# Patient Record
Sex: FEMALE | Race: WHITE | NOT HISPANIC OR LATINO
[De-identification: names, ages, dates, MRNs, and addresses within clinical notes are randomized per-mention and may not be internally consistent; named-entity substitution may affect disease eponyms.]

---

## 2016-04-11 RX ORDER — INSULIN ASPART 100 [IU]/ML
22 INJECTION, SOLUTION SUBCUTANEOUS
Qty: 1 | Refills: 0 | COMMUNITY
Start: 2016-04-11 | End: 2016-05-11

## 2017-01-13 ENCOUNTER — APPOINTMENT (OUTPATIENT)
Dept: CARDIOLOGY | Facility: CLINIC | Age: 82
End: 2017-01-13

## 2017-04-06 ENCOUNTER — INPATIENT (INPATIENT)
Facility: HOSPITAL | Age: 82
LOS: 0 days | Discharge: ANOTHER HOSPITAL | DRG: 281 | End: 2017-04-07
Attending: HOSPITALIST | Admitting: HOSPITALIST
Payer: MEDICARE

## 2017-04-06 VITALS
DIASTOLIC BLOOD PRESSURE: 107 MMHG | HEIGHT: 60 IN | TEMPERATURE: 98 F | OXYGEN SATURATION: 98 % | HEART RATE: 115 BPM | SYSTOLIC BLOOD PRESSURE: 188 MMHG | WEIGHT: 194.01 LBS

## 2017-04-06 DIAGNOSIS — R93.1 ABNORMAL FINDINGS ON DIAGNOSTIC IMAGING OF HEART AND CORONARY CIRCULATION: Chronic | ICD-10-CM

## 2017-04-06 DIAGNOSIS — Z90.49 ACQUIRED ABSENCE OF OTHER SPECIFIED PARTS OF DIGESTIVE TRACT: Chronic | ICD-10-CM

## 2017-04-06 PROCEDURE — 93010 ELECTROCARDIOGRAM REPORT: CPT

## 2017-04-06 PROCEDURE — 71010: CPT | Mod: 26

## 2017-04-06 RX ORDER — CLOPIDOGREL BISULFATE 75 MG/1
300 TABLET, FILM COATED ORAL ONCE
Qty: 0 | Refills: 0 | Status: COMPLETED | OUTPATIENT
Start: 2017-04-06 | End: 2017-04-06

## 2017-04-06 RX ORDER — ASPIRIN/CALCIUM CARB/MAGNESIUM 324 MG
325 TABLET ORAL ONCE
Qty: 0 | Refills: 0 | Status: COMPLETED | OUTPATIENT
Start: 2017-04-06 | End: 2017-04-06

## 2017-04-06 RX ORDER — ENOXAPARIN SODIUM 100 MG/ML
90 INJECTION SUBCUTANEOUS ONCE
Qty: 0 | Refills: 0 | Status: COMPLETED | OUTPATIENT
Start: 2017-04-06 | End: 2017-04-06

## 2017-04-06 RX ORDER — NITROGLYCERIN 6.5 MG
10 CAPSULE, EXTENDED RELEASE ORAL
Qty: 50 | Refills: 0 | Status: DISCONTINUED | OUTPATIENT
Start: 2017-04-06 | End: 2017-04-07

## 2017-04-06 NOTE — ED PROVIDER NOTE - SIGNIFICANT NEGATIVE FINDINGS
no headache, no syncope , no palpitations, no n/v/d, no urinary symptoms, no bleeding. no neuro changes.

## 2017-04-06 NOTE — ED ADULT NURSE NOTE - PMH
Asthma    CAD (coronary artery disease)    Diabetes    HLD (hyperlipidemia)    HTN (hypertension)    Hypothyroid

## 2017-04-06 NOTE — ED PROVIDER NOTE - CARE PLAN
Principal Discharge DX:	Chest pain  Secondary Diagnosis:	Asthma  Secondary Diagnosis:	Abdominal pain

## 2017-04-06 NOTE — ED PROVIDER NOTE - OBJECTIVE STATEMENT
83 y/o w/f h/o Asthma, HTN c/o chest tightness, SOB and wheezing. She took one albuterol PTA. The chest  tightness has subsided, the breathing discomfort is reduced. She also has abdominal bloating and RLQ pain

## 2017-04-06 NOTE — ED ADULT NURSE NOTE - OBJECTIVE STATEMENT
pt presents to the ER with an episode of chest pain that lasted two hours across her chest with SOB   EMS started PIV and gave aspirin and nitroglycerin with relief

## 2017-04-07 ENCOUNTER — INPATIENT (INPATIENT)
Facility: HOSPITAL | Age: 82
LOS: 3 days | Discharge: HOME | DRG: 246 | End: 2017-04-11
Attending: HOSPITALIST | Admitting: INTERNAL MEDICINE
Payer: MEDICARE

## 2017-04-07 VITALS
WEIGHT: 194.01 LBS | DIASTOLIC BLOOD PRESSURE: 77 MMHG | HEIGHT: 60 IN | OXYGEN SATURATION: 97 % | SYSTOLIC BLOOD PRESSURE: 177 MMHG | TEMPERATURE: 98 F | HEART RATE: 78 BPM | RESPIRATION RATE: 20 BRPM

## 2017-04-07 VITALS
TEMPERATURE: 98 F | DIASTOLIC BLOOD PRESSURE: 78 MMHG | SYSTOLIC BLOOD PRESSURE: 144 MMHG | OXYGEN SATURATION: 96 % | RESPIRATION RATE: 17 BRPM | HEART RATE: 70 BPM

## 2017-04-07 DIAGNOSIS — K21.9 GASTRO-ESOPHAGEAL REFLUX DISEASE WITHOUT ESOPHAGITIS: ICD-10-CM

## 2017-04-07 DIAGNOSIS — E03.9 HYPOTHYROIDISM, UNSPECIFIED: ICD-10-CM

## 2017-04-07 DIAGNOSIS — Z90.49 ACQUIRED ABSENCE OF OTHER SPECIFIED PARTS OF DIGESTIVE TRACT: Chronic | ICD-10-CM

## 2017-04-07 DIAGNOSIS — I10 ESSENTIAL (PRIMARY) HYPERTENSION: ICD-10-CM

## 2017-04-07 DIAGNOSIS — Z29.9 ENCOUNTER FOR PROPHYLACTIC MEASURES, UNSPECIFIED: ICD-10-CM

## 2017-04-07 DIAGNOSIS — N17.9 ACUTE KIDNEY FAILURE, UNSPECIFIED: ICD-10-CM

## 2017-04-07 DIAGNOSIS — R07.9 CHEST PAIN, UNSPECIFIED: ICD-10-CM

## 2017-04-07 DIAGNOSIS — E78.5 HYPERLIPIDEMIA, UNSPECIFIED: ICD-10-CM

## 2017-04-07 DIAGNOSIS — I21.4 NON-ST ELEVATION (NSTEMI) MYOCARDIAL INFARCTION: ICD-10-CM

## 2017-04-07 DIAGNOSIS — E11.9 TYPE 2 DIABETES MELLITUS WITHOUT COMPLICATIONS: ICD-10-CM

## 2017-04-07 DIAGNOSIS — R07.89 OTHER CHEST PAIN: ICD-10-CM

## 2017-04-07 DIAGNOSIS — R10.9 UNSPECIFIED ABDOMINAL PAIN: ICD-10-CM

## 2017-04-07 DIAGNOSIS — J45.909 UNSPECIFIED ASTHMA, UNCOMPLICATED: ICD-10-CM

## 2017-04-07 DIAGNOSIS — R93.1 ABNORMAL FINDINGS ON DIAGNOSTIC IMAGING OF HEART AND CORONARY CIRCULATION: Chronic | ICD-10-CM

## 2017-04-07 LAB
ALBUMIN SERPL ELPH-MCNC: 3.2 G/DL — LOW (ref 3.3–5)
ALP SERPL-CCNC: 80 U/L — SIGNIFICANT CHANGE UP (ref 40–120)
ALT FLD-CCNC: 17 U/L — SIGNIFICANT CHANGE UP (ref 12–78)
ANION GAP SERPL CALC-SCNC: 12 MMOL/L — SIGNIFICANT CHANGE UP (ref 5–17)
ANION GAP SERPL CALC-SCNC: 12 MMOL/L — SIGNIFICANT CHANGE UP (ref 5–17)
AST SERPL-CCNC: 15 U/L — SIGNIFICANT CHANGE UP (ref 15–37)
BASOPHILS # BLD AUTO: 0.1 K/UL — SIGNIFICANT CHANGE UP (ref 0–0.2)
BASOPHILS NFR BLD AUTO: 0.9 % — SIGNIFICANT CHANGE UP (ref 0–2)
BILIRUB SERPL-MCNC: 0.3 MG/DL — SIGNIFICANT CHANGE UP (ref 0.2–1.2)
BUN SERPL-MCNC: 21 MG/DL — SIGNIFICANT CHANGE UP (ref 7–23)
BUN SERPL-MCNC: 24 MG/DL — HIGH (ref 7–23)
CALCIUM SERPL-MCNC: 8.3 MG/DL — LOW (ref 8.5–10.1)
CALCIUM SERPL-MCNC: 8.4 MG/DL — LOW (ref 8.5–10.1)
CHLORIDE SERPL-SCNC: 101 MMOL/L — SIGNIFICANT CHANGE UP (ref 96–108)
CHLORIDE SERPL-SCNC: 102 MMOL/L — SIGNIFICANT CHANGE UP (ref 96–108)
CHOLEST SERPL-MCNC: 167 MG/DL — SIGNIFICANT CHANGE UP (ref 10–199)
CK SERPL-CCNC: 182 U/L — SIGNIFICANT CHANGE UP (ref 26–192)
CO2 SERPL-SCNC: 25 MMOL/L — SIGNIFICANT CHANGE UP (ref 22–31)
CO2 SERPL-SCNC: 25 MMOL/L — SIGNIFICANT CHANGE UP (ref 22–31)
CREAT SERPL-MCNC: 1.1 MG/DL — SIGNIFICANT CHANGE UP (ref 0.5–1.3)
CREAT SERPL-MCNC: 1.4 MG/DL — HIGH (ref 0.5–1.3)
EOSINOPHIL # BLD AUTO: 0.1 K/UL — SIGNIFICANT CHANGE UP (ref 0–0.5)
EOSINOPHIL NFR BLD AUTO: 1.1 % — SIGNIFICANT CHANGE UP (ref 0–6)
GLUCOSE SERPL-MCNC: 306 MG/DL — HIGH (ref 70–99)
GLUCOSE SERPL-MCNC: 404 MG/DL — HIGH (ref 70–99)
HBA1C BLD-MCNC: 9.3 % — HIGH (ref 4–5.6)
HCT VFR BLD CALC: 39.3 % — SIGNIFICANT CHANGE UP (ref 34.5–45)
HCT VFR BLD CALC: 39.8 % — SIGNIFICANT CHANGE UP (ref 34.5–45)
HDLC SERPL-MCNC: 37 MG/DL — LOW (ref 40–125)
HGB BLD-MCNC: 12.9 G/DL — SIGNIFICANT CHANGE UP (ref 11.5–15.5)
HGB BLD-MCNC: 13.1 G/DL — SIGNIFICANT CHANGE UP (ref 11.5–15.5)
INR BLD: 0.98 RATIO — SIGNIFICANT CHANGE UP (ref 0.88–1.16)
LIPID PNL WITH DIRECT LDL SERPL: 89 MG/DL — SIGNIFICANT CHANGE UP
LYMPHOCYTES # BLD AUTO: 2.8 K/UL — SIGNIFICANT CHANGE UP (ref 1–3.3)
LYMPHOCYTES # BLD AUTO: 20.4 % — SIGNIFICANT CHANGE UP (ref 13–44)
MCHC RBC-ENTMCNC: 27.4 PG — SIGNIFICANT CHANGE UP (ref 27–34)
MCHC RBC-ENTMCNC: 28.3 PG — SIGNIFICANT CHANGE UP (ref 27–34)
MCHC RBC-ENTMCNC: 32.4 GM/DL — SIGNIFICANT CHANGE UP (ref 32–36)
MCHC RBC-ENTMCNC: 33.4 GM/DL — SIGNIFICANT CHANGE UP (ref 32–36)
MCV RBC AUTO: 84.4 FL — SIGNIFICANT CHANGE UP (ref 80–100)
MCV RBC AUTO: 84.6 FL — SIGNIFICANT CHANGE UP (ref 80–100)
MONOCYTES # BLD AUTO: 1 K/UL — HIGH (ref 0–0.9)
MONOCYTES NFR BLD AUTO: 7.2 % — SIGNIFICANT CHANGE UP (ref 1–9)
NEUTROPHILS # BLD AUTO: 9.5 K/UL — HIGH (ref 1.8–7.4)
NEUTROPHILS NFR BLD AUTO: 70.5 % — SIGNIFICANT CHANGE UP (ref 43–77)
PLATELET # BLD AUTO: 232 K/UL — SIGNIFICANT CHANGE UP (ref 150–400)
PLATELET # BLD AUTO: 238 K/UL — SIGNIFICANT CHANGE UP (ref 150–400)
POTASSIUM SERPL-MCNC: 3.8 MMOL/L — SIGNIFICANT CHANGE UP (ref 3.5–5.3)
POTASSIUM SERPL-MCNC: 3.8 MMOL/L — SIGNIFICANT CHANGE UP (ref 3.5–5.3)
POTASSIUM SERPL-SCNC: 3.8 MMOL/L — SIGNIFICANT CHANGE UP (ref 3.5–5.3)
POTASSIUM SERPL-SCNC: 3.8 MMOL/L — SIGNIFICANT CHANGE UP (ref 3.5–5.3)
PROT SERPL-MCNC: 7.1 G/DL — SIGNIFICANT CHANGE UP (ref 6–8.3)
PROTHROM AB SERPL-ACNC: 10.7 SEC — SIGNIFICANT CHANGE UP (ref 9.8–12.7)
RBC # BLD: 4.64 M/UL — SIGNIFICANT CHANGE UP (ref 3.8–5.2)
RBC # BLD: 4.72 M/UL — SIGNIFICANT CHANGE UP (ref 3.8–5.2)
RBC # FLD: 12.9 % — SIGNIFICANT CHANGE UP (ref 10.3–14.5)
RBC # FLD: 12.9 % — SIGNIFICANT CHANGE UP (ref 10.3–14.5)
SODIUM SERPL-SCNC: 138 MMOL/L — SIGNIFICANT CHANGE UP (ref 135–145)
SODIUM SERPL-SCNC: 139 MMOL/L — SIGNIFICANT CHANGE UP (ref 135–145)
TOTAL CHOLESTEROL/HDL RATIO MEASUREMENT: 4.5 RATIO — SIGNIFICANT CHANGE UP (ref 3.3–7.1)
TRIGL SERPL-MCNC: 204 MG/DL — HIGH (ref 10–149)
TROPONIN I SERPL-MCNC: 0.43 NG/ML — HIGH (ref 0.01–0.04)
WBC # BLD: 12.4 K/UL — HIGH (ref 3.8–10.5)
WBC # BLD: 13.5 K/UL — HIGH (ref 3.8–10.5)
WBC # FLD AUTO: 12.4 K/UL — HIGH (ref 3.8–10.5)
WBC # FLD AUTO: 13.5 K/UL — HIGH (ref 3.8–10.5)

## 2017-04-07 PROCEDURE — 96372 THER/PROPH/DIAG INJ SC/IM: CPT | Mod: 59

## 2017-04-07 PROCEDURE — 74176 CT ABD & PELVIS W/O CONTRAST: CPT

## 2017-04-07 PROCEDURE — 83036 HEMOGLOBIN GLYCOSYLATED A1C: CPT

## 2017-04-07 PROCEDURE — 85610 PROTHROMBIN TIME: CPT

## 2017-04-07 PROCEDURE — 74176 CT ABD & PELVIS W/O CONTRAST: CPT | Mod: 26

## 2017-04-07 PROCEDURE — 96374 THER/PROPH/DIAG INJ IV PUSH: CPT

## 2017-04-07 PROCEDURE — 93005 ELECTROCARDIOGRAM TRACING: CPT

## 2017-04-07 PROCEDURE — 80053 COMPREHEN METABOLIC PANEL: CPT

## 2017-04-07 PROCEDURE — 71045 X-RAY EXAM CHEST 1 VIEW: CPT

## 2017-04-07 PROCEDURE — 84484 ASSAY OF TROPONIN QUANT: CPT

## 2017-04-07 PROCEDURE — 94640 AIRWAY INHALATION TREATMENT: CPT

## 2017-04-07 PROCEDURE — 82550 ASSAY OF CK (CPK): CPT

## 2017-04-07 PROCEDURE — 93458 L HRT ARTERY/VENTRICLE ANGIO: CPT | Mod: 26,59

## 2017-04-07 PROCEDURE — 92941 PRQ TRLML REVSC TOT OCCL AMI: CPT | Mod: LD

## 2017-04-07 PROCEDURE — 76705 ECHO EXAM OF ABDOMEN: CPT

## 2017-04-07 PROCEDURE — 99239 HOSP IP/OBS DSCHRG MGMT >30: CPT

## 2017-04-07 PROCEDURE — 99223 1ST HOSP IP/OBS HIGH 75: CPT | Mod: AI,GC

## 2017-04-07 PROCEDURE — 80061 LIPID PANEL: CPT

## 2017-04-07 PROCEDURE — 76705 ECHO EXAM OF ABDOMEN: CPT | Mod: 26

## 2017-04-07 PROCEDURE — 80048 BASIC METABOLIC PNL TOTAL CA: CPT

## 2017-04-07 PROCEDURE — 99223 1ST HOSP IP/OBS HIGH 75: CPT

## 2017-04-07 PROCEDURE — 85027 COMPLETE CBC AUTOMATED: CPT

## 2017-04-07 PROCEDURE — 99285 EMERGENCY DEPT VISIT HI MDM: CPT

## 2017-04-07 PROCEDURE — 82553 CREATINE MB FRACTION: CPT

## 2017-04-07 PROCEDURE — 99285 EMERGENCY DEPT VISIT HI MDM: CPT | Mod: 25

## 2017-04-07 PROCEDURE — 93010 ELECTROCARDIOGRAM REPORT: CPT

## 2017-04-07 RX ORDER — FLUTICASONE PROPIONATE AND SALMETEROL 50; 250 UG/1; UG/1
1 POWDER ORAL; RESPIRATORY (INHALATION)
Qty: 0 | Refills: 0 | Status: DISCONTINUED | OUTPATIENT
Start: 2017-04-07 | End: 2017-04-11

## 2017-04-07 RX ORDER — TIOTROPIUM BROMIDE 18 UG/1
1 CAPSULE ORAL; RESPIRATORY (INHALATION) DAILY
Qty: 0 | Refills: 0 | Status: DISCONTINUED | OUTPATIENT
Start: 2017-04-07 | End: 2017-04-07

## 2017-04-07 RX ORDER — DEXTROSE 50 % IN WATER 50 %
50 SYRINGE (ML) INTRAVENOUS
Qty: 0 | Refills: 0 | COMMUNITY
Start: 2017-04-07

## 2017-04-07 RX ORDER — INSULIN GLARGINE 100 [IU]/ML
55 INJECTION, SOLUTION SUBCUTANEOUS AT BEDTIME
Qty: 0 | Refills: 0 | Status: DISCONTINUED | OUTPATIENT
Start: 2017-04-07 | End: 2017-04-07

## 2017-04-07 RX ORDER — INSULIN GLARGINE 100 [IU]/ML
48 INJECTION, SOLUTION SUBCUTANEOUS AT BEDTIME
Qty: 0 | Refills: 0 | Status: DISCONTINUED | OUTPATIENT
Start: 2017-04-07 | End: 2017-04-09

## 2017-04-07 RX ORDER — ASPIRIN/CALCIUM CARB/MAGNESIUM 324 MG
325 TABLET ORAL DAILY
Qty: 0 | Refills: 0 | Status: DISCONTINUED | OUTPATIENT
Start: 2017-04-07 | End: 2017-04-07

## 2017-04-07 RX ORDER — CLOPIDOGREL BISULFATE 75 MG/1
75 TABLET, FILM COATED ORAL DAILY
Qty: 0 | Refills: 0 | Status: DISCONTINUED | OUTPATIENT
Start: 2017-04-08 | End: 2017-04-11

## 2017-04-07 RX ORDER — METOPROLOL TARTRATE 50 MG
50 TABLET ORAL DAILY
Qty: 0 | Refills: 0 | Status: COMPLETED | OUTPATIENT
Start: 2017-04-07 | End: 2017-04-07

## 2017-04-07 RX ORDER — ALBUTEROL 90 UG/1
2 AEROSOL, METERED ORAL EVERY 6 HOURS
Qty: 0 | Refills: 0 | Status: DISCONTINUED | OUTPATIENT
Start: 2017-04-07 | End: 2017-04-11

## 2017-04-07 RX ORDER — ASPIRIN/CALCIUM CARB/MAGNESIUM 324 MG
1 TABLET ORAL
Qty: 0 | Refills: 0 | COMMUNITY
Start: 2017-04-07

## 2017-04-07 RX ORDER — DEXTROSE 50 % IN WATER 50 %
25 SYRINGE (ML) INTRAVENOUS ONCE
Qty: 0 | Refills: 0 | Status: DISCONTINUED | OUTPATIENT
Start: 2017-04-07 | End: 2017-04-07

## 2017-04-07 RX ORDER — INSULIN LISPRO 100/ML
VIAL (ML) SUBCUTANEOUS
Qty: 0 | Refills: 0 | Status: DISCONTINUED | OUTPATIENT
Start: 2017-04-07 | End: 2017-04-07

## 2017-04-07 RX ORDER — DEXTROSE 50 % IN WATER 50 %
1 SYRINGE (ML) INTRAVENOUS ONCE
Qty: 0 | Refills: 0 | Status: DISCONTINUED | OUTPATIENT
Start: 2017-04-07 | End: 2017-04-07

## 2017-04-07 RX ORDER — PANTOPRAZOLE SODIUM 20 MG/1
40 TABLET, DELAYED RELEASE ORAL
Qty: 0 | Refills: 0 | Status: DISCONTINUED | OUTPATIENT
Start: 2017-04-07 | End: 2017-04-11

## 2017-04-07 RX ORDER — FUROSEMIDE 40 MG
20 TABLET ORAL DAILY
Qty: 0 | Refills: 0 | Status: DISCONTINUED | OUTPATIENT
Start: 2017-04-07 | End: 2017-04-09

## 2017-04-07 RX ORDER — TIOTROPIUM BROMIDE 18 UG/1
1 CAPSULE ORAL; RESPIRATORY (INHALATION) DAILY
Qty: 0 | Refills: 0 | Status: DISCONTINUED | OUTPATIENT
Start: 2017-04-07 | End: 2017-04-11

## 2017-04-07 RX ORDER — METOPROLOL TARTRATE 50 MG
1 TABLET ORAL
Qty: 0 | Refills: 0 | COMMUNITY
Start: 2017-04-07

## 2017-04-07 RX ORDER — INSULIN LISPRO 100/ML
22 VIAL (ML) SUBCUTANEOUS
Qty: 0 | Refills: 0 | Status: DISCONTINUED | OUTPATIENT
Start: 2017-04-07 | End: 2017-04-07

## 2017-04-07 RX ORDER — LEVOTHYROXINE SODIUM 125 MCG
112 TABLET ORAL DAILY
Qty: 0 | Refills: 0 | Status: DISCONTINUED | OUTPATIENT
Start: 2017-04-07 | End: 2017-04-07

## 2017-04-07 RX ORDER — BUDESONIDE AND FORMOTEROL FUMARATE DIHYDRATE 160; 4.5 UG/1; UG/1
2 AEROSOL RESPIRATORY (INHALATION)
Qty: 0 | Refills: 0 | COMMUNITY

## 2017-04-07 RX ORDER — ALBUTEROL 90 UG/1
2.5 AEROSOL, METERED ORAL ONCE
Qty: 0 | Refills: 0 | Status: COMPLETED | OUTPATIENT
Start: 2017-04-07 | End: 2017-04-08

## 2017-04-07 RX ORDER — DEXTROSE 50 % IN WATER 50 %
12.5 SYRINGE (ML) INTRAVENOUS ONCE
Qty: 0 | Refills: 0 | Status: DISCONTINUED | OUTPATIENT
Start: 2017-04-07 | End: 2017-04-11

## 2017-04-07 RX ORDER — LABETALOL HCL 100 MG
10 TABLET ORAL ONCE
Qty: 0 | Refills: 0 | Status: COMPLETED | OUTPATIENT
Start: 2017-04-07 | End: 2017-04-07

## 2017-04-07 RX ORDER — METOPROLOL TARTRATE 50 MG
25 TABLET ORAL ONCE
Qty: 0 | Refills: 0 | Status: COMPLETED | OUTPATIENT
Start: 2017-04-07 | End: 2017-04-07

## 2017-04-07 RX ORDER — INSULIN LISPRO 100/ML
VIAL (ML) SUBCUTANEOUS
Qty: 0 | Refills: 0 | Status: DISCONTINUED | OUTPATIENT
Start: 2017-04-07 | End: 2017-04-10

## 2017-04-07 RX ORDER — INSULIN LISPRO 100/ML
0 VIAL (ML) SUBCUTANEOUS
Qty: 0 | Refills: 0 | COMMUNITY
Start: 2017-04-07

## 2017-04-07 RX ORDER — DEXTROSE 50 % IN WATER 50 %
25 SYRINGE (ML) INTRAVENOUS ONCE
Qty: 0 | Refills: 0 | Status: DISCONTINUED | OUTPATIENT
Start: 2017-04-07 | End: 2017-04-11

## 2017-04-07 RX ORDER — GLUCAGON INJECTION, SOLUTION 0.5 MG/.1ML
1 INJECTION, SOLUTION SUBCUTANEOUS ONCE
Qty: 0 | Refills: 0 | Status: DISCONTINUED | OUTPATIENT
Start: 2017-04-07 | End: 2017-04-11

## 2017-04-07 RX ORDER — LEVOTHYROXINE SODIUM 125 MCG
112 TABLET ORAL DAILY
Qty: 0 | Refills: 0 | Status: DISCONTINUED | OUTPATIENT
Start: 2017-04-07 | End: 2017-04-11

## 2017-04-07 RX ORDER — ATORVASTATIN CALCIUM 80 MG/1
40 TABLET, FILM COATED ORAL AT BEDTIME
Qty: 0 | Refills: 0 | Status: DISCONTINUED | OUTPATIENT
Start: 2017-04-07 | End: 2017-04-11

## 2017-04-07 RX ORDER — INSULIN LISPRO 100/ML
15 VIAL (ML) SUBCUTANEOUS
Qty: 0 | Refills: 0 | Status: DISCONTINUED | OUTPATIENT
Start: 2017-04-07 | End: 2017-04-09

## 2017-04-07 RX ORDER — INSULIN LISPRO 100/ML
11 VIAL (ML) SUBCUTANEOUS
Qty: 0 | Refills: 0 | Status: DISCONTINUED | OUTPATIENT
Start: 2017-04-07 | End: 2017-04-07

## 2017-04-07 RX ORDER — ASPIRIN/CALCIUM CARB/MAGNESIUM 324 MG
81 TABLET ORAL DAILY
Qty: 0 | Refills: 0 | Status: DISCONTINUED | OUTPATIENT
Start: 2017-04-08 | End: 2017-04-11

## 2017-04-07 RX ORDER — CLOPIDOGREL BISULFATE 75 MG/1
1 TABLET, FILM COATED ORAL
Qty: 0 | Refills: 0 | COMMUNITY
Start: 2017-04-07

## 2017-04-07 RX ORDER — ATORVASTATIN CALCIUM 80 MG/1
40 TABLET, FILM COATED ORAL AT BEDTIME
Qty: 0 | Refills: 0 | Status: DISCONTINUED | OUTPATIENT
Start: 2017-04-07 | End: 2017-04-07

## 2017-04-07 RX ORDER — SODIUM CHLORIDE 9 MG/ML
1000 INJECTION, SOLUTION INTRAVENOUS
Qty: 0 | Refills: 0 | COMMUNITY
Start: 2017-04-07

## 2017-04-07 RX ORDER — BUDESONIDE AND FORMOTEROL FUMARATE DIHYDRATE 160; 4.5 UG/1; UG/1
2 AEROSOL RESPIRATORY (INHALATION)
Qty: 0 | Refills: 0 | COMMUNITY
Start: 2017-04-07

## 2017-04-07 RX ORDER — SODIUM CHLORIDE 9 MG/ML
1000 INJECTION INTRAMUSCULAR; INTRAVENOUS; SUBCUTANEOUS
Qty: 0 | Refills: 0 | Status: DISCONTINUED | OUTPATIENT
Start: 2017-04-07 | End: 2017-04-07

## 2017-04-07 RX ORDER — DEXTROSE 50 % IN WATER 50 %
1 SYRINGE (ML) INTRAVENOUS ONCE
Qty: 0 | Refills: 0 | Status: DISCONTINUED | OUTPATIENT
Start: 2017-04-07 | End: 2017-04-11

## 2017-04-07 RX ORDER — ALBUTEROL 90 UG/1
2.5 AEROSOL, METERED ORAL EVERY 6 HOURS
Qty: 0 | Refills: 0 | Status: DISCONTINUED | OUTPATIENT
Start: 2017-04-07 | End: 2017-04-07

## 2017-04-07 RX ORDER — DEXTROSE 50 % IN WATER 50 %
1 SYRINGE (ML) INTRAVENOUS
Qty: 0 | Refills: 0 | COMMUNITY
Start: 2017-04-07

## 2017-04-07 RX ORDER — DEXTROSE 50 % IN WATER 50 %
12.5 SYRINGE (ML) INTRAVENOUS ONCE
Qty: 0 | Refills: 0 | Status: DISCONTINUED | OUTPATIENT
Start: 2017-04-07 | End: 2017-04-07

## 2017-04-07 RX ORDER — DEXTROSE 50 % IN WATER 50 %
25 SYRINGE (ML) INTRAVENOUS
Qty: 0 | Refills: 0 | COMMUNITY
Start: 2017-04-07

## 2017-04-07 RX ORDER — INSULIN LISPRO 100/ML
11 VIAL (ML) SUBCUTANEOUS
Qty: 0 | Refills: 0 | COMMUNITY
Start: 2017-04-07

## 2017-04-07 RX ORDER — SODIUM CHLORIDE 9 MG/ML
1000 INJECTION, SOLUTION INTRAVENOUS
Qty: 0 | Refills: 0 | Status: DISCONTINUED | OUTPATIENT
Start: 2017-04-07 | End: 2017-04-07

## 2017-04-07 RX ORDER — GLUCAGON INJECTION, SOLUTION 0.5 MG/.1ML
1 INJECTION, SOLUTION SUBCUTANEOUS ONCE
Qty: 0 | Refills: 0 | Status: DISCONTINUED | OUTPATIENT
Start: 2017-04-07 | End: 2017-04-07

## 2017-04-07 RX ORDER — BUDESONIDE AND FORMOTEROL FUMARATE DIHYDRATE 160; 4.5 UG/1; UG/1
2 AEROSOL RESPIRATORY (INHALATION)
Qty: 0 | Refills: 0 | Status: DISCONTINUED | OUTPATIENT
Start: 2017-04-07 | End: 2017-04-07

## 2017-04-07 RX ORDER — SODIUM CHLORIDE 9 MG/ML
1000 INJECTION, SOLUTION INTRAVENOUS
Qty: 0 | Refills: 0 | Status: DISCONTINUED | OUTPATIENT
Start: 2017-04-07 | End: 2017-04-11

## 2017-04-07 RX ORDER — CLOPIDOGREL BISULFATE 75 MG/1
75 TABLET, FILM COATED ORAL DAILY
Qty: 0 | Refills: 0 | Status: DISCONTINUED | OUTPATIENT
Start: 2017-04-07 | End: 2017-04-07

## 2017-04-07 RX ORDER — INSULIN LISPRO 100/ML
VIAL (ML) SUBCUTANEOUS AT BEDTIME
Qty: 0 | Refills: 0 | Status: DISCONTINUED | OUTPATIENT
Start: 2017-04-07 | End: 2017-04-11

## 2017-04-07 RX ORDER — METOPROLOL TARTRATE 50 MG
50 TABLET ORAL DAILY
Qty: 0 | Refills: 0 | Status: DISCONTINUED | OUTPATIENT
Start: 2017-04-07 | End: 2017-04-11

## 2017-04-07 RX ADMIN — Medication 112 MICROGRAM(S): at 05:43

## 2017-04-07 RX ADMIN — FLUTICASONE PROPIONATE AND SALMETEROL 1 DOSE(S): 50; 250 POWDER ORAL; RESPIRATORY (INHALATION) at 23:16

## 2017-04-07 RX ADMIN — SODIUM CHLORIDE 75 MILLILITER(S): 9 INJECTION INTRAMUSCULAR; INTRAVENOUS; SUBCUTANEOUS at 06:22

## 2017-04-07 RX ADMIN — ATORVASTATIN CALCIUM 40 MILLIGRAM(S): 80 TABLET, FILM COATED ORAL at 23:16

## 2017-04-07 RX ADMIN — Medication 2: at 23:17

## 2017-04-07 RX ADMIN — Medication 50 MILLIGRAM(S): at 10:15

## 2017-04-07 RX ADMIN — Medication 10 MILLIGRAM(S): at 19:50

## 2017-04-07 RX ADMIN — Medication 325 MILLIGRAM(S): at 12:11

## 2017-04-07 RX ADMIN — Medication 11 UNIT(S): at 08:32

## 2017-04-07 RX ADMIN — Medication 3: at 08:32

## 2017-04-07 RX ADMIN — TIOTROPIUM BROMIDE 1 CAPSULE(S): 18 CAPSULE ORAL; RESPIRATORY (INHALATION) at 08:33

## 2017-04-07 RX ADMIN — INSULIN GLARGINE 48 UNIT(S): 100 INJECTION, SOLUTION SUBCUTANEOUS at 23:16

## 2017-04-07 RX ADMIN — ENOXAPARIN SODIUM 90 MILLIGRAM(S): 100 INJECTION SUBCUTANEOUS at 00:10

## 2017-04-07 RX ADMIN — Medication 3 MICROGRAM(S)/MIN: at 00:43

## 2017-04-07 RX ADMIN — Medication 25 MILLIGRAM(S): at 02:05

## 2017-04-07 RX ADMIN — CLOPIDOGREL BISULFATE 300 MILLIGRAM(S): 75 TABLET, FILM COATED ORAL at 00:43

## 2017-04-07 RX ADMIN — BUDESONIDE AND FORMOTEROL FUMARATE DIHYDRATE 2 PUFF(S): 160; 4.5 AEROSOL RESPIRATORY (INHALATION) at 10:15

## 2017-04-07 RX ADMIN — CLOPIDOGREL BISULFATE 75 MILLIGRAM(S): 75 TABLET, FILM COATED ORAL at 12:11

## 2017-04-07 NOTE — H&P ADULT - NSHPSOCIALHISTORY_GEN_ALL_CORE
smoked cigarettes 40 years ago, smoked for 20 year, 1 pack per week   no alcohol, no drugs    , ambulates with walker    received flu vaccine this season   received pna vaccine 3 years ago

## 2017-04-07 NOTE — H&P CARDIOLOGY - PMH
DM2 (diabetes mellitus, type 2)    Essential hypertension    Gastroesophageal reflux disease without esophagitis    Hypothyroidism, unspecified type    Pure hypercholesterolemia    Uncomplicated asthma, unspecified asthma severity DM2 (diabetes mellitus, type 2)    Essential hypertension    Gastroesophageal reflux disease without esophagitis    Hypothyroidism, unspecified type    NSTEMI (non-ST elevated myocardial infarction)    Pure hypercholesterolemia    Uncomplicated asthma, unspecified asthma severity

## 2017-04-07 NOTE — H&P ADULT - FAMILY HISTORY
Father  Still living? Unknown  Family history of heart disease, Age at diagnosis: Age Unknown     Mother  Still living? Unknown  Family history of cancer in mother, Age at diagnosis: Age Unknown

## 2017-04-07 NOTE — H&P ADULT - ASSESSMENT
81 yo female with pmh of DM, Asthma, CAD, hypothyroid, Htn presented c/o chest pain admitted with chest pain r/o acs, veda, elevated glucose, and questionable abdominal pain.

## 2017-04-07 NOTE — H&P ADULT - HISTORY OF PRESENT ILLNESS
81 yo female with pmh of DM, Asthma, CAD, hypothyroid, Htn presented c/o chest pain. Pt states at around 10pm last night she began having this heaviness across her chest and then left arm pain that persisted. Also admitted to worsening sob and nausea. Denies ever having this pain before. Denies abdominal pain, v/d/c, dizziness, headache, blurry vision. Pt denies any current pain now and stated it improved after medication she received in the ER.  In the ED pt was found to have creatinine of 1.4, glucose of 404, trop 0.428. Pt received asa and plavix and full dose lovenox. Pt started on nitroglycerin drip. Pt also had a ct- abdomen (for rlq pain per ER note, which pt is denying now) showing no bowel obstruction, drainable fluid collection or intraperitoneal free air. Cholelithiasis.

## 2017-04-07 NOTE — H&P ADULT. - ASSESSMENT
81 yo female with PMH of DM2, ?CKD, severe Asthma, CAD, hypothyroid, HTN, HLD, obesity, former smoker 5 pack years presented with NSTEMI, s/p cardiac cath and stents x 3 to mid and prox LAD.

## 2017-04-07 NOTE — CONSULT NOTE ADULT - PROBLEM SELECTOR RECOMMENDATION 9
chest pain resolved at this time   recommend readministration of sublingual nitro if returns  submental oxygen

## 2017-04-07 NOTE — H&P ADULT - PROBLEM SELECTOR PLAN 1
admit to tele  follow up with Cardiology   trend troponin   ekg in AM   continue asa and lovenox full dose  pain controlled with nirtroglycerin drip in the ED admit to tele  follow up with Cardiology Dr Marinelli  trend troponin   ekg in AM   continue asa and lovenox full dose  pain controlled with nirtroglycerin drip in the ED admit to tele  follow up with Cardiology Dr Marinelli  trend troponin   ekg in AM   continue asa and plavix, s/p full dose lovenox, discuss with cardio if want to continue  nitroglycerin drip in the ED, d/c for now

## 2017-04-07 NOTE — H&P ADULT - GASTROINTESTINAL DETAILS
bowel sounds normal/no rebound tenderness/no masses palpable/no rigidity/no bruit/no distention/soft/no guarding/nontender/normal/no organomegaly

## 2017-04-07 NOTE — H&P ADULT. - LAB RESULTS AND INTERPRETATION
Labs personally reviewed and interpreted:  labs from [facility] 4/7/17  A1c 9.3, Chol 167, HDL 37, LDL 89  leukocytosis of 13.5, Hb 13.1, platelet 232, electrolytes normal BUN 21, cr. 1.1, glucose 306  calcium 8.3; , CKMB 15, troponin 0.045

## 2017-04-07 NOTE — CONSULT NOTE ADULT - PROBLEM SELECTOR RECOMMENDATION 2
discontinue Tridol ( nitro) drip   give hydralazine IV or labetalol for hypertension if returns   restart home antihypertensive meds

## 2017-04-07 NOTE — H&P ADULT - ATTENDING COMMENTS
83yo f pmh stated above comes in with Chest pain, hypertensive,  nitroglycerin was started. EKG st depression. Trops elevated.  G was contacted, recommended ICU monitoring.  ICU evaluated pt, pt's symptoms subsided, recommended telemetry floor.  Recommended also to D/c nitroglycerin and give labetolol.

## 2017-04-07 NOTE — H&P ADULT. - FAMILY HISTORY
Father  Still living? Unknown  Family history of MI (myocardial infarction), Age at diagnosis: Age Unknown     Mother  Still living? Unknown  Family history of stomach cancer, Age at diagnosis: Age Unknown

## 2017-04-07 NOTE — CONSULT NOTE ADULT - SUBJECTIVE AND OBJECTIVE BOX
REQUESTING PHYSICIAN:      CHIEF COMPLAINT: Patient is a 82y old  Female who presents with a chief complaint of chest pain (07 Apr 2017 03:27)      HPI:  83 yo female with pmh of DM, Asthma, CAD, hypothyroid, Htn presented c/o chest pain. Pt states at around 10pm last night she began having this heaviness across her chest and then left arm pain that persisted. Also admitted to worsening sob and nausea. Denies ever having this pain before. Denies abdominal pain, v/d/c, dizziness, headache, blurry vision. Pt denies any current pain now and stated it improved after medication she received in the ER.  In the ED pt was found to have creatinine of 1.4, glucose of 404, trop 0.428. Pt received asa and plavix and full dose lovenox. Pt started on nitroglycerin drip. Pt also had a ct- abdomen (for rlq pain per ER note, which pt is denying now) showing no bowel obstruction, drainable fluid collection or intraperitoneal free air. Cholelithiasis. (07 Apr 2017 03:27)      PAST MEDICAL / SURGICAL HISTORY:  PAST MEDICAL & SURGICAL HISTORY:  Hypothyroid  HLD (hyperlipidemia)  HTN (hypertension)  CAD (coronary artery disease)  Asthma  Diabetes  History of appendectomy        FAMILY HISTORY: FAMILY HISTORY:  Family history of cancer in mother (Mother)  Family history of heart disease (Father)      MEDICATIONS  (STANDING):  levothyroxine 112MICROGram(s) Oral daily  tiotropium 18 MICROgram(s) Capsule 1Capsule(s) Inhalation daily  buDESOnide 160 MICROgram(s)/formoterol 4.5 MICROgram(s) Inhaler 2Puff(s) Inhalation two times a day  atorvastatin 40milliGRAM(s) Oral at bedtime  diltiazem    Tablet 60milliGRAM(s) Oral three times a day  insulin glargine Injectable (LANTUS) 55Unit(s) SubCutaneous at bedtime  insulin lispro (HumaLOG) corrective regimen sliding scale  SubCutaneous Before meals and at bedtime  dextrose 5%. 1000milliLiter(s) IV Continuous <Continuous>  dextrose 50% Injectable 12.5Gram(s) IV Push once  dextrose 50% Injectable 25Gram(s) IV Push once  dextrose 50% Injectable 25Gram(s) IV Push once  aspirin 325milliGRAM(s) Oral daily  clopidogrel Tablet 75milliGRAM(s) Oral daily  insulin lispro Injectable (HumaLOG) 11Unit(s) SubCutaneous three times a day before meals  sodium chloride 0.9%. 1000milliLiter(s) IV Continuous <Continuous>    MEDICATIONS  (PRN):  ALBUTerol    0.083% 2.5milliGRAM(s) Nebulizer every 6 hours PRN Shortness of Breath and/or Wheezing  dextrose Gel 1Dose(s) Oral once PRN Blood Glucose LESS THAN 70 milliGRAM(s)/deciLiter  glucagon  Injectable 1milliGRAM(s) IntraMuscular once PRN Glucose <70 milliGRAM(s)/deciLiter      Allergies    iodine containing compounds (Unknown)  shellfish (Swelling; Short breath)    Intolerances        REVIEW OF SYSTEMS:    CONSTITUTIONAL: No weakness, fevers or chills  EYES/ENT: No visual changes;  No vertigo or throat pain   NECK: No pain or stiffness  RESPIRATORY: No cough, wheezing, hemoptysis; No shortness of breath  CARDIOVASCULAR: No chest pain or palpitations  GASTROINTESTINAL: No abdominal pain. No nausea, vomiting, or hematemesis; No diarrhea or constipation. No melena or hematochezia.  GENITOURINARY: No dysuria, frequency or hematuria  NEUROLOGICAL: No numbness or weakness  SKIN: No itching or rash  All other review of systems is negative unless indicated above    VITAL SIGNS:   Vital Signs Last 24 Hrs  T(C): 36.7, Max: 36.7 (04-07 @ 02:55)  T(F): 98, Max: 98 (04-07 @ 02:55)  HR: 71 (71 - 115)  BP: 143/58 (128/- - 188/107)  BP(mean): --  RR: 17 (17 - 22)  SpO2: 97% (97% - 98%)    I&O's Summary      PHYSICAL EXAM:    Constitutional: NAD, awake and alert, well-developed  Eyes:  EOMI,  Pupils round, No oral cyanosis.  Pulmonary: Non-labored, breath sounds are clear bilaterally, No wheezing, rales or rhonchi  Cardiovascular: S1 and S2, regular rate and rhythm, no Murmurs, gallops or rubs  Gastrointestinal: Bowel Sounds present, soft, nontender.   Lymph: No peripheral edema. No cervical lymphadenopathy.  Neurological: Alert, no focal deficits  Skin: No rashes.  Psych:  Mood & affect appropriate    LABS: All Labs Reviewed:                        13.1   13.5  )-----------( 232      ( 07 Apr 2017 06:15 )             39.3                         12.9   12.4  )-----------( 238      ( 07 Apr 2017 00:02 )             39.8     07 Apr 2017 06:15    139    |  102    |  21     ----------------------------<  306    3.8     |  25     |  1.10   07 Apr 2017 00:02    138    |  101    |  24     ----------------------------<  404    3.8     |  25     |  1.40     Ca    8.3        07 Apr 2017 06:15  Ca    8.4        07 Apr 2017 00:02    TPro  7.1    /  Alb  3.2    /  TBili  0.3    /  DBili  x      /  AST  15     /  ALT  17     /  AlkPhos  80     07 Apr 2017 00:02    PT/INR - ( 07 Apr 2017 00:02 )   PT: 10.7 sec;   INR: 0.98 ratio           CARDIAC MARKERS ( 07 Apr 2017 06:15 )  3.950 ng/mL / x     / 259 U/L / x     / 15.0 ng/mL  CARDIAC MARKERS ( 07 Apr 2017 00:02 )  .428 ng/mL / x     / 182 U/L / x     / x          Blood Culture:         EKG: Sinus tachycardia. ST-T abn possible ischemia    Imaging:  EXAM:  CHEST PORTABLE IMMED                            PROCEDURE DATE:  04/06/2017        INTERPRETATION:  Chest portable semierect single AP view:    Clinical history:      Chest pain    Findings:    Cardiac size appears normal. Aorta within normal limits. Lungs appear   normal. No acute pulmonary process seen. CP angles appear normal. No   pneumothorax. Bones within normal limits.    Impression:    Unremarkable study.
82y  Female  iodine containing compounds (Unknown)  shellfish (Swelling; Short breath)    PAST MEDICAL & SURGICAL HISTORY:  Hypothyroid  HLD (hyperlipidemia)  HTN (hypertension)  CAD (coronary artery disease)  Asthma  Diabetes  History of appendectomy    FAMILY HISTORY:  Family history of cancer in mother (Mother)  Family history of heart disease (Father)    Patient is a 82y old  Female who presented with a chief complaint of chest pain     HPI:  83 yo female with pmh of DM, Asthma, CAD, hypothyroid, Htn presented c/o chest pain started 10pm last night , described as heaviness across her chest and then left arm pain that persisted. Also admitted to worsening sob and some nausea but no vomiting . Denies v/d/c, dizziness, headache, blurry vision. Pt denies any current pain now which  improved with nitro drip started by ER MD.  trop 0.428.with st depression in V leads but no ST elevation  Pt received asa and Plavix and full dose Lovenox Pt started on nitroglycerin drip but chest discomfort has resolved .   There was also a  ct- abdomen  showing no bowel obstruction, drainable fluid collection or intraperitoneal free air. Cholelithiasis. This was orded by ER MD when pt complained of abdominal pain which she currently denies Presently Pt seems anxious but not in distress or pain.     Vital Signs Last 24 Hrs  T(C): 36.7, Max: 36.7 (04-07 @ 02:55)  T(F): 98, Max: 98 (04-07 @ 02:55)  HR: 71 (71 - 115)  BP: 143/58 (128/- - 188/107)  BP(mean): --  RR: 17 (17 - 22)  SpO2: 97% (97% - 98%)  I&O's Summary    Radiology    ct abdomin   No bowel obstruction, drainable fluid collection or intraperitoneal free  air. Cholelithiasis. Recommend clinical correlation      LABS                        13.1   13.5  )-----------( 232      ( 07 Apr 2017 06:15 )             39.3     PT/INR - ( 07 Apr 2017 00:02 )   PT: 10.7 sec;   INR: 0.98 ratio           04-07    139  |  102  |  21  ----------------------------<  306<H>  3.8   |  25  |  1.10    Ca    8.3<L>      07 Apr 2017 06:15    TPro  7.1  /  Alb  3.2<L>  /  TBili  0.3  /  DBili  x   /  AST  15  /  ALT  17  /  AlkPhos  80  04-07    LIVER FUNCTIONS - ( 07 Apr 2017 00:02 )  Alb: 3.2 g/dL / Pro: 7.1 g/dL / ALK PHOS: 80 U/L / ALT: 17 U/L / AST: 15 U/L / GGT: x           CAPILLARY BLOOD GLUCOSE      VENT SETTINGS     MEDICATIONS  (STANDING):  levothyroxine 112MICROGram(s) Oral daily  tiotropium 18 MICROgram(s) Capsule 1Capsule(s) Inhalation daily  buDESOnide 160 MICROgram(s)/formoterol 4.5 MICROgram(s) Inhaler 2Puff(s) Inhalation two times a day  atorvastatin 40milliGRAM(s) Oral at bedtime  diltiazem    Tablet 60milliGRAM(s) Oral three times a day  insulin glargine Injectable (LANTUS) 55Unit(s) SubCutaneous at bedtime  insulin lispro (HumaLOG) corrective regimen sliding scale  SubCutaneous Before meals and at bedtime  dextrose 5%. 1000milliLiter(s) IV Continuous <Continuous>  dextrose 50% Injectable 12.5Gram(s) IV Push once  dextrose 50% Injectable 25Gram(s) IV Push once  dextrose 50% Injectable 25Gram(s) IV Push once  aspirin 325milliGRAM(s) Oral daily  clopidogrel Tablet 75milliGRAM(s) Oral daily  insulin lispro Injectable (HumaLOG) 11Unit(s) SubCutaneous three times a day before meals  sodium chloride 0.9%. 1000milliLiter(s) IV Continuous <Continuous>      REVIEW OF SYSTEMS:    CONSTITUTIONAL: No fever, weight loss, or fatigue  EYES: No eye pain, visual disturbances, or discharge  ENMT:  No difficulty hearing, tinnitus, vertigo; No sinus or throat pain  NECK: No pain or stiffness  RESPIRATORY: No cough, wheezing, chills or hemoptysis; No shortness of breath  CARDIOVASCULAR:  chest pain resolved with nitro drip , no palpitations, no dizziness, no peripheral edema  GASTROINTESTINAL: No abdominal or epigastric pain. No nausea, vomiting, or hematemesis; No diarrhea or constipation. No melena or hematochezia.  GENITOURINARY: No dysuria, frequency, hematuria, or incontinence  NEUROLOGICAL: No headaches, memory loss, loss of strength, numbness, or tremors  SKIN: No itching, burning, rashes, or lesions   LYMPH NODES: No enlarged glands  ENDOCRINE: No heat or cold intolerance; No hair loss  MUSCULOSKELETAL: No joint pain or swelling; No muscle, back, or extremity pain  PSYCHIATRIC: No depression, anxiety, mood swings, or difficulty sleeping  HEME/LYMPH: No easy bruising, or bleeding gums  ALLERY AND IMMUNOLOGIC: No hives or eczema      Physicial Exam:     Constitutional: obese female slightly anxious   HEENT: PERRLA, EOMI, no drainage or redness  Neck: No bruits; no thyromegaly or nodules,  No JVD  Respiratory: Breath Sounds equal & clear to percussion & auscultation, no accessory muscle use to breath  Cardiovascular: Regular rate & rhythm, normal S1, S2; no murmurs, gallops or rubs; no S3, S4  Gastrointestinal: Soft, non-tender, non distended no hepatosplenomegaly, normal bowel sounds  Extremities: No peripheral edema, No cyanosis, clubbing   Vascular: Equal and normal pulses: 2+ peripheral pulses throughout  Neurological: A&O x 3; no sensory, motor  deficits, normal reflexes  Psychiatric: Normal mood, normal affect  Musculoskeletal: No joint pain, swelling or deformity; no limitation of movement  Skin: No rashes

## 2017-04-07 NOTE — DISCHARGE NOTE ADULT - PATIENT PORTAL LINK FT
“You can access the [employer] Patient Portal, offered by [facility], by registering with the following website: [email or web address]”

## 2017-04-07 NOTE — DISCHARGE NOTE ADULT - SECONDARY DIAGNOSIS.
Abdominal pain TRENTON (acute kidney injury) Diabetes Asthma Essential hypertension HLD (hyperlipidemia)

## 2017-04-07 NOTE — CONSULT NOTE ADULT - PROBLEM SELECTOR RECOMMENDATION 3
consult cardiolgy  admit to telemetry floor with cardiac monitoring   full dose Lovenox given   continue anticoagulation per cardiology  recommend ASA, plavix   considering history of asthma beta blocker with caution consider cardiology   recommendation    TTE in morning  repeat EKG

## 2017-04-07 NOTE — H&P ADULT. - HISTORY OF PRESENT ILLNESS
83 yo female with PMH of DM2, ?CKD, severe Asthma, CAD, hypothyroid, HTN, HLD, obesity, former smoker 5 pack years presented initially to [facility] with c/o severe 10/10 substernal chest pain with tingling down left arm that started at approx 10pm on 4/6/17 with associated worsening SOB, nausea.  At the time, patient was getting ready to go to bed.  She then activated her lifeline and was brought to [facility]. Pt. denied nausea, vomiting, dizziness, diaphoresis, palpitations, weight gain, peripheral edema or syncope. She never had prior episode of chest pain.  Pt. states pain was relieved in ER after receiving NTG and aspirin.  In the ED pt was found to have creatinine of 1.4, glucose of 404, trop 0.428/3.95 Pt was given Plavix 300mg/ lovenox. Pt was started on nitroglycerin drip.  Patient also felt bloated and had abdominal pain for which she had a CT abdomen, only showed cholelithiasis.  Patient was subsequently sent to [facility] for cardiac cath.  She is now s/p cardia cath, found have lesions in LAD and RCA.  She is s/p 3 stents to proximal and mid LAD.  She is now chest pain free.

## 2017-04-07 NOTE — H&P CARDIOLOGY - HISTORY OF PRESENT ILLNESS
This is an 81 yo female with PMH of DM2 managed by Dr. Craig Perlman A1C 9.3, severe Asthma, CAD, hypothyroid, HTN, HLD presented to [facility] with c/o severe 10/10 substernal chest pain with tingling down left arm that started at approx 10pm on 4/6/17 with associated worsening SOB, nausea.  Pt. denied nausea, vomiting, dizziness, diaphoresis, palpitations, weight gain, peripheral edema or syncope.     In the ED pt was found to have creatinine of 1.4, glucose of 404, trop 0.428/3.95 Pt received ASA 325mg/ Plavix 300mg/ and full dose lovenox. Pt started on nitroglycerin drip. Pt also had a ct- abdomen (for rlq pain per ER note, which pt is denying now) showing no bowel obstruction, drainable fluid collection or intraperitoneal free air. Cholelithiasis. This is an 83 yo female with PMH of DM2 managed by Dr. Craig Perlman A1C 9.3, severe Asthma, CAD, hypothyroid, HTN, HLD, obesity, former smoker 5 pack years presented to [place] with c/o severe 10/10 substernal chest pain with tingling down left arm that started at approx 10pm on 4/6/17 with associated worsening SOB, nausea.  Pt. denied nausea, vomiting, dizziness, diaphoresis, palpitations, weight gain, peripheral edema or syncope.     In the ED pt was found to have creatinine of 1.4, glucose of 404, trop 0.428/3.95 Pt received ASA 325mg/ Plavix 300mg/ lovenox 90 mg at 0010 today. Pt started on nitroglycerin drip. Pt also had a ct- abdomen (for rlq pain per ER note, which pt is denying now) showing no bowel obstruction, drainable fluid collection or intraperitoneal free air. Cholelithiasis. This is an 81 yo female with PMH of DM2 managed by Dr. Craig Perlman A1C 9.3, [place], severe Asthma, CAD, hypothyroid, HTN, HLD, obesity, former smoker 5 pack years presented to [place] with c/o severe 10/10 substernal chest pain with tingling down left arm that started at approx 10pm on 4/6/17 with associated worsening SOB, nausea.  Pt. denied nausea, vomiting, dizziness, diaphoresis, palpitations, weight gain, peripheral edema or syncope. Pt. states pain was relieved in ER.    In the ED pt was found to have creatinine of 1.4, glucose of 404, trop 0.428/3.95 Pt received ASA 325mg/ Plavix 300mg/ lovenox 90 mg at 0010 today. Pt started on nitroglycerin drip. Pt also had a CT abdomen (for rlq pain per ER note, which pt is denying now) showing no bowel obstruction, drainable fluid collection or intraperitoneal free air. Cholelithiasis.    Pt. now transferred to [facility] asymptomatic for further evaluation and cardiac cath.

## 2017-04-07 NOTE — DISCHARGE NOTE ADULT - ADDITIONAL INSTRUCTIONS
pt pmd dr andrade / transfer  plan notified / transfer [facility] for cath lab. hold  on metformin for cath .

## 2017-04-07 NOTE — H&P ADULT. - NEUROLOGICAL DETAILS
sensation intact/responds to verbal commands/cranial nerves intact/alert and oriented x 3/responds to pain

## 2017-04-07 NOTE — H&P ADULT - PROBLEM SELECTOR PLAN 6
continue diltiazem uncontrolled, ICU recommend D/C nitroglycerin drip and give IV labetolol   continue diltiazem uncontrolled, ICU recommend D/C nitroglycerin drip and give IV labetolol if BP rising again   continue diltiazem

## 2017-04-07 NOTE — H&P ADULT - PROBLEM SELECTOR PLAN 3
unknown baseline, possible secondary to acs  monitor BMP and continue IVF unknown baseline, possible secondary to acs  monitor BMP and continue IVF  hold lasix for now, restart if needed per cardio

## 2017-04-07 NOTE — H&P ADULT. - PROBLEM SELECTOR PLAN 4
A1c 9.3  sliding scale  started on 48u of lantus and novolog 15 u TIDAC  needs diabetic education as patient is not controlled  hold metformin

## 2017-04-07 NOTE — H&P ADULT. - PMH
DM2 (diabetes mellitus, type 2)    Essential hypertension    Gastroesophageal reflux disease without esophagitis    Hypothyroidism, unspecified type    NSTEMI (non-ST elevated myocardial infarction)    Pure hypercholesterolemia    Uncomplicated asthma, unspecified asthma severity

## 2017-04-07 NOTE — H&P ADULT. - RS GEN PE MLT RESP DETAILS PC
respirations non-labored/normal/clear to auscultation bilaterally/airway patent/breath sounds equal/good air movement

## 2017-04-07 NOTE — DISCHARGE NOTE ADULT - MEDICATION SUMMARY - MEDICATIONS TO STOP TAKING
I will STOP taking the medications listed below when I get home from the hospital:  None I will STOP taking the medications listed below when I get home from the hospital:    furosemide 20 mg oral tablet  -- 1 tab(s) by mouth once a day t, th, sat, sun  2 tab m, w , f    metFORMIN 500 mg oral tablet, extended release  -- 2 tab(s) by mouth 2 times a day

## 2017-04-07 NOTE — DISCHARGE NOTE ADULT - HOSPITAL COURSE
81 yo female with pmh of DM, Asthma, CAD, hypothyroid, Htn presented c/o chest pain. Pt states at around 10pm last night she began having this heaviness across her chest and then left arm pain that persisted. Also admitted to worsening sob and nausea. Denies ever having this pain before. Denies abdominal pain, v/d/c, dizziness, headache, blurry vision. Pt denies any current pain now and stated it improved after medication she received in the ER.  In the ED pt was found to have creatinine of 1.4, glucose of 404, trop 0.428. Pt received asa and plavix and full dose lovenox. Pt started on nitroglycerin drip. Pt also had a ct- abdomen (for rlq pain per ER note, which pt is denying now) showing no bowel obstruction, drainable fluid collection or intraperitoneal free air. Cholelithiasis. (07 Apr 2017 03:27). EKG in ED showed sinus tachycardia, abnormal ST-T, possible ischemia - unofficial read. Chest x ray showed no acute pulmonary process, no pneumothorax. US Gallbladder showed cholelithiasis without significant gallbladder wall thickening or   pericholecystic fluid, questionable liver cirrhosis. Initial troponin 0.428, repeat troponin 3.950.     Admitted for chest pain 2/2 to Acute coronary syndrome, patient ready for immediate transfer to [place] for urgent cardiac cath. Also admitted for elevated glucose and questionable abdominal pain. 83 yo female with pmh of DM, Asthma, CAD, hypothyroid, Htn presented c/o chest pain. Pt states at around 10pm last night she began having this heaviness across her chest and then left arm pain that persisted. Also admitted to worsening sob and nausea. Denies ever having this pain before. Denies abdominal pain, v/d/c, dizziness, headache, blurry vision. Pt denies any current pain now and stated it improved after medication she received in the ER.  In the ED pt was found to have creatinine of 1.4, glucose of 404, trop 0.428. Pt received asa and plavix and full dose lovenox. Pt started on nitroglycerin drip. Pt also had a ct- abdomen (for rlq pain per ER note, which pt is denying now) showing no bowel obstruction, drainable fluid collection or intraperitoneal free air. Cholelithiasis. (07 Apr 2017 03:27). EKG in ED showed sinus tachycardia, abnormal ST-T, possible ischemia - unofficial read. Chest x ray showed no acute pulmonary process, no pneumothorax. US Gallbladder showed cholelithiasis without significant gallbladder wall thickening or   pericholecystic fluid, questionable liver cirrhosis. Initial troponin 0.428, repeat troponin 3.950.     Admitted for chest pain 2/2 to Acute coronary syndrome / nstemi , patient ready for immediate transfer to [place] for urgent cardiac cath. Also admitted for elevated glucose and questionable abdominal pain  sec to possible acs . 81 yo female with pmh of DM, Asthma, CAD, hypothyroid, Htn presented c/o chest pain. Pt states at around 10pm last night she began having this heaviness across her chest and then left arm pain that persisted. Also admitted to worsening sob and nausea. Denies ever having this pain before. Denies abdominal pain, v/d/c, dizziness, headache, blurry vision. Pt denies any current pain now and stated it improved after medication she received in the ER.  In the ED pt was found to have creatinine of 1.4, glucose of 404, trop 0.428. Pt received asa and plavix and full dose lovenox. Pt started on nitroglycerin drip. Pt also had a ct- abdomen (for rlq pain per ER note, which pt is denying now) showing no bowel obstruction, drainable fluid collection or intraperitoneal free air. Cholelithiasis. (07 Apr 2017 03:27). EKG in ED showed sinus tachycardia, abnormal ST-T, possible ischemia - unofficial read. Chest x ray showed no acute pulmonary process, no pneumothorax. US Gallbladder showed cholelithiasis without significant gallbladder wall thickening or   pericholecystic fluid, questionable liver cirrhosis. Initial troponin 0.428, repeat troponin 3.950.     Admitted for chest pain 2/2 to Acute coronary syndrome / nstemi , patient ready for immediate transfer to [place] for urgent cardiac cath. Also admitted for elevated glucose and questionable abdominal pain  sec to possible acs .pt does have cho lithiasis but no cholecystitis / mild leucocytosis sec to stress induce , hx dm hold on metformin cont insulin, mild veda resolved cr stable . pmd dr ramirez notified dc plan .

## 2017-04-07 NOTE — CONSULT NOTE ADULT - ASSESSMENT
Problem 1:   Chest pain/ACS.  Elevated troponin. Currently pain free.  continue asa and plavix, s/p full dose lovenox, add B blocker. admit to tele  follow up with Cardiology Dr Marinelli  trend troponin   ekg in AM   continue asa and lovenox full dose  pain controlled with nirtroglycerin drip in the EDTransfer to [place] for urgent cardiac cath    Problem/Plan - 2:  ·  Problem: Abdominal pain.  Plan: pt is denying any pain and stated that she never had any pain. Ct- abd- negative for acute findings  Follow up US gallbladder.     Problem/Plan - 3:  ·  Problem: TRENTON (acute kidney injury).  Plan: unknown baseline, possible secondary to acs  monitor BMP and continue IVF  hold lasix monitor BMP and continue IVF.     Problem/Plan - 4:  ·  Problem: Diabetes.  Plan: uncontrolled continue home bedtime insulin and pre- meals  ISS  hypoglycemia protocol  follow up HBa1C.     Problem/Plan - 5:  ·  Problem: Asthma.  Plan: continue advair, spiriva, albuterol prn.     Problem/Plan - 6:  Problem: HTN (hypertension). continue diltiazemB blocker.    Problem/Plan - 7:  ·  Problem: HLD (hyperlipidemia).  Plan: continue statin.     Problem/Plan - 8:  ·  Problem: Hypothyroid.  Plan: continue levothyroxine.     Problem/Plan - 9:  ·  Problem: Prophylactic measure.  Plan: full dose lovenox, pt on full dose lovenox
83 yo female with pmh of DM, Asthma, CAD, hypothyroid, Htn presented c/o chest pain described as heaviness across her chest and then left arm pain that persisted. Also admitted to worsening sob while at home and some nausea but no vomiting . The patient is resting in ER bed anxious but states chest pain has resolved. Currently hemodynamically stable on tridol drip with blood pressure of 158/68 heart rate normal with 100 oxygen saturation.   Pt at this time does not meet the criteria for ICU admission and after disscusion with ER MD and EICU atending Dr Nagy , it was aggred for pt to be admited to [facility] on Telemetry floor and cardiolgy consult called   please re consult if pt condition worsens  40 mins of critical care time spent

## 2017-04-07 NOTE — H&P ADULT. - RADIOLOGY RESULTS AND INTERPRETATION
CT abd/pelv and US gallbladder  from [facility] reviewed:  shows cholelithiasis without gallbladder wall thickening or pericholecystic fluid; ?liver cirrhosis  CXR 4/6/17: negative

## 2017-04-07 NOTE — DISCHARGE NOTE ADULT - CARE PLAN
Principal Discharge DX:	Acute coronary syndrome  Goal:	Urgent Cardiac cath  Instructions for follow-up, activity and diet:	Elevated troponin. Currently pain free.  Continue asa and plavix, s/p full dose lovenox, add B blocker.   Transfer to [place] for urgent cardiac cath.  Continue full dose Lovenox.  Seen by cardiology in ED.  Secondary Diagnosis:	Abdominal pain  Instructions for follow-up, activity and diet:	RLQ abdominal pain initially in ED. Now resolved.  CT abdomen is negative for acute findings, + for cholelithiasis.  US gallbladder: Cholelithiasis without significant gallbladder wall thickening or   pericholecystic fluid. Unreliable sonographic Leyva sign. If there is   clinical suspicion for acute cholecystitis, a hepatobiliary scan may be   obtained for further evaluation. Question liver cirrhosis. Recommend clinical correlation and follow-up.  Secondary Diagnosis:	TRENTON (acute kidney injury)  Instructions for follow-up, activity and diet:	Unknown baseline, possible secondary to ACS  Monitor BMP and continue IVF  Hold lasix.  Secondary Diagnosis:	Diabetes  Instructions for follow-up, activity and diet:	Uncontrolled continue home bedtime insulin and pre- meals  ISS  hypoglycemia protocol  Follow up HBa1C.  Secondary Diagnosis:	Asthma  Instructions for follow-up, activity and diet:	Continue advair, spiriva, albuterol prn.  Secondary Diagnosis:	Essential hypertension  Instructions for follow-up, activity and diet:	Continue B blocker  Secondary Diagnosis:	HLD (hyperlipidemia)  Instructions for follow-up, activity and diet:	Continue Statin Principal Discharge DX:	Acute coronary syndrome  Goal:	Urgent Cardiac cath  Instructions for follow-up, activity and diet:	Elevated troponin. Currently pain free.  Continue asa and plavix, s/p full dose lovenox, continue B blocker.   Transfer to [place] for urgent cardiac cath.  Continue full dose Lovenox.  Seen by cardiology Dr. Cooper in ED.  Secondary Diagnosis:	Abdominal pain  Instructions for follow-up, activity and diet:	RLQ abdominal pain initially in ED. Now resolved.  CT abdomen is negative for acute findings, + for cholelithiasis.  US gallbladder: Cholelithiasis without significant gallbladder wall thickening or   pericholecystic fluid. Unreliable sonographic Leyva sign. If there is   clinical suspicion for acute cholecystitis, a hepatobiliary scan may be   obtained for further evaluation. Question liver cirrhosis. Recommend clinical correlation and follow-up.  Consider GI consult in [place].  Secondary Diagnosis:	TRENTON (acute kidney injury)  Instructions for follow-up, activity and diet:	Unknown baseline, possible secondary to ACS  Monitor BMP and continue IVF  Hold lasix.  Secondary Diagnosis:	Diabetes  Instructions for follow-up, activity and diet:	Uncontrolled continue home bedtime insulin and pre- meals  ISS  hypoglycemia protocol  Follow up HBa1C.  Secondary Diagnosis:	Asthma  Instructions for follow-up, activity and diet:	Continue advair, spiriva, albuterol prn.  Secondary Diagnosis:	Essential hypertension  Instructions for follow-up, activity and diet:	Continue B blocker  Secondary Diagnosis:	HLD (hyperlipidemia)  Instructions for follow-up, activity and diet:	Continue Statin Principal Discharge DX:	Acute coronary syndrome  Goal:	Urgent Cardiac cath  Instructions for follow-up, activity and diet:	Elevated troponin. Currently pain free.  Continue asa and plavix, s/p full dose lovenox, continue B blocker.   Transfer to [place] for urgent cardiac cath.  Continue full dose Lovenox.  Seen by cardiology Dr. Cooper in ED.  Secondary Diagnosis:	Abdominal pain  Instructions for follow-up, activity and diet:	possible acs RLQ abdominal pain initially in ED. Now resolved.  CT abdomen is negative for acute findings, + for cholelithiasis.  US gallbladder: Cholelithiasis without significant gallbladder wall thickening or   pericholecystic fluid. Unreliable sonographic Leyva sign. If there is   clinical suspicion for acute cholecystitis, a hepatobiliary scan may be   obtained for further evaluation. Question liver cirrhosis. Recommend clinical correlation and follow-up.  Consider GI consult in [place].  Secondary Diagnosis:	TRENTON (acute kidney injury)  Instructions for follow-up, activity and diet:	Unknown baseline, possible secondary to ACS  Monitor BMP and continue IVF  Hold lasix.  Secondary Diagnosis:	Diabetes  Instructions for follow-up, activity and diet:	Uncontrolled continue home bedtime insulin and pre- meals  ISS  hypoglycemia protocol  Follow up HBa1C.  Secondary Diagnosis:	Asthma  Instructions for follow-up, activity and diet:	Continue advair, spiriva, albuterol prn.  Secondary Diagnosis:	Essential hypertension  Instructions for follow-up, activity and diet:	Continue B blocker  Secondary Diagnosis:	HLD (hyperlipidemia)  Instructions for follow-up, activity and diet:	Continue Statin Principal Discharge DX:	Acute coronary syndrome  Goal:	Urgent Cardiac cath  Instructions for follow-up, activity and diet:	Elevated troponin. Currently pain free.  Continue asa and plavix, s/p full dose lovenox, continue B blocker.   Transfer to [place] for urgent cardiac cath.  Continue full dose Lovenox.  Seen by cardiology Dr. Cooper in ED.  Secondary Diagnosis:	Abdominal pain  Instructions for follow-up, activity and diet:	possible acs RLQ abdominal pain initially in ED. Now resolved.  CT abdomen is negative for acute findings, + for cholelithiasis.  US gallbladder: Cholelithiasis without significant gallbladder wall thickening or   pericholecystic fluid. Unreliable sonographic Leyva sign. If there is   clinical suspicion for acute cholecystitis, a hepatobiliary scan may be   obtained for further evaluation. Question liver cirrhosis. Recommend clinical correlation and follow-up.  Consider GI consult in [place].  Secondary Diagnosis:	TRENTON (acute kidney injury)  Instructions for follow-up, activity and diet:	Unknown baseline, possible secondary to ACS  Monitor BMP and continue IVF  Hold lasix.  Secondary Diagnosis:	Diabetes  Instructions for follow-up, activity and diet:	Uncontrolled continue home bedtime insulin and pre- meals  ISS  hypoglycemia protocol  Follow up HBa1C.  Secondary Diagnosis:	Asthma  Instructions for follow-up, activity and diet:	Continue advair, spiriva, albuterol prn.  Secondary Diagnosis:	Essential hypertension  Instructions for follow-up, activity and diet:	Continue B blocker  Secondary Diagnosis:	HLD (hyperlipidemia)  Instructions for follow-up, activity and diet:	Continue Statin Principal Discharge DX:	Acute coronary syndrome  Goal:	Urgent Cardiac cath  Instructions for follow-up, activity and diet:	Elevated troponin. Currently pain free.  Continue asa and plavix, s/p full dose lovenox, continue B blocker.   Transfer to [place] for urgent cardiac cath.  Continue full dose Lovenox.  Seen by cardiology Dr. Cooper in ED.  Secondary Diagnosis:	Abdominal pain  Instructions for follow-up, activity and diet:	possible acs RLQ abdominal pain initially in ED. Now resolved.  CT abdomen is negative for acute findings, + for cholelithiasis.  US gallbladder: Cholelithiasis without significant gallbladder wall thickening or   pericholecystic fluid. Unreliable sonographic Leyva sign. If there is   clinical suspicion for acute cholecystitis, a hepatobiliary scan may be   obtained for further evaluation. Question liver cirrhosis. Recommend clinical correlation and follow-up.  Consider GI consult in [place].  Secondary Diagnosis:	TRENTON (acute kidney injury)  Instructions for follow-up, activity and diet:	Unknown baseline, possible secondary to ACS  Monitor BMP and continue IVF  Hold lasix.  Secondary Diagnosis:	Diabetes  Instructions for follow-up, activity and diet:	Uncontrolled continue home bedtime insulin and pre- meals  ISS  hypoglycemia protocol  Follow up HBa1C.  Secondary Diagnosis:	Asthma  Instructions for follow-up, activity and diet:	Continue advair, spiriva, albuterol prn.  Secondary Diagnosis:	Essential hypertension  Instructions for follow-up, activity and diet:	Continue B blocker  Secondary Diagnosis:	HLD (hyperlipidemia)  Instructions for follow-up, activity and diet:	Continue Statin

## 2017-04-07 NOTE — DISCHARGE NOTE ADULT - PLAN OF CARE
Elevated troponin. Currently pain free.  Continue asa and plavix, s/p full dose lovenox, add B blocker.   Transfer to [place] for urgent cardiac cath.  Continue full dose Lovenox.  Seen by cardiology in ED. RLQ abdominal pain initially in ED. Now resolved.  CT abdomen is negative for acute findings, + for cholelithiasis.  US gallbladder: Cholelithiasis without significant gallbladder wall thickening or   pericholecystic fluid. Unreliable sonographic Leyva sign. If there is   clinical suspicion for acute cholecystitis, a hepatobiliary scan may be   obtained for further evaluation. Question liver cirrhosis. Recommend clinical correlation and follow-up. Unknown baseline, possible secondary to ACS  Monitor BMP and continue IVF  Hold lasix. Uncontrolled continue home bedtime insulin and pre- meals  ISS  hypoglycemia protocol  Follow up HBa1C. Continue advair, spiriva, albuterol prn. Continue B blocker Continue Statin Urgent Cardiac cath Elevated troponin. Currently pain free.  Continue asa and plavix, s/p full dose lovenox, continue B blocker.   Transfer to [place] for urgent cardiac cath.  Continue full dose Lovenox.  Seen by cardiology Dr. Cooper in ED. RLQ abdominal pain initially in ED. Now resolved.  CT abdomen is negative for acute findings, + for cholelithiasis.  US gallbladder: Cholelithiasis without significant gallbladder wall thickening or   pericholecystic fluid. Unreliable sonographic Leyva sign. If there is   clinical suspicion for acute cholecystitis, a hepatobiliary scan may be   obtained for further evaluation. Question liver cirrhosis. Recommend clinical correlation and follow-up.  Consider GI consult in [place]. possible acs RLQ abdominal pain initially in ED. Now resolved.  CT abdomen is negative for acute findings, + for cholelithiasis.  US gallbladder: Cholelithiasis without significant gallbladder wall thickening or   pericholecystic fluid. Unreliable sonographic Leyva sign. If there is   clinical suspicion for acute cholecystitis, a hepatobiliary scan may be   obtained for further evaluation. Question liver cirrhosis. Recommend clinical correlation and follow-up.  Consider GI consult in [place].

## 2017-04-07 NOTE — H&P ADULT - PROBLEM SELECTOR PLAN 4
uncontrolled continue aysha bedtime insulin and pre- meals  ISS  hypoglycemia protocol  follow up HBa1C uncontrolled continue home bedtime insulin and pre- meals  ISS  hypoglycemia protocol  follow up HBa1C

## 2017-04-07 NOTE — H&P ADULT - PROBLEM SELECTOR PLAN 2
pt is denying any pain and stated that she never had any pain. Ct- abd- negative for acute findings  Follow up US gallbladder

## 2017-04-07 NOTE — H&P ADULT. - PROBLEM SELECTOR PLAN 1
Patient s/p cardiac cath, stents x3 to mid and prox LAD  monitor in telemetry  groin looks OK, no hematoma  c/w aspirin and plavix; c/w metoprolol and statin  will be getting staged PCI on Monday  monitor creatinine

## 2017-04-07 NOTE — DISCHARGE NOTE ADULT - CARE PROVIDER_API CALL
Maik Coyne (DO), Family Medicine  [address]  [place] [address]  Phone: [phone]  Fax: [phone]

## 2017-04-07 NOTE — H&P ADULT. - MUSCULOSKELETAL
details… detailed exam no joint warmth/no joint erythema/no joint swelling/normal/no calf tenderness/ROM intact

## 2017-04-08 LAB
ALBUMIN SERPL ELPH-MCNC: 3.7 G/DL — SIGNIFICANT CHANGE UP (ref 3.3–5)
ALP SERPL-CCNC: 81 U/L — SIGNIFICANT CHANGE UP (ref 40–120)
ALT FLD-CCNC: 12 U/L RC — SIGNIFICANT CHANGE UP (ref 10–45)
ANION GAP SERPL CALC-SCNC: 14 MMOL/L — SIGNIFICANT CHANGE UP (ref 5–17)
AST SERPL-CCNC: 31 U/L — SIGNIFICANT CHANGE UP (ref 10–40)
BILIRUB SERPL-MCNC: 0.5 MG/DL — SIGNIFICANT CHANGE UP (ref 0.2–1.2)
BUN SERPL-MCNC: 25 MG/DL — HIGH (ref 7–23)
CALCIUM SERPL-MCNC: 9.3 MG/DL — SIGNIFICANT CHANGE UP (ref 8.4–10.5)
CHLORIDE SERPL-SCNC: 99 MMOL/L — SIGNIFICANT CHANGE UP (ref 96–108)
CHOLEST SERPL-MCNC: 202 MG/DL — HIGH (ref 10–199)
CO2 SERPL-SCNC: 24 MMOL/L — SIGNIFICANT CHANGE UP (ref 22–31)
CREAT SERPL-MCNC: 1.21 MG/DL — SIGNIFICANT CHANGE UP (ref 0.5–1.3)
GLUCOSE SERPL-MCNC: 281 MG/DL — HIGH (ref 70–99)
HBA1C BLD-MCNC: 9.4 % — HIGH (ref 4–5.6)
HCT VFR BLD CALC: 41 % — SIGNIFICANT CHANGE UP (ref 34.5–45)
HDLC SERPL-MCNC: 39 MG/DL — LOW (ref 40–125)
HGB BLD-MCNC: 14.2 G/DL — SIGNIFICANT CHANGE UP (ref 11.5–15.5)
LIPID PNL WITH DIRECT LDL SERPL: 110 MG/DL — SIGNIFICANT CHANGE UP
MCHC RBC-ENTMCNC: 29 PG — SIGNIFICANT CHANGE UP (ref 27–34)
MCHC RBC-ENTMCNC: 34.7 GM/DL — SIGNIFICANT CHANGE UP (ref 32–36)
MCV RBC AUTO: 83.4 FL — SIGNIFICANT CHANGE UP (ref 80–100)
PLATELET # BLD AUTO: 253 K/UL — SIGNIFICANT CHANGE UP (ref 150–400)
POTASSIUM SERPL-MCNC: 4 MMOL/L — SIGNIFICANT CHANGE UP (ref 3.5–5.3)
POTASSIUM SERPL-SCNC: 4 MMOL/L — SIGNIFICANT CHANGE UP (ref 3.5–5.3)
PROT SERPL-MCNC: 6.9 G/DL — SIGNIFICANT CHANGE UP (ref 6–8.3)
RBC # BLD: 4.92 M/UL — SIGNIFICANT CHANGE UP (ref 3.8–5.2)
RBC # FLD: 13 % — SIGNIFICANT CHANGE UP (ref 10.3–14.5)
SODIUM SERPL-SCNC: 137 MMOL/L — SIGNIFICANT CHANGE UP (ref 135–145)
TOTAL CHOLESTEROL/HDL RATIO MEASUREMENT: 5.2 RATIO — SIGNIFICANT CHANGE UP (ref 3.3–7.1)
TRIGL SERPL-MCNC: 266 MG/DL — HIGH (ref 10–149)
TSH SERPL-MCNC: 0.94 UIU/ML — SIGNIFICANT CHANGE UP (ref 0.27–4.2)
WBC # BLD: 17.8 K/UL — HIGH (ref 3.8–10.5)
WBC # FLD AUTO: 17.8 K/UL — HIGH (ref 3.8–10.5)

## 2017-04-08 PROCEDURE — 99232 SBSQ HOSP IP/OBS MODERATE 35: CPT

## 2017-04-08 PROCEDURE — 93010 ELECTROCARDIOGRAM REPORT: CPT

## 2017-04-08 PROCEDURE — 99232 SBSQ HOSP IP/OBS MODERATE 35: CPT | Mod: GC

## 2017-04-08 RX ORDER — SODIUM CHLORIDE 9 MG/ML
1000 INJECTION INTRAMUSCULAR; INTRAVENOUS; SUBCUTANEOUS
Qty: 0 | Refills: 0 | Status: DISCONTINUED | OUTPATIENT
Start: 2017-04-08 | End: 2017-04-11

## 2017-04-08 RX ORDER — HEPARIN SODIUM 5000 [USP'U]/ML
5000 INJECTION INTRAVENOUS; SUBCUTANEOUS EVERY 8 HOURS
Qty: 0 | Refills: 0 | Status: DISCONTINUED | OUTPATIENT
Start: 2017-04-08 | End: 2017-04-11

## 2017-04-08 RX ADMIN — INSULIN GLARGINE 48 UNIT(S): 100 INJECTION, SOLUTION SUBCUTANEOUS at 21:38

## 2017-04-08 RX ADMIN — HEPARIN SODIUM 5000 UNIT(S): 5000 INJECTION INTRAVENOUS; SUBCUTANEOUS at 21:22

## 2017-04-08 RX ADMIN — Medication 3: at 07:40

## 2017-04-08 RX ADMIN — ALBUTEROL 2.5 MILLIGRAM(S): 90 AEROSOL, METERED ORAL at 05:39

## 2017-04-08 RX ADMIN — TIOTROPIUM BROMIDE 1 CAPSULE(S): 18 CAPSULE ORAL; RESPIRATORY (INHALATION) at 13:04

## 2017-04-08 RX ADMIN — PANTOPRAZOLE SODIUM 40 MILLIGRAM(S): 20 TABLET, DELAYED RELEASE ORAL at 05:40

## 2017-04-08 RX ADMIN — Medication 50 MILLIGRAM(S): at 05:40

## 2017-04-08 RX ADMIN — ATORVASTATIN CALCIUM 40 MILLIGRAM(S): 80 TABLET, FILM COATED ORAL at 21:22

## 2017-04-08 RX ADMIN — Medication 81 MILLIGRAM(S): at 11:58

## 2017-04-08 RX ADMIN — Medication 20 MILLIGRAM(S): at 05:40

## 2017-04-08 RX ADMIN — Medication 15 UNIT(S): at 12:00

## 2017-04-08 RX ADMIN — CLOPIDOGREL BISULFATE 75 MILLIGRAM(S): 75 TABLET, FILM COATED ORAL at 11:58

## 2017-04-08 RX ADMIN — Medication 112 MICROGRAM(S): at 05:40

## 2017-04-08 RX ADMIN — SODIUM CHLORIDE 60 MILLILITER(S): 9 INJECTION INTRAMUSCULAR; INTRAVENOUS; SUBCUTANEOUS at 15:25

## 2017-04-08 RX ADMIN — Medication 15 UNIT(S): at 18:01

## 2017-04-08 RX ADMIN — FLUTICASONE PROPIONATE AND SALMETEROL 1 DOSE(S): 50; 250 POWDER ORAL; RESPIRATORY (INHALATION) at 21:22

## 2017-04-08 RX ADMIN — Medication 2: at 12:00

## 2017-04-08 RX ADMIN — Medication 1: at 18:01

## 2017-04-08 RX ADMIN — FLUTICASONE PROPIONATE AND SALMETEROL 1 DOSE(S): 50; 250 POWDER ORAL; RESPIRATORY (INHALATION) at 11:58

## 2017-04-08 RX ADMIN — Medication 15 UNIT(S): at 07:40

## 2017-04-09 LAB
ANION GAP SERPL CALC-SCNC: 15 MMOL/L — SIGNIFICANT CHANGE UP (ref 5–17)
BASOPHILS # BLD AUTO: 0.04 K/UL — SIGNIFICANT CHANGE UP (ref 0–0.2)
BASOPHILS NFR BLD AUTO: 0.3 % — SIGNIFICANT CHANGE UP (ref 0–2)
BUN SERPL-MCNC: 34 MG/DL — HIGH (ref 7–23)
CALCIUM SERPL-MCNC: 8.5 MG/DL — SIGNIFICANT CHANGE UP (ref 8.4–10.5)
CHLORIDE SERPL-SCNC: 101 MMOL/L — SIGNIFICANT CHANGE UP (ref 96–108)
CO2 SERPL-SCNC: 21 MMOL/L — LOW (ref 22–31)
CREAT SERPL-MCNC: 1.34 MG/DL — HIGH (ref 0.5–1.3)
EOSINOPHIL # BLD AUTO: 0.31 K/UL — SIGNIFICANT CHANGE UP (ref 0–0.5)
EOSINOPHIL NFR BLD AUTO: 2.6 % — SIGNIFICANT CHANGE UP (ref 0–6)
GLUCOSE SERPL-MCNC: 236 MG/DL — HIGH (ref 70–99)
HCT VFR BLD CALC: 37 % — SIGNIFICANT CHANGE UP (ref 34.5–45)
HGB BLD-MCNC: 11.9 G/DL — SIGNIFICANT CHANGE UP (ref 11.5–15.5)
IMM GRANULOCYTES NFR BLD AUTO: 0.4 % — SIGNIFICANT CHANGE UP (ref 0–1.5)
LYMPHOCYTES # BLD AUTO: 25.3 % — SIGNIFICANT CHANGE UP (ref 13–44)
LYMPHOCYTES # BLD AUTO: 3.05 K/UL — SIGNIFICANT CHANGE UP (ref 1–3.3)
MCHC RBC-ENTMCNC: 27.3 PG — SIGNIFICANT CHANGE UP (ref 27–34)
MCHC RBC-ENTMCNC: 32.2 GM/DL — SIGNIFICANT CHANGE UP (ref 32–36)
MCV RBC AUTO: 84.9 FL — SIGNIFICANT CHANGE UP (ref 80–100)
MONOCYTES # BLD AUTO: 0.92 K/UL — HIGH (ref 0–0.9)
MONOCYTES NFR BLD AUTO: 7.6 % — SIGNIFICANT CHANGE UP (ref 2–14)
NEUTROPHILS # BLD AUTO: 7.69 K/UL — HIGH (ref 1.8–7.4)
NEUTROPHILS NFR BLD AUTO: 63.8 % — SIGNIFICANT CHANGE UP (ref 43–77)
PLATELET # BLD AUTO: 204 K/UL — SIGNIFICANT CHANGE UP (ref 150–400)
POTASSIUM SERPL-MCNC: 3.6 MMOL/L — SIGNIFICANT CHANGE UP (ref 3.5–5.3)
POTASSIUM SERPL-SCNC: 3.6 MMOL/L — SIGNIFICANT CHANGE UP (ref 3.5–5.3)
RBC # BLD: 4.36 M/UL — SIGNIFICANT CHANGE UP (ref 3.8–5.2)
RBC # FLD: 14.4 % — SIGNIFICANT CHANGE UP (ref 10.3–14.5)
SODIUM SERPL-SCNC: 137 MMOL/L — SIGNIFICANT CHANGE UP (ref 135–145)
WBC # BLD: 12.06 K/UL — HIGH (ref 3.8–10.5)
WBC # FLD AUTO: 12.06 K/UL — HIGH (ref 3.8–10.5)

## 2017-04-09 PROCEDURE — 99232 SBSQ HOSP IP/OBS MODERATE 35: CPT

## 2017-04-09 RX ORDER — INSULIN LISPRO 100/ML
17 VIAL (ML) SUBCUTANEOUS
Qty: 0 | Refills: 0 | Status: DISCONTINUED | OUTPATIENT
Start: 2017-04-09 | End: 2017-04-11

## 2017-04-09 RX ORDER — INSULIN GLARGINE 100 [IU]/ML
24 INJECTION, SOLUTION SUBCUTANEOUS AT BEDTIME
Qty: 0 | Refills: 0 | Status: DISCONTINUED | OUTPATIENT
Start: 2017-04-09 | End: 2017-04-11

## 2017-04-09 RX ORDER — SODIUM CHLORIDE 9 MG/ML
1000 INJECTION, SOLUTION INTRAVENOUS
Qty: 0 | Refills: 0 | Status: DISCONTINUED | OUTPATIENT
Start: 2017-04-10 | End: 2017-04-10

## 2017-04-09 RX ADMIN — TIOTROPIUM BROMIDE 1 CAPSULE(S): 18 CAPSULE ORAL; RESPIRATORY (INHALATION) at 11:32

## 2017-04-09 RX ADMIN — HEPARIN SODIUM 5000 UNIT(S): 5000 INJECTION INTRAVENOUS; SUBCUTANEOUS at 22:17

## 2017-04-09 RX ADMIN — HEPARIN SODIUM 5000 UNIT(S): 5000 INJECTION INTRAVENOUS; SUBCUTANEOUS at 13:25

## 2017-04-09 RX ADMIN — Medication 112 MICROGRAM(S): at 05:54

## 2017-04-09 RX ADMIN — Medication 1: at 17:34

## 2017-04-09 RX ADMIN — Medication 50 MILLIGRAM(S): at 05:54

## 2017-04-09 RX ADMIN — CLOPIDOGREL BISULFATE 75 MILLIGRAM(S): 75 TABLET, FILM COATED ORAL at 11:32

## 2017-04-09 RX ADMIN — Medication 17 UNIT(S): at 11:31

## 2017-04-09 RX ADMIN — Medication 2: at 08:02

## 2017-04-09 RX ADMIN — Medication 4: at 11:32

## 2017-04-09 RX ADMIN — HEPARIN SODIUM 5000 UNIT(S): 5000 INJECTION INTRAVENOUS; SUBCUTANEOUS at 05:55

## 2017-04-09 RX ADMIN — ATORVASTATIN CALCIUM 40 MILLIGRAM(S): 80 TABLET, FILM COATED ORAL at 22:17

## 2017-04-09 RX ADMIN — FLUTICASONE PROPIONATE AND SALMETEROL 1 DOSE(S): 50; 250 POWDER ORAL; RESPIRATORY (INHALATION) at 22:16

## 2017-04-09 RX ADMIN — Medication 15 UNIT(S): at 08:02

## 2017-04-09 RX ADMIN — FLUTICASONE PROPIONATE AND SALMETEROL 1 DOSE(S): 50; 250 POWDER ORAL; RESPIRATORY (INHALATION) at 08:03

## 2017-04-09 RX ADMIN — Medication 17 UNIT(S): at 17:34

## 2017-04-09 RX ADMIN — PANTOPRAZOLE SODIUM 40 MILLIGRAM(S): 20 TABLET, DELAYED RELEASE ORAL at 05:55

## 2017-04-09 RX ADMIN — Medication 20 MILLIGRAM(S): at 05:55

## 2017-04-09 RX ADMIN — SODIUM CHLORIDE 60 MILLILITER(S): 9 INJECTION INTRAMUSCULAR; INTRAVENOUS; SUBCUTANEOUS at 05:54

## 2017-04-09 RX ADMIN — Medication 81 MILLIGRAM(S): at 11:32

## 2017-04-09 RX ADMIN — INSULIN GLARGINE 24 UNIT(S): 100 INJECTION, SOLUTION SUBCUTANEOUS at 22:16

## 2017-04-10 ENCOUNTER — TRANSCRIPTION ENCOUNTER (OUTPATIENT)
Age: 82
End: 2017-04-10

## 2017-04-10 LAB
ANION GAP SERPL CALC-SCNC: 15 MMOL/L — SIGNIFICANT CHANGE UP (ref 5–17)
BUN SERPL-MCNC: 33 MG/DL — HIGH (ref 7–23)
CALCIUM SERPL-MCNC: 8.8 MG/DL — SIGNIFICANT CHANGE UP (ref 8.4–10.5)
CHLORIDE SERPL-SCNC: 102 MMOL/L — SIGNIFICANT CHANGE UP (ref 96–108)
CO2 SERPL-SCNC: 20 MMOL/L — LOW (ref 22–31)
CREAT SERPL-MCNC: 0.67 MG/DL — SIGNIFICANT CHANGE UP (ref 0.5–1.3)
GLUCOSE SERPL-MCNC: 271 MG/DL — HIGH (ref 70–99)
HCT VFR BLD CALC: 36.1 % — SIGNIFICANT CHANGE UP (ref 34.5–45)
HGB BLD-MCNC: 11.6 G/DL — SIGNIFICANT CHANGE UP (ref 11.5–15.5)
MCHC RBC-ENTMCNC: 26.9 PG — LOW (ref 27–34)
MCHC RBC-ENTMCNC: 32.1 GM/DL — SIGNIFICANT CHANGE UP (ref 32–36)
MCV RBC AUTO: 83.8 FL — SIGNIFICANT CHANGE UP (ref 80–100)
PLATELET # BLD AUTO: 211 K/UL — SIGNIFICANT CHANGE UP (ref 150–400)
POTASSIUM SERPL-MCNC: 4 MMOL/L — SIGNIFICANT CHANGE UP (ref 3.5–5.3)
POTASSIUM SERPL-SCNC: 4 MMOL/L — SIGNIFICANT CHANGE UP (ref 3.5–5.3)
RBC # BLD: 4.31 M/UL — SIGNIFICANT CHANGE UP (ref 3.8–5.2)
RBC # FLD: 14.2 % — SIGNIFICANT CHANGE UP (ref 10.3–14.5)
SODIUM SERPL-SCNC: 137 MMOL/L — SIGNIFICANT CHANGE UP (ref 135–145)
WBC # BLD: 11.84 K/UL — HIGH (ref 3.8–10.5)
WBC # FLD AUTO: 11.84 K/UL — HIGH (ref 3.8–10.5)

## 2017-04-10 PROCEDURE — 93010 ELECTROCARDIOGRAM REPORT: CPT

## 2017-04-10 PROCEDURE — 93306 TTE W/DOPPLER COMPLETE: CPT | Mod: 26

## 2017-04-10 PROCEDURE — 99233 SBSQ HOSP IP/OBS HIGH 50: CPT

## 2017-04-10 PROCEDURE — 92928 PRQ TCAT PLMT NTRAC ST 1 LES: CPT | Mod: LC,GC

## 2017-04-10 PROCEDURE — 99232 SBSQ HOSP IP/OBS MODERATE 35: CPT | Mod: GC

## 2017-04-10 PROCEDURE — 71010: CPT | Mod: 26

## 2017-04-10 RX ORDER — DIPHENHYDRAMINE HCL 50 MG
50 CAPSULE ORAL ONCE
Qty: 0 | Refills: 0 | Status: COMPLETED | OUTPATIENT
Start: 2017-04-10 | End: 2017-04-10

## 2017-04-10 RX ORDER — INSULIN LISPRO 100/ML
VIAL (ML) SUBCUTANEOUS EVERY 6 HOURS
Qty: 0 | Refills: 0 | Status: DISCONTINUED | OUTPATIENT
Start: 2017-04-10 | End: 2017-04-10

## 2017-04-10 RX ORDER — HYDROCORTISONE 20 MG
200 TABLET ORAL ONCE
Qty: 0 | Refills: 0 | Status: COMPLETED | OUTPATIENT
Start: 2017-04-10 | End: 2017-04-10

## 2017-04-10 RX ORDER — INSULIN LISPRO 100/ML
VIAL (ML) SUBCUTANEOUS
Qty: 0 | Refills: 0 | Status: DISCONTINUED | OUTPATIENT
Start: 2017-04-10 | End: 2017-04-11

## 2017-04-10 RX ORDER — SODIUM CHLORIDE 9 MG/ML
1000 INJECTION, SOLUTION INTRAVENOUS
Qty: 0 | Refills: 0 | Status: DISCONTINUED | OUTPATIENT
Start: 2017-04-10 | End: 2017-04-11

## 2017-04-10 RX ADMIN — INSULIN GLARGINE 24 UNIT(S): 100 INJECTION, SOLUTION SUBCUTANEOUS at 21:58

## 2017-04-10 RX ADMIN — HEPARIN SODIUM 5000 UNIT(S): 5000 INJECTION INTRAVENOUS; SUBCUTANEOUS at 21:26

## 2017-04-10 RX ADMIN — Medication 200 MILLIGRAM(S): at 15:30

## 2017-04-10 RX ADMIN — CLOPIDOGREL BISULFATE 75 MILLIGRAM(S): 75 TABLET, FILM COATED ORAL at 11:42

## 2017-04-10 RX ADMIN — Medication 50 MILLIGRAM(S): at 05:31

## 2017-04-10 RX ADMIN — PANTOPRAZOLE SODIUM 40 MILLIGRAM(S): 20 TABLET, DELAYED RELEASE ORAL at 05:31

## 2017-04-10 RX ADMIN — Medication 50 MILLIGRAM(S): at 15:30

## 2017-04-10 RX ADMIN — HEPARIN SODIUM 5000 UNIT(S): 5000 INJECTION INTRAVENOUS; SUBCUTANEOUS at 05:30

## 2017-04-10 RX ADMIN — Medication 3: at 06:18

## 2017-04-10 RX ADMIN — SODIUM CHLORIDE 50 MILLILITER(S): 9 INJECTION, SOLUTION INTRAVENOUS at 00:12

## 2017-04-10 RX ADMIN — FLUTICASONE PROPIONATE AND SALMETEROL 1 DOSE(S): 50; 250 POWDER ORAL; RESPIRATORY (INHALATION) at 21:26

## 2017-04-10 RX ADMIN — Medication 3: at 11:41

## 2017-04-10 RX ADMIN — Medication 81 MILLIGRAM(S): at 11:42

## 2017-04-10 RX ADMIN — HEPARIN SODIUM 5000 UNIT(S): 5000 INJECTION INTRAVENOUS; SUBCUTANEOUS at 13:17

## 2017-04-10 RX ADMIN — Medication 112 MICROGRAM(S): at 05:30

## 2017-04-10 RX ADMIN — Medication 3: at 21:59

## 2017-04-10 RX ADMIN — FLUTICASONE PROPIONATE AND SALMETEROL 1 DOSE(S): 50; 250 POWDER ORAL; RESPIRATORY (INHALATION) at 08:07

## 2017-04-10 RX ADMIN — Medication 3: at 18:17

## 2017-04-10 RX ADMIN — TIOTROPIUM BROMIDE 1 CAPSULE(S): 18 CAPSULE ORAL; RESPIRATORY (INHALATION) at 11:42

## 2017-04-10 RX ADMIN — ATORVASTATIN CALCIUM 40 MILLIGRAM(S): 80 TABLET, FILM COATED ORAL at 21:26

## 2017-04-10 RX ADMIN — Medication 200 MILLIGRAM(S): at 21:26

## 2017-04-10 NOTE — DISCHARGE NOTE ADULT - MEDICATION SUMMARY - MEDICATIONS TO TAKE
I will START or STAY ON the medications listed below when I get home from the hospital:    aspirin 81 mg oral tablet, chewable  -- 1 tab(s) by mouth once a day  -- Indication: For CAD    diltiaZEM 60 mg oral tablet  -- 1 tab(s) by mouth 3 times a day  -- Indication: For CAD    Basaglar KwikPen  -- 55 unit(s) subcutaneous once a day (at bedtime)  home  -- Indication: For DM2 (diabetes mellitus, type 2)    NovoLOG FlexPen 100 units/mL subcutaneous solution  -- 22 unit(s) subcutaneous 3 times a day  home  -- Indication: For DM2 (diabetes mellitus, type 2)    metFORMIN 500 mg oral tablet, extended release  -- 2 tab(s) by mouth 2 times a day  home, resume 4/13/17  -- Indication: For DM2 (diabetes mellitus, type 2)    atorvastatin 40 mg oral tablet  -- 1 tab(s) by mouth once a day  -- Indication: For HLD    clopidogrel 75 mg oral tablet  -- 1 tab(s) by mouth once a day hosp  -- Indication: For CAD    metoprolol succinate 50 mg oral tablet, extended release  -- 1 tab(s) by mouth once a day  -- Indication: For HTN (hypertension)    ProAir HFA 90 mcg/inh inhalation aerosol  -- 2 puff(s) inhaled 4 times a day  home  -- Indication: For COPD (chronic obstructive pulmonary disease)    Advair Diskus 500 mcg-50 mcg inhalation powder  -- 1 puff(s) inhaled 2 times a day  home  -- Indication: For COPD (chronic obstructive pulmonary disease)    Spiriva 18 mcg inhalation capsule  -- 1 cap(s) inhaled once a day  -- Indication: For COPD (chronic obstructive pulmonary disease)    furosemide 40 mg oral tablet  -- 1 tab(s) by mouth once a day  -- Indication: For CHF    pantoprazole 40 mg oral delayed release tablet  -- 1 tab(s) by mouth once a day (before a meal)  -- Indication: For GERD    Synthroid 112 mcg (0.112 mg) oral tablet  -- 1 tab(s) by mouth once a day  home  -- Indication: For Hypothyroidism, unspecified type    Hycodan  -- 5 milliliter(s) by mouth every 6 hours, As Needed  home  -- Indication: For COugh as needed

## 2017-04-10 NOTE — DISCHARGE NOTE ADULT - HOSPITAL COURSE
to be completed by MD 81 yo female with PMH of DM2, ?CKD, severe Asthma, CAD, hypothyroid, HTN, HLD, obesity, former smoker 5 pack years presented initially to [facility] with c/o severe 10/10 substernal chest pain with tingling down left arm that started at approx 10pm on 4/6/17 with associated worsening SOB, nausea.  At the time, patient was getting ready to go to bed.  She then activated her lifeline and was brought to [facility]. Pt. denied nausea, vomiting, dizziness, diaphoresis, palpitations, weight gain, peripheral edema or syncope. She never had prior episode of chest pain.  Pt. states pain was relieved in ER after receiving NTG and aspirin.  In the ED pt was found to have creatinine of 1.4, glucose of 404, trop 0.428/3.95 Pt was given Plavix 300mg/ lovenox. Pt was started on nitroglycerin drip.  Patient also felt bloated and had abdominal pain for which she had a CT abdomen, only showed cholelithiasis.  Patient was subsequently sent to [facility] for cardiac cath.  She is now s/p cardia cath, found have lesions in LAD and RCA.  She is s/p 3 stents to proximal and mid LAD.  She is now chest pain free.     Patient then had a staged cath with ADELIA to OM, which was done in 4/10. She tolerated the procedure well. She was continued on optimal medical therapy, and lasix 40mg daily was added to her daily regimen. She remained asymptomatic and was discharged home. She will need follow up with PCP and Cardiology.

## 2017-04-10 NOTE — DISCHARGE NOTE ADULT - PLAN OF CARE
to remain without complication of cardiac catheterization and or returning chest pain Continue your medications. Do not stop Aspirin or Plavix unless instructed by your cardiologist.  No heavy lifting, strenuous activity, bending, straining or unnecessary stair climbing for 2 weeks. No sex for 1 week.  No driving for 2 days. You may shower 24 hours following procedure but avoid baths and swimming for 1 week. Check groin site for bleeding and/or swelling daily following procedure. Call your doctor/cardiologist immediately for bleeding or swelling or if you have increased/persistent pain or drainage at the site. Follow up with your cardiologist in 1- 2 weeks. You may call [facility] at [phone] or [phone] after office hours and weekends with any questions or concerns following your procedure. HgA1C this admission was 9.4  Make sure you get your HgA1c checked every three months.  If you take oral diabetes medications, check your blood glucose two times a day.  If you take insulin, check your blood glucose before meals and at bedtime.  It's important not to skip any meals.  Keep a log of your blood glucose results and always take it with you to your doctor appointments.  Keep a list of your current medications including injectables and over the counter medications and bring this medication list with you to all your doctor appointments.  If you have not seen your ophthalmologist this year call for appointment.  Check your feet daily for redness, sores, or openings. Do not self treat. If no improvement in two days call your primary care physician for an appointment.  Low blood sugar (hypoglycemia) is a blood sugar below 70mg/dl. Check your blood sugar if you feel signs/symptoms of hypoglycemia. If your blood sugar is below 70 take 15 grams of carbohydrates (ex 4 oz of apple juice, 3-4 glucose tablets, or 4-6 oz of regular soda) wait 15 minutes and repeat blood sugar to make sure it comes up above 70.  If your blood sugar is above 70 and you are due for a meal, have a meal.  If you are not due for a meal have a snack.  This snack helps keeps your blood sugar at a safe range. Follow up with your medical doctor to establish long term blood pressure treatment goals. Call your Health Care provider upon arrival home to make a follow up appointment within one week.  Take all inhalers as prescribed by your Health Care Provider.  Take steroids as prescribed by your Health Care Provider.  If your cough increases infrequency and severity and/or you have shortness of breath or increased shortness of breath call your Health Care Provider.  If you develop fever, chills, night sweats, malaise, and/or change in mental status call your Health care Provider.  Nutrition is very important.  Eat small frequent meals.  Increase your activity as tolerated.  Do not stay in bed all day Avoid taking (NSAIDs) - (ex: Ibuprofen, Advil, Celebrex, Naprosyn)  Avoid taking any nephrotoxic agents (can harm kidneys) - Intravenous contrast for diagnostic testing, combination cold medications.  Have all medications adjusted for your renal function by your Health Care Provider.  Blood pressure control is important.  Take all medication as prescribed. Avoid taking (NSAIDs) - (ex: Ibuprofen, Advil, Celebrex, Naprosyn)  Avoid taking any nephrotoxic agents (can harm kidneys) - Intravenous contrast for diagnostic testing, combination cold medications.  Have all medications adjusted for your renal function by your Health Care Provider.  Blood pressure control is important.  Take all medication as prescribed.  Please have routine kidney function tests in 1 week with Dr. Coyne Weigh yourself daily.  If you gain 3lbs in 3 days, or 5lbs in a week call your Health Care Provider.  Do not eat or drink foods containing more than 2000mg of salt (sodium) in your diet every day.  Call your Health Care Provider if you have any swelling or increased swelling in your feet, ankles, and/or stomach.  Take all of your medication as directed.  If you become dizzy call your Health Care Provider.

## 2017-04-10 NOTE — DISCHARGE NOTE ADULT - CARE PROVIDER_API CALL
Terrell Howell (DO), Cardiology; Internal Medicine; Nuclear Cardiology  [address]  [place] [address]  Phone: [phone]  Fax: [phone]    Maik Coyne (DO), Family Medicine  [address] [place] [address]  Phone: [phone]  Fax: [phone]

## 2017-04-10 NOTE — DISCHARGE NOTE ADULT - PATIENT PORTAL LINK FT
“You can access the [facility] Patient Portal, offered by [facility], by registering with the following website: [email or web address]”

## 2017-04-10 NOTE — DISCHARGE NOTE ADULT - CARE PLAN
Principal Discharge DX:	NSTEMI (non-ST elevated myocardial infarction)  Goal:	to remain without complication of cardiac catheterization and or returning chest pain  Instructions for follow-up, activity and diet:	Continue your medications. Do not stop Aspirin or Plavix unless instructed by your cardiologist.  No heavy lifting, strenuous activity, bending, straining or unnecessary stair climbing for 2 weeks. No sex for 1 week.  No driving for 2 days. You may shower 24 hours following procedure but avoid baths and swimming for 1 week. Check groin site for bleeding and/or swelling daily following procedure. Call your doctor/cardiologist immediately for bleeding or swelling or if you have increased/persistent pain or drainage at the site. Follow up with your cardiologist in 1- 2 weeks. You may call [facility] at [phone] or [phone] after office hours and weekends with any questions or concerns following your procedure.  Secondary Diagnosis:	DM2 (diabetes mellitus, type 2)  Instructions for follow-up, activity and diet:	HgA1C this admission was 9.4  Make sure you get your HgA1c checked every three months.  If you take oral diabetes medications, check your blood glucose two times a day.  If you take insulin, check your blood glucose before meals and at bedtime.  It's important not to skip any meals.  Keep a log of your blood glucose results and always take it with you to your doctor appointments.  Keep a list of your current medications including injectables and over the counter medications and bring this medication list with you to all your doctor appointments.  If you have not seen your ophthalmologist this year call for appointment.  Check your feet daily for redness, sores, or openings. Do not self treat. If no improvement in two days call your primary care physician for an appointment.  Low blood sugar (hypoglycemia) is a blood sugar below 70mg/dl. Check your blood sugar if you feel signs/symptoms of hypoglycemia. If your blood sugar is below 70 take 15 grams of carbohydrates (ex 4 oz of apple juice, 3-4 glucose tablets, or 4-6 oz of regular soda) wait 15 minutes and repeat blood sugar to make sure it comes up above 70.  If your blood sugar is above 70 and you are due for a meal, have a meal.  If you are not due for a meal have a snack.  This snack helps keeps your blood sugar at a safe range.  Secondary Diagnosis:	HTN (hypertension)  Instructions for follow-up, activity and diet:	Follow up with your medical doctor to establish long term blood pressure treatment goals.  Secondary Diagnosis:	COPD (chronic obstructive pulmonary disease)  Instructions for follow-up, activity and diet:	Call your Health Care provider upon arrival home to make a follow up appointment within one week.  Take all inhalers as prescribed by your Health Care Provider.  Take steroids as prescribed by your Health Care Provider.  If your cough increases infrequency and severity and/or you have shortness of breath or increased shortness of breath call your Health Care Provider.  If you develop fever, chills, night sweats, malaise, and/or change in mental status call your Health care Provider.  Nutrition is very important.  Eat small frequent meals.  Increase your activity as tolerated.  Do not stay in bed all day  Secondary Diagnosis:	CKD (chronic kidney disease)  Instructions for follow-up, activity and diet:	Avoid taking (NSAIDs) - (ex: Ibuprofen, Advil, Celebrex, Naprosyn)  Avoid taking any nephrotoxic agents (can harm kidneys) - Intravenous contrast for diagnostic testing, combination cold medications.  Have all medications adjusted for your renal function by your Health Care Provider.  Blood pressure control is important.  Take all medication as prescribed. Principal Discharge DX:	NSTEMI (non-ST elevated myocardial infarction)  Goal:	to remain without complication of cardiac catheterization and or returning chest pain  Instructions for follow-up, activity and diet:	Continue your medications. Do not stop Aspirin or Plavix unless instructed by your cardiologist.  No heavy lifting, strenuous activity, bending, straining or unnecessary stair climbing for 2 weeks. No sex for 1 week.  No driving for 2 days. You may shower 24 hours following procedure but avoid baths and swimming for 1 week. Check groin site for bleeding and/or swelling daily following procedure. Call your doctor/cardiologist immediately for bleeding or swelling or if you have increased/persistent pain or drainage at the site. Follow up with your cardiologist in 1- 2 weeks. You may call [facility] at [phone] or [phone] after office hours and weekends with any questions or concerns following your procedure.  Secondary Diagnosis:	DM2 (diabetes mellitus, type 2)  Instructions for follow-up, activity and diet:	HgA1C this admission was 9.4  Make sure you get your HgA1c checked every three months.  If you take oral diabetes medications, check your blood glucose two times a day.  If you take insulin, check your blood glucose before meals and at bedtime.  It's important not to skip any meals.  Keep a log of your blood glucose results and always take it with you to your doctor appointments.  Keep a list of your current medications including injectables and over the counter medications and bring this medication list with you to all your doctor appointments.  If you have not seen your ophthalmologist this year call for appointment.  Check your feet daily for redness, sores, or openings. Do not self treat. If no improvement in two days call your primary care physician for an appointment.  Low blood sugar (hypoglycemia) is a blood sugar below 70mg/dl. Check your blood sugar if you feel signs/symptoms of hypoglycemia. If your blood sugar is below 70 take 15 grams of carbohydrates (ex 4 oz of apple juice, 3-4 glucose tablets, or 4-6 oz of regular soda) wait 15 minutes and repeat blood sugar to make sure it comes up above 70.  If your blood sugar is above 70 and you are due for a meal, have a meal.  If you are not due for a meal have a snack.  This snack helps keeps your blood sugar at a safe range.  Secondary Diagnosis:	HTN (hypertension)  Instructions for follow-up, activity and diet:	Follow up with your medical doctor to establish long term blood pressure treatment goals.  Secondary Diagnosis:	COPD (chronic obstructive pulmonary disease)  Instructions for follow-up, activity and diet:	Call your Health Care provider upon arrival home to make a follow up appointment within one week.  Take all inhalers as prescribed by your Health Care Provider.  Take steroids as prescribed by your Health Care Provider.  If your cough increases infrequency and severity and/or you have shortness of breath or increased shortness of breath call your Health Care Provider.  If you develop fever, chills, night sweats, malaise, and/or change in mental status call your Health care Provider.  Nutrition is very important.  Eat small frequent meals.  Increase your activity as tolerated.  Do not stay in bed all day  Secondary Diagnosis:	CKD (chronic kidney disease)  Instructions for follow-up, activity and diet:	Avoid taking (NSAIDs) - (ex: Ibuprofen, Advil, Celebrex, Naprosyn)  Avoid taking any nephrotoxic agents (can harm kidneys) - Intravenous contrast for diagnostic testing, combination cold medications.  Have all medications adjusted for your renal function by your Health Care Provider.  Blood pressure control is important.  Take all medication as prescribed. Principal Discharge DX:	NSTEMI (non-ST elevated myocardial infarction)  Goal:	to remain without complication of cardiac catheterization and or returning chest pain  Instructions for follow-up, activity and diet:	Continue your medications. Do not stop Aspirin or Plavix unless instructed by your cardiologist.  No heavy lifting, strenuous activity, bending, straining or unnecessary stair climbing for 2 weeks. No sex for 1 week.  No driving for 2 days. You may shower 24 hours following procedure but avoid baths and swimming for 1 week. Check groin site for bleeding and/or swelling daily following procedure. Call your doctor/cardiologist immediately for bleeding or swelling or if you have increased/persistent pain or drainage at the site. Follow up with your cardiologist in 1- 2 weeks. You may call [facility] at [phone] or [phone] after office hours and weekends with any questions or concerns following your procedure.  Secondary Diagnosis:	DM2 (diabetes mellitus, type 2)  Instructions for follow-up, activity and diet:	HgA1C this admission was 9.4  Make sure you get your HgA1c checked every three months.  If you take oral diabetes medications, check your blood glucose two times a day.  If you take insulin, check your blood glucose before meals and at bedtime.  It's important not to skip any meals.  Keep a log of your blood glucose results and always take it with you to your doctor appointments.  Keep a list of your current medications including injectables and over the counter medications and bring this medication list with you to all your doctor appointments.  If you have not seen your ophthalmologist this year call for appointment.  Check your feet daily for redness, sores, or openings. Do not self treat. If no improvement in two days call your primary care physician for an appointment.  Low blood sugar (hypoglycemia) is a blood sugar below 70mg/dl. Check your blood sugar if you feel signs/symptoms of hypoglycemia. If your blood sugar is below 70 take 15 grams of carbohydrates (ex 4 oz of apple juice, 3-4 glucose tablets, or 4-6 oz of regular soda) wait 15 minutes and repeat blood sugar to make sure it comes up above 70.  If your blood sugar is above 70 and you are due for a meal, have a meal.  If you are not due for a meal have a snack.  This snack helps keeps your blood sugar at a safe range.  Secondary Diagnosis:	HTN (hypertension)  Instructions for follow-up, activity and diet:	Follow up with your medical doctor to establish long term blood pressure treatment goals.  Secondary Diagnosis:	COPD (chronic obstructive pulmonary disease)  Instructions for follow-up, activity and diet:	Call your Health Care provider upon arrival home to make a follow up appointment within one week.  Take all inhalers as prescribed by your Health Care Provider.  Take steroids as prescribed by your Health Care Provider.  If your cough increases infrequency and severity and/or you have shortness of breath or increased shortness of breath call your Health Care Provider.  If you develop fever, chills, night sweats, malaise, and/or change in mental status call your Health care Provider.  Nutrition is very important.  Eat small frequent meals.  Increase your activity as tolerated.  Do not stay in bed all day  Secondary Diagnosis:	CKD (chronic kidney disease)  Instructions for follow-up, activity and diet:	Avoid taking (NSAIDs) - (ex: Ibuprofen, Advil, Celebrex, Naprosyn)  Avoid taking any nephrotoxic agents (can harm kidneys) - Intravenous contrast for diagnostic testing, combination cold medications.  Have all medications adjusted for your renal function by your Health Care Provider.  Blood pressure control is important.  Take all medication as prescribed.  Please have routine kidney function tests in 1 week with Dr. Coyne Principal Discharge DX:	NSTEMI (non-ST elevated myocardial infarction)  Goal:	to remain without complication of cardiac catheterization and or returning chest pain  Instructions for follow-up, activity and diet:	Continue your medications. Do not stop Aspirin or Plavix unless instructed by your cardiologist.  No heavy lifting, strenuous activity, bending, straining or unnecessary stair climbing for 2 weeks. No sex for 1 week.  No driving for 2 days. You may shower 24 hours following procedure but avoid baths and swimming for 1 week. Check groin site for bleeding and/or swelling daily following procedure. Call your doctor/cardiologist immediately for bleeding or swelling or if you have increased/persistent pain or drainage at the site. Follow up with your cardiologist in 1- 2 weeks. You may call [facility] at [phone] or [phone] after office hours and weekends with any questions or concerns following your procedure.  Secondary Diagnosis:	DM2 (diabetes mellitus, type 2)  Instructions for follow-up, activity and diet:	HgA1C this admission was 9.4  Make sure you get your HgA1c checked every three months.  If you take oral diabetes medications, check your blood glucose two times a day.  If you take insulin, check your blood glucose before meals and at bedtime.  It's important not to skip any meals.  Keep a log of your blood glucose results and always take it with you to your doctor appointments.  Keep a list of your current medications including injectables and over the counter medications and bring this medication list with you to all your doctor appointments.  If you have not seen your ophthalmologist this year call for appointment.  Check your feet daily for redness, sores, or openings. Do not self treat. If no improvement in two days call your primary care physician for an appointment.  Low blood sugar (hypoglycemia) is a blood sugar below 70mg/dl. Check your blood sugar if you feel signs/symptoms of hypoglycemia. If your blood sugar is below 70 take 15 grams of carbohydrates (ex 4 oz of apple juice, 3-4 glucose tablets, or 4-6 oz of regular soda) wait 15 minutes and repeat blood sugar to make sure it comes up above 70.  If your blood sugar is above 70 and you are due for a meal, have a meal.  If you are not due for a meal have a snack.  This snack helps keeps your blood sugar at a safe range.  Secondary Diagnosis:	HTN (hypertension)  Instructions for follow-up, activity and diet:	Follow up with your medical doctor to establish long term blood pressure treatment goals.  Secondary Diagnosis:	COPD (chronic obstructive pulmonary disease)  Instructions for follow-up, activity and diet:	Call your Health Care provider upon arrival home to make a follow up appointment within one week.  Take all inhalers as prescribed by your Health Care Provider.  Take steroids as prescribed by your Health Care Provider.  If your cough increases infrequency and severity and/or you have shortness of breath or increased shortness of breath call your Health Care Provider.  If you develop fever, chills, night sweats, malaise, and/or change in mental status call your Health care Provider.  Nutrition is very important.  Eat small frequent meals.  Increase your activity as tolerated.  Do not stay in bed all day  Secondary Diagnosis:	CKD (chronic kidney disease)  Instructions for follow-up, activity and diet:	Avoid taking (NSAIDs) - (ex: Ibuprofen, Advil, Celebrex, Naprosyn)  Avoid taking any nephrotoxic agents (can harm kidneys) - Intravenous contrast for diagnostic testing, combination cold medications.  Have all medications adjusted for your renal function by your Health Care Provider.  Blood pressure control is important.  Take all medication as prescribed.  Please have routine kidney function tests in 1 week with Dr. Coyne  Secondary Diagnosis:	Chronic diastolic congestive heart failure  Instructions for follow-up, activity and diet:	Weigh yourself daily.  If you gain 3lbs in 3 days, or 5lbs in a week call your Health Care Provider.  Do not eat or drink foods containing more than 2000mg of salt (sodium) in your diet every day.  Call your Health Care Provider if you have any swelling or increased swelling in your feet, ankles, and/or stomach.  Take all of your medication as directed.  If you become dizzy call your Health Care Provider. Principal Discharge DX:	NSTEMI (non-ST elevated myocardial infarction)  Goal:	to remain without complication of cardiac catheterization and or returning chest pain  Instructions for follow-up, activity and diet:	Continue your medications. Do not stop Aspirin or Plavix unless instructed by your cardiologist.  No heavy lifting, strenuous activity, bending, straining or unnecessary stair climbing for 2 weeks. No sex for 1 week.  No driving for 2 days. You may shower 24 hours following procedure but avoid baths and swimming for 1 week. Check groin site for bleeding and/or swelling daily following procedure. Call your doctor/cardiologist immediately for bleeding or swelling or if you have increased/persistent pain or drainage at the site. Follow up with your cardiologist in 1- 2 weeks. You may call [facility] at [phone] or [phone] after office hours and weekends with any questions or concerns following your procedure.  Secondary Diagnosis:	DM2 (diabetes mellitus, type 2)  Instructions for follow-up, activity and diet:	HgA1C this admission was 9.4  Make sure you get your HgA1c checked every three months.  If you take oral diabetes medications, check your blood glucose two times a day.  If you take insulin, check your blood glucose before meals and at bedtime.  It's important not to skip any meals.  Keep a log of your blood glucose results and always take it with you to your doctor appointments.  Keep a list of your current medications including injectables and over the counter medications and bring this medication list with you to all your doctor appointments.  If you have not seen your ophthalmologist this year call for appointment.  Check your feet daily for redness, sores, or openings. Do not self treat. If no improvement in two days call your primary care physician for an appointment.  Low blood sugar (hypoglycemia) is a blood sugar below 70mg/dl. Check your blood sugar if you feel signs/symptoms of hypoglycemia. If your blood sugar is below 70 take 15 grams of carbohydrates (ex 4 oz of apple juice, 3-4 glucose tablets, or 4-6 oz of regular soda) wait 15 minutes and repeat blood sugar to make sure it comes up above 70.  If your blood sugar is above 70 and you are due for a meal, have a meal.  If you are not due for a meal have a snack.  This snack helps keeps your blood sugar at a safe range.  Secondary Diagnosis:	HTN (hypertension)  Instructions for follow-up, activity and diet:	Follow up with your medical doctor to establish long term blood pressure treatment goals.  Secondary Diagnosis:	COPD (chronic obstructive pulmonary disease)  Instructions for follow-up, activity and diet:	Call your Health Care provider upon arrival home to make a follow up appointment within one week.  Take all inhalers as prescribed by your Health Care Provider.  Take steroids as prescribed by your Health Care Provider.  If your cough increases infrequency and severity and/or you have shortness of breath or increased shortness of breath call your Health Care Provider.  If you develop fever, chills, night sweats, malaise, and/or change in mental status call your Health care Provider.  Nutrition is very important.  Eat small frequent meals.  Increase your activity as tolerated.  Do not stay in bed all day  Secondary Diagnosis:	CKD (chronic kidney disease)  Instructions for follow-up, activity and diet:	Avoid taking (NSAIDs) - (ex: Ibuprofen, Advil, Celebrex, Naprosyn)  Avoid taking any nephrotoxic agents (can harm kidneys) - Intravenous contrast for diagnostic testing, combination cold medications.  Have all medications adjusted for your renal function by your Health Care Provider.  Blood pressure control is important.  Take all medication as prescribed.  Please have routine kidney function tests in 1 week with Dr. Coyne  Secondary Diagnosis:	Chronic diastolic congestive heart failure  Instructions for follow-up, activity and diet:	Weigh yourself daily.  If you gain 3lbs in 3 days, or 5lbs in a week call your Health Care Provider.  Do not eat or drink foods containing more than 2000mg of salt (sodium) in your diet every day.  Call your Health Care Provider if you have any swelling or increased swelling in your feet, ankles, and/or stomach.  Take all of your medication as directed.  If you become dizzy call your Health Care Provider. Principal Discharge DX:	NSTEMI (non-ST elevated myocardial infarction)  Goal:	to remain without complication of cardiac catheterization and or returning chest pain  Instructions for follow-up, activity and diet:	Continue your medications. Do not stop Aspirin or Plavix unless instructed by your cardiologist.  No heavy lifting, strenuous activity, bending, straining or unnecessary stair climbing for 2 weeks. No sex for 1 week.  No driving for 2 days. You may shower 24 hours following procedure but avoid baths and swimming for 1 week. Check groin site for bleeding and/or swelling daily following procedure. Call your doctor/cardiologist immediately for bleeding or swelling or if you have increased/persistent pain or drainage at the site. Follow up with your cardiologist in 1- 2 weeks. You may call [facility] at [phone] or [phone] after office hours and weekends with any questions or concerns following your procedure.  Secondary Diagnosis:	DM2 (diabetes mellitus, type 2)  Instructions for follow-up, activity and diet:	HgA1C this admission was 9.4  Make sure you get your HgA1c checked every three months.  If you take oral diabetes medications, check your blood glucose two times a day.  If you take insulin, check your blood glucose before meals and at bedtime.  It's important not to skip any meals.  Keep a log of your blood glucose results and always take it with you to your doctor appointments.  Keep a list of your current medications including injectables and over the counter medications and bring this medication list with you to all your doctor appointments.  If you have not seen your ophthalmologist this year call for appointment.  Check your feet daily for redness, sores, or openings. Do not self treat. If no improvement in two days call your primary care physician for an appointment.  Low blood sugar (hypoglycemia) is a blood sugar below 70mg/dl. Check your blood sugar if you feel signs/symptoms of hypoglycemia. If your blood sugar is below 70 take 15 grams of carbohydrates (ex 4 oz of apple juice, 3-4 glucose tablets, or 4-6 oz of regular soda) wait 15 minutes and repeat blood sugar to make sure it comes up above 70.  If your blood sugar is above 70 and you are due for a meal, have a meal.  If you are not due for a meal have a snack.  This snack helps keeps your blood sugar at a safe range.  Secondary Diagnosis:	HTN (hypertension)  Instructions for follow-up, activity and diet:	Follow up with your medical doctor to establish long term blood pressure treatment goals.  Secondary Diagnosis:	COPD (chronic obstructive pulmonary disease)  Instructions for follow-up, activity and diet:	Call your Health Care provider upon arrival home to make a follow up appointment within one week.  Take all inhalers as prescribed by your Health Care Provider.  Take steroids as prescribed by your Health Care Provider.  If your cough increases infrequency and severity and/or you have shortness of breath or increased shortness of breath call your Health Care Provider.  If you develop fever, chills, night sweats, malaise, and/or change in mental status call your Health care Provider.  Nutrition is very important.  Eat small frequent meals.  Increase your activity as tolerated.  Do not stay in bed all day  Secondary Diagnosis:	CKD (chronic kidney disease)  Instructions for follow-up, activity and diet:	Avoid taking (NSAIDs) - (ex: Ibuprofen, Advil, Celebrex, Naprosyn)  Avoid taking any nephrotoxic agents (can harm kidneys) - Intravenous contrast for diagnostic testing, combination cold medications.  Have all medications adjusted for your renal function by your Health Care Provider.  Blood pressure control is important.  Take all medication as prescribed.  Please have routine kidney function tests in 1 week with Dr. Coyne  Secondary Diagnosis:	Chronic diastolic congestive heart failure  Instructions for follow-up, activity and diet:	Weigh yourself daily.  If you gain 3lbs in 3 days, or 5lbs in a week call your Health Care Provider.  Do not eat or drink foods containing more than 2000mg of salt (sodium) in your diet every day.  Call your Health Care Provider if you have any swelling or increased swelling in your feet, ankles, and/or stomach.  Take all of your medication as directed.  If you become dizzy call your Health Care Provider. Principal Discharge DX:	NSTEMI (non-ST elevated myocardial infarction)  Goal:	to remain without complication of cardiac catheterization and or returning chest pain  Instructions for follow-up, activity and diet:	Continue your medications. Do not stop Aspirin or Plavix unless instructed by your cardiologist.  No heavy lifting, strenuous activity, bending, straining or unnecessary stair climbing for 2 weeks. No sex for 1 week.  No driving for 2 days. You may shower 24 hours following procedure but avoid baths and swimming for 1 week. Check groin site for bleeding and/or swelling daily following procedure. Call your doctor/cardiologist immediately for bleeding or swelling or if you have increased/persistent pain or drainage at the site. Follow up with your cardiologist in 1- 2 weeks. You may call [facility] at [phone] or [phone] after office hours and weekends with any questions or concerns following your procedure.  Secondary Diagnosis:	DM2 (diabetes mellitus, type 2)  Instructions for follow-up, activity and diet:	HgA1C this admission was 9.4  Make sure you get your HgA1c checked every three months.  If you take oral diabetes medications, check your blood glucose two times a day.  If you take insulin, check your blood glucose before meals and at bedtime.  It's important not to skip any meals.  Keep a log of your blood glucose results and always take it with you to your doctor appointments.  Keep a list of your current medications including injectables and over the counter medications and bring this medication list with you to all your doctor appointments.  If you have not seen your ophthalmologist this year call for appointment.  Check your feet daily for redness, sores, or openings. Do not self treat. If no improvement in two days call your primary care physician for an appointment.  Low blood sugar (hypoglycemia) is a blood sugar below 70mg/dl. Check your blood sugar if you feel signs/symptoms of hypoglycemia. If your blood sugar is below 70 take 15 grams of carbohydrates (ex 4 oz of apple juice, 3-4 glucose tablets, or 4-6 oz of regular soda) wait 15 minutes and repeat blood sugar to make sure it comes up above 70.  If your blood sugar is above 70 and you are due for a meal, have a meal.  If you are not due for a meal have a snack.  This snack helps keeps your blood sugar at a safe range.  Secondary Diagnosis:	HTN (hypertension)  Instructions for follow-up, activity and diet:	Follow up with your medical doctor to establish long term blood pressure treatment goals.  Secondary Diagnosis:	COPD (chronic obstructive pulmonary disease)  Instructions for follow-up, activity and diet:	Call your Health Care provider upon arrival home to make a follow up appointment within one week.  Take all inhalers as prescribed by your Health Care Provider.  Take steroids as prescribed by your Health Care Provider.  If your cough increases infrequency and severity and/or you have shortness of breath or increased shortness of breath call your Health Care Provider.  If you develop fever, chills, night sweats, malaise, and/or change in mental status call your Health care Provider.  Nutrition is very important.  Eat small frequent meals.  Increase your activity as tolerated.  Do not stay in bed all day  Secondary Diagnosis:	CKD (chronic kidney disease)  Instructions for follow-up, activity and diet:	Avoid taking (NSAIDs) - (ex: Ibuprofen, Advil, Celebrex, Naprosyn)  Avoid taking any nephrotoxic agents (can harm kidneys) - Intravenous contrast for diagnostic testing, combination cold medications.  Have all medications adjusted for your renal function by your Health Care Provider.  Blood pressure control is important.  Take all medication as prescribed.  Please have routine kidney function tests in 1 week with Dr. Coyne  Secondary Diagnosis:	Chronic diastolic congestive heart failure  Instructions for follow-up, activity and diet:	Weigh yourself daily.  If you gain 3lbs in 3 days, or 5lbs in a week call your Health Care Provider.  Do not eat or drink foods containing more than 2000mg of salt (sodium) in your diet every day.  Call your Health Care Provider if you have any swelling or increased swelling in your feet, ankles, and/or stomach.  Take all of your medication as directed.  If you become dizzy call your Health Care Provider.

## 2017-04-10 NOTE — DISCHARGE NOTE ADULT - SECONDARY DIAGNOSIS.
DM2 (diabetes mellitus, type 2) HTN (hypertension) COPD (chronic obstructive pulmonary disease) CKD (chronic kidney disease) Chronic diastolic congestive heart failure

## 2017-04-10 NOTE — DISCHARGE NOTE ADULT - MEDICATION SUMMARY - MEDICATIONS TO CHANGE
I will SWITCH the dose or number of times a day I take the medications listed below when I get home from the hospital:    furosemide 20 mg oral tablet  -- 1 tab(s) by mouth once a day t/TH/sat/Sun  2 tabs M/W/F  home

## 2017-04-11 VITALS
SYSTOLIC BLOOD PRESSURE: 156 MMHG | RESPIRATION RATE: 18 BRPM | OXYGEN SATURATION: 96 % | TEMPERATURE: 98 F | HEART RATE: 71 BPM | DIASTOLIC BLOOD PRESSURE: 52 MMHG

## 2017-04-11 DIAGNOSIS — J45.909 UNSPECIFIED ASTHMA, UNCOMPLICATED: ICD-10-CM

## 2017-04-11 DIAGNOSIS — E03.9 HYPOTHYROIDISM, UNSPECIFIED: ICD-10-CM

## 2017-04-11 DIAGNOSIS — E78.5 HYPERLIPIDEMIA, UNSPECIFIED: ICD-10-CM

## 2017-04-11 DIAGNOSIS — I10 ESSENTIAL (PRIMARY) HYPERTENSION: ICD-10-CM

## 2017-04-11 DIAGNOSIS — I25.10 ATHEROSCLEROTIC HEART DISEASE OF NATIVE CORONARY ARTERY WITHOUT ANGINA PECTORIS: ICD-10-CM

## 2017-04-11 DIAGNOSIS — E11.9 TYPE 2 DIABETES MELLITUS WITHOUT COMPLICATIONS: ICD-10-CM

## 2017-04-11 DIAGNOSIS — E66.9 OBESITY, UNSPECIFIED: ICD-10-CM

## 2017-04-11 DIAGNOSIS — N17.9 ACUTE KIDNEY FAILURE, UNSPECIFIED: ICD-10-CM

## 2017-04-11 DIAGNOSIS — I21.4 NON-ST ELEVATION (NSTEMI) MYOCARDIAL INFARCTION: ICD-10-CM

## 2017-04-11 DIAGNOSIS — I24.9 ACUTE ISCHEMIC HEART DISEASE, UNSPECIFIED: ICD-10-CM

## 2017-04-11 LAB
ANION GAP SERPL CALC-SCNC: 15 MMOL/L — SIGNIFICANT CHANGE UP (ref 5–17)
BASOPHILS # BLD AUTO: 0.1 K/UL — SIGNIFICANT CHANGE UP (ref 0–0.2)
BASOPHILS NFR BLD AUTO: 0.4 % — SIGNIFICANT CHANGE UP (ref 0–2)
BUN SERPL-MCNC: 30 MG/DL — HIGH (ref 7–23)
CALCIUM SERPL-MCNC: 9 MG/DL — SIGNIFICANT CHANGE UP (ref 8.4–10.5)
CHLORIDE SERPL-SCNC: 104 MMOL/L — SIGNIFICANT CHANGE UP (ref 96–108)
CO2 SERPL-SCNC: 21 MMOL/L — LOW (ref 22–31)
CREAT SERPL-MCNC: 1.21 MG/DL — SIGNIFICANT CHANGE UP (ref 0.5–1.3)
EOSINOPHIL # BLD AUTO: 0 K/UL — SIGNIFICANT CHANGE UP (ref 0–0.5)
EOSINOPHIL NFR BLD AUTO: 0.2 % — SIGNIFICANT CHANGE UP (ref 0–6)
GLUCOSE SERPL-MCNC: 329 MG/DL — HIGH (ref 70–99)
HCT VFR BLD CALC: 35.9 % — SIGNIFICANT CHANGE UP (ref 34.5–45)
HCT VFR BLD CALC: 35.9 % — SIGNIFICANT CHANGE UP (ref 34.5–45)
HGB BLD-MCNC: 11.9 G/DL — SIGNIFICANT CHANGE UP (ref 11.5–15.5)
HGB BLD-MCNC: 12.2 G/DL — SIGNIFICANT CHANGE UP (ref 11.5–15.5)
LYMPHOCYTES # BLD AUTO: 17.7 % — SIGNIFICANT CHANGE UP (ref 13–44)
LYMPHOCYTES # BLD AUTO: 2.6 K/UL — SIGNIFICANT CHANGE UP (ref 1–3.3)
MCHC RBC-ENTMCNC: 27.5 PG — SIGNIFICANT CHANGE UP (ref 27–34)
MCHC RBC-ENTMCNC: 28.4 PG — SIGNIFICANT CHANGE UP (ref 27–34)
MCHC RBC-ENTMCNC: 33.2 GM/DL — SIGNIFICANT CHANGE UP (ref 32–36)
MCHC RBC-ENTMCNC: 34.1 GM/DL — SIGNIFICANT CHANGE UP (ref 32–36)
MCV RBC AUTO: 82.8 FL — SIGNIFICANT CHANGE UP (ref 80–100)
MCV RBC AUTO: 83.2 FL — SIGNIFICANT CHANGE UP (ref 80–100)
MONOCYTES # BLD AUTO: 0.8 K/UL — SIGNIFICANT CHANGE UP (ref 0–0.9)
MONOCYTES NFR BLD AUTO: 5.8 % — SIGNIFICANT CHANGE UP (ref 2–14)
NEUTROPHILS # BLD AUTO: 11 K/UL — HIGH (ref 1.8–7.4)
NEUTROPHILS NFR BLD AUTO: 75.9 % — SIGNIFICANT CHANGE UP (ref 43–77)
PLATELET # BLD AUTO: 199 K/UL — SIGNIFICANT CHANGE UP (ref 150–400)
PLATELET # BLD AUTO: 204 K/UL — SIGNIFICANT CHANGE UP (ref 150–400)
POTASSIUM SERPL-MCNC: 4.3 MMOL/L — SIGNIFICANT CHANGE UP (ref 3.5–5.3)
POTASSIUM SERPL-SCNC: 4.3 MMOL/L — SIGNIFICANT CHANGE UP (ref 3.5–5.3)
RBC # BLD: 4.31 M/UL — SIGNIFICANT CHANGE UP (ref 3.8–5.2)
RBC # BLD: 4.33 M/UL — SIGNIFICANT CHANGE UP (ref 3.8–5.2)
RBC # FLD: 12.6 % — SIGNIFICANT CHANGE UP (ref 10.3–14.5)
RBC # FLD: 12.8 % — SIGNIFICANT CHANGE UP (ref 10.3–14.5)
SODIUM SERPL-SCNC: 140 MMOL/L — SIGNIFICANT CHANGE UP (ref 135–145)
WBC # BLD: 14.5 K/UL — HIGH (ref 3.8–10.5)
WBC # BLD: 15.1 K/UL — HIGH (ref 3.8–10.5)
WBC # FLD AUTO: 14.5 K/UL — HIGH (ref 3.8–10.5)
WBC # FLD AUTO: 15.1 K/UL — HIGH (ref 3.8–10.5)

## 2017-04-11 PROCEDURE — 80061 LIPID PANEL: CPT

## 2017-04-11 PROCEDURE — 80048 BASIC METABOLIC PNL TOTAL CA: CPT

## 2017-04-11 PROCEDURE — 80053 COMPREHEN METABOLIC PANEL: CPT

## 2017-04-11 PROCEDURE — 93458 L HRT ARTERY/VENTRICLE ANGIO: CPT | Mod: 59

## 2017-04-11 PROCEDURE — 85027 COMPLETE CBC AUTOMATED: CPT

## 2017-04-11 PROCEDURE — 94640 AIRWAY INHALATION TREATMENT: CPT

## 2017-04-11 PROCEDURE — 71045 X-RAY EXAM CHEST 1 VIEW: CPT

## 2017-04-11 PROCEDURE — 84443 ASSAY THYROID STIM HORMONE: CPT

## 2017-04-11 PROCEDURE — C1725: CPT

## 2017-04-11 PROCEDURE — C9606: CPT | Mod: LD

## 2017-04-11 PROCEDURE — C1769: CPT

## 2017-04-11 PROCEDURE — C1760: CPT

## 2017-04-11 PROCEDURE — 99232 SBSQ HOSP IP/OBS MODERATE 35: CPT | Mod: GC

## 2017-04-11 PROCEDURE — C1887: CPT

## 2017-04-11 PROCEDURE — 83036 HEMOGLOBIN GLYCOSYLATED A1C: CPT

## 2017-04-11 PROCEDURE — C9600: CPT | Mod: LC

## 2017-04-11 PROCEDURE — 99232 SBSQ HOSP IP/OBS MODERATE 35: CPT

## 2017-04-11 PROCEDURE — 93306 TTE W/DOPPLER COMPLETE: CPT

## 2017-04-11 PROCEDURE — 93005 ELECTROCARDIOGRAM TRACING: CPT

## 2017-04-11 PROCEDURE — C1874: CPT

## 2017-04-11 PROCEDURE — C1894: CPT

## 2017-04-11 RX ORDER — CLOPIDOGREL BISULFATE 75 MG/1
1 TABLET, FILM COATED ORAL
Qty: 0 | Refills: 0 | COMMUNITY

## 2017-04-11 RX ORDER — FUROSEMIDE 40 MG
40 TABLET ORAL DAILY
Qty: 0 | Refills: 0 | Status: DISCONTINUED | OUTPATIENT
Start: 2017-04-11 | End: 2017-04-11

## 2017-04-11 RX ORDER — PANTOPRAZOLE SODIUM 20 MG/1
1 TABLET, DELAYED RELEASE ORAL
Qty: 30 | Refills: 0 | OUTPATIENT
Start: 2017-04-11 | End: 2017-05-11

## 2017-04-11 RX ORDER — CLOPIDOGREL BISULFATE 75 MG/1
1 TABLET, FILM COATED ORAL
Qty: 30 | Refills: 0 | OUTPATIENT
Start: 2017-04-11 | End: 2017-05-11

## 2017-04-11 RX ORDER — ALBUTEROL 90 UG/1
3 AEROSOL, METERED ORAL
Qty: 0 | Refills: 0 | COMMUNITY

## 2017-04-11 RX ORDER — FUROSEMIDE 40 MG
1 TABLET ORAL
Qty: 30 | Refills: 0 | OUTPATIENT
Start: 2017-04-11 | End: 2017-05-11

## 2017-04-11 RX ORDER — METOPROLOL TARTRATE 50 MG
1 TABLET ORAL
Qty: 0 | Refills: 0 | COMMUNITY

## 2017-04-11 RX ORDER — FUROSEMIDE 40 MG
1 TABLET ORAL
Qty: 0 | Refills: 0 | COMMUNITY

## 2017-04-11 RX ORDER — METOPROLOL TARTRATE 50 MG
1 TABLET ORAL
Qty: 30 | Refills: 0 | OUTPATIENT
Start: 2017-04-11 | End: 2017-05-11

## 2017-04-11 RX ORDER — METFORMIN HYDROCHLORIDE 850 MG/1
2 TABLET ORAL
Qty: 0 | Refills: 0 | COMMUNITY

## 2017-04-11 RX ADMIN — HEPARIN SODIUM 5000 UNIT(S): 5000 INJECTION INTRAVENOUS; SUBCUTANEOUS at 13:59

## 2017-04-11 RX ADMIN — Medication 17 UNIT(S): at 07:56

## 2017-04-11 RX ADMIN — FLUTICASONE PROPIONATE AND SALMETEROL 1 DOSE(S): 50; 250 POWDER ORAL; RESPIRATORY (INHALATION) at 12:06

## 2017-04-11 RX ADMIN — TIOTROPIUM BROMIDE 1 CAPSULE(S): 18 CAPSULE ORAL; RESPIRATORY (INHALATION) at 12:06

## 2017-04-11 RX ADMIN — Medication 3: at 12:06

## 2017-04-11 RX ADMIN — Medication 112 MICROGRAM(S): at 05:56

## 2017-04-11 RX ADMIN — Medication 17 UNIT(S): at 12:05

## 2017-04-11 RX ADMIN — HEPARIN SODIUM 5000 UNIT(S): 5000 INJECTION INTRAVENOUS; SUBCUTANEOUS at 05:57

## 2017-04-11 RX ADMIN — CLOPIDOGREL BISULFATE 75 MILLIGRAM(S): 75 TABLET, FILM COATED ORAL at 12:06

## 2017-04-11 RX ADMIN — Medication 50 MILLIGRAM(S): at 05:57

## 2017-04-11 RX ADMIN — PANTOPRAZOLE SODIUM 40 MILLIGRAM(S): 20 TABLET, DELAYED RELEASE ORAL at 05:56

## 2017-04-11 RX ADMIN — Medication 81 MILLIGRAM(S): at 12:06

## 2017-04-11 RX ADMIN — Medication 3: at 07:56

## 2017-04-11 RX ADMIN — Medication 40 MILLIGRAM(S): at 13:59

## 2017-09-13 ENCOUNTER — APPOINTMENT (OUTPATIENT)
Dept: PULMONOLOGY | Facility: CLINIC | Age: 82
End: 2017-09-13
Payer: MEDICARE

## 2017-09-13 VITALS
RESPIRATION RATE: 16 BRPM | WEIGHT: 214 LBS | SYSTOLIC BLOOD PRESSURE: 122 MMHG | OXYGEN SATURATION: 94 % | BODY MASS INDEX: 42.57 KG/M2 | HEIGHT: 59.5 IN | HEART RATE: 69 BPM | DIASTOLIC BLOOD PRESSURE: 74 MMHG

## 2017-09-13 PROCEDURE — 94060 EVALUATION OF WHEEZING: CPT

## 2017-09-13 PROCEDURE — 94664 DEMO&/EVAL PT USE INHALER: CPT | Mod: 59

## 2017-09-13 PROCEDURE — 99204 OFFICE O/P NEW MOD 45 MIN: CPT | Mod: 25

## 2017-09-13 PROCEDURE — 99214 OFFICE O/P EST MOD 30 MIN: CPT | Mod: 25

## 2017-09-13 RX ORDER — AMOXICILLIN 500 MG/1
500 TABLET, FILM COATED ORAL
Qty: 22 | Refills: 0 | Status: DISCONTINUED | COMMUNITY
Start: 2017-03-20 | End: 2017-09-13

## 2017-09-25 ENCOUNTER — RX RENEWAL (OUTPATIENT)
Age: 82
End: 2017-09-25

## 2017-10-02 ENCOUNTER — RX RENEWAL (OUTPATIENT)
Age: 82
End: 2017-10-02

## 2017-10-11 ENCOUNTER — APPOINTMENT (OUTPATIENT)
Dept: CARDIOLOGY | Facility: CLINIC | Age: 82
End: 2017-10-11

## 2018-02-28 ENCOUNTER — NON-APPOINTMENT (OUTPATIENT)
Age: 83
End: 2018-02-28

## 2018-02-28 ENCOUNTER — APPOINTMENT (OUTPATIENT)
Dept: CARDIOLOGY | Facility: CLINIC | Age: 83
End: 2018-02-28
Payer: MEDICARE

## 2018-02-28 DIAGNOSIS — I25.2 OLD MYOCARDIAL INFARCTION: ICD-10-CM

## 2018-02-28 PROCEDURE — 93000 ELECTROCARDIOGRAM COMPLETE: CPT

## 2018-02-28 PROCEDURE — 99214 OFFICE O/P EST MOD 30 MIN: CPT | Mod: 25

## 2018-02-28 RX ORDER — LIRAGLUTIDE 6 MG/ML
18 INJECTION SUBCUTANEOUS
Qty: 27 | Refills: 0 | Status: COMPLETED | COMMUNITY
Start: 2017-10-17

## 2018-02-28 RX ORDER — PREDNISOLONE ACETATE 10 MG/ML
1 SUSPENSION/ DROPS OPHTHALMIC
Qty: 5 | Refills: 0 | Status: COMPLETED | COMMUNITY
Start: 2018-01-26

## 2018-02-28 RX ORDER — TROPICAMIDE 10 MG/ML
1 SOLUTION/ DROPS OPHTHALMIC
Qty: 15 | Refills: 0 | Status: COMPLETED | COMMUNITY
Start: 2018-01-25

## 2018-02-28 RX ORDER — BESIFLOXACIN 6 MG/ML
0.6 SUSPENSION OPHTHALMIC
Qty: 5 | Refills: 0 | Status: COMPLETED | COMMUNITY
Start: 2018-01-25

## 2018-03-01 ENCOUNTER — FORM ENCOUNTER (OUTPATIENT)
Age: 83
End: 2018-03-01

## 2018-03-02 ENCOUNTER — APPOINTMENT (OUTPATIENT)
Dept: CARDIOLOGY | Facility: CLINIC | Age: 83
End: 2018-03-02
Payer: MEDICARE

## 2018-03-02 ENCOUNTER — APPOINTMENT (OUTPATIENT)
Dept: RADIOLOGY | Facility: CLINIC | Age: 83
End: 2018-03-02
Payer: MEDICARE

## 2018-03-02 ENCOUNTER — OUTPATIENT (OUTPATIENT)
Dept: OUTPATIENT SERVICES | Facility: HOSPITAL | Age: 83
LOS: 1 days | End: 2018-03-02
Payer: MEDICARE

## 2018-03-02 ENCOUNTER — APPOINTMENT (OUTPATIENT)
Dept: PULMONOLOGY | Facility: CLINIC | Age: 83
End: 2018-03-02
Payer: MEDICARE

## 2018-03-02 VITALS
WEIGHT: 219 LBS | BODY MASS INDEX: 44.15 KG/M2 | SYSTOLIC BLOOD PRESSURE: 186 MMHG | DIASTOLIC BLOOD PRESSURE: 70 MMHG | OXYGEN SATURATION: 94 % | HEART RATE: 76 BPM | HEIGHT: 59 IN

## 2018-03-02 VITALS — WEIGHT: 210 LBS | DIASTOLIC BLOOD PRESSURE: 72 MMHG | BODY MASS INDEX: 41.71 KG/M2 | SYSTOLIC BLOOD PRESSURE: 144 MMHG

## 2018-03-02 DIAGNOSIS — Z90.49 ACQUIRED ABSENCE OF OTHER SPECIFIED PARTS OF DIGESTIVE TRACT: Chronic | ICD-10-CM

## 2018-03-02 DIAGNOSIS — R93.1 ABNORMAL FINDINGS ON DIAGNOSTIC IMAGING OF HEART AND CORONARY CIRCULATION: Chronic | ICD-10-CM

## 2018-03-02 DIAGNOSIS — R06.02 SHORTNESS OF BREATH: ICD-10-CM

## 2018-03-02 DIAGNOSIS — J44.9 CHRONIC OBSTRUCTIVE PULMONARY DISEASE, UNSPECIFIED: ICD-10-CM

## 2018-03-02 PROCEDURE — 85018 HEMOGLOBIN: CPT | Mod: QW

## 2018-03-02 PROCEDURE — 94727 GAS DIL/WSHOT DETER LNG VOL: CPT

## 2018-03-02 PROCEDURE — 71046 X-RAY EXAM CHEST 2 VIEWS: CPT

## 2018-03-02 PROCEDURE — 99215 OFFICE O/P EST HI 40 MIN: CPT | Mod: 25

## 2018-03-02 PROCEDURE — 94010 BREATHING CAPACITY TEST: CPT

## 2018-03-02 PROCEDURE — 94729 DIFFUSING CAPACITY: CPT

## 2018-03-02 PROCEDURE — 0399T: CPT

## 2018-03-02 PROCEDURE — 71046 X-RAY EXAM CHEST 2 VIEWS: CPT | Mod: 26

## 2018-03-02 PROCEDURE — 93306 TTE W/DOPPLER COMPLETE: CPT

## 2018-03-26 ENCOUNTER — OUTPATIENT (OUTPATIENT)
Dept: OUTPATIENT SERVICES | Facility: HOSPITAL | Age: 83
LOS: 1 days | End: 2018-03-26
Payer: MEDICARE

## 2018-03-26 VITALS
OXYGEN SATURATION: 95 % | TEMPERATURE: 98 F | DIASTOLIC BLOOD PRESSURE: 70 MMHG | RESPIRATION RATE: 18 BRPM | HEART RATE: 75 BPM | SYSTOLIC BLOOD PRESSURE: 156 MMHG

## 2018-03-26 DIAGNOSIS — R93.1 ABNORMAL FINDINGS ON DIAGNOSTIC IMAGING OF HEART AND CORONARY CIRCULATION: Chronic | ICD-10-CM

## 2018-03-26 DIAGNOSIS — Z90.49 ACQUIRED ABSENCE OF OTHER SPECIFIED PARTS OF DIGESTIVE TRACT: Chronic | ICD-10-CM

## 2018-03-26 DIAGNOSIS — Z01.810 ENCOUNTER FOR PREPROCEDURAL CARDIOVASCULAR EXAMINATION: ICD-10-CM

## 2018-03-26 LAB
ANION GAP SERPL CALC-SCNC: 10 MMOL/L — SIGNIFICANT CHANGE UP (ref 5–17)
APTT BLD: 32.3 SEC — SIGNIFICANT CHANGE UP (ref 27.5–37.4)
BASOPHILS # BLD AUTO: 0 K/UL — SIGNIFICANT CHANGE UP (ref 0–0.2)
BASOPHILS NFR BLD AUTO: 0.2 % — SIGNIFICANT CHANGE UP (ref 0–2)
BUN SERPL-MCNC: 23 MG/DL — HIGH (ref 8–20)
CALCIUM SERPL-MCNC: 9.1 MG/DL — SIGNIFICANT CHANGE UP (ref 8.6–10.2)
CHLORIDE SERPL-SCNC: 101 MMOL/L — SIGNIFICANT CHANGE UP (ref 98–107)
CO2 SERPL-SCNC: 31 MMOL/L — HIGH (ref 22–29)
CREAT SERPL-MCNC: 1.09 MG/DL — SIGNIFICANT CHANGE UP (ref 0.5–1.3)
EOSINOPHIL # BLD AUTO: 0.3 K/UL — SIGNIFICANT CHANGE UP (ref 0–0.5)
EOSINOPHIL NFR BLD AUTO: 2.7 % — SIGNIFICANT CHANGE UP (ref 0–6)
GLUCOSE SERPL-MCNC: 115 MG/DL — SIGNIFICANT CHANGE UP (ref 70–115)
HCT VFR BLD CALC: 38.6 % — SIGNIFICANT CHANGE UP (ref 37–47)
HGB BLD-MCNC: 12.1 G/DL — SIGNIFICANT CHANGE UP (ref 12–16)
INR BLD: 1.01 RATIO — SIGNIFICANT CHANGE UP (ref 0.88–1.16)
LYMPHOCYTES # BLD AUTO: 2.3 K/UL — SIGNIFICANT CHANGE UP (ref 1–4.8)
LYMPHOCYTES # BLD AUTO: 20.5 % — SIGNIFICANT CHANGE UP (ref 20–55)
MCHC RBC-ENTMCNC: 27.1 PG — SIGNIFICANT CHANGE UP (ref 27–31)
MCHC RBC-ENTMCNC: 31.3 G/DL — LOW (ref 32–36)
MCV RBC AUTO: 86.5 FL — SIGNIFICANT CHANGE UP (ref 81–99)
MONOCYTES # BLD AUTO: 0.8 K/UL — SIGNIFICANT CHANGE UP (ref 0–0.8)
MONOCYTES NFR BLD AUTO: 6.8 % — SIGNIFICANT CHANGE UP (ref 3–10)
NEUTROPHILS # BLD AUTO: 7.9 K/UL — SIGNIFICANT CHANGE UP (ref 1.8–8)
NEUTROPHILS NFR BLD AUTO: 69.3 % — SIGNIFICANT CHANGE UP (ref 37–73)
PLATELET # BLD AUTO: 263 K/UL — SIGNIFICANT CHANGE UP (ref 150–400)
POTASSIUM SERPL-MCNC: 4.1 MMOL/L — SIGNIFICANT CHANGE UP (ref 3.5–5.3)
POTASSIUM SERPL-SCNC: 4.1 MMOL/L — SIGNIFICANT CHANGE UP (ref 3.5–5.3)
PROTHROM AB SERPL-ACNC: 11.1 SEC — SIGNIFICANT CHANGE UP (ref 9.8–12.7)
RBC # BLD: 4.46 M/UL — SIGNIFICANT CHANGE UP (ref 4.4–5.2)
RBC # FLD: 14.9 % — SIGNIFICANT CHANGE UP (ref 11–15.6)
SODIUM SERPL-SCNC: 142 MMOL/L — SIGNIFICANT CHANGE UP (ref 135–145)
WBC # BLD: 11.4 K/UL — HIGH (ref 4.8–10.8)
WBC # FLD AUTO: 11.4 K/UL — HIGH (ref 4.8–10.8)

## 2018-03-26 PROCEDURE — G0463: CPT

## 2018-03-26 PROCEDURE — 36415 COLL VENOUS BLD VENIPUNCTURE: CPT

## 2018-03-26 PROCEDURE — 93005 ELECTROCARDIOGRAM TRACING: CPT

## 2018-03-26 PROCEDURE — 85610 PROTHROMBIN TIME: CPT

## 2018-03-26 PROCEDURE — 85730 THROMBOPLASTIN TIME PARTIAL: CPT

## 2018-03-26 PROCEDURE — 80048 BASIC METABOLIC PNL TOTAL CA: CPT

## 2018-03-26 PROCEDURE — 85027 COMPLETE CBC AUTOMATED: CPT

## 2018-03-26 PROCEDURE — 93010 ELECTROCARDIOGRAM REPORT: CPT

## 2018-03-26 NOTE — H&P PST ADULT - ASSESSMENT
Plan: PRE-PROCEDURE ASSESSMENT      -Patient seen and examined  -Labs reviewed  -Pre-procedure teaching completed with patient   -Questions answered about patients concerns    - pt instructed to hold metformin 2 days prior to procedure and 2 days after   -instructed to NPO after midnight.   - Pt instructed to have escort to and from procedure   -pt instructed IF STENT PLACED WILL REQUIRE TO STAY OVERNIGHT FOR MONITORING AND DISCHARGED IN THE AM .   -advised to take normal dose of basal insulin due to elevated fasting sugars of 170 adverage according to pt.

## 2018-03-26 NOTE — H&P PST ADULT - HISTORY OF PRESENT ILLNESS
This is an 82yo female with past medical history of Diabetes, heart failure , HTN, HLD, MI, AI, COPD.   She presents today for PST in anticipation of having a RHC/LHC.  Most recently she was seen by cardiology.    Coronary angioplasty 4/2017 -- ADELIA ro RCA and LAD x 2

## 2018-03-29 ENCOUNTER — TRANSCRIPTION ENCOUNTER (OUTPATIENT)
Age: 83
End: 2018-03-29

## 2018-03-29 ENCOUNTER — OUTPATIENT (OUTPATIENT)
Dept: OUTPATIENT SERVICES | Facility: HOSPITAL | Age: 83
LOS: 1 days | Discharge: HOME | End: 2018-03-29
Payer: MEDICARE

## 2018-03-29 VITALS
RESPIRATION RATE: 20 BRPM | SYSTOLIC BLOOD PRESSURE: 156 MMHG | OXYGEN SATURATION: 96 % | HEART RATE: 75 BPM | DIASTOLIC BLOOD PRESSURE: 70 MMHG

## 2018-03-29 VITALS
HEART RATE: 71 BPM | SYSTOLIC BLOOD PRESSURE: 145 MMHG | TEMPERATURE: 98 F | RESPIRATION RATE: 20 BRPM | OXYGEN SATURATION: 95 % | DIASTOLIC BLOOD PRESSURE: 65 MMHG

## 2018-03-29 DIAGNOSIS — Z90.49 ACQUIRED ABSENCE OF OTHER SPECIFIED PARTS OF DIGESTIVE TRACT: Chronic | ICD-10-CM

## 2018-03-29 DIAGNOSIS — Z01.810 ENCOUNTER FOR PREPROCEDURAL CARDIOVASCULAR EXAMINATION: ICD-10-CM

## 2018-03-29 DIAGNOSIS — R06.09 OTHER FORMS OF DYSPNEA: ICD-10-CM

## 2018-03-29 DIAGNOSIS — R93.1 ABNORMAL FINDINGS ON DIAGNOSTIC IMAGING OF HEART AND CORONARY CIRCULATION: Chronic | ICD-10-CM

## 2018-03-29 LAB — GLUCOSE BLDC GLUCOMTR-MCNC: 184 MG/DL — HIGH (ref 70–99)

## 2018-03-29 PROCEDURE — C1769: CPT

## 2018-03-29 PROCEDURE — 82962 GLUCOSE BLOOD TEST: CPT

## 2018-03-29 PROCEDURE — C1760: CPT

## 2018-03-29 PROCEDURE — 99152 MOD SED SAME PHYS/QHP 5/>YRS: CPT

## 2018-03-29 PROCEDURE — C1894: CPT

## 2018-03-29 PROCEDURE — C1887: CPT

## 2018-03-29 PROCEDURE — C1889: CPT

## 2018-03-29 PROCEDURE — 93460 R&L HRT ART/VENTRICLE ANGIO: CPT

## 2018-03-29 PROCEDURE — 99153 MOD SED SAME PHYS/QHP EA: CPT

## 2018-03-29 PROCEDURE — 93571 IV DOP VEL&/PRESS C FLO 1ST: CPT | Mod: LC

## 2018-03-29 RX ORDER — HYDROCORTISONE 20 MG
100 TABLET ORAL ONCE
Qty: 0 | Refills: 0 | Status: COMPLETED | OUTPATIENT
Start: 2018-03-29 | End: 2018-03-29

## 2018-03-29 RX ORDER — METFORMIN HYDROCHLORIDE 850 MG/1
2 TABLET ORAL
Qty: 0 | Refills: 0 | COMMUNITY

## 2018-03-29 RX ORDER — DIPHENHYDRAMINE HCL 50 MG
25 CAPSULE ORAL ONCE
Qty: 0 | Refills: 0 | Status: COMPLETED | OUTPATIENT
Start: 2018-03-29 | End: 2018-03-29

## 2018-03-29 RX ORDER — FAMOTIDINE 10 MG/ML
20 INJECTION INTRAVENOUS ONCE
Qty: 0 | Refills: 0 | Status: COMPLETED | OUTPATIENT
Start: 2018-03-29 | End: 2018-03-29

## 2018-03-29 RX ADMIN — Medication 100 MILLIGRAM(S): at 08:34

## 2018-03-29 RX ADMIN — Medication 25 MILLIGRAM(S): at 08:34

## 2018-03-29 RX ADMIN — FAMOTIDINE 20 MILLIGRAM(S): 10 INJECTION INTRAVENOUS at 08:34

## 2018-03-29 NOTE — DISCHARGE NOTE ADULT - PATIENT PORTAL LINK FT
You can access the [facility] Patient Portal, offered by [facility], by registering with the following website: [email or web address]

## 2018-03-29 NOTE — DISCHARGE NOTE ADULT - PLAN OF CARE
Optimal cardiac function No heavy lifting, driving, sex, tub baths, swimming, or any activity that submerges the lower half of the body in water for 48 hours.  Limited walking and stairs for 48 hours.    Change the bandaid after 24 hours and every 24 hours after that.  Keep the puncture site dry and covered with a bandaid until a scab forms.    Observe the site frequently.  If bleeding or a large lump (the size of a golf ball or bigger) occurs lie flat, apply continuous direct pressure just above the puncture site for at least 10 minutes, and notify your physician immediately.  If the bleeding cannot be controlled, call 911 immediately for assistance.  Notify your physician of pain, swelling or any drainage.    Notify your physician immediately if coldness, numbness, discoloration or pain in your foot occurs.     Restricted use with no heavy lifting of affected arm for 48 hours.  No submerging the arm in water for 48 hours.  You may start showering today.  Call your doctor for any bleeding, swelling, loss of sensation in the hand or fingers, or fingers turning blue.  If heavy bleeding or large lumps form, hold pressure at the spot and come to the Emergency Room.

## 2018-03-29 NOTE — DISCHARGE NOTE ADULT - MEDICATION SUMMARY - MEDICATIONS TO TAKE
I will START or STAY ON the medications listed below when I get home from the hospital:    aspirin 81 mg oral tablet, chewable  -- 1 tab(s) by mouth once a day  -- Indication: For aspirin    DilTIAZem Hydrochloride  mg/24 hours oral capsule, extended release  -- 1 cap(s) by mouth once a day  -- Indication: For blood pressure    metFORMIN 500 mg oral tablet, extended release  -- 2 tab(s) by mouth 2 times a day  home, resume 4/13/17  -- Indication: For diabetes; hold for 48 hours, resume on 4/1    Basaglar KwikPen  -- 55 unit(s) subcutaneous once a day (at bedtime)  home  -- Indication: For diabetes    NovoLOG FlexPen 100 units/mL subcutaneous solution  -- 22 unit(s) subcutaneous 3 times a day  home  -- Indication: For diabetes    atorvastatin 40 mg oral tablet  -- 1 tab(s) by mouth once a day  -- Indication: For cholesterol    clopidogrel 75 mg oral tablet  -- 1 tab(s) by mouth once a day hosp  -- Indication: For anti platelte    metoprolol succinate 50 mg oral tablet, extended release  -- 1 tab(s) by mouth once a day  -- Indication: For blood pressure    ProAir HFA 90 mcg/inh inhalation aerosol  -- 2 puff(s) inhaled 4 times a day  home  -- Indication: For lungs    Advair Diskus 500 mcg-50 mcg inhalation powder  -- 1 puff(s) inhaled 2 times a day  home  -- Indication: For lungs    Spiriva 18 mcg inhalation capsule  -- 1 cap(s) inhaled once a day  -- Indication: For lungs    furosemide 40 mg oral tablet  -- 1 tab(s) by mouth once a day  -- Indication: For water pill    pantoprazole 40 mg oral delayed release tablet  -- 1 tab(s) by mouth once a day (before a meal)  -- Indication: For stomach    Synthroid 112 mcg (0.112 mg) oral tablet  -- 1 tab(s) by mouth once a day  home  -- Indication: For thyroid

## 2018-03-29 NOTE — DISCHARGE NOTE ADULT - NS AS ACTIVITY OBS
Showering allowed/Walking-Indoors allowed/No Heavy lifting/straining/Walking-Outdoors allowed/Do not drive or operate machinery

## 2018-03-29 NOTE — DISCHARGE NOTE ADULT - CARE PLAN
Principal Discharge DX:	DM2 (diabetes mellitus, type 2)  Goal:	Optimal cardiac function  Assessment and plan of treatment:	No heavy lifting, driving, sex, tub baths, swimming, or any activity that submerges the lower half of the body in water for 48 hours.  Limited walking and stairs for 48 hours.    Change the bandaid after 24 hours and every 24 hours after that.  Keep the puncture site dry and covered with a bandaid until a scab forms.    Observe the site frequently.  If bleeding or a large lump (the size of a golf ball or bigger) occurs lie flat, apply continuous direct pressure just above the puncture site for at least 10 minutes, and notify your physician immediately.  If the bleeding cannot be controlled, call 911 immediately for assistance.  Notify your physician of pain, swelling or any drainage.    Notify your physician immediately if coldness, numbness, discoloration or pain in your foot occurs.     Restricted use with no heavy lifting of affected arm for 48 hours.  No submerging the arm in water for 48 hours.  You may start showering today.  Call your doctor for any bleeding, swelling, loss of sensation in the hand or fingers, or fingers turning blue.  If heavy bleeding or large lumps form, hold pressure at the spot and come to the Emergency Room.

## 2018-03-29 NOTE — DISCHARGE NOTE ADULT - HOSPITAL COURSE
84yo female with past medical history of Diabetes, heart failure , HTN, HLD, MI, AI, COPD now s/p RHC/LHC no intervention see full report. RFA with angioseal closure device, RRA band in place,. Right brachial venous sheath in place. Patient awake and alert without complaints. Denies cehst pain, sob, palps. No complication during hospital course.    Neuro: A&OX3  Lungs: CTA B/L  CV: S1, S2, no murmur, RRR  Abd: Soft  Right Groin: Soft, no bleeding, no hematoma  Right Wrist no bleeding, no hematoma, no ecchymosis, band in place, Right brachial sheath in place no hematoma, no bleeding  Extremity: + distal pulses, cap refill <3 sec      A/P: 83y Female s/p /[facility] no intervention  1. Groin/wrist management discussed with patient  2. Continue current meds  3. Follow up as an outpatient with cardiologist  4. Bedrest x 2 hours  5. Remove radial and brachial sheaths in 1 hour  6. Discharge at 1500 if groin, wrist and brachial site benign.

## 2018-03-29 NOTE — DISCHARGE NOTE ADULT - CARE PROVIDER_API CALL
Waylon Benavides), Cardiovascular Disease; Internal Medicine; Interventional Cardiology  Phone: [phone]  Fax: [phone]

## 2018-03-30 PROCEDURE — 93460 R&L HRT ART/VENTRICLE ANGIO: CPT | Mod: 26

## 2018-03-30 PROCEDURE — 99152 MOD SED SAME PHYS/QHP 5/>YRS: CPT

## 2018-03-30 PROCEDURE — 93571 IV DOP VEL&/PRESS C FLO 1ST: CPT | Mod: 26,RC

## 2018-04-01 RX ORDER — METFORMIN HYDROCHLORIDE 850 MG/1
1 TABLET ORAL
Qty: 0 | Refills: 0 | COMMUNITY
Start: 2018-04-01

## 2018-04-01 RX ORDER — METFORMIN HYDROCHLORIDE 850 MG/1
2 TABLET ORAL
Qty: 0 | Refills: 0 | COMMUNITY
Start: 2018-04-01

## 2018-04-04 DIAGNOSIS — R06.02 SHORTNESS OF BREATH: ICD-10-CM

## 2018-04-17 ENCOUNTER — OTHER (OUTPATIENT)
Age: 83
End: 2018-04-17

## 2018-04-17 ENCOUNTER — RX RENEWAL (OUTPATIENT)
Age: 83
End: 2018-04-17

## 2018-04-18 ENCOUNTER — APPOINTMENT (OUTPATIENT)
Dept: CARDIOLOGY | Facility: CLINIC | Age: 83
End: 2018-04-18
Payer: MEDICARE

## 2018-04-18 ENCOUNTER — FORM ENCOUNTER (OUTPATIENT)
Age: 83
End: 2018-04-18

## 2018-04-18 PROCEDURE — 99215 OFFICE O/P EST HI 40 MIN: CPT | Mod: 25

## 2018-04-18 NOTE — ED PROVIDER NOTE - RELIEVING FACTORS
Diet controlled, non compliant with Metformin, started her on insulin Lispro on a sliding scale before meals & at bedtime, may add glargine based on insuline requirement, will check glycohemoglobin level in am. Also will hold Amlodipine & start her instead on Lisinopril 10 mg PO daily for both BP management & renal protection. n/a - Stopped taking Metformin because her sugars improved with diet control  - HgbA1c pending  - continue SSI for now  - current hyperglycemia likely due to steroids

## 2018-04-19 ENCOUNTER — APPOINTMENT (OUTPATIENT)
Dept: PULMONOLOGY | Facility: CLINIC | Age: 83
End: 2018-04-19
Payer: MEDICARE

## 2018-04-19 ENCOUNTER — OUTPATIENT (OUTPATIENT)
Dept: OUTPATIENT SERVICES | Facility: HOSPITAL | Age: 83
LOS: 1 days | End: 2018-04-19
Payer: MEDICARE

## 2018-04-19 VITALS
HEART RATE: 73 BPM | OXYGEN SATURATION: 95 % | DIASTOLIC BLOOD PRESSURE: 66 MMHG | WEIGHT: 214 LBS | HEIGHT: 60 IN | BODY MASS INDEX: 42.01 KG/M2 | SYSTOLIC BLOOD PRESSURE: 130 MMHG

## 2018-04-19 DIAGNOSIS — Z90.49 ACQUIRED ABSENCE OF OTHER SPECIFIED PARTS OF DIGESTIVE TRACT: Chronic | ICD-10-CM

## 2018-04-19 DIAGNOSIS — I50.30 UNSPECIFIED DIASTOLIC (CONGESTIVE) HEART FAILURE: ICD-10-CM

## 2018-04-19 DIAGNOSIS — R93.1 ABNORMAL FINDINGS ON DIAGNOSTIC IMAGING OF HEART AND CORONARY CIRCULATION: Chronic | ICD-10-CM

## 2018-04-19 PROCEDURE — 99205 OFFICE O/P NEW HI 60 MIN: CPT | Mod: 25

## 2018-04-19 PROCEDURE — 94060 EVALUATION OF WHEEZING: CPT

## 2018-04-19 PROCEDURE — 94664 DEMO&/EVAL PT USE INHALER: CPT | Mod: 59

## 2018-04-19 PROCEDURE — 99215 OFFICE O/P EST HI 40 MIN: CPT | Mod: 25

## 2018-04-19 PROCEDURE — 71046 X-RAY EXAM CHEST 2 VIEWS: CPT | Mod: 26

## 2018-04-19 RX ORDER — PREDNISONE 10 MG/1
10 TABLET ORAL
Qty: 60 | Refills: 0 | Status: DISCONTINUED | COMMUNITY
Start: 2018-03-02 | End: 2018-04-19

## 2018-04-23 ENCOUNTER — OUTPATIENT (OUTPATIENT)
Dept: OUTPATIENT SERVICES | Facility: HOSPITAL | Age: 83
LOS: 1 days | End: 2018-04-23
Payer: MEDICARE

## 2018-04-23 DIAGNOSIS — Z90.49 ACQUIRED ABSENCE OF OTHER SPECIFIED PARTS OF DIGESTIVE TRACT: Chronic | ICD-10-CM

## 2018-04-23 DIAGNOSIS — G47.33 OBSTRUCTIVE SLEEP APNEA (ADULT) (PEDIATRIC): ICD-10-CM

## 2018-04-23 DIAGNOSIS — R93.1 ABNORMAL FINDINGS ON DIAGNOSTIC IMAGING OF HEART AND CORONARY CIRCULATION: Chronic | ICD-10-CM

## 2018-04-23 PROCEDURE — 95810 POLYSOM 6/> YRS 4/> PARAM: CPT

## 2018-04-23 PROCEDURE — 95810 POLYSOM 6/> YRS 4/> PARAM: CPT | Mod: 26

## 2018-05-04 ENCOUNTER — OUTPATIENT (OUTPATIENT)
Dept: OUTPATIENT SERVICES | Facility: HOSPITAL | Age: 83
LOS: 1 days | End: 2018-05-04
Payer: MEDICARE

## 2018-05-04 DIAGNOSIS — Z90.49 ACQUIRED ABSENCE OF OTHER SPECIFIED PARTS OF DIGESTIVE TRACT: Chronic | ICD-10-CM

## 2018-05-04 DIAGNOSIS — I50.30 UNSPECIFIED DIASTOLIC (CONGESTIVE) HEART FAILURE: ICD-10-CM

## 2018-05-04 DIAGNOSIS — R93.1 ABNORMAL FINDINGS ON DIAGNOSTIC IMAGING OF HEART AND CORONARY CIRCULATION: Chronic | ICD-10-CM

## 2018-05-04 PROCEDURE — 93351 STRESS TTE COMPLETE: CPT

## 2018-05-04 PROCEDURE — 93351 STRESS TTE COMPLETE: CPT | Mod: 26

## 2018-05-04 RX ORDER — DOBUTAMINE HCL 250MG/20ML
5 VIAL (ML) INTRAVENOUS
Qty: 500 | Refills: 0 | Status: DISCONTINUED | OUTPATIENT
Start: 2018-05-04 | End: 2018-05-19

## 2018-05-09 ENCOUNTER — APPOINTMENT (OUTPATIENT)
Dept: CARDIOLOGY | Facility: CLINIC | Age: 83
End: 2018-05-09
Payer: MEDICARE

## 2018-05-09 VITALS
WEIGHT: 221 LBS | DIASTOLIC BLOOD PRESSURE: 75 MMHG | BODY MASS INDEX: 43.16 KG/M2 | OXYGEN SATURATION: 94 % | SYSTOLIC BLOOD PRESSURE: 153 MMHG | HEART RATE: 83 BPM

## 2018-05-09 PROCEDURE — 99215 OFFICE O/P EST HI 40 MIN: CPT

## 2018-05-25 ENCOUNTER — APPOINTMENT (OUTPATIENT)
Dept: CARDIOTHORACIC SURGERY | Facility: CLINIC | Age: 83
End: 2018-05-25
Payer: MEDICARE

## 2018-05-25 VITALS
HEIGHT: 60 IN | DIASTOLIC BLOOD PRESSURE: 84 MMHG | SYSTOLIC BLOOD PRESSURE: 186 MMHG | WEIGHT: 220 LBS | RESPIRATION RATE: 16 BRPM | BODY MASS INDEX: 43.19 KG/M2 | OXYGEN SATURATION: 95 % | HEART RATE: 79 BPM

## 2018-05-25 PROCEDURE — 99204 OFFICE O/P NEW MOD 45 MIN: CPT

## 2018-05-30 ENCOUNTER — INPATIENT (INPATIENT)
Facility: HOSPITAL | Age: 83
LOS: 6 days | Discharge: HOME | DRG: 291 | End: 2018-06-06
Attending: HOSPITALIST | Admitting: HOSPITALIST
Payer: MEDICARE

## 2018-05-30 VITALS
DIASTOLIC BLOOD PRESSURE: 66 MMHG | WEIGHT: 195.11 LBS | SYSTOLIC BLOOD PRESSURE: 123 MMHG | HEIGHT: 66 IN | TEMPERATURE: 98 F | OXYGEN SATURATION: 95 % | RESPIRATION RATE: 20 BRPM | HEART RATE: 65 BPM

## 2018-05-30 DIAGNOSIS — Z90.49 ACQUIRED ABSENCE OF OTHER SPECIFIED PARTS OF DIGESTIVE TRACT: Chronic | ICD-10-CM

## 2018-05-30 DIAGNOSIS — R06.00 DYSPNEA, UNSPECIFIED: ICD-10-CM

## 2018-05-30 DIAGNOSIS — R93.1 ABNORMAL FINDINGS ON DIAGNOSTIC IMAGING OF HEART AND CORONARY CIRCULATION: Chronic | ICD-10-CM

## 2018-05-30 DIAGNOSIS — I35.0 NONRHEUMATIC AORTIC (VALVE) STENOSIS: ICD-10-CM

## 2018-05-30 LAB
ANION GAP SERPL CALC-SCNC: 15 MMOL/L — SIGNIFICANT CHANGE UP (ref 5–17)
APPEARANCE UR: CLEAR — SIGNIFICANT CHANGE UP
APTT BLD: 29.2 SEC — SIGNIFICANT CHANGE UP (ref 27.5–37.4)
BACTERIA # UR AUTO: ABNORMAL
BILIRUB UR-MCNC: NEGATIVE — SIGNIFICANT CHANGE UP
BUN SERPL-MCNC: 22 MG/DL — HIGH (ref 8–20)
CALCIUM SERPL-MCNC: 9 MG/DL — SIGNIFICANT CHANGE UP (ref 8.6–10.2)
CHLORIDE SERPL-SCNC: 98 MMOL/L — SIGNIFICANT CHANGE UP (ref 98–107)
CK SERPL-CCNC: 103 U/L — SIGNIFICANT CHANGE UP (ref 25–170)
CO2 SERPL-SCNC: 28 MMOL/L — SIGNIFICANT CHANGE UP (ref 22–29)
COLOR SPEC: YELLOW — SIGNIFICANT CHANGE UP
CREAT SERPL-MCNC: 1.36 MG/DL — HIGH (ref 0.5–1.3)
D DIMER BLD IA.RAPID-MCNC: 228 NG/ML DDU — SIGNIFICANT CHANGE UP
DIFF PNL FLD: ABNORMAL
EPI CELLS # UR: SIGNIFICANT CHANGE UP
GLUCOSE SERPL-MCNC: 72 MG/DL — SIGNIFICANT CHANGE UP (ref 70–115)
GLUCOSE UR QL: NEGATIVE MG/DL — SIGNIFICANT CHANGE UP
HCT VFR BLD CALC: 40.8 % — SIGNIFICANT CHANGE UP (ref 37–47)
HGB BLD-MCNC: 12.5 G/DL — SIGNIFICANT CHANGE UP (ref 12–16)
INR BLD: 1.04 RATIO — SIGNIFICANT CHANGE UP (ref 0.88–1.16)
KETONES UR-MCNC: NEGATIVE — SIGNIFICANT CHANGE UP
LEUKOCYTE ESTERASE UR-ACNC: ABNORMAL
MCHC RBC-ENTMCNC: 26.6 PG — LOW (ref 27–31)
MCHC RBC-ENTMCNC: 30.6 G/DL — LOW (ref 32–36)
MCV RBC AUTO: 86.8 FL — SIGNIFICANT CHANGE UP (ref 81–99)
NITRITE UR-MCNC: NEGATIVE — SIGNIFICANT CHANGE UP
NT-PROBNP SERPL-SCNC: 354 PG/ML — HIGH (ref 0–300)
PH UR: 5 — SIGNIFICANT CHANGE UP (ref 5–8)
PLATELET # BLD AUTO: 269 K/UL — SIGNIFICANT CHANGE UP (ref 150–400)
POTASSIUM SERPL-MCNC: 4.6 MMOL/L — SIGNIFICANT CHANGE UP (ref 3.5–5.3)
POTASSIUM SERPL-SCNC: 4.6 MMOL/L — SIGNIFICANT CHANGE UP (ref 3.5–5.3)
PROT UR-MCNC: NEGATIVE MG/DL — SIGNIFICANT CHANGE UP
PROTHROM AB SERPL-ACNC: 11.4 SEC — SIGNIFICANT CHANGE UP (ref 9.8–12.7)
RBC # BLD: 4.7 M/UL — SIGNIFICANT CHANGE UP (ref 4.4–5.2)
RBC # FLD: 14.9 % — SIGNIFICANT CHANGE UP (ref 11–15.6)
RBC CASTS # UR COMP ASSIST: SIGNIFICANT CHANGE UP /HPF (ref 0–4)
SODIUM SERPL-SCNC: 141 MMOL/L — SIGNIFICANT CHANGE UP (ref 135–145)
SP GR SPEC: 1.01 — SIGNIFICANT CHANGE UP (ref 1.01–1.02)
TROPONIN T SERPL-MCNC: <0.01 NG/ML — SIGNIFICANT CHANGE UP (ref 0–0.06)
UROBILINOGEN FLD QL: NEGATIVE MG/DL — SIGNIFICANT CHANGE UP
WBC # BLD: 15.8 K/UL — HIGH (ref 4.8–10.8)
WBC # FLD AUTO: 15.8 K/UL — HIGH (ref 4.8–10.8)
WBC UR QL: SIGNIFICANT CHANGE UP

## 2018-05-30 PROCEDURE — 99221 1ST HOSP IP/OBS SF/LOW 40: CPT

## 2018-05-30 PROCEDURE — 93970 EXTREMITY STUDY: CPT | Mod: 26

## 2018-05-30 PROCEDURE — 71045 X-RAY EXAM CHEST 1 VIEW: CPT | Mod: 26

## 2018-05-30 PROCEDURE — 99291 CRITICAL CARE FIRST HOUR: CPT

## 2018-05-30 PROCEDURE — 93306 TTE W/DOPPLER COMPLETE: CPT | Mod: 26

## 2018-05-30 PROCEDURE — 93010 ELECTROCARDIOGRAM REPORT: CPT

## 2018-05-30 RX ORDER — ASPIRIN/CALCIUM CARB/MAGNESIUM 324 MG
81 TABLET ORAL ONCE
Qty: 0 | Refills: 0 | Status: COMPLETED | OUTPATIENT
Start: 2018-05-30 | End: 2018-05-30

## 2018-05-30 RX ORDER — SODIUM CHLORIDE 9 MG/ML
3 INJECTION INTRAMUSCULAR; INTRAVENOUS; SUBCUTANEOUS ONCE
Qty: 0 | Refills: 0 | Status: COMPLETED | OUTPATIENT
Start: 2018-05-30 | End: 2018-05-30

## 2018-05-30 RX ORDER — FUROSEMIDE 40 MG
40 TABLET ORAL ONCE
Qty: 0 | Refills: 0 | Status: COMPLETED | OUTPATIENT
Start: 2018-05-30 | End: 2018-05-30

## 2018-05-30 RX ADMIN — SODIUM CHLORIDE 3 MILLILITER(S): 9 INJECTION INTRAMUSCULAR; INTRAVENOUS; SUBCUTANEOUS at 20:38

## 2018-05-30 RX ADMIN — Medication 40 MILLIGRAM(S): at 23:28

## 2018-05-30 RX ADMIN — Medication 81 MILLIGRAM(S): at 23:28

## 2018-05-30 NOTE — ED ADULT NURSE NOTE - CHIEF COMPLAINT QUOTE
pt BIBA c/o SOB, exacerbation of asthma, denies chest pain, has hx of stents, 1 combi treatment administered pta by ems

## 2018-05-30 NOTE — PHYSICAL EXAM
[General Appearance - Alert] : alert [General Appearance - Well Nourished] : well nourished [General Appearance - Well-Appearing] : healthy appearing [Hearing Threshold Finger Rub Not Clay] : hearing was normal [Neck Appearance] : the appearance of the neck was normal [Apical Impulse] : the apical impulse was normal [Heart Sounds] : normal S1 and S2 [FreeTextEntry1] : 3/6 RJ [Examination Of The Chest] : the chest was normal in appearance [Breast Appearance] : normal in appearance [Abdomen Tenderness] : non-tender [No CVA Tenderness] : no ~M costovertebral angle tenderness [Abnormal Walk] : normal gait [Skin Color & Pigmentation] : normal skin color and pigmentation [No Focal Deficits] : no focal deficits [Oriented To Time, Place, And Person] : oriented to person, place, and time

## 2018-05-30 NOTE — ED ADULT NURSE NOTE - PMH
DM2 (diabetes mellitus, type 2)    Essential hypertension    Gastroesophageal reflux disease without esophagitis    Hypothyroidism, unspecified type    Pure hypercholesterolemia    Uncomplicated asthma, unspecified asthma severity

## 2018-05-30 NOTE — ED PROVIDER NOTE - MEDICAL DECISION MAKING DETAILS
PT WITH MODERATE AS, MI HISTORY, HYPERTENSION,, HYPERLIPIDEMIA, DIABETES, COROANARY ARTERY DISEASE, MILD PULMONARY HTN, PRESENTS WITH INCREASING EDEMA BLE AND INCREASING SOB. BEING EVALUATED BY DR KO FOR POSSIBLE TAVR. FOLLOWED BY [place] [facility] FOR ABOVE. DAVIDSON [place] SIG CHF ON CXR, GIVEN 40 MG LASIX. BNP ONLY 300S. D DI ROXANN BELOW THRESHHOLD. AWAITING DOPPLER RESULTS, SPOKE WITH DR PATTEN CARDIO REC IV LASIX X 1 DOSE 40 MG AND SERIAL CARDIAC ENZYMES. SPOKE WITH HIWOT CT ICU PA WHO WILL SEE PT. PT NEEDS ADMISSION AND FURTHER EVALUATION. DR BARON TO COMPLETE ADMISSION PROCESS

## 2018-05-30 NOTE — CONSULT NOTE ADULT - ASSESSMENT
83 year old Female with extensive past medical history including NSTEMI s/p PCI to LAD and LCx in 4/2017 on Plavix, HTN, HLD, COPD related to extensive smoking history, DM on metformin, and known aortic stenosis followed as outpatient by Dr. Benavides, was recently seen in CTS office for consultation for TAVR with Dr. Gerardo. Today, patient presents to ER BIBA from assisted living facility with complaints of acute exacerbation of SOB at rest and peripheral edema that is worse than it has been despite taking her daily Lasix.     Upon thorough evaluation of EMR including cath, stress echo, tte, and discussion with Dr. Gerardo, at this time we recommend admission to medicine service with close cardiology follow up and medical optimization of heart failure. Further evaluation for TAVR includes SHANNON to better evaluate severity of aortic stenosis given that preliminary testing shows moderate AS with BILL 1.21. Shortness of breath may also be related to COPD and diminished lung function CT surgery will follow closely while patient is admitted. Please continue with heart failure treatment including diuresis and HR control.

## 2018-05-30 NOTE — ED PROVIDER NOTE - CRITICAL CARE PROVIDED
interpretation of diagnostic studies/direct patient care (not related to procedure)/consultation with other physicians/additional history taking/documentation/consult w/ pt's family directly relating to pts condition/50 MINUTES

## 2018-05-30 NOTE — ED PROVIDER NOTE - OBJECTIVE STATEMENT
THIS IS AN 82 Y/O F PT WITH A PMHX OF NSTEMI, SM, HTN, GERD, HYPOTHYROIDISM, HYPERCHOLESTEROLEMIA, CORONARY STENTS X3, ASTHMA AND APPENDECTOMY PRESENTS TO THE ED C/O ACUTE ON CHRONIC SOB AND DIFFICULTY BREATHING. EXPLAINS THAT HER SYMPTOMS HAD EXACERBATING OVER THE PAST 6 DAYS. ADDITIONALLY NOTES LE EDEMA, DIFFICULTY WALKING AND DECREASE IN APPETITE. SHE ORDERED FOOD TODAY AND WHEN SHE STARTED TO EAR SHE FELT THAT "I DID NOT WANT TO EAT AND WAS NOT HUNGRY." COMPLIANT WITH ALL MEDICATIONS AT THIS TIME. HAS BEEN TO HER CARDIOLOGIST/PULMONOLOGIST/SURGEON OVER THE PAST FEW MONTH I  ORDER TO FIGURE OUT HER BREATHING COMPLICATIONS. THEY HAVE OBTAIN ECHOS, CATHETERIZATIONS, STRESS TESTS THAT ARE ALL NORMAL WITH MINIMAL ABNORMALITIES. ALLERGIC TO SHELLFISH AND IODINE.  CARDIOLOGIST: DR. MULLINS.  PULMONOLOGIST: DR. CHAWLA.  CARDIOTHOROACIC SURGEON: MAIKEL

## 2018-05-30 NOTE — ED ADULT NURSE NOTE - OBJECTIVE STATEMENT
pt care assumed at 2040, no apparent distress noted at this time. pt received Alert and Oriented to person, place, situation and time sitting in bed with son, daughter and family at bedside. pt c.o shortness of breath, b/l lower extremity edema, and b/l ankle pain. as per daughter "the texture is off." b/l lower extremities have +2 pitting edema and shiny texture. HR is Normal Sinus Rhythm on cardiac monitor, lung sounds are diminished b/l, abd is soft and nontender with positive bowel sounds in all four quadrants, skin is warm, dry and appropriate for age and race. pt educated on plan of care, plan of care taught back to RN. proficiency determined from successful pt teach back. will continue to educate pt throughout ED stay.

## 2018-05-30 NOTE — CONSULT LETTER
[DrLex  ___] : Dr. MARTINEZ [Dear  ___] : Dear  [unfilled], [Courtesy Letter:] : I had the pleasure of seeing your patient, [unfilled], in my office today. [Please see my note below.] : Please see my note below. [Consult Closing:] : Thank you very much for allowing me to participate in the care of this patient.  If you have any questions, please do not hesitate to contact me. [Sincerely,] : Sincerely, [FreeTextEntry2] : Dr.Luis Benavides\par [address] [place] [address]\par [place] [id number] [Rachid Gerardo MD] : Rachid Gerardo MD [Chief] : Chief [[facility]] : [facility]

## 2018-05-30 NOTE — ASSESSMENT
[FreeTextEntry1] : Very pleasant 83-year-old lady with aortic stenosis, and shortness of breath. The patient has had a chronic history of dyspnea, and more recently she was found to have a murmur. When an echocardiogram it was noticed that she had what appears to be moderate aortic stenosis. The severity of the aortic stenosis appears to be only moderate, with a low gradient, and with moderate velocities across the left ventricular out flow tract. It is not clear that her symptoms are related to the severity of the aortic stenosis. Nonetheless she does have a murmur. As a result, if the patient continues to be symptomatic then a SHANNON may be indicated to obtain a better assessment of the degree of the aortic stenosis. I have discussed this with Dr. Galindo Barnett, and he is in agreement. We will continue to follow the aortic stenosis closely. If this worsens, she may be a candidate for a transcatheter aortic valve replacement. Thank you for the opportunity to participate in the care of your patient.

## 2018-05-30 NOTE — ED PROVIDER NOTE - SHIFT CHANGE DETAILS
PT WITH EXTENSIVE MEDICAL HX PRESENTS WITH INCREASING SOB. READ MDM. NEEDS ADMISSION. CARDIO TO SEE IN AM. CT PA TO SEE TONIGHT. PULM IS DR CHAWLA - NOT CALLED.  FU CT ICU CONSULT AND ADMIT ON POX MON , DISPO

## 2018-05-30 NOTE — ED ADULT TRIAGE NOTE - CHIEF COMPLAINT QUOTE
pt BIBA c/o SOB, exacerbation of asthma, denies chest pain, has hx of stents. pt BIBA c/o SOB, exacerbation of asthma, denies chest pain, has hx of stents, 1 combi treatment administered pta by ems

## 2018-05-30 NOTE — ED PROVIDER NOTE - CRITICAL CARE INDICATION, MLM
patient was critically ill... Patient was critically ill with a high probability of imminent or life threatening deterioration. STAT EVAL , LABS, IV, XRAY, DOPPLERS, EKG, IV MEDS, UA CARDIO AND CT CONSULTS ADMIT

## 2018-05-31 DIAGNOSIS — E66.9 OBESITY, UNSPECIFIED: ICD-10-CM

## 2018-05-31 DIAGNOSIS — I50.9 HEART FAILURE, UNSPECIFIED: ICD-10-CM

## 2018-05-31 DIAGNOSIS — E03.9 HYPOTHYROIDISM, UNSPECIFIED: ICD-10-CM

## 2018-05-31 DIAGNOSIS — I25.10 ATHEROSCLEROTIC HEART DISEASE OF NATIVE CORONARY ARTERY WITHOUT ANGINA PECTORIS: ICD-10-CM

## 2018-05-31 DIAGNOSIS — J18.9 PNEUMONIA, UNSPECIFIED ORGANISM: ICD-10-CM

## 2018-05-31 DIAGNOSIS — E11.8 TYPE 2 DIABETES MELLITUS WITH UNSPECIFIED COMPLICATIONS: ICD-10-CM

## 2018-05-31 DIAGNOSIS — B37.0 CANDIDAL STOMATITIS: ICD-10-CM

## 2018-05-31 DIAGNOSIS — J44.9 CHRONIC OBSTRUCTIVE PULMONARY DISEASE, UNSPECIFIED: ICD-10-CM

## 2018-05-31 DIAGNOSIS — I50.33 ACUTE ON CHRONIC DIASTOLIC (CONGESTIVE) HEART FAILURE: ICD-10-CM

## 2018-05-31 LAB
ANION GAP SERPL CALC-SCNC: 14 MMOL/L — SIGNIFICANT CHANGE UP (ref 5–17)
BUN SERPL-MCNC: 24 MG/DL — HIGH (ref 8–20)
CALCIUM SERPL-MCNC: 8.9 MG/DL — SIGNIFICANT CHANGE UP (ref 8.6–10.2)
CHLORIDE SERPL-SCNC: 94 MMOL/L — LOW (ref 98–107)
CO2 SERPL-SCNC: 31 MMOL/L — HIGH (ref 22–29)
CREAT SERPL-MCNC: 1.35 MG/DL — HIGH (ref 0.5–1.3)
GLUCOSE BLDC GLUCOMTR-MCNC: 199 MG/DL — HIGH (ref 70–99)
GLUCOSE BLDC GLUCOMTR-MCNC: 212 MG/DL — HIGH (ref 70–99)
GLUCOSE BLDC GLUCOMTR-MCNC: 392 MG/DL — HIGH (ref 70–99)
GLUCOSE BLDC GLUCOMTR-MCNC: 421 MG/DL — HIGH (ref 70–99)
GLUCOSE SERPL-MCNC: 230 MG/DL — HIGH (ref 70–115)
HCT VFR BLD CALC: 40.8 % — SIGNIFICANT CHANGE UP (ref 37–47)
HGB BLD-MCNC: 12.5 G/DL — SIGNIFICANT CHANGE UP (ref 12–16)
MCHC RBC-ENTMCNC: 26.6 PG — LOW (ref 27–31)
MCHC RBC-ENTMCNC: 30.6 G/DL — LOW (ref 32–36)
MCV RBC AUTO: 86.8 FL — SIGNIFICANT CHANGE UP (ref 81–99)
PLATELET # BLD AUTO: 237 K/UL — SIGNIFICANT CHANGE UP (ref 150–400)
POTASSIUM SERPL-MCNC: 4.9 MMOL/L — SIGNIFICANT CHANGE UP (ref 3.5–5.3)
POTASSIUM SERPL-SCNC: 4.9 MMOL/L — SIGNIFICANT CHANGE UP (ref 3.5–5.3)
PROCALCITONIN SERPL-MCNC: <0.05 NG/ML — SIGNIFICANT CHANGE UP (ref 0–0.04)
RBC # BLD: 4.7 M/UL — SIGNIFICANT CHANGE UP (ref 4.4–5.2)
RBC # FLD: 15 % — SIGNIFICANT CHANGE UP (ref 11–15.6)
SODIUM SERPL-SCNC: 139 MMOL/L — SIGNIFICANT CHANGE UP (ref 135–145)
WBC # BLD: 12.6 K/UL — HIGH (ref 4.8–10.8)
WBC # FLD AUTO: 12.6 K/UL — HIGH (ref 4.8–10.8)

## 2018-05-31 PROCEDURE — 99223 1ST HOSP IP/OBS HIGH 75: CPT

## 2018-05-31 PROCEDURE — 71250 CT THORAX DX C-: CPT | Mod: 26

## 2018-05-31 PROCEDURE — 93010 ELECTROCARDIOGRAM REPORT: CPT

## 2018-05-31 PROCEDURE — 12345: CPT | Mod: NC

## 2018-05-31 PROCEDURE — 99223 1ST HOSP IP/OBS HIGH 75: CPT | Mod: 25

## 2018-05-31 RX ORDER — ACETAMINOPHEN 500 MG
650 TABLET ORAL EVERY 6 HOURS
Qty: 0 | Refills: 0 | Status: DISCONTINUED | OUTPATIENT
Start: 2018-05-31 | End: 2018-06-06

## 2018-05-31 RX ORDER — SACCHAROMYCES BOULARDII 250 MG
250 POWDER IN PACKET (EA) ORAL
Qty: 0 | Refills: 0 | Status: DISCONTINUED | OUTPATIENT
Start: 2018-05-31 | End: 2018-06-06

## 2018-05-31 RX ORDER — CLOTRIMAZOLE 10 MG
1 TROCHE MUCOUS MEMBRANE
Qty: 0 | Refills: 0 | Status: DISCONTINUED | OUTPATIENT
Start: 2018-05-31 | End: 2018-06-05

## 2018-05-31 RX ORDER — ENOXAPARIN SODIUM 100 MG/ML
40 INJECTION SUBCUTANEOUS DAILY
Qty: 0 | Refills: 0 | Status: DISCONTINUED | OUTPATIENT
Start: 2018-05-31 | End: 2018-06-06

## 2018-05-31 RX ORDER — ASPIRIN/CALCIUM CARB/MAGNESIUM 324 MG
81 TABLET ORAL DAILY
Qty: 0 | Refills: 0 | Status: DISCONTINUED | OUTPATIENT
Start: 2018-05-31 | End: 2018-06-06

## 2018-05-31 RX ORDER — INSULIN LISPRO 100/ML
VIAL (ML) SUBCUTANEOUS AT BEDTIME
Qty: 0 | Refills: 0 | Status: DISCONTINUED | OUTPATIENT
Start: 2018-05-31 | End: 2018-06-02

## 2018-05-31 RX ORDER — CEFTRIAXONE 500 MG/1
1000 INJECTION, POWDER, FOR SOLUTION INTRAMUSCULAR; INTRAVENOUS ONCE
Qty: 0 | Refills: 0 | Status: COMPLETED | OUTPATIENT
Start: 2018-05-31 | End: 2018-05-31

## 2018-05-31 RX ORDER — DEXTROSE 50 % IN WATER 50 %
12.5 SYRINGE (ML) INTRAVENOUS ONCE
Qty: 0 | Refills: 0 | Status: DISCONTINUED | OUTPATIENT
Start: 2018-05-31 | End: 2018-06-01

## 2018-05-31 RX ORDER — ATORVASTATIN CALCIUM 80 MG/1
1 TABLET, FILM COATED ORAL
Qty: 0 | Refills: 0 | COMMUNITY

## 2018-05-31 RX ORDER — DILTIAZEM HCL 120 MG
240 CAPSULE, EXT RELEASE 24 HR ORAL DAILY
Qty: 0 | Refills: 0 | Status: DISCONTINUED | OUTPATIENT
Start: 2018-05-31 | End: 2018-06-06

## 2018-05-31 RX ORDER — DEXTROSE 50 % IN WATER 50 %
25 SYRINGE (ML) INTRAVENOUS ONCE
Qty: 0 | Refills: 0 | Status: DISCONTINUED | OUTPATIENT
Start: 2018-05-31 | End: 2018-06-01

## 2018-05-31 RX ORDER — TIOTROPIUM BROMIDE 18 UG/1
1 CAPSULE ORAL; RESPIRATORY (INHALATION) DAILY
Qty: 0 | Refills: 0 | Status: DISCONTINUED | OUTPATIENT
Start: 2018-05-31 | End: 2018-06-06

## 2018-05-31 RX ORDER — CEFTRIAXONE 500 MG/1
1000 INJECTION, POWDER, FOR SOLUTION INTRAMUSCULAR; INTRAVENOUS EVERY 24 HOURS
Qty: 0 | Refills: 0 | Status: DISCONTINUED | OUTPATIENT
Start: 2018-06-01 | End: 2018-06-01

## 2018-05-31 RX ORDER — PANTOPRAZOLE SODIUM 20 MG/1
40 TABLET, DELAYED RELEASE ORAL
Qty: 0 | Refills: 0 | Status: DISCONTINUED | OUTPATIENT
Start: 2018-05-31 | End: 2018-06-06

## 2018-05-31 RX ORDER — ALBUTEROL 90 UG/1
2 AEROSOL, METERED ORAL EVERY 6 HOURS
Qty: 0 | Refills: 0 | Status: DISCONTINUED | OUTPATIENT
Start: 2018-05-31 | End: 2018-06-06

## 2018-05-31 RX ORDER — IPRATROPIUM/ALBUTEROL SULFATE 18-103MCG
3 AEROSOL WITH ADAPTER (GRAM) INHALATION ONCE
Qty: 0 | Refills: 0 | Status: COMPLETED | OUTPATIENT
Start: 2018-05-31 | End: 2018-05-31

## 2018-05-31 RX ORDER — AZITHROMYCIN 500 MG/1
500 TABLET, FILM COATED ORAL EVERY 24 HOURS
Qty: 0 | Refills: 0 | Status: DISCONTINUED | OUTPATIENT
Start: 2018-06-01 | End: 2018-06-02

## 2018-05-31 RX ORDER — CLOPIDOGREL BISULFATE 75 MG/1
75 TABLET, FILM COATED ORAL DAILY
Qty: 0 | Refills: 0 | Status: DISCONTINUED | OUTPATIENT
Start: 2018-05-31 | End: 2018-06-06

## 2018-05-31 RX ORDER — AZITHROMYCIN 500 MG/1
TABLET, FILM COATED ORAL
Qty: 0 | Refills: 0 | Status: DISCONTINUED | OUTPATIENT
Start: 2018-05-31 | End: 2018-06-02

## 2018-05-31 RX ORDER — LEVOTHYROXINE SODIUM 125 MCG
112 TABLET ORAL DAILY
Qty: 0 | Refills: 0 | Status: DISCONTINUED | OUTPATIENT
Start: 2018-05-31 | End: 2018-06-06

## 2018-05-31 RX ORDER — CEFTRIAXONE 500 MG/1
INJECTION, POWDER, FOR SOLUTION INTRAMUSCULAR; INTRAVENOUS
Qty: 0 | Refills: 0 | Status: DISCONTINUED | OUTPATIENT
Start: 2018-05-31 | End: 2018-06-01

## 2018-05-31 RX ORDER — CEFTRIAXONE 500 MG/1
INJECTION, POWDER, FOR SOLUTION INTRAMUSCULAR; INTRAVENOUS
Qty: 0 | Refills: 0 | Status: DISCONTINUED | OUTPATIENT
Start: 2018-05-31 | End: 2018-05-31

## 2018-05-31 RX ORDER — ONDANSETRON 8 MG/1
4 TABLET, FILM COATED ORAL EVERY 6 HOURS
Qty: 0 | Refills: 0 | Status: DISCONTINUED | OUTPATIENT
Start: 2018-05-31 | End: 2018-06-06

## 2018-05-31 RX ORDER — DEXTROSE 50 % IN WATER 50 %
15 SYRINGE (ML) INTRAVENOUS ONCE
Qty: 0 | Refills: 0 | Status: DISCONTINUED | OUTPATIENT
Start: 2018-05-31 | End: 2018-06-01

## 2018-05-31 RX ORDER — BUDESONIDE AND FORMOTEROL FUMARATE DIHYDRATE 160; 4.5 UG/1; UG/1
2 AEROSOL RESPIRATORY (INHALATION)
Qty: 0 | Refills: 0 | Status: DISCONTINUED | OUTPATIENT
Start: 2018-05-31 | End: 2018-06-06

## 2018-05-31 RX ORDER — ATORVASTATIN CALCIUM 80 MG/1
20 TABLET, FILM COATED ORAL AT BEDTIME
Qty: 0 | Refills: 0 | Status: DISCONTINUED | OUTPATIENT
Start: 2018-05-31 | End: 2018-06-06

## 2018-05-31 RX ORDER — AZITHROMYCIN 500 MG/1
500 TABLET, FILM COATED ORAL ONCE
Qty: 0 | Refills: 0 | Status: COMPLETED | OUTPATIENT
Start: 2018-05-31 | End: 2018-05-31

## 2018-05-31 RX ORDER — SODIUM CHLORIDE 9 MG/ML
1000 INJECTION, SOLUTION INTRAVENOUS
Qty: 0 | Refills: 0 | Status: DISCONTINUED | OUTPATIENT
Start: 2018-05-31 | End: 2018-06-01

## 2018-05-31 RX ORDER — ALBUTEROL 90 UG/1
1 AEROSOL, METERED ORAL EVERY 4 HOURS
Qty: 0 | Refills: 0 | Status: DISCONTINUED | OUTPATIENT
Start: 2018-05-31 | End: 2018-06-06

## 2018-05-31 RX ORDER — INSULIN LISPRO 100/ML
VIAL (ML) SUBCUTANEOUS
Qty: 0 | Refills: 0 | Status: DISCONTINUED | OUTPATIENT
Start: 2018-05-31 | End: 2018-06-01

## 2018-05-31 RX ORDER — IPRATROPIUM/ALBUTEROL SULFATE 18-103MCG
3 AEROSOL WITH ADAPTER (GRAM) INHALATION EVERY 6 HOURS
Qty: 0 | Refills: 0 | Status: DISCONTINUED | OUTPATIENT
Start: 2018-05-31 | End: 2018-06-06

## 2018-05-31 RX ORDER — INSULIN GLARGINE 100 [IU]/ML
30 INJECTION, SOLUTION SUBCUTANEOUS EVERY MORNING
Qty: 0 | Refills: 0 | Status: DISCONTINUED | OUTPATIENT
Start: 2018-05-31 | End: 2018-06-01

## 2018-05-31 RX ORDER — FUROSEMIDE 40 MG
40 TABLET ORAL DAILY
Qty: 0 | Refills: 0 | Status: DISCONTINUED | OUTPATIENT
Start: 2018-05-31 | End: 2018-06-01

## 2018-05-31 RX ORDER — GLUCAGON INJECTION, SOLUTION 0.5 MG/.1ML
1 INJECTION, SOLUTION SUBCUTANEOUS ONCE
Qty: 0 | Refills: 0 | Status: DISCONTINUED | OUTPATIENT
Start: 2018-05-31 | End: 2018-06-01

## 2018-05-31 RX ADMIN — Medication 250 MILLIGRAM(S): at 18:07

## 2018-05-31 RX ADMIN — ATORVASTATIN CALCIUM 20 MILLIGRAM(S): 80 TABLET, FILM COATED ORAL at 21:44

## 2018-05-31 RX ADMIN — ENOXAPARIN SODIUM 40 MILLIGRAM(S): 100 INJECTION SUBCUTANEOUS at 12:35

## 2018-05-31 RX ADMIN — Medication 1 LOZENGE: at 12:36

## 2018-05-31 RX ADMIN — Medication 10: at 18:08

## 2018-05-31 RX ADMIN — Medication 3 MILLILITER(S): at 20:59

## 2018-05-31 RX ADMIN — Medication 1 LOZENGE: at 21:44

## 2018-05-31 RX ADMIN — Medication 40 MILLIGRAM(S): at 21:44

## 2018-05-31 RX ADMIN — Medication 40 MILLIGRAM(S): at 12:35

## 2018-05-31 RX ADMIN — Medication 1 LOZENGE: at 09:31

## 2018-05-31 RX ADMIN — BUDESONIDE AND FORMOTEROL FUMARATE DIHYDRATE 2 PUFF(S): 160; 4.5 AEROSOL RESPIRATORY (INHALATION) at 21:00

## 2018-05-31 RX ADMIN — Medication 4: at 12:35

## 2018-05-31 RX ADMIN — PANTOPRAZOLE SODIUM 40 MILLIGRAM(S): 20 TABLET, DELAYED RELEASE ORAL at 05:47

## 2018-05-31 RX ADMIN — Medication 1 LOZENGE: at 15:52

## 2018-05-31 RX ADMIN — Medication 4: at 21:49

## 2018-05-31 RX ADMIN — Medication 40 MILLIGRAM(S): at 05:47

## 2018-05-31 RX ADMIN — Medication 112 MICROGRAM(S): at 05:47

## 2018-05-31 RX ADMIN — ALBUTEROL 2 PUFF(S): 90 AEROSOL, METERED ORAL at 06:08

## 2018-05-31 RX ADMIN — INSULIN GLARGINE 30 UNIT(S): 100 INJECTION, SOLUTION SUBCUTANEOUS at 09:32

## 2018-05-31 RX ADMIN — Medication 240 MILLIGRAM(S): at 05:47

## 2018-05-31 RX ADMIN — AZITHROMYCIN 255 MILLIGRAM(S): 500 TABLET, FILM COATED ORAL at 15:50

## 2018-05-31 RX ADMIN — CLOPIDOGREL BISULFATE 75 MILLIGRAM(S): 75 TABLET, FILM COATED ORAL at 12:36

## 2018-05-31 RX ADMIN — TIOTROPIUM BROMIDE 1 CAPSULE(S): 18 CAPSULE ORAL; RESPIRATORY (INHALATION) at 10:09

## 2018-05-31 RX ADMIN — BUDESONIDE AND FORMOTEROL FUMARATE DIHYDRATE 2 PUFF(S): 160; 4.5 AEROSOL RESPIRATORY (INHALATION) at 10:09

## 2018-05-31 RX ADMIN — Medication 81 MILLIGRAM(S): at 12:36

## 2018-05-31 RX ADMIN — CEFTRIAXONE 1000 MILLIGRAM(S): 500 INJECTION, POWDER, FOR SOLUTION INTRAMUSCULAR; INTRAVENOUS at 12:37

## 2018-05-31 RX ADMIN — Medication 3 MILLILITER(S): at 10:09

## 2018-05-31 RX ADMIN — Medication 2: at 09:30

## 2018-05-31 RX ADMIN — Medication 3 MILLILITER(S): at 14:53

## 2018-05-31 NOTE — CONSULT NOTE ADULT - ASSESSMENT
83F hx HTN, DM, Asthma, known CAD since 1990, HFpEF, Cor Pulmonale,  COPD, Chronic Bl LE Edema,  s/p  PCI with staged procedure to OM1 and ? at [facility],   who has baseline RABAGO which is  increasing, unable to walk without getting SOB, NYHA 3/4, no CP, but has orthopnea and PND. However she has long standing asthma.   On 4/19/2018 she underwent cardiac cath that revealed patent stent in the LAD, 50% stenosis in the LCx (iFR normal)   and 100% occlusion of the Mid RCA.  LV function was normal and there was moderate AS with an BILL=1.21 cm2 and mild Pul HTN and normal CO/CI.   On 5/4/2018 she had a dobutamine stress test that revealed a completely normal LV function with a peak gradient of 34 mmHg.  Pt was brought in to the ER because of progressive SOB, orthopnea, dyspnea on walking few steps with poor response to albuterol nebulizer treatment.  She recently reduced  the dose of Lasix from 40 mg/day to 20 mg/day because patient is complaining of frequent urination.   Admitted 3 days of dry cough. Denied dizziness, CP, palpitation, nausea, vomiting hematuria, melena, syncope, near syncope.       A/  Acute on Chronic Diastolic Heart Failure: Increased baseline RABAGO, Chronic LE edema, ProBNP 354  COPD/Asthma exacerbation Wheezing. CXR done:  Suspect right perihilar right paracardiac middle lobe opacities . follow-up recommended..        CAD  Moderate AS    P/  Echo done: EF > 75%. RV systolic function is normal. Grade I DD. Moderate AS.  Pulmonary evaluations is recommended  Cont Cardio Meds  Cont Diuretics  Monitor SpO2, Supplemental O2, breathing tratment/nebs  Dr. Gerardo already evaluated for Aortic Stenosis  Dr. Benavides is [facility] Primary Cardiologist to Follow tomorrow      Cardio Meds:  aspirin enteric coated 81 milliGRAM(s) Oral daily  atorvastatin 20 milliGRAM(s) Oral at bedtime  clopidogrel Tablet 75 milliGRAM(s) Oral daily  diltiazem    milliGRAM(s) Oral daily  enoxaparin Injectable 40 milliGRAM(s) SubCutaneous daily  furosemide   Injectable 40 milliGRAM(s) IV Push daily 83F hx HTN, DM, Asthma, known CAD since 1990, HFpEF, Cor Pulmonale,  COPD, Chronic Bl LE Edema,  s/p  PCI with staged procedure to OM1 and ? at [facility],   who has baseline RABAGO which is  increasing, unable to walk without getting SOB, NYHA 3/4, no CP, but has orthopnea and PND. However she has long standing asthma.   On 4/19/2018 she underwent cardiac cath that revealed patent stent in the LAD, 50% stenosis in the LCx (iFR normal)   and 100% occlusion of the Mid RCA.  LV function was normal and there was moderate AS with an BILL=1.21 cm2 and mild Pul HTN and normal CO/CI.   On 5/4/2018 she had a dobutamine stress test that revealed a completely normal LV function with a peak gradient of 34 mmHg.  Pt was brought in to the ER because of progressive SOB, orthopnea, dyspnea on walking few steps with poor response to albuterol nebulizer treatment.  She recently reduced  the dose of Lasix from 40 mg/day to 20 mg/day because patient is complaining of frequent urination.   Admitted 3 days of dry cough. Denied dizziness, CP, palpitation, nausea, vomiting hematuria, melena, syncope, near syncope.       A/  Acute on Chronic Diastolic Heart Failure: Increased baseline RABAGO, Chronic LE edema, ProBNP 354  COPD/Asthma exacerbation Wheezing. CXR done:  Suspect right perihilar right paracardiac middle lobe opacities . follow-up recommended..        CAD: trop neg. Serial CE, EKG  Moderate AS    P/  Echo done: EF > 75%. RV systolic function is normal. Grade I DD. Moderate AS.  Pulmonary evaluations is recommended  Cont Cardio Meds  Cont Diuretics  Monitor SpO2, Supplemental O2, breathing tratment/nebs  Dr. Gerardo already evaluated for Aortic Stenosis  Dr. Benavides is [facility] Primary Cardiologist to Follow tomorrow      Cardio Meds:  aspirin enteric coated 81 milliGRAM(s) Oral daily  atorvastatin 20 milliGRAM(s) Oral at bedtime  clopidogrel Tablet 75 milliGRAM(s) Oral daily  diltiazem    milliGRAM(s) Oral daily  enoxaparin Injectable 40 milliGRAM(s) SubCutaneous daily  furosemide   Injectable 40 milliGRAM(s) IV Push daily

## 2018-05-31 NOTE — PROGRESS NOTE ADULT - PROBLEM SELECTOR PLAN 1
evaulated last week by Dr. Gerardo  concerned that AS is not patients sole issue causing SOB evaluated last week by Dr. Gerardo  concerned that AS is not patients sole issue causing SOB and may have pulmonary factor contributing   SHANNON early next week per cardiology when pt more clinically stable and optimized    primary management per medicine/cardiology  no indication for TAVR this admission  pt needs subsequent work up as her prior echos have only demonstrated low velocity and mean gradients consistent with only moderate AS, TAVR not indicated for moderate AS    will follow as needed  d/w Dr. Gerardo

## 2018-05-31 NOTE — CHART NOTE - NSCHARTNOTEFT_GEN_A_CORE
patient being seen for acute on chronic diastolic hf, pneumonia, COPD exacerbation and med management. Patient seen at bedside with daughter and is minimally sob    vitals reviewed    PE  GEN: slightly anxious, speaking in full sentences  HEENT: PERRL,   CVS: s1s2+  LUNGS: exp wheezing  ABD: soft , obese  EXT: trace edema, chronic skin changes  NEURO: aaox3     labs reviewed    ct chest no contrast ordered    a/p  84 y/o female, obese with acute on top of chronic CHF, thrush, CAD, COPD, DM II, hypothyroidism presents with acute on chronic diastolic hf     Problem/Plan - 1:  ·  Problem: Acute on chronic congestive heart failure, unspecified heart failure type.  Plan: IV lasix. Echo cardiogram. performed  --> cardio consult appreciated     Problem/Plan - 2:  ·  Problem: Coronary artery disease involving native coronary artery of native heart without angina pectoris.  Plan: Aspirin, plavix and atorvastatin.      Problem/Plan - 3:  ·  Problem: Chronic obstructive pulmonary disease, unspecified COPD type.  Plan: Symbicort and spiriva.   --> start duonebs and iv steroids     Problem/Plan - 4:  ·  Problem: Hypothyroidism, unspecified type.  Plan: Levothyroxine.      Problem/Plan - 5:  ·  Problem: Type 2 diabetes mellitus with complication, with long-term current use of insulin.  Plan: Insulin therapy protocol.    Problem 6- CAP likely gram positive  --> start iv ceft and azithro  --> florastor

## 2018-05-31 NOTE — PROGRESS NOTE ADULT - SUBJECTIVE AND OBJECTIVE BOX
Called to evaluate for wheezing, patient states, "I feel sob of today".  Patient visible air hungry, skin warm and dry, rr 26, pulse 100 percent on 2 lnc, bilateral wheezing to bases posterior and anterior, reviewed x ray and informed MD butt.    Plan  MD butt aware,  sat duo neb and changed to q6  ct pulse and plan as per MD butt.

## 2018-05-31 NOTE — CONSULT NOTE ADULT - ASSESSMENT
84 yo woman with long standing effort-induced SOB and heart failure (HFpEF) under evaluation for aortic stenosis.  Admitted with acute decompensated diastolic heart failure, possible related to lung infectious (?) process or change in diuretic dose  Will recommend to continue medical management with diuretics and antibiotics  Consider Pul consult and Chest CT to assess lung findings in the CXR

## 2018-05-31 NOTE — H&P ADULT - HISTORY OF PRESENT ILLNESS
82 y/o obese female with h/o CHF, CAD, COPD, DM II, was brought in to the ER because of progressive SOB. patient uses 3 pillows to sleep. dyspnea on walking few steps with poor response to albuterol nebulizer treatment. recently the dose of Lasix was cut down from 40 mg/day to 20 mg/day because patient is complaining of frequent urination. no chest pain or palpitations. no cough.

## 2018-05-31 NOTE — PROGRESS NOTE ADULT - SUBJECTIVE AND OBJECTIVE BOX
Subjective: reports improved breathing, denies CP, palpitations, N/V, fever chills, numbness, tingling, abd pain, HA, dizziness  improved cough, non productive    T(C): 37.2 (05-31-18 @ 10:10), Max: 37.2 (05-31-18 @ 10:10)  HR: 72 (05-31-18 @ 14:56) (65 - 91)  BP: 134/64 (05-31-18 @ 10:10) (123/66 - 148/60  RR: 20 (05-31-18 @ 10:10) (20 - 21)  SpO2: 94% (05-31-18 @ 10:22) (94% - 100%)    05-31    139  |  94<L>  |  24.0<H>  ----------------------------<  230<H>  4.9   |  31.0<H>  |  1.35<H>    Ca    8.9      31 May 2018 11:48                       12.5   12.6  )-----------( 237      ( 31 May 2018 11:48 )             40.8    PT/INR - ( 30 May 2018 20:17 )   PT: 11.4 sec;   INR: 1.04 ratio    PTT - ( 30 May 2018 20:17 )  PTT:29.2 sec              CAPILLARY BLOOD GLUCOSE  POCT Blood Glucose.: 212 mg/dL (31 May 2018 12:15)  POCT Blood Glucose.: 199 mg/dL (31 May 2018 08:18)        CXR:  Suspect right perihilar right paracardiac middle lobe   opacities . follow-up recommended..          I&O's Detail    30 May 2018 07:01  -  31 May 2018 07:00  --------------------------------------------------------  IN:  Total IN: 0 mL    OUT:    Voided: 400 mL  Total OUT: 400 mL    Total NET: -400 mL    31 May 2018 07:01  -  31 May 2018 15:35  --------------------------------------------------------  IN:    Oral Fluid: 480 mL  Total IN: 480 mL    OUT:    Voided: 1200 mL  Total OUT: 1200 mL    Total NET: -720 mL    Drug Dosing Weight  Height (cm): 167.64 (30 May 2018 19:13)  Weight (kg): 88.5 (30 May 2018 19:13)  BMI (kg/m2): 31.5 (30 May 2018 19:13)  BSA (m2): 1.98 (30 May 2018 19:13)    MEDICATIONS  (STANDING):  ALBUTerol    90 MICROgram(s) HFA Inhaler 1 Puff(s) Inhalation every 4 hours  ALBUTerol/ipratropium for Nebulization 3 milliLiter(s) Nebulizer every 6 hours  aspirin enteric coated 81 milliGRAM(s) Oral daily  atorvastatin 20 milliGRAM(s) Oral at bedtime  azithromycin  IVPB 500 milliGRAM(s) IV Intermittent once  azithromycin  IVPB      buDESOnide 160 MICROgram(s)/formoterol 4.5 MICROgram(s) Inhaler 2 Puff(s) Inhalation two times a day  cefTRIAXone Injectable.      clopidogrel Tablet 75 milliGRAM(s) Oral daily  clotrimazole Lozenge 1 Lozenge Oral five times a day  dextrose 5%. 1000 milliLiter(s) (50 mL/Hr) IV Continuous <Continuous>  dextrose 50% Injectable 12.5 Gram(s) IV Push once  dextrose 50% Injectable 25 Gram(s) IV Push once  dextrose 50% Injectable 25 Gram(s) IV Push once  diltiazem    milliGRAM(s) Oral daily  enoxaparin Injectable 40 milliGRAM(s) SubCutaneous daily  furosemide   Injectable 40 milliGRAM(s) IV Push daily  insulin glargine Injectable (LANTUS) 30 Unit(s) SubCutaneous every morning  insulin lispro (HumaLOG) corrective regimen sliding scale   SubCutaneous three times a day before meals  levothyroxine 112 MICROGram(s) Oral daily  methylPREDNISolone sodium succinate Injectable 40 milliGRAM(s) IV Push every 8 hours  pantoprazole    Tablet 40 milliGRAM(s) Oral before breakfast  saccharomyces boulardii 250 milliGRAM(s) Oral two times a day  tiotropium 18 MICROgram(s) Capsule 1 Capsule(s) Inhalation daily  tiotropium 18 MICROgram(s) Capsule 1 Capsule(s) Inhalation daily    MEDICATIONS  (PRN):  acetaminophen   Tablet 650 milliGRAM(s) Oral every 6 hours PRN For Temp greater than 38 C (100.4 F)  ALBUTerol    90 MICROgram(s) HFA Inhaler 2 Puff(s) Inhalation every 6 hours PRN Shortness of Breath and/or Wheezing  dextrose 40% Gel 15 Gram(s) Oral once PRN Blood Glucose LESS THAN 70 milliGRAM(s)/deciliter  glucagon  Injectable 1 milliGRAM(s) IntraMuscular once PRN Glucose LESS THAN 70 milligrams/deciliter  ondansetron Injectable 4 milliGRAM(s) IV Push every 6 hours PRN Nausea and/or Vomiting    Physical Exam  Neuro: A&Ox3  Pulm: decreased b/l bases, mild expiratory wheeze  CV: S1S2 RRR, + RJ  Abd: + BS soft NT ND  Extrem/MS: 1-2+ b/l LE edema

## 2018-06-01 LAB
ANION GAP SERPL CALC-SCNC: 18 MMOL/L — HIGH (ref 5–17)
ANION GAP SERPL CALC-SCNC: 18 MMOL/L — HIGH (ref 5–17)
BUN SERPL-MCNC: 39 MG/DL — HIGH (ref 8–20)
BUN SERPL-MCNC: 52 MG/DL — HIGH (ref 8–20)
CALCIUM SERPL-MCNC: 8.6 MG/DL — SIGNIFICANT CHANGE UP (ref 8.6–10.2)
CALCIUM SERPL-MCNC: 8.7 MG/DL — SIGNIFICANT CHANGE UP (ref 8.6–10.2)
CHLORIDE SERPL-SCNC: 88 MMOL/L — LOW (ref 98–107)
CHLORIDE SERPL-SCNC: 91 MMOL/L — LOW (ref 98–107)
CO2 SERPL-SCNC: 24 MMOL/L — SIGNIFICANT CHANGE UP (ref 22–29)
CO2 SERPL-SCNC: 24 MMOL/L — SIGNIFICANT CHANGE UP (ref 22–29)
CREAT SERPL-MCNC: 1.42 MG/DL — HIGH (ref 0.5–1.3)
CREAT SERPL-MCNC: 1.97 MG/DL — HIGH (ref 0.5–1.3)
GLUCOSE BLDC GLUCOMTR-MCNC: 444 MG/DL — HIGH (ref 70–99)
GLUCOSE BLDC GLUCOMTR-MCNC: 477 MG/DL — CRITICAL HIGH (ref 70–99)
GLUCOSE BLDC GLUCOMTR-MCNC: 496 MG/DL — CRITICAL HIGH (ref 70–99)
GLUCOSE BLDC GLUCOMTR-MCNC: >530 MG/DL — CRITICAL HIGH (ref 70–99)
GLUCOSE SERPL-MCNC: 479 MG/DL — HIGH (ref 70–115)
GLUCOSE SERPL-MCNC: 580 MG/DL — CRITICAL HIGH (ref 70–115)
GLUCOSE SERPL-MCNC: 619 MG/DL — CRITICAL HIGH (ref 70–115)
HBA1C BLD-MCNC: 8.5 % — HIGH (ref 4–5.6)
HCT VFR BLD CALC: 41.6 % — SIGNIFICANT CHANGE UP (ref 37–47)
HGB BLD-MCNC: 12.9 G/DL — SIGNIFICANT CHANGE UP (ref 12–16)
MCHC RBC-ENTMCNC: 26.3 PG — LOW (ref 27–31)
MCHC RBC-ENTMCNC: 31 G/DL — LOW (ref 32–36)
MCV RBC AUTO: 84.7 FL — SIGNIFICANT CHANGE UP (ref 81–99)
PLATELET # BLD AUTO: 235 K/UL — SIGNIFICANT CHANGE UP (ref 150–400)
POTASSIUM SERPL-MCNC: 4.5 MMOL/L — SIGNIFICANT CHANGE UP (ref 3.5–5.3)
POTASSIUM SERPL-MCNC: 4.5 MMOL/L — SIGNIFICANT CHANGE UP (ref 3.5–5.3)
POTASSIUM SERPL-SCNC: 4.5 MMOL/L — SIGNIFICANT CHANGE UP (ref 3.5–5.3)
POTASSIUM SERPL-SCNC: 4.5 MMOL/L — SIGNIFICANT CHANGE UP (ref 3.5–5.3)
RBC # BLD: 4.91 M/UL — SIGNIFICANT CHANGE UP (ref 4.4–5.2)
RBC # FLD: 14.6 % — SIGNIFICANT CHANGE UP (ref 11–15.6)
SODIUM SERPL-SCNC: 130 MMOL/L — LOW (ref 135–145)
SODIUM SERPL-SCNC: 133 MMOL/L — LOW (ref 135–145)
TROPONIN T SERPL-MCNC: <0.01 NG/ML — SIGNIFICANT CHANGE UP (ref 0–0.06)
WBC # BLD: 7.2 K/UL — SIGNIFICANT CHANGE UP (ref 4.8–10.8)
WBC # FLD AUTO: 7.2 K/UL — SIGNIFICANT CHANGE UP (ref 4.8–10.8)

## 2018-06-01 PROCEDURE — 99233 SBSQ HOSP IP/OBS HIGH 50: CPT

## 2018-06-01 RX ORDER — INSULIN GLARGINE 100 [IU]/ML
50 INJECTION, SOLUTION SUBCUTANEOUS
Qty: 0 | Refills: 0 | Status: DISCONTINUED | OUTPATIENT
Start: 2018-06-01 | End: 2018-06-02

## 2018-06-01 RX ORDER — INSULIN LISPRO 100/ML
22 VIAL (ML) SUBCUTANEOUS
Qty: 0 | Refills: 0 | Status: DISCONTINUED | OUTPATIENT
Start: 2018-06-01 | End: 2018-06-02

## 2018-06-01 RX ORDER — DEXTROSE 50 % IN WATER 50 %
25 SYRINGE (ML) INTRAVENOUS ONCE
Qty: 0 | Refills: 0 | Status: DISCONTINUED | OUTPATIENT
Start: 2018-06-01 | End: 2018-06-06

## 2018-06-01 RX ORDER — INSULIN GLARGINE 100 [IU]/ML
40 INJECTION, SOLUTION SUBCUTANEOUS AT BEDTIME
Qty: 0 | Refills: 0 | Status: DISCONTINUED | OUTPATIENT
Start: 2018-06-01 | End: 2018-06-01

## 2018-06-01 RX ORDER — GLUCAGON INJECTION, SOLUTION 0.5 MG/.1ML
1 INJECTION, SOLUTION SUBCUTANEOUS ONCE
Qty: 0 | Refills: 0 | Status: DISCONTINUED | OUTPATIENT
Start: 2018-06-01 | End: 2018-06-06

## 2018-06-01 RX ORDER — SODIUM CHLORIDE 9 MG/ML
1000 INJECTION, SOLUTION INTRAVENOUS
Qty: 0 | Refills: 0 | Status: DISCONTINUED | OUTPATIENT
Start: 2018-06-01 | End: 2018-06-06

## 2018-06-01 RX ORDER — INSULIN GLARGINE 100 [IU]/ML
35 INJECTION, SOLUTION SUBCUTANEOUS AT BEDTIME
Qty: 0 | Refills: 0 | Status: DISCONTINUED | OUTPATIENT
Start: 2018-06-01 | End: 2018-06-01

## 2018-06-01 RX ORDER — INSULIN LISPRO 100/ML
14 VIAL (ML) SUBCUTANEOUS ONCE
Qty: 0 | Refills: 0 | Status: COMPLETED | OUTPATIENT
Start: 2018-06-01 | End: 2018-06-01

## 2018-06-01 RX ORDER — INSULIN GLARGINE 100 [IU]/ML
60 INJECTION, SOLUTION SUBCUTANEOUS AT BEDTIME
Qty: 0 | Refills: 0 | Status: DISCONTINUED | OUTPATIENT
Start: 2018-06-01 | End: 2018-06-01

## 2018-06-01 RX ORDER — DEXTROSE 50 % IN WATER 50 %
12.5 SYRINGE (ML) INTRAVENOUS ONCE
Qty: 0 | Refills: 0 | Status: DISCONTINUED | OUTPATIENT
Start: 2018-06-01 | End: 2018-06-06

## 2018-06-01 RX ORDER — FUROSEMIDE 40 MG
40 TABLET ORAL DAILY
Qty: 0 | Refills: 0 | Status: DISCONTINUED | OUTPATIENT
Start: 2018-06-01 | End: 2018-06-06

## 2018-06-01 RX ORDER — INSULIN LISPRO 100/ML
VIAL (ML) SUBCUTANEOUS
Qty: 0 | Refills: 0 | Status: DISCONTINUED | OUTPATIENT
Start: 2018-06-01 | End: 2018-06-02

## 2018-06-01 RX ORDER — INSULIN LISPRO 100/ML
10 VIAL (ML) SUBCUTANEOUS
Qty: 0 | Refills: 0 | Status: DISCONTINUED | OUTPATIENT
Start: 2018-06-01 | End: 2018-06-01

## 2018-06-01 RX ORDER — INSULIN LISPRO 100/ML
7 VIAL (ML) SUBCUTANEOUS
Qty: 0 | Refills: 0 | Status: DISCONTINUED | OUTPATIENT
Start: 2018-06-01 | End: 2018-06-01

## 2018-06-01 RX ORDER — SODIUM CHLORIDE 9 MG/ML
1000 INJECTION INTRAMUSCULAR; INTRAVENOUS; SUBCUTANEOUS
Qty: 0 | Refills: 0 | Status: COMPLETED | OUTPATIENT
Start: 2018-06-01 | End: 2018-06-01

## 2018-06-01 RX ORDER — DEXTROSE 50 % IN WATER 50 %
15 SYRINGE (ML) INTRAVENOUS ONCE
Qty: 0 | Refills: 0 | Status: DISCONTINUED | OUTPATIENT
Start: 2018-06-01 | End: 2018-06-06

## 2018-06-01 RX ORDER — INSULIN LISPRO 100/ML
15 VIAL (ML) SUBCUTANEOUS
Qty: 0 | Refills: 0 | Status: DISCONTINUED | OUTPATIENT
Start: 2018-06-01 | End: 2018-06-01

## 2018-06-01 RX ADMIN — Medication 4: at 21:33

## 2018-06-01 RX ADMIN — Medication 3 MILLILITER(S): at 20:57

## 2018-06-01 RX ADMIN — INSULIN GLARGINE 50 UNIT(S): 100 INJECTION, SOLUTION SUBCUTANEOUS at 14:05

## 2018-06-01 RX ADMIN — Medication 240 MILLIGRAM(S): at 05:56

## 2018-06-01 RX ADMIN — ATORVASTATIN CALCIUM 20 MILLIGRAM(S): 80 TABLET, FILM COATED ORAL at 21:33

## 2018-06-01 RX ADMIN — Medication 81 MILLIGRAM(S): at 12:46

## 2018-06-01 RX ADMIN — Medication 22 UNIT(S): at 18:15

## 2018-06-01 RX ADMIN — Medication 3 MILLILITER(S): at 03:01

## 2018-06-01 RX ADMIN — Medication 40 MILLIGRAM(S): at 05:56

## 2018-06-01 RX ADMIN — Medication 3 MILLILITER(S): at 08:47

## 2018-06-01 RX ADMIN — BUDESONIDE AND FORMOTEROL FUMARATE DIHYDRATE 2 PUFF(S): 160; 4.5 AEROSOL RESPIRATORY (INHALATION) at 20:58

## 2018-06-01 RX ADMIN — Medication 3 MILLILITER(S): at 15:36

## 2018-06-01 RX ADMIN — CLOPIDOGREL BISULFATE 75 MILLIGRAM(S): 75 TABLET, FILM COATED ORAL at 12:46

## 2018-06-01 RX ADMIN — Medication 7 UNIT(S): at 09:13

## 2018-06-01 RX ADMIN — SODIUM CHLORIDE 100 MILLILITER(S): 9 INJECTION INTRAMUSCULAR; INTRAVENOUS; SUBCUTANEOUS at 18:19

## 2018-06-01 RX ADMIN — Medication 14 UNIT(S): at 13:32

## 2018-06-01 RX ADMIN — AZITHROMYCIN 255 MILLIGRAM(S): 500 TABLET, FILM COATED ORAL at 15:50

## 2018-06-01 RX ADMIN — BUDESONIDE AND FORMOTEROL FUMARATE DIHYDRATE 2 PUFF(S): 160; 4.5 AEROSOL RESPIRATORY (INHALATION) at 08:48

## 2018-06-01 RX ADMIN — Medication 250 MILLIGRAM(S): at 05:56

## 2018-06-01 RX ADMIN — Medication 1 LOZENGE: at 00:34

## 2018-06-01 RX ADMIN — Medication 12: at 13:18

## 2018-06-01 RX ADMIN — ENOXAPARIN SODIUM 40 MILLIGRAM(S): 100 INJECTION SUBCUTANEOUS at 21:33

## 2018-06-01 RX ADMIN — INSULIN GLARGINE 50 UNIT(S): 100 INJECTION, SOLUTION SUBCUTANEOUS at 21:32

## 2018-06-01 RX ADMIN — Medication 1 LOZENGE: at 21:32

## 2018-06-01 RX ADMIN — Medication 40 MILLIGRAM(S): at 18:23

## 2018-06-01 RX ADMIN — Medication 12: at 19:00

## 2018-06-01 RX ADMIN — Medication 10 UNIT(S): at 13:18

## 2018-06-01 RX ADMIN — PANTOPRAZOLE SODIUM 40 MILLIGRAM(S): 20 TABLET, DELAYED RELEASE ORAL at 05:56

## 2018-06-01 RX ADMIN — Medication 12: at 09:13

## 2018-06-01 RX ADMIN — Medication 112 MICROGRAM(S): at 05:56

## 2018-06-01 RX ADMIN — Medication 250 MILLIGRAM(S): at 21:33

## 2018-06-01 NOTE — PHYSICAL THERAPY INITIAL EVALUATION ADULT - PERTINENT HX OF CURRENT PROBLEM, REHAB EVAL
83F, pmhx NSTEMI s/p PCI to LAD, LCx 4/2017, HTN, HLD, COPD, former extensive smoking history, DM, known AS, recently seen by Dr. Gerardo for TAVR consultation, now admitted for acute on chronic diastolic HF exacerbation, COPD exacerbation and possible CAP

## 2018-06-01 NOTE — PHYSICAL THERAPY INITIAL EVALUATION ADULT - ASSISTIVE DEVICE FOR TRANSFER: STAND/SIT, REHAB EVAL
Benefits, risks, and possible complications of procedure explained to patient/caregiver who verbalized understanding and gave verbal consent. rolling walker

## 2018-06-01 NOTE — CONSULT NOTE ADULT - ASSESSMENT
82 yo woman with known moderate aortic stenosis, HFpEF and COPD. Admitted with worsening SOB that could be attributed to a mix of CHF (she reduced the dose of furosemide on her own) and COPD exacerbation.  All tests for an infectious process have been negative.  Has responded well to diuresis and inhalers, although there has been an increase in her SCr.  Would recommend to continue same management of the COPD  Would recommend to switch back to oral furosemide 40 mg/day  Continue the PT

## 2018-06-01 NOTE — PHYSICAL THERAPY INITIAL EVALUATION ADULT - ADDITIONAL COMMENTS
Pt lives in a senior living apartment. No steps to navigate. Pt was independent PTA with intermittent use of RW. Pt owns RW, shower chair and has grab bars in her bathroom.

## 2018-06-01 NOTE — PROGRESS NOTE ADULT - SUBJECTIVE AND OBJECTIVE BOX
FLOYD PAEZ    [id number]    83y      Female    INTERVAL HPI/OVERNIGHT EVENTS:    patient being seen for COPD exacerbation, chf and veda. Patient seen at bedside and states breathing is better.       REVIEW OF SYSTEMS:    CONSTITUTIONAL: No fever, weight loss, or fatigue  RESPIRATORY: No cough, wheezing, hemoptysis; No shortness of breath  CARDIOVASCULAR: No chest pain, palpitations  GASTROINTESTINAL: No abdominal or epigastric pain. No nausea, vomiting  NEUROLOGICAL: No headaches, memory loss, loss of strength.  MISCELLANEOUS:      Vital Signs Last 24 Hrs  T(C): 36.6 (2018 10:32), Max: 37.1 (31 May 2018 21:47)  T(F): 97.9 (2018 10:32), Max: 98.8 (31 May 2018 21:47)  HR: 80 (2018 10:32) (72 - 88)  BP: 138/68 (2018 10:32) (138/68 - 148/75)  BP(mean): --  RR: 19 (2018 10:32) (19 - 20)  SpO2: 97% (2018 10:32) (96% - 98%)    PHYSICAL EXAM:    GENERAL: NAD, obese  HEENT: PERRL, +EOMI  NECK: soft, Supple, No JVD,   CHEST/LUNG: mild exp wheeze, improved from yesterday   HEART: S1S2+,   ABDOMEN: Soft, Nontender,  EXTREMITIES:  no edema   SKIN: No rashes or lesions  NEURO: AAOX3, no focal deficits,   PSYCH: normal mood      LABS:                        12.9   7.2   )-----------( 235      ( 2018 05:46 )             41.6     06-01    133<L>  |  91<L>  |  39.0<H>  ----------------------------<  479<H>  4.5   |  24.0  |  1.42<H>    Ca    8.6      2018 05:46      PT/INR - ( 30 May 2018 20:17 )   PT: 11.4 sec;   INR: 1.04 ratio         PTT - ( 30 May 2018 20:17 )  PTT:29.2 sec  Urinalysis Basic - ( 30 May 2018 23:37 )    Color: Yellow / Appearance: Clear / S.015 / pH: x  Gluc: x / Ketone: Negative  / Bili: Negative / Urobili: Negative mg/dL   Blood: x / Protein: Negative mg/dL / Nitrite: Negative   Leuk Esterase: Small / RBC: 0-2 /HPF / WBC 3-5   Sq Epi: x / Non Sq Epi: Few / Bacteria: Occasional          MEDICATIONS  (STANDING):  ALBUTerol    90 MICROgram(s) HFA Inhaler 1 Puff(s) Inhalation every 4 hours  ALBUTerol/ipratropium for Nebulization 3 milliLiter(s) Nebulizer every 6 hours  aspirin enteric coated 81 milliGRAM(s) Oral daily  atorvastatin 20 milliGRAM(s) Oral at bedtime  azithromycin  IVPB 500 milliGRAM(s) IV Intermittent every 24 hours  azithromycin  IVPB      buDESOnide 160 MICROgram(s)/formoterol 4.5 MICROgram(s) Inhaler 2 Puff(s) Inhalation two times a day  clopidogrel Tablet 75 milliGRAM(s) Oral daily  clotrimazole Lozenge 1 Lozenge Oral five times a day  dextrose 5%. 1000 milliLiter(s) (50 mL/Hr) IV Continuous <Continuous>  dextrose 50% Injectable 12.5 Gram(s) IV Push once  dextrose 50% Injectable 25 Gram(s) IV Push once  dextrose 50% Injectable 25 Gram(s) IV Push once  diltiazem    milliGRAM(s) Oral daily  enoxaparin Injectable 40 milliGRAM(s) SubCutaneous daily  furosemide    Tablet 40 milliGRAM(s) Oral daily  furosemide   Injectable 40 milliGRAM(s) IV Push daily  insulin glargine Injectable (LANTUS) 60 Unit(s) SubCutaneous at bedtime  insulin lispro (HumaLOG) corrective regimen sliding scale   SubCutaneous at bedtime  insulin lispro (HumaLOG) corrective regimen sliding scale   SubCutaneous three times a day before meals  insulin lispro Injectable (HumaLOG) 10 Unit(s) SubCutaneous three times a day before meals  levothyroxine 112 MICROGram(s) Oral daily  methylPREDNISolone sodium succinate Injectable 40 milliGRAM(s) IV Push every 8 hours  pantoprazole    Tablet 40 milliGRAM(s) Oral before breakfast  saccharomyces boulardii 250 milliGRAM(s) Oral two times a day  tiotropium 18 MICROgram(s) Capsule 1 Capsule(s) Inhalation daily  tiotropium 18 MICROgram(s) Capsule 1 Capsule(s) Inhalation daily    MEDICATIONS  (PRN):  acetaminophen   Tablet 650 milliGRAM(s) Oral every 6 hours PRN For Temp greater than 38 C (100.4 F)  ALBUTerol    90 MICROgram(s) HFA Inhaler 2 Puff(s) Inhalation every 6 hours PRN Shortness of Breath and/or Wheezing  dextrose 40% Gel 15 Gram(s) Oral once PRN Blood Glucose LESS THAN 70 milliGRAM(s)/deciliter  glucagon  Injectable 1 milliGRAM(s) IntraMuscular once PRN Glucose LESS THAN 70 milligrams/deciliter  ondansetron Injectable 4 milliGRAM(s) IV Push every 6 hours PRN Nausea and/or Vomiting      RADIOLOGY & ADDITIONAL TESTS:    ct chest - no pna

## 2018-06-02 LAB
ANION GAP SERPL CALC-SCNC: 18 MMOL/L — HIGH (ref 5–17)
ANION GAP SERPL CALC-SCNC: 18 MMOL/L — HIGH (ref 5–17)
ANION GAP SERPL CALC-SCNC: 19 MMOL/L — HIGH (ref 5–17)
BUN SERPL-MCNC: 53 MG/DL — HIGH (ref 8–20)
BUN SERPL-MCNC: 56 MG/DL — HIGH (ref 8–20)
BUN SERPL-MCNC: 57 MG/DL — HIGH (ref 8–20)
CALCIUM SERPL-MCNC: 8.5 MG/DL — LOW (ref 8.6–10.2)
CALCIUM SERPL-MCNC: 8.6 MG/DL — SIGNIFICANT CHANGE UP (ref 8.6–10.2)
CALCIUM SERPL-MCNC: 8.9 MG/DL — SIGNIFICANT CHANGE UP (ref 8.6–10.2)
CHLORIDE SERPL-SCNC: 88 MMOL/L — LOW (ref 98–107)
CHLORIDE SERPL-SCNC: 91 MMOL/L — LOW (ref 98–107)
CHLORIDE SERPL-SCNC: 94 MMOL/L — LOW (ref 98–107)
CO2 SERPL-SCNC: 24 MMOL/L — SIGNIFICANT CHANGE UP (ref 22–29)
CO2 SERPL-SCNC: 25 MMOL/L — SIGNIFICANT CHANGE UP (ref 22–29)
CO2 SERPL-SCNC: 25 MMOL/L — SIGNIFICANT CHANGE UP (ref 22–29)
CREAT SERPL-MCNC: 1.54 MG/DL — HIGH (ref 0.5–1.3)
CREAT SERPL-MCNC: 1.55 MG/DL — HIGH (ref 0.5–1.3)
CREAT SERPL-MCNC: 1.68 MG/DL — HIGH (ref 0.5–1.3)
GLUCOSE BLDC GLUCOMTR-MCNC: 328 MG/DL — HIGH (ref 70–99)
GLUCOSE BLDC GLUCOMTR-MCNC: 381 MG/DL — HIGH (ref 70–99)
GLUCOSE BLDC GLUCOMTR-MCNC: 393 MG/DL — HIGH (ref 70–99)
GLUCOSE BLDC GLUCOMTR-MCNC: 435 MG/DL — HIGH (ref 70–99)
GLUCOSE BLDC GLUCOMTR-MCNC: 457 MG/DL — CRITICAL HIGH (ref 70–99)
GLUCOSE BLDC GLUCOMTR-MCNC: 468 MG/DL — CRITICAL HIGH (ref 70–99)
GLUCOSE BLDC GLUCOMTR-MCNC: >530 MG/DL — CRITICAL HIGH (ref 70–99)
GLUCOSE BLDC GLUCOMTR-MCNC: >530 MG/DL — CRITICAL HIGH (ref 70–99)
GLUCOSE SERPL-MCNC: 432 MG/DL — HIGH (ref 70–115)
GLUCOSE SERPL-MCNC: 454 MG/DL — HIGH (ref 70–115)
GLUCOSE SERPL-MCNC: 489 MG/DL — HIGH (ref 70–115)
HCT VFR BLD CALC: 39.5 % — SIGNIFICANT CHANGE UP (ref 37–47)
HGB BLD-MCNC: 12.3 G/DL — SIGNIFICANT CHANGE UP (ref 12–16)
MAGNESIUM SERPL-MCNC: 2.3 MG/DL — SIGNIFICANT CHANGE UP (ref 1.6–2.6)
MCHC RBC-ENTMCNC: 26.3 PG — LOW (ref 27–31)
MCHC RBC-ENTMCNC: 31.1 G/DL — LOW (ref 32–36)
MCV RBC AUTO: 84.4 FL — SIGNIFICANT CHANGE UP (ref 81–99)
PLATELET # BLD AUTO: 242 K/UL — SIGNIFICANT CHANGE UP (ref 150–400)
POTASSIUM SERPL-MCNC: 4 MMOL/L — SIGNIFICANT CHANGE UP (ref 3.5–5.3)
POTASSIUM SERPL-MCNC: 4.3 MMOL/L — SIGNIFICANT CHANGE UP (ref 3.5–5.3)
POTASSIUM SERPL-MCNC: 4.5 MMOL/L — SIGNIFICANT CHANGE UP (ref 3.5–5.3)
POTASSIUM SERPL-SCNC: 4 MMOL/L — SIGNIFICANT CHANGE UP (ref 3.5–5.3)
POTASSIUM SERPL-SCNC: 4.3 MMOL/L — SIGNIFICANT CHANGE UP (ref 3.5–5.3)
POTASSIUM SERPL-SCNC: 4.5 MMOL/L — SIGNIFICANT CHANGE UP (ref 3.5–5.3)
RBC # BLD: 4.68 M/UL — SIGNIFICANT CHANGE UP (ref 4.4–5.2)
RBC # FLD: 14.6 % — SIGNIFICANT CHANGE UP (ref 11–15.6)
SODIUM SERPL-SCNC: 132 MMOL/L — LOW (ref 135–145)
SODIUM SERPL-SCNC: 134 MMOL/L — LOW (ref 135–145)
SODIUM SERPL-SCNC: 136 MMOL/L — SIGNIFICANT CHANGE UP (ref 135–145)
WBC # BLD: 18.4 K/UL — HIGH (ref 4.8–10.8)
WBC # FLD AUTO: 18.4 K/UL — HIGH (ref 4.8–10.8)

## 2018-06-02 PROCEDURE — 99232 SBSQ HOSP IP/OBS MODERATE 35: CPT

## 2018-06-02 PROCEDURE — 99233 SBSQ HOSP IP/OBS HIGH 50: CPT

## 2018-06-02 RX ORDER — INSULIN LISPRO 100/ML
25 VIAL (ML) SUBCUTANEOUS
Qty: 0 | Refills: 0 | Status: DISCONTINUED | OUTPATIENT
Start: 2018-06-02 | End: 2018-06-05

## 2018-06-02 RX ORDER — POLYETHYLENE GLYCOL 3350 17 G/17G
17 POWDER, FOR SOLUTION ORAL DAILY
Qty: 0 | Refills: 0 | Status: DISCONTINUED | OUTPATIENT
Start: 2018-06-02 | End: 2018-06-06

## 2018-06-02 RX ORDER — AZITHROMYCIN 500 MG/1
500 TABLET, FILM COATED ORAL EVERY 24 HOURS
Qty: 0 | Refills: 0 | Status: DISCONTINUED | OUTPATIENT
Start: 2018-06-02 | End: 2018-06-04

## 2018-06-02 RX ORDER — INSULIN HUMAN 100 [IU]/ML
10 INJECTION, SOLUTION SUBCUTANEOUS ONCE
Qty: 0 | Refills: 0 | Status: COMPLETED | OUTPATIENT
Start: 2018-06-02 | End: 2018-06-02

## 2018-06-02 RX ORDER — FLUTICASONE PROPIONATE 50 MCG
1 SPRAY, SUSPENSION NASAL
Qty: 0 | Refills: 0 | Status: DISCONTINUED | OUTPATIENT
Start: 2018-06-02 | End: 2018-06-06

## 2018-06-02 RX ORDER — INSULIN LISPRO 100/ML
VIAL (ML) SUBCUTANEOUS
Qty: 0 | Refills: 0 | Status: DISCONTINUED | OUTPATIENT
Start: 2018-06-02 | End: 2018-06-06

## 2018-06-02 RX ORDER — SODIUM CHLORIDE 9 MG/ML
1000 INJECTION INTRAMUSCULAR; INTRAVENOUS; SUBCUTANEOUS ONCE
Qty: 0 | Refills: 0 | Status: COMPLETED | OUTPATIENT
Start: 2018-06-02 | End: 2018-06-02

## 2018-06-02 RX ORDER — FUROSEMIDE 40 MG
20 TABLET ORAL ONCE
Qty: 0 | Refills: 0 | Status: COMPLETED | OUTPATIENT
Start: 2018-06-02 | End: 2018-06-02

## 2018-06-02 RX ORDER — INSULIN GLARGINE 100 [IU]/ML
60 INJECTION, SOLUTION SUBCUTANEOUS
Qty: 0 | Refills: 0 | Status: DISCONTINUED | OUTPATIENT
Start: 2018-06-02 | End: 2018-06-02

## 2018-06-02 RX ORDER — INSULIN GLARGINE 100 [IU]/ML
65 INJECTION, SOLUTION SUBCUTANEOUS
Qty: 0 | Refills: 0 | Status: DISCONTINUED | OUTPATIENT
Start: 2018-06-02 | End: 2018-06-04

## 2018-06-02 RX ORDER — INSULIN LISPRO 100/ML
VIAL (ML) SUBCUTANEOUS AT BEDTIME
Qty: 0 | Refills: 0 | Status: DISCONTINUED | OUTPATIENT
Start: 2018-06-02 | End: 2018-06-06

## 2018-06-02 RX ADMIN — Medication 3 MILLILITER(S): at 09:44

## 2018-06-02 RX ADMIN — Medication 1 LOZENGE: at 23:33

## 2018-06-02 RX ADMIN — Medication 40 MILLIGRAM(S): at 05:35

## 2018-06-02 RX ADMIN — Medication 22 UNIT(S): at 12:34

## 2018-06-02 RX ADMIN — Medication 3 MILLILITER(S): at 15:52

## 2018-06-02 RX ADMIN — Medication 3 MILLILITER(S): at 03:48

## 2018-06-02 RX ADMIN — Medication 112 MICROGRAM(S): at 05:35

## 2018-06-02 RX ADMIN — CLOPIDOGREL BISULFATE 75 MILLIGRAM(S): 75 TABLET, FILM COATED ORAL at 11:43

## 2018-06-02 RX ADMIN — Medication 240 MILLIGRAM(S): at 05:35

## 2018-06-02 RX ADMIN — Medication 1 LOZENGE: at 11:42

## 2018-06-02 RX ADMIN — Medication 14: at 17:38

## 2018-06-02 RX ADMIN — INSULIN GLARGINE 65 UNIT(S): 100 INJECTION, SOLUTION SUBCUTANEOUS at 21:42

## 2018-06-02 RX ADMIN — Medication 22 UNIT(S): at 08:44

## 2018-06-02 RX ADMIN — ENOXAPARIN SODIUM 40 MILLIGRAM(S): 100 INJECTION SUBCUTANEOUS at 21:42

## 2018-06-02 RX ADMIN — Medication 25 UNIT(S): at 17:38

## 2018-06-02 RX ADMIN — Medication 250 MILLIGRAM(S): at 05:35

## 2018-06-02 RX ADMIN — AZITHROMYCIN 255 MILLIGRAM(S): 500 TABLET, FILM COATED ORAL at 06:53

## 2018-06-02 RX ADMIN — Medication 81 MILLIGRAM(S): at 11:43

## 2018-06-02 RX ADMIN — INSULIN HUMAN 10 UNIT(S): 100 INJECTION, SOLUTION SUBCUTANEOUS at 02:31

## 2018-06-02 RX ADMIN — PANTOPRAZOLE SODIUM 40 MILLIGRAM(S): 20 TABLET, DELAYED RELEASE ORAL at 05:35

## 2018-06-02 RX ADMIN — Medication 8: at 21:42

## 2018-06-02 RX ADMIN — Medication 1 SPRAY(S): at 17:39

## 2018-06-02 RX ADMIN — INSULIN HUMAN 10 UNIT(S): 100 INJECTION, SOLUTION SUBCUTANEOUS at 06:32

## 2018-06-02 RX ADMIN — Medication 250 MILLIGRAM(S): at 17:39

## 2018-06-02 RX ADMIN — INSULIN GLARGINE 50 UNIT(S): 100 INJECTION, SOLUTION SUBCUTANEOUS at 08:43

## 2018-06-02 RX ADMIN — BUDESONIDE AND FORMOTEROL FUMARATE DIHYDRATE 2 PUFF(S): 160; 4.5 AEROSOL RESPIRATORY (INHALATION) at 09:44

## 2018-06-02 RX ADMIN — ATORVASTATIN CALCIUM 20 MILLIGRAM(S): 80 TABLET, FILM COATED ORAL at 21:42

## 2018-06-02 RX ADMIN — Medication 1 SPRAY(S): at 12:34

## 2018-06-02 RX ADMIN — Medication 40 MILLIGRAM(S): at 05:36

## 2018-06-02 RX ADMIN — Medication 3 MILLILITER(S): at 20:43

## 2018-06-02 RX ADMIN — INSULIN HUMAN 10 UNIT(S): 100 INJECTION, SOLUTION SUBCUTANEOUS at 01:24

## 2018-06-02 RX ADMIN — Medication 1 LOZENGE: at 01:05

## 2018-06-02 RX ADMIN — Medication 1 LOZENGE: at 20:15

## 2018-06-02 RX ADMIN — Medication 1 LOZENGE: at 08:44

## 2018-06-02 RX ADMIN — Medication 16: at 13:20

## 2018-06-02 RX ADMIN — Medication 20 MILLIGRAM(S): at 06:32

## 2018-06-02 RX ADMIN — BUDESONIDE AND FORMOTEROL FUMARATE DIHYDRATE 2 PUFF(S): 160; 4.5 AEROSOL RESPIRATORY (INHALATION) at 20:44

## 2018-06-02 RX ADMIN — Medication 1 LOZENGE: at 17:39

## 2018-06-02 NOTE — PROGRESS NOTE ADULT - SUBJECTIVE AND OBJECTIVE BOX
FLOYD PAEZ    [id number]    83y      Female    INTERVAL HPI/OVERNIGHT EVENTS:    patient being seen for COPD exacerbation, chf and veda. Patient seen at bedside and states feeling better and has less sob.       REVIEW OF SYSTEMS:    CONSTITUTIONAL: No fever, weight loss, or fatigue  RESPIRATORY: No cough, wheezing, hemoptysis; No shortness of breath  CARDIOVASCULAR: No chest pain, palpitations  GASTROINTESTINAL: No abdominal or epigastric pain. No nausea, vomiting  NEUROLOGICAL: No headaches, memory loss, loss of strength.  MISCELLANEOUS:      Vital Signs Last 24 Hrs  T(C): 36.5 (02 Jun 2018 05:34), Max: 37.1 (01 Jun 2018 21:31)  T(F): 97.7 (02 Jun 2018 05:34), Max: 98.7 (01 Jun 2018 21:31)  HR: 77 (02 Jun 2018 09:48) (73 - 82)  BP: 138/60 (02 Jun 2018 05:34) (138/60 - 142/79)  BP(mean): --  RR: 20 (02 Jun 2018 05:34) (20 - 20)  SpO2: 97% (02 Jun 2018 09:48) (96% - 98%)    PHYSICAL EXAM:    GENERAL: NAD, obese  HEENT: PERRL, +EOMI  NECK: soft, Supple, No JVD,   CHEST/LUNG: slightly diminished, no wheezing   HEART: S1S2+,   ABDOMEN: Soft, Nontender,  EXTREMITIES:  edema   SKIN: No rashes or lesions  NEURO: AAOX3, no focal deficits,   PSYCH: normal mood        LABS:                        12.3   18.4  )-----------( 242      ( 02 Jun 2018 06:13 )             39.5     06-02    136  |  94<L>  |  56.0<H>  ----------------------------<  432<H>  4.5   |  24.0  |  1.54<H>    Ca    8.6      02 Jun 2018 06:13  Mg     2.3     06-02              MEDICATIONS  (STANDING):  ALBUTerol    90 MICROgram(s) HFA Inhaler 1 Puff(s) Inhalation every 4 hours  ALBUTerol/ipratropium for Nebulization 3 milliLiter(s) Nebulizer every 6 hours  aspirin enteric coated 81 milliGRAM(s) Oral daily  atorvastatin 20 milliGRAM(s) Oral at bedtime  azithromycin  IVPB 500 milliGRAM(s) IV Intermittent every 24 hours  buDESOnide 160 MICROgram(s)/formoterol 4.5 MICROgram(s) Inhaler 2 Puff(s) Inhalation two times a day  clopidogrel Tablet 75 milliGRAM(s) Oral daily  clotrimazole Lozenge 1 Lozenge Oral five times a day  dextrose 5%. 1000 milliLiter(s) (50 mL/Hr) IV Continuous <Continuous>  dextrose 50% Injectable 12.5 Gram(s) IV Push once  dextrose 50% Injectable 25 Gram(s) IV Push once  dextrose 50% Injectable 25 Gram(s) IV Push once  diltiazem    milliGRAM(s) Oral daily  enoxaparin Injectable 40 milliGRAM(s) SubCutaneous daily  fluticasone propionate 50 MICROgram(s)/spray Nasal Spray 1 Spray(s) Both Nostrils two times a day  furosemide    Tablet 40 milliGRAM(s) Oral daily  insulin glargine Injectable (LANTUS) 60 Unit(s) SubCutaneous two times a day  insulin lispro (HumaLOG) corrective regimen sliding scale   SubCutaneous at bedtime  insulin lispro (HumaLOG) corrective regimen sliding scale   SubCutaneous three times a day before meals  insulin lispro Injectable (HumaLOG) 22 Unit(s) SubCutaneous three times a day before meals  levothyroxine 112 MICROGram(s) Oral daily  methylPREDNISolone sodium succinate Injectable 40 milliGRAM(s) IV Push two times a day  pantoprazole    Tablet 40 milliGRAM(s) Oral before breakfast  saccharomyces boulardii 250 milliGRAM(s) Oral two times a day  tiotropium 18 MICROgram(s) Capsule 1 Capsule(s) Inhalation daily  tiotropium 18 MICROgram(s) Capsule 1 Capsule(s) Inhalation daily    MEDICATIONS  (PRN):  acetaminophen   Tablet 650 milliGRAM(s) Oral every 6 hours PRN For Temp greater than 38 C (100.4 F)  ALBUTerol    90 MICROgram(s) HFA Inhaler 2 Puff(s) Inhalation every 6 hours PRN Shortness of Breath and/or Wheezing  dextrose 40% Gel 15 Gram(s) Oral once PRN Blood Glucose LESS THAN 70 milliGRAM(s)/deciliter  glucagon  Injectable 1 milliGRAM(s) IntraMuscular once PRN Glucose LESS THAN 70 milligrams/deciliter  ondansetron Injectable 4 milliGRAM(s) IV Push every 6 hours PRN Nausea and/or Vomiting  polyethylene glycol 3350 17 Gram(s) Oral daily PRN Constipation      RADIOLOGY & ADDITIONAL TESTS:

## 2018-06-02 NOTE — DIETITIAN INITIAL EVALUATION ADULT. - ADHERENCE
Pt lives at an assisted living where 2 meals/day are provided. She often has a PBJ sandwhich with her morning pills, then has one egg and potato pancake with 4oz of juice for breakfast. Lunch is cottage cheese and fruit or tuna salad. Dinner is provided- pt chooses a protein/starch/veggie. Pt weighs herself every morning and SMBG 3x daily before meals, reports BG is usually ~120./fair

## 2018-06-02 NOTE — PROGRESS NOTE ADULT - ATTENDING COMMENTS
Patient seen and examined by me.  Patient is doing well and stable from a cardiovascular standpoint.    I will sign off.

## 2018-06-02 NOTE — PROGRESS NOTE ADULT - SUBJECTIVE AND OBJECTIVE BOX
[place] [facility]                                                        Office: [address] [address]                                                       Telephone: [phone]. Fax:[phone]                                                                             PROGRESS NOTE    Reason for follow up: Follow up on congestive heart failure      Review of symptoms:   Cardiac:  No chest pain. No dyspnea. No palpitations.  Respiratory: no cough. No dyspnea  Gastrointestinal: No diarrhea. No abdominal pain. No bleeding.   N: 600 mL / OUT: 1800 mL /  	  Vitals:  T(C): 36.5 (06-02-18 @ 05:34), Max: 37.1 (06-01-18 @ 21:31)  HR: 77 (06-02-18 @ 09:48) (73 - 82)  BP: 138/60 (06-02-18 @ 05:34) (138/60 - 142/79)  RR: 20 (06-02-18 @ 05:34) (20 - 20)  SpO2: 97% (06-02-18 @ 09:48) (96% - 98%)  Wt(kg): --  I&O's Summary    01 Jun 2018 07:01  -  02 Jun 2018 07:00  --------------------------------------------------------  I NET: -1200 mL      Weight (kg): 88.5 (05-30 @ 19:13)    PHYSICAL EXAM:  Appearance: Comfortable. No acute distress  HEENT:  Head and neck: Atraumatic. Normocephalic  Lymphatic: No cervical lymphadenopathy  Cardiovascular: Normal S1 S2,   Respiratory: Lungs clear to auscultation  Gastrointestinal:  Soft, Non-tender, + BS  Lower Extremities: trace pedal edema  Skin: No rashes/ ecchymoses/cyanosis/ulcers visualized on the face, hands or feet.  Neurologic: A & O x 3, no focal deficits. EOMI , Cranial nerves are intact.    CURRENT MEDICATIONS:  Dilltiazem  milliGRAM(s) Oral daily  furosemide Tablet 40 milliGRAM(s) Oral daily  atorvastatin  aspirin enteric coated 81mg  clopidogrel Tablet 75mg   ALBUTerol    90 MICROgram(s) HFA Inhaler  ALBUTerol/ipratropium for Nebulization  buDESOnide 160 MICROgram(s)/formoterol 4.5 MICROgram(s) Inhaler  tiotropium 18 MICROgram(s) Capsule  tiotropium 18 MICROgram(s) Capsule  azithromycin  IVPB  clotrimazole Lozenge  pantoprazole 20  Tablet  insulin glargine Injectable (LANTUS)  insulin lispro Injectable (HumaLOG)  levothyroxine  predniSONE   Tablet  enoxaparin Injectable  fluticasone propionate 50 MICROgram(s)/spray Nasal Spray      LABS:	 	  CARDIAC MARKERS ( 01 Jun 2018 05:46 )  x     / <0.01 ng/mL / x     / x     / x      p-BNP 01 Jun 2018 05:46: x    , CARDIAC MARKERS ( 30 May 2018 20:17 )  x     / <0.01 ng/mL / 103 U/L / x     / x      p-BNP 30 May 2018 20:17: 354 pg/mL                         12.3   18.4  )-----------( 242      ( 02 Jun 2018 06:13 )             39.5     06-02    136  |  94<L>  |  56.0<H>  ----------------------------<  432<H>  4.5   |  24.0  |  1.54<H>    Ca    8.6      02 Jun 2018 06:13  Mg     2.3     06-02      proBNP: Serum Pro-Brain Natriuretic Peptide: 354 pg/mL (05-30 @ 20:17)      TELEMETRY: Nsr no arrhythmias    DIAGNOSTIC TESTING:  [ ] Echocardiogram: 1. Hyperdynamic global left ventricular systolic function. Left   ventricular ejection fraction, by visual estimation, is >75%. RV systolic   function is normal.   2. Spectral Doppler shows impaired relaxation pattern of left   ventricular myocardial filling (Grade I diastolic dysfunction).   3. Moderate degenerative aortic stenosis.   4. No pericardial effusion    [ ]  Catheterization:  [ ] Stress Test:    OTHER: [place] [facility]                                                        Office: [address] [address]                                                       Telephone: [phone]. Fax:[phone]                                                                             PROGRESS NOTE    Reason for follow up: Follow up on congestive heart failure      Review of symptoms:   Cardiac:  No chest pain. No dyspnea. No palpitations.  Respiratory: no cough. No dyspnea  Gastrointestinal: No diarrhea. No abdominal pain. No bleeding.   N: 600 mL / OUT: 1800 mL /  	  Vitals:  T(C): 36.5 (06-02-18 @ 05:34), Max: 37.1 (06-01-18 @ 21:31)  HR: 77 (06-02-18 @ 09:48) (73 - 82)  BP: 138/60 (06-02-18 @ 05:34) (138/60 - 142/79)  RR: 20 (06-02-18 @ 05:34) (20 - 20)  SpO2: 97% (06-02-18 @ 09:48) (96% - 98%)  Wt(kg): --  I&O's Summary    01 Jun 2018 07:01  -  02 Jun 2018 07:00  --------------------------------------------------------  I NET: -1200 mL      Weight (kg): 88.5 (05-30 @ 19:13)    PHYSICAL EXAM:  Appearance: Comfortable. No acute distress  HEENT:  Head and neck: Atraumatic. Normocephalic  Lymphatic: No cervical lymphadenopathy  Cardiovascular: Normal S1 S2,   Respiratory: Lungs clear to auscultation  Gastrointestinal:  Soft, Non-tender, + BS  Lower Extremities: trace pedal edema  Skin: No rashes/ ecchymoses/cyanosis/ulcers visualized on the face, hands or feet.  Neurologic: A & O x 3, no focal deficits. EOMI , Cranial nerves are intact.    CURRENT MEDICATIONS:  Dilltiazem  milliGRAM(s) Oral daily  furosemide Tablet 40 milliGRAM(s) Oral daily  atorvastatin 20mg  aspirin enteric coated 81mg  clopidogrel Tablet 75mg       ALBUTerol    90 MICROgram(s) HFA Inhaler  ALBUTerol/ipratropium for Nebulization  buDESOnide 160 MICROgram(s)/formoterol 4.5 MICROgram(s) Inhaler  tiotropium 18 MICROgram(s) Capsule  tiotropium 18 MICROgram(s) Capsule  azithromycin  IVPB  clotrimazole Lozenge  pantoprazole 20  Tablet  insulin glargine Injectable (LANTUS)  insulin lispro Injectable (HumaLOG)  levothyroxine  predniSONE 50mg   Tablet  enoxaparin Injectable  fluticasone propionate 50 MICROgram(s)/spray Nasal Spray      LABS:	 	  CARDIAC MARKERS ( 01 Jun 2018 05:46 )  x     / <0.01 ng/mL / x     / x     / x      p-BNP 01 Jun 2018 05:46: x    , CARDIAC MARKERS ( 30 May 2018 20:17 )  x     / <0.01 ng/mL / 103 U/L / x     / x      p-BNP 30 May 2018 20:17: 354 pg/mL                         12.3   18.4  )-----------( 242      ( 02 Jun 2018 06:13 )             39.5     06-02    136  |  94<L>  |  56.0<H>  ----------------------------<  432<H>  4.5   |  24.0  |  1.54<H>    Ca    8.6      02 Jun 2018 06:13  Mg     2.3     06-02      proBNP: Serum Pro-Brain Natriuretic Peptide: 354 pg/mL (05-30 @ 20:17)      TELEMETRY: Nsr no arrhythmias    DIAGNOSTIC TESTING:  [ ] Echocardiogram: 1. Hyperdynamic global left ventricular systolic function. Left   ventricular ejection fraction, by visual estimation, is >75%. RV systolic   function is normal.   2. Spectral Doppler shows impaired relaxation pattern of left   ventricular myocardial filling (Grade I diastolic dysfunction).   3. Moderate degenerative aortic stenosis.   4. No pericardial effusion  On 4/19/2018 she underwent cardiac cath that revealed patent stent in the LAD, 50% stenosis in the LCx (iFR normal) and 100% occlusion of the Mid RCA (fills via good collaterals).  LV function was normal and there was moderate AS with an BILL=1.21 cm2 and mild Pul HTN and normal CO/CI.   On 5/4/2018 she had a dobutamine stress test that revealed a completely normal LV function with a peak gradient of 34 mmHg and was discussed to perform a SHANNON to assess the opening of the aortic valve.    OTHER:

## 2018-06-02 NOTE — DIETITIAN INITIAL EVALUATION ADULT. - OTHER INFO
Pt admitted for acute of chronic CHF exacerbation. Pt reports appetite is fair-good. Yesterday she ate poorly due to BG being in the 600s and feeling very weak. Pt fills out menu, declined updating food preferences. Diet education with literature provided. Noted current weight of 213#- likely fluid shifts as pt reports weight has been stable for years at 186-190#.

## 2018-06-03 LAB
ANION GAP SERPL CALC-SCNC: 16 MMOL/L — SIGNIFICANT CHANGE UP (ref 5–17)
BUN SERPL-MCNC: 67 MG/DL — HIGH (ref 8–20)
CALCIUM SERPL-MCNC: 9.1 MG/DL — SIGNIFICANT CHANGE UP (ref 8.6–10.2)
CHLORIDE SERPL-SCNC: 93 MMOL/L — LOW (ref 98–107)
CO2 SERPL-SCNC: 28 MMOL/L — SIGNIFICANT CHANGE UP (ref 22–29)
CREAT SERPL-MCNC: 1.49 MG/DL — HIGH (ref 0.5–1.3)
GLUCOSE BLDC GLUCOMTR-MCNC: 230 MG/DL — HIGH (ref 70–99)
GLUCOSE BLDC GLUCOMTR-MCNC: 298 MG/DL — HIGH (ref 70–99)
GLUCOSE BLDC GLUCOMTR-MCNC: 466 MG/DL — CRITICAL HIGH (ref 70–99)
GLUCOSE BLDC GLUCOMTR-MCNC: 78 MG/DL — SIGNIFICANT CHANGE UP (ref 70–99)
GLUCOSE SERPL-MCNC: 253 MG/DL — HIGH (ref 70–115)
MAGNESIUM SERPL-MCNC: 2.5 MG/DL — SIGNIFICANT CHANGE UP (ref 1.6–2.6)
POTASSIUM SERPL-MCNC: 4.6 MMOL/L — SIGNIFICANT CHANGE UP (ref 3.5–5.3)
POTASSIUM SERPL-SCNC: 4.6 MMOL/L — SIGNIFICANT CHANGE UP (ref 3.5–5.3)
SODIUM SERPL-SCNC: 137 MMOL/L — SIGNIFICANT CHANGE UP (ref 135–145)

## 2018-06-03 PROCEDURE — 71045 X-RAY EXAM CHEST 1 VIEW: CPT | Mod: 26

## 2018-06-03 PROCEDURE — 99233 SBSQ HOSP IP/OBS HIGH 50: CPT

## 2018-06-03 RX ORDER — FUROSEMIDE 40 MG
20 TABLET ORAL ONCE
Qty: 0 | Refills: 0 | Status: COMPLETED | OUTPATIENT
Start: 2018-06-03 | End: 2018-06-03

## 2018-06-03 RX ORDER — ACETYLCYSTEINE 200 MG/ML
600 VIAL (ML) MISCELLANEOUS EVERY 12 HOURS
Qty: 0 | Refills: 0 | Status: COMPLETED | OUTPATIENT
Start: 2018-06-03 | End: 2018-06-05

## 2018-06-03 RX ORDER — LISINOPRIL 2.5 MG/1
5 TABLET ORAL DAILY
Qty: 0 | Refills: 0 | Status: DISCONTINUED | OUTPATIENT
Start: 2018-06-03 | End: 2018-06-06

## 2018-06-03 RX ADMIN — Medication 1 SPRAY(S): at 17:36

## 2018-06-03 RX ADMIN — Medication 1 LOZENGE: at 11:21

## 2018-06-03 RX ADMIN — Medication 3 MILLILITER(S): at 03:50

## 2018-06-03 RX ADMIN — Medication 8: at 17:36

## 2018-06-03 RX ADMIN — Medication 25 UNIT(S): at 09:32

## 2018-06-03 RX ADMIN — ATORVASTATIN CALCIUM 20 MILLIGRAM(S): 80 TABLET, FILM COATED ORAL at 22:18

## 2018-06-03 RX ADMIN — Medication 1 LOZENGE: at 09:34

## 2018-06-03 RX ADMIN — Medication 1 SPRAY(S): at 05:33

## 2018-06-03 RX ADMIN — Medication 3 MILLILITER(S): at 15:06

## 2018-06-03 RX ADMIN — ENOXAPARIN SODIUM 40 MILLIGRAM(S): 100 INJECTION SUBCUTANEOUS at 22:18

## 2018-06-03 RX ADMIN — INSULIN GLARGINE 65 UNIT(S): 100 INJECTION, SOLUTION SUBCUTANEOUS at 22:19

## 2018-06-03 RX ADMIN — Medication 20 MILLIGRAM(S): at 12:59

## 2018-06-03 RX ADMIN — Medication 10: at 09:33

## 2018-06-03 RX ADMIN — PANTOPRAZOLE SODIUM 40 MILLIGRAM(S): 20 TABLET, DELAYED RELEASE ORAL at 05:31

## 2018-06-03 RX ADMIN — BUDESONIDE AND FORMOTEROL FUMARATE DIHYDRATE 2 PUFF(S): 160; 4.5 AEROSOL RESPIRATORY (INHALATION) at 09:06

## 2018-06-03 RX ADMIN — Medication 81 MILLIGRAM(S): at 11:22

## 2018-06-03 RX ADMIN — BUDESONIDE AND FORMOTEROL FUMARATE DIHYDRATE 2 PUFF(S): 160; 4.5 AEROSOL RESPIRATORY (INHALATION) at 20:14

## 2018-06-03 RX ADMIN — Medication 250 MILLIGRAM(S): at 05:31

## 2018-06-03 RX ADMIN — Medication 600 MILLIGRAM(S): at 22:18

## 2018-06-03 RX ADMIN — Medication 25 UNIT(S): at 12:54

## 2018-06-03 RX ADMIN — Medication 3 MILLILITER(S): at 09:03

## 2018-06-03 RX ADMIN — Medication 1 LOZENGE: at 17:35

## 2018-06-03 RX ADMIN — INSULIN GLARGINE 65 UNIT(S): 100 INJECTION, SOLUTION SUBCUTANEOUS at 09:35

## 2018-06-03 RX ADMIN — Medication 40 MILLIGRAM(S): at 05:31

## 2018-06-03 RX ADMIN — Medication 1 LOZENGE: at 19:43

## 2018-06-03 RX ADMIN — Medication 3 MILLILITER(S): at 20:14

## 2018-06-03 RX ADMIN — LISINOPRIL 5 MILLIGRAM(S): 2.5 TABLET ORAL at 10:55

## 2018-06-03 RX ADMIN — Medication 112 MICROGRAM(S): at 05:31

## 2018-06-03 RX ADMIN — Medication 25 UNIT(S): at 17:36

## 2018-06-03 RX ADMIN — Medication 600 MILLIGRAM(S): at 14:11

## 2018-06-03 RX ADMIN — Medication 50 MILLIGRAM(S): at 05:32

## 2018-06-03 RX ADMIN — AZITHROMYCIN 255 MILLIGRAM(S): 500 TABLET, FILM COATED ORAL at 05:32

## 2018-06-03 RX ADMIN — Medication 16: at 12:53

## 2018-06-03 RX ADMIN — Medication 250 MILLIGRAM(S): at 17:36

## 2018-06-03 RX ADMIN — Medication 240 MILLIGRAM(S): at 05:31

## 2018-06-03 RX ADMIN — Medication 1 LOZENGE: at 23:26

## 2018-06-03 RX ADMIN — CLOPIDOGREL BISULFATE 75 MILLIGRAM(S): 75 TABLET, FILM COATED ORAL at 11:21

## 2018-06-03 NOTE — PROGRESS NOTE ADULT - SUBJECTIVE AND OBJECTIVE BOX
FLOYD PAEZ    [id number]    83y      Female    INTERVAL HPI/OVERNIGHT EVENTS:    patient being seen for COPD exacerbation, chf and veda. Patient seen at bedside with granddaughter. Patient complains of cough and mild sob.     REVIEW OF SYSTEMS:    CONSTITUTIONAL: No fever, weight loss, or fatigue  RESPIRATORY: cough, sob   CARDIOVASCULAR: No chest pain, palpitations  GASTROINTESTINAL: No abdominal or epigastric pain. No nausea, vomiting  NEUROLOGICAL: No headaches, memory loss, loss of strength.  MISCELLANEOUS:      Vital Signs Last 24 Hrs  T(C): 36.5 (03 Jun 2018 10:48), Max: 37.1 (02 Jun 2018 15:49)  T(F): 97.7 (03 Jun 2018 10:48), Max: 98.7 (02 Jun 2018 15:49)  HR: 84 (03 Jun 2018 10:48) (73 - 90)  BP: 118/60 (03 Jun 2018 10:48) (118/60 - 165/64)  BP(mean): --  RR: 20 (03 Jun 2018 10:48) (18 - 22)  SpO2: 100% (03 Jun 2018 10:48) (95% - 100%)    PHYSICAL EXAM:    GENERAL: NAD, obese  HEENT: PERRL, +EOMI  NECK: soft, Supple, No JVD,   CHEST/LUNG: slightly diminished, no wheezing   HEART: S1S2+,   ABDOMEN: Soft, Nontender,  EXTREMITIES:  edema   NEURO: AAOX3, no focal deficits,   PSYCH: normal mood      LABS:                        12.3   18.4  )-----------( 242      ( 02 Jun 2018 06:13 )             39.5     06-03    137  |  93<L>  |  67.0<H>  ----------------------------<  253<H>  4.6   |  28.0  |  1.49<H>    Ca    9.1      03 Jun 2018 06:03  Mg     2.5     06-03              MEDICATIONS  (STANDING):  acetylcysteine  Oral Solution 600 milliGRAM(s) Oral every 12 hours  ALBUTerol    90 MICROgram(s) HFA Inhaler 1 Puff(s) Inhalation every 4 hours  ALBUTerol/ipratropium for Nebulization 3 milliLiter(s) Nebulizer every 6 hours  aspirin enteric coated 81 milliGRAM(s) Oral daily  atorvastatin 20 milliGRAM(s) Oral at bedtime  azithromycin  IVPB 500 milliGRAM(s) IV Intermittent every 24 hours  buDESOnide 160 MICROgram(s)/formoterol 4.5 MICROgram(s) Inhaler 2 Puff(s) Inhalation two times a day  clopidogrel Tablet 75 milliGRAM(s) Oral daily  clotrimazole Lozenge 1 Lozenge Oral five times a day  dextrose 5%. 1000 milliLiter(s) (50 mL/Hr) IV Continuous <Continuous>  dextrose 50% Injectable 12.5 Gram(s) IV Push once  dextrose 50% Injectable 25 Gram(s) IV Push once  dextrose 50% Injectable 25 Gram(s) IV Push once  diltiazem    milliGRAM(s) Oral daily  enoxaparin Injectable 40 milliGRAM(s) SubCutaneous daily  fluticasone propionate 50 MICROgram(s)/spray Nasal Spray 1 Spray(s) Both Nostrils two times a day  furosemide    Tablet 40 milliGRAM(s) Oral daily  insulin glargine Injectable (LANTUS) 65 Unit(s) SubCutaneous two times a day  insulin lispro (HumaLOG) corrective regimen sliding scale   SubCutaneous three times a day before meals  insulin lispro (HumaLOG) corrective regimen sliding scale   SubCutaneous at bedtime  insulin lispro Injectable (HumaLOG) 25 Unit(s) SubCutaneous three times a day before meals  levothyroxine 112 MICROGram(s) Oral daily  lisinopril 5 milliGRAM(s) Oral daily  pantoprazole    Tablet 40 milliGRAM(s) Oral before breakfast  predniSONE   Tablet 50 milliGRAM(s) Oral daily  saccharomyces boulardii 250 milliGRAM(s) Oral two times a day  tiotropium 18 MICROgram(s) Capsule 1 Capsule(s) Inhalation daily  tiotropium 18 MICROgram(s) Capsule 1 Capsule(s) Inhalation daily    MEDICATIONS  (PRN):  acetaminophen   Tablet 650 milliGRAM(s) Oral every 6 hours PRN For Temp greater than 38 C (100.4 F)  ALBUTerol    90 MICROgram(s) HFA Inhaler 2 Puff(s) Inhalation every 6 hours PRN Shortness of Breath and/or Wheezing  dextrose 40% Gel 15 Gram(s) Oral once PRN Blood Glucose LESS THAN 70 milliGRAM(s)/deciliter  glucagon  Injectable 1 milliGRAM(s) IntraMuscular once PRN Glucose LESS THAN 70 milligrams/deciliter  ondansetron Injectable 4 milliGRAM(s) IV Push every 6 hours PRN Nausea and/or Vomiting  polyethylene glycol 3350 17 Gram(s) Oral daily PRN Constipation      RADIOLOGY & ADDITIONAL TESTS:    chest xray - ordered

## 2018-06-04 DIAGNOSIS — N17.9 ACUTE KIDNEY FAILURE, UNSPECIFIED: ICD-10-CM

## 2018-06-04 LAB
ANION GAP SERPL CALC-SCNC: 15 MMOL/L — SIGNIFICANT CHANGE UP (ref 5–17)
BUN SERPL-MCNC: 64 MG/DL — HIGH (ref 8–20)
CALCIUM SERPL-MCNC: 9.4 MG/DL — SIGNIFICANT CHANGE UP (ref 8.6–10.2)
CHLORIDE SERPL-SCNC: 97 MMOL/L — LOW (ref 98–107)
CO2 SERPL-SCNC: 30 MMOL/L — HIGH (ref 22–29)
CREAT SERPL-MCNC: 1.51 MG/DL — HIGH (ref 0.5–1.3)
GLUCOSE BLDC GLUCOMTR-MCNC: 157 MG/DL — HIGH (ref 70–99)
GLUCOSE BLDC GLUCOMTR-MCNC: 195 MG/DL — HIGH (ref 70–99)
GLUCOSE BLDC GLUCOMTR-MCNC: 69 MG/DL — LOW (ref 70–99)
GLUCOSE BLDC GLUCOMTR-MCNC: 75 MG/DL — SIGNIFICANT CHANGE UP (ref 70–99)
GLUCOSE BLDC GLUCOMTR-MCNC: 76 MG/DL — SIGNIFICANT CHANGE UP (ref 70–99)
GLUCOSE SERPL-MCNC: 67 MG/DL — LOW (ref 70–115)
MAGNESIUM SERPL-MCNC: 2.5 MG/DL — SIGNIFICANT CHANGE UP (ref 1.6–2.6)
POTASSIUM SERPL-MCNC: 4.2 MMOL/L — SIGNIFICANT CHANGE UP (ref 3.5–5.3)
POTASSIUM SERPL-SCNC: 4.2 MMOL/L — SIGNIFICANT CHANGE UP (ref 3.5–5.3)
SODIUM SERPL-SCNC: 142 MMOL/L — SIGNIFICANT CHANGE UP (ref 135–145)

## 2018-06-04 PROCEDURE — 99233 SBSQ HOSP IP/OBS HIGH 50: CPT

## 2018-06-04 RX ORDER — INSULIN GLARGINE 100 [IU]/ML
60 INJECTION, SOLUTION SUBCUTANEOUS AT BEDTIME
Qty: 0 | Refills: 0 | Status: DISCONTINUED | OUTPATIENT
Start: 2018-06-04 | End: 2018-06-05

## 2018-06-04 RX ADMIN — Medication 4: at 21:55

## 2018-06-04 RX ADMIN — Medication 1 SPRAY(S): at 05:36

## 2018-06-04 RX ADMIN — Medication 6: at 17:43

## 2018-06-04 RX ADMIN — Medication 1 SPRAY(S): at 17:09

## 2018-06-04 RX ADMIN — Medication 250 MILLIGRAM(S): at 17:10

## 2018-06-04 RX ADMIN — Medication 3 MILLILITER(S): at 20:11

## 2018-06-04 RX ADMIN — PANTOPRAZOLE SODIUM 40 MILLIGRAM(S): 20 TABLET, DELAYED RELEASE ORAL at 05:36

## 2018-06-04 RX ADMIN — BUDESONIDE AND FORMOTEROL FUMARATE DIHYDRATE 2 PUFF(S): 160; 4.5 AEROSOL RESPIRATORY (INHALATION) at 08:24

## 2018-06-04 RX ADMIN — Medication 600 MILLIGRAM(S): at 17:10

## 2018-06-04 RX ADMIN — Medication 25 UNIT(S): at 12:33

## 2018-06-04 RX ADMIN — Medication 3 MILLILITER(S): at 15:50

## 2018-06-04 RX ADMIN — BUDESONIDE AND FORMOTEROL FUMARATE DIHYDRATE 2 PUFF(S): 160; 4.5 AEROSOL RESPIRATORY (INHALATION) at 20:11

## 2018-06-04 RX ADMIN — Medication 6: at 12:32

## 2018-06-04 RX ADMIN — INSULIN GLARGINE 60 UNIT(S): 100 INJECTION, SOLUTION SUBCUTANEOUS at 21:55

## 2018-06-04 RX ADMIN — Medication 240 MILLIGRAM(S): at 05:36

## 2018-06-04 RX ADMIN — Medication 25 UNIT(S): at 17:43

## 2018-06-04 RX ADMIN — Medication 50 MILLIGRAM(S): at 05:36

## 2018-06-04 RX ADMIN — ENOXAPARIN SODIUM 40 MILLIGRAM(S): 100 INJECTION SUBCUTANEOUS at 21:54

## 2018-06-04 RX ADMIN — Medication 1 LOZENGE: at 23:45

## 2018-06-04 RX ADMIN — Medication 40 MILLIGRAM(S): at 05:36

## 2018-06-04 RX ADMIN — Medication 1 LOZENGE: at 11:38

## 2018-06-04 RX ADMIN — Medication 1 LOZENGE: at 08:44

## 2018-06-04 RX ADMIN — Medication 1 LOZENGE: at 15:56

## 2018-06-04 RX ADMIN — Medication 112 MICROGRAM(S): at 05:36

## 2018-06-04 RX ADMIN — ATORVASTATIN CALCIUM 20 MILLIGRAM(S): 80 TABLET, FILM COATED ORAL at 21:55

## 2018-06-04 RX ADMIN — AZITHROMYCIN 255 MILLIGRAM(S): 500 TABLET, FILM COATED ORAL at 05:37

## 2018-06-04 RX ADMIN — LISINOPRIL 5 MILLIGRAM(S): 2.5 TABLET ORAL at 05:36

## 2018-06-04 RX ADMIN — Medication 1 LOZENGE: at 21:29

## 2018-06-04 RX ADMIN — Medication 3 MILLILITER(S): at 08:24

## 2018-06-04 RX ADMIN — CLOPIDOGREL BISULFATE 75 MILLIGRAM(S): 75 TABLET, FILM COATED ORAL at 11:38

## 2018-06-04 RX ADMIN — Medication 250 MILLIGRAM(S): at 05:36

## 2018-06-04 RX ADMIN — Medication 81 MILLIGRAM(S): at 11:38

## 2018-06-04 RX ADMIN — Medication 600 MILLIGRAM(S): at 05:37

## 2018-06-04 RX ADMIN — Medication 3 MILLILITER(S): at 03:39

## 2018-06-04 NOTE — CONSULT NOTE ADULT - PROBLEM SELECTOR RECOMMENDATION 9
Will continue same meds  Would continue PO Lasix 40 mg/day with careful follow up of kidney function  Consider preload and afterload management with nitrates and hydralazine if BP permits
as above  SHANNON required for better evaluation of aortic stenosis as part of TAVR work up

## 2018-06-04 NOTE — SWALLOW BEDSIDE ASSESSMENT ADULT - ASR SWALLOW ASPIRATION MONITOR
pneumonia/position upright (90Y)/fever/throat clearing/change of breathing pattern/cough/gurgly voice/upper respiratory infection/oral hygiene

## 2018-06-04 NOTE — CONSULT NOTE ADULT - SUBJECTIVE AND OBJECTIVE BOX
[facility]                                                       [facility]                                                        Office: [address] [address]                                                       Telephone: [phone]. Fax:[phone]      CC: SOB    HPI: Patient is a  83y Female with a PMH of HTN, DM, COPD, known CAD since 1990, HFpEF, Chronic Bl LE Edema, s/p  PCI with staged procedure to OM1 and LAD.  C/O RABAGO which is  increasing, unable to walk without getting SOB, NYHA 3/4, no CP, but has orthopnea and PND. However she has long standing COPD.   On 4/19/2018 she underwent cardiac cath that revealed patent stent in the LAD, 50% stenosis in the LCx (iFR normal) and 100% occlusion of the Mid RCA (fills via good collaterals).  LV function was normal and there was moderate AS with an BILL=1.21 cm2 and mild Pul HTN and normal CO/CI.   On 5/4/2018 she had a dobutamine stress test that revealed a completely normal LV function with a peak gradient of 34 mmHg and was discussed to perform a SHANNON to assess the opening of the aortic valve.  Pt was brought in to the ER because of progressive SOB, orthopnea, dyspnea and dry cough on walking few steps with poor response to albuterol nebulizer treatment. No fever, no change in her diet.  She recently reduced  the dose of Lasix from 40 mg/day to 20 mg/day because patient is complaining of frequent urination.   Cardiac troponins were normal. Pro BNP was mildly elevated (354 mcg/dL). Procalcitonin was negative and chest CT done on 5/31/2018 did not reveal a pneumonia or pleural effusion. US DPlx LE was negative also.  This morning, still C/O effort-induced SOB, mild orthopnea but some improvement since yesterday. Has been walking. Denies CP, palpitation, nausea, vomiting hematuria, melena, syncope, near syncope.     PAST MEDICAL & SURGICAL HISTORY:  NSTEMI (non-ST elevated myocardial infarction)  Gastroesophageal reflux disease without esophagitis  Pure hypercholesterolemia  Hypothyroidism, unspecified type  Essential hypertension  DM2 (diabetes mellitus, type 2)  Uncomplicated asthma, unspecified asthma severity  Abnormal findings on cardiac catheterization: Cardiac Cath  History of appendectomy    FAMILY HISTORY:  Family history of stomach cancer  Family history of MI (myocardial infarction) (Father)    SOCIAL HISTORY: no EtOH, drugs or tobacco    MEDICATIONS  (STANDING):  ALBUTerol    90 MICROgram(s) HFA Inhaler 1 Puff(s) Inhalation every 4 hours  ALBUTerol/ipratropium for Nebulization 3 milliLiter(s) Nebulizer every 6 hours  aspirin enteric coated 81 milliGRAM(s) Oral daily  atorvastatin 20 milliGRAM(s) Oral at bedtime  azithromycin  IVPB 500 milliGRAM(s) IV Intermittent every 24 hours  azithromycin  IVPB      buDESOnide 160 MICROgram(s)/formoterol 4.5 MICROgram(s) Inhaler 2 Puff(s) Inhalation two times a day  clopidogrel Tablet 75 milliGRAM(s) Oral daily  clotrimazole Lozenge 1 Lozenge Oral five times a day  dextrose 5%. 1000 milliLiter(s) (50 mL/Hr) IV Continuous <Continuous>  dextrose 50% Injectable 12.5 Gram(s) IV Push once  dextrose 50% Injectable 25 Gram(s) IV Push once  dextrose 50% Injectable 25 Gram(s) IV Push once  diltiazem    milliGRAM(s) Oral daily  enoxaparin Injectable 40 milliGRAM(s) SubCutaneous daily  furosemide   Injectable 40 milliGRAM(s) IV Push daily  insulin glargine Injectable (LANTUS) 35 Unit(s) SubCutaneous at bedtime  insulin glargine Injectable (LANTUS) 40 Unit(s) SubCutaneous at bedtime  insulin lispro (HumaLOG) corrective regimen sliding scale   SubCutaneous at bedtime  insulin lispro (HumaLOG) corrective regimen sliding scale   SubCutaneous three times a day before meals  insulin lispro Injectable (HumaLOG) 7 Unit(s) SubCutaneous three times a day before meals  levothyroxine 112 MICROGram(s) Oral daily  methylPREDNISolone sodium succinate Injectable 40 milliGRAM(s) IV Push every 8 hours  pantoprazole    Tablet 40 milliGRAM(s) Oral before breakfast  saccharomyces boulardii 250 milliGRAM(s) Oral two times a day  tiotropium 18 MICROgram(s) Capsule 1 Capsule(s) Inhalation daily  tiotropium 18 MICROgram(s) Capsule 1 Capsule(s) Inhalation daily    ROS: All others negative    PHYSICAL EXAM:  Vital Signs Last 24 Hrs  T(C): 36.5 (01 Jun 2018 05:54), Max: 37.2 (31 May 2018 10:10)  T(F): 97.7 (01 Jun 2018 05:54), Max: 99 (31 May 2018 10:10)  HR: 85 (01 Jun 2018 05:54) (72 - 91)  BP: 140/70 (01 Jun 2018 05:54) (134/64 - 148/75)  BP(mean): --  RR: 20 (01 Jun 2018 05:54) (20 - 20)  SpO2: 97% (01 Jun 2018 05:54) (94% - 100%)  I&O's Summary    31 May 2018 07:01  -  01 Jun 2018 07:00  --------------------------------------------------------  IN: 850 mL / OUT: 2050 mL / NET: -1200 mL    01 Jun 2018 07:01  -  01 Jun 2018 09:58  --------------------------------------------------------  IN: 0 mL / OUT: 400 mL / NET: -400 mL      Appearance: Normal	  HEENT:   Normal oral mucosa, PERRL, EOMI	  Lymphatic: No lymphadenopathy  Cardiovascular: Normal S1 S2, No JVD, RJ 4/6 at the LSB with reduced S2. Edema +2  Respiratory: Lungs prolonged expirium with mild wheezing and diminished breath sounds in the Lt side  Psychiatry: A & O x 3, Mood & affect appropriate  Gastrointestinal:  Soft, Non-tender, + BS	  Skin: No rashes, No ecchymoses, No cyanosis  Neurologic: Non-focal  Extremities: Normal range of motion, No clubbing, cyanosis or edema  Vascular: Peripheral pulses palpable 2+ bilaterally    ECG:  LABS:                        12.9   7.2   )-----------( 235      ( 01 Jun 2018 05:46 )             41.6     06-01    133<L>  |  91<L>  |  39.0<H>  ----------------------------<  479<H>  4.5   |  24.0  |  1.42<H>    Ca    8.6      01 Jun 2018 05:46      PT/INR - ( 30 May 2018 20:17 )   PT: 11.4 sec;   INR: 1.04 ratio         PTT - ( 30 May 2018 20:17 )  PTT:29.2 sec  CARDIAC MARKERS ( 01 Jun 2018 05:46 )  x     / <0.01 ng/mL / x     / x     / x      CARDIAC MARKERS ( 30 May 2018 20:17 )  x     / <0.01 ng/mL / 103 U/L / x     / x          RADIOLOGY & ADDITIONAL STUDIES:
CARDIOLOGY CONSULTATION NOTE   Consult requested by:      Reason for Consultation:     History obtained by: Patient, family and medical record     obtained: No    Chief complaint:    Patient is a 83y old  Female who presents with a chief complaint of SOB (31 May 2018 01:02)      HPI:    83F hx HTN, DM, Asthma, known CAD since , HFpEF, Cor Pulmonale,  COPD, Chronic Bl LE Edema,  s/p  PCI with staged procedure to OM1 and ? at [facility],   who has balsine RABAGO which is  increasing, unable to walk without getting SOB, NYHA 3/4, no CP, but has orthopnea and PND. However she has long standing asthma.   On 2018 she underwent cardiac cath that revealed patent stent in the LAD, 50% stenosis in the LCx (iFR normal)   and 100% occlusion of the Mid RCA.  LV function was normal and there was moderate AS with an BILL=1.21 cm2 and mild Pul HTN and normal CO/CI.   On 2018 she had a dobutamine stress test that revealed a completely normal LV function with a peak gradient of 34 mmHg.  Pt was brought in to the ER because of progressive SOB, orthopnea, dyspnea on walking few steps with poor response to albuterol nebulizer treatment.  She recentlyr reduced  the dose of Lasix from 40 mg/day to 20 mg/day because patient is complaining of frequent urination.   Admitted 3 days of dry cough. Denied dizziness, CP, palpitation, nausea, vomiting hematuria, melena, syncope, near syncope.         REVIEW OF SYMPTOMS:   Constitutional: Denied: fever, chills, weight loss or gain  Eyes: Denied: reddened ayes, eye discharge, eye pain  ENMT: Denied: ear pain, nasal discharge, mouth pain, throat pain or swelling  Cardiovascular: Denied: chest pain,  Chest pressure, palpitation, arrhythmia, irregular or fast heart beats,  Respiratory: Denied:  phlegm production  GI: Denied: Abdominal pain, nausea, vomiting, diarrhea, constipation, melena, rectal bleed  : Denied: hematuria, frequency  Musculoskeletal: Denied: Muscle aches, weakness, pain  Hematology/lymp: Denied: Bleeding disorder, anemia, blood clotting  Neuro: Denied: Headache, light headedness, dizziness, numbness, aphasia, dysarthria, seizure,  syncope, near syncope  Psych: Denied: depression, anxiety  Integumentary/skin: Denied: rash, bruises, ecchymosis, itching  Allergy/Immunology: denied environmental or drug allergies    ALL OTHER REVIEW OF SYSTEMS ARE NEGATIVE.    MEDICATIONS  (STANDING):  ALBUTerol    90 MICROgram(s) HFA Inhaler 1 Puff(s) Inhalation every 4 hours  ALBUTerol/ipratropium for Nebulization 3 milliLiter(s) Nebulizer every 6 hours  ALBUTerol/ipratropium for Nebulization. 3 milliLiter(s) Nebulizer once  aspirin enteric coated 81 milliGRAM(s) Oral daily  atorvastatin 20 milliGRAM(s) Oral at bedtime  buDESOnide 160 MICROgram(s)/formoterol 4.5 MICROgram(s) Inhaler 2 Puff(s) Inhalation two times a day  clopidogrel Tablet 75 milliGRAM(s) Oral daily  clotrimazole Lozenge 1 Lozenge Oral five times a day  dextrose 5%. 1000 milliLiter(s) (50 mL/Hr) IV Continuous <Continuous>  dextrose 50% Injectable 12.5 Gram(s) IV Push once  dextrose 50% Injectable 25 Gram(s) IV Push once  dextrose 50% Injectable 25 Gram(s) IV Push once  diltiazem    milliGRAM(s) Oral daily  enoxaparin Injectable 40 milliGRAM(s) SubCutaneous daily  furosemide   Injectable 40 milliGRAM(s) IV Push daily  insulin glargine Injectable (LANTUS) 30 Unit(s) SubCutaneous every morning  insulin lispro (HumaLOG) corrective regimen sliding scale   SubCutaneous three times a day before meals  levothyroxine 112 MICROGram(s) Oral daily  pantoprazole    Tablet 40 milliGRAM(s) Oral before breakfast  tiotropium 18 MICROgram(s) Capsule 1 Capsule(s) Inhalation daily  tiotropium 18 MICROgram(s) Capsule 1 Capsule(s) Inhalation daily    MEDICATIONS  (PRN):  ALBUTerol    90 MICROgram(s) HFA Inhaler 2 Puff(s) Inhalation every 6 hours PRN Shortness of Breath and/or Wheezing  dextrose 40% Gel 15 Gram(s) Oral once PRN Blood Glucose LESS THAN 70 milliGRAM(s)/deciliter  glucagon  Injectable 1 milliGRAM(s) IntraMuscular once PRN Glucose LESS THAN 70 milligrams/deciliter        PAST MEDICAL & SURGICAL HISTORY:  NSTEMI (non-ST elevated myocardial infarction)  Gastroesophageal reflux disease without esophagitis  Pure hypercholesterolemia  Hypothyroidism, unspecified type  Essential hypertension  DM2 (diabetes mellitus, type 2)  Uncomplicated asthma, unspecified asthma severity  Abnormal findings on cardiac catheterization: Cardiac Cath  History of appendectomy      FAMILY HISTORY:  Family history of stomach cancer  Family history of MI (myocardial infarction) (Father)      SOCIAL HISTORY:    CIGARETTES:  No    ALCOHOL: No    DRUGS: No    Vital Signs Last 24 Hrs  T(C): 37.2 (31 May 2018 10:10), Max: 37.2 (31 May 2018 10:10)  T(F): 99 (31 May 2018 10:10), Max: 99 (31 May 2018 10:10)  HR: 88 (31 May 2018 10:22) (65 - 91)  BP: 134/64 (31 May 2018 10:10) (123/66 - 148/60)  BP(mean): --  RR: 20 (31 May 2018 10:10) (20 - 21)  SpO2: 94% (31 May 2018 10:22) (94% - 100%)    PHYSICAL EXAM:      Constitutional: No fever, chills, NAD, Comfortable    Eyes: Not reddened, no discharge    ENMT: No discharge, No pain    Neck: + JVD, No Bruit    Back: No CVA tenderness    Respiratory: CModerate air entry, BL Diffuse expiratory wheezing    Cardiovascular: RRR, Normal S1-2, No S3-, No friction rub 3/6 RJ at RUSB    Gastrointestinal: Soft, NT/ND. BS+, No organomegaly    Extremities: 2+ bl ankle  edema    Vascular: Distal pulses intact    Neurological: Alert, awake, oriented, able to move extremities, speach is clear    Skin: No ecchymosis, no rash    Musculoskeletal: Non tender, no weaknss    Psychiatric: Aproppriate mood, no anxiety        INTERPRETATION OF TELEMETRY: SR    ECG: P    ECG    Ventricular Rate 73 BPM    Atrial Rate 73 BPM    P-R Interval 238 ms    QRS Duration 88 ms    Q-T Interval 422 ms    QTC Calculation(Bezet) 464 ms    P Axis 74 degrees    R Axis 22 degrees    T Axis 98 degrees    Diagnosis Line Sinus rhythm with 1st degree A-V block  Abnormal QRS-T angle, consider primary T wave abnormality  Abnormal ECG    Confirmed by ALLY BENITES ([id number]) on 3/26/2018 10:03:49 PM      I&O's Detail    30 May 2018 07:01  -  31 May 2018 07:00  --------------------------------------------------------  IN:  Total IN: 0 mL    OUT:    Voided: 400 mL  Total OUT: 400 mL    Total NET: -400 mL      31 May 2018 07:01  -  31 May 2018 10:24  --------------------------------------------------------  IN:  Total IN: 0 mL    OUT:    Voided: 700 mL  Total OUT: 700 mL    Total NET: -700 mL          LABS:                        12.5   15.8  )-----------( 269      ( 30 May 2018 20:17 )             40.8     05-30    141  |  98  |  22.0<H>  ----------------------------<  72  4.6   |  28.0  |  1.36<H>    Ca    9.0      30 May 2018 20:17      CARDIAC MARKERS ( 30 May 2018 20:17 )  x     / <0.01 ng/mL / 103 U/L / x     / x          PT/INR - ( 30 May 2018 20:17 )   PT: 11.4 sec;   INR: 1.04 ratio         PTT - ( 30 May 2018 20:17 )  PTT:29.2 sec  Urinalysis Basic - ( 30 May 2018 23:37 )    Color: Yellow / Appearance: Clear / S.015 / pH: x  Gluc: x / Ketone: Negative  / Bili: Negative / Urobili: Negative mg/dL   Blood: x / Protein: Negative mg/dL / Nitrite: Negative   Leuk Esterase: Small / RBC: 0-2 /HPF / WBC 3-5   Sq Epi: x / Non Sq Epi: Few / Bacteria: Occasional      I&O's Summary    30 May 2018 07:01  -  31 May 2018 07:00  --------------------------------------------------------  IN: 0 mL / OUT: 400 mL / NET: -400 mL    31 May 2018 07:  -  31 May 2018 10:24  --------------------------------------------------------  IN: 0 mL / OUT: 700 mL / NET: -700 mL        EXAM:  ECHO TRANSTHORACIC COMP W DOPP      PROCEDURE DATE:  May 30 2018   .      INTERPRETATION:  REPORT:    TRANSTHORACIC ECHOCARDIOGRAM REPORT         Patient Name:   FLOYD PAEZ Patient Location: [facility]  Medical Rec #:  [id number]         Accession #:      [id number]  Account #:                       Height:           65.7 in 167.0 cm  YOB: 1934        Weight:           194.0 lb 88.00 kg  Patient Age:    83 years         BSA:              1.97 m²  Patient Gender: F                BP:               137/86 mmHg       Date of Exam: 2018 9:57:16 PM  Sonographer:  Ludivina Cruz    Procedure:     2D Echo/Doppler/Color Doppler Complete.  Indications:   Unspecified systolic (congestive) heart failure - I50.20  Diagnosis:     Nonrheumatic aortic (valve) stenosis - I35.0  Study Details: Technically fair study.         2D AND M-MODE MEASUREMENTS (normal ranges within parentheses):  Left                Normal    Aorta/Left           Normal  Ventricle:                    Atrium:  IVSd (2D):    1.24  (0.7-1.1) Aortic Root  2.20 cm (2.4-3.7)                cm              (2D):  LVPWd (2D):   1.07  (0.7-1.1) Left Atrium  3.97 cm (1.9-4.0)                cm              (2D):  LVIDd (2D):   4.24  (3.4-5.7) LA Volume    29.8                cm              Index        ml/m²  LVIDs (2D):   2.77                cm  LV FS (2D):   34.7  (>25%)                %  Relative Wall 0.50  (<0.42)  Thickness    LV DIASTOLIC FUNCTION:  MV Peak E: 1.00 m/s e', MV Katerin: 0.07 m/s  MV Peak A: 1.34 m/s E/e' Ratio: 14.21  E/A Ratio: 0.74    SPECTRAL DOPPLER ANALYSIS (where applicable):  Aortic Valve: AoV Max Eliud: 2.31 m/s AoV Peak P.3 mmHg AoV Mean P.0 mmHg    LVOT Vmax:  LVOT VTI: 0.272 m LVOT Diameter: 1.90 cm    AoV Area, Vmax:  AoV Area, VTI: 1.38 cm² AoV Area, Vmn: 1.41 cm²  Ao VTI: 0.557  Tricuspid Valve and PA/RV Systolic Pressure: TR Max Velocity: 2.65 m/s RA   Pressure:  RVSP/PASP:       PHYSICIAN INTERPRETATION:  Left Ventricle: Low parasternal window precludes accurate linear   measurements. Global LV systolic function was hyperdynamic. Left   ventricular ejection fraction, by visual estimation, is >75%. Spectral   Doppler shows impaired relaxation pattern of left ventricular myocardial   filling (Grade I diastolic dysfunction).  Right Ventricle: The right ventricular size is normal. RV systolic   function is normal.  Left Atrium: There is left atrial enlargement.  Right Atrium: The right atrium is normal in size.  Pericardium: There is no evidence of pericardial effusion. Prominent fat   pad.  Mitral Valve: Chordal TRUDY is present. There is mild mitral annular   calcification. Trace mitral valve regurgitation is seen. Mean transmitral   gradient of 3 mmHg (HR 62 bpm).  Tricuspid Valve: Structurally normal tricuspid valve. Trivial tricuspid   regurgitation is visualized. Adequate TR velocity was not obtained to   accurately assess RVSP.  Aortic Valve: The aortic valve is not well visualized on short axis.   Leaflets are calcific and severely restricted. Peak Av velocity is 2.31   m/s mean transaortic gradient equals 14.0 mmHg, the calculated aortic   valve area equals 1.38 cm² by the continuity equation consistent with   moderate aortic stenosis. No evidence of aortic valve regurgitation is   seen.  Pulmonic Valve: The pulmonic valve is normal. No indication of   significant pulmonic valve regurgitation.  Aorta: The aortic root is normal in size and structure.  Pulmonary Artery: The main pulmonary artery is normal in size.  Venous: The inferior vena cava is not well visualized.  In comparison to the previous echocardiogram(s): Prior examinations are   available and were reviewed for comparison purposes.       Summary:   1. Hyperdynamic global left ventricular systolic function. Left   ventricular ejection fraction, by visual estimation, is >75%. RV systolic   function is normal.   2. Spectral Doppler shows impaired relaxation pattern of left   ventricular myocardial filling (Grade I diastolic dysfunction).   3. Moderate degenerative aortic stenosis.   4. No pericardial effusion
History of Present Illness:  83 year old Female with extensive past medical history including NSTEMI s/p PCI to LAD and LCx in 4/2017 on Plavix, HTN, HLD, COPD related to extensive smoking history, DM on metformin, and known aortic stenosis followed as outpatient by Dr. Benavides, was recently seen in CTS office for consultation for TAVR with Dr. Gerardo. Today, patient presents to ER BIBA from assisted living facility with complaints of acute exacerbation of SOB at rest and peripheral edema that is worse than it has been despite taking her daily Lasix. Patient denies syncope, chest pain, headache, dizziness. She does admit to lower extremity tenderness. On arrival, patient is SOB while lying on stretcher requiring frequent breaks when taking her history requiring supplemental O2.     Past Medical History  NSTEMI (non-ST elevated myocardial infarction)  Gastroesophageal reflux disease without esophagitis  Pure hypercholesterolemia  Hypothyroidism, unspecified type  Essential hypertension  DM2 (diabetes mellitus, type 2)  Uncomplicated asthma, unspecified asthma severity    Past Surgical History  Abnormal findings on cardiac catheterization: Cardiac Cath  History of appendectomy  No significant past surgical history    MEDICATIONS  (STANDING):  furosemide   Injectable 40 milliGRAM(s) IV Push Once    MEDICATIONS  (PRN):    Antiplatelet therapy: Plavix                          Last dose/amt: 75mg once daily    Allergies: iodine (Hives)  shellfish (Anaphylaxis)      SOCIAL HISTORY:  Smoker: [X] Yes  [ ] No        PACK YEARS:  1 PPY                     WHEN QUIT? 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  Live with: self  Assist device use: walker    Relevant Family History  FAMILY HISTORY:  Family history of stomach cancer (Mother)  Family history of MI (myocardial infarction) (Father)      Review of Systems  GENERAL:  Fevers[] chills[] sweats[] fatigue[] weight loss[] weight gain []                                        NEURO:  parathesias[] seizures []  syncope []  confusion []                                                                                                                                                                                        CV:  chest pain[] palpitations[] RABAGO [X] diaphoresis [] edema[]                                                                                             RESPIRATORY:  wheezing[] SOB[X] cough [] sputum[] hemoptysis[]                                                                    GI:  nausea[]  vomiting []  diarrhea[] constipation [] melena []                                                                        : hematuria[ ]  dysuria[ ] urgency[] incontinence[]                                                                                              MUSKULOSKELETAL:  arthritis[ ]  joint swelling [ ] muscle weakness [ ]                                                                  SKIN/BREAST:  rash[ ] itching [ ]  hair loss[ ] masses[ ]                                                                                                PSYCH:  dementia [ ] depresion [ ] anxiety[ ]                                                                                                                  HEME/LYMPH:  bruises easily[ ] enlarged lymph nodes[ ] tender lymph nodes[ ]                                                 ENDOCRINE:  cold intolerance[ ] heat intolerance[ ] polydipsia[ ]                                                                              PHYSICAL EXAM  Vital Signs Last 24 Hrs  T(C): 36.9 (30 May 2018 19:13), Max: 36.9 (30 May 2018 19:13)  T(F): 98.4 (30 May 2018 19:13), Max: 98.4 (30 May 2018 19:13)  HR: 65 (30 May 2018 19:13) (65 - 65)  BP: 123/66 (30 May 2018 19:13) (123/66 - 123/66)  BP(mean): --  RR: 20 (30 May 2018 19:13) (20 - 20)  SpO2: 95% (30 May 2018 19:13) (95% - 95%)    General: Well nourished, well developed, no acute distress.                                                         Neuro: Normal exam oriented to person/place & time with no focal motor or sensory  deficits.                    Neck: Normal exam of jugular veins, trachea & thyroid.   Chest: lung exam with good air movement and diminished breath sounds bilateral lower bases                                                                     CV:  Auscultation: normal [ ] S3[ ] S4[ ] Irregular [ ] Rub[ ] Clicks[ ]  Murmurs none:[ ]systolic [X]  diastolic [ ] holosystolic [ ]  Carotids: No Bruits[X] Other____________ Abdominal Aorta: normal [ ] nonpalpable[ ]                                                                         GI: Normal exam of abdomen, liver & spleen with no noted masses or tenderness.                                                                                              Lower Extremity Pulses: Right[X] Left[X]Varicosities[ ]  SKIN : Normal exam to inspection & palpation of upper extremities, lower extremities with bilateral 2+ edema                                                          LABS:                        12.5   15.8  )-----------( 269      ( 30 May 2018 20:17 )             40.8     05-30    141  |  98  |  22.0<H>  ----------------------------<  72  4.6   |  28.0  |  1.36<H>    Ca    9.0      30 May 2018 20:17      PT/INR - ( 30 May 2018 20:17 )   PT: 11.4 sec;   INR: 1.04 ratio         PTT - ( 30 May 2018 20:17 )  PTT:29.2 sec    CARDIAC MARKERS ( 30 May 2018 20:17 )  x     / <0.01 ng/mL / 103 U/L / x     / x          Cardiac Cath:  < from: Cardiac Cath Lab - Adult (03.29.18 @ 10:50) >  SUMMARY:  HEMODYNAMICS: Hemodynamic assessment demonstrates normal cardiac output,  normal pulmonary capillary wedge pressure, and moderately elevated  pulmonary vascular resistance.  DIAGNOSTIC IMPRESSIONS: There is significant single vessel coronary artery  disease.  RCA Mid = 100%  LCx Prox = 50%  Patent Stents in LAD and LCx  iFR of Prox LCx was WNL (0.99) Moderate Aortic Stenosis (AVG=16 mmHg) and  BILL=1.21 cm2  Mild Pulmonary HTN ( 42/19 - 31 mmHg)  Mild Elevation of Right Heart Pressures:  RA=14 mmHg, RV=48/16 mmHg, PCWP=19 mmHg  Normal CO (4.82 L/min) and CI (2.63L/min/m2) Left ventricular function is  normal (LVEF=60%)  Normal Ascending Aorta  Succesful Deployment of AngioSeal 6F in the RFA  DIAGNOSTIC RECOMMENDATIONS: Patient management should include aggressive  medical therapy, close monitoring of BUN and creatinine, resumption of all  previous activities in 2 days, an exercise program, and weight reduction.  The patient should follow a low sodium, low fat, and low calorie diet.  Medical management is recommended.  Consider nuclear stress test for possible inferior wall ischemia, in which  case PCI of the  could be attempted  Consider dobutamine echo for Low Gradient/Normal CO Moderate AS  Prepared and signed by  Waylon Benavides MD    < end of copied text >    TTE / SHANNON:  < from: TTE Echo Complete w/Doppler (03.01.16 @ 12:36) >  IMPRESSION:  Summary:   1. Left ventricular ejection fraction, by visual estimation, is >75%.   2. Hyperdynamic global left ventricular systolic function.   3. Spectral Doppler shows impaired relaxation pattern of left   ventricular myocardial filling (Grade I diastolic dysfunction).   4. Normal left ventricular internal cavity size.   5. There is mild concentric left ventricular hypertrophy.   6. Normal right ventricular size and function.   7. Moderately enlarged left atrium.   8. Moderately dilated right atrium.   9. Mild to moderate mitral annular calcification.  10. Thickening of the anterior and posterior mitral valve leaflets.  11. Mild mitral valve regurgitation.  12. Aortic valve is calcified with reduced opening. Accurate velocities   could not be obtained but there appears to be at least mild aortic   stenosis.  13. Moderate aortic regurgitation.  14. Estimated pulmonary artery systolic pressure is 42.9 mmHg assuming a   right atrial pressure of 10 mmHg, which is consistent with mild pulmonary   hypertension.  15. There is no evidence of pericardial effusion.     MD Daija Electronically signed on 3/1/2016 at 5:47:15 PM      < end of copied text >
[facility]                                                       [facility]                                                        Office: [address] [address]                                                       Telephone: [phone]. Fax:[phone]      CC: Consult for CHF exacerbation     HPI: Patient is a  83y Female with a PMH of HTN, DM, COPD, known CAD since 1990, s/p  PCI with staged procedure to OM1 and LAD and known  of RCA, HFpEF, Chronic Bl LE Edema.  Admitted with increasing RABAGO, unable to walk without getting SOB, NYHA 3/4, no CP, but has orthopnea and PND. However she has long standing COPD.   On 4/19/2018 she underwent cardiac cath that revealed patent stent in the LAD, 50% stenosis in the LCx (iFR normal) and 100% occlusion of the Mid RCA (fills via good collaterals).  LV function was normal and there was moderate AS with an BILL=1.21 cm2, mild Pul HTN and normal CO/CI.   On 5/4/2018 she had a dobutamine stress test that revealed a completely normal LV function with a peak gradient of 34 mmHg and was discussed to perform a SHANNON to assess the opening of the aortic valve.  Pt was brought in to the ER because of progressive SOB, orthopnea, dyspnea and dry cough on walking few steps with poor response to albuterol nebulizer treatment. No fever, no change in her diet.  She recently reduced  the dose of Lasix from 40 mg/day to 20 mg/day because patient is complaining of frequent urination.   Cardiac troponins were normal. Pro BNP was mildly elevated (354 mcg/dL). Procalcitonin was negative and chest CT done on 5/31/2018 did not reveal a pneumonia or pleural effusion. US DPlx LE was negative also.  Glucose was uncontrolled over the weekend  (489 mg.dl).  This morning, still C/O effort-induced SOB, productive cough, no orthopnea, improvement since yesterday.   Has been walking with O2 supplementation. Denies CP, palpitation, nausea, vomiting hematuria, melena, syncope, near syncope.   PAST MEDICAL & SURGICAL HISTORY:  NSTEMI (non-ST elevated myocardial infarction)  Gastroesophageal reflux disease without esophagitis  Pure hypercholesterolemia  Hypothyroidism, unspecified type  Essential hypertension  DM2 (diabetes mellitus, type 2)  Uncomplicated asthma, unspecified asthma severity  Abnormal findings on cardiac catheterization: Cardiac Cath  History of appendectomy    FAMILY HISTORY:  Family history of stomach cancer  Family history of MI (myocardial infarction) (Father)    SOCIAL HISTORY: no EtOH, drugs or tobacco    MEDICATIONS  (STANDING):  acetylcysteine  Oral Solution 600 milliGRAM(s) Oral every 12 hours  ALBUTerol    90 MICROgram(s) HFA Inhaler 1 Puff(s) Inhalation every 4 hours  ALBUTerol/ipratropium for Nebulization 3 milliLiter(s) Nebulizer every 6 hours  aspirin enteric coated 81 milliGRAM(s) Oral daily  atorvastatin 20 milliGRAM(s) Oral at bedtime  azithromycin  IVPB 500 milliGRAM(s) IV Intermittent every 24 hours  buDESOnide 160 MICROgram(s)/formoterol 4.5 MICROgram(s) Inhaler 2 Puff(s) Inhalation two times a day  clopidogrel Tablet 75 milliGRAM(s) Oral daily  clotrimazole Lozenge 1 Lozenge Oral five times a day  dextrose 5%. 1000 milliLiter(s) (50 mL/Hr) IV Continuous <Continuous>  dextrose 50% Injectable 12.5 Gram(s) IV Push once  dextrose 50% Injectable 25 Gram(s) IV Push once  dextrose 50% Injectable 25 Gram(s) IV Push once  diltiazem    milliGRAM(s) Oral daily  enoxaparin Injectable 40 milliGRAM(s) SubCutaneous daily  fluticasone propionate 50 MICROgram(s)/spray Nasal Spray 1 Spray(s) Both Nostrils two times a day  furosemide    Tablet 40 milliGRAM(s) Oral daily  insulin glargine Injectable (LANTUS) 60 Unit(s) SubCutaneous at bedtime  insulin lispro (HumaLOG) corrective regimen sliding scale   SubCutaneous three times a day before meals  insulin lispro (HumaLOG) corrective regimen sliding scale   SubCutaneous at bedtime  insulin lispro Injectable (HumaLOG) 25 Unit(s) SubCutaneous three times a day before meals  levothyroxine 112 MICROGram(s) Oral daily  lisinopril 5 milliGRAM(s) Oral daily  pantoprazole    Tablet 40 milliGRAM(s) Oral before breakfast  predniSONE   Tablet 40 milliGRAM(s) Oral daily  saccharomyces boulardii 250 milliGRAM(s) Oral two times a day  tiotropium 18 MICROgram(s) Capsule 1 Capsule(s) Inhalation daily  tiotropium 18 MICROgram(s) Capsule 1 Capsule(s) Inhalation daily    ROS: All others negative    PHYSICAL EXAM:  Vital Signs Last 24 Hrs  T(C): 36.7 (04 Jun 2018 10:00), Max: 36.7 (04 Jun 2018 10:00)  T(F): 98.1 (04 Jun 2018 10:00), Max: 98.1 (04 Jun 2018 10:00)  HR: 92 (04 Jun 2018 10:00) (71 - 92)  BP: 141/65 (04 Jun 2018 10:00) (128/62 - 160/69)  BP(mean): --  RR: 18 (04 Jun 2018 10:00) (18 - 20)  SpO2: 98% (04 Jun 2018 10:00) (97% - 99%)  I&O's Summary    03 Jun 2018 07:01  -  04 Jun 2018 07:00  --------------------------------------------------------  IN: 0 mL / OUT: 1700 mL / NET: -1700 mL      Appearance: Normal	  HEENT:   Normal oral mucosa, PERRL, EOMI	  Lymphatic: No lymphadenopathy  Cardiovascular: RJ 4/6 at LSB with diminshed S2. No JVD, Edema +2 BE  Respiratory: Lungs clear to auscultation, mild sofx expiratory wheezing, very mild.	  Psychiatry: A & O x 3, Mood & affect appropriate  Gastrointestinal:  Protuberant. Soft, Non-tender, + BS	  Skin: No rashes, No ecchymoses, No cyanosis  Neurologic: Non-focal  Extremities: Normal range of motion, No clubbing, cyanosis, edema +2 BE  Vascular: Peripheral pulses palpable 2+ bilaterally    ECG:  LABS:    06-04    142  |  97<L>  |  64.0<H>  ----------------------------<  67<L>  4.2   |  30.0<H>  |  1.51<H>    Ca    9.4      04 Jun 2018 02:21  Mg     2.5     06-04            RADIOLOGY & ADDITIONAL STUDIES:
[facility]                                                        Office: [address] [address]                                                       Telephone: [phone]. Fax:[phone]      CC: VANESSA    HPI: Patient is a  83y Female with a PMH of HTN, DM, Asthma, known CAD since 1990, HFpEF, Cor Pulmonale,  COPD, Chronic Bl LE Edema,  s/p  PCI with staged procedure to OM1 and LAD.  She had been C/O RABAGO which is  increasing, unable to walk without getting SOB, NYHA 3/4, no CP, but has orthopnea and PND. However she has long standing asthma.   On 4/19/2018 she underwent cardiac cath that revealed patent stent in the LAD, 50% stenosis in the LCx (iFR normal) and 100% occlusion of the Mid RCA (fills via good collaterals).  LV function was normal and there was moderate AS with an BILL=1.21 cm2 and mild Pul HTN and normal CO/CI.   On 5/4/2018 she had a dobutamine stress test that revealed a completely normal LV function with a peak gradient of 34 mmHg and was discussed to perform a SHANNON to assess the opening of the aortic valve.  Pt was brought in to the ER because of progressive SOB, orthopnea, dyspnea and dry cough on walking few steps with poor response to albuterol nebulizer treatment. No fever, no change in her diet.  She recently reduced  the dose of Lasix from 40 mg/day to 20 mg/day because patient is complaining of frequent urination.   Denies CP, palpitation, nausea, vomiting hematuria, melena, syncope, near syncope. Cardiac troponins were normal. Pro BNP was mildly elevated (354 mcg/dL)    PAST MEDICAL & SURGICAL HISTORY:  NSTEMI (non-ST elevated myocardial infarction)  Gastroesophageal reflux disease without esophagitis  Pure hypercholesterolemia  Hypothyroidism, unspecified type  Essential hypertension  DM2 (diabetes mellitus, type 2)  Uncomplicated asthma, unspecified asthma severity  Abnormal findings on cardiac catheterization: Cardiac Cath  History of appendectomy    FAMILY HISTORY:  Family history of stomach cancer  Family history of MI (myocardial infarction) (Father)    SOCIAL HISTORY: no EtOH, drugs or tobacco    MEDICATIONS  (STANDING):  ALBUTerol    90 MICROgram(s) HFA Inhaler 1 Puff(s) Inhalation every 4 hours  ALBUTerol/ipratropium for Nebulization 3 milliLiter(s) Nebulizer every 6 hours  aspirin enteric coated 81 milliGRAM(s) Oral daily  atorvastatin 20 milliGRAM(s) Oral at bedtime  azithromycin  IVPB 500 milliGRAM(s) IV Intermittent once  azithromycin  IVPB      buDESOnide 160 MICROgram(s)/formoterol 4.5 MICROgram(s) Inhaler 2 Puff(s) Inhalation two times a day  cefTRIAXone Injectable.      cefTRIAXone Injectable. 1000 milliGRAM(s) IV Push once  clopidogrel Tablet 75 milliGRAM(s) Oral daily  clotrimazole Lozenge 1 Lozenge Oral five times a day  dextrose 5%. 1000 milliLiter(s) (50 mL/Hr) IV Continuous <Continuous>  dextrose 50% Injectable 12.5 Gram(s) IV Push once  dextrose 50% Injectable 25 Gram(s) IV Push once  dextrose 50% Injectable 25 Gram(s) IV Push once  diltiazem    milliGRAM(s) Oral daily  enoxaparin Injectable 40 milliGRAM(s) SubCutaneous daily  furosemide   Injectable 40 milliGRAM(s) IV Push daily  insulin glargine Injectable (LANTUS) 30 Unit(s) SubCutaneous every morning  insulin lispro (HumaLOG) corrective regimen sliding scale   SubCutaneous three times a day before meals  levothyroxine 112 MICROGram(s) Oral daily  methylPREDNISolone sodium succinate Injectable 40 milliGRAM(s) IV Push every 8 hours  pantoprazole    Tablet 40 milliGRAM(s) Oral before breakfast  saccharomyces boulardii 250 milliGRAM(s) Oral two times a day  tiotropium 18 MICROgram(s) Capsule 1 Capsule(s) Inhalation daily  tiotropium 18 MICROgram(s) Capsule 1 Capsule(s) Inhalation daily    ROS: All others negative    PHYSICAL EXAM:  Vital Signs Last 24 Hrs  T(C): 37.2 (31 May 2018 10:10), Max: 37.2 (31 May 2018 10:10)  T(F): 99 (31 May 2018 10:10), Max: 99 (31 May 2018 10:10)  HR: 88 (31 May 2018 10:22) (65 - 91)  BP: 134/64 (31 May 2018 10:10) (123/66 - 148/60)  BP(mean): --  RR: 20 (31 May 2018 10:10) (20 - 21)  SpO2: 94% (31 May 2018 10:22) (94% - 100%)  I&O's Summary    30 May 2018 07:01  -  31 May 2018 07:00  --------------------------------------------------------  IN: 0 mL / OUT: 400 mL / NET: -400 mL    31 May 2018 07:01  -  31 May 2018 11:57  --------------------------------------------------------  IN: 0 mL / OUT: 700 mL / NET: -700 mL      Appearance: Normal	  HEENT:   Normal oral mucosa, PERRL, EOMI	  Lymphatic: No lymphadenopathy  Cardiovascular: Normal S1 S2, No JVD, RJ 4/6 at the LSB with absent S2  Respiratory: Lungs crepitation on the left lower base  Psychiatry: A & O x 3, Mood & affect appropriate  Gastrointestinal:  Soft, Non-tender, + BS	  Skin: No rashes, No ecchymoses, No cyanosis  Neurologic: Non-focal  Extremities: Normal range of motion, No clubbing, edema +1  Vascular: Peripheral pulses palpable 2+ bilaterally    ECG: NSR 64 BPM with 1st degree A-V block (278 ms), normal AQRS, QRS and QTc with poor R progression V1-4  LABS:                        12.5   12.6  )-----------( 237      ( 31 May 2018 11:48 )             40.8     05-30    141  |  98  |  22.0<H>  ----------------------------<  72  4.6   |  28.0  |  1.36<H>    Ca    9.0      30 May 2018 20:17      PT/INR - ( 30 May 2018 20:17 )   PT: 11.4 sec;   INR: 1.04 ratio         PTT - ( 30 May 2018 20:17 )  PTT:29.2 sec  CARDIAC MARKERS ( 30 May 2018 20:17 )  x     / <0.01 ng/mL / 103 U/L / x     / x          RADIOLOGY & ADDITIONAL STUDIES:

## 2018-06-04 NOTE — SWALLOW BEDSIDE ASSESSMENT ADULT - SLP PERTINENT HISTORY OF CURRENT PROBLEM
pt denies history of dysphagia upon ?. Pt reports expectoration of large amount of phlegm on 6/3/18, which was "stuck in throat"

## 2018-06-04 NOTE — SWALLOW BEDSIDE ASSESSMENT ADULT - COMMENTS
84 y/o female, obese with acute on top of chronic CHF, thrush, CAD, COPD, DM II, hypothyroidism presents with acute on chronic diastolic hf

## 2018-06-04 NOTE — PROGRESS NOTE ADULT - SUBJECTIVE AND OBJECTIVE BOX
FLOYD PAEZ    [id number]    83y      Female    INTERVAL HPI/OVERNIGHT EVENTS:    patient being seen for COPD exacerbation, chf and veda. Patient seen at bedside and seen at bedside with daughter. Patients breathing is better      REVIEW OF SYSTEMS:    CONSTITUTIONAL: No fever, weight loss, or fatigue  RESPIRATORY: No cough, wheezing, hemoptysis; No shortness of breath  CARDIOVASCULAR: No chest pain, palpitations  GASTROINTESTINAL: No abdominal or epigastric pain. No nausea, vomiting  NEUROLOGICAL: No headaches, memory loss, loss of strength.  MISCELLANEOUS:      Vital Signs Last 24 Hrs  T(C): 36.7 (04 Jun 2018 10:00), Max: 36.7 (04 Jun 2018 10:00)  T(F): 98.1 (04 Jun 2018 10:00), Max: 98.1 (04 Jun 2018 10:00)  HR: 92 (04 Jun 2018 10:00) (71 - 92)  BP: 141/65 (04 Jun 2018 10:00) (128/62 - 160/69)  BP(mean): --  RR: 18 (04 Jun 2018 10:00) (18 - 20)  SpO2: 98% (04 Jun 2018 10:00) (97% - 99%)    PHYSICAL EXAM:    GENERAL: NAD, obese  HEENT: PERRL, +EOMI  NECK: soft, Supple, No JVD,   CHEST/LUNG: slightly diminished, no wheezing   HEART: S1S2+,   ABDOMEN: Soft, Nontender,  EXTREMITIES:  edema   NEURO: AAOX3, no focal deficits,   PSYCH: normal mood      LABS:    06-04    142  |  97<L>  |  64.0<H>  ----------------------------<  67<L>  4.2   |  30.0<H>  |  1.51<H>    Ca    9.4      04 Jun 2018 02:21  Mg     2.5     06-04              MEDICATIONS  (STANDING):  acetylcysteine  Oral Solution 600 milliGRAM(s) Oral every 12 hours  ALBUTerol    90 MICROgram(s) HFA Inhaler 1 Puff(s) Inhalation every 4 hours  ALBUTerol/ipratropium for Nebulization 3 milliLiter(s) Nebulizer every 6 hours  aspirin enteric coated 81 milliGRAM(s) Oral daily  atorvastatin 20 milliGRAM(s) Oral at bedtime  azithromycin  IVPB 500 milliGRAM(s) IV Intermittent every 24 hours  buDESOnide 160 MICROgram(s)/formoterol 4.5 MICROgram(s) Inhaler 2 Puff(s) Inhalation two times a day  clopidogrel Tablet 75 milliGRAM(s) Oral daily  clotrimazole Lozenge 1 Lozenge Oral five times a day  dextrose 5%. 1000 milliLiter(s) (50 mL/Hr) IV Continuous <Continuous>  dextrose 50% Injectable 12.5 Gram(s) IV Push once  dextrose 50% Injectable 25 Gram(s) IV Push once  dextrose 50% Injectable 25 Gram(s) IV Push once  diltiazem    milliGRAM(s) Oral daily  enoxaparin Injectable 40 milliGRAM(s) SubCutaneous daily  fluticasone propionate 50 MICROgram(s)/spray Nasal Spray 1 Spray(s) Both Nostrils two times a day  furosemide    Tablet 40 milliGRAM(s) Oral daily  insulin glargine Injectable (LANTUS) 60 Unit(s) SubCutaneous at bedtime  insulin lispro (HumaLOG) corrective regimen sliding scale   SubCutaneous three times a day before meals  insulin lispro (HumaLOG) corrective regimen sliding scale   SubCutaneous at bedtime  insulin lispro Injectable (HumaLOG) 25 Unit(s) SubCutaneous three times a day before meals  levothyroxine 112 MICROGram(s) Oral daily  lisinopril 5 milliGRAM(s) Oral daily  pantoprazole    Tablet 40 milliGRAM(s) Oral before breakfast  predniSONE   Tablet 40 milliGRAM(s) Oral daily  saccharomyces boulardii 250 milliGRAM(s) Oral two times a day  tiotropium 18 MICROgram(s) Capsule 1 Capsule(s) Inhalation daily  tiotropium 18 MICROgram(s) Capsule 1 Capsule(s) Inhalation daily    MEDICATIONS  (PRN):  acetaminophen   Tablet 650 milliGRAM(s) Oral every 6 hours PRN For Temp greater than 38 C (100.4 F)  ALBUTerol    90 MICROgram(s) HFA Inhaler 2 Puff(s) Inhalation every 6 hours PRN Shortness of Breath and/or Wheezing  dextrose 40% Gel 15 Gram(s) Oral once PRN Blood Glucose LESS THAN 70 milliGRAM(s)/deciliter  glucagon  Injectable 1 milliGRAM(s) IntraMuscular once PRN Glucose LESS THAN 70 milligrams/deciliter  ondansetron Injectable 4 milliGRAM(s) IV Push every 6 hours PRN Nausea and/or Vomiting  polyethylene glycol 3350 17 Gram(s) Oral daily PRN Constipation      RADIOLOGY & ADDITIONAL TESTS:

## 2018-06-04 NOTE — CONSULT NOTE ADULT - PROBLEM SELECTOR PROBLEM 1
Acute on chronic diastolic (congestive) heart failure
Moderate aortic stenosis by prior echocardiogram

## 2018-06-05 ENCOUNTER — TRANSCRIPTION ENCOUNTER (OUTPATIENT)
Age: 83
End: 2018-06-05

## 2018-06-05 LAB
GLUCOSE BLDC GLUCOMTR-MCNC: 106 MG/DL — HIGH (ref 70–99)
GLUCOSE BLDC GLUCOMTR-MCNC: 193 MG/DL — HIGH (ref 70–99)
GLUCOSE BLDC GLUCOMTR-MCNC: 195 MG/DL — HIGH (ref 70–99)
GLUCOSE BLDC GLUCOMTR-MCNC: 54 MG/DL — LOW (ref 70–99)
GLUCOSE BLDC GLUCOMTR-MCNC: 66 MG/DL — LOW (ref 70–99)
GLUCOSE BLDC GLUCOMTR-MCNC: 92 MG/DL — SIGNIFICANT CHANGE UP (ref 70–99)

## 2018-06-05 PROCEDURE — 99233 SBSQ HOSP IP/OBS HIGH 50: CPT

## 2018-06-05 RX ORDER — FUROSEMIDE 40 MG
1 TABLET ORAL
Qty: 0 | Refills: 0 | COMMUNITY

## 2018-06-05 RX ORDER — PANTOPRAZOLE SODIUM 20 MG/1
1 TABLET, DELAYED RELEASE ORAL
Qty: 0 | Refills: 0 | COMMUNITY
Start: 2018-06-05

## 2018-06-05 RX ORDER — ATORVASTATIN CALCIUM 80 MG/1
1 TABLET, FILM COATED ORAL
Qty: 0 | Refills: 0 | COMMUNITY
Start: 2018-06-05

## 2018-06-05 RX ORDER — LEVOTHYROXINE SODIUM 125 MCG
1 TABLET ORAL
Qty: 0 | Refills: 0 | COMMUNITY
Start: 2018-06-05

## 2018-06-05 RX ORDER — DEXTROSE 50 % IN WATER 50 %
15 SYRINGE (ML) INTRAVENOUS ONCE
Qty: 0 | Refills: 0 | Status: COMPLETED | OUTPATIENT
Start: 2018-06-05 | End: 2018-06-05

## 2018-06-05 RX ORDER — DILTIAZEM HCL 120 MG
1 CAPSULE, EXT RELEASE 24 HR ORAL
Qty: 0 | Refills: 0 | COMMUNITY
Start: 2018-06-05

## 2018-06-05 RX ORDER — ASPIRIN/CALCIUM CARB/MAGNESIUM 324 MG
1 TABLET ORAL
Qty: 0 | Refills: 0 | DISCHARGE
Start: 2018-06-05

## 2018-06-05 RX ORDER — DILTIAZEM HCL 120 MG
1 CAPSULE, EXT RELEASE 24 HR ORAL
Qty: 0 | Refills: 0 | COMMUNITY

## 2018-06-05 RX ORDER — INSULIN GLARGINE 100 [IU]/ML
50 INJECTION, SOLUTION SUBCUTANEOUS AT BEDTIME
Qty: 0 | Refills: 0 | Status: DISCONTINUED | OUTPATIENT
Start: 2018-06-05 | End: 2018-06-06

## 2018-06-05 RX ORDER — LISINOPRIL 2.5 MG/1
1 TABLET ORAL
Qty: 0 | Refills: 0 | COMMUNITY
Start: 2018-06-05

## 2018-06-05 RX ORDER — CIPROFLOXACIN LACTATE 400MG/40ML
1 VIAL (ML) INTRAVENOUS
Qty: 1 | Refills: 0 | OUTPATIENT
Start: 2018-06-05 | End: 2018-06-05

## 2018-06-05 RX ORDER — LISINOPRIL 2.5 MG/1
1 TABLET ORAL
Qty: 30 | Refills: 0 | OUTPATIENT
Start: 2018-06-05 | End: 2018-07-04

## 2018-06-05 RX ORDER — CLOPIDOGREL BISULFATE 75 MG/1
1 TABLET, FILM COATED ORAL
Qty: 0 | Refills: 0 | COMMUNITY
Start: 2018-06-05

## 2018-06-05 RX ORDER — FUROSEMIDE 40 MG
1 TABLET ORAL
Qty: 0 | Refills: 0 | DISCHARGE
Start: 2018-06-05

## 2018-06-05 RX ORDER — LEVOTHYROXINE SODIUM 125 MCG
1 TABLET ORAL
Qty: 0 | Refills: 0 | DISCHARGE
Start: 2018-06-05

## 2018-06-05 RX ORDER — BENZOCAINE AND MENTHOL 5; 1 G/100ML; G/100ML
1 LIQUID ORAL THREE TIMES A DAY
Qty: 0 | Refills: 0 | Status: DISCONTINUED | OUTPATIENT
Start: 2018-06-05 | End: 2018-06-06

## 2018-06-05 RX ORDER — INSULIN LISPRO 100/ML
15 VIAL (ML) SUBCUTANEOUS
Qty: 0 | Refills: 0 | Status: DISCONTINUED | OUTPATIENT
Start: 2018-06-05 | End: 2018-06-06

## 2018-06-05 RX ORDER — LEVOTHYROXINE SODIUM 125 MCG
1 TABLET ORAL
Qty: 0 | Refills: 0 | COMMUNITY

## 2018-06-05 RX ORDER — ATORVASTATIN CALCIUM 80 MG/1
1 TABLET, FILM COATED ORAL
Qty: 0 | Refills: 0 | COMMUNITY

## 2018-06-05 RX ADMIN — INSULIN GLARGINE 50 UNIT(S): 100 INJECTION, SOLUTION SUBCUTANEOUS at 23:06

## 2018-06-05 RX ADMIN — Medication 1 SPRAY(S): at 17:23

## 2018-06-05 RX ADMIN — Medication 3 MILLILITER(S): at 15:28

## 2018-06-05 RX ADMIN — Medication 250 MILLIGRAM(S): at 17:23

## 2018-06-05 RX ADMIN — Medication 3 MILLILITER(S): at 09:03

## 2018-06-05 RX ADMIN — Medication 4: at 21:23

## 2018-06-05 RX ADMIN — Medication 81 MILLIGRAM(S): at 12:33

## 2018-06-05 RX ADMIN — BUDESONIDE AND FORMOTEROL FUMARATE DIHYDRATE 2 PUFF(S): 160; 4.5 AEROSOL RESPIRATORY (INHALATION) at 09:04

## 2018-06-05 RX ADMIN — ATORVASTATIN CALCIUM 20 MILLIGRAM(S): 80 TABLET, FILM COATED ORAL at 21:23

## 2018-06-05 RX ADMIN — Medication 600 MILLIGRAM(S): at 06:25

## 2018-06-05 RX ADMIN — Medication 3 MILLILITER(S): at 20:15

## 2018-06-05 RX ADMIN — Medication 112 MICROGRAM(S): at 06:23

## 2018-06-05 RX ADMIN — Medication 6: at 13:13

## 2018-06-05 RX ADMIN — Medication 1 LOZENGE: at 10:22

## 2018-06-05 RX ADMIN — BUDESONIDE AND FORMOTEROL FUMARATE DIHYDRATE 2 PUFF(S): 160; 4.5 AEROSOL RESPIRATORY (INHALATION) at 20:15

## 2018-06-05 RX ADMIN — Medication 3 MILLILITER(S): at 02:18

## 2018-06-05 RX ADMIN — Medication 40 MILLIGRAM(S): at 06:23

## 2018-06-05 RX ADMIN — Medication 40 MILLIGRAM(S): at 06:48

## 2018-06-05 RX ADMIN — Medication 15 GRAM(S): at 06:55

## 2018-06-05 RX ADMIN — Medication 1 SPRAY(S): at 06:24

## 2018-06-05 RX ADMIN — LISINOPRIL 5 MILLIGRAM(S): 2.5 TABLET ORAL at 06:24

## 2018-06-05 RX ADMIN — Medication 240 MILLIGRAM(S): at 06:24

## 2018-06-05 RX ADMIN — ENOXAPARIN SODIUM 40 MILLIGRAM(S): 100 INJECTION SUBCUTANEOUS at 21:23

## 2018-06-05 RX ADMIN — TIOTROPIUM BROMIDE 1 CAPSULE(S): 18 CAPSULE ORAL; RESPIRATORY (INHALATION) at 09:03

## 2018-06-05 RX ADMIN — CLOPIDOGREL BISULFATE 75 MILLIGRAM(S): 75 TABLET, FILM COATED ORAL at 12:33

## 2018-06-05 RX ADMIN — Medication 250 MILLIGRAM(S): at 06:24

## 2018-06-05 RX ADMIN — Medication 15 GRAM(S): at 06:43

## 2018-06-05 RX ADMIN — Medication 15 UNIT(S): at 16:37

## 2018-06-05 RX ADMIN — Medication 6: at 16:37

## 2018-06-05 RX ADMIN — Medication 15 UNIT(S): at 13:07

## 2018-06-05 RX ADMIN — PANTOPRAZOLE SODIUM 40 MILLIGRAM(S): 20 TABLET, DELAYED RELEASE ORAL at 06:23

## 2018-06-05 NOTE — DISCHARGE NOTE ADULT - PATIENT PORTAL LINK FT
You can access the [facility], offered by [facility], by registering with the following website: [email or web address]

## 2018-06-05 NOTE — DISCHARGE NOTE ADULT - PLAN OF CARE
home insulin STOP METFORMIN secondary to CKD home meds creatinine baseline 1.4-1.5 follow up with dr kathleen outpatient TAVR eval resolved, low salt diet secondary to chf and COPD lasix low salt diet  follow up with cardiac

## 2018-06-05 NOTE — PROVIDER CONTACT NOTE (OTHER) - REASON
Pt was Independent upon Eval. Will hold PT new order for PT Consult pending clarification of new PT Order.

## 2018-06-05 NOTE — DISCHARGE NOTE ADULT - SECONDARY DIAGNOSIS.
Type 2 diabetes mellitus with complication, with long-term current use of insulin Hypothyroidism, unspecified type Essential hypertension CKD (chronic kidney disease) Aortic stenosis Acute on chronic congestive heart failure, unspecified heart failure type

## 2018-06-05 NOTE — DISCHARGE NOTE ADULT - HOSPITAL COURSE
84 y/o obese female with h/o CHF, CAD, COPD, DM II, was brought in to the ER because of progressive SOB. patient uses 3 pillows to sleep. dyspnea on walking few steps with poor response to albuterol nebulizer treatment. recently the dose of Lasix was cut down from 40 mg/day to 20 mg/day because patient is complaining of frequent urination.    Patient admitted to medicine as an acute hypoxic resp failure secondary to acute on chronic diastolic hf and COPD exacerbation. Patient started on lasix, iv steroids abx and had a tte:    Summary:   1. Hyperdynamic global left ventricular systolic function. Left   ventricular ejection fraction, by visual estimation, is >75%. RV systolic   function is normal.   2. Spectral Doppler shows impaired relaxation pattern of left   ventricular myocardial filling (Grade I diastolic dysfunction).   3. Moderate degenerative aortic stenosis.    Patient seen by ct surgery and cardio. Patient improved clinicaly and is now stable for dc home. Patient stressed importance of diet low in salt and carbs. Patient advised to take all meds as prescribed. Patient is now ready to be discharged back to her original living siutation with abx and steoird taper.     time spent on dc 32 minutes 82 y/o obese female with h/o CHF, CAD, COPD, DM II, was brought in to the ER because of progressive SOB. patient uses 3 pillows to sleep. dyspnea on walking few steps with poor response to albuterol nebulizer treatment. recently the dose of Lasix was cut down from 40 mg/day to 20 mg/day because patient is complaining of frequent urination.    Patient admitted to medicine as an acute hypoxic resp failure secondary to acute on chronic diastolic hf and COPD exacerbation. Patient started on lasix, iv steroids abx and had a tte:    Summary:   1. Hyperdynamic global left ventricular systolic function. Left   ventricular ejection fraction, by visual estimation, is >75%. RV systolic   function is normal.   2. Spectral Doppler shows impaired relaxation pattern of left   ventricular myocardial filling (Grade I diastolic dysfunction).   3. Moderate degenerative aortic stenosis.    Patient seen by ct surgery and cardio. Patient improved clinicaly and is now stable for dc home. Patient stressed importance of diet low in salt and carbs. Patient advised to take all meds as prescribed. Patient is now ready to be discharged back to her original living siutation with a steoird taper.     time spent on dc 32 minutes

## 2018-06-05 NOTE — DISCHARGE NOTE ADULT - CARE PLAN
Principal Discharge DX:	Acute respiratory failure with hypoxia  Goal:	resolved, low salt diet  Assessment and plan of treatment:	secondary to chf and COPD  Secondary Diagnosis:	Acute on chronic congestive heart failure, unspecified heart failure type  Goal:	lasix  Assessment and plan of treatment:	low salt diet  follow up with cardiac  Secondary Diagnosis:	Type 2 diabetes mellitus with complication, with long-term current use of insulin  Goal:	home insulin  Assessment and plan of treatment:	STOP METFORMIN secondary to CKD  Secondary Diagnosis:	Hypothyroidism, unspecified type  Goal:	home meds  Secondary Diagnosis:	Essential hypertension  Goal:	home meds  Secondary Diagnosis:	CKD (chronic kidney disease)  Goal:	creatinine baseline 1.4-1.5  Secondary Diagnosis:	Aortic stenosis  Goal:	follow up with dr kathleen  Assessment and plan of treatment:	outpatient TAVR eval

## 2018-06-05 NOTE — PROGRESS NOTE ADULT - SUBJECTIVE AND OBJECTIVE BOX
FLOYD PAEZ    [id number]    83y      Female    INTERVAL HPI/OVERNIGHT EVENTS:    patient being seen for COPD exacerbation, chf and ckd. Patient seen at bedside and still complains of sob and dry cough.     REVIEW OF SYSTEMS:    CONSTITUTIONAL: No fever, weight loss, or fatigue  RESPIRATORY: sob and cough   CARDIOVASCULAR: No chest pain, palpitations  GASTROINTESTINAL: No abdominal or epigastric pain. No nausea, vomiting  NEUROLOGICAL: No headaches, memory loss, loss of strength.  MISCELLANEOUS:      Vital Signs Last 24 Hrs  T(C): 36.5 (05 Jun 2018 09:16), Max: 36.8 (04 Jun 2018 21:51)  T(F): 97.7 (05 Jun 2018 09:16), Max: 98.2 (04 Jun 2018 21:51)  HR: 81 (05 Jun 2018 09:16) (77 - 98)  BP: 144/60 (05 Jun 2018 09:16) (118/72 - 149/76)  BP(mean): --  RR: 20 (05 Jun 2018 09:16) (16 - 20)  SpO2: 100% (05 Jun 2018 09:07) (93% - 100%)    PHYSICAL EXAM:    GENERAL: NAD, obese  HEENT: PERRL, +EOMI  NECK: soft, Supple, No JVD,   CHEST/LUNG: slightly diminished, no wheezing   HEART: S1S2+,   ABDOMEN: Soft, Nontender,  EXTREMITIES:  edema   NEURO: AAOX3, no focal deficits,   PSYCH: normal mood      LABS:    06-04    142  |  97<L>  |  64.0<H>  ----------------------------<  67<L>  4.2   |  30.0<H>  |  1.51<H>    Ca    9.4      04 Jun 2018 02:21  Mg     2.5     06-04              MEDICATIONS  (STANDING):  ALBUTerol    90 MICROgram(s) HFA Inhaler 1 Puff(s) Inhalation every 4 hours  ALBUTerol/ipratropium for Nebulization 3 milliLiter(s) Nebulizer every 6 hours  aspirin enteric coated 81 milliGRAM(s) Oral daily  atorvastatin 20 milliGRAM(s) Oral at bedtime  buDESOnide 160 MICROgram(s)/formoterol 4.5 MICROgram(s) Inhaler 2 Puff(s) Inhalation two times a day  clopidogrel Tablet 75 milliGRAM(s) Oral daily  dextrose 5%. 1000 milliLiter(s) (50 mL/Hr) IV Continuous <Continuous>  dextrose 50% Injectable 12.5 Gram(s) IV Push once  dextrose 50% Injectable 25 Gram(s) IV Push once  dextrose 50% Injectable 25 Gram(s) IV Push once  diltiazem    milliGRAM(s) Oral daily  enoxaparin Injectable 40 milliGRAM(s) SubCutaneous daily  fluticasone propionate 50 MICROgram(s)/spray Nasal Spray 1 Spray(s) Both Nostrils two times a day  furosemide    Tablet 40 milliGRAM(s) Oral daily  insulin glargine Injectable (LANTUS) 50 Unit(s) SubCutaneous at bedtime  insulin lispro (HumaLOG) corrective regimen sliding scale   SubCutaneous three times a day before meals  insulin lispro (HumaLOG) corrective regimen sliding scale   SubCutaneous at bedtime  insulin lispro Injectable (HumaLOG) 15 Unit(s) SubCutaneous three times a day before meals  levoFLOXacin  Tablet 750 milliGRAM(s) Oral every 48 hours  levothyroxine 112 MICROGram(s) Oral daily  lisinopril 5 milliGRAM(s) Oral daily  pantoprazole    Tablet 40 milliGRAM(s) Oral before breakfast  predniSONE   Tablet 40 milliGRAM(s) Oral daily  saccharomyces boulardii 250 milliGRAM(s) Oral two times a day  tiotropium 18 MICROgram(s) Capsule 1 Capsule(s) Inhalation daily  tiotropium 18 MICROgram(s) Capsule 1 Capsule(s) Inhalation daily    MEDICATIONS  (PRN):  acetaminophen   Tablet 650 milliGRAM(s) Oral every 6 hours PRN For Temp greater than 38 C (100.4 F)  ALBUTerol    90 MICROgram(s) HFA Inhaler 2 Puff(s) Inhalation every 6 hours PRN Shortness of Breath and/or Wheezing  benzocaine 15 mG/menthol 3.6 mG Lozenge 1 Lozenge Oral three times a day PRN Sore Throat  dextrose 40% Gel 15 Gram(s) Oral once PRN Blood Glucose LESS THAN 70 milliGRAM(s)/deciliter  glucagon  Injectable 1 milliGRAM(s) IntraMuscular once PRN Glucose LESS THAN 70 milligrams/deciliter  HYDROcodone/homatropine Syrup 5 milliLiter(s) Oral every 6 hours PRN Cough  ondansetron Injectable 4 milliGRAM(s) IV Push every 6 hours PRN Nausea and/or Vomiting  polyethylene glycol 3350 17 Gram(s) Oral daily PRN Constipation      RADIOLOGY & ADDITIONAL TESTS:

## 2018-06-05 NOTE — PROGRESS NOTE ADULT - ASSESSMENT
82 y/o female, obese with acute on top of chronic CHF, thrush, CAD, COPD, DM II, hypothyroidism presents with acute on chronic diastolic hf     Problem/Plan - 1:  ·  Problem: Acute on chronic congestive heart failure, unspecified heart failure type.  .   --> cardio following  --> will start po lasix      Problem/Plan - 2:  ·  Problem: Coronary artery disease involving native coronary artery of native heart without angina pectoris.  Plan: Aspirin, plavix and atorvastatin.      Problem/Plan - 3:  ·  Problem: Chronic obstructive pulmonary disease, unspecified COPD type.  improved  --> taper iv steorids  --> c.w zpack     Problem/Plan - 4:  ·  Problem: Hypothyroidism, unspecified type.  Plan: Levothyroxine.      Problem/Plan - 5:  ·  Problem: Type 2 diabetes mellitus with complication, with long-term current use of insulin.  Plan: Insulin therapy protocol.  --> inc lantus and premeal     Problem 6- bronchitis  --> continue zpack     Problem 7 - acute on chronic renal failure  --> hold ace-I  --> bmp daily  --> could be secondary to long standing dm2    Problem 8 - debility   --> pt consult
82 y/o female, obese with acute on top of chronic CHF, thrush, CAD, COPD, DM II, hypothyroidism presents with acute on chronic diastolic hf     Problem/Plan - 1:  ·  Problem: Acute on chronic congestive heart failure, unspecified heart failure type.  .   --> cardio following  --> po lasix     Problem/Plan - 2:  ·  Problem: Coronary artery disease involving native coronary artery of native heart without angina pectoris.  Plan: Aspirin, plavix and atorvastatin.      Problem/Plan - 3:  ·  Problem: Chronic obstructive pulmonary disease, unspecified COPD type.  improved  --> change iv steroids to po prednisone   --> c.w zpack for 5 days      Problem/Plan - 4:  ·  Problem: Hypothyroidism, unspecified type.  Plan: Levothyroxine.      Problem/Plan - 5:  ·  Problem: Type 2 diabetes mellitus with complication, with long-term current use of insulin.  Plan: Insulin therapy protocol.  --> inc lantus  --> improved    Problem 6- bronchitis  --> continue zpack     Problem 7 - acute on chronic renal failure --> improving  --> if bmp improves tomorrow ; will add ace-I     Problem 8 - debility   --> pt consult appreciated, home with home pt
83F, pmhx NSTEMI s/p PCI to LAD, LCx 4/2017, HTN, HLD, COPD, former extensive smoking history, DM, known AS, recently seen by Dr. Gerardo for TAVR consultation, now admitted for acute on chronic diastolic HF exacerbation, COPD exacerbation and possible CAP, CT surgery asked to follow for future TAVR;
84 y/o female, obese with acute on top of chronic CHF, thrush, CAD, COPD, DM II, hypothyroidism presents with acute on chronic diastolic hf     Problem/Plan - 1:  ·  Problem: Acute on chronic congestive heart failure, unspecified heart failure type.  .   --> cardio following  --> po lasix     Problem/Plan - 2:  ·  Problem: Coronary artery disease involving native coronary artery of native heart without angina pectoris.  Plan: Aspirin, plavix and atorvastatin.      Problem/Plan - 3:  ·  Problem: Chronic obstructive pulmonary disease, unspecified COPD type.  improved  --> taper steroids  --> levaquin      Problem/Plan - 4:  ·  Problem: Hypothyroidism, unspecified type.  Plan: Levothyroxine.      Problem/Plan - 5:  ·  Problem: Type 2 diabetes mellitus with complication, with long-term current use of insulin.  Plan: Insulin therapy protocol.  --> imrproved  --> dec lantus     Problem 6- bronchitis  --> change to levaquin    Problem 7 - acute on chronic renal failure --> stable    dispo --> dc tomorrow back to original living situation
84 y/o female, obese with acute on top of chronic CHF, thrush, CAD, COPD, DM II, hypothyroidism presents with acute on chronic diastolic hf     Problem/Plan - 1:  ·  Problem: Acute on chronic congestive heart failure, unspecified heart failure type.  .   --> cardio following  --> po lasix     Problem/Plan - 2:  ·  Problem: Coronary artery disease involving native coronary artery of native heart without angina pectoris.  Plan: Aspirin, plavix and atorvastatin.      Problem/Plan - 3:  ·  Problem: Chronic obstructive pulmonary disease, unspecified COPD type.  improved  --> taper steroids  --> levaquin      Problem/Plan - 4:  ·  Problem: Hypothyroidism, unspecified type.  Plan: Levothyroxine.      Problem/Plan - 5:  ·  Problem: Type 2 diabetes mellitus with complication, with long-term current use of insulin.  Plan: Insulin therapy protocol.  --> now with hypoglycemia  --> dec lantus and premeal lispro    Problem 6- bronchitis  --> levaquin, nebs and cepacol and hycodan     Problem 7 - acute on chronic renal failure --> stable    dispo --> transfer from [facility] tomorrow
84 y/o female, obese with acute on top of chronic CHF, thrush, CAD, COPD, DM II, hypothyroidism presents with acute on chronic diastolic hf     Problem/Plan - 1:  ·  Problem: Acute on chronic congestive heart failure, unspecified heart failure type.  .   --> cardio following  --> po lasix  --> one time order of lasix 20mg ordered iv      Problem/Plan - 2:  ·  Problem: Coronary artery disease involving native coronary artery of native heart without angina pectoris.  Plan: Aspirin, plavix and atorvastatin.      Problem/Plan - 3:  ·  Problem: Chronic obstructive pulmonary disease, unspecified COPD type.  improved  --> po prednisone; taper accordingly   --> will switch to levaquin      Problem/Plan - 4:  ·  Problem: Hypothyroidism, unspecified type.  Plan: Levothyroxine.      Problem/Plan - 5:  ·  Problem: Type 2 diabetes mellitus with complication, with long-term current use of insulin.  Plan: Insulin therapy protocol.  --> imrproved    Problem 6- bronchitis  --> change to levaquin    Problem 7 - acute on chronic renal failure --> improving  -->add low dose ace-I
This is a 82y/o female with PMH of hypothyroidism, Dm, COPD,  ASHD  (Lad stent 4/18)  & Chf  Ef 75% moderate AS diastolic dysfunction who presents with dyspnea and diuresed and now with worsening renal function    CHF D dysfunction  Low na diet  Lasix 40mg qd    ASHD  asa, plavix No beta blocker  No chest pain     Copd excab continue nebulizer, taper prednisone    ARF bmp in am

## 2018-06-05 NOTE — DISCHARGE NOTE ADULT - CARE PROVIDER_API CALL
Maik Coyne (DO), Family Medicine  [address]  [address]  [place] [address]  Phone: [phone]  Fax: [phone]    Mamadou Mcginnis), Critical Care Medicine; Pulmonary Disease; Sleep Medicine  [address]  [place], [place] [address]  Phone: [phone]  Fax: [phone]    Waylon Benavides), Cardiovascular Disease; Internal Medicine; Interventional Cardiology  Phone: [phone]  Fax: [phone]    Rachid Gerardo (MD), Surgery; Thoracic and Cardiac Surgery  [address] [place] [address]  Phone: [phone]  Fax: [phone]

## 2018-06-05 NOTE — DISCHARGE NOTE ADULT - CARE PROVIDERS DIRECT ADDRESSES
,DirectAddress_Unknown,[email or web address],DirectAddress_Unknown,[email or web address]

## 2018-06-05 NOTE — DISCHARGE NOTE ADULT - MEDICATION SUMMARY - MEDICATIONS TO TAKE
I will START or STAY ON the medications listed below when I get home from the hospital:    predniSONE 10 mg oral tablet  -- 4 tabs po daily x 2 days then 3tabs po daily x 2 days then 2 tabs po daily x 2 days then 1 tab po daily x 2 days then stop   -- It is very important that you take or use this exactly as directed.  Do not skip doses or discontinue unless directed by your doctor.  Obtain medical advice before taking any non-prescription drugs as some may affect the action of this medication.  Take with food or milk.    -- Indication: For Copd    aspirin 81 mg oral delayed release tablet  -- 1 tab(s) by mouth once a day  -- Indication: For Cad    lisinopril 5 mg oral tablet  -- 1 tab(s) by mouth once a day  -- Indication: For Htn    dilTIAZem 240 mg/24 hours oral capsule, extended release  -- 1 cap(s) by mouth once a day  -- Indication: For Afib    Basaglar KwikPen  -- 55 unit(s) subcutaneous once a day (at bedtime)  home  -- Indication: For DM2 (diabetes mellitus, type 2)    NovoLOG FlexPen 100 units/mL subcutaneous solution  -- 22 unit(s) subcutaneous 3 times a day  home  -- Indication: For DM2 (diabetes mellitus, type 2)    atorvastatin 20 mg oral tablet  -- 1 tab(s) by mouth once a day (at bedtime)  -- Indication: For Hyperlipidmiea    clopidogrel 75 mg oral tablet  -- 1 tab(s) by mouth once a day  -- Indication: For Cad    Advair Diskus 500 mcg-50 mcg inhalation powder  -- 1 puff(s) inhaled 2 times a day  home  -- Indication: For Copd    Spiriva 18 mcg inhalation capsule  -- 1 cap(s) inhaled once a day  -- Indication: For Copd    ProAir HFA 90 mcg/inh inhalation aerosol  -- 2 puff(s) inhaled 4 times a day  home  -- Indication: For Copd    furosemide 40 mg oral tablet  -- 1 tab(s) by mouth once a day  -- Indication: For Chf    pantoprazole 40 mg oral delayed release tablet  -- 1 tab(s) by mouth once a day (before a meal)  -- Indication: For gerd    omeprazole 40 mg oral delayed release capsule  -- 1 cap(s) by mouth once a day  -- Indication: For gerd    levoFLOXacin 750 mg oral tablet  -- 1 tab(s) by mouth every 48 hours  -- Indication: For bronchitis     levothyroxine 112 mcg (0.112 mg) oral tablet  -- 1 tab(s) by mouth once a day  -- Indication: For Hypothyroidism, unspecified type I will START or STAY ON the medications listed below when I get home from the hospital:    predniSONE 10 mg oral tablet  -- 4 tabs po daily x 2 days then 3tabs po daily x 2 days then 2 tabs po daily x 2 days then 1 tab po daily x 2 days then stop   -- It is very important that you take or use this exactly as directed.  Do not skip doses or discontinue unless directed by your doctor.  Obtain medical advice before taking any non-prescription drugs as some may affect the action of this medication.  Take with food or milk.    -- Indication: For Copd    aspirin 81 mg oral delayed release tablet  -- 1 tab(s) by mouth once a day  -- Indication: For Cad    lisinopril 5 mg oral tablet  -- 1 tab(s) by mouth once a day  -- Indication: For Htn    dilTIAZem 240 mg/24 hours oral capsule, extended release  -- 1 cap(s) by mouth once a day  -- Indication: For Afib    Basaglar KwikPen  -- 45 unit(s) subcutaneous once a day (at bedtime)  -- Indication: For Dm    NovoLOG FlexPen 100 units/mL subcutaneous solution  -- 12 unit(s) subcutaneous 3 times a day  -- Indication: For Dm    atorvastatin 20 mg oral tablet  -- 1 tab(s) by mouth once a day (at bedtime)  -- Indication: For Hyperlipidmiea    clopidogrel 75 mg oral tablet  -- 1 tab(s) by mouth once a day  -- Indication: For Cad    ProAir HFA 90 mcg/inh inhalation aerosol  -- 2 puff(s) inhaled 4 times a day  home  -- Indication: For Copd    Advair Diskus 500 mcg-50 mcg inhalation powder  -- 1 puff(s) inhaled 2 times a day  home  -- Indication: For Copd    Spiriva 18 mcg inhalation capsule  -- 1 cap(s) inhaled once a day  -- Indication: For Copd    furosemide 40 mg oral tablet  -- 1 tab(s) by mouth once a day  -- Indication: For Chf    pantoprazole 40 mg oral delayed release tablet  -- 1 tab(s) by mouth once a day (before a meal)  -- Indication: For gerd    omeprazole 40 mg oral delayed release capsule  -- 1 cap(s) by mouth once a day  -- Indication: For gerd    levothyroxine 112 mcg (0.112 mg) oral tablet  -- 1 tab(s) by mouth once a day  -- Indication: For Hypothyroidism, unspecified type

## 2018-06-05 NOTE — DISCHARGE NOTE ADULT - MEDICATION SUMMARY - MEDICATIONS TO CHANGE
I will SWITCH the dose or number of times a day I take the medications listed below when I get home from the hospital:    Lasix 20 mg oral tablet  -- 1 tab(s) by mouth once a day I will SWITCH the dose or number of times a day I take the medications listed below when I get home from the hospital:    Basaglar KwikPen  -- 55 unit(s) subcutaneous once a day (at bedtime)  home    NovoLOG FlexPen 100 units/mL subcutaneous solution  -- 22 unit(s) subcutaneous 3 times a day  home    Lasix 20 mg oral tablet  -- 1 tab(s) by mouth once a day

## 2018-06-06 ENCOUNTER — APPOINTMENT (OUTPATIENT)
Dept: PULMONOLOGY | Facility: CLINIC | Age: 83
End: 2018-06-06

## 2018-06-06 VITALS
TEMPERATURE: 98 F | DIASTOLIC BLOOD PRESSURE: 72 MMHG | SYSTOLIC BLOOD PRESSURE: 132 MMHG | OXYGEN SATURATION: 98 % | RESPIRATION RATE: 18 BRPM | HEART RATE: 90 BPM

## 2018-06-06 LAB
GLUCOSE BLDC GLUCOMTR-MCNC: 267 MG/DL — HIGH (ref 70–99)
GLUCOSE BLDC GLUCOMTR-MCNC: 82 MG/DL — SIGNIFICANT CHANGE UP (ref 70–99)

## 2018-06-06 PROCEDURE — 94640 AIRWAY INHALATION TREATMENT: CPT

## 2018-06-06 PROCEDURE — 85027 COMPLETE CBC AUTOMATED: CPT

## 2018-06-06 PROCEDURE — 92610 EVALUATE SWALLOWING FUNCTION: CPT

## 2018-06-06 PROCEDURE — 93306 TTE W/DOPPLER COMPLETE: CPT

## 2018-06-06 PROCEDURE — 85610 PROTHROMBIN TIME: CPT

## 2018-06-06 PROCEDURE — 83036 HEMOGLOBIN GLYCOSYLATED A1C: CPT

## 2018-06-06 PROCEDURE — 71045 X-RAY EXAM CHEST 1 VIEW: CPT

## 2018-06-06 PROCEDURE — 71250 CT THORAX DX C-: CPT

## 2018-06-06 PROCEDURE — 83880 ASSAY OF NATRIURETIC PEPTIDE: CPT

## 2018-06-06 PROCEDURE — 97163 PT EVAL HIGH COMPLEX 45 MIN: CPT

## 2018-06-06 PROCEDURE — 99239 HOSP IP/OBS DSCHRG MGMT >30: CPT

## 2018-06-06 PROCEDURE — 85379 FIBRIN DEGRADATION QUANT: CPT

## 2018-06-06 PROCEDURE — 84145 PROCALCITONIN (PCT): CPT

## 2018-06-06 PROCEDURE — 81001 URINALYSIS AUTO W/SCOPE: CPT

## 2018-06-06 PROCEDURE — 36415 COLL VENOUS BLD VENIPUNCTURE: CPT

## 2018-06-06 PROCEDURE — 85730 THROMBOPLASTIN TIME PARTIAL: CPT

## 2018-06-06 PROCEDURE — 82962 GLUCOSE BLOOD TEST: CPT

## 2018-06-06 PROCEDURE — 96374 THER/PROPH/DIAG INJ IV PUSH: CPT

## 2018-06-06 PROCEDURE — 83735 ASSAY OF MAGNESIUM: CPT

## 2018-06-06 PROCEDURE — 93970 EXTREMITY STUDY: CPT

## 2018-06-06 PROCEDURE — 84484 ASSAY OF TROPONIN QUANT: CPT

## 2018-06-06 PROCEDURE — 93005 ELECTROCARDIOGRAM TRACING: CPT

## 2018-06-06 PROCEDURE — 80048 BASIC METABOLIC PNL TOTAL CA: CPT

## 2018-06-06 PROCEDURE — 82550 ASSAY OF CK (CPK): CPT

## 2018-06-06 PROCEDURE — 82947 ASSAY GLUCOSE BLOOD QUANT: CPT

## 2018-06-06 PROCEDURE — 99285 EMERGENCY DEPT VISIT HI MDM: CPT | Mod: 25

## 2018-06-06 RX ORDER — FLUTICASONE PROPIONATE AND SALMETEROL 50; 250 UG/1; UG/1
1 POWDER ORAL; RESPIRATORY (INHALATION)
Qty: 10 | Refills: 0 | OUTPATIENT
Start: 2018-06-06 | End: 2018-07-05

## 2018-06-06 RX ORDER — TIOTROPIUM BROMIDE 18 UG/1
1 CAPSULE ORAL; RESPIRATORY (INHALATION)
Qty: 30 | Refills: 0 | OUTPATIENT
Start: 2018-06-06 | End: 2018-07-05

## 2018-06-06 RX ORDER — INSULIN LISPRO 100/ML
12 VIAL (ML) SUBCUTANEOUS
Qty: 0 | Refills: 0 | Status: DISCONTINUED | OUTPATIENT
Start: 2018-06-06 | End: 2018-06-06

## 2018-06-06 RX ORDER — INSULIN ASPART 100 [IU]/ML
22 INJECTION, SOLUTION SUBCUTANEOUS
Qty: 0 | Refills: 0 | COMMUNITY

## 2018-06-06 RX ORDER — INSULIN GLARGINE 100 [IU]/ML
45 INJECTION, SOLUTION SUBCUTANEOUS AT BEDTIME
Qty: 0 | Refills: 0 | Status: DISCONTINUED | OUTPATIENT
Start: 2018-06-06 | End: 2018-06-06

## 2018-06-06 RX ORDER — INSULIN GLARGINE 100 [IU]/ML
55 INJECTION, SOLUTION SUBCUTANEOUS
Qty: 0 | Refills: 0 | COMMUNITY

## 2018-06-06 RX ADMIN — Medication 81 MILLIGRAM(S): at 11:24

## 2018-06-06 RX ADMIN — CLOPIDOGREL BISULFATE 75 MILLIGRAM(S): 75 TABLET, FILM COATED ORAL at 11:24

## 2018-06-06 RX ADMIN — Medication 240 MILLIGRAM(S): at 05:39

## 2018-06-06 RX ADMIN — BUDESONIDE AND FORMOTEROL FUMARATE DIHYDRATE 2 PUFF(S): 160; 4.5 AEROSOL RESPIRATORY (INHALATION) at 08:57

## 2018-06-06 RX ADMIN — Medication 40 MILLIGRAM(S): at 05:39

## 2018-06-06 RX ADMIN — Medication 3 MILLILITER(S): at 14:50

## 2018-06-06 RX ADMIN — Medication 112 MICROGRAM(S): at 05:39

## 2018-06-06 RX ADMIN — Medication 1 SPRAY(S): at 05:39

## 2018-06-06 RX ADMIN — Medication 3 MILLILITER(S): at 02:26

## 2018-06-06 RX ADMIN — Medication 10: at 11:24

## 2018-06-06 RX ADMIN — Medication 12 UNIT(S): at 11:24

## 2018-06-06 RX ADMIN — PANTOPRAZOLE SODIUM 40 MILLIGRAM(S): 20 TABLET, DELAYED RELEASE ORAL at 05:39

## 2018-06-06 RX ADMIN — LISINOPRIL 5 MILLIGRAM(S): 2.5 TABLET ORAL at 05:39

## 2018-06-06 RX ADMIN — Medication 3 MILLILITER(S): at 08:56

## 2018-06-06 RX ADMIN — TIOTROPIUM BROMIDE 1 CAPSULE(S): 18 CAPSULE ORAL; RESPIRATORY (INHALATION) at 08:56

## 2018-06-06 RX ADMIN — Medication 250 MILLIGRAM(S): at 05:39

## 2018-06-06 NOTE — CHART NOTE - NSCHARTNOTEFT_GEN_A_CORE
home o2 evaluation       Patient evaluated for home o2. Patient desaturated to 86% on ambulation on room air. Patient is 96% on 2 liters/min      DX: COPD and diastolic heart failure.   Nebs, steroids, lasix and abx attempted without improvement.   Patient will require oxygen on discharge. home o2 evaluation       Patient evaluated for home o2. Patient desaturated to 86% on ambulation on room air. Patient is 96% on 2 liters/min  Spo2 92% on room air at rest  Spo2 ambulating on 2 liters 95%      DX: COPD and diastolic heart failure.   Nebs, steroids, lasix and abx attempted without improvement.   Patient will require oxygen on discharge.    CHF exacerbation has resolved and patient is at baseline with chf and COPD

## 2018-06-07 ENCOUNTER — INPATIENT (INPATIENT)
Facility: HOSPITAL | Age: 83
LOS: 7 days | Discharge: NURSING, REHAB OR LONG-TERM CARE | DRG: 191 | End: 2018-06-15
Attending: INTERNAL MEDICINE | Admitting: INTERNAL MEDICINE
Payer: MEDICARE

## 2018-06-07 VITALS
RESPIRATION RATE: 25 BRPM | HEIGHT: 60 IN | DIASTOLIC BLOOD PRESSURE: 78 MMHG | OXYGEN SATURATION: 94 % | WEIGHT: 199.96 LBS | HEART RATE: 107 BPM | TEMPERATURE: 98 F | SYSTOLIC BLOOD PRESSURE: 139 MMHG

## 2018-06-07 DIAGNOSIS — R93.1 ABNORMAL FINDINGS ON DIAGNOSTIC IMAGING OF HEART AND CORONARY CIRCULATION: Chronic | ICD-10-CM

## 2018-06-07 DIAGNOSIS — Z90.49 ACQUIRED ABSENCE OF OTHER SPECIFIED PARTS OF DIGESTIVE TRACT: Chronic | ICD-10-CM

## 2018-06-07 DIAGNOSIS — J44.1 CHRONIC OBSTRUCTIVE PULMONARY DISEASE WITH (ACUTE) EXACERBATION: ICD-10-CM

## 2018-06-07 LAB
ALBUMIN SERPL ELPH-MCNC: 3.4 G/DL — SIGNIFICANT CHANGE UP (ref 3.3–5.2)
ALP SERPL-CCNC: 59 U/L — SIGNIFICANT CHANGE UP (ref 40–120)
ALT FLD-CCNC: 17 U/L — SIGNIFICANT CHANGE UP
ANION GAP SERPL CALC-SCNC: 14 MMOL/L — SIGNIFICANT CHANGE UP (ref 5–17)
APPEARANCE UR: CLEAR — SIGNIFICANT CHANGE UP
APTT BLD: 26.7 SEC — LOW (ref 27.5–37.4)
AST SERPL-CCNC: 15 U/L — SIGNIFICANT CHANGE UP
BASOPHILS # BLD AUTO: 0 K/UL — SIGNIFICANT CHANGE UP (ref 0–0.2)
BASOPHILS NFR BLD AUTO: 0.1 % — SIGNIFICANT CHANGE UP (ref 0–2)
BILIRUB SERPL-MCNC: 0.3 MG/DL — LOW (ref 0.4–2)
BILIRUB UR-MCNC: NEGATIVE — SIGNIFICANT CHANGE UP
BUN SERPL-MCNC: 68 MG/DL — HIGH (ref 8–20)
CALCIUM SERPL-MCNC: 9.4 MG/DL — SIGNIFICANT CHANGE UP (ref 8.6–10.2)
CHLORIDE SERPL-SCNC: 95 MMOL/L — LOW (ref 98–107)
CO2 SERPL-SCNC: 28 MMOL/L — SIGNIFICANT CHANGE UP (ref 22–29)
COLOR SPEC: YELLOW — SIGNIFICANT CHANGE UP
CREAT SERPL-MCNC: 1.51 MG/DL — HIGH (ref 0.5–1.3)
DIFF PNL FLD: NEGATIVE — SIGNIFICANT CHANGE UP
EOSINOPHIL # BLD AUTO: 0 K/UL — SIGNIFICANT CHANGE UP (ref 0–0.5)
EOSINOPHIL NFR BLD AUTO: 0.1 % — SIGNIFICANT CHANGE UP (ref 0–6)
EPI CELLS # UR: SIGNIFICANT CHANGE UP
GLUCOSE SERPL-MCNC: 91 MG/DL — SIGNIFICANT CHANGE UP (ref 70–115)
GLUCOSE UR QL: NEGATIVE MG/DL — SIGNIFICANT CHANGE UP
HCT VFR BLD CALC: 41.2 % — SIGNIFICANT CHANGE UP (ref 37–47)
HGB BLD-MCNC: 12.9 G/DL — SIGNIFICANT CHANGE UP (ref 12–16)
INR BLD: 0.99 RATIO — SIGNIFICANT CHANGE UP (ref 0.88–1.16)
KETONES UR-MCNC: NEGATIVE — SIGNIFICANT CHANGE UP
LEUKOCYTE ESTERASE UR-ACNC: ABNORMAL
LYMPHOCYTES # BLD AUTO: 0.8 K/UL — LOW (ref 1–4.8)
LYMPHOCYTES # BLD AUTO: 4.6 % — LOW (ref 20–55)
MCHC RBC-ENTMCNC: 26.8 PG — LOW (ref 27–31)
MCHC RBC-ENTMCNC: 31.3 G/DL — LOW (ref 32–36)
MCV RBC AUTO: 85.5 FL — SIGNIFICANT CHANGE UP (ref 81–99)
MONOCYTES # BLD AUTO: 0.3 K/UL — SIGNIFICANT CHANGE UP (ref 0–0.8)
MONOCYTES NFR BLD AUTO: 1.8 % — LOW (ref 3–10)
NEUTROPHILS # BLD AUTO: 16.2 K/UL — HIGH (ref 1.8–8)
NEUTROPHILS NFR BLD AUTO: 92.6 % — HIGH (ref 37–73)
NITRITE UR-MCNC: NEGATIVE — SIGNIFICANT CHANGE UP
NT-PROBNP SERPL-SCNC: 156 PG/ML — SIGNIFICANT CHANGE UP (ref 0–300)
PH UR: 6.5 — SIGNIFICANT CHANGE UP (ref 5–8)
PLATELET # BLD AUTO: 249 K/UL — SIGNIFICANT CHANGE UP (ref 150–400)
POTASSIUM SERPL-MCNC: 4.5 MMOL/L — SIGNIFICANT CHANGE UP (ref 3.5–5.3)
POTASSIUM SERPL-SCNC: 4.5 MMOL/L — SIGNIFICANT CHANGE UP (ref 3.5–5.3)
PROT SERPL-MCNC: 6.7 G/DL — SIGNIFICANT CHANGE UP (ref 6.6–8.7)
PROT UR-MCNC: NEGATIVE MG/DL — SIGNIFICANT CHANGE UP
PROTHROM AB SERPL-ACNC: 10.9 SEC — SIGNIFICANT CHANGE UP (ref 9.8–12.7)
RBC # BLD: 4.82 M/UL — SIGNIFICANT CHANGE UP (ref 4.4–5.2)
RBC # FLD: 14.8 % — SIGNIFICANT CHANGE UP (ref 11–15.6)
SODIUM SERPL-SCNC: 137 MMOL/L — SIGNIFICANT CHANGE UP (ref 135–145)
SP GR SPEC: 1 — LOW (ref 1.01–1.02)
UROBILINOGEN FLD QL: NEGATIVE MG/DL — SIGNIFICANT CHANGE UP
WBC # BLD: 17.5 K/UL — HIGH (ref 4.8–10.8)
WBC # FLD AUTO: 17.5 K/UL — HIGH (ref 4.8–10.8)
WBC UR QL: SIGNIFICANT CHANGE UP

## 2018-06-07 PROCEDURE — 71045 X-RAY EXAM CHEST 1 VIEW: CPT | Mod: 26

## 2018-06-07 PROCEDURE — 99285 EMERGENCY DEPT VISIT HI MDM: CPT

## 2018-06-07 PROCEDURE — 93010 ELECTROCARDIOGRAM REPORT: CPT

## 2018-06-07 RX ORDER — DEXTROSE 50 % IN WATER 50 %
12.5 SYRINGE (ML) INTRAVENOUS ONCE
Qty: 0 | Refills: 0 | Status: DISCONTINUED | OUTPATIENT
Start: 2018-06-07 | End: 2018-06-15

## 2018-06-07 RX ORDER — IPRATROPIUM/ALBUTEROL SULFATE 18-103MCG
3 AEROSOL WITH ADAPTER (GRAM) INHALATION EVERY 6 HOURS
Qty: 0 | Refills: 0 | Status: DISCONTINUED | OUTPATIENT
Start: 2018-06-07 | End: 2018-06-15

## 2018-06-07 RX ORDER — TIOTROPIUM BROMIDE 18 UG/1
1 CAPSULE ORAL; RESPIRATORY (INHALATION) DAILY
Qty: 0 | Refills: 0 | Status: DISCONTINUED | OUTPATIENT
Start: 2018-06-07 | End: 2018-06-15

## 2018-06-07 RX ORDER — GLUCAGON INJECTION, SOLUTION 0.5 MG/.1ML
1 INJECTION, SOLUTION SUBCUTANEOUS ONCE
Qty: 0 | Refills: 0 | Status: DISCONTINUED | OUTPATIENT
Start: 2018-06-07 | End: 2018-06-15

## 2018-06-07 RX ORDER — FUROSEMIDE 40 MG
40 TABLET ORAL DAILY
Qty: 0 | Refills: 0 | Status: DISCONTINUED | OUTPATIENT
Start: 2018-06-07 | End: 2018-06-10

## 2018-06-07 RX ORDER — SODIUM CHLORIDE 9 MG/ML
1000 INJECTION, SOLUTION INTRAVENOUS
Qty: 0 | Refills: 0 | Status: DISCONTINUED | OUTPATIENT
Start: 2018-06-07 | End: 2018-06-15

## 2018-06-07 RX ORDER — ATORVASTATIN CALCIUM 80 MG/1
20 TABLET, FILM COATED ORAL AT BEDTIME
Qty: 0 | Refills: 0 | Status: DISCONTINUED | OUTPATIENT
Start: 2018-06-07 | End: 2018-06-15

## 2018-06-07 RX ORDER — ENOXAPARIN SODIUM 100 MG/ML
40 INJECTION SUBCUTANEOUS DAILY
Qty: 0 | Refills: 0 | Status: DISCONTINUED | OUTPATIENT
Start: 2018-06-07 | End: 2018-06-15

## 2018-06-07 RX ORDER — IPRATROPIUM/ALBUTEROL SULFATE 18-103MCG
3 AEROSOL WITH ADAPTER (GRAM) INHALATION ONCE
Qty: 0 | Refills: 0 | Status: COMPLETED | OUTPATIENT
Start: 2018-06-07 | End: 2018-06-07

## 2018-06-07 RX ORDER — DEXTROSE 50 % IN WATER 50 %
25 SYRINGE (ML) INTRAVENOUS ONCE
Qty: 0 | Refills: 0 | Status: DISCONTINUED | OUTPATIENT
Start: 2018-06-07 | End: 2018-06-15

## 2018-06-07 RX ORDER — LEVOTHYROXINE SODIUM 125 MCG
112 TABLET ORAL DAILY
Qty: 0 | Refills: 0 | Status: DISCONTINUED | OUTPATIENT
Start: 2018-06-07 | End: 2018-06-15

## 2018-06-07 RX ORDER — INSULIN LISPRO 100/ML
VIAL (ML) SUBCUTANEOUS AT BEDTIME
Qty: 0 | Refills: 0 | Status: DISCONTINUED | OUTPATIENT
Start: 2018-06-07 | End: 2018-06-15

## 2018-06-07 RX ORDER — PANTOPRAZOLE SODIUM 20 MG/1
40 TABLET, DELAYED RELEASE ORAL
Qty: 0 | Refills: 0 | Status: DISCONTINUED | OUTPATIENT
Start: 2018-06-07 | End: 2018-06-15

## 2018-06-07 RX ORDER — DILTIAZEM HCL 120 MG
240 CAPSULE, EXT RELEASE 24 HR ORAL DAILY
Qty: 0 | Refills: 0 | Status: DISCONTINUED | OUTPATIENT
Start: 2018-06-07 | End: 2018-06-15

## 2018-06-07 RX ORDER — INSULIN LISPRO 100/ML
VIAL (ML) SUBCUTANEOUS
Qty: 0 | Refills: 0 | Status: DISCONTINUED | OUTPATIENT
Start: 2018-06-07 | End: 2018-06-15

## 2018-06-07 RX ORDER — DEXTROSE 50 % IN WATER 50 %
15 SYRINGE (ML) INTRAVENOUS ONCE
Qty: 0 | Refills: 0 | Status: DISCONTINUED | OUTPATIENT
Start: 2018-06-07 | End: 2018-06-15

## 2018-06-07 RX ORDER — ALBUTEROL 90 UG/1
2.5 AEROSOL, METERED ORAL
Qty: 0 | Refills: 0 | Status: DISCONTINUED | OUTPATIENT
Start: 2018-06-07 | End: 2018-06-15

## 2018-06-07 RX ORDER — FUROSEMIDE 40 MG
40 TABLET ORAL ONCE
Qty: 0 | Refills: 0 | Status: COMPLETED | OUTPATIENT
Start: 2018-06-07 | End: 2018-06-07

## 2018-06-07 RX ORDER — CLOPIDOGREL BISULFATE 75 MG/1
75 TABLET, FILM COATED ORAL DAILY
Qty: 0 | Refills: 0 | Status: DISCONTINUED | OUTPATIENT
Start: 2018-06-07 | End: 2018-06-15

## 2018-06-07 RX ORDER — LISINOPRIL 2.5 MG/1
5 TABLET ORAL DAILY
Qty: 0 | Refills: 0 | Status: DISCONTINUED | OUTPATIENT
Start: 2018-06-07 | End: 2018-06-08

## 2018-06-07 RX ORDER — ASPIRIN/CALCIUM CARB/MAGNESIUM 324 MG
81 TABLET ORAL DAILY
Qty: 0 | Refills: 0 | Status: DISCONTINUED | OUTPATIENT
Start: 2018-06-07 | End: 2018-06-15

## 2018-06-07 RX ORDER — ALBUTEROL 90 UG/1
1 AEROSOL, METERED ORAL EVERY 4 HOURS
Qty: 0 | Refills: 0 | Status: DISCONTINUED | OUTPATIENT
Start: 2018-06-07 | End: 2018-06-15

## 2018-06-07 RX ADMIN — Medication 3 MILLILITER(S): at 18:51

## 2018-06-07 RX ADMIN — Medication 3 MILLILITER(S): at 16:05

## 2018-06-07 RX ADMIN — Medication 40 MILLIGRAM(S): at 16:05

## 2018-06-07 NOTE — H&P ADULT - FAMILY HISTORY
Family history of stomach cancer     Father  Still living? Unknown  Family history of MI (myocardial infarction), Age at diagnosis: Age Unknown

## 2018-06-07 NOTE — ED ADULT NURSE NOTE - CHIEF COMPLAINT QUOTE
Pt axox3 c/o SOB and wheezing starting this AM. Pt dc'd from [facility] 6/6 for copd and CHF exacerbation. Pt Room air 94%, Pt received solumedrol 125mgIV and x 2 albuterol treatment from EMS. Pt denies chest pain, On arrival for EMS spo2 on room air was 94%.

## 2018-06-07 NOTE — H&P ADULT - HISTORY OF PRESENT ILLNESS
84 y/o female who was discharged from [facility] yesterday after being admitted for copd exacerbation and acute on chronic diastolic chf, pt. was sent home ( lives in assisted living ) on oxygen. As per pt. she got o2 tank but it was not function properly and she was not getting enough oxygen and felt sob, could not catch her breath and came to hospital via ambulance. no cp. no fever , no abd. pain  or n/v reported. pt. feels better with oxygen in the ER. 84 y/o female who was discharged from [facility] yesterday after being admitted for copd exacerbation and acute on chronic diastolic chf, pt. was sent home ( lives in assisted living ) on oxygen. As per pt. she got o2 tank but it was not function properly and she was not getting enough oxygen and felt sob, wheeze and could not catch her breath and came to hospital via ambulance. no cp. no fever , no abd. pain  or n/v reported. pt. feels better with oxygen in the ER. Pt. also reported that her blood sugar was 60 today when she checked at home. 84 y/o female who was discharged from [facility] yesterday after being admitted for copd exacerbation and acute on chronic diastolic chf, pt. was sent home ( lives in assisted living ) on oxygen. As per pt. she got o2 tank but it was not function properly and she was not getting enough oxygen and felt sob, wheeze and could not catch her breath and came to hospital via ambulance. no cp. no fever , no abd. pain  or n/v reported. pt. feels better with oxygen in the ER. Pt. also reported that her blood sugar was 60 today when she checked at home. pt. was discharged home on po steroids which she used today in the morning.

## 2018-06-07 NOTE — ED ADULT NURSE REASSESSMENT NOTE - NS ED NURSE REASSESS COMMENT FT1
oob to bathroom ( stretcher moved across from bathroom) + sob after and episode of coughing and strenuously blowing her nose. returned to bed and placed back on oxygen, pt reported felt better. Dr. Scruggs at bedside talia pt.
1750 pt yumiko diabetic, low na tray without incident. famiily at bedside updated on plan of care, questions answered. awaiting dispo.
apt oob to bedside commode, r/t lasix, yumiko it much better than bedpan. richard care proved and pt returned to stretcher.
pt continues to c/o pain to her l thumb, ct angio negative. pt yumiko lunch box well. awaiting re eval. by md.
pt sitting in chair pt states she is more comfortable in chair, family at bedside, resp even and slightly labored pt c/o some sob but has improved since being in ED, l/s upper left expiatory wheezing, pt denies any pain, cardiac monitor in place, will continue to monitor

## 2018-06-07 NOTE — ED PROVIDER NOTE - OBJECTIVE STATEMENT
84 y/o F pt with hx of DM presents to ED c/o SOB and productive cough today. Pt was in hospital x 1 week and was sent home yesterday. This morning, pt's BS was noted to be 59. Pt had 3 nebulizer treatment today. Pt takes lasix, and took dose today. 84 y/o F pt with hx of DM presents to ED c/o SOB and productive cough today. Pt was in hospital x 1 week and was sent home yesterday. This morning, pt's BS was noted to be 59. Pt had 3 nebulizer treatment today. Pt takes lasix, and took dose today.  Cardio: Dr. Conroy  PMD: Dr. Coyne

## 2018-06-07 NOTE — ED ADULT TRIAGE NOTE - CHIEF COMPLAINT QUOTE
Pt axox3 c/o SOB and wheezing starting this AM. Pt dc'd from [facility] 6/6 for copd and CHF exacerbation. Pt Room air 94%, Pt received solumedrol 125mgIV and x 2 albuterol treatment from EMS. Pt denies chest pain, On arrival for EMS spo2 on room air was 94%.

## 2018-06-07 NOTE — ED PROVIDER NOTE - MUSCULOSKELETAL, MLM
Spine appears normal, range of motion is not limited, no muscle or joint tenderness Spine appears normal, range of motion is not limited, no muscle or joint tenderness. 2+ pedal edema

## 2018-06-07 NOTE — ED ADULT NURSE NOTE - OBJECTIVE STATEMENT
pt biba from an independent living facility with c/o sob, pt was discharged from [facility] yesterday after a week for chf / copd exacerbation. denies chest pain fever or chills. skin warm and dry. pt biba from an independent living facility with c/o sob, pt was discharged from [facility] yesterday after a week for chf / copd exacerbation. denies chest pain fever or chills. skin warm and dry. + scattered wheezes in all fields, + 2+ pitting edema,

## 2018-06-07 NOTE — H&P ADULT - NSHPPHYSICALEXAM_GEN_ALL_CORE
General: An elderly  female sitting up in chair, with o2 via NC on.   HEENT: AT, NC. PERRL. intact EOM. no throat erythema or exudate.   Neck: supple. no JVD.   Chest: mild wheeze b/L. no rales.  Heart: normal S1,S2. RRR. 3 /6 RJ left sternal border.   Abdomen: soft. non-tender. obese+ BS.  Ext: 1 + edema of b/L LE. moves all ext. well. neuro vascular intact.   Neuro: AAO x3. no focal weakness. no speech disorder.   Skin: no rash. no warmth. no cyanosis. General: An elderly  female sitting up in chair, with o2 via NC on.   HEENT: AT, NC. PERRL. intact EOM. no throat erythema or exudate.   Neck: supple. no JVD.   Chest: mild wheeze b/L. no rales.  Heart: normal S1,S2. RRR. 3 /6 RJ left sternal border.   Abdomen: soft. non-tender. obese+ BS.  Ext: 1 + edema of b/L LE. moves all ext. well.   Vascular : neuro vascular intact.   Neuro: AAO x3. no focal weakness. no speech disorder.   Skin: no rash. no warmth. no cyanosis.  Psychiatric : normal.

## 2018-06-07 NOTE — H&P ADULT - NSHPLABSRESULTS_GEN_ALL_CORE
cxr reviewed by me, heart appears enlarged, no infiltrate or pl. effusion noted. no sig. change from last cxr on 6/3/18. radiologist reading pending.

## 2018-06-07 NOTE — H&P ADULT - ASSESSMENT
pt. is admitted for     Acute respiratory distress, very  likely related to o2 malfunction, feels better with o2 in ER. will request for SW consult so o2 delivery and proper functioning instrument is arranged before discharge.    COPD unspecified type,  liat be mild exacerbation, got iv solumedrol in ER, will give couple more doses of solumedrol and then day team can change to po steroids.    TRENTON,  BUN/ Cr was 23/1.09 in March , 2018 and today  BUN/ Cr is 68/ 1.51. pt. is on lasix that might have contributed to TRENTON, pt. has h/o DM and htn as well that might be contributing as well. will request nephrology Dr. Leach consult for TRENTON as she needs close f/u on fluid balance and monitoring of kidney function.     Essential htn, continue lisinopril and diltiazem.    Dm , type 2 will keep on humalog scale and close f/u of accu checks. pt. is admitted for     Acute respiratory distress, very  likely related to o2 malfunction, feels better with o2 in ER. will request for SW consult so o2 delivery and proper functioning instrument is arranged before discharge.    COPD unspecified type,  may be mild exacerbation, got iv solumedrol in ER, will give couple more doses of solumedrol and then day team can change to po steroids.    TRENTON,  BUN/ Cr was 23/1.09 in March , 2018 and today  BUN/ Cr is 68/ 1.51. pt. is on lasix that might have contributed to TRENTON, pt. has h/o DM and htn as well that might be contributing as well. will request nephrology Dr. Leach consult for TRENTON as she needs close f/u on fluid balance and monitoring of kidney function.     Essential htn, continue lisinopril and diltiazem.    Chronic diastolic chf, continue po lasix. last echo report  from May 30th reviewed.     Leukocytosis, unspecified. likely from steroid use, no fever, non toxic looking. will follow with repeat cbc.     Dm , type 2 will keep on humalog scale and close f/u of accu checks.

## 2018-06-08 LAB
ANION GAP SERPL CALC-SCNC: 16 MMOL/L — SIGNIFICANT CHANGE UP (ref 5–17)
BUN SERPL-MCNC: 69 MG/DL — HIGH (ref 8–20)
CALCIUM SERPL-MCNC: 9.1 MG/DL — SIGNIFICANT CHANGE UP (ref 8.6–10.2)
CHLORIDE SERPL-SCNC: 91 MMOL/L — LOW (ref 98–107)
CO2 SERPL-SCNC: 27 MMOL/L — SIGNIFICANT CHANGE UP (ref 22–29)
CREAT SERPL-MCNC: 1.62 MG/DL — HIGH (ref 0.5–1.3)
EOSINOPHIL # BLD AUTO: 0 K/UL — SIGNIFICANT CHANGE UP (ref 0–0.5)
EOSINOPHIL NFR BLD AUTO: 0 % — SIGNIFICANT CHANGE UP (ref 0–6)
GLUCOSE BLDC GLUCOMTR-MCNC: 323 MG/DL — HIGH (ref 70–99)
GLUCOSE BLDC GLUCOMTR-MCNC: 358 MG/DL — HIGH (ref 70–99)
GLUCOSE BLDC GLUCOMTR-MCNC: 390 MG/DL — HIGH (ref 70–99)
GLUCOSE BLDC GLUCOMTR-MCNC: 397 MG/DL — HIGH (ref 70–99)
GLUCOSE BLDC GLUCOMTR-MCNC: 406 MG/DL — HIGH (ref 70–99)
GLUCOSE SERPL-MCNC: 398 MG/DL — HIGH (ref 70–115)
HCT VFR BLD CALC: 42.8 % — SIGNIFICANT CHANGE UP (ref 37–47)
HGB BLD-MCNC: 13.4 G/DL — SIGNIFICANT CHANGE UP (ref 12–16)
LYMPHOCYTES # BLD AUTO: 1 K/UL — SIGNIFICANT CHANGE UP (ref 1–4.8)
LYMPHOCYTES # BLD AUTO: 8.3 % — LOW (ref 20–55)
MCHC RBC-ENTMCNC: 26.5 PG — LOW (ref 27–31)
MCHC RBC-ENTMCNC: 31.3 G/DL — LOW (ref 32–36)
MCV RBC AUTO: 84.6 FL — SIGNIFICANT CHANGE UP (ref 81–99)
MONOCYTES # BLD AUTO: 0.5 K/UL — SIGNIFICANT CHANGE UP (ref 0–0.8)
MONOCYTES NFR BLD AUTO: 3.9 % — SIGNIFICANT CHANGE UP (ref 3–10)
NEUTROPHILS # BLD AUTO: 10.7 K/UL — HIGH (ref 1.8–8)
NEUTROPHILS NFR BLD AUTO: 87.2 % — HIGH (ref 37–73)
PLATELET # BLD AUTO: 235 K/UL — SIGNIFICANT CHANGE UP (ref 150–400)
POTASSIUM SERPL-MCNC: 4.8 MMOL/L — SIGNIFICANT CHANGE UP (ref 3.5–5.3)
POTASSIUM SERPL-SCNC: 4.8 MMOL/L — SIGNIFICANT CHANGE UP (ref 3.5–5.3)
RBC # BLD: 5.06 M/UL — SIGNIFICANT CHANGE UP (ref 4.4–5.2)
RBC # FLD: 14.7 % — SIGNIFICANT CHANGE UP (ref 11–15.6)
SODIUM SERPL-SCNC: 134 MMOL/L — LOW (ref 135–145)
WBC # BLD: 12.3 K/UL — HIGH (ref 4.8–10.8)
WBC # FLD AUTO: 12.3 K/UL — HIGH (ref 4.8–10.8)

## 2018-06-08 PROCEDURE — 99223 1ST HOSP IP/OBS HIGH 75: CPT

## 2018-06-08 PROCEDURE — 99233 SBSQ HOSP IP/OBS HIGH 50: CPT

## 2018-06-08 RX ORDER — INSULIN LISPRO 100/ML
8 VIAL (ML) SUBCUTANEOUS ONCE
Qty: 0 | Refills: 0 | Status: COMPLETED | OUTPATIENT
Start: 2018-06-08 | End: 2018-06-08

## 2018-06-08 RX ORDER — INSULIN GLARGINE 100 [IU]/ML
20 INJECTION, SOLUTION SUBCUTANEOUS AT BEDTIME
Qty: 0 | Refills: 0 | Status: DISCONTINUED | OUTPATIENT
Start: 2018-06-08 | End: 2018-06-11

## 2018-06-08 RX ADMIN — CLOPIDOGREL BISULFATE 75 MILLIGRAM(S): 75 TABLET, FILM COATED ORAL at 12:28

## 2018-06-08 RX ADMIN — ATORVASTATIN CALCIUM 20 MILLIGRAM(S): 80 TABLET, FILM COATED ORAL at 21:32

## 2018-06-08 RX ADMIN — LISINOPRIL 5 MILLIGRAM(S): 2.5 TABLET ORAL at 06:18

## 2018-06-08 RX ADMIN — Medication 10: at 08:32

## 2018-06-08 RX ADMIN — Medication 12: at 12:27

## 2018-06-08 RX ADMIN — Medication 3 MILLILITER(S): at 20:08

## 2018-06-08 RX ADMIN — Medication 240 MILLIGRAM(S): at 06:18

## 2018-06-08 RX ADMIN — Medication 3 MILLILITER(S): at 03:20

## 2018-06-08 RX ADMIN — Medication 40 MILLIGRAM(S): at 06:19

## 2018-06-08 RX ADMIN — ENOXAPARIN SODIUM 40 MILLIGRAM(S): 100 INJECTION SUBCUTANEOUS at 12:28

## 2018-06-08 RX ADMIN — Medication 81 MILLIGRAM(S): at 12:28

## 2018-06-08 RX ADMIN — Medication 8 UNIT(S): at 07:35

## 2018-06-08 RX ADMIN — INSULIN GLARGINE 20 UNIT(S): 100 INJECTION, SOLUTION SUBCUTANEOUS at 21:33

## 2018-06-08 RX ADMIN — Medication 40 MILLIGRAM(S): at 17:09

## 2018-06-08 RX ADMIN — Medication 112 MICROGRAM(S): at 06:18

## 2018-06-08 RX ADMIN — PANTOPRAZOLE SODIUM 40 MILLIGRAM(S): 20 TABLET, DELAYED RELEASE ORAL at 06:19

## 2018-06-08 RX ADMIN — Medication 10: at 17:10

## 2018-06-08 RX ADMIN — Medication 2: at 21:33

## 2018-06-08 RX ADMIN — Medication 3 MILLILITER(S): at 08:35

## 2018-06-08 NOTE — PROGRESS NOTE ADULT - ASSESSMENT
83 yr female admitted for Acute respiratory distress, very  likely related to o2 malfunction, feels better with o2 in ER. will request for SW consult so o2 delivery and proper functioning instrument is arranged before discharge.    COPD unspecified type,-     may be mild exacerbation, Received  iv solumedrol in ER,  will  transition to tapering dose  po steroids.    TRENTON,  BUN/ Cr was 23/1.09 in March , 2018 and today  BUN/ Cr is 68/ 1.51. pt. is on lasix that might have contributed to TRENTON, pt. has h/o DM and htn as well that might be contributing as well. will request nephrology Dr. Leach consult for TRENTON as she needs close f/u on fluid balance and monitoring of kidney function.     Essential htn,-  continue diltiazem CD 240mg po daily     Chronic diastolic-  chf, continue po lasix 40mg po daily .     Leukocytosis,-  unspecified. likely from steroid use, no fever, non toxic looking. will follow with repeat cbc.     Dm , type 2- Insulin sliding scale . Insulin lantus      Disposition- PT, supportive care.

## 2018-06-08 NOTE — PROGRESS NOTE ADULT - SUBJECTIVE AND OBJECTIVE BOX
CC: COPD returned to the hospital for increasing shortness of breath.  Blood sugar uncontrolled.    HPI:  84 y/o female who was discharged from [facility] yesterday after being admitted for copd exacerbation and acute on chronic diastolic chf, pt. was sent home ( lives in assisted living ) on oxygen. As per pt. she got o2 tank but it was not function properly and she was not getting enough oxygen and felt sob, wheeze and could not catch her breath and came to hospital via ambulance. no cp. no fever , no abd. pain  or n/v reported. pt. feels better with oxygen in the ER. Pt. also reported that her blood sugar was 60 today when she checked at home. pt. was discharged home on po steroids which she used today in the morning. (2018 22:23)    REVIEW OF SYSTEMS:    Patient denied fever, chills, abdominal pain, nausea, vomiting, cough, shortness of breath, chest pain or palpitations    Vital Signs Last 24 Hrs  T(C): 36.4 (2018 10:53), Max: 36.8 (2018 14:22)  T(F): 97.6 (2018 10:53), Max: 98.2 (2018 14:22)  HR: 92 (2018 10:53) (92 - 116)  BP: 144/65 (2018 10:53) (126/78 - 145/84)  BP(mean): --  RR: 26 (2018 10:53) (24 - 27)  SpO2: 95% (2018 10:53) (94% - 99%)I&O's Summary    2018 07:01  -  2018 07:00  --------------------------------------------------------  IN: 0 mL / OUT: 850 mL / NET: -850 mL      PHYSICAL EXAM:  GENERAL: Obese   HEENT: PERRL, +EOMI, anicteric, no [place]  NECK: Supple, No JVD   CHEST/LUNG: bilateral rales rhonchi  HEART: S1S2 Normal intensity, no murmurs, g  ABDOMEN: Soft, BS Normoactive, NT, ND, no HSM noted  EXTREMITIES:  2+ radial and DP pulses noted, No cyanosis, Paul pedal  edema noted, Limited mobility   SKIN: No rashes or lesions noted  NEURO: A&Ox3, no focal deficits noted, CN II-XII intact  PSYCH: Anxious mood and affect; insight/judgement appropriate  LABS:                        13.4   12.3  )-----------( 235      ( 2018 07:59 )             42.8     06-08    134<L>  |  91<L>  |  69.0<H>  ----------------------------<  398<H>  4.8   |  27.0  |  1.62<H>    Ca    9.1      2018 07:59    TPro  6.7  /  Alb  3.4  /  TBili  0.3<L>  /  DBili  x   /  AST  15  /  ALT  17  /  AlkPhos  59  06-07    PT/INR - ( 2018 16:32 )   PT: 10.9 sec;   INR: 0.99 ratio         PTT - ( 2018 16:32 )  PTT:26.7 sec  Urinalysis Basic - ( 2018 18:06 )    Color: Yellow / Appearance: Clear / S.005 / pH: x  Gluc: x / Ketone: Negative  / Bili: Negative / Urobili: Negative mg/dL   Blood: x / Protein: Negative mg/dL / Nitrite: Negative   Leuk Esterase: Trace / RBC: x / WBC 3-5   Sq Epi: x / Non Sq Epi: Occasional / Bacteria: x      RADIOLOGY & ADDITIONAL TESTS:    MEDICATIONS:  MEDICATIONS  (STANDING):  ALBUTerol    90 MICROgram(s) HFA Inhaler 1 Puff(s) Inhalation every 4 hours  ALBUTerol/ipratropium for Nebulization 3 milliLiter(s) Nebulizer every 6 hours  aspirin enteric coated 81 milliGRAM(s) Oral daily  atorvastatin 20 milliGRAM(s) Oral at bedtime  clopidogrel Tablet 75 milliGRAM(s) Oral daily  dextrose 5%. 1000 milliLiter(s) (50 mL/Hr) IV Continuous <Continuous>  dextrose 50% Injectable 12.5 Gram(s) IV Push once  dextrose 50% Injectable 25 Gram(s) IV Push once  dextrose 50% Injectable 25 Gram(s) IV Push once  diltiazem    milliGRAM(s) Oral daily  enoxaparin Injectable 40 milliGRAM(s) SubCutaneous daily  furosemide    Tablet 40 milliGRAM(s) Oral daily  insulin glargine Injectable (LANTUS) 20 Unit(s) SubCutaneous at bedtime  insulin lispro (HumaLOG) corrective regimen sliding scale   SubCutaneous three times a day before meals  insulin lispro (HumaLOG) corrective regimen sliding scale   SubCutaneous at bedtime  levothyroxine 112 MICROGram(s) Oral daily  methylPREDNISolone sodium succinate Injectable 40 milliGRAM(s) IV Push every 12 hours  pantoprazole    Tablet 40 milliGRAM(s) Oral before breakfast  tiotropium 18 MICROgram(s) Capsule 1 Capsule(s) Inhalation daily    MEDICATIONS  (PRN):  ALBUTerol    0.083% 2.5 milliGRAM(s) Nebulizer every 2 hours PRN Shortness of Breath and/or Wheezing  dextrose 40% Gel 15 Gram(s) Oral once PRN Blood Glucose LESS THAN 70 milliGRAM(s)/deciliter  glucagon  Injectable 1 milliGRAM(s) IntraMuscular once PRN Glucose LESS THAN 70 milligrams/deciliter

## 2018-06-08 NOTE — PROGRESS NOTE ADULT - SUBJECTIVE AND OBJECTIVE BOX
137    |  95<L>  |  68.0<H>  ----------------------------<  91     Ca:9.4   (2018 16:32)  4.5     |  28.0   |  1.51<H>      eGFR if Non : 32 <L>  eGFR if : 37 <L>    TPro  6.7    /  Alb  3.4    /  TBili  0.3<L>  /  DBili  x      /  AST  15     /  ALT  17     /  AlkPhos  59     2018 16:32                               12.9   17.5<H> )-----------( 249      ( 2018 16:32 )                    41.2     Phos:-- M.5 mg/dL PTH:-- Uric acid:-- Serum Osm:--  Ferritin:-- Iron:-- TIBC:-- Tsat:--  B12:-- TSH:-- ( @ 02:21)    Urinalysis Basic - ( 2018 18:06 )  Color: Yellow / Appearance: Clear / S.005<L> / pH: x  Gluc: x / Ketone: Negative  / Bili: Negative / Urobili: Negative mg/dL   Blood: x / Protein: Negative mg/dL / Nitrite: Negative   Leuk Esterase: Trace<!> / RBC: x / WBC 3-5   Sq Epi: x / Non Sq Epi: Occasional / Bacteria: x                      84 y/o female, obese with acute on chronic CHF, CAD, COPD, DM II &  hypothyroidism presents with acutely Decompensated HF,    Full consult to Follow,

## 2018-06-08 NOTE — CONSULT NOTE ADULT - SUBJECTIVE AND OBJECTIVE BOX
[facility] DIVISION OF KIDNEY DISEASES AND HYPERTENSION -- INITIAL CONSULT NOTE  --------------------------------------------------------------------------------  HPI:  83 year old female discharged from [facility] recently after being admitted for COPD exacerbation with acute on chronic CHF. Pt found to be short of breath, wheezing, brought in by ambulance to [facility]. Pt seen and examined; in no distress; sitting up in chair in NAD. Has history of DM2, HTN, GERD, hypothyroidism.     PAST HISTORY  --------------------------------------------------------------------------------  PAST MEDICAL & SURGICAL HISTORY:  NSTEMI (non-ST elevated myocardial infarction)  Gastroesophageal reflux disease without esophagitis  Pure hypercholesterolemia  Hypothyroidism, unspecified type  Essential hypertension  DM2 (diabetes mellitus, type 2)  Uncomplicated asthma, unspecified asthma severity  Abnormal findings on cardiac catheterization: Cardiac Cath  History of appendectomy    FAMILY HISTORY:  Family history of stomach cancer  Family history of MI (myocardial infarction) (Father)    PAST SOCIAL HISTORY:    ALLERGIES & MEDICATIONS  --------------------------------------------------------------------------------  Allergies    iodine (Hives)  shellfish (Anaphylaxis)    Intolerances      Standing Inpatient Medications  ALBUTerol    90 MICROgram(s) HFA Inhaler 1 Puff(s) Inhalation every 4 hours  ALBUTerol/ipratropium for Nebulization 3 milliLiter(s) Nebulizer every 6 hours  aspirin enteric coated 81 milliGRAM(s) Oral daily  atorvastatin 20 milliGRAM(s) Oral at bedtime  clopidogrel Tablet 75 milliGRAM(s) Oral daily  dextrose 5%. 1000 milliLiter(s) IV Continuous <Continuous>  dextrose 50% Injectable 12.5 Gram(s) IV Push once  dextrose 50% Injectable 25 Gram(s) IV Push once  dextrose 50% Injectable 25 Gram(s) IV Push once  diltiazem    milliGRAM(s) Oral daily  enoxaparin Injectable 40 milliGRAM(s) SubCutaneous daily  furosemide    Tablet 40 milliGRAM(s) Oral daily  insulin lispro (HumaLOG) corrective regimen sliding scale   SubCutaneous three times a day before meals  insulin lispro (HumaLOG) corrective regimen sliding scale   SubCutaneous at bedtime  levothyroxine 112 MICROGram(s) Oral daily  methylPREDNISolone sodium succinate Injectable 40 milliGRAM(s) IV Push every 12 hours  pantoprazole    Tablet 40 milliGRAM(s) Oral before breakfast  tiotropium 18 MICROgram(s) Capsule 1 Capsule(s) Inhalation daily    PRN Inpatient Medications  ALBUTerol    0.083% 2.5 milliGRAM(s) Nebulizer every 2 hours PRN  dextrose 40% Gel 15 Gram(s) Oral once PRN  glucagon  Injectable 1 milliGRAM(s) IntraMuscular once PRN      REVIEW OF SYSTEMS  --------------------------------------------------------------------------------  Gen: No weight changes, fatigue, fevers/chills, weakness  Skin: No rashes  Head/Eyes/Ears/Mouth: No headache; Normal hearing; Normal vision w/o blurriness; No sinus pain/discomfort, sore throat  Respiratory: No dyspnea, cough, wheezing, hemoptysis  CV: No chest pain, PND, orthopnea  GI: No abdominal pain, diarrhea, constipation, nausea, vomiting, melena, hematochezia  : No increased frequency, dysuria, hematuria, nocturia  MSK: No joint pain/swelling; no back pain; no edema  Neuro: No dizziness/lightheadedness, weakness, seizures, numbness, tingling  Heme: No easy bruising or bleeding  Endo: No heat/cold intolerance  Psych: No significant nervousness, anxiety, stress, depression    All other systems were reviewed and are negative, except as noted.    VITALS/PHYSICAL EXAM  --------------------------------------------------------------------------------  T(C): 36.4 (06-08-18 @ 10:53), Max: 36.8 (06-07-18 @ 14:22)  HR: 92 (06-08-18 @ 10:53) (92 - 116)  BP: 144/65 (06-08-18 @ 10:53) (126/78 - 145/84)  RR: 26 (06-08-18 @ 10:53) (24 - 27)  SpO2: 95% (06-08-18 @ 10:53) (94% - 99%)  Wt(kg): --  Height (cm): 152.4 (06-07-18 @ 14:22)  Weight (kg): 90.7 (06-07-18 @ 14:22)  BMI (kg/m2): 39.1 (06-07-18 @ 14:22)  BSA (m2): 1.87 (06-07-18 @ 14:22)      06-07-18 @ 07:01  -  06-08-18 @ 07:00  --------------------------------------------------------  IN: 0 mL / OUT: 850 mL / NET: -850 mL      Physical Exam:    General: An elderly  female sitting up in chair, with o2 via NC on.   	HEENT: AT, NC. PERRL. intact EOM. no throat erythema or exudate.   	Neck: supple. no JVD.   	Chest: mild wheeze b/L. no rales.  	Heart: normal S1,S2. RRR. 3 /6 RJ left sternal border.   	Abdomen: soft. non-tender. obese+ BS.  	Ext: 1 + edema of b/L LE. moves all ext. well.   	Vascular : neuro vascular intact.   	Neuro: AAO x3. no focal weakness. no speech disorder.   	Skin: no rash. no warmth. no cyanosis.    Psychiatric : normal.    LABS/STUDIES  --------------------------------------------------------------------------------              13.4   12.3  >-----------<  235      [06-08-18 @ 07:59]              42.8     134  |  91  |  69.0  ----------------------------<  398      [06-08-18 @ 07:59]  4.8   |  27.0  |  1.62        Ca     9.1     [06-08-18 @ 07:59]    TPro  6.7  /  Alb  3.4  /  TBili  0.3  /  DBili  x   /  AST  15  /  ALT  17  /  AlkPhos  59  [06-07-18 @ 16:32]    PT/INR: PT 10.9 , INR 0.99       [06-07-18 @ 16:32]  PTT: 26.7       [06-07-18 @ 16:32]      Creatinine Trend:  SCr 1.62 [06-08 @ 07:59]  SCr 1.51 [06-07 @ 16:32]  SCr 1.51 [06-04 @ 02:21]  SCr 1.49 [06-03 @ 06:03]  SCr 1.55 [06-02 @ 12:55]    Urinalysis - [06-07-18 @ 18:06]      Color Yellow / Appearance Clear / SG 1.005 / pH 6.5      Gluc Negative / Ketone Negative  / Bili Negative / Urobili Negative       Blood Negative / Protein Negative / Leuk Est Trace / Nitrite Negative      RBC  / WBC 3-5 / Hyaline  / Gran  / Sq Epi  / Non Sq Epi Occasional / Bacteria       HbA1c 8.5      [06-01-18 @ 05:46]

## 2018-06-08 NOTE — CONSULT NOTE ADULT - ASSESSMENT
1) TRENTON on CKD III  2) CHF  3) HTN  4) HTN    Pt has TRENTON on CKD III in the setting of diuresis and ACEI use  Baseline SCr 1.09 however has been in this range   Check UA, Shira, UOsm  Renal/bladder sonogram  Holding lisinopril  Can continue po lasix  Will follow

## 2018-06-09 LAB
ANION GAP SERPL CALC-SCNC: 14 MMOL/L — SIGNIFICANT CHANGE UP (ref 5–17)
BUN SERPL-MCNC: 76 MG/DL — HIGH (ref 8–20)
CALCIUM SERPL-MCNC: 9 MG/DL — SIGNIFICANT CHANGE UP (ref 8.6–10.2)
CHLORIDE SERPL-SCNC: 94 MMOL/L — LOW (ref 98–107)
CO2 SERPL-SCNC: 29 MMOL/L — SIGNIFICANT CHANGE UP (ref 22–29)
CREAT SERPL-MCNC: 1.5 MG/DL — HIGH (ref 0.5–1.3)
GLUCOSE BLDC GLUCOMTR-MCNC: 228 MG/DL — HIGH (ref 70–99)
GLUCOSE BLDC GLUCOMTR-MCNC: 344 MG/DL — HIGH (ref 70–99)
GLUCOSE BLDC GLUCOMTR-MCNC: 350 MG/DL — HIGH (ref 70–99)
GLUCOSE BLDC GLUCOMTR-MCNC: 399 MG/DL — HIGH (ref 70–99)
GLUCOSE SERPL-MCNC: 376 MG/DL — HIGH (ref 70–115)
HCT VFR BLD CALC: 39.2 % — SIGNIFICANT CHANGE UP (ref 37–47)
HGB BLD-MCNC: 12.2 G/DL — SIGNIFICANT CHANGE UP (ref 12–16)
MCHC RBC-ENTMCNC: 26.5 PG — LOW (ref 27–31)
MCHC RBC-ENTMCNC: 31.1 G/DL — LOW (ref 32–36)
MCV RBC AUTO: 85 FL — SIGNIFICANT CHANGE UP (ref 81–99)
PLATELET # BLD AUTO: 252 K/UL — SIGNIFICANT CHANGE UP (ref 150–400)
POTASSIUM SERPL-MCNC: 5.2 MMOL/L — SIGNIFICANT CHANGE UP (ref 3.5–5.3)
POTASSIUM SERPL-SCNC: 5.2 MMOL/L — SIGNIFICANT CHANGE UP (ref 3.5–5.3)
RBC # BLD: 4.61 M/UL — SIGNIFICANT CHANGE UP (ref 4.4–5.2)
RBC # FLD: 14.8 % — SIGNIFICANT CHANGE UP (ref 11–15.6)
SODIUM SERPL-SCNC: 137 MMOL/L — SIGNIFICANT CHANGE UP (ref 135–145)
WBC # BLD: 19.7 K/UL — HIGH (ref 4.8–10.8)
WBC # FLD AUTO: 19.7 K/UL — HIGH (ref 4.8–10.8)

## 2018-06-09 PROCEDURE — 99233 SBSQ HOSP IP/OBS HIGH 50: CPT

## 2018-06-09 RX ORDER — INSULIN LISPRO 100/ML
4 VIAL (ML) SUBCUTANEOUS
Qty: 0 | Refills: 0 | Status: DISCONTINUED | OUTPATIENT
Start: 2018-06-09 | End: 2018-06-11

## 2018-06-09 RX ORDER — AZITHROMYCIN 500 MG/1
500 TABLET, FILM COATED ORAL DAILY
Qty: 0 | Refills: 0 | Status: COMPLETED | OUTPATIENT
Start: 2018-06-09 | End: 2018-06-13

## 2018-06-09 RX ORDER — CARVEDILOL PHOSPHATE 80 MG/1
6.25 CAPSULE, EXTENDED RELEASE ORAL EVERY 12 HOURS
Qty: 0 | Refills: 0 | Status: DISCONTINUED | OUTPATIENT
Start: 2018-06-09 | End: 2018-06-09

## 2018-06-09 RX ADMIN — Medication 112 MICROGRAM(S): at 05:55

## 2018-06-09 RX ADMIN — Medication 3 MILLILITER(S): at 15:11

## 2018-06-09 RX ADMIN — AZITHROMYCIN 500 MILLIGRAM(S): 500 TABLET, FILM COATED ORAL at 14:30

## 2018-06-09 RX ADMIN — Medication 8: at 08:10

## 2018-06-09 RX ADMIN — Medication 3 MILLILITER(S): at 08:58

## 2018-06-09 RX ADMIN — Medication 81 MILLIGRAM(S): at 12:18

## 2018-06-09 RX ADMIN — INSULIN GLARGINE 20 UNIT(S): 100 INJECTION, SOLUTION SUBCUTANEOUS at 21:53

## 2018-06-09 RX ADMIN — Medication 4 UNIT(S): at 12:18

## 2018-06-09 RX ADMIN — Medication 240 MILLIGRAM(S): at 05:55

## 2018-06-09 RX ADMIN — Medication 3: at 21:50

## 2018-06-09 RX ADMIN — Medication 4 UNIT(S): at 16:41

## 2018-06-09 RX ADMIN — Medication 40 MILLIGRAM(S): at 05:55

## 2018-06-09 RX ADMIN — CLOPIDOGREL BISULFATE 75 MILLIGRAM(S): 75 TABLET, FILM COATED ORAL at 12:18

## 2018-06-09 RX ADMIN — Medication 4: at 16:42

## 2018-06-09 RX ADMIN — PANTOPRAZOLE SODIUM 40 MILLIGRAM(S): 20 TABLET, DELAYED RELEASE ORAL at 06:00

## 2018-06-09 RX ADMIN — Medication 3 MILLILITER(S): at 20:10

## 2018-06-09 RX ADMIN — ENOXAPARIN SODIUM 40 MILLIGRAM(S): 100 INJECTION SUBCUTANEOUS at 12:18

## 2018-06-09 RX ADMIN — ATORVASTATIN CALCIUM 20 MILLIGRAM(S): 80 TABLET, FILM COATED ORAL at 21:51

## 2018-06-09 RX ADMIN — Medication 8: at 12:19

## 2018-06-09 NOTE — PROGRESS NOTE ADULT - SUBJECTIVE AND OBJECTIVE BOX
ICU Vital Signs Last 24 Hrs  T(C): 36.3 (2018 13:14), Max: 37.1 (2018 06:37)  T(F): 97.4 (2018 13:14), Max: 98.8 (2018 06:37)  HR: 68 (2018 13:14) (68 - 98)  BP: 132/72 (2018 13:14) (132/72 - 165/70)  BP(mean): --  ABP: --  ABP(mean): --  RR: 18 (2018 13:14) (18 - 20)  SpO2: 96% (2018 13:14) (96% - 97%)    137    |  94<L>  |  76.0<H>  ----------------------------<  376<H>  Ca:9.0   (2018 07:51)  5.2     |  29.0   |  1.50<H>      eGFR if Non : 32 <L>  eGFR if : 37 <L>    TPro  6.7    /  Alb  3.4    /  TBili  0.3<L>  /  DBili  x      /  AST  15     /  ALT  17     /  AlkPhos  59     2018 16:32                             12.2   19.7<H> )-----------( 252      ( 2018 07:51 )                  39.2     Phos:-- M.5 mg/dL PTH:-- Uric acid:-- Serum Osm:--  Ferritin:-- Iron:-- TIBC:-- Tsat:--  B12:-- TSH:-- ( @ 02:21)    Urinalysis Basic - ( 2018 18:06 )  Color: Yellow / Appearance: Clear / S.005<L> / pH: x  Gluc: x / Ketone: Negative  / Bili: Negative / Urobili: Negative mg/dL   Blood: x / Protein: Negative mg/dL / Nitrite: Negative   Leuk Esterase: Trace<!> / RBC: x / WBC 3-5   Sq Epi: x / Non Sq Epi: Occasional / Bacteria: x    Consult Note:   · Provider Specialty	Nephrology	    Referral/Consultation:   Initial Consult:  · Requested by Name:	Dr. Tomlin	  · Date/Time:	2018 13:00	  · Reason for Referral/Consultation:	TRENTON on CKD III	      · Subjective and Objective: 	  [facility] DIVISION OF KIDNEY DISEASES AND HYPERTENSION -- INITIAL CONSULT NOTE  --------------------------------------------------------------------------------  HPI:  83 year old female discharged from [facility] recently after being admitted for COPD exacerbation with acute on chronic CHF. Pt found to be short of breath, wheezing, brought in by ambulance to [facility]. Pt seen and examined; in no distress; sitting up in chair in NAD. Has history of DM2, HTN, GERD, hypothyroidism.     PAST HISTORY  --------------------------------------------------------------------------------  PAST MEDICAL & SURGICAL HISTORY:  NSTEMI (non-ST elevated myocardial infarction)  Gastroesophageal reflux disease without esophagitis  Pure hypercholesterolemia  Hypothyroidism, unspecified type  Essential hypertension  DM2 (diabetes mellitus, type 2)  Uncomplicated asthma, unspecified asthma severity  Abnormal findings on cardiac catheterization: Cardiac Cath  History of appendectomy    FAMILY HISTORY:  Family history of stomach cancer  Family history of MI (myocardial infarction) (Father)    PAST SOCIAL HISTORY:    ALLERGIES & MEDICATIONS  --------------------------------------------------------------------------------  Allergies    iodine (Hives)  shellfish (Anaphylaxis)    Intolerances      Standing Inpatient Medications  ALBUTerol    90 MICROgram(s) HFA Inhaler 1 Puff(s) Inhalation every 4 hours  ALBUTerol/ipratropium for Nebulization 3 milliLiter(s) Nebulizer every 6 hours  aspirin enteric coated 81 milliGRAM(s) Oral daily  atorvastatin 20 milliGRAM(s) Oral at bedtime  clopidogrel Tablet 75 milliGRAM(s) Oral daily  dextrose 5%. 1000 milliLiter(s) IV Continuous <Continuous>  dextrose 50% Injectable 12.5 Gram(s) IV Push once  dextrose 50% Injectable 25 Gram(s) IV Push once  dextrose 50% Injectable 25 Gram(s) IV Push once  diltiazem    milliGRAM(s) Oral daily  enoxaparin Injectable 40 milliGRAM(s) SubCutaneous daily  furosemide    Tablet 40 milliGRAM(s) Oral daily  insulin lispro (HumaLOG) corrective regimen sliding scale   SubCutaneous three times a day before meals  insulin lispro (HumaLOG) corrective regimen sliding scale   SubCutaneous at bedtime  levothyroxine 112 MICROGram(s) Oral daily  methylPREDNISolone sodium succinate Injectable 40 milliGRAM(s) IV Push every 12 hours  pantoprazole    Tablet 40 milliGRAM(s) Oral before breakfast  tiotropium 18 MICROgram(s) Capsule 1 Capsule(s) Inhalation daily    PRN Inpatient Medications  ALBUTerol    0.083% 2.5 milliGRAM(s) Nebulizer every 2 hours PRN  dextrose 40% Gel 15 Gram(s) Oral once PRN  glucagon  Injectable 1 milliGRAM(s) IntraMuscular once PRN      REVIEW OF SYSTEMS  --------------------------------------------------------------------------------  Gen: No weight changes, fatigue, fevers/chills, weakness  Skin: No rashes  Head/Eyes/Ears/Mouth: No headache; Normal hearing; Normal vision w/o blurriness; No sinus pain/discomfort, sore throat  Respiratory: No dyspnea, cough, wheezing, hemoptysis  CV: No chest pain, PND, orthopnea  GI: No abdominal pain, diarrhea, constipation, nausea, vomiting, melena, hematochezia  : No increased frequency, dysuria, hematuria, nocturia  MSK: No joint pain/swelling; no back pain; no edema  Neuro: No dizziness/lightheadedness, weakness, seizures, numbness, tingling  Heme: No easy bruising or bleeding  Endo: No heat/cold intolerance  Psych: No significant nervousness, anxiety, stress, depression    All other systems were reviewed and are negative, except as noted.    VITALS/PHYSICAL EXAM  --------------------------------------------------------------------------------  T(C): 36.4 (18 @ 10:53), Max: 36.8 (18 @ 14:22)  HR: 92 (18 @ 10:53) (92 - 116)  BP: 144/65 (18 @ 10:53) (126/78 - 145/84)  RR: 26 (18 @ 10:53) (24 - 27)  SpO2: 95% (18 @ 10:53) (94% - 99%)  Wt(kg): --  Height (cm): 152.4 (18 @ 14:22)  Weight (kg): 90.7 (18 14:22)  BMI (kg/m2): 39.1 (18 @ 14:22)  BSA (m2): 1.87 (18 @ 14:22)      18 @ 07:01  -  18 @ 07:00  --------------------------------------------------------  IN: 0 mL / OUT: 850 mL / NET: -850 mL      Physical Exam:    General: An elderly  female sitting up in chair, with o2 via NC on.   	HEENT: AT, NC. PERRL. intact EOM. no throat erythema or exudate.   	Neck: supple. no JVD.   	Chest: mild wheeze b/L. no rales.  	Heart: normal S1,S2. RRR. 3 /6 RJ left sternal border.   	Abdomen: soft. non-tender. obese+ BS.  	Ext: 1 + edema of b/L LE. moves all ext. well.   	Vascular : neuro vascular intact.   	Neuro: AAO x3. no focal weakness. no speech disorder.   	Skin: no rash. no warmth. no cyanosis.    Psychiatric : normal.    LABS/STUDIES  --------------------------------------------------------------------------------              13.4   12.3  >-----------<  235      [18 @ 07:59]              42.8     134  |  91  |  69.0  ----------------------------<  398      [18 07:59]  4.8   |  27.0  |  1.62        Ca     9.1     [18 07:59]    TPro  6.7  /  Alb  3.4  /  TBili  0.3  /  DBili  x   /  AST  15  /  ALT  17  /  AlkPhos  59  [18 @ 16:32]    PT/INR: PT 10.9 , INR 0.99       [18 @ 16:32]  PTT: 26.7       [18 16:32]      Creatinine Trend:  SCr 1.62 [ 07:59]  SCr 1.51 [ 16:32]  SCr 1.51 [ 02:21]  SCr 1.49 [ 06:03]  SCr 1.55 [ @ 12:55]    Urinalysis - [18 @ 18:06]      Color Yellow / Appearance Clear / SG 1.005 / pH 6.5      Gluc Negative / Ketone Negative  / Bili Negative / Urobili Negative       Blood Negative / Protein Negative / Leuk Est Trace / Nitrite Negative      RBC  / WBC 3-5 / Hyaline  / Gran  / Sq Epi  / Non Sq Epi Occasional / Bacteria     HbA1c 8.5      [06-01-18 @ 05:46]    Assessment and Recommendation:     1) TRENTON on CKD - 4,  2) CHF  3) HTN  4) HTN    Pt has TRENTON on CKD III in the setting of diuresis and ACEI use

## 2018-06-09 NOTE — PROGRESS NOTE ADULT - ASSESSMENT
83 yr female admitted for Acute respiratory distress, very  likely related to o2 malfunction, feels better with o2 in ER. will request for SW consult so o2 delivery and proper functioning instrument is arranged before discharge.    COPD exacerbation, Received  iv solumedrol in ER. Steroid discontinued for leukocytosis. Now On  Zithromax .     TRENTON,  BUN/ Cr was 23/1.09 mg.,  in March , 2018     Essential HTN - On  diltiazem CD 240mg , coreg 6.25mg po bid for greater BP control.     Chronic diastolic CHF , On po Lasix 40mg daily .     Dm , type 2- Insulin sliding scale  & Lantus  .

## 2018-06-09 NOTE — PROGRESS NOTE ADULT - ASSESSMENT
83 yr female admitted for Acute respiratory distress, very  likely related to o2 malfunction, feels better with o2 in ER. will request for SW consult so o2 delivery and proper functioning instrument is arranged before discharge.    COPD exac,-     may be mild exacerbation, Received  iv solumedrol in ER,  will  ttapering dose  po steroids.    TRENTON,  BUN/ Cr was 23/1.09 in March , 2018 and today  BUN/ Cr is 68/ 1.51. pt. is on lasix that might have contributed to TRENTON, pt. has h/o DM and htn as well that might be contributing as well. will request nephrology Dr. Leach consult for TRENTON as she needs close f/u on fluid balance and monitoring of kidney function.     Essential htn,-  continue diltiazem CD 240mg po daily . Start coreg 6.25mg po bid for greater BP control.     Chronic diastolic-  chf, continue po lasix 40mg po daily .     Leukocytosis,-  unspecified. likely from steroid use, no fever, non toxic looking. will follow with repeat cbc.     Dm , type 2- Insulin sliding scale . Insulin lantus  . Start meal time insulin for greater blood sugar control     Disposition- PT, supportive care. 83 yr female admitted for Acute respiratory distress, very  likely related to o2 malfunction, feels better with o2 in ER. will request for SW consult so o2 delivery and proper functioning instrument is arranged before discharge.    COPD exac,-     may be mild exacerbation, Received  iv solumedrol in ER. Steroid discontinued for leukocytosis. Will give zithromax .     TRENTON,  BUN/ Cr was 23/1.09 in March , 2018 and today  BUN/ Cr is 68/ 1.51. pt. is on lasix that might have contributed to TRENTON, pt. has h/o DM and htn as well that might be contributing as well. will request nephrology Dr. Leach consult for TRENTON as she needs close f/u on fluid balance and monitoring of kidney function.     Essential htn,-  continue diltiazem CD 240mg po daily . Start coreg 6.25mg po bid for greater BP control.     Chronic diastolic-  chf, continue po lasix 40mg po daily .     Leukocytosis,-  unspecified. likely from steroid use, no fever, non toxic looking. Will d/c steroid      Dm , type 2- Insulin sliding scale . Insulin lantus  . Start meal time insulin for greater blood sugar control     Disposition- PT, supportive care.

## 2018-06-09 NOTE — PROGRESS NOTE ADULT - SUBJECTIVE AND OBJECTIVE BOX
CC: COPD.  Breathing better. No cough. BP and blood sugar is uncontrolled.   HPI:  82 y/o female who was discharged from [facility] yesterday after being admitted for copd exacerbation and acute on chronic diastolic chf, pt. was sent home ( lives in assisted living ) on oxygen. As per pt. she got o2 tank but it was not function properly and she was not getting enough oxygen and felt sob, wheeze and could not catch her breath and came to hospital via ambulance. no cp. no fever , no abd. pain  or n/v reported. pt. feels better with oxygen in the ER. Pt. also reported that her blood sugar was 60 today when she checked at home. pt. was discharged home on po steroids which she used today in the morning. (2018 22:23)    REVIEW OF SYSTEMS:    Patient denied fever, chills, abdominal pain, nausea, vomiting, cough, shortness of breath, chest pain or palpitations    Vital Signs Last 24 Hrs  T(C): 36.9 (2018 11:11), Max: 37.1 (2018 06:37)  T(F): 98.4 (2018 11:11), Max: 98.8 (2018 06:37)  HR: 72 (2018 11:11) (71 - 98)  BP: 165/70 (2018 11:11) (134/63 - 165/70)  BP(mean): --  RR: 18 (2018 06:37) (18 - 20)  SpO2: 97% (2018 06:37) (97% - 97%)I&O's Summary    2018 07:01  -  2018 07:00  --------------------------------------------------------  IN: 610 mL / OUT: 340 mL / NET: 270 mL      PHYSICAL EXAM:  GENERAL: Extreme obese  HEENT: PERRL, +EOMI, anicteric, no [place]  NECK: Supple, No JVD   CHEST/LUNG:  bilateral crackles   HEART: S1S2 Normal intensity, no murmurs, gallops or rubs noted  ABDOMEN: Soft, BS Normoactive, NT, ND, no HSM noted  EXTREMITIES:  2+ radial and DP pulses noted, no clubbing, cyanosis, or edema noted, Limited mobility   SKIN: No rashes or lesions noted  NEURO: A&Ox3, no focal deficits noted, CN II-XII intact  PSYCH: Depressed mood and affect; insight/judgement appropriate  LABS:                        12.2   19.7  )-----------( 252      ( 2018 07:51 )             39.2     06-    137  |  94<L>  |  76.0<H>  ----------------------------<  376<H>  5.2   |  29.0  |  1.50<H>    Ca    9.0      2018 07:51    TPro  6.7  /  Alb  3.4  /  TBili  0.3<L>  /  DBili  x   /  AST  15  /  ALT  17  /  AlkPhos  59  06-07    PT/INR - ( 2018 16:32 )   PT: 10.9 sec;   INR: 0.99 ratio         PTT - ( 2018 16:32 )  PTT:26.7 sec  Urinalysis Basic - ( 2018 18:06 )    Color: Yellow / Appearance: Clear / S.005 / pH: x  Gluc: x / Ketone: Negative  / Bili: Negative / Urobili: Negative mg/dL   Blood: x / Protein: Negative mg/dL / Nitrite: Negative   Leuk Esterase: Trace / RBC: x / WBC 3-5   Sq Epi: x / Non Sq Epi: Occasional / Bacteria: x      RADIOLOGY & ADDITIONAL TESTS:    MEDICATIONS:  MEDICATIONS  (STANDING):  ALBUTerol    90 MICROgram(s) HFA Inhaler 1 Puff(s) Inhalation every 4 hours  ALBUTerol/ipratropium for Nebulization 3 milliLiter(s) Nebulizer every 6 hours  aspirin enteric coated 81 milliGRAM(s) Oral daily  atorvastatin 20 milliGRAM(s) Oral at bedtime  carvedilol 6.25 milliGRAM(s) Oral every 12 hours  clopidogrel Tablet 75 milliGRAM(s) Oral daily  dextrose 5%. 1000 milliLiter(s) (50 mL/Hr) IV Continuous <Continuous>  dextrose 50% Injectable 12.5 Gram(s) IV Push once  dextrose 50% Injectable 25 Gram(s) IV Push once  dextrose 50% Injectable 25 Gram(s) IV Push once  diltiazem    milliGRAM(s) Oral daily  enoxaparin Injectable 40 milliGRAM(s) SubCutaneous daily  furosemide    Tablet 40 milliGRAM(s) Oral daily  insulin glargine Injectable (LANTUS) 20 Unit(s) SubCutaneous at bedtime  insulin lispro (HumaLOG) corrective regimen sliding scale   SubCutaneous three times a day before meals  insulin lispro (HumaLOG) corrective regimen sliding scale   SubCutaneous at bedtime  insulin lispro Injectable (HumaLOG) 4 Unit(s) SubCutaneous three times a day before meals  levothyroxine 112 MICROGram(s) Oral daily  pantoprazole    Tablet 40 milliGRAM(s) Oral before breakfast  tiotropium 18 MICROgram(s) Capsule 1 Capsule(s) Inhalation daily    MEDICATIONS  (PRN):  ALBUTerol    0.083% 2.5 milliGRAM(s) Nebulizer every 2 hours PRN Shortness of Breath and/or Wheezing  dextrose 40% Gel 15 Gram(s) Oral once PRN Blood Glucose LESS THAN 70 milliGRAM(s)/deciliter  glucagon  Injectable 1 milliGRAM(s) IntraMuscular once PRN Glucose LESS THAN 70 milligrams/deciliter

## 2018-06-10 LAB
-  AMIKACIN: SIGNIFICANT CHANGE UP
-  AMPICILLIN/SULBACTAM: SIGNIFICANT CHANGE UP
-  AMPICILLIN: SIGNIFICANT CHANGE UP
-  AZTREONAM: SIGNIFICANT CHANGE UP
-  CEFAZOLIN: SIGNIFICANT CHANGE UP
-  CEFEPIME: SIGNIFICANT CHANGE UP
-  CEFOXITIN: SIGNIFICANT CHANGE UP
-  CEFTRIAXONE: SIGNIFICANT CHANGE UP
-  CIPROFLOXACIN: SIGNIFICANT CHANGE UP
-  ERTAPENEM: SIGNIFICANT CHANGE UP
-  GENTAMICIN: SIGNIFICANT CHANGE UP
-  IMIPENEM: SIGNIFICANT CHANGE UP
-  LEVOFLOXACIN: SIGNIFICANT CHANGE UP
-  MEROPENEM: SIGNIFICANT CHANGE UP
-  NITROFURANTOIN: SIGNIFICANT CHANGE UP
-  PIPERACILLIN/TAZOBACTAM: SIGNIFICANT CHANGE UP
-  TIGECYCLINE: SIGNIFICANT CHANGE UP
-  TOBRAMYCIN: SIGNIFICANT CHANGE UP
-  TRIMETHOPRIM/SULFAMETHOXAZOLE: SIGNIFICANT CHANGE UP
ANION GAP SERPL CALC-SCNC: 14 MMOL/L — SIGNIFICANT CHANGE UP (ref 5–17)
BUN SERPL-MCNC: 80 MG/DL — HIGH (ref 8–20)
CALCIUM SERPL-MCNC: 9.3 MG/DL — SIGNIFICANT CHANGE UP (ref 8.6–10.2)
CHLORIDE SERPL-SCNC: 95 MMOL/L — LOW (ref 98–107)
CO2 SERPL-SCNC: 27 MMOL/L — SIGNIFICANT CHANGE UP (ref 22–29)
CREAT SERPL-MCNC: 1.57 MG/DL — HIGH (ref 0.5–1.3)
CULTURE RESULTS: SIGNIFICANT CHANGE UP
GLUCOSE BLDC GLUCOMTR-MCNC: 240 MG/DL — HIGH (ref 70–99)
GLUCOSE BLDC GLUCOMTR-MCNC: 241 MG/DL — HIGH (ref 70–99)
GLUCOSE BLDC GLUCOMTR-MCNC: 264 MG/DL — HIGH (ref 70–99)
GLUCOSE BLDC GLUCOMTR-MCNC: 289 MG/DL — HIGH (ref 70–99)
GLUCOSE SERPL-MCNC: 280 MG/DL — HIGH (ref 70–115)
HCT VFR BLD CALC: 39.7 % — SIGNIFICANT CHANGE UP (ref 37–47)
HGB BLD-MCNC: 12.2 G/DL — SIGNIFICANT CHANGE UP (ref 12–16)
MCHC RBC-ENTMCNC: 26.3 PG — LOW (ref 27–31)
MCHC RBC-ENTMCNC: 30.7 G/DL — LOW (ref 32–36)
MCV RBC AUTO: 85.6 FL — SIGNIFICANT CHANGE UP (ref 81–99)
METHOD TYPE: SIGNIFICANT CHANGE UP
NT-PROBNP SERPL-SCNC: 157 PG/ML — SIGNIFICANT CHANGE UP (ref 0–300)
ORGANISM # SPEC MICROSCOPIC CNT: SIGNIFICANT CHANGE UP
ORGANISM # SPEC MICROSCOPIC CNT: SIGNIFICANT CHANGE UP
PLATELET # BLD AUTO: 245 K/UL — SIGNIFICANT CHANGE UP (ref 150–400)
POTASSIUM SERPL-MCNC: 4.3 MMOL/L — SIGNIFICANT CHANGE UP (ref 3.5–5.3)
POTASSIUM SERPL-SCNC: 4.3 MMOL/L — SIGNIFICANT CHANGE UP (ref 3.5–5.3)
RBC # BLD: 4.64 M/UL — SIGNIFICANT CHANGE UP (ref 4.4–5.2)
RBC # FLD: 14.9 % — SIGNIFICANT CHANGE UP (ref 11–15.6)
SODIUM SERPL-SCNC: 136 MMOL/L — SIGNIFICANT CHANGE UP (ref 135–145)
SPECIMEN SOURCE: SIGNIFICANT CHANGE UP
WBC # BLD: 16 K/UL — HIGH (ref 4.8–10.8)
WBC # FLD AUTO: 16 K/UL — HIGH (ref 4.8–10.8)

## 2018-06-10 PROCEDURE — 99233 SBSQ HOSP IP/OBS HIGH 50: CPT

## 2018-06-10 PROCEDURE — 99223 1ST HOSP IP/OBS HIGH 75: CPT

## 2018-06-10 RX ORDER — ERTAPENEM SODIUM 1 G/1
500 INJECTION, POWDER, LYOPHILIZED, FOR SOLUTION INTRAMUSCULAR; INTRAVENOUS EVERY 24 HOURS
Qty: 0 | Refills: 0 | Status: DISCONTINUED | OUTPATIENT
Start: 2018-06-10 | End: 2018-06-10

## 2018-06-10 RX ORDER — ERTAPENEM SODIUM 1 G/1
INJECTION, POWDER, LYOPHILIZED, FOR SOLUTION INTRAMUSCULAR; INTRAVENOUS
Qty: 0 | Refills: 0 | Status: DISCONTINUED | OUTPATIENT
Start: 2018-06-10 | End: 2018-06-10

## 2018-06-10 RX ADMIN — Medication 3 MILLILITER(S): at 14:23

## 2018-06-10 RX ADMIN — Medication 40 MILLIGRAM(S): at 05:28

## 2018-06-10 RX ADMIN — Medication 3 MILLILITER(S): at 08:09

## 2018-06-10 RX ADMIN — Medication 4 UNIT(S): at 17:30

## 2018-06-10 RX ADMIN — ATORVASTATIN CALCIUM 20 MILLIGRAM(S): 80 TABLET, FILM COATED ORAL at 21:13

## 2018-06-10 RX ADMIN — Medication 4: at 17:31

## 2018-06-10 RX ADMIN — Medication 81 MILLIGRAM(S): at 12:18

## 2018-06-10 RX ADMIN — ENOXAPARIN SODIUM 40 MILLIGRAM(S): 100 INJECTION SUBCUTANEOUS at 12:18

## 2018-06-10 RX ADMIN — CLOPIDOGREL BISULFATE 75 MILLIGRAM(S): 75 TABLET, FILM COATED ORAL at 12:17

## 2018-06-10 RX ADMIN — Medication 240 MILLIGRAM(S): at 05:30

## 2018-06-10 RX ADMIN — Medication 4 UNIT(S): at 12:18

## 2018-06-10 RX ADMIN — Medication 112 MICROGRAM(S): at 05:27

## 2018-06-10 RX ADMIN — Medication 1: at 21:12

## 2018-06-10 RX ADMIN — PANTOPRAZOLE SODIUM 40 MILLIGRAM(S): 20 TABLET, DELAYED RELEASE ORAL at 05:29

## 2018-06-10 RX ADMIN — Medication 4: at 12:18

## 2018-06-10 RX ADMIN — Medication 6: at 08:31

## 2018-06-10 RX ADMIN — AZITHROMYCIN 500 MILLIGRAM(S): 500 TABLET, FILM COATED ORAL at 13:17

## 2018-06-10 RX ADMIN — ERTAPENEM SODIUM 100 MILLIGRAM(S): 1 INJECTION, POWDER, LYOPHILIZED, FOR SOLUTION INTRAMUSCULAR; INTRAVENOUS at 15:44

## 2018-06-10 RX ADMIN — Medication 4 UNIT(S): at 08:30

## 2018-06-10 RX ADMIN — INSULIN GLARGINE 20 UNIT(S): 100 INJECTION, SOLUTION SUBCUTANEOUS at 21:12

## 2018-06-10 RX ADMIN — Medication 3 MILLILITER(S): at 19:46

## 2018-06-10 NOTE — PROGRESS NOTE ADULT - SUBJECTIVE AND OBJECTIVE BOX
Vital Signs Last  48 Hrs  T(C): 36.8 (2018 06:34), Max: 36.9 (10 Charbel 2018 11:23)  T(F): 98.2 (2018 06:34), Max: 98.4 (10 Charbel 2018 11:23)  HR: 66 (2018 06:34) (66 - 88)  BP: 139/57 (2018 06:34) (110/62 - 142/62)  BP(mean): --  RR: 20 (2018 06:34) (18 - 20)  SpO2: 98% (2018 06:34) (96% - 100%)    T(C): 36.3 (2018 13:14), Max: 37.1 (2018 06:37)  T(F): 97.4 (2018 13:14), Max: 98.8 (2018 06:37)  HR: 68 (2018 13:14) (68 - 98)  BP: 132/72 (2018 13:14) (132/72 - 165/70)  BP(mean): --  ABP: --  ABP(mean): --  RR: 18 (2018 13:14) (18 - 20)  SpO2: 96% (2018 13:14) (96% - 97%)      142    |  100    |  75.0<H>  ----------------------------<  208<H>  Ca:9.0   (2018 06:01)  4.6     |  32.0<H>  |  1.41<H>      eGFR if Non : 34 <L>  eGFR if : 40 <L>                             12.1   13.8<H> )-----------( 226      ( 2018 06:01 )                 40.0     Phos:-- M.5 mg/dL PTH:-- Uric acid:-- Serum Osm:--  Ferritin:-- Iron:-- TIBC:-- Tsat:--  B12:-- TSH:-- ( @ 02:21)    Urinalysis Basic - ( 2018 18:06 )  Color: Yellow / Appearance: Clear / S.005<L> / pH: x  Gluc: x / Ketone: Negative  / Bili: Negative / Urobili: Negative mg/dL   Blood: x / Protein: Negative mg/dL / Nitrite: Negative   Leuk Esterase: Trace<!> / RBC: x / WBC 3-5   Sq Epi: x / Non Sq Epi: Occasional / Bacteria: x          137    |  94<L>  |  76.0<H>  ----------------------------<  376<H>  Ca:9.0   (2018 07:51)  5.2     |  29.0   |  1.50<H>      eGFR if Non : 32 <L>  eGFR if : 37 <L>    TPro  6.7    /  Alb  3.4    /  TBili  0.3<L>  /  DBili  x      /  AST  15     /  ALT  17     /  AlkPhos  59     2018 16:32                             12.2   19.7<H> )-----------( 252      ( 2018 07:51 )                  39.2     Phos:-- M.5 mg/dL PTH:-- Uric acid:-- Serum Osm:--  Ferritin:-- Iron:-- TIBC:-- Tsat:--  B12:-- TSH:-- ( @ 02:21)    Urinalysis Basic - ( 2018 18:06 )  Color: Yellow / Appearance: Clear / S.005<L> / pH: x  Gluc: x / Ketone: Negative  / Bili: Negative / Urobili: Negative mg/dL   Blood: x / Protein: Negative mg/dL / Nitrite: Negative   Leuk Esterase: Trace<!> / RBC: x / WBC 3-5   Sq Epi: x / Non Sq Epi: Occasional / Bacteria: x    Consult Note:   · Provider Specialty	Nephrology	    Referral/Consultation:   Initial Consult:  · Requested by Name:	Dr. Tomlin	  · Date/Time:	2018 13:00	  · Reason for Referral/Consultation:	TRENTON on CKD III	      · Subjective and Objective: 	  [facility] DIVISION OF KIDNEY DISEASES AND HYPERTENSION -- INITIAL CONSULT NOTE  --------------------------------------------------------------------------------  HPI:  83 year old female discharged from [facility] recently after being admitted for COPD exacerbation with acute on chronic CHF. Pt found to be short of breath, wheezing, brought in by ambulance to [facility]. Pt seen and examined; in no distress; sitting up in chair in NAD. Has history of DM2, HTN, GERD, hypothyroidism.     PAST HISTORY  --------------------------------------------------------------------------------  PAST MEDICAL & SURGICAL HISTORY:  NSTEMI (non-ST elevated myocardial infarction)  Gastroesophageal reflux disease without esophagitis  Pure hypercholesterolemia  Hypothyroidism, unspecified type  Essential hypertension  DM2 (diabetes mellitus, type 2)  Uncomplicated asthma, unspecified asthma severity  Abnormal findings on cardiac catheterization: Cardiac Cath  History of appendectomy    FAMILY HISTORY:  Family history of stomach cancer  Family history of MI (myocardial infarction) (Father)    PAST SOCIAL HISTORY:    ALLERGIES & MEDICATIONS  --------------------------------------------------------------------------------  Allergies    iodine (Hives)  shellfish (Anaphylaxis)    Intolerances      Standing Inpatient Medications  ALBUTerol    90 MICROgram(s) HFA Inhaler 1 Puff(s) Inhalation every 4 hours  ALBUTerol/ipratropium for Nebulization 3 milliLiter(s) Nebulizer every 6 hours  aspirin enteric coated 81 milliGRAM(s) Oral daily  atorvastatin 20 milliGRAM(s) Oral at bedtime  clopidogrel Tablet 75 milliGRAM(s) Oral daily  dextrose 5%. 1000 milliLiter(s) IV Continuous <Continuous>  dextrose 50% Injectable 12.5 Gram(s) IV Push once  dextrose 50% Injectable 25 Gram(s) IV Push once  dextrose 50% Injectable 25 Gram(s) IV Push once  diltiazem    milliGRAM(s) Oral daily  enoxaparin Injectable 40 milliGRAM(s) SubCutaneous daily  furosemide    Tablet 40 milliGRAM(s) Oral daily  insulin lispro (HumaLOG) corrective regimen sliding scale   SubCutaneous three times a day before meals  insulin lispro (HumaLOG) corrective regimen sliding scale   SubCutaneous at bedtime  levothyroxine 112 MICROGram(s) Oral daily  methylPREDNISolone sodium succinate Injectable 40 milliGRAM(s) IV Push every 12 hours  pantoprazole    Tablet 40 milliGRAM(s) Oral before breakfast  tiotropium 18 MICROgram(s) Capsule 1 Capsule(s) Inhalation daily    PRN Inpatient Medications  ALBUTerol    0.083% 2.5 milliGRAM(s) Nebulizer every 2 hours PRN  dextrose 40% Gel 15 Gram(s) Oral once PRN  glucagon  Injectable 1 milliGRAM(s) IntraMuscular once PRN      REVIEW OF SYSTEMS  --------------------------------------------------------------------------------  Gen: No weight changes, fatigue, fevers/chills, weakness  Skin: No rashes  Head/Eyes/Ears/Mouth: No headache; Normal hearing; Normal vision w/o blurriness; No sinus pain/discomfort, sore throat  Respiratory: No dyspnea, cough, wheezing, hemoptysis  CV: No chest pain, PND, orthopnea  GI: No abdominal pain, diarrhea, constipation, nausea, vomiting, melena, hematochezia  : No increased frequency, dysuria, hematuria, nocturia  MSK: No joint pain/swelling; no back pain; no edema  Neuro: No dizziness/lightheadedness, weakness, seizures, numbness, tingling  Heme: No easy bruising or bleeding  Endo: No heat/cold intolerance  Psych: No significant nervousness, anxiety, stress, depression    All other systems were reviewed and are negative, except as noted.    VITALS/PHYSICAL EXAM  --------------------------------------------------------------------------------  T(C): 36.4 (18 @ 10:53), Max: 36.8 (18 @ 14:22)  HR: 92 (18 @ 10:53) (92 - 116)  BP: 144/65 (18 @ 10:53) (126/78 - 145/84)  RR: 26 (18 @ 10:53) (24 - 27)  SpO2: 95% (18 @ 10:53) (94% - 99%)  Wt(kg): --  Height (cm): 152.4 (18 @ 14:22)  Weight (kg): 90.7 (18 14:22)  BMI (kg/m2): 39.1 (18 14:22)  BSA (m2): 1.87 (18 @ 14:22)      18 @ 07:01  -  18 @ 07:00  --------------------------------------------------------  IN: 0 mL / OUT: 850 mL / NET: -850 mL      Physical Exam:    General: An elderly  female sitting up in chair, with o2 via NC on.   	HEENT: AT, NC. PERRL. intact EOM. no throat erythema or exudate.   	Neck: supple. no JVD.   	Chest: mild wheeze b/L. no rales.  	Heart: normal S1,S2. RRR. 3 /6 RJ left sternal border.   	Abdomen: soft. non-tender. obese+ BS.  	Ext: 1 + edema of b/L LE. moves all ext. well.   	Vascular : neuro vascular intact.   	Neuro: AAO x3. no focal weakness. no speech disorder.   	Skin: no rash. no warmth. no cyanosis.    Psychiatric : normal.    LABS/STUDIES  --------------------------------------------------------------------------------              13.4   12.3  >-----------<  235      [18 @ 07:59]              42.8     134  |  91  |  69.0  ----------------------------<  398      [18 07:59]  4.8   |  27.0  |  1.62        Ca     9.1     [18 07:59]    TPro  6.7  /  Alb  3.4  /  TBili  0.3  /  DBili  x   /  AST  15  /  ALT  17  /  AlkPhos  59  [18 @ 16:32]    PT/INR: PT 10.9 , INR 0.99       [18 @ 16:32]  PTT: 26.7       [18 @ 16:32]      Creatinine Trend:  SCr 1.62 [ 07:59]  SCr 1.51 [ 16:32]  SCr 1.51 [ @ 02:21]  SCr 1.49 [ @ 06:03]  SCr 1.55 [ @ 12:55]    Urinalysis - [18 @ 18:06]      Color Yellow / Appearance Clear / SG 1.005 / pH 6.5      Gluc Negative / Ketone Negative  / Bili Negative / Urobili Negative       Blood Negative / Protein Negative / Leuk Est Trace / Nitrite Negative      RBC  / WBC 3-5 / Hyaline  / Gran  / Sq Epi  / Non Sq Epi Occasional / Bacteria     HbA1c 8.5      [18 @ 05:46]    Assessment and Recommendation:     1) TRENTON on CKD - 4,  2) CHF  3) HTN  4) HTN    Pt has TRENTON on CKD III in the setting of diuresis and ACEI use    Assessment and Plan:     83 yr female admitted for Acute respiratory distress, very  likely related to o2 malfunction, feels better with o2 in ER. will request for SW consult so o2 delivery and proper functioning instrument is arranged before discharge.    COPD exacerbation, Received  iv solumedrol in ER. Steroid discontinued for leukocytosis. Now On  Zithromax .     TRENTON,  BUN/ Cr was 23/1.09 mg.,  in      Essential HTN - On  diltiazem CD 240mg , coreg 6.25mg po bid for greater BP control.     Chronic diastolic CHF , On po Lasix 40mg daily . Will ABRAN ADAN Cardiology, BUN Disproportionately elevated related to steroidsABRAN RN,    Dm , type 2- Insulin sliding scale  & Lantus  .     Note :  Serum Pro-Brain Natriuretic Peptide (06.10.18 @ 18:32)    Serum Pro-Brain Natriuretic Peptide: 157 pg/mL

## 2018-06-10 NOTE — PROGRESS NOTE ADULT - ASSESSMENT
83 yr female admitted for Acute respiratory distress, very  likely related to o2 malfunction, feels better with o2 in ER. will request for SW consult so o2 delivery and proper functioning instrument is arranged before discharge.    COPD exac,-     may be mild exacerbation, Received  iv solumedrol in ER. Steroid discontinued for leukocytosis.  zithromax 500mg po daily .     TRENTON,  - Slow worsening of renal function on diuretics.  Nephrology Illamathi holding lasix for now.  Pat. has h/o DM and htn as well that might be contributing to renal insufficiency . Close f/u on fluid and electrolyte  balance and monitoring of kidney function.     Essential htn,-  continue diltiazem CD 240mg po daily . Start coreg 6.25mg po bid for greater BP control.     Chronic diastolic-  chf, continue po lasix 40mg po daily. Nephrology recommend to hold lasix for worsening renal function.  Cardiology dr De León bnp [phone] not elevated. SOB likely mostly pulmonary related COPD.     Leukocytosis,-  Urine culture growing ESBL E.Coli.  Consulted ID dr Luu.  Will start on invanz pending ID review.  Leukocytosis may be related to steroid . Discontinued steroid      Dm , type 2- Insulin sliding scale . Insulin lantus  . Start meal time insulin for greater blood sugar control     Disposition- PT, supportive care.

## 2018-06-10 NOTE — CONSULT NOTE ADULT - SUBJECTIVE AND OBJECTIVE BOX
[facility] [place] KANDY CURRAN MD FACP   SERGIO CORNEJO MD  Diplomates American Board of Internal Medicine and Infecctious Diseases  [phone]  [phone] JOSELINE [id number]      HPI:  84 y/o female who was discharged from [facility]  after being admitted for copd exacerbation and acute on chronic diastolic chf, pt. was sent home ( lives in assisted living ) on oxygen. As per pt. she got o2 tank but it was not function properly and she was not getting enough oxygen and felt sob, wheeze and could not catch her breath and came to hospital via ambulance. no cp. no fever , no abd. pain  or n/v reported. pt. feels better with oxygen in the ER. Pt. also reported that her blood sugar was 60 today when she checked at home. pt. was discharged home on po steroids which she used today in the morning.   PT HAD URINE CX SENT AND RESULTS WERE 10-49K ESBL  UNCLEAR HOW IT WAS COLLECTED PT DENIES  SX  ASKED TO EVALUATE FROM ID STANDPOINT       PAST MEDICAL & SURGICAL HISTORY:  NSTEMI (non-ST elevated myocardial infarction)  Gastroesophageal reflux disease without esophagitis  Pure hypercholesterolemia  Hypothyroidism, unspecified type  Essential hypertension  DM2 (diabetes mellitus, type 2)  Uncomplicated asthma, unspecified asthma severity  Abnormal findings on cardiac catheterization: Cardiac Cath  History of appendectomy      ANTIBIOTICS  azithromycin   Tablet 500 milliGRAM(s) Oral daily      Allergies    iodine (Hives)  shellfish (Anaphylaxis)    Intolerances        SOCIAL HISTORY:       FAMILY HX   FAMILY HISTORY:  Family history of stomach cancer  Family history of MI (myocardial infarction) (Father)      Vital Signs Last 24 Hrs  T(C): 36.9 (10 Charbel 2018 11:23), Max: 36.9 (10 Charbel 2018 11:23)  T(F): 98.4 (10 Charbel 2018 11:23), Max: 98.4 (10 Charbel 2018 11:23)  HR: 76 (10 Charbel 2018 14:38) (66 - 98)  BP: 110/62 (10 Charbel 2018 11:23) (110/62 - 129/63)  BP(mean): --  RR: 20 (10 Charbel 2018 11:23) (18 - 20)  SpO2: 100% (10 Charbel 2018 11:23) (96% - 100%)  Drug Dosing Weight  Height (cm): 152.4 (07 Jun 2018 14:22)  Weight (kg): 90.7 (07 Jun 2018 14:22)  BMI (kg/m2): 39.1 (07 Jun 2018 14:22)  BSA (m2): 1.87 (07 Jun 2018 14:22)      REVIEW OF SYSTEMS:    CONSTITUTIONAL:  As per HPI.    HEENT:  Eyes:  No diplopia or blurred vision. ENT:  No earache, sore throat or runny nose.    CARDIOVASCULAR:  No pressure, squeezing, strangling, tightness, heaviness or aching about the chest, neck, axilla or epigastrium.    RESPIRATORY:  No cough, shortness of breath, PND or orthopnea.    GASTROINTESTINAL:  No nausea, vomiting or diarrhea.    GENITOURINARY:  No dysuria, frequency or urgency.    MUSCULOSKELETAL:  As per HPI.    SKIN:  No change in skin, hair or nails.    NEUROLOGIC:  No paresthesias, fasciculations, seizures or weakness.                  PHYSICAL EXAMINATION:    GENERAL: The patient is a well-developed, well-nourished ___IN NAD OOB TO CHAIR    VITAL SIGNS: T(C): 36.9 (06-10-18 @ 11:23), Max: 36.9 (06-10-18 @ 11:23)  HR: 76 (06-10-18 @ 14:38) (66 - 98)  BP: 110/62 (06-10-18 @ 11:23) (110/62 - 129/63)  RR: 20 (06-10-18 @ 11:23) (18 - 20)  SpO2: 100% (06-10-18 @ 11:23) (96% - 100%)  Wt(kg): --    HEENT: Head is normocephalic and atraumatic.  ANICTERIC  NECK: Supple. No carotid bruits.  No lymphadenopathy or thyromegaly.    LUNGS:COARSE BREATH SOUNDS    HEART: Regular rate and rhythm without murmur.    ABDOMEN: Soft, nontender, and nondistended.  Positive bowel sounds.  No hepatosplenomegaly was noted. NO REBOUND NO GUARDING    EXTREMITIES: NO EDEMA NO ERYTHEMA    NEUROLOGIC: NON FOCAL      SKIN: No ulceration or induration present. NO RASH        BLOOD CULTURES  Culture Results:   10,000 - 49,000 CFU/mL Escherichia coli ESBL  .  TYPE: (C=Critical, N=Notification, A=Abnormal) C  TESTS:  _ ESBL  DATE/TIME CALLED: _ 06/10/2018 11:10:20  CALLED TO: Pankaj REYNA RN  READ BACK (2 Patient Identifiers)(Y/N): _ Y  READ BACK VALUES(Y/N): _ Y  CALLED BY: Pankaj BONNER  .  FAX TO [facility] (06-07 @ 22:53)       URINE CX          LABS:                        12.2   16.0  )-----------( 245      ( 10 Charbel 2018 07:39 )             39.7     06-10    136  |  95<L>  |  80.0<H>  ----------------------------<  280<H>  4.3   |  27.0  |  1.57<H>    Ca    9.3      10 Charbel 2018 07:39            RADIOLOGY & ADDITIONAL STUDIES:      ASSESSMENT/PLAN  84 y/o female who was discharged from [facility]  after being admitted for copd exacerbation and acute on chronic diastolic chf, pt. was sent home ( lives in assisted living ) on oxygen. As per pt. she got o2 tank but it was not function properly and she was not getting enough oxygen and felt sob, wheeze and could not catch her breath and came to hospital via ambulance. no cp. no fever , no abd. pain  or n/v reported. pt. feels better with oxygen in the ER. Pt. also reported that her blood sugar was 60 today when she checked at home. pt. was discharged home on po steroids which she used today in the morning.   PT HAD URINE CX SENT AND RESULTS WERE 10-49K ESBL ECOLI  UNCLEAR HOWKAYA NUÑEZ SPECIMEN  WAS COLLECTED AND   PT DENIES ANY SIGNIFICANT  SX   WOULD DEFER TREATMENT AS LOW COLONY COUNT AND NO SX AND MAY BE COLONIZATION   I WOULD SUGGEST REPEAT URINE VIA STRAIGHT CATH IF THIS IS POSITIVE THEN TREAT  IN TERMS OF COPD CAN  CONTINUE ZITHROMAX                  SERGIO CA MD

## 2018-06-10 NOTE — PROGRESS NOTE ADULT - SUBJECTIVE AND OBJECTIVE BOX
CC: Cough, Relentless. Not able to lie flat in bed to sleep at night. Sit up in chair all night . COPD , CHF . ESBL E Coli.   HPI:  84 y/o female who was discharged from [facility] yesterday after being admitted for copd exacerbation and acute on chronic diastolic chf, pt. was sent home ( lives in assisted living ) on oxygen. As per pt. she got o2 tank but it was not function properly and she was not getting enough oxygen and felt sob, wheeze and could not catch her breath and came to hospital via ambulance. no cp. no fever , no abd. pain  or n/v reported. pt. feels better with oxygen in the ER. Pt. also reported that her blood sugar was 60 today when she checked at home. pt. was discharged home on po steroids which she used today in the morning. (07 Jun 2018 22:23)    REVIEW OF SYSTEMS:    Patient denied fever, chills, abdominal pain, nausea, vomiting, cough, shortness of breath, chest pain or palpitations    Vital Signs Last 24 Hrs  T(C): 36.9 (10 Charbel 2018 11:23), Max: 36.9 (10 Charbel 2018 11:23)  T(F): 98.4 (10 Charbel 2018 11:23), Max: 98.4 (10 Charbel 2018 11:23)  HR: 66 (10 Charbel 2018 11:23) (66 - 98)  BP: 110/62 (10 Charbel 2018 11:23) (110/62 - 129/63)  BP(mean): --  RR: 20 (10 Charbel 2018 11:23) (18 - 20)  SpO2: 100% (10 Charbel 2018 11:23) (96% - 100%)I&O's Summary    09 Jun 2018 07:01  -  10 Charbel 2018 07:00  --------------------------------------------------------  IN: 200 mL / OUT: 0 mL / NET: 200 mL      PHYSICAL EXAM:  GENERAL: Obese  HEENT: PERRL, +EOMI, anicteric, no [place]  NECK: Supple, No JVD   CHEST/LUNG: bilateral rales   HEART: S1S2 Normal intensity, no murmurs, gallops or rubs noted  ABDOMEN: Soft, BS Normoactive, NT, ND, no HSM noted  EXTREMITIES:  2+ radial and DP pulses noted, no clubbing, cyanosis, or edema noted, Limited mobility   SKIN: No rashes or lesions noted  NEURO: A&Ox3, no focal deficits noted, CN II-XII intact  PSYCH: Depresse mood and affect; insight/judgement appropriate  LABS:                        12.2   16.0  )-----------( 245      ( 10 Charbel 2018 07:39 )             39.7     06-10    136  |  95<L>  |  80.0<H>  ----------------------------<  280<H>  4.3   |  27.0  |  1.57<H>    Ca    9.3      10 Charbel 2018 07:39          RADIOLOGY & ADDITIONAL TESTS:    MEDICATIONS:  MEDICATIONS  (STANDING):  ALBUTerol    90 MICROgram(s) HFA Inhaler 1 Puff(s) Inhalation every 4 hours  ALBUTerol/ipratropium for Nebulization 3 milliLiter(s) Nebulizer every 6 hours  aspirin enteric coated 81 milliGRAM(s) Oral daily  atorvastatin 20 milliGRAM(s) Oral at bedtime  azithromycin   Tablet 500 milliGRAM(s) Oral daily  clopidogrel Tablet 75 milliGRAM(s) Oral daily  dextrose 5%. 1000 milliLiter(s) (50 mL/Hr) IV Continuous <Continuous>  dextrose 50% Injectable 12.5 Gram(s) IV Push once  dextrose 50% Injectable 25 Gram(s) IV Push once  dextrose 50% Injectable 25 Gram(s) IV Push once  diltiazem    milliGRAM(s) Oral daily  enoxaparin Injectable 40 milliGRAM(s) SubCutaneous daily  insulin glargine Injectable (LANTUS) 20 Unit(s) SubCutaneous at bedtime  insulin lispro (HumaLOG) corrective regimen sliding scale   SubCutaneous three times a day before meals  insulin lispro (HumaLOG) corrective regimen sliding scale   SubCutaneous at bedtime  insulin lispro Injectable (HumaLOG) 4 Unit(s) SubCutaneous three times a day before meals  levothyroxine 112 MICROGram(s) Oral daily  pantoprazole    Tablet 40 milliGRAM(s) Oral before breakfast  tiotropium 18 MICROgram(s) Capsule 1 Capsule(s) Inhalation daily    MEDICATIONS  (PRN):  ALBUTerol    0.083% 2.5 milliGRAM(s) Nebulizer every 2 hours PRN Shortness of Breath and/or Wheezing  dextrose 40% Gel 15 Gram(s) Oral once PRN Blood Glucose LESS THAN 70 milliGRAM(s)/deciliter  glucagon  Injectable 1 milliGRAM(s) IntraMuscular once PRN Glucose LESS THAN 70 milligrams/deciliter

## 2018-06-11 DIAGNOSIS — R82.71 BACTERIURIA: ICD-10-CM

## 2018-06-11 DIAGNOSIS — D72.823 LEUKEMOID REACTION: ICD-10-CM

## 2018-06-11 DIAGNOSIS — J44.1 CHRONIC OBSTRUCTIVE PULMONARY DISEASE WITH (ACUTE) EXACERBATION: ICD-10-CM

## 2018-06-11 LAB
ANION GAP SERPL CALC-SCNC: 10 MMOL/L — SIGNIFICANT CHANGE UP (ref 5–17)
BUN SERPL-MCNC: 75 MG/DL — HIGH (ref 8–20)
CALCIUM SERPL-MCNC: 9 MG/DL — SIGNIFICANT CHANGE UP (ref 8.6–10.2)
CHLORIDE SERPL-SCNC: 100 MMOL/L — SIGNIFICANT CHANGE UP (ref 98–107)
CO2 SERPL-SCNC: 32 MMOL/L — HIGH (ref 22–29)
CREAT SERPL-MCNC: 1.41 MG/DL — HIGH (ref 0.5–1.3)
CULTURE RESULTS: NO GROWTH — SIGNIFICANT CHANGE UP
GLUCOSE BLDC GLUCOMTR-MCNC: 180 MG/DL — HIGH (ref 70–99)
GLUCOSE BLDC GLUCOMTR-MCNC: 205 MG/DL — HIGH (ref 70–99)
GLUCOSE BLDC GLUCOMTR-MCNC: 236 MG/DL — HIGH (ref 70–99)
GLUCOSE BLDC GLUCOMTR-MCNC: 297 MG/DL — HIGH (ref 70–99)
GLUCOSE SERPL-MCNC: 208 MG/DL — HIGH (ref 70–115)
HCT VFR BLD CALC: 40 % — SIGNIFICANT CHANGE UP (ref 37–47)
HGB BLD-MCNC: 12.1 G/DL — SIGNIFICANT CHANGE UP (ref 12–16)
MCHC RBC-ENTMCNC: 26.1 PG — LOW (ref 27–31)
MCHC RBC-ENTMCNC: 30.3 G/DL — LOW (ref 32–36)
MCV RBC AUTO: 86.2 FL — SIGNIFICANT CHANGE UP (ref 81–99)
PLATELET # BLD AUTO: 226 K/UL — SIGNIFICANT CHANGE UP (ref 150–400)
POTASSIUM SERPL-MCNC: 4.6 MMOL/L — SIGNIFICANT CHANGE UP (ref 3.5–5.3)
POTASSIUM SERPL-SCNC: 4.6 MMOL/L — SIGNIFICANT CHANGE UP (ref 3.5–5.3)
RBC # BLD: 4.64 M/UL — SIGNIFICANT CHANGE UP (ref 4.4–5.2)
RBC # FLD: 14.6 % — SIGNIFICANT CHANGE UP (ref 11–15.6)
SODIUM SERPL-SCNC: 142 MMOL/L — SIGNIFICANT CHANGE UP (ref 135–145)
SPECIMEN SOURCE: SIGNIFICANT CHANGE UP
WBC # BLD: 13.8 K/UL — HIGH (ref 4.8–10.8)
WBC # FLD AUTO: 13.8 K/UL — HIGH (ref 4.8–10.8)

## 2018-06-11 PROCEDURE — 99233 SBSQ HOSP IP/OBS HIGH 50: CPT

## 2018-06-11 PROCEDURE — 99232 SBSQ HOSP IP/OBS MODERATE 35: CPT

## 2018-06-11 RX ORDER — SACCHAROMYCES BOULARDII 250 MG
250 POWDER IN PACKET (EA) ORAL
Qty: 0 | Refills: 0 | Status: DISCONTINUED | OUTPATIENT
Start: 2018-06-11 | End: 2018-06-15

## 2018-06-11 RX ORDER — INSULIN LISPRO 100/ML
6 VIAL (ML) SUBCUTANEOUS
Qty: 0 | Refills: 0 | Status: DISCONTINUED | OUTPATIENT
Start: 2018-06-11 | End: 2018-06-15

## 2018-06-11 RX ORDER — INSULIN GLARGINE 100 [IU]/ML
25 INJECTION, SOLUTION SUBCUTANEOUS AT BEDTIME
Qty: 0 | Refills: 0 | Status: DISCONTINUED | OUTPATIENT
Start: 2018-06-11 | End: 2018-06-15

## 2018-06-11 RX ADMIN — Medication 4 UNIT(S): at 12:16

## 2018-06-11 RX ADMIN — Medication 3 MILLILITER(S): at 15:13

## 2018-06-11 RX ADMIN — Medication 3 MILLILITER(S): at 19:57

## 2018-06-11 RX ADMIN — INSULIN GLARGINE 25 UNIT(S): 100 INJECTION, SOLUTION SUBCUTANEOUS at 22:08

## 2018-06-11 RX ADMIN — AZITHROMYCIN 500 MILLIGRAM(S): 500 TABLET, FILM COATED ORAL at 17:44

## 2018-06-11 RX ADMIN — Medication 1: at 22:09

## 2018-06-11 RX ADMIN — Medication 250 MILLIGRAM(S): at 17:44

## 2018-06-11 RX ADMIN — Medication 240 MILLIGRAM(S): at 05:29

## 2018-06-11 RX ADMIN — Medication 4: at 08:13

## 2018-06-11 RX ADMIN — Medication 112 MICROGRAM(S): at 05:29

## 2018-06-11 RX ADMIN — Medication 81 MILLIGRAM(S): at 11:49

## 2018-06-11 RX ADMIN — ENOXAPARIN SODIUM 40 MILLIGRAM(S): 100 INJECTION SUBCUTANEOUS at 11:49

## 2018-06-11 RX ADMIN — Medication 6 UNIT(S): at 17:14

## 2018-06-11 RX ADMIN — Medication 3 MILLILITER(S): at 08:56

## 2018-06-11 RX ADMIN — Medication 2: at 12:16

## 2018-06-11 RX ADMIN — PANTOPRAZOLE SODIUM 40 MILLIGRAM(S): 20 TABLET, DELAYED RELEASE ORAL at 05:29

## 2018-06-11 RX ADMIN — Medication 4 UNIT(S): at 08:13

## 2018-06-11 RX ADMIN — CLOPIDOGREL BISULFATE 75 MILLIGRAM(S): 75 TABLET, FILM COATED ORAL at 11:49

## 2018-06-11 RX ADMIN — Medication 4: at 17:14

## 2018-06-11 RX ADMIN — Medication 3 MILLILITER(S): at 03:23

## 2018-06-11 RX ADMIN — ATORVASTATIN CALCIUM 20 MILLIGRAM(S): 80 TABLET, FILM COATED ORAL at 22:09

## 2018-06-11 NOTE — PROGRESS NOTE ADULT - SUBJECTIVE AND OBJECTIVE BOX
Patient is a 83y old  Female who presents with a chief complaint of difficulty breathing. (07 Jun 2018 22:23)      HPI:  82 y/o female who was discharged from [facility] yesterday after being admitted for copd exacerbation and acute on chronic diastolic chf, pt. was sent home ( lives in assisted living ) on oxygen. As per pt. she got o2 tank but it was not function properly and she was not getting enough oxygen and felt sob, wheeze and could not catch her breath and came to hospital via ambulance. no cp. no fever , no abd. pain  or n/v reported. pt. feels better with oxygen in the ER. Pt. also reported that her blood sugar was 60 today when she checked at home. pt. was discharged home on po steroids which she used today in the morning. (07 Jun 2018 22:23)    FAMILY HISTORY:  Family history of stomach cancer  Family history of MI (myocardial infarction) (Father)      INTERVAL HPI/OVERNIGHT EVENTS:  Pt. is seen and examined. Case d/w attending.  Pt. states she feels tired and wants to rest.  Denies SOB, CP, abd.pain, N/V/D/C, dysuria, chills/fever      PAST MEDICAL & SURGICAL HISTORY:  NSTEMI (non-ST elevated myocardial infarction)  Gastroesophageal reflux disease without esophagitis  Pure hypercholesterolemia  Hypothyroidism, unspecified type  Essential hypertension  DM2 (diabetes mellitus, type 2)  Uncomplicated asthma, unspecified asthma severity  Abnormal findings on cardiac catheterization: Cardiac Cath  History of appendectomy      Allergies    iodine (Hives)  shellfish (Anaphylaxis)    Intolerances        Vital Signs Last 24 Hrs  T(C): 36.7 (11 Jun 2018 11:53), Max: 36.8 (11 Jun 2018 06:34)  T(F): 98 (11 Jun 2018 11:53), Max: 98.2 (11 Jun 2018 06:34)  HR: 70 (11 Jun 2018 11:53) (66 - 89)  BP: 125/60 (11 Jun 2018 11:53) (125/60 - 142/62)  BP(mean): --  RR: 20 (11 Jun 2018 11:53) (18 - 20)  SpO2: 97% (11 Jun 2018 11:53) (96% - 99%)                                12.1   13.8  )-----------( 226      ( 11 Jun 2018 06:01 )             40.0         RADIOLOGY & ADDITIONAL STUDIES:    MEDICATIONS:  ALBUTerol    0.083% 2.5 milliGRAM(s) Nebulizer every 2 hours PRN  ALBUTerol    90 MICROgram(s) HFA Inhaler 1 Puff(s) Inhalation every 4 hours  ALBUTerol/ipratropium for Nebulization 3 milliLiter(s) Nebulizer every 6 hours  aspirin enteric coated 81 milliGRAM(s) Oral daily  atorvastatin 20 milliGRAM(s) Oral at bedtime  azithromycin   Tablet 500 milliGRAM(s) Oral daily  clopidogrel Tablet 75 milliGRAM(s) Oral daily  dextrose 40% Gel 15 Gram(s) Oral once PRN  dextrose 5%. 1000 milliLiter(s) IV Continuous <Continuous>  dextrose 50% Injectable 12.5 Gram(s) IV Push once  dextrose 50% Injectable 25 Gram(s) IV Push once  dextrose 50% Injectable 25 Gram(s) IV Push once  diltiazem    milliGRAM(s) Oral daily  enoxaparin Injectable 40 milliGRAM(s) SubCutaneous daily  glucagon  Injectable 1 milliGRAM(s) IntraMuscular once PRN  insulin glargine Injectable (LANTUS) 20 Unit(s) SubCutaneous at bedtime  insulin lispro (HumaLOG) corrective regimen sliding scale   SubCutaneous three times a day before meals  insulin lispro (HumaLOG) corrective regimen sliding scale   SubCutaneous at bedtime  insulin lispro Injectable (HumaLOG) 4 Unit(s) SubCutaneous three times a day before meals  levothyroxine 112 MICROGram(s) Oral daily  pantoprazole    Tablet 40 milliGRAM(s) Oral before breakfast  tiotropium 18 MICROgram(s) Capsule 1 Capsule(s) Inhalation daily

## 2018-06-11 NOTE — PHYSICAL THERAPY INITIAL EVALUATION ADULT - ADDITIONAL COMMENTS
per patient she is in an assisted living, however independent. She uses a RW when she leaves her living quarters, but does not use it around the home. She is requesting PT services when she returns home.

## 2018-06-11 NOTE — PROGRESS NOTE ADULT - ASSESSMENT
This 83 y.o. F with COPD exacerbation; found with positive urine culture, asymptomatic.   repeat urine cx sent on 6/10/18

## 2018-06-11 NOTE — PROGRESS NOTE ADULT - ASSESSMENT
Pt has TRENTON on CKD III in the setting of diuresis and ACEI use  1) TRENTON   2) CHF  3) HTN  4) DM    TRENTON,  BUN/ Cr was 23/1.09 mg.,  in March , 2018     Essential HTN - On  diltiazem CD 240mg , coreg 6.25mg po bid for greater BP control.     Chronic diastolic CHF , On po Lasix 40mg daily . Will [facility] Cardiology, BUN Disproportionately elevated related to steroids, ABRAN RN,    Dm , type 2- Insulin sliding scale  & Lantus  .     SCr improving; holding ACEI for now ;     Signing off; please recall if needed

## 2018-06-11 NOTE — PROGRESS NOTE ADULT - SUBJECTIVE AND OBJECTIVE BOX
[facility] DIVISION OF KIDNEY DISEASES AND HYPERTENSION -- FOLLOW UP NOTE  --------------------------------------------------------------------------------  Chief Complaint: TRENTON    24 hour events/subjective:  Pt seen and examined  SCr improving      PAST HISTORY  --------------------------------------------------------------------------------  No significant changes to PMH, PSH, FHx, SHx, unless otherwise noted    ALLERGIES & MEDICATIONS  --------------------------------------------------------------------------------  Allergies    iodine (Hives)  shellfish (Anaphylaxis)    Intolerances      Standing Inpatient Medications  ALBUTerol    90 MICROgram(s) HFA Inhaler 1 Puff(s) Inhalation every 4 hours  ALBUTerol/ipratropium for Nebulization 3 milliLiter(s) Nebulizer every 6 hours  aspirin enteric coated 81 milliGRAM(s) Oral daily  atorvastatin 20 milliGRAM(s) Oral at bedtime  azithromycin   Tablet 500 milliGRAM(s) Oral daily  clopidogrel Tablet 75 milliGRAM(s) Oral daily  dextrose 5%. 1000 milliLiter(s) IV Continuous <Continuous>  dextrose 50% Injectable 12.5 Gram(s) IV Push once  dextrose 50% Injectable 25 Gram(s) IV Push once  dextrose 50% Injectable 25 Gram(s) IV Push once  diltiazem    milliGRAM(s) Oral daily  enoxaparin Injectable 40 milliGRAM(s) SubCutaneous daily  insulin glargine Injectable (LANTUS) 20 Unit(s) SubCutaneous at bedtime  insulin lispro (HumaLOG) corrective regimen sliding scale   SubCutaneous three times a day before meals  insulin lispro (HumaLOG) corrective regimen sliding scale   SubCutaneous at bedtime  insulin lispro Injectable (HumaLOG) 4 Unit(s) SubCutaneous three times a day before meals  levothyroxine 112 MICROGram(s) Oral daily  pantoprazole    Tablet 40 milliGRAM(s) Oral before breakfast  saccharomyces boulardii 250 milliGRAM(s) Oral two times a day  tiotropium 18 MICROgram(s) Capsule 1 Capsule(s) Inhalation daily    PRN Inpatient Medications  ALBUTerol    0.083% 2.5 milliGRAM(s) Nebulizer every 2 hours PRN  dextrose 40% Gel 15 Gram(s) Oral once PRN  glucagon  Injectable 1 milliGRAM(s) IntraMuscular once PRN      REVIEW OF SYSTEMS  --------------------------------------------------------------------------------  Gen: No weight changes, fatigue, fevers/chills, weakness  Skin: No rashes  Head/Eyes/Ears/Mouth: No headache; Normal hearing; Normal vision w/o blurriness; No sinus pain/discomfort, sore throat  Respiratory: No dyspnea, cough, wheezing, hemoptysis  CV: No chest pain, PND, orthopnea  GI: No abdominal pain, diarrhea, constipation, nausea, vomiting, melena, hematochezia  : No increased frequency, dysuria, hematuria, nocturia  MSK: No joint pain/swelling; no back pain; no edema  Neuro: No dizziness/lightheadedness, weakness, seizures, numbness, tingling  Heme: No easy bruising or bleeding  Endo: No heat/cold intolerance  Psych: No significant nervousness, anxiety, stress, depression    All other systems were reviewed and are negative, except as noted.    VITALS/PHYSICAL EXAM  --------------------------------------------------------------------------------  T(C): 36.7 (06-11-18 @ 11:53), Max: 36.8 (06-11-18 @ 06:34)  HR: 70 (06-11-18 @ 11:53) (66 - 89)  BP: 125/60 (06-11-18 @ 11:53) (125/60 - 142/62)  RR: 20 (06-11-18 @ 11:53) (18 - 20)  SpO2: 97% (06-11-18 @ 11:53) (96% - 99%)  Wt(kg): --        06-10-18 @ 07:01  -  06-11-18 @ 07:00  --------------------------------------------------------  IN: 300 mL / OUT: 600 mL / NET: -300 mL      Physical Exam:    General: An elderly  female sitting up in chair, with o2 via NC on.   	HEENT: AT, NC. PERRL. intact EOM. no throat erythema or exudate.   	Neck: supple. no JVD.   	Chest: mild wheeze b/L. no rales.  	Heart: normal S1,S2. RRR. 3 /6 RJ left sternal border.   	Abdomen: soft. non-tender. obese+ BS.  	Ext: 1 + edema of b/L LE. moves all ext. well.   	Vascular : neuro vascular intact.   	Neuro: AAO x3. no focal weakness. no speech disorder.   	Skin: no rash. no warmth. no cyanosis.    Psychiatric : normal.    LABS/STUDIES  --------------------------------------------------------------------------------              12.1   13.8  >-----------<  226      [06-11-18 @ 06:01]              40.0     142  |  100  |  75.0  ----------------------------<  208      [06-11-18 @ 06:01]  4.6   |  32.0  |  1.41        Ca     9.0     [06-11-18 @ 06:01]            Creatinine Trend:  SCr 1.41 [06-11 @ 06:01]  SCr 1.57 [06-10 @ 07:39]  SCr 1.50 [06-09 @ 07:51]  SCr 1.62 [06-08 @ 07:59]  SCr 1.51 [06-07 @ 16:32]    Urinalysis - [06-07-18 @ 18:06]      Color Yellow / Appearance Clear / SG 1.005 / pH 6.5      Gluc Negative / Ketone Negative  / Bili Negative / Urobili Negative       Blood Negative / Protein Negative / Leuk Est Trace / Nitrite Negative      RBC  / WBC 3-5 / Hyaline  / Gran  / Sq Epi  / Non Sq Epi Occasional / Bacteria       HbA1c 8.5      [06-01-18 @ 05:46]

## 2018-06-11 NOTE — PROGRESS NOTE ADULT - ASSESSMENT
83 yr female admitted for Acute respiratory distress, very  likely related to o2 malfunction, feels better with o2 in ER. will request for SW consult so o2 delivery and proper functioning instrument is arranged before discharge.    1. COPD exac,-     may be mild exacerbation,   Received  iv solumedrol in ER.   Steroid discontinued for leukocytosis.    zithromax 500mg po daily .     2. TRENTON,  - Slow worsening of renal function on diuretics.    Nephrology Dr. Marx holding lasix for now.    h/o DM and htn as well that might be contributing to renal insufficiency .   Close f/u on fluid and electrolyte  balance and monitoring of kidney function.     06-11    142  |  100  |  75.0<H>  ----------------------------<  208<H>  4.6   |  32.0<H>  |  1.41<H>    Ca    9.0      11 Jun 2018 06:01      3. Essential htn,-    continue diltiazem CD 240mg po daily .   Start coreg 6.25mg po bid for greater BP control. ICU Vital Signs Last 24 Hrs  HR: 70 (11 Jun 2018 11:53) (66 - 89)  BP: 125/60 (11 Jun 2018 11:53) (125/60 - 142/62)  SpO2: 97% (11 Jun 2018 11:53) (96% - 99%)    4. Chronic diastolic-  chf,   continue po lasix 40mg po daily.   Nephrology recommend to hold lasix for worsening renal function.    Cardiology dr De León bnp 156 not elevated. SOB likely mostly pulmonary related COPD.     5. Leukocytosis,-  afebrile  Urine culture growing ESBL E.Coli.    ID Consult appreciated  Invanz dced   Leukocytosis may be related to steroid . Discontinued steroid      6. Dm   type 2- Insulin sliding scale .   Insulin lantus    Start meal time insulin for greater blood sugar control     Disposition- PT, supportive care.    PT evaluation appreciated - Home w/home PT recommended 83 yr female admitted for Acute respiratory distress, very  likely related to o2 malfunction, feels better with o2 in ER. will request for SW consult so o2 delivery and proper functioning instrument is arranged before discharge.    1. COPD exac,-     may be mild exacerbation,   Received  iv solumedrol in ER.   Steroid discontinued  zithromax 500mg po daily  Nebs    2. TRENTON,  - Slow worsening of renal function on diuretics.    Nephrology Dr. Marx holding lasix for now.    h/o DM and htn as well that might be contributing to renal insufficiency .   Close f/u on fluid and electrolyte  balance and monitoring of kidney function.       06-11    142  |  100  |  75.0<H>  ----------------------------<  208<H>  4.6   |  32.0<H>  |  1.41<H>    Ca    9.0      11 Jun 2018 06:01      3. Essential htn,-    continue diltiazem CD 240mg po daily .   monitor BP    4. Chronic diastolic-  chf,   Nephrology recommend to hold lasix for worsening renal function.    Cardiology dr De León bnp 156 not elevated. SOB likely mostly pulmonary related COPD.     5. Leukocytosis,-  afebrile   ID Consult appreciated  Leukocytosis may be related to steroid . Discontinued steroid      6. DM type 2   Insulin sliding scale, fingerstick monitoring  Increase Lantus to 25 units QHS and increase insulin lispro to 6 units 3x per day with meals for better sugar controll    Disposition- PT, supportive care.    PT evaluation appreciated - Home w/home PT recommended

## 2018-06-11 NOTE — PROGRESS NOTE ADULT - SUBJECTIVE AND OBJECTIVE BOX
[facility]  INFECTIOUS DISEASES AND INTERNAL MEDICINE at [place]  =======================================================  Lázaro Bob MD  Diplomates American Board of Internal Medicine and Infectious Diseases  =======================================================    FLOYD PAEZ [id number]  chief complaint: follow up on positive urine culture  patient seen and examined in follow up.  Chart and labs reviewed.     reports breathing better today.   Denies urinary complaints.  Specifically, no burning, no abd pain, no foul smelling urine.   slight cough.   =======================================================  Allergies:  iodine (Hives)  shellfish (Anaphylaxis)    =======================================================  Medications:  ALBUTerol    0.083% 2.5 milliGRAM(s) Nebulizer every 2 hours PRN  ALBUTerol    90 MICROgram(s) HFA Inhaler 1 Puff(s) Inhalation every 4 hours  ALBUTerol/ipratropium for Nebulization 3 milliLiter(s) Nebulizer every 6 hours  aspirin enteric coated 81 milliGRAM(s) Oral daily  atorvastatin 20 milliGRAM(s) Oral at bedtime  azithromycin   Tablet 500 milliGRAM(s) Oral daily  clopidogrel Tablet 75 milliGRAM(s) Oral daily  dextrose 40% Gel 15 Gram(s) Oral once PRN  dextrose 5%. 1000 milliLiter(s) IV Continuous <Continuous>  dextrose 50% Injectable 12.5 Gram(s) IV Push once  dextrose 50% Injectable 25 Gram(s) IV Push once  dextrose 50% Injectable 25 Gram(s) IV Push once  diltiazem    milliGRAM(s) Oral daily  enoxaparin Injectable 40 milliGRAM(s) SubCutaneous daily  glucagon  Injectable 1 milliGRAM(s) IntraMuscular once PRN  insulin glargine Injectable (LANTUS) 20 Unit(s) SubCutaneous at bedtime  insulin lispro (HumaLOG) corrective regimen sliding scale   SubCutaneous three times a day before meals  insulin lispro (HumaLOG) corrective regimen sliding scale   SubCutaneous at bedtime  insulin lispro Injectable (HumaLOG) 4 Unit(s) SubCutaneous three times a day before meals  levothyroxine 112 MICROGram(s) Oral daily  pantoprazole    Tablet 40 milliGRAM(s) Oral before breakfast  tiotropium 18 MICROgram(s) Capsule 1 Capsule(s) Inhalation daily      =======================================================  REVIEW OF SYSTEMS:  as above  all other ROS negative  =======================================================    Physical Exam:  Vital Signs Last 24 Hrs  T(C): 36.7 (11 Jun 2018 11:53), Max: 36.8 (11 Jun 2018 06:34)  T(F): 98 (11 Jun 2018 11:53), Max: 98.2 (11 Jun 2018 06:34)  HR: 70 (11 Jun 2018 11:53) (66 - 89)  BP: 125/60 (11 Jun 2018 11:53) (125/60 - 142/62)  RR: 20 (11 Jun 2018 11:53) (18 - 20)  SpO2: 97% (11 Jun 2018 11:53) (96% - 99%)    GEN: NAD, pleasant  HEENT: normocephalic and atraumatic. EOMI. GAMA.    NECK: Supple. No carotid bruits.  No lymphadenopathy or thyromegaly.  LUNGS: Clear to auscultation.; good air entry, prolong resp phase  HEART: Regular rate and rhythm without murmur.  ABDOMEN: Soft, nontender, and nondistended.  Positive bowel sounds.    : No tenderness to palpation lower abd/ bladder  EXTREMITIES: Without any cyanosis, clubbing, rash, lesions or edema.  MSK: no joint swelling  NEUROLOGIC: Cranial nerves II through XII are grossly intact.  PSYCHIATRIC: Appropriate affect .  SKIN: No ulceration or induration present.    =======================================================    Labs:  06-11    142  |  100  |  75.0<H>  ----------------------------<  208<H>  4.6   |  32.0<H>  |  1.41<H>    Ca    9.0      11 Jun 2018 06:01                      12.1   13.8  )-----------( 226      ( 11 Jun 2018 06:01 )             40.0     POCT Blood Glucose.: 205 mg/dL (11 Jun 2018 08:10)  POCT Blood Glucose.: 289 mg/dL (10 Charbel 2018 21:11)  POCT Blood Glucose.: 241 mg/dL (10 Charbel 2018 17:13)    RECENT CULTURES:  06-07 @ 22:53 .Urine Clean Catch (Midstream) Escherichia coli ESBL    10,000 - 49,000 CFU/mL Escherichia coli ESBL  .  TYPE: (C=Critical, N=Notification, A=Abnormal) C  TESTS:  _ ESBL  DATE/TIME CALLED: _ 06/10/2018 11:10:20  CALLED TO: Pankaj REYNA RN  READ BACK (2 Patient Identifiers)(Y/N): _ Y  READ BACK VALUES(Y/N): _ Y  CALLED BY: Pankaj BONNER  .  FAX TO [facility]

## 2018-06-12 LAB
ANION GAP SERPL CALC-SCNC: 11 MMOL/L — SIGNIFICANT CHANGE UP (ref 5–17)
BUN SERPL-MCNC: 58 MG/DL — HIGH (ref 8–20)
CALCIUM SERPL-MCNC: 9 MG/DL — SIGNIFICANT CHANGE UP (ref 8.6–10.2)
CHLORIDE SERPL-SCNC: 100 MMOL/L — SIGNIFICANT CHANGE UP (ref 98–107)
CO2 SERPL-SCNC: 29 MMOL/L — SIGNIFICANT CHANGE UP (ref 22–29)
CREAT SERPL-MCNC: 1.28 MG/DL — SIGNIFICANT CHANGE UP (ref 0.5–1.3)
GLUCOSE BLDC GLUCOMTR-MCNC: 169 MG/DL — HIGH (ref 70–99)
GLUCOSE BLDC GLUCOMTR-MCNC: 198 MG/DL — HIGH (ref 70–99)
GLUCOSE BLDC GLUCOMTR-MCNC: 252 MG/DL — HIGH (ref 70–99)
GLUCOSE BLDC GLUCOMTR-MCNC: 320 MG/DL — HIGH (ref 70–99)
GLUCOSE SERPL-MCNC: 263 MG/DL — HIGH (ref 70–115)
HCT VFR BLD CALC: 40.7 % — SIGNIFICANT CHANGE UP (ref 37–47)
HGB BLD-MCNC: 12.5 G/DL — SIGNIFICANT CHANGE UP (ref 12–16)
MAGNESIUM SERPL-MCNC: 2.3 MG/DL — SIGNIFICANT CHANGE UP (ref 1.6–2.6)
MCHC RBC-ENTMCNC: 26.3 PG — LOW (ref 27–31)
MCHC RBC-ENTMCNC: 30.7 G/DL — LOW (ref 32–36)
MCV RBC AUTO: 85.7 FL — SIGNIFICANT CHANGE UP (ref 81–99)
PLATELET # BLD AUTO: 226 K/UL — SIGNIFICANT CHANGE UP (ref 150–400)
POTASSIUM SERPL-MCNC: 4.8 MMOL/L — SIGNIFICANT CHANGE UP (ref 3.5–5.3)
POTASSIUM SERPL-SCNC: 4.8 MMOL/L — SIGNIFICANT CHANGE UP (ref 3.5–5.3)
RBC # BLD: 4.75 M/UL — SIGNIFICANT CHANGE UP (ref 4.4–5.2)
RBC # FLD: 14.7 % — SIGNIFICANT CHANGE UP (ref 11–15.6)
SODIUM SERPL-SCNC: 140 MMOL/L — SIGNIFICANT CHANGE UP (ref 135–145)
WBC # BLD: 18.6 K/UL — HIGH (ref 4.8–10.8)
WBC # FLD AUTO: 18.6 K/UL — HIGH (ref 4.8–10.8)

## 2018-06-12 PROCEDURE — 99233 SBSQ HOSP IP/OBS HIGH 50: CPT

## 2018-06-12 RX ADMIN — Medication 6: at 08:17

## 2018-06-12 RX ADMIN — ENOXAPARIN SODIUM 40 MILLIGRAM(S): 100 INJECTION SUBCUTANEOUS at 12:24

## 2018-06-12 RX ADMIN — CLOPIDOGREL BISULFATE 75 MILLIGRAM(S): 75 TABLET, FILM COATED ORAL at 12:24

## 2018-06-12 RX ADMIN — Medication 112 MICROGRAM(S): at 05:56

## 2018-06-12 RX ADMIN — Medication 240 MILLIGRAM(S): at 05:56

## 2018-06-12 RX ADMIN — Medication 250 MILLIGRAM(S): at 05:56

## 2018-06-12 RX ADMIN — ATORVASTATIN CALCIUM 20 MILLIGRAM(S): 80 TABLET, FILM COATED ORAL at 21:55

## 2018-06-12 RX ADMIN — Medication 3 MILLILITER(S): at 03:42

## 2018-06-12 RX ADMIN — AZITHROMYCIN 500 MILLIGRAM(S): 500 TABLET, FILM COATED ORAL at 14:04

## 2018-06-12 RX ADMIN — Medication 3 MILLILITER(S): at 09:19

## 2018-06-12 RX ADMIN — Medication 250 MILLIGRAM(S): at 17:34

## 2018-06-12 RX ADMIN — Medication 2: at 12:25

## 2018-06-12 RX ADMIN — Medication 6 UNIT(S): at 12:25

## 2018-06-12 RX ADMIN — PANTOPRAZOLE SODIUM 40 MILLIGRAM(S): 20 TABLET, DELAYED RELEASE ORAL at 05:56

## 2018-06-12 RX ADMIN — Medication 81 MILLIGRAM(S): at 12:25

## 2018-06-12 RX ADMIN — Medication 3 MILLILITER(S): at 15:31

## 2018-06-12 RX ADMIN — Medication 6 UNIT(S): at 17:33

## 2018-06-12 RX ADMIN — Medication 3 MILLILITER(S): at 20:30

## 2018-06-12 RX ADMIN — INSULIN GLARGINE 25 UNIT(S): 100 INJECTION, SOLUTION SUBCUTANEOUS at 21:54

## 2018-06-12 RX ADMIN — Medication 2: at 17:33

## 2018-06-12 RX ADMIN — Medication 2: at 21:54

## 2018-06-12 RX ADMIN — Medication 6 UNIT(S): at 08:17

## 2018-06-12 NOTE — PROGRESS NOTE ADULT - ASSESSMENT
83 yr female admitted for Acute respiratory distress, very  likely related to o2 malfunction, feels better with o2 in ER. will request for SW consult so o2 delivery and proper functioning instrument is arranged before discharge.    1. COPD exac,-     may be mild exacerbation,   Received  iv solumedrol in ER.   Steroid discontinued few days back  WBC still up   CXR  blood c&s added  zithromax 500mg po daily tommorrow last day  Nebs    2. TRENTON,  - Slow worsening of renal function on diuretics.    Nephrology Dr. Marx holding lasix for now.    SCr improving  ACE on hold  h/o DM and htn as well that might be contributing to renal insufficiency .   Close f/u on fluid and electrolyte  balance and monitoring of kidney function.         3. Essential htn,-    continue diltiazem CD 240mg po daily & coreg 6.25mg po bid for greater BP control.   monitor BP    4. Chronic diastolic-  chf,   Nephrology recommend to hold lasix for worsening renal function.    Cardiology dr De León bnp 156 not elevated. SOB likely mostly pulmonary related COPD.     5. Leukocytosis,-  afebrile   ID Consult appreciated  Leukocytosis may be related to steroid . Discontinued steroid      6. DM type 2   Insulin sliding scale, fingerstick monitoring  Increase Lantus to 25 units QHS and increase insulin lispro to 6 units 3x per day with meals for better sugar controll    Disposition- PT, supportive care.    PT evaluation appreciated - Home w/home PT recommended but pt prefers [facility]  SW met with family (pt. gave full authorization to talk to daughter in law r.e plan of care) 83 yr female admitted for Acute respiratory distress, very  likely related to o2 malfunction, feels better with o2 in ER. will request for SW consult so o2 delivery and proper functioning instrument is arranged before discharge.    1. COPD exac,-     may be mild exacerbation,   Received  iv solumedrol in ER.   Steroid discontinued few days back  WBC still up   CXR  blood c&s added  zithromax 500mg po daily tommorrow last day  Nebs    2. TRENTON,  - Slow worsening of renal function on diuretics.    Nephrology Dr. Marx holding lasix for now, can resume lasix post discharge per renal  SCr improving  ACE on hold  h/o DM and htn as well that might be contributing to renal insufficiency .   Close f/u on fluid and electrolyte  balance and monitoring of kidney function.         3. Essential htn,-    continue diltiazem CD 240mg po daily & coreg 6.25mg po bid for greater BP control.   monitor BP    4. Chronic diastolic-  chf,   Nephrology recommend to hold lasix for worsening renal function.    Cardiology dr De León bnp 156 not elevated. SOB likely mostly pulmonary related COPD.     5. Leukocytosis,-  afebrile   ID Consult appreciated  Leukocytosis may be related to steroid . Discontinued steroid    However since wbc is 18 today will check CXR and blood cx, urine cx from 6/10 was negative    6. DM type 2   Insulin sliding scale, fingerstick monitoring  Increase Lantus to 25 units QHS and increase insulin lispro to 6 units 3x per day with meals for better sugar control    Disposition- PT, supportive care.    PT evaluation appreciated - Home w/home PT recommended but pt prefers [facility]  SW met with family (pt. gave full authorization to talk to daughter in law r.e plan of care)

## 2018-06-12 NOTE — PROGRESS NOTE ADULT - NEGATIVE GASTROINTESTINAL SYMPTOMS
no nausea/no vomiting/no abdominal pain
no constipation/no vomiting/no diarrhea/no nausea/no abdominal pain

## 2018-06-12 NOTE — PROGRESS NOTE ADULT - SUBJECTIVE AND OBJECTIVE BOX
Patient is a 83y old  Female who presents with a chief complaint of difficulty breathing. (07 Jun 2018 22:23)      HPI:  82 y/o female who was discharged from [facility] yesterday after being admitted for copd exacerbation and acute on chronic diastolic chf, pt. was sent home ( lives in assisted living ) on oxygen. As per pt. she got o2 tank but it was not function properly and she was not getting enough oxygen and felt sob, wheeze and could not catch her breath and came to hospital via ambulance. no cp. no fever , no abd. pain  or n/v reported. pt. feels better with oxygen in the ER. Pt. also reported that her blood sugar was 60 today when she checked at home. pt. was discharged home on po steroids which she used today in the morning. (07 Jun 2018 22:23)    FAMILY HISTORY:  Family history of stomach cancer  Family history of MI (myocardial infarction) (Father)      INTERVAL HPI/OVERNIGHT EVENTS:  Pt. is seen and examined at the bedside. Case d/w attending.  Pt. states she still feels tired.  Denies SOB, CP, abd.pain, N/V/D/C, dysuria, chills/fever    PAST MEDICAL & SURGICAL HISTORY:  NSTEMI (non-ST elevated myocardial infarction)  Gastroesophageal reflux disease without esophagitis  Pure hypercholesterolemia  Hypothyroidism, unspecified type  Essential hypertension  DM2 (diabetes mellitus, type 2)  Uncomplicated asthma, unspecified asthma severity  Abnormal findings on cardiac catheterization: Cardiac Cath  History of appendectomy      Allergies    iodine (Hives)  shellfish (Anaphylaxis)    Intolerances    Vital Signs Last 24 Hrs  T(C): 36.6 (12 Jun 2018 11:04), Max: 36.9 (11 Jun 2018 23:00)  T(F): 97.8 (12 Jun 2018 11:04), Max: 98.5 (11 Jun 2018 23:00)  HR: 76 (12 Jun 2018 15:32) (76 - 91)  BP: 112/53 (12 Jun 2018 11:04) (112/53 - 131/67)  BP(mean): --  RR: 18 (12 Jun 2018 11:04) (18 - 20)  SpO2: 97% (12 Jun 2018 15:32) (95% - 98%)                          12.5   18.6  )-----------( 226      ( 12 Jun 2018 05:59 )             40.7     RADIOLOGY & ADDITIONAL STUDIES:    MEDICATIONS:  ALBUTerol    0.083% 2.5 milliGRAM(s) Nebulizer every 2 hours PRN  ALBUTerol    90 MICROgram(s) HFA Inhaler 1 Puff(s) Inhalation every 4 hours  ALBUTerol/ipratropium for Nebulization 3 milliLiter(s) Nebulizer every 6 hours  aspirin enteric coated 81 milliGRAM(s) Oral daily  atorvastatin 20 milliGRAM(s) Oral at bedtime  azithromycin   Tablet 500 milliGRAM(s) Oral daily  clopidogrel Tablet 75 milliGRAM(s) Oral daily  dextrose 40% Gel 15 Gram(s) Oral once PRN  dextrose 5%. 1000 milliLiter(s) IV Continuous <Continuous>  dextrose 50% Injectable 12.5 Gram(s) IV Push once  dextrose 50% Injectable 25 Gram(s) IV Push once  dextrose 50% Injectable 25 Gram(s) IV Push once  diltiazem    milliGRAM(s) Oral daily  enoxaparin Injectable 40 milliGRAM(s) SubCutaneous daily  glucagon  Injectable 1 milliGRAM(s) IntraMuscular once PRN  insulin glargine Injectable (LANTUS) 25 Unit(s) SubCutaneous at bedtime  insulin lispro (HumaLOG) corrective regimen sliding scale   SubCutaneous three times a day before meals  insulin lispro (HumaLOG) corrective regimen sliding scale   SubCutaneous at bedtime  insulin lispro Injectable (HumaLOG) 6 Unit(s) SubCutaneous three times a day before meals  levothyroxine 112 MICROGram(s) Oral daily  pantoprazole    Tablet 40 milliGRAM(s) Oral before breakfast  saccharomyces boulardii 250 milliGRAM(s) Oral two times a day  tiotropium 18 MICROgram(s) Capsule 1 Capsule(s) Inhalation daily

## 2018-06-13 LAB
ANION GAP SERPL CALC-SCNC: 13 MMOL/L — SIGNIFICANT CHANGE UP (ref 5–17)
BUN SERPL-MCNC: 52 MG/DL — HIGH (ref 8–20)
CALCIUM SERPL-MCNC: 8.5 MG/DL — LOW (ref 8.6–10.2)
CHLORIDE SERPL-SCNC: 99 MMOL/L — SIGNIFICANT CHANGE UP (ref 98–107)
CO2 SERPL-SCNC: 27 MMOL/L — SIGNIFICANT CHANGE UP (ref 22–29)
CREAT SERPL-MCNC: 1.14 MG/DL — SIGNIFICANT CHANGE UP (ref 0.5–1.3)
GLUCOSE BLDC GLUCOMTR-MCNC: 153 MG/DL — HIGH (ref 70–99)
GLUCOSE BLDC GLUCOMTR-MCNC: 198 MG/DL — HIGH (ref 70–99)
GLUCOSE BLDC GLUCOMTR-MCNC: 198 MG/DL — HIGH (ref 70–99)
GLUCOSE BLDC GLUCOMTR-MCNC: 316 MG/DL — HIGH (ref 70–99)
GLUCOSE SERPL-MCNC: 203 MG/DL — HIGH (ref 70–115)
HCT VFR BLD CALC: 38.5 % — SIGNIFICANT CHANGE UP (ref 37–47)
HGB BLD-MCNC: 11.9 G/DL — LOW (ref 12–16)
MAGNESIUM SERPL-MCNC: 2.2 MG/DL — SIGNIFICANT CHANGE UP (ref 1.6–2.6)
MCHC RBC-ENTMCNC: 26.6 PG — LOW (ref 27–31)
MCHC RBC-ENTMCNC: 30.9 G/DL — LOW (ref 32–36)
MCV RBC AUTO: 85.9 FL — SIGNIFICANT CHANGE UP (ref 81–99)
PLATELET # BLD AUTO: 211 K/UL — SIGNIFICANT CHANGE UP (ref 150–400)
POTASSIUM SERPL-MCNC: 4.2 MMOL/L — SIGNIFICANT CHANGE UP (ref 3.5–5.3)
POTASSIUM SERPL-SCNC: 4.2 MMOL/L — SIGNIFICANT CHANGE UP (ref 3.5–5.3)
RBC # BLD: 4.48 M/UL — SIGNIFICANT CHANGE UP (ref 4.4–5.2)
RBC # FLD: 14.7 % — SIGNIFICANT CHANGE UP (ref 11–15.6)
SODIUM SERPL-SCNC: 139 MMOL/L — SIGNIFICANT CHANGE UP (ref 135–145)
WBC # BLD: 18.6 K/UL — HIGH (ref 4.8–10.8)
WBC # FLD AUTO: 18.6 K/UL — HIGH (ref 4.8–10.8)

## 2018-06-13 PROCEDURE — 99233 SBSQ HOSP IP/OBS HIGH 50: CPT

## 2018-06-13 PROCEDURE — 71046 X-RAY EXAM CHEST 2 VIEWS: CPT | Mod: 26

## 2018-06-13 RX ORDER — SENNA PLUS 8.6 MG/1
2 TABLET ORAL AT BEDTIME
Qty: 0 | Refills: 0 | Status: DISCONTINUED | OUTPATIENT
Start: 2018-06-13 | End: 2018-06-15

## 2018-06-13 RX ORDER — DOCUSATE SODIUM 100 MG
100 CAPSULE ORAL DAILY
Qty: 0 | Refills: 0 | Status: DISCONTINUED | OUTPATIENT
Start: 2018-06-13 | End: 2018-06-15

## 2018-06-13 RX ADMIN — Medication 6 UNIT(S): at 12:06

## 2018-06-13 RX ADMIN — ENOXAPARIN SODIUM 40 MILLIGRAM(S): 100 INJECTION SUBCUTANEOUS at 12:08

## 2018-06-13 RX ADMIN — Medication 250 MILLIGRAM(S): at 17:15

## 2018-06-13 RX ADMIN — PANTOPRAZOLE SODIUM 40 MILLIGRAM(S): 20 TABLET, DELAYED RELEASE ORAL at 06:00

## 2018-06-13 RX ADMIN — Medication 3 MILLILITER(S): at 03:39

## 2018-06-13 RX ADMIN — ATORVASTATIN CALCIUM 20 MILLIGRAM(S): 80 TABLET, FILM COATED ORAL at 21:53

## 2018-06-13 RX ADMIN — INSULIN GLARGINE 25 UNIT(S): 100 INJECTION, SOLUTION SUBCUTANEOUS at 21:52

## 2018-06-13 RX ADMIN — Medication 2: at 12:06

## 2018-06-13 RX ADMIN — CLOPIDOGREL BISULFATE 75 MILLIGRAM(S): 75 TABLET, FILM COATED ORAL at 12:06

## 2018-06-13 RX ADMIN — Medication 112 MICROGRAM(S): at 05:59

## 2018-06-13 RX ADMIN — SENNA PLUS 2 TABLET(S): 8.6 TABLET ORAL at 21:53

## 2018-06-13 RX ADMIN — Medication 6 UNIT(S): at 17:15

## 2018-06-13 RX ADMIN — Medication 2: at 21:52

## 2018-06-13 RX ADMIN — AZITHROMYCIN 500 MILLIGRAM(S): 500 TABLET, FILM COATED ORAL at 12:07

## 2018-06-13 RX ADMIN — Medication 240 MILLIGRAM(S): at 05:59

## 2018-06-13 RX ADMIN — Medication 100 MILLIGRAM(S): at 17:15

## 2018-06-13 RX ADMIN — Medication 3 MILLILITER(S): at 08:38

## 2018-06-13 RX ADMIN — Medication 2: at 08:01

## 2018-06-13 RX ADMIN — Medication 250 MILLIGRAM(S): at 05:59

## 2018-06-13 RX ADMIN — Medication 2: at 17:15

## 2018-06-13 RX ADMIN — Medication 6 UNIT(S): at 08:01

## 2018-06-13 RX ADMIN — Medication 3 MILLILITER(S): at 20:40

## 2018-06-13 RX ADMIN — Medication 81 MILLIGRAM(S): at 12:07

## 2018-06-13 NOTE — PROGRESS NOTE ADULT - SUBJECTIVE AND OBJECTIVE BOX
Patient is a 83y old Female who presents with a chief complaint of difficulty breathing.     HPI:  82 y/o female who was discharged from [facility] yesterday after being admitted for copd exacerbation and acute on chronic diastolic chf, pt. was sent home ( lives in assisted living ) on oxygen. As per pt. she got o2 tank but it was not function properly and she was not getting enough oxygen and felt sob, wheeze and could not catch her breath and came to hospital via ambulance. no cp. no fever , no abd. pain  or n/v reported. pt. feels better with oxygen in the ER. Pt. also reported that her blood sugar was 60 today when she checked at home. pt. was discharged home on po steroids which she used today in the morning.     INTERVAL HPI/OVERNIGHT EVENTS:  Pt. is seen and examined at the bedside. Reports improvement in SOB.   Denies SOB, CP, abd.pain, N/V/D/C, dysuria, chills/fever    MEDICATIONS:  ALBUTerol    0.083% 2.5 milliGRAM(s) Nebulizer every 2 hours PRN  ALBUTerol    90 MICROgram(s) HFA Inhaler 1 Puff(s) Inhalation every 4 hours  ALBUTerol/ipratropium for Nebulization 3 milliLiter(s) Nebulizer every 6 hours  aspirin enteric coated 81 milliGRAM(s) Oral daily  atorvastatin 20 milliGRAM(s) Oral at bedtime  azithromycin   Tablet 500 milliGRAM(s) Oral daily  clopidogrel Tablet 75 milliGRAM(s) Oral daily  dextrose 40% Gel 15 Gram(s) Oral once PRN  dextrose 5%. 1000 milliLiter(s) IV Continuous <Continuous>  dextrose 50% Injectable 12.5 Gram(s) IV Push once  dextrose 50% Injectable 25 Gram(s) IV Push once  dextrose 50% Injectable 25 Gram(s) IV Push once  diltiazem    milliGRAM(s) Oral daily  enoxaparin Injectable 40 milliGRAM(s) SubCutaneous daily  glucagon  Injectable 1 milliGRAM(s) IntraMuscular once PRN  insulin glargine Injectable (LANTUS) 25 Unit(s) SubCutaneous at bedtime  insulin lispro (HumaLOG) corrective regimen sliding scale   SubCutaneous three times a day before meals  insulin lispro (HumaLOG) corrective regimen sliding scale   SubCutaneous at bedtime  insulin lispro Injectable (HumaLOG) 6 Unit(s) SubCutaneous three times a day before meals  levothyroxine 112 MICROGram(s) Oral daily  pantoprazole    Tablet 40 milliGRAM(s) Oral before breakfast  saccharomyces boulardii 250 milliGRAM(s) Oral two times a day  tiotropium 18 MICROgram(s) Capsule 1 Capsule(s) Inhalation daily    FAMILY HISTORY:  Family history of stomach cancer  Family history of MI (myocardial infarction) (Father)    PAST MEDICAL & SURGICAL HISTORY:  NSTEMI (non-ST elevated myocardial infarction)  Gastroesophageal reflux disease without esophagitis  Pure hypercholesterolemia  Hypothyroidism, unspecified type  Essential hypertension  DM2 (diabetes mellitus, type 2)  Uncomplicated asthma, unspecified asthma severity  Abnormal findings on cardiac catheterization: Cardiac Cath  History of appendectomy    Allergies  iodine (Hives)  shellfish (Anaphylaxis)    Vital Signs Last 24 Hrs  T(C): 36.4 (13 Jun 2018 07:58), Max: 36.8 (12 Jun 2018 21:44)  T(F): 97.6 (13 Jun 2018 07:58), Max: 98.2 (12 Jun 2018 21:44)  HR: 78 (13 Jun 2018 08:38) (76 - 86)  BP: 134/58 (13 Jun 2018 07:58) (116/58 - 137/66)  BP(mean): --  RR: 20 (13 Jun 2018 07:58) (18 - 20)  SpO2: 95% (13 Jun 2018 08:38) (94% - 98%)    PHYSICAL EXAM:  GENERAL: NAD, well-groomed, well-developed  HEAD:  Atraumatic, Normocephalic  EYES: EOMI, PERRLA, conjunctiva and sclera clear  NERVOUS SYSTEM:  Alert & Oriented X3, Good concentration; Motor Strength 5/5 B/L upper and lower extremities  CHEST/LUNG: good air entry b/l, scattered expiratory wheeze   HEART: Regular rate and rhythm; No murmurs, rubs, or gallops  ABDOMEN: Soft, Nontender, Nondistended; Bowel sounds present  EXTREMITIES:  2+ b/l lower extremity edema Patient is a 83y old Female who presents with a chief complaint of difficulty breathing.     INTERVAL HPI/OVERNIGHT EVENTS:  Pt. is seen and examined at the bedside. Reports improvement in SOB.   Denies SOB, CP, abd.pain, N/V/D/C, dysuria, chills/fever    MEDICATIONS:  ALBUTerol    0.083% 2.5 milliGRAM(s) Nebulizer every 2 hours PRN  ALBUTerol    90 MICROgram(s) HFA Inhaler 1 Puff(s) Inhalation every 4 hours  ALBUTerol/ipratropium for Nebulization 3 milliLiter(s) Nebulizer every 6 hours  aspirin enteric coated 81 milliGRAM(s) Oral daily  atorvastatin 20 milliGRAM(s) Oral at bedtime  azithromycin   Tablet 500 milliGRAM(s) Oral daily  clopidogrel Tablet 75 milliGRAM(s) Oral daily  dextrose 40% Gel 15 Gram(s) Oral once PRN  dextrose 5%. 1000 milliLiter(s) IV Continuous <Continuous>  dextrose 50% Injectable 12.5 Gram(s) IV Push once  dextrose 50% Injectable 25 Gram(s) IV Push once  dextrose 50% Injectable 25 Gram(s) IV Push once  diltiazem    milliGRAM(s) Oral daily  enoxaparin Injectable 40 milliGRAM(s) SubCutaneous daily  glucagon  Injectable 1 milliGRAM(s) IntraMuscular once PRN  insulin glargine Injectable (LANTUS) 25 Unit(s) SubCutaneous at bedtime  insulin lispro (HumaLOG) corrective regimen sliding scale   SubCutaneous three times a day before meals  insulin lispro (HumaLOG) corrective regimen sliding scale   SubCutaneous at bedtime  insulin lispro Injectable (HumaLOG) 6 Unit(s) SubCutaneous three times a day before meals  levothyroxine 112 MICROGram(s) Oral daily  pantoprazole    Tablet 40 milliGRAM(s) Oral before breakfast  saccharomyces boulardii 250 milliGRAM(s) Oral two times a day  tiotropium 18 MICROgram(s) Capsule 1 Capsule(s) Inhalation daily    FAMILY HISTORY:  Family history of stomach cancer  Family history of MI (myocardial infarction) (Father)    PAST MEDICAL & SURGICAL HISTORY:  NSTEMI (non-ST elevated myocardial infarction)  Gastroesophageal reflux disease without esophagitis  Pure hypercholesterolemia  Hypothyroidism, unspecified type  Essential hypertension  DM2 (diabetes mellitus, type 2)  Uncomplicated asthma, unspecified asthma severity  Abnormal findings on cardiac catheterization: Cardiac Cath  History of appendectomy    Allergies  iodine (Hives)  shellfish (Anaphylaxis)    Vital Signs Last 24 Hrs  T(C): 36.4 (13 Jun 2018 07:58), Max: 36.8 (12 Jun 2018 21:44)  T(F): 97.6 (13 Jun 2018 07:58), Max: 98.2 (12 Jun 2018 21:44)  HR: 78 (13 Jun 2018 08:38) (76 - 86)  BP: 134/58 (13 Jun 2018 07:58) (116/58 - 137/66)  BP(mean): --  RR: 20 (13 Jun 2018 07:58) (18 - 20)  SpO2: 95% (13 Jun 2018 08:38) (94% - 98%)    PHYSICAL EXAM:  GENERAL: NAD, well-groomed, well-developed  HEAD:  Atraumatic, Normocephalic  EYES: EOMI, PERRLA, conjunctiva and sclera clear  NERVOUS SYSTEM:  Alert & Oriented X3, Good concentration; Motor Strength 5/5 B/L upper and lower extremities  CHEST/LUNG: good air entry b/l, scattered expiratory wheeze   HEART: Regular rate and rhythm; No murmurs, rubs, or gallops  ABDOMEN: Soft, Nontender, Nondistended; Bowel sounds present  EXTREMITIES:  2+ b/l lower extremity edema

## 2018-06-13 NOTE — DIETITIAN INITIAL EVALUATION ADULT. - SIGNS/SYMPTOMS
as evidenced by uncontrolled DM, -320, Glu 203, and HgA1c 8.5 as evidenced by -320, Glu 203, and HgA1c 8.5

## 2018-06-13 NOTE — DIETITIAN INITIAL EVALUATION ADULT. - FACTORS AFF FOOD INTAKE
other (specify)/Hard to eat d/t difficulty breathing other (specify)/Hard to eat due to difficulty breathing

## 2018-06-13 NOTE — DIETITIAN INITIAL EVALUATION ADULT. - DIET TYPE
DASH/TLC (sodium and cholesterol restricted diet) consistent carbohydrate (evening snack)/DASH/TLC (sodium and cholesterol restricted diet)

## 2018-06-13 NOTE — DIETITIAN INITIAL EVALUATION ADULT. - OTHER INFO
Pt admitted for COPD. Difficulty breathing has effected pt appetite. Pt states has had DM2 for 20+ years and denied education for uncontrolled DM

## 2018-06-13 NOTE — DIETITIAN INITIAL EVALUATION ADULT. - NUTRITIONGOAL OUTCOME1
Pt will consume Ensure supplement at breakfast Pt will consume Glucerna supplement at breakfast Declined Diabetic education

## 2018-06-13 NOTE — DIETITIAN INITIAL EVALUATION ADULT. - ORAL INTAKE PTA
fair/Decreased appetite d/t difficulty breathing from COPD Decreased appetite due to difficulty breathing secondary to COPD/fair

## 2018-06-13 NOTE — PROGRESS NOTE ADULT - ASSESSMENT
83 yr female admitted for Acute respiratory distress, very likely related to o2 malfunction, feels better with o2 in ER. will request for SW consult so o2 delivery and proper functioning instrument is arranged before discharge.    1. COPD exac,-     may be mild exacerbation,   Received  iv solumedrol in ER.   Steroid discontinued few days back  WBC still up, 18.6 today  Repeat cx ordered yesterday but not drawn. Repeat bcx to be drawn today   zithromax 500mg po daily, last dose today   Nebs  02 to be set up for discharge by SW     2. [place],  - Slow worsening of renal function on diuretics.    Nephrology Dr. Marx holding lasix for now, can resume lasix post discharge per renal  SCr improving today 1.14  ACE on hold  h/o DM and htn as well that might be contributing to renal insufficiency .   Close f/u on fluid and electrolyte  balance and monitoring of kidney function.       3. Essential htn,-    continue diltiazem CD 240mg po daily & coreg 6.25mg po bid for greater BP control.   monitor BP    4. Chronic diastolic-  chf,   Nephrology recommend to hold lasix for worsening renal function.    Cardiology dr De León bnp 156 not elevated. SOB likely mostly pulmonary related COPD.     5. Leukocytosis,-  afebrile   ID Consult appreciated  Leukocytosis may be related to steroid . Discontinued steroid    However since wbc was 18 yesterday, CXR done and blood cultures redrawn.  urine cx from 6/10 was negative    6. DM type 2   Insulin sliding scale, fingerstick monitoring  Increase Lantus to 25 units QHS and increase insulin lispro to 6 units 3x per day with meals for better sugar control    Disposition- PT, supportive care.    PT evaluation appreciated - Home w/home PT recommended but pt prefers [facility]  SW met with family (pt. gave full authorization to talk to daughter in law r.e plan of care) 83 yr female admitted for Acute respiratory distress, very likely related to o2 malfunction, feels better with o2 in ER. will request for SW consult so o2 delivery and proper functioning instrument is arranged before discharge.    1. COPD exac,-     may be mild exacerbation,   Received  iv solumedrol in ER.   Steroid discontinued few days back  WBC still up, 18.6 today  Repeat cx ordered yesterday but not drawn. Repeat bcx to be drawn today   zithromax 500mg po daily, last dose today   Nebs  02 to be set up for discharge by SW     2. [place],  - Slow worsening of renal function on diuretics.    Nephrology Dr. Marx holding lasix for now, can resume lasix post discharge per renal  SCr improving today 1.14  ACE on hold  h/o DM and htn as well that might be contributing to renal insufficiency .   Close f/u on fluid and electrolyte  balance and monitoring of kidney function.       3. Essential htn,-    continue diltiazem CD 240mg po daily & coreg 6.25mg po bid for greater BP control.   monitor BP    4. Chronic diastolic-  chf,   Nephrology recommend to hold lasix for worsening renal function.    Cardiology dr De León bnp 156 not elevated. SOB likely mostly pulmonary related COPD.     5. Leukocytosis,-  afebrile   When pt initially presented to ED on June 7, Urine cultures with ESBL though UA without UTI. Repeat cultures from juen 10, negative. Blood cultures Kyung 10 negative.  ID Consult appreciated, will reconsult given WBC continues to trend up.   Leukocytosis may be related to steroid . Discontinued steroid      6. DM type 2   Insulin sliding scale, fingerstick monitoring  Increase Lantus to 25 units QHS and increase insulin lispro to 6 units 3x per day with meals for better sugar control    Disposition- PT, supportive care.    PT evaluation appreciated - Home w/home PT recommended but pt prefers [facility]  SW met with family (pt. gave full authorization to talk to daughter in law r.e plan of care)

## 2018-06-14 LAB
ALBUMIN SERPL ELPH-MCNC: 3 G/DL — LOW (ref 3.3–5.2)
ALP SERPL-CCNC: 58 U/L — SIGNIFICANT CHANGE UP (ref 40–120)
ALT FLD-CCNC: 12 U/L — SIGNIFICANT CHANGE UP
ANION GAP SERPL CALC-SCNC: 12 MMOL/L — SIGNIFICANT CHANGE UP (ref 5–17)
AST SERPL-CCNC: 9 U/L — SIGNIFICANT CHANGE UP
BASOPHILS # BLD AUTO: 0 K/UL — SIGNIFICANT CHANGE UP (ref 0–0.2)
BASOPHILS NFR BLD AUTO: 0.1 % — SIGNIFICANT CHANGE UP (ref 0–2)
BILIRUB SERPL-MCNC: 0.4 MG/DL — SIGNIFICANT CHANGE UP (ref 0.4–2)
BUN SERPL-MCNC: 43 MG/DL — HIGH (ref 8–20)
CALCIUM SERPL-MCNC: 8.7 MG/DL — SIGNIFICANT CHANGE UP (ref 8.6–10.2)
CHLORIDE SERPL-SCNC: 98 MMOL/L — SIGNIFICANT CHANGE UP (ref 98–107)
CO2 SERPL-SCNC: 24 MMOL/L — SIGNIFICANT CHANGE UP (ref 22–29)
CREAT SERPL-MCNC: 1.07 MG/DL — SIGNIFICANT CHANGE UP (ref 0.5–1.3)
EOSINOPHIL # BLD AUTO: 0.5 K/UL — SIGNIFICANT CHANGE UP (ref 0–0.5)
EOSINOPHIL NFR BLD AUTO: 2.4 % — SIGNIFICANT CHANGE UP (ref 0–6)
GLUCOSE BLDC GLUCOMTR-MCNC: 150 MG/DL — HIGH (ref 70–99)
GLUCOSE BLDC GLUCOMTR-MCNC: 209 MG/DL — HIGH (ref 70–99)
GLUCOSE BLDC GLUCOMTR-MCNC: 238 MG/DL — HIGH (ref 70–99)
GLUCOSE BLDC GLUCOMTR-MCNC: 252 MG/DL — HIGH (ref 70–99)
GLUCOSE SERPL-MCNC: 191 MG/DL — HIGH (ref 70–115)
HCT VFR BLD CALC: 35.9 % — LOW (ref 37–47)
HGB BLD-MCNC: 11.3 G/DL — LOW (ref 12–16)
LYMPHOCYTES # BLD AUTO: 17.4 % — LOW (ref 20–55)
LYMPHOCYTES # BLD AUTO: 3.3 K/UL — SIGNIFICANT CHANGE UP (ref 1–4.8)
MCHC RBC-ENTMCNC: 26.5 PG — LOW (ref 27–31)
MCHC RBC-ENTMCNC: 31.5 G/DL — LOW (ref 32–36)
MCV RBC AUTO: 84.3 FL — SIGNIFICANT CHANGE UP (ref 81–99)
MONOCYTES # BLD AUTO: 1.1 K/UL — HIGH (ref 0–0.8)
MONOCYTES NFR BLD AUTO: 5.7 % — SIGNIFICANT CHANGE UP (ref 3–10)
NEUTROPHILS # BLD AUTO: 14.1 K/UL — HIGH (ref 1.8–8)
NEUTROPHILS NFR BLD AUTO: 73.3 % — HIGH (ref 37–73)
PLATELET # BLD AUTO: 195 K/UL — SIGNIFICANT CHANGE UP (ref 150–400)
POTASSIUM SERPL-MCNC: 4.1 MMOL/L — SIGNIFICANT CHANGE UP (ref 3.5–5.3)
POTASSIUM SERPL-SCNC: 4.1 MMOL/L — SIGNIFICANT CHANGE UP (ref 3.5–5.3)
PROCALCITONIN SERPL-MCNC: <0.05 NG/ML — SIGNIFICANT CHANGE UP (ref 0–0.04)
PROT SERPL-MCNC: 6.1 G/DL — LOW (ref 6.6–8.7)
RBC # BLD: 4.26 M/UL — LOW (ref 4.4–5.2)
RBC # FLD: 14.6 % — SIGNIFICANT CHANGE UP (ref 11–15.6)
SODIUM SERPL-SCNC: 134 MMOL/L — LOW (ref 135–145)
WBC # BLD: 19.2 K/UL — HIGH (ref 4.8–10.8)
WBC # FLD AUTO: 19.2 K/UL — HIGH (ref 4.8–10.8)

## 2018-06-14 PROCEDURE — 99232 SBSQ HOSP IP/OBS MODERATE 35: CPT

## 2018-06-14 RX ORDER — ACETYLCYSTEINE 200 MG/ML
3 VIAL (ML) MISCELLANEOUS
Qty: 0 | Refills: 0 | Status: DISCONTINUED | OUTPATIENT
Start: 2018-06-14 | End: 2018-06-15

## 2018-06-14 RX ORDER — SODIUM CHLORIDE 9 MG/ML
1000 INJECTION INTRAMUSCULAR; INTRAVENOUS; SUBCUTANEOUS
Qty: 0 | Refills: 0 | Status: DISCONTINUED | OUTPATIENT
Start: 2018-06-14 | End: 2018-06-14

## 2018-06-14 RX ORDER — ACETYLCYSTEINE 200 MG/ML
6 VIAL (ML) MISCELLANEOUS
Qty: 0 | Refills: 0 | Status: DISCONTINUED | OUTPATIENT
Start: 2018-06-14 | End: 2018-06-14

## 2018-06-14 RX ADMIN — Medication 3 MILLILITER(S): at 20:02

## 2018-06-14 RX ADMIN — Medication 1: at 22:59

## 2018-06-14 RX ADMIN — ENOXAPARIN SODIUM 40 MILLIGRAM(S): 100 INJECTION SUBCUTANEOUS at 12:17

## 2018-06-14 RX ADMIN — Medication 4: at 08:16

## 2018-06-14 RX ADMIN — Medication 240 MILLIGRAM(S): at 05:53

## 2018-06-14 RX ADMIN — Medication 100 MILLIGRAM(S): at 12:20

## 2018-06-14 RX ADMIN — ATORVASTATIN CALCIUM 20 MILLIGRAM(S): 80 TABLET, FILM COATED ORAL at 22:59

## 2018-06-14 RX ADMIN — Medication 6 UNIT(S): at 17:19

## 2018-06-14 RX ADMIN — Medication 112 MICROGRAM(S): at 05:53

## 2018-06-14 RX ADMIN — Medication 3 MILLILITER(S): at 16:17

## 2018-06-14 RX ADMIN — Medication 6 UNIT(S): at 08:16

## 2018-06-14 RX ADMIN — Medication 3 MILLILITER(S): at 09:25

## 2018-06-14 RX ADMIN — Medication 6 UNIT(S): at 12:16

## 2018-06-14 RX ADMIN — Medication 81 MILLIGRAM(S): at 12:17

## 2018-06-14 RX ADMIN — Medication 4: at 12:16

## 2018-06-14 RX ADMIN — CLOPIDOGREL BISULFATE 75 MILLIGRAM(S): 75 TABLET, FILM COATED ORAL at 12:17

## 2018-06-14 RX ADMIN — INSULIN GLARGINE 25 UNIT(S): 100 INJECTION, SOLUTION SUBCUTANEOUS at 22:59

## 2018-06-14 RX ADMIN — Medication 250 MILLIGRAM(S): at 17:20

## 2018-06-14 RX ADMIN — SENNA PLUS 2 TABLET(S): 8.6 TABLET ORAL at 22:59

## 2018-06-14 RX ADMIN — Medication 3 MILLILITER(S): at 03:37

## 2018-06-14 RX ADMIN — Medication 250 MILLIGRAM(S): at 05:53

## 2018-06-14 RX ADMIN — PANTOPRAZOLE SODIUM 40 MILLIGRAM(S): 20 TABLET, DELAYED RELEASE ORAL at 05:53

## 2018-06-14 NOTE — PROGRESS NOTE ADULT - SUBJECTIVE AND OBJECTIVE BOX
[facility]  INFECTIOUS DISEASES AND INTERNAL MEDICINE at [place]  =======================================================  Lázaro Navas MD [facility]   Shen Bob MD  Diplomates American Board of Internal Medicine and Infectious Diseases  =======================================================    FLOYD PAEZ [id number]  chief complaint: WBC elevation.   patient seen and examined in follow up.  Chart and labs reviewed.     asked to come back by medical team for WBC elevation.   no fevers., no chills  still with some cough. slightly productive  lung xray on 6/13/18 evening with clear lungs and cardiomegaly.   =======================================================  Allergies:  iodine (Hives)  shellfish (Anaphylaxis)    =======================================================  MEDICATIONS  (STANDING):  acetylcysteine 20% Inhalation 3 milliLiter(s) Inhalation two times a day  ALBUTerol    90 MICROgram(s) HFA Inhaler 1 Puff(s) Inhalation every 4 hours  ALBUTerol/ipratropium for Nebulization 3 milliLiter(s) Nebulizer every 6 hours  aspirin enteric coated 81 milliGRAM(s) Oral daily  atorvastatin 20 milliGRAM(s) Oral at bedtime  clopidogrel Tablet 75 milliGRAM(s) Oral daily  dextrose 5%. 1000 milliLiter(s) (50 mL/Hr) IV Continuous <Continuous>  dextrose 50% Injectable 12.5 Gram(s) IV Push once  dextrose 50% Injectable 25 Gram(s) IV Push once  dextrose 50% Injectable 25 Gram(s) IV Push once  diltiazem    milliGRAM(s) Oral daily  docusate sodium 100 milliGRAM(s) Oral daily  enoxaparin Injectable 40 milliGRAM(s) SubCutaneous daily  insulin glargine Injectable (LANTUS) 25 Unit(s) SubCutaneous at bedtime  insulin lispro (HumaLOG) corrective regimen sliding scale   SubCutaneous three times a day before meals  insulin lispro (HumaLOG) corrective regimen sliding scale   SubCutaneous at bedtime  insulin lispro Injectable (HumaLOG) 6 Unit(s) SubCutaneous three times a day before meals  levothyroxine 112 MICROGram(s) Oral daily  pantoprazole    Tablet 40 milliGRAM(s) Oral before breakfast  saccharomyces boulardii 250 milliGRAM(s) Oral two times a day  senna 2 Tablet(s) Oral at bedtime  tiotropium 18 MICROgram(s) Capsule 1 Capsule(s) Inhalation daily    =======================================================  REVIEW OF SYSTEMS:  as above  all other ROS negative  =======================================================    Physical Exam:  Vital Signs Last 24 Hrs  T(C): 36.4 (14 Jun 2018 14:31), Max: 36.9 (13 Jun 2018 15:48)  T(F): 97.6 (14 Jun 2018 14:31), Max: 98.5 (13 Jun 2018 15:48)  HR: 76 (14 Jun 2018 14:31) (74 - 79)  BP: 130/55 (14 Jun 2018 14:31) (118/58 - 160/71)  RR: 18 (14 Jun 2018 14:31) (18 - 20)  SpO2: 96% (14 Jun 2018 14:31) (95% - 97%)    GEN: NAD, pleasant  HEENT: normocephalic and atraumatic. EOMI. GAMA.    NECK: Supple. No carotid bruits.  No lymphadenopathy or thyromegaly.  LUNGS: Clear to auscultation.; good air entry, prolong resp phase  HEART: Regular rate and rhythm without murmur.  ABDOMEN: Soft, nontender, and nondistended.  Positive bowel sounds.    : No tenderness to palpation lower abd/ bladder  EXTREMITIES: Without any cyanosis, clubbing, rash, lesions or edema.  MSK: no joint swelling  NEUROLOGIC: Cranial nerves II through XII are grossly intact.  PSYCHIATRIC: Appropriate affect .  SKIN: No ulceration or induration present.    =======================================================       Labs:  06-14    134<L>  |  98  |  43.0<H>  ----------------------------<  191<H>  4.1   |  24.0  |  1.07    Ca    8.7      14 Jun 2018 07:23  Mg     2.2     06-13    TPro  6.1<L>  /  Alb  3.0<L>  /  TBili  0.4  /  DBili  x   /  AST  9   /  ALT  12  /  AlkPhos  58  06-14                          11.3   19.2  )-----------( 195      ( 14 Jun 2018 07:23 )             35.9           LIVER FUNCTIONS - ( 14 Jun 2018 07:23 )  Alb: 3.0 g/dL / Pro: 6.1 g/dL / ALK PHOS: 58 U/L / ALT: 12 U/L / AST: 9 U/L / GGT: x               CAPILLARY BLOOD GLUCOSE      POCT Blood Glucose.: 238 mg/dL (14 Jun 2018 12:08)  POCT Blood Glucose.: 209 mg/dL (14 Jun 2018 07:50)  POCT Blood Glucose.: 316 mg/dL (13 Jun 2018 21:51)  POCT Blood Glucose.: 153 mg/dL (13 Jun 2018 16:40)        RECENT CULTURES:  06-10 @ 18:32 .Blood Blood     No growth at 48 hours        06-10 @ 18:29 .Urine Catheterized     No growth        06-07 @ 22:53 .Urine Clean Catch (Midstream) Escherichia coli ESBL    10,000 - 49,000 CFU/mL Escherichia coli ESBL  .  TYPE: (C=Critical, N=Notification, A=Abnormal) C  TESTS:  _ ESBL  DATE/TIME CALLED: _ 06/10/2018 11:10:20  CALLED TO: aPnkaj REYNA RN  READ BACK (2 Patient Identifiers)(Y/N): _ Y  READ BACK VALUES(Y/N): _ Y  CALLED BY: Pankaj BONNER  .  FAX TO [facility]

## 2018-06-14 NOTE — PROGRESS NOTE ADULT - ASSESSMENT
83 yr female admitted for Acute respiratory distress, COPD exacerbation.  Pt with improvement of symptoms though now with worsening leukocytosis, trending up. Will follow up repeat bcx drawn 6/13 and reconsult ID.     1. COPD exac,-     may be mild exacerbation,   Received iv solumedrol in ER.   Steroid discontinued few days back  WBC still trending up, 19.2 today  Repeat blood cultures drawn yesterday, pending.   Zithromax 500mg po daily, last dose yesterday 6/13  Continue with nebs and NC o2 PRN and mucomyst   Need O2 sat on RA at rest and during ambulation, RN aware    2. TRENTON,    Slow worsening of renal function on diuretics.    Nephrology Dr. Marx holding lasix for now, can resume lasix post discharge per renal  SCr improving today 1.07  ACE on hold  h/o DM and htn as well that might be contributing to renal insufficiency .   Close f/u on fluid and electrolyte  balance and monitoring of kidney function.       3. Essential htn   continue diltiazem CD 240mg po daily & coreg 6.25mg po bid for greater BP control.   monitor BP    4. Chronic diastolic-  chf,   Nephrology recommend to hold lasix for worsening renal function. Cr stable, will resume on discharge   Cardiology dr De León bnp 156 not elevated. SOB likely mostly pulmonary related COPD.     5. Leukocytosis,-  afebrile   ID Consult appreciated, will reconsult due to continue rise/up trend of WBC   Leukocytosis may be related to steroid though steroid were discontinued days back    However since wbc was 18 yesterday, 19 today and trending up, will reconsult ID.   Am CBC    6. DM type 2   Insulin sliding scale, fingerstick monitoring  Increase Lantus to 25 units QHS and increase insulin lispro to 6 units 3x per day with meals for better sugar control    Disposition- PT, supportive care.    PT evaluation appreciated - Home w/home PT recommended but pt prefers [facility]  SW met with family (pt. gave full authorization to talk to daughter in law r.e plan of care) 83 yr female admitted for Acute respiratory distress, COPD exacerbation.  Pt with improvement of symptoms though now with worsening leukocytosis, trending up. Will follow up repeat bcx drawn 6/13 and reconsult ID.     1. COPD exac,-     may be mild exacerbation,   Received iv solumedrol in ER.   Steroid discontinued few days back  WBC still trending up, 19.2 today  Repeat blood cultures drawn yesterday, pending.   Zithromax 500mg po daily, last dose yesterday 6/13  Continue with nebs and NC o2 PRN and mucomyst   Need O2 sat on RA at rest and during ambulation, RN aware    2. TRENTON,    Slow worsening of renal function on diuretics.    Nephrology Dr. Marx holding lasix for now, can resume lasix post discharge per renal  SCr improving today 1.07  ACE on hold  h/o DM and htn as well that might be contributing to renal insufficiency .   Close f/u on fluid and electrolyte  balance and monitoring of kidney function.       3. Essential htn   continue diltiazem CD 240mg po daily & coreg 6.25mg po bid for greater BP control.   monitor BP    4. Chronic diastolic-  chf,   Nephrology recommend to hold lasix for worsening renal function. Cr stable, will resume on discharge   Cardiology dr De León bnp 156 not elevated. SOB likely mostly pulmonary related COPD.     5. Leukocytosis,-  afebrile   ID Consult appreciated, will reconsult due to continue rise/up trend of WBC   Leukocytosis may be related to steroid though steroid were discontinued days back    However since wbc was 18 yesterday, 19 today and trending up, will reconsult ID.   Am CBC    6. DM type 2   Insulin sliding scale, fingerstick monitoring  Increase Lantus to 25 units QHS and increase insulin lispro to 6 units 3x per day with meals for better sugar control    7. Hyponatremia  pseudohyponatremia due to hyperglycemia?  will give IV NS x 1 dose   follow up am lab     Disposition- PT, supportive care.    PT evaluation appreciated - Home w/home PT recommended but pt prefers [facility]  SW met with family (pt. gave full authorization to talk to daughter in law r.e plan of care) 83 yr female admitted for Acute respiratory distress, COPD exacerbation.  Pt with improvement of symptoms though now with worsening leukocytosis, trending up. Will follow up repeat bcx drawn 6/13 and reconsult ID.     1. COPD exac,-     may be mild exacerbation,   Received iv solumedrol in ER.   Steroid discontinued few days back  WBC still trending up, 19.2 today  Repeat blood cultures drawn yesterday, pending.   Zithromax 500mg po daily, last dose yesterday 6/13  Continue with nebs and NC o2 PRN and mucomyst   Need O2 sat on RA at rest and during ambulation, RN aware    2. TRENTON,    Slow worsening of renal function on diuretics.    Nephrology Dr. Marx holding lasix for now, can resume lasix post discharge per renal  SCr improving today 1.07  ACE on hold  h/o DM and htn as well that might be contributing to renal insufficiency .   Close f/u on fluid and electrolyte  balance and monitoring of kidney function.       3. Essential htn   continue diltiazem CD 240mg po daily & coreg 6.25mg po bid for greater BP control.   monitor BP    4. Chronic diastolic-  chf,   Nephrology recommend to hold lasix for worsening renal function. Cr stable, will resume on discharge   Cardiology dr De León bnp 156 not elevated. SOB likely mostly pulmonary related COPD.     5. Leukocytosis,-  afebrile possibly reactive  ID Consult appreciated, will reconsult due to continue rise/up trend of WBC   Leukocytosis may be related to steroid though steroid were discontinued days back    However since wbc was 18 yesterday, 19 today and trending up, will reconsult ID.   Am CBC    6. DM type 2   Insulin sliding scale, fingerstick monitoring  Increase Lantus to 25 units QHS and increase insulin lispro to 6 units 3x per day with meals for better sugar control     Disposition- PT, supportive care.    PT evaluation appreciated - Home w/home PT recommended but pt prefers [facility]  SW met with family (pt. gave full authorization to talk to daughter in law r.e plan of care)

## 2018-06-14 NOTE — PROGRESS NOTE ADULT - SUBJECTIVE AND OBJECTIVE BOX
Patient is a 83y old Female who presents with a chief complaint of difficulty breathing. Admitted with respiratory distress, COPD exacerbation.     INTERVAL HPI/OVERNIGHT EVENTS: No events reported by nursing staff.    Pt. is seen and examined at the bedside. Reports improvement in SOB, no medical complaints.  Denies SOB, CP, abd.pain, N/V/D/C, dysuria, chills/fever    MEDICATIONS:  ALBUTerol    0.083% 2.5 milliGRAM(s) Nebulizer every 2 hours PRN  ALBUTerol    90 MICROgram(s) HFA Inhaler 1 Puff(s) Inhalation every 4 hours  ALBUTerol/ipratropium for Nebulization 3 milliLiter(s) Nebulizer every 6 hours  aspirin enteric coated 81 milliGRAM(s) Oral daily  atorvastatin 20 milliGRAM(s) Oral at bedtime  azithromycin   Tablet 500 milliGRAM(s) Oral daily  clopidogrel Tablet 75 milliGRAM(s) Oral daily  dextrose 40% Gel 15 Gram(s) Oral once PRN  dextrose 5%. 1000 milliLiter(s) IV Continuous <Continuous>  dextrose 50% Injectable 12.5 Gram(s) IV Push once  dextrose 50% Injectable 25 Gram(s) IV Push once  dextrose 50% Injectable 25 Gram(s) IV Push once  diltiazem    milliGRAM(s) Oral daily  enoxaparin Injectable 40 milliGRAM(s) SubCutaneous daily  glucagon  Injectable 1 milliGRAM(s) IntraMuscular once PRN  insulin glargine Injectable (LANTUS) 25 Unit(s) SubCutaneous at bedtime  insulin lispro (HumaLOG) corrective regimen sliding scale   SubCutaneous three times a day before meals  insulin lispro (HumaLOG) corrective regimen sliding scale   SubCutaneous at bedtime  insulin lispro Injectable (HumaLOG) 6 Unit(s) SubCutaneous three times a day before meals  levothyroxine 112 MICROGram(s) Oral daily  pantoprazole    Tablet 40 milliGRAM(s) Oral before breakfast  saccharomyces boulardii 250 milliGRAM(s) Oral two times a day  tiotropium 18 MICROgram(s) Capsule 1 Capsule(s) Inhalation daily    FAMILY HISTORY:  Family history of stomach cancer  Family history of MI (myocardial infarction) (Father)    PAST MEDICAL & SURGICAL HISTORY:  NSTEMI (non-ST elevated myocardial infarction)  Gastroesophageal reflux disease without esophagitis  Pure hypercholesterolemia  Hypothyroidism, unspecified type  Essential hypertension  DM2 (diabetes mellitus, type 2)  Uncomplicated asthma, unspecified asthma severity  Abnormal findings on cardiac catheterization: Cardiac Cath  History of appendectomy    Allergies  iodine (Hives)  shellfish (Anaphylaxis)    Vital Signs Last 24 Hrs  T(C): 36.8 (14 Jun 2018 11:25), Max: 36.9 (13 Jun 2018 15:48)  T(F): 98.2 (14 Jun 2018 11:25), Max: 98.5 (13 Jun 2018 15:48)  HR: 74 (14 Jun 2018 11:25) (74 - 79)  BP: 160/71 (14 Jun 2018 11:25) (118/58 - 160/71)  BP(mean): --  RR: 18 (14 Jun 2018 11:25) (18 - 20)  SpO2: 97% (14 Jun 2018 03:39) (95% - 97%)    PHYSICAL EXAM:  GENERAL: NAD, well-groomed, well-developed  HEAD:  Atraumatic, Normocephalic  EYES: EOMI, PERRLA, conjunctiva and sclera clear  NERVOUS SYSTEM:  Alert & Oriented X3, Good concentration; Motor Strength 5/5 B/L upper and lower extremities  CHEST/LUNG: good air entry b/l, scattered expiratory wheeze   HEART: Regular rate and rhythm; No murmurs, rubs, or gallops  ABDOMEN: Soft, Nontender, Nondistended; Bowel sounds present  EXTREMITIES:  2+ b/l lower extremity edema       Imaging/Labs:  reviewed

## 2018-06-14 NOTE — PROGRESS NOTE ADULT - ASSESSMENT
This 83 y.o. F with COPD exacerbation; found with positive urine culture, asymptomatic.   still with cough and WBC elevation.   no evidence of PNA on clinical or radiological exam.

## 2018-06-15 ENCOUNTER — TRANSCRIPTION ENCOUNTER (OUTPATIENT)
Age: 83
End: 2018-06-15

## 2018-06-15 VITALS
TEMPERATURE: 98 F | HEART RATE: 79 BPM | RESPIRATION RATE: 18 BRPM | SYSTOLIC BLOOD PRESSURE: 117 MMHG | DIASTOLIC BLOOD PRESSURE: 60 MMHG

## 2018-06-15 LAB
ALBUMIN SERPL ELPH-MCNC: 2.9 G/DL — LOW (ref 3.3–5.2)
ALP SERPL-CCNC: 61 U/L — SIGNIFICANT CHANGE UP (ref 40–120)
ALT FLD-CCNC: 12 U/L — SIGNIFICANT CHANGE UP
ANION GAP SERPL CALC-SCNC: 11 MMOL/L — SIGNIFICANT CHANGE UP (ref 5–17)
AST SERPL-CCNC: 10 U/L — SIGNIFICANT CHANGE UP
BASOPHILS # BLD AUTO: 0 K/UL — SIGNIFICANT CHANGE UP (ref 0–0.2)
BASOPHILS NFR BLD AUTO: 0.2 % — SIGNIFICANT CHANGE UP (ref 0–2)
BILIRUB SERPL-MCNC: 0.4 MG/DL — SIGNIFICANT CHANGE UP (ref 0.4–2)
BUN SERPL-MCNC: 37 MG/DL — HIGH (ref 8–20)
CALCIUM SERPL-MCNC: 8.9 MG/DL — SIGNIFICANT CHANGE UP (ref 8.6–10.2)
CHLORIDE SERPL-SCNC: 100 MMOL/L — SIGNIFICANT CHANGE UP (ref 98–107)
CO2 SERPL-SCNC: 24 MMOL/L — SIGNIFICANT CHANGE UP (ref 22–29)
CREAT SERPL-MCNC: 0.98 MG/DL — SIGNIFICANT CHANGE UP (ref 0.5–1.3)
CULTURE RESULTS: SIGNIFICANT CHANGE UP
CULTURE RESULTS: SIGNIFICANT CHANGE UP
EOSINOPHIL # BLD AUTO: 0.5 K/UL — SIGNIFICANT CHANGE UP (ref 0–0.5)
EOSINOPHIL NFR BLD AUTO: 2.8 % — SIGNIFICANT CHANGE UP (ref 0–6)
GLUCOSE BLDC GLUCOMTR-MCNC: 181 MG/DL — HIGH (ref 70–99)
GLUCOSE BLDC GLUCOMTR-MCNC: 205 MG/DL — HIGH (ref 70–99)
GLUCOSE SERPL-MCNC: 199 MG/DL — HIGH (ref 70–115)
HCT VFR BLD CALC: 38.2 % — SIGNIFICANT CHANGE UP (ref 37–47)
HGB BLD-MCNC: 11.8 G/DL — LOW (ref 12–16)
LYMPHOCYTES # BLD AUTO: 15.9 % — LOW (ref 20–55)
LYMPHOCYTES # BLD AUTO: 2.6 K/UL — SIGNIFICANT CHANGE UP (ref 1–4.8)
MCHC RBC-ENTMCNC: 26.3 PG — LOW (ref 27–31)
MCHC RBC-ENTMCNC: 30.9 G/DL — LOW (ref 32–36)
MCV RBC AUTO: 85.3 FL — SIGNIFICANT CHANGE UP (ref 81–99)
MONOCYTES # BLD AUTO: 1.1 K/UL — HIGH (ref 0–0.8)
MONOCYTES NFR BLD AUTO: 6.7 % — SIGNIFICANT CHANGE UP (ref 3–10)
NEUTROPHILS # BLD AUTO: 12 K/UL — HIGH (ref 1.8–8)
NEUTROPHILS NFR BLD AUTO: 73.4 % — HIGH (ref 37–73)
PLATELET # BLD AUTO: 182 K/UL — SIGNIFICANT CHANGE UP (ref 150–400)
POTASSIUM SERPL-MCNC: 4.5 MMOL/L — SIGNIFICANT CHANGE UP (ref 3.5–5.3)
POTASSIUM SERPL-SCNC: 4.5 MMOL/L — SIGNIFICANT CHANGE UP (ref 3.5–5.3)
PROT SERPL-MCNC: 6.3 G/DL — LOW (ref 6.6–8.7)
RBC # BLD: 4.48 M/UL — SIGNIFICANT CHANGE UP (ref 4.4–5.2)
RBC # FLD: 14.6 % — SIGNIFICANT CHANGE UP (ref 11–15.6)
SODIUM SERPL-SCNC: 135 MMOL/L — SIGNIFICANT CHANGE UP (ref 135–145)
SPECIMEN SOURCE: SIGNIFICANT CHANGE UP
SPECIMEN SOURCE: SIGNIFICANT CHANGE UP
WBC # BLD: 16.3 K/UL — HIGH (ref 4.8–10.8)
WBC # FLD AUTO: 16.3 K/UL — HIGH (ref 4.8–10.8)

## 2018-06-15 PROCEDURE — 83880 ASSAY OF NATRIURETIC PEPTIDE: CPT

## 2018-06-15 PROCEDURE — 80053 COMPREHEN METABOLIC PANEL: CPT

## 2018-06-15 PROCEDURE — 87040 BLOOD CULTURE FOR BACTERIA: CPT

## 2018-06-15 PROCEDURE — 97163 PT EVAL HIGH COMPLEX 45 MIN: CPT

## 2018-06-15 PROCEDURE — 87186 SC STD MICRODIL/AGAR DIL: CPT

## 2018-06-15 PROCEDURE — 85730 THROMBOPLASTIN TIME PARTIAL: CPT

## 2018-06-15 PROCEDURE — 71045 X-RAY EXAM CHEST 1 VIEW: CPT

## 2018-06-15 PROCEDURE — 99285 EMERGENCY DEPT VISIT HI MDM: CPT | Mod: 25

## 2018-06-15 PROCEDURE — 84145 PROCALCITONIN (PCT): CPT

## 2018-06-15 PROCEDURE — 80048 BASIC METABOLIC PNL TOTAL CA: CPT

## 2018-06-15 PROCEDURE — 93005 ELECTROCARDIOGRAM TRACING: CPT

## 2018-06-15 PROCEDURE — 99232 SBSQ HOSP IP/OBS MODERATE 35: CPT

## 2018-06-15 PROCEDURE — 71046 X-RAY EXAM CHEST 2 VIEWS: CPT

## 2018-06-15 PROCEDURE — 99239 HOSP IP/OBS DSCHRG MGMT >30: CPT

## 2018-06-15 PROCEDURE — 82962 GLUCOSE BLOOD TEST: CPT

## 2018-06-15 PROCEDURE — 94640 AIRWAY INHALATION TREATMENT: CPT

## 2018-06-15 PROCEDURE — 87086 URINE CULTURE/COLONY COUNT: CPT

## 2018-06-15 PROCEDURE — 85610 PROTHROMBIN TIME: CPT

## 2018-06-15 PROCEDURE — 83735 ASSAY OF MAGNESIUM: CPT

## 2018-06-15 PROCEDURE — 97110 THERAPEUTIC EXERCISES: CPT

## 2018-06-15 PROCEDURE — 85027 COMPLETE CBC AUTOMATED: CPT

## 2018-06-15 PROCEDURE — 97116 GAIT TRAINING THERAPY: CPT

## 2018-06-15 PROCEDURE — 96374 THER/PROPH/DIAG INJ IV PUSH: CPT

## 2018-06-15 PROCEDURE — 36415 COLL VENOUS BLD VENIPUNCTURE: CPT

## 2018-06-15 PROCEDURE — 81001 URINALYSIS AUTO W/SCOPE: CPT

## 2018-06-15 RX ADMIN — Medication 240 MILLIGRAM(S): at 06:01

## 2018-06-15 RX ADMIN — Medication 81 MILLIGRAM(S): at 12:48

## 2018-06-15 RX ADMIN — PANTOPRAZOLE SODIUM 40 MILLIGRAM(S): 20 TABLET, DELAYED RELEASE ORAL at 06:01

## 2018-06-15 RX ADMIN — Medication 3 MILLILITER(S): at 09:42

## 2018-06-15 RX ADMIN — Medication 250 MILLIGRAM(S): at 06:01

## 2018-06-15 RX ADMIN — Medication 3 MILLILITER(S): at 03:42

## 2018-06-15 RX ADMIN — Medication 6 UNIT(S): at 08:29

## 2018-06-15 RX ADMIN — Medication 112 MICROGRAM(S): at 06:01

## 2018-06-15 RX ADMIN — CLOPIDOGREL BISULFATE 75 MILLIGRAM(S): 75 TABLET, FILM COATED ORAL at 12:48

## 2018-06-15 RX ADMIN — Medication 100 MILLIGRAM(S): at 12:48

## 2018-06-15 RX ADMIN — Medication 2: at 12:48

## 2018-06-15 RX ADMIN — Medication 4: at 08:29

## 2018-06-15 RX ADMIN — Medication 6 UNIT(S): at 12:48

## 2018-06-15 RX ADMIN — ENOXAPARIN SODIUM 40 MILLIGRAM(S): 100 INJECTION SUBCUTANEOUS at 12:48

## 2018-06-15 NOTE — DISCHARGE NOTE ADULT - MEDICATION SUMMARY - MEDICATIONS TO TAKE
I will START or STAY ON the medications listed below when I get home from the hospital:    aspirin 81 mg oral delayed release tablet  -- 1 tab(s) by mouth once a day  -- Indication: For Cardio protective     lisinopril 5 mg oral tablet  -- 1 tab(s) by mouth once a day  -- Indication: For HTN    dilTIAZem 240 mg/24 hours oral capsule, extended release  -- 1 cap(s) by mouth once a day  -- Indication: For HTN    Basaglar KwikPen  -- 45 unit(s) subcutaneous once a day (at bedtime)  -- Indication: For DM2 (diabetes mellitus, type 2)    NovoLOG FlexPen 100 units/mL subcutaneous solution  -- 12 unit(s) subcutaneous 3 times a day  -- Indication: For DM2 (diabetes mellitus, type 2)    atorvastatin 20 mg oral tablet  -- 1 tab(s) by mouth once a day (at bedtime)  -- Indication: For HLD    clopidogrel 75 mg oral tablet  -- 1 tab(s) by mouth once a day  -- Indication: For Antiplatelet     ProAir HFA 90 mcg/inh inhalation aerosol  -- 2 puff(s) inhaled 4 times a day  home  -- Indication: For COPD    Advair Diskus 500 mcg-50 mcg inhalation powder  -- 1 puff(s) inhaled 2 times a day  home  -- Indication: For COPD    Spiriva 18 mcg inhalation capsule  -- 1 cap(s) inhaled once a day  -- Indication: For COPD    Spiriva 18 mcg inhalation capsule  -- 1 cap(s) inhaled once a day   -- Check with your doctor before becoming pregnant.  For inhalation only.  It is very important that you take or use this exactly as directed.  Do not skip doses or discontinue unless directed by your doctor.  Obtain medical advice before taking any non-prescription drugs as some may affect the action of this medication.    -- Indication: For COPD    Advair Diskus 100 mcg-50 mcg inhalation powder  -- 1 puff(s) inhaled 2 times a day   -- Check with your doctor before becoming pregnant.  For inhalation only.  Obtain medical advice before taking any non-prescription drugs as some may affect the action of this medication.  Rinse mouth thoroughly after use.    -- Indication: For COPD    furosemide 40 mg oral tablet  -- 1 tab(s) by mouth once a day  -- Indication: For CHF (congestive heart failure)    omeprazole 40 mg oral delayed release capsule  -- 1 cap(s) by mouth once a day  -- Indication: For GERD    levothyroxine 112 mcg (0.112 mg) oral tablet  -- 1 tab(s) by mouth once a day  -- Indication: For hypothyroid    HYDROcodone-homatropine 5 mg-1.5 mg/5 mL oral syrup  -- 5 milliliter(s) by mouth every 6 hours, As Needed MDD:4  -- Caution federal law prohibits the transfer of this drug to any person other  than the person for whom it was prescribed.  May cause drowsiness.  Alcohol may intensify this effect.  Use care when operating dangerous machinery.  Obtain medical advice before taking any non-prescription drugs as some may affect the action of this medication.    -- Indication: For COPD I will START or STAY ON the medications listed below when I get home from the hospital:    aspirin 81 mg oral delayed release tablet  -- 1 tab(s) by mouth once a day  -- Indication: For Cardio protective     lisinopril 5 mg oral tablet  -- 1 tab(s) by mouth once a day  -- Indication: For HTN    dilTIAZem 240 mg/24 hours oral capsule, extended release  -- 1 cap(s) by mouth once a day  -- Indication: For HTN    Basaglar KwikPen  -- 45 unit(s) subcutaneous once a day (at bedtime)  -- Indication: For DM2 (diabetes mellitus, type 2)    NovoLOG FlexPen 100 units/mL subcutaneous solution  -- 12 unit(s) subcutaneous 3 times a day  -- Indication: For DM2 (diabetes mellitus, type 2)    atorvastatin 20 mg oral tablet  -- 1 tab(s) by mouth once a day (at bedtime)  -- Indication: For HLD    clopidogrel 75 mg oral tablet  -- 1 tab(s) by mouth once a day  -- Indication: For Antiplatelet     ProAir HFA 90 mcg/inh inhalation aerosol  -- 2 puff(s) inhaled 4 times a day  home  -- Indication: For COPD    Advair Diskus 500 mcg-50 mcg inhalation powder  -- 1 puff(s) inhaled 2 times a day  home  -- Indication: For COPD    Spiriva 18 mcg inhalation capsule  -- 1 cap(s) inhaled once a day  -- Indication: For COPD    furosemide 40 mg oral tablet  -- 1 tab(s) by mouth once a day  -- Indication: For CHF (congestive heart failure)    omeprazole 40 mg oral delayed release capsule  -- 1 cap(s) by mouth once a day  -- Indication: For GERD    levothyroxine 112 mcg (0.112 mg) oral tablet  -- 1 tab(s) by mouth once a day  -- Indication: For hypothyroid    HYDROcodone-homatropine 5 mg-1.5 mg/5 mL oral syrup  -- 5 milliliter(s) by mouth every 6 hours, As Needed MDD:4  -- Caution federal law prohibits the transfer of this drug to any person other  than the person for whom it was prescribed.  May cause drowsiness.  Alcohol may intensify this effect.  Use care when operating dangerous machinery.  Obtain medical advice before taking any non-prescription drugs as some may affect the action of this medication.    -- Indication: For COPD

## 2018-06-15 NOTE — DISCHARGE NOTE ADULT - PLAN OF CARE
resolved continue all home meds  completed 5 day course abx  follow up with pulmonologist outpatient  02 sat at rest without oxygen 92%. O2 sat on ambulation without oxygen 97% - no home oxygen requirement. resume home meds chornic chronic resume home meds  follow up with PMD/nephrologist as outpatient within 1 week.

## 2018-06-15 NOTE — PROGRESS NOTE ADULT - ATTENDING COMMENTS
Reviewed above. Examined pt at bedside. She offers no complaints. Awaiting blood cultures.
I discussed pt care with her son Calvin Chávez who is  of workforce safety for [employer] at [phone] and answered all his questions and he agrees with above plan.
no contraindications to discharge to community.   will sign off.
will watch with you.
will sign off.
I discussed pt care with her son Calvin Chávez who is  of workforce safety for [employer] at [phone] and answered all his questions. I assured him that we will be in touch on a daily basis regarding his mothers care

## 2018-06-15 NOTE — DISCHARGE NOTE ADULT - CARE PLAN
Principal Discharge DX:	COPD exacerbation  Goal:	resolved  Assessment and plan of treatment:	continue all home meds  completed 5 day course abx  follow up with pulmonologist outpatient  02 sat at rest without oxygen 92%. O2 sat on ambulation without oxygen 97% - no home oxygen requirement.  Secondary Diagnosis:	Essential hypertension  Assessment and plan of treatment:	resume home meds  Secondary Diagnosis:	Gastroesophageal reflux disease without esophagitis  Goal:	chornic  Assessment and plan of treatment:	resume home meds  Secondary Diagnosis:	DM2 (diabetes mellitus, type 2)  Goal:	chronic  Assessment and plan of treatment:	resolved  Secondary Diagnosis:	Pure hypercholesterolemia  Goal:	chronic  Assessment and plan of treatment:	resume home meds  Secondary Diagnosis:	CHF (congestive heart failure)  Goal:	chronic  Assessment and plan of treatment:	resume home meds  Secondary Diagnosis:	TRENTON (acute kidney injury)  Goal:	resolved  Assessment and plan of treatment:	resume home meds  follow up with PMD/nephrologist as outpatient within 1 week. Principal Discharge DX:	COPD exacerbation  Goal:	resolved  Assessment and plan of treatment:	continue all home meds  completed 5 day course abx  follow up with pulmonologist outpatient  02 sat at rest without oxygen 92%. O2 sat on ambulation without oxygen 97% - no home oxygen requirement.  Secondary Diagnosis:	Essential hypertension  Assessment and plan of treatment:	resume home meds  Secondary Diagnosis:	Gastroesophageal reflux disease without esophagitis  Goal:	chronic  Assessment and plan of treatment:	resume home meds  Secondary Diagnosis:	DM2 (diabetes mellitus, type 2)  Goal:	chronic  Assessment and plan of treatment:	resolved  Secondary Diagnosis:	Pure hypercholesterolemia  Goal:	chronic  Assessment and plan of treatment:	resume home meds  Secondary Diagnosis:	CHF (congestive heart failure)  Goal:	chronic  Assessment and plan of treatment:	resume home meds  Secondary Diagnosis:	TRENTON (acute kidney injury)  Goal:	resolved  Assessment and plan of treatment:	resume home meds  follow up with PMD/nephrologist as outpatient within 1 week.

## 2018-06-15 NOTE — PROGRESS NOTE ADULT - PROBLEM SELECTOR PLAN 3
- would not treat for this specifically, even in ESBL E coli.
- would not treat for this specifically, even in ESBL E coli.
downtrending,   likely related to recent steroid use

## 2018-06-15 NOTE — DISCHARGE NOTE ADULT - MEDICATION SUMMARY - MEDICATIONS TO STOP TAKING
I will STOP taking the medications listed below when I get home from the hospital:    predniSONE 10 mg oral tablet  -- 4 tabs po daily x 2 days then 3tabs po daily x 2 days then 2 tabs po daily x 2 days then 1 tab po daily x 2 days then stop   -- It is very important that you take or use this exactly as directed.  Do not skip doses or discontinue unless directed by your doctor.  Obtain medical advice before taking any non-prescription drugs as some may affect the action of this medication.  Take with food or milk.    pantoprazole 40 mg oral delayed release tablet  -- 1 tab(s) by mouth once a day (before a meal)

## 2018-06-15 NOTE — DISCHARGE NOTE ADULT - HOSPITAL COURSE
84 y/o female who was discharged from [facility] yesterday after being admitted for copd exacerbation and acute on chronic diastolic chf, pt. was sent home ( lives in assisted living ) on oxygen. As per pt. she got o2 tank for home but it was not function properly and she was not getting enough oxygen and felt sob, wheeze and could not catch her breath and came to hospital via ambulance. no cp. no fever , no abd. pain  or n/v reported. Pt was admitted with actue respiratory failure secondary to COPD exacerbation. Imaging negative for PNA. Treated with nebs/nc o2/steroids/5 day course zithromax. Pt noted with improvement. O2 sats recorded at rest and ambulation without oxygen. At rest 92% and on ambulation 97%. Pt therefore does not meet requirements for home o2. Over hospital course, Pt found with asymptomatic UTI with cx growing ESBL. Repeat cx negative. Evaluated by ID, no recommendation for abx. Leukocytosis down trending, BCX and Ucx from [facility] negative. Pt noticed with TRENTON on admission, ACE/diuretics were held, consulted by nephrology.  TRENTON with improvemnet, OK To resume home meds with PMD/nephrology outpatient follow up. Pt medically stable for discharge to [facility]. Pt to follow up with PMD/pulmonologist and nephrologist as an outpatient.     Vital Signs Last 24 Hrs  T(C): 36.7 (15 Charbel 2018 05:36), Max: 37 (14 Jun 2018 22:54)  T(F): 98.1 (15 Charbel 2018 05:36), Max: 98.6 (14 Jun 2018 22:54)  HR: 80 (15 Charbel 2018 05:36) (64 - 80)  BP: 139/66 (15 Charbel 2018 05:36) (116/61 - 160/71)  BP(mean): --  RR: 18 (14 Jun 2018 22:54) (18 - 18)  SpO2: 97% (15 Charbel 2018 03:42) (96% - 99%)    PHYSICAL EXAM:  GENERAL: NAD, well-groomed, well-developed  HEAD:  Atraumatic, Normocephalic  EYES: EOMI, PERRLA, conjunctiva and sclera clear  NERVOUS SYSTEM:  Alert & Oriented X3, Good concentration;   CHEST/LUNG: CTA b/l   HEART: Regular rate and rhythm; No murmurs, rubs, or gallops  ABDOMEN: Soft, Nontender, Nondistended; Bowel sounds present  EXTREMITIES:  2+ b/l lower extremity edema \    A/P: DC to [facility]. follow up wiht PMD/nephrologist/pulmonologist as outpatient 84 y/o female who was discharged from [facility] yesterday after being admitted for copd exacerbation and acute on chronic diastolic chf, pt. was sent home ( lives in assisted living ) on oxygen. As per pt. she got o2 tank for home but it was not function properly and she was not getting enough oxygen and felt sob, wheeze and could not catch her breath and came to hospital via ambulance. no cp. no fever , no abd. pain  or n/v reported. Pt was admitted with actue respiratory failure secondary to COPD exacerbation. Imaging negative for PNA. Treated with nebs/nc o2/steroids/5 day course zithromax. Pt noted with improvement. O2 sats recorded at rest and ambulation without oxygen. At rest 92% and on ambulation 97%. Pt therefore does not meet requirements for home o2. Over hospital course, Pt found with asymptomatic UTI with cx growing ESBL. Repeat cx negative. Evaluated by ID, no recommendation for abx. Leukocytosis down trending, BCX and Ucx from [facility] negative. Pt noticed with TRENTON on admission, ACE/diuretics were held, consulted by nephrology.  TRENTON with improvement OK To resume home meds with PMD/nephrology outpatient follow up. Pt medically stable for discharge to [facility] Pt to follow up with PMD/pulmonologist and nephrologist as an outpatient.     Vital Signs Last 24 Hrs  T(C): 36.7 (15 Charbel 2018 05:36), Max: 37 (14 Jun 2018 22:54)  T(F): 98.1 (15 Charbel 2018 05:36), Max: 98.6 (14 Jun 2018 22:54)  HR: 80 (15 Charbel 2018 05:36) (64 - 80)  BP: 139/66 (15 Charbel 2018 05:36) (116/61 - 160/71)  BP(mean): --  RR: 18 (14 Jun 2018 22:54) (18 - 18)  SpO2: 97% (15 Charbel 2018 03:42) (96% - 99%)    PHYSICAL EXAM:  GENERAL: NAD, well-groomed, well-developed  HEAD:  Atraumatic, Normocephalic  EYES: EOMI, PERRLA, conjunctiva and sclera clear  NERVOUS SYSTEM:  Alert & Oriented X3, Good concentration;   CHEST/LUNG: CTA b/l no wheeze no rhonchi  HEART: Regular rate and rhythm; No murmurs, rubs, or gallops  ABDOMEN: Soft, Nontender, Nondistended; Bowel sounds present  EXTREMITIES:  2+ b/l lower extremity edema, non tender    DC to [facility]. follow up with PMD/nephrologist/pulmonologist as outpatient     total time spent for discharge 36 minutes

## 2018-06-15 NOTE — DISCHARGE NOTE ADULT - PATIENT PORTAL LINK FT
You can access the [facility] Patient Portal, offered by [facility], by registering with the following website: [email or web address]

## 2018-06-15 NOTE — PROGRESS NOTE ADULT - PROVIDER SPECIALTY LIST ADULT
Hospitalist
Infectious Disease
Internal Medicine
Internal Medicine
Nephrology
Hospitalist

## 2018-06-15 NOTE — PROGRESS NOTE ADULT - PROBLEM SELECTOR PLAN 2
completed a course of Azithromycin x 5days  - completed steroid course
completed a course of Azithromycin x 5days  - completed steroid course
- would not treat for this specifically, even in ESBL E coli.

## 2018-06-15 NOTE — DISCHARGE NOTE ADULT - OTHER SIGNIFICANT FINDINGS
CXR:  FINDINGS:    Single frontal view of the chest demonstrates the lungs to be clear. The   cardiomediastinal silhouette is enlarged. No acute osseous abnormalities.   Overlying EKG leads and wires are noted.    IMPRESSION: No acute cardiopulmonary disease process.    YODIT SUBRAMANIAN M.D., ATTENDING RADIOLOGIST  This document has been electronically signed. Jun 8 2018  7:58AM

## 2018-06-15 NOTE — PROGRESS NOTE ADULT - SUBJECTIVE AND OBJECTIVE BOX
[facility]  INFECTIOUS DISEASES AND INTERNAL MEDICINE at [place]  =======================================================  Lázaro Navas MD [facility]   Shen Bob MD  Diplomates American Board of Internal Medicine and Infectious Diseases  =======================================================    FLOYD PAEZ [id number]  chief complaint: follow up WBC elevation.   patient seen and examined in follow up.  Chart and labs reviewed.     no new complaints  no fevers., no chills  still with some cough  no other events noted    =======================================================  Allergies:  iodine (Hives)  shellfish (Anaphylaxis)    =======================================================  MEDICATIONS  (STANDING):  acetylcysteine 20% Inhalation 3 milliLiter(s) Inhalation two times a day  ALBUTerol    90 MICROgram(s) HFA Inhaler 1 Puff(s) Inhalation every 4 hours  ALBUTerol/ipratropium for Nebulization 3 milliLiter(s) Nebulizer every 6 hours  aspirin enteric coated 81 milliGRAM(s) Oral daily  atorvastatin 20 milliGRAM(s) Oral at bedtime  clopidogrel Tablet 75 milliGRAM(s) Oral daily  dextrose 5%. 1000 milliLiter(s) (50 mL/Hr) IV Continuous <Continuous>  dextrose 50% Injectable 12.5 Gram(s) IV Push once  dextrose 50% Injectable 25 Gram(s) IV Push once  dextrose 50% Injectable 25 Gram(s) IV Push once  diltiazem    milliGRAM(s) Oral daily  docusate sodium 100 milliGRAM(s) Oral daily  enoxaparin Injectable 40 milliGRAM(s) SubCutaneous daily  insulin glargine Injectable (LANTUS) 25 Unit(s) SubCutaneous at bedtime  insulin lispro (HumaLOG) corrective regimen sliding scale   SubCutaneous three times a day before meals  insulin lispro (HumaLOG) corrective regimen sliding scale   SubCutaneous at bedtime  insulin lispro Injectable (HumaLOG) 6 Unit(s) SubCutaneous three times a day before meals  levothyroxine 112 MICROGram(s) Oral daily  pantoprazole    Tablet 40 milliGRAM(s) Oral before breakfast  saccharomyces boulardii 250 milliGRAM(s) Oral two times a day  senna 2 Tablet(s) Oral at bedtime  tiotropium 18 MICROgram(s) Capsule 1 Capsule(s) Inhalation daily    =======================================================  REVIEW OF SYSTEMS:  as above  all other ROS negative  =======================================================    Physical Exam:  Vital Signs Last 24 Hrs  T(C): 36.7 (15 Charbel 2018 05:36), Max: 37 (14 Jun 2018 22:54)  T(F): 98.1 (15 Charbel 2018 05:36), Max: 98.6 (14 Jun 2018 22:54)  HR: 80 (15 Charbel 2018 05:36) (64 - 80)  BP: 139/66 (15 Charbel 2018 05:36) (116/61 - 139/66)  RR: 18 (14 Jun 2018 22:54) (18 - 18)  SpO2: 97% (15 Charbel 2018 03:42) (96% - 99%)    GEN: NAD, pleasant  HEENT: normocephalic and atraumatic. EOMI. GAMA.    NECK: Supple. No carotid bruits.  No lymphadenopathy or thyromegaly.  LUNGS: Clear to auscultation.; good air entry, prolong resp phase  HEART: Regular rate and rhythm without murmur.  ABDOMEN: Soft, nontender, and nondistended.  Positive bowel sounds.    : No tenderness to palpation lower abd/ bladder  EXTREMITIES: Without any cyanosis, clubbing, rash, lesions or edema.  MSK: no joint swelling  NEUROLOGIC: Cranial nerves II through XII are grossly intact.  PSYCHIATRIC: Appropriate affect .  SKIN: No ulceration or induration present.    =======================================================       Labs:        Labs:  06-15    135  |  100  |  37.0<H>  ----------------------------<  199<H>  4.5   |  24.0  |  0.98    Ca    8.9      15 Charbel 2018 06:36    TPro  6.3<L>  /  Alb  2.9<L>  /  TBili  0.4  /  DBili  x   /  AST  10  /  ALT  12  /  AlkPhos  61  06-15                          11.8   16.3  )-----------( 182      ( 15 Charbel 2018 06:36 )             38.2           LIVER FUNCTIONS - ( 15 Charbel 2018 06:36 )  Alb: 2.9 g/dL / Pro: 6.3 g/dL / ALK PHOS: 61 U/L / ALT: 12 U/L / AST: 10 U/L / GGT: x               CAPILLARY BLOOD GLUCOSE      POCT Blood Glucose.: 205 mg/dL (15 Charbel 2018 07:55)  POCT Blood Glucose.: 252 mg/dL (14 Jun 2018 22:52)  POCT Blood Glucose.: 150 mg/dL (14 Jun 2018 17:13)  POCT Blood Glucose.: 238 mg/dL (14 Jun 2018 12:08)        RECENT CULTURES:  06-10 @ 18:32 .Blood Blood     No growth at 48 hours        06-10 @ 18:29 .Urine Catheterized     No growth        06-07 @ 22:53 .Urine Clean Catch (Midstream) Escherichia coli ESBL    10,000 - 49,000 CFU/mL Escherichia coli ESBL  .  TYPE: (C=Critical, N=Notification, A=Abnormal) C  TESTS:  _ ESBL  DATE/TIME CALLED: _ 06/10/2018 11:10:20  CALLED TO: Pankaj REYNA RN  READ BACK (2 Patient Identifiers)(Y/N): _ Y  READ BACK VALUES(Y/N): _ Y  CALLED BY: _ HA  .  FAX TO [facility]

## 2018-06-15 NOTE — DISCHARGE NOTE ADULT - SECONDARY DIAGNOSIS.
Essential hypertension Gastroesophageal reflux disease without esophagitis DM2 (diabetes mellitus, type 2) Pure hypercholesterolemia CHF (congestive heart failure) TRENTON (acute kidney injury)

## 2018-06-15 NOTE — PROGRESS NOTE ADULT - PROBLEM SELECTOR PLAN 1
- WBC downtrending  procalcitonin level was < 0.05  - no clinical evidence of infection, defer antibiotics
- no clear infectious source  - defer antibiotics   - check procalcitonin elvel today
- clinically improving.   - on azithromycin, will limit to 5 day course. ends on 6/13/18  - continue medical care.

## 2018-06-18 LAB
CULTURE RESULTS: SIGNIFICANT CHANGE UP
CULTURE RESULTS: SIGNIFICANT CHANGE UP
SPECIMEN SOURCE: SIGNIFICANT CHANGE UP
SPECIMEN SOURCE: SIGNIFICANT CHANGE UP

## 2018-07-09 ENCOUNTER — APPOINTMENT (OUTPATIENT)
Dept: PULMONOLOGY | Facility: CLINIC | Age: 83
End: 2018-07-09
Payer: MEDICARE

## 2018-07-09 VITALS
DIASTOLIC BLOOD PRESSURE: 60 MMHG | HEART RATE: 76 BPM | SYSTOLIC BLOOD PRESSURE: 130 MMHG | OXYGEN SATURATION: 94 % | HEIGHT: 60 IN | BODY MASS INDEX: 38.87 KG/M2 | WEIGHT: 198 LBS

## 2018-07-09 PROCEDURE — 94010 BREATHING CAPACITY TEST: CPT

## 2018-07-09 PROCEDURE — 99215 OFFICE O/P EST HI 40 MIN: CPT | Mod: 25

## 2018-07-09 RX ORDER — PREDNISONE 10 MG/1
10 TABLET ORAL
Qty: 21 | Refills: 0 | Status: DISCONTINUED | COMMUNITY
Start: 2018-04-19 | End: 2018-07-09

## 2018-07-09 RX ORDER — TIOTROPIUM BROMIDE 18 UG/1
18 CAPSULE ORAL; RESPIRATORY (INHALATION) DAILY
Qty: 1 | Refills: 5 | Status: DISCONTINUED | COMMUNITY
Start: 2018-03-02 | End: 2018-07-09

## 2018-07-16 PROBLEM — I21.4 NON-ST ELEVATION (NSTEMI) MYOCARDIAL INFARCTION: Chronic | Status: ACTIVE | Noted: 2017-04-07

## 2018-07-17 ENCOUNTER — OUTPATIENT (OUTPATIENT)
Dept: OUTPATIENT SERVICES | Facility: HOSPITAL | Age: 83
LOS: 1 days | End: 2018-07-17
Payer: MEDICARE

## 2018-07-17 DIAGNOSIS — Z90.49 ACQUIRED ABSENCE OF OTHER SPECIFIED PARTS OF DIGESTIVE TRACT: Chronic | ICD-10-CM

## 2018-07-17 DIAGNOSIS — G47.33 OBSTRUCTIVE SLEEP APNEA (ADULT) (PEDIATRIC): ICD-10-CM

## 2018-07-17 DIAGNOSIS — R93.1 ABNORMAL FINDINGS ON DIAGNOSTIC IMAGING OF HEART AND CORONARY CIRCULATION: Chronic | ICD-10-CM

## 2018-07-17 PROCEDURE — 95811 POLYSOM 6/>YRS CPAP 4/> PARM: CPT

## 2018-07-17 PROCEDURE — 95811 POLYSOM 6/>YRS CPAP 4/> PARM: CPT | Mod: 26

## 2018-08-16 ENCOUNTER — APPOINTMENT (OUTPATIENT)
Dept: CARDIOLOGY | Facility: CLINIC | Age: 83
End: 2018-08-16

## 2018-08-22 ENCOUNTER — APPOINTMENT (OUTPATIENT)
Dept: CARDIOLOGY | Facility: CLINIC | Age: 83
End: 2018-08-22
Payer: MEDICARE

## 2018-08-22 ENCOUNTER — NON-APPOINTMENT (OUTPATIENT)
Age: 83
End: 2018-08-22

## 2018-08-22 VITALS
BODY MASS INDEX: 40.62 KG/M2 | WEIGHT: 208 LBS | HEART RATE: 110 BPM | OXYGEN SATURATION: 91 % | DIASTOLIC BLOOD PRESSURE: 80 MMHG | SYSTOLIC BLOOD PRESSURE: 136 MMHG

## 2018-08-22 PROCEDURE — 93000 ELECTROCARDIOGRAM COMPLETE: CPT

## 2018-08-22 PROCEDURE — 99215 OFFICE O/P EST HI 40 MIN: CPT | Mod: 25

## 2018-08-24 ENCOUNTER — APPOINTMENT (OUTPATIENT)
Dept: ENDOCRINOLOGY | Facility: CLINIC | Age: 83
End: 2018-08-24
Payer: MEDICARE

## 2018-08-24 LAB
GLUCOSE BLDC GLUCOMTR-MCNC: 341
HBA1C MFR BLD HPLC: 10.8

## 2018-08-24 PROCEDURE — 82962 GLUCOSE BLOOD TEST: CPT

## 2018-08-24 PROCEDURE — 83036 HEMOGLOBIN GLYCOSYLATED A1C: CPT | Mod: QW

## 2018-08-24 PROCEDURE — G0108 DIAB MANAGE TRN  PER INDIV: CPT

## 2018-08-27 ENCOUNTER — TRANSCRIPTION ENCOUNTER (OUTPATIENT)
Age: 83
End: 2018-08-27

## 2018-08-30 ENCOUNTER — EMERGENCY (EMERGENCY)
Facility: HOSPITAL | Age: 83
LOS: 1 days | Discharge: HOME | End: 2018-08-30
Attending: EMERGENCY MEDICINE
Payer: MEDICARE

## 2018-08-30 VITALS
HEART RATE: 105 BPM | TEMPERATURE: 98 F | HEIGHT: 60 IN | OXYGEN SATURATION: 94 % | DIASTOLIC BLOOD PRESSURE: 96 MMHG | SYSTOLIC BLOOD PRESSURE: 167 MMHG | WEIGHT: 203.93 LBS | RESPIRATION RATE: 22 BRPM

## 2018-08-30 VITALS
OXYGEN SATURATION: 95 % | SYSTOLIC BLOOD PRESSURE: 148 MMHG | RESPIRATION RATE: 18 BRPM | HEART RATE: 94 BPM | DIASTOLIC BLOOD PRESSURE: 72 MMHG

## 2018-08-30 DIAGNOSIS — Z90.49 ACQUIRED ABSENCE OF OTHER SPECIFIED PARTS OF DIGESTIVE TRACT: Chronic | ICD-10-CM

## 2018-08-30 DIAGNOSIS — R93.1 ABNORMAL FINDINGS ON DIAGNOSTIC IMAGING OF HEART AND CORONARY CIRCULATION: Chronic | ICD-10-CM

## 2018-08-30 LAB
ACETONE SERPL-MCNC: ABNORMAL
ALBUMIN SERPL ELPH-MCNC: 4 G/DL — SIGNIFICANT CHANGE UP (ref 3.3–5.2)
ALP SERPL-CCNC: 80 U/L — SIGNIFICANT CHANGE UP (ref 40–120)
ALT FLD-CCNC: 12 U/L — SIGNIFICANT CHANGE UP
ANION GAP SERPL CALC-SCNC: 15 MMOL/L — SIGNIFICANT CHANGE UP (ref 5–17)
APPEARANCE UR: CLEAR — SIGNIFICANT CHANGE UP
APTT BLD: 30 SEC — SIGNIFICANT CHANGE UP (ref 27.5–37.4)
AST SERPL-CCNC: 16 U/L — SIGNIFICANT CHANGE UP
BACTERIA # UR AUTO: ABNORMAL
BILIRUB SERPL-MCNC: 0.3 MG/DL — LOW (ref 0.4–2)
BILIRUB UR-MCNC: NEGATIVE — SIGNIFICANT CHANGE UP
BUN SERPL-MCNC: 25 MG/DL — HIGH (ref 8–20)
CALCIUM SERPL-MCNC: 9.7 MG/DL — SIGNIFICANT CHANGE UP (ref 8.6–10.2)
CHLORIDE SERPL-SCNC: 92 MMOL/L — LOW (ref 98–107)
CK SERPL-CCNC: 79 U/L — SIGNIFICANT CHANGE UP (ref 25–170)
CO2 SERPL-SCNC: 26 MMOL/L — SIGNIFICANT CHANGE UP (ref 22–29)
COLOR SPEC: YELLOW — SIGNIFICANT CHANGE UP
CREAT SERPL-MCNC: 1.08 MG/DL — SIGNIFICANT CHANGE UP (ref 0.5–1.3)
D DIMER BLD IA.RAPID-MCNC: 180 NG/ML DDU — SIGNIFICANT CHANGE UP
DIFF PNL FLD: NEGATIVE — SIGNIFICANT CHANGE UP
EPI CELLS # UR: SIGNIFICANT CHANGE UP
GLUCOSE SERPL-MCNC: 314 MG/DL — HIGH (ref 70–115)
GLUCOSE UR QL: 1000 MG/DL
HCT VFR BLD CALC: 42.2 % — SIGNIFICANT CHANGE UP (ref 37–47)
HGB BLD-MCNC: 13.4 G/DL — SIGNIFICANT CHANGE UP (ref 12–16)
INR BLD: 0.96 RATIO — SIGNIFICANT CHANGE UP (ref 0.88–1.16)
KETONES UR-MCNC: ABNORMAL
LEUKOCYTE ESTERASE UR-ACNC: NEGATIVE — SIGNIFICANT CHANGE UP
MCHC RBC-ENTMCNC: 26.7 PG — LOW (ref 27–31)
MCHC RBC-ENTMCNC: 31.8 G/DL — LOW (ref 32–36)
MCV RBC AUTO: 84.2 FL — SIGNIFICANT CHANGE UP (ref 81–99)
NITRITE UR-MCNC: NEGATIVE — SIGNIFICANT CHANGE UP
NT-PROBNP SERPL-SCNC: 433 PG/ML — HIGH (ref 0–300)
PH UR: 7 — SIGNIFICANT CHANGE UP (ref 5–8)
PLATELET # BLD AUTO: 252 K/UL — SIGNIFICANT CHANGE UP (ref 150–400)
POTASSIUM SERPL-MCNC: 4.6 MMOL/L — SIGNIFICANT CHANGE UP (ref 3.5–5.3)
POTASSIUM SERPL-SCNC: 4.6 MMOL/L — SIGNIFICANT CHANGE UP (ref 3.5–5.3)
PROT SERPL-MCNC: 7.4 G/DL — SIGNIFICANT CHANGE UP (ref 6.6–8.7)
PROT UR-MCNC: 15 MG/DL
PROTHROM AB SERPL-ACNC: 10.6 SEC — SIGNIFICANT CHANGE UP (ref 9.8–12.7)
RBC # BLD: 5.01 M/UL — SIGNIFICANT CHANGE UP (ref 4.4–5.2)
RBC # FLD: 14.6 % — SIGNIFICANT CHANGE UP (ref 11–15.6)
RBC CASTS # UR COMP ASSIST: SIGNIFICANT CHANGE UP /HPF (ref 0–4)
SODIUM SERPL-SCNC: 133 MMOL/L — LOW (ref 135–145)
SP GR SPEC: 1 — LOW (ref 1.01–1.02)
TROPONIN T SERPL-MCNC: <0.01 NG/ML — SIGNIFICANT CHANGE UP (ref 0–0.06)
TROPONIN T SERPL-MCNC: <0.01 NG/ML — SIGNIFICANT CHANGE UP (ref 0–0.06)
UROBILINOGEN FLD QL: NEGATIVE MG/DL — SIGNIFICANT CHANGE UP
WBC # BLD: 9.9 K/UL — SIGNIFICANT CHANGE UP (ref 4.8–10.8)
WBC # FLD AUTO: 9.9 K/UL — SIGNIFICANT CHANGE UP (ref 4.8–10.8)
WBC UR QL: SIGNIFICANT CHANGE UP

## 2018-08-30 PROCEDURE — 36415 COLL VENOUS BLD VENIPUNCTURE: CPT

## 2018-08-30 PROCEDURE — 85730 THROMBOPLASTIN TIME PARTIAL: CPT

## 2018-08-30 PROCEDURE — 80053 COMPREHEN METABOLIC PANEL: CPT

## 2018-08-30 PROCEDURE — 99284 EMERGENCY DEPT VISIT MOD MDM: CPT

## 2018-08-30 PROCEDURE — 82550 ASSAY OF CK (CPK): CPT

## 2018-08-30 PROCEDURE — 84484 ASSAY OF TROPONIN QUANT: CPT

## 2018-08-30 PROCEDURE — 99285 EMERGENCY DEPT VISIT HI MDM: CPT

## 2018-08-30 PROCEDURE — 83880 ASSAY OF NATRIURETIC PEPTIDE: CPT

## 2018-08-30 PROCEDURE — 93971 EXTREMITY STUDY: CPT

## 2018-08-30 PROCEDURE — 71046 X-RAY EXAM CHEST 2 VIEWS: CPT | Mod: 26

## 2018-08-30 PROCEDURE — 94640 AIRWAY INHALATION TREATMENT: CPT

## 2018-08-30 PROCEDURE — 96372 THER/PROPH/DIAG INJ SC/IM: CPT | Mod: XU

## 2018-08-30 PROCEDURE — 96374 THER/PROPH/DIAG INJ IV PUSH: CPT

## 2018-08-30 PROCEDURE — 81001 URINALYSIS AUTO W/SCOPE: CPT

## 2018-08-30 PROCEDURE — 85379 FIBRIN DEGRADATION QUANT: CPT

## 2018-08-30 PROCEDURE — 93005 ELECTROCARDIOGRAM TRACING: CPT

## 2018-08-30 PROCEDURE — 96375 TX/PRO/DX INJ NEW DRUG ADDON: CPT

## 2018-08-30 PROCEDURE — 99284 EMERGENCY DEPT VISIT MOD MDM: CPT | Mod: 25

## 2018-08-30 PROCEDURE — 85610 PROTHROMBIN TIME: CPT

## 2018-08-30 PROCEDURE — 93971 EXTREMITY STUDY: CPT | Mod: 26,LT

## 2018-08-30 PROCEDURE — 82009 KETONE BODYS QUAL: CPT

## 2018-08-30 PROCEDURE — 96376 TX/PRO/DX INJ SAME DRUG ADON: CPT

## 2018-08-30 PROCEDURE — 82962 GLUCOSE BLOOD TEST: CPT

## 2018-08-30 PROCEDURE — 93010 ELECTROCARDIOGRAM REPORT: CPT

## 2018-08-30 PROCEDURE — 85027 COMPLETE CBC AUTOMATED: CPT

## 2018-08-30 RX ORDER — ONDANSETRON 8 MG/1
4 TABLET, FILM COATED ORAL ONCE
Qty: 0 | Refills: 0 | Status: COMPLETED | OUTPATIENT
Start: 2018-08-30 | End: 2018-08-30

## 2018-08-30 RX ORDER — IPRATROPIUM/ALBUTEROL SULFATE 18-103MCG
3 AEROSOL WITH ADAPTER (GRAM) INHALATION EVERY 6 HOURS
Qty: 0 | Refills: 0 | Status: DISCONTINUED | OUTPATIENT
Start: 2018-08-30 | End: 2018-09-04

## 2018-08-30 RX ORDER — INSULIN HUMAN 100 [IU]/ML
4 INJECTION, SOLUTION SUBCUTANEOUS ONCE
Qty: 0 | Refills: 0 | Status: COMPLETED | OUTPATIENT
Start: 2018-08-30 | End: 2018-08-30

## 2018-08-30 RX ORDER — OXYCODONE AND ACETAMINOPHEN 5; 325 MG/1; MG/1
1 TABLET ORAL ONCE
Qty: 0 | Refills: 0 | Status: DISCONTINUED | OUTPATIENT
Start: 2018-08-30 | End: 2018-08-30

## 2018-08-30 RX ORDER — MORPHINE SULFATE 50 MG/1
4 CAPSULE, EXTENDED RELEASE ORAL ONCE
Qty: 0 | Refills: 0 | Status: DISCONTINUED | OUTPATIENT
Start: 2018-08-30 | End: 2018-08-30

## 2018-08-30 RX ORDER — LABETALOL HCL 100 MG
10 TABLET ORAL ONCE
Qty: 0 | Refills: 0 | Status: COMPLETED | OUTPATIENT
Start: 2018-08-30 | End: 2018-08-30

## 2018-08-30 RX ORDER — TIOTROPIUM BROMIDE 18 UG/1
1 CAPSULE ORAL; RESPIRATORY (INHALATION) DAILY
Qty: 0 | Refills: 0 | Status: DISCONTINUED | OUTPATIENT
Start: 2018-08-30 | End: 2018-09-04

## 2018-08-30 RX ORDER — ALBUTEROL 90 UG/1
1 AEROSOL, METERED ORAL EVERY 4 HOURS
Qty: 0 | Refills: 0 | Status: DISCONTINUED | OUTPATIENT
Start: 2018-08-30 | End: 2018-09-04

## 2018-08-30 RX ORDER — IPRATROPIUM/ALBUTEROL SULFATE 18-103MCG
3 AEROSOL WITH ADAPTER (GRAM) INHALATION ONCE
Qty: 0 | Refills: 0 | Status: DISCONTINUED | OUTPATIENT
Start: 2018-08-30 | End: 2018-09-04

## 2018-08-30 RX ORDER — INSULIN HUMAN 100 [IU]/ML
5 INJECTION, SOLUTION SUBCUTANEOUS ONCE
Qty: 0 | Refills: 0 | Status: COMPLETED | OUTPATIENT
Start: 2018-08-30 | End: 2018-08-30

## 2018-08-30 RX ORDER — SODIUM CHLORIDE 9 MG/ML
3 INJECTION INTRAMUSCULAR; INTRAVENOUS; SUBCUTANEOUS ONCE
Qty: 0 | Refills: 0 | Status: COMPLETED | OUTPATIENT
Start: 2018-08-30 | End: 2018-08-30

## 2018-08-30 RX ADMIN — MORPHINE SULFATE 4 MILLIGRAM(S): 50 CAPSULE, EXTENDED RELEASE ORAL at 10:59

## 2018-08-30 RX ADMIN — SODIUM CHLORIDE 3 MILLILITER(S): 9 INJECTION INTRAMUSCULAR; INTRAVENOUS; SUBCUTANEOUS at 10:56

## 2018-08-30 RX ADMIN — OXYCODONE AND ACETAMINOPHEN 1 TABLET(S): 5; 325 TABLET ORAL at 08:23

## 2018-08-30 RX ADMIN — MORPHINE SULFATE 4 MILLIGRAM(S): 50 CAPSULE, EXTENDED RELEASE ORAL at 15:52

## 2018-08-30 RX ADMIN — Medication 3 MILLILITER(S): at 16:13

## 2018-08-30 RX ADMIN — Medication 10 MILLIGRAM(S): at 13:07

## 2018-08-30 RX ADMIN — Medication 3 MILLILITER(S): at 10:04

## 2018-08-30 RX ADMIN — INSULIN HUMAN 5 UNIT(S): 100 INJECTION, SOLUTION SUBCUTANEOUS at 17:53

## 2018-08-30 RX ADMIN — MORPHINE SULFATE 4 MILLIGRAM(S): 50 CAPSULE, EXTENDED RELEASE ORAL at 10:50

## 2018-08-30 RX ADMIN — INSULIN HUMAN 4 UNIT(S): 100 INJECTION, SOLUTION SUBCUTANEOUS at 10:49

## 2018-08-30 RX ADMIN — OXYCODONE AND ACETAMINOPHEN 1 TABLET(S): 5; 325 TABLET ORAL at 10:03

## 2018-08-30 NOTE — ED STATDOCS - NS_ ATTENDINGSCRIBEDETAILS _ED_A_ED_FT
I, Anna Pedersen, performed the initial face to face bedside interview with this patient regarding history of   The history, relevant review of systems, past medical and surgical history, medical decision making, and physical examination was documented by the scribe in my presence and I attest to the accuracy of the documentation.

## 2018-08-30 NOTE — ED ADULT NURSE NOTE - NSIMPLEMENTINTERV_GEN_ALL_ED
Implemented All Universal Safety Interventions:  [place] to call system. Call bell, personal items and telephone within reach. Instruct patient to call for assistance. Room bathroom lighting operational. Non-slip footwear when patient is off stretcher. Physically safe environment: no spills, clutter or unnecessary equipment. Stretcher in lowest position, wheels locked, appropriate side rails in place. Specific interventions were implemented:

## 2018-08-30 NOTE — ED STATDOCS - PROGRESS NOTE DETAILS
83 y/o F pt with medical hx of COPD, DM2, HTN, hypothyroidism, and NSTEMI presents to ED c/o primarily leg pain for 1 week, nausea and SOB today. Pt reports that she called her son c/o SOB and LE pain today, and was brought into ED. She believes her nausea is being caused by taking tramadol with Tylenol. Pt states that there was no trauma to her LE. Pt had x-rays done of her hip, but had no findings, along with a doppler of her legs and gude that came out negative. She was evaluated by her PMD, but again had no findings. Pt will need cardio workup along with doppler of LE to r/o DVT, as well as treatment for her COPD, as well as pain relief and anti nausea medication. Denies chest pain and fever. No further complaints at this time. 83 y/o F pt with medical hx of COPD, DM2, HTN, hypothyroidism, and NSTEMI presents to ED c/o primarily leg pain for 1 week, nausea and SOB today. Pt reports that she called her son c/o SOB and LE pain today, and was brought into ED. She believes her nausea is being caused by taking tramadol with Tylenol. Pt states that there was no trauma to her LE. Pt had x-rays done of her hip, but had no findings, along with a doppler of her legs and  that came out negative. She was evaluated by her PMD, but again had no findings. Pt will need cardio workup along with doppler of LE to r/o DVT, as well as treatment for her COPD, as well as pain relief and anti nausea medication. Denies chest pain and fever. No further complaints at this time.

## 2018-08-30 NOTE — ED ADULT NURSE REASSESSMENT NOTE - NURSING MUSC EXTREMITY NORMAL ROM
left upper extremity/right upper extremity/right lower extremity/pain in LUE & LLE/left lower extremity

## 2018-08-30 NOTE — ED ADULT NURSE NOTE - OBJECTIVE STATEMENT
patient complaining of short of breath with nausea and weakness, also pain and swelling  in her leg for 2 weeks. denies chest pain

## 2018-08-30 NOTE — ED ADULT NURSE REASSESSMENT NOTE - INTEGUMENTARY WDL
Color consistent with ethnicity/race, warm, dry intact, resilient. Both feet cool to touch with pitting edema, pedal pulses palpable 1+

## 2018-08-30 NOTE — CONSULT NOTE ADULT - SUBJECTIVE AND OBJECTIVE BOX
CARDIOLOGY CONSULTATION NOTE   Consult requested by:  Dr. savage    Reason for Consultation:  SOB    History obtained by: Patient, family and medical record     obtained: No    Chief complaint:    Patient is a 84y old  Female who presents with a chief complaint of Leg Pain    HPI:    Primary Cardiologist:      84F hx DM, HTN, HLD, COPD, STEVENSON, Asthma, Restrictive lung disease, Subclavian artery stenosis, Occlusion and stenosis of right carotid artery, AS, HFpEF, MI,  known CAD since , last cath On 2018  that revealed patent stent in the LAD, 50% stenosis in the LCx (iFR normal) and 100% occlusion of the Mid RCA.  LV function was normal and there was moderate AS with an BILL=1.21 cm2 and mild Pul HTN and normal CO/CI. On 2018 she had a dobutamine stress test that revealed a completely normal LV function with a peak gradient of 34 mmHg. She had recent FU as oupt () for increasing SOB, orthopnea, PND and leg edema presented to ED with several days h/o L leg and back pain , Left hip pain which became "too much" and brought in to the ER. She admitted SOB this morning stating she get SOB most of the time which is part of her COPD yet today's one was "a little bit more". Denied fever, chills, cough, phlegm production, edema, CP palpitation, nausea, vomiting hematuria melena, syncope, near syncope. She admitted L shoulder pain. [place] admitted dry cough at baseline chronic and unchanged She admitted nausea which she thinks itis being caused by taking tramadol with Tylenol.  Denied fever, chills, CP, palpitation, vomiting, hematuria, melena syncope, near syncope, trauma fall.       REVIEW OF SYMPTOMS:   Constitutional: Denied: fever, chills, weight loss or gain  Eyes: Denied: reddened ayes, eye discharge, eye pain  ENMT: Denied: ear pain, nasal discharge, mouth pain, throat pain or swelling  Cardiovascular: Denied: chest pain,  Chest pressure, palpitation, arrhythmia, irregular or fast heart beats,   Respiratory: Denied:  phlegm production, wheezes,   GI: Denied: Abdominal pain, vomiting, diarrhea, constipation, melena, rectal bleed  : Denied: hematuria, frequency  Musculoskeletal: Denied: muscle weakness,  Hematology/lymp: Denied: Bleeding disorder, anemia, blood clotting  Neuro: Denied: Headache, light headedness, dizziness, numbness, aphasia, dysarthria, seizure,  syncope, near syncope  Psych: Denied: depression, anxiety  Integumentary/skin: Denied: rash, bruises, ecchymosis, itching  Allergy/Immunology: denied environmental or drug allergies    ALL OTHER REVIEW OF SYSTEMS ARE NEGATIVE.    MEDICATIONS  (STANDING):  ALBUTerol    90 MICROgram(s) HFA Inhaler 1 Puff(s) Inhalation every 4 hours  ALBUTerol/ipratropium for Nebulization 3 milliLiter(s) Nebulizer every 6 hours  labetalol Injectable 10 milliGRAM(s) IV Push once  tiotropium 18 MICROgram(s) Capsule 1 Capsule(s) Inhalation daily    MEDICATIONS  (PRN):    Home Meds  Advair Diskus 500-50 MCG/DOSE Inhalation Aerosol Powder Breath Activated; INHALE 1  PUFF TWICE DAILY  Albuterol Sulfate (2.5 MG/3ML) 0.083% Inhalation Nebulization Solution; USE 1 VIAL IN  NEBULIZER EVERY 4 TO 6 HOURS AS NEEDED FOR WHEEZING  Aspirin 81 MG Oral Tablet Chewable; CHEW AND SWALLOW 1 TABLET DAILY  Atorvastatin Calcium 40 MG Oral Tablet; TAKE 1 TABLET AT BEDTIME  Basaglar KwikPen 100 UNIT/ML Subcutaneous Solution Pen-injector  BD Pen Needle Lou U/F 32G X 4 MM  BD Pen Needle Ultrafine 29G X 12.7MM  Clopidogrel Bisulfate 75 MG Oral Tablet; TAKE 1 TAB(S) ORALLY ONCE A DAY  Diabetes Support THERAPY PACK Oral  DilTIAZem HCl ER Coated Beads 240 MG Oral Capsule Extended Release 24 Hour;  TAKE 1 CAPSULE DAILY  E-Z Spacer FILIPE; To use with handheld inhaler  Furosemide 40 MG Oral Tablet; TAKE 1 TAB(S) ORALLY ONCE A DAY  Gabapentin 100 MG Oral Capsule  Levothyroxine Sodium 112 MCG Oral Tablet; TAKE 1 TABLET BY MOUTH ONCE DAILY  MetFORMIN HCl  MG Oral Tablet Extended Release 24 Hour; TAKE 2 TABLETS  TWICE DAILY  Metoprolol Succinate ER 50 MG Oral Tablet Extended Release 24 Hour; 1 TAB(S)  ORALLY ONCE A DAY  NovoLOG FlexPen 100 UNIT/ML Subcutaneous Solution Pen-injector; INJECT  SUBCUTANEOUSLY AS DIRECTED  OneTouch Delica Lancets 33G  OneTouch Ultra Blue In Vitro Strip  Pantoprazole Sodium 40 MG Oral Tablet Delayed Release; 1 TAB(S) ORALLY ONCE A  DAY (BEFORE A MEAL)  ProAir  (90 Base) MCG/ACT Inhalation Aerosol Solution; INHALE 1 TO 2 PUFFS  EVERY 4 TO 6 HOURS AS NEEDED  Spiriva HandiHaler 18 MCG Inhalation Capsule; INHALE CONTENTS OF 1 CAPSULE  ONCE DAILY    Allergies  Iodine SOLN  Shellfish-derived Products  Shellfish    PAST MEDICAL & SURGICAL HISTORY:  NSTEMI (non-ST elevated myocardial infarction)  Gastroesophageal reflux disease without esophagitis  Pure hypercholesterolemia  Hypothyroidism, unspecified type  Essential hypertension  DM2 (diabetes mellitus, type 2)  Uncomplicated asthma, unspecified asthma severity  Abnormal findings on cardiac catheterization: Cardiac Cath  History of appendectomy      FAMILY HISTORY:  Family history of stomach cancer  Family history of MI (myocardial infarction) (Father)      SOCIAL HISTORY:    CIGARETTES:  No    ALCOHOL: No    DRUGS: No    Vital Signs Last 24 Hrs  T(C): 36.6 (30 Aug 2018 10:04), Max: 36.6 (30 Aug 2018 07:17)  T(F): 97.8 (30 Aug 2018 10:04), Max: 97.9 (30 Aug 2018 07:17)  HR: 92 (30 Aug 2018 10:58) (88 - 105)  BP: 173/96 (30 Aug 2018 10:58) (167/96 - 190/91)  BP(mean): --  RR: 12 (30 Aug 2018 10:58) (12 - 22)  SpO2: 95% (30 Aug 2018 10:58) (94% - 95%)    PHYSICAL EXAM:      Constitutional: No fever, chills, NAD, Comfortable    Eyes: Not reddened, no discharge    ENMT: No discharge, No pain    Neck: No JVD, No Bruit    Back: No CVA tenderness    Respiratory: Clear to auscultation bilaterally, prolonged expiratory phase    Cardiovascular: RRR, Normal S1-2, No S3-, No friction rub 3/6 RJ at RUSB    Gastrointestinal: Soft, NT/ND. BS+, No organomegaly    Extremities: + bl ankle edema    Vascular: Distal pulses intact    Neurological: Alert, awake, oriented, able to move extremities, speech is clear    Skin: No ecchymosis, no rash    Musculoskeletal: Non tender, no weakness    Psychiatric: Appropriate mood, no anxiety        INTERPRETATION OF TELEMETRY:    ECG:  ST at 103 bpm, 1st degree AVB, LVH with rep abnl, Inferior infarct, Anterior Infarct  (Unchanged)      I&O's Detail      LABS:                        13.4   9.9   )-----------( 252      ( 30 Aug 2018 10:57 )             42.2     08    133<L>  |  92<L>  |  25.0<H>  ----------------------------<  314<H>  4.6   |  26.0  |  1.08    Ca    9.7      30 Aug 2018 10:57    TPro  7.4  /  Alb  4.0  /  TBili  0.3<L>  /  DBili  x   /  AST  16  /  ALT  12  /  AlkPhos  80  08-30    CARDIAC MARKERS ( 30 Aug 2018 10:57 )  x     / <0.01 ng/mL / 79 U/L / x     / x          PT/INR - ( 30 Aug 2018 10:57 )   PT: 10.6 sec;   INR: 0.96 ratio         PTT - ( 30 Aug 2018 10:57 )  PTT:30.0 sec  Urinalysis Basic - ( 30 Aug 2018 11:01 )    Color: Yellow / Appearance: Clear / S.005 / pH: x  Gluc: x / Ketone: Trace  / Bili: Negative / Urobili: Negative mg/dL   Blood: x / Protein: 15 mg/dL / Nitrite: Negative   Leuk Esterase: Negative / RBC: 0-2 /HPF / WBC 0-2   Sq Epi: x / Non Sq Epi: Few / Bacteria: Occasional      I&O's Summary      Echo 2018  EF >756. Grade 1 DD. Moderate AS.

## 2018-08-30 NOTE — ED PROVIDER NOTE - PROGRESS NOTE DETAILS
hussein: pt is feeling better; cleared by cardiology to go home; most likely msk pain notes that has trmadol at home for pain able to ambulate in the bathroom - usually uses a walker at home would like to go home --will dc

## 2018-08-30 NOTE — ED PROVIDER NOTE - OBJECTIVE STATEMENT
85yo F hx of copd not on home o2 sat usually low 90s as per pt, chf on lasix. 3 stents on plavix, htn dm hld, hypothyroid p/w left leg/calf pain x 2 weeks, notes similar pain x months from back down to leg had DIEGO jacinto in june that was neg. today woke up with more pain to the leg . + weakness over last few days, sob this morning a little worse then baseline notes that was anxious bc of leg pain now feels ok no sob sp neb. +nausea. no vomiting.  no redness. Denies f/c/v/cp/palpitations/cough/ rash/headache/dizziness/abd.pain/d/c/dysuria/hematuria. no sick contacts no recent travel. no hx of dvt/pe

## 2018-08-30 NOTE — ED ADULT NURSE NOTE - INTERVENTIONS DEFINITIONS
Stretcher in lowest position, wheels locked, appropriate side rails in place/Call bell, personal items and telephone within reach/Instruct patient to call for assistance/[place] to call system/Non-slip footwear when patient is off stretcher

## 2018-08-30 NOTE — CONSULT NOTE ADULT - ASSESSMENT
84F hx DM, HTN, HLD, COPD, STEVENSON, Asthma, Restrictive lung disease, Subclavian artery stenosis, Occlusion and stenosis of right carotid artery, AS, HFpEF, MI,  known CAD since 1990, last cath On 4/19/2018  that revealed patent stent in the LAD, 50% stenosis in the LCx (iFR normal) and 100% occlusion of the Mid RCA.  LV function was normal and there was moderate AS with an BILL=1.21 cm2 and mild Pul HTN and normal CO/CI. On 5/4/2018 she had a dobutamine stress test that revealed a completely normal LV function with a peak gradient of 34 mmHg. She had recent FU as oupt (8/22) for increasing SOB, orthopnea, PND and leg edema presented to ED with several days h/o L leg and back pain , Left hip pain which became "too much" and brought in to the ER. She admitted SOB this morning stating she get SOB most of the time which is part of her COPD yet today's one was "a little bit more". Denied fever, chills, cough, phlegm production, edema, CP palpitation, nausea, vomiting hematuria melena, syncope, near syncope. She admitted L shoulder pain. She admitted dry cough at baseline chronic and unchanged She admitted nausea which she thinks itis being caused by taking tramadol with Tylenol.  Denied fever, chills, CP, palpitation, vomiting, hematuria, melena syncope, near syncope, trauma fall.     A/P  1) Leg Pain/ Hip Pain: Leg doppler: No DVT. Consider musculoskeletal etiology   2) SOB:  Multifactorial However, COPD component is more prominent, Consider Pulmonary evaluation   3) HFpEF: PBNP 433, trop #1 neg. Cont diuretics. Tren trop.  4) AS: Echo 5/2018: EF >756. Grade 1 DD. Moderate AS.   5) COPD: Monitor SpO2. Inhalers. Supplemental )2.  6) Asthma: No wheezes  7) Restrictive lung disease: Consider Pulmonary evaluation      Cont Cardio Meds:  Aspirin 81mg daily   Atorvastatin 40mg daily  Clopidogrel 75mg daily  DilTIAZem HCl ER Coated Beads 240 MG Oral Capsule Extended Release 24 Hour;  Furosemide 40 mg daily  Metoprolol Succinate ER 50 MG Oral Tablet Extended Release 24 Hour; 1 TAB(S)      if trop neg may follow as outpt from cardiac standpoint.  d/w Dr. savage

## 2018-08-30 NOTE — ED PROVIDER NOTE - MEDICAL DECISION MAKING DETAILS
leg pain - chronic msk vs dvt will fu sono; worsening sob but now improved sp neb; at baseline sat anxiety vs copd vs PE? in a setting of leg pain although hx of same with neg dvt study in the past vs acs/chf --will fu sono; cxr; labs; neb, d-dimer trop ekg --reasses

## 2018-08-31 ENCOUNTER — EMERGENCY (EMERGENCY)
Facility: HOSPITAL | Age: 83
LOS: 1 days | Discharge: HOME | End: 2018-08-31
Attending: STUDENT IN AN ORGANIZED HEALTH CARE EDUCATION/TRAINING PROGRAM
Payer: MEDICARE

## 2018-08-31 VITALS — HEIGHT: 60 IN | WEIGHT: 203.93 LBS

## 2018-08-31 VITALS
TEMPERATURE: 98 F | OXYGEN SATURATION: 94 % | HEART RATE: 104 BPM | SYSTOLIC BLOOD PRESSURE: 125 MMHG | RESPIRATION RATE: 21 BRPM | DIASTOLIC BLOOD PRESSURE: 80 MMHG

## 2018-08-31 DIAGNOSIS — R93.1 ABNORMAL FINDINGS ON DIAGNOSTIC IMAGING OF HEART AND CORONARY CIRCULATION: Chronic | ICD-10-CM

## 2018-08-31 DIAGNOSIS — Z90.49 ACQUIRED ABSENCE OF OTHER SPECIFIED PARTS OF DIGESTIVE TRACT: Chronic | ICD-10-CM

## 2018-08-31 PROCEDURE — 99283 EMERGENCY DEPT VISIT LOW MDM: CPT

## 2018-08-31 RX ORDER — OXYCODONE AND ACETAMINOPHEN 5; 325 MG/1; MG/1
2 TABLET ORAL ONCE
Qty: 0 | Refills: 0 | Status: DISCONTINUED | OUTPATIENT
Start: 2018-08-31 | End: 2018-08-31

## 2018-08-31 RX ORDER — ONDANSETRON 8 MG/1
4 TABLET, FILM COATED ORAL ONCE
Qty: 0 | Refills: 0 | Status: COMPLETED | OUTPATIENT
Start: 2018-08-31 | End: 2018-08-31

## 2018-08-31 RX ADMIN — OXYCODONE AND ACETAMINOPHEN 2 TABLET(S): 5; 325 TABLET ORAL at 04:51

## 2018-08-31 RX ADMIN — ONDANSETRON 4 MILLIGRAM(S): 8 TABLET, FILM COATED ORAL at 06:39

## 2018-08-31 RX ADMIN — OXYCODONE AND ACETAMINOPHEN 2 TABLET(S): 5; 325 TABLET ORAL at 05:30

## 2018-08-31 NOTE — ED ADULT TRIAGE NOTE - CHIEF COMPLAINT QUOTE
"My leg is sore very badly." Patient A&Ox4 stated pain has been ongoing x1.5 weeks. Patient was seen in ED earlier in the evening for same pain to leg. Denies any recent trauma. Stated is unable to ambulate.

## 2018-09-01 NOTE — DISCHARGE NOTE ADULT - MEDICATION SUMMARY - MEDICATIONS TO CHANGE
Home I will SWITCH the dose or number of times a day I take the medications listed below when I get home from the hospital:  None I will SWITCH the dose or number of times a day I take the medications listed below when I get home from the hospital:    Basaglar KwikPen  --   55 units qhs

## 2018-09-07 NOTE — ED PROVIDER NOTE - ATTENDING CONTRIBUTION TO CARE
yes
85yo female with pmh of copd not on home o2 sat usually low 90s, chf, CAD s/p stents on plavix, htn dm hld, hypothyroid p/w left leg/calf pain x 2 weeks, notes similar pain x months from back down to leg had LE sono in june that was neg. Pt also had negative sono and work up here yesterday for leg pain. Pt is able to ambulate but states she has pain, pt was told not to take her tramadol therefore did not take it at home. Pt denies fevers/chills, ha, loc, focal neuro deficits, cp/sob/palp, cough, abd pain/n/v/d, urinary symptoms.   Gen: Alert, NAD  Head: NC, AT, PERRL, EOMI, normal lids/conjunctiva  ENT: normal hearing, patent oropharynx without erythema/exudate, uvula midline  Neck: +supple, no tenderness/meningismus/JVD, +Trachea midline  Pulm: Bilateral BS, normal resp effort, no wheeze/stridor/retractions  CV: RRR, S1S2, +dist pulses  Abd: soft, NT/ND, +BS, no hepatosplenomegaly  Mskel: no edema/erythema/cyanosis, able to ambulate at baseline  Skin: no rash  Neuro: AAOx3, no sensory/motor deficits, CN 2-12 intact  plan - pain control, education, pmd follow up

## 2018-09-07 NOTE — ED PROVIDER NOTE - OBJECTIVE STATEMENT
85 y/o F c/o left leg pain x 1 week.  Patient was seen yesterday for the same complaint - had negative workup and was discharged.  She states that pain was exacerbated when she was walking to the bathroom.  Patient has bottles of tramadol and gabapentin.  She states that she hasn't taken any home medications for pain since yesterday.  Denies any other complaints. 85 y/o F c/o left leg pain x 2 week.  Patient was seen yesterday for the same complaint - had negative workup and was discharged.  She states that pain was exacerbated when she was walking to the bathroom.  Patient has bottles of tramadol and gabapentin.  She states that she hasn't taken any home medications for pain since yesterday.  Denies any other complaints.

## 2018-09-16 ENCOUNTER — INPATIENT (INPATIENT)
Facility: HOSPITAL | Age: 83
LOS: 3 days | Discharge: HOME | DRG: 74 | End: 2018-09-20
Attending: FAMILY MEDICINE | Admitting: HOSPITALIST
Payer: MEDICARE

## 2018-09-16 VITALS
SYSTOLIC BLOOD PRESSURE: 175 MMHG | OXYGEN SATURATION: 96 % | TEMPERATURE: 99 F | WEIGHT: 205.91 LBS | DIASTOLIC BLOOD PRESSURE: 65 MMHG | RESPIRATION RATE: 24 BRPM | HEART RATE: 108 BPM

## 2018-09-16 DIAGNOSIS — R93.1 ABNORMAL FINDINGS ON DIAGNOSTIC IMAGING OF HEART AND CORONARY CIRCULATION: Chronic | ICD-10-CM

## 2018-09-16 DIAGNOSIS — R06.00 DYSPNEA, UNSPECIFIED: ICD-10-CM

## 2018-09-16 DIAGNOSIS — G47.33 OBSTRUCTIVE SLEEP APNEA (ADULT) (PEDIATRIC): ICD-10-CM

## 2018-09-16 DIAGNOSIS — I51.9 HEART DISEASE, UNSPECIFIED: ICD-10-CM

## 2018-09-16 DIAGNOSIS — E11.9 TYPE 2 DIABETES MELLITUS WITHOUT COMPLICATIONS: ICD-10-CM

## 2018-09-16 DIAGNOSIS — J44.9 CHRONIC OBSTRUCTIVE PULMONARY DISEASE, UNSPECIFIED: ICD-10-CM

## 2018-09-16 DIAGNOSIS — Z90.49 ACQUIRED ABSENCE OF OTHER SPECIFIED PARTS OF DIGESTIVE TRACT: Chronic | ICD-10-CM

## 2018-09-16 DIAGNOSIS — G62.9 POLYNEUROPATHY, UNSPECIFIED: ICD-10-CM

## 2018-09-16 LAB
APPEARANCE UR: CLEAR — SIGNIFICANT CHANGE UP
BASOPHILS # BLD AUTO: 0.04 K/UL — SIGNIFICANT CHANGE UP (ref 0–0.2)
BASOPHILS NFR BLD AUTO: 0.3 % — SIGNIFICANT CHANGE UP (ref 0–2)
BILIRUB UR-MCNC: NEGATIVE — SIGNIFICANT CHANGE UP
COLOR SPEC: SIGNIFICANT CHANGE UP
D DIMER BLD IA.RAPID-MCNC: <150 NG/ML DDU — SIGNIFICANT CHANGE UP
DIFF PNL FLD: NEGATIVE — SIGNIFICANT CHANGE UP
EOSINOPHIL # BLD AUTO: 0.37 K/UL — SIGNIFICANT CHANGE UP (ref 0–0.5)
EOSINOPHIL NFR BLD AUTO: 3 % — SIGNIFICANT CHANGE UP (ref 0–6)
GLUCOSE UR QL: 50 MG/DL
HCT VFR BLD CALC: 38.2 % — SIGNIFICANT CHANGE UP (ref 34.5–45)
HGB BLD-MCNC: 12.7 G/DL — SIGNIFICANT CHANGE UP (ref 11.5–15.5)
IMM GRANULOCYTES NFR BLD AUTO: 0.5 % — SIGNIFICANT CHANGE UP (ref 0–1.5)
KETONES UR-MCNC: NEGATIVE — SIGNIFICANT CHANGE UP
LACTATE SERPL-SCNC: 1.9 MMOL/L — SIGNIFICANT CHANGE UP (ref 0.7–2)
LEUKOCYTE ESTERASE UR-ACNC: ABNORMAL
LYMPHOCYTES # BLD AUTO: 19 % — SIGNIFICANT CHANGE UP (ref 13–44)
LYMPHOCYTES # BLD AUTO: 2.33 K/UL — SIGNIFICANT CHANGE UP (ref 1–3.3)
MCHC RBC-ENTMCNC: 28 PG — SIGNIFICANT CHANGE UP (ref 27–34)
MCHC RBC-ENTMCNC: 33.2 GM/DL — SIGNIFICANT CHANGE UP (ref 32–36)
MCV RBC AUTO: 84.1 FL — SIGNIFICANT CHANGE UP (ref 80–100)
MONOCYTES # BLD AUTO: 0.92 K/UL — HIGH (ref 0–0.9)
MONOCYTES NFR BLD AUTO: 7.5 % — SIGNIFICANT CHANGE UP (ref 2–14)
NEUTROPHILS # BLD AUTO: 8.52 K/UL — HIGH (ref 1.8–7.4)
NEUTROPHILS NFR BLD AUTO: 69.7 % — SIGNIFICANT CHANGE UP (ref 43–77)
NITRITE UR-MCNC: NEGATIVE — SIGNIFICANT CHANGE UP
NT-PROBNP SERPL-SCNC: 343 PG/ML — SIGNIFICANT CHANGE UP (ref 0–450)
PH UR: 7 — SIGNIFICANT CHANGE UP (ref 5–8)
PLATELET # BLD AUTO: 234 K/UL — SIGNIFICANT CHANGE UP (ref 150–400)
PROT UR-MCNC: NEGATIVE — SIGNIFICANT CHANGE UP
RAPID RVP RESULT: SIGNIFICANT CHANGE UP
RBC # BLD: 4.54 M/UL — SIGNIFICANT CHANGE UP (ref 3.8–5.2)
RBC # FLD: 14.4 % — SIGNIFICANT CHANGE UP (ref 10.3–14.5)
SP GR SPEC: 1 — LOW (ref 1.01–1.02)
TSH SERPL-MCNC: 4.72 UIU/ML — HIGH (ref 0.36–3.74)
UROBILINOGEN FLD QL: NEGATIVE — SIGNIFICANT CHANGE UP
WBC # BLD: 12.24 K/UL — HIGH (ref 3.8–10.5)
WBC # FLD AUTO: 12.24 K/UL — HIGH (ref 3.8–10.5)

## 2018-09-16 PROCEDURE — 71045 X-RAY EXAM CHEST 1 VIEW: CPT | Mod: 26

## 2018-09-16 PROCEDURE — 93010 ELECTROCARDIOGRAM REPORT: CPT

## 2018-09-16 PROCEDURE — 99285 EMERGENCY DEPT VISIT HI MDM: CPT

## 2018-09-16 PROCEDURE — 93970 EXTREMITY STUDY: CPT | Mod: 26

## 2018-09-16 PROCEDURE — 71250 CT THORAX DX C-: CPT | Mod: 26

## 2018-09-16 RX ORDER — TRAMADOL HYDROCHLORIDE 50 MG/1
25 TABLET ORAL
Qty: 0 | Refills: 0 | Status: DISCONTINUED | OUTPATIENT
Start: 2018-09-16 | End: 2018-09-20

## 2018-09-16 RX ORDER — ACETAMINOPHEN 500 MG
650 TABLET ORAL EVERY 6 HOURS
Qty: 0 | Refills: 0 | Status: DISCONTINUED | OUTPATIENT
Start: 2018-09-16 | End: 2018-09-20

## 2018-09-16 RX ORDER — MORPHINE SULFATE 50 MG/1
4 CAPSULE, EXTENDED RELEASE ORAL ONCE
Qty: 0 | Refills: 0 | Status: DISCONTINUED | OUTPATIENT
Start: 2018-09-16 | End: 2018-09-16

## 2018-09-16 RX ORDER — TIOTROPIUM BROMIDE 18 UG/1
1 CAPSULE ORAL; RESPIRATORY (INHALATION) DAILY
Qty: 0 | Refills: 0 | Status: DISCONTINUED | OUTPATIENT
Start: 2018-09-16 | End: 2018-09-20

## 2018-09-16 RX ORDER — BUDESONIDE AND FORMOTEROL FUMARATE DIHYDRATE 160; 4.5 UG/1; UG/1
2 AEROSOL RESPIRATORY (INHALATION)
Qty: 0 | Refills: 0 | Status: DISCONTINUED | OUTPATIENT
Start: 2018-09-16 | End: 2018-09-20

## 2018-09-16 RX ORDER — ONDANSETRON 8 MG/1
4 TABLET, FILM COATED ORAL ONCE
Qty: 0 | Refills: 0 | Status: COMPLETED | OUTPATIENT
Start: 2018-09-16 | End: 2018-09-16

## 2018-09-16 RX ORDER — ALBUTEROL 90 UG/1
2.5 AEROSOL, METERED ORAL EVERY 6 HOURS
Qty: 0 | Refills: 0 | Status: DISCONTINUED | OUTPATIENT
Start: 2018-09-16 | End: 2018-09-20

## 2018-09-16 RX ADMIN — MORPHINE SULFATE 4 MILLIGRAM(S): 50 CAPSULE, EXTENDED RELEASE ORAL at 23:39

## 2018-09-16 RX ADMIN — MORPHINE SULFATE 4 MILLIGRAM(S): 50 CAPSULE, EXTENDED RELEASE ORAL at 23:52

## 2018-09-16 RX ADMIN — ONDANSETRON 4 MILLIGRAM(S): 8 TABLET, FILM COATED ORAL at 20:53

## 2018-09-16 RX ADMIN — Medication 40 MILLIGRAM(S): at 21:01

## 2018-09-16 RX ADMIN — MORPHINE SULFATE 4 MILLIGRAM(S): 50 CAPSULE, EXTENDED RELEASE ORAL at 20:53

## 2018-09-16 NOTE — CONSULT NOTE ADULT - PROBLEM SELECTOR RECOMMENDATION 2
heart disease, ashd, cad, Pulm HTN and HFpEF stage I, moderate to severe AS  old Cath and TTE reports in the EMR from [place] hospital stay dates  will likely need diuresis and cvs regimen and BP control  dietary control and DAPT as per cardio  I and O  weight daily  admit to tele floor

## 2018-09-16 NOTE — CONSULT NOTE ADULT - PROBLEM SELECTOR RECOMMENDATION 3
STEVENSON  Obesity  sleep hygiene discussed  weight loss recs  CPAP night time, discussed with pt  keep sat > 88 pct

## 2018-09-16 NOTE — ED PROVIDER NOTE - MEDICAL DECISION MAKING DETAILS
right leg pain for past week. on and off left leg pain for months. shortness of breath started this evening. will order venous dopplers of lower extremities. will order CBC, CMP, cardiac enzymes, EKG, CXR, procalcitonin, BNP, blood cultures, lactate, PT/INR. will give duoneb

## 2018-09-16 NOTE — ED ADULT NURSE NOTE - OBJECTIVE STATEMENT
Brought in by son. Present to ER with c/o of shortness of breath, fever and left leg pain. As per son, leg pain might be associated with diabetic neuropathy. Also, pt Brought in by son. Present to ER with c/o of shortness of breath, fever and left leg pain. As per son, Pt has shortness of breath and worst when lying flat. Also, leg pain might be associated with diabetic neuropathy. Pt was prescribed tramadol for pain and unable to get other prescription due to insurance issue.

## 2018-09-16 NOTE — ED PROVIDER NOTE - RESPIRATORY, MLM
coarse lung sounds bilaterally with crackles and wheezes throughout both lung fields, worse left lung base.

## 2018-09-16 NOTE — ED PROVIDER NOTE - OBJECTIVE STATEMENT
presents with shortness of breath and subjective fever that started this evening around 1800. no chest pain. shortness of breath worse when lying flat and with exertion. no abdominal pain. +nausea on and off today. no headache or neck pain. also reports right leg pain for the past 1-2 weeks. has had chronic on and off bilateral leg pain for several months. suspect pain due to diabetic neuropathy. has been prescribed tramadol in the past which helps. has not been taking it recently due to insurance issues not covering it. presents with shortness of breath and subjective fever that started this evening around 1800. no chest pain. shortness of breath worse when lying flat and with exertion. no abdominal pain. +nausea on and off today. no headache or neck pain. also reports right leg pain for the past 1-2 weeks. has had chronic on and off bilateral leg pain for several months. suspect pain due to diabetic neuropathy. has been prescribed tramadol in the past which helps. has not been taking it recently due to insurance issues not covering it.    history of HTN, hyperlipidemia, CAD with 3 stents, Asthma, CHF, and COPD    patient was seen at [place] in May for uncontrolled sugar and shortness of breath. Was then sent to [facility] for 3 weeks for rehab. then had an aide for a month. currently resides in [facility]. Was seen at [place] ED Thursday before labor day for leg pain and RAKEL. had dopplers, CT, and blood work which were negative per son. pain was improved with morphine. went back next day for continued leg pain and was given additional morphine. leg pain has continued but shortness of breath seemed to improve until tonight. has not taking gabapentin for pain, but ran out of tramadol due to insurance issues.     PCP Dr. Stanford Benavides

## 2018-09-16 NOTE — ED PROVIDER NOTE - NEUROLOGICAL, MLM
Alert and oriented, no focal deficits, no motor or sensory deficits. strong distal pulses bilaterally

## 2018-09-16 NOTE — CONSULT NOTE ADULT - PROBLEM SELECTOR RECOMMENDATION 6
Dyspnea likely multifactorial, extensive hx of heart and lung disease  will check D Dimer and LE dopplers  CXR pending  labs pending  cvs regimen  pulm regimen  overall prognosis guarded  discussed with pt and son  tele monitoring and admission  pall care eval for GOC discussion and MOLST

## 2018-09-16 NOTE — ED ADULT NURSE REASSESSMENT NOTE - NSIMPLEMENTINTERV_GEN_ALL_ED
Implemented All Fall Risk Interventions:  [place] to call system. Call bell, personal items and telephone within reach. Instruct patient to call for assistance. Room bathroom lighting operational. Non-slip footwear when patient is off stretcher. Physically safe environment: no spills, clutter or unnecessary equipment. Stretcher in lowest position, wheels locked, appropriate side rails in place. Provide visual cue, wrist band, yellow gown, etc. Monitor gait and stability. Monitor for mental status changes and reorient to person, place, and time. Review medications for side effects contributing to fall risk. Reinforce activity limits and safety measures with patient and family.

## 2018-09-16 NOTE — ED PROVIDER NOTE - PROGRESS NOTE DETAILS
spoke with Dr. Copeland (pulmonology) and has seen patient in ED. will give oral prednisone, 40 mg. will consult. will admit patient to medicine when work up complete patient feels overall much improved after duoneb and oral prednisone. no current shortness of breath. leg pain continues, but slightly improved after morphine. declines additional pain meds at this time. waiting for rest of lab results and doppler. Chest CT ordered due to abnormal chest x-ray results. d-dimer negative spoke with Dr. Reyes who kindly accepts patient for admission. Dr Gibson will follow up with radiologist interpretation of CT results. no obvious pneumonia on ED provider's initial interpretation. neg US and neg d-dimer, low suspicion for PE.

## 2018-09-16 NOTE — ED PROVIDER NOTE - ATTENDING CONTRIBUTION TO CARE
Pt is a 83 yo female who presents to the ED with a cc of increasing weakness.  PMHx of asthma, CAD, DM, HLD,  HTN, hypothyroidism.    Pt has had a long protracted course since May.  At that time she was admitted to [facility] with uncontrolled blood sugars.  She was also noted to be hypertensive at that time.  She was admitted to several weeks and was then discharged to rehab.  Pt was at [facility] for several weeks and was finally sent home on July 9th.  She had a follow up with her PMD at the beginning of August and was dx with sleep apnea around that time.  Per reports exam was WNL at that time.  Pt and family goes on to state that over the last several weeks pt has begun to c/o left lower ext pain.  Pt reports that the pain is most severe below the knee.  She reports that initially she was following with her PMD regarding the pain but the Thursday prior to Labor Day the pain became so severe she again presented to an OSH.  She was treated with Morphine and reports that she had an US and x-rays which was WNL.  Pain was controlled and pt was sent home.  That same day she returned back to the hospital around 1:30 am when the pain was again severe and she was again treated with Morphine and discharged home.   Pt has continued to reports intermittent LLE pain.  Over the last several weeks pt reports that she has begun to note pain to her RLE.  This evening pt reports that the pain in her right leg became severe.  It appears to be worse in her upper thigh.  She then reports that she became SOB with associated nausea.  She had no relief with her home neb and so she came to the ED for further work up.  At baseline pt is not on home oxygen.  Further reports subjective fevers, chills.  Denies V/D/C, CP, abd pain.  On exam pt lying in bed on nasal canal oxygen,  NCAT, PERRL, EOMI, heart RRR with systolic murmur noted, lungs diminished at the bases with coarse BS noted to right upper lobe, abd soft NT/ND.  Sensation intact to bilateral LE with 4/5 weakness.  Peripheral edema noted right greater then left with intact pedal pulses.  Will check screening septic work up, BNP, procalcitonin, INR, d dimer, cardiac enzymes, EKG, TSH, x-ray chest, bilateral lower ext duplex, CT chest.  Agree with above plan of care

## 2018-09-16 NOTE — ED PROVIDER NOTE - CARE PLAN
Principal Discharge DX:	Shortness of breath  Secondary Diagnosis:	Right leg pain  Secondary Diagnosis:	Acute congestive heart failure, unspecified heart failure type

## 2018-09-16 NOTE — ED PROVIDER NOTE - MUSCULOSKELETAL NEGATIVE STATEMENT, MLM
right leg pain for 1 week (on and off bilateral leg pain for months) right leg pain for 1 week (on and off left leg pain for months)

## 2018-09-16 NOTE — CONSULT NOTE ADULT - PROBLEM SELECTOR RECOMMENDATION 4
LE neuropathy  pt was prescribed Neurontin and tramadol for pain relief  DM control and serial FS and optimization

## 2018-09-16 NOTE — CONSULT NOTE ADULT - SUBJECTIVE AND OBJECTIVE BOX
Date/Time Patient Seen:  		  Referring MD:   Data Reviewed	       Patient is a 84y old  Female who presents with a chief complaint of     Subjective/HPI  in bed  seen and examined  vs and meds reviewed  sob and ortiz   pt has hx of asthma, copd, STEVENSON, CAD, HFpEF and Pulm HTN, preserved LV, subclavian artery stenosis, obesity, DM and HTN     lives in indep living facility    recent multiple visits to [facility]    pt's PMD pulm - Dr. Narinder Mcginnis in [place]    er provider note reviewed  labs and imaging pending  old records from [facility] reviewed    alert  oriented  multiple chronic medical issues    ED ADULT Nurse Note [Charted Location: [facility] ED] [Authored: 16-Sep-2018 19:31]- for Visit: [id number], Incomplete, Revised, Signed in Full, General    INITIAL TRIAGE / INTAKE ASSESSMENT:    Chief Complaint:  · Chief Complaint Quote	c/o fever and shortness of breath today , and chronic right leg pain  · Chief Complaint	fever, shortness of breath     Preferred Language to Address Healthcare:  · Preferred Language to Address Healthcare	English     Vital Signs Triage Note to FS:   ,,ED ADULT Flow Sheet:    16-Sep-2018 19:28  · Temp (F)	99  · Temp (C) Temp (C)	37.2  · Temp site Temp Site	oral; oral  · Heart Rate Heart Rate (beats/min)	108  · BP Systolic Systolic	175  · BP Diastolic Diastolic (mm Hg)	65  · Respiration Rate (breaths/min) Respiration Rate (breaths/min)	24      ED Provider Note [Charted Location: [facility] ED] [Authored: 16-Sep-2018 20:37]- for Visit: [id number], Incomplete, Entered, Signed w/additional Signatures Pending, General    HISTORY OF PRESENT ILLNESS:    High Risk Travel:  International Travel? No(1)    · Chief Complaint: The patient is a 84y Female complaining of fever.    PAST MEDICAL/SURGICAL/FAMILY/SOCIAL HISTORY:    Past Medical History:  Asthma    CAD (coronary artery disease)    Diabetes    HLD (hyperlipidemia)    HTN (hypertension)    Hypothyroid.    ALLERGIES AND HOME MEDICATIONS:   Allergies:        Allergies:  	shellfish: Food, Swelling, Short breath  	iodine containing compounds: Drug Category, Unknown     PAST MEDICAL & SURGICAL HISTORY:  Hypothyroid  HLD (hyperlipidemia)  HTN (hypertension)  CAD (coronary artery disease)  Asthma  Diabetes  History of appendectomy        Medication list         MEDICATIONS  (STANDING):  ALBUTerol    0.083% 2.5 milliGRAM(s) Nebulizer every 6 hours  buDESOnide 160 MICROgram(s)/formoterol 4.5 MICROgram(s) Inhaler 2 Puff(s) Inhalation two times a day  predniSONE   Tablet 40 milliGRAM(s) Oral Once  tiotropium 18 MICROgram(s) Capsule 1 Capsule(s) Inhalation daily    MEDICATIONS  (PRN):  acetaminophen   Tablet .. 650 milliGRAM(s) Oral every 6 hours PRN Temp greater or equal to 38C (100.4F), Mild Pain (1 - 3)  benzonatate 100 milliGRAM(s) Oral three times a day PRN Cough  traMADol 25 milliGRAM(s) Oral four times a day PRN moderate to severe pain         Vitals log        ICU Vital Signs Last 24 Hrs  T(C): 37.2 (16 Sep 2018 19:28), Max: 37.2 (16 Sep 2018 19:28)  T(F): 99 (16 Sep 2018 19:28), Max: 99 (16 Sep 2018 19:28)  HR: 108 (16 Sep 2018 19:28) (108 - 108)  BP: 175/65 (16 Sep 2018 19:28) (175/65 - 175/65)  BP(mean): --  ABP: --  ABP(mean): --  RR: 24 (16 Sep 2018 19:28) (24 - 24)  SpO2: 96% (16 Sep 2018 19:28) (96% - 96%)           Input and Output:  I&O's Detail      Lab Data        non smoker  non drinker  has children  son works for [employer]  lives in [facility]  walks with assist            Review of Systems	  sob  ortiz      Objective     Physical Examination    heart s1s2  lung dec BS  abd soft  heart murmur  alert  oriented  cn grossly int  obese  abd protuberant  extr edema noted bilateral and symmetric      Pertinent Lab findings & Imaging      Schwarz:  NO   Adequate UO     I&O's Detail           Discussed with:     Cultures:	        Radiology

## 2018-09-16 NOTE — ED PROVIDER NOTE - FAMILY HISTORY
Family history of cancer in mother     Father  Still living? Unknown  Family history of heart disease, Age at diagnosis: Age Unknown

## 2018-09-16 NOTE — CONSULT NOTE ADULT - PROBLEM SELECTOR RECOMMENDATION 9
asthma and copd overlap  o2 support  albuterol nebs atc  prednisone low dose, monitor FS, hx of DM  spiriva and symbicort  pt follows with Dr. Mcginnis in [place] for Pulm Medicine  will check CRP, RVP, IgE and peripheral eos count, will check CXR  may benefit from getting old records from Dr. Mcginnis's office

## 2018-09-16 NOTE — ED PROVIDER NOTE - MUSCULOSKELETAL, MLM
bilateral lower extremity tenderness to palpation, right worse than left. right leg appears slightly more swollen than left

## 2018-09-17 ENCOUNTER — RESULT REVIEW (OUTPATIENT)
Age: 83
End: 2018-09-17

## 2018-09-17 DIAGNOSIS — E03.9 HYPOTHYROIDISM, UNSPECIFIED: ICD-10-CM

## 2018-09-17 DIAGNOSIS — I25.10 ATHEROSCLEROTIC HEART DISEASE OF NATIVE CORONARY ARTERY WITHOUT ANGINA PECTORIS: ICD-10-CM

## 2018-09-17 DIAGNOSIS — D72.829 ELEVATED WHITE BLOOD CELL COUNT, UNSPECIFIED: ICD-10-CM

## 2018-09-17 DIAGNOSIS — I50.9 HEART FAILURE, UNSPECIFIED: ICD-10-CM

## 2018-09-17 DIAGNOSIS — R06.02 SHORTNESS OF BREATH: ICD-10-CM

## 2018-09-17 DIAGNOSIS — M79.604 PAIN IN RIGHT LEG: ICD-10-CM

## 2018-09-17 DIAGNOSIS — J44.1 CHRONIC OBSTRUCTIVE PULMONARY DISEASE WITH (ACUTE) EXACERBATION: ICD-10-CM

## 2018-09-17 DIAGNOSIS — Z29.9 ENCOUNTER FOR PROPHYLACTIC MEASURES, UNSPECIFIED: ICD-10-CM

## 2018-09-17 DIAGNOSIS — E11.8 TYPE 2 DIABETES MELLITUS WITH UNSPECIFIED COMPLICATIONS: ICD-10-CM

## 2018-09-17 DIAGNOSIS — I10 ESSENTIAL (PRIMARY) HYPERTENSION: ICD-10-CM

## 2018-09-17 LAB
ANION GAP SERPL CALC-SCNC: 7 MMOL/L — SIGNIFICANT CHANGE UP (ref 5–17)
BASOPHILS # BLD AUTO: 0.02 K/UL — SIGNIFICANT CHANGE UP (ref 0–0.2)
BASOPHILS NFR BLD AUTO: 0.3 % — SIGNIFICANT CHANGE UP (ref 0–2)
BUN SERPL-MCNC: 32 MG/DL — HIGH (ref 7–23)
CALCIUM SERPL-MCNC: 9.3 MG/DL — SIGNIFICANT CHANGE UP (ref 8.5–10.1)
CHLORIDE SERPL-SCNC: 99 MMOL/L — SIGNIFICANT CHANGE UP (ref 96–108)
CO2 SERPL-SCNC: 30 MMOL/L — SIGNIFICANT CHANGE UP (ref 22–31)
CREAT SERPL-MCNC: 1.4 MG/DL — HIGH (ref 0.5–1.3)
EOSINOPHIL # BLD AUTO: 0 K/UL — SIGNIFICANT CHANGE UP (ref 0–0.5)
EOSINOPHIL # BLD: 420 /UL — HIGH (ref 50–350)
EOSINOPHIL NFR BLD AUTO: 0 % — SIGNIFICANT CHANGE UP (ref 0–6)
GLUCOSE SERPL-MCNC: 375 MG/DL — HIGH (ref 70–99)
HCT VFR BLD CALC: 40.3 % — SIGNIFICANT CHANGE UP (ref 34.5–45)
HGB BLD-MCNC: 12.6 G/DL — SIGNIFICANT CHANGE UP (ref 11.5–15.5)
IGE SERPL-ACNC: 86 IU/ML — SIGNIFICANT CHANGE UP (ref 0–100)
IMM GRANULOCYTES NFR BLD AUTO: 0.4 % — SIGNIFICANT CHANGE UP (ref 0–1.5)
LYMPHOCYTES # BLD AUTO: 0.95 K/UL — LOW (ref 1–3.3)
LYMPHOCYTES # BLD AUTO: 12.6 % — LOW (ref 13–44)
MCHC RBC-ENTMCNC: 26.3 PG — LOW (ref 27–34)
MCHC RBC-ENTMCNC: 31.3 GM/DL — LOW (ref 32–36)
MCV RBC AUTO: 84.1 FL — SIGNIFICANT CHANGE UP (ref 80–100)
MONOCYTES # BLD AUTO: 0.05 K/UL — SIGNIFICANT CHANGE UP (ref 0–0.9)
MONOCYTES NFR BLD AUTO: 0.7 % — LOW (ref 2–14)
NEUTROPHILS # BLD AUTO: 6.46 K/UL — SIGNIFICANT CHANGE UP (ref 1.8–7.4)
NEUTROPHILS NFR BLD AUTO: 86 % — HIGH (ref 43–77)
PLATELET # BLD AUTO: 225 K/UL — SIGNIFICANT CHANGE UP (ref 150–400)
POTASSIUM SERPL-MCNC: 5.2 MMOL/L — SIGNIFICANT CHANGE UP (ref 3.5–5.3)
POTASSIUM SERPL-SCNC: 5.2 MMOL/L — SIGNIFICANT CHANGE UP (ref 3.5–5.3)
RBC # BLD: 4.79 M/UL — SIGNIFICANT CHANGE UP (ref 3.8–5.2)
RBC # FLD: 14 % — SIGNIFICANT CHANGE UP (ref 10.3–14.5)
SODIUM SERPL-SCNC: 136 MMOL/L — SIGNIFICANT CHANGE UP (ref 135–145)
T3 SERPL-MCNC: 72 NG/DL — LOW (ref 80–200)
T4 AB SER-ACNC: 5.4 UG/DL — SIGNIFICANT CHANGE UP (ref 4.6–12)
WBC # BLD: 7.51 K/UL — SIGNIFICANT CHANGE UP (ref 3.8–10.5)
WBC # FLD AUTO: 7.51 K/UL — SIGNIFICANT CHANGE UP (ref 3.8–10.5)

## 2018-09-17 PROCEDURE — 99223 1ST HOSP IP/OBS HIGH 75: CPT | Mod: AI,GC

## 2018-09-17 PROCEDURE — 99233 SBSQ HOSP IP/OBS HIGH 50: CPT

## 2018-09-17 PROCEDURE — 99221 1ST HOSP IP/OBS SF/LOW 40: CPT

## 2018-09-17 RX ORDER — DEXTROSE 50 % IN WATER 50 %
25 SYRINGE (ML) INTRAVENOUS ONCE
Qty: 0 | Refills: 0 | Status: DISCONTINUED | OUTPATIENT
Start: 2018-09-17 | End: 2018-09-20

## 2018-09-17 RX ORDER — INSULIN LISPRO 100/ML
VIAL (ML) SUBCUTANEOUS
Qty: 0 | Refills: 0 | Status: DISCONTINUED | OUTPATIENT
Start: 2018-09-17 | End: 2018-09-20

## 2018-09-17 RX ORDER — LISINOPRIL 2.5 MG/1
5 TABLET ORAL DAILY
Qty: 0 | Refills: 0 | Status: DISCONTINUED | OUTPATIENT
Start: 2018-09-17 | End: 2018-09-20

## 2018-09-17 RX ORDER — DEXTROSE 50 % IN WATER 50 %
12.5 SYRINGE (ML) INTRAVENOUS ONCE
Qty: 0 | Refills: 0 | Status: DISCONTINUED | OUTPATIENT
Start: 2018-09-17 | End: 2018-09-20

## 2018-09-17 RX ORDER — INSULIN GLARGINE 100 [IU]/ML
30 INJECTION, SOLUTION SUBCUTANEOUS AT BEDTIME
Qty: 0 | Refills: 0 | Status: DISCONTINUED | OUTPATIENT
Start: 2018-09-17 | End: 2018-09-17

## 2018-09-17 RX ORDER — DILTIAZEM HCL 120 MG
240 CAPSULE, EXT RELEASE 24 HR ORAL DAILY
Qty: 0 | Refills: 0 | Status: DISCONTINUED | OUTPATIENT
Start: 2018-09-17 | End: 2018-09-20

## 2018-09-17 RX ORDER — ASPIRIN/CALCIUM CARB/MAGNESIUM 324 MG
81 TABLET ORAL DAILY
Qty: 0 | Refills: 0 | Status: DISCONTINUED | OUTPATIENT
Start: 2018-09-17 | End: 2018-09-20

## 2018-09-17 RX ORDER — INSULIN GLARGINE 100 [IU]/ML
45 INJECTION, SOLUTION SUBCUTANEOUS AT BEDTIME
Qty: 0 | Refills: 0 | Status: DISCONTINUED | OUTPATIENT
Start: 2018-09-17 | End: 2018-09-18

## 2018-09-17 RX ORDER — ATORVASTATIN CALCIUM 80 MG/1
40 TABLET, FILM COATED ORAL AT BEDTIME
Qty: 0 | Refills: 0 | Status: DISCONTINUED | OUTPATIENT
Start: 2018-09-17 | End: 2018-09-20

## 2018-09-17 RX ORDER — FUROSEMIDE 40 MG
40 TABLET ORAL DAILY
Qty: 0 | Refills: 0 | Status: DISCONTINUED | OUTPATIENT
Start: 2018-09-17 | End: 2018-09-20

## 2018-09-17 RX ORDER — GLUCAGON INJECTION, SOLUTION 0.5 MG/.1ML
1 INJECTION, SOLUTION SUBCUTANEOUS ONCE
Qty: 0 | Refills: 0 | Status: DISCONTINUED | OUTPATIENT
Start: 2018-09-17 | End: 2018-09-20

## 2018-09-17 RX ORDER — CLOPIDOGREL BISULFATE 75 MG/1
75 TABLET, FILM COATED ORAL DAILY
Qty: 0 | Refills: 0 | Status: DISCONTINUED | OUTPATIENT
Start: 2018-09-17 | End: 2018-09-20

## 2018-09-17 RX ORDER — INSULIN LISPRO 100/ML
VIAL (ML) SUBCUTANEOUS
Qty: 0 | Refills: 0 | Status: DISCONTINUED | OUTPATIENT
Start: 2018-09-17 | End: 2018-09-17

## 2018-09-17 RX ORDER — GABAPENTIN 400 MG/1
400 CAPSULE ORAL ONCE
Qty: 0 | Refills: 0 | Status: COMPLETED | OUTPATIENT
Start: 2018-09-17 | End: 2018-09-17

## 2018-09-17 RX ORDER — ATORVASTATIN CALCIUM 80 MG/1
20 TABLET, FILM COATED ORAL AT BEDTIME
Qty: 0 | Refills: 0 | Status: DISCONTINUED | OUTPATIENT
Start: 2018-09-17 | End: 2018-09-17

## 2018-09-17 RX ORDER — LEVOTHYROXINE SODIUM 125 MCG
112 TABLET ORAL DAILY
Qty: 0 | Refills: 0 | Status: DISCONTINUED | OUTPATIENT
Start: 2018-09-17 | End: 2018-09-20

## 2018-09-17 RX ORDER — ENOXAPARIN SODIUM 100 MG/ML
40 INJECTION SUBCUTANEOUS EVERY 24 HOURS
Qty: 0 | Refills: 0 | Status: DISCONTINUED | OUTPATIENT
Start: 2018-09-17 | End: 2018-09-20

## 2018-09-17 RX ORDER — INSULIN LISPRO 100/ML
10 VIAL (ML) SUBCUTANEOUS ONCE
Qty: 0 | Refills: 0 | Status: COMPLETED | OUTPATIENT
Start: 2018-09-17 | End: 2018-09-17

## 2018-09-17 RX ORDER — INSULIN GLARGINE 100 [IU]/ML
45 INJECTION, SOLUTION SUBCUTANEOUS ONCE
Qty: 0 | Refills: 0 | Status: COMPLETED | OUTPATIENT
Start: 2018-09-17 | End: 2018-09-17

## 2018-09-17 RX ORDER — SODIUM CHLORIDE 9 MG/ML
1000 INJECTION, SOLUTION INTRAVENOUS
Qty: 0 | Refills: 0 | Status: DISCONTINUED | OUTPATIENT
Start: 2018-09-17 | End: 2018-09-20

## 2018-09-17 RX ORDER — GABAPENTIN 400 MG/1
400 CAPSULE ORAL
Qty: 0 | Refills: 0 | Status: DISCONTINUED | OUTPATIENT
Start: 2018-09-17 | End: 2018-09-19

## 2018-09-17 RX ORDER — DEXTROSE 50 % IN WATER 50 %
15 SYRINGE (ML) INTRAVENOUS ONCE
Qty: 0 | Refills: 0 | Status: DISCONTINUED | OUTPATIENT
Start: 2018-09-17 | End: 2018-09-20

## 2018-09-17 RX ADMIN — CLOPIDOGREL BISULFATE 75 MILLIGRAM(S): 75 TABLET, FILM COATED ORAL at 12:14

## 2018-09-17 RX ADMIN — GABAPENTIN 400 MILLIGRAM(S): 400 CAPSULE ORAL at 12:14

## 2018-09-17 RX ADMIN — ALBUTEROL 2.5 MILLIGRAM(S): 90 AEROSOL, METERED ORAL at 01:33

## 2018-09-17 RX ADMIN — Medication 112 MICROGRAM(S): at 06:05

## 2018-09-17 RX ADMIN — TRAMADOL HYDROCHLORIDE 25 MILLIGRAM(S): 50 TABLET ORAL at 08:52

## 2018-09-17 RX ADMIN — Medication 81 MILLIGRAM(S): at 12:14

## 2018-09-17 RX ADMIN — Medication 240 MILLIGRAM(S): at 06:05

## 2018-09-17 RX ADMIN — Medication 20 MILLIGRAM(S): at 06:05

## 2018-09-17 RX ADMIN — Medication 10: at 17:16

## 2018-09-17 RX ADMIN — ENOXAPARIN SODIUM 40 MILLIGRAM(S): 100 INJECTION SUBCUTANEOUS at 12:15

## 2018-09-17 RX ADMIN — ALBUTEROL 2.5 MILLIGRAM(S): 90 AEROSOL, METERED ORAL at 13:40

## 2018-09-17 RX ADMIN — Medication 5: at 08:39

## 2018-09-17 RX ADMIN — MORPHINE SULFATE 4 MILLIGRAM(S): 50 CAPSULE, EXTENDED RELEASE ORAL at 00:15

## 2018-09-17 RX ADMIN — ALBUTEROL 2.5 MILLIGRAM(S): 90 AEROSOL, METERED ORAL at 07:47

## 2018-09-17 RX ADMIN — ATORVASTATIN CALCIUM 40 MILLIGRAM(S): 80 TABLET, FILM COATED ORAL at 22:17

## 2018-09-17 RX ADMIN — ALBUTEROL 2.5 MILLIGRAM(S): 90 AEROSOL, METERED ORAL at 19:19

## 2018-09-17 RX ADMIN — Medication 40 MILLIGRAM(S): at 06:05

## 2018-09-17 RX ADMIN — Medication 10 UNIT(S): at 12:15

## 2018-09-17 RX ADMIN — Medication 12: at 22:17

## 2018-09-17 RX ADMIN — LISINOPRIL 5 MILLIGRAM(S): 2.5 TABLET ORAL at 06:05

## 2018-09-17 RX ADMIN — BUDESONIDE AND FORMOTEROL FUMARATE DIHYDRATE 2 PUFF(S): 160; 4.5 AEROSOL RESPIRATORY (INHALATION) at 20:12

## 2018-09-17 RX ADMIN — GABAPENTIN 400 MILLIGRAM(S): 400 CAPSULE ORAL at 17:16

## 2018-09-17 RX ADMIN — TRAMADOL HYDROCHLORIDE 25 MILLIGRAM(S): 50 TABLET ORAL at 09:15

## 2018-09-17 NOTE — H&P ADULT - PROBLEM SELECTOR PLAN 5
continue synthroid HFpEF   unlikely cause of sob at this time, no significant fluid overload on exam   ProBNP negative   continue Lasix 40mg daily uncontrolled   continue insulin corrective scale, hypoglycemia protocols  pt on Novolog 15unit TID before meals. Lantis Lantus 60units at bedtime. Will continue Lantus 30unit at bedtime for now ( reduced for inpatient purposes)   F/u Endo Dr. Perlman for further recommendation

## 2018-09-17 NOTE — ED ADULT NURSE REASSESSMENT NOTE - NS ED NURSE REASSESS COMMENT FT1
Admitting resident bedside for admission.
Respiratory bedside for sleep apnea CPAP set up. Patient states she does not want to use CPAP tonight or while in the hospital as she has not been using it recently. Patient on 2L nasal canula at this time, resting comfortably without distress. Safety maintained.
Received patient from Faina MONROY. Patient alert and oriented. Vital signs as documented. Continued on bedside cardiac monitor. Patient complains of lower extremity pain, right worse than left at this time. Plan to medicate as per ED PA order. Plan for tele admission. Patient made aware. Awaiting admission assessment and orders. Awaiting bed assignment. Safety maintained. Call bell in reach.

## 2018-09-17 NOTE — CONSULT NOTE ADULT - SUBJECTIVE AND OBJECTIVE BOX
REQUESTING PHYSICIAN: Dr. Plasencia    REASON FOR CONSULT: Patient is a 84y old  Female who presents with a chief complaint of sob (17 Sep 2018 12:27)      CHIEF COMPLAINT: Patient is a 84y old  Female who presents with a chief complaint of sob (17 Sep 2018 12:27)      HPI:  83yo F with PMHx of COPD(not on home o2 sat usually low 90s as per pt), HFpEF on Lasix, CAD s/p 4 stents PCI on Plavix, 2018 she underwent cardiac cath that revealed patent stent in the LAD, 50% stenosis in the LCx (iFR normal) and 100% occlusion of the Mid RCA. LV function was normal and there was moderate AS, mild Pul, HTN, DM type 2, hypothyroid p/w difficulty breathing and left leg pain, 10/10 x 1month, had multiple ER visit at [facility] for similar pain for which all workup was negative. Today woke up with more leg pain which has been chronic for past few month talking tramadol with some relieve but states Morphine helps with the pain. Pt also endorses to difficulty breathing this morning worse then her baseline with her oxygen level under 80s then called her son who told her to go to the ED. Denies fever, chills, cp, palpitations, cough, abdominal pain, dysuria, no sick contacts, no recent travel, difficulty walking. Patient was seen at [place] in May for uncontrolled DM and shortness of breath. Was then sent to [facility] for 3 weeks for rehab, currently  resides in [facility]. States she she has visiting nurse that comes one a week and due to pain she is unable to do her ADL. Patient's cardiologist is Dr. Waylon Benavides.    In the ED vitals stable, labs pertinent for glucose 292, wbc 12.24, Cr/BUN 1.6/37, procalcitonin .08, CT chest w/o cont negative for pneumonia, Doppler LE b/l Negative for DVT, proBNP 343. Pt received 40mg prednisone x 1, Morphine x 2 for pain. EKG showed sinus tachy 107, non specific T wave changes. (17 Sep 2018 01:23)    PAST MEDICAL & SURGICAL HISTORY:  Hypothyroid  HLD (hyperlipidemia)  HTN (hypertension)  CAD (coronary artery disease)  Asthma  Diabetes  History of appendectomy    SOCIAL HISTORY:  former smoker (Quit at age 40, however was exposed to second hand smoke from ), no EtOH, lives in [facility].    FAMILY HISTORY:   Family history of cancer in mother  Family history of heart disease (Father)      MEDICATIONS  (STANDING):  ALBUTerol    0.083% 2.5 milliGRAM(s) Nebulizer every 6 hours  aspirin enteric coated 81 milliGRAM(s) Oral daily  atorvastatin 20 milliGRAM(s) Oral at bedtime  buDESOnide 160 MICROgram(s)/formoterol 4.5 MICROgram(s) Inhaler 2 Puff(s) Inhalation two times a day  clopidogrel Tablet 75 milliGRAM(s) Oral daily  dextrose 5%. 1000 milliLiter(s) (50 mL/Hr) IV Continuous <Continuous>  dextrose 50% Injectable 12.5 Gram(s) IV Push once  dextrose 50% Injectable 25 Gram(s) IV Push once  dextrose 50% Injectable 25 Gram(s) IV Push once  diltiazem    milliGRAM(s) Oral daily  enoxaparin Injectable 40 milliGRAM(s) SubCutaneous every 24 hours  furosemide    Tablet 40 milliGRAM(s) Oral daily  gabapentin 400 milliGRAM(s) Oral two times a day  insulin glargine Injectable (LANTUS) 30 Unit(s) SubCutaneous at bedtime  insulin lispro (HumaLOG) corrective regimen sliding scale   SubCutaneous three times a day before meals  levothyroxine 112 MICROGram(s) Oral daily  lisinopril 5 milliGRAM(s) Oral daily  predniSONE   Tablet 20 milliGRAM(s) Oral daily  tiotropium 18 MICROgram(s) Capsule 1 Capsule(s) Inhalation daily    MEDICATIONS  (PRN):  acetaminophen   Tablet .. 650 milliGRAM(s) Oral every 6 hours PRN Temp greater or equal to 38C (100.4F), Mild Pain (1 - 3)  benzonatate 100 milliGRAM(s) Oral three times a day PRN Cough  dextrose 40% Gel 15 Gram(s) Oral once PRN Blood Glucose LESS THAN 70 milliGRAM(s)/deciliter  glucagon  Injectable 1 milliGRAM(s) IntraMuscular once PRN Glucose LESS THAN 70 milligrams/deciliter  traMADol 25 milliGRAM(s) Oral four times a day PRN Severe Pain (7 - 10)      Allergies    iodine containing compounds (Unknown)  shellfish (Swelling; Short breath)    Intolerances        REVIEW OF SYSTEMS:    CONSTITUTIONAL: No weakness, fevers or chills  EYES/ENT: No visual changes;  No vertigo or throat pain   NECK: No pain or stiffness  RESPIRATORY: No cough, wheezing, hemoptysis; (+) shortness of breath  CARDIOVASCULAR: No chest pain or palpitations  GASTROINTESTINAL: No abdominal pain. No nausea, vomiting, or hematemesis; No diarrhea or constipation. No melena or hematochezia.  GENITOURINARY: No dysuria, frequency or hematuria  NEUROLOGICAL: No numbness or weakness  SKIN: No itching or rash  All other review of systems is negative unless indicated above    VITAL SIGNS:   Vital Signs Last 24 Hrs  T(C): 36.4 (17 Sep 2018 05:30), Max: 37.2 (16 Sep 2018 19:28)  T(F): 97.6 (17 Sep 2018 05:30), Max: 99 (16 Sep 2018 19:28)  HR: 82 (17 Sep 2018 08:02) (67 - 108)  BP: 159/87 (17 Sep 2018 05:30) (145/65 - 175/65)  BP(mean): --  RR: 17 (17 Sep 2018 05:30) (15 - 24)  SpO2: 96% (17 Sep 2018 08:02) (93% - 98%)    I&O's Summary      PHYSICAL EXAM:    Constitutional: NAD, awake and alert, well-developed  Eyes:  EOMI,  Pupils round, No oral cyanosis.  Pulmonary: Non-labored, breath sounds are clear bilaterally, No wheezing, rales or rhonchi  Cardiovascular: S1 and S2, regular rate and rhythm, no Murmurs, gallops or rubs  Gastrointestinal: Bowel Sounds present, soft, nontender.   Lymph: No peripheral edema. No cervical lymphadenopathy.  Neurological: Alert, no focal deficits  Extremities: no lower extremity edema bilaterally. intact distal pedal pulses bilaterally.  Skin: No rashes.  Psych:  Mood & affect appropriate    LABS: All Labs Reviewed:                        12.6   7.51  )-----------( 225      ( 17 Sep 2018 07:04 )             40.3                         12.7   12.24 )-----------( 234      ( 16 Sep 2018 21:39 )             38.2     17 Sep 2018 07:04    136    |  99     |  32     ----------------------------<  375    5.2     |  30     |  1.40   16 Sep 2018 20:46    138    |  102    |  37     ----------------------------<  258    4.1     |  27     |  1.60     Ca    9.3        17 Sep 2018 07:04  Ca    8.9        16 Sep 2018 20:46    TPro  7.4    /  Alb  3.2    /  TBili  0.3    /  DBili  x      /  AST  23     /  ALT  20     /  AlkPhos  76     16 Sep 2018 20:46    PT/INR - ( 16 Sep 2018 20:59 )   PT: 10.9 sec;   INR: 1.00 ratio         PTT - ( 16 Sep 2018 20:59 )  PTT:29.2 sec  CARDIAC MARKERS ( 16 Sep 2018 20:46 )  <.015 ng/mL / x     / 163 U/L / x     / 2.4 ng/mL      Blood Culture:    @ 23:08  Pro Bnp 343     @ 20:46  TSH: 4.72      EK2018: ST with first degree AV Block, mild ST depressions inferior lateral leads.    Imaging:  < from: CT Chest No Cont (18 @ 23:05) >    EXAM:  CT CHEST                            PROCEDURE DATE:  2018          INTERPRETATION:  HISTORY: Fever and shortness of breath. Evaluate for   pneumonia.    TECHNIQUE: A non-contrast CT scan of the chest was performed from the   thoracic inlet to the adrenal glands.      COMPARISON: No similar prior study is available for comparison. CT of the   abdomen and pelvis from 2017 is reviewed for comparison of the lung   bases and upper abdomen    FINDINGS:   Evaluation of the lung parenchyma demonstrates no suspicious pulmonary   nodule or mass. A 3 mm triangular-shaped nodule adjacent to the right   minor fissure on series 2 image 22 likely represents an intrafissural   lymph node. There is no evidence pneumonia. There is linear atelectasis   in both lung bases. There is mild paraseptal emphysema. There is no   pleural effusion or pneumothorax. The trachea and main bronchi are clear.    The heart size is normal.  There is no pericardial effusion. There are   severe coronary artery calcifications.    The thoracic aorta and main pulmonary arteries are normal in caliber.     The thyroid gland is unremarkable.    There is no significant axillary, mediastinal, or hilar lymphadenopathy.    Images of the upper abdomen demonstrate apartially visualized gallstones   within the gallbladder without secondary signs of acute cholecystitis. No   acute findings seen in the upper abdomen.    There are no acute osseous abnormalities. Slightly increased nodular left   breast tissue is suggested as compared to the prior CT. This could be   artifactually due to different positioning.    IMPRESSION:   No evidence of pneumonia.    Questionable increased nodular left breast tissue. Correlation with   mammography is recommended.      MEÑO BRANHAM M.D., RADIOLOGIST  This document has been electronically signed. Sep 16 2018 11:52PM    < end of copied text >      Echocardiogram 2018, LV hyperdynamic with EF of >75%, mild MAC with trace MR, Moderate AS,

## 2018-09-17 NOTE — H&P ADULT - PROBLEM SELECTOR PLAN 3
uncontrolled   continue insulin sliding scale, hypoglycemia protocols  continue Novolog 12unit TID before meals. Lantis 45units at bedtime. likely reactive in the setting of COPD exacerbation   no suspected infection at this time, pt afebrile  trend wbc, likely to remain elevated due to prednisone use.

## 2018-09-17 NOTE — H&P ADULT - PROBLEM SELECTOR PROBLEM 5
Hypothyroidism, unspecified type Congestive heart failure, unspecified HF chronicity, unspecified heart failure type Type 2 diabetes mellitus with complication, unspecified whether long term insulin use

## 2018-09-17 NOTE — H&P ADULT - NSHPSOCIALHISTORY_GEN_ALL_CORE
Lives alone at [facility]   uses walker   nonsmoker   Denies EtOH or drugs use   uptodate with vaccines

## 2018-09-17 NOTE — H&P ADULT - PROBLEM SELECTOR PROBLEM 3
Type 2 diabetes mellitus with complication, unspecified whether long term insulin use Leukocytosis, unspecified type

## 2018-09-17 NOTE — H&P ADULT - NSHPREVIEWOFSYSTEMS_GEN_ALL_CORE
REVIEW OF SYSTEMS:  CONSTITUTIONAL: fever, chills, fatigue, myalgia, Body ache  EYES: No eye pain, visual disturbances, or discharge  ENMT:  No vertigo, sinus or throat pain  NECK: No pain, stiffness  RESPIRATORY: difficulty breathing, No cough, wheezing, hemoptysis  CARDIOVASCULAR: No chest pain, palpitations, leg swelling, dizziness or lightheadedness   GASTROINTESTINAL: No abdominal or epigastric pain, nausea, vomiting, diarrhea or constipation  GENITOURINARY: No dysuria, frequency, urgency, hematuria, or incontinence  NEUROLOGICAL:  No headaches, No dizziness, No numbness, weakness   SKIN:   No itching, burning, rashes, or lesions   MUSCULOSKELETAL: right let pain, no weakness, numbness, or tinglings

## 2018-09-17 NOTE — PROGRESS NOTE ADULT - SUBJECTIVE AND OBJECTIVE BOX
Patient is a 84y old  Female who presents with a chief complaint of sob (17 Sep 2018 13:11)      INTERVAL HPI: Pt seen and examined. States she is feeling better, her breathing is improved. She still has RLE pain, states its been going on for about 1 month and started on the LLE. Son at bedside.     OVERNIGHT EVENTS: none noted  T(F): 98.5 (18 @ 13:37), Max: 99 (18 @ 19:28)  HR: 81 (18 @ 13:42) (67 - 108)  BP: 95/61 (18 @ 13:37) (95/61 - 175/65)  RR: 17 (18 @ 13:37) (15 - 24)  SpO2: 95% (18 @ 13:42) (93% - 98%)  I&O's Summary    17 Sep 2018 07:01  -  17 Sep 2018 17:13  --------------------------------------------------------  IN: 480 mL / OUT: 1 mL / NET: 479 mL        REVIEW OF SYSTEMS:  CONSTITUTIONAL: No fever, weight loss, or fatigue  RESPIRATORY: No cough, wheezing, chills or hemoptysis; No shortness of breath  CARDIOVASCULAR: No chest pain, palpitations, dizziness, or leg swelling  GASTROINTESTINAL: No abdominal or epigastric pain. No nausea, vomiting, or hematemesis; No diarrhea or constipation. No melena or hematochezia.  GENITOURINARY: No dysuria, frequency, hematuria, or incontinence  NEUROLOGICAL: No headaches, memory loss, loss of strength, numbness, or tremors  SKIN: No itching, burning, rashes, or lesions   ENDOCRINE: No heat or cold intolerance; No hair loss  MUSCULOSKELETAL: + RLE pain numbness and occasion burning sensation  PSYCHIATRIC: No depression, anxiety, mood swings, or difficulty sleeping      PHYSICAL EXAM:  GENERAL: NAD, well-groomed, well-developed  HEAD:  Atraumatic, Normocephalic  EYES: EOMI,  conjunctiva and sclera clear  ENMT: No tonsillar erythema, exudates, or enlargement; Moist mucous membranes, Good dentition, No lesions  NECK: Supple, No JVD,   NERVOUS SYSTEM:  Alert & Oriented X3, Good concentration; Motor Strength 5/5 B/L upper and lower extremities except RLE due to pain  CHEST/LUNG: Clear to percussion bilaterally; No rales, rhonchi, wheezing, or rubs  HEART: Regular rate and rhythm; No murmurs, rubs, or gallops  ABDOMEN: Soft, Nontender, Nondistended; Bowel sounds present  EXTREMITIES:  2+ Peripheral Pulses, No clubbing, cyanosis, + nonpitting edema    LABS:                        12.6   7.51  )-----------( 225      ( 17 Sep 2018 07:04 )             40.3         136  |  99  |  32<H>  ----------------------------<  375<H>  5.2   |  30  |  1.40<H>    Ca    9.3      17 Sep 2018 07:04    TPro  7.4  /  Alb  3.2<L>  /  TBili  0.3  /  DBili  x   /  AST  23  /  ALT  20  /  AlkPhos  76  09-16    PT/INR - ( 16 Sep 2018 20:59 )   PT: 10.9 sec;   INR: 1.00 ratio         PTT - ( 16 Sep 2018 20:59 )  PTT:29.2 sec  Urinalysis Basic - ( 16 Sep 2018 23:10 )    Color: Pale Yellow / Appearance: Clear / S.005 / pH: x  Gluc: x / Ketone: Negative  / Bili: Negative / Urobili: Negative   Blood: x / Protein: Negative / Nitrite: Negative   Leuk Esterase: Trace / RBC: 3-5 /HPF / WBC 3-5   Sq Epi: x / Non Sq Epi: Few / Bacteria: Few      CAPILLARY BLOOD GLUCOSE      POCT Blood Glucose.: 399 mg/dL (17 Sep 2018 16:39)  POCT Blood Glucose.: 404 mg/dL (17 Sep 2018 11:38)  POCT Blood Glucose.: 398 mg/dL (17 Sep 2018 08:15)  POCT Blood Glucose.: 292 mg/dL (16 Sep 2018 23:44)  POCT Blood Glucose.: 235 mg/dL (16 Sep 2018 21:13)              MEDICATIONS  (STANDING):  ALBUTerol    0.083% 2.5 milliGRAM(s) Nebulizer every 6 hours  aspirin enteric coated 81 milliGRAM(s) Oral daily  atorvastatin 40 milliGRAM(s) Oral at bedtime  buDESOnide 160 MICROgram(s)/formoterol 4.5 MICROgram(s) Inhaler 2 Puff(s) Inhalation two times a day  clopidogrel Tablet 75 milliGRAM(s) Oral daily  dextrose 5%. 1000 milliLiter(s) (50 mL/Hr) IV Continuous <Continuous>  dextrose 50% Injectable 12.5 Gram(s) IV Push once  dextrose 50% Injectable 25 Gram(s) IV Push once  dextrose 50% Injectable 25 Gram(s) IV Push once  diltiazem    milliGRAM(s) Oral daily  enoxaparin Injectable 40 milliGRAM(s) SubCutaneous every 24 hours  furosemide    Tablet 40 milliGRAM(s) Oral daily  gabapentin 400 milliGRAM(s) Oral two times a day  insulin glargine Injectable (LANTUS) 30 Unit(s) SubCutaneous at bedtime  insulin lispro (HumaLOG) corrective regimen sliding scale   SubCutaneous three times a day before meals  levothyroxine 112 MICROGram(s) Oral daily  lisinopril 5 milliGRAM(s) Oral daily  predniSONE   Tablet 20 milliGRAM(s) Oral daily  tiotropium 18 MICROgram(s) Capsule 1 Capsule(s) Inhalation daily    MEDICATIONS  (PRN):  acetaminophen   Tablet .. 650 milliGRAM(s) Oral every 6 hours PRN Temp greater or equal to 38C (100.4F), Mild Pain (1 - 3)  benzonatate 100 milliGRAM(s) Oral three times a day PRN Cough  dextrose 40% Gel 15 Gram(s) Oral once PRN Blood Glucose LESS THAN 70 milliGRAM(s)/deciliter  glucagon  Injectable 1 milliGRAM(s) IntraMuscular once PRN Glucose LESS THAN 70 milligrams/deciliter  traMADol 25 milliGRAM(s) Oral four times a day PRN Severe Pain (7 - 10)

## 2018-09-17 NOTE — H&P ADULT - PROBLEM SELECTOR PLAN 4
unlikely cause of sob at this time, no significant fluid overload on exam   ProBNP negative   No echo on record  continue Lasix 40mg daily uncontrolled   continue insulin sliding scale, hypoglycemia protocols  continue Novolog 12unit TID before meals. Lantis 45units at bedtime. uncontrolled   continue insulin sliding scale, hypoglycemia protocols  pt on Novolog 15unit TID before meals. Lantis Lantus 60units at bedtime. Will continue Lantus 30unit at bedtime for now  F/u Endo Dr. Perlman for further recommendation HFpEF   she has lower ext edema, at baseline per patient. labs, CXR and exam not indicative of fluid overload   continue Lasix 40mg daily

## 2018-09-17 NOTE — H&P ADULT - PROBLEM SELECTOR PLAN 1
acute on chronic COPD exacerbation  CT chest w/o cont showed no pneumonia, RVP neg    continue prednisone 20mg daily, albuterol neb, Symbicort, Spiriva inhaler   continue O2 supplement  Pulm Dr. Copeland following

## 2018-09-17 NOTE — PROVIDER CONTACT NOTE (OTHER) - ACTION/TREATMENT ORDERED:
12 units humalog ordered per sliding scale  Per MD OK to give 45 units Lantus with 12 units humalog 12 units humalog ordered per sliding scale  Per MD hold Alivia

## 2018-09-17 NOTE — CONSULT NOTE ADULT - ASSESSMENT
83yo F with PMHx of COPD(not on home o2 sat usually low 90s as per pt), HFpEF on Lasix, CAD s/p 4 stents PCI on Plavix, 4/19/2018 she underwent cardiac cath that revealed patent stent in the LAD, 50% stenosis in the LCx (iFR normal) and 100% occlusion of the Mid RCA. LV function was normal and there was moderate AS, mild Pul, HTN, DM type 2, hypothyroid p/w difficulty breathing and left leg pain, 10/10 x 1month, had multiple ER visit at [facility] for similar pain for which all workup was negative. No evidence of PAD. Symptoms concerning for neuropathic pain secondary to lumbar spinal stenosis or herniation.      Rec  Evaluation by spine surgery  Continue physical therapy  Cont tramadol and gabapentin daily  No need for further vascular studies or interventions  D/W Dr. Plasencia

## 2018-09-17 NOTE — PROGRESS NOTE ADULT - SUBJECTIVE AND OBJECTIVE BOX
Date/Time Patient Seen:  		  Referring MD:   Data Reviewed	       Patient is a 84y old  Female who presents with a chief complaint of shortness of breath (17 Sep 2018 05:00)    in bed  seen and examined  vs and meds reviewed  on o2 support    Subjective/HPI     PAST MEDICAL & SURGICAL HISTORY:  Hypothyroid  HLD (hyperlipidemia)  HTN (hypertension)  CAD (coronary artery disease)  Asthma  Diabetes  History of appendectomy        Medication list         MEDICATIONS  (STANDING):  ALBUTerol    0.083% 2.5 milliGRAM(s) Nebulizer every 6 hours  aspirin enteric coated 81 milliGRAM(s) Oral daily  atorvastatin 20 milliGRAM(s) Oral at bedtime  buDESOnide 160 MICROgram(s)/formoterol 4.5 MICROgram(s) Inhaler 2 Puff(s) Inhalation two times a day  clopidogrel Tablet 75 milliGRAM(s) Oral daily  dextrose 5%. 1000 milliLiter(s) (50 mL/Hr) IV Continuous <Continuous>  dextrose 50% Injectable 12.5 Gram(s) IV Push once  dextrose 50% Injectable 25 Gram(s) IV Push once  dextrose 50% Injectable 25 Gram(s) IV Push once  diltiazem    milliGRAM(s) Oral daily  enoxaparin Injectable 40 milliGRAM(s) SubCutaneous every 24 hours  furosemide    Tablet 40 milliGRAM(s) Oral daily  gabapentin 400 milliGRAM(s) Oral two times a day  insulin glargine Injectable (LANTUS) 30 Unit(s) SubCutaneous at bedtime  insulin lispro (HumaLOG) corrective regimen sliding scale   SubCutaneous three times a day before meals  levothyroxine 112 MICROGram(s) Oral daily  lisinopril 5 milliGRAM(s) Oral daily  predniSONE   Tablet 20 milliGRAM(s) Oral daily  tiotropium 18 MICROgram(s) Capsule 1 Capsule(s) Inhalation daily    MEDICATIONS  (PRN):  acetaminophen   Tablet .. 650 milliGRAM(s) Oral every 6 hours PRN Temp greater or equal to 38C (100.4F), Mild Pain (1 - 3)  benzonatate 100 milliGRAM(s) Oral three times a day PRN Cough  dextrose 40% Gel 15 Gram(s) Oral once PRN Blood Glucose LESS THAN 70 milliGRAM(s)/deciliter  glucagon  Injectable 1 milliGRAM(s) IntraMuscular once PRN Glucose LESS THAN 70 milligrams/deciliter  traMADol 25 milliGRAM(s) Oral four times a day PRN Severe Pain (7 - 10)         Vitals log        ICU Vital Signs Last 24 Hrs  T(C): 36.4 (17 Sep 2018 05:30), Max: 37.2 (16 Sep 2018 19:28)  T(F): 97.6 (17 Sep 2018 05:30), Max: 99 (16 Sep 2018 19:28)  HR: 82 (17 Sep 2018 08:02) (67 - 108)  BP: 159/87 (17 Sep 2018 05:30) (145/65 - 175/65)  BP(mean): --  ABP: --  ABP(mean): --  RR: 17 (17 Sep 2018 05:30) (15 - 24)  SpO2: 96% (17 Sep 2018 08:02) (93% - 98%)           Input and Output:  I&O's Detail      Lab Data                        12.6   7.51  )-----------( 225      ( 17 Sep 2018 07:04 )             40.3     09-17    136  |  99  |  32<H>  ----------------------------<  375<H>  5.2   |  30  |  1.40<H>    Ca    9.3      17 Sep 2018 07:04    TPro  7.4  /  Alb  3.2<L>  /  TBili  0.3  /  DBili  x   /  AST  23  /  ALT  20  /  AlkPhos  76  09-16      CARDIAC MARKERS ( 16 Sep 2018 20:46 )  <.015 ng/mL / x     / 163 U/L / x     / 2.4 ng/mL        Review of Systems	      Objective     Physical Examination    heart s1s2  lung dec BS  abd soft      Pertinent Lab findings & Imaging      Schwarz:  NO   Adequate UO     I&O's Detail           Discussed with:     Cultures:	        Radiology Chronic back pain greater than 3 months duration HTN (hypertension)

## 2018-09-17 NOTE — H&P ADULT - ASSESSMENT
83yo F with PMHx of COPD(not on home o2 sat usually low 90s as per pt), CHF on Lasix, CAD s/p 4 stents on Plavix, HTN, DM type 2, hypothyroid p/w difficulty breathing and left leg pain, 10/10 x 1month, had multiple ER visit at [facility] for similar pain for which all workup was negative admitted for COPD exacerbation. 85yo F with PMHx of COPD(not on home o2 sat usually low 90s as per pt), HFpEF on Lasix, CAD s/p 4 stents PCI on Plavix, 4/19/2018 she underwent cardiac cath that revealed patent stent in the LAD, 50% stenosis in the LCx (iFR normal) and 100% occlusion of the Mid RCA. LV function was normal and there was moderate AS, mild Pul, HTN, DM type 2, hypothyroid p/w difficulty breathing and left leg pain, 10/10 x 1month, had multiple ER visit at [facility] for similar pain for which all workup was negative admitted for COPD exacerbation.

## 2018-09-17 NOTE — CONSULT NOTE ADULT - SUBJECTIVE AND OBJECTIVE BOX
Vascular Attending:  Dr. Ohara      HPI:  85yo F with PMHx of COPD(not on home o2 sat usually low 90s as per pt), HFpEF on Lasix, CAD s/p 4 stents PCI on Plavix, 2018 she underwent cardiac cath that revealed patent stent in the LAD, 50% stenosis in the LCx (iFR normal) and 100% occlusion of the Mid RCA. LV function was normal and there was moderate AS, mild Pul, HTN, DM type 2, hypothyroid p/w difficulty breathing and left leg pain, 10/10 x 1month, had multiple ER visit at [facility] for similar pain for which all workup was negative. Today woke up with more leg pain which has been chronic for past few month talking tramadol with some relieve but states Morphine helps with the pain. Pt also endorses to difficulty breathing this morning worse then her baseline with her oxygen level under 80s then called her son who told her to go to the ED. Denies fever, chills, cp, palpitations, cough, abdominal pain, dysuria, no sick contacts, no recent travel, difficulty walking. Patient was seen at [place] in May for uncontrolled DM and shortness of breath. Was then sent to [facility] for 3 weeks for rehab, currently  resides in [facility]. States she she has visiting nurse that comes one a week and due to pain she is unable to do her ADL.     In the ED vitals stable, labs pertinent for glucose 292, wbc 12.24, Cr/BUN 1.6/37, procalcitonin .08, CT chest w/o cont negative for pneumonia, Doppler LE b/l Negative for DVT, proBNP 343. Pt received 40mg prednisone x 1, Morphine x 2 for pain. EKG showed sinus tachy 107, non specific T wave changes. (17 Sep 2018 01:23)      PAST MEDICAL & SURGICAL HISTORY:  Hypothyroid  HLD (hyperlipidemia)  HTN (hypertension)  CAD (coronary artery disease)  Asthma  Diabetes  History of appendectomy      MEDICATIONS  (STANDING):  ALBUTerol    0.083% 2.5 milliGRAM(s) Nebulizer every 6 hours  aspirin enteric coated 81 milliGRAM(s) Oral daily  atorvastatin 40 milliGRAM(s) Oral at bedtime  buDESOnide 160 MICROgram(s)/formoterol 4.5 MICROgram(s) Inhaler 2 Puff(s) Inhalation two times a day  clopidogrel Tablet 75 milliGRAM(s) Oral daily  dextrose 5%. 1000 milliLiter(s) (50 mL/Hr) IV Continuous <Continuous>  dextrose 50% Injectable 12.5 Gram(s) IV Push once  dextrose 50% Injectable 25 Gram(s) IV Push once  dextrose 50% Injectable 25 Gram(s) IV Push once  diltiazem    milliGRAM(s) Oral daily  enoxaparin Injectable 40 milliGRAM(s) SubCutaneous every 24 hours  furosemide    Tablet 40 milliGRAM(s) Oral daily  gabapentin 400 milliGRAM(s) Oral two times a day  insulin glargine Injectable (LANTUS) 45 Unit(s) SubCutaneous at bedtime  insulin lispro (HumaLOG) corrective regimen sliding scale   SubCutaneous three times a day before meals  levothyroxine 112 MICROGram(s) Oral daily  lisinopril 5 milliGRAM(s) Oral daily  predniSONE   Tablet 20 milliGRAM(s) Oral daily  tiotropium 18 MICROgram(s) Capsule 1 Capsule(s) Inhalation daily    MEDICATIONS  (PRN):  acetaminophen   Tablet .. 650 milliGRAM(s) Oral every 6 hours PRN Temp greater or equal to 38C (100.4F), Mild Pain (1 - 3)  benzonatate 100 milliGRAM(s) Oral three times a day PRN Cough  dextrose 40% Gel 15 Gram(s) Oral once PRN Blood Glucose LESS THAN 70 milliGRAM(s)/deciliter  glucagon  Injectable 1 milliGRAM(s) IntraMuscular once PRN Glucose LESS THAN 70 milligrams/deciliter  traMADol 25 milliGRAM(s) Oral four times a day PRN Severe Pain (7 - 10)      Allergies    iodine containing compounds (Unknown)  shellfish (Swelling; Short breath)    Intolerances        FAMILY HISTORY:  Family history of cancer in mother  Family history of heart disease (Father)      SOCIAL HISTORY: No history of smoking, alcohol or illicit drug use    ROS: 10-system review is otherwise negative except HPI above.      Vital Signs Last 24 Hrs  T(C): 36.4 (17 Sep 2018 21:14), Max: 36.9 (17 Sep 2018 13:37)  T(F): 97.6 (17 Sep 2018 21:14), Max: 98.5 (17 Sep 2018 13:37)  HR: 76 (17 Sep 2018 21:14) (67 - 96)  BP: 108/65 (17 Sep 2018 21:14) (95/61 - 159/87)  BP(mean): --  RR: 16 (17 Sep 2018 21:14) (15 - 18)  SpO2: 94% (17 Sep 2018 21:14) (93% - 98%)    General:  NAD  Neuro: 5/5 motor function in all 4 extremities, sensation intact; AAOX3  HEENT:  no facial droop; no ptosis or facial edema  Neck:  Supple, no carotid bruits  Respiratory: CTA B/L  CV: RRR, S1S2, no murmur  Abdominal: Obese, Soft, NT, ND no palpable mass, no pulsatile mass, no bruits  Ext: No edema, pink, well-perfused; capillary refill <2sec bilaterally  Musc: FROM all 4 extremity  Vasc:   Right    Axillary  2+ [x ]  1+ [ ] doppler [ ]                  Left   Axillary  2+ [ x]  1+ [ ] doppler [ ]               Brachial  2+ [x ]  1+ [ ] doppler [ ]                           Brachial  2+ [x ]  1+ [ ] doppler [ ]               Radial  2+ [x ]  1+ [ ] doppler [ ]                              Radial 2+ [x ]  1+ [ ] doppler [ ]               Ulnar  2+ [x ]  1+ [ ] doppler [ ]                               Ulnar  2+ [x ]  1+ [ ] doppler [ ]               Femoral  2+ [x ]  1+ [ ] doppler [ ]                          Femoral  2+ [x ]  1+ [ ] doppler [ ]               Popliteal  2+ [x ]  1+ [ ] doppler [ ]                         Popliteal  2+ [x ]  1+ [ ] doppler [ ]               Dorsalis Pedis  2+ [x ]  1+ [ ] doppler [ ]                Dorsalis Pedis  2+ [x ]  1+ [ ] doppler [ ]               Posterior Tibial  2+ [x ]  1+ [ ] doppler [ ]              Posterior Tibial  2+ [x ]  1+ [ ] doppler [ ]      LABS:                        12.6   7.51  )-----------( 225      ( 17 Sep 2018 07:04 )             40.3     -    136  |  99  |  32<H>  ----------------------------<  375<H>  5.2   |  30  |  1.40<H>    Ca    9.3      17 Sep 2018 07:04    TPro  7.4  /  Alb  3.2<L>  /  TBili  0.3  /  DBili  x   /  AST  23  /  ALT  20  /  AlkPhos  76  09-16    PT/INR - ( 16 Sep 2018 20:59 )   PT: 10.9 sec;   INR: 1.00 ratio         PTT - ( 16 Sep 2018 20:59 )  PTT:29.2 sec  Urinalysis Basic - ( 16 Sep 2018 23:10 )    Color: Pale Yellow / Appearance: Clear / S.005 / pH: x  Gluc: x / Ketone: Negative  / Bili: Negative / Urobili: Negative   Blood: x / Protein: Negative / Nitrite: Negative   Leuk Esterase: Trace / RBC: 3-5 /HPF / WBC 3-5   Sq Epi: x / Non Sq Epi: Few / Bacteria: Few        RADIOLOGY & ADDITIONAL STUDIES    < from: US Duplex Venous Lower Ext Complete, Bilateral (18 @ 22:14) >  EXAM:  US DPLX LWR EXT VEINS COMPL BI                            PROCEDURE DATE:  2018          INTERPRETATION:  CLINICAL INFORMATION: Lower extremity edema. Evaluate   for DVT.    COMPARISON: None available.    TECHNIQUE: Duplex sonography of the BILATERAL LOWER extremities with   color and spectral Doppler, with and without compression.      FINDINGS:    There is normal compressibility of the bilateral common femoral, femoral   and popliteal veins. No calf vein thrombosis is detected.    Doppler examination shows normal spontaneous and phasic flow.    IMPRESSION:     No evidence of bilateral lower extremity deep venous thrombosis.          < end of copied text > Vascular Attending:  Dr. Ohara      HPI:  85yo F with PMHx of COPD(not on home o2 sat usually low 90s as per pt), HFpEF on Lasix, CAD s/p 4 stents PCI on Plavix, 2018 she underwent cardiac cath that revealed patent stent in the LAD, 50% stenosis in the LCx (iFR normal) and 100% occlusion of the Mid RCA. LV function was normal and there was moderate AS, mild Pul, HTN, DM type 2, hypothyroid p/w difficulty breathing and left leg pain, 10/10 x 1month, had multiple ER visit at [facility] for similar pain for which all workup was negative. Today woke up with more leg pain which has been chronic for past few month talking tramadol with some relieve but states Morphine helps with the pain. Pt also endorses to difficulty breathing this morning worse then her baseline with her oxygen level under 80s then called her son who told her to go to the ED. Denies fever, chills, cp, palpitations, cough, abdominal pain, dysuria, no sick contacts, no recent travel, difficulty walking. Patient was seen at [place] in May for uncontrolled DM and shortness of breath. Was then sent to [facility] for 3 weeks for rehab, currently  resides in [facility]. States she she has visiting nurse that comes one a week and due to pain she is unable to do her ADL.     Pt states pain in both legs is worse when her legs are positioned above heart level when she is seated. Discomfort is improved when she stands and walks around. She also states she has pain in the legs when a sheet or blanket touch the skin or someone squeezes her leg. She has never had her back evaluated for spinal stenosis.    In the ED vitals stable, labs pertinent for glucose 292, wbc 12.24, Cr/BUN 1.6/37, procalcitonin .08, CT chest w/o cont negative for pneumonia, Doppler LE b/l Negative for DVT, proBNP 343. Pt received 40mg prednisone x 1, Morphine x 2 for pain. EKG showed sinus tachy 107, non specific T wave changes. (17 Sep 2018 01:23)      PAST MEDICAL & SURGICAL HISTORY:  Hypothyroid  HLD (hyperlipidemia)  HTN (hypertension)  CAD (coronary artery disease)  Asthma  Diabetes  History of appendectomy      MEDICATIONS  (STANDING):  ALBUTerol    0.083% 2.5 milliGRAM(s) Nebulizer every 6 hours  aspirin enteric coated 81 milliGRAM(s) Oral daily  atorvastatin 40 milliGRAM(s) Oral at bedtime  buDESOnide 160 MICROgram(s)/formoterol 4.5 MICROgram(s) Inhaler 2 Puff(s) Inhalation two times a day  clopidogrel Tablet 75 milliGRAM(s) Oral daily  dextrose 5%. 1000 milliLiter(s) (50 mL/Hr) IV Continuous <Continuous>  dextrose 50% Injectable 12.5 Gram(s) IV Push once  dextrose 50% Injectable 25 Gram(s) IV Push once  dextrose 50% Injectable 25 Gram(s) IV Push once  diltiazem    milliGRAM(s) Oral daily  enoxaparin Injectable 40 milliGRAM(s) SubCutaneous every 24 hours  furosemide    Tablet 40 milliGRAM(s) Oral daily  gabapentin 400 milliGRAM(s) Oral two times a day  insulin glargine Injectable (LANTUS) 45 Unit(s) SubCutaneous at bedtime  insulin lispro (HumaLOG) corrective regimen sliding scale   SubCutaneous three times a day before meals  levothyroxine 112 MICROGram(s) Oral daily  lisinopril 5 milliGRAM(s) Oral daily  predniSONE   Tablet 20 milliGRAM(s) Oral daily  tiotropium 18 MICROgram(s) Capsule 1 Capsule(s) Inhalation daily    MEDICATIONS  (PRN):  acetaminophen   Tablet .. 650 milliGRAM(s) Oral every 6 hours PRN Temp greater or equal to 38C (100.4F), Mild Pain (1 - 3)  benzonatate 100 milliGRAM(s) Oral three times a day PRN Cough  dextrose 40% Gel 15 Gram(s) Oral once PRN Blood Glucose LESS THAN 70 milliGRAM(s)/deciliter  glucagon  Injectable 1 milliGRAM(s) IntraMuscular once PRN Glucose LESS THAN 70 milligrams/deciliter  traMADol 25 milliGRAM(s) Oral four times a day PRN Severe Pain (7 - 10)      Allergies    iodine containing compounds (Unknown)  shellfish (Swelling; Short breath)    Intolerances        FAMILY HISTORY:  Family history of cancer in mother  Family history of heart disease (Father)      SOCIAL HISTORY: No history of smoking, alcohol or illicit drug use    ROS: 10-system review is otherwise negative except HPI above.      Vital Signs Last 24 Hrs  T(C): 36.4 (17 Sep 2018 21:14), Max: 36.9 (17 Sep 2018 13:37)  T(F): 97.6 (17 Sep 2018 21:14), Max: 98.5 (17 Sep 2018 13:37)  HR: 76 (17 Sep 2018 21:14) (67 - 96)  BP: 108/65 (17 Sep 2018 21:14) (95/61 - 159/87)  BP(mean): --  RR: 16 (17 Sep 2018 21:14) (15 - 18)  SpO2: 94% (17 Sep 2018 21:14) (93% - 98%)    General:  NAD  Neuro: 5/5 motor function in all 4 extremities, sensation intact; AAOX3  HEENT:  no facial droop; no ptosis or facial edema  Neck:  Supple, no carotid bruits  Respiratory: CTA B/L  CV: RRR, S1S2, no murmur  Abdominal: Obese, Soft, NT, ND no palpable mass, no pulsatile mass, no bruits  Ext: No edema, pink, well-perfused; capillary refill <2sec bilaterally  Musc: FROM all 4 extremity  Vasc:   Right    Axillary  2+ [x ]  1+ [ ] doppler [ ]                  Left   Axillary  2+ [ x]  1+ [ ] doppler [ ]               Brachial  2+ [x ]  1+ [ ] doppler [ ]                           Brachial  2+ [x ]  1+ [ ] doppler [ ]               Radial  2+ [x ]  1+ [ ] doppler [ ]                              Radial 2+ [x ]  1+ [ ] doppler [ ]               Ulnar  2+ [x ]  1+ [ ] doppler [ ]                               Ulnar  2+ [x ]  1+ [ ] doppler [ ]               Femoral  2+ [x ]  1+ [ ] doppler [ ]                          Femoral  2+ [x ]  1+ [ ] doppler [ ]               Popliteal  2+ [x ]  1+ [ ] doppler [ ]                         Popliteal  2+ [x ]  1+ [ ] doppler [ ]               Dorsalis Pedis  2+ [x ]  1+ [ ] doppler [ ]                Dorsalis Pedis  2+ [x ]  1+ [ ] doppler [ ]               Posterior Tibial  2+ [x ]  1+ [ ] doppler [ ]              Posterior Tibial  2+ [x ]  1+ [ ] doppler [ ]      LABS:                        12.6   7.51  )-----------( 225      ( 17 Sep 2018 07:04 )             40.3     09-    136  |  99  |  32<H>  ----------------------------<  375<H>  5.2   |  30  |  1.40<H>    Ca    9.3      17 Sep 2018 07:04    TPro  7.4  /  Alb  3.2<L>  /  TBili  0.3  /  DBili  x   /  AST  23  /  ALT  20  /  AlkPhos  76  09-16    PT/INR - ( 16 Sep 2018 20:59 )   PT: 10.9 sec;   INR: 1.00 ratio         PTT - ( 16 Sep 2018 20:59 )  PTT:29.2 sec  Urinalysis Basic - ( 16 Sep 2018 23:10 )    Color: Pale Yellow / Appearance: Clear / S.005 / pH: x  Gluc: x / Ketone: Negative  / Bili: Negative / Urobili: Negative   Blood: x / Protein: Negative / Nitrite: Negative   Leuk Esterase: Trace / RBC: 3-5 /HPF / WBC 3-5   Sq Epi: x / Non Sq Epi: Few / Bacteria: Few        RADIOLOGY & ADDITIONAL STUDIES    < from: US Duplex Venous Lower Ext Complete, Bilateral (18 @ 22:14) >  EXAM:  US DPLX LWR EXT VEINS COMPL BI                            PROCEDURE DATE:  2018          INTERPRETATION:  CLINICAL INFORMATION: Lower extremity edema. Evaluate   for DVT.    COMPARISON: None available.    TECHNIQUE: Duplex sonography of the BILATERAL LOWER extremities with   color and spectral Doppler, with and without compression.      FINDINGS:    There is normal compressibility of the bilateral common femoral, femoral   and popliteal veins. No calf vein thrombosis is detected.    Doppler examination shows normal spontaneous and phasic flow.    IMPRESSION:     No evidence of bilateral lower extremity deep venous thrombosis.          < end of copied text >

## 2018-09-17 NOTE — GOALS OF CARE CONVERSATION - PERSONAL ADVANCE DIRECTIVE - NS PRO AD PATIENT TYPE ON CHART
molst completed 9/17/18/Medical Orders for Life-Sustaining Treatment (MOLST)/Do Not Resuscitate (DNR)

## 2018-09-17 NOTE — CONSULT NOTE ADULT - ASSESSMENT
Problem/Plan - 1:  ·  Problem: COPD exacerbation.  Plan: acute on chronic COPD exacerbation  CT chest w/o cont showed no pneumonia, RVP neg    continue prednisone 20mg daily, albuterol neb, Symbicort, Spiriva inhaler   continue O2 supplement  Pulm Dr. Copeland following.     Problem/Plan - 2:  ·  Problem: Right leg pain.  Plan: Chronic with multiple ED visit with negative workup, likely 2/2 to neuropathy   Doppler LE b/l Negative for DVT  Continue Tramadol 25mg PRN for pain   F/u PT for possible rehab placement.     Problem/Plan - 3:  ·  Problem: Heart failure with preserved EF, chronic. Plan: HFpEF   she has lower ext edema, at baseline per patient. labs, CXR and exam not indicative of fluid overload   continue Lasix 40mg daily.     Problem/Plan - 4:  ·  Problem: Type 2 diabetes mellitus with complication, unspecified whether long term insulin use.  Plan: uncontrolled   insulin as per medicine team.    Problem/Plan - 5:  Problem: CAD (coronary artery disease). Plan: continue ASA, Plavix. Will increase atorvastatin to 40mg daily. 4/19/2018 she underwent cardiac cath that revealed patent stent in the LAD, 50% stenosis in the LCx (iFR normal) and 100% occlusion of the Mid RCA.    Problem/Plan - 6:  ·  Problem: Essential hypertension.  Plan: continue lisinopril.     Problem/Plan - 7:  ·  Problem: Hypothyroidism, unspecified type.  Plan: continue synthroid.     Problem/Plan - 8:  - Problem: Moderate calcific aortic valve stenosis.  Plan: continue to follow with outpatient echocardiograms on a yearly basis or sooner if symptoms develop or change.

## 2018-09-17 NOTE — H&P ADULT - NSHPPHYSICALEXAM_GEN_ALL_CORE
Weight (kg): 93.4 (09-16 @ 19:28)  Vital Signs Last 24 Hrs  T(C): 36.7 (16 Sep 2018 23:39), Max: 37.2 (16 Sep 2018 19:28)  T(F): 98 (16 Sep 2018 23:39), Max: 99 (16 Sep 2018 19:28)  HR: 88 (16 Sep 2018 23:39) (88 - 108)  BP: 145/65 (16 Sep 2018 23:39) (145/65 - 175/65)  BP(mean): --  RR: 18 (16 Sep 2018 23:39) (18 - 24)  SpO2: 98% (16 Sep 2018 23:39) (96% - 98%)  [ ] room air   [ X] 02    PHYSICAL EXAM:  GENERAL:  No acute distress, well appearing, resting comfortably in bed  HEAD:  atraumatic, normocephalic   ENMT: PERRLA, EOMI, moist , no erythema, or exudate   NECK:  Suppled, No JVD   NERVOUS SYSTEM:  Alert & Oriented X3, no focal deficits, strength 5/5, sensation intact, CN intact   CHEST/LUNG: Clear to auscultation bilaterally, no wheezing,  rhonchi, rales or crackles  HEART:  Regular rate and rhythm, No murmurs, rubs, or gallops  ABDOMEN:  soft, nontender, nondistended, positive bowel soundsx4, no rebound tenderness or guarding    EXTREMITIES: +1 pitting edema b/l, no clubbing, cyanosis   SKIN: no venous stasis skin changes

## 2018-09-17 NOTE — H&P ADULT - PROBLEM SELECTOR PLAN 2
Chronic with multiple ED visit with negative workup, likely 2/2 to neuropathy   Doppler LE b/l Negative for DVT  Continue Tramadol 25mg PRN for pain   F/u PT for possible rehab placement

## 2018-09-17 NOTE — GOALS OF CARE CONVERSATION - PERSONAL ADVANCE DIRECTIVE - CONVERSATION DETAILS
met pt & daughter, Trinity, pt has hcp, pt sonCalvin is hcp, requested to provide copy of hcp. vero discussion of pt directives: pt spoke of dnr dni, malathi reviewed: pt agrees to dnr dni no TF, wants treatment: IV flds, antibiotics, return to hospital. daughter supports pt decisions. Dr Plasencia to complete molst form, order received.  contact # given.

## 2018-09-18 LAB
ANION GAP SERPL CALC-SCNC: 8 MMOL/L — SIGNIFICANT CHANGE UP (ref 5–17)
BUN SERPL-MCNC: 58 MG/DL — HIGH (ref 7–23)
CALCIUM SERPL-MCNC: 9.2 MG/DL — SIGNIFICANT CHANGE UP (ref 8.5–10.1)
CHLORIDE SERPL-SCNC: 97 MMOL/L — SIGNIFICANT CHANGE UP (ref 96–108)
CO2 SERPL-SCNC: 29 MMOL/L — SIGNIFICANT CHANGE UP (ref 22–31)
CREAT SERPL-MCNC: 1.9 MG/DL — HIGH (ref 0.5–1.3)
CRP SERPL-MCNC: 1.28 MG/DL — HIGH (ref 0–0.4)
GLUCOSE SERPL-MCNC: 437 MG/DL — HIGH (ref 70–99)
HBA1C BLD-MCNC: 10.7 % — HIGH (ref 4–5.6)
HCT VFR BLD CALC: 37.1 % — SIGNIFICANT CHANGE UP (ref 34.5–45)
HGB BLD-MCNC: 11.6 G/DL — SIGNIFICANT CHANGE UP (ref 11.5–15.5)
MCHC RBC-ENTMCNC: 26.7 PG — LOW (ref 27–34)
MCHC RBC-ENTMCNC: 31.3 GM/DL — LOW (ref 32–36)
MCV RBC AUTO: 85.3 FL — SIGNIFICANT CHANGE UP (ref 80–100)
NRBC # BLD: 0 /100 WBCS — SIGNIFICANT CHANGE UP (ref 0–0)
PLATELET # BLD AUTO: 249 K/UL — SIGNIFICANT CHANGE UP (ref 150–400)
POTASSIUM SERPL-MCNC: 4.9 MMOL/L — SIGNIFICANT CHANGE UP (ref 3.5–5.3)
POTASSIUM SERPL-SCNC: 4.9 MMOL/L — SIGNIFICANT CHANGE UP (ref 3.5–5.3)
RBC # BLD: 4.35 M/UL — SIGNIFICANT CHANGE UP (ref 3.8–5.2)
RBC # FLD: 14.5 % — SIGNIFICANT CHANGE UP (ref 10.3–14.5)
SODIUM SERPL-SCNC: 134 MMOL/L — LOW (ref 135–145)
WBC # BLD: 13.08 K/UL — HIGH (ref 3.8–10.5)
WBC # FLD AUTO: 13.08 K/UL — HIGH (ref 3.8–10.5)

## 2018-09-18 PROCEDURE — 72148 MRI LUMBAR SPINE W/O DYE: CPT | Mod: 26

## 2018-09-18 PROCEDURE — 99233 SBSQ HOSP IP/OBS HIGH 50: CPT

## 2018-09-18 PROCEDURE — 72100 X-RAY EXAM L-S SPINE 2/3 VWS: CPT | Mod: 26

## 2018-09-18 RX ORDER — INSULIN GLARGINE 100 [IU]/ML
60 INJECTION, SOLUTION SUBCUTANEOUS AT BEDTIME
Qty: 0 | Refills: 0 | Status: DISCONTINUED | OUTPATIENT
Start: 2018-09-18 | End: 2018-09-20

## 2018-09-18 RX ORDER — INSULIN LISPRO 100/ML
10 VIAL (ML) SUBCUTANEOUS
Qty: 0 | Refills: 0 | Status: DISCONTINUED | OUTPATIENT
Start: 2018-09-18 | End: 2018-09-19

## 2018-09-18 RX ORDER — INSULIN LISPRO 100/ML
6 VIAL (ML) SUBCUTANEOUS
Qty: 0 | Refills: 0 | Status: DISCONTINUED | OUTPATIENT
Start: 2018-09-18 | End: 2018-09-18

## 2018-09-18 RX ADMIN — CLOPIDOGREL BISULFATE 75 MILLIGRAM(S): 75 TABLET, FILM COATED ORAL at 12:33

## 2018-09-18 RX ADMIN — INSULIN GLARGINE 45 UNIT(S): 100 INJECTION, SOLUTION SUBCUTANEOUS at 00:00

## 2018-09-18 RX ADMIN — Medication 20 MILLIGRAM(S): at 05:30

## 2018-09-18 RX ADMIN — ENOXAPARIN SODIUM 40 MILLIGRAM(S): 100 INJECTION SUBCUTANEOUS at 12:37

## 2018-09-18 RX ADMIN — ALBUTEROL 2.5 MILLIGRAM(S): 90 AEROSOL, METERED ORAL at 02:02

## 2018-09-18 RX ADMIN — ALBUTEROL 2.5 MILLIGRAM(S): 90 AEROSOL, METERED ORAL at 07:24

## 2018-09-18 RX ADMIN — INSULIN GLARGINE 60 UNIT(S): 100 INJECTION, SOLUTION SUBCUTANEOUS at 21:58

## 2018-09-18 RX ADMIN — TIOTROPIUM BROMIDE 1 CAPSULE(S): 18 CAPSULE ORAL; RESPIRATORY (INHALATION) at 08:06

## 2018-09-18 RX ADMIN — Medication 240 MILLIGRAM(S): at 05:29

## 2018-09-18 RX ADMIN — GABAPENTIN 400 MILLIGRAM(S): 400 CAPSULE ORAL at 17:40

## 2018-09-18 RX ADMIN — Medication 12: at 12:32

## 2018-09-18 RX ADMIN — Medication 10: at 17:39

## 2018-09-18 RX ADMIN — Medication 8: at 08:03

## 2018-09-18 RX ADMIN — BUDESONIDE AND FORMOTEROL FUMARATE DIHYDRATE 2 PUFF(S): 160; 4.5 AEROSOL RESPIRATORY (INHALATION) at 20:59

## 2018-09-18 RX ADMIN — GABAPENTIN 400 MILLIGRAM(S): 400 CAPSULE ORAL at 05:30

## 2018-09-18 RX ADMIN — ALBUTEROL 2.5 MILLIGRAM(S): 90 AEROSOL, METERED ORAL at 13:40

## 2018-09-18 RX ADMIN — Medication 81 MILLIGRAM(S): at 12:33

## 2018-09-18 RX ADMIN — TRAMADOL HYDROCHLORIDE 25 MILLIGRAM(S): 50 TABLET ORAL at 22:24

## 2018-09-18 RX ADMIN — Medication 40 MILLIGRAM(S): at 05:29

## 2018-09-18 RX ADMIN — Medication 6 UNIT(S): at 12:32

## 2018-09-18 RX ADMIN — Medication 10 UNIT(S): at 17:39

## 2018-09-18 RX ADMIN — LISINOPRIL 5 MILLIGRAM(S): 2.5 TABLET ORAL at 05:30

## 2018-09-18 RX ADMIN — ALBUTEROL 2.5 MILLIGRAM(S): 90 AEROSOL, METERED ORAL at 20:09

## 2018-09-18 RX ADMIN — Medication 6 UNIT(S): at 08:08

## 2018-09-18 RX ADMIN — BUDESONIDE AND FORMOTEROL FUMARATE DIHYDRATE 2 PUFF(S): 160; 4.5 AEROSOL RESPIRATORY (INHALATION) at 08:05

## 2018-09-18 RX ADMIN — Medication 10: at 21:58

## 2018-09-18 RX ADMIN — ATORVASTATIN CALCIUM 40 MILLIGRAM(S): 80 TABLET, FILM COATED ORAL at 21:10

## 2018-09-18 RX ADMIN — Medication 112 MICROGRAM(S): at 05:30

## 2018-09-18 RX ADMIN — TRAMADOL HYDROCHLORIDE 25 MILLIGRAM(S): 50 TABLET ORAL at 21:24

## 2018-09-18 NOTE — PROGRESS NOTE ADULT - SUBJECTIVE AND OBJECTIVE BOX
CHIEF COMPLAINT: Patient is a 84y old  Female who presents with a chief complaint of sob (18 Sep 2018 07:25)      HPI:  83yo F with PMHx of COPD(not on home o2 sat usually low 90s as per pt), HFpEF on Lasix, CAD s/p 4 stents PCI on Plavix, 2018 she underwent cardiac cath that revealed patent stent in the LAD, 50% stenosis in the LCx (iFR normal) and 100% occlusion of the Mid RCA. LV function was normal and there was moderate AS, mild Pul, HTN, DM type 2, hypothyroid p/w difficulty breathing and left leg pain, 10/10 x 1month, had multiple ER visit at [facility] for similar pain for which all workup was negative. Today woke up with more leg pain which has been chronic for past few month talking tramadol with some relieve but states Morphine helps with the pain. Pt also endorses to difficulty breathing this morning worse then her baseline with her oxygen level under 80s then called her son who told her to go to the ED. Denies fever, chills, cp, palpitations, cough, abdominal pain, dysuria, no sick contacts, no recent travel, difficulty walking. Patient was seen at [facility] in May for uncontrolled DM and shortness of breath. Was then sent to [facility] for 3 weeks for rehab, currently  resides in [facility]. States she she has visiting nurse that comes one a week and due to pain she is unable to do her ADL.     In the ED vitals stable, labs pertinent for glucose 292, wbc 12.24, Cr/BUN 1.6/37, procalcitonin .08, CT chest w/o cont negative for pneumonia, Doppler LE b/l Negative for DVT, proBNP 343. Pt received 40mg prednisone x 1, Morphine x 2 for pain. EKG showed sinus tachy 107, non specific T wave changes. (17 Sep 2018 01:23)      Subjective: Denies chest pain, SOB, or palpitations. Still with right lower extremity pain.        MEDICATIONS  (STANDING):  ALBUTerol    0.083% 2.5 milliGRAM(s) Nebulizer every 6 hours  aspirin enteric coated 81 milliGRAM(s) Oral daily  atorvastatin 40 milliGRAM(s) Oral at bedtime  buDESOnide 160 MICROgram(s)/formoterol 4.5 MICROgram(s) Inhaler 2 Puff(s) Inhalation two times a day  clopidogrel Tablet 75 milliGRAM(s) Oral daily  dextrose 5%. 1000 milliLiter(s) (50 mL/Hr) IV Continuous <Continuous>  dextrose 50% Injectable 12.5 Gram(s) IV Push once  dextrose 50% Injectable 25 Gram(s) IV Push once  dextrose 50% Injectable 25 Gram(s) IV Push once  diltiazem    milliGRAM(s) Oral daily  enoxaparin Injectable 40 milliGRAM(s) SubCutaneous every 24 hours  furosemide    Tablet 40 milliGRAM(s) Oral daily  gabapentin 400 milliGRAM(s) Oral two times a day  insulin glargine Injectable (LANTUS) 45 Unit(s) SubCutaneous at bedtime  insulin lispro (HumaLOG) corrective regimen sliding scale   SubCutaneous Before meals and at bedtime  insulin lispro Injectable (HumaLOG) 6 Unit(s) SubCutaneous three times a day before meals  levothyroxine 112 MICROGram(s) Oral daily  lisinopril 5 milliGRAM(s) Oral daily  predniSONE   Tablet 20 milliGRAM(s) Oral daily  tiotropium 18 MICROgram(s) Capsule 1 Capsule(s) Inhalation daily    MEDICATIONS  (PRN):  acetaminophen   Tablet .. 650 milliGRAM(s) Oral every 6 hours PRN Temp greater or equal to 38C (100.4F), Mild Pain (1 - 3)  benzonatate 100 milliGRAM(s) Oral three times a day PRN Cough  dextrose 40% Gel 15 Gram(s) Oral once PRN Blood Glucose LESS THAN 70 milliGRAM(s)/deciliter  glucagon  Injectable 1 milliGRAM(s) IntraMuscular once PRN Glucose LESS THAN 70 milligrams/deciliter  traMADol 25 milliGRAM(s) Oral four times a day PRN Severe Pain (7 - 10)      Allergies    iodine containing compounds (Unknown)  shellfish (Swelling; Short breath)    Intolerances        REVIEW OF SYSTEMS:  CONSTITUTIONAL: No weakness, fevers or chills  EYES/ENT: No visual changes;  No vertigo or throat pain   NECK: No pain or stiffness  RESPIRATORY: No cough, wheezing, hemoptysis; (+) shortness of breath  CARDIOVASCULAR: No chest pain or palpitations  GASTROINTESTINAL: No abdominal pain. No nausea, vomiting, or hematemesis; No diarrhea or constipation. No melena or hematochezia.  GENITOURINARY: No dysuria, frequency or hematuria  NEUROLOGICAL: No numbness or weakness  SKIN: No itching or rash  All other review of systems is negative unless indicated above    VITAL SIGNS:   Vital Signs Last 24 Hrs  T(C): 36.3 (18 Sep 2018 05:13), Max: 36.9 (17 Sep 2018 13:37)  T(F): 97.3 (18 Sep 2018 05:13), Max: 98.5 (17 Sep 2018 13:37)  HR: 59 (18 Sep 2018 07:24) (59 - 82)  BP: 124/73 (18 Sep 2018 05:13) (95/61 - 124/73)  BP(mean): --  RR: 16 (18 Sep 2018 05:13) (16 - 17)  SpO2: 98% (18 Sep 2018 07:24) (93% - 98%)    I&O's Summary    17 Sep 2018 07:01  -  18 Sep 2018 07:00  --------------------------------------------------------  IN: 480 mL / OUT: 1 mL / NET: 479 mL        PHYSICAL EXAM:    Constitutional: NAD, awake and alert, well-developed  Eyes:  EOMI,  Pupils round, No oral cyanosis.  Pulmonary: Non-labored, breath sounds are clear bilaterally, No wheezing, rales or rhonchi  Cardiovascular: S1 and S2, regular rate and rhythm, no Murmurs, gallops or rubs  Gastrointestinal: Bowel Sounds present, soft, nontender.   Lymph: No peripheral edema. No cervical lymphadenopathy.  Neurological: Alert, no focal deficits  Extremities: no lower extremity edema bilaterally, intact distal pedal pulses bilaterally  Skin: No rashes.  Psych:  Mood & affect appropriate    LABS: All Labs Reviewed:                        12.6   7.51  )-----------( 225      ( 17 Sep 2018 07:04 )             40.3                         12.7   12.24 )-----------( 234      ( 16 Sep 2018 21:39 )             38.2     17 Sep 2018 07:04    136    |  99     |  32     ----------------------------<  375    5.2     |  30     |  1.40   16 Sep 2018 20:46    138    |  102    |  37     ----------------------------<  258    4.1     |  27     |  1.60     Ca    9.3        17 Sep 2018 07:04  Ca    8.9        16 Sep 2018 20:46    TPro  7.4    /  Alb  3.2    /  TBili  0.3    /  DBili  x      /  AST  23     /  ALT  20     /  AlkPhos  76     16 Sep 2018 20:46    PT/INR - ( 16 Sep 2018 20:59 )   PT: 10.9 sec;   INR: 1.00 ratio         PTT - ( 16 Sep 2018 20:59 )  PTT:29.2 sec  CARDIAC MARKERS ( 16 Sep 2018 20:46 )  <.015 ng/mL / x     / 163 U/L / x     / 2.4 ng/mL       @ 23:08  Pro Bnp 343     @ 20:46  TSH: 4.72    EK2018: ST with first degree AV Block, mild ST depressions inferior lateral leads.    Imaging:  < from: CT Chest No Cont (18 @ 23:05) >    EXAM:  CT CHEST                            PROCEDURE DATE:  2018          INTERPRETATION:  HISTORY: Fever and shortness of breath. Evaluate for   pneumonia.    TECHNIQUE: A non-contrast CT scan of the chest was performed from the   thoracic inlet to the adrenal glands.      COMPARISON: No similar prior study is available for comparison. CT of the   abdomen and pelvis from 2017 is reviewed for comparison of the lung   bases and upper abdomen    FINDINGS:   Evaluation of the lung parenchyma demonstrates no suspicious pulmonary   nodule or mass. A 3 mm triangular-shaped nodule adjacent to the right   minor fissure on series 2 image 22 likely represents an intrafissural   lymph node. There is no evidence pneumonia. There is linear atelectasis   in both lung bases. There is mild paraseptal emphysema. There is no   pleural effusion or pneumothorax. The trachea and main bronchi are clear.    The heart size is normal.  There is no pericardial effusion. There are   severe coronary artery calcifications.    The thoracic aorta and main pulmonary arteries are normal in caliber.     The thyroid gland is unremarkable.    There is no significant axillary, mediastinal, or hilar lymphadenopathy.    Images of the upper abdomen demonstrate apartially visualized gallstones   within the gallbladder without secondary signs of acute cholecystitis. No   acute findings seen in the upper abdomen.    There are no acute osseous abnormalities. Slightly increased nodular left   breast tissue is suggested as compared to the prior CT. This could be   artifactually due to different positioning.    IMPRESSION:   No evidence of pneumonia.    Questionable increased nodular left breast tissue. Correlation with   mammography is recommended.      MEÑO BRANHAM M.D., RADIOLOGIST  This document has been electronically signed. Sep 16 2018 11:52PM    < end of copied text >      Echocardiogram 2018, LV hyperdynamic with EF of >75%, mild MAC with trace MR, Moderate AS,

## 2018-09-18 NOTE — PROGRESS NOTE ADULT - SUBJECTIVE AND OBJECTIVE BOX
Date/Time Patient Seen:  		  Referring MD:   Data Reviewed	       Patient is a 84y old  Female who presents with a chief complaint of sob (17 Sep 2018 21:16)  in bed  seen and examined  vs and meds reviewed      Subjective/HPI     PAST MEDICAL & SURGICAL HISTORY:  Hypothyroid  HLD (hyperlipidemia)  HTN (hypertension)  CAD (coronary artery disease)  Asthma  Diabetes  History of appendectomy        Medication list         MEDICATIONS  (STANDING):  ALBUTerol    0.083% 2.5 milliGRAM(s) Nebulizer every 6 hours  aspirin enteric coated 81 milliGRAM(s) Oral daily  atorvastatin 40 milliGRAM(s) Oral at bedtime  buDESOnide 160 MICROgram(s)/formoterol 4.5 MICROgram(s) Inhaler 2 Puff(s) Inhalation two times a day  clopidogrel Tablet 75 milliGRAM(s) Oral daily  dextrose 5%. 1000 milliLiter(s) (50 mL/Hr) IV Continuous <Continuous>  dextrose 50% Injectable 12.5 Gram(s) IV Push once  dextrose 50% Injectable 25 Gram(s) IV Push once  dextrose 50% Injectable 25 Gram(s) IV Push once  diltiazem    milliGRAM(s) Oral daily  enoxaparin Injectable 40 milliGRAM(s) SubCutaneous every 24 hours  furosemide    Tablet 40 milliGRAM(s) Oral daily  gabapentin 400 milliGRAM(s) Oral two times a day  insulin glargine Injectable (LANTUS) 45 Unit(s) SubCutaneous at bedtime  insulin lispro (HumaLOG) corrective regimen sliding scale   SubCutaneous Before meals and at bedtime  levothyroxine 112 MICROGram(s) Oral daily  lisinopril 5 milliGRAM(s) Oral daily  predniSONE   Tablet 20 milliGRAM(s) Oral daily  tiotropium 18 MICROgram(s) Capsule 1 Capsule(s) Inhalation daily    MEDICATIONS  (PRN):  acetaminophen   Tablet .. 650 milliGRAM(s) Oral every 6 hours PRN Temp greater or equal to 38C (100.4F), Mild Pain (1 - 3)  benzonatate 100 milliGRAM(s) Oral three times a day PRN Cough  dextrose 40% Gel 15 Gram(s) Oral once PRN Blood Glucose LESS THAN 70 milliGRAM(s)/deciliter  glucagon  Injectable 1 milliGRAM(s) IntraMuscular once PRN Glucose LESS THAN 70 milligrams/deciliter  traMADol 25 milliGRAM(s) Oral four times a day PRN Severe Pain (7 - 10)         Vitals log        ICU Vital Signs Last 24 Hrs  T(C): 36.3 (18 Sep 2018 05:13), Max: 36.9 (17 Sep 2018 13:37)  T(F): 97.3 (18 Sep 2018 05:13), Max: 98.5 (17 Sep 2018 13:37)  HR: 67 (18 Sep 2018 05:13) (67 - 82)  BP: 124/73 (18 Sep 2018 05:13) (95/61 - 124/73)  BP(mean): --  ABP: --  ABP(mean): --  RR: 16 (18 Sep 2018 05:13) (16 - 17)  SpO2: 93% (18 Sep 2018 05:13) (93% - 97%)           Input and Output:  I&O's Detail    17 Sep 2018 07:01  -  18 Sep 2018 06:28  --------------------------------------------------------  IN:    Oral Fluid: 480 mL  Total IN: 480 mL    OUT:    Stool: 1 mL  Total OUT: 1 mL    Total NET: 479 mL          Lab Data                        12.6   7.51  )-----------( 225      ( 17 Sep 2018 07:04 )             40.3     09-17    136  |  99  |  32<H>  ----------------------------<  375<H>  5.2   |  30  |  1.40<H>    Ca    9.3      17 Sep 2018 07:04    TPro  7.4  /  Alb  3.2<L>  /  TBili  0.3  /  DBili  x   /  AST  23  /  ALT  20  /  AlkPhos  76  09-16      CARDIAC MARKERS ( 16 Sep 2018 20:46 )  <.015 ng/mL / x     / 163 U/L / x     / 2.4 ng/mL        Review of Systems	      Objective     Physical Examination    heart s1s2  lung dec BS  on o2 support      Pertinent Lab findings & Imaging      Chong:  NO   Adequate UO     I&O's Detail    17 Sep 2018 07:01  -  18 Sep 2018 06:28  --------------------------------------------------------  IN:    Oral Fluid: 480 mL  Total IN: 480 mL    OUT:    Stool: 1 mL  Total OUT: 1 mL    Total NET: 479 mL               Discussed with:     Cultures:	        Radiology

## 2018-09-18 NOTE — CONSULT NOTE ADULT - PROBLEM SELECTOR RECOMMENDATION 9
add humalog 7 units tid before meals  cont mod dose humalog scale coverage  cont lantus 45 units daily  cont cons cho diet  goal bg 100-180 in hosp setting

## 2018-09-18 NOTE — PROGRESS NOTE ADULT - SUBJECTIVE AND OBJECTIVE BOX
Patient is a 84y old  Female who presents with a chief complaint of sob (18 Sep 2018 10:08)      INTERVAL HPI: Pt seen and examined.  Pt states she is still having RLE pain that radiates from her low back to back of knee.     OVERNIGHT EVENTS: none noted  T(F): 97.3 (18 @ 05:13), Max: 98.5 (18 @ 13:37)  HR: 59 (18 @ 07:24) (59 - 82)  BP: 124/73 (18 @ 05:13) (95/61 - 124/73)  RR: 16 (18 @ 05:13) (16 - 17)  SpO2: 98% (18 @ 07:24) (93% - 98%)  I&O's Summary    17 Sep 2018 07:01  -  18 Sep 2018 07:00  --------------------------------------------------------  IN: 480 mL / OUT: 1 mL / NET: 479 mL        REVIEW OF SYSTEMS:  CONSTITUTIONAL: No fever, weight loss, or fatigue  RESPIRATORY: No cough, wheezing, chills or hemoptysis; +SOB  CARDIOVASCULAR: No chest pain, palpitations, dizziness, or leg swelling  GASTROINTESTINAL: No abdominal or epigastric pain. No nausea, vomiting, or hematemesis; No diarrhea or constipation. No melena or hematochezia.  GENITOURINARY: No dysuria, frequency, hematuria, or incontinence  NEUROLOGICAL: No headaches, memory loss, loss of strength, numbness, or tremors  SKIN: No itching, burning, rashes, or lesions   ENDOCRINE: No heat or cold intolerance; No hair loss  MUSCULOSKELETAL: +low back pain  PSYCHIATRIC: No depression, anxiety, mood swings, or difficulty sleeping      PHYSICAL EXAM:  GENERAL: NAD, well-groomed, well-developed, elder, obese  HEAD:  Atraumatic, Normocephalic  EYES: EOMI, PERRLA, conjunctiva and sclera clear  ENMT: No tonsillar erythema, exudates, or enlargement; Moist mucous membranes, Good dentition, No lesions  NECK: Supple, No JVD, Normal thyroid  NERVOUS SYSTEM:  Alert & Oriented X3, Good concentration; Motor Strength 5/5 B/L upper and lower extremities  CHEST/LUNG: diminshed bs b/l   HEART: Regular rate and rhythm; No murmurs, rubs, or gallops  ABDOMEN: Soft, Nontender, Nondistended; Bowel sounds present  EXTREMITIES:  2+ Peripheral Pulses, No clubbing, cyanosis, b/l LE nonpitting edema    LABS:                        12.6   7.51  )-----------( 225      ( 17 Sep 2018 07:04 )             40.3         136  |  99  |  32<H>  ----------------------------<  375<H>  5.2   |  30  |  1.40<H>    Ca    9.3      17 Sep 2018 07:04    TPro  7.4  /  Alb  3.2<L>  /  TBili  0.3  /  DBili  x   /  AST  23  /  ALT  20  /  AlkPhos  76      PT/INR - ( 16 Sep 2018 20:59 )   PT: 10.9 sec;   INR: 1.00 ratio         PTT - ( 16 Sep 2018 20:59 )  PTT:29.2 sec  Urinalysis Basic - ( 16 Sep 2018 23:10 )    Color: Pale Yellow / Appearance: Clear / S.005 / pH: x  Gluc: x / Ketone: Negative  / Bili: Negative / Urobili: Negative   Blood: x / Protein: Negative / Nitrite: Negative   Leuk Esterase: Trace / RBC: 3-5 /HPF / WBC 3-5   Sq Epi: x / Non Sq Epi: Few / Bacteria: Few      CAPILLARY BLOOD GLUCOSE      POCT Blood Glucose.: 312 mg/dL (18 Sep 2018 07:42)  POCT Blood Glucose.: 365 mg/dL (17 Sep 2018 23:47)  POCT Blood Glucose.: 427 mg/dL (17 Sep 2018 21:55)  POCT Blood Glucose.: 411 mg/dL (17 Sep 2018 21:52)  POCT Blood Glucose.: 399 mg/dL (17 Sep 2018 16:39)  POCT Blood Glucose.: 404 mg/dL (17 Sep 2018 11:38)       @ 08:40   No growth to date.  --  --   @ 02:04   No growth to date.  --  --          MEDICATIONS  (STANDING):  ALBUTerol    0.083% 2.5 milliGRAM(s) Nebulizer every 6 hours  aspirin enteric coated 81 milliGRAM(s) Oral daily  atorvastatin 40 milliGRAM(s) Oral at bedtime  buDESOnide 160 MICROgram(s)/formoterol 4.5 MICROgram(s) Inhaler 2 Puff(s) Inhalation two times a day  clopidogrel Tablet 75 milliGRAM(s) Oral daily  dextrose 5%. 1000 milliLiter(s) (50 mL/Hr) IV Continuous <Continuous>  dextrose 50% Injectable 12.5 Gram(s) IV Push once  dextrose 50% Injectable 25 Gram(s) IV Push once  dextrose 50% Injectable 25 Gram(s) IV Push once  diltiazem    milliGRAM(s) Oral daily  enoxaparin Injectable 40 milliGRAM(s) SubCutaneous every 24 hours  furosemide    Tablet 40 milliGRAM(s) Oral daily  gabapentin 400 milliGRAM(s) Oral two times a day  insulin glargine Injectable (LANTUS) 45 Unit(s) SubCutaneous at bedtime  insulin lispro (HumaLOG) corrective regimen sliding scale   SubCutaneous Before meals and at bedtime  insulin lispro Injectable (HumaLOG) 6 Unit(s) SubCutaneous three times a day before meals  levothyroxine 112 MICROGram(s) Oral daily  lisinopril 5 milliGRAM(s) Oral daily  predniSONE   Tablet 20 milliGRAM(s) Oral daily  tiotropium 18 MICROgram(s) Capsule 1 Capsule(s) Inhalation daily    MEDICATIONS  (PRN):  acetaminophen   Tablet .. 650 milliGRAM(s) Oral every 6 hours PRN Temp greater or equal to 38C (100.4F), Mild Pain (1 - 3)  benzonatate 100 milliGRAM(s) Oral three times a day PRN Cough  dextrose 40% Gel 15 Gram(s) Oral once PRN Blood Glucose LESS THAN 70 milliGRAM(s)/deciliter  glucagon  Injectable 1 milliGRAM(s) IntraMuscular once PRN Glucose LESS THAN 70 milligrams/deciliter  traMADol 25 milliGRAM(s) Oral four times a day PRN Severe Pain (7 - 10)

## 2018-09-18 NOTE — CONSULT NOTE ADULT - SUBJECTIVE AND OBJECTIVE BOX
Patient is a 84y old  Female who presents with a chief complaint of sob (18 Sep 2018 06:27)      Reason For Consult: dm2 uncontrolled    HPI:  83yo F with PMHx of COPD(not on home o2 sat usually low 90s as per pt), HFpEF on Lasix, CAD s/p 4 stents PCI on Plavix, 4/19/2018 she underwent cardiac cath that revealed patent stent in the LAD, 50% stenosis in the LCx (iFR normal) and 100% occlusion of the Mid RCA. LV function was normal and there was moderate AS, mild Pul, HTN, DM type 2, hypothyroid p/w difficulty breathing and left leg pain, 10/10 x 1month, had multiple ER visit at [facility] for similar pain for which all workup was negative. Today woke up with more leg pain which has been chronic for past few month talking tramadol with some relieve but states Morphine helps with the pain. Pt also endorses to difficulty breathing this morning worse then her baseline with her oxygen level under 80s then called her son who told her to go to the ED. Denies fever, chills, cp, palpitations, cough, abdominal pain, dysuria, no sick contacts, no recent travel, difficulty walking. Patient was seen at [facility] in May for uncontrolled DM and shortness of breath. Was then sent to [facility] for 3 weeks for rehab, currently  resides in [facility]. States she she has visiting nurse that comes one a week and due to pain she is unable to do her ADL.     In the ED vitals stable, labs pertinent for glucose 292, wbc 12.24, Cr/BUN 1.6/37, procalcitonin .08, CT chest w/o cont negative for pneumonia, Doppler LE b/l Negative for DVT, proBNP 343. Pt received 40mg prednisone x 1, Morphine x 2 for pain. EKG showed sinus tachy 107, non specific T wave changes. (17 Sep 2018 01:23)      PAST MEDICAL & SURGICAL HISTORY:  Hypothyroid  HLD (hyperlipidemia)  HTN (hypertension)  CAD (coronary artery disease)  Asthma  Diabetes  History of appendectomy      FAMILY HISTORY:  Family history of cancer in mother  Family history of heart disease (Father)        Social History:    MEDICATIONS  (STANDING):  ALBUTerol    0.083% 2.5 milliGRAM(s) Nebulizer every 6 hours  aspirin enteric coated 81 milliGRAM(s) Oral daily  atorvastatin 40 milliGRAM(s) Oral at bedtime  buDESOnide 160 MICROgram(s)/formoterol 4.5 MICROgram(s) Inhaler 2 Puff(s) Inhalation two times a day  clopidogrel Tablet 75 milliGRAM(s) Oral daily  dextrose 5%. 1000 milliLiter(s) (50 mL/Hr) IV Continuous <Continuous>  dextrose 50% Injectable 12.5 Gram(s) IV Push once  dextrose 50% Injectable 25 Gram(s) IV Push once  dextrose 50% Injectable 25 Gram(s) IV Push once  diltiazem    milliGRAM(s) Oral daily  enoxaparin Injectable 40 milliGRAM(s) SubCutaneous every 24 hours  furosemide    Tablet 40 milliGRAM(s) Oral daily  gabapentin 400 milliGRAM(s) Oral two times a day  insulin glargine Injectable (LANTUS) 45 Unit(s) SubCutaneous at bedtime  insulin lispro (HumaLOG) corrective regimen sliding scale   SubCutaneous Before meals and at bedtime  levothyroxine 112 MICROGram(s) Oral daily  lisinopril 5 milliGRAM(s) Oral daily  predniSONE   Tablet 20 milliGRAM(s) Oral daily  tiotropium 18 MICROgram(s) Capsule 1 Capsule(s) Inhalation daily    MEDICATIONS  (PRN):  acetaminophen   Tablet .. 650 milliGRAM(s) Oral every 6 hours PRN Temp greater or equal to 38C (100.4F), Mild Pain (1 - 3)  benzonatate 100 milliGRAM(s) Oral three times a day PRN Cough  dextrose 40% Gel 15 Gram(s) Oral once PRN Blood Glucose LESS THAN 70 milliGRAM(s)/deciliter  glucagon  Injectable 1 milliGRAM(s) IntraMuscular once PRN Glucose LESS THAN 70 milligrams/deciliter  traMADol 25 milliGRAM(s) Oral four times a day PRN Severe Pain (7 - 10)        T(C): 36.3 (09-18-18 @ 05:13), Max: 36.9 (09-17-18 @ 13:37)  HR: 67 (09-18-18 @ 05:13) (67 - 82)  BP: 124/73 (09-18-18 @ 05:13) (95/61 - 124/73)  RR: 16 (09-18-18 @ 05:13) (16 - 17)  SpO2: 93% (09-18-18 @ 05:13) (93% - 97%)  Wt(kg): --    PHYSICAL EXAM:  GENERAL: NAD, well-groomed, well-developed  HEAD:  Atraumatic, Normocephalic  NECK: Supple, No JVD, Normal thyroid  CHEST/LUNG: diminished breath sounds  HEART: Regular rate and rhythm; No murmurs, rubs, or gallops  ABDOMEN: Soft, Nontender, Nondistended; Bowel sounds present  EXTREMITIES:  2+ Peripheral Pulses, No clubbing, cyanosis, or edema  SKIN: No rashes or lesions    CAPILLARY BLOOD GLUCOSE      POCT Blood Glucose.: 365 mg/dL (17 Sep 2018 23:47)  POCT Blood Glucose.: 427 mg/dL (17 Sep 2018 21:55)  POCT Blood Glucose.: 411 mg/dL (17 Sep 2018 21:52)  POCT Blood Glucose.: 399 mg/dL (17 Sep 2018 16:39)  POCT Blood Glucose.: 404 mg/dL (17 Sep 2018 11:38)  POCT Blood Glucose.: 398 mg/dL (17 Sep 2018 08:15)                            12.6   7.51  )-----------( 225      ( 17 Sep 2018 07:04 )             40.3       CMP:  09-17 @ 07:04  SGPT --  Albumin --   Alk Phos --   Anion Gap 7   SGOT --   Total Bili --   BUN 32   Calcium Total 9.3   CO2 30   Chloride 99   Creatinine 1.40   eGFR if AA 40   eGFR if non AA 34   Glucose 375   Potassium 5.2   Protein --   Sodium 136      Thyroid Function Tests:  09-17 @ 00:49 TSH -- FreeT4 -- T3 72 Anti TPO -- Anti Thyroglobulin Ab -- TSI --  09-16 @ 20:46 TSH 4.72 FreeT4 -- T3 -- Anti TPO -- Anti Thyroglobulin Ab -- TSI --      Diabetes Tests:       Radiology:

## 2018-09-19 ENCOUNTER — TRANSCRIPTION ENCOUNTER (OUTPATIENT)
Age: 83
End: 2018-09-19

## 2018-09-19 DIAGNOSIS — I35.0 NONRHEUMATIC AORTIC (VALVE) STENOSIS: ICD-10-CM

## 2018-09-19 DIAGNOSIS — I50.30 UNSPECIFIED DIASTOLIC (CONGESTIVE) HEART FAILURE: ICD-10-CM

## 2018-09-19 DIAGNOSIS — I25.119 ATHEROSCLEROTIC HEART DISEASE OF NATIVE CORONARY ARTERY WITH UNSPECIFIED ANGINA PECTORIS: ICD-10-CM

## 2018-09-19 LAB
ANION GAP SERPL CALC-SCNC: 9 MMOL/L — SIGNIFICANT CHANGE UP (ref 5–17)
BUN SERPL-MCNC: 53 MG/DL — HIGH (ref 7–23)
CALCIUM SERPL-MCNC: 9.2 MG/DL — SIGNIFICANT CHANGE UP (ref 8.5–10.1)
CHLORIDE SERPL-SCNC: 99 MMOL/L — SIGNIFICANT CHANGE UP (ref 96–108)
CO2 SERPL-SCNC: 28 MMOL/L — SIGNIFICANT CHANGE UP (ref 22–31)
CREAT SERPL-MCNC: 1.6 MG/DL — HIGH (ref 0.5–1.3)
GLUCOSE SERPL-MCNC: 254 MG/DL — HIGH (ref 70–99)
HCT VFR BLD CALC: 35.5 % — SIGNIFICANT CHANGE UP (ref 34.5–45)
HGB BLD-MCNC: 11.5 G/DL — SIGNIFICANT CHANGE UP (ref 11.5–15.5)
MCHC RBC-ENTMCNC: 27.1 PG — SIGNIFICANT CHANGE UP (ref 27–34)
MCHC RBC-ENTMCNC: 32.4 GM/DL — SIGNIFICANT CHANGE UP (ref 32–36)
MCV RBC AUTO: 83.5 FL — SIGNIFICANT CHANGE UP (ref 80–100)
NRBC # BLD: 0 /100 WBCS — SIGNIFICANT CHANGE UP (ref 0–0)
PLATELET # BLD AUTO: 238 K/UL — SIGNIFICANT CHANGE UP (ref 150–400)
POTASSIUM SERPL-MCNC: 4.2 MMOL/L — SIGNIFICANT CHANGE UP (ref 3.5–5.3)
POTASSIUM SERPL-SCNC: 4.2 MMOL/L — SIGNIFICANT CHANGE UP (ref 3.5–5.3)
RBC # BLD: 4.25 M/UL — SIGNIFICANT CHANGE UP (ref 3.8–5.2)
RBC # FLD: 14.6 % — HIGH (ref 10.3–14.5)
SODIUM SERPL-SCNC: 136 MMOL/L — SIGNIFICANT CHANGE UP (ref 135–145)
WBC # BLD: 12.3 K/UL — HIGH (ref 3.8–10.5)
WBC # FLD AUTO: 12.3 K/UL — HIGH (ref 3.8–10.5)

## 2018-09-19 PROCEDURE — 99233 SBSQ HOSP IP/OBS HIGH 50: CPT

## 2018-09-19 PROCEDURE — 73502 X-RAY EXAM HIP UNI 2-3 VIEWS: CPT | Mod: 26,RT

## 2018-09-19 RX ORDER — INSULIN LISPRO 100/ML
15 VIAL (ML) SUBCUTANEOUS
Qty: 0 | Refills: 0 | Status: DISCONTINUED | OUTPATIENT
Start: 2018-09-19 | End: 2018-09-20

## 2018-09-19 RX ORDER — GABAPENTIN 400 MG/1
600 CAPSULE ORAL
Qty: 0 | Refills: 0 | Status: DISCONTINUED | OUTPATIENT
Start: 2018-09-19 | End: 2018-09-19

## 2018-09-19 RX ADMIN — Medication 8: at 21:30

## 2018-09-19 RX ADMIN — BUDESONIDE AND FORMOTEROL FUMARATE DIHYDRATE 2 PUFF(S): 160; 4.5 AEROSOL RESPIRATORY (INHALATION) at 07:54

## 2018-09-19 RX ADMIN — TRAMADOL HYDROCHLORIDE 25 MILLIGRAM(S): 50 TABLET ORAL at 05:19

## 2018-09-19 RX ADMIN — Medication 20 MILLIGRAM(S): at 05:21

## 2018-09-19 RX ADMIN — Medication 81 MILLIGRAM(S): at 11:41

## 2018-09-19 RX ADMIN — ENOXAPARIN SODIUM 40 MILLIGRAM(S): 100 INJECTION SUBCUTANEOUS at 11:42

## 2018-09-19 RX ADMIN — Medication 10: at 11:42

## 2018-09-19 RX ADMIN — Medication 240 MILLIGRAM(S): at 05:20

## 2018-09-19 RX ADMIN — ALBUTEROL 2.5 MILLIGRAM(S): 90 AEROSOL, METERED ORAL at 07:39

## 2018-09-19 RX ADMIN — Medication 10 UNIT(S): at 07:54

## 2018-09-19 RX ADMIN — GABAPENTIN 400 MILLIGRAM(S): 400 CAPSULE ORAL at 05:21

## 2018-09-19 RX ADMIN — TIOTROPIUM BROMIDE 1 CAPSULE(S): 18 CAPSULE ORAL; RESPIRATORY (INHALATION) at 05:21

## 2018-09-19 RX ADMIN — Medication 15 UNIT(S): at 17:02

## 2018-09-19 RX ADMIN — Medication 15 UNIT(S): at 11:42

## 2018-09-19 RX ADMIN — Medication 40 MILLIGRAM(S): at 05:21

## 2018-09-19 RX ADMIN — LISINOPRIL 5 MILLIGRAM(S): 2.5 TABLET ORAL at 05:21

## 2018-09-19 RX ADMIN — Medication 12: at 17:01

## 2018-09-19 RX ADMIN — Medication 112 MICROGRAM(S): at 05:21

## 2018-09-19 RX ADMIN — Medication 6: at 07:53

## 2018-09-19 RX ADMIN — INSULIN GLARGINE 60 UNIT(S): 100 INJECTION, SOLUTION SUBCUTANEOUS at 21:30

## 2018-09-19 RX ADMIN — TRAMADOL HYDROCHLORIDE 25 MILLIGRAM(S): 50 TABLET ORAL at 16:56

## 2018-09-19 RX ADMIN — TRAMADOL HYDROCHLORIDE 25 MILLIGRAM(S): 50 TABLET ORAL at 16:24

## 2018-09-19 RX ADMIN — Medication 25 MILLIGRAM(S): at 16:24

## 2018-09-19 RX ADMIN — BUDESONIDE AND FORMOTEROL FUMARATE DIHYDRATE 2 PUFF(S): 160; 4.5 AEROSOL RESPIRATORY (INHALATION) at 21:10

## 2018-09-19 RX ADMIN — ALBUTEROL 2.5 MILLIGRAM(S): 90 AEROSOL, METERED ORAL at 20:00

## 2018-09-19 RX ADMIN — CLOPIDOGREL BISULFATE 75 MILLIGRAM(S): 75 TABLET, FILM COATED ORAL at 11:41

## 2018-09-19 RX ADMIN — ATORVASTATIN CALCIUM 40 MILLIGRAM(S): 80 TABLET, FILM COATED ORAL at 21:10

## 2018-09-19 RX ADMIN — ALBUTEROL 2.5 MILLIGRAM(S): 90 AEROSOL, METERED ORAL at 14:23

## 2018-09-19 NOTE — DISCHARGE NOTE ADULT - PATIENT PORTAL LINK FT
You can access the [facility], offered by [facility], by registering with the following website: [email or web address]

## 2018-09-19 NOTE — PROGRESS NOTE ADULT - PROBLEM SELECTOR PLAN 3
Heart failure with preserved EF, chronic. Plan: HFpEF   she has lower ext edema, at baseline per patient. labs, CXR and exam not indicative of fluid overload   continue Lasix 40mg daily.
likely reactive in the setting of COPD exacerbation   no suspected infection at this time, pt afebrile  trend wbc, likely to remain elevated due to prednisone use.

## 2018-09-19 NOTE — PROGRESS NOTE ADULT - SUBJECTIVE AND OBJECTIVE BOX
CHIEF COMPLAINT: Patient is a 84y old  Female who presents with a chief complaint of sob (19 Sep 2018 05:56)      HPI:  83yo F with PMHx of COPD(not on home o2 sat usually low 90s as per pt), HFpEF on Lasix, CAD s/p 4 stents PCI on Plavix, 2018 she underwent cardiac cath that revealed patent stent in the LAD, 50% stenosis in the LCx (iFR normal) and 100% occlusion of the Mid RCA. LV function was normal and there was moderate AS, mild Pul, HTN, DM type 2, hypothyroid p/w difficulty breathing and left leg pain, 10/10 x 1month, had multiple ER visit at [facility] for similar pain for which all workup was negative. Today woke up with more leg pain which has been chronic for past few month talking tramadol with some relieve but states Morphine helps with the pain. Pt also endorses to difficulty breathing this morning worse then her baseline with her oxygen level under 80s then called her son who told her to go to the ED. Denies fever, chills, cp, palpitations, cough, abdominal pain, dysuria, no sick contacts, no recent travel, difficulty walking. Patient was seen at [place] in May for uncontrolled DM and shortness of breath. Was then sent to [facility] for 3 weeks for rehab, currently  resides in [facility]. States she she has visiting nurse that comes one a week and due to pain she is unable to do her ADL.     In the ED vitals stable, labs pertinent for glucose 292, wbc 12.24, Cr/BUN 1.6/37, procalcitonin .08, CT chest w/o cont negative for pneumonia, Doppler LE b/l Negative for DVT, proBNP 343. Pt received 40mg prednisone x 1, Morphine x 2 for pain. EKG showed sinus tachy 107, non specific T wave changes. (17 Sep 2018 01:23)      Subjective: Pt was seen and examined at the bedside         MEDICATIONS  (STANDING):  ALBUTerol    0.083% 2.5 milliGRAM(s) Nebulizer every 6 hours  aspirin enteric coated 81 milliGRAM(s) Oral daily  atorvastatin 40 milliGRAM(s) Oral at bedtime  buDESOnide 160 MICROgram(s)/formoterol 4.5 MICROgram(s) Inhaler 2 Puff(s) Inhalation two times a day  clopidogrel Tablet 75 milliGRAM(s) Oral daily  dextrose 5%. 1000 milliLiter(s) (50 mL/Hr) IV Continuous <Continuous>  dextrose 50% Injectable 12.5 Gram(s) IV Push once  dextrose 50% Injectable 25 Gram(s) IV Push once  dextrose 50% Injectable 25 Gram(s) IV Push once  diltiazem    milliGRAM(s) Oral daily  enoxaparin Injectable 40 milliGRAM(s) SubCutaneous every 24 hours  furosemide    Tablet 40 milliGRAM(s) Oral daily  gabapentin 400 milliGRAM(s) Oral two times a day  insulin glargine Injectable (LANTUS) 60 Unit(s) SubCutaneous at bedtime  insulin lispro (HumaLOG) corrective regimen sliding scale   SubCutaneous Before meals and at bedtime  insulin lispro Injectable (HumaLOG) 10 Unit(s) SubCutaneous three times a day before meals  levothyroxine 112 MICROGram(s) Oral daily  lisinopril 5 milliGRAM(s) Oral daily  predniSONE   Tablet 20 milliGRAM(s) Oral daily  tiotropium 18 MICROgram(s) Capsule 1 Capsule(s) Inhalation daily    MEDICATIONS  (PRN):  acetaminophen   Tablet .. 650 milliGRAM(s) Oral every 6 hours PRN Temp greater or equal to 38C (100.4F), Mild Pain (1 - 3)  benzonatate 100 milliGRAM(s) Oral three times a day PRN Cough  dextrose 40% Gel 15 Gram(s) Oral once PRN Blood Glucose LESS THAN 70 milliGRAM(s)/deciliter  glucagon  Injectable 1 milliGRAM(s) IntraMuscular once PRN Glucose LESS THAN 70 milligrams/deciliter  traMADol 25 milliGRAM(s) Oral four times a day PRN Severe Pain (7 - 10)      Allergies    iodine containing compounds (Unknown)  shellfish (Swelling; Short breath)    Intolerances        REVIEW OF SYSTEMS:    CONSTITUTIONAL: No weakness, fevers or chills  EYES/ENT: No visual changes;  No vertigo or throat pain   NECK: No pain or stiffness  RESPIRATORY: No cough, wheezing, hemoptysis; No shortness of breath  CARDIOVASCULAR: No chest pain or palpitations  GASTROINTESTINAL: No abdominal pain. No nausea, vomiting, or hematemesis; No diarrhea or constipation. No melena or hematochezia.  GENITOURINARY: No dysuria, frequency or hematuria  NEUROLOGICAL: No numbness or weakness  SKIN: No itching or rash  All other review of systems is negative unless indicated above    VITAL SIGNS:   Vital Signs Last 24 Hrs  T(C): 36.8 (19 Sep 2018 05:06), Max: 36.8 (19 Sep 2018 05:06)  T(F): 98.2 (19 Sep 2018 05:06), Max: 98.2 (19 Sep 2018 05:06)  HR: 70 (19 Sep 2018 05:06) (64 - 91)  BP: 127/62 (19 Sep 2018 05:06) (117/67 - 127/62)  BP(mean): --  RR: 16 (19 Sep 2018 05:06) (16 - 16)  SpO2: 93% (19 Sep 2018 05:06) (92% - 97%)    I&O's Summary    18 Sep 2018 07:01  -  19 Sep 2018 07:00  --------------------------------------------------------  IN: 480 mL / OUT: 4 mL / NET: 476 mL        PHYSICAL EXAM:    Constitutional: NAD, awake and alert, well-developed  Eyes:  EOMI,  Pupils round, No oral cyanosis.  Pulmonary: Non-labored, breath sounds are clear bilaterally, No wheezing, rales or rhonchi  Cardiovascular: S1 and S2, regular rate and rhythm, no Murmurs, gallops or rubs  Gastrointestinal: Bowel Sounds present, soft, nontender.   Lymph: No peripheral edema. No cervical lymphadenopathy.  Neurological: Alert, no focal deficits  Skin: No rashes.  Psych:  Mood & affect appropriate    LABS: All Labs Reviewed:                           12.30 )-----------( 238      ( 19 Sep 2018 08:31 )             35.5                         11.6   13.08 )-----------( 249      ( 18 Sep 2018 11:31 )             37.1                         12.6   7.51  )-----------( 225      ( 17 Sep 2018 07:04 )             40.3     19 Sep 2018 08:31    136    |  99     |  53     ----------------------------<  254    4.2     |  28     |  1.60   18 Sep 2018 11:31    134    |  97     |  58     ----------------------------<  437    4.9     |  29     |  1.90   17 Sep 2018 07:04    136    |  99     |  32     ----------------------------<  375    5.2     |  30     |  1.40     Ca    9.2        19 Sep 2018 08:31  Ca    9.2        18 Sep 2018 11:31  Ca    9.3        17 Sep 2018 07:04    TPro  7.4    /  Alb  3.2    /  TBili  0.3    /  DBili  x      /  AST  23     /  ALT  20     /  AlkPhos  76     16 Sep 2018 20:46          09- @ 23:08  Pro Bnp 343     @ 20:46  TSH: 4.72          EKG: EK2018: ST with first degree AV Block, mild ST depressions inferior lateral leads.    Imaging:     < from: CT Chest No Cont (18 @ 23:05) >  XAM:  CT CHEST                            PROCEDURE DATE:  2018          INTERPRETATION:  HISTORY: Fever and shortness of breath. Evaluate for   pneumonia.    TECHNIQUE: A non-contrast CT scan of the chest was performed from the   thoracic inlet to the adrenal glands.      COMPARISON: No similar prior study is available for comparison. CT of the   abdomen and pelvis from 2017 is reviewed for comparison of the lung   bases and upper abdomen    FINDINGS:   Evaluation of the lung parenchyma demonstrates no suspicious pulmonary   nodule or mass. A 3 mm triangular-shaped nodule adjacent to the right   minor fissure on series 2 image 22 likely represents an intrafissural   lymph node. There is no evidence pneumonia. There is linear atelectasis   in both lung bases. There is mild paraseptal emphysema. There is no   pleural effusion or pneumothorax. The trachea and main bronchi are clear.    The heart size is normal.  There is no pericardial effusion. There are   severe coronary artery calcifications.    The thoracic aorta and main pulmonary arteries are normal in caliber.     The thyroid gland is unremarkable.    There is no significant axillary, mediastinal, or hilar lymphadenopathy.    Images of the upper abdomen demonstrate apartially visualized gallstones   within the gallbladder without secondary signs of acute cholecystitis. No   acute findings seen in the upper abdomen.    There are no acute osseous abnormalities. Slightly increased nodular left   breast tissue is suggested as compared to the prior CT. This could be   artifactually due to different positioning.    IMPRESSION:   No evidence of pneumonia.    Questionable increased nodular left breast tissue. Correlation with   mammography is recommended.        < from: MR Lumbar Spine No Cont (18 @ 12:02) >  EXAM:  MR SPINE LUMBAR                            PROCEDURE DATE:  2018          INTERPRETATION:  CLINICAL STATEMENT: Neck pain.    TECHNIQUE: MRI of the lumbar spine was performed without gadolinium.    COMPARISON: None.    FINDINGS:  The vertebral body heights are within normal limits. Endplate signal   changes noted at L4-5 and L5-S1 which Schmorl's nodes.    Grade 1 anterolisthesis of L4 on L5, L5 on S1.    The conus terminates at T12-L1    Multilevel degenerative disc disease noted with loss of signal. Mild disc   space narrowing L3-4. Moderate disc space narrowing L4-5 and L5-S1.    L2-3: Disc bulge and facet hypertrophy noted resulting in effacement of   ventral thecal sac. No significant neural foraminal narrowing.    L3-4: Disc bulge and facet hypertrophy/ligamentum flavum enfolding noted   resulting in mild spinal canal stenosis. Mild left neural foraminal   narrowing.    L4-5: Disc bulge and facet hypertrophy/ligamentum flavum enfolding noted   resulting in effacement of ventral thecal sac. Mild right neural   foraminal narrowing.    L5-S1: Disc bulge and facet hypertrophy noted resulting in effacement of   ventral thecal sac. Moderate bilateral neural foraminal narrowing    IMPRESSION:  No acute compression fracture or cord compression.    Grade 1 anterolisthesis of L4 on L5, L5-S1. CHIEF COMPLAINT: Patient is a 84y old  Female who presents with a chief complaint of sob (19 Sep 2018 05:56)      HPI:  85yo F with PMHx of COPD(not on home o2 sat usually low 90s as per pt), HFpEF on Lasix, CAD s/p 4 stents PCI on Plavix, 2018 she underwent cardiac cath that revealed patent stent in the LAD, 50% stenosis in the LCx (iFR normal) and 100% occlusion of the Mid RCA. LV function was normal and there was moderate AS, mild Pul, HTN, DM type 2, hypothyroid p/w difficulty breathing and left leg pain, 10/10 x 1month, had multiple ER visit at [facility] for similar pain for which all workup was negative. Today woke up with more leg pain which has been chronic for past few month talking tramadol with some relieve but states Morphine helps with the pain. Pt also endorses to difficulty breathing this morning worse then her baseline with her oxygen level under 80s then called her son who told her to go to the ED. Denies fever, chills, cp, palpitations, cough, abdominal pain, dysuria, no sick contacts, no recent travel, difficulty walking. Patient was seen at [place] in May for uncontrolled DM and shortness of breath. Was then sent to [facility] for 3 weeks for rehab, currently  resides in [facility]. States she she has visiting nurse that comes one a week and due to pain she is unable to do her ADL.     In the ED vitals stable, labs pertinent for glucose 292, wbc 12.24, Cr/BUN 1.6/37, procalcitonin .08, CT chest w/o cont negative for pneumonia, Doppler LE b/l Negative for DVT, proBNP 343. Pt received 40mg prednisone x 1, Morphine x 2 for pain. EKG showed sinus tachy 107, non specific T wave changes. (17 Sep 2018 01:23)      Subjective: Pt was seen and examined at the bedside . C/O Rt LE pain.        MEDICATIONS  (STANDING):  ALBUTerol    0.083% 2.5 milliGRAM(s) Nebulizer every 6 hours  aspirin enteric coated 81 milliGRAM(s) Oral daily  atorvastatin 40 milliGRAM(s) Oral at bedtime  buDESOnide 160 MICROgram(s)/formoterol 4.5 MICROgram(s) Inhaler 2 Puff(s) Inhalation two times a day  clopidogrel Tablet 75 milliGRAM(s) Oral daily  dextrose 5%. 1000 milliLiter(s) (50 mL/Hr) IV Continuous <Continuous>  dextrose 50% Injectable 12.5 Gram(s) IV Push once  dextrose 50% Injectable 25 Gram(s) IV Push once  dextrose 50% Injectable 25 Gram(s) IV Push once  diltiazem    milliGRAM(s) Oral daily  enoxaparin Injectable 40 milliGRAM(s) SubCutaneous every 24 hours  furosemide    Tablet 40 milliGRAM(s) Oral daily  gabapentin 400 milliGRAM(s) Oral two times a day  insulin glargine Injectable (LANTUS) 60 Unit(s) SubCutaneous at bedtime  insulin lispro (HumaLOG) corrective regimen sliding scale   SubCutaneous Before meals and at bedtime  insulin lispro Injectable (HumaLOG) 10 Unit(s) SubCutaneous three times a day before meals  levothyroxine 112 MICROGram(s) Oral daily  lisinopril 5 milliGRAM(s) Oral daily  predniSONE   Tablet 20 milliGRAM(s) Oral daily  tiotropium 18 MICROgram(s) Capsule 1 Capsule(s) Inhalation daily    MEDICATIONS  (PRN):  acetaminophen   Tablet .. 650 milliGRAM(s) Oral every 6 hours PRN Temp greater or equal to 38C (100.4F), Mild Pain (1 - 3)  benzonatate 100 milliGRAM(s) Oral three times a day PRN Cough  dextrose 40% Gel 15 Gram(s) Oral once PRN Blood Glucose LESS THAN 70 milliGRAM(s)/deciliter  glucagon  Injectable 1 milliGRAM(s) IntraMuscular once PRN Glucose LESS THAN 70 milligrams/deciliter  traMADol 25 milliGRAM(s) Oral four times a day PRN Severe Pain (7 - 10)      Allergies    iodine containing compounds (Unknown)  shellfish (Swelling; Short breath)    Intolerances        REVIEW OF SYSTEMS:    CONSTITUTIONAL: No weakness, fevers or chills  EYES/ENT: No visual changes;  No vertigo or throat pain   NECK: No pain or stiffness  RESPIRATORY: No cough, wheezing, hemoptysis; shortness of breath improving   CARDIOVASCULAR: No chest pain or palpitations  GASTROINTESTINAL: No abdominal pain. No nausea, vomiting, or hematemesis; No diarrhea or constipation. No melena or hematochezia.  GENITOURINARY: No dysuria, frequency or hematuria  NEUROLOGICAL: No focal weakness  SKIN: No itching or rash  All other review of systems is negative unless indicated above    VITAL SIGNS:   Vital Signs Last 24 Hrs  T(C): 36.8 (19 Sep 2018 05:06), Max: 36.8 (19 Sep 2018 05:06)  T(F): 98.2 (19 Sep 2018 05:06), Max: 98.2 (19 Sep 2018 05:06)  HR: 70 (19 Sep 2018 05:06) (64 - 91)  BP: 127/62 (19 Sep 2018 05:06) (117/67 - 127/62)  BP(mean): --  RR: 16 (19 Sep 2018 05:06) (16 - 16)  SpO2: 93% (19 Sep 2018 05:06) (92% - 97%)    I&O's Summary    18 Sep 2018 07:01  -  19 Sep 2018 07:00  --------------------------------------------------------  IN: 480 mL / OUT: 4 mL / NET: 476 mL        PHYSICAL EXAM:    Constitutional: awake and alert, well-developed  Eyes:  EOMI,  Pupils round, No oral cyanosis.  Pulmonary: Non-labored, breath sounds are clear bilaterally, No wheezing, rales or rhonchi  Cardiovascular: S1 and S2, regular rate and rhythm, ESM at aortic area , No gallops or rubs  Gastrointestinal: Bowel Sounds present, soft, nontender.   Lymph: No peripheral edema. No cervical lymphadenopathy.  Neurological: Alert, no focal deficits  Skin: No rashes.  Psych:  Mood & affect appropriate    LABS: All Labs Reviewed:                         )-----------( 238      ( 19 Sep 2018 08:31 )             35.5                         11.6   13.08 )-----------( 249      ( 18 Sep 2018 11:31 )             37.1                         12.6   7.51  )-----------( 225      ( 17 Sep 2018 07:04 )             40.3     19 Sep 2018 08:31    136    |  99     |  53     ----------------------------<  254    4.2     |  28     |  1.60   18 Sep 2018 11:31    134    |  97     |  58     ----------------------------<  437    4.9     |  29     |  1.90   17 Sep 2018 07:04    136    |  99     |  32     ----------------------------<  375    5.2     |  30     |  1.40     Ca    9.2        19 Sep 2018 08:31  Ca    9.2        18 Sep 2018 11:31  Ca    9.3        17 Sep 2018 07:04    TPro  7.4    /  Alb  3.2    /  TBili  0.3    /  DBili  x      /  AST  23     /  ALT  20     /  AlkPhos  76     16 Sep 2018 20:46          09- @ 23:08  Pro Bnp 343     @ 20:46  TSH: 4.72          EKG: EK2018: ST with first degree AV Block, mild ST depressions inferior lateral leads.    Imaging:     < from: CT Chest No Cont (18 @ 23:05) >  XAM:  CT CHEST                            PROCEDURE DATE:  2018          INTERPRETATION:  HISTORY: Fever and shortness of breath. Evaluate for   pneumonia.    TECHNIQUE: A non-contrast CT scan of the chest was performed from the   thoracic inlet to the adrenal glands.      COMPARISON: No similar prior study is available for comparison. CT of the   abdomen and pelvis from 2017 is reviewed for comparison of the lung   bases and upper abdomen    FINDINGS:   Evaluation of the lung parenchyma demonstrates no suspicious pulmonary   nodule or mass. A 3 mm triangular-shaped nodule adjacent to the right   minor fissure on series 2 image 22 likely represents an intrafissural   lymph node. There is no evidence pneumonia. There is linear atelectasis   in both lung bases. There is mild paraseptal emphysema. There is no   pleural effusion or pneumothorax. The trachea and main bronchi are clear.    The heart size is normal.  There is no pericardial effusion. There are   severe coronary artery calcifications.    The thoracic aorta and main pulmonary arteries are normal in caliber.     The thyroid gland is unremarkable.    There is no significant axillary, mediastinal, or hilar lymphadenopathy.    Images of the upper abdomen demonstrate apartially visualized gallstones   within the gallbladder without secondary signs of acute cholecystitis. No   acute findings seen in the upper abdomen.    There are no acute osseous abnormalities. Slightly increased nodular left   breast tissue is suggested as compared to the prior CT. This could be   artifactually due to different positioning.    IMPRESSION:   No evidence of pneumonia.    Questionable increased nodular left breast tissue. Correlation with   mammography is recommended.        < from: MR Lumbar Spine No Cont (18 @ 12:02) >  EXAM:  MR SPINE LUMBAR                            PROCEDURE DATE:  2018          INTERPRETATION:  CLINICAL STATEMENT: Neck pain.    TECHNIQUE: MRI of the lumbar spine was performed without gadolinium.    COMPARISON: None.    FINDINGS:  The vertebral body heights are within normal limits. Endplate signal   changes noted at L4-5 and L5-S1 which Schmorl's nodes.    Grade 1 anterolisthesis of L4 on L5, L5 on S1.    The conus terminates at T12-L1    Multilevel degenerative disc disease noted with loss of signal. Mild disc   space narrowing L3-4. Moderate disc space narrowing L4-5 and L5-S1.    L2-3: Disc bulge and facet hypertrophy noted resulting in effacement of   ventral thecal sac. No significant neural foraminal narrowing.    L3-4: Disc bulge and facet hypertrophy/ligamentum flavum enfolding noted   resulting in mild spinal canal stenosis. Mild left neural foraminal   narrowing.    L4-5: Disc bulge and facet hypertrophy/ligamentum flavum enfolding noted   resulting in effacement of ventral thecal sac. Mild right neural   foraminal narrowing.    L5-S1: Disc bulge and facet hypertrophy noted resulting in effacement of   ventral thecal sac. Moderate bilateral neural foraminal narrowing    IMPRESSION:  No acute compression fracture or cord compression.    Grade 1 anterolisthesis of L4 on L5, L5-S1.

## 2018-09-19 NOTE — DISCHARGE NOTE ADULT - CARE PLAN
Principal Discharge DX:	Right leg pain  Goal:	resolution  Assessment and plan of treatment:	-your R leg pain was evaluated and likely is due to neuropathy, you receved tramadol and lyrica for your pain which improved and you were able to ambulate close to your normal level, a PT script will be provided for ongoing PT, please follow up with your primary medical provider within 1 week of discharge for further evaluation and management, you are also going to given the name of an orthopaedist to follow up with, it is recommended that you have your kidney function tested while on lyrica routinely  Secondary Diagnosis:	COPD exacerbation  Goal:	stable  Assessment and plan of treatment:	-you were seen by our pulmonologist and give nebulization treatments and steroids which allowed you to improve with your respiratory symptoms, you were also given put on cpap and were instructed to follow up with your primary medical provider and outpatient pulmonologist within 1 week of discharge for further evaluation and management

## 2018-09-19 NOTE — PROGRESS NOTE ADULT - PROBLEM SELECTOR PLAN 9
Padua VTE score of 3  continue Lovenox subq daily

## 2018-09-19 NOTE — DISCHARGE NOTE ADULT - CARE PROVIDER_API CALL
Maik Coyne (DO), Family Medicine  [address]  [address]  [place] [address]  Phone: [phone]  Fax: [phone]    Mamadou Mcginnis), Critical Care Medicine; HospicePalliative Medicine; Pulmonary Disease; Sleep Medicine  [address] [id number]  Phone: [phone]  Fax: [phone]    Loyda Boland (), Orthopaedic Surgery Orthopaedics Surgery  [address] [facility]  [address] [place] [address]  Phone: [phone]  Fax: [phone]

## 2018-09-19 NOTE — PROGRESS NOTE ADULT - PROBLEM SELECTOR PLAN 5
Continue current meds
uncontrolled   continue insulin corrective scale, hypoglycemia protocols  humalog 15unit TID before meals.  Lantus 60units at bedtime  apprec dr perlman endocrine recs, will try to wean off prednisone
uncontrolled   continue insulin corrective scale, hypoglycemia protocols  pt on Novolog 15unit TID before meals. Lantis Lantus 60units at bedtime. Will continue Lantus 30unit at bedtime for now ( reduced for inpatient purposes)   apprec dr perlman endocrine recs
uncontrolled   continue insulin corrective scale, hypoglycemia protocols  pt on Novolog 15unit TID before meals. Lantis Lantus 60units at bedtime. Will continue Lantus 30unit at bedtime for now ( reduced for inpatient purposes)   apprec dr perlman endocrine recs

## 2018-09-19 NOTE — DIETITIAN INITIAL EVALUATION ADULT. - NS AS NUTRI INTERV ED CONTENT
Priority modifications/Nutrition relationship to health/disease/Pt provided with nutrition education on Consistent CHO diet. Reviewed : 1) consuming consistent amount of CHO throughout the day, 2) consuming CHO with protein, 3) complex vs simple CHO, 4) portion sizes, consuming foods in moderation. Pt made aware of RD to remain available./Purpose of the nutrition education/Recommended modifications

## 2018-09-19 NOTE — PROGRESS NOTE ADULT - PROBLEM SELECTOR PROBLEM 4
Congestive heart failure, unspecified HF chronicity, unspecified heart failure type
Coronary artery disease with angina pectoris, unspecified vessel or lesion type, unspecified whether native or transplanted heart
Congestive heart failure, unspecified HF chronicity, unspecified heart failure type
Congestive heart failure, unspecified HF chronicity, unspecified heart failure type

## 2018-09-19 NOTE — CONSULT NOTE ADULT - SUBJECTIVE AND OBJECTIVE BOX
Patient is a 84F community ambulator with a walker who presents today for a c/o of R leg pain. Patient states she has had a history of L leg pain that was relieved by two sessions of PT and Tramadol and most recently experienced R leg pain starting two days ago. She states the pain is localized to the lateral aspect of her upper leg (trochanteric region) and does not radiate. Denies any recent traumas or injuries. Denies any numbness or tingling. Denies having any other pain elsewhere. Denies having any previous orthopedic history. No other orthopedic concerns at this time.    ROS: All other systems reviewed and negative except as noted in HPI    PAST MEDICAL & SURGICAL HISTORY:  Hypothyroid  HLD (hyperlipidemia)  HTN (hypertension)  CAD (coronary artery disease)  Asthma  Diabetes  History of appendectomy    Home Medications:  albuterol 0.63 mg/3 mL (0.021%) inhalation solution: 3 milliliter(s) inhaled every 6 hours, As Needed (17 Sep 2018 02:54)  Aspirin Low Dose 81 mg oral delayed release tablet: 1 tab(s) orally once a day (17 Sep 2018 02:54)  atorvastatin 20 mg oral tablet: 1 tab(s) orally once a day (at bedtime) (17 Sep 2018 02:54)  Basaglar KwikPen 100 units/mL subcutaneous solution: 60 unit(s) subcutaneous once (at bedtime) (17 Sep 2018 02:54)  DilTIAZem Hydrochloride  mg/24 hours oral capsule, extended release: 1 cap(s) orally once a day (17 Sep 2018 02:54)  furosemide 40 mg oral tablet: 1 tab(s) orally once a day (17 Sep 2018 02:54)  levothyroxine 112 mcg (0.112 mg) oral tablet: 1 tab(s) orally once a day (17 Sep 2018 02:54)  lisinopril 5 mg oral tablet: 1 tab(s) orally once a day (17 Sep 2018 02:54)  NovoLOG FlexPen 100 units/mL injectable solution: 12 unit(s) subcutaneous 3 times a day (17 Sep 2018 02:54)  Plavix 75 mg oral tablet: 1 tab(s) orally once a day (17 Sep 2018 02:54)    Allergies  iodine containing compounds (Unknown)  shellfish (Swelling; Short breath)    PHYSICAL EXAM:  Vital Signs Last 24 Hrs  T(C): 36.8 (19 Sep 2018 13:11), Max: 36.8 (19 Sep 2018 05:06)  T(F): 98.2 (19 Sep 2018 13:11), Max: 98.2 (19 Sep 2018 05:06)  HR: 75 (19 Sep 2018 13:11) (64 - 91)  BP: 111/65 (19 Sep 2018 13:11) (111/65 - 127/62)  RR: 16 (19 Sep 2018 13:11) (16 - 16)  SpO2: 90% (19 Sep 2018 13:11) (90% - 97%)    Gen: NAD; Resting comfortably  RLE: No gross deformities noted  Skin intact; No erythema or ecchymosis  +ttp over lateral trochanteric region  +DP  Compartments soft and compressible    Spine Exam:  No tenderness to palpation along spine  No palpable step offs  Extremities:              Sensation:  intact to light touch           Motor exam:          Bilateral Upper extremities    Delt      Bicep      Tri      Wrist ext  Wrst Flex       Digit Ext Digit Flex                                                   R         5/5        5/5        5/5       5/5            5/5            5/5       5/5          5/5                                                   L          5/5        5/5        5/5       5/5            5/5            5/5       5/5          5/5         Bilateral Lower ext.     Hip Flx  Hip Ext    Quad   Hamstrg   TA       EHL      GS                                          R        5/5        5/5        5/5        5/5          5/5     5/5      5/5                                          L         5/5        5/5        5/5        5/5          5/5     5/5      5/5    Babinski: Neg  Ankle Clonus: Neg  Hoffmans: Neg    Secondary Survey:   RUE, LUE, LLE: No TTP over bony prominences, SILT, palpable pulses, full/painless range of motion, compartments soft     XR Hip/Pelvis:  EXAM:  XR HIP WITH PELV 2-3V RT                            PROCEDURE DATE:  09/19/2018          INTERPRETATION:  Clinical information: Right lower extremity pain.    Technique: AP view of the pelvis. AP and frog-leg lateral views of the   right hip.    Comparison: None available.    Findings: No acute fractures or dislocations are noted. The right femoral   neck appears intact. The bilateral joint spaces are preserved.   Degenerative changes are notable within the pubic symphysis. Soft tissues   are within normal limits.    IMPRESSION: No acute findings.    EXAM:  LUMBAR SPINE AP & LAT                            PROCEDURE DATE:  09/18/2018          INTERPRETATION:  CLINICAL STATEMENT: Pain.    TECHNIQUE: AP and lateral views of lumbar spine.    COMPARISON: None.    FINDINGS:  The lumbar vertebral bodyheights are within normal limits. Grade 1   anterolisthesis of L4 on L5. Evaluation of lower thoracic spine limited.    Multilevel osteophytes. Posterior facet adenopathy. Mild disc space   narrowing L3-4. Moderate disc space narrowing L4-5 and L5-S1.    Small vascular calcification overlies right pelvis    IMPRESSION:  Findings as described above. If pain persists, MRI exam recommended.    EXAM:  MR SPINE LUMBAR                            PROCEDURE DATE:  09/18/2018          INTERPRETATION:  CLINICAL STATEMENT: Neck pain.    TECHNIQUE: MRI of the lumbar spine was performed without gadolinium.    COMPARISON: None.    FINDINGS:  The vertebral body heights are within normal limits. Endplate signal   changes noted at L4-5 and L5-S1 which Schmorl's nodes.    Grade 1 anterolisthesis of L4 on L5, L5 on S1.    The conus terminates at T12-L1    Multilevel degenerative disc disease noted with loss of signal. Mild disc   space narrowing L3-4. Moderate disc space narrowing L4-5 and L5-S1.    L2-3: Disc bulge and facet hypertrophy noted resulting in effacement of   ventral thecal sac. No significant neural foraminal narrowing.    L3-4: Disc bulge and facet hypertrophy/ligamentum flavum enfolding noted   resulting in mild spinal canal stenosis. Mild left neural foraminal   narrowing.    L4-5: Disc bulge and facet hypertrophy/ligamentum flavum enfolding noted   resulting in effacement of ventral thecal sac. Mild right neural   foraminal narrowing.    L5-S1: Disc bulge and facet hypertrophy noted resulting in effacement of   ventral thecal sac. Moderate bilateral neural foraminal narrowing    IMPRESSION:  No acute compression fracture or cord compression.    Grade 1 anterolisthesis of L4 on L5, L5-S1.    Multilevel degenerative changes as described above Patient is a 84F community ambulator with a walker who presents today for a c/o of R leg pain. Patient states she has had a history of L leg pain that was relieved by two sessions of PT and Tramadol and most recently experienced R leg pain starting two days ago. She states the pain is localized to the lateral aspect of her upper leg (trochanteric region) and does not radiate. Denies any recent traumas or injuries. Denies any numbness or tingling. Denies any bowel or bladder incontinence or saddle anesthesia. Denies having any other pain elsewhere. Denies having any previous orthopedic history. No other orthopedic concerns at this time.    ROS: All other systems reviewed and negative except as noted in HPI    PAST MEDICAL & SURGICAL HISTORY:  Hypothyroid  HLD (hyperlipidemia)  HTN (hypertension)  CAD (coronary artery disease)  Asthma  Diabetes  History of appendectomy    Home Medications:  albuterol 0.63 mg/3 mL (0.021%) inhalation solution: 3 milliliter(s) inhaled every 6 hours, As Needed (17 Sep 2018 02:54)  Aspirin Low Dose 81 mg oral delayed release tablet: 1 tab(s) orally once a day (17 Sep 2018 02:54)  atorvastatin 20 mg oral tablet: 1 tab(s) orally once a day (at bedtime) (17 Sep 2018 02:54)  Basaglar KwikPen 100 units/mL subcutaneous solution: 60 unit(s) subcutaneous once (at bedtime) (17 Sep 2018 02:54)  DilTIAZem Hydrochloride  mg/24 hours oral capsule, extended release: 1 cap(s) orally once a day (17 Sep 2018 02:54)  furosemide 40 mg oral tablet: 1 tab(s) orally once a day (17 Sep 2018 02:54)  levothyroxine 112 mcg (0.112 mg) oral tablet: 1 tab(s) orally once a day (17 Sep 2018 02:54)  lisinopril 5 mg oral tablet: 1 tab(s) orally once a day (17 Sep 2018 02:54)  NovoLOG FlexPen 100 units/mL injectable solution: 12 unit(s) subcutaneous 3 times a day (17 Sep 2018 02:54)  Plavix 75 mg oral tablet: 1 tab(s) orally once a day (17 Sep 2018 02:54)    Allergies  iodine containing compounds (Unknown)  shellfish (Swelling; Short breath)    PHYSICAL EXAM:  Vital Signs Last 24 Hrs  T(C): 36.8 (19 Sep 2018 13:11), Max: 36.8 (19 Sep 2018 05:06)  T(F): 98.2 (19 Sep 2018 13:11), Max: 98.2 (19 Sep 2018 05:06)  HR: 75 (19 Sep 2018 13:11) (64 - 91)  BP: 111/65 (19 Sep 2018 13:11) (111/65 - 127/62)  RR: 16 (19 Sep 2018 13:11) (16 - 16)  SpO2: 90% (19 Sep 2018 13:11) (90% - 97%)    Gen: NAD; Resting comfortably  RLE: No gross deformities noted  Skin intact; No erythema or ecchymosis  +ttp over lateral trochanteric region  +DP  Compartments soft and compressible    Spine Exam:  No tenderness to palpation along spine  No palpable step offs  Extremities:              Sensation:  intact to light touch           Motor exam:          Bilateral Upper extremities    Delt      Bicep      Tri      Wrist ext  Wrst Flex       Digit Ext Digit Flex                                                   R         5/5        5/5        5/5       5/5            5/5            5/5       5/5          5/5                                                   L          5/5        5/5        5/5       5/5            5/5            5/5       5/5          5/5         Bilateral Lower ext.     Hip Flx  Hip Ext    Quad   Hamstrg   TA       EHL      GS                                          R        5/5        5/5        5/5        5/5          5/5     5/5      5/5                                          L         5/5        5/5        5/5        5/5          5/5     5/5      5/5    Babinski: Neg  Ankle Clonus: Neg  Hoffmans: Neg    Secondary Survey:   RUE, LUE, LLE: No TTP over bony prominences, SILT, palpable pulses, full/painless range of motion, compartments soft     XR Hip/Pelvis:  EXAM:  XR HIP WITH PELV 2-3V RT                            PROCEDURE DATE:  09/19/2018          INTERPRETATION:  Clinical information: Right lower extremity pain.    Technique: AP view of the pelvis. AP and frog-leg lateral views of the   right hip.    Comparison: None available.    Findings: No acute fractures or dislocations are noted. The right femoral   neck appears intact. The bilateral joint spaces are preserved.   Degenerative changes are notable within the pubic symphysis. Soft tissues   are within normal limits.    IMPRESSION: No acute findings.    EXAM:  LUMBAR SPINE AP & LAT                            PROCEDURE DATE:  09/18/2018          INTERPRETATION:  CLINICAL STATEMENT: Pain.    TECHNIQUE: AP and lateral views of lumbar spine.    COMPARISON: None.    FINDINGS:  The lumbar vertebral bodyheights are within normal limits. Grade 1   anterolisthesis of L4 on L5. Evaluation of lower thoracic spine limited.    Multilevel osteophytes. Posterior facet adenopathy. Mild disc space   narrowing L3-4. Moderate disc space narrowing L4-5 and L5-S1.    Small vascular calcification overlies right pelvis    IMPRESSION:  Findings as described above. If pain persists, MRI exam recommended.    EXAM:  MR SPINE LUMBAR                            PROCEDURE DATE:  09/18/2018          INTERPRETATION:  CLINICAL STATEMENT: Neck pain.    TECHNIQUE: MRI of the lumbar spine was performed without gadolinium.    COMPARISON: None.    FINDINGS:  The vertebral body heights are within normal limits. Endplate signal   changes noted at L4-5 and L5-S1 which Schmorl's nodes.    Grade 1 anterolisthesis of L4 on L5, L5 on S1.    The conus terminates at T12-L1    Multilevel degenerative disc disease noted with loss of signal. Mild disc   space narrowing L3-4. Moderate disc space narrowing L4-5 and L5-S1.    L2-3: Disc bulge and facet hypertrophy noted resulting in effacement of   ventral thecal sac. No significant neural foraminal narrowing.    L3-4: Disc bulge and facet hypertrophy/ligamentum flavum enfolding noted   resulting in mild spinal canal stenosis. Mild left neural foraminal   narrowing.    L4-5: Disc bulge and facet hypertrophy/ligamentum flavum enfolding noted   resulting in effacement of ventral thecal sac. Mild right neural   foraminal narrowing.    L5-S1: Disc bulge and facet hypertrophy noted resulting in effacement of   ventral thecal sac. Moderate bilateral neural foraminal narrowing    IMPRESSION:  No acute compression fracture or cord compression.    Grade 1 anterolisthesis of L4 on L5, L5-S1.    Multilevel degenerative changes as described above

## 2018-09-19 NOTE — DIETITIAN INITIAL EVALUATION ADULT. - ENERGY NEEDS
Wt: 205.15lbs (admission), Ht: 60in (pt's stated height, height not in chart), BMI: 40.1, IBW: 100, +/-10%IBW: 90-110lbs, %IBW: 205

## 2018-09-19 NOTE — DISCHARGE NOTE ADULT - ADDITIONAL INSTRUCTIONS
a1c 10.7% a1c 10.7% Follow up appt Dr. Coyne Friday Sept 21st 3:00pm a1c 10.7% Follow up appt Dr. Coyne primary medical provider Friday Sept 21st 3:00pm  please follow up with your primary medical provider, pulmonology and orthopaedics within 1 week of discharge  pt to setup all follow up appts

## 2018-09-19 NOTE — DISCHARGE NOTE ADULT - CARE PROVIDERS DIRECT ADDRESSES
,DirectAddress_Unknown,[email or web address],DirectAddress_Unknown

## 2018-09-19 NOTE — PROGRESS NOTE ADULT - PROBLEM SELECTOR PROBLEM 6
CAD (coronary artery disease)
Type 2 diabetes mellitus with complication, unspecified whether long term insulin use
CAD (coronary artery disease)
CAD (coronary artery disease)

## 2018-09-19 NOTE — PROGRESS NOTE ADULT - PROBLEM SELECTOR PLAN 4
HFpEF   she has lower ext edema, at baseline per patient. labs, CXR and exam not indicative of fluid overload   continue Lasix 40mg daily  apprec cardio recs and repeat echo, may 2018 hfpef ef 75%, will annote any change given dyspnea
continue ASA, Plavix. Increased atorvastatin to 40mg daily.   Cardiac cath in 4/2018 revealed patent stent in the LAD, 50% stenosis in the LCx (iFR normal) and 100% occlusion of the Mid RCA.
HFpEF   she has lower ext edema, at baseline per patient. labs, CXR and exam not indicative of fluid overload   continue Lasix 40mg daily  apprec cardio recs and repeat echo, may 2018 hfpef ef 75%, will annote any change given dyspnea
HFpEF   she has lower ext edema, at baseline per patient. labs, CXR and exam not indicative of fluid overload   continue Lasix 40mg daily  apprec cardio recs and repeat echo, may 2018 hfpef ef 75%, will annote any change given dyspnea

## 2018-09-19 NOTE — PROGRESS NOTE ADULT - PROBLEM SELECTOR PLAN 2
Chronic with multiple ED visit with negative workup, likely 2/2 to neuropathy   Doppler LE b/l Negative for DVT  Continue Tramadol 25mg PRN for pain   apprec neuro recs started lyrica, monitor for clinical imrpovement  will start higher dose gabapentin and see clinical response as likely neuropathic in nature  F/u PT for possible rehab placement  apprec vascular recs Dr. Griffin serrano poss spinal stenosis, consult neuro Dr. Gresham apprec recs, ordered MRI L/s for ongoing evaluation and care as well as xr l/s ap and lat
Chronic with multiple ED visit with negative workup, likely 2/2 to neuropathy   Doppler LE b/l Negative for DVT  MRI of Lumbar spine : No acute compression fracture or cord compression., Grade 1 anterolisthesis of L4 on L5, L5-S1.  Continue Tramadol 25mg PRN for pain   F/u PT for possible rehab placement.
Chronic with multiple ED visit with negative workup, likely 2/2 to neuropathy   Doppler LE b/l Negative for DVT  Continue Tramadol 25mg PRN for pain   will start higher dose gabapentin and see clinical response as likely neuropathic in nature  F/u PT for possible rehab placement  apprec vascular recs Dr. Griffin serrano
Chronic with multiple ED visit with negative workup, likely 2/2 to neuropathy   Doppler LE b/l Negative for DVT  Continue Tramadol 25mg PRN for pain   will start higher dose gabapentin and see clinical response as likely neuropathic in nature  F/u PT for possible rehab placement  apprec vascular recs Dr. Griffin serrano poss spinal stenosis, consult neuro Dr. Gresham apprec recs, ordered MRI L/s for ongoing evaluation and care as well as xr l/s ap and lat

## 2018-09-19 NOTE — PROGRESS NOTE ADULT - PROBLEM SELECTOR PLAN 7
chronic, stable  continue lisinopril
chronic, stable  continue lisinopril
continue to follow with outpatient echocardiograms on a yearly basis or sooner if symptoms develop or change.
chronic, stable  continue lisinopril

## 2018-09-19 NOTE — PROGRESS NOTE ADULT - SUBJECTIVE AND OBJECTIVE BOX
Patient is a 84y old  Female who presents with a chief complaint of sob (19 Sep 2018 13:56)      INTERVAL HPI: Pt seen and examined. Pt states she still has RLE pain, states it feels like numbness and pins/needles in upper and lower leg. denies any other acute complaints, states she is breathing much better    OVERNIGHT EVENTS: none noted  T(F): 98.2 (09-19-18 @ 13:11), Max: 98.2 (09-19-18 @ 05:06)  HR: 74 (09-19-18 @ 14:23) (70 - 91)  BP: 111/65 (09-19-18 @ 13:11) (111/65 - 127/62)  RR: 16 (09-19-18 @ 13:11) (16 - 16)  SpO2: 96% (09-19-18 @ 14:23) (90% - 97%)  I&O's Summary    18 Sep 2018 07:01  -  19 Sep 2018 07:00  --------------------------------------------------------  IN: 480 mL / OUT: 4 mL / NET: 476 mL        REVIEW OF SYSTEMS:  CONSTITUTIONAL: No fever, weight loss, or fatigue  RESPIRATORY: No cough, wheezing, chills or hemoptysis; No shortness of breath  CARDIOVASCULAR: No chest pain, palpitations, dizziness, or leg swelling  GASTROINTESTINAL: No abdominal or epigastric pain. No nausea, vomiting, or hematemesis; No diarrhea or constipation. No melena or hematochezia.  NEUROLOGICAL: +RLE numbness tingling and numbness  SKIN: No itching, burning, rashes, or lesions   ENDOCRINE: No heat or cold intolerance; No hair loss  MUSCULOSKELETAL: RLE pain  PSYCHIATRIC: No depression, anxiety, mood swings, or difficulty sleeping    PHYSICAL EXAM:  GENERAL: NAD, obese, elder  HEAD:  Atraumatic, Normocephalic  EYES: EOMI, PERRLA, conjunctiva and sclera clear  NECK: Supple, No JVD  NERVOUS SYSTEM:  Alert & Oriented X3, RLE tenderness on palpation  CHEST/LUNG: Clear to percussion bilaterally; No rales, rhonchi, wheezing, or rubs  HEART: Regular rate and rhythm; No murmurs, rubs, or gallops  ABDOMEN: Soft, Nontender, Nondistended; Bowel sounds present  EXTREMITIES:  RLE pain on movement and touch  SKIN: hyperalgesia of RLE    LABS:                        11.5   12.30 )-----------( 238      ( 19 Sep 2018 08:31 )             35.5     09-19    136  |  99  |  53<H>  ----------------------------<  254<H>  4.2   |  28  |  1.60<H>    Ca    9.2      19 Sep 2018 08:31          CAPILLARY BLOOD GLUCOSE      POCT Blood Glucose.: 447 mg/dL (19 Sep 2018 16:52)  POCT Blood Glucose.: 436 mg/dL (19 Sep 2018 16:34)  POCT Blood Glucose.: 384 mg/dL (19 Sep 2018 11:31)  POCT Blood Glucose.: 266 mg/dL (19 Sep 2018 07:28)  POCT Blood Glucose.: 372 mg/dL (18 Sep 2018 21:46)      09-17 @ 08:40   No growth to date.  --  --  09-17 @ 02:04   No growth to date.  --  --          MEDICATIONS  (STANDING):  ALBUTerol    0.083% 2.5 milliGRAM(s) Nebulizer every 6 hours  aspirin enteric coated 81 milliGRAM(s) Oral daily  atorvastatin 40 milliGRAM(s) Oral at bedtime  buDESOnide 160 MICROgram(s)/formoterol 4.5 MICROgram(s) Inhaler 2 Puff(s) Inhalation two times a day  clopidogrel Tablet 75 milliGRAM(s) Oral daily  dextrose 5%. 1000 milliLiter(s) (50 mL/Hr) IV Continuous <Continuous>  dextrose 50% Injectable 12.5 Gram(s) IV Push once  dextrose 50% Injectable 25 Gram(s) IV Push once  dextrose 50% Injectable 25 Gram(s) IV Push once  diltiazem    milliGRAM(s) Oral daily  enoxaparin Injectable 40 milliGRAM(s) SubCutaneous every 24 hours  furosemide    Tablet 40 milliGRAM(s) Oral daily  insulin glargine Injectable (LANTUS) 60 Unit(s) SubCutaneous at bedtime  insulin lispro (HumaLOG) corrective regimen sliding scale   SubCutaneous Before meals and at bedtime  insulin lispro Injectable (HumaLOG) 15 Unit(s) SubCutaneous three times a day before meals  levothyroxine 112 MICROGram(s) Oral daily  lisinopril 5 milliGRAM(s) Oral daily  predniSONE   Tablet 20 milliGRAM(s) Oral daily  pregabalin 25 milliGRAM(s) Oral two times a day  tiotropium 18 MICROgram(s) Capsule 1 Capsule(s) Inhalation daily    MEDICATIONS  (PRN):  acetaminophen   Tablet .. 650 milliGRAM(s) Oral every 6 hours PRN Temp greater or equal to 38C (100.4F), Mild Pain (1 - 3)  benzonatate 100 milliGRAM(s) Oral three times a day PRN Cough  dextrose 40% Gel 15 Gram(s) Oral once PRN Blood Glucose LESS THAN 70 milliGRAM(s)/deciliter  glucagon  Injectable 1 milliGRAM(s) IntraMuscular once PRN Glucose LESS THAN 70 milligrams/deciliter  traMADol 25 milliGRAM(s) Oral four times a day PRN Severe Pain (7 - 10)

## 2018-09-19 NOTE — PROGRESS NOTE ADULT - PROBLEM SELECTOR PROBLEM 5
Essential hypertension
Type 2 diabetes mellitus with complication, unspecified whether long term insulin use

## 2018-09-19 NOTE — DISCHARGE NOTE ADULT - PLAN OF CARE
resolution -your R leg pain was evaluated and likely is due to neuropathy, you receved tramadol and lyrica for your pain which improved and you were able to ambulate close to your normal level, a PT script will be provided for ongoing PT, please follow up with your primary medical provider within 1 week of discharge for further evaluation and management, you are also going to given the name of an orthopaedist to follow up with, it is recommended that you have your kidney function tested while on lyrica routinely stable -you were seen by our pulmonologist and give nebulization treatments and steroids which allowed you to improve with your respiratory symptoms, you were also given put on cpap and were instructed to follow up with your primary medical provider and outpatient pulmonologist within 1 week of discharge for further evaluation and management

## 2018-09-19 NOTE — DISCHARGE NOTE ADULT - HOSPITAL COURSE
83yo F with PMHx of COPD(not on home o2 sat usually low 90s as per pt), HFpEF on Lasix, CAD s/p 4 stents PCI on Plavix, 4/19/2018 she underwent cardiac cath that revealed patent stent in the LAD, 50% stenosis in the LCx (iFR normal) and 100% occlusion of the Mid RCA. LV function was normal and there was moderate AS, mild Pul, HTN, DM type 2, hypothyroid p/w difficulty breathing and left leg pain, 10/10 x 1month, had multiple ER visit at [facility] for similar pain for which all workup was negative admitted for acute COPD exacerbation and RLE pain likely neuropathic pain as spinal stenosis ruled out,  with inadequate pain control and  unable to ambulate. Pt was seen by pulmonology, orthopaedics, neurology and vascular surgery. Pt showed improvement with her respiratory symptoms and pain. She likely had a copd exacerbation and neuropathic leg pain and also some tronchanter bursitis. Pt was evaluated by PT and recommneded home with outpatient PT. Pt is now imprved and stable for discharge with close follow up with outpatietn primary providers.       Time spent: 65 minutes

## 2018-09-19 NOTE — DIETITIAN INITIAL EVALUATION ADULT. - OTHER INFO
As per chart is a 84 year old male with a PMH of asthma, CAD, diabetes, HLD, HTN, hypothyroid, CHF. Pt admitted with COPD exacerbation. Pt reports currently good appetite and PO intake, consuming about % of meals in house. Pt's current weight per chart (9/19) 207.4lbs. Pt reports UBW of 204-206lbs, states weight has been stable. Of note pt is currently on Lasix. Pt denies any chewing/swallowing difficulties, denies any GI distress, +BM today. Pt with +2 b/l leg edema, no pressure injuries noted at this time.

## 2018-09-19 NOTE — PROGRESS NOTE ADULT - SUBJECTIVE AND OBJECTIVE BOX
CAPILLARY BLOOD GLUCOSE      POCT Blood Glucose.: 266 mg/dL (19 Sep 2018 07:28)  POCT Blood Glucose.: 372 mg/dL (18 Sep 2018 21:46)  POCT Blood Glucose.: 391 mg/dL (18 Sep 2018 16:44)  POCT Blood Glucose.: 459 mg/dL (18 Sep 2018 12:14)  POCT Blood Glucose.: 468 mg/dL (18 Sep 2018 12:09)      Vital Signs Last 24 Hrs  T(C): 36.8 (19 Sep 2018 05:06), Max: 36.8 (19 Sep 2018 05:06)  T(F): 98.2 (19 Sep 2018 05:06), Max: 98.2 (19 Sep 2018 05:06)  HR: 70 (19 Sep 2018 05:06) (64 - 91)  BP: 127/62 (19 Sep 2018 05:06) (117/67 - 127/62)  BP(mean): --  RR: 16 (19 Sep 2018 05:06) (16 - 16)  SpO2: 93% (19 Sep 2018 05:06) (92% - 97%)    General: WN/WD NAD  Respiratory: diminished breath sounds  CV: RRR, S1S2, no murmurs, rubs or gallops  Abdominal: Soft, NT, ND +BS, Last BM  Extremities: No edema, + peripheral pulses    Hemoglobin A1C, Whole Blood: 10.7 % (09-18 @ 10:49)   09-19    136  |  99  |  53<H>  ----------------------------<  254<H>  4.2   |  28  |  1.60<H>    Ca    9.2      19 Sep 2018 08:31        atorvastatin 40 milliGRAM(s) Oral at bedtime  dextrose 40% Gel 15 Gram(s) Oral once PRN  dextrose 50% Injectable 12.5 Gram(s) IV Push once  dextrose 50% Injectable 25 Gram(s) IV Push once  dextrose 50% Injectable 25 Gram(s) IV Push once  glucagon  Injectable 1 milliGRAM(s) IntraMuscular once PRN  insulin glargine Injectable (LANTUS) 60 Unit(s) SubCutaneous at bedtime  insulin lispro (HumaLOG) corrective regimen sliding scale   SubCutaneous Before meals and at bedtime  insulin lispro Injectable (HumaLOG) 10 Unit(s) SubCutaneous three times a day before meals  levothyroxine 112 MICROGram(s) Oral daily  predniSONE   Tablet 20 milliGRAM(s) Oral daily

## 2018-09-19 NOTE — PROGRESS NOTE ADULT - PROBLEM SELECTOR PROBLEM 3
Heart failure with preserved ejection fraction
Leukocytosis, unspecified type

## 2018-09-19 NOTE — DIETITIAN INITIAL EVALUATION ADULT. - PROBLEM SELECTOR PLAN 5
uncontrolled   continue insulin corrective scale, hypoglycemia protocols  pt on Novolog 15unit TID before meals. Lantis Lantus 60units at bedtime. Will continue Lantus 30unit at bedtime for now ( reduced for inpatient purposes)   F/u Endo Dr. Perlman for further recommendation

## 2018-09-19 NOTE — PROGRESS NOTE ADULT - PROBLEM SELECTOR PLAN 6
Monitor BS/ISS  Per primary team and endocrine Monitor BS/ISS  Per primary team   Endo follow up noted

## 2018-09-19 NOTE — DIETITIAN INITIAL EVALUATION ADULT. - ADHERENCE
fair/Pt reports that at the Assisted living facility the pt is on a "Heart healthy diet", pt reports that she is able to choose the foods for her meal based on the selective menu and tries to choose foods that align with a Consistent CHO diet. Per chart pt is on insulin PTA. Pt reports food allergy to shellfish and iodine.

## 2018-09-19 NOTE — PROGRESS NOTE ADULT - SUBJECTIVE AND OBJECTIVE BOX
Date/Time Patient Seen:  		  Referring MD:   Data Reviewed	       Patient is a 84y old  Female who presents with a chief complaint of sob (18 Sep 2018 10:08)  in bed  seen and examined  vs and meds reviewed      Subjective/HPI     PAST MEDICAL & SURGICAL HISTORY:  Hypothyroid  HLD (hyperlipidemia)  HTN (hypertension)  CAD (coronary artery disease)  Asthma  Diabetes  History of appendectomy        Medication list         MEDICATIONS  (STANDING):  ALBUTerol    0.083% 2.5 milliGRAM(s) Nebulizer every 6 hours  aspirin enteric coated 81 milliGRAM(s) Oral daily  atorvastatin 40 milliGRAM(s) Oral at bedtime  buDESOnide 160 MICROgram(s)/formoterol 4.5 MICROgram(s) Inhaler 2 Puff(s) Inhalation two times a day  clopidogrel Tablet 75 milliGRAM(s) Oral daily  dextrose 5%. 1000 milliLiter(s) (50 mL/Hr) IV Continuous <Continuous>  dextrose 50% Injectable 12.5 Gram(s) IV Push once  dextrose 50% Injectable 25 Gram(s) IV Push once  dextrose 50% Injectable 25 Gram(s) IV Push once  diltiazem    milliGRAM(s) Oral daily  enoxaparin Injectable 40 milliGRAM(s) SubCutaneous every 24 hours  furosemide    Tablet 40 milliGRAM(s) Oral daily  gabapentin 400 milliGRAM(s) Oral two times a day  insulin glargine Injectable (LANTUS) 60 Unit(s) SubCutaneous at bedtime  insulin lispro (HumaLOG) corrective regimen sliding scale   SubCutaneous Before meals and at bedtime  insulin lispro Injectable (HumaLOG) 10 Unit(s) SubCutaneous three times a day before meals  levothyroxine 112 MICROGram(s) Oral daily  lisinopril 5 milliGRAM(s) Oral daily  predniSONE   Tablet 20 milliGRAM(s) Oral daily  tiotropium 18 MICROgram(s) Capsule 1 Capsule(s) Inhalation daily    MEDICATIONS  (PRN):  acetaminophen   Tablet .. 650 milliGRAM(s) Oral every 6 hours PRN Temp greater or equal to 38C (100.4F), Mild Pain (1 - 3)  benzonatate 100 milliGRAM(s) Oral three times a day PRN Cough  dextrose 40% Gel 15 Gram(s) Oral once PRN Blood Glucose LESS THAN 70 milliGRAM(s)/deciliter  glucagon  Injectable 1 milliGRAM(s) IntraMuscular once PRN Glucose LESS THAN 70 milligrams/deciliter  traMADol 25 milliGRAM(s) Oral four times a day PRN Severe Pain (7 - 10)         Vitals log        ICU Vital Signs Last 24 Hrs  T(C): 36.8 (19 Sep 2018 05:06), Max: 36.8 (19 Sep 2018 05:06)  T(F): 98.2 (19 Sep 2018 05:06), Max: 98.2 (19 Sep 2018 05:06)  HR: 70 (19 Sep 2018 05:06) (59 - 91)  BP: 127/62 (19 Sep 2018 05:06) (117/67 - 127/62)  BP(mean): --  ABP: --  ABP(mean): --  RR: 16 (19 Sep 2018 05:06) (16 - 16)  SpO2: 93% (19 Sep 2018 05:06) (92% - 98%)           Input and Output:  I&O's Detail    17 Sep 2018 07:01  -  18 Sep 2018 07:00  --------------------------------------------------------  IN:    Oral Fluid: 480 mL  Total IN: 480 mL    OUT:    Stool: 1 mL  Total OUT: 1 mL    Total NET: 479 mL      18 Sep 2018 07:01  -  19 Sep 2018 05:56  --------------------------------------------------------  IN:    Oral Fluid: 480 mL  Total IN: 480 mL    OUT:    Stool: 4 mL  Total OUT: 4 mL    Total NET: 476 mL          Lab Data                        11.6   13.08 )-----------( 249      ( 18 Sep 2018 11:31 )             37.1     09-18    134<L>  |  97  |  58<H>  ----------------------------<  437<H>  4.9   |  29  |  1.90<H>    Ca    9.2      18 Sep 2018 11:31              Review of Systems	      Objective     Physical Examination    heart s1s2  lung dec BS  abd soft    Pertinent Lab findings & Imaging      Chong:  NO   Adequate UO     I&O's Detail    17 Sep 2018 07:01  -  18 Sep 2018 07:00  --------------------------------------------------------  IN:    Oral Fluid: 480 mL  Total IN: 480 mL    OUT:    Stool: 1 mL  Total OUT: 1 mL    Total NET: 479 mL      18 Sep 2018 07:01  -  19 Sep 2018 05:56  --------------------------------------------------------  IN:    Oral Fluid: 480 mL  Total IN: 480 mL    OUT:    Stool: 4 mL  Total OUT: 4 mL    Total NET: 476 mL               Discussed with:     Cultures:	        Radiology

## 2018-09-19 NOTE — CONSULT NOTE ADULT - ASSESSMENT
A/P: 84F a/w COPD exacerbation who presents with R Lower Extremity Pain  Pain with palpation over R trochanter c/w trochanteric bursitis; Recommend NSAID therapy  Recommend continuing gabapentin and tramadol for pain control  No acute orthopedic surgical interventions indicated at this time; Ortho stable  DVT ppx  WBAT RLE  PT/OT  FU outpatient within 1 week of discharge  Will discuss with attending and advise if plan changes

## 2018-09-19 NOTE — DISCHARGE NOTE ADULT - MEDICATION SUMMARY - MEDICATIONS TO TAKE
I will START or STAY ON the medications listed below when I get home from the hospital:    Aspirin Low Dose 81 mg oral delayed release tablet  -- 1 tab(s) by mouth once a day  -- Indication: For CAD (coronary artery disease)    acetaminophen 325 mg oral tablet  -- 2 tab(s) by mouth every 6 hours, As needed, Temp greater or equal to 38C (100.4F), Mild Pain (1 - 3)  -- Indication: For Neuropathy    traMADol 50 mg oral tablet  -- 0.5 tab(s) by mouth 4 times a day, As needed, Severe Pain (7 - 10) MDD:2 tabs  -- Indication: For Neuropathy    lisinopril 5 mg oral tablet  -- 1 tab(s) by mouth once a day  -- Indication: For Essential hypertension    DilTIAZem Hydrochloride  mg/24 hours oral capsule, extended release  -- 1 cap(s) by mouth once a day  -- Indication: For Heart failure with preserved ejection fraction    pregabalin 25 mg oral capsule  -- 1 cap(s) by mouth 2 times a day MDD:2 tabs  -- Indication: For Neuropathy    NovoLOG FlexPen 100 units/mL injectable solution  -- 12 unit(s) subcutaneous 3 times a day  -- Indication: For Diabetes mellitus type 2, uncontrolled    Basaglar KwikPen 100 units/mL subcutaneous solution  -- 60 unit(s) subcutaneous once (at bedtime)  -- Indication: For Diabetes mellitus type 2, uncontrolled    atorvastatin 20 mg oral tablet  -- 1 tab(s) by mouth once a day (at bedtime)  -- Indication: For HLD    Plavix 75 mg oral tablet  -- 1 tab(s) by mouth once a day  -- Indication: For CAD (coronary artery disease)    albuterol 0.63 mg/3 mL (0.021%) inhalation solution  -- 3 milliliter(s) inhaled every 6 hours, As Needed  -- Indication: For COPD exacerbation    furosemide 40 mg oral tablet  -- 1 tab(s) by mouth once a day  -- Indication: For Heart failure with preserved ejection fraction    levothyroxine 112 mcg (0.112 mg) oral tablet  -- 1 tab(s) by mouth once a day  -- Indication: For Hypothyroidism, unspecified type

## 2018-09-19 NOTE — DIETITIAN INITIAL EVALUATION ADULT. - PROBLEM SELECTOR PLAN 4
HFpEF   she has lower ext edema, at baseline per patient. labs, CXR and exam not indicative of fluid overload   continue Lasix 40mg daily

## 2018-09-19 NOTE — DIETITIAN INITIAL EVALUATION ADULT. - PROBLEM SELECTOR PLAN 3
likely reactive in the setting of COPD exacerbation   no suspected infection at this time, pt afebrile  trend wbc, likely to remain elevated due to prednisone use.

## 2018-09-20 VITALS
SYSTOLIC BLOOD PRESSURE: 110 MMHG | RESPIRATION RATE: 16 BRPM | OXYGEN SATURATION: 94 % | DIASTOLIC BLOOD PRESSURE: 70 MMHG | HEART RATE: 79 BPM | TEMPERATURE: 98 F

## 2018-09-20 DIAGNOSIS — E11.65 TYPE 2 DIABETES MELLITUS WITH HYPERGLYCEMIA: ICD-10-CM

## 2018-09-20 LAB
ANION GAP SERPL CALC-SCNC: 10 MMOL/L — SIGNIFICANT CHANGE UP (ref 5–17)
BUN SERPL-MCNC: 56 MG/DL — HIGH (ref 7–23)
CALCIUM SERPL-MCNC: 8.8 MG/DL — SIGNIFICANT CHANGE UP (ref 8.5–10.1)
CHLORIDE SERPL-SCNC: 97 MMOL/L — SIGNIFICANT CHANGE UP (ref 96–108)
CO2 SERPL-SCNC: 31 MMOL/L — SIGNIFICANT CHANGE UP (ref 22–31)
CREAT SERPL-MCNC: 1.4 MG/DL — HIGH (ref 0.5–1.3)
GLUCOSE SERPL-MCNC: 229 MG/DL — HIGH (ref 70–99)
HCT VFR BLD CALC: 35.8 % — SIGNIFICANT CHANGE UP (ref 34.5–45)
HGB BLD-MCNC: 11.4 G/DL — LOW (ref 11.5–15.5)
MCHC RBC-ENTMCNC: 26.8 PG — LOW (ref 27–34)
MCHC RBC-ENTMCNC: 31.8 GM/DL — LOW (ref 32–36)
MCV RBC AUTO: 84.2 FL — SIGNIFICANT CHANGE UP (ref 80–100)
NRBC # BLD: 0 /100 WBCS — SIGNIFICANT CHANGE UP (ref 0–0)
PLATELET # BLD AUTO: 237 K/UL — SIGNIFICANT CHANGE UP (ref 150–400)
POTASSIUM SERPL-MCNC: 4.1 MMOL/L — SIGNIFICANT CHANGE UP (ref 3.5–5.3)
POTASSIUM SERPL-SCNC: 4.1 MMOL/L — SIGNIFICANT CHANGE UP (ref 3.5–5.3)
RBC # BLD: 4.25 M/UL — SIGNIFICANT CHANGE UP (ref 3.8–5.2)
RBC # FLD: 14.5 % — SIGNIFICANT CHANGE UP (ref 10.3–14.5)
SODIUM SERPL-SCNC: 138 MMOL/L — SIGNIFICANT CHANGE UP (ref 135–145)
WBC # BLD: 13.32 K/UL — HIGH (ref 3.8–10.5)
WBC # FLD AUTO: 13.32 K/UL — HIGH (ref 3.8–10.5)

## 2018-09-20 PROCEDURE — 99239 HOSP IP/OBS DSCHRG MGMT >30: CPT

## 2018-09-20 RX ORDER — INSULIN LISPRO 100/ML
20 VIAL (ML) SUBCUTANEOUS
Qty: 0 | Refills: 0 | Status: DISCONTINUED | OUTPATIENT
Start: 2018-09-20 | End: 2018-09-20

## 2018-09-20 RX ORDER — TRAMADOL HYDROCHLORIDE 50 MG/1
0.5 TABLET ORAL
Qty: 6 | Refills: 0
Start: 2018-09-20 | End: 2018-09-22

## 2018-09-20 RX ORDER — TRAMADOL HYDROCHLORIDE 50 MG/1
0.5 TABLET ORAL
Qty: 6 | Refills: 0 | OUTPATIENT
Start: 2018-09-20 | End: 2018-09-22

## 2018-09-20 RX ORDER — ACETAMINOPHEN 500 MG
2 TABLET ORAL
Qty: 0 | Refills: 0 | COMMUNITY
Start: 2018-09-20

## 2018-09-20 RX ADMIN — Medication 81 MILLIGRAM(S): at 11:50

## 2018-09-20 RX ADMIN — BUDESONIDE AND FORMOTEROL FUMARATE DIHYDRATE 2 PUFF(S): 160; 4.5 AEROSOL RESPIRATORY (INHALATION) at 08:09

## 2018-09-20 RX ADMIN — Medication 25 MILLIGRAM(S): at 05:29

## 2018-09-20 RX ADMIN — TIOTROPIUM BROMIDE 1 CAPSULE(S): 18 CAPSULE ORAL; RESPIRATORY (INHALATION) at 06:23

## 2018-09-20 RX ADMIN — ALBUTEROL 2.5 MILLIGRAM(S): 90 AEROSOL, METERED ORAL at 07:33

## 2018-09-20 RX ADMIN — LISINOPRIL 5 MILLIGRAM(S): 2.5 TABLET ORAL at 05:27

## 2018-09-20 RX ADMIN — Medication 20 MILLIGRAM(S): at 05:27

## 2018-09-20 RX ADMIN — Medication 112 MICROGRAM(S): at 05:27

## 2018-09-20 RX ADMIN — Medication 6: at 08:07

## 2018-09-20 RX ADMIN — Medication 40 MILLIGRAM(S): at 05:27

## 2018-09-20 RX ADMIN — Medication 240 MILLIGRAM(S): at 05:27

## 2018-09-20 RX ADMIN — Medication 20 UNIT(S): at 11:50

## 2018-09-20 RX ADMIN — CLOPIDOGREL BISULFATE 75 MILLIGRAM(S): 75 TABLET, FILM COATED ORAL at 11:50

## 2018-09-20 RX ADMIN — Medication 6: at 11:50

## 2018-09-20 RX ADMIN — Medication 15 UNIT(S): at 08:07

## 2018-09-20 NOTE — PROGRESS NOTE ADULT - SUBJECTIVE AND OBJECTIVE BOX
Date/Time Patient Seen:  		  Referring MD:   Data Reviewed	       Patient is a 84y old  Female who presents with a chief complaint of sob (19 Sep 2018 13:56)  in bed  seen and examined  vs and meds reviewed      Subjective/HPI     PAST MEDICAL & SURGICAL HISTORY:  Hypothyroid  HLD (hyperlipidemia)  HTN (hypertension)  CAD (coronary artery disease)  Asthma  Diabetes  History of appendectomy        Medication list         MEDICATIONS  (STANDING):  ALBUTerol    0.083% 2.5 milliGRAM(s) Nebulizer every 6 hours  aspirin enteric coated 81 milliGRAM(s) Oral daily  atorvastatin 40 milliGRAM(s) Oral at bedtime  buDESOnide 160 MICROgram(s)/formoterol 4.5 MICROgram(s) Inhaler 2 Puff(s) Inhalation two times a day  clopidogrel Tablet 75 milliGRAM(s) Oral daily  dextrose 5%. 1000 milliLiter(s) (50 mL/Hr) IV Continuous <Continuous>  dextrose 50% Injectable 12.5 Gram(s) IV Push once  dextrose 50% Injectable 25 Gram(s) IV Push once  dextrose 50% Injectable 25 Gram(s) IV Push once  diltiazem    milliGRAM(s) Oral daily  enoxaparin Injectable 40 milliGRAM(s) SubCutaneous every 24 hours  furosemide    Tablet 40 milliGRAM(s) Oral daily  insulin glargine Injectable (LANTUS) 60 Unit(s) SubCutaneous at bedtime  insulin lispro (HumaLOG) corrective regimen sliding scale   SubCutaneous Before meals and at bedtime  insulin lispro Injectable (HumaLOG) 15 Unit(s) SubCutaneous three times a day before meals  levothyroxine 112 MICROGram(s) Oral daily  lisinopril 5 milliGRAM(s) Oral daily  pregabalin 25 milliGRAM(s) Oral two times a day  tiotropium 18 MICROgram(s) Capsule 1 Capsule(s) Inhalation daily    MEDICATIONS  (PRN):  acetaminophen   Tablet .. 650 milliGRAM(s) Oral every 6 hours PRN Temp greater or equal to 38C (100.4F), Mild Pain (1 - 3)  benzonatate 100 milliGRAM(s) Oral three times a day PRN Cough  dextrose 40% Gel 15 Gram(s) Oral once PRN Blood Glucose LESS THAN 70 milliGRAM(s)/deciliter  glucagon  Injectable 1 milliGRAM(s) IntraMuscular once PRN Glucose LESS THAN 70 milligrams/deciliter  traMADol 25 milliGRAM(s) Oral four times a day PRN Severe Pain (7 - 10)         Vitals log        ICU Vital Signs Last 24 Hrs  T(C): 36.5 (20 Sep 2018 04:00), Max: 36.8 (19 Sep 2018 13:11)  T(F): 97.7 (20 Sep 2018 04:00), Max: 98.2 (19 Sep 2018 13:11)  HR: 66 (20 Sep 2018 04:00) (66 - 80)  BP: 126/68 (20 Sep 2018 04:00) (111/65 - 130/69)  BP(mean): --  ABP: --  ABP(mean): --  RR: 18 (20 Sep 2018 04:00) (16 - 18)  SpO2: 93% (20 Sep 2018 04:00) (90% - 97%)           Input and Output:  I&O's Detail    18 Sep 2018 07:01  -  19 Sep 2018 07:00  --------------------------------------------------------  IN:    Oral Fluid: 480 mL  Total IN: 480 mL    OUT:    Stool: 4 mL  Total OUT: 4 mL    Total NET: 476 mL          Lab Data                        11.5   12.30 )-----------( 238      ( 19 Sep 2018 08:31 )             35.5     09-19    136  |  99  |  53<H>  ----------------------------<  254<H>  4.2   |  28  |  1.60<H>    Ca    9.2      19 Sep 2018 08:31              Review of Systems	      Objective     Physical Examination    heart s1s2  lung dec BS  abd soft      Pertinent Lab findings & Imaging      Chong:  NO   Adequate UO     I&O's Detail    18 Sep 2018 07:01  -  19 Sep 2018 07:00  --------------------------------------------------------  IN:    Oral Fluid: 480 mL  Total IN: 480 mL    OUT:    Stool: 4 mL  Total OUT: 4 mL    Total NET: 476 mL               Discussed with:     Cultures:	        Radiology

## 2018-09-20 NOTE — PROGRESS NOTE ADULT - SUBJECTIVE AND OBJECTIVE BOX
CHIEF COMPLAINT: Patient is a 84y old  Female who presents with a chief complaint of sob (20 Sep 2018 10:30)      HPI:  85yo F with PMHx of COPD(not on home o2 sat usually low 90s as per pt), HFpEF on Lasix, CAD s/p 4 stents PCI on Plavix, 4/19/2018 she underwent cardiac cath that revealed patent stent in the LAD, 50% stenosis in the LCx (iFR normal) and 100% occlusion of the Mid RCA. LV function was normal and there was moderate AS, mild Pul, HTN, DM type 2, hypothyroid p/w difficulty breathing and left leg pain, 10/10 x 1month, had multiple ER visit at [facility] for similar pain for which all workup was negative. Today woke up with more leg pain which has been chronic for past few month talking tramadol with some relieve but states Morphine helps with the pain. Pt also endorses to difficulty breathing this morning worse then her baseline with her oxygen level under 80s then called her son who told her to go to the ED. Denies fever, chills, cp, palpitations, cough, abdominal pain, dysuria, no sick contacts, no recent travel, difficulty walking. Patient was seen at [place] in May for uncontrolled DM and shortness of breath. Was then sent to [facility] for 3 weeks for rehab, currently  resides in [facility]. States she she has visiting nurse that comes one a week and due to pain she is unable to do her ADL.     In the ED vitals stable, labs pertinent for glucose 292, wbc 12.24, Cr/BUN 1.6/37, procalcitonin .08, CT chest w/o cont negative for pneumonia, Doppler LE b/l Negative for DVT, proBNP 343. Pt received 40mg prednisone x 1, Morphine x 2 for pain. EKG showed sinus tachy 107, non specific T wave changes. (17 Sep 2018 01:23)      Subjective: Patient seen and examined. Still with right leg pain. No SOB, chest pain, palpitations.       MEDICATIONS  (STANDING):  ALBUTerol    0.083% 2.5 milliGRAM(s) Nebulizer every 6 hours  aspirin enteric coated 81 milliGRAM(s) Oral daily  atorvastatin 40 milliGRAM(s) Oral at bedtime  buDESOnide 160 MICROgram(s)/formoterol 4.5 MICROgram(s) Inhaler 2 Puff(s) Inhalation two times a day  clopidogrel Tablet 75 milliGRAM(s) Oral daily  dextrose 5%. 1000 milliLiter(s) (50 mL/Hr) IV Continuous <Continuous>  dextrose 50% Injectable 12.5 Gram(s) IV Push once  dextrose 50% Injectable 25 Gram(s) IV Push once  dextrose 50% Injectable 25 Gram(s) IV Push once  diltiazem    milliGRAM(s) Oral daily  enoxaparin Injectable 40 milliGRAM(s) SubCutaneous every 24 hours  furosemide    Tablet 40 milliGRAM(s) Oral daily  insulin glargine Injectable (LANTUS) 60 Unit(s) SubCutaneous at bedtime  insulin lispro (HumaLOG) corrective regimen sliding scale   SubCutaneous Before meals and at bedtime  insulin lispro Injectable (HumaLOG) 20 Unit(s) SubCutaneous three times a day before meals  levothyroxine 112 MICROGram(s) Oral daily  lisinopril 5 milliGRAM(s) Oral daily  pregabalin 25 milliGRAM(s) Oral two times a day  tiotropium 18 MICROgram(s) Capsule 1 Capsule(s) Inhalation daily    MEDICATIONS  (PRN):  acetaminophen   Tablet .. 650 milliGRAM(s) Oral every 6 hours PRN Temp greater or equal to 38C (100.4F), Mild Pain (1 - 3)  benzonatate 100 milliGRAM(s) Oral three times a day PRN Cough  dextrose 40% Gel 15 Gram(s) Oral once PRN Blood Glucose LESS THAN 70 milliGRAM(s)/deciliter  glucagon  Injectable 1 milliGRAM(s) IntraMuscular once PRN Glucose LESS THAN 70 milligrams/deciliter  traMADol 25 milliGRAM(s) Oral four times a day PRN Severe Pain (7 - 10)      Allergies    iodine containing compounds (Unknown)  shellfish (Swelling; Short breath)    Intolerances        REVIEW OF SYSTEMS:    CONSTITUTIONAL: No weakness, fevers or chills  EYES/ENT: No visual changes;  No vertigo or throat pain   NECK: No pain or stiffness  RESPIRATORY: No cough, wheezing, hemoptysis; No shortness of breath  CARDIOVASCULAR: No chest pain or palpitations  GASTROINTESTINAL: No abdominal pain. No nausea, vomiting, or hematemesis; No diarrhea or constipation. No melena or hematochezia.  GENITOURINARY: No dysuria, frequency or hematuria  NEUROLOGICAL: No numbness or weakness  SKIN: No itching or rash  All other review of systems is negative unless indicated above    VITAL SIGNS:   Vital Signs Last 24 Hrs  T(C): 36.5 (20 Sep 2018 04:00), Max: 36.8 (19 Sep 2018 13:11)  T(F): 97.7 (20 Sep 2018 04:00), Max: 98.2 (19 Sep 2018 13:11)  HR: 66 (20 Sep 2018 04:00) (66 - 80)  BP: 126/68 (20 Sep 2018 04:00) (111/65 - 130/69)  BP(mean): --  RR: 18 (20 Sep 2018 04:00) (16 - 18)  SpO2: 93% (20 Sep 2018 04:00) (90% - 97%)    I&O's Summary      PHYSICAL EXAM:    Constitutional: NAD, awake and alert, well-developed  Eyes:  EOMI,  Pupils round, No oral cyanosis.  Pulmonary: Non-labored, breath sounds are clear bilaterally, No wheezing, rales or rhonchi  Cardiovascular: S1 and S2, regular rate and rhythm, no Murmurs, gallops or rubs  Gastrointestinal: Bowel Sounds present, soft, nontender.   Lymph: No peripheral edema. No cervical lymphadenopathy.  Neurological: Alert, no focal deficits  Extremities: no lower extremity edema bilaterally, intact distal pedal pulses bilaterally  Skin: No rashes.  Psych:  Mood & affect appropriate    LABS: All Labs Reviewed:                        11.4   13.32 )-----------( 237      ( 20 Sep 2018 06:58 )             35.8                         11.5   12.30 )-----------( 238      ( 19 Sep 2018 08:31 )             35.5                         11.6   13.08 )-----------( 249      ( 18 Sep 2018 11:31 )             37.1     20 Sep 2018 06:58    138    |  97     |  56     ----------------------------<  229    4.1     |  31     |  1.40   19 Sep 2018 08:31    136    |  99     |  53     ----------------------------<  254    4.2     |  28     |  1.60   18 Sep 2018 11:31    134    |  97     |  58     ----------------------------<  437    4.9     |  29     |  1.90     Ca    8.8        20 Sep 2018 06:58  Ca    9.2        19 Sep 2018 08:31  Ca    9.2        18 Sep 2018 11:31      Telemetry: off monitor    Imaging:  < from: MR Lumbar Spine No Cont (09.18.18 @ 12:02) >  EXAM:  MR SPINE LUMBAR                            PROCEDURE DATE:  09/18/2018          INTERPRETATION:  CLINICAL STATEMENT: Neck pain.    TECHNIQUE: MRI of the lumbar spine was performed without gadolinium.    COMPARISON: None.    FINDINGS:  The vertebral body heights are within normal limits. Endplate signal   changes noted at L4-5 and L5-S1 which Schmorl's nodes.    Grade 1 anterolisthesis of L4 on L5, L5 on S1.    The conus terminates at T12-L1    Multilevel degenerative disc disease noted with loss of signal. Mild disc   space narrowing L3-4. Moderate disc space narrowing L4-5 and L5-S1.    L2-3: Disc bulge and facet hypertrophy noted resulting in effacement of   ventral thecal sac. No significant neural foraminal narrowing.    L3-4: Disc bulge and facet hypertrophy/ligamentum flavum enfolding noted   resulting in mild spinal canal stenosis. Mild left neural foraminal   narrowing.    L4-5: Disc bulge and facet hypertrophy/ligamentum flavum enfolding noted   resulting in effacement of ventral thecal sac. Mild right neural   foraminal narrowing.    L5-S1: Disc bulge and facet hypertrophy noted resulting in effacement of   ventral thecal sac. Moderate bilateral neural foraminal narrowing    IMPRESSION:  No acute compression fracture or cord compression.    Grade 1 anterolisthesis of L4 on L5, L5-S1.    Multilevel degenerative changes as described above                LAVON MALDONADO M.D., ATTENDING RADIOLOGIST  This document has been electronically signed. Sep 18 2018 12:47PM        < end of copied text >

## 2018-09-20 NOTE — PROGRESS NOTE ADULT - SUBJECTIVE AND OBJECTIVE BOX
Neurology follow up note    FLOYD [id number]      Interval History:    Patient feels better less pain seen with daughter     MEDICATIONS    acetaminophen   Tablet .. 650 milliGRAM(s) Oral every 6 hours PRN  ALBUTerol    0.083% 2.5 milliGRAM(s) Nebulizer every 6 hours  aspirin enteric coated 81 milliGRAM(s) Oral daily  atorvastatin 40 milliGRAM(s) Oral at bedtime  benzonatate 100 milliGRAM(s) Oral three times a day PRN  buDESOnide 160 MICROgram(s)/formoterol 4.5 MICROgram(s) Inhaler 2 Puff(s) Inhalation two times a day  clopidogrel Tablet 75 milliGRAM(s) Oral daily  dextrose 40% Gel 15 Gram(s) Oral once PRN  dextrose 5%. 1000 milliLiter(s) IV Continuous <Continuous>  dextrose 50% Injectable 12.5 Gram(s) IV Push once  dextrose 50% Injectable 25 Gram(s) IV Push once  dextrose 50% Injectable 25 Gram(s) IV Push once  diltiazem    milliGRAM(s) Oral daily  enoxaparin Injectable 40 milliGRAM(s) SubCutaneous every 24 hours  furosemide    Tablet 40 milliGRAM(s) Oral daily  glucagon  Injectable 1 milliGRAM(s) IntraMuscular once PRN  insulin glargine Injectable (LANTUS) 60 Unit(s) SubCutaneous at bedtime  insulin lispro (HumaLOG) corrective regimen sliding scale   SubCutaneous Before meals and at bedtime  insulin lispro Injectable (HumaLOG) 20 Unit(s) SubCutaneous three times a day before meals  levothyroxine 112 MICROGram(s) Oral daily  lisinopril 5 milliGRAM(s) Oral daily  pregabalin 25 milliGRAM(s) Oral two times a day  tiotropium 18 MICROgram(s) Capsule 1 Capsule(s) Inhalation daily  traMADol 25 milliGRAM(s) Oral four times a day PRN      Allergies    iodine containing compounds (Unknown)  shellfish (Swelling; Short breath)    Intolerances            Vital Signs Last 24 Hrs  T(C): 36.5 (20 Sep 2018 04:00), Max: 36.8 (19 Sep 2018 13:11)  T(F): 97.7 (20 Sep 2018 04:00), Max: 98.2 (19 Sep 2018 13:11)  HR: 66 (20 Sep 2018 04:00) (66 - 80)  BP: 126/68 (20 Sep 2018 04:00) (111/65 - 130/69)  BP(mean): --  RR: 18 (20 Sep 2018 04:00) (16 - 18)  SpO2: 93% (20 Sep 2018 04:00) (90% - 97%)        REVIEW OF SYSTEMS:    Constitutional:  No fever, chills, or night sweats.    Head:  No headaches.    Eyes:  No double vision or blurry vision.    Ears:  No ringing in the ears.    Neck:  No neck pain.    Respiratory:  Positive history of shortness of breath, which brought her to the emergency room.    Cardiovascular:  No chest pain.    Extremities/Neurological:  Positive numbness, tingling, and burning sensation in her thighs, was in her left thigh, now appears to be more prominent in her right thigh better today    Musculoskeletal  Occasional history of joint pain.    PHYSICAL EXAMINATION:     HEENT:  Head:  Normocephalic, atraumatic.  Eyes:  No scleral icterus.  Ears:  Hearing bilaterally intact.    NECK:  Supple.  RESPIRATORY:  Good air entry bilaterally.    CARDIOVASCULAR:  S1 and S2 heard.    ABDOMEN:  Soft and nontender.    EXTREMITIES:  No clubbing or cyanosis were noted.      NEUROLOGIC:  The patient is awake, alert, and oriented x3.    Extraocular movements were intact.    Pupils were equal, round, and reactive bilaterally 3 mm to 2 mm.    Speech was fluent.    Smile was symmetric.    Motor:  Bilateral upper and lower were 4/5 would say age-appropriate.    Sensory:  Bilateral upper and lower intact to light touch.  No drift.            LABS:  CBC Full  -  ( 20 Sep 2018 06:58 )  WBC Count : 13.32 K/uL  Hemoglobin : 11.4 g/dL  Hematocrit : 35.8 %  Platelet Count - Automated : 237 K/uL  Mean Cell Volume : 84.2 fl  Mean Cell Hemoglobin : 26.8 pg  Mean Cell Hemoglobin Concentration : 31.8 gm/dL  Auto Neutrophil # : x  Auto Lymphocyte # : x  Auto Monocyte # : x  Auto Eosinophil # : x  Auto Basophil # : x  Auto Neutrophil % : x  Auto Lymphocyte % : x  Auto Monocyte % : x  Auto Eosinophil % : x  Auto Basophil % : x      09-20    138  |  97  |  56<H>  ----------------------------<  229<H>  4.1   |  31  |  1.40<H>    Ca    8.8      20 Sep 2018 06:58      Hemoglobin A1C:       Vitamin B12         RADIOLOGY    ANALYSIS AND PLAN:  This is an 84-year-old with bilateral leg mostly in the right burning, numbness, and tingling.  1.	Clinical impression most likely neuropathy most likely from a history of diabetes, questionable any type of spinal disc disease, even though MRI imaging of the spine did not show any conclusive evidence of any compression.  2.	I will recommend to discontinue the patient's gabapentin.  She does not appear to be having decrease in pain.  We will start the patient on Lyrica 25 mg two times a day and adjust accordingly doing better today   3.	For history of diabetes, monitor the patient's blood sugars, strict control.  4.	I would recommend Physical Therapy evaluation.  5.	I would recommend fall precaution.  6.	spoke to daughter at bedside   7.	Greater than 45 minutes of time was spent with the patient, plan of care, reviewing data, speaking to the patient and multidisciplinary healthcare team.      Thank you for the courtesy of this consultation.

## 2018-09-20 NOTE — PROGRESS NOTE ADULT - PROVIDER SPECIALTY LIST ADULT
Cardiology
Endocrinology
Endocrinology
Neurology
Pulmonology
Hospitalist

## 2018-09-20 NOTE — PROGRESS NOTE ADULT - SUBJECTIVE AND OBJECTIVE BOX
CAPILLARY BLOOD GLUCOSE      POCT Blood Glucose.: 267 mg/dL (20 Sep 2018 07:40)  POCT Blood Glucose.: 324 mg/dL (19 Sep 2018 21:14)  POCT Blood Glucose.: 447 mg/dL (19 Sep 2018 16:52)  POCT Blood Glucose.: 436 mg/dL (19 Sep 2018 16:34)  POCT Blood Glucose.: 384 mg/dL (19 Sep 2018 11:31)      Vital Signs Last 24 Hrs  T(C): 36.5 (20 Sep 2018 04:00), Max: 36.8 (19 Sep 2018 13:11)  T(F): 97.7 (20 Sep 2018 04:00), Max: 98.2 (19 Sep 2018 13:11)  HR: 66 (20 Sep 2018 04:00) (66 - 80)  BP: 126/68 (20 Sep 2018 04:00) (111/65 - 130/69)  BP(mean): --  RR: 18 (20 Sep 2018 04:00) (16 - 18)  SpO2: 93% (20 Sep 2018 04:00) (90% - 97%)    General: WN/WD NAD  Respiratory: diminished breath sounds  CV: RRR, S1S2, no murmurs, rubs or gallops  Abdominal: Soft, NT, ND +BS, Last BM  Extremities: No edema, + peripheral pulses    Hemoglobin A1C, Whole Blood: 10.7 % (09-18 @ 10:49)   09-20    138  |  97  |  56<H>  ----------------------------<  229<H>  4.1   |  31  |  1.40<H>    Ca    8.8      20 Sep 2018 06:58        atorvastatin 40 milliGRAM(s) Oral at bedtime  dextrose 40% Gel 15 Gram(s) Oral once PRN  dextrose 50% Injectable 12.5 Gram(s) IV Push once  dextrose 50% Injectable 25 Gram(s) IV Push once  dextrose 50% Injectable 25 Gram(s) IV Push once  glucagon  Injectable 1 milliGRAM(s) IntraMuscular once PRN  insulin glargine Injectable (LANTUS) 60 Unit(s) SubCutaneous at bedtime  insulin lispro (HumaLOG) corrective regimen sliding scale   SubCutaneous Before meals and at bedtime  insulin lispro Injectable (HumaLOG) 15 Unit(s) SubCutaneous three times a day before meals  levothyroxine 112 MICROGram(s) Oral daily

## 2018-09-20 NOTE — PROGRESS NOTE ADULT - PROBLEM SELECTOR PROBLEM 1
COPD exacerbation
Diabetes mellitus type 2, uncontrolled
Type 2 diabetes mellitus with complication, unspecified whether long term insulin use
COPD exacerbation

## 2018-09-20 NOTE — PROGRESS NOTE ADULT - ASSESSMENT
83yo F with PMHx of COPD(not on home o2 sat usually low 90s as per pt), HFpEF on Lasix, CAD s/p 4 stents PCI on Plavix, 4/19/2018 she underwent cardiac cath that revealed patent stent in the LAD, 50% stenosis in the LCx (iFR normal) and 100% occlusion of the Mid RCA. LV function was normal and there was moderate AS, mild Pul, HTN, DM type 2, hypothyroid p/w difficulty breathing and left leg pain, 10/10 x 1month, had multiple ER visit at [facility] for similar pain for which all workup was negative admitted for acute COPD exacerbation and RLE pain likely neuropathic with inadequate pain unable to ambulate
85yo F with PMHx of COPD(not on home o2 sat usually low 90s as per pt), HFpEF on Lasix, CAD s/p 4 stents PCI on Plavix, 4/19/2018 she underwent cardiac cath that revealed patent stent in the LAD, 50% stenosis in the LCx (iFR normal) and 100% occlusion of the Mid RCA. LV function was normal and there was moderate AS, mild Pul, HTN, DM type 2, hypothyroid p/w difficulty breathing and left leg pain, 10/10 x 1month, had multiple ER visit at [facility] for similar pain for which all workup was negative admitted for acute COPD exacerbation and RLE pain likely neuropathic pain as psinal stenosis ruled out,  with inadequate pain control and  unable to ambulate
Problem/Plan - 1:  ·  Problem: COPD exacerbation.  Plan: acute on chronic COPD exacerbation  CT chest w/o cont showed no pneumonia, RVP neg    continue prednisone 20mg daily, albuterol neb, Symbicort, Spiriva inhaler   continue O2 supplement  Pulm Dr. Copeland following.     Problem/Plan - 2:  ·  Problem: Right leg pain.  Plan: Chronic with multiple ED visit with negative workup, likely 2/2 to neuropathy   Doppler LE b/l Negative for DVT  Continue Tramadol 25mg PRN for pain   F/u PT for possible rehab placement.     Problem/Plan - 3:  ·  Problem: Heart failure with preserved EF, chronic. Plan: HFpEF   she has lower ext edema, at baseline per patient. labs, CXR and exam not indicative of fluid overload   continue Lasix 40mg daily.     Problem/Plan - 4:  ·  Problem: Type 2 diabetes mellitus with complication, unspecified whether long term insulin use.  Plan: uncontrolled   insulin as per medicine team.    Problem/Plan - 5:  Problem: CAD (coronary artery disease). Plan: continue ASA, Plavix. Increased atorvastatin to 40mg daily. 4/19/2018 she underwent cardiac cath that revealed patent stent in the LAD, 50% stenosis in the LCx (iFR normal) and 100% occlusion of the Mid RCA.    Problem/Plan - 6:  ·  Problem: Essential hypertension.  Plan: continue lisinopril and cardizem (high risk medication with no signs of toxicity)    Problem/Plan - 7:  ·  Problem: Hypothyroidism, unspecified type.  Plan: continue synthroid.     Problem/Plan - 8:  - Problem: Moderate calcific aortic valve stenosis.  Plan: continue to follow with outpatient echocardiograms on a yearly basis or sooner if symptoms develop or change.
Problem/Plan - 1:  ·  Problem: COPD exacerbation.  Plan: acute on chronic COPD exacerbation  CT chest w/o cont showed no pneumonia, RVP neg    continue prednisone 20mg daily, albuterol neb, Symbicort, Spiriva inhaler   continue O2 supplement  Pulm Dr. Copeland following.     Problem/Plan - 2:  ·  Problem: Right leg pain.  Plan: Chronic with multiple ED visit with negative workup, likely 2/2 to neuropathy   Doppler LE b/l Negative for DVT  MRI of Lumbar spine : No acute compression fracture or cord compression., Grade 1 anterolisthesis of L4 on L5, L5-S1.  Continue Tramadol 25mg PRN for pain   F/u PT for possible rehab placement.     Problem/Plan - 3:  ·  Problem: Heart failure with preserved ejection fraction.  Plan: Heart failure with preserved EF, chronic. Plan: HFpEF   she has lower ext edema, at baseline per patient. labs, CXR and exam not indicative of fluid overload   continue Lasix 40mg daily.     Problem/Plan - 4:  ·  Problem: Coronary artery disease with angina pectoris, unspecified vessel or lesion type, unspecified whether native or transplanted heart.  Plan: continue ASA, Plavix. Increased atorvastatin to 40mg daily.   Cardiac cath in 4/2018 revealed patent stent in the LAD, 50% stenosis in the LCx (iFR normal) and 100% occlusion of the Mid RCA.     Problem/Plan - 5:  ·  Problem: Essential hypertension.  Plan: Continue Cardizem (high risk medication with no signs of toxicity) and lisinopril    Problem/Plan - 6:  Problem: Type 2 diabetes mellitus with complication, unspecified whether long term insulin use. Plan: Monitor BS/ISS  Per primary team   Endo follow up noted.  Problem/Plan - 7:  ·  Problem: Aortic stenosis, moderate.  Plan: continue to follow with outpatient echocardiograms on a yearly basis or sooner if symptoms develop or change.
85yo F with PMHx of COPD(not on home o2 sat usually low 90s as per pt), HFpEF on Lasix, CAD s/p 4 stents PCI on Plavix, 4/19/2018 she underwent cardiac cath that revealed patent stent in the LAD, 50% stenosis in the LCx (iFR normal) and 100% occlusion of the Mid RCA. LV function was normal and there was moderate AS, mild Pul, HTN, DM type 2, hypothyroid p/w difficulty breathing and left leg pain, 10/10 x 1month, had multiple ER visit at [facility] for similar pain for which all workup was negative admitted for acute COPD exacerbation and RLE pain likely neuropathic vs spinal stenosis with inadequate pain unable to ambulate

## 2018-09-20 NOTE — PHYSICAL THERAPY INITIAL EVALUATION ADULT - ADDITIONAL COMMENTS
Pt lives in apartment, 0 ZENA, was independent in all ADLs and ambulation with rollator.  Pt has a stall shower with seat.

## 2018-09-20 NOTE — PHYSICAL THERAPY INITIAL EVALUATION ADULT - PERTINENT HX OF CURRENT PROBLEM, REHAB EVAL
83 y/o woke up with more leg pain which has been chronic for past few month talking tramadol with some relieve but states Morphine helps with the pain. Pt also endorses to difficulty breathing this morning worse then her baseline with her oxygen level under 80s then called her son who told her to go to the ED.

## 2018-09-20 NOTE — PROGRESS NOTE ADULT - PROBLEM SELECTOR PLAN 1
copd and copd ex  chf and AS and Pulm HTN and HFpEF  on lasix, cvs regimen and BP control  DM care and optimization  COPD ex - will dc Steroids this am, tolerating room air, overall better, cont NEBS and Inhalers,   CPAP use for night time for STEVENSON  discussed medical issues  dc planning  SHERIN vs indep living
acute on chronic COPD exacerbation  CT chest w/o cont showed no pneumonia, RVP neg    continue prednisone 20mg daily, albuterol neb, Symbicort, Spiriva inhaler   continue O2 supplement  Pulm Dr. Copeland following.
acute on chronic COPD exacerbation  CT chest w/o cont showed no pneumonia, RVP neg    continue prednisone 20mg daily, albuterol neb, Symbicort, Spiriva inhaler   continue O2 supplement, trend off tomorrow  apprec pulm recfredy boggs
acute on chronic COPD exacerbation, improved breathing at baseline  CT chest w/o cont showed no pneumonia, RVP neg    continue prednisone 20mg daily, albuterol neb, Symbicort, Spiriva inhaler   continue O2 supplement, trend off tomorrow  will order incentive spirometer  apprec pulm recfredy boggs
cont lantus 60 units qhs  increase humalog 15 units tid before meals  cont mod dose humalog scale coverage  cont cons cho diet  goal bg 100-180 in hosp setting
copd  copd ex  CHF  CAD  AS  DM  cvs regimen and diuresis  nebs and inhaler and systemic steroids for copd ex, will dc steroids after 4- 5 days of therapy  STEVENSON - does not want to use CPAP  sleep hygiene discussed  weight loss discussed  pt has Kyphosis - restr lung disease and COPD  pain regimen neurontin and tramadol for pain, neuropathy  serial labs  serial PE  consider SHERIN dc plan  assist with ADL  prognosis guarded  pt is DNR  cardio follow up
copd  copd ex  STEVENSON - not compliant with CPAP  on o2 support  cad and AS and HFpEF  DM   DM diet, serial FS  cont symbicort and spiriva, albuterol, will complete 4 - 5 day course of Prednisone  cont diuresis  cvs regimen   management of comorbidities  seasonal vaccination  mobilize  assess for SHERIN  pt is DNR
copd  copd ex  kyphosis and restr lung disease  cad  ashd and HFpEF and Pulm HTN  cvs regimen  diuresis  I and O  serial PE and serial LABS, replete lytes  monitor vs and HD and Sat  DM management, FS high partially due to steroids  cont NEBS and Inhaler regimen and low dose systemic steroids  ct chest reviewed  US dopplers neg  Neuropathy tx - neurontin and tramadol  enc use of CPAP night time, nasal pillows   overall prognosis guarded  assess for SHERIN transfer and dc planning  discussed with patient  will follow
increase humalog 20 units tid before meals  cont lantus 60 units/day  cont humalog mod dose scale coverage  cont cons cho diet  goal bg 100-180 in hosp setting
acute on chronic COPD exacerbation  CT chest w/o cont showed no pneumonia, RVP neg    continue prednisone 20mg daily, albuterol neb, Symbicort, Spiriva inhaler   continue O2 supplement, trend off tomorrow  will order incentive spirometer  apprec pulm recs dr boggs

## 2018-10-03 RX ORDER — INSULIN ASPART 100 [IU]/ML
0 INJECTION, SOLUTION SUBCUTANEOUS
Qty: 0 | Refills: 0 | COMMUNITY

## 2018-10-03 RX ORDER — INSULIN GLARGINE 100 [IU]/ML
45 INJECTION, SOLUTION SUBCUTANEOUS
Qty: 0 | Refills: 0 | COMMUNITY

## 2018-10-03 RX ORDER — FUROSEMIDE 40 MG
1 TABLET ORAL
Qty: 0 | Refills: 0 | COMMUNITY

## 2018-10-03 RX ORDER — INSULIN GLARGINE 100 [IU]/ML
60 INJECTION, SOLUTION SUBCUTANEOUS
Qty: 0 | Refills: 0 | COMMUNITY

## 2018-10-03 RX ORDER — TIOTROPIUM BROMIDE 18 UG/1
1 CAPSULE ORAL; RESPIRATORY (INHALATION)
Qty: 0 | Refills: 0 | COMMUNITY

## 2018-10-03 RX ORDER — METFORMIN HYDROCHLORIDE 850 MG/1
2 TABLET ORAL
Qty: 0 | Refills: 0 | COMMUNITY

## 2018-10-03 RX ORDER — FLUTICASONE PROPIONATE AND SALMETEROL 50; 250 UG/1; UG/1
1 POWDER ORAL; RESPIRATORY (INHALATION)
Qty: 0 | Refills: 0 | COMMUNITY

## 2018-10-03 RX ORDER — ATORVASTATIN CALCIUM 80 MG/1
1 TABLET, FILM COATED ORAL
Qty: 0 | Refills: 0 | COMMUNITY

## 2018-10-03 RX ORDER — LEVOTHYROXINE SODIUM 125 MCG
1 TABLET ORAL
Qty: 0 | Refills: 0 | COMMUNITY

## 2018-10-03 RX ORDER — ALBUTEROL 90 UG/1
2 AEROSOL, METERED ORAL
Qty: 0 | Refills: 0 | COMMUNITY

## 2018-10-03 RX ORDER — INSULIN GLARGINE 100 [IU]/ML
0 INJECTION, SOLUTION SUBCUTANEOUS
Qty: 0 | Refills: 0 | COMMUNITY

## 2018-10-03 RX ORDER — ALBUTEROL 90 UG/1
3 AEROSOL, METERED ORAL
Qty: 0 | Refills: 0 | COMMUNITY

## 2018-10-19 ENCOUNTER — APPOINTMENT (OUTPATIENT)
Dept: PULMONOLOGY | Facility: CLINIC | Age: 83
End: 2018-10-19
Payer: MEDICARE

## 2018-10-19 ENCOUNTER — MED ADMIN CHARGE (OUTPATIENT)
Age: 83
End: 2018-10-19

## 2018-10-19 VITALS — HEART RATE: 67 BPM | DIASTOLIC BLOOD PRESSURE: 60 MMHG | OXYGEN SATURATION: 95 % | SYSTOLIC BLOOD PRESSURE: 134 MMHG

## 2018-10-19 PROBLEM — E78.5 HYPERLIPIDEMIA, UNSPECIFIED: Chronic | Status: ACTIVE | Noted: 2017-04-07

## 2018-10-19 PROBLEM — J45.909 UNSPECIFIED ASTHMA, UNCOMPLICATED: Chronic | Status: ACTIVE | Noted: 2017-04-07

## 2018-10-19 PROBLEM — E03.9 HYPOTHYROIDISM, UNSPECIFIED: Chronic | Status: ACTIVE | Noted: 2017-04-07

## 2018-10-19 PROBLEM — E11.9 TYPE 2 DIABETES MELLITUS WITHOUT COMPLICATIONS: Chronic | Status: ACTIVE | Noted: 2017-04-07

## 2018-10-19 PROBLEM — I10 ESSENTIAL (PRIMARY) HYPERTENSION: Chronic | Status: ACTIVE | Noted: 2017-04-07

## 2018-10-19 PROBLEM — I25.10 ATHEROSCLEROTIC HEART DISEASE OF NATIVE CORONARY ARTERY WITHOUT ANGINA PECTORIS: Chronic | Status: ACTIVE | Noted: 2017-04-07

## 2018-10-19 PROCEDURE — 90686 IIV4 VACC NO PRSV 0.5 ML IM: CPT

## 2018-10-19 PROCEDURE — G0008: CPT

## 2018-10-19 PROCEDURE — 99214 OFFICE O/P EST MOD 30 MIN: CPT | Mod: 25

## 2018-10-24 PROCEDURE — 96374 THER/PROPH/DIAG INJ IV PUSH: CPT

## 2018-10-24 PROCEDURE — 83880 ASSAY OF NATRIURETIC PEPTIDE: CPT

## 2018-10-24 PROCEDURE — 71045 X-RAY EXAM CHEST 1 VIEW: CPT

## 2018-10-24 PROCEDURE — 99285 EMERGENCY DEPT VISIT HI MDM: CPT | Mod: 25

## 2018-10-24 PROCEDURE — 96372 THER/PROPH/DIAG INJ SC/IM: CPT | Mod: XU

## 2018-10-24 PROCEDURE — 85379 FIBRIN DEGRADATION QUANT: CPT

## 2018-10-24 PROCEDURE — 84443 ASSAY THYROID STIM HORMONE: CPT

## 2018-10-24 PROCEDURE — 87040 BLOOD CULTURE FOR BACTERIA: CPT

## 2018-10-24 PROCEDURE — 82553 CREATINE MB FRACTION: CPT

## 2018-10-24 PROCEDURE — 82550 ASSAY OF CK (CPK): CPT

## 2018-10-24 PROCEDURE — 85048 AUTOMATED LEUKOCYTE COUNT: CPT

## 2018-10-24 PROCEDURE — 94660 CPAP INITIATION&MGMT: CPT

## 2018-10-24 PROCEDURE — 80053 COMPREHEN METABOLIC PANEL: CPT

## 2018-10-24 PROCEDURE — 87633 RESP VIRUS 12-25 TARGETS: CPT

## 2018-10-24 PROCEDURE — 96375 TX/PRO/DX INJ NEW DRUG ADDON: CPT

## 2018-10-24 PROCEDURE — 72148 MRI LUMBAR SPINE W/O DYE: CPT

## 2018-10-24 PROCEDURE — 80048 BASIC METABOLIC PNL TOTAL CA: CPT

## 2018-10-24 PROCEDURE — 84480 ASSAY TRIIODOTHYRONINE (T3): CPT

## 2018-10-24 PROCEDURE — 85027 COMPLETE CBC AUTOMATED: CPT

## 2018-10-24 PROCEDURE — 83036 HEMOGLOBIN GLYCOSYLATED A1C: CPT

## 2018-10-24 PROCEDURE — 84484 ASSAY OF TROPONIN QUANT: CPT

## 2018-10-24 PROCEDURE — 96376 TX/PRO/DX INJ SAME DRUG ADON: CPT

## 2018-10-24 PROCEDURE — 97161 PT EVAL LOW COMPLEX 20 MIN: CPT

## 2018-10-24 PROCEDURE — 87486 CHLMYD PNEUM DNA AMP PROBE: CPT

## 2018-10-24 PROCEDURE — 85730 THROMBOPLASTIN TIME PARTIAL: CPT

## 2018-10-24 PROCEDURE — 86140 C-REACTIVE PROTEIN: CPT

## 2018-10-24 PROCEDURE — 82785 ASSAY OF IGE: CPT

## 2018-10-24 PROCEDURE — 71250 CT THORAX DX C-: CPT

## 2018-10-24 PROCEDURE — 87798 DETECT AGENT NOS DNA AMP: CPT

## 2018-10-24 PROCEDURE — 72100 X-RAY EXAM L-S SPINE 2/3 VWS: CPT

## 2018-10-24 PROCEDURE — 73502 X-RAY EXAM HIP UNI 2-3 VIEWS: CPT

## 2018-10-24 PROCEDURE — 93970 EXTREMITY STUDY: CPT

## 2018-10-24 PROCEDURE — 85610 PROTHROMBIN TIME: CPT

## 2018-10-24 PROCEDURE — 82962 GLUCOSE BLOOD TEST: CPT

## 2018-10-24 PROCEDURE — 93005 ELECTROCARDIOGRAM TRACING: CPT

## 2018-10-24 PROCEDURE — 84145 PROCALCITONIN (PCT): CPT

## 2018-10-24 PROCEDURE — 83605 ASSAY OF LACTIC ACID: CPT

## 2018-10-24 PROCEDURE — 94760 N-INVAS EAR/PLS OXIMETRY 1: CPT

## 2018-10-24 PROCEDURE — 84436 ASSAY OF TOTAL THYROXINE: CPT

## 2018-10-24 PROCEDURE — 94640 AIRWAY INHALATION TREATMENT: CPT

## 2018-10-24 PROCEDURE — 81001 URINALYSIS AUTO W/SCOPE: CPT

## 2018-10-24 PROCEDURE — 87581 M.PNEUMON DNA AMP PROBE: CPT

## 2018-10-28 ENCOUNTER — EMERGENCY (EMERGENCY)
Facility: HOSPITAL | Age: 83
LOS: 1 days | Discharge: HOME | End: 2018-10-28
Attending: EMERGENCY MEDICINE
Payer: MEDICARE

## 2018-10-28 VITALS
DIASTOLIC BLOOD PRESSURE: 59 MMHG | HEART RATE: 97 BPM | RESPIRATION RATE: 16 BRPM | TEMPERATURE: 99 F | WEIGHT: 199.96 LBS | OXYGEN SATURATION: 95 % | SYSTOLIC BLOOD PRESSURE: 131 MMHG

## 2018-10-28 DIAGNOSIS — Z90.49 ACQUIRED ABSENCE OF OTHER SPECIFIED PARTS OF DIGESTIVE TRACT: Chronic | ICD-10-CM

## 2018-10-28 DIAGNOSIS — R93.1 ABNORMAL FINDINGS ON DIAGNOSTIC IMAGING OF HEART AND CORONARY CIRCULATION: Chronic | ICD-10-CM

## 2018-10-28 LAB
ALBUMIN SERPL ELPH-MCNC: 3.1 G/DL — LOW (ref 3.3–5)
ALP SERPL-CCNC: 92 U/L — SIGNIFICANT CHANGE UP (ref 40–120)
ALT FLD-CCNC: 20 U/L — SIGNIFICANT CHANGE UP (ref 12–78)
ANION GAP SERPL CALC-SCNC: 9 MMOL/L — SIGNIFICANT CHANGE UP (ref 5–17)
AST SERPL-CCNC: 19 U/L — SIGNIFICANT CHANGE UP (ref 15–37)
BASOPHILS # BLD AUTO: 0.08 K/UL — SIGNIFICANT CHANGE UP (ref 0–0.2)
BASOPHILS NFR BLD AUTO: 0.6 % — SIGNIFICANT CHANGE UP (ref 0–2)
BILIRUB SERPL-MCNC: 0.4 MG/DL — SIGNIFICANT CHANGE UP (ref 0.2–1.2)
BUN SERPL-MCNC: 41 MG/DL — HIGH (ref 7–23)
CALCIUM SERPL-MCNC: 8.5 MG/DL — SIGNIFICANT CHANGE UP (ref 8.5–10.1)
CHLORIDE SERPL-SCNC: 103 MMOL/L — SIGNIFICANT CHANGE UP (ref 96–108)
CO2 SERPL-SCNC: 27 MMOL/L — SIGNIFICANT CHANGE UP (ref 22–31)
CREAT SERPL-MCNC: 1.9 MG/DL — HIGH (ref 0.5–1.3)
EOSINOPHIL # BLD AUTO: 0.22 K/UL — SIGNIFICANT CHANGE UP (ref 0–0.5)
EOSINOPHIL NFR BLD AUTO: 1.6 % — SIGNIFICANT CHANGE UP (ref 0–6)
GLUCOSE SERPL-MCNC: 263 MG/DL — HIGH (ref 70–99)
HCT VFR BLD CALC: 40.2 % — SIGNIFICANT CHANGE UP (ref 34.5–45)
HGB BLD-MCNC: 13.2 G/DL — SIGNIFICANT CHANGE UP (ref 11.5–15.5)
IMM GRANULOCYTES NFR BLD AUTO: 0.4 % — SIGNIFICANT CHANGE UP (ref 0–1.5)
LIDOCAIN IGE QN: 143 U/L — SIGNIFICANT CHANGE UP (ref 73–393)
LYMPHOCYTES # BLD AUTO: 24.8 % — SIGNIFICANT CHANGE UP (ref 13–44)
LYMPHOCYTES # BLD AUTO: 3.44 K/UL — HIGH (ref 1–3.3)
MCHC RBC-ENTMCNC: 27.3 PG — SIGNIFICANT CHANGE UP (ref 27–34)
MCHC RBC-ENTMCNC: 32.8 GM/DL — SIGNIFICANT CHANGE UP (ref 32–36)
MCV RBC AUTO: 83.1 FL — SIGNIFICANT CHANGE UP (ref 80–100)
MONOCYTES # BLD AUTO: 1 K/UL — HIGH (ref 0–0.9)
MONOCYTES NFR BLD AUTO: 7.2 % — SIGNIFICANT CHANGE UP (ref 2–14)
NEUTROPHILS # BLD AUTO: 9.06 K/UL — HIGH (ref 1.8–7.4)
NEUTROPHILS NFR BLD AUTO: 65.4 % — SIGNIFICANT CHANGE UP (ref 43–77)
NT-PROBNP SERPL-SCNC: 390 PG/ML — SIGNIFICANT CHANGE UP (ref 0–450)
PLATELET # BLD AUTO: 237 K/UL — SIGNIFICANT CHANGE UP (ref 150–400)
POTASSIUM SERPL-MCNC: 4 MMOL/L — SIGNIFICANT CHANGE UP (ref 3.5–5.3)
POTASSIUM SERPL-SCNC: 4 MMOL/L — SIGNIFICANT CHANGE UP (ref 3.5–5.3)
PROCALCITONIN SERPL-MCNC: <0.05 — SIGNIFICANT CHANGE UP (ref 0–0.04)
PROT SERPL-MCNC: 7.7 G/DL — SIGNIFICANT CHANGE UP (ref 6–8.3)
RAPID RVP RESULT: SIGNIFICANT CHANGE UP
RBC # BLD: 4.84 M/UL — SIGNIFICANT CHANGE UP (ref 3.8–5.2)
RBC # FLD: 13.7 % — SIGNIFICANT CHANGE UP (ref 10.3–14.5)
SODIUM SERPL-SCNC: 139 MMOL/L — SIGNIFICANT CHANGE UP (ref 135–145)
TROPONIN I SERPL-MCNC: <.015 NG/ML — SIGNIFICANT CHANGE UP (ref 0.01–0.04)
WBC # BLD: 13.85 K/UL — HIGH (ref 3.8–10.5)
WBC # FLD AUTO: 13.85 K/UL — HIGH (ref 3.8–10.5)

## 2018-10-28 PROCEDURE — 71045 X-RAY EXAM CHEST 1 VIEW: CPT | Mod: 26

## 2018-10-28 PROCEDURE — 99284 EMERGENCY DEPT VISIT MOD MDM: CPT

## 2018-10-28 RX ORDER — ONDANSETRON 8 MG/1
4 TABLET, FILM COATED ORAL ONCE
Qty: 0 | Refills: 0 | Status: DISCONTINUED | OUTPATIENT
Start: 2018-10-28 | End: 2018-10-28

## 2018-10-28 RX ORDER — TRAMADOL HYDROCHLORIDE 50 MG/1
50 TABLET ORAL ONCE
Qty: 0 | Refills: 0 | Status: DISCONTINUED | OUTPATIENT
Start: 2018-10-28 | End: 2018-10-28

## 2018-10-28 RX ORDER — SODIUM CHLORIDE 9 MG/ML
3 INJECTION INTRAMUSCULAR; INTRAVENOUS; SUBCUTANEOUS ONCE
Qty: 0 | Refills: 0 | Status: COMPLETED | OUTPATIENT
Start: 2018-10-28 | End: 2018-10-28

## 2018-10-28 RX ORDER — SODIUM CHLORIDE 9 MG/ML
500 INJECTION INTRAMUSCULAR; INTRAVENOUS; SUBCUTANEOUS ONCE
Qty: 0 | Refills: 0 | Status: COMPLETED | OUTPATIENT
Start: 2018-10-28 | End: 2018-10-28

## 2018-10-28 RX ORDER — PANTOPRAZOLE SODIUM 20 MG/1
40 TABLET, DELAYED RELEASE ORAL ONCE
Qty: 0 | Refills: 0 | Status: COMPLETED | OUTPATIENT
Start: 2018-10-28 | End: 2018-10-28

## 2018-10-28 RX ORDER — ONDANSETRON 8 MG/1
4 TABLET, FILM COATED ORAL ONCE
Qty: 0 | Refills: 0 | Status: COMPLETED | OUTPATIENT
Start: 2018-10-28 | End: 2018-10-28

## 2018-10-28 RX ADMIN — SODIUM CHLORIDE 3 MILLILITER(S): 9 INJECTION INTRAMUSCULAR; INTRAVENOUS; SUBCUTANEOUS at 20:45

## 2018-10-28 RX ADMIN — TRAMADOL HYDROCHLORIDE 50 MILLIGRAM(S): 50 TABLET ORAL at 22:24

## 2018-10-28 RX ADMIN — SODIUM CHLORIDE 500 MILLILITER(S): 9 INJECTION INTRAMUSCULAR; INTRAVENOUS; SUBCUTANEOUS at 22:44

## 2018-10-28 RX ADMIN — ONDANSETRON 4 MILLIGRAM(S): 8 TABLET, FILM COATED ORAL at 21:10

## 2018-10-28 RX ADMIN — PANTOPRAZOLE SODIUM 40 MILLIGRAM(S): 20 TABLET, DELAYED RELEASE ORAL at 22:24

## 2018-10-28 NOTE — ED ADULT NURSE NOTE - NSIMPLEMENTINTERV_GEN_ALL_ED
Implemented All Universal Safety Interventions:  [place] to call system. Call bell, personal items and telephone within reach. Instruct patient to call for assistance. Room bathroom lighting operational. Non-slip footwear when patient is off stretcher. Physically safe environment: no spills, clutter or unnecessary equipment. Stretcher in lowest position, wheels locked, appropriate side rails in place.

## 2018-10-28 NOTE — ED PROVIDER NOTE - PROGRESS NOTE DETAILS
patient seen and evaluated by cardiology Dr. Olivares, noted elevated cardiac enzymes, may dc home with imdur, has outpatient f/u with cardioligist

## 2018-10-28 NOTE — ED PROVIDER NOTE - MEDICAL DECISION MAKING DETAILS
Pt is a 85 yo female who presents to the ED with a cc of chest discomfort and SOB.  Symptoms have been ongoing for several hours prior to arrival.  Pt with similar complaints on prior admission.  Has h/o CAD and COPD.   Will obtain cardiac labs, BNP, procalcitonin, EKG, and imaging.  If elevated troponin is noted pt will require cardiac consult

## 2018-10-28 NOTE — ED PROVIDER NOTE - PSH
Abnormal findings on cardiac catheterization  Cardiac Cath  History of appendectomy    History of appendectomy

## 2018-10-28 NOTE — ED PROVIDER NOTE - OBJECTIVE STATEMENT
Pt is a 85 yo female who presents to the ED with a cc of chest discomfort.  PMHx of asthma, CAD, DM, HLD,  HTN, hypothyroidism.    Pt has had a long protracted course since May.  At that time she was admitted to [facility] with uncontrolled blood sugars.  She was also noted to be hypertensive at that time.  She was admitted for several weeks and was then discharged to rehab.  Pt was at [facility] for several weeks and was finally sent home on July 9th.  She had a follow up with her PMD at the beginning of August and was dx with sleep apnea around that time.  Per reports exam was WNL at that time. She then began to develop left leg pain and shortly after that developed right leg pain.  She was seen several times at [facility] for these complaints and was started on pain medication with no improvement.  Pt reports that she has continued to follow up and they are now considering possible epidural injections for the pain.   Pt was admitted to [facility] at the end of September with increased weakness and diagnosed COPD exacerbation.  She was treated and discharged home.  Pt reports that over the last week she has been experiencing a non-productive cough.  Yesterday she noted some mild SOB which has progressively worsened.  This morning pt reports that she awoke with midsternal chest pain which has remained constant throughout the day.  Reports associated chills.  Denies fever, N/V/D/C, abd pain.  Pt is not on home oxygen

## 2018-10-28 NOTE — ED ADULT NURSE NOTE - OBJECTIVE STATEMENT
Pt presents to the Ed via ambulance s/p chest pain and cough. EKG done, pt placed on cardiac monitor, 02 in place via n/c @ 2 l

## 2018-10-28 NOTE — ED PROVIDER NOTE - FAMILY HISTORY
Family history of cancer in mother     Family history of stomach cancer     Father  Still living? Unknown  Family history of heart disease, Age at diagnosis: Age Unknown  Family history of MI (myocardial infarction), Age at diagnosis: Age Unknown

## 2018-10-28 NOTE — ED ADULT NURSE NOTE - PMH
Asthma    CAD (coronary artery disease)    Diabetes    DM2 (diabetes mellitus, type 2)    Essential hypertension    Gastroesophageal reflux disease without esophagitis    HLD (hyperlipidemia)    HTN (hypertension)    Hypothyroid    Hypothyroidism, unspecified type    NSTEMI (non-ST elevated myocardial infarction)    Pure hypercholesterolemia    Uncomplicated asthma, unspecified asthma severity

## 2018-10-29 VITALS
DIASTOLIC BLOOD PRESSURE: 67 MMHG | OXYGEN SATURATION: 95 % | HEART RATE: 62 BPM | SYSTOLIC BLOOD PRESSURE: 152 MMHG | TEMPERATURE: 98 F | RESPIRATION RATE: 15 BRPM

## 2018-10-29 LAB
CK MB BLD-MCNC: 2.9 % — SIGNIFICANT CHANGE UP (ref 0–3.5)
CK MB CFR SERPL CALC: 1.9 NG/ML — SIGNIFICANT CHANGE UP (ref 0–3.6)
CK SERPL-CCNC: 66 U/L — SIGNIFICANT CHANGE UP (ref 26–192)
TROPONIN I SERPL-MCNC: 0.05 NG/ML — HIGH (ref 0.01–0.04)
TROPONIN I SERPL-MCNC: 0.08 NG/ML — HIGH (ref 0.01–0.04)

## 2018-10-29 PROCEDURE — 83690 ASSAY OF LIPASE: CPT

## 2018-10-29 PROCEDURE — 87486 CHLMYD PNEUM DNA AMP PROBE: CPT

## 2018-10-29 PROCEDURE — 96361 HYDRATE IV INFUSION ADD-ON: CPT

## 2018-10-29 PROCEDURE — 36415 COLL VENOUS BLD VENIPUNCTURE: CPT

## 2018-10-29 PROCEDURE — 82962 GLUCOSE BLOOD TEST: CPT

## 2018-10-29 PROCEDURE — 85027 COMPLETE CBC AUTOMATED: CPT

## 2018-10-29 PROCEDURE — 96374 THER/PROPH/DIAG INJ IV PUSH: CPT

## 2018-10-29 PROCEDURE — 82553 CREATINE MB FRACTION: CPT

## 2018-10-29 PROCEDURE — 83880 ASSAY OF NATRIURETIC PEPTIDE: CPT

## 2018-10-29 PROCEDURE — 96375 TX/PRO/DX INJ NEW DRUG ADDON: CPT

## 2018-10-29 PROCEDURE — 80053 COMPREHEN METABOLIC PANEL: CPT

## 2018-10-29 PROCEDURE — 84484 ASSAY OF TROPONIN QUANT: CPT

## 2018-10-29 PROCEDURE — 93005 ELECTROCARDIOGRAM TRACING: CPT

## 2018-10-29 PROCEDURE — 99284 EMERGENCY DEPT VISIT MOD MDM: CPT | Mod: 25

## 2018-10-29 PROCEDURE — 71250 CT THORAX DX C-: CPT

## 2018-10-29 PROCEDURE — 87798 DETECT AGENT NOS DNA AMP: CPT

## 2018-10-29 PROCEDURE — 71250 CT THORAX DX C-: CPT | Mod: 26

## 2018-10-29 PROCEDURE — 74176 CT ABD & PELVIS W/O CONTRAST: CPT | Mod: 26

## 2018-10-29 PROCEDURE — 74176 CT ABD & PELVIS W/O CONTRAST: CPT

## 2018-10-29 PROCEDURE — 82550 ASSAY OF CK (CPK): CPT

## 2018-10-29 PROCEDURE — 84145 PROCALCITONIN (PCT): CPT

## 2018-10-29 PROCEDURE — 71045 X-RAY EXAM CHEST 1 VIEW: CPT

## 2018-10-29 PROCEDURE — 87581 M.PNEUMON DNA AMP PROBE: CPT

## 2018-10-29 PROCEDURE — 87633 RESP VIRUS 12-25 TARGETS: CPT

## 2018-10-29 RX ORDER — ISOSORBIDE MONONITRATE 60 MG/1
1 TABLET, EXTENDED RELEASE ORAL
Qty: 30 | Refills: 0
Start: 2018-10-29 | End: 2018-11-27

## 2018-10-29 RX ORDER — TRAMADOL HYDROCHLORIDE 50 MG/1
50 TABLET ORAL ONCE
Qty: 0 | Refills: 0 | Status: DISCONTINUED | OUTPATIENT
Start: 2018-10-29 | End: 2018-10-29

## 2018-10-29 RX ADMIN — TRAMADOL HYDROCHLORIDE 50 MILLIGRAM(S): 50 TABLET ORAL at 04:57

## 2018-10-29 RX ADMIN — TRAMADOL HYDROCHLORIDE 50 MILLIGRAM(S): 50 TABLET ORAL at 04:46

## 2018-10-29 RX ADMIN — SODIUM CHLORIDE 500 MILLILITER(S): 9 INJECTION INTRAMUSCULAR; INTRAVENOUS; SUBCUTANEOUS at 04:47

## 2018-10-29 RX ADMIN — TRAMADOL HYDROCHLORIDE 50 MILLIGRAM(S): 50 TABLET ORAL at 04:47

## 2018-10-29 NOTE — CONSULT NOTE ADULT - SUBJECTIVE AND OBJECTIVE BOX
REQUESTING PHYSICIAN: Dr. Ivan    REASON FOR CONSULT: Patient is a 84y old  Female who presents with a chief complaint of Chest pain    CHIEF COMPLAINT: Patient is a 84y old  Female who presents with a chief complaint of chest pain    HPI:83yo F with PMHx of COPD(not on home o2 sat usually low 90s as per pt), HFpEF on Lasix, CAD s/p 4 stents PCI on Plavix, 4/19/2018 she underwent cardiac cath that revealed patent stent in the LAD, 50% stenosis in the LCx (iFR normal) and 100% occlusion of the Mid RCA. LV function was normal and there was moderate AS, mild Pul, HTN, DM type 2, hypothyroid p/w chest pain. Patient states the pain was mild-moderate in nature located from her neck all the way down to her abdomen. The pain didn't radiate to arms or back. Patient admits to mild associated SOB and nausea. Patient states nothing made the pain worse or better. It lasted all day and it went away on its own by the time she came to the ED. Patient could not describe the quality of the pain. Her cardiologist is Dr. Waylon Benavides and she states she has a follow up appointment with him this Wednesday.       PAST MEDICAL & SURGICAL HISTORY:  NSTEMI (non-ST elevated myocardial infarction)  Hypothyroid  HLD (hyperlipidemia)  HTN (hypertension)  CAD (coronary artery disease)  Asthma  Diabetes  Gastroesophageal reflux disease without esophagitis  Pure hypercholesterolemia  Hypothyroidism, unspecified type  Essential hypertension  DM2 (diabetes mellitus, type 2)  Uncomplicated asthma, unspecified asthma severity  Abnormal findings on cardiac catheterization: Cardiac Cath  History of appendectomy  History of appendectomy    SOCIAL HISTORY:  no smoking, no EtOH, lives at [facility]        FAMILY HISTORY:  Family history of stomach cancer  Family history of MI (myocardial infarction) (Father)  Family history of cancer in mother  Family history of heart disease (Father)    Allergies    iodine (Hives)  iodine containing compounds (Unknown)  shellfish (Anaphylaxis)  shellfish (Swelling; Short breath)    Intolerances        REVIEW OF SYSTEMS:    CONSTITUTIONAL: (+) weakness, No fevers or chills  EYES/ENT: No visual changes;  No vertigo or throat pain   NECK: No pain or stiffness  RESPIRATORY: No cough, wheezing, hemoptysis; (+) shortness of breath  CARDIOVASCULAR: (+) chest pain, No palpitations  GASTROINTESTINAL: No abdominal pain. No nausea, vomiting, or hematemesis; No diarrhea or constipation. No melena or hematochezia.  GENITOURINARY: No dysuria, frequency or hematuria  NEUROLOGICAL: No numbness, (+) generalized weakness  SKIN: No itching or rash  All other review of systems is negative unless indicated above    VITAL SIGNS:   Vital Signs Last 24 Hrs  T(C): 36.4 (29 Oct 2018 07:40), Max: 37 (28 Oct 2018 19:54)  T(F): 97.6 (29 Oct 2018 07:40), Max: 98.6 (28 Oct 2018 19:54)  HR: 71 (29 Oct 2018 07:40) (64 - 97)  BP: 160/71 (29 Oct 2018 07:40) (131/59 - 187/87)  BP(mean): --  RR: 16 (29 Oct 2018 07:40) (16 - 16)  SpO2: 97% (29 Oct 2018 07:40) (95% - 97%)    I&O's Summary      PHYSICAL EXAM:    Constitutional: NAD, awake and alert, well-developed  Eyes:  EOMI,  Pupils round, No oral cyanosis.  Pulmonary: Non-labored, breath sounds are clear bilaterally, No wheezing, rales or rhonchi  Cardiovascular: S1 and S2, regular rate and rhythm, 2/6 holosystolic Murmur RUSB No gallops or rubs  Gastrointestinal: Bowel Sounds present, soft, nontender.   Lymph: No peripheral edema. No cervical lymphadenopathy.  Neurological: Alert, no focal deficits  Extremities: no lower extremity edema bilaterally. intact distal pedal pulses bilaterally.  Skin: No rashes.  Psych:  Mood & affect appropriate    LABS: All Labs Reviewed:                        13.2   13.85 )-----------( 237      ( 28 Oct 2018 20:49 )             40.2     28 Oct 2018 20:49    139    |  103    |  41     ----------------------------<  263    4.0     |  27     |  1.90     Ca    8.5        28 Oct 2018 20:49    TPro  7.7    /  Alb  3.1    /  TBili  0.4    /  DBili  x      /  AST  19     /  ALT  20     /  AlkPhos  92     28 Oct 2018 20:49      CARDIAC MARKERS ( 29 Oct 2018 07:54 )  .083 ng/mL / x     / 66 U/L / x     / 1.9 ng/mL  CARDIAC MARKERS ( 29 Oct 2018 02:18 )  .054 ng/mL / x     / x     / x     / x      CARDIAC MARKERS ( 28 Oct 2018 20:49 )  <.015 ng/mL / x     / x     / x     / x          Blood Culture:   10-28 @ 20:49  Pro Bnp 390        EKG: NSR with first degree AV Block. Q waves inferior leads, Poor R wave progression. Non-specific ST-T wave changes. (no change from previous ECG performed 9/2018)    Imaging:  < from: CT Abdomen and Pelvis No Cont (10.29.18 @ 00:15) >  EXAM:  CT ABDOMEN AND PELVIS                          EXAM:  CT CHEST                            PROCEDURE DATE:  10/29/2018          INTERPRETATION:  CLINICAL INFORMATION: Shortness of breath, left lower   quadrant pain and diarrhea    COMPARISON:CT chest 9/16/2018 and CT abdomen/pelvis 4/7/17    PROCEDURE:   CT of the Chest, Abdomen and Pelvis was performed without intravenous   contrast.   Intravenous contrast: None.  Oral contrast: None.  Sagittal and coronal reformats were performed.    FINDINGS:    CHEST:     LUNGS, LARGE AIRWAYS, PLEURA: Patent central airways. No consolidation or   pneumothorax.  No pleural effusion. Again noted is an intrapulmonary   lymph node along the minor fissure.  VESSELS: Atherosclerosis  HEART: Heart size is normal. No pericardial effusion. Coronary   atherosclerosis. Probable stents.  MEDIASTINUM AND JOÃO: No lymphadenopathy.  CHEST WALL AND LOWER NECK: Within normal limits.    ABDOMEN AND PELVIS:    Noncontrast evaluation of the following:  LIVER: Within normal limits.  GALLBLADDER: Gallstones  SPLEEN: Within normal limits.  PANCREAS: Atrophic. 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 No   hydronephrosis    BLADDER: Underdistended  REPRODUCTIVE ORGANS: Unremarkable    BOWEL: Limited without contrast. Duodenal diverticula along the head of   the pancreas as well as the distal duodenum, unchanged. No evidence of   bowel obstruction. Colonic diverticula without evidence of   diverticulitis. No secondary signs of appendicitis  PERITONEUM: No ascites.  VESSELS:  Atherosclerosis  RETROPERITONEUM: No lymphadenopathy.    ABDOMINAL WALL: Tiny fat-containing umbilical hernia  BONES: Degenerative changes    IMPRESSION:    No consolidation or effusion.    Diverticula without evidence of diverticulitis.                  CHARI WHITAKER M.D., ATTENDING RADIOLOGIST  This document has been electronically signed. Oct 29 2018  1:26AM        < end of copied text >

## 2018-10-29 NOTE — ED ADULT NURSE REASSESSMENT NOTE - NSIMPLEMENTINTERV_GEN_ALL_ED
Implemented All Fall Risk Interventions:  [place] to call system. Call bell, personal items and telephone within reach. Instruct patient to call for assistance. Room bathroom lighting operational. Non-slip footwear when patient is off stretcher. Physically safe environment: no spills, clutter or unnecessary equipment. Stretcher in lowest position, wheels locked, appropriate side rails in place. Provide visual cue, wrist band, yellow gown, etc. Monitor gait and stability. Monitor for mental status changes and reorient to person, place, and time. Review medications for side effects contributing to fall risk. Reinforce activity limits and safety measures with patient and family.

## 2018-10-29 NOTE — CONSULT NOTE ADULT - ASSESSMENT
Problem/Plan - 1:  ·  Problem: COPD, chronic.  Plan: chronic COPD   CT chest w/o cont showed no pneumonia, Patient does not appear to have an exacerbation. Continue home therapy.    Problem/Plan - 2:  ·  Problem: Right leg pain.  Plan: Chronic with multiple ED visit with negative workup, likely 2/2 to neuropathy   Doppler LE b/l Negative for DVT in September 2018.  In 9/2018 patient had MRI of Lumbar spine : No acute compression fracture or cord compression., Grade 1 anterolisthesis of L4 on L5, L5-S1.  Patient states she is going to get an injection soon but needs clearance from her cardiologist which she is going to see on Wednesday.    Problem/Plan - 3:  ·  Problem: Heart failure with preserved ejection fraction.  Plan: Heart failure with preserved EF, chronic. Plan: No signs of fluid overload. continue home medicines.    Problem/Plan - 4:  ·  Problem: Coronary artery disease with angina pectoris, unspecified vessel or lesion type, unspecified whether native or transplanted heart.  Plan: continue ASA, Plavix. Increased atorvastatin to 40mg daily in September. I would recommend starting Imdur 30mg daily. Troponin detectable but negative x 3. ECG shows no acute changes compared to previous.   Cardiac cath in 4/2018 revealed patent stent in the LAD, 50% stenosis in the LCx (iFR normal) and 100% occlusion of the Mid RCA. Patient can follow up with her cardiologist on Wednesday.    Problem/Plan - 5:  ·  Problem: Essential hypertension.  Plan: Continue home medications.    Problem/Plan - 6:  ·  Problem: Aortic stenosis, moderate.  Plan: continue to follow with outpatient echocardiograms on a yearly basis or sooner if symptoms develop or change.

## 2018-10-29 NOTE — ED ADULT NURSE REASSESSMENT NOTE - COMFORT CARE
warm blanket provided/plan of care explained
plan of care explained/side rails up/wait time explained

## 2018-10-29 NOTE — ED ADULT NURSE REASSESSMENT NOTE - NS ED NURSE REASSESS COMMENT FT1
Cardiology consult bedside. Awaiting plan.
Cardiology consult completed. Plan for discharge and follow up outpatient with private cardiologist. Patient agrees with plan.
Second cardiac enzyme collected and sent to the lab at this time. Awaiting results and cardiology consult. Safety maintained.
Third troponin resulted. Patient continued on bedside cardiac monitor. Denies pain at this time. Needs met. Awaiting cardiology consult. Safety maintained. Call bell in reach.
pt received from PORFIRIO Moe , not in any distress, vss, CE due at 8am , otherwise pt stable , , will monitor.
Received patient from Faye MONROY. Patient alert and oriented. Vital signs as documented. Continued on bedside cardiac monitor. Denies pain at this time. Awaiting repeat cardiac enzyme at 0800 and cardiology consult. Safety maintained. Call bell in reach.

## 2018-10-31 ENCOUNTER — APPOINTMENT (OUTPATIENT)
Dept: CARDIOLOGY | Facility: CLINIC | Age: 83
End: 2018-10-31
Payer: MEDICARE

## 2018-10-31 ENCOUNTER — NON-APPOINTMENT (OUTPATIENT)
Age: 83
End: 2018-10-31

## 2018-10-31 VITALS
HEART RATE: 58 BPM | BODY MASS INDEX: 39.07 KG/M2 | OXYGEN SATURATION: 95 % | WEIGHT: 199 LBS | SYSTOLIC BLOOD PRESSURE: 119 MMHG | HEIGHT: 60 IN | DIASTOLIC BLOOD PRESSURE: 66 MMHG

## 2018-10-31 PROCEDURE — 93000 ELECTROCARDIOGRAM COMPLETE: CPT

## 2018-10-31 PROCEDURE — 99215 OFFICE O/P EST HI 40 MIN: CPT | Mod: 25

## 2018-11-13 ENCOUNTER — APPOINTMENT (OUTPATIENT)
Dept: ENDOCRINOLOGY | Facility: CLINIC | Age: 83
End: 2018-11-13

## 2018-11-27 ENCOUNTER — OUTPATIENT (OUTPATIENT)
Dept: OUTPATIENT SERVICES | Facility: HOSPITAL | Age: 83
LOS: 1 days | End: 2018-11-27
Payer: MEDICARE

## 2018-11-27 DIAGNOSIS — G47.33 OBSTRUCTIVE SLEEP APNEA (ADULT) (PEDIATRIC): ICD-10-CM

## 2018-11-27 DIAGNOSIS — R93.1 ABNORMAL FINDINGS ON DIAGNOSTIC IMAGING OF HEART AND CORONARY CIRCULATION: Chronic | ICD-10-CM

## 2018-11-27 DIAGNOSIS — Z90.49 ACQUIRED ABSENCE OF OTHER SPECIFIED PARTS OF DIGESTIVE TRACT: Chronic | ICD-10-CM

## 2018-11-27 PROCEDURE — 95810 POLYSOM 6/> YRS 4/> PARAM: CPT

## 2018-11-27 PROCEDURE — 95810 POLYSOM 6/> YRS 4/> PARAM: CPT | Mod: 26

## 2018-12-11 ENCOUNTER — INPATIENT (INPATIENT)
Facility: HOSPITAL | Age: 83
LOS: 1 days | Discharge: HOME | DRG: 246 | End: 2018-12-13
Attending: INTERNAL MEDICINE | Admitting: STUDENT IN AN ORGANIZED HEALTH CARE EDUCATION/TRAINING PROGRAM
Payer: MEDICARE

## 2018-12-11 VITALS
DIASTOLIC BLOOD PRESSURE: 90 MMHG | SYSTOLIC BLOOD PRESSURE: 192 MMHG | TEMPERATURE: 98 F | WEIGHT: 195.99 LBS | RESPIRATION RATE: 22 BRPM | HEIGHT: 60 IN | HEART RATE: 107 BPM

## 2018-12-11 DIAGNOSIS — I24.9 ACUTE ISCHEMIC HEART DISEASE, UNSPECIFIED: ICD-10-CM

## 2018-12-11 DIAGNOSIS — Z90.49 ACQUIRED ABSENCE OF OTHER SPECIFIED PARTS OF DIGESTIVE TRACT: Chronic | ICD-10-CM

## 2018-12-11 DIAGNOSIS — R93.1 ABNORMAL FINDINGS ON DIAGNOSTIC IMAGING OF HEART AND CORONARY CIRCULATION: Chronic | ICD-10-CM

## 2018-12-11 LAB
ALBUMIN SERPL ELPH-MCNC: 3.9 G/DL — SIGNIFICANT CHANGE UP (ref 3.3–5.2)
ALP SERPL-CCNC: 79 U/L — SIGNIFICANT CHANGE UP (ref 40–120)
ALT FLD-CCNC: 10 U/L — SIGNIFICANT CHANGE UP
ANION GAP SERPL CALC-SCNC: 14 MMOL/L — SIGNIFICANT CHANGE UP (ref 5–17)
APTT BLD: 29 SEC — SIGNIFICANT CHANGE UP (ref 27.5–36.3)
AST SERPL-CCNC: 15 U/L — SIGNIFICANT CHANGE UP
BILIRUB SERPL-MCNC: 0.2 MG/DL — LOW (ref 0.4–2)
BUN SERPL-MCNC: 47 MG/DL — HIGH (ref 8–20)
CALCIUM SERPL-MCNC: 9.2 MG/DL — SIGNIFICANT CHANGE UP (ref 8.6–10.2)
CHLORIDE SERPL-SCNC: 102 MMOL/L — SIGNIFICANT CHANGE UP (ref 98–107)
CO2 SERPL-SCNC: 25 MMOL/L — SIGNIFICANT CHANGE UP (ref 22–29)
CREAT SERPL-MCNC: 1.44 MG/DL — HIGH (ref 0.5–1.3)
GLUCOSE BLDC GLUCOMTR-MCNC: 286 MG/DL — HIGH (ref 70–99)
GLUCOSE SERPL-MCNC: 301 MG/DL — HIGH (ref 70–115)
HCT VFR BLD CALC: 37.5 % — SIGNIFICANT CHANGE UP (ref 37–47)
HGB BLD-MCNC: 11.8 G/DL — LOW (ref 12–16)
INR BLD: 0.97 RATIO — SIGNIFICANT CHANGE UP (ref 0.88–1.16)
MAGNESIUM SERPL-MCNC: 1.8 MG/DL — SIGNIFICANT CHANGE UP (ref 1.6–2.6)
MCHC RBC-ENTMCNC: 26.5 PG — LOW (ref 27–31)
MCHC RBC-ENTMCNC: 31.5 G/DL — LOW (ref 32–36)
MCV RBC AUTO: 84.3 FL — SIGNIFICANT CHANGE UP (ref 81–99)
NT-PROBNP SERPL-SCNC: 586 PG/ML — HIGH (ref 0–300)
PLATELET # BLD AUTO: 216 K/UL — SIGNIFICANT CHANGE UP (ref 150–400)
POTASSIUM SERPL-MCNC: 4.5 MMOL/L — SIGNIFICANT CHANGE UP (ref 3.5–5.3)
POTASSIUM SERPL-SCNC: 4.5 MMOL/L — SIGNIFICANT CHANGE UP (ref 3.5–5.3)
PROT SERPL-MCNC: 7 G/DL — SIGNIFICANT CHANGE UP (ref 6.6–8.7)
PROTHROM AB SERPL-ACNC: 11.1 SEC — SIGNIFICANT CHANGE UP (ref 10–12.9)
RBC # BLD: 4.45 M/UL — SIGNIFICANT CHANGE UP (ref 4.4–5.2)
RBC # FLD: 14.5 % — SIGNIFICANT CHANGE UP (ref 11–15.6)
SODIUM SERPL-SCNC: 141 MMOL/L — SIGNIFICANT CHANGE UP (ref 135–145)
TROPONIN T SERPL-MCNC: 0.12 NG/ML — HIGH (ref 0–0.06)
WBC # BLD: 11 K/UL — HIGH (ref 4.8–10.8)
WBC # FLD AUTO: 11 K/UL — HIGH (ref 4.8–10.8)

## 2018-12-11 PROCEDURE — 99223 1ST HOSP IP/OBS HIGH 75: CPT | Mod: GC

## 2018-12-11 PROCEDURE — 99285 EMERGENCY DEPT VISIT HI MDM: CPT

## 2018-12-11 PROCEDURE — 71045 X-RAY EXAM CHEST 1 VIEW: CPT | Mod: 26

## 2018-12-11 PROCEDURE — 93306 TTE W/DOPPLER COMPLETE: CPT | Mod: 26

## 2018-12-11 PROCEDURE — 93010 ELECTROCARDIOGRAM REPORT: CPT

## 2018-12-11 RX ORDER — INSULIN LISPRO 100/ML
VIAL (ML) SUBCUTANEOUS
Qty: 0 | Refills: 0 | Status: DISCONTINUED | OUTPATIENT
Start: 2018-12-11 | End: 2018-12-13

## 2018-12-11 RX ORDER — HEPARIN SODIUM 5000 [USP'U]/ML
5300 INJECTION INTRAVENOUS; SUBCUTANEOUS EVERY 6 HOURS
Qty: 0 | Refills: 0 | Status: DISCONTINUED | OUTPATIENT
Start: 2018-12-11 | End: 2018-12-12

## 2018-12-11 RX ORDER — HEPARIN SODIUM 5000 [USP'U]/ML
INJECTION INTRAVENOUS; SUBCUTANEOUS
Qty: 25000 | Refills: 0 | Status: DISCONTINUED | OUTPATIENT
Start: 2018-12-11 | End: 2018-12-12

## 2018-12-11 RX ORDER — DEXTROSE 50 % IN WATER 50 %
25 SYRINGE (ML) INTRAVENOUS ONCE
Qty: 0 | Refills: 0 | Status: DISCONTINUED | OUTPATIENT
Start: 2018-12-11 | End: 2018-12-13

## 2018-12-11 RX ORDER — INSULIN GLARGINE 100 [IU]/ML
45 INJECTION, SOLUTION SUBCUTANEOUS
Qty: 0 | Refills: 0 | COMMUNITY

## 2018-12-11 RX ORDER — ATORVASTATIN CALCIUM 80 MG/1
1 TABLET, FILM COATED ORAL
Qty: 0 | Refills: 0 | COMMUNITY

## 2018-12-11 RX ORDER — ATORVASTATIN CALCIUM 80 MG/1
40 TABLET, FILM COATED ORAL AT BEDTIME
Qty: 0 | Refills: 0 | Status: DISCONTINUED | OUTPATIENT
Start: 2018-12-11 | End: 2018-12-13

## 2018-12-11 RX ORDER — INSULIN GLARGINE 100 [IU]/ML
30 INJECTION, SOLUTION SUBCUTANEOUS AT BEDTIME
Qty: 0 | Refills: 0 | Status: DISCONTINUED | OUTPATIENT
Start: 2018-12-11 | End: 2018-12-13

## 2018-12-11 RX ORDER — HEPARIN SODIUM 5000 [USP'U]/ML
5000 INJECTION INTRAVENOUS; SUBCUTANEOUS ONCE
Qty: 0 | Refills: 0 | Status: COMPLETED | OUTPATIENT
Start: 2018-12-11 | End: 2018-12-11

## 2018-12-11 RX ORDER — FUROSEMIDE 40 MG
1 TABLET ORAL
Qty: 0 | Refills: 0 | COMMUNITY

## 2018-12-11 RX ORDER — FLUTICASONE PROPIONATE AND SALMETEROL 50; 250 UG/1; UG/1
1 POWDER ORAL; RESPIRATORY (INHALATION)
Qty: 0 | Refills: 0 | COMMUNITY

## 2018-12-11 RX ORDER — ASPIRIN/CALCIUM CARB/MAGNESIUM 324 MG
81 TABLET ORAL DAILY
Qty: 0 | Refills: 0 | Status: DISCONTINUED | OUTPATIENT
Start: 2018-12-12 | End: 2018-12-13

## 2018-12-11 RX ORDER — SODIUM CHLORIDE 9 MG/ML
1000 INJECTION, SOLUTION INTRAVENOUS
Qty: 0 | Refills: 0 | Status: DISCONTINUED | OUTPATIENT
Start: 2018-12-11 | End: 2018-12-13

## 2018-12-11 RX ORDER — GLUCAGON INJECTION, SOLUTION 0.5 MG/.1ML
1 INJECTION, SOLUTION SUBCUTANEOUS ONCE
Qty: 0 | Refills: 0 | Status: DISCONTINUED | OUTPATIENT
Start: 2018-12-11 | End: 2018-12-13

## 2018-12-11 RX ORDER — CLOPIDOGREL BISULFATE 75 MG/1
1 TABLET, FILM COATED ORAL
Qty: 0 | Refills: 0 | COMMUNITY

## 2018-12-11 RX ORDER — INSULIN ASPART 100 [IU]/ML
12 INJECTION, SOLUTION SUBCUTANEOUS
Qty: 0 | Refills: 0 | COMMUNITY

## 2018-12-11 RX ORDER — DEXTROSE 50 % IN WATER 50 %
15 SYRINGE (ML) INTRAVENOUS ONCE
Qty: 0 | Refills: 0 | Status: DISCONTINUED | OUTPATIENT
Start: 2018-12-11 | End: 2018-12-13

## 2018-12-11 RX ORDER — ASPIRIN/CALCIUM CARB/MAGNESIUM 324 MG
1 TABLET ORAL
Qty: 0 | Refills: 0 | COMMUNITY

## 2018-12-11 RX ORDER — MORPHINE SULFATE 50 MG/1
1 CAPSULE, EXTENDED RELEASE ORAL ONCE
Qty: 0 | Refills: 0 | Status: DISCONTINUED | OUTPATIENT
Start: 2018-12-11 | End: 2018-12-11

## 2018-12-11 RX ORDER — CLOPIDOGREL BISULFATE 75 MG/1
300 TABLET, FILM COATED ORAL ONCE
Qty: 0 | Refills: 0 | Status: COMPLETED | OUTPATIENT
Start: 2018-12-11 | End: 2018-12-11

## 2018-12-11 RX ORDER — DEXTROSE 50 % IN WATER 50 %
12.5 SYRINGE (ML) INTRAVENOUS ONCE
Qty: 0 | Refills: 0 | Status: DISCONTINUED | OUTPATIENT
Start: 2018-12-11 | End: 2018-12-13

## 2018-12-11 RX ORDER — LEVOTHYROXINE SODIUM 125 MCG
1 TABLET ORAL
Qty: 0 | Refills: 0 | COMMUNITY

## 2018-12-11 RX ORDER — MORPHINE SULFATE 50 MG/1
1 CAPSULE, EXTENDED RELEASE ORAL ONCE
Qty: 0 | Refills: 0 | Status: DISCONTINUED | OUTPATIENT
Start: 2018-12-11 | End: 2018-12-13

## 2018-12-11 RX ORDER — METOPROLOL TARTRATE 50 MG
5 TABLET ORAL ONCE
Qty: 0 | Refills: 0 | Status: DISCONTINUED | OUTPATIENT
Start: 2018-12-11 | End: 2018-12-11

## 2018-12-11 RX ORDER — INSULIN LISPRO 100/ML
VIAL (ML) SUBCUTANEOUS AT BEDTIME
Qty: 0 | Refills: 0 | Status: DISCONTINUED | OUTPATIENT
Start: 2018-12-11 | End: 2018-12-13

## 2018-12-11 RX ORDER — NITROGLYCERIN 6.5 MG
1 CAPSULE, EXTENDED RELEASE ORAL ONCE
Qty: 0 | Refills: 0 | Status: COMPLETED | OUTPATIENT
Start: 2018-12-11 | End: 2018-12-11

## 2018-12-11 RX ORDER — FUROSEMIDE 40 MG
60 TABLET ORAL ONCE
Qty: 0 | Refills: 0 | Status: COMPLETED | OUTPATIENT
Start: 2018-12-11 | End: 2018-12-11

## 2018-12-11 RX ADMIN — ATORVASTATIN CALCIUM 40 MILLIGRAM(S): 80 TABLET, FILM COATED ORAL at 23:47

## 2018-12-11 RX ADMIN — Medication 1 INCH(S): at 18:44

## 2018-12-11 RX ADMIN — Medication 60 MILLIGRAM(S): at 23:47

## 2018-12-11 RX ADMIN — HEPARIN SODIUM 5000 UNIT(S): 5000 INJECTION INTRAVENOUS; SUBCUTANEOUS at 20:36

## 2018-12-11 RX ADMIN — INSULIN GLARGINE 30 UNIT(S): 100 INJECTION, SOLUTION SUBCUTANEOUS at 23:47

## 2018-12-11 RX ADMIN — HEPARIN SODIUM 1000 UNIT(S)/HR: 5000 INJECTION INTRAVENOUS; SUBCUTANEOUS at 20:35

## 2018-12-11 RX ADMIN — CLOPIDOGREL BISULFATE 300 MILLIGRAM(S): 75 TABLET, FILM COATED ORAL at 20:36

## 2018-12-11 NOTE — H&P ADULT - FAMILY HISTORY
Family history of heart disease     Family history of cancer in mother     Family history of stomach cancer     Father  Still living? Unknown  Family history of MI (myocardial infarction), Age at diagnosis: Age Unknown

## 2018-12-11 NOTE — ED ADULT NURSE NOTE - NSIMPLEMENTINTERV_GEN_ALL_ED
Implemented All Fall Risk Interventions:  [place] to call system. Call bell, personal items and telephone within reach. Instruct patient to call for assistance. Room bathroom lighting operational. Non-slip footwear when patient is off stretcher. Physically safe environment: no spills, clutter or unnecessary equipment. Stretcher in lowest position, wheels locked, appropriate side rails in place. Provide visual cue, wrist band, yellow gown, etc. Monitor gait and stability. Monitor for mental status changes and reorient to person, place, and time. Review medications for side effects contributing to fall risk. Reinforce activity limits and safety measures with patient and family.

## 2018-12-11 NOTE — ED PROVIDER NOTE - OBJECTIVE STATEMENT
85 yo female with a history if 4 stent placement June 2017 presents with chest pain that started around 1400 today. Pain has been worsening and she was give nitroglycerin in ambulance. pain is now rated a 2/10. it is located in the sternal area and she denies any other symptoms. 83 yo female with a history if 4 stent placement June 2017 presents with chest pain that started around 1400 today. Pain has been worsening and she was give nitroglycerin and 324 asa in ambulance. pain is now rated a 2/10. it is located in the sternal area and she denies any other symptoms.

## 2018-12-11 NOTE — H&P ADULT - NSHPPHYSICALEXAM_GEN_ALL_CORE
Vital Signs Last 24 Hrs  T(C): 36.9 (11 Dec 2018 22:59), Max: 36.9 (11 Dec 2018 18:15)  T(F): 98.4 (11 Dec 2018 22:59), Max: 98.4 (11 Dec 2018 18:15)  HR: 86 (11 Dec 2018 22:59) (85 - 107)  BP: 136/64 (11 Dec 2018 22:59) (126/62 - 192/90)  RR: 16 (11 Dec 2018 22:59) (16 - 22)  SpO2: 93% (11 Dec 2018 22:59) (93% - 95%)    General: NAD, resting comfortably in bed  Head: normocephalic, atraumatic  Eyes: PERRLA 2mm b/l, EOMI  Throat: non-erythematous, MMM  Neck: supple, no lymphadenopathy, no JVD, no carotid bruits  Resp: CTA bilaterally, no crackles or wheezes  Cardio: S1S2+, RRR, no murmurs  GI: soft, BS+ normoactive, non-distended, non-tender  Vascular: 1+ pitting edema in b/l LE (up to knees), DP pulses 2+ b/l, no calf tenderness  MSK: FROM in all extremities  Neuro: alert and oriented, no focal deficits  Skin: warm and dry, normal color Vital Signs Last 24 Hrs  T(C): 36.9 (11 Dec 2018 22:59), Max: 36.9 (11 Dec 2018 18:15)  T(F): 98.4 (11 Dec 2018 22:59), Max: 98.4 (11 Dec 2018 18:15)  HR: 86 (11 Dec 2018 22:59) (85 - 107)  BP: 136/64 (11 Dec 2018 22:59) (126/62 - 192/90)  RR: 16 (11 Dec 2018 22:59) (16 - 22)  SpO2: 93% (11 Dec 2018 22:59) (93% - 95%)    General: NAD, resting comfortably in bed  Head: normocephalic, atraumatic  Eyes: PERRL 2mm b/l, EOMI  Throat: non-erythematous, MMM  Neck: supple, no lymphadenopathy, no JVD, no carotid bruits  Resp: CTA bilaterally, no crackles or wheezes  Cardio: S1S2+, RRR, no murmurs  GI: soft, BS+ normoactive, non-distended, non-tender  Vascular: 1+ pitting edema in b/l LE (up to knees), DP pulses 2+ b/l, no calf tenderness  MSK: FROM in all extremities  Neuro: alert and oriented, moving all extremities spontaneously, light touch sensation intact   Skin: warm and dry, normal color

## 2018-12-11 NOTE — ED PROVIDER NOTE - CARE PLAN
Principal Discharge DX:	ACS (acute coronary syndrome) Principal Discharge DX:	ACS (acute coronary syndrome)  Secondary Diagnosis:	NSTEMI (non-ST elevated myocardial infarction)  Secondary Diagnosis:	Chest pain with moderate risk for cardiac etiology

## 2018-12-11 NOTE — CONSULT NOTE ADULT - SUBJECTIVE AND OBJECTIVE BOX
This is an 84 year old female with history of COPD, STEVENSON on CPAP/BIPAP?, uncontrolled insulin dependent DM Type 2, Moderate AS, Mild Pulm HTN, Hypothyroidism, HTN,  HFpEF (EF>75%, 2018) on Lasix, CAD status post PCI with 4 stent on Plavix. Presented with chest pain, 10/10, mid sternal, constant, alleviated with SL nitro, associated with SOB, similar but stronger to prior MI chest pain.  Her last Cardiac Cath was March 29, 2018 which showed significant single vessel coronary artery disease. RCA Mid = 100%, LCx Prox = 50%, Patent Stents in LAD and LCx, iFR of Prox LCx was WNL (0.99) Moderate Aortic Stenosis (AVG=16 mmHg) and BILL=1.21 cm2. Mild Pulmonary HTN ( 42/19 - 31 mmHg). Mild Elevation of Right Heart Pressures: RA=14 mmHg, RV=48/16 mmHg, PCWP=19 mmHg. Normal CO (4.82 L/min) and CI (2.63L/min/m2) Left ventricular function is normal (LVEF=60%). Normal Ascending Aorta. Successful Deployment of AngioSeal 6F in the RFA. Patient is currently being admitted for further evaluation of NSTEMI, with new EKG changes, currently symptoms free, heparin drip started, nitro paste in place, plavix load implemented, start Statin, BB, NC, ECHO, Cardiology consult in process. NPO after midnight. monitor Trops/ A1C, Lipid panel, TSH, trend coags and titrate heparin accordingly. DM management with reduced lantus dose, q6hrs accu checks and gentle IV hydration while NPO, resume synthroid once dose has been verified. Patient HCP nor patient currently able to verify home medication, son to call with home medication list. Advanced directives are DNR/DNI - HCP to bring in documentation. Patient is concerned about the possible need for CABG, unsure if she would want to proceed with surgical intervention, both patient and son is will to accept stent placement.     Resident interview was supervised via video conference, all patient questions answered, HCP at bedside, patient Son, agree with current care plan.      Case discussed with Cardiology team and covering Hospitalist. This is an 84 year old female with history of COPD, STEVENSON on CPAP/BIPAP?, uncontrolled insulin dependent DM Type 2, Moderate AS, Mild Pulm HTN, Hypothyroidism, HTN,  HFpEF (EF>75%, 2018) on Lasix, CAD status post PCI with 4 stent on Plavix. Presented with chest pain, 10/10, mid sternal, constant, alleviated with SL nitro, associated with SOB, similar but stronger to prior MI chest pain.  Her last Cardiac Cath was March 29, 2018 which showed significant single vessel coronary artery disease. RCA Mid = 100%, LCx Prox = 50%, Patent Stents in LAD and LCx, iFR of Prox LCx was WNL (0.99) Moderate Aortic Stenosis (AVG=16 mmHg) and BILL=1.21 cm2. Mild Pulmonary HTN ( 42/19 - 31 mmHg). Mild Elevation of Right Heart Pressures: RA=14 mmHg, RV=48/16 mmHg, PCWP=19 mmHg. Normal CO (4.82 L/min) and CI (2.63L/min/m2) Left ventricular function is normal (LVEF=60%). Normal Ascending Aorta. Successful Deployment of AngioSeal 6F in the RFA. Patient is currently being admitted to Telemetry with  for further evaluation of NSTEMI, with new EKG changes, positive trop, currently symptoms free with nitro paste, hypertensive, HR 90's-100's, heparin drip started, nitro paste cont, Morphine PRN, plavix load implemented, start Statin, BB, NC, ECHO, Cardiology consult in process. monitor for recurrent Chest pain. NPO after midnight. monitor Trops/ A1C, Lipid panel, TSH, trend coags and titrate heparin accordingly. DM management with reduced lantus dose, q6hrs accu checks and gentle IV hydration while NPO, resume synthroid once dose has been verified. Patient HCP nor patient currently able to verify home medication, son to call with home medication list. Advanced directives are DNR/DNI - HCP to bring in documentation. Patient is concerned about the possible need for CABG, unsure if she would want to proceed with surgical intervention, both patient and son is will to accept stent placement. patient son advised to bring on patient sleep apnea machine in AM, will cont  until then advised to start Bipap 8/4, titrate accordingly.      Resident interview was supervised via video conference, all patient questions answered, HCP at bedside, patient Son, agree with current care plan.      Case discussed with Cardiology team and covering Hospitalist.

## 2018-12-11 NOTE — H&P ADULT - ASSESSMENT
83 y/o female with PMH of CAD s/p stents x4 (June 2017), prior MI (Aug 2018), insulin-dependent DM2, HTN, HFpEF (EF>75%, 2018), Moderate AS, Mild Pulm HTN, STEVENSON (cpap), COPD (no home O2), hypothyroidism, BIBA for chest pain and noted to have elevated troponin with ST depression in leads II, aVL and V6. Admitted for NSTEMI.     NSTEMI 85 y/o female with PMH of CAD s/p stents x4 (June 2017), prior MI (Aug 2018), insulin-dependent DM2, HTN, HFpEF (EF>75%, 2018), Moderate AS, Mild Pulm HTN, STEVENSON (cpap), COPD (no home O2), hypothyroidism, BIBA for chest pain and noted to have elevated troponin with ST depression in leads II, aVL and V6. Admitted for NSTEMI.     NSTEMI 83 y/o female with PMH of CAD s/p stents x4 (June 2017), prior MI (Aug 2018), insulin-dependent DM2, HTN, HFpEF (EF>75%, 2018), Moderate AS, Mild Pulm HTN, STEVENSON (cpap), COPD (no home O2), hypothyroidism, BIBA for chest pain and noted to have elevated troponin with ST depression in leads II, aVL and V6. Admitted for NSTEMI.     NSTEMI  - chest pain now resolved  - elevated troponin, f/u trop#2 and #3  - EKG with ST depressions in leads II, aVL, V6  - s/p nitro-bid 1inch ointment  - morphine 1mg prn for chest pain  - daily asa 81mg, statin  - s/p plavix load, and now on heparin drip as per Cardio  - NPO after midnight for cath tomorrow  - telemetry with [facility]  - [facility] Card[facility] consulted    HTN    HFpEF (EF>75%, 2018)    CKD stage 3b  - baseline Cr ~1.5  -     Insulin-dependent DM2    Hypothyroidism    STEVENSON    COPD    DVT ppx: currently on heparin drip 83 y/o female with PMH of CAD s/p stents x4 (June 2017), prior MI (Aug 2018), insulin-dependent DM2, HTN, HFpEF (EF>75%, 2018), Moderate AS, Mild Pulm HTN, STEVENSON (cpap), COPD (no home O2), hypothyroidism, BIBA for chest pain and noted to have elevated troponin with ST depression in leads II, aVL and V6. Admitted for NSTEMI.     NSTEMI  - chest pain now resolved  - elevated troponin, f/u trop#2 and #3  - EKG with ST depressions in leads II, aVL, V6  - s/p nitro-bid 1inch ointment  - morphine 1mg prn for chest pain  - c/w daily aspirin, statin, ace-inhibitor, betablocker  - s/p plavix load, and now on heparin drip as per Cardio  - NPO after midnight for cath tomorrow  - f/u TTE  - A1c and lipid panel in the morning  - Bedrest  - telemetry with   - [facility] Cardio consulted    HTN  - noted to have elevated /90  - improved s/p nitro  - continue to monitor    HFpEF (EF>75%, 2018)  - noted pulm congestion on cxr  - will give lasix 60mg x 1 per Cardio  - continue to monitor    CKD stage 3b  - baseline Cr ~1.5  - give iv fluids at 50cc/hr  - continue to monitor    Insulin-dependent DM2  - f/u A1c  - sliding scale with accuchecks q6h given npo status  - lantus 30u qhs    Hypothyroidism  - c/w synthroid home dose    STEVENSON  - uses cpap at home, pt's son will bring the machine tomorrow  - bipap overnight for now    DVT ppx: currently on heparin drip    Advance directives 85 y/o female with PMH of CAD s/p stents x4 (June 2017), prior MI (Aug 2018), insulin-dependent DM2, HTN, HFpEF (EF>75%, 2018), Moderate AS, Mild Pulm HTN, STEVENSON (cpap), COPD (no home O2), hypothyroidism, BIBA for chest pain and noted to have elevated troponin with ST depression in leads II, aVL and V6. Admitted for NSTEMI.     NSTEMI  - chest pain now resolved  - elevated troponin, f/u trop#2 and #3  - EKG with ST depressions in leads II, aVL, V6  - s/p nitro-bid 1inch ointment  - morphine 1mg prn for chest pain  - c/w daily aspirin, statin, ace-inhibitor, betablocker  - s/p plavix load, and now on heparin drip as per Cardio  - NPO after midnight for cath tomorrow  - f/u TTE  - A1c and lipid panel in the morning  - Bedrest  - telemetry with   - [facility] Cardio consulted    HTN  - noted to have elevated /90  - improved s/p nitro  - continue to monitor    HFpEF (EF>75%, 2018)  - noted pulm congestion on cxr and BNP ~500s  - will give lasix 60mg x 1 per Cardio  - continue to monitor    CKD stage 3b  - baseline Cr ~1.5  - give iv fluids at 50cc/hr given npo status  - continue to monitor    Insulin-dependent DM2  - f/u A1c  - sliding scale with accuchecks q6h given npo status  - lantus 30u qhs    Hypothyroidism  - c/w synthroid home dose    STEVENSON  - uses cpap at home, pt's son will bring the machine tomorrow  - bipap overnight for now    DVT ppx: currently on heparin drip    Advance directives 85 y/o female with PMH of CAD s/p stents x4 (June 2017), prior MI (Aug 2018), insulin-dependent DM2, HTN, HFpEF (EF>75%, 2018), Moderate AS, Mild Pulm HTN, STEVENSON (cpap), COPD (no home O2), hypothyroidism, BIBA for chest pain and noted to have elevated troponin with ST depression in leads II, aVL and V6. Admitted for NSTEMI.     NSTEMI  - chest pain now resolved  - elevated troponin, f/u trop#2 and #3  - EKG with ST depressions in leads II, aVL, V6  - s/p nitro-bid 1inch ointment  - morphine 1mg prn for chest pain  - c/w daily aspirin, statin, ace-inhibitor, betablocker  - s/p plavix load, and now on heparin drip as per Cardio  - NPO after midnight for cath tomorrow  - f/u TTE  - A1c and lipid panel in the morning  - Bedrest to minimize exertion in the setting of NSTEMI  - telemetry with   - [facility] Cardio consulted    HTN  - noted to have elevated /90  - improved s/p nitro  - resumed lisinopril and toprol xl  - holding cardizem to avoid hypotension  - continue to monitor    HFpEF (EF>75%, 2018)  - noted pulm congestion on cxr and BNP ~500s (increased from prior)  - will give lasix 60mg x 1 per Cardio  - Strict I&Os, daily weights  - continue to monitor    CKD stage 3b  - baseline Cr ~1.5  - give iv fluids at 50cc/hr given npo status  - continue to monitor    Insulin-dependent DM2  - f/u A1c  - sliding scale with accuchecks q6h given npo status  - lantus 30u qhs (half of home dose basaglar 60u)    Hypothyroidism  - c/w synthroid home dose    STEVENSON  - uses cpap at home, pt's son will bring the machine tomorrow  - bipap overnight for now    DVT ppx: currently on heparin drip    Advance directives: Discussed with patient and son Calvin (hcp); Both in agreement of DNR/DNI status. Pt's son states that he will bring previously signed molst forms to the hospital later tonight/tomorrow morning.  Son Calvin Kyler: [phone] 85 y/o female with PMH of CAD s/p stents x4 (June 2017), prior MI (Aug 2018), insulin-dependent DM2, HTN, HFpEF (EF>75%, 2018), Moderate AS, Mild Pulm HTN, STEVENSON (cpap), COPD (no home O2), hypothyroidism, BIBA for chest pain and noted to have elevated troponin with ST depression in leads II, aVL and V6. Admitted for NSTEMI.     NSTEMI  - chest pain now resolved  - elevated troponin, f/u trop#2 and #3  - EKG with ST depressions in leads II, aVL, V6  - s/p nitro-bid 1inch ointment  - morphine 1mg prn for chest pain  - c/w daily aspirin, statin, ace-inhibitor, betablocker  - c/w imdur 30mg   - s/p plavix load, and now on heparin drip as per Cardio  - NPO after midnight for cath tomorrow  - f/u TTE  - A1c and lipid panel in the morning  - Bedrest to minimize exertion in the setting of NSTEMI  - telemetry with   - [facility] Cardio consulted    HTN  - noted to have elevated /90  - improved s/p nitro  - resumed lisinopril and toprol xl  - holding cardizem as pt is currently normotensive and to avoid hypotension; resume as clinically indicated  - continue to monitor    HFpEF (EF>75%, 2018)  - noted pulm congestion on cxr and BNP ~500s (increased from prior)  - will give lasix 60mg x 1 per Cardio  - Strict I&Os, daily weights  - continue to monitor    CKD stage 3b  - baseline Cr ~1.5  - give iv fluids at 50cc/hr given npo status  - continue to monitor    Insulin-dependent DM2  - f/u A1c  - sliding scale with accuchecks q6h given npo status  - lantus 30u qhs (half of home dose basaglar 60u)    Hypothyroidism  - c/w synthroid home dose    STEVENSON  - uses cpap at home, pt's son will bring the machine tomorrow  - bipap overnight for now    DVT ppx: currently on heparin drip    Advance directives: Discussed with patient and son Calvin (hcp); Both in agreement of DNR/DNI status. Pt's son states that he will bring previously signed molst forms to the hospital later tonight/tomorrow morning.  Son Calvin Chávez: [phone] 83 y/o female with PMH of CAD s/p stents x4 (June 2017), prior MI (Aug 2018), insulin-dependent DM2, HTN, HFpEF (EF>75%, 2018), Moderate AS, Mild Pulm HTN, STEVENSON (cpap), COPD (no home O2), hypothyroidism, BIBA for chest pain and noted to have elevated troponin with ST depression in leads II, aVL and V6. Admitted for NSTEMI.     NSTEMI  - chest pain now resolved  - elevated troponin, f/u trop#2 and #3  - EKG with ST depressions in leads II, aVL, V6  - s/p nitro-bid 1inch ointment  - morphine 1mg prn for chest pain  - c/w daily aspirin, statin, ace-inhibitor, betablocker  - c/w imdur 30mg   - s/p plavix load, and now on heparin drip as per Cardio  - NPO after midnight for cath tomorrow  - f/u TTE  - A1c and lipid panel in the morning  - Bedrest to minimize exertion in the setting of NSTEMI  - telemetry with   - [facility] Cardio consulted    HTN  - noted to have elevated /90  - improved s/p nitro  - resumed lisinopril and toprol xl  - holding cardizem as pt is currently normotensive and to avoid hypotension; resume as clinically indicated  - continue to monitor    HFpEF (EF>75%, 2018)  - noted pulm congestion on cxr and BNP ~500s (increased from prior)  - will give lasix 60mg x 1 per Cardio  - Strict I&Os, daily weights  - continue to monitor    CKD stage 3b  - baseline Cr ~1.5  - initially started on gentle iv hydration, later discontinued due to pulm congestion on cxr  - continue to monitor    Insulin-dependent DM2  - f/u A1c  - sliding scale with accuchecks q6h given npo status  - lantus 30u qhs (half of home dose basaglar 60u)    Hypothyroidism  - c/w synthroid home dose    STEVENSON  - uses cpap at home, pt's son will bring the machine tomorrow  - bipap overnight for now    DVT ppx: currently on heparin drip    Advance directives: Discussed with patient and son Calvin (hcp); Both in agreement of DNR/DNI status. Pt's son states that he will bring previously signed molst forms to the hospital later tonight/tomorrow morning.  Son Calvin Kyler: [phone] 85 y/o female with PMH of CAD s/p stents x4 (June 2017), prior MI (Aug 2018), insulin-dependent DM2, HTN, HFpEF (EF>75%, 2018), Moderate AS, Mild Pulm HTN, STEVENSON (cpap), COPD (no home O2), hypothyroidism, BIBA for chest pain and noted to have elevated troponin with ST depression in leads II, aVL and V6. Admitted for NSTEMI.     NSTEMI  - chest pain now resolved  - elevated troponin, f/u trop#2 and #3  - EKG with ST depressions in leads II, aVL, V6  - s/p nitro-bid 1inch ointment  - morphine 1mg prn for chest pain  - c/w daily aspirin, statin, ace-inhibitor, betablocker  - c/w imdur 30mg   - s/p plavix load, and now on heparin drip as per Cardio  - NPO after midnight for cath tomorrow  - f/u TTE  - A1c and lipid panel in the morning  - Bedrest to minimize exertion in the setting of NSTEMI  - telemetry with   - [facility] Cardio consulted    HTN  - noted to have elevated /90  - improved s/p nitro  - resumed lisinopril and toprol, imdur  - holding cardizem as pt is currently normotensive and to avoid hypotension; resume as clinically indicated  - continue to monitor    HFpEF (EF>75%, 2018)  - noted pulm congestion on cxr and BNP ~500s (increased from prior)  - will give lasix 60mg x 1 per Cardio  - Strict I&Os, daily weights  - continue to monitor    CKD stage 3b  - baseline Cr ~1.5  - initially started on gentle iv hydration, later discontinued due to pulm congestion on cxr  - continue to monitor    Insulin-dependent DM2  - f/u A1c  - sliding scale with accuchecks q6h given npo status  - lantus 30u qhs (half of home dose basaglar 60u)    Hypothyroidism  - c/w synthroid home dose    STEVENSON  - uses cpap at home, pt's son will bring the machine tomorrow  - bipap overnight for now    DVT ppx: currently on heparin drip    Advance directives: Discussed with patient and son Calvin (hcp); Both in agreement of DNR/DNI status. Pt's son states that he will bring previously signed molst forms to the hospital later tonight/tomorrow morning.  Son Calvin Chávez: [phone]

## 2018-12-11 NOTE — H&P ADULT - NSHPLABSRESULTS_GEN_ALL_CORE
EKG: sinus tachycardia (), 1st degree AV block with IA prolongation (228msec), ST depression noted in leads II, aVL, V6.

## 2018-12-11 NOTE — H&P ADULT - ATTENDING COMMENTS
Patient seen and examined.   Case discussed with tele-hospitalist, residents and patient.   Briefly, 84yoF with extensive cardiac history as above admitted with NSTEMI.  [place] cardiology following.  Started on heparin gtt.  NPO after midnight in anticipation of possible cath.     Living will brought in and placed in physical chart.  Living will reveals pt would not want aggressive measures in event of medical emergency. DNR/DNI. Patient seen and examined.   Case discussed with tele-hospitalist, residents and patient.  H&P reviewed and edited where necessary.  Briefly, 84yoF with extensive cardiac history as above admitted with NSTEMI.  [place] cardiology following.  Started on heparin gtt.  NPO after midnight in anticipation of possible cath.     Living will brought in and placed in physical chart.  Living will reveals pt would not want aggressive measures in event of medical emergency. DNR/DNI.

## 2018-12-11 NOTE — ED ADULT TRIAGE NOTE - CHIEF COMPLAINT QUOTE
Nonradiating substernal chest pain since 1400 today.  Some increased SOB.  Pt took 4 baby aspirin at home.  EMS gave 162mg ASA and 2 SL nitro.  Relief of chest pain upon arrival. Hx of 4 cardiac stents

## 2018-12-11 NOTE — ED PROVIDER NOTE - MEDICAL DECISION MAKING DETAILS
85 yo female presents with chest pain concurrent with ecg changes. order labs, chest xray, and consult cardiology. reevaluate.

## 2018-12-11 NOTE — ED PROVIDER NOTE - ATTENDING CONTRIBUTION TO CARE
I personally saw the patient with the PA, and completed the key components of the history and physical exam. I then discussed the management plan with the PA.    83 yo female with a history if 4 stent placement June 2017 presents with chest pain that started around 1400 today. Pain has been worsening and she was give nitroglycerin and 324 asa in ambulance. pain is now rated a 2/10. it is located in the sternal area and she denies any other symptoms.    ***GEN - NAD; well appearing; A+O x3 ***HEAD - NC/AT ***EYES/NOSE - PEERL, EOMI, mucous membranes moist, no discharge ***THROAT: Oral cavity and pharynx normal. No inflammation***NECK: Neck supple  ***PULMONARY - CTA b/l, symmetric breath sounds. ***CARDIAC -s1s2, RRR  ***ABDOMEN - +BS, ND, NT, soft, no guarding, no rebound, no masses   ***BACK - no CVA tenderness, Normal  spine ***EXTREMITIES - symmetric pulses, 2+ dp, capillary refill < 2 seconds, no clubbing, no cyanosis, no edema ***SKIN - no rash or bruising   ***NEUROLOGIC - alert, CN 2-12 intact, reflexes nl, sensation nl, coordination negative, motor 5/5 RUE/LUE/RLE/LLE gait nl, ***PSYCH - insight and judgment nl, memory nl, affect nl, thought nl     concern for ACS/nstemi cards consult agree with heparin and admit

## 2018-12-11 NOTE — H&P ADULT - NSHPSOCIALHISTORY_GEN_ALL_CORE
Former smoker. Quit in the early 1970s.   Denies alcohol or illicit drug use. Former smoker (1 pack per week x ~20yrs). Quit in the early 1970s.   Denies alcohol or illicit drug use.

## 2018-12-11 NOTE — ED ADULT NURSE REASSESSMENT NOTE - NS ED NURSE REASSESS COMMENT FT1
Assumed pt care at this time. pt a&ox3, son at bedside, denies any further pain/discomfort. pt has nitro patch to right upper chest wall. pt educated on nitro gtt and aware gtt will be started. resident team at bedside for admission, cardio consulted. pt and son updated on plan of care, verbalize understanding. call bell in reach

## 2018-12-11 NOTE — ED ADULT NURSE NOTE - OBJECTIVE STATEMENT
83 y/o female with sanaz of DM, 4 cardiac stents BIBA from home c/o midsternal CP x 4 hours. Pt. stated she was sitting at dinner and had CP radiating to right arm and jaw. Pt. denied any SOB, HA, N/V, diaphoresis. Pt. was given nitro SL in ambulance and denies any CP or SOB at this time.

## 2018-12-12 LAB
ANION GAP SERPL CALC-SCNC: 16 MMOL/L — SIGNIFICANT CHANGE UP (ref 5–17)
APPEARANCE UR: CLEAR — SIGNIFICANT CHANGE UP
APTT BLD: 55.7 SEC — HIGH (ref 27.5–36.3)
APTT BLD: 57.4 SEC — HIGH (ref 27.5–36.3)
BASOPHILS # BLD AUTO: 0 K/UL — SIGNIFICANT CHANGE UP (ref 0–0.2)
BASOPHILS NFR BLD AUTO: 0.4 % — SIGNIFICANT CHANGE UP (ref 0–2)
BILIRUB UR-MCNC: NEGATIVE — SIGNIFICANT CHANGE UP
BUN SERPL-MCNC: 46 MG/DL — HIGH (ref 8–20)
CALCIUM SERPL-MCNC: 9.2 MG/DL — SIGNIFICANT CHANGE UP (ref 8.6–10.2)
CHLORIDE SERPL-SCNC: 100 MMOL/L — SIGNIFICANT CHANGE UP (ref 98–107)
CHOLEST SERPL-MCNC: 180 MG/DL — SIGNIFICANT CHANGE UP (ref 110–199)
CK MB CFR SERPL CALC: 10.6 NG/ML — HIGH (ref 0–6.7)
CK MB CFR SERPL CALC: 11.9 NG/ML — HIGH (ref 0–6.7)
CK SERPL-CCNC: 164 U/L — SIGNIFICANT CHANGE UP (ref 25–170)
CK SERPL-CCNC: 173 U/L — HIGH (ref 25–170)
CO2 SERPL-SCNC: 23 MMOL/L — SIGNIFICANT CHANGE UP (ref 22–29)
COLOR SPEC: YELLOW — SIGNIFICANT CHANGE UP
CREAT SERPL-MCNC: 1.45 MG/DL — HIGH (ref 0.5–1.3)
DIFF PNL FLD: NEGATIVE — SIGNIFICANT CHANGE UP
EOSINOPHIL # BLD AUTO: 0.2 K/UL — SIGNIFICANT CHANGE UP (ref 0–0.5)
EOSINOPHIL NFR BLD AUTO: 2.3 % — SIGNIFICANT CHANGE UP (ref 0–6)
GLUCOSE BLDC GLUCOMTR-MCNC: 197 MG/DL — HIGH (ref 70–99)
GLUCOSE BLDC GLUCOMTR-MCNC: 278 MG/DL — HIGH (ref 70–99)
GLUCOSE BLDC GLUCOMTR-MCNC: 306 MG/DL — HIGH (ref 70–99)
GLUCOSE BLDC GLUCOMTR-MCNC: 411 MG/DL — HIGH (ref 70–99)
GLUCOSE BLDC GLUCOMTR-MCNC: 435 MG/DL — HIGH (ref 70–99)
GLUCOSE SERPL-MCNC: 302 MG/DL — HIGH (ref 70–115)
GLUCOSE UR QL: 50 MG/DL
HBA1C BLD-MCNC: 9.6 % — HIGH (ref 4–5.6)
HCT VFR BLD CALC: 34.9 % — LOW (ref 37–47)
HCT VFR BLD CALC: 35.3 % — LOW (ref 37–47)
HDLC SERPL-MCNC: 36 MG/DL — LOW
HGB BLD-MCNC: 11 G/DL — LOW (ref 12–16)
HGB BLD-MCNC: 11.1 G/DL — LOW (ref 12–16)
KETONES UR-MCNC: NEGATIVE — SIGNIFICANT CHANGE UP
LEUKOCYTE ESTERASE UR-ACNC: NEGATIVE — SIGNIFICANT CHANGE UP
LIPID PNL WITH DIRECT LDL SERPL: 98 MG/DL — SIGNIFICANT CHANGE UP
LYMPHOCYTES # BLD AUTO: 27.9 % — SIGNIFICANT CHANGE UP (ref 20–55)
LYMPHOCYTES # BLD AUTO: 3 K/UL — SIGNIFICANT CHANGE UP (ref 1–4.8)
MAGNESIUM SERPL-MCNC: 1.8 MG/DL — SIGNIFICANT CHANGE UP (ref 1.6–2.6)
MCHC RBC-ENTMCNC: 26.6 PG — LOW (ref 27–31)
MCHC RBC-ENTMCNC: 26.6 PG — LOW (ref 27–31)
MCHC RBC-ENTMCNC: 31.4 G/DL — LOW (ref 32–36)
MCHC RBC-ENTMCNC: 31.5 G/DL — LOW (ref 32–36)
MCV RBC AUTO: 84.4 FL — SIGNIFICANT CHANGE UP (ref 81–99)
MCV RBC AUTO: 84.5 FL — SIGNIFICANT CHANGE UP (ref 81–99)
MONOCYTES # BLD AUTO: 0.9 K/UL — HIGH (ref 0–0.8)
MONOCYTES NFR BLD AUTO: 8.1 % — SIGNIFICANT CHANGE UP (ref 3–10)
NEUTROPHILS # BLD AUTO: 6.5 K/UL — SIGNIFICANT CHANGE UP (ref 1.8–8)
NEUTROPHILS NFR BLD AUTO: 60.9 % — SIGNIFICANT CHANGE UP (ref 37–73)
NITRITE UR-MCNC: NEGATIVE — SIGNIFICANT CHANGE UP
PH UR: 6 — SIGNIFICANT CHANGE UP (ref 5–8)
PHOSPHATE SERPL-MCNC: 4.7 MG/DL — SIGNIFICANT CHANGE UP (ref 2.4–4.7)
PLATELET # BLD AUTO: 199 K/UL — SIGNIFICANT CHANGE UP (ref 150–400)
PLATELET # BLD AUTO: 202 K/UL — SIGNIFICANT CHANGE UP (ref 150–400)
POTASSIUM SERPL-MCNC: 4.4 MMOL/L — SIGNIFICANT CHANGE UP (ref 3.5–5.3)
POTASSIUM SERPL-SCNC: 4.4 MMOL/L — SIGNIFICANT CHANGE UP (ref 3.5–5.3)
PROT UR-MCNC: NEGATIVE MG/DL — SIGNIFICANT CHANGE UP
RBC # BLD: 4.13 M/UL — LOW (ref 4.4–5.2)
RBC # BLD: 4.18 M/UL — LOW (ref 4.4–5.2)
RBC # FLD: 14.5 % — SIGNIFICANT CHANGE UP (ref 11–15.6)
RBC # FLD: 14.7 % — SIGNIFICANT CHANGE UP (ref 11–15.6)
SODIUM SERPL-SCNC: 139 MMOL/L — SIGNIFICANT CHANGE UP (ref 135–145)
SP GR SPEC: 1.01 — SIGNIFICANT CHANGE UP (ref 1.01–1.02)
TOTAL CHOLESTEROL/HDL RATIO MEASUREMENT: 5 RATIO — SIGNIFICANT CHANGE UP (ref 3.3–7.1)
TRIGL SERPL-MCNC: 229 MG/DL — HIGH (ref 10–200)
TROPONIN T SERPL-MCNC: 0.48 NG/ML — HIGH (ref 0–0.06)
TROPONIN T SERPL-MCNC: 0.57 NG/ML — HIGH (ref 0–0.06)
TROPONIN T SERPL-MCNC: 0.57 NG/ML — HIGH (ref 0–0.06)
UROBILINOGEN FLD QL: NEGATIVE MG/DL — SIGNIFICANT CHANGE UP
WBC # BLD: 10.7 K/UL — SIGNIFICANT CHANGE UP (ref 4.8–10.8)
WBC # BLD: 11 K/UL — HIGH (ref 4.8–10.8)
WBC # FLD AUTO: 10.7 K/UL — SIGNIFICANT CHANGE UP (ref 4.8–10.8)
WBC # FLD AUTO: 11 K/UL — HIGH (ref 4.8–10.8)

## 2018-12-12 PROCEDURE — 99233 SBSQ HOSP IP/OBS HIGH 50: CPT | Mod: GC

## 2018-12-12 PROCEDURE — 92941 PRQ TRLML REVSC TOT OCCL AMI: CPT | Mod: LC

## 2018-12-12 PROCEDURE — 93460 R&L HRT ART/VENTRICLE ANGIO: CPT | Mod: 26,XU

## 2018-12-12 PROCEDURE — 93010 ELECTROCARDIOGRAM REPORT: CPT | Mod: 76

## 2018-12-12 PROCEDURE — 99223 1ST HOSP IP/OBS HIGH 75: CPT

## 2018-12-12 RX ORDER — LISINOPRIL 2.5 MG/1
5 TABLET ORAL DAILY
Qty: 0 | Refills: 0 | Status: DISCONTINUED | OUTPATIENT
Start: 2018-12-12 | End: 2018-12-13

## 2018-12-12 RX ORDER — MAGNESIUM SULFATE 500 MG/ML
1 VIAL (ML) INJECTION ONCE
Qty: 0 | Refills: 0 | Status: DISCONTINUED | OUTPATIENT
Start: 2018-12-12 | End: 2018-12-12

## 2018-12-12 RX ORDER — CLOPIDOGREL BISULFATE 75 MG/1
75 TABLET, FILM COATED ORAL ONCE
Qty: 0 | Refills: 0 | Status: COMPLETED | OUTPATIENT
Start: 2018-12-12 | End: 2018-12-12

## 2018-12-12 RX ORDER — GABAPENTIN 400 MG/1
100 CAPSULE ORAL THREE TIMES A DAY
Qty: 0 | Refills: 0 | Status: DISCONTINUED | OUTPATIENT
Start: 2018-12-12 | End: 2018-12-13

## 2018-12-12 RX ORDER — HYDROCORTISONE 20 MG
100 TABLET ORAL ONCE
Qty: 0 | Refills: 0 | Status: COMPLETED | OUTPATIENT
Start: 2018-12-12 | End: 2018-12-12

## 2018-12-12 RX ORDER — DILTIAZEM HCL 120 MG
240 CAPSULE, EXT RELEASE 24 HR ORAL DAILY
Qty: 0 | Refills: 0 | Status: DISCONTINUED | OUTPATIENT
Start: 2018-12-12 | End: 2018-12-13

## 2018-12-12 RX ORDER — DIPHENHYDRAMINE HCL 50 MG
50 CAPSULE ORAL ONCE
Qty: 0 | Refills: 0 | Status: DISCONTINUED | OUTPATIENT
Start: 2018-12-12 | End: 2018-12-12

## 2018-12-12 RX ORDER — ISOSORBIDE MONONITRATE 60 MG/1
30 TABLET, EXTENDED RELEASE ORAL DAILY
Qty: 0 | Refills: 0 | Status: DISCONTINUED | OUTPATIENT
Start: 2018-12-12 | End: 2018-12-13

## 2018-12-12 RX ORDER — LEVOTHYROXINE SODIUM 125 MCG
125 TABLET ORAL DAILY
Qty: 0 | Refills: 0 | Status: DISCONTINUED | OUTPATIENT
Start: 2018-12-12 | End: 2018-12-13

## 2018-12-12 RX ORDER — MAGNESIUM SULFATE 500 MG/ML
1 VIAL (ML) INJECTION ONCE
Qty: 0 | Refills: 0 | Status: COMPLETED | OUTPATIENT
Start: 2018-12-12 | End: 2018-12-12

## 2018-12-12 RX ORDER — INSULIN LISPRO 100/ML
8 VIAL (ML) SUBCUTANEOUS ONCE
Qty: 0 | Refills: 0 | Status: COMPLETED | OUTPATIENT
Start: 2018-12-12 | End: 2018-12-12

## 2018-12-12 RX ORDER — PANTOPRAZOLE SODIUM 20 MG/1
40 TABLET, DELAYED RELEASE ORAL
Qty: 0 | Refills: 0 | Status: DISCONTINUED | OUTPATIENT
Start: 2018-12-12 | End: 2018-12-13

## 2018-12-12 RX ORDER — SODIUM CHLORIDE 9 MG/ML
1000 INJECTION INTRAMUSCULAR; INTRAVENOUS; SUBCUTANEOUS
Qty: 0 | Refills: 0 | Status: DISCONTINUED | OUTPATIENT
Start: 2018-12-12 | End: 2018-12-12

## 2018-12-12 RX ORDER — DIPHENHYDRAMINE HCL 50 MG
25 CAPSULE ORAL ONCE
Qty: 0 | Refills: 0 | Status: COMPLETED | OUTPATIENT
Start: 2018-12-12 | End: 2018-12-12

## 2018-12-12 RX ORDER — CLOPIDOGREL BISULFATE 75 MG/1
75 TABLET, FILM COATED ORAL DAILY
Qty: 0 | Refills: 0 | Status: DISCONTINUED | OUTPATIENT
Start: 2018-12-13 | End: 2018-12-13

## 2018-12-12 RX ORDER — FAMOTIDINE 10 MG/ML
20 INJECTION INTRAVENOUS ONCE
Qty: 0 | Refills: 0 | Status: COMPLETED | OUTPATIENT
Start: 2018-12-12 | End: 2018-12-12

## 2018-12-12 RX ORDER — METOPROLOL TARTRATE 50 MG
50 TABLET ORAL DAILY
Qty: 0 | Refills: 0 | Status: DISCONTINUED | OUTPATIENT
Start: 2018-12-12 | End: 2018-12-13

## 2018-12-12 RX ADMIN — HEPARIN SODIUM 1000 UNIT(S)/HR: 5000 INJECTION INTRAVENOUS; SUBCUTANEOUS at 03:05

## 2018-12-12 RX ADMIN — LISINOPRIL 5 MILLIGRAM(S): 2.5 TABLET ORAL at 05:38

## 2018-12-12 RX ADMIN — Medication 1 INCH(S): at 05:42

## 2018-12-12 RX ADMIN — GABAPENTIN 100 MILLIGRAM(S): 400 CAPSULE ORAL at 05:39

## 2018-12-12 RX ADMIN — Medication 2: at 16:26

## 2018-12-12 RX ADMIN — Medication 81 MILLIGRAM(S): at 12:08

## 2018-12-12 RX ADMIN — ATORVASTATIN CALCIUM 40 MILLIGRAM(S): 80 TABLET, FILM COATED ORAL at 21:21

## 2018-12-12 RX ADMIN — Medication 125 MICROGRAM(S): at 05:39

## 2018-12-12 RX ADMIN — SODIUM CHLORIDE 50 MILLILITER(S): 9 INJECTION INTRAMUSCULAR; INTRAVENOUS; SUBCUTANEOUS at 01:11

## 2018-12-12 RX ADMIN — Medication 8: at 08:31

## 2018-12-12 RX ADMIN — PANTOPRAZOLE SODIUM 40 MILLIGRAM(S): 20 TABLET, DELAYED RELEASE ORAL at 05:38

## 2018-12-12 RX ADMIN — INSULIN GLARGINE 30 UNIT(S): 100 INJECTION, SOLUTION SUBCUTANEOUS at 21:21

## 2018-12-12 RX ADMIN — FAMOTIDINE 20 MILLIGRAM(S): 10 INJECTION INTRAVENOUS at 14:30

## 2018-12-12 RX ADMIN — CLOPIDOGREL BISULFATE 75 MILLIGRAM(S): 75 TABLET, FILM COATED ORAL at 14:30

## 2018-12-12 RX ADMIN — ISOSORBIDE MONONITRATE 30 MILLIGRAM(S): 60 TABLET, EXTENDED RELEASE ORAL at 12:08

## 2018-12-12 RX ADMIN — Medication 50 MILLIGRAM(S): at 05:38

## 2018-12-12 RX ADMIN — Medication 8: at 21:21

## 2018-12-12 RX ADMIN — HEPARIN SODIUM 1000 UNIT(S)/HR: 5000 INJECTION INTRAVENOUS; SUBCUTANEOUS at 10:00

## 2018-12-12 RX ADMIN — Medication 100 GRAM(S): at 10:41

## 2018-12-12 RX ADMIN — GABAPENTIN 100 MILLIGRAM(S): 400 CAPSULE ORAL at 21:21

## 2018-12-12 RX ADMIN — Medication 100 MILLIGRAM(S): at 14:30

## 2018-12-12 RX ADMIN — Medication 25 MILLIGRAM(S): at 14:30

## 2018-12-12 RX ADMIN — Medication 25 MILLIGRAM(S): at 05:38

## 2018-12-12 RX ADMIN — Medication 240 MILLIGRAM(S): at 10:41

## 2018-12-12 RX ADMIN — Medication 25 MILLIGRAM(S): at 21:29

## 2018-12-12 RX ADMIN — Medication 6: at 12:44

## 2018-12-12 RX ADMIN — Medication 2: at 00:18

## 2018-12-12 NOTE — CONSULT NOTE ADULT - ATTENDING COMMENTS
Mrs. Chávez was seen and examined. Her EKGs were reviewed and d/w PA and Dr. Lo.   She has NSTEMI, currently CP free. She is on heparin drip and will undergo cardiac catheterization this afternoon. I reviewed the above, labs and imaging studies and agree with a/p.

## 2018-12-12 NOTE — CONSULT NOTE ADULT - ASSESSMENT
Assessment / Plan: 83 y/o female with PMH of CAD S/P 3 cardiac cath's x 3 with PCI to PLAD 90% ADELIA, MLAD PCI to 99% ADELIA, OM1 PCI to 90% ADELIA,. LCX PCI to 80% ADELIA and  to % no intervention to date. Patient was at home at rest and developed 10/10 chest pain mid sternal with SOB prompting her to call EMS. She has a History of insulin-dependent DM2, HTN, HFpEF (EF>75%, 2018), Moderate AS, Mild Pulm HTN, STEVENSON (cpap), COPD (no home O2), hypothyroidism, Today's chest pain. Responded well to SL nitro in the ambulance,  ASA 325mg en route to the hospital, She admitted to SOB in the ED the CXR was reviewed and 60mg Lasix was given IVP with good effect SOB is now resolved. The patient admits to increasing SOB over the past week but felt it was her Asthma.        1) CAD, NSTEMI: cardiac cath in the AM, ACS heparin BB, ASA, statin, serial CE and EKG, NPO, Nitro  2) HTN: currently well controlled c/w current Rx  3) CHF: Diaeretic f/u TTE report  4) CKD: per PCT  5) Dm: Per PCT  6) STEVENSON BIPAP QHS  7) Hypothyroid: check TSH

## 2018-12-12 NOTE — CONSULT NOTE ADULT - SUBJECTIVE AND OBJECTIVE BOX
Consult requested by:  Dr Palacios    Reason for Consultation: Chest pain    History obtained by: Patient and medical record     obtained: No    Chief complaint:  Chest Pain and SOB    HPI:  83 y/o female with PMH of CAD S/P 3 cardiac cath's x 3 with PCI to PLAD 90% ADELIA, MLAD PCI to 99% ADELIA, OM1 PCI to 90% ADELIA,. LCX PCI to 80% ADELIA and  to % no intervention to date. Patient was at home at rest and developed 10/10 chest pain mid sternal with SOB prompting her to call EMS. She has a History of insulin-dependent DM2, HTN, HFpEF (EF>75%, 2018), Moderate AS, Mild Pulm HTN, STEVENSON (cpap), COPD (no home O2), hypothyroidism, Today's chest pain. Responded well to SL nitro in the ambulance,  ASA 325mg en route to the hospital, She admitted to SOB in the ED the CXR was reviewed and 60mg Lasix was given IVP with good effect SOB is now resolved. The patient admits to increasing SOB over the past week but felt it was her Asthma.  Spoke to Dr Benton who reviewed today stat TTE. Will plan likely cardiac cat in the AM     REVIEW OF SYSTEMS:  CONSTITUTIONAL: No fever, + weight gain, no fatigue  EYES: No eye pain, visual disturbances, or discharge  ENMT:  No difficulty hearing, tinnitus, vertigo; No sinus or throat pain  NECK: No pain or stiffness  RESPIRATORY: No cough, wheezing, chills or hemoptysis; + shortness of breath  CARDIOVASCULAR: + chest pain, no palpitations, dizziness, or leg swelling  GASTROINTESTINAL: No abdominal or epigastric pain. No nausea, vomiting, or hematemesis; No diarrhea or constipation. No melena or hematochezia.  GENITOURINARY: No dysuria, frequency, hematuria, or incontinence  NEUROLOGICAL: No headaches, memory loss, loss of strength, numbness, or tremors  SKIN: No itching, burning, rashes, or lesions   LYMPH NODES: No enlarged glands  ENDOCRINE: No heat or cold intolerance; No hair loss  MUSCULOSKELETAL: No joint pain or swelling; No muscle, back, or extremity pain  PSYCHIATRIC: No depression, anxiety, mood swings, or difficulty sleeping  HEME/LYMPH: No easy bruising, or bleeding gums  ALLERY AND IMMUNOLOGIC: No hives or eczema    MEDICATIONS  (STANDING):  aspirin enteric coated 81 milliGRAM(s) Oral daily  atorvastatin 40 milliGRAM(s) Oral at bedtime  dextrose 5%. 1000 milliLiter(s) (50 mL/Hr) IV Continuous <Continuous>  dextrose 50% Injectable 12.5 Gram(s) IV Push once  dextrose 50% Injectable 25 Gram(s) IV Push once  dextrose 50% Injectable 25 Gram(s) IV Push once  gabapentin 100 milliGRAM(s) Oral three times a day  heparin  Infusion.  Unit(s)/Hr (10 mL/Hr) IV Continuous <Continuous>  insulin glargine Injectable (LANTUS) 30 Unit(s) SubCutaneous at bedtime  insulin lispro (HumaLOG) corrective regimen sliding scale   SubCutaneous three times a day before meals  insulin lispro (HumaLOG) corrective regimen sliding scale   SubCutaneous at bedtime  levothyroxine 125 MICROGram(s) Oral daily  lisinopril 5 milliGRAM(s) Oral daily  metoprolol succinate ER 50 milliGRAM(s) Oral daily  pantoprazole    Tablet 40 milliGRAM(s) Oral before breakfast  pregabalin 25 milliGRAM(s) Oral three times a day  sodium chloride 0.9%. 1000 milliLiter(s) (50 mL/Hr) IV Continuous <Continuous>    MEDICATIONS  (PRN):  dextrose 40% Gel 15 Gram(s) Oral once PRN Blood Glucose LESS THAN 70 milliGRAM(s)/deciliter  glucagon  Injectable 1 milliGRAM(s) IntraMuscular once PRN Glucose LESS THAN 70 milligrams/deciliter  heparin  Injectable 5300 Unit(s) IV Push every 6 hours PRN For aPTT less than 40  morphine  - Injectable 1 milliGRAM(s) IV Push once PRN chest pain        PAST MEDICAL & SURGICAL HISTORY:  NSTEMI (non-ST elevated myocardial infarction)  Hypothyroid  HLD (hyperlipidemia)  HTN (hypertension)  CAD (coronary artery disease)  Asthma  Diabetes  Gastroesophageal reflux disease without esophagitis  Pure hypercholesterolemia  Hypothyroidism, unspecified type  Essential hypertension  DM2 (diabetes mellitus, type 2)  Uncomplicated asthma, unspecified asthma severity  Abnormal findings on cardiac catheterization: Cardiac Cath  History of appendectomy  History of appendectomy      FAMILY HISTORY:  Family history of stomach cancer  Family history of MI (myocardial infarction) (Father)  Family history of cancer in mother  Family history of heart disease      SOCIAL HISTORY:  CIGARETTES:     ALCOHOL:  DRUGS:    Vital Signs Last 24 Hrs  T(C): 36.9 (11 Dec 2018 22:59), Max: 36.9 (11 Dec 2018 18:15)  T(F): 98.4 (11 Dec 2018 22:59), Max: 98.4 (11 Dec 2018 18:15)  HR: 86 (11 Dec 2018 22:59) (85 - 107)  BP: 136/64 (11 Dec 2018 22:59) (126/62 - 192/90)  BP(mean): --  RR: 16 (11 Dec 2018 22:59) (16 - 22)  SpO2: 93% (11 Dec 2018 22:59) (93% - 95%)    PHYSICAL EXAM:  GENERAL: NAD, well-groomed, well-developed  HEAD:  Atraumatic, Normocephalic  EYES: EOMI, PERRLA, conjunctiva and sclera clear  ENMT: No tonsillar erythema, exudates, or enlargement; Moist mucous membranes, Good dentition, No lesions  NECK: Supple, No JVD, Normal thyroid  NERVOUS SYSTEM:  Alert & Oriented X3, Good concentration; Motor Strength 5/5 B/L upper and lower extremities;  CHEST/LUNG:  + rales bilaterally; No rhonchi, wheezing, or rubs  HEART: Regular rate and rhythm; No murmurs, rubs, or gallops  ABDOMEN: Soft, Nontender, Nondistended; Bowel sounds present  EXTREMITIES:  2+ Peripheral Pulses, No clubbing, cyanosis, or edema  LYMPH: No lymphadenopathy noted  SKIN: No rashes or lesions        INTERPRETATION OF TELEMETRY: NSR 70's no arrhthymias     ECG: Sinus rate 106 multiple T wave inversions abnormal no clear ST elevations    I&O's Detail      LABS:                        11.8   11.0  )-----------( 216      ( 11 Dec 2018 19:00 )             37.5     12-11    141  |  102  |  47.0<H>  ----------------------------<  301<H>  4.5   |  25.0  |  1.44<H>    Ca    9.2      11 Dec 2018 19:00  Mg     1.8     12-11    TPro  7.0  /  Alb  3.9  /  TBili  0.2<L>  /  DBili  x   /  AST  15  /  ALT  10  /  AlkPhos  79  12-11    CARDIAC MARKERS ( 11 Dec 2018 19:00 )  x     / 0.12 ng/mL / x     / x     / x          PT/INR - ( 11 Dec 2018 19:00 )   PT: 11.1 sec;   INR: 0.97 ratio         PTT - ( 11 Dec 2018 19:00 )  PTT:29.0 sec    I&O's Summary      RADIOLOGY & ADDITIONAL STUDIES:  X-ray:  bilateral PVC and cardiomegaly     PREVIOUS DIAGNOSTIC TESTING:      ECHO: TTE tonight results pending      STRESS:    IMPRESSIONS:Normal Study  Inability to exercise due to unsteady gait.  No Symptom. No diagnostic ECG changes.  The left ventricle was normal LV size.  No significant ischemia. Cannot rule out subtle ischemia  in inferior wall.  Gated wall motion shows hyperdynamic wall motion with LVEF  of 70%.    Confirmed on  4/11/2016 - 14:27:13 by Milind Arshad MD    Cardiology Fellow: AIME Beverly        CATHETERIZATION4/07/17, 4/10/17/ 3/29/18DEs x 4 with 100% RCA  without intervention to date . Consult requested by:  Dr Palacios    Reason for Consultation: Chest pain    History obtained by: Patient and medical record     obtained: No    Chief complaint:  Chest Pain and SOB    HPI:  83 y/o female with PMH of CAD S/P 3 cardiac cath's x 3 with PCI to PLAD 90% ADELIA, MLAD PCI to 99% ADELIA, OM1 PCI to 90% ADELIA,. LCX PCI to 80% ADELIA and  to % no intervention to date. Patient was at home at rest and developed 10/10 chest pain mid sternal with SOB prompting her to call EMS. She has a History of insulin-dependent DM2, HTN, HFpEF (EF>75%, 2018), Moderate AS, Mild Pulm HTN, STEVENSON (cpap), COPD (no home O2), hypothyroidism, Today's chest pain. Responded well to SL nitro in the ambulance,  ASA 325mg en route to the hospital, She admitted to SOB in the ED the CXR was reviewed and 60mg Lasix was given IVP with good effect SOB is now resolved. The patient admits to increasing SOB over the past week but felt it was her Asthma.  Spoke to Dr Benton who reviewed today stat TTE. Will plan likely cardiac cat in the AM     REVIEW OF SYSTEMS:  CONSTITUTIONAL: No fever, + weight gain, no fatigue  EYES: No eye pain, visual disturbances, or discharge  ENMT:  No difficulty hearing, tinnitus, vertigo; No sinus or throat pain  NECK: No pain or stiffness  RESPIRATORY: No cough, wheezing, chills or hemoptysis; + shortness of breath  CARDIOVASCULAR: + chest pain, no palpitations, dizziness, or leg swelling  GASTROINTESTINAL: No abdominal or epigastric pain. No nausea, vomiting, or hematemesis; No diarrhea or constipation. No melena or hematochezia.  GENITOURINARY: No dysuria, frequency, hematuria, or incontinence  NEUROLOGICAL: No headaches, memory loss, loss of strength, numbness, or tremors  SKIN: No itching, burning, rashes, or lesions   LYMPH NODES: No enlarged glands  ENDOCRINE: No heat or cold intolerance; No hair loss  MUSCULOSKELETAL: No joint pain or swelling; No muscle, back, or extremity pain  PSYCHIATRIC: No depression, anxiety, mood swings, or difficulty sleeping  HEME/LYMPH: No easy bruising, or bleeding gums  ALLERY AND IMMUNOLOGIC: No hives or eczema    MEDICATIONS  (STANDING):  aspirin enteric coated 81 milliGRAM(s) Oral daily  atorvastatin 40 milliGRAM(s) Oral at bedtime  dextrose 5%. 1000 milliLiter(s) (50 mL/Hr) IV Continuous <Continuous>  dextrose 50% Injectable 12.5 Gram(s) IV Push once  dextrose 50% Injectable 25 Gram(s) IV Push once  dextrose 50% Injectable 25 Gram(s) IV Push once  gabapentin 100 milliGRAM(s) Oral three times a day  heparin  Infusion.  Unit(s)/Hr (10 mL/Hr) IV Continuous <Continuous>  insulin glargine Injectable (LANTUS) 30 Unit(s) SubCutaneous at bedtime  insulin lispro (HumaLOG) corrective regimen sliding scale   SubCutaneous three times a day before meals  insulin lispro (HumaLOG) corrective regimen sliding scale   SubCutaneous at bedtime  levothyroxine 125 MICROGram(s) Oral daily  lisinopril 5 milliGRAM(s) Oral daily  metoprolol succinate ER 50 milliGRAM(s) Oral daily  pantoprazole    Tablet 40 milliGRAM(s) Oral before breakfast  pregabalin 25 milliGRAM(s) Oral three times a day  sodium chloride 0.9%. 1000 milliLiter(s) (50 mL/Hr) IV Continuous <Continuous>    MEDICATIONS  (PRN):  dextrose 40% Gel 15 Gram(s) Oral once PRN Blood Glucose LESS THAN 70 milliGRAM(s)/deciliter  glucagon  Injectable 1 milliGRAM(s) IntraMuscular once PRN Glucose LESS THAN 70 milligrams/deciliter  heparin  Injectable 5300 Unit(s) IV Push every 6 hours PRN For aPTT less than 40  morphine  - Injectable 1 milliGRAM(s) IV Push once PRN chest pain        PAST MEDICAL & SURGICAL HISTORY:  NSTEMI (non-ST elevated myocardial infarction)  Hypothyroid  HLD (hyperlipidemia)  HTN (hypertension)  CAD (coronary artery disease)  Asthma  Diabetes  Gastroesophageal reflux disease without esophagitis  Pure hypercholesterolemia  Hypothyroidism, unspecified type  Essential hypertension  DM2 (diabetes mellitus, type 2)  Uncomplicated asthma, unspecified asthma severity  Abnormal findings on cardiac catheterization: Cardiac Cath  History of appendectomy  History of appendectomy      FAMILY HISTORY:  Family history of stomach cancer  Family history of MI (myocardial infarction) (Father)  Family history of cancer in mother  Family history of heart disease      SOCIAL HISTORY:  CIGARETTES:  ex-smoker  ALCOHOL: denies  DRUGS: Denies     Vital Signs Last 24 Hrs  T(C): 36.9 (11 Dec 2018 22:59), Max: 36.9 (11 Dec 2018 18:15)  T(F): 98.4 (11 Dec 2018 22:59), Max: 98.4 (11 Dec 2018 18:15)  HR: 86 (11 Dec 2018 22:59) (85 - 107)  BP: 136/64 (11 Dec 2018 22:59) (126/62 - 192/90)  BP(mean): --  RR: 16 (11 Dec 2018 22:59) (16 - 22)  SpO2: 93% (11 Dec 2018 22:59) (93% - 95%)    PHYSICAL EXAM:  GENERAL: NAD, well-groomed, well-developed  HEAD:  Atraumatic, Normocephalic  EYES: EOMI, PERRLA, conjunctiva and sclera clear  ENMT: No tonsillar erythema, exudates, or enlargement; Moist mucous membranes, Good dentition, No lesions  NECK: Supple, No JVD, Normal thyroid  NERVOUS SYSTEM:  Alert & Oriented X3, Good concentration; Motor Strength 5/5 B/L upper and lower extremities;  CHEST/LUNG:  + rales bilaterally; No rhonchi, wheezing, or rubs  HEART: Regular rate and rhythm; No murmurs, rubs, or gallops  ABDOMEN: Soft, Nontender, Nondistended; Bowel sounds present  EXTREMITIES:  2+ Peripheral Pulses, No clubbing, cyanosis, or edema  LYMPH: No lymphadenopathy noted  SKIN: No rashes or lesions        INTERPRETATION OF TELEMETRY: NSR 70's no arrhthymias     ECG: Sinus rate 106 multiple T wave inversions abnormal no clear ST elevations    I&O's Detail      LABS:                        11.8   11.0  )-----------( 216      ( 11 Dec 2018 19:00 )             37.5     12-11    141  |  102  |  47.0<H>  ----------------------------<  301<H>  4.5   |  25.0  |  1.44<H>    Ca    9.2      11 Dec 2018 19:00  Mg     1.8     12-11    TPro  7.0  /  Alb  3.9  /  TBili  0.2<L>  /  DBili  x   /  AST  15  /  ALT  10  /  AlkPhos  79  12-11    CARDIAC MARKERS ( 11 Dec 2018 19:00 )  x     / 0.12 ng/mL / x     / x     / x          PT/INR - ( 11 Dec 2018 19:00 )   PT: 11.1 sec;   INR: 0.97 ratio         PTT - ( 11 Dec 2018 19:00 )  PTT:29.0 sec    I&O's Summary      RADIOLOGY & ADDITIONAL STUDIES:  X-ray:  bilateral PVC and cardiomegaly     PREVIOUS DIAGNOSTIC TESTING:      ECHO: TTE tonight results pending      STRESS:    IMPRESSIONS:Normal Study  Inability to exercise due to unsteady gait.  No Symptom. No diagnostic ECG changes.  The left ventricle was normal LV size.  No significant ischemia. Cannot rule out subtle ischemia  in inferior wall.  Gated wall motion shows hyperdynamic wall motion with LVEF  of 70%.    Confirmed on  4/11/2016 - 14:27:13 by Milind Arshad MD    Cardiology Fellow: AIME Beverly        CATHETERIZATION4/07/17, 4/10/17/ 3/29/18DEs x 4 with 100% RCA  without intervention to date .

## 2018-12-12 NOTE — PROGRESS NOTE ADULT - SUBJECTIVE AND OBJECTIVE BOX
83 y/o female with PMH of CAD s/p stents x4 (June 2017), prior MI (Aug 2018), insulin-dependent DM2, HTN, HFpEF (EF>75%, 2018), Moderate AS, Mild Pulm HTN, STEVENSON (cpap), COPD (no home O2), hypothyroidism, BIBA for chest pain. Patient was at home sitting on her couch when she had sudden onset of mid-sternal chest pain, with radiation to the right arm and right neck. Pain was 10/10, pressure-like, and similar but stronger than the chest pain from her previous MI. Assoc with dyspnea during episode of chest pain. Patient subsequently called the ambulance and received Nitro SL x 2 and ASA 325mg en route to the hospital, which improved her symptoms, and currently chest pain is now resolved.     In the ED, patient was noted to have elevated troponin with ST depression noted in leads II, aVL, V6. She was given nitro-bid ointment 1inch and put on heparin drip. [facility] consult placed.   Here for cath  Pre-op steroid and benadryl for allergy to iodine, pepcid and shellfish (anaphylaxis)

## 2018-12-12 NOTE — PROGRESS NOTE ADULT - ASSESSMENT
83 y/o female with PMH of CAD s/p stents x4 (June 2017), prior MI (Aug 2018), insulin-dependent DM2, HTN, HFpEF (EF>75%, 2018), Moderate AS, Mild Pulm HTN, STEVENSON (cpap), COPD (no home O2), hypothyroidism, BIBA for chest pain and noted to have elevated troponin with ST depression in leads II, aVL and V6. Admitted for NSTEMI.     NSTEMI  - Pending cath.  - chest pain now resolved  - elevated troponin X3, 3rd troponin downtrending  - EKG with ST depressions in leads II, aVL, V6  - s/p nitro-bid 1inch ointment  - morphine 1mg prn for chest pain  - c/w daily aspirin, statin, ace-inhibitor, betablocker  - c/w imdur 30mg   - s/p plavix load, and now on heparin drip as per Cardio  - NPO after midnight for cath today  - f/u TTE  - Lipid panel showing elevated triglycerides and low HDL  - Bedrest to minimize exertion in the setting of NSTEMI  - telemetry with   - Cardio consult appreciated    HTN  - stable  - noted to have elevated /90  - improved s/p nitro  - resumed lisinopril and toprol, imdur  - holding cardizem as pt is currently normotensive and to avoid hypotension; resume as clinically indicated  - continue to monitor    HFpEF (EF>75%, 2018)  - stable  - noted pulm congestion on cxr and BNP ~500s (increased from prior)  - will give lasix 60mg x 1 per Cardio  - Strict I&Os, daily weights  - continue to monitor    CKD stage 3b  - stable  - baseline Cr ~1.45  - initially started on gentle iv hydration, later discontinued due to pulm congestion on cxr  - continue to monitor    Insulin-dependent DM2  - stable  - f/u A1c  - sliding scale with accuchecks q6h given npo status  - lantus 30u qhs (half of home dose basaglar 60u)    Hypothyroidism  - c/w synthroid home dose    STEVENSON  - uses cpap at home, pt's son will bring the machine tomorrow  - bipap overnight for now 83 y/o female with PMH of CAD s/p stents x4 (June 2017), prior MI (Aug 2018), insulin-dependent DM2, HTN, HFpEF (EF>75%, 2018), Moderate AS, Mild Pulm HTN, STEVENSON (cpap), COPD (no home O2), hypothyroidism, BIBA for chest pain and noted to have elevated troponin with ST depression in leads II, aVL and V6. Admitted for NSTEMI.     NSTEMI  - asymptomatic, cp resolved  - Pending cath 12/12  - elevated troponin X3, peak trop 0.57, 3rd troponin downtrending  - EKG with ST depressions in leads II, aVL, V6  - s/p nitro-bid with resolution of symptoms earlier in admission  - nitro + morphine 1mg prn for chest pain  - c/w daily aspirin, statin, ace-inhibitor, betablocker  - c/w imdur 30mg   - s/p plavix load, and now on heparin drip as per Cardio  - f/u TTE  - Lipid panel showing elevated triglycerides and low HDL, total chol controlled, will consider starting ezetimibe prior to dc  - Bedrest to minimize exertion in the setting of NSTEMI  - telemetry with   - [facility] consult appreciated, will fu post cath    acute on chronic HFpEF (EF>75%, 2018)  - stable  - noted pulm congestion on cxr and BNP ~500s (increased from prior)  - sp lasix 60mg x 1 per Cardio, will not cont standing lasix for now  - Strict I&Os, daily weights  - continue to monitor    HTN Emergency  - resolved  - noted earlier in admission to have elevated /90 in setting of nstemi  - bp meds as below  - titrate to goal    HTN  - stable  - goal < 150/90  - improved s/p nitro  - resumed lisinopril and toprol, imdur, cardizem  - continue to monitor    CKD stage 3b  - stable  - baseline Cr ~1.45  - initially started on gentle iv hydration, later discontinued due to pulm congestion on cxr  - continue to monitor serial bmp, diane post contrast for cath    Dm2 on long term insulin therapy complicated by hyperglycemia, asymptomatic  - stable  - A1c 9.6, above goal of < 8  - sliding scale with accuchecks q6h given npo status, change to achs when no longer npo  - lantus 30u qhs (half of home dose basaglar 60u), titrate back to goal when no longer npo  - will need close outpt fu to help reach goal, diabetic education while inpatient + nutrition cs    Hypothyroidism  -chronic stable uncomplicated  - c/w synthroid home dose    STEVENSON  - chronic, stable, unocmplicated  - uses cpap at home, pt's son will bring the machine  - bipap overnight for now 8/4    Obesity  - bmi 38.3  - likely sec to excess alvin intake  - wt loss advised, diane in light of comorbid conditions  - lifestyle modifications  - nutrition eval    dvt ppx. initially on hep drip, stopped per cardio, will inquire as to if it will be resumed, if not will start lovenox

## 2018-12-12 NOTE — PROGRESS NOTE ADULT - SUBJECTIVE AND OBJECTIVE BOX
83 y/o female with PMH of CAD s/p stents x4 (June 2017), prior MI (Aug 2018), insulin-dependent DM2, HTN, HFpEF (EF>75%, 2018), Moderate AS, Mild Pulm HTN, STEVENSON (cpap), COPD (no home O2), hypothyroidism, BIBA for chest pain. Patient was at home sitting on her couch when she had sudden onset of mid-sternal chest pain, with radiation to the right arm and right neck. Pain was 10/10, pressure-like, and similar but stronger than the chest pain from her previous MI. Assoc with dyspnea during episode of chest pain. Patient subsequently called the ambulance and received Nitro SL x 2 and ASA 325mg en route to the hospital, which improved her symptoms, and currently chest pain is now resolved.     In the ED, patient was noted to have elevated troponin with ST depression noted in leads II, aVL, V6. She was given nitro-bid ointment 1inch and put on heparin drip. H Cardio consult placed.   < from: Cardiac Cath Lab - Adult (12.12.18 @ 14:19) >  VENTRICLES: There were no left ventricular global or regional wall motion  abnormalities. Global left ventricular function was hypercontractile. EF  calculated by contrast ventriculography was 80 % and EF estimated was80  %.  VALVES: AORTIC VALVE: There was moderate aortic stenosis.  CORONARY VESSELS: The coronary circulation is right dominant.  LM:   --  LM: Normal. The vessel was normal sized, not calcified, and not  tortuous. Angiography showed no evidence of disease.  LAD:   --  LAD: Normal. The vessel was normal sized, not calcified, and not  tortuous. Angiography showed minor luminal irregularities with no flow  limiting lesions. Patent stents in the mid portion of the LAD.  CX:   --  Circumflex: Normal.The vessel was normal sized, not calcified,  and not tortuous. Angiography showed a single discrete lesion. There was a  tubular 95 % stenosis in the proximal third of the vessel segment. The  lesion was without evidence of thrombus. There was RUBIN grade 3 flow  through the vessel (brisk flow). This lesion is a likely culprit for the  patient's recent myocardial infarction. It appears amenable to  percutaneous intervention.  --  Proximal circumflex:  RCA:   --  RCA: Normal. The vessel was normal sized, not calcified, and not  tortuous. Angiography showed a single discrete lesion. There was a diffuse  100 % stenosis in the middle third of the vessel segment. The lesion was  without evidence of thrombus. There was RUBIN grade 0 flow through the  vessel (no flow). This lesion is a chronic total occlusion. Fills via  collaterals from the RCA (faintly) and from the left system (good  collaterals).  COMPLICATIONS: There were no complications. No complications occurred  during the cath lab visit.No complications occurred during the cath lab  visit.  DIAGNOSTIC IMPRESSIONS: There is significant double vessel coronary artery  disease.  Prox LCx=95%  Mid ETX=653%  Successful PCI of Prox LCx with ADELIA x 1 ([place] 3.0x18mm) Left ventricular  function is normal.  LVEF=80%  Mild Elevation of Rt Hear Pressures:  RA=10 mmHg, RV=20/14 mmHg, PCWP=19 mmHg  Mild Pulmonary HTN (38/20 - 27 mmHg)  Normal CO (5.79 L/min) and CI (2.81 L/min/m2)  Moderate Aortic Stenosis (AVG=24 mmHg and BILL=1.3 cm2)  Successful Deployment of Closure Device (Angioseal)  DIAGNOSTIC RECOMMENDATIONS: The patient should continue with the present  medications. Patient management should include aggressive medical therapy,  close monitoring of BUN and creatinine, resumption of all previous  activities in 5 days, a cardiac rehabilitation program, and weight  reduction. The patient should follow a low fat and low calorie diet.  Medical management is recommended.  Prepared and signed by  Waylon Benavides MD    Angioseal site benign  Bedrest for 3 hours  Continue aspirin and plavix protocol  Patient in [place] ONE Clear study  Plavix and aspirin protocol as ordered.  AM labs to be drawn at 1000 am including  CK and EKG.   Post  procedure EKG  flipped T's I and Avl  Denies chest pain  Will monitor. 83 y/o female with PMH of CAD s/p stents x4 (June 2017), prior MI (Aug 2018), insulin-dependent DM2, HTN, HFpEF (EF>75%, 2018), Moderate AS, Mild Pulm HTN, STEVENSON (cpap), COPD (no home O2), hypothyroidism, BIBA for chest pain. Patient was at home sitting on her couch when she had sudden onset of mid-sternal chest pain, with radiation to the right arm and right neck. Pain was 10/10, pressure-like, and similar but stronger than the chest pain from her previous MI. Assoc with dyspnea during episode of chest pain. Patient subsequently called the ambulance and received Nitro SL x 2 and ASA 325mg en route to the hospital, which improved her symptoms, and currently chest pain is now resolved.     In the ED, patient was noted to have elevated troponin with ST depression noted in leads II, aVL, V6. She was given nitro-bid ointment 1inch and put on heparin drip. H Cardio consult placed.   < from: Cardiac Cath Lab - Adult (12.12.18 @ 14:19) >  VENTRICLES: There were no left ventricular global or regional wall motion  abnormalities. Global left ventricular function was hypercontractile. EF  calculated by contrast ventriculography was 80 % and EF estimated was80  %.  VALVES: AORTIC VALVE: There was moderate aortic stenosis.  CORONARY VESSELS: The coronary circulation is right dominant.  LM:   --  LM: Normal. The vessel was normal sized, not calcified, and not  tortuous. Angiography showed no evidence of disease.  LAD:   --  LAD: Normal. The vessel was normal sized, not calcified, and not  tortuous. Angiography showed minor luminal irregularities with no flow  limiting lesions. Patent stents in the mid portion of the LAD.  CX:   --  Circumflex: Normal.The vessel was normal sized, not calcified,  and not tortuous. Angiography showed a single discrete lesion. There was a  tubular 95 % stenosis in the proximal third of the vessel segment. The  lesion was without evidence of thrombus. There was RUBIN grade 3 flow  through the vessel (brisk flow). This lesion is a likely culprit for the  patient's recent myocardial infarction. It appears amenable to  percutaneous intervention.  --  Proximal circumflex:  RCA:   --  RCA: Normal. The vessel was normal sized, not calcified, and not  tortuous. Angiography showed a single discrete lesion. There was a diffuse  100 % stenosis in the middle third of the vessel segment. The lesion was  without evidence of thrombus. There was RUBIN grade 0 flow through the  vessel (no flow). This lesion is a chronic total occlusion. Fills via  collaterals from the RCA (faintly) and from the left system (good  collaterals).  COMPLICATIONS: There were no complications. No complications occurred  during the cath lab visit.No complications occurred during the cath lab  visit.  DIAGNOSTIC IMPRESSIONS: There is significant double vessel coronary artery  disease.  Prox LCx=95%  Mid OOK=498%  Successful PCI of Prox LCx with ADELIA x 1 ([place] 3.0x18mm) Left ventricular  function is normal.  LVEF=80%  Mild Elevation of Rt Hear Pressures:  RA=10 mmHg, RV=20/14 mmHg, PCWP=19 mmHg  Mild Pulmonary HTN (38/20 - 27 mmHg)  Normal CO (5.79 L/min) and CI (2.81 L/min/m2)  Moderate Aortic Stenosis (AVG=24 mmHg and BILL=1.3 cm2)  Successful Deployment of Closure Device (Angioseal)  DIAGNOSTIC RECOMMENDATIONS: The patient should continue with the present  medications. Patient management should include aggressive medical therapy,  close monitoring of BUN and creatinine, resumption of all previous  activities in 5 days, a cardiac rehabilitation program, and weight  reduction. The patient should follow a low fat and low calorie diet.  Medical management is recommended.  Prepared and signed by  Waylon Benavides MD    Angioseal site benign  Bedrest for 3 hours  Continue aspirin and plavix protocol  Patient in [place] ONE Clear study  Plavix and aspirin protocol as ordered.  AM labs to be drawn at 1000 am including  CK and EKG.   Post  procedure EKG  flipped T's I and Avl  Dr Fowler aware.  Denies chest pain  Will monitor.

## 2018-12-12 NOTE — PROGRESS NOTE ADULT - SUBJECTIVE AND OBJECTIVE BOX
Patient is a 84y old  Female who presents with a chief complaint of chest pain (12 Dec 2018 00:49)    INTERVAL HPI/OVERNIGHT EVENTS:  83yo Female seen at bedside and chart reviewed. Pt has no complaints.    Pt denies SOB, cough, CP, palpitations, Headache, lightheadedness, dizziness, abdominal pain, nausea, vomiting, diarrhea, constipation, dysuria, lower extremity edema    Allergies    iodine (Hives)  iodine containing compounds (Unknown)  shellfish (Anaphylaxis)  shellfish (Swelling; Short breath)    Vital Signs Last 24 Hrs  T(C): 36.4 (12 Dec 2018 10:42), Max: 37 (12 Dec 2018 00:45)  T(F): 97.6 (12 Dec 2018 10:42), Max: 98.6 (12 Dec 2018 00:45)  HR: 63 (12 Dec 2018 10:42) (63 - 107)  BP: 130/74 (12 Dec 2018 10:42) (120/62 - 192/90)  BP(mean): --  RR: 16 (12 Dec 2018 05:33) (16 - 22)  SpO2: 98% (12 Dec 2018 05:33) (93% - 98%)    Daily Height in cm: 152.4 (11 Dec 2018 18:15)    Daily Weight in k.4 (12 Dec 2018 00:45)  I&O's Detail    Last Menstrual Period      PHYSICAL EXAM:  GENERAL: NAD, well-groomed, well-developed  HEAD:  Atraumatic, Normocephalic  EYES: EOMI, PERRLA, conjunctiva and sclera clear  ENMT: Moist mucous membranes, Good dentition  NECK: Supple, No JVD, Normal thyroid  NERVOUS SYSTEM:  Alert & Oriented X3, Good concentration;   CHEST/LUNG: Clear to auscultation bilaterally; No rales, rhonchi, wheezing, or rubs  HEART: Regular rate; No murmurs, rubs, or gallops  ABDOMEN: Soft, Nontender, distended likely due to body habitus; Bowel sounds present  EXTREMITIES:  2+ Peripheral Pulses, No clubbing, cyanosis, or edema    LABS:                        11.1   11.0  )-----------( 202      ( 12 Dec 2018 06:44 )             35.3     CBC Full  -  ( 12 Dec 2018 06:44 )  WBC Count : 11.0 K/uL  Hemoglobin : 11.1 g/dL  Hematocrit : 35.3 %  Platelet Count - Automated : 202 K/uL  Mean Cell Volume : 84.4 fl  Mean Cell Hemoglobin : 26.6 pg  Mean Cell Hemoglobin Concentration : 31.4 g/dL  Auto Neutrophil # : x  Auto Lymphocyte # : x  Auto Monocyte # : x  Auto Eosinophil # : x  Auto Basophil # : x  Auto Neutrophil % : x  Auto Lymphocyte % : x  Auto Monocyte % : x  Auto Eosinophil % : x  Auto Basophil % : x        139  |  100  |  46.0<H>  ----------------------------<  302<H>  4.4   |  23.0  |  1.45<H>    Ca    9.2      12 Dec 2018 04:17  Phos  4.7       Mg     1.8         TPro  7.0  /  Alb  3.9  /  TBili  0.2<L>  /  DBili  x   /  AST  15  /  ALT  10  /  AlkPhos  79      PT/INR - ( 11 Dec 2018 19:00 )   PT: 11.1 sec;   INR: 0.97 ratio         PTT - ( 12 Dec 2018 09:29 )  PTT:57.4 sec  Urinalysis Basic - ( 12 Dec 2018 02:22 )    Color: Yellow / Appearance: Clear / S.010 / pH: x  Gluc: x / Ketone: Negative  / Bili: Negative / Urobili: Negative mg/dL   Blood: x / Protein: Negative mg/dL / Nitrite: Negative   Leuk Esterase: Negative / RBC: x / WBC x   Sq Epi: x / Non Sq Epi: x / Bacteria: x      Hemoglobin A1C, Whole Blood: 9.6 % ( @ 04:17)  Hemoglobin A1C, Whole Blood: 10.7 % ( @ 10:49)  Hemoglobin A1C, Whole Blood: 8.5 % ( @ 05:46)      Serum Pro-Brain Natriuretic Peptide: 586 pg/mL ( @ 19:00)      Albumin, Serum: 3.9 g/dL ( @ 19:00)      MEDICATIONS  (STANDING):  aspirin enteric coated 81 milliGRAM(s) Oral daily  atorvastatin 40 milliGRAM(s) Oral at bedtime  dextrose 5%. 1000 milliLiter(s) (50 mL/Hr) IV Continuous <Continuous>  dextrose 50% Injectable 12.5 Gram(s) IV Push once  dextrose 50% Injectable 25 Gram(s) IV Push once  dextrose 50% Injectable 25 Gram(s) IV Push once  diltiazem    milliGRAM(s) Oral daily  gabapentin 100 milliGRAM(s) Oral three times a day  heparin  Infusion.  Unit(s)/Hr (10 mL/Hr) IV Continuous <Continuous>  insulin glargine Injectable (LANTUS) 30 Unit(s) SubCutaneous at bedtime  insulin lispro (HumaLOG) corrective regimen sliding scale   SubCutaneous three times a day before meals  insulin lispro (HumaLOG) corrective regimen sliding scale   SubCutaneous at bedtime  isosorbide   mononitrate ER Tablet (IMDUR) 30 milliGRAM(s) Oral daily  levothyroxine 125 MICROGram(s) Oral daily  lisinopril 5 milliGRAM(s) Oral daily  metoprolol succinate ER 50 milliGRAM(s) Oral daily  pantoprazole    Tablet 40 milliGRAM(s) Oral before breakfast  pregabalin 25 milliGRAM(s) Oral three times a day    MEDICATIONS  (PRN):  dextrose 40% Gel 15 Gram(s) Oral once PRN Blood Glucose LESS THAN 70 milliGRAM(s)/deciliter  glucagon  Injectable 1 milliGRAM(s) IntraMuscular once PRN Glucose LESS THAN 70 milligrams/deciliter  heparin  Injectable 5300 Unit(s) IV Push every 6 hours PRN For aPTT less than 40  morphine  - Injectable 1 milliGRAM(s) IV Push once PRN chest pain Patient is a 84y old  Female who presents with a chief complaint of chest pain (12 Dec 2018 00:49)    INTERVAL HPI/OVERNIGHT EVENTS:    CC: CP    85yo Female seen at bedside and chart reviewed. Pt has no complaints.    Pt denies SOB, cough, CP, palpitations, Headache, lightheadedness, dizziness, abdominal pain, nausea, vomiting, diarrhea, constipation, dysuria, lower extremity edema    Allergies    iodine (Hives)  iodine containing compounds (Unknown)  shellfish (Anaphylaxis)  shellfish (Swelling; Short breath)    Vital Signs Last 24 Hrs  T(C): 36.4 (12 Dec 2018 10:42), Max: 37 (12 Dec 2018 00:45)  T(F): 97.6 (12 Dec 2018 10:42), Max: 98.6 (12 Dec 2018 00:45)  HR: 63 (12 Dec 2018 10:42) (63 - 107)  BP: 130/74 (12 Dec 2018 10:42) (120/62 - 192/90)  BP(mean): --  RR: 16 (12 Dec 2018 05:33) (16 - 22)  SpO2: 98% (12 Dec 2018 05:33) (93% - 98%)    Daily Height in cm: 152.4 (11 Dec 2018 18:15)    Daily Weight in k.4 (12 Dec 2018 00:45)  I&O's Detail    Last Menstrual Period      PHYSICAL EXAM:  GENERAL: NAD, well-groomed, obese  HEAD:  Atraumatic, Normocephalic  EYES: EOMI, PERRLA, conjunctiva and sclera clear  ENMT: Moist mucous membranes, Good dentition  NECK: Supple, No JVD  NERVOUS SYSTEM:  Alert & Oriented X3, Good concentration;   CHEST/LUNG: Clear to auscultation bilaterally; No rales, rhonchi, wheezing, or rubs  HEART: Regular rate; No murmurs, rubs, or gallops  ABDOMEN: Soft, Nontender, distended likely due to body habitus; Bowel sounds present  EXTREMITIES:  2+ Peripheral Pulses, No clubbing, cyanosis, or edema    LABS:                        11.1   11.0  )-----------( 202      ( 12 Dec 2018 06:44 )             35.3     CBC Full  -  ( 12 Dec 2018 06:44 )  WBC Count : 11.0 K/uL  Hemoglobin : 11.1 g/dL  Hematocrit : 35.3 %  Platelet Count - Automated : 202 K/uL  Mean Cell Volume : 84.4 fl  Mean Cell Hemoglobin : 26.6 pg  Mean Cell Hemoglobin Concentration : 31.4 g/dL  Auto Neutrophil # : x  Auto Lymphocyte # : x  Auto Monocyte # : x  Auto Eosinophil # : x  Auto Basophil # : x  Auto Neutrophil % : x  Auto Lymphocyte % : x  Auto Monocyte % : x  Auto Eosinophil % : x  Auto Basophil % : x        139  |  100  |  46.0<H>  ----------------------------<  302<H>  4.4   |  23.0  |  1.45<H>    Ca    9.2      12 Dec 2018 04:17  Phos  4.7       Mg     1.8         TPro  7.0  /  Alb  3.9  /  TBili  0.2<L>  /  DBili  x   /  AST  15  /  ALT  10  /  AlkPhos  79      PT/INR - ( 11 Dec 2018 19:00 )   PT: 11.1 sec;   INR: 0.97 ratio         PTT - ( 12 Dec 2018 09:29 )  PTT:57.4 sec  Urinalysis Basic - ( 12 Dec 2018 02:22 )    Color: Yellow / Appearance: Clear / S.010 / pH: x  Gluc: x / Ketone: Negative  / Bili: Negative / Urobili: Negative mg/dL   Blood: x / Protein: Negative mg/dL / Nitrite: Negative   Leuk Esterase: Negative / RBC: x / WBC x   Sq Epi: x / Non Sq Epi: x / Bacteria: x      Hemoglobin A1C, Whole Blood: 9.6 % ( @ 04:17)  Hemoglobin A1C, Whole Blood: 10.7 % ( @ 10:49)  Hemoglobin A1C, Whole Blood: 8.5 % ( @ 05:46)      Serum Pro-Brain Natriuretic Peptide: 586 pg/mL ( @ 19:00)      Albumin, Serum: 3.9 g/dL ( @ 19:00)      MEDICATIONS  (STANDING):  aspirin enteric coated 81 milliGRAM(s) Oral daily  atorvastatin 40 milliGRAM(s) Oral at bedtime  dextrose 5%. 1000 milliLiter(s) (50 mL/Hr) IV Continuous <Continuous>  dextrose 50% Injectable 12.5 Gram(s) IV Push once  dextrose 50% Injectable 25 Gram(s) IV Push once  dextrose 50% Injectable 25 Gram(s) IV Push once  diltiazem    milliGRAM(s) Oral daily  gabapentin 100 milliGRAM(s) Oral three times a day  heparin  Infusion.  Unit(s)/Hr (10 mL/Hr) IV Continuous <Continuous>  insulin glargine Injectable (LANTUS) 30 Unit(s) SubCutaneous at bedtime  insulin lispro (HumaLOG) corrective regimen sliding scale   SubCutaneous three times a day before meals  insulin lispro (HumaLOG) corrective regimen sliding scale   SubCutaneous at bedtime  isosorbide   mononitrate ER Tablet (IMDUR) 30 milliGRAM(s) Oral daily  levothyroxine 125 MICROGram(s) Oral daily  lisinopril 5 milliGRAM(s) Oral daily  metoprolol succinate ER 50 milliGRAM(s) Oral daily  pantoprazole    Tablet 40 milliGRAM(s) Oral before breakfast  pregabalin 25 milliGRAM(s) Oral three times a day    MEDICATIONS  (PRN):  dextrose 40% Gel 15 Gram(s) Oral once PRN Blood Glucose LESS THAN 70 milliGRAM(s)/deciliter  glucagon  Injectable 1 milliGRAM(s) IntraMuscular once PRN Glucose LESS THAN 70 milligrams/deciliter  heparin  Injectable 5300 Unit(s) IV Push every 6 hours PRN For aPTT less than 40  morphine  - Injectable 1 milliGRAM(s) IV Push once PRN chest pain

## 2018-12-13 ENCOUNTER — TRANSCRIPTION ENCOUNTER (OUTPATIENT)
Age: 83
End: 2018-12-13

## 2018-12-13 VITALS
DIASTOLIC BLOOD PRESSURE: 64 MMHG | TEMPERATURE: 98 F | HEART RATE: 64 BPM | SYSTOLIC BLOOD PRESSURE: 132 MMHG | OXYGEN SATURATION: 96 % | RESPIRATION RATE: 18 BRPM

## 2018-12-13 LAB
ANION GAP SERPL CALC-SCNC: 12 MMOL/L — SIGNIFICANT CHANGE UP (ref 5–17)
ANION GAP SERPL CALC-SCNC: 12 MMOL/L — SIGNIFICANT CHANGE UP (ref 5–17)
APTT BLD: 27 SEC — LOW (ref 27.5–36.3)
BASOPHILS # BLD AUTO: 0 K/UL — SIGNIFICANT CHANGE UP (ref 0–0.2)
BASOPHILS NFR BLD AUTO: 0.3 % — SIGNIFICANT CHANGE UP (ref 0–2)
BUN SERPL-MCNC: 45 MG/DL — HIGH (ref 8–20)
BUN SERPL-MCNC: 49 MG/DL — HIGH (ref 8–20)
CALCIUM SERPL-MCNC: 9.1 MG/DL — SIGNIFICANT CHANGE UP (ref 8.6–10.2)
CALCIUM SERPL-MCNC: 9.3 MG/DL — SIGNIFICANT CHANGE UP (ref 8.6–10.2)
CHLORIDE SERPL-SCNC: 100 MMOL/L — SIGNIFICANT CHANGE UP (ref 98–107)
CHLORIDE SERPL-SCNC: 103 MMOL/L — SIGNIFICANT CHANGE UP (ref 98–107)
CK SERPL-CCNC: 102 U/L — SIGNIFICANT CHANGE UP (ref 25–170)
CK SERPL-CCNC: 103 U/L — SIGNIFICANT CHANGE UP (ref 25–170)
CO2 SERPL-SCNC: 26 MMOL/L — SIGNIFICANT CHANGE UP (ref 22–29)
CO2 SERPL-SCNC: 28 MMOL/L — SIGNIFICANT CHANGE UP (ref 22–29)
CREAT SERPL-MCNC: 1.47 MG/DL — HIGH (ref 0.5–1.3)
CREAT SERPL-MCNC: 1.48 MG/DL — HIGH (ref 0.5–1.3)
CREAT SERPL-MCNC: 1.73 MG/DL — HIGH (ref 0.5–1.3)
EOSINOPHIL # BLD AUTO: 0.3 K/UL — SIGNIFICANT CHANGE UP (ref 0–0.5)
EOSINOPHIL NFR BLD AUTO: 2.3 % — SIGNIFICANT CHANGE UP (ref 0–6)
GLUCOSE BLDC GLUCOMTR-MCNC: 241 MG/DL — HIGH (ref 70–99)
GLUCOSE BLDC GLUCOMTR-MCNC: 286 MG/DL — HIGH (ref 70–99)
GLUCOSE BLDC GLUCOMTR-MCNC: 294 MG/DL — HIGH (ref 70–99)
GLUCOSE BLDC GLUCOMTR-MCNC: 329 MG/DL — HIGH (ref 70–99)
GLUCOSE BLDC GLUCOMTR-MCNC: 370 MG/DL — HIGH (ref 70–99)
GLUCOSE BLDC GLUCOMTR-MCNC: 474 MG/DL — CRITICAL HIGH (ref 70–99)
GLUCOSE SERPL-MCNC: 215 MG/DL — HIGH (ref 70–115)
GLUCOSE SERPL-MCNC: 346 MG/DL — HIGH (ref 70–115)
HCT VFR BLD CALC: 36.8 % — LOW (ref 37–47)
HGB BLD-MCNC: 11.4 G/DL — LOW (ref 12–16)
LYMPHOCYTES # BLD AUTO: 2.8 K/UL — SIGNIFICANT CHANGE UP (ref 1–4.8)
LYMPHOCYTES # BLD AUTO: 21.3 % — SIGNIFICANT CHANGE UP (ref 20–55)
MAGNESIUM SERPL-MCNC: 2.2 MG/DL — SIGNIFICANT CHANGE UP (ref 1.6–2.6)
MCHC RBC-ENTMCNC: 26.6 PG — LOW (ref 27–31)
MCHC RBC-ENTMCNC: 31 G/DL — LOW (ref 32–36)
MCV RBC AUTO: 85.8 FL — SIGNIFICANT CHANGE UP (ref 81–99)
MONOCYTES # BLD AUTO: 1.3 K/UL — HIGH (ref 0–0.8)
MONOCYTES NFR BLD AUTO: 10.1 % — HIGH (ref 3–10)
NEUTROPHILS # BLD AUTO: 8.7 K/UL — HIGH (ref 1.8–8)
NEUTROPHILS NFR BLD AUTO: 65.7 % — SIGNIFICANT CHANGE UP (ref 37–73)
PHOSPHATE SERPL-MCNC: 3.8 MG/DL — SIGNIFICANT CHANGE UP (ref 2.4–4.7)
PLATELET # BLD AUTO: 228 K/UL — SIGNIFICANT CHANGE UP (ref 150–400)
POTASSIUM SERPL-MCNC: 3.9 MMOL/L — SIGNIFICANT CHANGE UP (ref 3.5–5.3)
POTASSIUM SERPL-MCNC: 4.5 MMOL/L — SIGNIFICANT CHANGE UP (ref 3.5–5.3)
POTASSIUM SERPL-SCNC: 3.9 MMOL/L — SIGNIFICANT CHANGE UP (ref 3.5–5.3)
POTASSIUM SERPL-SCNC: 4.5 MMOL/L — SIGNIFICANT CHANGE UP (ref 3.5–5.3)
RAPID RVP RESULT: SIGNIFICANT CHANGE UP
RBC # BLD: 4.29 M/UL — LOW (ref 4.4–5.2)
RBC # FLD: 14.7 % — SIGNIFICANT CHANGE UP (ref 11–15.6)
SODIUM SERPL-SCNC: 140 MMOL/L — SIGNIFICANT CHANGE UP (ref 135–145)
SODIUM SERPL-SCNC: 141 MMOL/L — SIGNIFICANT CHANGE UP (ref 135–145)
TROPONIN T SERPL-MCNC: 0.35 NG/ML — HIGH (ref 0–0.06)
TROPONIN T SERPL-MCNC: 0.39 NG/ML — HIGH (ref 0–0.06)
WBC # BLD: 13.2 K/UL — HIGH (ref 4.8–10.8)
WBC # FLD AUTO: 13.2 K/UL — HIGH (ref 4.8–10.8)

## 2018-12-13 PROCEDURE — C1769: CPT

## 2018-12-13 PROCEDURE — 83735 ASSAY OF MAGNESIUM: CPT

## 2018-12-13 PROCEDURE — 81003 URINALYSIS AUTO W/O SCOPE: CPT

## 2018-12-13 PROCEDURE — 84484 ASSAY OF TROPONIN QUANT: CPT

## 2018-12-13 PROCEDURE — 99232 SBSQ HOSP IP/OBS MODERATE 35: CPT

## 2018-12-13 PROCEDURE — 93306 TTE W/DOPPLER COMPLETE: CPT

## 2018-12-13 PROCEDURE — 80061 LIPID PANEL: CPT

## 2018-12-13 PROCEDURE — C1887: CPT

## 2018-12-13 PROCEDURE — C1889: CPT

## 2018-12-13 PROCEDURE — 36415 COLL VENOUS BLD VENIPUNCTURE: CPT

## 2018-12-13 PROCEDURE — 93460 R&L HRT ART/VENTRICLE ANGIO: CPT | Mod: XU

## 2018-12-13 PROCEDURE — 80053 COMPREHEN METABOLIC PANEL: CPT

## 2018-12-13 PROCEDURE — 86900 BLOOD TYPING SEROLOGIC ABO: CPT

## 2018-12-13 PROCEDURE — 82962 GLUCOSE BLOOD TEST: CPT

## 2018-12-13 PROCEDURE — 96374 THER/PROPH/DIAG INJ IV PUSH: CPT

## 2018-12-13 PROCEDURE — 93010 ELECTROCARDIOGRAM REPORT: CPT

## 2018-12-13 PROCEDURE — C9606: CPT | Mod: LC

## 2018-12-13 PROCEDURE — 93005 ELECTROCARDIOGRAM TRACING: CPT

## 2018-12-13 PROCEDURE — 85730 THROMBOPLASTIN TIME PARTIAL: CPT

## 2018-12-13 PROCEDURE — 86850 RBC ANTIBODY SCREEN: CPT

## 2018-12-13 PROCEDURE — 83036 HEMOGLOBIN GLYCOSYLATED A1C: CPT

## 2018-12-13 PROCEDURE — 82550 ASSAY OF CK (CPK): CPT

## 2018-12-13 PROCEDURE — 80048 BASIC METABOLIC PNL TOTAL CA: CPT

## 2018-12-13 PROCEDURE — 71045 X-RAY EXAM CHEST 1 VIEW: CPT

## 2018-12-13 PROCEDURE — C1874: CPT

## 2018-12-13 PROCEDURE — 83880 ASSAY OF NATRIURETIC PEPTIDE: CPT

## 2018-12-13 PROCEDURE — 84100 ASSAY OF PHOSPHORUS: CPT

## 2018-12-13 PROCEDURE — 87798 DETECT AGENT NOS DNA AMP: CPT

## 2018-12-13 PROCEDURE — 94760 N-INVAS EAR/PLS OXIMETRY 1: CPT

## 2018-12-13 PROCEDURE — 99239 HOSP IP/OBS DSCHRG MGMT >30: CPT

## 2018-12-13 PROCEDURE — 82553 CREATINE MB FRACTION: CPT

## 2018-12-13 PROCEDURE — 85027 COMPLETE CBC AUTOMATED: CPT

## 2018-12-13 PROCEDURE — C1760: CPT

## 2018-12-13 PROCEDURE — 87633 RESP VIRUS 12-25 TARGETS: CPT

## 2018-12-13 PROCEDURE — 94660 CPAP INITIATION&MGMT: CPT

## 2018-12-13 PROCEDURE — 87486 CHLMYD PNEUM DNA AMP PROBE: CPT

## 2018-12-13 PROCEDURE — C1725: CPT

## 2018-12-13 PROCEDURE — 82565 ASSAY OF CREATININE: CPT

## 2018-12-13 PROCEDURE — 85610 PROTHROMBIN TIME: CPT

## 2018-12-13 PROCEDURE — 99285 EMERGENCY DEPT VISIT HI MDM: CPT | Mod: 25

## 2018-12-13 PROCEDURE — 87581 M.PNEUMON DNA AMP PROBE: CPT

## 2018-12-13 PROCEDURE — C1894: CPT

## 2018-12-13 RX ORDER — BENZOCAINE AND MENTHOL 5; 1 G/100ML; G/100ML
1 LIQUID ORAL DAILY
Qty: 0 | Refills: 0 | Status: DISCONTINUED | OUTPATIENT
Start: 2018-12-13 | End: 2018-12-13

## 2018-12-13 RX ORDER — BENZOCAINE AND MENTHOL 5; 1 G/100ML; G/100ML
1 LIQUID ORAL
Qty: 0 | Refills: 0 | Status: DISCONTINUED | OUTPATIENT
Start: 2018-12-13 | End: 2018-12-13

## 2018-12-13 RX ORDER — ATORVASTATIN CALCIUM 80 MG/1
1 TABLET, FILM COATED ORAL
Qty: 30 | Refills: 0
Start: 2018-12-13 | End: 2019-01-11

## 2018-12-13 RX ORDER — INSULIN LISPRO 100/ML
4 VIAL (ML) SUBCUTANEOUS ONCE
Qty: 0 | Refills: 0 | Status: COMPLETED | OUTPATIENT
Start: 2018-12-13 | End: 2018-12-13

## 2018-12-13 RX ADMIN — Medication 240 MILLIGRAM(S): at 06:01

## 2018-12-13 RX ADMIN — Medication 4: at 09:03

## 2018-12-13 RX ADMIN — Medication 125 MICROGRAM(S): at 06:01

## 2018-12-13 RX ADMIN — Medication 50 MILLIGRAM(S): at 06:01

## 2018-12-13 RX ADMIN — Medication 25 MILLIGRAM(S): at 13:38

## 2018-12-13 RX ADMIN — Medication 8 UNIT(S): at 00:41

## 2018-12-13 RX ADMIN — LISINOPRIL 5 MILLIGRAM(S): 2.5 TABLET ORAL at 06:01

## 2018-12-13 RX ADMIN — CLOPIDOGREL BISULFATE 75 MILLIGRAM(S): 75 TABLET, FILM COATED ORAL at 12:22

## 2018-12-13 RX ADMIN — GABAPENTIN 100 MILLIGRAM(S): 400 CAPSULE ORAL at 06:01

## 2018-12-13 RX ADMIN — GABAPENTIN 100 MILLIGRAM(S): 400 CAPSULE ORAL at 13:38

## 2018-12-13 RX ADMIN — Medication 25 MILLIGRAM(S): at 06:00

## 2018-12-13 RX ADMIN — ISOSORBIDE MONONITRATE 30 MILLIGRAM(S): 60 TABLET, EXTENDED RELEASE ORAL at 12:22

## 2018-12-13 RX ADMIN — Medication 8: at 12:23

## 2018-12-13 RX ADMIN — PANTOPRAZOLE SODIUM 40 MILLIGRAM(S): 20 TABLET, DELAYED RELEASE ORAL at 06:00

## 2018-12-13 RX ADMIN — Medication 81 MILLIGRAM(S): at 12:22

## 2018-12-13 NOTE — DISCHARGE NOTE ADULT - MEDICATION SUMMARY - MEDICATIONS TO TAKE
I will START or STAY ON the medications listed below when I get home from the hospital:    aspirin 81 mg oral delayed release tablet  -- 1 tab(s) by mouth once a day  -- Indication: For Heart disease    lisinopril 5 mg oral tablet  -- 1 tab(s) by mouth once a day  -- Indication: For blood pressure    isosorbide mononitrate 30 mg oral tablet, extended release  -- 1 tab(s) by mouth once a day (in the morning)   -- Do not drink alcoholic beverages when taking this medication.  It is very important that you take or use this exactly as directed.  Do not skip doses or discontinue unless directed by your doctor.  Swallow whole.  Do not crush.    -- Indication: For Heart disease    DilTIAZem Hydrochloride  mg/24 hours oral capsule, extended release  -- 1 cap(s) by mouth once a day  -- Indication: For Heart disease    pregabalin 25 mg oral capsule  -- 1 cap(s) by mouth 3 times a day  -- Indication: For pain    gabapentin 100 mg oral capsule  -- 1 cap(s) by mouth 3 times a day  -- Indication: For pain    NovoLOG FlexPen 100 units/mL injectable solution  -- unit(s) subcutaneous 3 times a day: as per sliding scale  -- Indication: For Diabetes    Basaglar KwikPen 100 units/mL subcutaneous solution  -- 60 unit(s) subcutaneous once (at bedtime)  -- Indication: For Diabetes    Lipitor 80 mg oral tablet  -- 1 tab(s) by mouth once a day (at bedtime)   -- Avoid grapefruit and grapefruit juice while taking this medication.  Do not take this drug if you are pregnant.  It is very important that you take or use this exactly as directed.  Do not skip doses or discontinue unless directed by your doctor.  Obtain medical advice before taking any non-prescription drugs as some may affect the action of this medication.  Take with food or milk.    -- Indication: For Cholesterol    clopidogrel 75 mg oral tablet  -- 1 tab(s) by mouth once a day  -- Indication: For Heart disease    metoprolol succinate 50 mg oral tablet, extended release  -- 1 tab(s) by mouth once a day  -- Indication: For Heart disease    ProAir HFA 90 mcg/inh inhalation aerosol  -- 2 puff(s) inhaled 4 times a day, As Needed - for shortness of breath and/or wheezing  -- Indication: For shortness of breath    Spiriva 18 mcg inhalation capsule  -- 1 cap(s) inhaled once a day  -- Indication: For shortness of breath    albuterol 0.63 mg/3 mL (0.021%) inhalation solution  -- 3 milliliter(s) inhaled every 6 hours, As Needed  -- Indication: For shortness of breath    furosemide 40 mg oral tablet  -- 1 tab(s) by mouth once a day  -- Indication: For Heart disease    omeprazole 40 mg oral delayed release capsule  -- 1 cap(s) by mouth once a day  -- Indication: For reflux    levothyroxine 125 mcg (0.125 mg) oral tablet  -- 1 tab(s) by mouth once a day  -- Indication: For Hypothyroidism, unspecified type

## 2018-12-13 NOTE — DISCHARGE NOTE ADULT - OTHER SIGNIFICANT FINDINGS
< from: TTE Echo Complete w/Doppler (12.11.18 @ 22:13) >    Summary:   1. Technically difficult study.   2. Hyperdynamic global left ventricular systolic function.   3. Left ventricular ejection fraction, by visual estimation, is >75%.   4. Spectral Doppler shows impaired relaxation pattern of left   ventricular myocardial filling (Grade I diastolic dysfunction).   5. There is mild concentric left ventricular hypertrophy.   6. Thickening and calcification of the anterior and posterior mitral   valve leaflets.   7. Mildly enlarged left atrium.   8. Trace tricuspid regurgitation.   9. Sclerotic aortic valve with decreased opening.  10. Trace pulmonic valve regurgitation.  11. Normal right ventricular size and function.  12. Pericardium and IVC was not well visualized.    < end of copied text >    12-13    141  |  103  |  45.0<H>  ----------------------------<  215<H>  3.9   |  26.0  |  1.48<H>    Ca    9.1      13 Dec 2018 07:38  Phos  3.8     12-13  Mg     2.2     12-13    TPro  7.0  /  Alb  3.9  /  TBili  0.2<L>  /  DBili  x   /  AST  15  /  ALT  10  /  AlkPhos  79  12-11                          11.4   13.2  )-----------( 228      ( 13 Dec 2018 11:42 )             36.8     LIVER FUNCTIONS - ( 11 Dec 2018 19:00 )  Alb: 3.9 g/dL / Pro: 7.0 g/dL / ALK PHOS: 79 U/L / ALT: 10 U/L / AST: 15 U/L / GGT: x           PT/INR - ( 11 Dec 2018 19:00 )   PT: 11.1 sec;   INR: 0.97 ratio         PTT - ( 13 Dec 2018 07:38 )  PTT:27.0 sec    Hemoglobin A1C, Whole Blood in AM (12.12.18 @ 04:17)    Hemoglobin A1C, Whole Blood: 9.6 %    Lipid Profile in AM (12.12.18 @ 04:17)    Total Cholesterol/HDL Ratio Measurement: 5.0 Ratio    Cholesterol, Serum: 180 mg/dL    Triglycerides, Serum: 229 mg/dL    HDL Cholesterol, Serum: 36: HDL Levels >/= 60 mg/dL are considered beneficial and a "negative" risk  factor.  Effective 08/15/2018: New reference range and interpretive comment. mg/dL    Direct LDL: 98:     < from: Cardiac Cath Lab - Adult (12.12.18 @ 14:19) >  DIAGNOSTIC IMPRESSIONS: There is significant double vessel coronary artery  disease.  Prox LCx=95%  Mid HEK=679%  Successful PCI of Prox LCx with ADELIA x 1 ([place] 3.0x18mm) Left ventricular  function is normal.  LVEF=80%    < end of copied text >

## 2018-12-13 NOTE — DISCHARGE NOTE ADULT - CARE PROVIDER_API CALL
Maik Coyne (), Family Medicine  [address] [facility]  [address]  [place] [address]  Phone: [phone]  Fax: [phone]    Waylon Benavides), Cardiovascular Disease; Internal Medicine; Interventional Cardiology  Phone: [phone]  Fax: [phone]

## 2018-12-13 NOTE — DISCHARGE NOTE ADULT - PATIENT PORTAL LINK FT
You can access the [facility] Patient Portal, offered by [facility], by registering with the following website: [email or web address]

## 2018-12-13 NOTE — PROGRESS NOTE ADULT - SUBJECTIVE AND OBJECTIVE BOX
SUBJECTIVE:  Cardiology NP F/U note:  SP: [facility] which revealed: ADELIA prox LCX   denies complaints of chest pain/sob/dizziness/palps overnight   yumiko diet OOB   slight cough overnight  NAD       MEDICATIONS:  diltiazem    milliGRAM(s) Oral daily  isosorbide   mononitrate ER Tablet (IMDUR) 30 milliGRAM(s) Oral daily  lisinopril 5 milliGRAM(s) Oral daily  metoprolol succinate ER 50 milliGRAM(s) Oral daily  gabapentin 100 milliGRAM(s) Oral three times a day  morphine  - Injectable 1 milliGRAM(s) IV Push once PRN  pregabalin 25 milliGRAM(s) Oral three times a day  pantoprazole    Tablet 40 milliGRAM(s) Oral before breakfast  atorvastatin 40 milliGRAM(s) Oral at bedtime  dextrose 40% Gel 15 Gram(s) Oral once PRN  dextrose 50% Injectable 12.5 Gram(s) IV Push once  dextrose 50% Injectable 25 Gram(s) IV Push once  dextrose 50% Injectable 25 Gram(s) IV Push once  glucagon  Injectable 1 milliGRAM(s) IntraMuscular once PRN  insulin glargine Injectable (LANTUS) 30 Unit(s) SubCutaneous at bedtime  insulin lispro (HumaLOG) corrective regimen sliding scale   SubCutaneous three times a day before meals  insulin lispro (HumaLOG) corrective regimen sliding scale   SubCutaneous at bedtime  levothyroxine 125 MICROGram(s) Oral daily  aspirin enteric coated 81 milliGRAM(s) Oral daily  clopidogrel Tablet 75 milliGRAM(s) Oral daily  dextrose 5%. 1000 milliLiter(s) IV Continuous <Continuous>        PHYSICAL EXAM:    T(C): 36.6 (18 @ 10:13), Max: 36.7 (18 @ 21:18)  HR: 62 (18 @ 10:13) (59 - 83)  BP: 118/56 (18 @ 10:13) (118/56 - 184/76)  RR: 18 (18 @ 10:13) (15 - 20)  SpO2: 97% (18 @ 10:13) (94% - 97%)  Wt(kg): --    I&O's Summary    12 Dec 2018 07:01  -  13 Dec 2018 07:00  --------------------------------------------------------  IN: 120 mL / OUT: 700 mL / NET: -580 mL        Daily     Daily Weight in k (13 Dec 2018 10:44)    Appearance: Normal	  HEENT:   Normal oral mucosa,   Lymphatic: No lymphadenopathy  Cardiovascular: Normal S1 S2, RRR 50's No JVD, No murmurs, No edema  Respiratory: Lungs clear to auscultation	  Psychiatry: A & O x 3, Mood & affect appropriate  Gastrointestinal:  Soft, Non-tender, + BS	  Skin: warm and dry  Neurologic: Non-focal  Extremities: Normal range of motion,:  Right Groin soft /no hematoma  dressing removed  Vascular: Peripheral pulses palpable 2+ bilaterally    TELEMETRY: 	 RSR B  56  no event s  on tele     ECG:  	  RADIOLOGY:   DIAGNOSTIC TESTING:  [X ] Echocardiogram:  < from: TTE Echo Complete w/Doppler (18 @ 22:13) >  Technically difficult study.   2. Hyperdynamic global left ventricular systolic function.   3. Left ventricular ejection fraction, by visual estimation, is >75%.   4. Spectral Doppler shows impaired relaxation pattern of left   ventricular myocardial filling (Grade I diastolic dysfunction).   5. There is mild concentric left ventricular hypertrophy.   6. Thickening and calcification of the anterior and posterior mitral   valve leaflets.   7. Mildly enlarged left atrium.   8. Trace tricuspid regurgitation.   9. Sclerotic aortic valve with decreased opening.  10. Trace pulmonic valve regurgitation.  11. Normal right ventricular size and function.  12. Pericardium and IVC was not well visualized.    [X ]  Catheterization:  < from: Cardiac Cath Lab - Adult (18 @ 14:19) >  DIAGNOSTIC IMPRESSIONS: There is significant double vessel coronary artery  disease.  Prox LCx=95%  Mid OVQ=331%  Successful PCI of Prox LCx with ADELIA x 1 (Adela 3.0x18mm) Left ventricular  function is normal.  LVEF=80%  Mild Elevation of Rt Hear Pressures:  RA=10 mmHg, RV=20/14 mmHg, PCWP=19 mmHg  Mild Pulmonary HTN (38/20 - 27 mmHg)  Normal CO (5.79 L/min) and CI (2.81 L/min/m2)  Moderate Aortic Stenosis (AVG=24 mmHg and BILL=1.3 cm2)  Successful Deployment of Closure Device (Angioseal)    [ ] Stress Test:    OTHER: 	    LABS:	 	    CARDIAC MARKERS: Positive                                   11.1   11.0  )-----------( 202      ( 12 Dec 2018 06:44 )             35.3     12    141  |  103  |  45.0<H>  ----------------------------<  215<H>  3.9   |  26.0  |  1.48<H>    Ca    9.1      13 Dec 2018 07:38  Phos  3.8     12  Mg     2.2         TPro  7.0  /  Alb  3.9  /  TBili  0.2<L>  /  DBili  x   /  AST  15  /  ALT  10  /  AlkPhos  79        ASSESSMENT:  84 F  presents with Chest pain radiating to right arm/ +SOB  HX CAD& stents mod AS/ DM2/ Pulm HTN/ STEVENSON bipap/ HTN/HLD  + NSTEMI w/ pEF / enrolled into the ADELA study  [facility] 18 = ADELIA PCI prox lCX  Hemodynamically stable / labs and EKG reviewed VSS  Plan:  Continue current meds asa / Plavix/ BB/ Statin/ ACE  med compliance/ groin care and out pt follow up discussed with Dr. Marshall.   stable for d/c home SUBJECTIVE:  Cardiology NP F/U note:  SP: [facility] which revealed: ADELIA prox LCX   denies complaints of chest pain/sob/dizziness/palps overnight   yumiko diet OOB   slight cough overnight  NAD       MEDICATIONS:  diltiazem    milliGRAM(s) Oral daily  isosorbide   mononitrate ER Tablet (IMDUR) 30 milliGRAM(s) Oral daily  lisinopril 5 milliGRAM(s) Oral daily  metoprolol succinate ER 50 milliGRAM(s) Oral daily  gabapentin 100 milliGRAM(s) Oral three times a day  morphine  - Injectable 1 milliGRAM(s) IV Push once PRN  pregabalin 25 milliGRAM(s) Oral three times a day  pantoprazole    Tablet 40 milliGRAM(s) Oral before breakfast  atorvastatin 40 milliGRAM(s) Oral at bedtime  dextrose 40% Gel 15 Gram(s) Oral once PRN  dextrose 50% Injectable 12.5 Gram(s) IV Push once  dextrose 50% Injectable 25 Gram(s) IV Push once  dextrose 50% Injectable 25 Gram(s) IV Push once  glucagon  Injectable 1 milliGRAM(s) IntraMuscular once PRN  insulin glargine Injectable (LANTUS) 30 Unit(s) SubCutaneous at bedtime  insulin lispro (HumaLOG) corrective regimen sliding scale   SubCutaneous three times a day before meals  insulin lispro (HumaLOG) corrective regimen sliding scale   SubCutaneous at bedtime  levothyroxine 125 MICROGram(s) Oral daily  aspirin enteric coated 81 milliGRAM(s) Oral daily  clopidogrel Tablet 75 milliGRAM(s) Oral daily  dextrose 5%. 1000 milliLiter(s) IV Continuous <Continuous>        PHYSICAL EXAM:    T(C): 36.6 (18 @ 10:13), Max: 36.7 (18 @ 21:18)  HR: 62 (18 @ 10:13) (59 - 83)  BP: 118/56 (18 @ 10:13) (118/56 - 184/76)  RR: 18 (18 @ 10:13) (15 - 20)  SpO2: 97% (18 @ 10:13) (94% - 97%)  Wt(kg): --    I&O's Summary    12 Dec 2018 07:01  -  13 Dec 2018 07:00  --------------------------------------------------------  IN: 120 mL / OUT: 700 mL / NET: -580 mL        Daily     Daily Weight in k (13 Dec 2018 10:44)    Appearance: Normal	  HEENT:   Normal oral mucosa,   Lymphatic: No lymphadenopathy  Cardiovascular: Normal S1 S2, RRR 50's No JVD, No murmurs, No edema  Respiratory: Lungs clear to auscultation	  Psychiatry: A & O x 3, Mood & affect appropriate  Gastrointestinal:  Soft, Non-tender, + BS	  Skin: warm and dry  Neurologic: Non-focal  Extremities: Normal range of motion,:  Right Groin soft /no hematoma  dressing removed  Vascular: Peripheral pulses palpable 2+ bilaterally    TELEMETRY: 	 RSR B  56  no event s  on tele     ECG:  	  RADIOLOGY:   DIAGNOSTIC TESTING:  [X ] Echocardiogram:  < from: TTE Echo Complete w/Doppler (18 @ 22:13) >  Technically difficult study.   2. Hyperdynamic global left ventricular systolic function.   3. Left ventricular ejection fraction, by visual estimation, is >75%.   4. Spectral Doppler shows impaired relaxation pattern of left   ventricular myocardial filling (Grade I diastolic dysfunction).   5. There is mild concentric left ventricular hypertrophy.   6. Thickening and calcification of the anterior and posterior mitral   valve leaflets.   7. Mildly enlarged left atrium.   8. Trace tricuspid regurgitation.   9. Sclerotic aortic valve with decreased opening.  10. Trace pulmonic valve regurgitation.  11. Normal right ventricular size and function.  12. Pericardium and IVC was not well visualized.    [X ]  Catheterization:  < from: Cardiac Cath Lab - Adult (18 @ 14:19) >  DIAGNOSTIC IMPRESSIONS: There is significant double vessel coronary artery  disease.  Prox LCx=95%  Mid CLA=583%  Successful PCI of Prox LCx with ADELIA x 1 (Adela 3.0x18mm) Left ventricular  function is normal.  LVEF=80%  Mild Elevation of Rt Hear Pressures:  RA=10 mmHg, RV=20/14 mmHg, PCWP=19 mmHg  Mild Pulmonary HTN (38/20 - 27 mmHg)  Normal CO (5.79 L/min) and CI (2.81 L/min/m2)  Moderate Aortic Stenosis (AVG=24 mmHg and BILL=1.3 cm2)  Successful Deployment of Closure Device (Angioseal)    [ ] Stress Test:    OTHER: 	    LABS:	 	    CARDIAC MARKERS: Positive                                   11.1   11.0  )-----------( 202      ( 12 Dec 2018 06:44 )             35.3     12-    141  |  103  |  45.0<H>  ----------------------------<  215<H>  3.9   |  26.0  |  1.48<H>    Ca    9.1      13 Dec 2018 07:38  Phos  3.8     12  Mg     2.2         TPro  7.0  /  Alb  3.9  /  TBili  0.2<L>  /  DBili  x   /  AST  15  /  ALT  10  /  AlkPhos  79        ASSESSMENT:  84 F  presents with Chest pain radiating to right arm/ +SOB  HX CAD& stents mod AS/ DM2/ Pulm HTN/ STEVENSON bipap/ HTN/HLD/ CKD  + NSTEMI w/ pEF / enrolled into the ADELA study  [facility] 18 = ADELIA PCI prox lCX  Hemodynamically stable / labs and EKG reviewed VSS  CKD stable   Plan:  Continue current meds asa / Plavix/ BB/ Statin/ ACE  med compliance/ groin care and out pt follow up discussed with Dr. Marshall.   stable for d/c home SUBJECTIVE:  Cardiology NP F/U note:  SP: [facility] which revealed: ADELIA prox LCX   denies complaints of chest pain/sob/dizziness/palps overnight   yumiko diet OOB   slight cough overnight  NAD       MEDICATIONS:  diltiazem    milliGRAM(s) Oral daily  isosorbide   mononitrate ER Tablet (IMDUR) 30 milliGRAM(s) Oral daily  lisinopril 5 milliGRAM(s) Oral daily  metoprolol succinate ER 50 milliGRAM(s) Oral daily  gabapentin 100 milliGRAM(s) Oral three times a day  morphine  - Injectable 1 milliGRAM(s) IV Push once PRN  pregabalin 25 milliGRAM(s) Oral three times a day  pantoprazole    Tablet 40 milliGRAM(s) Oral before breakfast  atorvastatin 40 milliGRAM(s) Oral at bedtime  dextrose 40% Gel 15 Gram(s) Oral once PRN  dextrose 50% Injectable 12.5 Gram(s) IV Push once  dextrose 50% Injectable 25 Gram(s) IV Push once  dextrose 50% Injectable 25 Gram(s) IV Push once  glucagon  Injectable 1 milliGRAM(s) IntraMuscular once PRN  insulin glargine Injectable (LANTUS) 30 Unit(s) SubCutaneous at bedtime  insulin lispro (HumaLOG) corrective regimen sliding scale   SubCutaneous three times a day before meals  insulin lispro (HumaLOG) corrective regimen sliding scale   SubCutaneous at bedtime  levothyroxine 125 MICROGram(s) Oral daily  aspirin enteric coated 81 milliGRAM(s) Oral daily  clopidogrel Tablet 75 milliGRAM(s) Oral daily  dextrose 5%. 1000 milliLiter(s) IV Continuous <Continuous>        PHYSICAL EXAM:    T(C): 36.6 (18 @ 10:13), Max: 36.7 (18 @ 21:18)  HR: 62 (18 @ 10:13) (59 - 83)  BP: 118/56 (18 @ 10:13) (118/56 - 184/76)  RR: 18 (18 @ 10:13) (15 - 20)  SpO2: 97% (18 @ 10:13) (94% - 97%)  Wt(kg): --    I&O's Summary    12 Dec 2018 07:01  -  13 Dec 2018 07:00  --------------------------------------------------------  IN: 120 mL / OUT: 700 mL / NET: -580 mL        Daily     Daily Weight in k (13 Dec 2018 10:44)    Appearance: Normal	  HEENT:   Normal oral mucosa,   Lymphatic: No lymphadenopathy  Cardiovascular: Normal S1 S2, RRR 50's No JVD, No murmurs, No edema  Respiratory: Lungs clear to auscultation	  Psychiatry: A & O x 3, Mood & affect appropriate  Gastrointestinal:  Soft, Non-tender, + BS	  Skin: warm and dry  Neurologic: Non-focal  Extremities: Normal range of motion,:  Right Groin soft /no hematoma  dressing removed  Vascular: Peripheral pulses palpable 2+ bilaterally    TELEMETRY: 	 RSR B  56  no event s  on tele     ECG:  	RSR B 56/ Latera;l STTW abm/ no acute STTW changes   RADIOLOGY:   DIAGNOSTIC TESTING:  [X ] Echocardiogram:  < from: TTE Echo Complete w/Doppler (18 @ 22:13) >  Technically difficult study.   2. Hyperdynamic global left ventricular systolic function.   3. Left ventricular ejection fraction, by visual estimation, is >75%.   4. Spectral Doppler shows impaired relaxation pattern of left   ventricular myocardial filling (Grade I diastolic dysfunction).   5. There is mild concentric left ventricular hypertrophy.   6. Thickening and calcification of the anterior and posterior mitral   valve leaflets.   7. Mildly enlarged left atrium.   8. Trace tricuspid regurgitation.   9. Sclerotic aortic valve with decreased opening.  10. Trace pulmonic valve regurgitation.  11. Normal right ventricular size and function.  12. Pericardium and IVC was not well visualized.    [X ]  Catheterization:  < from: Cardiac Cath Lab - Adult (18 @ 14:19) >  DIAGNOSTIC IMPRESSIONS: There is significant double vessel coronary artery  disease.  Prox LCx=95%  Mid EOG=265%  Successful PCI of Prox LCx with ADELIA x 1 (Adela 3.0x18mm) Left ventricular  function is normal.  LVEF=80%  Mild Elevation of Rt Hear Pressures:  RA=10 mmHg, RV=20/14 mmHg, PCWP=19 mmHg  Mild Pulmonary HTN (38/20 - 27 mmHg)  Normal CO (5.79 L/min) and CI (2.81 L/min/m2)  Moderate Aortic Stenosis (AVG=24 mmHg and BILL=1.3 cm2)  Successful Deployment of Closure Device (Angioseal)    [ ] Stress Test:    OTHER: 	    LABS:	 	    CARDIAC MARKERS: Positive                                   11.1   11.0  )-----------( 202      ( 12 Dec 2018 06:44 )             35.3     12-13    141  |  103  |  45.0<H>  ----------------------------<  215<H>  3.9   |  26.0  |  1.48<H>    Ca    9.1      13 Dec 2018 07:38  Phos  3.8     12-13  Mg     2.2     12-13    TPro  7.0  /  Alb  3.9  /  TBili  0.2<L>  /  DBili  x   /  AST  15  /  ALT  10  /  AlkPhos  79  12-11      ASSESSMENT:  84 F  presents with Chest pain radiating to right arm/ +SOB  HX CAD& stents mod AS/ DM2/ Pulm HTN/ STEVENSON bipap/ HTN/HLD/ CKD  + NSTEMI w/ pEF / enrolled into the ADELA study  Sp [facility] 18 = ADELIA PCI prox lCX  Hemodynamically stable / labs and EKG reviewed VSS  CKD stable   Plan:  Continue current meds asa / Plavix/ BB/ Statin/ ACE  med compliance/ groin care and out pt follow up discussed with Dr. Marshall.   stable for d/c home

## 2018-12-13 NOTE — DISCHARGE NOTE ADULT - ADDITIONAL INSTRUCTIONS
-Follow up with Primary Care Doctor within 1 week  -Follow up with Cardiology (heart doctor) in 1-2 weeks

## 2018-12-13 NOTE — DISCHARGE NOTE ADULT - HOSPITAL COURSE
83 y/o female with PMH of CAD s/p stents x4 (June 2017), prior MI (Aug 2018), insulin-dependent DM2, HTN, HFpEF (EF>75%, 2018), Moderate AS, Mild Pulm HTN, STEVENSON (cpap), COPD (no home O2), hypothyroidism, BIBA for chest pain and noted to have elevated troponin with ST depression in leads II, aVL and V6. Admitted for NSTEMI.     NSTEMI  - asymptomatic, cp resolved  - sp cath 12/12, well tolerated, uncomplicated, sp octavia x1 to prox Lt circumflex  - EKG with ST depressions in leads II, aVL, V6  - c/w daily aspirin, statin, ace-inhibitor, betablocker  - c/w imdur 30mg   - cont plavix  - TTE w/ normal EF, grade 1 diastolic dysfunction  - Lipid panel showing elevated triglycerides and low HDL, total chol controlled, increased statin to 80mg, advised repeat lipid panel w/ poss addition of ezetimibe as outpt if remaining uncontrolled  - [place] [facility] consult appreciated    acute on chronic HFpEF (EF>75%, 2018)  - resolved by the time of dc  - noted pulm congestion on cxr and BNP ~500s (increased from prior)  - sp lasix 60mg x 1 per Cardio  - echo as above    HTN Emergency  - resolved  - noted earlier in admission to have elevated /90 in setting of nstemi  - bp meds as below    HTN  - stable  - goal < 150/90  - improved s/p nitro  - resumed lisinopril and toprol, imdur, cardizem    CKD stage 3b  - stable  - baseline Cr ~1.45  - initially started on gentle iv hydration, later discontinued due to pulm congestion on cxr  - outpt pmd fu    Dm2 on long term insulin therapy complicated by hyperglycemia, asymptomatic  - stable  - A1c 9.6, above goal of < 8  - sliding scale with accuchecks while npo, resume home dosing insulin on dc  - will need close outpt fu to help reach goal, recommend continued diabetic education as outpt with pmd for better control, may need continued titration of meds    Hypothyroidism  -chronic stable uncomplicated  - c/w synthroid home dose    STEVENSON  - chronic, stable, uncomplicated  - uses cpap at home    Obesity  - bmi 38.3  - likely sec to excess alvin intake  - wt loss advised, diane in light of comorbid conditions  - lifestyle modifications    Vital Signs Last 24 Hrs  T(C): 36.6 (13 Dec 2018 10:13), Max: 36.7 (12 Dec 2018 21:18)  T(F): 97.8 (13 Dec 2018 10:13), Max: 98 (12 Dec 2018 21:18)  HR: 62 (13 Dec 2018 10:13) (59 - 83)  BP: 118/56 (13 Dec 2018 10:13) (118/56 - 184/76)  BP(mean): --  RR: 18 (13 Dec 2018 10:13) (15 - 20)  SpO2: 97% (13 Dec 2018 10:13) (94% - 97%)    PHYSICAL EXAM:  GENERAL: NAD, well-groomed, obese  HEAD:  Atraumatic, Normocephalic  EYES: EOMI, PERRLA, conjunctiva and sclera clear  ENMT: Moist mucous membranes, Good dentition  NECK: Supple, No JVD  NERVOUS SYSTEM:  Alert & Oriented X3, Good concentration;   CHEST/LUNG: Clear to auscultation bilaterally; No rales, rhonchi, wheezing, or rubs  HEART: Regular rate; No murmurs, rubs, or gallops  ABDOMEN: Soft, Nontender, distended likely due to body habitus; Bowel sounds present  EXTREMITIES:  2+ Peripheral Pulses, No clubbing, cyanosis, or edema    All electrolyte abnormalities were monitored carefully and repleted as necessary during this hospitalization. At the time of discharge patient was hemodynamically stable and amenable to all terms of discharge. The patient has received verbal instructions from myself regarding discharge plans.     Length of Discharge: 45MIN

## 2018-12-13 NOTE — DISCHARGE NOTE ADULT - CARE PLAN
Principal Discharge DX:	ACS (acute coronary syndrome)  Goal:	s/p Stent  Assessment and plan of treatment:	You were noted to have chest pain either on arrival or during your hospitalization. Tests to evaluate your heart including blood tests called troponins and EKGs were performed and you had signs of heart attack based on these studies. You had 1 stent placed in your heart to keep your arteries open. You have been instructed to follow up with a Cardiologist, it is extremely important that you do so. If you have been prescribed medications for heart health, it is very important that you ALWAYS take them and do not stop doing so unless instructed by a healthcare provider.  Secondary Diagnosis:	CAD (coronary artery disease)  Goal:	Follow up  Assessment and plan of treatment:	Continue following a healthy lifestyle, this includes exercising 150minutes a week or as much as tolerated, and eating a healthy balanced diet consisting of fresh fruits and veggies. Be sure to take all medications as prescribed, do not miss them! Follow up with your primary care doctor and/or Cardiologist. If you have chest pain, be sure to come to the ER immediately for an evaluation.  Secondary Diagnosis:	DM2 (diabetes mellitus, type 2)  Goal:	A1c < 8  Assessment and plan of treatment:	You have diabetes. Please continue to take your medications, maintain a consistent carbohydrate diet, and follow up with your primary care doctor who will oversee your blood sugar levels and adjust your treatment as necessary.  Secondary Diagnosis:	HLD (hyperlipidemia)  Goal:	LDL < 70  Assessment and plan of treatment:	Follow a low fat diet. Continue taking your cholesterol medications as prescribed. Follow up with your Primary Care Doctor to continue having your cholesterol levels checked on a continual basis.  Secondary Diagnosis:	HTN (hypertension)  Goal:	SBP < 140  Assessment and plan of treatment:	Follow a low sodium diet. Continue taking your blood pressure medications as prescribed. Follow up with your Primary Care Doctor to continue having your blood pressure checked on a continual basis.  Secondary Diagnosis:	Hypothyroidism, unspecified type  Goal:	Stable  Assessment and plan of treatment:	You have hypothyroidism. For this condition, it is important to continue taking medication as prescribed. If your dose of thyroid replacement medication has been changed, be sure to have your Primary Care Doctor or Endocronologist repeat blood work to check your Thyroid function in 6 weeks to make sure that your current dose does not need to be changed again to reach the best possible level for you.  Secondary Diagnosis:	CKD (chronic kidney disease)  Goal:	follow up  Assessment and plan of treatment:	Your kidney function is not optimal. This can be due to a variety of reasons. If you were not aware of this, you should discuss with your Primary Care Doctor about seeing a Nephrologist. If you already have a nephrologist, be sure to continue following up as scheduled. Principal Discharge DX:	ACS (acute coronary syndrome)  Goal:	s/p Stent  Assessment and plan of treatment:	You were noted to have chest pain either on arrival or during your hospitalization. Tests to evaluate your heart including blood tests called troponins and EKGs were performed and you had signs of heart attack based on these studies. You had 1 stent placed in your heart to keep your arteries open. You have been instructed to follow up with a Cardiologist, it is extremely important that you do so. If you have been prescribed medications for heart health, it is very important that you ALWAYS take them and do not stop doing so unless instructed by a healthcare provider.  Secondary Diagnosis:	CAD (coronary artery disease)  Goal:	Follow up  Assessment and plan of treatment:	Continue following a healthy lifestyle, this includes exercising 150minutes a week or as much as tolerated, and eating a healthy balanced diet consisting of fresh fruits and veggies. Be sure to take all medications as prescribed, do not miss them! Follow up with your primary care doctor and/or Cardiologist. If you have chest pain, be sure to come to the ER immediately for an evaluation.  Secondary Diagnosis:	DM2 (diabetes mellitus, type 2)  Goal:	A1c < 8, yours is 9.6  Assessment and plan of treatment:	You have diabetes. Please continue to take your medications, maintain a consistent carbohydrate diet, and follow up with your primary care doctor who will oversee your blood sugar levels and adjust your treatment as necessary.  Secondary Diagnosis:	HLD (hyperlipidemia)  Goal:	LDL < 70  Assessment and plan of treatment:	Follow a low fat diet. Continue taking your cholesterol medications as prescribed, your dose has been increased for better control. You should repeat a cholesterol panel in 6 weeks, if your LDL is not at goal and/or if your triglycerides remain elevated you should talk to your doctor about adding additional medication such as ezetimibe. Follow up with your Primary Care Doctor to continue having your cholesterol levels checked on a continual basis.  Secondary Diagnosis:	HTN (hypertension)  Goal:	SBP < 140  Assessment and plan of treatment:	Follow a low sodium diet. Continue taking your blood pressure medications as prescribed. Follow up with your Primary Care Doctor to continue having your blood pressure checked on a continual basis.  Secondary Diagnosis:	Hypothyroidism, unspecified type  Goal:	Stable  Assessment and plan of treatment:	You have hypothyroidism. For this condition, it is important to continue taking medication as prescribed. If your dose of thyroid replacement medication has been changed, be sure to have your Primary Care Doctor or Endocronologist repeat blood work to check your Thyroid function in 6 weeks to make sure that your current dose does not need to be changed again to reach the best possible level for you.  Secondary Diagnosis:	CKD (chronic kidney disease)  Goal:	follow up  Assessment and plan of treatment:	Your kidney function is not optimal. This can be due to a variety of reasons. If you were not aware of this, you should discuss with your Primary Care Doctor about seeing a Nephrologist. If you already have a nephrologist, be sure to continue following up as scheduled.

## 2018-12-13 NOTE — DISCHARGE NOTE ADULT - PLAN OF CARE
s/p Stent You were noted to have chest pain either on arrival or during your hospitalization. Tests to evaluate your heart including blood tests called troponins and EKGs were performed and you had signs of heart attack based on these studies. You had 1 stent placed in your heart to keep your arteries open. You have been instructed to follow up with a Cardiologist, it is extremely important that you do so. If you have been prescribed medications for heart health, it is very important that you ALWAYS take them and do not stop doing so unless instructed by a healthcare provider. Follow up Continue following a healthy lifestyle, this includes exercising 150minutes a week or as much as tolerated, and eating a healthy balanced diet consisting of fresh fruits and veggies. Be sure to take all medications as prescribed, do not miss them! Follow up with your primary care doctor and/or Cardiologist. If you have chest pain, be sure to come to the ER immediately for an evaluation. A1c < 8 You have diabetes. Please continue to take your medications, maintain a consistent carbohydrate diet, and follow up with your primary care doctor who will oversee your blood sugar levels and adjust your treatment as necessary. LDL < 70 Follow a low fat diet. Continue taking your cholesterol medications as prescribed. Follow up with your Primary Care Doctor to continue having your cholesterol levels checked on a continual basis. SBP < 140 Follow a low sodium diet. Continue taking your blood pressure medications as prescribed. Follow up with your Primary Care Doctor to continue having your blood pressure checked on a continual basis. Stable You have hypothyroidism. For this condition, it is important to continue taking medication as prescribed. If your dose of thyroid replacement medication has been changed, be sure to have your Primary Care Doctor or Endocronologist repeat blood work to check your Thyroid function in 6 weeks to make sure that your current dose does not need to be changed again to reach the best possible level for you. follow up Your kidney function is not optimal. This can be due to a variety of reasons. If you were not aware of this, you should discuss with your Primary Care Doctor about seeing a Nephrologist. If you already have a nephrologist, be sure to continue following up as scheduled. A1c < 8, yours is 9.6 Follow a low fat diet. Continue taking your cholesterol medications as prescribed, your dose has been increased for better control. You should repeat a cholesterol panel in 6 weeks, if your LDL is not at goal and/or if your triglycerides remain elevated you should talk to your doctor about adding additional medication such as ezetimibe. Follow up with your Primary Care Doctor to continue having your cholesterol levels checked on a continual basis.

## 2018-12-13 NOTE — DISCHARGE NOTE ADULT - SECONDARY DIAGNOSIS.
CAD (coronary artery disease) DM2 (diabetes mellitus, type 2) HLD (hyperlipidemia) HTN (hypertension) Hypothyroidism, unspecified type CKD (chronic kidney disease)

## 2018-12-13 NOTE — PROGRESS NOTE ADULT - ATTENDING COMMENTS
s/p pci to lcx.  Images reviewed with pt.   Stable for dc.  pt seen and examined. D/W NP.  I agree with a/p.   FU with Dr Benavides 1-2 weeks
Patient seen and examined at the bedside. I was physically present for key portions of the evaluation and management services provided. Agree with the above history, physical, assessment, and plan with the necessary amendments/elaborations already made above.

## 2018-12-14 ENCOUNTER — APPOINTMENT (OUTPATIENT)
Dept: PULMONOLOGY | Facility: CLINIC | Age: 83
End: 2018-12-14

## 2019-01-09 ENCOUNTER — APPOINTMENT (OUTPATIENT)
Dept: CARDIOLOGY | Facility: CLINIC | Age: 84
End: 2019-01-09
Payer: MEDICARE

## 2019-01-09 ENCOUNTER — NON-APPOINTMENT (OUTPATIENT)
Age: 84
End: 2019-01-09

## 2019-01-09 ENCOUNTER — APPOINTMENT (OUTPATIENT)
Dept: CARDIOLOGY | Facility: CLINIC | Age: 84
End: 2019-01-09

## 2019-01-09 VITALS
BODY MASS INDEX: 40.25 KG/M2 | HEART RATE: 70 BPM | OXYGEN SATURATION: 96 % | DIASTOLIC BLOOD PRESSURE: 64 MMHG | WEIGHT: 205 LBS | SYSTOLIC BLOOD PRESSURE: 138 MMHG | HEIGHT: 60 IN

## 2019-01-09 PROCEDURE — 93000 ELECTROCARDIOGRAM COMPLETE: CPT

## 2019-01-09 PROCEDURE — 99215 OFFICE O/P EST HI 40 MIN: CPT | Mod: 25

## 2019-01-09 RX ORDER — CLOPIDOGREL BISULFATE 75 MG/1
75 TABLET, FILM COATED ORAL
Qty: 30 | Refills: 0 | Status: DISCONTINUED | COMMUNITY
Start: 2017-04-11 | End: 2019-01-09

## 2019-01-09 NOTE — ASSESSMENT
[FreeTextEntry1] : ECG performed today at the office revealed NSR 71 BPM, prolonged CT (254 ms) with Q in the inferior leads, normal AQRS, QRS and QTc. Poor R prog precordial leads

## 2019-01-09 NOTE — REASON FOR VISIT
[Follow-Up - Clinic] : a clinic follow-up of [Family Member] : family member [FreeTextEntry1] : Follow up for SOB and CAD

## 2019-01-09 NOTE — PHYSICAL EXAM
[General Appearance - Well Developed] : well developed [Normal Appearance] : normal appearance [Well Groomed] : well groomed [General Appearance - Well Nourished] : well nourished [No Deformities] : no deformities [General Appearance - In No Acute Distress] : no acute distress [Normal Conjunctiva] : the conjunctiva exhibited no abnormalities [Normal Oral Mucosa] : normal oral mucosa [Normal Oropharynx] : normal oropharynx [Normal Jugular Venous V Waves Present] : normal jugular venous V waves present [Respiration, Rhythm And Depth] : normal respiratory rhythm and effort [Exaggerated Use Of Accessory Muscles For Inspiration] : no accessory muscle use [Auscultation Breath Sounds / Voice Sounds] : lungs were clear to auscultation bilaterally [Chest Palpation] : palpation of the chest revealed no abnormalities [Lungs Percussion] : the lungs were normal to percussion [Heart Rate And Rhythm] : heart rate and rhythm were normal [Heart Sounds] : normal S1 and S2 [Arterial Pulses Normal] : the arterial pulses were normal [Bowel Sounds] : normal bowel sounds [Abdomen Soft] : soft [Abdomen Tenderness] : non-tender [Abdomen Mass (___ Cm)] : no abdominal mass palpated [Abdomen Hernia] : no hernia was discovered [Abnormal Walk] : normal gait [Nail Clubbing] : no clubbing of the fingernails [Cyanosis, Localized] : no localized cyanosis [Skin Color & Pigmentation] : normal skin color and pigmentation [Skin Turgor] : normal skin turgor [] : no rash [No Venous Stasis] : no venous stasis [Skin Lesions] : no skin lesions [No Skin Ulcers] : no skin ulcer [Oriented To Time, Place, And Person] : oriented to person, place, and time [Impaired Insight] : insight and judgment were intact [No Anxiety] : not feeling anxious [FreeTextEntry1] : Edema +1 BE

## 2019-01-09 NOTE — HISTORY OF PRESENT ILLNESS
[FreeTextEntry1] : 83 yo woman, , mother of 4 with known CAD since 1990, seems that at that time she had a procedure at [facility]. In April 2017 she was admitted to [facility] with chest pain and then transferred to [facility] where she underwent PCI with staged procedure to OM1 and ?  \par Under follow up for increasing SOB, unable to walk without getting SOB, NYHA 3/4, no CP, but has orthopnea and PND. However she has long standing asthma. She also has noticed BE edema. Has not been compliant with the furosemide because of incontinence and the need to urinate.\par Seems that the last echo was done in 2016?\par On 4/19/2018 she underwent cardiac cath that revealed patent stent in the LAD, 50% stenosis in the LCx (iFR normal) and 100% occlusion of the Mid RCA. LV function was normal and there was moderate AS with an BILL=1.21 cm2 and mild Pul HTN and normal CO/CI. \par On 5/4/2018 she had a dobutamine stress test that revealed a completely normal LV function with a peak gradient of 34 mmHg.\par On 10/28/2018 she was again in [facility] for SOB. Underwent tests and was discharged the next day. \par On 12/11/2018 she was admitted to [facility] with ACS/NSTEMI. Cardiac cath revealed a  of the RCA and a new 95% stenosis in the prox LCX that was treated with ADELIA x 1. She was enrolled in the CLEAR [place] study (1 month DAPT). \par C/O occasional SOB, has returned to her facility and is able to walk with a walker. Independent. Still C/O orthopnea, STEVENSON and COPD. Denies CP, palpitations or dizziness. Still occasional BE ankle swelling. Has also Rt leg pain and she will undergo a procedure. \par Underwent cataract of the left eye in January 2018\par Diabetes since 2000 on insulin\par HTN treated with meds\par HLD\par Smoked until age 40\par Denies alcohol drinking

## 2019-01-09 NOTE — DISCUSSION/SUMMARY
[FreeTextEntry1] : 85 yo woman with known moderate AS and HFrEF. Post recent PCI to the LCx and is asymptomatic, except for the effort-related SOB (NYHA 2/4)\par Will continue same meds.\par Will stop clopidogrel as part of the CLEAR [place] study\par Discussed exercise and diet and patient is able PT and occupational therapy\par Routine Follow up in 3 months

## 2019-01-23 ENCOUNTER — OTHER (OUTPATIENT)
Age: 84
End: 2019-01-23

## 2019-01-23 ENCOUNTER — MESSAGE (OUTPATIENT)
Age: 84
End: 2019-01-23

## 2019-02-28 ENCOUNTER — MEDICATION RENEWAL (OUTPATIENT)
Age: 84
End: 2019-02-28

## 2019-02-28 ENCOUNTER — APPOINTMENT (OUTPATIENT)
Dept: ENDOCRINOLOGY | Facility: CLINIC | Age: 84
End: 2019-02-28
Payer: MEDICARE

## 2019-02-28 ENCOUNTER — RX RENEWAL (OUTPATIENT)
Age: 84
End: 2019-02-28

## 2019-02-28 VITALS
WEIGHT: 208 LBS | HEIGHT: 60 IN | SYSTOLIC BLOOD PRESSURE: 118 MMHG | BODY MASS INDEX: 40.84 KG/M2 | DIASTOLIC BLOOD PRESSURE: 70 MMHG | HEART RATE: 71 BPM

## 2019-02-28 PROCEDURE — 99215 OFFICE O/P EST HI 40 MIN: CPT | Mod: 25

## 2019-02-28 PROCEDURE — 83036 HEMOGLOBIN GLYCOSYLATED A1C: CPT | Mod: QW

## 2019-02-28 RX ORDER — PREGABALIN 25 MG/1
25 CAPSULE ORAL
Qty: 60 | Refills: 0 | Status: DISCONTINUED | COMMUNITY
Start: 2018-09-21 | End: 2019-02-28

## 2019-02-28 RX ORDER — ASPIRIN ENTERIC COATED TABLETS 81 MG 81 MG/1
81 TABLET, DELAYED RELEASE ORAL
Qty: 30 | Refills: 0 | Status: DISCONTINUED | COMMUNITY
Start: 2018-07-09 | End: 2019-02-28

## 2019-02-28 NOTE — PHYSICAL EXAM
[Alert] : alert [No Acute Distress] : no acute distress [Well Nourished] : well nourished [Well Developed] : well developed [Normal Sclera/Conjunctiva] : normal sclera/conjunctiva [EOMI] : extra ocular movement intact [No Proptosis] : no proptosis [Normal Oropharynx] : the oropharynx was normal [Thyroid Not Enlarged] : the thyroid was not enlarged [No Thyroid Nodules] : there were no palpable thyroid nodules [No Respiratory Distress] : no respiratory distress [No Accessory Muscle Use] : no accessory muscle use [Clear to Auscultation] : lungs were clear to auscultation bilaterally [Normal Rate] : heart rate was normal  [Normal S1, S2] : normal S1 and S2 [Regular Rhythm] : with a regular rhythm [Pedal Pulses Normal] : the pedal pulses are present [No Edema] : there was no peripheral edema [Normal Bowel Sounds] : normal bowel sounds [Not Tender] : non-tender [Soft] : abdomen soft [Not Distended] : not distended [Post Cervical Nodes] : posterior cervical nodes [Anterior Cervical Nodes] : anterior cervical nodes [Normal] : normal and non tender [Spine Straight] : spine straight [No Stigmata of Cushings Syndrome] : no stigmata of cushings syndrome [Normal Gait] : normal gait [Normal Strength/Tone] : muscle strength and tone were normal [No Rash] : no rash [Normal Reflexes] : deep tendon reflexes were 2+ and symmetric [No Tremors] : no tremors [Oriented x3] : oriented to person, place, and time [Acanthosis Nigricans] : no acanthosis nigricans [de-identified] : obese

## 2019-02-28 NOTE — HISTORY OF PRESENT ILLNESS
[FreeTextEntry1] : Dm2 for 20 yr and HTN, HLD and obesity. CAd s/p Mi and PCI with stents x4. Has nephropathy with GFr 35. \par she takes basal -bolus insulin \par Bgs 4x day . She's been sick with respiratory infection and Bgs are a little higher than usual .

## 2019-02-28 NOTE — ASSESSMENT
[Carbohydrate Consistent Diet] : carbohydrate consistent diet [Long Term Vascular Complications] : long term vascular complications of diabetes [Importance of Diet and Exercise] : importance of diet and exercise to improve glycemic control, achieve weight loss and improve cardiovascular health [Self Monitoring of Blood Glucose] : self monitoring of blood glucose [Insulin Self-Administration] : insulin self-administration [Levothyroxine] : The patient was instructed to take Levothyroxine on an empty stomach, separate from vitamins, and wait at least 30 minutes before eating [FreeTextEntry1] : DM2 moderately uncontrolled  A1c 9 \par increase basaglar 64 u HS \par start taking novolog 17 u TID for meals. No SSI for now.\par she will retrun with logbook with CDE and will adjust doses of insulins \par Bgs now 180-300 \par she uses a SSI (actually 2 SSI given by 2 physicians) and she varies doses. \par she enjoys cake and sweets at time and she has Bgs 500 ocasionally\par discussed diet and exercise\par encouraged more exercise walking 30 min 3 x week\par nephropathy with GFR 35. Monitor and continue ACEI \par has diabetic neuropathy: controlled symptoms with gabapentin \par flushot done\par monitor GFR \par eye exam referral \par \par HTN :  bp at target on meds. Continue current management.\par \par HLD: check lipids. \par continue atorvastatin 80 mg qd . \par History of CAD s/p MI and stents \par \par Obesity: discussed diet and exercise\par encouraged more exercise walking 30 min 3 x week\par \par hypoT : check TFts today \par continue lt4 125 mcg qd \par takes LT4 appropriately in am\par \par vitamin d deficiency: she does not take any vitamins \par check 25OHD level today \par \par age osteoporosis: check DXA scan \par she had an ankle fracture many years ago. no steroids. \par height 60 in. no alcohol. no RA no family h/o fractures or hip fractures \par

## 2019-03-01 LAB — HBA1C MFR BLD HPLC: 9

## 2019-03-14 ENCOUNTER — APPOINTMENT (OUTPATIENT)
Dept: ENDOCRINOLOGY | Facility: CLINIC | Age: 84
End: 2019-03-14

## 2019-03-27 ENCOUNTER — APPOINTMENT (OUTPATIENT)
Dept: ENDOCRINOLOGY | Facility: CLINIC | Age: 84
End: 2019-03-27
Payer: MEDICARE

## 2019-03-27 PROCEDURE — 97802 MEDICAL NUTRITION INDIV IN: CPT

## 2019-03-29 ENCOUNTER — INPATIENT (INPATIENT)
Facility: HOSPITAL | Age: 84
LOS: 5 days | Discharge: HOME | DRG: 191 | End: 2019-04-04
Attending: INTERNAL MEDICINE | Admitting: HOSPITALIST
Payer: MEDICARE

## 2019-03-29 VITALS
OXYGEN SATURATION: 91 % | TEMPERATURE: 98 F | DIASTOLIC BLOOD PRESSURE: 92 MMHG | RESPIRATION RATE: 20 BRPM | SYSTOLIC BLOOD PRESSURE: 147 MMHG | HEIGHT: 62 IN | WEIGHT: 207.01 LBS | HEART RATE: 103 BPM

## 2019-03-29 DIAGNOSIS — E03.9 HYPOTHYROIDISM, UNSPECIFIED: ICD-10-CM

## 2019-03-29 DIAGNOSIS — I50.32 CHRONIC DIASTOLIC (CONGESTIVE) HEART FAILURE: ICD-10-CM

## 2019-03-29 DIAGNOSIS — Z90.49 ACQUIRED ABSENCE OF OTHER SPECIFIED PARTS OF DIGESTIVE TRACT: Chronic | ICD-10-CM

## 2019-03-29 DIAGNOSIS — I25.10 ATHEROSCLEROTIC HEART DISEASE OF NATIVE CORONARY ARTERY WITHOUT ANGINA PECTORIS: ICD-10-CM

## 2019-03-29 DIAGNOSIS — J44.1 CHRONIC OBSTRUCTIVE PULMONARY DISEASE WITH (ACUTE) EXACERBATION: ICD-10-CM

## 2019-03-29 DIAGNOSIS — I10 ESSENTIAL (PRIMARY) HYPERTENSION: ICD-10-CM

## 2019-03-29 DIAGNOSIS — R93.1 ABNORMAL FINDINGS ON DIAGNOSTIC IMAGING OF HEART AND CORONARY CIRCULATION: Chronic | ICD-10-CM

## 2019-03-29 DIAGNOSIS — E11.8 TYPE 2 DIABETES MELLITUS WITH UNSPECIFIED COMPLICATIONS: ICD-10-CM

## 2019-03-29 LAB
ALBUMIN SERPL ELPH-MCNC: 3.8 G/DL — SIGNIFICANT CHANGE UP (ref 3.3–5.2)
ALP SERPL-CCNC: 93 U/L — SIGNIFICANT CHANGE UP (ref 40–120)
ALT FLD-CCNC: 12 U/L — SIGNIFICANT CHANGE UP
ANION GAP SERPL CALC-SCNC: 12 MMOL/L — SIGNIFICANT CHANGE UP (ref 5–17)
APPEARANCE UR: CLEAR — SIGNIFICANT CHANGE UP
APTT BLD: 30 SEC — SIGNIFICANT CHANGE UP (ref 27.5–36.3)
AST SERPL-CCNC: 12 U/L — SIGNIFICANT CHANGE UP
BASOPHILS # BLD AUTO: 0 K/UL — SIGNIFICANT CHANGE UP (ref 0–0.2)
BASOPHILS NFR BLD AUTO: 0.3 % — SIGNIFICANT CHANGE UP (ref 0–2)
BILIRUB SERPL-MCNC: 0.4 MG/DL — SIGNIFICANT CHANGE UP (ref 0.4–2)
BILIRUB UR-MCNC: NEGATIVE — SIGNIFICANT CHANGE UP
BUN SERPL-MCNC: 25 MG/DL — HIGH (ref 8–20)
CALCIUM SERPL-MCNC: 9.7 MG/DL — SIGNIFICANT CHANGE UP (ref 8.6–10.2)
CHLORIDE SERPL-SCNC: 101 MMOL/L — SIGNIFICANT CHANGE UP (ref 98–107)
CO2 SERPL-SCNC: 26 MMOL/L — SIGNIFICANT CHANGE UP (ref 22–29)
COLOR SPEC: YELLOW — SIGNIFICANT CHANGE UP
CREAT SERPL-MCNC: 1.07 MG/DL — SIGNIFICANT CHANGE UP (ref 0.5–1.3)
DIFF PNL FLD: ABNORMAL
EOSINOPHIL # BLD AUTO: 0.2 K/UL — SIGNIFICANT CHANGE UP (ref 0–0.5)
EOSINOPHIL NFR BLD AUTO: 1.2 % — SIGNIFICANT CHANGE UP (ref 0–6)
EPI CELLS # UR: SIGNIFICANT CHANGE UP
GLUCOSE BLDC GLUCOMTR-MCNC: 337 MG/DL — HIGH (ref 70–99)
GLUCOSE BLDC GLUCOMTR-MCNC: 354 MG/DL — HIGH (ref 70–99)
GLUCOSE SERPL-MCNC: 259 MG/DL — HIGH (ref 70–115)
GLUCOSE UR QL: 250 MG/DL
HCT VFR BLD CALC: 38.5 % — SIGNIFICANT CHANGE UP (ref 37–47)
HGB BLD-MCNC: 12 G/DL — SIGNIFICANT CHANGE UP (ref 12–16)
INR BLD: 1.04 RATIO — SIGNIFICANT CHANGE UP (ref 0.88–1.16)
KETONES UR-MCNC: NEGATIVE — SIGNIFICANT CHANGE UP
LACTATE BLDV-MCNC: 2.1 MMOL/L — HIGH (ref 0.5–2)
LEUKOCYTE ESTERASE UR-ACNC: NEGATIVE — SIGNIFICANT CHANGE UP
LYMPHOCYTES # BLD AUTO: 22.9 % — SIGNIFICANT CHANGE UP (ref 20–55)
LYMPHOCYTES # BLD AUTO: 3.5 K/UL — SIGNIFICANT CHANGE UP (ref 1–4.8)
MAGNESIUM SERPL-MCNC: 1.9 MG/DL — SIGNIFICANT CHANGE UP (ref 1.6–2.6)
MCHC RBC-ENTMCNC: 26.8 PG — LOW (ref 27–31)
MCHC RBC-ENTMCNC: 31.2 G/DL — LOW (ref 32–36)
MCV RBC AUTO: 86.1 FL — SIGNIFICANT CHANGE UP (ref 81–99)
MONOCYTES # BLD AUTO: 1 K/UL — HIGH (ref 0–0.8)
MONOCYTES NFR BLD AUTO: 6.3 % — SIGNIFICANT CHANGE UP (ref 3–10)
NEUTROPHILS # BLD AUTO: 10.6 K/UL — HIGH (ref 1.8–8)
NEUTROPHILS NFR BLD AUTO: 68.9 % — SIGNIFICANT CHANGE UP (ref 37–73)
NITRITE UR-MCNC: NEGATIVE — SIGNIFICANT CHANGE UP
NT-PROBNP SERPL-SCNC: 945 PG/ML — HIGH (ref 0–300)
PH UR: 6 — SIGNIFICANT CHANGE UP (ref 5–8)
PLATELET # BLD AUTO: 261 K/UL — SIGNIFICANT CHANGE UP (ref 150–400)
POTASSIUM SERPL-MCNC: 5.2 MMOL/L — SIGNIFICANT CHANGE UP (ref 3.5–5.3)
POTASSIUM SERPL-SCNC: 5.2 MMOL/L — SIGNIFICANT CHANGE UP (ref 3.5–5.3)
PROT SERPL-MCNC: 7.6 G/DL — SIGNIFICANT CHANGE UP (ref 6.6–8.7)
PROT UR-MCNC: 30 MG/DL
PROTHROM AB SERPL-ACNC: 12 SEC — SIGNIFICANT CHANGE UP (ref 10–12.9)
RAPID RVP RESULT: SIGNIFICANT CHANGE UP
RBC # BLD: 4.47 M/UL — SIGNIFICANT CHANGE UP (ref 4.4–5.2)
RBC # FLD: 14.1 % — SIGNIFICANT CHANGE UP (ref 11–15.6)
RBC CASTS # UR COMP ASSIST: ABNORMAL /HPF (ref 0–4)
SODIUM SERPL-SCNC: 139 MMOL/L — SIGNIFICANT CHANGE UP (ref 135–145)
SP GR SPEC: 1.01 — SIGNIFICANT CHANGE UP (ref 1.01–1.02)
TROPONIN T SERPL-MCNC: <0.01 NG/ML — SIGNIFICANT CHANGE UP (ref 0–0.06)
UROBILINOGEN FLD QL: NEGATIVE MG/DL — SIGNIFICANT CHANGE UP
WBC # BLD: 15.4 K/UL — HIGH (ref 4.8–10.8)
WBC # FLD AUTO: 15.4 K/UL — HIGH (ref 4.8–10.8)
WBC UR QL: SIGNIFICANT CHANGE UP

## 2019-03-29 PROCEDURE — 99233 SBSQ HOSP IP/OBS HIGH 50: CPT

## 2019-03-29 PROCEDURE — 99285 EMERGENCY DEPT VISIT HI MDM: CPT

## 2019-03-29 PROCEDURE — 99223 1ST HOSP IP/OBS HIGH 75: CPT

## 2019-03-29 PROCEDURE — 71250 CT THORAX DX C-: CPT | Mod: 26

## 2019-03-29 PROCEDURE — 71045 X-RAY EXAM CHEST 1 VIEW: CPT | Mod: 26

## 2019-03-29 RX ORDER — PANTOPRAZOLE SODIUM 20 MG/1
40 TABLET, DELAYED RELEASE ORAL
Qty: 0 | Refills: 0 | Status: DISCONTINUED | OUTPATIENT
Start: 2019-03-29 | End: 2019-04-04

## 2019-03-29 RX ORDER — ASPIRIN/CALCIUM CARB/MAGNESIUM 324 MG
81 TABLET ORAL DAILY
Qty: 0 | Refills: 0 | Status: DISCONTINUED | OUTPATIENT
Start: 2019-03-29 | End: 2019-04-04

## 2019-03-29 RX ORDER — SODIUM CHLORIDE 9 MG/ML
1000 INJECTION, SOLUTION INTRAVENOUS
Qty: 0 | Refills: 0 | Status: DISCONTINUED | OUTPATIENT
Start: 2019-03-29 | End: 2019-04-04

## 2019-03-29 RX ORDER — METOPROLOL TARTRATE 50 MG
50 TABLET ORAL DAILY
Qty: 0 | Refills: 0 | Status: DISCONTINUED | OUTPATIENT
Start: 2019-03-29 | End: 2019-04-04

## 2019-03-29 RX ORDER — ATORVASTATIN CALCIUM 80 MG/1
80 TABLET, FILM COATED ORAL AT BEDTIME
Qty: 0 | Refills: 0 | Status: DISCONTINUED | OUTPATIENT
Start: 2019-03-29 | End: 2019-04-04

## 2019-03-29 RX ORDER — GLUCAGON INJECTION, SOLUTION 0.5 MG/.1ML
1 INJECTION, SOLUTION SUBCUTANEOUS ONCE
Qty: 0 | Refills: 0 | Status: DISCONTINUED | OUTPATIENT
Start: 2019-03-29 | End: 2019-04-04

## 2019-03-29 RX ORDER — ONDANSETRON 8 MG/1
4 TABLET, FILM COATED ORAL ONCE
Qty: 0 | Refills: 0 | Status: COMPLETED | OUTPATIENT
Start: 2019-03-29 | End: 2019-03-29

## 2019-03-29 RX ORDER — FUROSEMIDE 40 MG
40 TABLET ORAL DAILY
Qty: 0 | Refills: 0 | Status: DISCONTINUED | OUTPATIENT
Start: 2019-03-29 | End: 2019-04-01

## 2019-03-29 RX ORDER — ENOXAPARIN SODIUM 100 MG/ML
40 INJECTION SUBCUTANEOUS DAILY
Qty: 0 | Refills: 0 | Status: DISCONTINUED | OUTPATIENT
Start: 2019-03-29 | End: 2019-04-01

## 2019-03-29 RX ORDER — GABAPENTIN 400 MG/1
100 CAPSULE ORAL THREE TIMES A DAY
Qty: 0 | Refills: 0 | Status: DISCONTINUED | OUTPATIENT
Start: 2019-03-29 | End: 2019-04-04

## 2019-03-29 RX ORDER — INSULIN GLARGINE 100 [IU]/ML
60 INJECTION, SOLUTION SUBCUTANEOUS
Qty: 0 | Refills: 0 | COMMUNITY

## 2019-03-29 RX ORDER — TIOTROPIUM BROMIDE 18 UG/1
1 CAPSULE ORAL; RESPIRATORY (INHALATION) DAILY
Qty: 0 | Refills: 0 | Status: DISCONTINUED | OUTPATIENT
Start: 2019-03-29 | End: 2019-03-31

## 2019-03-29 RX ORDER — DEXTROSE 50 % IN WATER 50 %
25 SYRINGE (ML) INTRAVENOUS ONCE
Qty: 0 | Refills: 0 | Status: DISCONTINUED | OUTPATIENT
Start: 2019-03-29 | End: 2019-04-04

## 2019-03-29 RX ORDER — INSULIN LISPRO 100/ML
VIAL (ML) SUBCUTANEOUS
Qty: 0 | Refills: 0 | Status: DISCONTINUED | OUTPATIENT
Start: 2019-03-29 | End: 2019-04-04

## 2019-03-29 RX ORDER — IPRATROPIUM/ALBUTEROL SULFATE 18-103MCG
3 AEROSOL WITH ADAPTER (GRAM) INHALATION ONCE
Qty: 0 | Refills: 0 | Status: COMPLETED | OUTPATIENT
Start: 2019-03-29 | End: 2019-03-29

## 2019-03-29 RX ORDER — LEVOTHYROXINE SODIUM 125 MCG
125 TABLET ORAL DAILY
Qty: 0 | Refills: 0 | Status: DISCONTINUED | OUTPATIENT
Start: 2019-03-29 | End: 2019-04-04

## 2019-03-29 RX ORDER — DEXTROSE 50 % IN WATER 50 %
12.5 SYRINGE (ML) INTRAVENOUS ONCE
Qty: 0 | Refills: 0 | Status: DISCONTINUED | OUTPATIENT
Start: 2019-03-29 | End: 2019-04-04

## 2019-03-29 RX ORDER — DEXTROSE 50 % IN WATER 50 %
15 SYRINGE (ML) INTRAVENOUS ONCE
Qty: 0 | Refills: 0 | Status: DISCONTINUED | OUTPATIENT
Start: 2019-03-29 | End: 2019-04-04

## 2019-03-29 RX ORDER — LISINOPRIL 2.5 MG/1
5 TABLET ORAL DAILY
Qty: 0 | Refills: 0 | Status: DISCONTINUED | OUTPATIENT
Start: 2019-03-29 | End: 2019-04-01

## 2019-03-29 RX ORDER — BUDESONIDE, MICRONIZED 100 %
0.5 POWDER (GRAM) MISCELLANEOUS EVERY 12 HOURS
Qty: 0 | Refills: 0 | Status: DISCONTINUED | OUTPATIENT
Start: 2019-03-29 | End: 2019-04-04

## 2019-03-29 RX ORDER — INSULIN GLARGINE 100 [IU]/ML
40 INJECTION, SOLUTION SUBCUTANEOUS AT BEDTIME
Qty: 0 | Refills: 0 | Status: DISCONTINUED | OUTPATIENT
Start: 2019-03-29 | End: 2019-04-03

## 2019-03-29 RX ORDER — DILTIAZEM HCL 120 MG
240 CAPSULE, EXT RELEASE 24 HR ORAL DAILY
Qty: 0 | Refills: 0 | Status: DISCONTINUED | OUTPATIENT
Start: 2019-03-29 | End: 2019-04-04

## 2019-03-29 RX ORDER — IPRATROPIUM/ALBUTEROL SULFATE 18-103MCG
3 AEROSOL WITH ADAPTER (GRAM) INHALATION EVERY 6 HOURS
Qty: 0 | Refills: 0 | Status: DISCONTINUED | OUTPATIENT
Start: 2019-03-29 | End: 2019-04-04

## 2019-03-29 RX ORDER — ISOSORBIDE MONONITRATE 60 MG/1
30 TABLET, EXTENDED RELEASE ORAL DAILY
Qty: 0 | Refills: 0 | Status: DISCONTINUED | OUTPATIENT
Start: 2019-03-29 | End: 2019-04-04

## 2019-03-29 RX ORDER — SODIUM CHLORIDE 9 MG/ML
500 INJECTION INTRAMUSCULAR; INTRAVENOUS; SUBCUTANEOUS ONCE
Qty: 0 | Refills: 0 | Status: COMPLETED | OUTPATIENT
Start: 2019-03-29 | End: 2019-03-29

## 2019-03-29 RX ADMIN — GABAPENTIN 100 MILLIGRAM(S): 400 CAPSULE ORAL at 21:35

## 2019-03-29 RX ADMIN — SODIUM CHLORIDE 500 MILLILITER(S): 9 INJECTION INTRAMUSCULAR; INTRAVENOUS; SUBCUTANEOUS at 08:51

## 2019-03-29 RX ADMIN — Medication 10: at 17:53

## 2019-03-29 RX ADMIN — Medication 40 MILLIGRAM(S): at 23:08

## 2019-03-29 RX ADMIN — INSULIN GLARGINE 40 UNIT(S): 100 INJECTION, SOLUTION SUBCUTANEOUS at 21:36

## 2019-03-29 RX ADMIN — Medication 40 MILLIGRAM(S): at 17:56

## 2019-03-29 RX ADMIN — GABAPENTIN 100 MILLIGRAM(S): 400 CAPSULE ORAL at 13:57

## 2019-03-29 RX ADMIN — SODIUM CHLORIDE 500 MILLILITER(S): 9 INJECTION INTRAMUSCULAR; INTRAVENOUS; SUBCUTANEOUS at 10:47

## 2019-03-29 RX ADMIN — Medication 3 MILLILITER(S): at 07:52

## 2019-03-29 RX ADMIN — ONDANSETRON 4 MILLIGRAM(S): 8 TABLET, FILM COATED ORAL at 10:46

## 2019-03-29 RX ADMIN — Medication 81 MILLIGRAM(S): at 13:42

## 2019-03-29 RX ADMIN — Medication 50 MILLIGRAM(S): at 13:42

## 2019-03-29 RX ADMIN — Medication 100 MILLIGRAM(S): at 12:21

## 2019-03-29 RX ADMIN — Medication 40 MILLIGRAM(S): at 13:57

## 2019-03-29 RX ADMIN — ATORVASTATIN CALCIUM 80 MILLIGRAM(S): 80 TABLET, FILM COATED ORAL at 21:35

## 2019-03-29 NOTE — ED PROVIDER NOTE - PHYSICAL EXAMINATION
VITAL SIGNS: I have reviewed nursing notes and confirm.  CONSTITUTIONAL: Well-developed; well-nourished; (+) speak in short sentenses   SKIN: Skin exam is warm and dry, no acute rash.  HEAD: Normocephalic; atraumatic.  EYES: PERRL, EOM intact; conjunctiva and sclera clear.  ENT: No nasal discharge; airway clear. Throat clear.  NECK: Supple; non tender.    CARD: S1, S2 normal; no murmurs, gallops, or rubs. Regular rate and rhythm.  RESP: (+) wheezing b/l  (+) crackleat the b/l base   ABD:  soft; non-distended; non-tender;   EXT: Normal ROM. No clubbing, cyanosis. non-pitting edema.  NEURO: Alert, oriented. Grossly unremarkable. No focal deficits. no facial droop, moves all extremities,    PSYCH: Cooperative, appropriate.

## 2019-03-29 NOTE — ED PROVIDER NOTE - OBJECTIVE STATEMENT
83 yo F hx of COPD, CHF, HTN, CAD s/p stent x 5, multiple hospitalization recently p/w SOB with excessary muscle use. patient had 1 nebulized treatment at home. EMS found her in respiraotry distress, 91% on RA. gave her another duoneb x 1 and solumdral 125 mg IV with significant improvement.     cards:

## 2019-03-29 NOTE — ED ADULT NURSE NOTE - NSIMPLEMENTINTERV_GEN_ALL_ED
Implemented All Universal Safety Interventions:  [place] to call system. Call bell, personal items and telephone within reach. Instruct patient to call for assistance. Room bathroom lighting operational. Non-slip footwear when patient is off stretcher. Physically safe environment: no spills, clutter or unnecessary equipment. Stretcher in lowest position, wheels locked, appropriate side rails in place.

## 2019-03-29 NOTE — CONSULT NOTE ADULT - SUBJECTIVE AND OBJECTIVE BOX
PULMONARY CONSULT NOTE      FLOYD PAEZ  MRN-[id number]    Patient is a 84y old  Female who presents with a chief complaint of Difficulty to breath (29 Mar 2019 15:20)      HISTORY OF PRESENT ILLNESS:    83 y/o F PMHx of CAD s/p cardiac cath x 4 (most recent NSTEMI 12/2018 ADELIA to pLCX, previous ADELIA pLAD, ADELIA x mLAD, ADELIA OM1, ADELIA to LCx, and  to % with no intervention to date), HFpEF (EF>75%), COPD, asthma, moderate AS, mild pulmonary HTN, STEVENSON (CPAP of 7 cm of water), insulin dependent DMII, HTN, HLD, and hypothyroidism who presents to the ED with worsening cough and shortness of breath. Patient states that a month ago she began experiencing a dry cough that is very persistent and worsening. Patient states that she had been having progressive RABAGO over the month, but became acutely worse 3 days ago. Patient states minimal movement results in SOB. Patient states the SOB resolves at rest, but the cough is constant. Patient has chronic orthopnea, doesn't note and worsening, and doesn't note worsening leg swelling from her baseline. Patient denies fevers, chills, CP, PND, palpitations, syncope, near syncope, abdominal pain, N/v/D, headache, or dizziness. She is an ex-smoker of 1ppw x 10 years but quit 40 years ago.      MEDICATIONS  (STANDING):  ALBUTerol/ipratropium for Nebulization 3 milliLiter(s) Nebulizer every 6 hours  aspirin enteric coated 81 milliGRAM(s) Oral daily  atorvastatin 80 milliGRAM(s) Oral at bedtime  buDESOnide   0.5 milliGRAM(s) Respule 0.5 milliGRAM(s) Inhalation every 12 hours  dextrose 5%. 1000 milliLiter(s) (50 mL/Hr) IV Continuous <Continuous>  dextrose 50% Injectable 12.5 Gram(s) IV Push once  dextrose 50% Injectable 25 Gram(s) IV Push once  dextrose 50% Injectable 25 Gram(s) IV Push once  diltiazem    milliGRAM(s) Oral daily  enoxaparin Injectable 40 milliGRAM(s) SubCutaneous daily  furosemide    Tablet 40 milliGRAM(s) Oral daily  gabapentin 100 milliGRAM(s) Oral three times a day  insulin glargine Injectable (LANTUS) 40 Unit(s) SubCutaneous at bedtime  insulin lispro (HumaLOG) corrective regimen sliding scale   SubCutaneous three times a day before meals  isosorbide   mononitrate ER Tablet (IMDUR) 30 milliGRAM(s) Oral daily  levoFLOXacin IVPB      levothyroxine 125 MICROGram(s) Oral daily  lisinopril 5 milliGRAM(s) Oral daily  methylPREDNISolone sodium succinate Injectable 40 milliGRAM(s) IV Push every 6 hours  metoprolol succinate ER 50 milliGRAM(s) Oral daily  pantoprazole    Tablet 40 milliGRAM(s) Oral before breakfast  tiotropium 18 MICROgram(s) Capsule 1 Capsule(s) Inhalation daily      MEDICATIONS  (PRN):  benzonatate 100 milliGRAM(s) Oral three times a day PRN Cough  dextrose 40% Gel 15 Gram(s) Oral once PRN Blood Glucose LESS THAN 70 milliGRAM(s)/deciliter  glucagon  Injectable 1 milliGRAM(s) IntraMuscular once PRN Glucose LESS THAN 70 milligrams/deciliter  HYDROcodone/homatropine Syrup 5 milliLiter(s) Oral two times a day PRN Cough      Allergies    iodine (Hives)  iodine containing compounds (Unknown)  shellfish (Anaphylaxis)  shellfish (Swelling; Short breath)    Intolerances        PAST MEDICAL & SURGICAL HISTORY:  NSTEMI (non-ST elevated myocardial infarction)  Hypothyroid  HLD (hyperlipidemia)  HTN (hypertension)  CAD (coronary artery disease)  Asthma  Diabetes  Gastroesophageal reflux disease without esophagitis  Pure hypercholesterolemia  Hypothyroidism, unspecified type  Essential hypertension  DM2 (diabetes mellitus, type 2)  Uncomplicated asthma, unspecified asthma severity  Abnormal findings on cardiac catheterization: Cardiac Cath  History of appendectomy  History of appendectomy      FAMILY HISTORY:  Family history of stomach cancer  Family history of MI (myocardial infarction)  Family history of cancer in mother  Family history of heart disease      SOCIAL HISTORY  Smoking History: as above    REVIEW OF SYSTEMS:    CONSTITUTIONAL:  No fevers, chills, sweats    HEENT:  Eyes:  No diplopia or blurred vision. ENT:  No earache, sore throat or runny nose.    CARDIOVASCULAR:  No pressure, squeezing, tightness, or heaviness about the chest; no palpitations.    RESPIRATORY:  Per HPI    GASTROINTESTINAL:  No abdominal pain, nausea, vomiting or diarrhea.    GENITOURINARY:  No dysuria, frequency or urgency.    NEUROLOGIC:  No paresthesias, fasciculations, seizures or weakness.    PSYCHIATRIC:  No disorder of thought or mood.    Vital Signs Last 24 Hrs  T(C): 36.7 (29 Mar 2019 14:01), Max: 37 (29 Mar 2019 11:28)  T(F): 98 (29 Mar 2019 14:01), Max: 98.6 (29 Mar 2019 11:28)  HR: 102 (29 Mar 2019 14:01) (102 - 114)  BP: 180/105 (29 Mar 2019 14:01) (147/92 - 187/85)  BP(mean): --  RR: 20 (29 Mar 2019 14:01) (20 - 22)  SpO2: 98% (29 Mar 2019 14:01) (91% - 98%)    PHYSICAL EXAMINATION:    GENERAL: The patient is a well-developed, well-nourished obese female in no apparent distress.     HEENT: Head is normocephalic and atraumatic. Extraocular muscles are intact. Mucous membranes are moist.     NECK: Supple.     LUNGS: Clear to auscultation without wheezing, rales, or rhonchi. Respirations unlabored    HEART: Regular rate and rhythm without murmur.    ABDOMEN: Soft, nontender, and nondistended.  No hepatosplenomegaly is noted.    EXTREMITIES: Without any cyanosis, clubbing, with b/l edema.    NEUROLOGIC: Grossly intact.      LABS:                        12.0   15.4  )-----------( 261      ( 29 Mar 2019 07:56 )             38.5     03-29    139  |  101  |  25.0<H>  ----------------------------<  259<H>  5.2   |  26.0  |  1.07    Ca    9.7      29 Mar 2019 07:56  Mg     1.9     03-29    TPro  7.6  /  Alb  3.8  /  TBili  0.4  /  DBili  x   /  AST  12  /  ALT  12  /  AlkPhos  93  03-29    PT/INR - ( 29 Mar 2019 07:56 )   PT: 12.0 sec;   INR: 1.04 ratio         PTT - ( 29 Mar 2019 07:56 )  PTT:30.0 sec      CARDIAC MARKERS ( 29 Mar 2019 07:56 )  x     / <0.01 ng/mL / x     / x     / x            Serum Pro-Brain Natriuretic Peptide: 945 pg/mL (03-29-19 @ 07:56)          MICROBIOLOGY:    Rapid Respiratory Viral Panel (03.29.19 @ 13:47)    Rapid RVP Result: NotDete: This Respiratory Panel uses polymerase chain reaction (PCR) to detect for  adenovirus; coronavirus (HKU1, NL63, 229E, OC43); human metapneumovirus  (hMPV); human enterovirus/rhinovirus (Entero/RV); influenza A; influenza  A/H1; influenza A/H3; influenza A/H1-2009; influenza B; parainfluenza  viruses 1, 2, 3, 4; respiratory syncytial virus; Mycoplasma pneumoniae;  and Chlamydophila pneumoniae.        RADIOLOGY & ADDITIONAL STUDIES:       EXAM:  CT CHEST                          PROCEDURE DATE:  03/29/2019          INTERPRETATION:  HISTORY: Shortness of breath. History of COPD and   congestive heart failure..    Date and Time of Exam: 3/29/2019 9:23 AM    TECHNIQUE:  Sections were obtained from the apices to the diaphragm   without intravenous contrast.    COMPARISON EXAMINATION:   10/28/2018.    FINDINGS: No evidence of mediastinal or hilar lymphadenopathy. No   evidence of axillary adenopathy. No evidence of pleural or pericardial   effusion.    No significant abnormality in the visualized upper abdomen.    The left lung is clear. There is mild subpleural interstitial prominence   at the right lung base with linear atelectasis or fibrosis in the right   lower lobe unchanged since 10/28/2018. The right lung is otherwise clear.    No significant osseous abnormality.      IMPRESSION:     Mild subpleural interstitial prominence at the right lung base as well as   linear strands of atelectasis or fibrosis in the right lower lobe.   Otherwise unremarkable study. Stable exam since 10/28/2018..                 PRABHA MUIR M.D., ATTENDING RADIOLOGIST  This document has been electronically signed. Mar 29 2019 10:15AM          ECHO 12/12/18:      Summary:   1. Technically difficult study.   2. Hyperdynamic global left ventricular systolic function.   3. Left ventricular ejection fraction, by visual estimation, is >75%.   4. Spectral Doppler shows impaired relaxation pattern of left   ventricular myocardial filling (Grade I diastolic dysfunction).   5. There is mild concentric left ventricular hypertrophy.   6. Thickening and calcification of the anterior and posterior mitral   valve leaflets.   7. Mildly enlarged left atrium.   8. Trace tricuspid regurgitation.   9. Sclerotic aortic valve with decreased opening.  10. Trace pulmonic valve regurgitation.  11. Normal right ventricular size and function.  12. Pericardium and IVC was not well visualized.    [id number] Milton Benton MD, Electronically signed on 12/12/2018 at 9:05:12   AM

## 2019-03-29 NOTE — CONSULT NOTE ADULT - ATTENDING COMMENTS
pt see kin examined. Imaging studies were reviewed.   Case d/w PA.   History of multiple stents. She presents with cough. She has chronic leg edema.   She is treated with abx   I do not think her symptoms are related to HF. We will give her a dose of lasix and re-evaluate her tomorrow.  I agree with a/p.

## 2019-03-29 NOTE — H&P ADULT - HISTORY OF PRESENT ILLNESS
85 y/o female with h/o COPD, CHF, CAD, HTN, DM II 85 y/o female with h/o COPD, CHF, CAD, HTN, DM II, hypothyroidism, was brought in to the ER because of difficulty to breath and Hypoxemia. difficulty to breath started 2 days ado and was associated with runny nose and congestion. no fever. dry irritative cough. orthopnea ankle edema B/L. no chest pain or palpitations. . no sick contacts. WBC: 15 400.

## 2019-03-29 NOTE — H&P ADULT - NSICDXPASTMEDICALHX_GEN_ALL_CORE_FT
PAST MEDICAL HISTORY:  Asthma     CAD (coronary artery disease)     Diabetes     DM2 (diabetes mellitus, type 2)     Essential hypertension     Gastroesophageal reflux disease without esophagitis     HLD (hyperlipidemia)     HTN (hypertension)     Hypothyroid     Hypothyroidism, unspecified type     NSTEMI (non-ST elevated myocardial infarction)     Pure hypercholesterolemia     Uncomplicated asthma, unspecified asthma severity

## 2019-03-29 NOTE — ED PROVIDER NOTE - CLINICAL SUMMARY MEDICAL DECISION MAKING FREE TEXT BOX
83 yo F hx of asthma, copd, and cad p/w sob, improved after duonexb. CXR and CT showed no fluid, no signs of pneumonia. SOB likely from COPD exacerbtion, will need admission for further management.

## 2019-03-29 NOTE — ED ADULT NURSE REASSESSMENT NOTE - NS ED NURSE REASSESS COMMENT FT1
Pt's son is Calvin Chávez, per pt can discuss her medical progress with son and he can be reached at [phone]
Pt remains A & Ox4, states she still feels SOB however pt is 94-95% on room air and 98% on 2 liters O2 via NC. PT offers no other complaints waiting on CT of chest,  pt and family aware of plan of care.

## 2019-03-29 NOTE — CONSULT NOTE ADULT - ASSESSMENT
A/P:  85 y/o F PMHx of CAD s/p cardiac cath x 4 (most recent NSTEMI 12/2018 ADELIA to pLCX, previous ADELIA pLAD, ADELIA x mLAD, ADELIA OM1, ADELIA to LCx, and  to % with no intervention to date), HFpEF (EF>75%), COPD, asthma, moderate AS, mild pulmonary HTN, STEVENSON (CPAP), insulin dependent DMII, HTN, HLD, and hypothyroidism who presents to the ED with worsening cough and shortness of breath. Patient states that a month ago she began experiencing a dry cough that is very persistent and worsening. Patient states that she had been having progressive RABAGO over the month, but became acutely worse 3 days ago. Patient states minimal movement results in SOB. Patient states the SOB resolves at rest, but the cough is constant. Patient has chronic orthopnea, doesn't note and worsening, and doesn't note worsening leg swelling from her baseline. Patient denies fevers, chills, CP, PND, palpitations, syncope, near syncope, abdominal pain, N/v/D, headache, or dizziness.   Troponin neg x 1.     CT chest with ?pneumonia RLL with WBC 15.4  EKG with no acute changes.     1. HFpEF  - EF 12/18 >75%  - Dyspnea multifactorial HF vs. COPD exacerbation vs. infectious process  - Lasix 40mg IV x 1  - Then continue Lasix 40mg PO daily   - Continue current cardiac meds    2. CAD  - Recent ADELIA to A/P:  85 y/o F PMHx of CAD s/p cardiac cath x 4 (most recent NSTEMI 12/2018 ADELIA to pLCX, previous ADELIA pLAD, ADELIA x mLAD, ADELIA OM1, ADELIA to LCx, and  to % with no intervention to date), HFpEF (EF>75%), COPD, asthma, moderate AS, mild pulmonary HTN, STEVENSON (CPAP), insulin dependent DMII, HTN, HLD, and hypothyroidism who presents to the ED with worsening cough and shortness of breath. Patient states that a month ago she began experiencing a dry cough that is very persistent and worsening. Patient states that she had been having progressive RABAGO over the month, but became acutely worse 3 days ago. Patient states minimal movement results in SOB. Patient states the SOB resolves at rest, but the cough is constant. Patient has chronic orthopnea, doesn't note and worsening, and doesn't note worsening leg swelling from her baseline. Patient denies fevers, chills, CP, PND, palpitations, syncope, near syncope, abdominal pain, N/v/D, headache, or dizziness.   Troponin neg x 1.     CT chest with ?pneumonia RLL with WBC 15.4  EKG with no acute changes.     1. HFpEF  - EF 12/18 >75%  - Dyspnea multifactorial HF vs. COPD exacerbation vs. infectious process  - Lasix 40mg IV at this time  - Continue current cardiac meds    2. CAD  - Recent ADELIA to LCx, off Plavix per Dr. Benavides and [facility] [place] trial  - Continue ASA, Imdur, lipitor, Toprol XL, and Lisinopril  - Troponin neg x 1  - No symptoms of ACS    3. DVT PPx  - Cont lovenox for DVT ppx

## 2019-03-29 NOTE — H&P ADULT - NSICDXFAMILYHX_GEN_ALL_CORE_FT
FAMILY HISTORY:  Family history of cancer in mother  Family history of heart disease  Family history of MI (myocardial infarction)  Family history of stomach cancer

## 2019-03-29 NOTE — ED PROVIDER NOTE - NS ED ROS FT
Review of Systems  •	CONSTITUTIONAL - no  fever, no diaphoresis, no weight change  •	SKIN - no rash  •	HEMATOLOGIC - no bleeding, no bruising  •	EYES - no eye pain, no blurred vision  •	ENT - no change in hearing, no pain  •	RESPIRATORY - (+) shortness of breath,  (+) cough  •	CARDIAC - no chest pain, no palpitations  •	GI - no abd pain, no nausea, no vomiting, no diarrhea, no constipation, no bleeding  •	GENITO-URINARY - no discharge, no dysuria; no hematuria,   •	ENDO - no polydypsia, no polyurea, no heat/no cold intolerance  •	MUSCULOSKELETAL - no joint pain, no swelling, no redness  •	NEUROLOGIC - (+)  weakness, no headache, no anesthesia, no paresthesias  •	PSYCH - no anxiety, non suicidal, non homicidal, no hallucination, no depression

## 2019-03-29 NOTE — ED PROVIDER NOTE - PROGRESS NOTE DETAILS
Bedside ultrasound done, no significant B line or pleural effusion Patient re-assess, still felt SOB, drops to 89% off O2. exersional dyspnea after walking to the bathroom. I spoke to hospitalist Dr. Kidd, will admit the patient.

## 2019-03-29 NOTE — CONSULT NOTE ADULT - SUBJECTIVE AND OBJECTIVE BOX
REASON FOR Tele-evaluation    SUBJECTIVE: SOB and cough for almost a week     HPI: 83 yr old female with known history of DM, Htn, COPD, CAD , CHF with PEF, chronic bilateral LLE, moderate AS and Pul htn, presents with few days of worsening  cough and SOB , denied any fever, chills , sick contact , denied any dysuria , denied rash or diarrhea , complains of a relentless cough that wont go away and states that in terma of not being able to lie down flat she hasn't been able to lie down flat for a long time, but doesn't report any worsening of chronic leg edema or orthopnea. reports some relief in ED with tessalon pearls          ROS: negative as per HPI      T(C): 37 (03-29-19 @ 11:28), Max: 37 (03-29-19 @ 11:28)  HR: 114 (03-29-19 @ 11:28) (102 - 114)  BP: 163/87 (03-29-19 @ 11:28) (147/92 - 187/85)  RR: 22 (03-29-19 @ 11:28) (20 - 22)  SpO2: 98% (03-29-19 @ 11:28) (91% - 98%)    PHYSICAL EXAM: evaluation precludes physical exam. Pertinent physical exam findings as per video conference with  nurse at bedside is as follow: AAO times three, sitting with grandson t bedside , NAD but coughing, answers appropriately and knows all her medications and doctors                           12.0   15.4  )-----------( 261      ( 29 Mar 2019 07:56 )             38.5   03-29    139  |  101  |  25.0<H>  ----------------------------<  259<H>  5.2   |  26.0  |  1.07    Ca    9.7      29 Mar 2019 07:56  Mg     1.9     03-29    TPro  7.6  /  Alb  3.8  /  TBili  0.4  /  DBili  x   /  AST  12  /  ALT  12  /  AlkPhos  93  03-29          Radiology findings         Medication reconcillitation done     ASSESSMENT AND PLAN: (***)    Care plan discussed with (***)        Survey offered to patient

## 2019-03-29 NOTE — ED ADULT TRIAGE NOTE - CHIEF COMPLAINT QUOTE
Patient brought in by ambulance A/Ox3, sent from [facility], c/o SOB x 2 days.  Patient sat 90% RA, labored breathing, tachypneic.

## 2019-03-29 NOTE — CONSULT NOTE ADULT - SUBJECTIVE AND OBJECTIVE BOX
Patient is a 84y old  Female who presents with a chief complaint of Difficulty to breath (29 Mar 2019 13:10)      HPI: 85 y/o F PMHx of CAD s/p cardiac cath x 4 (most recent NSTEMI 12/2018 ADELIA to pLCX, previous ADELIA pLAD, ADELIA x mLAD, ADELIA OM1, ADELIA to LCx, and  to % with no intervention to date), HFpEF (EF>75%), COPD, asthma, moderate AS, mild pulmonary HTN, STEVENSON (CPAP), insulin dependent DMII, HTN, HLD, and hypothyroidism who presents to the ED with worsening cough and shortness of breath. Patient states that a month ago she began experiencing a dry cough that is very persistent and worsening. Patient states that she had been having progressive RABAGO over the month, but became acutely worse 3 days ago. Patient states minimal movement results in SOB. Patient states the SOB resolves at rest, but the cough is constant. Patient has chronic orthopnea, doesn't note and worsening, and doesn't note worsening leg swelling from her baseline. Patient denies fevers, chills, CP, PND, palpitations, syncope, near syncope, abdominal pain, N/v/D, headache, or dizziness.     PAST MEDICAL & SURGICAL HISTORY:  NSTEMI (non-ST elevated myocardial infarction)  Hypothyroid  HLD (hyperlipidemia)  HTN (hypertension)  CAD (coronary artery disease)  Asthma  Diabetes  Gastroesophageal reflux disease without esophagitis  Pure hypercholesterolemia  Hypothyroidism, unspecified type  Essential hypertension  DM2 (diabetes mellitus, type 2)  Uncomplicated asthma, unspecified asthma severity  Abnormal findings on cardiac catheterization: Cardiac Cath  History of appendectomy  History of appendectomy      PREVIOUS DIAGNOSTIC TESTING:      ECHO  FINDINGS:  < from: TTE Echo Complete w/Doppler (12.11.18 @ 22:13) >  PHYSICIAN INTERPRETATION:  Left Ventricle: The left ventricular internal cavity size is normal.  Global LV systolic function was hyperdynamic. Left ventricularejection   fraction, by visual estimation, is >75%. Spectral Doppler shows impaired   relaxation pattern of left ventricular myocardial filling (Grade I   diastolic dysfunction).  Right Ventricle: Normal right ventricular size and function. TV S' 0.1  m/s.  Left Atrium: Mildly enlarged left atrium.  Right Atrium: The right atrium is normal in size.  Pericardium: The pericardium was not well visualized.  Mitral Valve: Thickening and calcification of the anterior and posterior   mitral valve leaflets. There is moderate mitral annular calcification.   Peak transmitral mean gradient equals 3.4 mmHg, calculated mitral valve   area by pressure half time equals 2.72 cm² consistent with No evidence of   mitral stenosis. Trace mitral valve regurgitation is seen.  Tricuspid Valve: Trivial tricuspid regurgitation is visualized.  Aortic Valve: Sclerotic aortic valve with decreased opening. Peak Av   velocity is 2.85 m/s, mean transaortic gradient equals 19.2 mmHg, the   calculated aortic valve area equals 1.98 cm² by the continuity equation   consistent with mild aortic stenosis. Trivial aortic valve regurgitation   is seen.  Pulmonic Valve: The pulmonic valve was not well visualized. Trace   pulmonic valve regurgitation.  Aorta: The aortic rootis normal in size and structure.  Pulmonary Artery: The pulmonary artery is not well seen.  Venous: The inferior vena cava is not well visualized.       Summary:   1. Technically difficult study.   2. Hyperdynamic global left ventricular systolic function.   3. Left ventricular ejection fraction, by visual estimation, is >75%.   4. Spectral Doppler shows impaired relaxation pattern of left   ventricular myocardial filling (Grade I diastolic dysfunction).   5. There is mild concentric left ventricular hypertrophy.   6. Thickening and calcification of the anterior and posterior mitral   valve leaflets.   7. Mildly enlarged left atrium.   8. Trace tricuspid regurgitation.   9. Sclerotic aortic valve with decreased opening.  10. Trace pulmonic valve regurgitation.  11. Normal right ventricular size and function.  12. Pericardium and IVC was not well visualized.    [id number] Milton Benton MD, Electronically signed on 12/12/2018 at 9:05:12   AM    < end of copied text >      CATHETERIZATION  FINDINGS:  < from: Cardiac Cath Lab - Adult (12.12.18 @ 14:19) >  VENTRICLES: There were no left ventricular global or regional wall motion  abnormalities. Global left ventricular function was hypercontractile. EF  calculated by contrast ventriculography was 80 % and EF estimated was 80  %.  VALVES: AORTIC VALVE: There was moderate aortic stenosis.  CORONARY VESSELS: The coronary circulation is right dominant.  LM:   --  LM: Normal. The vessel was normal sized, not calcified, and not  tortuous. Angiography showed no evidence of disease.  LAD:   --  LAD: Normal. The vessel was normal sized, not calcified, and not  tortuous. Angiography showed minor luminal irregularities with no flow  limiting lesions. Patent stents in the mid portion of the LAD.  CX:   --  Circumflex: Normal. The vessel was normal sized, not calcified,  and not tortuous. Angiography showed a single discrete lesion. There was a  tubular 95 % stenosis in the proximal third of the vessel segment. The  lesion was without evidence of thrombus. There was RUBIN grade 3 flow  through the vessel (brisk flow). This lesion is a likely culprit forthe  patient's recent myocardial infarction. It appears amenable to  percutaneous intervention.  --  Proximal circumflex:  RCA:   --  RCA: Normal. The vessel was normal sized, not calcified, and not  tortuous. Angiography showed a single discrete lesion. There was a diffuse  100 % stenosis in the middle third of the vessel segment. The lesion was  without evidence of thrombus. There was RUBIN grade 0 flow through the  vessel (no flow). This lesion is a chronic total occlusion. Fills via  collaterals from the RCA (faintly) and from the left system (good  collaterals).  COMPLICATIONS: There were no complications. No complications occurred  during the cath lab visit. No complications occurred during the cath lab  visit.  SUMMARY:  Summary: Addendum 12/13/2018: case reconfirmed to add Stent Procedure to  [facility] Interventional report  DIAGNOSTIC IMPRESSIONS: There is significant double vessel coronary artery  disease.  Prox LCx=95%  Mid LRI=199%  Successful PCI of Prox LCx with ADELIA x 1 ([place] 3.0x18mm) Left ventricular  function is normal.  LVEF=80%  Mild Elevation of Rt Hear Pressures:  RA=10 mmHg, RV=20/14 mmHg, PCWP=19 mmHg  Mild Pulmonary HTN (38/20 - 27 mmHg)  Normal CO (5.79 L/min) and CI (2.81 L/min/m2)  Moderate Aortic Stenosis (AVG=24 mmHg and BILL=1.3 cm2)  Successful Deployment of Closure Device (Angioseal)  DIAGNOSTIC RECOMMENDATIONS: The patient should continue with the present  medications. Patient management should include aggressive medical therapy,  close monitoring of BUN and creatinine, resumption of all previous  activities in 5 days, a cardiac rehabilitation program, and weight  reduction. The patient should follow a low fat and low calorie diet.  Medical management is recommended.  Prepared and signed by  Waylon Benavides MD  Signed 12/13/2018 15:40:30    < end of copied text >    Allergies    iodine (Hives)  iodine containing compounds (Unknown)  shellfish (Anaphylaxis)  shellfish (Swelling; Short breath)    Intolerances    MEDICATIONS  (STANDING):  ALBUTerol/ipratropium for Nebulization 3 milliLiter(s) Nebulizer every 6 hours  aspirin enteric coated 81 milliGRAM(s) Oral daily  atorvastatin 80 milliGRAM(s) Oral at bedtime  buDESOnide   0.5 milliGRAM(s) Respule 0.5 milliGRAM(s) Inhalation every 12 hours  dextrose 5%. 1000 milliLiter(s) (50 mL/Hr) IV Continuous <Continuous>  dextrose 50% Injectable 12.5 Gram(s) IV Push once  dextrose 50% Injectable 25 Gram(s) IV Push once  dextrose 50% Injectable 25 Gram(s) IV Push once  diltiazem    milliGRAM(s) Oral daily  enoxaparin Injectable 40 milliGRAM(s) SubCutaneous daily  furosemide    Tablet 40 milliGRAM(s) Oral daily  gabapentin 100 milliGRAM(s) Oral three times a day  insulin glargine Injectable (LANTUS) 40 Unit(s) SubCutaneous at bedtime  insulin lispro (HumaLOG) corrective regimen sliding scale   SubCutaneous three times a day before meals  isosorbide   mononitrate ER Tablet (IMDUR) 30 milliGRAM(s) Oral daily  levoFLOXacin IVPB      levothyroxine 125 MICROGram(s) Oral daily  lisinopril 5 milliGRAM(s) Oral daily  methylPREDNISolone sodium succinate Injectable 40 milliGRAM(s) IV Push every 6 hours  metoprolol succinate ER 50 milliGRAM(s) Oral daily  pantoprazole    Tablet 40 milliGRAM(s) Oral before breakfast  tiotropium 18 MICROgram(s) Capsule 1 Capsule(s) Inhalation daily    MEDICATIONS  (PRN):  benzonatate 100 milliGRAM(s) Oral three times a day PRN Cough  dextrose 40% Gel 15 Gram(s) Oral once PRN Blood Glucose LESS THAN 70 milliGRAM(s)/deciliter  glucagon  Injectable 1 milliGRAM(s) IntraMuscular once PRN Glucose LESS THAN 70 milligrams/deciliter  HYDROcodone/homatropine Syrup 5 milliLiter(s) Oral two times a day PRN Cough    Home Medications:  albuterol 0.63 mg/3 mL (0.021%) inhalation solution: 3 milliliter(s) inhaled every 6 hours, As Needed (29 Mar 2019 13:27)  aspirin 81 mg oral delayed release tablet: 1 tab(s) orally once a day (29 Mar 2019 13:27)  Basaglar KwikPen 100 units/mL subcutaneous solution: 70 unit(s) subcutaneous once (at bedtime) (29 Mar 2019 13:27)  DilTIAZem Hydrochloride  mg/24 hours oral capsule, extended release: 1 cap(s) orally once a day (29 Mar 2019 13:27)  furosemide 40 mg oral tablet: 1 tab(s) orally once a day (29 Mar 2019 13:27)  gabapentin 100 mg oral capsule: 1 cap(s) orally 3 times a day (29 Mar 2019 13:27)  levothyroxine 125 mcg (0.125 mg) oral tablet: 1 tab(s) orally once a day (29 Mar 2019 13:27)  lisinopril 5 mg oral tablet: 1 tab(s) orally once a day (29 Mar 2019 13:27)  metoprolol succinate 50 mg oral tablet, extended release: 1 tab(s) orally once a day (29 Mar 2019 13:27)  NovoLOG FlexPen 100 units/mL injectable solution: unit(s) subcutaneous 3 times a day: as per sliding scale (29 Mar 2019 13:27)  omeprazole 40 mg oral delayed release capsule: 1 cap(s) orally once a day (29 Mar 2019 13:27)  Spiriva 18 mcg inhalation capsule: 1 cap(s) inhaled once a day (29 Mar 2019 13:27)    FAMILY HISTORY:  Family history of stomach cancer  Family history of MI (myocardial infarction)  Family history of cancer in mother  Family history of heart disease      SOCIAL HISTORY:    CIGARETTES: Former smoker    ALCOHOL: Denies    REVIEW OF SYSTEMS:  CONSTITUTIONAL: No fever, weight loss, or fatigue  Cardiovascular:  AS PER HPI  Respiratory: AS PER HPI  Genitourinary:  No dysuria, no hematuria;   Gastrointestinal:   No dark color stool, no melena, no diarrhea, no constipation, no abdominal pain;   Neurological: No headache, no dizziness, no slurred speech;    Psychiatric: No agitation, no anxiety.    ALL OTHER REVIEW OF SYSTEMS ARE NEGATIVE.    Vital Signs Last 24 Hrs  T(C): 36.7 (29 Mar 2019 14:01), Max: 37 (29 Mar 2019 11:28)  T(F): 98 (29 Mar 2019 14:01), Max: 98.6 (29 Mar 2019 11:28)  HR: 102 (29 Mar 2019 14:01) (102 - 114)  BP: 180/105 (29 Mar 2019 14:01) (147/92 - 187/85)  RR: 20 (29 Mar 2019 14:01) (20 - 22)  SpO2: 98% (29 Mar 2019 14:01) (91% - 98%)    Daily Height in cm: 157.48 (29 Mar 2019 07:36)      PHYSICAL EXAM:  Appearance: Normal, well nourished	  HEENT:   Normal oral mucosa, PERRL, EOMI, sclera non-icteric	  Lymphatic: No cervical lymphadenopathy  Cardiovascular: Normal S1 S2, No JVD, III/VI systolic murmur rsb, No carotid bruits, 2+ LE edema  Respiratory: Decreased BS at right base  Psychiatry: A & O x 3, Mood & affect appropriate  Gastrointestinal:  Soft, Non-tender, + BS, no bruits	  Skin: No rashes, No ecchymoses, No cyanosis  Neurologic: Grossly non-focal with full strength in all four extremities  Extremities: Normal range of motion, No clubbing, cyanosis, 2+ LE edema   Vascular: Peripheral pulses palpable 2+ bilaterally      INTERPRETATION OF TELEMETRY:    ECG: SR 1st degree AV block, old inferior infarct, old anteroseptal infarct, NSST/T wave abnormalities     LABS:                        12.0   15.4  )-----------( 261      ( 29 Mar 2019 07:56 )             38.5     03-29    139  |  101  |  25.0<H>  ----------------------------<  259<H>  5.2   |  26.0  |  1.07    Ca    9.7      29 Mar 2019 07:56  Mg     1.9     03-29    TPro  7.6  /  Alb  3.8  /  TBili  0.4  /  DBili  x   /  AST  12  /  ALT  12  /  AlkPhos  93  03-29    CARDIAC MARKERS ( 29 Mar 2019 07:56 )  x     / <0.01 ng/mL / x     / x     / x          PT/INR - ( 29 Mar 2019 07:56 )   PT: 12.0 sec;   INR: 1.04 ratio         PTT - ( 29 Mar 2019 07:56 )  PTT:30.0 sec    I&O's Summary    BNPSerum Pro-Brain Natriuretic Peptide: 945 pg/mL (03-29 @ 07:56)    Serum Pro-Brain Natriuretic Peptide: 945 pg/mL (03-29-19 @ 07:56)    RADIOLOGY & ADDITIONAL STUDIES:  < from: CT Chest No Cont (03.29.19 @ 10:01) >     EXAM:  CT CHEST                          PROCEDURE DATE:  03/29/2019          INTERPRETATION:  HISTORY: Shortness of breath. History of COPD and   congestive heart failure..    Date and Time of Exam: 3/29/2019 9:23 AM    TECHNIQUE:  Sections were obtained from the apices to the diaphragm   without intravenous contrast.    COMPARISON EXAMINATION:   10/28/2018.    FINDINGS: No evidence of mediastinal or hilar lymphadenopathy. No   evidence of axillary adenopathy. No evidence of pleural or pericardial   effusion.    No significant abnormality in the visualized upper abdomen.    The left lung is clear. There is mild subpleural interstitial prominence   at the right lung base with linear atelectasis or fibrosis in the right   lower lobe unchanged since 10/28/2018. The right lung is otherwise clear.    No significant osseous abnormality.      IMPRESSION:     Mild subpleural interstitial prominence at the right lung base as well as   linear strands of atelectasis or fibrosis in the right lower lobe.   Otherwise unremarkable study. Stable exam since 10/28/2018..       < end of copied text >

## 2019-03-29 NOTE — H&P ADULT - NSICDXPASTSURGICALHX_GEN_ALL_CORE_FT
PAST SURGICAL HISTORY:  Abnormal findings on cardiac catheterization Cardiac Cath    History of appendectomy     History of appendectomy

## 2019-03-29 NOTE — CONSULT NOTE ADULT - ASSESSMENT
Moderate COPD  STEVENSON on CPAP at 7 cm of water but not compliant at home  CAD s/p multiple stents  Obesity  Bronchitis    Rec:    Drug nebs  O2  IV steroids  Empiric abx  Diurese as needed  Does not want CPAP for O2 - not compliant at home  Optimize cardiac function  On Lovenox for DVT prophylaxis  On PPI for GI prophylaxis  Pulm f/u after d/c

## 2019-03-29 NOTE — CONSULT NOTE ADULT - ASSESSMENT
83 yr old female with known history of DM, Htn, COPD, CAD , CHF with PEF, chronic bilateral LLE, moderate AS and Pul htn, presents with few days of worsening  cough and SOB- possible COPD Exacerbation - start solumedrol, Duoneb and Pulmicort, Levaquin, LI lung called   Leukocytosis- check urine and blood cultures , urinalaysis  HTN/CAD_ cont Imdur , BB, statin, recently taken off the Plavix [facility] cardio called  Chronic CHF with PEF- cont lisinopril , hold lasix for a day with high lactate  Hypothyroidism- Synthroid   DM- FS with HISS   DVT PPX- lovenox

## 2019-03-30 LAB
ALBUMIN SERPL ELPH-MCNC: 3.5 G/DL — SIGNIFICANT CHANGE UP (ref 3.3–5.2)
ALP SERPL-CCNC: 87 U/L — SIGNIFICANT CHANGE UP (ref 40–120)
ALT FLD-CCNC: 13 U/L — SIGNIFICANT CHANGE UP
ANION GAP SERPL CALC-SCNC: 17 MMOL/L — SIGNIFICANT CHANGE UP (ref 5–17)
AST SERPL-CCNC: 11 U/L — SIGNIFICANT CHANGE UP
BASOPHILS # BLD AUTO: 0 K/UL — SIGNIFICANT CHANGE UP (ref 0–0.2)
BASOPHILS NFR BLD AUTO: 0.1 % — SIGNIFICANT CHANGE UP (ref 0–2)
BILIRUB SERPL-MCNC: 0.3 MG/DL — LOW (ref 0.4–2)
BUN SERPL-MCNC: 47 MG/DL — HIGH (ref 8–20)
CALCIUM SERPL-MCNC: 9.3 MG/DL — SIGNIFICANT CHANGE UP (ref 8.6–10.2)
CHLORIDE SERPL-SCNC: 95 MMOL/L — LOW (ref 98–107)
CO2 SERPL-SCNC: 22 MMOL/L — SIGNIFICANT CHANGE UP (ref 22–29)
CREAT SERPL-MCNC: 1.42 MG/DL — HIGH (ref 0.5–1.3)
CULTURE RESULTS: NO GROWTH — SIGNIFICANT CHANGE UP
CULTURE RESULTS: SIGNIFICANT CHANGE UP
EOSINOPHIL # BLD AUTO: 0 K/UL — SIGNIFICANT CHANGE UP (ref 0–0.5)
EOSINOPHIL NFR BLD AUTO: 0 % — SIGNIFICANT CHANGE UP (ref 0–6)
GLUCOSE BLDC GLUCOMTR-MCNC: 391 MG/DL — HIGH (ref 70–99)
GLUCOSE BLDC GLUCOMTR-MCNC: 455 MG/DL — CRITICAL HIGH (ref 70–99)
GLUCOSE BLDC GLUCOMTR-MCNC: 457 MG/DL — CRITICAL HIGH (ref 70–99)
GLUCOSE BLDC GLUCOMTR-MCNC: 462 MG/DL — CRITICAL HIGH (ref 70–99)
GLUCOSE BLDC GLUCOMTR-MCNC: 465 MG/DL — CRITICAL HIGH (ref 70–99)
GLUCOSE BLDC GLUCOMTR-MCNC: 489 MG/DL — CRITICAL HIGH (ref 70–99)
GLUCOSE SERPL-MCNC: 385 MG/DL — HIGH (ref 70–115)
HBA1C BLD-MCNC: 8.9 % — HIGH (ref 4–5.6)
HCT VFR BLD CALC: 37 % — SIGNIFICANT CHANGE UP (ref 37–47)
HGB BLD-MCNC: 11.7 G/DL — LOW (ref 12–16)
LYMPHOCYTES # BLD AUTO: 1.2 K/UL — SIGNIFICANT CHANGE UP (ref 1–4.8)
LYMPHOCYTES # BLD AUTO: 7.5 % — LOW (ref 20–55)
MCHC RBC-ENTMCNC: 26.8 PG — LOW (ref 27–31)
MCHC RBC-ENTMCNC: 31.6 G/DL — LOW (ref 32–36)
MCV RBC AUTO: 84.7 FL — SIGNIFICANT CHANGE UP (ref 81–99)
MONOCYTES # BLD AUTO: 0.3 K/UL — SIGNIFICANT CHANGE UP (ref 0–0.8)
MONOCYTES NFR BLD AUTO: 2.2 % — LOW (ref 3–10)
NEUTROPHILS # BLD AUTO: 13.7 K/UL — HIGH (ref 1.8–8)
NEUTROPHILS NFR BLD AUTO: 89.7 % — HIGH (ref 37–73)
PLATELET # BLD AUTO: 272 K/UL — SIGNIFICANT CHANGE UP (ref 150–400)
POTASSIUM SERPL-MCNC: 5 MMOL/L — SIGNIFICANT CHANGE UP (ref 3.5–5.3)
POTASSIUM SERPL-SCNC: 5 MMOL/L — SIGNIFICANT CHANGE UP (ref 3.5–5.3)
PROT SERPL-MCNC: 7 G/DL — SIGNIFICANT CHANGE UP (ref 6.6–8.7)
RBC # BLD: 4.37 M/UL — LOW (ref 4.4–5.2)
RBC # FLD: 13.7 % — SIGNIFICANT CHANGE UP (ref 11–15.6)
SODIUM SERPL-SCNC: 134 MMOL/L — LOW (ref 135–145)
SPECIMEN SOURCE: SIGNIFICANT CHANGE UP
WBC # BLD: 15.3 K/UL — HIGH (ref 4.8–10.8)
WBC # FLD AUTO: 15.3 K/UL — HIGH (ref 4.8–10.8)

## 2019-03-30 PROCEDURE — 99233 SBSQ HOSP IP/OBS HIGH 50: CPT

## 2019-03-30 PROCEDURE — 99232 SBSQ HOSP IP/OBS MODERATE 35: CPT

## 2019-03-30 RX ORDER — IPRATROPIUM/ALBUTEROL SULFATE 18-103MCG
3 AEROSOL WITH ADAPTER (GRAM) INHALATION ONCE
Qty: 0 | Refills: 0 | Status: DISCONTINUED | OUTPATIENT
Start: 2019-03-30 | End: 2019-04-04

## 2019-03-30 RX ORDER — INSULIN LISPRO 100/ML
6 VIAL (ML) SUBCUTANEOUS ONCE
Qty: 0 | Refills: 0 | Status: COMPLETED | OUTPATIENT
Start: 2019-03-30 | End: 2019-03-30

## 2019-03-30 RX ADMIN — ATORVASTATIN CALCIUM 80 MILLIGRAM(S): 80 TABLET, FILM COATED ORAL at 22:42

## 2019-03-30 RX ADMIN — LISINOPRIL 5 MILLIGRAM(S): 2.5 TABLET ORAL at 05:33

## 2019-03-30 RX ADMIN — ENOXAPARIN SODIUM 40 MILLIGRAM(S): 100 INJECTION SUBCUTANEOUS at 13:22

## 2019-03-30 RX ADMIN — ISOSORBIDE MONONITRATE 30 MILLIGRAM(S): 60 TABLET, EXTENDED RELEASE ORAL at 16:02

## 2019-03-30 RX ADMIN — PANTOPRAZOLE SODIUM 40 MILLIGRAM(S): 20 TABLET, DELAYED RELEASE ORAL at 05:46

## 2019-03-30 RX ADMIN — Medication 40 MILLIGRAM(S): at 05:46

## 2019-03-30 RX ADMIN — Medication 3 MILLILITER(S): at 03:49

## 2019-03-30 RX ADMIN — Medication 3 MILLILITER(S): at 14:20

## 2019-03-30 RX ADMIN — Medication 0.5 MILLIGRAM(S): at 08:29

## 2019-03-30 RX ADMIN — Medication 0.5 MILLIGRAM(S): at 20:11

## 2019-03-30 RX ADMIN — Medication 3 MILLILITER(S): at 08:28

## 2019-03-30 RX ADMIN — Medication 40 MILLIGRAM(S): at 13:22

## 2019-03-30 RX ADMIN — GABAPENTIN 100 MILLIGRAM(S): 400 CAPSULE ORAL at 13:22

## 2019-03-30 RX ADMIN — Medication 12: at 12:30

## 2019-03-30 RX ADMIN — Medication 125 MICROGRAM(S): at 05:33

## 2019-03-30 RX ADMIN — Medication 3 MILLILITER(S): at 20:07

## 2019-03-30 RX ADMIN — Medication 50 MILLIGRAM(S): at 05:33

## 2019-03-30 RX ADMIN — INSULIN GLARGINE 40 UNIT(S): 100 INJECTION, SOLUTION SUBCUTANEOUS at 23:13

## 2019-03-30 RX ADMIN — Medication 10: at 08:25

## 2019-03-30 RX ADMIN — Medication 12: at 17:18

## 2019-03-30 RX ADMIN — Medication 81 MILLIGRAM(S): at 13:22

## 2019-03-30 RX ADMIN — TIOTROPIUM BROMIDE 1 CAPSULE(S): 18 CAPSULE ORAL; RESPIRATORY (INHALATION) at 08:29

## 2019-03-30 RX ADMIN — Medication 6 UNIT(S): at 22:56

## 2019-03-30 RX ADMIN — GABAPENTIN 100 MILLIGRAM(S): 400 CAPSULE ORAL at 05:46

## 2019-03-30 RX ADMIN — Medication 40 MILLIGRAM(S): at 22:42

## 2019-03-30 RX ADMIN — Medication 240 MILLIGRAM(S): at 05:33

## 2019-03-30 RX ADMIN — GABAPENTIN 100 MILLIGRAM(S): 400 CAPSULE ORAL at 22:42

## 2019-03-30 NOTE — PROGRESS NOTE ADULT - PROBLEM SELECTOR PLAN 1
Duoneb . Solu medrol, being tapered, supplemental oxygen, Pulm consult appreciated, patient follow with  Dr Mcginnis, will discuss with Pulmonary for long term steriods as this is her 9th episode of exacerbation, will monitor closely and will do home O2 eval as well.

## 2019-03-30 NOTE — PROGRESS NOTE ADULT - ASSESSMENT
Moderate COPD  STEVENSON on CPAP at 7 cm of water but not compliant at home  CAD s/p multiple stents  Obesity  Bronchitis  Mild leukocytosis    Rec:    Drug nebs  Will need nebulizer at home - does not have per pt  O2 - evaluate for home O2  IV steroids - decrease as tolerates  Empiric abx  Diurese as needed  Does not want CPAP for O2 - not compliant at home  Optimize cardiac function  On Lovenox for DVT prophylaxis  On PPI for GI prophylaxis  Pulm f/u after d/c    D/w medicine

## 2019-03-30 NOTE — PROGRESS NOTE ADULT - ASSESSMENT
A/P:  83 y/o F PMHx of CAD s/p cardiac cath x 4 (most recent NSTEMI 12/2018 ADELIA to pLCX, previous ADELIA pLAD, ADELIA x mLAD, ADELIA OM1, ADELIA to LCx, and  to % with no intervention to date), HFpEF (EF>75%), COPD, asthma, moderate AS, mild pulmonary HTN, STEVENSON (CPAP), insulin dependent DMII, HTN, HLD, and hypothyroidism who presents to the ED with worsening cough and shortness of breath. Patient states that a month ago she began experiencing a dry cough that is very persistent and worsening. Patient states that she had been having progressive RABAGO over the month, but became acutely worse 3 days ago. Patient states minimal movement results in SOB. Patient states the SOB resolves at rest, but the cough is constant. Patient has chronic orthopnea, doesn't note and worsening, and doesn't note worsening leg swelling from her baseline. Patient denies fevers, chills, CP, PND, palpitations, syncope, near syncope, abdominal pain, N/v/D, headache, or dizziness.       HFpEF  Dyspnea multifactorial HF vs. COPD exacerbation vs. infectious process  Ct lasix, cardiac meds    CT chest with ?pneumonia RLL with WBC 15.4    Moderate COPD  STEVENSON on CPAP,  Non-compliant at home  Bronchitis  Mild leukocytosis    CAD  Recent ADELIA to LCx, off Plavix per Dr. Benavides and CLEAR Tabiona trial  Cont meds

## 2019-03-31 LAB
ACETONE SERPL-MCNC: NEGATIVE — SIGNIFICANT CHANGE UP
ANION GAP SERPL CALC-SCNC: 16 MMOL/L — SIGNIFICANT CHANGE UP (ref 5–17)
APPEARANCE UR: CLEAR — SIGNIFICANT CHANGE UP
B-OH-BUTYR SERPL-SCNC: 0.1 MMOL/L — SIGNIFICANT CHANGE UP
BACTERIA # UR AUTO: NEGATIVE — SIGNIFICANT CHANGE UP
BILIRUB UR-MCNC: NEGATIVE — SIGNIFICANT CHANGE UP
BUN SERPL-MCNC: 74 MG/DL — HIGH (ref 8–20)
CALCIUM SERPL-MCNC: 8.4 MG/DL — LOW (ref 8.6–10.2)
CHLORIDE SERPL-SCNC: 95 MMOL/L — LOW (ref 98–107)
CO2 SERPL-SCNC: 20 MMOL/L — LOW (ref 22–29)
COLOR SPEC: YELLOW — SIGNIFICANT CHANGE UP
COMMENT - URINE: SIGNIFICANT CHANGE UP
CREAT SERPL-MCNC: 1.88 MG/DL — HIGH (ref 0.5–1.3)
DIFF PNL FLD: NEGATIVE — SIGNIFICANT CHANGE UP
EPI CELLS # UR: SIGNIFICANT CHANGE UP
GLUCOSE BLDC GLUCOMTR-MCNC: 405 MG/DL — HIGH (ref 70–99)
GLUCOSE BLDC GLUCOMTR-MCNC: 445 MG/DL — HIGH (ref 70–99)
GLUCOSE BLDC GLUCOMTR-MCNC: 453 MG/DL — CRITICAL HIGH (ref 70–99)
GLUCOSE BLDC GLUCOMTR-MCNC: 455 MG/DL — CRITICAL HIGH (ref 70–99)
GLUCOSE BLDC GLUCOMTR-MCNC: 457 MG/DL — CRITICAL HIGH (ref 70–99)
GLUCOSE BLDC GLUCOMTR-MCNC: 460 MG/DL — CRITICAL HIGH (ref 70–99)
GLUCOSE BLDC GLUCOMTR-MCNC: 461 MG/DL — CRITICAL HIGH (ref 70–99)
GLUCOSE BLDC GLUCOMTR-MCNC: 469 MG/DL — CRITICAL HIGH (ref 70–99)
GLUCOSE BLDC GLUCOMTR-MCNC: 484 MG/DL — CRITICAL HIGH (ref 70–99)
GLUCOSE BLDC GLUCOMTR-MCNC: 501 MG/DL — CRITICAL HIGH (ref 70–99)
GLUCOSE BLDC GLUCOMTR-MCNC: 501 MG/DL — CRITICAL HIGH (ref 70–99)
GLUCOSE BLDC GLUCOMTR-MCNC: 502 MG/DL — CRITICAL HIGH (ref 70–99)
GLUCOSE SERPL-MCNC: 469 MG/DL — HIGH (ref 70–115)
GLUCOSE SERPL-MCNC: 487 MG/DL — HIGH (ref 70–115)
GLUCOSE UR QL: 1000 MG/DL
HCT VFR BLD CALC: 34.1 % — LOW (ref 37–47)
HGB BLD-MCNC: 10.7 G/DL — LOW (ref 12–16)
KETONES UR-MCNC: NEGATIVE — SIGNIFICANT CHANGE UP
LDH SERPL L TO P-CCNC: 219 U/L — HIGH (ref 98–192)
LEUKOCYTE ESTERASE UR-ACNC: NEGATIVE — SIGNIFICANT CHANGE UP
MCHC RBC-ENTMCNC: 26.6 PG — LOW (ref 27–31)
MCHC RBC-ENTMCNC: 31.4 G/DL — LOW (ref 32–36)
MCV RBC AUTO: 84.8 FL — SIGNIFICANT CHANGE UP (ref 81–99)
NITRITE UR-MCNC: NEGATIVE — SIGNIFICANT CHANGE UP
PH UR: 5 — SIGNIFICANT CHANGE UP (ref 5–8)
PLATELET # BLD AUTO: 265 K/UL — SIGNIFICANT CHANGE UP (ref 150–400)
POTASSIUM SERPL-MCNC: 4.8 MMOL/L — SIGNIFICANT CHANGE UP (ref 3.5–5.3)
POTASSIUM SERPL-SCNC: 4.8 MMOL/L — SIGNIFICANT CHANGE UP (ref 3.5–5.3)
PROT UR-MCNC: 15 MG/DL
RBC # BLD: 4.02 M/UL — LOW (ref 4.4–5.2)
RBC # FLD: 14 % — SIGNIFICANT CHANGE UP (ref 11–15.6)
RBC CASTS # UR COMP ASSIST: SIGNIFICANT CHANGE UP /HPF (ref 0–4)
SODIUM SERPL-SCNC: 131 MMOL/L — LOW (ref 135–145)
SP GR SPEC: 1.02 — SIGNIFICANT CHANGE UP (ref 1.01–1.02)
UROBILINOGEN FLD QL: NEGATIVE MG/DL — SIGNIFICANT CHANGE UP
WBC # BLD: 17.8 K/UL — HIGH (ref 4.8–10.8)
WBC # FLD AUTO: 17.8 K/UL — HIGH (ref 4.8–10.8)
WBC UR QL: SIGNIFICANT CHANGE UP

## 2019-03-31 PROCEDURE — 99232 SBSQ HOSP IP/OBS MODERATE 35: CPT

## 2019-03-31 PROCEDURE — 99233 SBSQ HOSP IP/OBS HIGH 50: CPT

## 2019-03-31 PROCEDURE — 12345: CPT | Mod: NC

## 2019-03-31 PROCEDURE — 99223 1ST HOSP IP/OBS HIGH 75: CPT

## 2019-03-31 RX ORDER — INSULIN LISPRO 100/ML
10 VIAL (ML) SUBCUTANEOUS
Qty: 0 | Refills: 0 | Status: DISCONTINUED | OUTPATIENT
Start: 2019-03-31 | End: 2019-04-01

## 2019-03-31 RX ORDER — INSULIN GLARGINE 100 [IU]/ML
30 INJECTION, SOLUTION SUBCUTANEOUS EVERY MORNING
Qty: 0 | Refills: 0 | Status: DISCONTINUED | OUTPATIENT
Start: 2019-04-01 | End: 2019-04-04

## 2019-03-31 RX ORDER — INSULIN LISPRO 100/ML
12 VIAL (ML) SUBCUTANEOUS ONCE
Qty: 0 | Refills: 0 | Status: COMPLETED | OUTPATIENT
Start: 2019-03-31 | End: 2019-03-31

## 2019-03-31 RX ORDER — INSULIN LISPRO 100/ML
8 VIAL (ML) SUBCUTANEOUS ONCE
Qty: 0 | Refills: 0 | Status: COMPLETED | OUTPATIENT
Start: 2019-03-31 | End: 2019-03-31

## 2019-03-31 RX ORDER — SACCHAROMYCES BOULARDII 250 MG
250 POWDER IN PACKET (EA) ORAL
Qty: 0 | Refills: 0 | Status: DISCONTINUED | OUTPATIENT
Start: 2019-03-31 | End: 2019-04-04

## 2019-03-31 RX ORDER — INSULIN GLARGINE 100 [IU]/ML
30 INJECTION, SOLUTION SUBCUTANEOUS ONCE
Qty: 0 | Refills: 0 | Status: COMPLETED | OUTPATIENT
Start: 2019-03-31 | End: 2019-03-31

## 2019-03-31 RX ORDER — INSULIN LISPRO 100/ML
6 VIAL (ML) SUBCUTANEOUS ONCE
Qty: 0 | Refills: 0 | Status: COMPLETED | OUTPATIENT
Start: 2019-03-31 | End: 2019-03-31

## 2019-03-31 RX ORDER — INSULIN LISPRO 100/ML
5 VIAL (ML) SUBCUTANEOUS
Qty: 0 | Refills: 0 | Status: DISCONTINUED | OUTPATIENT
Start: 2019-03-31 | End: 2019-03-31

## 2019-03-31 RX ADMIN — ENOXAPARIN SODIUM 40 MILLIGRAM(S): 100 INJECTION SUBCUTANEOUS at 11:30

## 2019-03-31 RX ADMIN — INSULIN GLARGINE 30 UNIT(S): 100 INJECTION, SOLUTION SUBCUTANEOUS at 02:32

## 2019-03-31 RX ADMIN — Medication 10 UNIT(S): at 17:02

## 2019-03-31 RX ADMIN — PANTOPRAZOLE SODIUM 40 MILLIGRAM(S): 20 TABLET, DELAYED RELEASE ORAL at 05:45

## 2019-03-31 RX ADMIN — Medication 12: at 12:23

## 2019-03-31 RX ADMIN — Medication 3 MILLILITER(S): at 14:22

## 2019-03-31 RX ADMIN — Medication 8 UNIT(S): at 02:30

## 2019-03-31 RX ADMIN — Medication 6 UNIT(S): at 00:30

## 2019-03-31 RX ADMIN — Medication 40 MILLIGRAM(S): at 05:44

## 2019-03-31 RX ADMIN — TIOTROPIUM BROMIDE 1 CAPSULE(S): 18 CAPSULE ORAL; RESPIRATORY (INHALATION) at 09:07

## 2019-03-31 RX ADMIN — Medication 0.5 MILLIGRAM(S): at 20:04

## 2019-03-31 RX ADMIN — Medication 12: at 17:03

## 2019-03-31 RX ADMIN — Medication 240 MILLIGRAM(S): at 05:44

## 2019-03-31 RX ADMIN — GABAPENTIN 100 MILLIGRAM(S): 400 CAPSULE ORAL at 05:44

## 2019-03-31 RX ADMIN — ATORVASTATIN CALCIUM 80 MILLIGRAM(S): 80 TABLET, FILM COATED ORAL at 21:31

## 2019-03-31 RX ADMIN — Medication 50 MILLIGRAM(S): at 05:44

## 2019-03-31 RX ADMIN — Medication 3 MILLILITER(S): at 09:06

## 2019-03-31 RX ADMIN — INSULIN GLARGINE 40 UNIT(S): 100 INJECTION, SOLUTION SUBCUTANEOUS at 21:32

## 2019-03-31 RX ADMIN — Medication 125 MICROGRAM(S): at 05:44

## 2019-03-31 RX ADMIN — Medication 100 MILLIGRAM(S): at 21:32

## 2019-03-31 RX ADMIN — GABAPENTIN 100 MILLIGRAM(S): 400 CAPSULE ORAL at 13:20

## 2019-03-31 RX ADMIN — Medication 81 MILLIGRAM(S): at 11:34

## 2019-03-31 RX ADMIN — ISOSORBIDE MONONITRATE 30 MILLIGRAM(S): 60 TABLET, EXTENDED RELEASE ORAL at 11:30

## 2019-03-31 RX ADMIN — LISINOPRIL 5 MILLIGRAM(S): 2.5 TABLET ORAL at 05:44

## 2019-03-31 RX ADMIN — Medication 3 MILLILITER(S): at 20:04

## 2019-03-31 RX ADMIN — Medication 12 UNIT(S): at 21:32

## 2019-03-31 RX ADMIN — Medication 5 UNIT(S): at 12:24

## 2019-03-31 RX ADMIN — Medication 40 MILLIGRAM(S): at 17:03

## 2019-03-31 RX ADMIN — Medication 12: at 08:05

## 2019-03-31 RX ADMIN — GABAPENTIN 100 MILLIGRAM(S): 400 CAPSULE ORAL at 21:31

## 2019-03-31 RX ADMIN — Medication 0.5 MILLIGRAM(S): at 09:06

## 2019-03-31 RX ADMIN — Medication 250 MILLIGRAM(S): at 17:07

## 2019-03-31 RX ADMIN — Medication 3 MILLILITER(S): at 02:47

## 2019-03-31 NOTE — CHART NOTE - NSCHARTNOTEFT_GEN_A_CORE
I was called on several occasions by RN FERNANDO due to patients blood glucose repeatedly being elevated above 500mg/dl. Patients blood sugar had also been repeatedly elevated throughout the day prior. ^ units of Humalog was given initially, then another 6 units of Humalog was given which did not decrease blood glucose. I spoke with the hospitalist who recommended BMP, UA, ketone level, beta hydroxybutyrate, and decrease steroids. give another dose of Lantus 30 units, another 8 units of Humalog. Case presented to daytime PA's to followup patient today.

## 2019-03-31 NOTE — PROGRESS NOTE ADULT - ASSESSMENT
Moderate COPD  STEVENSON on CPAP at 7 cm of water but not compliant at home  CAD s/p multiple stents  Obesity  Bronchitis  Mild leukocytosis    Rec:    Drug nebs  Will need nebulizer at home - does not have per pt  O2 - evaluate for home O2 prior to d/c  IV steroids - decrease as tolerates  Prednisone taper on d/c - possibly in AM and may benefit from a low dose of prednisone long term for cough if blood sugars allow  Control blood sugar  Consider endocrine evaluation  Empiric abx  Diurese as needed  Does not want CPAP for O2 - not compliant at home  Optimize cardiac function  On Lovenox for DVT prophylaxis  On PPI for GI prophylaxis  Pulm f/u after d/c    Had long d/w son regarding patient's condition Moderate COPD  STEVENSON on CPAP at 7 cm of water but not compliant at home  CAD s/p multiple stents  Obesity  Bronchitis  Mild leukocytosis - slightly worse and may be related to steroids    Rec:    Drug nebs  Will need nebulizer at home - does not have per pt  O2 - evaluate for home O2 prior to d/c  IV steroids - decrease as tolerates  Prednisone taper on d/c - possibly in AM and may benefit from a low dose of prednisone long term for cough if blood sugars allow  Control blood sugar  Consider endocrine evaluation  Empiric abx  Diurese as needed  Does not want CPAP for O2 - not compliant at home  Optimize cardiac function  On Lovenox for DVT prophylaxis  On PPI for GI prophylaxis  Pulm f/u after d/c    Had long d/w son regarding patient's condition

## 2019-03-31 NOTE — CHART NOTE - NSCHARTNOTEFT_GEN_A_CORE
Delayed entry note: called by PA for persistent hyperglycemia despite humalog 6U x 2, advised to reduce steroids from q8hr to q12hr, check BMP, acetone, give additional 8U humalog (reduced dose ot avoid stacking and hypoglycemia) and  give lantus 30U x1 then increase back to home regimen of lantus 70U per med rec and dose was reduced to lantus 40U on admission. Delayed entry note: called by medicine PA for persistent hyperglycemia despite humalog 6U x 2, advised to reduce steroids from q8hr to q12hr, check BMP, acetone, give additional 8U humalog (reduced dosing to avoid insulin stacking and hypoglycemia) and give lantus 30U x1 then increase back to home regimen of lantus 70U per med rec and dose was reduced to lantus 40U on admission.  Consider endo eval.

## 2019-03-31 NOTE — CONSULT NOTE ADULT - SUBJECTIVE AND OBJECTIVE BOX
HPI:  85 y/o female with h/o COPD, CHF, CAD, HTN, DM II, hypothyroidism, was brought in to the ER because of difficulty to breath and Hypoxemia. difficulty to breath started 2 days ado and was associated with runny nose and congestion. no fever. dry irritative cough. orthopnea ankle edema B/L. no chest pain or palpitations. . no sick contacts. WBC: 15 400.       PAST MEDICAL & SURGICAL HISTORY:  NSTEMI (non-ST elevated myocardial infarction)  Hypothyroid  HLD (hyperlipidemia)  HTN (hypertension)  CAD (coronary artery disease)  Asthma  Diabetes  Gastroesophageal reflux disease without esophagitis  Pure hypercholesterolemia  Hypothyroidism, unspecified type  Essential hypertension  DM2 (diabetes mellitus, type 2)  Uncomplicated asthma, unspecified asthma severity  Abnormal findings on cardiac catheterization: Cardiac Cath  History of appendectomy  History of appendectomy      FAMILY HISTORY:  Family history of stomach cancer  Family history of MI (myocardial infarction)  Family history of cancer in mother  Family history of heart disease      SOCIAL HISTORY: misha chaya, no tobacco, no drugs     REVIEW OF SYSTEMS:  Constitutional: No fever, no chills, no change in weight.  Eyes: No eye swelling,no  blurry vision, no redness, no loss of vision.  Neck: No neck pain, no change in voice.  Lungs: HAd SOB, no wheezing, no cough  CV: No chest pain, no palpitations, no pain with walking.  GI: No nausea, no vomiting, no constipation, no diarrhea, no abdominal pain  : No urinary frequency, no blood in urine  Musculoskeletal: No muscle pain, no joint pain, no swelling.  Skin: No rash  Neurologic: No headaches, no weakness  Endocrine: No heat intolerance, no cold intolerance  Psych: No depression, no anxiety    MEDICATIONS  (STANDING):  ALBUTerol/ipratropium for Nebulization 3 milliLiter(s) Nebulizer every 6 hours  ALBUTerol/ipratropium for Nebulization 3 milliLiter(s) Nebulizer once  aspirin enteric coated 81 milliGRAM(s) Oral daily  atorvastatin 80 milliGRAM(s) Oral at bedtime  buDESOnide   0.5 milliGRAM(s) Respule 0.5 milliGRAM(s) Inhalation every 12 hours  dextrose 5%. 1000 milliLiter(s) (50 mL/Hr) IV Continuous <Continuous>  dextrose 50% Injectable 12.5 Gram(s) IV Push once  dextrose 50% Injectable 25 Gram(s) IV Push once  dextrose 50% Injectable 25 Gram(s) IV Push once  diltiazem    milliGRAM(s) Oral daily  enoxaparin Injectable 40 milliGRAM(s) SubCutaneous daily  furosemide    Tablet 40 milliGRAM(s) Oral daily  gabapentin 100 milliGRAM(s) Oral three times a day  insulin glargine Injectable (LANTUS) 40 Unit(s) SubCutaneous at bedtime  insulin lispro (HumaLOG) corrective regimen sliding scale   SubCutaneous three times a day before meals  insulin lispro Injectable (HumaLOG) 5 Unit(s) SubCutaneous three times a day before meals  isosorbide   mononitrate ER Tablet (IMDUR) 30 milliGRAM(s) Oral daily  levoFLOXacin IVPB 500 milliGRAM(s) IV Intermittent every 24 hours  levoFLOXacin IVPB      levothyroxine 125 MICROGram(s) Oral daily  lisinopril 5 milliGRAM(s) Oral daily  methylPREDNISolone sodium succinate Injectable 40 milliGRAM(s) IV Push every 12 hours  metoprolol succinate ER 50 milliGRAM(s) Oral daily  pantoprazole    Tablet 40 milliGRAM(s) Oral before breakfast  tiotropium 18 MICROgram(s) Capsule 1 Capsule(s) Inhalation daily    MEDICATIONS  (PRN):  benzonatate 100 milliGRAM(s) Oral three times a day PRN Cough  dextrose 40% Gel 15 Gram(s) Oral once PRN Blood Glucose LESS THAN 70 milliGRAM(s)/deciliter  glucagon  Injectable 1 milliGRAM(s) IntraMuscular once PRN Glucose LESS THAN 70 milligrams/deciliter  HYDROcodone/homatropine Syrup 5 milliLiter(s) Oral two times a day PRN Cough      Allergies  iodine (Hives)  iodine containing compounds (Unknown)  shellfish (Anaphylaxis)  shellfish (Swelling; Short breath)    CAPILLARY BLOOD GLUCOSE  POCT Blood Glucose.: 460 mg/dL (31 Mar 2019 12:14)      PHYSICAL EXAM:    Vital Signs Last 24 Hrs  T(C): 36.1 (31 Mar 2019 11:08), Max: 36.5 (30 Mar 2019 21:46)  T(F): 96.9 (31 Mar 2019 11:08), Max: 97.7 (30 Mar 2019 21:46)  HR: 70 (31 Mar 2019 11:08) (67 - 81)  BP: 105/65 (31 Mar 2019 11:08) (105/65 - 130/66)  BP(mean): --  RR: 16 (31 Mar 2019 11:08) (16 - 20)  SpO2: 93% (31 Mar 2019 11:08) (93% - 99%)  General appearance: Well developed, well nourished.  Eyes: Pupils equal and reactive to light. EOM full. No exophthalmos.  Neck: Trachea midline. No thyroid enlargement.  Lungs: Normal respiratory excursion. Lungs clear.  CV: Regular cardiac rhythm. No murmur. Carotid and pedal pulses intact.  Abdomen: Soft, non tender, no organomegaly or mass.  Musculoskeletal: No cyanosis, clubbing, or edema Pitting edema 1+ B/L   Skin: Warm and moist. No rash.   Neuro: Cranial nerves intact. Normal motor and sensory function.  Psych: Normal affect, good judgement.      LABS:                        10.7   17.8  )-----------( 265      ( 31 Mar 2019 05:39 )             34.1     -    131<L>  |  95<L>  |  74.0<H>  ----------------------------<  469<H>  4.8   |  20.0<L>  |  1.88<H>    Ca    8.4<L>      31 Mar 2019 05:39    TPro  7.0  /  Alb  3.5  /  TBili  0.3<L>  /  DBili  x   /  AST  11  /  ALT  13  /  AlkPhos  87      Urinalysis Basic - ( 31 Mar 2019 04:28 )    Color: Yellow / Appearance: Clear / S.020 / pH: x  Gluc: x / Ketone: Negative  / Bili: Negative / Urobili: Negative mg/dL   Blood: x / Protein: 15 mg/dL / Nitrite: Negative   Leuk Esterase: Negative / RBC: 0-2 /HPF / WBC 0-2   Sq Epi: x / Non Sq Epi: Few / Bacteria: Negative      LIVER FUNCTIONS - ( 30 Mar 2019 05:53 )  Alb: 3.5 g/dL / Pro: 7.0 g/dL / ALK PHOS: 87 U/L / ALT: 13 U/L / AST: 11 U/L / GGT: x           Hemoglobin A1C, Whole Blood: 8.9 % ( @ 05:53)

## 2019-03-31 NOTE — PROGRESS NOTE ADULT - ASSESSMENT
85 y/o F PMHx of CAD s/p cardiac cath x 4 (most recent NSTEMI 12/2018 ADELIA to pLCX, previous ADELIA pLAD, ADELIA x mLAD, ADELIA OM1, ADELIA to LCx, and  to % with no intervention to date), HFpEF (EF>75%), COPD, asthma, moderate AS, mild pulmonary HTN, STEVENSON (CPAP), insulin dependent DMII, HTN, HLD, and hypothyroidism who presents to the ED with worsening cough and shortness of breath. Patient states that a month ago she began experiencing a dry cough that is very persistent and worsening.       HFpEF  Dyspnea multifactorial HF vs. COPD exacerbation vs. infectious process  Improved SOB  CT chest with ?pneumonia RLL with WBC 15.4  Cont meds    Moderate COPD  STEVENSON on CPAP,  Non-compliant at home  Bronchitis    CAD  Recent ADELIA to LCx, off Plavix per Dr. Benavides and CLEAR Maulik trial  Cont meds

## 2019-03-31 NOTE — CONSULT NOTE ADULT - ASSESSMENT
85 y/o female with COPD exacerbation, CHF, CAD, HTN, DM II, hypothyroidism      Problem: Type 2 diabetes mellitus with complication, with long-term current use of insulin.    insulin therapy protocol.   lantus just increased to 30 u am   continue lantus 40 u HS   increase lispro to 10 u TID w meals.     COPD exacerbation.  Plan: Duoneb . Solu medrol, being tapered, supplemental oxygen,  long term steriods as this is her 9th episode of exacerbation?  monitor closely and will do home O2 eval as well.       Chronic diastolic congestive heart failure.  Plan: Lasix and lisinopril. Imdur.     Coronary artery disease involving native coronary artery of native heart without angina pectoris.  Plan: Aspirin, metoprolol. atorvastatin.       Essential hypertension.  Plan: Cardizem. metoprolol and lisinopril.     Hypothyroidism, unspecified type. Plan: Levothyroxine.

## 2019-03-31 NOTE — PROGRESS NOTE ADULT - ASSESSMENT
83 y/o female with COPD exacerbation, CHF, CAD, HTN, DM II, hypothyroidism    1) COPD exacerbation  - Continue Solumedrol 40 mg q 12 hours  - Nebs and supplemental oxygen  - Levaquin 500 mg  - Pulmonary recommendations appreciated  2) Chronic diastolic congestive heart failure  - Continue Lasix, Lisinopril and Metoprolol   3) CAD  - Continue Aspirin, Imdur, Atorvastatin and Metoprolol   4) Type 2 diabetes mellitus - Uncontrolled  - Accu checks and ISS  - Added Lantus 30 units in am along with 40 units at night.  - Added Humalog 5 units Premeals this am and then increased to 10 units as per Endo recommendations   - Endo Consult appreciated  5) Essential hypertension  - Continue Lisinopril, Cardizem and Metoprolol   6) Hypothyroidism  - Continue Levothyroxine  DVT Prophylaxis -- Lovenox 40 mg

## 2019-04-01 LAB
ANION GAP SERPL CALC-SCNC: 15 MMOL/L — SIGNIFICANT CHANGE UP (ref 5–17)
BUN SERPL-MCNC: 86 MG/DL — HIGH (ref 8–20)
CALCIUM SERPL-MCNC: 8.3 MG/DL — LOW (ref 8.6–10.2)
CHLORIDE SERPL-SCNC: 97 MMOL/L — LOW (ref 98–107)
CO2 SERPL-SCNC: 23 MMOL/L — SIGNIFICANT CHANGE UP (ref 22–29)
CREAT SERPL-MCNC: 1.88 MG/DL — HIGH (ref 0.5–1.3)
GLUCOSE BLDC GLUCOMTR-MCNC: 271 MG/DL — HIGH (ref 70–99)
GLUCOSE BLDC GLUCOMTR-MCNC: 344 MG/DL — HIGH (ref 70–99)
GLUCOSE BLDC GLUCOMTR-MCNC: 368 MG/DL — HIGH (ref 70–99)
GLUCOSE BLDC GLUCOMTR-MCNC: 435 MG/DL — HIGH (ref 70–99)
GLUCOSE SERPL-MCNC: 384 MG/DL — HIGH (ref 70–115)
HCT VFR BLD CALC: 33.5 % — LOW (ref 37–47)
HGB BLD-MCNC: 10.5 G/DL — LOW (ref 12–16)
LYMPHOCYTES # BLD AUTO: 0.9 K/UL — LOW (ref 1–4.8)
LYMPHOCYTES # BLD AUTO: 6 % — LOW (ref 20–55)
MAGNESIUM SERPL-MCNC: 2.2 MG/DL — SIGNIFICANT CHANGE UP (ref 1.6–2.6)
MCHC RBC-ENTMCNC: 26.7 PG — LOW (ref 27–31)
MCHC RBC-ENTMCNC: 31.3 G/DL — LOW (ref 32–36)
MCV RBC AUTO: 85.2 FL — SIGNIFICANT CHANGE UP (ref 81–99)
MONOCYTES # BLD AUTO: 0.4 K/UL — SIGNIFICANT CHANGE UP (ref 0–0.8)
MONOCYTES NFR BLD AUTO: 3 % — SIGNIFICANT CHANGE UP (ref 3–10)
NEUTROPHILS # BLD AUTO: 14 K/UL — HIGH (ref 1.8–8)
NEUTROPHILS NFR BLD AUTO: 91 % — HIGH (ref 37–73)
PLAT MORPH BLD: NORMAL — SIGNIFICANT CHANGE UP
PLATELET # BLD AUTO: 264 K/UL — SIGNIFICANT CHANGE UP (ref 150–400)
POTASSIUM SERPL-MCNC: 4.5 MMOL/L — SIGNIFICANT CHANGE UP (ref 3.5–5.3)
POTASSIUM SERPL-SCNC: 4.5 MMOL/L — SIGNIFICANT CHANGE UP (ref 3.5–5.3)
RBC # BLD: 3.93 M/UL — LOW (ref 4.4–5.2)
RBC # FLD: 14 % — SIGNIFICANT CHANGE UP (ref 11–15.6)
RBC BLD AUTO: SIGNIFICANT CHANGE UP
SODIUM SERPL-SCNC: 135 MMOL/L — SIGNIFICANT CHANGE UP (ref 135–145)
WBC # BLD: 15.4 K/UL — HIGH (ref 4.8–10.8)
WBC # FLD AUTO: 15.4 K/UL — HIGH (ref 4.8–10.8)

## 2019-04-01 PROCEDURE — 99232 SBSQ HOSP IP/OBS MODERATE 35: CPT

## 2019-04-01 PROCEDURE — 99233 SBSQ HOSP IP/OBS HIGH 50: CPT

## 2019-04-01 RX ORDER — INSULIN LISPRO 100/ML
13 VIAL (ML) SUBCUTANEOUS
Qty: 0 | Refills: 0 | Status: DISCONTINUED | OUTPATIENT
Start: 2019-04-01 | End: 2019-04-02

## 2019-04-01 RX ORDER — HEPARIN SODIUM 5000 [USP'U]/ML
5000 INJECTION INTRAVENOUS; SUBCUTANEOUS EVERY 8 HOURS
Qty: 0 | Refills: 0 | Status: DISCONTINUED | OUTPATIENT
Start: 2019-04-01 | End: 2019-04-04

## 2019-04-01 RX ORDER — INSULIN LISPRO 100/ML
3 VIAL (ML) SUBCUTANEOUS ONCE
Qty: 0 | Refills: 0 | Status: COMPLETED | OUTPATIENT
Start: 2019-04-01 | End: 2019-04-01

## 2019-04-01 RX ADMIN — Medication 13 UNIT(S): at 17:01

## 2019-04-01 RX ADMIN — LISINOPRIL 5 MILLIGRAM(S): 2.5 TABLET ORAL at 05:09

## 2019-04-01 RX ADMIN — INSULIN GLARGINE 30 UNIT(S): 100 INJECTION, SOLUTION SUBCUTANEOUS at 08:23

## 2019-04-01 RX ADMIN — Medication 81 MILLIGRAM(S): at 12:22

## 2019-04-01 RX ADMIN — Medication 8: at 17:01

## 2019-04-01 RX ADMIN — Medication 40 MILLIGRAM(S): at 05:09

## 2019-04-01 RX ADMIN — GABAPENTIN 100 MILLIGRAM(S): 400 CAPSULE ORAL at 05:09

## 2019-04-01 RX ADMIN — ENOXAPARIN SODIUM 40 MILLIGRAM(S): 100 INJECTION SUBCUTANEOUS at 12:23

## 2019-04-01 RX ADMIN — Medication 100 MILLIGRAM(S): at 05:08

## 2019-04-01 RX ADMIN — INSULIN GLARGINE 40 UNIT(S): 100 INJECTION, SOLUTION SUBCUTANEOUS at 21:34

## 2019-04-01 RX ADMIN — PANTOPRAZOLE SODIUM 40 MILLIGRAM(S): 20 TABLET, DELAYED RELEASE ORAL at 05:09

## 2019-04-01 RX ADMIN — Medication 3 MILLILITER(S): at 20:03

## 2019-04-01 RX ADMIN — Medication 3 MILLILITER(S): at 09:37

## 2019-04-01 RX ADMIN — GABAPENTIN 100 MILLIGRAM(S): 400 CAPSULE ORAL at 13:00

## 2019-04-01 RX ADMIN — Medication 10 UNIT(S): at 08:24

## 2019-04-01 RX ADMIN — Medication 3 UNIT(S): at 22:16

## 2019-04-01 RX ADMIN — Medication 3 MILLILITER(S): at 14:56

## 2019-04-01 RX ADMIN — Medication 12: at 12:23

## 2019-04-01 RX ADMIN — HEPARIN SODIUM 5000 UNIT(S): 5000 INJECTION INTRAVENOUS; SUBCUTANEOUS at 21:34

## 2019-04-01 RX ADMIN — GABAPENTIN 100 MILLIGRAM(S): 400 CAPSULE ORAL at 21:34

## 2019-04-01 RX ADMIN — Medication 0.5 MILLIGRAM(S): at 20:02

## 2019-04-01 RX ADMIN — ISOSORBIDE MONONITRATE 30 MILLIGRAM(S): 60 TABLET, EXTENDED RELEASE ORAL at 12:22

## 2019-04-01 RX ADMIN — ATORVASTATIN CALCIUM 80 MILLIGRAM(S): 80 TABLET, FILM COATED ORAL at 21:34

## 2019-04-01 RX ADMIN — Medication 250 MILLIGRAM(S): at 05:09

## 2019-04-01 RX ADMIN — Medication 125 MICROGRAM(S): at 05:08

## 2019-04-01 RX ADMIN — Medication 0.5 MILLIGRAM(S): at 09:37

## 2019-04-01 RX ADMIN — Medication 50 MILLIGRAM(S): at 05:09

## 2019-04-01 RX ADMIN — Medication 40 MILLIGRAM(S): at 12:23

## 2019-04-01 RX ADMIN — Medication 240 MILLIGRAM(S): at 05:08

## 2019-04-01 RX ADMIN — Medication 10 UNIT(S): at 12:23

## 2019-04-01 RX ADMIN — Medication 250 MILLIGRAM(S): at 17:01

## 2019-04-01 RX ADMIN — Medication 10: at 08:24

## 2019-04-01 RX ADMIN — Medication 3 MILLILITER(S): at 03:12

## 2019-04-01 NOTE — PROGRESS NOTE ADULT - ASSESSMENT
85 y/o female with COPD exacerbation, CHF, CAD, HTN, DM II, hypothyroidism    1) COPD exacerbation  - Solumedrol switched to Prednisone this am  - Nebs and supplemental oxygen  - Levaquin 250 mg  - Pulmonary recommendations appreciated  2) Chronic diastolic congestive heart failure  - Continue Lasix, Lisinopril and Metoprolol   - Cardiology input appreciated  3) CAD  - Continue Aspirin, Imdur, Atorvastatin and Metoprolol   4) Type 2 diabetes mellitus - Uncontrolled  - Accu checks and ISS  - Continue Lantus 30 units in am and 40 units at night.  - Increase Premeal Humalog to 13 units   - Endo Consult appreciated  5) Essential hypertension  - Continue Lisinopril, Cardizem and Metoprolol   6) Hypothyroidism  - Continue Levothyroxine  DVT Prophylaxis -- Lovenox 40 mg 85 y/o female with COPD exacerbation, CHF, CAD, HTN, DM II, hypothyroidism    1) COPD exacerbation  - Solumedrol switched to Prednisone this am  - Nebs and supplemental oxygen  - Levaquin 250 mg  - Pulmonary recommendations appreciated  2) Chronic diastolic congestive heart failure  - Continue Metoprolol   - Cardiology input appreciated  - Hold Lasix and Lisinopril due to acute on chronic kidney injury  3) CAD  - Continue Aspirin, Imdur, Atorvastatin and Metoprolol   4) Type 2 diabetes mellitus - Uncontrolled  - Accu checks and ISS  - Continue Lantus 30 units in am and 40 units at night.  - Increase Premeal Humalog to 13 units   - Endo Consult appreciated  5) Essential hypertension  - Continue Lisinopril, Cardizem and Metoprolol   6) Hypothyroidism  - Continue Levothyroxine  DVT Prophylaxis -- Lovenox 40 mg

## 2019-04-01 NOTE — PROGRESS NOTE ADULT - ASSESSMENT
T2DM uncontrolled _ pt just received 30 unit dose of AM lantus today - willsee how she does T2DM uncontrolled _ pt just received 30 unit dose of AM lantus today - willsee how she does tomorrow and adjust insulin as needed   contineu supportive care

## 2019-04-01 NOTE — PROGRESS NOTE ADULT - ASSESSMENT
Still bronchospastic on PO steroids  Tolerating CPAP    Plan:  hold dc for 24 hrs  needs home nebulizer  prn O2

## 2019-04-02 ENCOUNTER — TRANSCRIPTION ENCOUNTER (OUTPATIENT)
Age: 84
End: 2019-04-02

## 2019-04-02 LAB
ANION GAP SERPL CALC-SCNC: 15 MMOL/L — SIGNIFICANT CHANGE UP (ref 5–17)
BASOPHILS # BLD AUTO: 0 K/UL — SIGNIFICANT CHANGE UP (ref 0–0.2)
BASOPHILS NFR BLD AUTO: 0.1 % — SIGNIFICANT CHANGE UP (ref 0–2)
BUN SERPL-MCNC: 91 MG/DL — HIGH (ref 8–20)
CALCIUM SERPL-MCNC: 8.2 MG/DL — LOW (ref 8.6–10.2)
CHLORIDE SERPL-SCNC: 98 MMOL/L — SIGNIFICANT CHANGE UP (ref 98–107)
CO2 SERPL-SCNC: 24 MMOL/L — SIGNIFICANT CHANGE UP (ref 22–29)
CREAT SERPL-MCNC: 1.84 MG/DL — HIGH (ref 0.5–1.3)
EOSINOPHIL # BLD AUTO: 0 K/UL — SIGNIFICANT CHANGE UP (ref 0–0.5)
EOSINOPHIL NFR BLD AUTO: 0 % — SIGNIFICANT CHANGE UP (ref 0–6)
GLUCOSE BLDC GLUCOMTR-MCNC: 219 MG/DL — HIGH (ref 70–99)
GLUCOSE BLDC GLUCOMTR-MCNC: 277 MG/DL — HIGH (ref 70–99)
GLUCOSE BLDC GLUCOMTR-MCNC: 346 MG/DL — HIGH (ref 70–99)
GLUCOSE BLDC GLUCOMTR-MCNC: 422 MG/DL — HIGH (ref 70–99)
GLUCOSE SERPL-MCNC: 349 MG/DL — HIGH (ref 70–115)
HCT VFR BLD CALC: 33.5 % — LOW (ref 37–47)
HGB BLD-MCNC: 10.7 G/DL — LOW (ref 12–16)
LYMPHOCYTES # BLD AUTO: 0.9 K/UL — LOW (ref 1–4.8)
LYMPHOCYTES # BLD AUTO: 6 % — LOW (ref 20–55)
MAGNESIUM SERPL-MCNC: 2.4 MG/DL — SIGNIFICANT CHANGE UP (ref 1.6–2.6)
MCHC RBC-ENTMCNC: 27 PG — SIGNIFICANT CHANGE UP (ref 27–31)
MCHC RBC-ENTMCNC: 31.9 G/DL — LOW (ref 32–36)
MCV RBC AUTO: 84.4 FL — SIGNIFICANT CHANGE UP (ref 81–99)
MONOCYTES # BLD AUTO: 1 K/UL — HIGH (ref 0–0.8)
MONOCYTES NFR BLD AUTO: 6.7 % — SIGNIFICANT CHANGE UP (ref 3–10)
NEUTROPHILS # BLD AUTO: 12.5 K/UL — HIGH (ref 1.8–8)
NEUTROPHILS NFR BLD AUTO: 86.6 % — HIGH (ref 37–73)
PHOSPHATE SERPL-MCNC: 4.1 MG/DL — SIGNIFICANT CHANGE UP (ref 2.4–4.7)
PLATELET # BLD AUTO: 254 K/UL — SIGNIFICANT CHANGE UP (ref 150–400)
POTASSIUM SERPL-MCNC: 4.4 MMOL/L — SIGNIFICANT CHANGE UP (ref 3.5–5.3)
POTASSIUM SERPL-SCNC: 4.4 MMOL/L — SIGNIFICANT CHANGE UP (ref 3.5–5.3)
RBC # BLD: 3.97 M/UL — LOW (ref 4.4–5.2)
RBC # FLD: 13.9 % — SIGNIFICANT CHANGE UP (ref 11–15.6)
SODIUM SERPL-SCNC: 137 MMOL/L — SIGNIFICANT CHANGE UP (ref 135–145)
WBC # BLD: 14.4 K/UL — HIGH (ref 4.8–10.8)
WBC # FLD AUTO: 14.4 K/UL — HIGH (ref 4.8–10.8)

## 2019-04-02 PROCEDURE — 99233 SBSQ HOSP IP/OBS HIGH 50: CPT

## 2019-04-02 PROCEDURE — 99232 SBSQ HOSP IP/OBS MODERATE 35: CPT

## 2019-04-02 RX ORDER — INSULIN LISPRO 100/ML
15 VIAL (ML) SUBCUTANEOUS
Qty: 0 | Refills: 0 | Status: DISCONTINUED | OUTPATIENT
Start: 2019-04-02 | End: 2019-04-02

## 2019-04-02 RX ORDER — INSULIN LISPRO 100/ML
18 VIAL (ML) SUBCUTANEOUS
Qty: 0 | Refills: 0 | Status: DISCONTINUED | OUTPATIENT
Start: 2019-04-02 | End: 2019-04-04

## 2019-04-02 RX ORDER — INSULIN GLARGINE 100 [IU]/ML
10 INJECTION, SOLUTION SUBCUTANEOUS ONCE
Qty: 0 | Refills: 0 | Status: COMPLETED | OUTPATIENT
Start: 2019-04-02 | End: 2019-04-02

## 2019-04-02 RX ADMIN — Medication 240 MILLIGRAM(S): at 05:21

## 2019-04-02 RX ADMIN — Medication 250 MILLIGRAM(S): at 05:23

## 2019-04-02 RX ADMIN — Medication 0.5 MILLIGRAM(S): at 20:19

## 2019-04-02 RX ADMIN — Medication 40 MILLIGRAM(S): at 05:20

## 2019-04-02 RX ADMIN — Medication 250 MILLIGRAM(S): at 16:46

## 2019-04-02 RX ADMIN — Medication 12: at 12:49

## 2019-04-02 RX ADMIN — GABAPENTIN 100 MILLIGRAM(S): 400 CAPSULE ORAL at 05:21

## 2019-04-02 RX ADMIN — PANTOPRAZOLE SODIUM 40 MILLIGRAM(S): 20 TABLET, DELAYED RELEASE ORAL at 05:21

## 2019-04-02 RX ADMIN — Medication 6: at 16:42

## 2019-04-02 RX ADMIN — Medication 125 MICROGRAM(S): at 05:21

## 2019-04-02 RX ADMIN — INSULIN GLARGINE 40 UNIT(S): 100 INJECTION, SOLUTION SUBCUTANEOUS at 21:41

## 2019-04-02 RX ADMIN — Medication 13 UNIT(S): at 12:49

## 2019-04-02 RX ADMIN — Medication 50 MILLIGRAM(S): at 05:23

## 2019-04-02 RX ADMIN — HEPARIN SODIUM 5000 UNIT(S): 5000 INJECTION INTRAVENOUS; SUBCUTANEOUS at 21:38

## 2019-04-02 RX ADMIN — HEPARIN SODIUM 5000 UNIT(S): 5000 INJECTION INTRAVENOUS; SUBCUTANEOUS at 12:51

## 2019-04-02 RX ADMIN — Medication 81 MILLIGRAM(S): at 12:50

## 2019-04-02 RX ADMIN — HEPARIN SODIUM 5000 UNIT(S): 5000 INJECTION INTRAVENOUS; SUBCUTANEOUS at 05:23

## 2019-04-02 RX ADMIN — INSULIN GLARGINE 30 UNIT(S): 100 INJECTION, SOLUTION SUBCUTANEOUS at 08:38

## 2019-04-02 RX ADMIN — GABAPENTIN 100 MILLIGRAM(S): 400 CAPSULE ORAL at 21:38

## 2019-04-02 RX ADMIN — Medication 3 MILLILITER(S): at 09:03

## 2019-04-02 RX ADMIN — Medication 3 MILLILITER(S): at 03:37

## 2019-04-02 RX ADMIN — Medication 18 UNIT(S): at 16:42

## 2019-04-02 RX ADMIN — Medication 8: at 08:37

## 2019-04-02 RX ADMIN — GABAPENTIN 100 MILLIGRAM(S): 400 CAPSULE ORAL at 12:51

## 2019-04-02 RX ADMIN — Medication 0.5 MILLIGRAM(S): at 09:03

## 2019-04-02 RX ADMIN — Medication 13 UNIT(S): at 08:37

## 2019-04-02 RX ADMIN — Medication 3 MILLILITER(S): at 20:17

## 2019-04-02 RX ADMIN — ATORVASTATIN CALCIUM 80 MILLIGRAM(S): 80 TABLET, FILM COATED ORAL at 21:38

## 2019-04-02 RX ADMIN — Medication 3 MILLILITER(S): at 14:59

## 2019-04-02 RX ADMIN — INSULIN GLARGINE 10 UNIT(S): 100 INJECTION, SOLUTION SUBCUTANEOUS at 16:43

## 2019-04-02 NOTE — PHYSICAL THERAPY INITIAL EVALUATION ADULT - ADDITIONAL COMMENTS
pt states she lives in [facility] assisted living in her own apartment with no steps. has RW and rollator at home. has shower chair. has a lady who comes a few days a week to help and someone to clean once a week.

## 2019-04-02 NOTE — DISCHARGE NOTE PROVIDER - INSTRUCTIONS
consistent carbohydrate diet  low sodium (2gm sodium) diet consistent carbohydrate diet  - make sure to eat carbohydrates with meals when using your premeal insulin  low sodium (2gm sodium) diet

## 2019-04-02 NOTE — DISCHARGE NOTE PROVIDER - PROVIDER TOKENS
PROVIDER:[TOKEN:[[id number]],FOLLOWUP:[2 weeks]],PROVIDER:[TOKEN:[[id number]],FOLLOWUP:[1 month]] PROVIDER:[TOKEN:[[id number]],FOLLOWUP:[2 weeks]],PROVIDER:[TOKEN:[[id number]],FOLLOWUP:[1 week]]

## 2019-04-02 NOTE — DISCHARGE NOTE PROVIDER - HOSPITAL COURSE
HPI:    85 y/o female with h/o COPD, CHF, CAD, HTN, DM II, hypothyroidism, was brought in to the ER because of difficulty to breath and Hypoxemia. Difficulty to breath started 2 days ado and was associated with runny nose and congestion. No fever. Dry irritative cough. Orthopnea ankle edema B/L. No chest pain or palpitations. No sick contacts.        Patient admitted for COPD exacerbation, treated with IV steroids and breathing treatments. She was seen by pulmonolongy and cardiology. Pulmonology considering long term steroids to control COPD given patient has had 9 admissions in the last 1 year. Patient was seen by endocrinology for diabetes and steroid induced hyperglycemia. She is on BID lantus. She will follow up with pulmonology and endocrinology on discharge.        ICU Vital Signs Last 24 Hrs    T(C): 36.6 (03 Apr 2019 04:32), Max: 36.6 (02 Apr 2019 21:33)    T(F): 97.8 (03 Apr 2019 04:32), Max: 97.8 (02 Apr 2019 21:33)    HR: 63 (03 Apr 2019 04:32) (62 - 80)    BP: 118/71 (03 Apr 2019 04:32) (94/48 - 135/75)    BP(mean): --    ABP: --    ABP(mean): --    RR: 18 (02 Apr 2019 21:33) (18 - 18)    SpO2: 93% (03 Apr 2019 04:32) (92% - 96%)        PHYSICAL EXAM:        General: Well developed; well nourished; in no acute distress    Eyes: PERRLA, EOMI; conjunctiva and sclera clear    Head: Normocephalic; atraumatic    ENMT: No nasal discharge; airway clear    Neck: Supple; non tender; no masses    Respiratory: No wheezes, rales or rhonchi    Cardiovascular: Regular rate and rhythm. S1 and S2 Normal; No murmurs, gallops or rubs    Gastrointestinal: Soft non-tender non-distended; Normal bowel sounds    Genitourinary: No costovertebral angle tenderness    Extremities: Normal range of motion, No clubbing, cyanosis or edema    Vascular: Peripheral pulses palpable 2+ bilaterally    Neurological: Alert and oriented x4    Skin: Warm and dry. No acute rash    Lymph Nodes: No acute cervical adenopathy    Musculoskeletal: Normal gait, tone, without deformities    Psychiatric: Cooperative and appropriate        < from: CT Chest No Cont (03.29.19 @ 10:01) >        IMPRESSION:       Mild subpleural interstitial prominence at the right lung base as well as     linear strands of atelectasis or fibrosis in the right lower lobe.     Otherwise unremarkable study. Stable exam since 10/28/2018..         < end of copied text >                                10.7     14.4  )-----------( 254      ( 02 Apr 2019 07:05 )               33.5     04-02        137  |  98  |  91.0<H>    ----------------------------<  349<H>    4.4   |  24.0  |  1.84<H>        Ca    8.2<L>      02 Apr 2019 07:05    Phos  4.1     04-02    Mg     2.4     04-02

## 2019-04-02 NOTE — PHYSICAL THERAPY INITIAL EVALUATION ADULT - PERTINENT HX OF CURRENT PROBLEM, REHAB EVAL
83 y/o female with h/o COPD, CHF, CAD, HTN, DM II, hypothyroidism, was brought in to the ER because of difficulty to breath and Hypoxemia. difficulty to breath started 2 days ado and was associated with runny nose and congestion.

## 2019-04-02 NOTE — DISCHARGE NOTE NURSING/CASE MANAGEMENT/SOCIAL WORK - NSDCFUADDAPPT_GEN_ALL_CORE_FT
A Discharge follow up appointment has been made for you with/  Pulmonologist-Dr. Lutz   Address: [address] [place] [address]  Phone Number: [phone]    Date and Time of appointment: Tuesday 4/16/19 at 3:15 A Discharge follow up appointment has been made for you with/  Pulmonologist-Dr. Mcginnis  Address: [address] [place] [address]  Phone Number: [phone]    Date and Time of appointment: Tuesday 4/10/19 at 11:30

## 2019-04-02 NOTE — DISCHARGE NOTE PROVIDER - CARE PROVIDER_API CALL
Shen Kerns)  Critical Care Medicine; Internal Medicine; Pulmonary Disease  [address] [facility], [facility] [address]  [place] [address]  Phone: [phone]  Fax: [phone]  Follow Up Time: 2 weeks    Mesha Barfield)  EndocrinologyMetabDiabetes  [address] [place], [place] [id number]  Phone: [phone]  Fax: [phone]  Follow Up Time: 1 month Shen Kerns)  Critical Care Medicine; Internal Medicine; Pulmonary Disease  [address] [facility], [address]  [place] [address]  Phone: [phone]  Fax: [phone]  Follow Up Time: 2 weeks    Mesha Barfield)  EndocrinologyMetabDiabetes  [address] [place], [place] [id number]  Phone: [phone]  Fax: [phone]  Follow Up Time: 1 week

## 2019-04-02 NOTE — PHYSICAL THERAPY INITIAL EVALUATION ADULT - PREDICTED DURATION OF THERAPY (DAYS/WKS), PT EVAL
pt independent with RW for all mobility on level surfaces. no skilled needs at this time. will not follow.

## 2019-04-02 NOTE — PROGRESS NOTE ADULT - ASSESSMENT
85 y/o female with COPD exacerbation, CHF, CAD, HTN, DM II, hypothyroidism    1) COPD exacerbation  - Solumedrol switched to Prednisone yesterday  - Nebs and supplemental oxygen  - Levaquin 250 mg  - Pulmonary recommendations appreciated  2) Chronic diastolic congestive heart failure  - Continue Metoprolol   - Cardiology input appreciated  - Hold Lasix and Lisinopril due to acute on chronic kidney injury  3) CAD  - Continue Aspirin, Imdur, Atorvastatin and Metoprolol   4) Type 2 diabetes mellitus - Uncontrolled  - Accu checks and ISS  - Continue Lantus 30 units in am and 40 units at night. Gave 10 units extra in the afternoon.  - Increased Premeal Humalog to 18 units as per Endo  - Endo input appreciated  5) Essential hypertension  - Continue Lisinopril, Cardizem and Metoprolol   6) Hypothyroidism  - Continue Levothyroxine  DVT Prophylaxis -- Lovenox 40 mg    Dispo: Likely discharge tomorrow

## 2019-04-02 NOTE — DISCHARGE NOTE PROVIDER - CARE PROVIDERS DIRECT ADDRESSES
[email or web address]

## 2019-04-02 NOTE — PROGRESS NOTE ADULT - ASSESSMENT
T2Dm- uncontrolled - Will Increase remeal Huamlog to 18 unit tid ac   Continue coverage scale    Continue Lantus 30 unit Maxx and 40 unit in PM   pt received additional 10 unit Lantus this Am T2Dm- uncontrolled - Will Increase premeal Humalog to 18 unit tid ac   Continue coverage scale    Continue Lantus 30 unit Maxx and 40 unit in PM   pt to receive additional LAntus now   Pt received Prednisone 40 mg yesterday and today then is stopped  will reduce Humalog  tomorrow

## 2019-04-02 NOTE — DISCHARGE NOTE NURSING/CASE MANAGEMENT/SOCIAL WORK - NSDCDPATPORTLINK_GEN_ALL_CORE
You can access the [facility], offered by [facility], by registering with the following website: [email or web address]

## 2019-04-02 NOTE — DISCHARGE NOTE PROVIDER - NSDCCPCAREPLAN_GEN_ALL_CORE_FT
PRINCIPAL DISCHARGE DIAGNOSIS  Diagnosis: COPD exacerbation  Assessment and Plan of Treatment:       SECONDARY DISCHARGE DIAGNOSES  Diagnosis: Type 2 diabetes mellitus with complication, with long-term current use of insulin  Assessment and Plan of Treatment: Type 2 diabetes mellitus with complication, with long-term current use of insulin    Diagnosis: Hypothyroidism, unspecified type  Assessment and Plan of Treatment: Hypothyroidism, unspecified type    Diagnosis: Essential hypertension  Assessment and Plan of Treatment: Essential hypertension    Diagnosis: Chronic diastolic congestive heart failure  Assessment and Plan of Treatment: Chronic diastolic congestive heart failure PRINCIPAL DISCHARGE DIAGNOSIS  Diagnosis: COPD exacerbation  Assessment and Plan of Treatment: taper steroids to 10mg a day. Follow up with your pulmonologist to see if should continue in 2 weeks. continue taking your home inhalers as prescribed.      SECONDARY DISCHARGE DIAGNOSES  Diagnosis: Type 2 diabetes mellitus with complication, with long-term current use of insulin  Assessment and Plan of Treatment: continue for now on glargine in the morning and at night 30 units. take 18 U premeal of novolog. Record your blood sugar that you measure before meals and at night and take to your endocrinologist next week so that insulin can be tapered down. Take carbohydrates with each of your meals. Do not use sliding scale insulin at home.    Diagnosis: Hypothyroidism, unspecified type  Assessment and Plan of Treatment: continue levothyroxine    Diagnosis: Essential hypertension  Assessment and Plan of Treatment: continue prescribed medications    Diagnosis: Chronic diastolic congestive heart failure  Assessment and Plan of Treatment: continue home medciations PRINCIPAL DISCHARGE DIAGNOSIS  Diagnosis: COPD exacerbation  Assessment and Plan of Treatment: taper steroids to 10mg a day. Follow up with your pulmonologist to see if should continue in 2 weeks. continue taking your home inhalers as prescribed.      SECONDARY DISCHARGE DIAGNOSES  Diagnosis: Type 2 diabetes mellitus with complication, with long-term current use of insulin  Assessment and Plan of Treatment: continue for now on glargine 30 units in the morning and at night take 10 units. Record your blood sugar before meals and at night. If your morning blood sugar is less than 100 for 2 days in a row stop taking the 10 units at night and only take the 30 units in the morning. With meals take 18 U premeal of novolog and be sure to have carbohydrates in each meal. Do not use sliding scale insulin at home. Take your measurements to your endocrinologist next week. Be wary of low blood glucose and supplement with hard candy if having symptoms    Diagnosis: Hypothyroidism, unspecified type  Assessment and Plan of Treatment: continue levothyroxine    Diagnosis: Essential hypertension  Assessment and Plan of Treatment: continue prescribed medications    Diagnosis: Chronic diastolic congestive heart failure  Assessment and Plan of Treatment: continue home medciations

## 2019-04-03 LAB
ANION GAP SERPL CALC-SCNC: 12 MMOL/L — SIGNIFICANT CHANGE UP (ref 5–17)
BUN SERPL-MCNC: 85 MG/DL — HIGH (ref 8–20)
CALCIUM SERPL-MCNC: 8.9 MG/DL — SIGNIFICANT CHANGE UP (ref 8.6–10.2)
CHLORIDE SERPL-SCNC: 104 MMOL/L — SIGNIFICANT CHANGE UP (ref 98–107)
CO2 SERPL-SCNC: 27 MMOL/L — SIGNIFICANT CHANGE UP (ref 22–29)
CREAT SERPL-MCNC: 1.7 MG/DL — HIGH (ref 0.5–1.3)
CULTURE RESULTS: SIGNIFICANT CHANGE UP
GLUCOSE BLDC GLUCOMTR-MCNC: 161 MG/DL — HIGH (ref 70–99)
GLUCOSE BLDC GLUCOMTR-MCNC: 197 MG/DL — HIGH (ref 70–99)
GLUCOSE BLDC GLUCOMTR-MCNC: 235 MG/DL — HIGH (ref 70–99)
GLUCOSE BLDC GLUCOMTR-MCNC: 95 MG/DL — SIGNIFICANT CHANGE UP (ref 70–99)
GLUCOSE SERPL-MCNC: 104 MG/DL — SIGNIFICANT CHANGE UP (ref 70–115)
MAGNESIUM SERPL-MCNC: 2.6 MG/DL — SIGNIFICANT CHANGE UP (ref 1.6–2.6)
POTASSIUM SERPL-MCNC: 4.4 MMOL/L — SIGNIFICANT CHANGE UP (ref 3.5–5.3)
POTASSIUM SERPL-SCNC: 4.4 MMOL/L — SIGNIFICANT CHANGE UP (ref 3.5–5.3)
SODIUM SERPL-SCNC: 143 MMOL/L — SIGNIFICANT CHANGE UP (ref 135–145)
SPECIMEN SOURCE: SIGNIFICANT CHANGE UP

## 2019-04-03 PROCEDURE — 99232 SBSQ HOSP IP/OBS MODERATE 35: CPT

## 2019-04-03 RX ORDER — GABAPENTIN 400 MG/1
1 CAPSULE ORAL
Qty: 0 | Refills: 0 | COMMUNITY

## 2019-04-03 RX ORDER — INSULIN GLARGINE 100 [IU]/ML
30 INJECTION, SOLUTION SUBCUTANEOUS
Qty: 1 | Refills: 0 | OUTPATIENT
Start: 2019-04-03 | End: 2019-05-02

## 2019-04-03 RX ORDER — INSULIN ASPART 100 [IU]/ML
0 INJECTION, SOLUTION SUBCUTANEOUS
Qty: 0 | Refills: 0 | COMMUNITY

## 2019-04-03 RX ORDER — INSULIN ASPART 100 [IU]/ML
18 INJECTION, SOLUTION SUBCUTANEOUS
Qty: 1 | Refills: 0
Start: 2019-04-03

## 2019-04-03 RX ORDER — INSULIN GLARGINE 100 [IU]/ML
70 INJECTION, SOLUTION SUBCUTANEOUS
Qty: 0 | Refills: 0 | COMMUNITY

## 2019-04-03 RX ORDER — GABAPENTIN 400 MG/1
1 CAPSULE ORAL
Qty: 0 | Refills: 0 | DISCHARGE
Start: 2019-04-03

## 2019-04-03 RX ORDER — INSULIN GLARGINE 100 [IU]/ML
30 INJECTION, SOLUTION SUBCUTANEOUS AT BEDTIME
Qty: 0 | Refills: 0 | Status: DISCONTINUED | OUTPATIENT
Start: 2019-04-03 | End: 2019-04-04

## 2019-04-03 RX ADMIN — Medication 18 UNIT(S): at 12:26

## 2019-04-03 RX ADMIN — HEPARIN SODIUM 5000 UNIT(S): 5000 INJECTION INTRAVENOUS; SUBCUTANEOUS at 12:27

## 2019-04-03 RX ADMIN — ISOSORBIDE MONONITRATE 30 MILLIGRAM(S): 60 TABLET, EXTENDED RELEASE ORAL at 12:26

## 2019-04-03 RX ADMIN — Medication 0.5 MILLIGRAM(S): at 20:58

## 2019-04-03 RX ADMIN — INSULIN GLARGINE 30 UNIT(S): 100 INJECTION, SOLUTION SUBCUTANEOUS at 21:33

## 2019-04-03 RX ADMIN — Medication 100 MILLIGRAM(S): at 21:32

## 2019-04-03 RX ADMIN — ATORVASTATIN CALCIUM 80 MILLIGRAM(S): 80 TABLET, FILM COATED ORAL at 21:30

## 2019-04-03 RX ADMIN — GABAPENTIN 100 MILLIGRAM(S): 400 CAPSULE ORAL at 12:27

## 2019-04-03 RX ADMIN — Medication 2: at 17:17

## 2019-04-03 RX ADMIN — Medication 100 MILLIGRAM(S): at 12:26

## 2019-04-03 RX ADMIN — HEPARIN SODIUM 5000 UNIT(S): 5000 INJECTION INTRAVENOUS; SUBCUTANEOUS at 05:31

## 2019-04-03 RX ADMIN — PANTOPRAZOLE SODIUM 40 MILLIGRAM(S): 20 TABLET, DELAYED RELEASE ORAL at 05:33

## 2019-04-03 RX ADMIN — Medication 18 UNIT(S): at 17:16

## 2019-04-03 RX ADMIN — Medication 240 MILLIGRAM(S): at 05:33

## 2019-04-03 RX ADMIN — Medication 3 MILLILITER(S): at 20:58

## 2019-04-03 RX ADMIN — Medication 3 MILLILITER(S): at 03:32

## 2019-04-03 RX ADMIN — Medication 30 MILLIGRAM(S): at 05:32

## 2019-04-03 RX ADMIN — Medication 250 MILLIGRAM(S): at 17:17

## 2019-04-03 RX ADMIN — GABAPENTIN 100 MILLIGRAM(S): 400 CAPSULE ORAL at 05:32

## 2019-04-03 RX ADMIN — Medication 4: at 12:26

## 2019-04-03 RX ADMIN — Medication 50 MILLIGRAM(S): at 05:33

## 2019-04-03 RX ADMIN — Medication 250 MILLIGRAM(S): at 05:32

## 2019-04-03 RX ADMIN — Medication 81 MILLIGRAM(S): at 12:26

## 2019-04-03 RX ADMIN — INSULIN GLARGINE 30 UNIT(S): 100 INJECTION, SOLUTION SUBCUTANEOUS at 08:47

## 2019-04-03 RX ADMIN — HEPARIN SODIUM 5000 UNIT(S): 5000 INJECTION INTRAVENOUS; SUBCUTANEOUS at 21:31

## 2019-04-03 RX ADMIN — Medication 0.5 MILLIGRAM(S): at 08:54

## 2019-04-03 RX ADMIN — GABAPENTIN 100 MILLIGRAM(S): 400 CAPSULE ORAL at 21:31

## 2019-04-03 RX ADMIN — Medication 125 MICROGRAM(S): at 05:31

## 2019-04-03 RX ADMIN — Medication 3 MILLILITER(S): at 08:54

## 2019-04-03 NOTE — DIETITIAN INITIAL EVALUATION ADULT. - PERTINENT LABORATORY DATA
04-03 Na143 mmol/L Glu 104 mg/dL K+ 4.4 mmol/L Cr  1.70 mg/dL<H> BUN 85.0 mg/dL<H> Phos n/a   Alb n/a   PAB n/a

## 2019-04-03 NOTE — PROGRESS NOTE ADULT - REASON FOR ADMISSION
Difficulty to breath

## 2019-04-03 NOTE — PROGRESS NOTE ADULT - SUBJECTIVE AND OBJECTIVE BOX
CARDIOLOGY PROGRESS NOTE   ([facility])                                                                                                        Subjective: feels better, denied  CP, alert, awake    Vitals:  T(C): 36.4 (03-30-19 @ 16:19), Max: 36.9 (03-29-19 @ 23:30)  HR: 67 (03-30-19 @ 16:19) (67 - 94)  BP: 107/62 (03-30-19 @ 16:19) (107/62 - 162/85)  RR: 18 (03-30-19 @ 16:19) (18 - 18)  SpO2: 94% (03-30-19 @ 16:19) (93% - 97%)  Wt(kg): --  I&O's Summary        PHYSICAL EXAM:  Appearance: Normal	  HEENT:   Atraumatic  Cardiovascular: Normal S1 S2, No JVD, No murmurs, No edema  Respiratory: Lungs clear to auscultation	  Gastrointestinal:  Soft, Non-tender, + BS	  Skin: No rashes, No ecchymoses, No cyanosis  Neurologic: Alert and awake, able to move extremities  Extremities: + bl ankle  edema    TELEMETRY: 	    SR      CURRENT MEDICATIONS:  diltiazem    milliGRAM(s) Oral daily  furosemide    Tablet 40 milliGRAM(s) Oral daily  isosorbide   mononitrate ER Tablet (IMDUR) 30 milliGRAM(s) Oral daily  lisinopril 5 milliGRAM(s) Oral daily  metoprolol succinate ER 50 milliGRAM(s) Oral daily    levoFLOXacin IVPB 500 milliGRAM(s) IV Intermittent every 24 hours  levoFLOXacin IVPB        ALBUTerol/ipratropium for Nebulization 3 milliLiter(s) Nebulizer every 6 hours  ALBUTerol/ipratropium for Nebulization 3 milliLiter(s) Nebulizer once  benzonatate 100 milliGRAM(s) Oral three times a day PRN  buDESOnide   0.5 milliGRAM(s) Respule 0.5 milliGRAM(s) Inhalation every 12 hours  HYDROcodone/homatropine Syrup 5 milliLiter(s) Oral two times a day PRN  tiotropium 18 MICROgram(s) Capsule 1 Capsule(s) Inhalation daily    gabapentin 100 milliGRAM(s) Oral three times a day    pantoprazole    Tablet 40 milliGRAM(s) Oral before breakfast    atorvastatin 80 milliGRAM(s) Oral at bedtime  dextrose 40% Gel 15 Gram(s) Oral once PRN  dextrose 50% Injectable 12.5 Gram(s) IV Push once  dextrose 50% Injectable 25 Gram(s) IV Push once  dextrose 50% Injectable 25 Gram(s) IV Push once  glucagon  Injectable 1 milliGRAM(s) IntraMuscular once PRN  insulin glargine Injectable (LANTUS) 40 Unit(s) SubCutaneous at bedtime  insulin lispro (HumaLOG) corrective regimen sliding scale   SubCutaneous three times a day before meals  levothyroxine 125 MICROGram(s) Oral daily  methylPREDNISolone sodium succinate Injectable 40 milliGRAM(s) IV Push every 8 hours    aspirin enteric coated 81 milliGRAM(s) Oral daily  dextrose 5%. 1000 milliLiter(s) IV Continuous <Continuous>  enoxaparin Injectable 40 milliGRAM(s) SubCutaneous daily      LABS:	 	                              11.7   15.3  )-----------( 272      ( 30 Mar 2019 05:53 )             37.0     03-30    134<L>  |  95<L>  |  47.0<H>  ----------------------------<  385<H>  5.0   |  22.0  |  1.42<H>    Ca    9.3      30 Mar 2019 05:53  Mg     1.9     03-29    TPro  7.0  /  Alb  3.5  /  TBili  0.3<L>  /  DBili  x   /  AST  11  /  ALT  13  /  AlkPhos  87  03-30    proBNP: Serum Pro-Brain Natriuretic Peptide: 945 pg/mL (03-29 @ 07:56)    Lipid Profile:   HgA1c: Hemoglobin A1C, Whole Blood: 8.9 % (03-30 @ 05:53)    TSH:
CARDIOLOGY PROGRESS NOTE   ([facility])                                                                                                        Subjective: laying in bed flat, nad, denied CP    Vitals:  T(C): 36.1 (03-31-19 @ 11:08), Max: 36.5 (03-30-19 @ 21:46)  HR: 71 (03-31-19 @ 14:23) (67 - 77)  BP: 105/65 (03-31-19 @ 11:08) (105/65 - 130/66)  RR: 16 (03-31-19 @ 11:08) (16 - 20)  SpO2: 99% (03-31-19 @ 14:23) (93% - 99%)  Wt(kg): --  I&O's Summary    30 Mar 2019 07:01  -  31 Mar 2019 07:00  --------------------------------------------------------  IN: 240 mL / OUT: 200 mL / NET: 40 mL    31 Mar 2019 07:01  -  31 Mar 2019 15:16  --------------------------------------------------------  IN: 220 mL / OUT: 0 mL / NET: 220 mL          PHYSICAL EXAM:  Appearance: Normal	  HEENT:   Atraumatic  Cardiovascular: Normal S1 S2, No JVD, No murmurs, No edema  Respiratory: Lungs clear to auscultation	  Gastrointestinal:  Soft, Non-tender, + BS	  Skin: No rashes, No ecchymoses, No cyanosis  Neurologic: Alert and awake, able to move extremities  Extremities: + bl ankle  edema  	      CURRENT MEDICATIONS:  diltiazem    milliGRAM(s) Oral daily  furosemide    Tablet 40 milliGRAM(s) Oral daily  isosorbide   mononitrate ER Tablet (IMDUR) 30 milliGRAM(s) Oral daily  lisinopril 5 milliGRAM(s) Oral daily  metoprolol succinate ER 50 milliGRAM(s) Oral daily      ALBUTerol/ipratropium for Nebulization 3 milliLiter(s) Nebulizer every 6 hours  ALBUTerol/ipratropium for Nebulization 3 milliLiter(s) Nebulizer once  benzonatate 100 milliGRAM(s) Oral three times a day PRN  buDESOnide   0.5 milliGRAM(s) Respule 0.5 milliGRAM(s) Inhalation every 12 hours  HYDROcodone/homatropine Syrup 5 milliLiter(s) Oral two times a day PRN    gabapentin 100 milliGRAM(s) Oral three times a day    pantoprazole    Tablet 40 milliGRAM(s) Oral before breakfast    atorvastatin 80 milliGRAM(s) Oral at bedtime  dextrose 40% Gel 15 Gram(s) Oral once PRN  dextrose 50% Injectable 12.5 Gram(s) IV Push once  dextrose 50% Injectable 25 Gram(s) IV Push once  dextrose 50% Injectable 25 Gram(s) IV Push once  glucagon  Injectable 1 milliGRAM(s) IntraMuscular once PRN  insulin glargine Injectable (LANTUS) 40 Unit(s) SubCutaneous at bedtime  insulin lispro (HumaLOG) corrective regimen sliding scale   SubCutaneous three times a day before meals  insulin lispro Injectable (HumaLOG) 10 Unit(s) SubCutaneous three times a day before meals  levothyroxine 125 MICROGram(s) Oral daily  methylPREDNISolone sodium succinate Injectable 40 milliGRAM(s) IV Push every 12 hours    aspirin enteric coated 81 milliGRAM(s) Oral daily  dextrose 5%. 1000 milliLiter(s) IV Continuous <Continuous>  enoxaparin Injectable 40 milliGRAM(s) SubCutaneous daily      LABS:	 	                              10.7   17.8  )-----------( 265      ( 31 Mar 2019 05:39 )             34.1     03-31    x   |  x   |  x   ----------------------------<  487<H>  x    |  x   |  x     Ca    8.4<L>      31 Mar 2019 05:39    TPro  7.0  /  Alb  3.5  /  TBili  0.3<L>  /  DBili  x   /  AST  11  /  ALT  13  /  AlkPhos  87  03-30    proBNP: Serum Pro-Brain Natriuretic Peptide: 945 pg/mL (03-29 @ 07:56)
CHIEF COMPLAINT:Patient is a 84y old  Female who presents with a chief complaint of Difficulty to breath (01 Apr 2019 14:37)    INTERVAL HISTORY:  pt see nand examined with copd exacerbation. no cp. cough is imrpoved  worsening renal function,   diuretics  on hold     Allergies  iodine (Hives)  iodine containing compounds (Unknown)  shellfish (Anaphylaxis)  shellfish (Swelling; Short breath)    MEDICATIONS:  diltiazem    milliGRAM(s) Oral daily  isosorbide   mononitrate ER Tablet (IMDUR) 30 milliGRAM(s) Oral daily  metoprolol succinate ER 50 milliGRAM(s) Oral daily  levoFLOXacin  Tablet 250 milliGRAM(s) Oral every 24 hours  ALBUTerol/ipratropium for Nebulization 3 milliLiter(s) Nebulizer every 6 hours  ALBUTerol/ipratropium for Nebulization 3 milliLiter(s) Nebulizer once  benzonatate 100 milliGRAM(s) Oral three times a day PRN  buDESOnide   0.5 milliGRAM(s) Respule 0.5 milliGRAM(s) Inhalation every 12 hours  HYDROcodone/homatropine Syrup 5 milliLiter(s) Oral two times a day PRN  gabapentin 100 milliGRAM(s) Oral three times a day  pantoprazole    Tablet 40 milliGRAM(s) Oral before breakfast  atorvastatin 80 milliGRAM(s) Oral at bedtime  dextrose 40% Gel 15 Gram(s) Oral once PRN  dextrose 50% Injectable 12.5 Gram(s) IV Push once  dextrose 50% Injectable 25 Gram(s) IV Push once  dextrose 50% Injectable 25 Gram(s) IV Push once  glucagon  Injectable 1 milliGRAM(s) IntraMuscular once PRN  insulin glargine Injectable (LANTUS) 40 Unit(s) SubCutaneous at bedtime  insulin glargine Injectable (LANTUS) 30 Unit(s) SubCutaneous every morning  insulin lispro (HumaLOG) corrective regimen sliding scale   SubCutaneous three times a day before meals  insulin lispro Injectable (HumaLOG) 13 Unit(s) SubCutaneous three times a day before meals  levothyroxine 125 MICROGram(s) Oral daily  predniSONE   Tablet 40 milliGRAM(s) Oral daily  aspirin enteric coated 81 milliGRAM(s) Oral daily  dextrose 5%. 1000 milliLiter(s) IV Continuous <Continuous>  heparin  Injectable 5000 Unit(s) SubCutaneous every 8 hours    PHYSICAL EXAM:  T(C): 36.7 (04-01-19 @ 17:11), Max: 36.7 (04-01-19 @ 17:11)  HR: 67 (04-01-19 @ 20:04) (61 - 84)  BP: 132/68 (04-01-19 @ 17:11) (96/56 - 132/68)  RR: 18 (04-01-19 @ 17:17) (18 - 20)  SpO2: 97% (04-01-19 @ 20:04) (91% - 99%)  31 Mar 2019 07:01  -  01 Apr 2019 07:00  --------------------------------------------------------  IN: 340 mL / OUT: 0 mL / NET: 340 mL    01 Apr 2019 07:01  -  01 Apr 2019 21:42  --------------------------------------------------------  IN: 480 mL / OUT: 0 mL / NET: 480 mL    Appearance: Normal	  HEENT:   NC/AT  Eye: Pink Conjunctiva  Lungs: ronchi at bases   CVS: RRR, Normal S1 and S2, No Edema  Pulses: Normal distal pulses.  Neuro: A&O x3    LABS:	 	                       10.5   15.4  )-----------( 264      ( 01 Apr 2019 06:59 )             33.5     04-01    135  |  97<L>  |  86.0<H>  ----------------------------<  384<H>  4.5   |  23.0  |  1.88<H>    Ca    8.3<L>      01 Apr 2019 06:59  Mg     2.2     04-01    ASSESSMENT/PLAN: 	    1. COPD exacerbation on medical therapy doing better  2. Agree with holding diuretics until cr stabilizes  3. cont with home meds otherwise.   There is no active cardiac issues otherwise  please call if needed.
Chronic obstructive pulmonary disease with acute exacerbation    HPI:  83 y/o female with h/o COPD, CHF, CAD, HTN, DM II, hypothyroidism, was brought in to the ER because of difficulty to breath and Hypoxemia. difficulty to breath started 2 days ado and was associated with runny nose and congestion. no fever. dry irritative cough. orthopnea ankle edema B/L. no chest pain or palpitations. . no sick contacts. WBC: 15 400. (29 Mar 2019 13:10)    Interval History:  Patient was seen and examined at bedside around 8 am. Feeling very weak and tired. SOB is slightly better. Complaining of musculoskeletal chest pain secondary to coughing.   Denies palpitations, headache, dizziness, visual symptoms, nausea, vomiting or abdominal pain.  Blood glucose has been high - was given extra dose of Humalog and Lantus overnight and solumedrol was decreased to 40 mg q 12 hours.     ROS:  As per interval history otherwise unremarkable.    PHYSICAL EXAM:  Vital Signs   T(C): 36.1 (31 Mar 2019 11:08), Max: 36.5 (30 Mar 2019 21:46)  T(F): 96.9 (31 Mar 2019 11:08), Max: 97.7 (30 Mar 2019 21:46)  HR: 71 (31 Mar 2019 14:23) (67 - 77)  BP: 105/65 (31 Mar 2019 11:08) (105/65 - 130/66)  RR: 16 (31 Mar 2019 11:08) (16 - 20)  SpO2: 99% (31 Mar 2019 14:23) (93% - 99%)  General: Well developed. Well nourished. Mild respiratory distress while talking.   HEENT: PERRLA. EOMI. Clear conjunctivae. Moist mucus membrane  Neck: Supple.    Chest: Decreased air entry at bases. No wheezing, rales or rhonchi. No chest wall tenderness.  Heart: Normal S1 & S2. RRR.   Abdomen: Soft. Non-tender. Non-distended. + BS  Ext: 1+ pedal edema. No calf tenderness   Neuro: AAO x 3. No focal deficit. No speech disorder  Skin: Warm and Dry  Psychiatry: Normal mood and affect    I&O's Summary    30 Mar 2019 07:01  -  31 Mar 2019 07:00  --------------------------------------------------------  IN: 240 mL / OUT: 200 mL / NET: 40 mL    31 Mar 2019 07:  -  31 Mar 2019 14:25  --------------------------------------------------------  IN: 220 mL / OUT: 0 mL / NET: 220 mL    MEDICATIONS  (STANDING):  ALBUTerol/ipratropium for Nebulization 3 milliLiter(s) Nebulizer every 6 hours  ALBUTerol/ipratropium for Nebulization 3 milliLiter(s) Nebulizer once  aspirin enteric coated 81 milliGRAM(s) Oral daily  atorvastatin 80 milliGRAM(s) Oral at bedtime  buDESOnide   0.5 milliGRAM(s) Respule 0.5 milliGRAM(s) Inhalation every 12 hours  dextrose 5%. 1000 milliLiter(s) (50 mL/Hr) IV Continuous <Continuous>  dextrose 50% Injectable 12.5 Gram(s) IV Push once  dextrose 50% Injectable 25 Gram(s) IV Push once  dextrose 50% Injectable 25 Gram(s) IV Push once  diltiazem    milliGRAM(s) Oral daily  enoxaparin Injectable 40 milliGRAM(s) SubCutaneous daily  furosemide    Tablet 40 milliGRAM(s) Oral daily  gabapentin 100 milliGRAM(s) Oral three times a day  insulin glargine Injectable (LANTUS) 40 Unit(s) SubCutaneous at bedtime  insulin lispro (HumaLOG) corrective regimen sliding scale   SubCutaneous three times a day before meals  insulin lispro Injectable (HumaLOG) 10 Unit(s) SubCutaneous three times a day before meals  isosorbide   mononitrate ER Tablet (IMDUR) 30 milliGRAM(s) Oral daily  levoFLOXacin IVPB 500 milliGRAM(s) IV Intermittent every 24 hours  levoFLOXacin IVPB      levothyroxine 125 MICROGram(s) Oral daily  lisinopril 5 milliGRAM(s) Oral daily  methylPREDNISolone sodium succinate Injectable 40 milliGRAM(s) IV Push every 12 hours  metoprolol succinate ER 50 milliGRAM(s) Oral daily  pantoprazole    Tablet 40 milliGRAM(s) Oral before breakfast  saccharomyces boulardii 250 milliGRAM(s) Oral two times a day  tiotropium 18 MICROgram(s) Capsule 1 Capsule(s) Inhalation daily    MEDICATIONS  (PRN):  benzonatate 100 milliGRAM(s) Oral three times a day PRN Cough  dextrose 40% Gel 15 Gram(s) Oral once PRN Blood Glucose LESS THAN 70 milliGRAM(s)/deciliter  glucagon  Injectable 1 milliGRAM(s) IntraMuscular once PRN Glucose LESS THAN 70 milligrams/deciliter  HYDROcodone/homatropine Syrup 5 milliLiter(s) Oral two times a day PRN Cough    LABS:  CAPILLARY BLOOD GLUCOSE  POCT Blood Glucose.: 460 mg/dL (31 Mar 2019 12:14)  POCT Blood Glucose.: 469 mg/dL (31 Mar 2019 12:12)  POCT Blood Glucose.: 445 mg/dL (31 Mar 2019 08:01)  POCT Blood Glucose.: 455 mg/dL (31 Mar 2019 08:00)  POCT Blood Glucose.: 461 mg/dL (31 Mar 2019 03:48)  POCT Blood Glucose.: 457 mg/dL (31 Mar 2019 03:47)  POCT Blood Glucose.: 501 mg/dL (31 Mar 2019 01:46)  POCT Blood Glucose.: 502 mg/dL (31 Mar 2019 00:08)  POCT Blood Glucose.: 501 mg/dL (31 Mar 2019 00:05)  POCT Blood Glucose.: 465 mg/dL (30 Mar 2019 22:00)  POCT Blood Glucose.: 489 mg/dL (30 Mar 2019 21:58)  POCT Blood Glucose.: 455 mg/dL (30 Mar 2019 16:51)                          10.7   17.8  )-----------( 265      ( 31 Mar 2019 05:39 )             34.1         x   |  x   |  x   ----------------------------<  487<H>  x    |  x   |  x     Ca    8.4<L>      31 Mar 2019 05:39    TPro  7.0  /  Alb  3.5  /  TBili  0.3<L>  /  DBili  x   /  AST  11  /  ALT  13  /  AlkPhos  87        Urinalysis Basic - ( 31 Mar 2019 04:28 )    Color: Yellow / Appearance: Clear / S.020 / pH: x  Gluc: x / Ketone: Negative  / Bili: Negative / Urobili: Negative mg/dL   Blood: x / Protein: 15 mg/dL / Nitrite: Negative   Leuk Esterase: Negative / RBC: 0-2 /HPF / WBC 0-2   Sq Epi: x / Non Sq Epi: Few / Bacteria: Negative    RADIOLOGY & ADDITIONAL STUDIES:  Reviewed
Chronic obstructive pulmonary disease with acute exacerbation    HPI:  83 y/o female with h/o COPD, CHF, CAD, HTN, DM II, hypothyroidism, was brought in to the ER because of difficulty to breath and Hypoxemia. difficulty to breath started 2 days ado and was associated with runny nose and congestion. no fever. dry irritative cough. orthopnea ankle edema B/L. no chest pain or palpitations. . no sick contacts. WBC: 15 400. (29 Mar 2019 13:10)    Interval History:  Patient was seen and examined at bedside around 8:30 am. SOB is slowing improving. Continues to complain about musculoskeletal chest pain secondary to coughing.   Denies palpitations, headache, dizziness, visual symptoms, nausea, vomiting or abdominal pain.    ROS:  As per interval history otherwise unremarkable.    PHYSICAL EXAM:  Vital Signs   T(C): 36.3 (01 Apr 2019 11:53), Max: 36.7 (31 Mar 2019 16:40)  T(F): 97.3 (01 Apr 2019 11:53), Max: 98 (31 Mar 2019 16:40)  HR: 69 (01 Apr 2019 11:53) (61 - 88)  BP: 96/56 (01 Apr 2019 11:53) (96/56 - 124/65)  RR: 19 (01 Apr 2019 11:53) (19 - 20)  SpO2: 96% (01 Apr 2019 14:06) (92% - 99%)  General: Well developed. Well nourished. Mild respiratory distress while talking.   HEENT: PERRLA. EOMI. Clear conjunctivae. Moist mucus membrane  Neck: Supple.    Chest: Decreased air entry at bases. No wheezing, rales or rhonchi. No chest wall tenderness.  Heart: Normal S1 & S2. RRR.   Abdomen: Soft. Non-tender. Non-distended. + BS  Ext: 1+ pedal edema. No calf tenderness   Neuro: AAO x 3. No focal deficit. No speech disorder  Skin: Warm and Dry  Psychiatry: Normal mood and affect    I&O's Summary    30 Mar 2019 07:01  -  31 Mar 2019 07:00  --------------------------------------------------------  IN: 240 mL / OUT: 200 mL / NET: 40 mL    31 Mar 2019 07:01  -  31 Mar 2019 14:25  --------------------------------------------------------  IN: 220 mL / OUT: 0 mL / NET: 220 mL    MEDICATIONS  (STANDING):  ALBUTerol/ipratropium for Nebulization 3 milliLiter(s) Nebulizer every 6 hours  ALBUTerol/ipratropium for Nebulization 3 milliLiter(s) Nebulizer once  aspirin enteric coated 81 milliGRAM(s) Oral daily  atorvastatin 80 milliGRAM(s) Oral at bedtime  buDESOnide   0.5 milliGRAM(s) Respule 0.5 milliGRAM(s) Inhalation every 12 hours  dextrose 5%. 1000 milliLiter(s) (50 mL/Hr) IV Continuous <Continuous>  dextrose 50% Injectable 12.5 Gram(s) IV Push once  dextrose 50% Injectable 25 Gram(s) IV Push once  dextrose 50% Injectable 25 Gram(s) IV Push once  diltiazem    milliGRAM(s) Oral daily  enoxaparin Injectable 40 milliGRAM(s) SubCutaneous daily  furosemide    Tablet 40 milliGRAM(s) Oral daily  gabapentin 100 milliGRAM(s) Oral three times a day  insulin glargine Injectable (LANTUS) 40 Unit(s) SubCutaneous at bedtime  insulin glargine Injectable (LANTUS) 30 Unit(s) SubCutaneous every morning  insulin lispro (HumaLOG) corrective regimen sliding scale   SubCutaneous three times a day before meals  insulin lispro Injectable (HumaLOG) 13 Unit(s) SubCutaneous three times a day before meals  isosorbide   mononitrate ER Tablet (IMDUR) 30 milliGRAM(s) Oral daily  levoFLOXacin  Tablet 250 milliGRAM(s) Oral every 24 hours  levothyroxine 125 MICROGram(s) Oral daily  lisinopril 5 milliGRAM(s) Oral daily  metoprolol succinate ER 50 milliGRAM(s) Oral daily  pantoprazole    Tablet 40 milliGRAM(s) Oral before breakfast  predniSONE   Tablet 40 milliGRAM(s) Oral daily  saccharomyces boulardii 250 milliGRAM(s) Oral two times a day    MEDICATIONS  (PRN):  benzonatate 100 milliGRAM(s) Oral three times a day PRN Cough  dextrose 40% Gel 15 Gram(s) Oral once PRN Blood Glucose LESS THAN 70 milliGRAM(s)/deciliter  glucagon  Injectable 1 milliGRAM(s) IntraMuscular once PRN Glucose LESS THAN 70 milligrams/deciliter  HYDROcodone/homatropine Syrup 5 milliLiter(s) Oral two times a day PRN Cough    LABS:  CAPILLARY BLOOD GLUCOSE  POCT Blood Glucose.: 435 mg/dL (01 Apr 2019 11:52)  POCT Blood Glucose.: 368 mg/dL (01 Apr 2019 07:59)  POCT Blood Glucose.: 405 mg/dL (31 Mar 2019 21:14)  POCT Blood Glucose.: 484 mg/dL (31 Mar 2019 17:00)  POCT Blood Glucose.: 453 mg/dL (31 Mar 2019 16:58)                        10.5   15.4  )-----------( 264      ( 01 Apr 2019 06:59 )             33.5     04-01    135  |  97<L>  |  86.0<H>  ----------------------------<  384<H>  4.5   |  23.0  |  1.88<H>    Ca    8.3<L>      01 Apr 2019 06:59  Mg     2.2     04-01    Blood Culture: 03-29 @ 20:41  Organism --  Gram Stain Blood -- Gram Stain --  Specimen Source .Urine  Culture-Blood --    03-29 @ 14:28  Organism --  Gram Stain Blood -- Gram Stain --  Specimen Source .Blood  Culture-Blood --  RADIOLOGY & ADDITIONAL STUDIES:  Reviewed
Chronic obstructive pulmonary disease with acute exacerbation    HPI:  85 y/o female with h/o COPD, CHF, CAD, HTN, DM II, hypothyroidism, was brought in to the ER because of difficulty to breath and Hypoxemia. difficulty to breath started 2 days ado and was associated with runny nose and congestion. no fever. dry irritative cough. orthopnea ankle edema B/L. no chest pain or palpitations. . no sick contacts. WBC: 15 400. (29 Mar 2019 13:10)    Interval History:  Patient was seen and examined at bedside around 8:15 am. Slowly improving. SOB and cough are better today.     Denies palpitations, headache, dizziness, visual symptoms, nausea, vomiting or abdominal pain.    PHYSICAL EXAM:  Vital Signs   T(C): 36.4 (02 Apr 2019 17:10), Max: 36.7 (02 Apr 2019 04:23)  T(F): 97.6 (02 Apr 2019 17:10), Max: 98 (02 Apr 2019 04:23)  HR: 69 (02 Apr 2019 17:10) (62 - 81)  BP: 107/54 (02 Apr 2019 17:10) (94/48 - 154/71)  RR: 18 (02 Apr 2019 17:10) (18 - 18)  SpO2: 96% (02 Apr 2019 17:10) (93% - 97%)    General: Well developed. Well nourished. Mild respiratory distress while talking.   HEENT: PERRLA. EOMI. Clear conjunctivae. Moist mucus membrane  Neck: Supple.    Chest: Good air entry bilaterally. No wheezing, rales or rhonchi. No chest wall tenderness.  Heart: Normal S1 & S2. RRR.   Abdomen: Soft. Non-tender. Non-distended. + BS  Ext: 1+ pedal edema. No calf tenderness   Neuro: AAO x 3. No focal deficit. No speech disorder  Skin: Warm and Dry  Psychiatry: Normal mood and affect    MEDICATIONS  (STANDING):  ALBUTerol/ipratropium for Nebulization 3 milliLiter(s) Nebulizer every 6 hours  ALBUTerol/ipratropium for Nebulization 3 milliLiter(s) Nebulizer once  aspirin enteric coated 81 milliGRAM(s) Oral daily  atorvastatin 80 milliGRAM(s) Oral at bedtime  buDESOnide   0.5 milliGRAM(s) Respule 0.5 milliGRAM(s) Inhalation every 12 hours  dextrose 5%. 1000 milliLiter(s) (50 mL/Hr) IV Continuous <Continuous>  dextrose 50% Injectable 12.5 Gram(s) IV Push once  dextrose 50% Injectable 25 Gram(s) IV Push once  dextrose 50% Injectable 25 Gram(s) IV Push once  diltiazem    milliGRAM(s) Oral daily  gabapentin 100 milliGRAM(s) Oral three times a day  heparin  Injectable 5000 Unit(s) SubCutaneous every 8 hours  insulin glargine Injectable (LANTUS) 40 Unit(s) SubCutaneous at bedtime  insulin glargine Injectable (LANTUS) 30 Unit(s) SubCutaneous every morning  insulin lispro (HumaLOG) corrective regimen sliding scale   SubCutaneous three times a day before meals  insulin lispro Injectable (HumaLOG) 18 Unit(s) SubCutaneous three times a day before meals  isosorbide   mononitrate ER Tablet (IMDUR) 30 milliGRAM(s) Oral daily  levothyroxine 125 MICROGram(s) Oral daily  metoprolol succinate ER 50 milliGRAM(s) Oral daily  pantoprazole    Tablet 40 milliGRAM(s) Oral before breakfast  saccharomyces boulardii 250 milliGRAM(s) Oral two times a day    MEDICATIONS  (PRN):  benzonatate 100 milliGRAM(s) Oral three times a day PRN Cough  dextrose 40% Gel 15 Gram(s) Oral once PRN Blood Glucose LESS THAN 70 milliGRAM(s)/deciliter  glucagon  Injectable 1 milliGRAM(s) IntraMuscular once PRN Glucose LESS THAN 70 milligrams/deciliter  HYDROcodone/homatropine Syrup 5 milliLiter(s) Oral two times a day PRN Cough    LABS:    Blood Culture: 03-29 @ 20:41  Organism --  Gram Stain Blood -- Gram Stain --  Specimen Source .Urine  Culture-Blood --    03-29 @ 14:28  Organism --  Gram Stain Blood -- Gram Stain --  Specimen Source .Blood  Culture-Blood --
FLOYD PAEZ    [id number]    84y      Female    Patient is a 84y old  Female who presents with a chief complaint of Difficulty to breath (30 Mar 2019 16:47)      INTERVAL HPI/OVERNIGHT EVENTS:    Patient is feeling improvement of SOB, denies fever, chills, chest pain, nausea, vomiting, dizziness    REVIEW OF SYSTEMS:    CONSTITUTIONAL: No fever, some fatigue  RESPIRATORY: No cough, improving shortness of breath  CARDIOVASCULAR: No chest pain, palpitations  GASTROINTESTINAL: No abdominal, No nausea, vomiting  NEUROLOGICAL: No headaches,  loss of strength.  MISCELLANEOUS: No joint swelling or pain       Vital Signs Last 24 Hrs  T(C): 36.4 (30 Mar 2019 16:19), Max: 36.9 (29 Mar 2019 23:30)  T(F): 97.5 (30 Mar 2019 16:19), Max: 98.5 (29 Mar 2019 23:30)  HR: 67 (30 Mar 2019 16:19) (67 - 94)  BP: 107/62 (30 Mar 2019 16:19) (107/62 - 162/85)  RR: 18 (30 Mar 2019 16:19) (18 - 18)  SpO2: 94% (30 Mar 2019 16:19) (93% - 97%)    PHYSICAL EXAM:    GENERAL: Elderly female looking comfortable  HEENT: PERRL, +EOMI  NECK: soft, Supple, No JVD,   CHEST/LUNG: Decrease air entry bilaterally; No wheezing  HEART: S1S2+, Regular rate and rhythm; No murmurs  ABDOMEN: Soft, Nontender, Nondistended; Bowel sounds present  EXTREMITIES:  1+ Peripheral Pulses, No edema  SKIN: No rashes or lesions  NEURO: AAOX3, no focal deficits, no motor r sensory loss  PSYCH: normal mood      LABS:                        11.7   15.3  )-----------( 272      ( 30 Mar 2019 05:53 )             37.0     03-30    134<L>  |  95<L>  |  47.0<H>  ----------------------------<  385<H>  5.0   |  22.0  |  1.42<H>    Ca    9.3      30 Mar 2019 05:53  Mg     1.9     03-29    TPro  7.0  /  Alb  3.5  /  TBili  0.3<L>  /  DBili  x   /  AST  11  /  ALT  13  /  AlkPhos  87  03-30    PT/INR - ( 29 Mar 2019 07:56 )   PT: 12.0 sec;   INR: 1.04 ratio         PTT - ( 29 Mar 2019 07:56 )  PTT:30.0 sec  Urinalysis Basic - ( 29 Mar 2019 20:41 )    Color: Yellow / Appearance: Clear / S.010 / pH: x  Gluc: x / Ketone: Negative  / Bili: Negative / Urobili: Negative mg/dL   Blood: x / Protein: 30 mg/dL / Nitrite: Negative   Leuk Esterase: Negative / RBC: 3-5 /HPF / WBC 0-2   Sq Epi: x / Non Sq Epi: Few / Bacteria: x          I&O's Summary      MEDICATIONS  (STANDING):  ALBUTerol/ipratropium for Nebulization 3 milliLiter(s) Nebulizer every 6 hours  ALBUTerol/ipratropium for Nebulization 3 milliLiter(s) Nebulizer once  aspirin enteric coated 81 milliGRAM(s) Oral daily  atorvastatin 80 milliGRAM(s) Oral at bedtime  buDESOnide   0.5 milliGRAM(s) Respule 0.5 milliGRAM(s) Inhalation every 12 hours  dextrose 5%. 1000 milliLiter(s) (50 mL/Hr) IV Continuous <Continuous>  dextrose 50% Injectable 12.5 Gram(s) IV Push once  dextrose 50% Injectable 25 Gram(s) IV Push once  dextrose 50% Injectable 25 Gram(s) IV Push once  diltiazem    milliGRAM(s) Oral daily  enoxaparin Injectable 40 milliGRAM(s) SubCutaneous daily  furosemide    Tablet 40 milliGRAM(s) Oral daily  gabapentin 100 milliGRAM(s) Oral three times a day  insulin glargine Injectable (LANTUS) 40 Unit(s) SubCutaneous at bedtime  insulin lispro (HumaLOG) corrective regimen sliding scale   SubCutaneous three times a day before meals  isosorbide   mononitrate ER Tablet (IMDUR) 30 milliGRAM(s) Oral daily  levoFLOXacin IVPB 500 milliGRAM(s) IV Intermittent every 24 hours  levoFLOXacin IVPB      levothyroxine 125 MICROGram(s) Oral daily  lisinopril 5 milliGRAM(s) Oral daily  methylPREDNISolone sodium succinate Injectable 40 milliGRAM(s) IV Push every 8 hours  metoprolol succinate ER 50 milliGRAM(s) Oral daily  pantoprazole    Tablet 40 milliGRAM(s) Oral before breakfast  tiotropium 18 MICROgram(s) Capsule 1 Capsule(s) Inhalation daily    MEDICATIONS  (PRN):  benzonatate 100 milliGRAM(s) Oral three times a day PRN Cough  dextrose 40% Gel 15 Gram(s) Oral once PRN Blood Glucose LESS THAN 70 milliGRAM(s)/deciliter  glucagon  Injectable 1 milliGRAM(s) IntraMuscular once PRN Glucose LESS THAN 70 milligrams/deciliter  HYDROcodone/homatropine Syrup 5 milliLiter(s) Oral two times a day PRN Cough
INTERVAL HPI/OVERNIGHT EVENTS:  follow up T2Dm   BS remain elevated   MEDICATIONS  (STANDING):  ALBUTerol/ipratropium for Nebulization 3 milliLiter(s) Nebulizer every 6 hours  ALBUTerol/ipratropium for Nebulization 3 milliLiter(s) Nebulizer once  aspirin enteric coated 81 milliGRAM(s) Oral daily  atorvastatin 80 milliGRAM(s) Oral at bedtime  buDESOnide   0.5 milliGRAM(s) Respule 0.5 milliGRAM(s) Inhalation every 12 hours  dextrose 5%. 1000 milliLiter(s) (50 mL/Hr) IV Continuous <Continuous>  dextrose 50% Injectable 12.5 Gram(s) IV Push once  dextrose 50% Injectable 25 Gram(s) IV Push once  dextrose 50% Injectable 25 Gram(s) IV Push once  diltiazem    milliGRAM(s) Oral daily  gabapentin 100 milliGRAM(s) Oral three times a day  heparin  Injectable 5000 Unit(s) SubCutaneous every 8 hours  insulin glargine Injectable (LANTUS) 40 Unit(s) SubCutaneous at bedtime  insulin glargine Injectable (LANTUS) 10 Unit(s) SubCutaneous once  insulin glargine Injectable (LANTUS) 30 Unit(s) SubCutaneous every morning  insulin lispro (HumaLOG) corrective regimen sliding scale   SubCutaneous three times a day before meals  insulin lispro Injectable (HumaLOG) 18 Unit(s) SubCutaneous three times a day before meals  isosorbide   mononitrate ER Tablet (IMDUR) 30 milliGRAM(s) Oral daily  levothyroxine 125 MICROGram(s) Oral daily  metoprolol succinate ER 50 milliGRAM(s) Oral daily  pantoprazole    Tablet 40 milliGRAM(s) Oral before breakfast  saccharomyces boulardii 250 milliGRAM(s) Oral two times a day    MEDICATIONS  (PRN):  benzonatate 100 milliGRAM(s) Oral three times a day PRN Cough  dextrose 40% Gel 15 Gram(s) Oral once PRN Blood Glucose LESS THAN 70 milliGRAM(s)/deciliter  glucagon  Injectable 1 milliGRAM(s) IntraMuscular once PRN Glucose LESS THAN 70 milligrams/deciliter  HYDROcodone/homatropine Syrup 5 milliLiter(s) Oral two times a day PRN Cough      Allergies    iodine (Hives)  iodine containing compounds (Unknown)  shellfish (Anaphylaxis)  shellfish (Swelling; Short breath)    Intolerances        Review of systems:    Vital Signs Last 24 Hrs  T(C): 36.4 (02 Apr 2019 11:03), Max: 36.7 (01 Apr 2019 17:11)  T(F): 97.5 (02 Apr 2019 11:03), Max: 98.1 (01 Apr 2019 17:11)  HR: 79 (02 Apr 2019 14:56) (62 - 81)  BP: 102/84 (02 Apr 2019 12:48) (94/48 - 154/71)  BP(mean): --  RR: 18 (02 Apr 2019 11:03) (18 - 18)  SpO2: 93% (02 Apr 2019 14:56) (91% - 97%)    PHYSICAL EXAM:      Constitutional: NAD, well-groomed, well-developed  HEENT: PERRLA, EOMI, no exophalmos  Neck: No LAD, No JVD, trachea midline, no thyroid enlargement  Back: Normal spine flexure, No CVA tenderness  Respiratory: CTAB  Cardiovascular: S1 and S2, RRR, no M/G/R  Gastrointestinal: BS+, soft, no organomeglag or mass  Extremities: No peripheral edema, no pedal lesions  Vascular: 2+ peripheral pulses  Neurological: A/O x 3, no focal deficits  Psychiatric: Normal mood, normal affect  Musculoskeletal: 5/5 strength b/l upper and lower extremities  Skin: No rashes, no acanthosis        LABS:                        10.7   14.4  )-----------( 254      ( 02 Apr 2019 07:05 )             33.5     04-02    137  |  98  |  91.0<H>  ----------------------------<  349<H>  4.4   |  24.0  |  1.84<H>    Ca    8.2<L>      02 Apr 2019 07:05  Phos  4.1     04-02  Mg     2.4     04-02            CAPILLARY BLOOD GLUCOSE  CAPILLARY BLOOD GLUCOSE      POCT Blood Glucose.: 422 mg/dL (02 Apr 2019 12:00)  POCT Blood Glucose.: 346 mg/dL (02 Apr 2019 08:16)  POCT Blood Glucose.: 271 mg/dL (01 Apr 2019 21:26)  POCT Blood Glucose.: 344 mg/dL (01 Apr 2019 16:40)      RADIOLOGY & ADDITIONAL TESTS:
INTERVAL HPI/OVERNIGHT EVENTS:  follow up t2DM     BS been runnign high  but pt received LAntus 30 units this Am for first time   MEDICATIONS  (STANDING):  ALBUTerol/ipratropium for Nebulization 3 milliLiter(s) Nebulizer every 6 hours  ALBUTerol/ipratropium for Nebulization 3 milliLiter(s) Nebulizer once  aspirin enteric coated 81 milliGRAM(s) Oral daily  atorvastatin 80 milliGRAM(s) Oral at bedtime  buDESOnide   0.5 milliGRAM(s) Respule 0.5 milliGRAM(s) Inhalation every 12 hours  dextrose 5%. 1000 milliLiter(s) (50 mL/Hr) IV Continuous <Continuous>  dextrose 50% Injectable 12.5 Gram(s) IV Push once  dextrose 50% Injectable 25 Gram(s) IV Push once  dextrose 50% Injectable 25 Gram(s) IV Push once  diltiazem    milliGRAM(s) Oral daily  gabapentin 100 milliGRAM(s) Oral three times a day  heparin  Injectable 5000 Unit(s) SubCutaneous every 8 hours  insulin glargine Injectable (LANTUS) 40 Unit(s) SubCutaneous at bedtime  insulin glargine Injectable (LANTUS) 30 Unit(s) SubCutaneous every morning  insulin lispro (HumaLOG) corrective regimen sliding scale   SubCutaneous three times a day before meals  insulin lispro Injectable (HumaLOG) 13 Unit(s) SubCutaneous three times a day before meals  isosorbide   mononitrate ER Tablet (IMDUR) 30 milliGRAM(s) Oral daily  levoFLOXacin  Tablet 250 milliGRAM(s) Oral every 24 hours  levothyroxine 125 MICROGram(s) Oral daily  metoprolol succinate ER 50 milliGRAM(s) Oral daily  pantoprazole    Tablet 40 milliGRAM(s) Oral before breakfast  predniSONE   Tablet 40 milliGRAM(s) Oral daily  saccharomyces boulardii 250 milliGRAM(s) Oral two times a day    MEDICATIONS  (PRN):  benzonatate 100 milliGRAM(s) Oral three times a day PRN Cough  dextrose 40% Gel 15 Gram(s) Oral once PRN Blood Glucose LESS THAN 70 milliGRAM(s)/deciliter  glucagon  Injectable 1 milliGRAM(s) IntraMuscular once PRN Glucose LESS THAN 70 milligrams/deciliter  HYDROcodone/homatropine Syrup 5 milliLiter(s) Oral two times a day PRN Cough      Allergies    iodine (Hives)  iodine containing compounds (Unknown)  shellfish (Anaphylaxis)  shellfish (Swelling; Short breath)    Intolerances        Review of systems:    Vital Signs Last 24 Hrs  T(C): 36.7 (2019 17:11), Max: 36.7 (2019 17:11)  T(F): 98.1 (2019 17:11), Max: 98.1 (2019 17:11)  HR: 67 (2019 20:04) (61 - 84)  BP: 132/68 (2019 17:11) (96/56 - 132/68)  BP(mean): --  RR: 18 (2019 17:17) (18 - 20)  SpO2: 97% (2019 20:04) (91% - 99%)    PHYSICAL EXAM:      Constitutional: NAD, well-groomed, well-developed  HEENT: PERRLA, EOMI, no exophalmos  Respiratory: CTAB  Cardiovascular: S1 and S2, RRR, no M/G/R  Gastrointestinal: BS+, soft, no organomeglag or mass  Extremities: No peripheral edema, no pedal lesions  Neurological: A/O x 3, no focal deficits  Psychiatric: Normal mood, normal affect  Musculoskeletal: 5/5 strength b/l upper and lower extremities  Skin: No rashes, no acanthosis        LABS:                        10.5   15.4  )-----------( 264      ( 2019 06:59 )             33.5     04-01    135  |  97<L>  |  86.0<H>  ----------------------------<  384<H>  4.5   |  23.0  |  1.88<H>    Ca    8.3<L>      2019 06:59  Mg     2.2     04-01      Urinalysis Basic - ( 31 Mar 2019 04:28 )    Color: Yellow / Appearance: Clear / S.020 / pH: x  Gluc: x / Ketone: Negative  / Bili: Negative / Urobili: Negative mg/dL   Blood: x / Protein: 15 mg/dL / Nitrite: Negative   Leuk Esterase: Negative / RBC: 0-2 /HPF / WBC 0-2   Sq Epi: x / Non Sq Epi: Few / Bacteria: Negative          CAPILLARY BLOOD GLUCOSE  CAPILLARY BLOOD GLUCOSE      POCT Blood Glucose.: 271 mg/dL (2019 21:26)  POCT Blood Glucose.: 344 mg/dL (2019 16:40)  POCT Blood Glucose.: 435 mg/dL (2019 11:52)  POCT Blood Glucose.: 368 mg/dL (2019 07:59)      RADIOLOGY & ADDITIONAL TESTS:
INTERVAL HPI/OVERNIGHT EVENTS:  pt doing better    BS lower this AM   pt with good appetitie    MEDICATIONS  (STANDING):  ALBUTerol/ipratropium for Nebulization 3 milliLiter(s) Nebulizer every 6 hours  ALBUTerol/ipratropium for Nebulization 3 milliLiter(s) Nebulizer once  aspirin enteric coated 81 milliGRAM(s) Oral daily  atorvastatin 80 milliGRAM(s) Oral at bedtime  buDESOnide   0.5 milliGRAM(s) Respule 0.5 milliGRAM(s) Inhalation every 12 hours  dextrose 5%. 1000 milliLiter(s) (50 mL/Hr) IV Continuous <Continuous>  dextrose 50% Injectable 12.5 Gram(s) IV Push once  dextrose 50% Injectable 25 Gram(s) IV Push once  dextrose 50% Injectable 25 Gram(s) IV Push once  diltiazem    milliGRAM(s) Oral daily  gabapentin 100 milliGRAM(s) Oral three times a day  heparin  Injectable 5000 Unit(s) SubCutaneous every 8 hours  insulin glargine Injectable (LANTUS) 30 Unit(s) SubCutaneous at bedtime  insulin lispro (HumaLOG) corrective regimen sliding scale   SubCutaneous three times a day before meals  insulin lispro Injectable (HumaLOG) 18 Unit(s) SubCutaneous three times a day before meals  isosorbide   mononitrate ER Tablet (IMDUR) 30 milliGRAM(s) Oral daily  levothyroxine 125 MICROGram(s) Oral daily  metoprolol succinate ER 50 milliGRAM(s) Oral daily  pantoprazole    Tablet 40 milliGRAM(s) Oral before breakfast  saccharomyces boulardii 250 milliGRAM(s) Oral two times a day    MEDICATIONS  (PRN):  benzonatate 100 milliGRAM(s) Oral three times a day PRN Cough  dextrose 40% Gel 15 Gram(s) Oral once PRN Blood Glucose LESS THAN 70 milliGRAM(s)/deciliter  glucagon  Injectable 1 milliGRAM(s) IntraMuscular once PRN Glucose LESS THAN 70 milligrams/deciliter  HYDROcodone/homatropine Syrup 5 milliLiter(s) Oral two times a day PRN Cough      Allergies    iodine (Hives)  iodine containing compounds (Unknown)  shellfish (Anaphylaxis)  shellfish (Swelling; Short breath)    Intolerances        Review of systems:    Vital Signs Last 24 Hrs  T(C): 36.1 (04 Apr 2019 10:48), Max: 36.8 (03 Apr 2019 21:07)  T(F): 97 (04 Apr 2019 10:48), Max: 98.3 (03 Apr 2019 21:07)  HR: 56 (04 Apr 2019 10:56) (56 - 75)  BP: 122/54 (04 Apr 2019 10:48) (122/54 - 145/76)  BP(mean): --  RR: 19 (04 Apr 2019 10:48) (18 - 19)  SpO2: 99% (04 Apr 2019 10:56) (93% - 99%)    PHYSICAL EXAM:      Constitutional: NAD, well-groomed, well-developed  HEENT: PERRLA, EOMI, no exophalmos  Neck: No LAD, No JVD, trachea midline, no thyroid enlargement  Back: Normal spine flexure, No CVA tenderness  Respiratory: CTAB  Cardiovascular: S1 and S2, RRR, no M/G/R        LABS:    04-03    143  |  104  |  85.0<H>  ----------------------------<  104  4.4   |  27.0  |  1.70<H>    Ca    8.9      03 Apr 2019 07:39  Mg     2.6     04-03            CAPILLARY BLOOD GLUCOSE  CAPILLARY BLOOD GLUCOSE        POCT Blood Glucose.: 161 mg/dL (03 Apr 2019 21:29)  POCT Blood Glucose.: 197 mg/dL (03 Apr 2019 17:07)        RADIOLOGY & ADDITIONAL TESTS:
PULMONARY PROGRESS NOTE      FLOYD PAEZ  MRN-[id number]    Patient is a 84y old  Female who presents with a chief complaint of Difficulty to breath (30 Mar 2019 17:54)      INTERVAL HPI/OVERNIGHT EVENTS:    Patient is awake and alert  Less cough and SOB but not back to baseline    MEDICATIONS  (STANDING):  ALBUTerol/ipratropium for Nebulization 3 milliLiter(s) Nebulizer every 6 hours  ALBUTerol/ipratropium for Nebulization 3 milliLiter(s) Nebulizer once  aspirin enteric coated 81 milliGRAM(s) Oral daily  atorvastatin 80 milliGRAM(s) Oral at bedtime  buDESOnide   0.5 milliGRAM(s) Respule 0.5 milliGRAM(s) Inhalation every 12 hours  dextrose 5%. 1000 milliLiter(s) (50 mL/Hr) IV Continuous <Continuous>  dextrose 50% Injectable 12.5 Gram(s) IV Push once  dextrose 50% Injectable 25 Gram(s) IV Push once  dextrose 50% Injectable 25 Gram(s) IV Push once  diltiazem    milliGRAM(s) Oral daily  enoxaparin Injectable 40 milliGRAM(s) SubCutaneous daily  furosemide    Tablet 40 milliGRAM(s) Oral daily  gabapentin 100 milliGRAM(s) Oral three times a day  insulin glargine Injectable (LANTUS) 40 Unit(s) SubCutaneous at bedtime  insulin lispro (HumaLOG) corrective regimen sliding scale   SubCutaneous three times a day before meals  insulin lispro Injectable (HumaLOG) 5 Unit(s) SubCutaneous three times a day before meals  isosorbide   mononitrate ER Tablet (IMDUR) 30 milliGRAM(s) Oral daily  levoFLOXacin IVPB 500 milliGRAM(s) IV Intermittent every 24 hours  levoFLOXacin IVPB      levothyroxine 125 MICROGram(s) Oral daily  lisinopril 5 milliGRAM(s) Oral daily  methylPREDNISolone sodium succinate Injectable 40 milliGRAM(s) IV Push every 12 hours  metoprolol succinate ER 50 milliGRAM(s) Oral daily  pantoprazole    Tablet 40 milliGRAM(s) Oral before breakfast  tiotropium 18 MICROgram(s) Capsule 1 Capsule(s) Inhalation daily      MEDICATIONS  (PRN):  benzonatate 100 milliGRAM(s) Oral three times a day PRN Cough  dextrose 40% Gel 15 Gram(s) Oral once PRN Blood Glucose LESS THAN 70 milliGRAM(s)/deciliter  glucagon  Injectable 1 milliGRAM(s) IntraMuscular once PRN Glucose LESS THAN 70 milligrams/deciliter  HYDROcodone/homatropine Syrup 5 milliLiter(s) Oral two times a day PRN Cough      Allergies    iodine (Hives)  iodine containing compounds (Unknown)  shellfish (Anaphylaxis)  shellfish (Swelling; Short breath)    Intolerances        PAST MEDICAL & SURGICAL HISTORY:  NSTEMI (non-ST elevated myocardial infarction)  Hypothyroid  HLD (hyperlipidemia)  HTN (hypertension)  CAD (coronary artery disease)  Asthma  Diabetes  Gastroesophageal reflux disease without esophagitis  Pure hypercholesterolemia  Hypothyroidism, unspecified type  Essential hypertension  DM2 (diabetes mellitus, type 2)  Uncomplicated asthma, unspecified asthma severity  Abnormal findings on cardiac catheterization: Cardiac Cath  History of appendectomy  History of appendectomy        REVIEW OF SYSTEMS:    CONSTITUTIONAL:  No distress    HEENT:  Eyes:  No diplopia or blurred vision. ENT:  No earache, sore throat or runny nose.    CARDIOVASCULAR:  No pressure, squeezing, tightness, heaviness or aching about the chest; no palpitations.    RESPIRATORY:  Improved cough, shortness of breath, no PND or orthopnea. + SOBOE    GASTROINTESTINAL:  No nausea, vomiting or diarrhea.    GENITOURINARY:  No dysuria, frequency or urgency.    NEUROLOGIC:  No paresthesias, fasciculations, seizures or weakness.    PSYCHIATRIC:  No disorder of thought or mood.    Vital Signs Last 24 Hrs  T(C): 36.1 (31 Mar 2019 11:08), Max: 36.5 (30 Mar 2019 21:46)  T(F): 96.9 (31 Mar 2019 11:08), Max: 97.7 (30 Mar 2019 21:46)  HR: 70 (31 Mar 2019 11:08) (67 - 81)  BP: 105/65 (31 Mar 2019 11:08) (105/65 - 130/66)  BP(mean): --  RR: 16 (31 Mar 2019 11:08) (16 - 20)  SpO2: 93% (31 Mar 2019 11:08) (93% - 99%)    PHYSICAL EXAMINATION:    GENERAL: The patient is awake and alert in no apparent distress.     HEENT: Head is normocephalic and atraumatic. Extraocular muscles are intact. Mucous membranes are moist.    NECK: Supple.    LUNGS: Clear to auscultation without wheezing, rales or rhonchi; respirations unlabored    HEART: Regular rate and rhythm without murmur.    ABDOMEN: Soft, nontender, and nondistended.      EXTREMITIES: Without any cyanosis, clubbing, rash, lesions or edema.    NEUROLOGIC: Grossly intact.    LABS:                        10.7   17.8  )-----------( 265      ( 31 Mar 2019 05:39 )             34.1     03-    131<L>  |  95<L>  |  74.0<H>  ----------------------------<  469<H>  4.8   |  20.0<L>  |  1.88<H>    Ca    8.4<L>      31 Mar 2019 05:39    TPro  7.0  /  Alb  3.5  /  TBili  0.3<L>  /  DBili  x   /  AST  11  /  ALT  13  /  AlkPhos  87  -      Urinalysis Basic - ( 31 Mar 2019 04:28 )    Color: Yellow / Appearance: Clear / S.020 / pH: x  Gluc: x / Ketone: Negative  / Bili: Negative / Urobili: Negative mg/dL   Blood: x / Protein: 15 mg/dL / Nitrite: Negative   Leuk Esterase: Negative / RBC: 0-2 /HPF / WBC 0-2   Sq Epi: x / Non Sq Epi: Few / Bacteria: Negative      Serum Pro-Brain Natriuretic Peptide: 945 pg/mL (19 @ 07:56)    MICROBIOLOGY:    Rapid Respiratory Viral Panel (19 @ 13:47)    Rapid RVP Result: NotDetec: This Respiratory Panel uses polymerase chain reaction (PCR) to detect for  adenovirus; coronavirus (HKU1, NL63, 229E, OC43); human metapneumovirus  (hMPV); human enterovirus/rhinovirus (Entero/RV); influenza A; influenza  A/H1; influenza A/H3; influenza A/H1-2009; influenza B; parainfluenza  viruses 1, 2, 3, 4; respiratory syncytial virus; Mycoplasma pneumoniae;  and Chlamydophila pneumoniae.        RADIOLOGY & ADDITIONAL STUDIES:       EXAM:  XR CHEST PORTABLE IMMED 1V                          PROCEDURE DATE:  2019          INTERPRETATION:  CHEST AP PORTABLE:    History: Chest Pain.     Date and time of exam: 3/29/2019 7:48 AM.    Technique: A single AP view of the chest was obtained.    Comparison exam: 2018.    Findings:  The lungs are clear. The heart is enlarged. No hilar or mediastinal   abnormality. No evidence of a pleural effusion. There are degenerative   changes in the spine..    Impression:  Cardiomegaly. Clear lungs. No evidence of acute cardiopulmonary disease..        PRABHA MUIR M.D., ATTENDING RADIOLOGIST  This document has been electronically signed. Mar 29 2019  8:41AM      ECHO 18:      Summary:   1. Technically difficult study.   2. Hyperdynamic global left ventricular systolic function.   3. Left ventricular ejection fraction, by visual estimation, is >75%.   4. Spectral Doppler shows impaired relaxation pattern of left   ventricular myocardial filling (Grade I diastolic dysfunction).   5. There is mild concentric left ventricular hypertrophy.   6. Thickening and calcification of the anterior and posterior mitral   valve leaflets.   7. Mildly enlarged left atrium.   8. Trace tricuspid regurgitation.   9. Sclerotic aortic valve with decreased opening.  10. Trace pulmonic valve regurgitation.  11. Normal right ventricular size and function.  12. Pericardium and IVC was not well visualized.    [id number] Milton Benton MD, Electronically signed on 2018 at 9:05:12   AM
PULMONARY PROGRESS NOTE      FLOYD PAEZ  MRN-[id number]    Patient is a 84y old  Female who presents with a chief complaint of Difficulty to breath (29 Mar 2019 16:08)      INTERVAL HPI/OVERNIGHT EVENTS:    Pt is awake and alert  Persistent cough  Improved SOB    MEDICATIONS  (STANDING):  ALBUTerol/ipratropium for Nebulization 3 milliLiter(s) Nebulizer every 6 hours  ALBUTerol/ipratropium for Nebulization 3 milliLiter(s) Nebulizer once  aspirin enteric coated 81 milliGRAM(s) Oral daily  atorvastatin 80 milliGRAM(s) Oral at bedtime  buDESOnide   0.5 milliGRAM(s) Respule 0.5 milliGRAM(s) Inhalation every 12 hours  dextrose 5%. 1000 milliLiter(s) (50 mL/Hr) IV Continuous <Continuous>  dextrose 50% Injectable 12.5 Gram(s) IV Push once  dextrose 50% Injectable 25 Gram(s) IV Push once  dextrose 50% Injectable 25 Gram(s) IV Push once  diltiazem    milliGRAM(s) Oral daily  enoxaparin Injectable 40 milliGRAM(s) SubCutaneous daily  furosemide    Tablet 40 milliGRAM(s) Oral daily  gabapentin 100 milliGRAM(s) Oral three times a day  insulin glargine Injectable (LANTUS) 40 Unit(s) SubCutaneous at bedtime  insulin lispro (HumaLOG) corrective regimen sliding scale   SubCutaneous three times a day before meals  isosorbide   mononitrate ER Tablet (IMDUR) 30 milliGRAM(s) Oral daily  levoFLOXacin IVPB 500 milliGRAM(s) IV Intermittent every 24 hours  levoFLOXacin IVPB      levothyroxine 125 MICROGram(s) Oral daily  lisinopril 5 milliGRAM(s) Oral daily  methylPREDNISolone sodium succinate Injectable 40 milliGRAM(s) IV Push every 6 hours  metoprolol succinate ER 50 milliGRAM(s) Oral daily  pantoprazole    Tablet 40 milliGRAM(s) Oral before breakfast  tiotropium 18 MICROgram(s) Capsule 1 Capsule(s) Inhalation daily      MEDICATIONS  (PRN):  benzonatate 100 milliGRAM(s) Oral three times a day PRN Cough  dextrose 40% Gel 15 Gram(s) Oral once PRN Blood Glucose LESS THAN 70 milliGRAM(s)/deciliter  glucagon  Injectable 1 milliGRAM(s) IntraMuscular once PRN Glucose LESS THAN 70 milligrams/deciliter  HYDROcodone/homatropine Syrup 5 milliLiter(s) Oral two times a day PRN Cough      Allergies    iodine (Hives)  iodine containing compounds (Unknown)  shellfish (Anaphylaxis)  shellfish (Swelling; Short breath)    Intolerances        PAST MEDICAL & SURGICAL HISTORY:  NSTEMI (non-ST elevated myocardial infarction)  Hypothyroid  HLD (hyperlipidemia)  HTN (hypertension)  CAD (coronary artery disease)  Asthma  Diabetes  Gastroesophageal reflux disease without esophagitis  Pure hypercholesterolemia  Hypothyroidism, unspecified type  Essential hypertension  DM2 (diabetes mellitus, type 2)  Uncomplicated asthma, unspecified asthma severity  Abnormal findings on cardiac catheterization: Cardiac Cath  History of appendectomy  History of appendectomy        REVIEW OF SYSTEMS:    CONSTITUTIONAL:  No distress    HEENT:  Eyes:  No diplopia or blurred vision. ENT:  No earache, sore throat or runny nose.    CARDIOVASCULAR:  No pressure, squeezing, tightness, heaviness or aching about the chest; no palpitations.    RESPIRATORY:  + cough, improved shortness of breath, no PND or orthopnea. Mild SOBOE    GASTROINTESTINAL:  No nausea, vomiting or diarrhea.    GENITOURINARY:  No dysuria, frequency or urgency.    NEUROLOGIC:  No paresthesias, fasciculations, seizures or weakness.    PSYCHIATRIC:  No disorder of thought or mood.    Vital Signs Last 24 Hrs  T(C): 36.6 (30 Mar 2019 11:03), Max: 36.9 (29 Mar 2019 23:30)  T(F): 97.8 (30 Mar 2019 11:03), Max: 98.5 (29 Mar 2019 23:30)  HR: 67 (30 Mar 2019 11:03) (67 - 102)  BP: 110/62 (30 Mar 2019 11:03) (110/62 - 180/105)  BP(mean): --  RR: 18 (30 Mar 2019 11:03) (18 - 20)  SpO2: 96% (30 Mar 2019 08:36) (93% - 98%)    PHYSICAL EXAMINATION:    GENERAL: The patient is awake and alert in no apparent distress.     HEENT: Head is normocephalic and atraumatic. Extraocular muscles are intact. Mucous membranes are moist.    NECK: Supple.    LUNGS: Decreased BS otherwise clear to auscultation without wheezing, rales or rhonchi; respirations unlabored    HEART: Regular rate and rhythm without murmur.    ABDOMEN: Soft, nontender, and nondistended.      EXTREMITIES: Without any cyanosis, clubbing, rash, lesions or edema.    NEUROLOGIC: Grossly intact.    LABS:                        11.7   15.3  )-----------( 272      ( 30 Mar 2019 05:53 )             37.0         134<L>  |  95<L>  |  47.0<H>  ----------------------------<  385<H>  5.0   |  22.0  |  1.42<H>    Ca    9.3      30 Mar 2019 05:53  Mg     1.9         TPro  7.0  /  Alb  3.5  /  TBili  0.3<L>  /  DBili  x   /  AST  11  /  ALT  13  /  AlkPhos  87      PT/INR - ( 29 Mar 2019 07:56 )   PT: 12.0 sec;   INR: 1.04 ratio         PTT - ( 29 Mar 2019 07:56 )  PTT:30.0 sec  Urinalysis Basic - ( 29 Mar 2019 20:41 )    Color: Yellow / Appearance: Clear / S.010 / pH: x  Gluc: x / Ketone: Negative  / Bili: Negative / Urobili: Negative mg/dL   Blood: x / Protein: 30 mg/dL / Nitrite: Negative   Leuk Esterase: Negative / RBC: 3-5 /HPF / WBC 0-2   Sq Epi: x / Non Sq Epi: Few / Bacteria: x        CARDIAC MARKERS ( 29 Mar 2019 07:56 )  x     / <0.01 ng/mL / x     / x     / x            Serum Pro-Brain Natriuretic Peptide: 945 pg/mL (19 @ 07:56)          MICROBIOLOGY:    Rapid Respiratory Viral Panel (19 @ 13:47)    Rapid RVP Result: NotDetec: This Respiratory Panel uses polymerase chain reaction (PCR) to detect for  adenovirus; coronavirus (HKU1, NL63, 229E, OC43); human metapneumovirus  (hMPV); human enterovirus/rhinovirus (Entero/RV); influenza A; influenza  A/H1; influenza A/H3; influenza A/H1-2009; influenza B; parainfluenza  viruses 1, 2, 3, 4; respiratory syncytial virus; Mycoplasma pneumoniae;  and Chlamydophila pneumoniae.        RADIOLOGY & ADDITIONAL STUDIES:       EXAM:  CT CHEST                          PROCEDURE DATE:  2019          INTERPRETATION:  HISTORY: Shortness of breath. History of COPD and   congestive heart failure..    Date and Time of Exam: 3/29/2019 9:23 AM    TECHNIQUE:  Sections were obtained from the apices to the diaphragm   without intravenous contrast.    COMPARISON EXAMINATION:   10/28/2018.    FINDINGS: No evidence of mediastinal or hilar lymphadenopathy. No   evidence of axillary adenopathy. No evidence of pleural or pericardial   effusion.    No significant abnormality in the visualized upper abdomen.    The left lung is clear. There is mild subpleural interstitial prominence   at the right lung base with linear atelectasis or fibrosis in the right   lower lobe unchanged since 10/28/2018. The right lung is otherwise clear.    No significant osseous abnormality.      IMPRESSION:     Mild subpleural interstitial prominence at the right lung base as well as   linear strands of atelectasis or fibrosis in the right lower lobe.   Otherwise unremarkable study. Stable exam since 10/28/2018..           PRABHA MUIR M.D., ATTENDING RADIOLOGIST  This document has been electronically signed. Mar 29 2019 10:15AM        ECHO:    Summary:   1. Technically difficult study.   2. Hyperdynamic global left ventricular systolic function.   3. Left ventricular ejection fraction, by visual estimation, is >75%.   4. Spectral Doppler shows impaired relaxation pattern of left   ventricular myocardial filling (Grade I diastolic dysfunction).   5. There is mild concentric left ventricular hypertrophy.   6. Thickening and calcification of the anterior and posterior mitral   valve leaflets.   7. Mildly enlarged left atrium.   8. Trace tricuspid regurgitation.   9. Sclerotic aortic valve with decreased opening.  10. Trace pulmonic valve regurgitation.  11. Normal right ventricular size and function.  12. Pericardium and IVC was not well visualized.    [id number] Milton Benton MD, Electronically signed on 2018 at 9:05:12   AM
PULMONARY PROGRESS NOTE      [facility]ZTen[facility] Louis[facility]-[id number]    Patient is a 84y old  Female who presents with a chief complaint of Difficulty to breath (31 Mar 2019 15:16)      INTERVAL HPI/OVERNIGHT EVENTS:  continues to cough and wheeze  no chest pain  minimal sputum    MEDICATIONS  (STANDING):  ALBUTerol/ipratropium for Nebulization 3 milliLiter(s) Nebulizer every 6 hours  ALBUTerol/ipratropium for Nebulization 3 milliLiter(s) Nebulizer once  aspirin enteric coated 81 milliGRAM(s) Oral daily  atorvastatin 80 milliGRAM(s) Oral at bedtime  buDESOnide   0.5 milliGRAM(s) Respule 0.5 milliGRAM(s) Inhalation every 12 hours  dextrose 5%. 1000 milliLiter(s) (50 mL/Hr) IV Continuous <Continuous>  dextrose 50% Injectable 12.5 Gram(s) IV Push once  dextrose 50% Injectable 25 Gram(s) IV Push once  dextrose 50% Injectable 25 Gram(s) IV Push once  diltiazem    milliGRAM(s) Oral daily  enoxaparin Injectable 40 milliGRAM(s) SubCutaneous daily  furosemide    Tablet 40 milliGRAM(s) Oral daily  gabapentin 100 milliGRAM(s) Oral three times a day  insulin glargine Injectable (LANTUS) 40 Unit(s) SubCutaneous at bedtime  insulin glargine Injectable (LANTUS) 30 Unit(s) SubCutaneous every morning  insulin lispro (HumaLOG) corrective regimen sliding scale   SubCutaneous three times a day before meals  insulin lispro Injectable (HumaLOG) 10 Unit(s) SubCutaneous three times a day before meals  isosorbide   mononitrate ER Tablet (IMDUR) 30 milliGRAM(s) Oral daily  levoFLOXacin  Tablet 250 milliGRAM(s) Oral every 24 hours  levothyroxine 125 MICROGram(s) Oral daily  lisinopril 5 milliGRAM(s) Oral daily  metoprolol succinate ER 50 milliGRAM(s) Oral daily  pantoprazole    Tablet 40 milliGRAM(s) Oral before breakfast  predniSONE   Tablet 40 milliGRAM(s) Oral daily  saccharomyces boulardii 250 milliGRAM(s) Oral two times a day      MEDICATIONS  (PRN):  benzonatate 100 milliGRAM(s) Oral three times a day PRN Cough  dextrose 40% Gel 15 Gram(s) Oral once PRN Blood Glucose LESS THAN 70 milliGRAM(s)/deciliter  glucagon  Injectable 1 milliGRAM(s) IntraMuscular once PRN Glucose LESS THAN 70 milligrams/deciliter  HYDROcodone/homatropine Syrup 5 milliLiter(s) Oral two times a day PRN Cough      Allergies    iodine (Hives)  iodine containing compounds (Unknown)  shellfish (Anaphylaxis)  shellfish (Swelling; Short breath)    Intolerances        PAST MEDICAL & SURGICAL HISTORY:  NSTEMI (non-ST elevated myocardial infarction)  Hypothyroid  HLD (hyperlipidemia)  HTN (hypertension)  CAD (coronary artery disease)  Asthma  Diabetes  Gastroesophageal reflux disease without esophagitis  Pure hypercholesterolemia  Hypothyroidism, unspecified type  Essential hypertension  DM2 (diabetes mellitus, type 2)  Uncomplicated asthma, unspecified asthma severity  Abnormal findings on cardiac catheterization: Cardiac Cath  History of appendectomy  History of appendectomy      SOCIAL HISTORY  Smoking History:       REVIEW OF SYSTEMS:    CONSTITUTIONAL:  No distress    HEENT:  Eyes:  No diplopia or blurred vision. ENT:  No earache, sore throat or runny nose.    CARDIOVASCULAR:  No pressure, squeezing, tightness, heaviness or aching about the chest; no palpitations.    RESPIRATORY: above    GASTROINTESTINAL:  No nausea, vomiting or diarrhea.    GENITOURINARY:  No dysuria, frequency or urgency.    NEUROLOGIC:  No paresthesias, fasciculations, seizures or weakness.    Extremities: No cyanosis, clubbing or edema    PSYCHIATRIC:  No disorder of thought or mood.    Vital Signs Last 24 Hrs  T(C): 36.3 (2019 04:48), Max: 36.7 (31 Mar 2019 16:40)  T(F): 97.3 (2019 04:48), Max: 98 (31 Mar 2019 16:40)  HR: 84 (2019 04:48) (70 - 88)  BP: 111/62 (2019 04:48) (111/62 - 124/65)  BP(mean): --  RR: 20 (2019 04:48) (20 - 20)  SpO2: 96% (2019 04:48) (92% - 99%)    PHYSICAL EXAMINATION:    GENERAL: The patient is awake and alert in no apparent distress.     HEENT: Head is normocephalic and atraumatic. Extraocular muscles are intact. Mucous membranes are moist.    NECK: Supple.    LUNGS: diminished bs dustin with end exp wheezes    HEART: Regular rate and rhythm without murmur.    ABDOMEN: Soft, nontender, and nondistended. Obese     EXTREMITIES: Without any cyanosis, clubbing, rash, lesions or edema.    NEUROLOGIC: Grossly intact.    LABS:                        10.5   15.4  )-----------( 264      ( 2019 06:59 )             33.5     04-    135  |  97<L>  |  86.0<H>  ----------------------------<  384<H>  4.5   |  23.0  |  1.88<H>    Ca    8.3<L>      2019 06:59  Mg     2.2     -        Urinalysis Basic - ( 31 Mar 2019 04:28 )    Color: Yellow / Appearance: Clear / S.020 / pH: x  Gluc: x / Ketone: Negative  / Bili: Negative / Urobili: Negative mg/dL   Blood: x / Protein: 15 mg/dL / Nitrite: Negative   Leuk Esterase: Negative / RBC: 0-2 /HPF / WBC 0-2   Sq Epi: x / Non Sq Epi: Few / Bacteria: Negative                      MICROBIOLOGY:    RADIOLOGY & ADDITIONAL STUDIES:   EXAM:  XR CHEST PORTABLE IMMED 1V                          PROCEDURE DATE:  2019          INTERPRETATION:  CHEST AP PORTABLE:    History: Chest Pain.     Date and time of exam: 3/29/2019 7:48 AM.    Technique: A single AP view of the chest was obtained.    Comparison exam: 2018.    Findings:  The lungs are clear. The heart is enlarged. No hilar or mediastinal   abnormality. No evidence of a pleural effusion. There are degenerative   changes in the spine..    Impression:  Cardiomegaly. Clear lungs. No evidence of acute cardiopulmonary disease..        PRABHA MUIR M.D., ATTENDING RADIOLOGIST  This document has been electronically signed. Mar 29 2019  8:41AM      ECHO 18:      Summary:   1. Technically difficult study.   2. Hyperdynamic global left ventricular systolic function.   3. Left ventricular ejection fraction, by visual estimation, is >75%.   4. Spectral Doppler shows impaired relaxation pattern of left   ventricular myocardial filling (Grade I diastolic dysfunction).   5. There is mild concentric left ventricular hypertrophy.   6. Thickening and calcification of the anterior and posterior mitral   valve leaflets.   7. Mildly enlarged left atrium.   8. Trace tricuspid regurgitation.   9. Sclerotic aortic valve with decreased opening.  10. Trace pulmonic valve regurgitation.  11. Normal right ventricular size and function.  12. Pericardium and IVC was not well visualized.    [id number] Milton Benton MD, Electronically signed on 2018 at 9:05:12   AM
Chronic obstructive pulmonary disease with acute exacerbation    HPI:  83 y/o female with h/o COPD, CHF, CAD, HTN, DM II, hypothyroidism, was brought in to the ER because of difficulty to breath and Hypoxemia. difficulty to breath started 2 days ado and was associated with runny nose and congestion. no fever. dry irritative cough. orthopnea ankle edema B/L. no chest pain or palpitations. . no sick contacts. WBC: 15 400. (29 Mar 2019 13:10)    Interval History:  Patient was seen and examined at bedside around 8:15 am. Slowly improving. SOB and cough are better today.     Denies palpitations, headache, dizziness, visual symptoms, nausea, vomiting or abdominal pain.    ROS:  As per interval history otherwise unremarkable.    PHYSICAL EXAM:  Vital Signs   T(C): 36.4 (02 Apr 2019 17:10), Max: 36.7 (02 Apr 2019 04:23)  T(F): 97.6 (02 Apr 2019 17:10), Max: 98 (02 Apr 2019 04:23)  HR: 69 (02 Apr 2019 17:10) (62 - 81)  BP: 107/54 (02 Apr 2019 17:10) (94/48 - 154/71)  RR: 18 (02 Apr 2019 17:10) (18 - 18)  SpO2: 96% (02 Apr 2019 17:10) (93% - 97%)  General: Well developed. Well nourished. Mild respiratory distress while talking.   HEENT: PERRLA. EOMI. Clear conjunctivae. Moist mucus membrane  Neck: Supple.    Chest: Good air entry bilaterally. No wheezing, rales or rhonchi. No chest wall tenderness.  Heart: Normal S1 & S2. RRR.   Abdomen: Soft. Non-tender. Non-distended. + BS  Ext: 1+ pedal edema. No calf tenderness   Neuro: AAO x 3. No focal deficit. No speech disorder  Skin: Warm and Dry  Psychiatry: Normal mood and affect    MEDICATIONS  (STANDING):  ALBUTerol/ipratropium for Nebulization 3 milliLiter(s) Nebulizer every 6 hours  ALBUTerol/ipratropium for Nebulization 3 milliLiter(s) Nebulizer once  aspirin enteric coated 81 milliGRAM(s) Oral daily  atorvastatin 80 milliGRAM(s) Oral at bedtime  buDESOnide   0.5 milliGRAM(s) Respule 0.5 milliGRAM(s) Inhalation every 12 hours  dextrose 5%. 1000 milliLiter(s) (50 mL/Hr) IV Continuous <Continuous>  dextrose 50% Injectable 12.5 Gram(s) IV Push once  dextrose 50% Injectable 25 Gram(s) IV Push once  dextrose 50% Injectable 25 Gram(s) IV Push once  diltiazem    milliGRAM(s) Oral daily  gabapentin 100 milliGRAM(s) Oral three times a day  heparin  Injectable 5000 Unit(s) SubCutaneous every 8 hours  insulin glargine Injectable (LANTUS) 40 Unit(s) SubCutaneous at bedtime  insulin glargine Injectable (LANTUS) 30 Unit(s) SubCutaneous every morning  insulin lispro (HumaLOG) corrective regimen sliding scale   SubCutaneous three times a day before meals  insulin lispro Injectable (HumaLOG) 18 Unit(s) SubCutaneous three times a day before meals  isosorbide   mononitrate ER Tablet (IMDUR) 30 milliGRAM(s) Oral daily  levothyroxine 125 MICROGram(s) Oral daily  metoprolol succinate ER 50 milliGRAM(s) Oral daily  pantoprazole    Tablet 40 milliGRAM(s) Oral before breakfast  saccharomyces boulardii 250 milliGRAM(s) Oral two times a day    MEDICATIONS  (PRN):  benzonatate 100 milliGRAM(s) Oral three times a day PRN Cough  dextrose 40% Gel 15 Gram(s) Oral once PRN Blood Glucose LESS THAN 70 milliGRAM(s)/deciliter  glucagon  Injectable 1 milliGRAM(s) IntraMuscular once PRN Glucose LESS THAN 70 milligrams/deciliter  HYDROcodone/homatropine Syrup 5 milliLiter(s) Oral two times a day PRN Cough    LABS:                        10.7   14.4  )-----------( 254      ( 02 Apr 2019 07:05 )             33.5     04-02    137  |  98  |  91.0<H>  ----------------------------<  349<H>  4.4   |  24.0  |  1.84<H>    Ca    8.2<L>      02 Apr 2019 07:05  Phos  4.1     04-02  Mg     2.4     04-02    Blood Culture: 03-29 @ 20:41  Organism --  Gram Stain Blood -- Gram Stain --  Specimen Source .Urine  Culture-Blood --    03-29 @ 14:28  Organism --  Gram Stain Blood -- Gram Stain --  Specimen Source .Blood  Culture-Blood --

## 2019-04-03 NOTE — PROGRESS NOTE ADULT - ASSESSMENT
85 y/o female with COPD exacerbation, CHF, CAD, HTN, DM II, hypothyroidism    1) COPD exacerbation  - Solumedrol switched to Prednisone; tolerating well  - Nebs and supplemental oxygen  - Levaquin 250 mg  - Pulmonary recommendations appreciated  - can discharge once aids are in place    2) Chronic diastolic congestive heart failure  - Continue Metoprolol   - Cardiology input appreciated  - Hold Lasix and Lisinopril due to acute on chronic kidney injury    3) CAD  - Continue Aspirin, Imdur, Atorvastatin and Metoprolol     4) Type 2 diabetes mellitus - Uncontrolled  - Accu checks and ISS  - Continue Lantus 30 units in am and 30 units at night  - Increased Premeal Humalog to 18 units as per Endo  - Endo input appreciated    5) Essential hypertension  - Continue Lisinopril, Cardizem and Metoprolol     6) Hypothyroidism  - Continue Levothyroxine    DVT Prophylaxis -- Lovenox 40 mg    Dispo: Likely discharge tomorrow home with aids

## 2019-04-03 NOTE — PROGRESS NOTE ADULT - ASSESSMENT
T2DM- spoke with Dr Sanjiv dupont- LAntus reduced to 30 bid   Keep Huamlog 18 tid ac     pt to go home tomorrow sole     followup With Dr cavazos in 1 week

## 2019-04-03 NOTE — PROGRESS NOTE ADULT - PROVIDER SPECIALTY LIST ADULT
Cardiology
Cardiology
Endocrinology
Hospitalist
Pulmonology
Cardiology
Hospitalist

## 2019-04-04 ENCOUNTER — INBOUND DOCUMENT (OUTPATIENT)
Age: 84
End: 2019-04-04

## 2019-04-04 VITALS — OXYGEN SATURATION: 99 %

## 2019-04-04 LAB
GLUCOSE BLDC GLUCOMTR-MCNC: 224 MG/DL — HIGH (ref 70–99)
GLUCOSE BLDC GLUCOMTR-MCNC: 79 MG/DL — SIGNIFICANT CHANGE UP (ref 70–99)

## 2019-04-04 PROCEDURE — 82009 KETONE BODYS QUAL: CPT

## 2019-04-04 PROCEDURE — 82010 KETONE BODYS QUAN: CPT

## 2019-04-04 PROCEDURE — 82962 GLUCOSE BLOOD TEST: CPT

## 2019-04-04 PROCEDURE — 71250 CT THORAX DX C-: CPT

## 2019-04-04 PROCEDURE — 84484 ASSAY OF TROPONIN QUANT: CPT

## 2019-04-04 PROCEDURE — 99239 HOSP IP/OBS DSCHRG MGMT >30: CPT

## 2019-04-04 PROCEDURE — 99285 EMERGENCY DEPT VISIT HI MDM: CPT | Mod: 25

## 2019-04-04 PROCEDURE — 85027 COMPLETE CBC AUTOMATED: CPT

## 2019-04-04 PROCEDURE — 82947 ASSAY GLUCOSE BLOOD QUANT: CPT

## 2019-04-04 PROCEDURE — 87486 CHLMYD PNEUM DNA AMP PROBE: CPT

## 2019-04-04 PROCEDURE — 93005 ELECTROCARDIOGRAM TRACING: CPT

## 2019-04-04 PROCEDURE — 97163 PT EVAL HIGH COMPLEX 45 MIN: CPT

## 2019-04-04 PROCEDURE — 85730 THROMBOPLASTIN TIME PARTIAL: CPT

## 2019-04-04 PROCEDURE — 83880 ASSAY OF NATRIURETIC PEPTIDE: CPT

## 2019-04-04 PROCEDURE — 83036 HEMOGLOBIN GLYCOSYLATED A1C: CPT

## 2019-04-04 PROCEDURE — 87040 BLOOD CULTURE FOR BACTERIA: CPT

## 2019-04-04 PROCEDURE — 87086 URINE CULTURE/COLONY COUNT: CPT

## 2019-04-04 PROCEDURE — 96361 HYDRATE IV INFUSION ADD-ON: CPT

## 2019-04-04 PROCEDURE — 83605 ASSAY OF LACTIC ACID: CPT

## 2019-04-04 PROCEDURE — 87633 RESP VIRUS 12-25 TARGETS: CPT

## 2019-04-04 PROCEDURE — 36415 COLL VENOUS BLD VENIPUNCTURE: CPT

## 2019-04-04 PROCEDURE — 80053 COMPREHEN METABOLIC PANEL: CPT

## 2019-04-04 PROCEDURE — 87798 DETECT AGENT NOS DNA AMP: CPT

## 2019-04-04 PROCEDURE — 96374 THER/PROPH/DIAG INJ IV PUSH: CPT

## 2019-04-04 PROCEDURE — 80048 BASIC METABOLIC PNL TOTAL CA: CPT

## 2019-04-04 PROCEDURE — 71045 X-RAY EXAM CHEST 1 VIEW: CPT

## 2019-04-04 PROCEDURE — 94640 AIRWAY INHALATION TREATMENT: CPT

## 2019-04-04 PROCEDURE — 83615 LACTATE (LD) (LDH) ENZYME: CPT

## 2019-04-04 PROCEDURE — 87581 M.PNEUMON DNA AMP PROBE: CPT

## 2019-04-04 PROCEDURE — 81001 URINALYSIS AUTO W/SCOPE: CPT

## 2019-04-04 PROCEDURE — 84100 ASSAY OF PHOSPHORUS: CPT

## 2019-04-04 PROCEDURE — 83735 ASSAY OF MAGNESIUM: CPT

## 2019-04-04 PROCEDURE — 85610 PROTHROMBIN TIME: CPT

## 2019-04-04 PROCEDURE — 94760 N-INVAS EAR/PLS OXIMETRY 1: CPT

## 2019-04-04 RX ORDER — INSULIN GLARGINE 100 [IU]/ML
30 INJECTION, SOLUTION SUBCUTANEOUS
Qty: 1 | Refills: 0
Start: 2019-04-04 | End: 2019-05-03

## 2019-04-04 RX ORDER — INSULIN GLARGINE 100 [IU]/ML
10 INJECTION, SOLUTION SUBCUTANEOUS
Qty: 1 | Refills: 0 | OUTPATIENT
Start: 2019-04-04 | End: 2019-04-17

## 2019-04-04 RX ORDER — INSULIN GLARGINE 100 [IU]/ML
30 INJECTION, SOLUTION SUBCUTANEOUS
Qty: 1 | Refills: 0 | OUTPATIENT
Start: 2019-04-04 | End: 2019-05-03

## 2019-04-04 RX ADMIN — PANTOPRAZOLE SODIUM 40 MILLIGRAM(S): 20 TABLET, DELAYED RELEASE ORAL at 05:44

## 2019-04-04 RX ADMIN — Medication 30 MILLIGRAM(S): at 05:41

## 2019-04-04 RX ADMIN — Medication 0.5 MILLIGRAM(S): at 11:03

## 2019-04-04 RX ADMIN — Medication 18 UNIT(S): at 12:07

## 2019-04-04 RX ADMIN — Medication 81 MILLIGRAM(S): at 12:07

## 2019-04-04 RX ADMIN — ISOSORBIDE MONONITRATE 30 MILLIGRAM(S): 60 TABLET, EXTENDED RELEASE ORAL at 12:07

## 2019-04-04 RX ADMIN — Medication 50 MILLIGRAM(S): at 05:58

## 2019-04-04 RX ADMIN — Medication 240 MILLIGRAM(S): at 05:43

## 2019-04-04 RX ADMIN — Medication 250 MILLIGRAM(S): at 05:41

## 2019-04-04 RX ADMIN — Medication 4: at 12:07

## 2019-04-04 RX ADMIN — Medication 3 MILLILITER(S): at 02:48

## 2019-04-04 RX ADMIN — Medication 125 MICROGRAM(S): at 05:41

## 2019-04-04 RX ADMIN — HEPARIN SODIUM 5000 UNIT(S): 5000 INJECTION INTRAVENOUS; SUBCUTANEOUS at 05:41

## 2019-04-04 RX ADMIN — INSULIN GLARGINE 30 UNIT(S): 100 INJECTION, SOLUTION SUBCUTANEOUS at 08:11

## 2019-04-04 RX ADMIN — GABAPENTIN 100 MILLIGRAM(S): 400 CAPSULE ORAL at 05:43

## 2019-04-04 RX ADMIN — Medication 3 MILLILITER(S): at 10:56

## 2019-04-10 ENCOUNTER — APPOINTMENT (OUTPATIENT)
Dept: CARDIOLOGY | Facility: CLINIC | Age: 84
End: 2019-04-10
Payer: MEDICARE

## 2019-04-10 ENCOUNTER — APPOINTMENT (OUTPATIENT)
Dept: PULMONOLOGY | Facility: CLINIC | Age: 84
End: 2019-04-10
Payer: MEDICARE

## 2019-04-10 ENCOUNTER — NON-APPOINTMENT (OUTPATIENT)
Age: 84
End: 2019-04-10

## 2019-04-10 VITALS
WEIGHT: 204 LBS | HEART RATE: 74 BPM | SYSTOLIC BLOOD PRESSURE: 124 MMHG | DIASTOLIC BLOOD PRESSURE: 72 MMHG | OXYGEN SATURATION: 95 % | BODY MASS INDEX: 39.84 KG/M2

## 2019-04-10 VITALS — WEIGHT: 202 LBS | SYSTOLIC BLOOD PRESSURE: 124 MMHG | BODY MASS INDEX: 39.45 KG/M2 | DIASTOLIC BLOOD PRESSURE: 62 MMHG

## 2019-04-10 VITALS — OXYGEN SATURATION: 94 % | HEART RATE: 75 BPM

## 2019-04-10 DIAGNOSIS — J98.4 OTHER DISORDERS OF LUNG: ICD-10-CM

## 2019-04-10 DIAGNOSIS — Z87.891 PERSONAL HISTORY OF NICOTINE DEPENDENCE: ICD-10-CM

## 2019-04-10 DIAGNOSIS — G62.9 POLYNEUROPATHY, UNSPECIFIED: ICD-10-CM

## 2019-04-10 PROCEDURE — 94010 BREATHING CAPACITY TEST: CPT

## 2019-04-10 PROCEDURE — 93000 ELECTROCARDIOGRAM COMPLETE: CPT

## 2019-04-10 PROCEDURE — 99496 TRANSJ CARE MGMT HIGH F2F 7D: CPT | Mod: 25

## 2019-04-10 PROCEDURE — 99214 OFFICE O/P EST MOD 30 MIN: CPT | Mod: 25

## 2019-04-10 RX ORDER — CIPROFLOXACIN HYDROCHLORIDE 250 MG/1
250 TABLET, FILM COATED ORAL
Qty: 20 | Refills: 0 | Status: DISCONTINUED | COMMUNITY
Start: 2019-02-11

## 2019-04-10 RX ORDER — GABAPENTIN 100 MG/1
100 CAPSULE ORAL
Refills: 0 | Status: DISCONTINUED | COMMUNITY
End: 2019-04-10

## 2019-04-10 RX ORDER — PEN NEEDLE, DIABETIC 29 G X1/2"
29G X 12.7MM NEEDLE, DISPOSABLE MISCELLANEOUS
Qty: 360 | Refills: 0 | Status: DISCONTINUED | COMMUNITY
Start: 2018-11-26

## 2019-04-10 RX ORDER — AMOXICILLIN AND CLAVULANATE POTASSIUM 875; 125 MG/1; MG/1
875-125 TABLET, COATED ORAL
Qty: 20 | Refills: 0 | Status: DISCONTINUED | COMMUNITY
Start: 2019-02-02

## 2019-04-10 RX ORDER — ATORVASTATIN CALCIUM 40 MG/1
40 TABLET, FILM COATED ORAL
Qty: 90 | Refills: 0 | Status: DISCONTINUED | COMMUNITY
Start: 2018-11-02

## 2019-04-10 NOTE — PROCEDURE
[FreeTextEntry1] : ct chest done 3/29/19\par \par spirometry: obstruction in the moderate range\par \par

## 2019-04-10 NOTE — HISTORY OF PRESENT ILLNESS
[Obstructive Sleep Apnea] : obstructive sleep apnea [Snoring] : snoring [Frequent Nocturnal Awakening] : frequent nocturnal awakening [Daytime Somnolence] : daytime somnolence [Awakes Unrefreshed] : awakening unrefreshed [AHI: ___ per hour] : Apnea-hypopnea index:  [unfilled] per hour [Date: ___] : the most recent therapeutic polysomnogram was completed [unfilled] [CPAP: ___ cmH2O] : CPAP: [unfilled] cmH2O [FreeTextEntry1] : hx COPD, STEVENSON.\par \par S/P hosp at [facility] again 3/29-4/4/19. D/C summary as follows:\par ====== 85 y/o female with h/o COPD, CHF, CAD, HTN, DM II, hypothyroidism, was brought \par in to the ER because of difficulty to breath and Hypoxemia. Difficulty to \par breath started 2 days ado and was associated with runny nose and congestion. No \par fever. Dry irritative cough. Orthopnea ankle edema B/L. No chest pain or \par palpitations. No sick contacts. \par \par Patient admitted for COPD exacerbation, treated with IV steroids and breathing \par treatments. She was seen by pulmonolongy and cardiology. Pulmonology \par considering long term steroids to control COPD given patient has had 9 \par admissions in the last 1 year. Patient was seen by endocrinology for diabetes \par and steroid induced hyperglycemia. She is on BID lantus. She will follow up \par with pulmonology and endocrinology on discharge. =====\par \par Main complaint is cough. RABAGO worse than previous baseline but better than on admission. Feels better on prednisone; still undergoing taper from hospital.   On  spiriva daily. Using neb QID as routine. Albuterol prn. No longer on advair. \par \par No oxygen required upon d/c. \par \par Saw Dr Benavides today. Hx AS. HFpEF.\par \par \par \par  \par

## 2019-04-10 NOTE — REASON FOR VISIT
[Follow-Up - Clinic] : a clinic follow-up of [Family Member] : family member [FreeTextEntry1] : Follow up for SOB and CAD [Follow-Up] : a follow-up visit [COPD] : COPD [Other: _____] : [unfilled]

## 2019-04-10 NOTE — PHYSICAL EXAM
[Well Groomed] : well groomed [Normal Appearance] : normal appearance [General Appearance - Well Developed] : well developed [General Appearance - Well Nourished] : well nourished [No Deformities] : no deformities [Normal Oral Mucosa] : normal oral mucosa [Normal Jugular Venous V Waves Present] : normal jugular venous V waves present [Respiration, Rhythm And Depth] : normal respiratory rhythm and effort [Exaggerated Use Of Accessory Muscles For Inspiration] : no accessory muscle use [Chest Palpation] : palpation of the chest revealed no abnormalities [Auscultation Breath Sounds / Voice Sounds] : lungs were clear to auscultation bilaterally [Lungs Percussion] : the lungs were normal to percussion [Heart Sounds] : normal S1 and S2 [Arterial Pulses Normal] : the arterial pulses were normal [Abdomen Tenderness] : non-tender [Abdomen Hernia] : no hernia was discovered [Abdomen Mass (___ Cm)] : no abdominal mass palpated [Skin Turgor] : normal skin turgor [No Skin Ulcers] : no skin ulcer [Skin Lesions] : no skin lesions [No Venous Stasis] : no venous stasis [No Anxiety] : not feeling anxious [General Appearance - In No Acute Distress] : no acute distress [Normal Conjunctiva] : the conjunctiva exhibited no abnormalities [Low Lying Soft Palate] : low lying soft palate [II] : II [Elongated Uvula] : elongated uvula [Neck Appearance] : the appearance of the neck was normal [] : the neck was supple [Bowel Sounds] : normal bowel sounds [Heart Rate And Rhythm] : heart rate and rhythm were normal [Abdomen Soft] : soft [Nail Clubbing] : no clubbing of the fingernails [Abnormal Walk] : normal gait [Cyanosis, Localized] : no localized cyanosis [Cranial Nerves] : cranial nerves 2-12 were intact [Skin Color & Pigmentation] : normal skin color and pigmentation [No Focal Deficits] : no focal deficits [Oriented To Time, Place, And Person] : oriented to person, place, and time [Affect] : the affect was normal [Impaired Insight] : insight and judgment were intact [Normal Rate] : the respiratory rate was normal [Rate ___] : at [unfilled] breaths per minute [Normal Breath Sounds] : normal bilateral breath sounds [Clear Bilaterally] : the lungs were clear to auscultation bilaterally [Normal Rhythm/Effort] : normal respiratory rhythm and effort [2+ Pitting] : 2+  pitting [Normal Oropharynx] : abnormal oropharynx [FreeTextEntry1] : obese

## 2019-04-10 NOTE — ASSESSMENT
[FreeTextEntry1] : S/P AECOPD; underlying moderate COPD. Multiple hospitalizations. Aortic stenosis playing role as well especially given that the COPD is not severe by airflow measurements. No pna on ct. Mild STEVENSON. Returned CPAP.

## 2019-04-10 NOTE — CONSULT LETTER
[Dear  ___] : Dear  [unfilled], [Courtesy Letter:] : I had the pleasure of seeing your patient, [unfilled], in my office today. [Consult Closing:] : Thank you very much for allowing me to participate in the care of this patient.  If you have any questions, please do not hesitate to contact me. [Mamadou Mcginnis MD] : Mamadou Mcginnis MD

## 2019-04-10 NOTE — DISCUSSION/SUMMARY
Anesthesia ROS/Med Hx    Overall Review:  Pts. EKG was reviewed   Pt. has history of anesthetic complications (Family history of malignant hyperthermia (occurred in 1st cousin approx. 8 years ago), also has personal history of PONV)    Pulmonary Review:    The patient is a former smoker with a 10 pack-year history.  (quit 15 years ago)    Neuro/Psych Review:    Pt. positive for headaches  Pt. positive for psychiatric history (depression, ADD)    Cardiovascular Review:    Pt. positive for hypertension  Pt. positive for hyperlipidemia    GI/HEPATIC/RENAL Review:    Pt. positive for GERD - well controlled    End/Other Review:    Pt. negative for endo/other ROS      Anesthesia Plan     ASA Status: 2  Anesthesia Type: General  Induction: Intravenous  Preferred Airway Type: ETT  Reviewed: Medications, Past Med History, Problem List, NPO Status, Allergies, Patient Summary, Pre-Induction Reassessment, Consultations, Nursing Notes, Lab Results and EKG  The proposed anesthetic plan, including its risks and benefits, have been discussed with the Patient - along with the risks and benefits of alternatives.  Questions were encouraged and answered and the patient and/or representative agrees to proceed.  Plan Comments: Non triggering anesthetic      Physical Exam  Mallampati: II  TM Distance: >3 FB  Neck ROM: Full  cardiovascular exam normal  pulmonary exam normal  abdominal exam normal                   [FreeTextEntry1] : 83 yo woman with known moderate AS and HFrEF. Recent hospitalization for COPD exacerbation although its hard to discern if there is also a HF component in the presence of moderate to severe aortic stenosis. \par Still C/O severe SOB (NYHA 3/4) with orthopnea and severe cough, possible related to COPD\par Will continue same meds.\par Discussed exercise and diet and patient is able PT and occupational therapy\par Routine Follow up in 1 months

## 2019-04-10 NOTE — REVIEW OF SYSTEMS
[Eyeglasses] : currently wearing eyeglasses [Shortness Of Breath] : shortness of breath [Dyspnea on exertion] : dyspnea during exertion [Earache] : earache [Lower Ext Edema] : lower extremity edema [Cough] : cough [Wheezing] : wheezing [Joint Pain] : joint pain [Chest Pain] : chest pain [Chest  Pressure] : no chest pressure [Leg Claudication] : no intermittent leg claudication [Palpitations] : no palpitations [As Noted in HPI] : as noted in HPI [Negative] : Endocrine [Fever] : no fever [Chills] : no chills

## 2019-05-16 ENCOUNTER — APPOINTMENT (OUTPATIENT)
Dept: CARDIOLOGY | Facility: CLINIC | Age: 84
End: 2019-05-16
Payer: MEDICARE

## 2019-05-16 ENCOUNTER — OTHER (OUTPATIENT)
Age: 84
End: 2019-05-16

## 2019-05-16 VITALS — SYSTOLIC BLOOD PRESSURE: 148 MMHG | OXYGEN SATURATION: 96 % | DIASTOLIC BLOOD PRESSURE: 81 MMHG | HEART RATE: 109 BPM

## 2019-05-16 PROCEDURE — 99214 OFFICE O/P EST MOD 30 MIN: CPT

## 2019-05-16 PROCEDURE — 93000 ELECTROCARDIOGRAM COMPLETE: CPT

## 2019-05-16 NOTE — HISTORY OF PRESENT ILLNESS
[FreeTextEntry1] : From prior notes: \par On 4/19/2018 she underwent cardiac cath that revealed patent stent in the LAD, 50% stenosis in the LCx (iFR normal) and 100% occlusion of the Mid RCA. LV function was normal and there was moderate AS with an BILL=1.21 cm2 and mild Pul HTN and normal CO/CI. \par On 5/4/2018 she had a dobutamine stress test that revealed a completely normal LV function with a peak gradient of 34 mmHg.\par On 10/28/2018 she was in the [facility] for SOB. Underwent tests and was discharged the next day. \par On 12/11/2018 she was admitted to [facility] with ACS/NSTEMI. Cardiac cath revealed a  of the RCA and a new 95% stenosis in the prox LCX that was treated with ADELIA x 1. She was enrolled in the CLEAR [place] study (1 month DAPT). \par On 3/29/2019 with cough and SOB. Seems that it was COPD exacerbation. Treated with antibiotics, steroids and nebulizers with improvement in her state. Still coughing, worse when lying flat. Has severe SIOB, orthopnea, unable to walk more than 10 ft. NYHA 3/4 -4/4,  associated chest pain constant, worsens with the cough. No fever, no sputum. Discharged home on 4/3/2019. Still occasional BE ankle swelling. Has also Rt leg pain and she will undergo a procedure. \par Underwent cataract of the left eye in January 2018\par Diabetes since 2000 on insulin\par HTN treated with meds\par HLD\par \par Pt is here today on urgent basis based on home nurse recommendation due to cp. She denies any chest pain at rest. She is compliant with medications except for imdur which she ran out of . \par She gets cp as she walks to the dinning room for her room. she states that pain is pressure like and does not radiate. It gets better for a couple of minutes of rest. She is currently asymptomatic.

## 2019-05-16 NOTE — ASSESSMENT
[FreeTextEntry1] : Mrs. Chávez is having angina for the past few months only with activity. Although she is on Imdur in the computer, she is not taking it. She is also tachycardic. \par I increased her toprol since she had high gradients on DSE. \par She is  restarted on imdur as well. If her symptoms are not improved, she will need to see us next week\par I spoke with her son Neil and he daughter as well.

## 2019-05-16 NOTE — PHYSICAL EXAM
[General Appearance - Well Developed] : well developed [Normal Appearance] : normal appearance [Well Groomed] : well groomed [General Appearance - Well Nourished] : well nourished [General Appearance - In No Acute Distress] : no acute distress [No Deformities] : no deformities [Normal Conjunctiva] : the conjunctiva exhibited no abnormalities [Normal Oral Mucosa] : normal oral mucosa [Normal Oropharynx] : normal oropharynx [Normal Jugular Venous V Waves Present] : normal jugular venous V waves present [Respiration, Rhythm And Depth] : normal respiratory rhythm and effort [Exaggerated Use Of Accessory Muscles For Inspiration] : no accessory muscle use [Auscultation Breath Sounds / Voice Sounds] : lungs were clear to auscultation bilaterally [Chest Palpation] : palpation of the chest revealed no abnormalities [Lungs Percussion] : the lungs were normal to percussion [Heart Rate And Rhythm] : heart rate and rhythm were normal [Heart Sounds] : normal S1 and S2 [Arterial Pulses Normal] : the arterial pulses were normal [Bowel Sounds] : normal bowel sounds [Abdomen Soft] : soft [Abdomen Tenderness] : non-tender [Abdomen Mass (___ Cm)] : no abdominal mass palpated [Abdomen Hernia] : no hernia was discovered [Abnormal Walk] : normal gait [Nail Clubbing] : no clubbing of the fingernails [Cyanosis, Localized] : no localized cyanosis [Skin Color & Pigmentation] : normal skin color and pigmentation [Skin Turgor] : normal skin turgor [] : no rash [No Venous Stasis] : no venous stasis [Skin Lesions] : no skin lesions [No Skin Ulcers] : no skin ulcer [FreeTextEntry1] : Edema +1 BE [Oriented To Time, Place, And Person] : oriented to person, place, and time [Impaired Insight] : insight and judgment were intact [No Anxiety] : not feeling anxious

## 2019-05-16 NOTE — REASON FOR VISIT
[Follow-Up - Clinic] : a clinic follow-up of [FreeTextEntry1] : Follow up for SOB and CAD [Family Member] : family member

## 2019-05-16 NOTE — REVIEW OF SYSTEMS
[Eyeglasses] : currently wearing eyeglasses [Earache] : earache [Shortness Of Breath] : shortness of breath [Dyspnea on exertion] : not dyspnea during exertion [Chest  Pressure] : no chest pressure [Chest Pain] : chest pain [Lower Ext Edema] : no extremity edema [Leg Claudication] : no intermittent leg claudication [Palpitations] : no palpitations [Cough] : no cough [Wheezing] : no wheezing [Joint Pain] : joint pain [Negative] : Heme/Lymph

## 2019-05-21 LAB
HBA1C MFR BLD HPLC: 9.5
LDLC SERPL DIRECT ASSAY-MCNC: 109

## 2019-05-22 ENCOUNTER — APPOINTMENT (OUTPATIENT)
Dept: CARDIOLOGY | Facility: CLINIC | Age: 84
End: 2019-05-22
Payer: MEDICARE

## 2019-05-22 ENCOUNTER — APPOINTMENT (OUTPATIENT)
Dept: ENDOCRINOLOGY | Facility: CLINIC | Age: 84
End: 2019-05-22
Payer: MEDICARE

## 2019-05-22 VITALS — SYSTOLIC BLOOD PRESSURE: 160 MMHG | DIASTOLIC BLOOD PRESSURE: 68 MMHG

## 2019-05-22 VITALS
HEIGHT: 60 IN | BODY MASS INDEX: 41.03 KG/M2 | HEART RATE: 91 BPM | WEIGHT: 209 LBS | SYSTOLIC BLOOD PRESSURE: 120 MMHG | DIASTOLIC BLOOD PRESSURE: 70 MMHG

## 2019-05-22 VITALS
HEIGHT: 60 IN | BODY MASS INDEX: 40.84 KG/M2 | HEART RATE: 74 BPM | OXYGEN SATURATION: 95 % | WEIGHT: 208 LBS | DIASTOLIC BLOOD PRESSURE: 76 MMHG | SYSTOLIC BLOOD PRESSURE: 180 MMHG

## 2019-05-22 PROCEDURE — 99215 OFFICE O/P EST HI 40 MIN: CPT

## 2019-05-22 PROCEDURE — 99214 OFFICE O/P EST MOD 30 MIN: CPT

## 2019-05-22 NOTE — REVIEW OF SYSTEMS
[Joint Pain] : joint pain [Joint Stiffness] : joint stiffness [Back Pain] : back pain [Negative] : Endocrine

## 2019-05-30 ENCOUNTER — NON-APPOINTMENT (OUTPATIENT)
Age: 84
End: 2019-05-30

## 2019-05-31 ENCOUNTER — INPATIENT (INPATIENT)
Facility: HOSPITAL | Age: 84
LOS: 3 days | Discharge: HOME | DRG: 189 | End: 2019-06-04
Attending: INTERNAL MEDICINE | Admitting: INTERNAL MEDICINE
Payer: MEDICARE

## 2019-05-31 VITALS
SYSTOLIC BLOOD PRESSURE: 136 MMHG | TEMPERATURE: 99 F | HEIGHT: 60 IN | HEART RATE: 120 BPM | OXYGEN SATURATION: 99 % | DIASTOLIC BLOOD PRESSURE: 52 MMHG | RESPIRATION RATE: 26 BRPM | WEIGHT: 207.01 LBS

## 2019-05-31 DIAGNOSIS — J96.90 RESPIRATORY FAILURE, UNSPECIFIED, UNSPECIFIED WHETHER WITH HYPOXIA OR HYPERCAPNIA: ICD-10-CM

## 2019-05-31 DIAGNOSIS — R93.1 ABNORMAL FINDINGS ON DIAGNOSTIC IMAGING OF HEART AND CORONARY CIRCULATION: Chronic | ICD-10-CM

## 2019-05-31 DIAGNOSIS — R06.02 SHORTNESS OF BREATH: ICD-10-CM

## 2019-05-31 DIAGNOSIS — E78.5 HYPERLIPIDEMIA, UNSPECIFIED: ICD-10-CM

## 2019-05-31 DIAGNOSIS — I10 ESSENTIAL (PRIMARY) HYPERTENSION: ICD-10-CM

## 2019-05-31 DIAGNOSIS — Z90.49 ACQUIRED ABSENCE OF OTHER SPECIFIED PARTS OF DIGESTIVE TRACT: Chronic | ICD-10-CM

## 2019-05-31 DIAGNOSIS — I25.10 ATHEROSCLEROTIC HEART DISEASE OF NATIVE CORONARY ARTERY WITHOUT ANGINA PECTORIS: ICD-10-CM

## 2019-05-31 LAB
ALBUMIN SERPL ELPH-MCNC: 4.1 G/DL — SIGNIFICANT CHANGE UP (ref 3.3–5.2)
ALP SERPL-CCNC: 81 U/L — SIGNIFICANT CHANGE UP (ref 40–120)
ALT FLD-CCNC: 13 U/L — SIGNIFICANT CHANGE UP
ANION GAP SERPL CALC-SCNC: 20 MMOL/L — HIGH (ref 5–17)
ANION GAP SERPL CALC-SCNC: 20 MMOL/L — HIGH (ref 5–17)
ANION GAP SERPL CALC-SCNC: 21 MMOL/L — HIGH (ref 5–17)
APTT BLD: 25.9 SEC — LOW (ref 27.5–36.3)
AST SERPL-CCNC: 16 U/L — SIGNIFICANT CHANGE UP
B-OH-BUTYR SERPL-SCNC: 0.2 MMOL/L — SIGNIFICANT CHANGE UP
BASE EXCESS BLDV CALC-SCNC: 2.2 MMOL/L — HIGH (ref -2–2)
BASOPHILS # BLD AUTO: 0 K/UL — SIGNIFICANT CHANGE UP (ref 0–0.2)
BASOPHILS NFR BLD AUTO: 0.2 % — SIGNIFICANT CHANGE UP (ref 0–2)
BILIRUB SERPL-MCNC: 0.5 MG/DL — SIGNIFICANT CHANGE UP (ref 0.4–2)
BUN SERPL-MCNC: 57 MG/DL — HIGH (ref 8–20)
BUN SERPL-MCNC: 58 MG/DL — HIGH (ref 8–20)
BUN SERPL-MCNC: 63 MG/DL — HIGH (ref 8–20)
CA-I SERPL-SCNC: 1.12 MMOL/L — LOW (ref 1.15–1.33)
CALCIUM SERPL-MCNC: 9.6 MG/DL — SIGNIFICANT CHANGE UP (ref 8.6–10.2)
CALCIUM SERPL-MCNC: 9.7 MG/DL — SIGNIFICANT CHANGE UP (ref 8.6–10.2)
CALCIUM SERPL-MCNC: 9.8 MG/DL — SIGNIFICANT CHANGE UP (ref 8.6–10.2)
CHLORIDE BLDV-SCNC: 95 MMOL/L — LOW (ref 98–107)
CHLORIDE SERPL-SCNC: 89 MMOL/L — LOW (ref 98–107)
CHLORIDE SERPL-SCNC: 91 MMOL/L — LOW (ref 98–107)
CHLORIDE SERPL-SCNC: 93 MMOL/L — LOW (ref 98–107)
CK SERPL-CCNC: 93 U/L — SIGNIFICANT CHANGE UP (ref 25–170)
CO2 SERPL-SCNC: 21 MMOL/L — LOW (ref 22–29)
CO2 SERPL-SCNC: 22 MMOL/L — SIGNIFICANT CHANGE UP (ref 22–29)
CO2 SERPL-SCNC: 24 MMOL/L — SIGNIFICANT CHANGE UP (ref 22–29)
CREAT SERPL-MCNC: 1.65 MG/DL — HIGH (ref 0.5–1.3)
CREAT SERPL-MCNC: 1.77 MG/DL — HIGH (ref 0.5–1.3)
CREAT SERPL-MCNC: 1.81 MG/DL — HIGH (ref 0.5–1.3)
EOSINOPHIL # BLD AUTO: 0 K/UL — SIGNIFICANT CHANGE UP (ref 0–0.5)
EOSINOPHIL NFR BLD AUTO: 0.2 % — SIGNIFICANT CHANGE UP (ref 0–6)
GAS PNL BLDA: SIGNIFICANT CHANGE UP
GAS PNL BLDV: 137 MMOL/L — SIGNIFICANT CHANGE UP (ref 135–145)
GAS PNL BLDV: SIGNIFICANT CHANGE UP
GAS PNL BLDV: SIGNIFICANT CHANGE UP
GLUCOSE BLDC GLUCOMTR-MCNC: 332 MG/DL — HIGH (ref 70–99)
GLUCOSE BLDC GLUCOMTR-MCNC: 335 MG/DL — HIGH (ref 70–99)
GLUCOSE BLDC GLUCOMTR-MCNC: 391 MG/DL — HIGH (ref 70–99)
GLUCOSE BLDC GLUCOMTR-MCNC: 406 MG/DL — HIGH (ref 70–99)
GLUCOSE BLDV-MCNC: 359 MG/DL — HIGH (ref 70–99)
GLUCOSE SERPL-MCNC: 368 MG/DL — HIGH (ref 70–115)
GLUCOSE SERPL-MCNC: 433 MG/DL — HIGH (ref 70–115)
GLUCOSE SERPL-MCNC: 454 MG/DL — HIGH (ref 70–115)
HCO3 BLDV-SCNC: 26 MMOL/L — SIGNIFICANT CHANGE UP (ref 21–29)
HCT VFR BLD CALC: 39.9 % — SIGNIFICANT CHANGE UP (ref 37–47)
HCT VFR BLDA CALC: 42 — SIGNIFICANT CHANGE UP (ref 39–50)
HGB BLD CALC-MCNC: 13.8 G/DL — SIGNIFICANT CHANGE UP (ref 11.5–15.5)
HGB BLD-MCNC: 12.9 G/DL — SIGNIFICANT CHANGE UP (ref 12–16)
INR BLD: 0.93 RATIO — SIGNIFICANT CHANGE UP (ref 0.88–1.16)
LACTATE BLDV-MCNC: 3.9 MMOL/L — HIGH (ref 0.5–2)
LACTATE BLDV-MCNC: 5.2 MMOL/L — CRITICAL HIGH (ref 0.5–2)
LACTATE BLDV-MCNC: 6.9 MMOL/L — CRITICAL HIGH (ref 0.5–2)
LYMPHOCYTES # BLD AUTO: 13.6 % — LOW (ref 20–55)
LYMPHOCYTES # BLD AUTO: 2.1 K/UL — SIGNIFICANT CHANGE UP (ref 1–4.8)
MAGNESIUM SERPL-MCNC: 1.9 MG/DL — SIGNIFICANT CHANGE UP (ref 1.6–2.6)
MAGNESIUM SERPL-MCNC: 2.6 MG/DL — SIGNIFICANT CHANGE UP (ref 1.6–2.6)
MCHC RBC-ENTMCNC: 27.5 PG — SIGNIFICANT CHANGE UP (ref 27–31)
MCHC RBC-ENTMCNC: 32.3 G/DL — SIGNIFICANT CHANGE UP (ref 32–36)
MCV RBC AUTO: 85.1 FL — SIGNIFICANT CHANGE UP (ref 81–99)
MONOCYTES # BLD AUTO: 0.8 K/UL — SIGNIFICANT CHANGE UP (ref 0–0.8)
MONOCYTES NFR BLD AUTO: 5.4 % — SIGNIFICANT CHANGE UP (ref 3–10)
NEUTROPHILS # BLD AUTO: 12.4 K/UL — HIGH (ref 1.8–8)
NEUTROPHILS NFR BLD AUTO: 80.2 % — HIGH (ref 37–73)
NT-PROBNP SERPL-SCNC: 815 PG/ML — HIGH (ref 0–300)
OTHER CELLS CSF MANUAL: 14 ML/DL — LOW (ref 18–22)
PCO2 BLDV: 44 MMHG — SIGNIFICANT CHANGE UP (ref 35–50)
PH BLDV: 7.4 — SIGNIFICANT CHANGE UP (ref 7.32–7.43)
PHOSPHATE SERPL-MCNC: 3.8 MG/DL — SIGNIFICANT CHANGE UP (ref 2.4–4.7)
PLATELET # BLD AUTO: 274 K/UL — SIGNIFICANT CHANGE UP (ref 150–400)
PO2 BLDV: 39 MMHG — SIGNIFICANT CHANGE UP (ref 25–45)
POTASSIUM BLDV-SCNC: 5 MMOL/L — HIGH (ref 3.4–4.5)
POTASSIUM SERPL-MCNC: 5.1 MMOL/L — SIGNIFICANT CHANGE UP (ref 3.5–5.3)
POTASSIUM SERPL-MCNC: 5.2 MMOL/L — SIGNIFICANT CHANGE UP (ref 3.5–5.3)
POTASSIUM SERPL-MCNC: 5.5 MMOL/L — HIGH (ref 3.5–5.3)
POTASSIUM SERPL-SCNC: 5.1 MMOL/L — SIGNIFICANT CHANGE UP (ref 3.5–5.3)
POTASSIUM SERPL-SCNC: 5.2 MMOL/L — SIGNIFICANT CHANGE UP (ref 3.5–5.3)
POTASSIUM SERPL-SCNC: 5.5 MMOL/L — HIGH (ref 3.5–5.3)
PROCALCITONIN SERPL-MCNC: 0.08 NG/ML — SIGNIFICANT CHANGE UP (ref 0.02–0.1)
PROT SERPL-MCNC: 7.8 G/DL — SIGNIFICANT CHANGE UP (ref 6.6–8.7)
PROTHROM AB SERPL-ACNC: 10.7 SEC — SIGNIFICANT CHANGE UP (ref 10–12.9)
RBC # BLD: 4.69 M/UL — SIGNIFICANT CHANGE UP (ref 4.4–5.2)
RBC # FLD: 15 % — SIGNIFICANT CHANGE UP (ref 11–15.6)
SAO2 % BLDV: 74 % — SIGNIFICANT CHANGE UP
SODIUM SERPL-SCNC: 133 MMOL/L — LOW (ref 135–145)
SODIUM SERPL-SCNC: 133 MMOL/L — LOW (ref 135–145)
SODIUM SERPL-SCNC: 135 MMOL/L — SIGNIFICANT CHANGE UP (ref 135–145)
TROPONIN T SERPL-MCNC: 0.02 NG/ML — SIGNIFICANT CHANGE UP (ref 0–0.06)
TROPONIN T SERPL-MCNC: 0.06 NG/ML — SIGNIFICANT CHANGE UP (ref 0–0.06)
TROPONIN T SERPL-MCNC: <0.01 NG/ML — SIGNIFICANT CHANGE UP (ref 0–0.06)
WBC # BLD: 15.5 K/UL — HIGH (ref 4.8–10.8)
WBC # FLD AUTO: 15.5 K/UL — HIGH (ref 4.8–10.8)

## 2019-05-31 PROCEDURE — 99232 SBSQ HOSP IP/OBS MODERATE 35: CPT

## 2019-05-31 PROCEDURE — 99223 1ST HOSP IP/OBS HIGH 75: CPT

## 2019-05-31 PROCEDURE — 93010 ELECTROCARDIOGRAM REPORT: CPT | Mod: 76

## 2019-05-31 PROCEDURE — 99291 CRITICAL CARE FIRST HOUR: CPT

## 2019-05-31 PROCEDURE — 99497 ADVNCD CARE PLAN 30 MIN: CPT

## 2019-05-31 PROCEDURE — 99233 SBSQ HOSP IP/OBS HIGH 50: CPT

## 2019-05-31 PROCEDURE — 99222 1ST HOSP IP/OBS MODERATE 55: CPT

## 2019-05-31 PROCEDURE — 71045 X-RAY EXAM CHEST 1 VIEW: CPT | Mod: 26

## 2019-05-31 RX ORDER — VANCOMYCIN HCL 1 G
1000 VIAL (EA) INTRAVENOUS ONCE
Refills: 0 | Status: COMPLETED | OUTPATIENT
Start: 2019-05-31 | End: 2019-05-31

## 2019-05-31 RX ORDER — SODIUM CHLORIDE 9 MG/ML
1000 INJECTION, SOLUTION INTRAVENOUS
Refills: 0 | Status: DISCONTINUED | OUTPATIENT
Start: 2019-05-31 | End: 2019-06-04

## 2019-05-31 RX ORDER — BUDESONIDE, MICRONIZED 100 %
0.5 POWDER (GRAM) MISCELLANEOUS EVERY 12 HOURS
Refills: 0 | Status: DISCONTINUED | OUTPATIENT
Start: 2019-05-31 | End: 2019-06-04

## 2019-05-31 RX ORDER — INSULIN LISPRO 100/ML
10 VIAL (ML) SUBCUTANEOUS ONCE
Refills: 0 | Status: COMPLETED | OUTPATIENT
Start: 2019-05-31 | End: 2019-05-31

## 2019-05-31 RX ORDER — INSULIN GLARGINE 100 [IU]/ML
40 INJECTION, SOLUTION SUBCUTANEOUS AT BEDTIME
Refills: 0 | Status: DISCONTINUED | OUTPATIENT
Start: 2019-05-31 | End: 2019-06-04

## 2019-05-31 RX ORDER — ISOSORBIDE MONONITRATE 60 MG/1
30 TABLET, EXTENDED RELEASE ORAL DAILY
Refills: 0 | Status: DISCONTINUED | OUTPATIENT
Start: 2019-05-31 | End: 2019-06-04

## 2019-05-31 RX ORDER — INSULIN GLARGINE 100 [IU]/ML
30 INJECTION, SOLUTION SUBCUTANEOUS AT BEDTIME
Refills: 0 | Status: DISCONTINUED | OUTPATIENT
Start: 2019-05-31 | End: 2019-05-31

## 2019-05-31 RX ORDER — GABAPENTIN 400 MG/1
100 CAPSULE ORAL THREE TIMES A DAY
Refills: 0 | Status: DISCONTINUED | OUTPATIENT
Start: 2019-05-31 | End: 2019-06-04

## 2019-05-31 RX ORDER — DEXTROSE 50 % IN WATER 50 %
25 SYRINGE (ML) INTRAVENOUS ONCE
Refills: 0 | Status: DISCONTINUED | OUTPATIENT
Start: 2019-05-31 | End: 2019-06-04

## 2019-05-31 RX ORDER — VANCOMYCIN HCL 1 G
500 VIAL (EA) INTRAVENOUS EVERY 24 HOURS
Refills: 0 | Status: DISCONTINUED | OUTPATIENT
Start: 2019-06-01 | End: 2019-06-01

## 2019-05-31 RX ORDER — GLUCAGON INJECTION, SOLUTION 0.5 MG/.1ML
1 INJECTION, SOLUTION SUBCUTANEOUS ONCE
Refills: 0 | Status: DISCONTINUED | OUTPATIENT
Start: 2019-05-31 | End: 2019-06-04

## 2019-05-31 RX ORDER — LEVOTHYROXINE SODIUM 125 MCG
125 TABLET ORAL DAILY
Refills: 0 | Status: DISCONTINUED | OUTPATIENT
Start: 2019-05-31 | End: 2019-06-04

## 2019-05-31 RX ORDER — DEXTROSE 50 % IN WATER 50 %
15 SYRINGE (ML) INTRAVENOUS ONCE
Refills: 0 | Status: DISCONTINUED | OUTPATIENT
Start: 2019-05-31 | End: 2019-06-04

## 2019-05-31 RX ORDER — INSULIN GLARGINE 100 [IU]/ML
40 INJECTION, SOLUTION SUBCUTANEOUS EVERY MORNING
Refills: 0 | Status: DISCONTINUED | OUTPATIENT
Start: 2019-05-31 | End: 2019-06-04

## 2019-05-31 RX ORDER — SODIUM CHLORIDE 9 MG/ML
1000 INJECTION, SOLUTION INTRAVENOUS
Refills: 0 | Status: DISCONTINUED | OUTPATIENT
Start: 2019-05-31 | End: 2019-06-01

## 2019-05-31 RX ORDER — IPRATROPIUM/ALBUTEROL SULFATE 18-103MCG
3 AEROSOL WITH ADAPTER (GRAM) INHALATION EVERY 6 HOURS
Refills: 0 | Status: DISCONTINUED | OUTPATIENT
Start: 2019-05-31 | End: 2019-06-04

## 2019-05-31 RX ORDER — ASPIRIN/CALCIUM CARB/MAGNESIUM 324 MG
81 TABLET ORAL DAILY
Refills: 0 | Status: DISCONTINUED | OUTPATIENT
Start: 2019-05-31 | End: 2019-06-04

## 2019-05-31 RX ORDER — DEXTROSE 50 % IN WATER 50 %
12.5 SYRINGE (ML) INTRAVENOUS ONCE
Refills: 0 | Status: DISCONTINUED | OUTPATIENT
Start: 2019-05-31 | End: 2019-06-04

## 2019-05-31 RX ORDER — SODIUM POLYSTYRENE SULFONATE 4.1 MEQ/G
15 POWDER, FOR SUSPENSION ORAL ONCE
Refills: 0 | Status: COMPLETED | OUTPATIENT
Start: 2019-05-31 | End: 2019-05-31

## 2019-05-31 RX ORDER — INSULIN LISPRO 100/ML
VIAL (ML) SUBCUTANEOUS
Refills: 0 | Status: DISCONTINUED | OUTPATIENT
Start: 2019-05-31 | End: 2019-05-31

## 2019-05-31 RX ORDER — INSULIN GLARGINE 100 [IU]/ML
10 INJECTION, SOLUTION SUBCUTANEOUS EVERY MORNING
Refills: 0 | Status: DISCONTINUED | OUTPATIENT
Start: 2019-05-31 | End: 2019-05-31

## 2019-05-31 RX ORDER — METOPROLOL TARTRATE 50 MG
50 TABLET ORAL DAILY
Refills: 0 | Status: DISCONTINUED | OUTPATIENT
Start: 2019-05-31 | End: 2019-06-04

## 2019-05-31 RX ORDER — PIPERACILLIN AND TAZOBACTAM 4; .5 G/20ML; G/20ML
3.38 INJECTION, POWDER, LYOPHILIZED, FOR SOLUTION INTRAVENOUS EVERY 12 HOURS
Refills: 0 | Status: DISCONTINUED | OUTPATIENT
Start: 2019-05-31 | End: 2019-05-31

## 2019-05-31 RX ORDER — HEPARIN SODIUM 5000 [USP'U]/ML
5000 INJECTION INTRAVENOUS; SUBCUTANEOUS EVERY 12 HOURS
Refills: 0 | Status: DISCONTINUED | OUTPATIENT
Start: 2019-05-31 | End: 2019-06-04

## 2019-05-31 RX ORDER — INSULIN LISPRO 100/ML
15 VIAL (ML) SUBCUTANEOUS
Refills: 0 | Status: DISCONTINUED | OUTPATIENT
Start: 2019-05-31 | End: 2019-05-31

## 2019-05-31 RX ORDER — INSULIN LISPRO 100/ML
VIAL (ML) SUBCUTANEOUS EVERY 4 HOURS
Refills: 0 | Status: DISCONTINUED | OUTPATIENT
Start: 2019-05-31 | End: 2019-05-31

## 2019-05-31 RX ORDER — IPRATROPIUM/ALBUTEROL SULFATE 18-103MCG
3 AEROSOL WITH ADAPTER (GRAM) INHALATION ONCE
Refills: 0 | Status: COMPLETED | OUTPATIENT
Start: 2019-05-31 | End: 2019-05-31

## 2019-05-31 RX ORDER — INSULIN HUMAN 100 [IU]/ML
INJECTION, SOLUTION SUBCUTANEOUS EVERY 4 HOURS
Refills: 0 | Status: DISCONTINUED | OUTPATIENT
Start: 2019-05-31 | End: 2019-05-31

## 2019-05-31 RX ORDER — PIPERACILLIN AND TAZOBACTAM 4; .5 G/20ML; G/20ML
3.38 INJECTION, POWDER, LYOPHILIZED, FOR SOLUTION INTRAVENOUS EVERY 8 HOURS
Refills: 0 | Status: DISCONTINUED | OUTPATIENT
Start: 2019-05-31 | End: 2019-06-01

## 2019-05-31 RX ORDER — MAGNESIUM SULFATE 500 MG/ML
2 VIAL (ML) INJECTION ONCE
Refills: 0 | Status: COMPLETED | OUTPATIENT
Start: 2019-05-31 | End: 2019-05-31

## 2019-05-31 RX ORDER — FUROSEMIDE 40 MG
40 TABLET ORAL DAILY
Refills: 0 | Status: DISCONTINUED | OUTPATIENT
Start: 2019-05-31 | End: 2019-05-31

## 2019-05-31 RX ORDER — PIPERACILLIN AND TAZOBACTAM 4; .5 G/20ML; G/20ML
3.38 INJECTION, POWDER, LYOPHILIZED, FOR SOLUTION INTRAVENOUS ONCE
Refills: 0 | Status: COMPLETED | OUTPATIENT
Start: 2019-05-31 | End: 2019-05-31

## 2019-05-31 RX ORDER — PANTOPRAZOLE SODIUM 20 MG/1
40 TABLET, DELAYED RELEASE ORAL
Refills: 0 | Status: DISCONTINUED | OUTPATIENT
Start: 2019-05-31 | End: 2019-06-04

## 2019-05-31 RX ORDER — SODIUM CHLORIDE 9 MG/ML
3000 INJECTION INTRAMUSCULAR; INTRAVENOUS; SUBCUTANEOUS ONCE
Refills: 0 | Status: COMPLETED | OUTPATIENT
Start: 2019-05-31 | End: 2019-05-31

## 2019-05-31 RX ORDER — INSULIN LISPRO 100/ML
20 VIAL (ML) SUBCUTANEOUS
Refills: 0 | Status: DISCONTINUED | OUTPATIENT
Start: 2019-05-31 | End: 2019-06-01

## 2019-05-31 RX ORDER — ONDANSETRON 8 MG/1
8 TABLET, FILM COATED ORAL ONCE
Refills: 0 | Status: COMPLETED | OUTPATIENT
Start: 2019-05-31 | End: 2019-05-31

## 2019-05-31 RX ORDER — INSULIN GLARGINE 100 [IU]/ML
20 INJECTION, SOLUTION SUBCUTANEOUS EVERY MORNING
Refills: 0 | Status: DISCONTINUED | OUTPATIENT
Start: 2019-05-31 | End: 2019-05-31

## 2019-05-31 RX ORDER — INSULIN LISPRO 100/ML
10 VIAL (ML) SUBCUTANEOUS
Refills: 0 | Status: DISCONTINUED | OUTPATIENT
Start: 2019-05-31 | End: 2019-05-31

## 2019-05-31 RX ORDER — INSULIN LISPRO 100/ML
VIAL (ML) SUBCUTANEOUS
Refills: 0 | Status: DISCONTINUED | OUTPATIENT
Start: 2019-05-31 | End: 2019-06-01

## 2019-05-31 RX ORDER — DILTIAZEM HCL 120 MG
240 CAPSULE, EXT RELEASE 24 HR ORAL DAILY
Refills: 0 | Status: DISCONTINUED | OUTPATIENT
Start: 2019-05-31 | End: 2019-06-04

## 2019-05-31 RX ORDER — VANCOMYCIN HCL 1 G
500 VIAL (EA) INTRAVENOUS EVERY 12 HOURS
Refills: 0 | Status: DISCONTINUED | OUTPATIENT
Start: 2019-05-31 | End: 2019-05-31

## 2019-05-31 RX ORDER — SODIUM CHLORIDE 9 MG/ML
500 INJECTION INTRAMUSCULAR; INTRAVENOUS; SUBCUTANEOUS ONCE
Refills: 0 | Status: COMPLETED | OUTPATIENT
Start: 2019-05-31 | End: 2019-05-31

## 2019-05-31 RX ORDER — INSULIN HUMAN 100 [IU]/ML
10 INJECTION, SOLUTION SUBCUTANEOUS ONCE
Refills: 0 | Status: COMPLETED | OUTPATIENT
Start: 2019-05-31 | End: 2019-05-31

## 2019-05-31 RX ADMIN — Medication 0.5 MILLIGRAM(S): at 21:08

## 2019-05-31 RX ADMIN — Medication 3 MILLILITER(S): at 21:08

## 2019-05-31 RX ADMIN — Medication 10 UNIT(S): at 18:17

## 2019-05-31 RX ADMIN — GABAPENTIN 100 MILLIGRAM(S): 400 CAPSULE ORAL at 15:26

## 2019-05-31 RX ADMIN — GABAPENTIN 100 MILLIGRAM(S): 400 CAPSULE ORAL at 22:12

## 2019-05-31 RX ADMIN — PIPERACILLIN AND TAZOBACTAM 200 GRAM(S): 4; .5 INJECTION, POWDER, LYOPHILIZED, FOR SOLUTION INTRAVENOUS at 13:10

## 2019-05-31 RX ADMIN — Medication 3 MILLILITER(S): at 14:38

## 2019-05-31 RX ADMIN — Medication 250 MILLIGRAM(S): at 15:06

## 2019-05-31 RX ADMIN — Medication 50 GRAM(S): at 11:30

## 2019-05-31 RX ADMIN — SODIUM POLYSTYRENE SULFONATE 15 GRAM(S): 4.1 POWDER, FOR SUSPENSION ORAL at 18:36

## 2019-05-31 RX ADMIN — SODIUM CHLORIDE 100 MILLILITER(S): 9 INJECTION, SOLUTION INTRAVENOUS at 15:09

## 2019-05-31 RX ADMIN — Medication 2 GRAM(S): at 11:51

## 2019-05-31 RX ADMIN — INSULIN GLARGINE 40 UNIT(S): 100 INJECTION, SOLUTION SUBCUTANEOUS at 22:07

## 2019-05-31 RX ADMIN — SODIUM CHLORIDE 1500 MILLILITER(S): 9 INJECTION INTRAMUSCULAR; INTRAVENOUS; SUBCUTANEOUS at 11:58

## 2019-05-31 RX ADMIN — INSULIN HUMAN 8: 100 INJECTION, SOLUTION SUBCUTANEOUS at 22:34

## 2019-05-31 RX ADMIN — Medication 10: at 18:17

## 2019-05-31 RX ADMIN — Medication 60 MILLIGRAM(S): at 23:49

## 2019-05-31 RX ADMIN — SODIUM CHLORIDE 3000 MILLILITER(S): 9 INJECTION INTRAMUSCULAR; INTRAVENOUS; SUBCUTANEOUS at 12:03

## 2019-05-31 RX ADMIN — Medication 10 UNIT(S): at 15:22

## 2019-05-31 RX ADMIN — SODIUM CHLORIDE 500 MILLILITER(S): 9 INJECTION INTRAMUSCULAR; INTRAVENOUS; SUBCUTANEOUS at 18:15

## 2019-05-31 RX ADMIN — ONDANSETRON 8 MILLIGRAM(S): 8 TABLET, FILM COATED ORAL at 11:25

## 2019-05-31 RX ADMIN — PIPERACILLIN AND TAZOBACTAM 25 GRAM(S): 4; .5 INJECTION, POWDER, LYOPHILIZED, FOR SOLUTION INTRAVENOUS at 22:07

## 2019-05-31 RX ADMIN — Medication 3 MILLILITER(S): at 11:25

## 2019-05-31 RX ADMIN — INSULIN HUMAN 10 UNIT(S): 100 INJECTION, SOLUTION SUBCUTANEOUS at 12:03

## 2019-05-31 RX ADMIN — HEPARIN SODIUM 5000 UNIT(S): 5000 INJECTION INTRAVENOUS; SUBCUTANEOUS at 18:25

## 2019-05-31 RX ADMIN — Medication 60 MILLIGRAM(S): at 18:25

## 2019-05-31 NOTE — CONSULT NOTE ADULT - ATTENDING COMMENTS
Current symptoms due to COPD. ct current management, Transthoracic echocardiogram , cardiac enzymes. telemetry.   ct coronary artery disease meds.

## 2019-05-31 NOTE — ED ADULT TRIAGE NOTE - CHIEF COMPLAINT QUOTE
Pt BIBA A&Ox3 c/o shortness of breath, hx of COPD and stents. Pt rec'd 2 duonebs and 125mg solumedrol by ems. Pt states " it feels cardiac." Pt denies any chest pain or discomfort. Pt tachycardic and tachypneic in triage, brought directly to critical care, MD Sweeney and CCRN at bedside.  in triage.

## 2019-05-31 NOTE — H&P ADULT - NSHPLABSRESULTS_GEN_ALL_CORE
12.9   15.5  )-----------( 274      ( 31 May 2019 11:12 )             39.9       05-31    133<L>  |  91<L>  |  58.0<H>  ----------------------------<  454<H>  5.5<H>   |  21.0<L>  |  1.81<H>    Ca    9.6      31 May 2019 17:29  Mg     1.9     05-31    TPro  7.8  /  Alb  4.1  /  TBili  0.5  /  DBili  x   /  AST  16  /  ALT  13  /  AlkPhos  81  05-31    CARDIAC MARKERS ( 31 May 2019 14:48 )  x     / 0.02 ng/mL / x     / x     / x      CARDIAC MARKERS ( 31 May 2019 11:12 )  x     / <0.01 ng/mL / x     / x     / x          ABG - ( 31 May 2019 13:40 )  pH, Arterial: 7.43  pH, Blood: x     /  pCO2: 36    /  pO2: 89    / HCO3: 25    / Base Excess: 0.2   /  SaO2: 98            < from: Xray Chest 1 View- PORTABLE-Urgent (05.31.19 @ 11:38) >    IMPRESSION: No focal consolidation.    < end of copied text > 12.9   15.5  )-----------( 274      ( 31 May 2019 11:12 )             39.9       05-31    133<L>  |  91<L>  |  58.0<H>  ----------------------------<  454<H>  5.5<H>   |  21.0<L>  |  1.81<H>    Ca    9.6      31 May 2019 17:29  Mg     1.9     05-31    TPro  7.8  /  Alb  4.1  /  TBili  0.5  /  DBili  x   /  AST  16  /  ALT  13  /  AlkPhos  81  05-31    CARDIAC MARKERS ( 31 May 2019 14:48 )  x     / 0.02 ng/mL / x     / x     / x      CARDIAC MARKERS ( 31 May 2019 11:12 )  x     / <0.01 ng/mL / x     / x     / x          ABG - ( 31 May 2019 13:40 )  pH, Arterial: 7.43  pH, Blood: x     /  pCO2: 36    /  pO2: 89    / HCO3: 25    / Base Excess: 0.2   /  SaO2: 98            Summary:   1. Technically difficult study.   2. Hyperdynamic global left ventricular systolic function.   3. Left ventricular ejection fraction, by visual estimation, is >75%.   4. Spectral Doppler shows impaired relaxation pattern of left   ventricular myocardial filling (Grade I diastolic dysfunction).   5. There is mild concentric left ventricular hypertrophy.   6. Thickening and calcification of the anterior and posterior mitral   valve leaflets.   7. Mildly enlarged left atrium.   8. Trace tricuspid regurgitation.   9. Sclerotic aortic valve with decreased opening.  10. Trace pulmonic valve regurgitation.  11. Normal right ventricular size and function.  12. Pericardium and IVC was not well visualized.    < end of copied text >      < from: Xray Chest 1 View- PORTABLE-Urgent (05.31.19 @ 11:38) >    IMPRESSION: No focal consolidation.    < end of copied text >

## 2019-05-31 NOTE — CONSULT NOTE ADULT - SUBJECTIVE AND OBJECTIVE BOX
Patient is a 84y old  Female who presents with a chief complaint of COPD exacerbation (31 May 2019 17:47)    HPI:  84F with T2DM, hypothyroidism, htn, Hld, CAD with recent Stents, moderate AS , Right side CHF ,COPD and multiple hospitalizations over the past year presents with two days of SOB, worse with minimal exertion, worse with Lying down, some chest pressure and difficulty breathing. s/p solumedro 125x1.   sees Dr. Barfield in the clinic  reports having difficulty managing FS  meds: basaglar 40 units BID, novolog 25 units TID, lt4 125mcg daily  daughter at bedside.   patient reports still feeling dyspneic  did not want to be intubated    PAST MEDICAL & SURGICAL HISTORY:  NSTEMI (non-ST elevated myocardial infarction)  Hypothyroid  HLD (hyperlipidemia)  HTN (hypertension)  CAD (coronary artery disease)  Asthma  Diabetes  Gastroesophageal reflux disease without esophagitis  Pure hypercholesterolemia  Hypothyroidism, unspecified type  Essential hypertension  DM2 (diabetes mellitus, type 2)  Uncomplicated asthma, unspecified asthma severity  Abnormal findings on cardiac catheterization: Cardiac Cath  History of appendectomy  History of appendectomy      SH: lives with family. no etoh/smoking    FAMILY HISTORY:  Family history of stomach cancer  Family history of MI (myocardial infarction)  Family history of cancer in mother  Family history of heart disease        Allergies    iodine (Hives)  iodine containing compounds (Unknown)  shellfish (Anaphylaxis)  shellfish (Swelling; Short breath)    Intolerances        REVIEW OF SYSTEMS:    CONSTITUTIONAL: No fever, weight loss, or fatigue  EYES: No eye pain, visual disturbances, or discharge  ENMT:  No difficulty hearing, tinnitus, vertigo; No sinus or throat pain  NECK: No pain or stiffness  RESPIRATORY: No cough, wheezing, chills or hemoptysis  CARDIOVASCULAR: No chest pain, palpitations, dizziness, or leg swelling  GASTROINTESTINAL: No abdominal or epigastric pain. No nausea, vomiting, or hematemesis; No diarrhea or constipation. No melena or hematochezia.  NEUROLOGICAL: No headaches, memory loss, loss of strength, numbness, or tremors  SKIN: No itching, burning, rashes, or lesions   MUSCULOSKELETAL: No joint pain or swelling; No muscle, back, or extremity pain  PSYCHIATRIC: No depression, anxiety, mood swings, or difficulty sleeping        MEDICATIONS  (STANDING):  ALBUTerol/ipratropium for Nebulization 3 milliLiter(s) Nebulizer every 6 hours  aspirin enteric coated 81 milliGRAM(s) Oral daily  buDESOnide   0.5 milliGRAM(s) Respule 0.5 milliGRAM(s) Inhalation every 12 hours  dextrose 5%. 1000 milliLiter(s) (50 mL/Hr) IV Continuous <Continuous>  dextrose 50% Injectable 12.5 Gram(s) IV Push once  dextrose 50% Injectable 25 Gram(s) IV Push once  dextrose 50% Injectable 25 Gram(s) IV Push once  diltiazem    milliGRAM(s) Oral daily  gabapentin 100 milliGRAM(s) Oral three times a day  heparin  Injectable 5000 Unit(s) SubCutaneous every 12 hours  insulin glargine Injectable (LANTUS) 40 Unit(s) SubCutaneous at bedtime  insulin glargine Injectable (LANTUS) 40 Unit(s) SubCutaneous every morning  insulin lispro (HumaLOG) corrective regimen sliding scale   SubCutaneous every 4 hours  insulin lispro Injectable (HumaLOG) 20 Unit(s) SubCutaneous three times a day before meals  isosorbide   mononitrate ER Tablet (IMDUR) 30 milliGRAM(s) Oral daily  lactated ringers. 1000 milliLiter(s) (100 mL/Hr) IV Continuous <Continuous>  levothyroxine 125 MICROGram(s) Oral daily  methylPREDNISolone sodium succinate Injectable 60 milliGRAM(s) IV Push every 6 hours  metoprolol succinate ER 50 milliGRAM(s) Oral daily  pantoprazole    Tablet 40 milliGRAM(s) Oral before breakfast  piperacillin/tazobactam IVPB. 3.375 Gram(s) IV Intermittent every 8 hours    MEDICATIONS  (PRN):  benzonatate 100 milliGRAM(s) Oral three times a day PRN Cough  dextrose 40% Gel 15 Gram(s) Oral once PRN Blood Glucose LESS THAN 70 milliGRAM(s)/deciliter  glucagon  Injectable 1 milliGRAM(s) IntraMuscular once PRN Glucose LESS THAN 70 milligrams/deciliter      Vital Signs Last 24 Hrs  T(C): 36.8 (31 May 2019 20:33), Max: 37 (31 May 2019 10:51)  T(F): 98.2 (31 May 2019 20:33), Max: 98.6 (31 May 2019 10:51)  HR: 111 (31 May 2019 19:55) (111 - 120)  BP: 133/82 (31 May 2019 19:55) (133/82 - 152/72)  BP(mean): --  RR: 22 (31 May 2019 19:55) (19 - 26)  SpO2: 96% (31 May 2019 19:55) (93% - 99%)      PHYSICAL EXAM:    Constitutional: NAD, elderly  HEENT: EOMI, no exophalmos  Neck: trachea midline, no thyroid enlargement  Respiratory: diffuse expiratory wheeze, tachypnea  Cardiovascular: S1 and S2, RRR  Gastrointestinal: BS+, soft, nntnd  Extremities: No peripheral edema  Neurological: A/O x 3, no focal deficits, fine tremors  Psychiatric: Normal mood, normal affect          LABS  05-31    133<L>  |  91<L>  |  58.0<H>  ----------------------------<  454<H>  5.5<H>   |  21.0<L>  |  1.81<H>    Ca    9.6      31 May 2019 17:29  Mg     1.9     05-31    TPro  7.8  /  Alb  4.1  /  TBili  0.5  /  DBili  x   /  AST  16  /  ALT  13  /  AlkPhos  81  05-31                          12.9   15.5  )-----------( 274      ( 31 May 2019 11:12 )             39.9               Alkaline Phosphatase, Serum: 81 U/L (05-31-19 @ 11:12)  Alanine Aminotransferase (ALT/SGPT): 13 U/L (05-31-19 @ 11:12)  Albumin, Serum: 4.1 g/dL (05-31-19 @ 11:12)  Aspartate Aminotransferase (AST/SGOT): 16 U/L (05-31-19 @ 11:12)      CAPILLARY BLOOD GLUCOSE      POCT Blood Glucose.: 391 mg/dL (31 May 2019 18:01)  POCT Blood Glucose.: 406 mg/dL (31 May 2019 14:44)  POCT Blood Glucose.: 453 mg/dL (31 May 2019 10:53)

## 2019-05-31 NOTE — CONSULT NOTE ADULT - SUBJECTIVE AND OBJECTIVE BOX
[facility]                                                        Office: [address] [address]                                                       Telephone: [phone]. Fax:[phone]                                                              CARDIOLOGY CONSULTATION NOTE                                                                                             Consult requested by:  Dr. Sweeney    Reason for Consultation: Dyspnea    History obtained by: Patient and medical record     obtained: No    Chief complaint:    Patient is a 84y old  Female who presents with a chief complaint of dyspnea    HPI:  83yo female with PMH of CAD with stent x4 (most recent NSTEMI 12/2018 ADELIA to pLCX, previous ADELIA pLAD, ADELIA x mLAD, ADELIA OM1, ADELIA to LCx, and  to % with no intervention to date), HFpEF (EF>75%), COPD, asthma, moderate AS, mild pulmonary HTN, STEVENSON (CPAP), insulin dependent DMII, HTN, HLD, and hypothyroidism Cardio is Kennedy and PMD is [place], Endo is Ionica, Mcginnis is Pulm. Per ED attending "BIBA with duoneb x2 and solumedrol 125.  Speaking in full sentences.  AAox3.  HHA bedside.  At presentation, As interpreted by ED physician, ECG is tachy to 126 but NSR with STD in lateral leads and qwaves in V1/V2 but largely unchanged from ECG on record from 3/2019.  Otherwise normal intervals/axis, no other changes in QRS, no other ST/T changes. Non toxic. BIPAP initiated immmediately upon arrival." SOB, productive cough, feverish, and elevated BS worsening for 2 days. c/o chest "soreness" since cough started, increases with inspiration and cough. Denies  palpitations, irregular and/or rapid heart beat, syncope/near syncope, dizziness, edema, n/v/d, hematuria, or hematochezia.       REVIEW OF SYMPTOMS: Cardiovascular:  See HPI.   Respiratory:  see HPI  Genitourinary:  No dysuria, no hematuria;   Gastrointestinal:  No nausea, no vomiting. No diarrhea.  No abdominal pain. No dark color stool, no melena ;   Neurological: No headache, no dizziness, no slurred speech;    Psychiatric: No agitation, no anxiety.    ALL OTHER REVIEW OF SYSTEMS ARE NEGATIVE.    ALLERGIES: Allergies    iodine (Hives)  iodine containing compounds (Unknown)  shellfish (Anaphylaxis)  shellfish (Swelling; Short breath)    Intolerances          CURRENT MEDICATIONS:  diltiazem    milliGRAM(s) Oral daily  isosorbide   mononitrate ER Tablet (IMDUR) 30 milliGRAM(s) Oral daily  metoprolol succinate ER 50 milliGRAM(s) Oral daily    ALBUTerol/ipratropium for Nebulization  buDESOnide   0.5 milliGRAM(s) Respule   gabapentin  pantoprazole    Tablet  aspirin enteric coated  heparin  Injectable  levothyroxine  methylPREDNISolone sodium succinate Injectable  piperacillin/tazobactam IVPB.  piperacillin/tazobactam IVPB.  vancomycin  IVPB  vancomycin  IVPB      HOME MEDICATIONS:  albuterol 0.63 mg/3 mL (0.021%) inhalation solution: 3 milliliter(s) inhaled every 6 hours, As Needed (29 Mar 2019 13:27)  aspirin 81 mg oral delayed release tablet: 1 tab(s) orally once a day (29 Mar 2019 13:27)  DilTIAZem Hydrochloride  mg/24 hours oral capsule, extended release: 1 cap(s) orally once a day (29 Mar 2019 13:27)  furosemide 40 mg oral tablet: 1 tab(s) orally once a day (29 Mar 2019 13:27)  gabapentin 100 mg oral capsule: 1 cap(s) orally 3 times a day (03 Apr 2019 14:56)  levothyroxine 125 mcg (0.125 mg) oral tablet: 1 tab(s) orally once a day (29 Mar 2019 13:27)  lisinopril 5 mg oral tablet: 1 tab(s) orally once a day (29 Mar 2019 13:27)  metoprolol succinate 50 mg oral tablet, extended release: 1 tab(s) orally once a day (29 Mar 2019 13:27)  omeprazole 40 mg oral delayed release capsule: 1 cap(s) orally once a day (29 Mar 2019 13:27)  Spiriva 18 mcg inhalation capsule: 1 cap(s) inhaled once a day (29 Mar 2019 13:27)      PAST MEDICAL HISTORY  NSTEMI (non-ST elevated myocardial infarction)  Hypothyroid  HLD (hyperlipidemia)  HTN (hypertension)  CAD (coronary artery disease)  Asthma  Diabetes  Gastroesophageal reflux disease without esophagitis  Pure hypercholesterolemia  Hypothyroidism, unspecified type  Essential hypertension  DM2 (diabetes mellitus, type 2)  Uncomplicated asthma, unspecified asthma severity      PAST SURGICAL HISTORY  Abnormal findings on cardiac catheterization  History of appendectomy  History of appendectomy  No significant past surgical history      FAMILY HISTORY:  Family history of stomach cancer  Family history of MI (myocardial infarction)  Family history of cancer in mother  Family history of heart disease      SOCIAL HISTORY:  lives alone in an adult independent living community    CIGARETTES:   quit 40+yrs ago    ALCOHOL: denies    DRUGS: denies    Vital Signs Last 24 Hrs  T(C): 37 (31 May 2019 10:51), Max: 37 (31 May 2019 10:51)  T(F): 98.6 (31 May 2019 10:51), Max: 98.6 (31 May 2019 10:51)  HR: 116 (31 May 2019 12:50) (116 - 120)  BP: 136/52 (31 May 2019 10:51) (136/52 - 136/52)  BP(mean): --  RR: 26 (31 May 2019 10:51) (26 - 26)  SpO2: 99% (31 May 2019 12:50) (99% - 99%)      PHYSICAL EXAM:  Constitutional: Comfortable . No acute distress.   HEENT: Atraumatic and normocephalic , neck is supple . no JVD. No carotid bruit. PEERL   CNS: A&Ox3. No focal deficits. EOMI. Cranial nerves II-IX are intact.   Lymph Nodes: Cervical : Not palpable.  Respiratory: b/l diminished bases  Cardiovascular: S1S2 RRR. II/VI systolic murmur No rubs or gallop.  Gastrointestinal: Soft non-tender and distended. +Bowel sounds though weak. Negative Leyva's sign.  Extremities: b/l 1+ pitting edema  Psychiatric: Calm . no agitation.  Skin: No skin rash/ulcers visualized to face, hands or feet.    Intake and output:     LABS:                        12.9   15.5  )-----------( 274      ( 31 May 2019 11:12 )             39.9     05-31    133<L>  |  89<L>  |  63.0<H>  ----------------------------<  433<H>  5.1   |  24.0  |  1.65<H>    Ca    9.8      31 May 2019 11:12  Mg     1.9     05-31    TPro  7.8  /  Alb  4.1  /  TBili  0.5  /  DBili  x   /  AST  16  /  ALT  13  /  AlkPhos  81  05-31    CARDIAC MARKERS ( 31 May 2019 11:12 )  x     / <0.01 ng/mL / x     / x     / x        ;p-BNP=Serum Pro-Brain Natriuretic Peptide: 815 pg/mL (05-31 @ 11:12)    PT/INR - ( 31 May 2019 11:12 )   PT: 10.7 sec;   INR: 0.93 ratio         PTT - ( 31 May 2019 11:12 )  PTT:25.9 sec      INTERPRETATION OF TELEMETRY: sinus tach  ECG: Inus tach 126bpm, ST depressions in lateral leads    RADIOLOGY & ADDITIONAL STUDIES:    X-ray:    < from: Xray Chest 1 View- PORTABLE-Urgent (05.31.19 @ 11:38) >   EXAM:  XR CHEST PORTABLE URGENT 1V                          PROCEDURE DATE:  05/31/2019          INTERPRETATION:  CLINICAL INFORMATION: COPD exacerbation. . .     EXAM: AP chest.    COMPARISON: Prior radiographs dated 3/29/2019    FINDINGS:  No focal consolidation.  No pleural effusions or pneumothorax.  There is cardiomegaly.   Atherosclerotic calcifications in the aorta. Coronary arterial stent.    IMPRESSION: No focal consolidation.    < end of copied text >    ECHO FINDINGS:   < from: TTE Echo Complete w/Doppler (12.11.18 @ 22:13) >  Summary:   1. Technically difficult study.   2. Hyperdynamic global left ventricular systolic function.   3. Left ventricular ejection fraction, by visual estimation, is >75%.   4. Spectral Doppler shows impaired relaxation pattern of left   ventricular myocardial filling (Grade I diastolic dysfunction).   5. There is mild concentric left ventricular hypertrophy.   6. Thickening and calcification of the anterior and posterior mitral   valve leaflets.   7. Mildly enlarged left atrium.   8. Trace tricuspid regurgitation.   9. Sclerotic aortic valve with decreased opening.  10. Trace pulmonic valve regurgitation.  11. Normal right ventricular size and function.  12. Pericardium and IVC was not well visualized.    [id number] Milton Benton MD, Electronically signed on 12/12/2018 at 9:05:12   AM       < end of copied text >    STRESS  FINDINGS:   < from: Stress Echocardiogram (05.04.18 @ 13:33) >  Summary:   1. Negativestress echo for ischemia.   2. Resting LVOT Vmax 152 cm/sec      Resting Aortic Valve Vmax 290cm/sed, PG 34 mm Hg, Mean gradient is 20   mm Hg suggestive of Moderate Aortic Stenosis.                  At Low Dose There was Midcavity LVOT obstruction with a Vmax 400   cm/sec, PG 64 mm Hg.    < end of copied text >

## 2019-05-31 NOTE — H&P ADULT - ASSESSMENT
84 yr old female with known history of DM, htn, Hld, CAD with recent Stents, moderate AS , Right side CHF ,COPD and multiple hospitalizations over the past year presents with CC of two days of SOB. a/w      per ED attending extensive wheezing and crackles with Resp failure with possible COPD exacerbation and pneumonia - started ABX Duoneb and solumedrol- please deescalate with clinical improvemnt and culture results  cardiology and pulmonary consults called by ED attending   CAD- cont BB statin aspirin  DM- FS with HISS-   DVT PPX- heparin SQ  Renal failure - with stable serum creatinine compared to last discharge labs -   HIGH LACTATE- normal serum bicarb- please repeat level in four hours and adjust fluids accordingly, holding LASIX and ACEI today - can possibly restart in AM   Multiple recent hospitalization- recommends palliative and Goals of care discussion 84 yr old female with known history of DM, htn, Hld, CAD with recent Stents, moderate AS , Right side CHF ,COPD and multiple hospitalizations over the past year presents with CC of two days of SOB. a/w     acute hypoxic resp failure: likely due to COPD exacerbation. no sign of CHF/ACS  s/p BIPAP. currently off biPAP  ABG done after treatment with BiPAP  admitted to SDU  c/w neb, steroid, taper, Abx  trend trop/EKG. 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   s/p levaquin in ER, recieved 3L IVF on admission,   on LR@100 mls/hr  ordered 500 mls NS bolus  c/w vanco, zosyn  f/u BC  end sputum cx, urine legionella  consider CT chest  repeat lactate at 8 pm  cirrent hypoxia    Uncontrolled DM: elevated AG, but normal Ph on Abg  acetone level pending  ?DKA  NPO  lantus, humalog, [id number] accucheck Q4hrs until BS better controlled.  endo cx called  a1c.   repeat BMP elevated AG and .   Humalog 10 unit stat ordered  MICU eval was called    Elevated lactate: likely multifactorial  sepsis, hypoxia, dehydration, TRENTON on CKD  c/w IVF, ABX  repeat lactate at 8 pm  caustion: risk of fluid overload with IVF    TRENTON on CKD3:  gentle IVF  hold ACEI,laisx for now  avoid nephrotoxic meds  monitor renal funciton    CAD: no ssx of ACS  see by cardio. suggested to trend CE and EKG  neg CE and EKG*2  c/w BB, statin, ASA    DVT-P: heparin     GOC: discussed with patient and son at bedside. MOLST form completed. DNR/DNI  discussed in details current comorbidities, plan of care, prognosis. answered all qs.   GOC discussion 30 min 84 yr old female with known history of DM, htn, Hld, CAD with recent Stents, moderate AS , Right side CHF ,COPD and multiple hospitalizations over the past year presents with CC of two days of SOB. a/w     acute hypoxic resp failure: likely due to COPD exacerbation. no sign of CHF/ACS  s/p BIPAP. currently off biPAP  ABG done after treatment with BiPAP  admitted to SDU  c/w neb, steroid, taper, Abx  trend trop/EKG. 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   s/p levaquin in ER, recieved 3L IVF on admission,   on LR@100 mls/hr  ordered 500 mls NS bolus  c/w vanco, zosyn  f/u BC  end sputum cx, urine legionella  consider CT chest  repeat lactate at 8 pm  cirrent hypoxia    Uncontrolled DM: elevated AG, but normal Ph on Abg  acetone level pending  ?DKA  NPO except meds for now. will start diet once BS better controlled  lantus, humalog, [id number] accucheck Q4hrs until BS better controlled.  endo cx called  a1c.   repeat BMP elevated AG and .   Humalog 10 unit stat ordered  MICU eval was called    Elevated lactate: likely multifactorial  sepsis, hypoxia, dehydration, TRENTON on CKD  c/w IVF, ABX  repeat lactate at 8 pm  caution: risk of fluid overload with IVF    TRENTON on CKD3:  gentle IVF  hold ACEI,laisx for now  avoid nephrotoxic meds  monitor renal function    CAD: no ssx of ACS  see by cardio. suggested to trend CE and EKG  neg CE and EKG*2  c/w BB, statin, ASA    DVT-P: heparin     GOC: discussed with patient and son at bedside. MOLST form completed. DNR/DNI  discussed in details current comorbidities, plan of care, prognosis. answered all qs.   GOC discussion 30 min 84 yr old female with known history of DM, htn, Hld, CAD with recent Stents, moderate AS , Right side CHF ,COPD and multiple hospitalizations over the past year presents with CC of two days of SOB. a/w     acute hypoxic resp failure: likely due to COPD exacerbation. no sign of CHF/ACS  s/p BIPAP. currently off biPAP  ABG done after treatment with BiPAP  admitted to SDU  c/w neb, steroid, taper, Abx  trend trop/EKG. 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   s/p levaquin in ER, recieved 3L IVF on admission,   on LR@100 mls/hr  ordered 500 mls NS bolus  c/w vanco, zosyn  f/u BC  end sputum cx, urine legionella  consider CT chest  repeat lactate at 8 pm  cirrent hypoxia    Uncontrolled DM: elevated AG, but normal Ph on Abg  acetone level pending  ?DKA  NPO except meds for now. will start diet once BS better controlled  lantus, humalog, [id number] accucheck Q4hrs until BS better controlled.  endo cx called  a1c.   repeat BMP elevated AG and . ?elevated Ag due to TRENTON/CKD vs DKA  Humalog 10 unit stat ordered  MICU eval was called    Elevated lactate: likely multifactorial  sepsis, hypoxia, dehydration, TRENTON on CKD  c/w IVF, ABX  repeat lactate at 8 pm  caution: risk of fluid overload with IVF    TRENTON on CKD3:  gentle IVF  hold ACEI,laisx for now  avoid nephrotoxic meds  monitor renal function    CAD: no ssx of ACS  see by cardio. suggested to trend CE and EKG  neg CE and EKG*2  c/w BB, statin, ASA    DVT-P: heparin     GOC: discussed with patient and son at bedside. MOLST form completed. DNR/DNI  discussed in details current comorbidities, plan of care, prognosis. answered all qs.   GOC discussion 30 min 84 yr old female with known history of DM, htn, Hld, CAD with recent Stents, moderate AS , Right side CHF ,COPD and multiple hospitalizations over the past year presents with CC of two days of SOB. a/w     acute hypoxic resp failure: likely due to COPD exacerbation. no sign of CHF/ACS  s/p BIPAP. currently off biPAP  ABG done after treatment with BiPAP  admitted to SDU  c/w neb, steroid, taper, Abx  trend trop/EKG. 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   s/p levaquin in ER, recieved 3L IVF on admission,   on LR@100 mls/hr  ordered 500 mls NS bolus  c/w vanco, zosyn  f/u BC  send sputum cx,   consider CT chest  repeat lactate at 8 pm  cirrent hypoxia    Uncontrolled DM: elevated AG, but normal Ph on Abg  acetone level pending  ?DKA  NPO except meds for now. will start diet once BS better controlled. initially ordered diet with premeals  lantus, humalog, [id number] accucheck Q4hrs until BS better controlled.  endo cx called  a1c. lipid  repeat BMP elevated AG and . ?elevated Ag due to TRENTON/CKD vs DKA  Humalog 10 unit stat ordered  MICU eval was called    Elevated lactate: likely multifactorial  sepsis, hypoxia, dehydration, TRENTON on CKD  c/w IVF, ABX  repeat lactate at 8 pm  caution: risk of fluid overload with IVF    TRENTON on CKD3:  gentle IVF  hold ACEI,laisx for now  avoid nephrotoxic meds  monitor renal function    CAD: no ssx of ACS  see by cardio. suggested to trend CE and EKG  neg CE and EKG*2  c/w BB, statin, ASA    DVT-P: heparin     GOC: discussed with patient and son at bedside. MOLST form completed. DNR/DNI  discussed in details current comorbidities, plan of care, prognosis. answered all qs.   GOC discussion 30 min

## 2019-05-31 NOTE — CONSULT NOTE ADULT - ASSESSMENT
84F with T2DM, hypothyroidism, htn, Hld, CAD with recent Stents, moderate AS , Right side CHF ,COPD and multiple hospitalizations over the past year presents with two days of SOB, worse with minimal exertion, worse with Lying down, some chest pressure and difficulty breathing. s/p solumedro 125x1.     Uncontrolled T2DM- hyperglycemic due to being on steroids  -A1c pending  -check sugars AC and bedtime  -ensure diabetic diet  -on solumedrol for dyspnea  -recommend the following     change to lantus 40 units QHS     change to lantus 40 untis A<     change to lispro 20 units TID     change insulin sliding scale to AC  -if patient is npo, hold lispro and change scale to Q4    Hypothyroidism- check TSH. continue LT4 125mcg daily    Hyponatremia- mild. monitor for now    HLD- continue statin
A/P:  85yo female with PMH of CAD with stent x4 (most recent NSTEMI 12/2018 ADELIA to pLCX, previous ADELIA pLAD, ADELIA x mLAD, ADELIA OM1, ADELIA to LCx, and  to % with no intervention to date), HFpEF (EF>75%), COPD, asthma, moderate AS, mild pulmonary HTN, STEVENSON (CPAP), insulin dependent DMII, HTN, HLD, and hypothyroidism Cardio is Kennedy and PMD is [place], Endo is Ionica, Mcginnis is Pulm. Per ED attending "BIBA with duoneb x2 and solumedrol 125.  Speaking in full sentences.  AAox3.  HHA bedside.  At presentation, As interpreted by ED physician, ECG is tachy to 126 but NSR with STD in lateral leads and qwaves in V1/V2 but largely unchanged from ECG on record from 3/2019.  Otherwise normal intervals/axis, no other changes in QRS, no other ST/T changes. Non toxic. BIPAP initiated immmediately upon arrival." SOB, productive cough, feverish, and elevated BS worsening for 2 days. c/o chest "soreness" since cough started, increases with inspiration and cough.    - dyspnea likely r/t COPD exacerbation vs resp. infectious process, does not appear to be cardiac related  - c/w ASA, metoprolol, diltiazem, lisinopril, lipitor, lasix  - PMT management for respiratory issues  - TTE to reevaluate fluid status  - serial troponins, CE, EKGs  - will c/t follow  - plan discussed with Dr. Sweeney
84 yr old female with known history of DM, htn, Hld, CAD with recent Stents, moderate AS , Right side CHF ,COPD and multiple hospitalizations over the past year presents with CC of two days of SOB- per ED attending extensive wheezing and crackles with Resp failure with possible COPD exacerbation and pneumonia - started ABX Duoneb and solumedrol- please deescalate with clinical improvemnt and culture results  cardiology and pulmonary consults called by ED attending   CAD- cont BB statin aspirin  DM- FS with HISS-   DVT PPX- heparin SQ  Renal failure - with stable serum creatinine compared to last discharge labs -   HIGH LACTATE- normal serum bicarb- please repeat level in four hours and adjust fluids accordingly, holding LASIX and ACEI today - can possibly restart in AM   Multiple recent hospitalization- recommends palliative and Goals of care discussion
AECOPD   no evidence pna  STEVENSON intolerant CPAP in the past  MO    Plan:  O2  drug nebs  solumedrol  abx   BIPAP as necessary and DC  ambulate ASAP  DVT prophylaxis
85 yo female, PMHx COPD, STEVENSON noncompliant with CPAP, CKD, hypothyroid, CAD s/p 4 stents, NSTEMI, HTN, HLD, obesity, T2IDDM, who presented with worsening shortness of breath likely secondary to acute COPD exacerbation possibly secondary to underlying sepsis pneumonia, complicated by hyperkalemia, metabolic lactic acidosis possibly related to respiratory insufficiency vs sepsis, uncontrolled hyperglycemia HHS    At this time, patient's condition is actively improving, further she states she has lived a good life and would not want any "extracurricular" measures to keep her alive, she is unlikely to gain additional benefit from ICU level of care at this time. Please reconsult should clinical condition change. Case was discussed with Dr. Truong and Dr. Palacios.     - Patient is requesting a  to visit her while admitted, please consult chaplaincy services.    - Continue supplemental O2 as needed, titrate to maintain SpO2 >90%. Continue bronchodilators, quick taper of steroids as able to improve hyperglycemia    - Endocrine is intimately involved with patient, continue their regimen for glycemic control. At this time, hyperglycemia is slowly improving, though patient had just been given cookies and soda. Please encourage adherence to consistent carb diet. Please reconsult should patient fail current therapy and require insulin infusion.     - Continue to trend lactate level    - PCT negative, would de-escalate antibiotics    - Hyperkalemia improved but remains borderline, would change IV fluids from LR that contains potassium to 1/2NS     - Very mildly elevated troponins likely related to acute respiratory insufficiency with hypoxia, r/o NSTEMI, continue to trend. Aspirin and statin therapy

## 2019-05-31 NOTE — CONSULT NOTE ADULT - SUBJECTIVE AND OBJECTIVE BOX
REASON FOR Tele-evaluation    SUBJECTIVE: SOB    HPI: 84 yr old female with known history of DM, htn, Hld, CAD with recent Stents, moderate AS , Right side CHF ,COPD and multiple hospitalizations over the past year presents with CC of two days of SOB, worse with minimal exertion, worse with Lying down , no cough , no fever , some chest pressure , and difficulty breathing , initially in ED was unable to tolerate BIPAP, but now as per ED attending has been keeping it off, complains of being very thirsty and wants to drink water . SOB is moderate to severe in intensity, no relieving factors, minimally improved since being in ED- has received nebulizers and BIPAP          ROS: asper HPI  (***)    T(C): 37 (05-31-19 @ 10:51), Max: 37 (05-31-19 @ 10:51)  HR: 116 (05-31-19 @ 12:50) (116 - 120)  BP: 136/52 (05-31-19 @ 10:51) (136/52 - 136/52)  RR: 26 (05-31-19 @ 10:51) (26 - 26)  SpO2: 99% (05-31-19 @ 12:50) (99% - 99%)    PHYSICAL EXAM: evaluation precludes physical exam. Pertinent physical exam findings as per video conference with  nurse at bedside is as follow: ON BIPAP uncomfortable answers appropriately - relayed to me by family at bedside                           12.9   15.5  )-----------( 274      ( 31 May 2019 11:12 )             39.9   05-31    133<L>  |  89<L>  |  63.0<H>  ----------------------------<  433<H>  5.1   |  24.0  |  1.65<H>    Ca    9.8      31 May 2019 11:12  Mg     1.9     05-31    TPro  7.8  /  Alb  4.1  /  TBili  0.5  /  DBili  x   /  AST  16  /  ALT  13  /  AlkPhos  81  05-31  lact 3.9        Radiology findings < from: Xray Chest 1 View- PORTABLE-Urgent (05.31.19 @ 11:38) >  FINDINGS:  No focal consolidation.  No pleural effusions or pneumothorax.  There is cardiomegaly.   Atherosclerotic calcifications in the aorta. Coronary arterial stent.    IMPRESSION: No focal consolidation.    < end of copied text >          Medication reconciliation done     ASSESSMENT AND PLAN: (***)    Care plan discussed with (***)        Survey offered to patient

## 2019-05-31 NOTE — CONSULT NOTE ADULT - SUBJECTIVE AND OBJECTIVE BOX
Patient is a 84y old  Female who presents with a chief complaint of COPD exacerbation (31 May 2019 20:35)      BRIEF HOSPITAL COURSE: ***    Events last 24 hours: ***    PAST MEDICAL & SURGICAL HISTORY:  NSTEMI (non-ST elevated myocardial infarction)  Hypothyroid  HLD (hyperlipidemia)  HTN (hypertension)  CAD (coronary artery disease)  Asthma  Diabetes  Gastroesophageal reflux disease without esophagitis  Pure hypercholesterolemia  Hypothyroidism, unspecified type  Essential hypertension  DM2 (diabetes mellitus, type 2)  Uncomplicated asthma, unspecified asthma severity  Abnormal findings on cardiac catheterization: Cardiac Cath  History of appendectomy  History of appendectomy      SOCIAL HISTORY:    Review of Systems:  CONSTITUTIONAL: No fever, chills, or fatigue  EYES: No eye pain, visual disturbances, or discharge  ENMT:  No difficulty hearing, tinnitus, vertigo; No sinus or throat pain  NECK: No pain or stiffness  RESPIRATORY: No cough, wheezing, chills or hemoptysis; No shortness of breath  CARDIOVASCULAR: No chest pain, palpitations, dizziness, or leg swelling  GASTROINTESTINAL: No abdominal or epigastric pain. No nausea, vomiting, or hematemesis; No diarrhea or constipation. No melena or hematochezia.  GENITOURINARY: No dysuria, frequency, hematuria, or incontinence  NEUROLOGICAL: No headaches, memory loss, loss of strength, numbness, or tremors  SKIN: No itching, burning, rashes, or lesions   MUSCULOSKELETAL: No joint pain or swelling; No muscle, back, or extremity pain  PSYCHIATRIC: No depression, anxiety, mood swings, or difficulty sleeping    [  ] Due to altered mental status/intubation, subjective information was not able to be obtained from the patient. History was obtained, to the extent possible, from review of the chart and collateral sources of information.      Medications:  piperacillin/tazobactam IVPB. 3.375 Gram(s) IV Intermittent every 8 hours    diltiazem    milliGRAM(s) Oral daily  isosorbide   mononitrate ER Tablet (IMDUR) 30 milliGRAM(s) Oral daily  metoprolol succinate ER 50 milliGRAM(s) Oral daily    ALBUTerol/ipratropium for Nebulization 3 milliLiter(s) Nebulizer every 6 hours  benzonatate 100 milliGRAM(s) Oral three times a day PRN  buDESOnide   0.5 milliGRAM(s) Respule 0.5 milliGRAM(s) Inhalation every 12 hours    gabapentin 100 milliGRAM(s) Oral three times a day      aspirin enteric coated 81 milliGRAM(s) Oral daily  heparin  Injectable 5000 Unit(s) SubCutaneous every 12 hours    pantoprazole    Tablet 40 milliGRAM(s) Oral before breakfast      dextrose 40% Gel 15 Gram(s) Oral once PRN  dextrose 50% Injectable 12.5 Gram(s) IV Push once  dextrose 50% Injectable 25 Gram(s) IV Push once  dextrose 50% Injectable 25 Gram(s) IV Push once  glucagon  Injectable 1 milliGRAM(s) IntraMuscular once PRN  insulin glargine Injectable (LANTUS) 40 Unit(s) SubCutaneous at bedtime  insulin glargine Injectable (LANTUS) 40 Unit(s) SubCutaneous every morning  insulin lispro Injectable (HumaLOG) 20 Unit(s) SubCutaneous three times a day before meals  insulin regular  human corrective regimen sliding scale   SubCutaneous every 4 hours  levothyroxine 125 MICROGram(s) Oral daily  methylPREDNISolone sodium succinate Injectable 60 milliGRAM(s) IV Push every 6 hours    dextrose 5%. 1000 milliLiter(s) IV Continuous <Continuous>  lactated ringers. 1000 milliLiter(s) IV Continuous <Continuous>                ICU Vital Signs Last 24 Hrs  T(C): 36.8 (31 May 2019 20:33), Max: 37 (31 May 2019 10:51)  T(F): 98.2 (31 May 2019 20:33), Max: 98.6 (31 May 2019 10:51)  HR: 113 (31 May 2019 21:09) (111 - 120)  BP: 133/82 (31 May 2019 19:55) (133/82 - 152/72)  BP(mean): --  ABP: --  ABP(mean): --  RR: 22 (31 May 2019 19:55) (19 - 26)  SpO2: 96% (31 May 2019 21:09) (93% - 99%)      ABG - ( 31 May 2019 13:40 )  pH, Arterial: 7.43  pH, Blood: x     /  pCO2: 36    /  pO2: 89    / HCO3: 25    / Base Excess: 0.2   /  SaO2: 98                  I&O's Detail        LABS:                        12.9   15.5  )-----------( 274      ( 31 May 2019 11:12 )             39.9     05-31    133<L>  |  91<L>  |  58.0<H>  ----------------------------<  454<H>  5.5<H>   |  21.0<L>  |  1.81<H>    Ca    9.6      31 May 2019 17:29  Mg     1.9     05-31    TPro  7.8  /  Alb  4.1  /  TBili  0.5  /  DBili  x   /  AST  16  /  ALT  13  /  AlkPhos  81  05-31      CARDIAC MARKERS ( 31 May 2019 20:23 )  x     / 0.06 ng/mL / 93 U/L / x     / x      CARDIAC MARKERS ( 31 May 2019 14:48 )  x     / 0.02 ng/mL / x     / x     / x      CARDIAC MARKERS ( 31 May 2019 11:12 )  x     / <0.01 ng/mL / x     / x     / x          CAPILLARY BLOOD GLUCOSE      POCT Blood Glucose.: 335 mg/dL (31 May 2019 20:56)    PT/INR - ( 31 May 2019 11:12 )   PT: 10.7 sec;   INR: 0.93 ratio         PTT - ( 31 May 2019 11:12 )  PTT:25.9 sec    CULTURES:      Physical Examination:    General: No acute distress.      HEENT: Pupils equal, reactive to light.  Symmetric.    PULM: Clear to auscultation bilaterally, no significant sputum production    CVS: Regular rate and rhythm, no murmurs, rubs, or gallops    ABD: Soft, nondistended, nontender, normoactive bowel sounds, no masses    EXT: No edema, nontender    SKIN: Warm and well perfused, no rashes noted.    NEURO: Alert, oriented, interactive, nonfocal    RADIOLOGY: ***    INVASIVE LINES:  INDWELLING PACE:  VTE PROPHYLAXIS:  CAM ICU:  CODE STATUS:    CRITICAL CARE TIME SPENT: *** minutes assessing presenting problems of acute illness, which pose high probability of life threatening deterioration or end organ damage/dysfunction, as well as medical decision making including initiating plan of care, reviewing data, reviewing radiologic exams, discussing with multidisciplinary team, non-inclusive of procedures performed, discussing goals of care with patient/family Patient is a 84y old  Female who presents with a chief complaint of COPD exacerbation (31 May 2019 20:35)      BRIEF HOSPITAL COURSE: 85 yo female, PMHx COPD, STEVENSON noncompliant with CPAP, CKD, hypothyroid, CAD s/p 4 stents, NSTEMI, HTN, HLD, obesity, T2IDDM, who presented from home BIBA with ~2 day history of shortness of breath. Patient states her shortness of breath was unrelieved with her home nebulizers and rescue inhalers. Over the past couple of weeks she has had significant worsening of her chronic cough, productive of clear sputum. Yesterday she was also hyperglycemic, states her FS >400 at home, and she was working with her Endocrinologist to get her BG under control. Today she began having intermittent episodes of substernal chest pain described as being "punched" in the chest. Denies fever or chills, abd pain or distention, n/v, LE edema. Patient was given duonebs, solu-medrol 125mg IV by EMS. Upon arrival to ED, attempted to use BiPAP however became nauseous and was unable to tolerate. Labs remarkable for anion gap metabolic acidosis with initial lactate 3.9 uptrended to 6.9, negative troponin. CXR had raised concerns for RLL infiltrate and therefore she was treated per sepsis protocol, fluid resuscitated with 3.5L IV bolus and she was given empiric antibiotics with Levaquin, Zosyn, and Vancomycin. Her hyperglycemia was treated with Lantus 10u, 10u regular insulin SC, and 10u humalog; Endodrinology was consulted and regimen was augmented.     ICU was requested to consult.    PAST MEDICAL & SURGICAL HISTORY:  NSTEMI (non-ST elevated myocardial infarction)  Hypothyroid  HLD (hyperlipidemia)  HTN (hypertension)  CAD (coronary artery disease)  Asthma  Diabetes  Gastroesophageal reflux disease without esophagitis  Pure hypercholesterolemia  Hypothyroidism, unspecified type  Essential hypertension  DM2 (diabetes mellitus, type 2)  Uncomplicated asthma, unspecified asthma severity  Abnormal findings on cardiac catheterization: Cardiac Cath  History of appendectomy  History of appendectomy    SOCIAL HISTORY:     Review of Systems:  CONSTITUTIONAL: No fever, chills, or fatigue  EYES: No eye pain, visual disturbances, or discharge  ENMT:  No difficulty hearing, tinnitus, vertigo; No sinus or throat pain  NECK: No pain or stiffness  RESPIRATORY: No cough, wheezing, chills or hemoptysis; No shortness of breath  CARDIOVASCULAR: No chest pain, palpitations, dizziness, or leg swelling  GASTROINTESTINAL: No abdominal or epigastric pain. No nausea, vomiting, or hematemesis; No diarrhea or constipation. No melena or hematochezia.  GENITOURINARY: No dysuria, frequency, hematuria, or incontinence  NEUROLOGICAL: No headaches, memory loss, loss of strength, numbness, or tremors  SKIN: No itching, burning, rashes, or lesions   MUSCULOSKELETAL: No joint pain or swelling; No muscle, back, or extremity pain  PSYCHIATRIC: No depression, anxiety, mood swings, or difficulty sleeping    [  ] Due to altered mental status/intubation, subjective information was not able to be obtained from the patient. History was obtained, to the extent possible, from review of the chart and collateral sources of information.      Medications:  piperacillin/tazobactam IVPB. 3.375 Gram(s) IV Intermittent every 8 hours    diltiazem    milliGRAM(s) Oral daily  isosorbide   mononitrate ER Tablet (IMDUR) 30 milliGRAM(s) Oral daily  metoprolol succinate ER 50 milliGRAM(s) Oral daily    ALBUTerol/ipratropium for Nebulization 3 milliLiter(s) Nebulizer every 6 hours  benzonatate 100 milliGRAM(s) Oral three times a day PRN  buDESOnide   0.5 milliGRAM(s) Respule 0.5 milliGRAM(s) Inhalation every 12 hours    gabapentin 100 milliGRAM(s) Oral three times a day      aspirin enteric coated 81 milliGRAM(s) Oral daily  heparin  Injectable 5000 Unit(s) SubCutaneous every 12 hours    pantoprazole    Tablet 40 milliGRAM(s) Oral before breakfast      dextrose 40% Gel 15 Gram(s) Oral once PRN  dextrose 50% Injectable 12.5 Gram(s) IV Push once  dextrose 50% Injectable 25 Gram(s) IV Push once  dextrose 50% Injectable 25 Gram(s) IV Push once  glucagon  Injectable 1 milliGRAM(s) IntraMuscular once PRN  insulin glargine Injectable (LANTUS) 40 Unit(s) SubCutaneous at bedtime  insulin glargine Injectable (LANTUS) 40 Unit(s) SubCutaneous every morning  insulin lispro Injectable (HumaLOG) 20 Unit(s) SubCutaneous three times a day before meals  insulin regular  human corrective regimen sliding scale   SubCutaneous every 4 hours  levothyroxine 125 MICROGram(s) Oral daily  methylPREDNISolone sodium succinate Injectable 60 milliGRAM(s) IV Push every 6 hours    dextrose 5%. 1000 milliLiter(s) IV Continuous <Continuous>  lactated ringers. 1000 milliLiter(s) IV Continuous <Continuous>                ICU Vital Signs Last 24 Hrs  T(C): 36.8 (31 May 2019 20:33), Max: 37 (31 May 2019 10:51)  T(F): 98.2 (31 May 2019 20:33), Max: 98.6 (31 May 2019 10:51)  HR: 113 (31 May 2019 21:09) (111 - 120)  BP: 133/82 (31 May 2019 19:55) (133/82 - 152/72)  BP(mean): --  ABP: --  ABP(mean): --  RR: 22 (31 May 2019 19:55) (19 - 26)  SpO2: 96% (31 May 2019 21:09) (93% - 99%)      ABG - ( 31 May 2019 13:40 )  pH, Arterial: 7.43  pH, Blood: x     /  pCO2: 36    /  pO2: 89    / HCO3: 25    / Base Excess: 0.2   /  SaO2: 98                  I&O's Detail        LABS:                        12.9   15.5  )-----------( 274      ( 31 May 2019 11:12 )             39.9     05-31    133<L>  |  91<L>  |  58.0<H>  ----------------------------<  454<H>  5.5<H>   |  21.0<L>  |  1.81<H>    Ca    9.6      31 May 2019 17:29  Mg     1.9     05-31    TPro  7.8  /  Alb  4.1  /  TBili  0.5  /  DBili  x   /  AST  16  /  ALT  13  /  AlkPhos  81  05-31      CARDIAC MARKERS ( 31 May 2019 20:23 )  x     / 0.06 ng/mL / 93 U/L / x     / x      CARDIAC MARKERS ( 31 May 2019 14:48 )  x     / 0.02 ng/mL / x     / x     / x      CARDIAC MARKERS ( 31 May 2019 11:12 )  x     / <0.01 ng/mL / x     / x     / x          CAPILLARY BLOOD GLUCOSE      POCT Blood Glucose.: 335 mg/dL (31 May 2019 20:56)    PT/INR - ( 31 May 2019 11:12 )   PT: 10.7 sec;   INR: 0.93 ratio         PTT - ( 31 May 2019 11:12 )  PTT:25.9 sec    CULTURES:      Physical Examination:    General: No acute distress.      HEENT: Pupils equal, reactive to light.  Symmetric.    PULM: Clear to auscultation bilaterally, no significant sputum production    CVS: Regular rate and rhythm, no murmurs, rubs, or gallops    ABD: Soft, nondistended, nontender, normoactive bowel sounds, no masses    EXT: No edema, nontender    SKIN: Warm and well perfused, no rashes noted.    NEURO: Alert, oriented, interactive, nonfocal    RADIOLOGY: ***    INVASIVE LINES:  INDWELLING PACE:  VTE PROPHYLAXIS:  CAM ICU:  CODE STATUS:    CRITICAL CARE TIME SPENT: *** minutes assessing presenting problems of acute illness, which pose high probability of life threatening deterioration or end organ damage/dysfunction, as well as medical decision making including initiating plan of care, reviewing data, reviewing radiologic exams, discussing with multidisciplinary team, non-inclusive of procedures performed, discussing goals of care with patient/family Patient is a 84y old  Female who presents with a chief complaint of COPD exacerbation (31 May 2019 20:35)      BRIEF HOSPITAL COURSE: 85 yo female, PMHx COPD, STEVENSON noncompliant with CPAP, CKD, hypothyroid, CAD s/p 4 stents, NSTEMI, HTN, HLD, obesity, T2IDDM, who presented from home BIBA with ~2 day history of shortness of breath. Patient states her shortness of breath was unrelieved with her home nebulizers and rescue inhalers. Over the past couple of weeks she has had significant worsening of her chronic cough, productive of clear sputum. Yesterday she was also hyperglycemic, states her FS >400 at home, and she was working with her Endocrinologist to get her BG under control. Today she began having intermittent episodes of substernal chest pain described as being "punched" in the chest. Denies fever or chills, abd pain or distention, n/v, LE edema. Patient was given duonebs, solu-medrol 125mg IV by EMS. Upon arrival to ED, attempted to use BiPAP however became nauseous and was unable to tolerate. Labs remarkable for anion gap metabolic acidosis with initial lactate 3.9 uptrended to 6.9, negative troponin. CXR had raised concerns for RLL infiltrate and therefore she was treated per sepsis protocol, fluid resuscitated with 3.5L IV bolus and she was given empiric antibiotics with Levaquin, Zosyn, and Vancomycin. Her hyperglycemia was treated with Lantus 10u, 10u regular insulin SC, and 10u humalog; Endodrinology was consulted and regimen was augmented.     ICU was requested to consult for respiratory failure, AGMA, hyperglycemia    PAST MEDICAL & SURGICAL HISTORY:  NSTEMI (non-ST elevated myocardial infarction)  Hypothyroid  HLD (hyperlipidemia)  HTN (hypertension)  CAD (coronary artery disease)  Asthma  Diabetes  Gastroesophageal reflux disease without esophagitis  Pure hypercholesterolemia  Hypothyroidism, unspecified type  Essential hypertension  DM2 (diabetes mellitus, type 2)  Uncomplicated asthma, unspecified asthma severity  Abnormal findings on cardiac catheterization: Cardiac Cath  History of appendectomy  History of appendectomy    SOCIAL HISTORY: Former smoker, no EtOH use. Lives in assisted living facility    Review of Systems:  CONSTITUTIONAL: No fever, chills, or fatigue  EYES: No eye pain, visual disturbances, or discharge  ENMT:  No difficulty hearing, tinnitus, vertigo; No sinus or throat pain  NECK: No pain or stiffness  RESPIRATORY: No cough, wheezing, chills or hemoptysis; No shortness of breath  CARDIOVASCULAR: No chest pain, palpitations, dizziness, or leg swelling  GASTROINTESTINAL: No abdominal or epigastric pain. No nausea, vomiting, or hematemesis; No diarrhea or constipation. No melena or hematochezia.  GENITOURINARY: No dysuria, frequency, hematuria, or incontinence  NEUROLOGICAL: No headaches, memory loss, loss of strength, numbness, or tremors  SKIN: No itching, burning, rashes, or lesions   MUSCULOSKELETAL: No joint pain or swelling; No muscle, back, or extremity pain  PSYCHIATRIC: No depression, anxiety, mood swings, or difficulty sleeping    [  ] Due to altered mental status/intubation, subjective information was not able to be obtained from the patient. History was obtained, to the extent possible, from review of the chart and collateral sources of information.      Medications:  piperacillin/tazobactam IVPB. 3.375 Gram(s) IV Intermittent every 8 hours    diltiazem    milliGRAM(s) Oral daily  isosorbide   mononitrate ER Tablet (IMDUR) 30 milliGRAM(s) Oral daily  metoprolol succinate ER 50 milliGRAM(s) Oral daily    ALBUTerol/ipratropium for Nebulization 3 milliLiter(s) Nebulizer every 6 hours  benzonatate 100 milliGRAM(s) Oral three times a day PRN  buDESOnide   0.5 milliGRAM(s) Respule 0.5 milliGRAM(s) Inhalation every 12 hours    gabapentin 100 milliGRAM(s) Oral three times a day      aspirin enteric coated 81 milliGRAM(s) Oral daily  heparin  Injectable 5000 Unit(s) SubCutaneous every 12 hours    pantoprazole    Tablet 40 milliGRAM(s) Oral before breakfast      dextrose 40% Gel 15 Gram(s) Oral once PRN  dextrose 50% Injectable 12.5 Gram(s) IV Push once  dextrose 50% Injectable 25 Gram(s) IV Push once  dextrose 50% Injectable 25 Gram(s) IV Push once  glucagon  Injectable 1 milliGRAM(s) IntraMuscular once PRN  insulin glargine Injectable (LANTUS) 40 Unit(s) SubCutaneous at bedtime  insulin glargine Injectable (LANTUS) 40 Unit(s) SubCutaneous every morning  insulin lispro Injectable (HumaLOG) 20 Unit(s) SubCutaneous three times a day before meals  insulin regular  human corrective regimen sliding scale   SubCutaneous every 4 hours  levothyroxine 125 MICROGram(s) Oral daily  methylPREDNISolone sodium succinate Injectable 60 milliGRAM(s) IV Push every 6 hours    dextrose 5%. 1000 milliLiter(s) IV Continuous <Continuous>  lactated ringers. 1000 milliLiter(s) IV Continuous <Continuous>                ICU Vital Signs Last 24 Hrs  T(C): 36.8 (31 May 2019 20:33), Max: 37 (31 May 2019 10:51)  T(F): 98.2 (31 May 2019 20:33), Max: 98.6 (31 May 2019 10:51)  HR: 113 (31 May 2019 21:09) (111 - 120)  BP: 133/82 (31 May 2019 19:55) (133/82 - 152/72)  BP(mean): --  ABP: --  ABP(mean): --  RR: 22 (31 May 2019 19:55) (19 - 26)  SpO2: 96% (31 May 2019 21:09) (93% - 99%)      ABG - ( 31 May 2019 13:40 )  pH, Arterial: 7.43  pH, Blood: x     /  pCO2: 36    /  pO2: 89    / HCO3: 25    / Base Excess: 0.2   /  SaO2: 98                  I&O's Detail        LABS:                        12.9   15.5  )-----------( 274      ( 31 May 2019 11:12 )             39.9     05-31    133<L>  |  91<L>  |  58.0<H>  ----------------------------<  454<H>  5.5<H>   |  21.0<L>  |  1.81<H>    Ca    9.6      31 May 2019 17:29  Mg     1.9     05-31    TPro  7.8  /  Alb  4.1  /  TBili  0.5  /  DBili  x   /  AST  16  /  ALT  13  /  AlkPhos  81  05-31      CARDIAC MARKERS ( 31 May 2019 20:23 )  x     / 0.06 ng/mL / 93 U/L / x     / x      CARDIAC MARKERS ( 31 May 2019 14:48 )  x     / 0.02 ng/mL / x     / x     / x      CARDIAC MARKERS ( 31 May 2019 11:12 )  x     / <0.01 ng/mL / x     / x     / x          CAPILLARY BLOOD GLUCOSE      POCT Blood Glucose.: 335 mg/dL (31 May 2019 20:56)    PT/INR - ( 31 May 2019 11:12 )   PT: 10.7 sec;   INR: 0.93 ratio         PTT - ( 31 May 2019 11:12 )  PTT:25.9 sec    CULTURES:      Physical Examination:    General: No acute distress.      HEENT: Pupils equal, reactive to light.  Symmetric.    PULM: Clear to auscultation bilaterally, no significant sputum production    CVS: Regular rate and rhythm, no murmurs, rubs, or gallops    ABD: Soft, nondistended, nontender, normoactive bowel sounds, no masses    EXT: No edema, nontender    SKIN: Warm and well perfused, no rashes noted.    NEURO: Alert, oriented, interactive, nonfocal    RADIOLOGY: ***    INVASIVE LINES:  INDWELLING PACE:  VTE PROPHYLAXIS:  CAM ICU:  CODE STATUS:    CRITICAL CARE TIME SPENT: *** minutes assessing presenting problems of acute illness, which pose high probability of life threatening deterioration or end organ damage/dysfunction, as well as medical decision making including initiating plan of care, reviewing data, reviewing radiologic exams, discussing with multidisciplinary team, non-inclusive of procedures performed, discussing goals of care with patient/family Patient is a 84y old  Female who presents with a chief complaint of COPD exacerbation (31 May 2019 20:35)      BRIEF HOSPITAL COURSE: 85 yo female, PMHx COPD, STEVENSON noncompliant with CPAP, CKD, hypothyroid, CAD s/p 4 stents, NSTEMI, HTN, HLD, obesity, T2IDDM, who presented from home BIBA with ~2 day history of shortness of breath. Patient states her shortness of breath was unrelieved with her home nebulizers and rescue inhalers. Over the past couple of weeks she has had significant worsening of her chronic cough, productive of clear sputum. Yesterday she was also hyperglycemic, states her FS >400 at home, and she was working with her Endocrinologist to get her BG under control. Today she began having intermittent episodes of substernal chest pain described as being "punched" in the chest. Denies fever or chills, abd pain or distention, n/v, LE edema. Patient was given duonebs, solu-medrol 125mg IV by EMS. Upon arrival to ED, attempted to use BiPAP however became nauseous and was unable to tolerate. Labs remarkable for anion gap metabolic acidosis with initial lactate 3.9 uptrended to 6.9, negative troponin. CXR had raised concerns for RLL infiltrate and therefore she was treated per sepsis protocol, fluid resuscitated with 3.5L IV bolus and she was given empiric antibiotics with Levaquin, Zosyn, and Vancomycin. Her hyperglycemia was treated with Lantus 10u, 10u regular insulin SC, and 10u humalog; Endodrinology was consulted and regimen was augmented.     ICU was requested to consult for respiratory failure, AGMA, hyperglycemia    PAST MEDICAL & SURGICAL HISTORY:  NSTEMI (non-ST elevated myocardial infarction)  Hypothyroid  HLD (hyperlipidemia)  HTN (hypertension)  CAD (coronary artery disease)  Asthma  Diabetes  Gastroesophageal reflux disease without esophagitis  Pure hypercholesterolemia  Hypothyroidism, unspecified type  Essential hypertension  DM2 (diabetes mellitus, type 2)  Uncomplicated asthma, unspecified asthma severity  Abnormal findings on cardiac catheterization: Cardiac Cath  History of appendectomy  History of appendectomy    SOCIAL HISTORY: Former smoker, no EtOH use. Lives in assisted living facility    Review of Systems:  CONSTITUTIONAL: No fever, chills  EYES: No vision changes  ENMT:  No pain  NECK: No pain  RESPIRATORY: +productive cough, RABAGO, orthopnea, shortness of breath (improved)  CARDIOVASCULAR: +chest pain, palpitations (now resolved)  GASTROINTESTINAL: No abdominal pain. No nausea, vomiting, diarrhea.  GENITOURINARY: No dysuria  NEUROLOGICAL: No headaches, loss of strength, numbness  SKIN: No rash  MUSCULOSKELETAL: No pain  PSYCHIATRIC: +difficulty sleeping      Medications:  piperacillin/tazobactam IVPB. 3.375 Gram(s) IV Intermittent every 8 hours  diltiazem    milliGRAM(s) Oral daily  isosorbide   mononitrate ER Tablet (IMDUR) 30 milliGRAM(s) Oral daily  metoprolol succinate ER 50 milliGRAM(s) Oral daily  ALBUTerol/ipratropium for Nebulization 3 milliLiter(s) Nebulizer every 6 hours  benzonatate 100 milliGRAM(s) Oral three times a day PRN  buDESOnide   0.5 milliGRAM(s) Respule 0.5 milliGRAM(s) Inhalation every 12 hours  gabapentin 100 milliGRAM(s) Oral three times a day  aspirin enteric coated 81 milliGRAM(s) Oral daily  heparin  Injectable 5000 Unit(s) SubCutaneous every 12 hours  pantoprazole    Tablet 40 milliGRAM(s) Oral before breakfast  dextrose 40% Gel 15 Gram(s) Oral once PRN  dextrose 50% Injectable 12.5 Gram(s) IV Push once  dextrose 50% Injectable 25 Gram(s) IV Push once  dextrose 50% Injectable 25 Gram(s) IV Push once  glucagon  Injectable 1 milliGRAM(s) IntraMuscular once PRN  insulin glargine Injectable (LANTUS) 40 Unit(s) SubCutaneous at bedtime  insulin glargine Injectable (LANTUS) 40 Unit(s) SubCutaneous every morning  insulin lispro Injectable (HumaLOG) 20 Unit(s) SubCutaneous three times a day before meals  insulin regular  human corrective regimen sliding scale   SubCutaneous every 4 hours  levothyroxine 125 MICROGram(s) Oral daily  methylPREDNISolone sodium succinate Injectable 60 milliGRAM(s) IV Push every 6 hours  dextrose 5%. 1000 milliLiter(s) IV Continuous <Continuous>  lactated ringers. 1000 milliLiter(s) IV Continuous <Continuous>      ICU Vital Signs Last 24 Hrs  T(C): 36.8 (31 May 2019 20:33), Max: 37 (31 May 2019 10:51)  T(F): 98.2 (31 May 2019 20:33), Max: 98.6 (31 May 2019 10:51)  HR: 113 (31 May 2019 21:09) (111 - 120)  BP: 133/82 (31 May 2019 19:55) (133/82 - 152/72)  BP(mean): --  ABP: --  ABP(mean): --  RR: 22 (31 May 2019 19:55) (19 - 26)  SpO2: 96% (31 May 2019 21:09) (93% - 99%)      ABG - ( 31 May 2019 13:40 )  pH, Arterial: 7.43  pH, Blood: x     /  pCO2: 36    /  pO2: 89    / HCO3: 25    / Base Excess: 0.2   /  SaO2: 98          I&O's Detail        LABS:                        12.9   15.5  )-----------( 274      ( 31 May 2019 11:12 )             39.9     05-31    133<L>  |  91<L>  |  58.0<H>  ----------------------------<  454<H>  5.5<H>   |  21.0<L>  |  1.81<H>    Ca    9.6      31 May 2019 17:29  Mg     1.9     05-31    TPro  7.8  /  Alb  4.1  /  TBili  0.5  /  DBili  x   /  AST  16  /  ALT  13  /  AlkPhos  81  05-31      CARDIAC MARKERS ( 31 May 2019 20:23 )  x     / 0.06 ng/mL / 93 U/L / x     / x      CARDIAC MARKERS ( 31 May 2019 14:48 )  x     / 0.02 ng/mL / x     / x     / x      CARDIAC MARKERS ( 31 May 2019 11:12 )  x     / <0.01 ng/mL / x     / x     / x          CAPILLARY BLOOD GLUCOSE      POCT Blood Glucose.: 335 mg/dL (31 May 2019 20:56)    PT/INR - ( 31 May 2019 11:12 )   PT: 10.7 sec;   INR: 0.93 ratio         PTT - ( 31 May 2019 11:12 )  PTT:25.9 sec    CULTURES:      Physical Examination:    General: Well appearing female sitting up in bed in no acute distress.      HEENT: Pupils equal, reactive to light.  Symmetric.    PULM: Bibasilar crackles posteriorly    CVS: Regular rate and rhythm, no murmurs    ABD: Soft, distended, nontender, normoactive bowel sounds    EXT: No edema, nontender    SKIN: Warm and well perfused, no rashes noted.    NEURO: Alert, oriented, interactive, nonfocal        RADIOLOGY: < from: Xray Chest 1 View- PORTABLE-Urgent (05.31.19 @ 11:38) >   EXAM:  XR CHEST PORTABLE URGENT 1V                          PROCEDURE DATE:  05/31/2019      INTERPRETATION:  CLINICAL INFORMATION: COPD exacerbation. . .     EXAM: AP chest.    COMPARISON: Prior radiographs dated 3/29/2019    FINDINGS:  No focal consolidation.  No pleural effusions or pneumothorax.  There is cardiomegaly.   Atherosclerotic calcifications in the aorta. Coronary arterial stent.    IMPRESSION: No focal consolidation.    JOEY BRAXTON   This document has been electronically signed. May 31 2019 11:48AM      CODE STATUS: DNR/DNI    TIME SPENT: 40 minutes assessing presenting problems of acute illness, which pose high probability of life threatening deterioration or end organ damage/dysfunction, as well as medical decision making including initiating plan of care, reviewing data, reviewing radiologic exams, discussing with multidisciplinary team, non-inclusive of procedures performed, discussing goals of care with patient. Patient is a 84y old  Female who presents with a chief complaint of COPD exacerbation (31 May 2019 20:35)      BRIEF HOSPITAL COURSE: 83 yo female, PMHx COPD, STEVENSON noncompliant with CPAP, CKD, hypothyroid, CAD s/p 4 stents, NSTEMI, HTN, HLD, obesity, T2IDDM, who presented from home BIBA with ~2 day history of shortness of breath. Patient states her shortness of breath was unrelieved with her home nebulizers and rescue inhalers. Over the past couple of weeks she has had significant worsening of her chronic cough, productive of clear sputum. Yesterday she was also hyperglycemic, states her FS >400 at home, and she was working with her Endocrinologist to get her BG under control. Today she began having intermittent episodes of substernal chest pain described as being "punched" in the chest. Denies fever or chills, abd pain or distention, n/v, LE edema. Patient was given duonebs, solu-medrol 125mg IV by EMS. Upon arrival to ED, attempted to use BiPAP however became nauseous and was unable to tolerate. Labs remarkable for anion gap metabolic acidosis with initial lactate 3.9 uptrended to 6.9, negative troponin. CXR had raised concerns for RLL infiltrate and therefore she was treated per sepsis protocol, fluid resuscitated with 3.5L IV bolus and she was given empiric antibiotics with Levaquin, Zosyn, and Vancomycin. Her hyperglycemia was treated with Lantus 10u, 10u regular insulin SC, and 10u humalog; Endodrinology was consulted and regimen was augmented.     ICU was requested to consult for respiratory failure, AGMA, hyperglycemia. On evaluation, patient sitting up in bed, awake and alert, on nasal cannula able to speak fluent prolonged sentences in no acute distress. She states she is feeling much better.    PAST MEDICAL & SURGICAL HISTORY:  NSTEMI (non-ST elevated myocardial infarction)  Hypothyroid  HLD (hyperlipidemia)  HTN (hypertension)  CAD (coronary artery disease)  Asthma  Diabetes  Gastroesophageal reflux disease without esophagitis  Pure hypercholesterolemia  Hypothyroidism, unspecified type  Essential hypertension  DM2 (diabetes mellitus, type 2)  Uncomplicated asthma, unspecified asthma severity  Abnormal findings on cardiac catheterization: Cardiac Cath  History of appendectomy  History of appendectomy    SOCIAL HISTORY: Former smoker, no EtOH use. Lives in assisted living facility    Review of Systems:  CONSTITUTIONAL: No fever, chills  EYES: No vision changes  ENMT:  No pain  NECK: No pain  RESPIRATORY: +productive cough, RABAGO, orthopnea, shortness of breath (improved)  CARDIOVASCULAR: +chest pain, palpitations (now resolved)  GASTROINTESTINAL: No abdominal pain. No nausea, vomiting, diarrhea.  GENITOURINARY: No dysuria  NEUROLOGICAL: No headaches, loss of strength, numbness  SKIN: No rash  MUSCULOSKELETAL: No pain  PSYCHIATRIC: +difficulty sleeping      Medications:  piperacillin/tazobactam IVPB. 3.375 Gram(s) IV Intermittent every 8 hours  diltiazem    milliGRAM(s) Oral daily  isosorbide   mononitrate ER Tablet (IMDUR) 30 milliGRAM(s) Oral daily  metoprolol succinate ER 50 milliGRAM(s) Oral daily  ALBUTerol/ipratropium for Nebulization 3 milliLiter(s) Nebulizer every 6 hours  benzonatate 100 milliGRAM(s) Oral three times a day PRN  buDESOnide   0.5 milliGRAM(s) Respule 0.5 milliGRAM(s) Inhalation every 12 hours  gabapentin 100 milliGRAM(s) Oral three times a day  aspirin enteric coated 81 milliGRAM(s) Oral daily  heparin  Injectable 5000 Unit(s) SubCutaneous every 12 hours  pantoprazole    Tablet 40 milliGRAM(s) Oral before breakfast  dextrose 40% Gel 15 Gram(s) Oral once PRN  dextrose 50% Injectable 12.5 Gram(s) IV Push once  dextrose 50% Injectable 25 Gram(s) IV Push once  dextrose 50% Injectable 25 Gram(s) IV Push once  glucagon  Injectable 1 milliGRAM(s) IntraMuscular once PRN  insulin glargine Injectable (LANTUS) 40 Unit(s) SubCutaneous at bedtime  insulin glargine Injectable (LANTUS) 40 Unit(s) SubCutaneous every morning  insulin lispro Injectable (HumaLOG) 20 Unit(s) SubCutaneous three times a day before meals  insulin regular  human corrective regimen sliding scale   SubCutaneous every 4 hours  levothyroxine 125 MICROGram(s) Oral daily  methylPREDNISolone sodium succinate Injectable 60 milliGRAM(s) IV Push every 6 hours  dextrose 5%. 1000 milliLiter(s) IV Continuous <Continuous>  lactated ringers. 1000 milliLiter(s) IV Continuous <Continuous>      ICU Vital Signs Last 24 Hrs  T(C): 36.8 (31 May 2019 20:33), Max: 37 (31 May 2019 10:51)  T(F): 98.2 (31 May 2019 20:33), Max: 98.6 (31 May 2019 10:51)  HR: 113 (31 May 2019 21:09) (111 - 120)  BP: 133/82 (31 May 2019 19:55) (133/82 - 152/72)  BP(mean): --  ABP: --  ABP(mean): --  RR: 22 (31 May 2019 19:55) (19 - 26)  SpO2: 96% (31 May 2019 21:09) (93% - 99%)      ABG - ( 31 May 2019 13:40 )  pH, Arterial: 7.43  pH, Blood: x     /  pCO2: 36    /  pO2: 89    / HCO3: 25    / Base Excess: 0.2   /  SaO2: 98          I&O's Detail        LABS:                        12.9   15.5  )-----------( 274      ( 31 May 2019 11:12 )             39.9     05-31    133<L>  |  91<L>  |  58.0<H>  ----------------------------<  454<H>  5.5<H>   |  21.0<L>  |  1.81<H>    Ca    9.6      31 May 2019 17:29  Mg     1.9     05-31    TPro  7.8  /  Alb  4.1  /  TBili  0.5  /  DBili  x   /  AST  16  /  ALT  13  /  AlkPhos  81  05-31      CARDIAC MARKERS ( 31 May 2019 20:23 )  x     / 0.06 ng/mL / 93 U/L / x     / x      CARDIAC MARKERS ( 31 May 2019 14:48 )  x     / 0.02 ng/mL / x     / x     / x      CARDIAC MARKERS ( 31 May 2019 11:12 )  x     / <0.01 ng/mL / x     / x     / x          CAPILLARY BLOOD GLUCOSE      POCT Blood Glucose.: 335 mg/dL (31 May 2019 20:56)    PT/INR - ( 31 May 2019 11:12 )   PT: 10.7 sec;   INR: 0.93 ratio         PTT - ( 31 May 2019 11:12 )  PTT:25.9 sec    CULTURES:      Physical Examination:    General: Well appearing female sitting up in bed in no acute distress.      HEENT: Pupils equal, reactive to light.  Symmetric.    PULM: Bibasilar crackles posteriorly    CVS: Regular rate and rhythm, no murmurs    ABD: Soft, distended, nontender, normoactive bowel sounds    EXT: No edema, nontender    SKIN: Warm and well perfused, no rashes noted.    NEURO: Alert, oriented, interactive, nonfocal        RADIOLOGY: < from: Xray Chest 1 View- PORTABLE-Urgent (05.31.19 @ 11:38) >   EXAM:  XR CHEST PORTABLE URGENT 1V                          PROCEDURE DATE:  05/31/2019      INTERPRETATION:  CLINICAL INFORMATION: COPD exacerbation. . .     EXAM: AP chest.    COMPARISON: Prior radiographs dated 3/29/2019    FINDINGS:  No focal consolidation.  No pleural effusions or pneumothorax.  There is cardiomegaly.   Atherosclerotic calcifications in the aorta. Coronary arterial stent.    IMPRESSION: No focal consolidation.    JOEY BRAXTON   This document has been electronically signed. May 31 2019 11:48AM      CODE STATUS: DNR/DNI    TIME SPENT: 40 minutes assessing presenting problems of acute illness, which pose high probability of life threatening deterioration or end organ damage/dysfunction, as well as medical decision making including initiating plan of care, reviewing data, reviewing radiologic exams, discussing with multidisciplinary team, non-inclusive of procedures performed, discussing goals of care with patient.

## 2019-05-31 NOTE — CONSULT NOTE ADULT - CONSULT REASON
Acute on Chronic Respiratory Failure, Metabolic Lactic Acidosis
Dyspnea
SOB
uncontrolled diabetes
resp failure

## 2019-05-31 NOTE — ED PROVIDER NOTE - CLINICAL SUMMARY MEDICAL DECISION MAKING FREE TEXT BOX
Patient prnests with SOB.  intiail attempt at bipap failed due to nausea associated with mask.  son bedside.  regarding labs, patient with elevated WBC (takes low dose daily prednsione) elevated lactic and CXR concnerning for RLL infiltrate as compared to prior CXR from March.  will treat as PNA with sepsis.  IVF and IV abx given.  WBC may be from steroids but with lactic and CXR will treat.  paged cardio and pulm.  will admit.  Uneventful ED observation period. improved from presentation but still with mild discomfrot.  no distress.  speakin gin full sentences. Patient prnests with SOB.  intiail attempt at bipap failed due to nausea associated with mask.  son bedside.  regarding labs, patient with elevated WBC (takes low dose daily prednsione) elevated lactic and CXR concnerning for RLL infiltrate as compared to prior CXR from March.  will treat as PNA with sepsis.  IVF and IV abx given.  WBC may be from steroids but with lactic and CXR will treat.  paged cardio and pulm.  will admit.  Uneventful ED observation period. improved from presentation but still with mild discomfrot.  now no distress.  speakin gin full sentences.  d.w Dr. Rios and will admit to Dr. [place]

## 2019-05-31 NOTE — ED ADULT NURSE NOTE - NSIMPLEMENTINTERV_GEN_ALL_ED
Implemented All Fall with Harm Risk Interventions:  [place] to call system. Call bell, personal items and telephone within reach. Instruct patient to call for assistance. Room bathroom lighting operational. Non-slip footwear when patient is off stretcher. Physically safe environment: no spills, clutter or unnecessary equipment. Stretcher in lowest position, wheels locked, appropriate side rails in place. Provide visual cue, wrist band, yellow gown, etc. Monitor gait and stability. Monitor for mental status changes and reorient to person, place, and time. Review medications for side effects contributing to fall risk. Reinforce activity limits and safety measures with patient and family. Provide visual clues: red socks.

## 2019-05-31 NOTE — CONSULT NOTE ADULT - SUBJECTIVE AND OBJECTIVE BOX
PULMONARY CONSULT NOTE      JORJE [id number]    Patient is a 84y old  Female who presents with a chief complaint of resp failure (31 May 2019 13:14)      HISTORY OF PRESENT ILLNESS:  85 y/o female with h/o COPD, CHF, CAD, HTN, DM II, hypothyroidism, was brought in to the ER 5/29/19	 because of difficulty to breath and Hypoxemia. difficulty to breath started 2 days ado and was associated with runny nose and congestion. no fever. dry irritative cough. orthopnea ankle edema B/L. no chest pain or palpitations. . no sick contacts.     Hx stage 2 COPD FEV1 0.92 (56.9%), [facility] not on CPAP (refused)    MEDICATIONS  (STANDING):  ALBUTerol/ipratropium for Nebulization 3 milliLiter(s) Nebulizer every 6 hours  aspirin enteric coated 81 milliGRAM(s) Oral daily  buDESOnide   0.5 milliGRAM(s) Respule 0.5 milliGRAM(s) Inhalation every 12 hours  dextrose 5%. 1000 milliLiter(s) (50 mL/Hr) IV Continuous <Continuous>  dextrose 50% Injectable 12.5 Gram(s) IV Push once  dextrose 50% Injectable 25 Gram(s) IV Push once  dextrose 50% Injectable 25 Gram(s) IV Push once  diltiazem    milliGRAM(s) Oral daily  gabapentin 100 milliGRAM(s) Oral three times a day  heparin  Injectable 5000 Unit(s) SubCutaneous every 12 hours  insulin lispro (HumaLOG) corrective regimen sliding scale   SubCutaneous three times a day before meals  isosorbide   mononitrate ER Tablet (IMDUR) 30 milliGRAM(s) Oral daily  levothyroxine 125 MICROGram(s) Oral daily  methylPREDNISolone sodium succinate Injectable 60 milliGRAM(s) IV Push every 6 hours  metoprolol succinate ER 50 milliGRAM(s) Oral daily  pantoprazole    Tablet 40 milliGRAM(s) Oral before breakfast  piperacillin/tazobactam IVPB. 3.375 Gram(s) IV Intermittent every 12 hours  vancomycin  IVPB 1000 milliGRAM(s) IV Intermittent once  vancomycin  IVPB 500 milliGRAM(s) IV Intermittent every 12 hours      MEDICATIONS  (PRN):  benzonatate 100 milliGRAM(s) Oral three times a day PRN Cough  dextrose 40% Gel 15 Gram(s) Oral once PRN Blood Glucose LESS THAN 70 milliGRAM(s)/deciliter  glucagon  Injectable 1 milliGRAM(s) IntraMuscular once PRN Glucose LESS THAN 70 milligrams/deciliter      Allergies    iodine (Hives)  iodine containing compounds (Unknown)  shellfish (Anaphylaxis)  shellfish (Swelling; Short breath)    Intolerances        PAST MEDICAL & SURGICAL HISTORY:  NSTEMI (non-ST elevated myocardial infarction)  Hypothyroid  HLD (hyperlipidemia)  HTN (hypertension)  CAD (coronary artery disease)  Asthma  Diabetes  Gastroesophageal reflux disease without esophagitis  Pure hypercholesterolemia  Hypothyroidism, unspecified type  Essential hypertension  DM2 (diabetes mellitus, type 2)  Uncomplicated asthma, unspecified asthma severity  Abnormal findings on cardiac catheterization: Cardiac Cath  History of appendectomy  History of appendectomy      FAMILY HISTORY:  Family history of stomach cancer  Family history of MI (myocardial infarction)  Family history of cancer in mother  Family history of heart disease      SOCIAL HISTORY  Smoking History:     REVIEW OF SYSTEMS:    CONSTITUTIONAL:  No fevers, chills, sweats    HEENT:  Eyes:  No diplopia or blurred vision. ENT:  No earache, sore throat or runny nose. sinus headache or postnasl drip    CARDIOVASCULAR:  No pressure, squeezing, tightness, or heaviness about the chest; no palpitations, leg swelling, orthopnea or PND    RESPIRATORY:  No cough, shortness of breath, PND or orthopnea. Mild SOBOE    GASTROINTESTINAL:  No abdominal pain, nausea, vomiting or diarrhea.    GENITOURINARY:  No dysuria, frequency or urgency.    NEUROLOGIC:  No paresthesias, fasciculations, seizures or weakness.    PSYCHIATRIC:  No disorder of thought or mood.    Vital Signs Last 24 Hrs  T(C): 37 (31 May 2019 10:51), Max: 37 (31 May 2019 10:51)  T(F): 98.6 (31 May 2019 10:51), Max: 98.6 (31 May 2019 10:51)  HR: 116 (31 May 2019 12:50) (116 - 120)  BP: 136/52 (31 May 2019 10:51) (136/52 - 136/52)  BP(mean): --  RR: 26 (31 May 2019 10:51) (26 - 26)  SpO2: 99% (31 May 2019 12:50) (99% - 99%)    PHYSICAL EXAMINATION:    GENERAL: The patient is a well-developed, well-nourished _____in no apparent distress.     HEENT: Head is normocephalic and atraumatic. Extraocular muscles are intact. Mucous membranes are moist.     NECK: Supple.     LUNGS: Clear to auscultation without wheezing, rales, or rhonchi. Respirations unlabored    HEART: Regular rate and rhythm without murmur.    ABDOMEN: Soft, nontender, and nondistended.  No hepatosplenomegaly is noted.    EXTREMITIES: Without any cyanosis, clubbing, rash, lesions or edema.    NEUROLOGIC: Grossly intact.      LABS:                        12.9   15.5  )-----------( 274      ( 31 May 2019 11:12 )             39.9     05-31    133<L>  |  89<L>  |  63.0<H>  ----------------------------<  433<H>  5.1   |  24.0  |  1.65<H>    Ca    9.8      31 May 2019 11:12  Mg     1.9     05-31    TPro  7.8  /  Alb  4.1  /  TBili  0.5  /  DBili  x   /  AST  16  /  ALT  13  /  AlkPhos  81  05-31    PT/INR - ( 31 May 2019 11:12 )   PT: 10.7 sec;   INR: 0.93 ratio         PTT - ( 31 May 2019 11:12 )  PTT:25.9 sec      CARDIAC MARKERS ( 31 May 2019 11:12 )  x     / <0.01 ng/mL / x     / x     / x            Serum Pro-Brain Natriuretic Peptide: 815 pg/mL (05-31-19 @ 11:12)          MICROBIOLOGY:    RADIOLOGY & ADDITIONAL STUDIES:  < from: Xray Chest 1 View- PORTABLE-Urgent (05.31.19 @ 11:38) >   EXAM:  XR CHEST PORTABLE URGENT 1V                          PROCEDURE DATE:  05/31/2019          INTERPRETATION:  CLINICAL INFORMATION: COPD exacerbation. . .     EXAM: AP chest.    COMPARISON: Prior radiographs dated 3/29/2019    FINDINGS:  No focal consolidation.  No pleural effusions or pneumothorax.  There is cardiomegaly.   Atherosclerotic calcifications in the aorta. Coronary arterial stent.    IMPRESSION: No focal consolidation.                JOEY BRAXTON   This document has been electronically signed. May 31 2019 11:48AM    < end of copied text >  All films reviewed on PACS PULMONARY CONSULT NOTE      JORJE [id number]    Patient is a 84y old  Female who presents with a chief complaint of resp failure (31 May 2019 13:14)      HISTORY OF PRESENT ILLNESS:  83 y/o female with h/o COPD, CHF, CAD, HTN, DM II, hypothyroidism, was brought in to the ER 5/29/19	 because of difficulty to breath and Hypoxemia. difficulty to breath started 2 days ado and was associated with runny nose and congestion. no fever. dry irritative cough. orthopnea ankle edema B/L. no chest pain or palpitations. . no sick contacts.     Hx stage 2 COPD FEV1 0.92 (56.9%), [facility] not on CPAP (refused)    Increased SOB associated with white sputum and without sinus HA, PND or edema    MEDICATIONS  (STANDING):  ALBUTerol/ipratropium for Nebulization 3 milliLiter(s) Nebulizer every 6 hours  aspirin enteric coated 81 milliGRAM(s) Oral daily  buDESOnide   0.5 milliGRAM(s) Respule 0.5 milliGRAM(s) Inhalation every 12 hours  dextrose 5%. 1000 milliLiter(s) (50 mL/Hr) IV Continuous <Continuous>  dextrose 50% Injectable 12.5 Gram(s) IV Push once  dextrose 50% Injectable 25 Gram(s) IV Push once  dextrose 50% Injectable 25 Gram(s) IV Push once  diltiazem    milliGRAM(s) Oral daily  gabapentin 100 milliGRAM(s) Oral three times a day  heparin  Injectable 5000 Unit(s) SubCutaneous every 12 hours  insulin lispro (HumaLOG) corrective regimen sliding scale   SubCutaneous three times a day before meals  isosorbide   mononitrate ER Tablet (IMDUR) 30 milliGRAM(s) Oral daily  levothyroxine 125 MICROGram(s) Oral daily  methylPREDNISolone sodium succinate Injectable 60 milliGRAM(s) IV Push every 6 hours  metoprolol succinate ER 50 milliGRAM(s) Oral daily  pantoprazole    Tablet 40 milliGRAM(s) Oral before breakfast  piperacillin/tazobactam IVPB. 3.375 Gram(s) IV Intermittent every 12 hours  vancomycin  IVPB 1000 milliGRAM(s) IV Intermittent once  vancomycin  IVPB 500 milliGRAM(s) IV Intermittent every 12 hours      MEDICATIONS  (PRN):  benzonatate 100 milliGRAM(s) Oral three times a day PRN Cough  dextrose 40% Gel 15 Gram(s) Oral once PRN Blood Glucose LESS THAN 70 milliGRAM(s)/deciliter  glucagon  Injectable 1 milliGRAM(s) IntraMuscular once PRN Glucose LESS THAN 70 milligrams/deciliter      Allergies    iodine (Hives)  iodine containing compounds (Unknown)  shellfish (Anaphylaxis)  shellfish (Swelling; Short breath)    Intolerances        PAST MEDICAL & SURGICAL HISTORY:  NSTEMI (non-ST elevated myocardial infarction)  Hypothyroid  HLD (hyperlipidemia)  HTN (hypertension)  CAD (coronary artery disease)  Asthma  Diabetes  Gastroesophageal reflux disease without esophagitis  Pure hypercholesterolemia  Hypothyroidism, unspecified type  Essential hypertension  DM2 (diabetes mellitus, type 2)  Uncomplicated asthma, unspecified asthma severity  Abnormal findings on cardiac catheterization: Cardiac Cath  History of appendectomy  History of appendectomy      FAMILY HISTORY:  Family history of stomach cancer  Family history of MI (myocardial infarction)  Family history of cancer in mother  Family history of heart disease      SOCIAL HISTORY  Smoking History:     REVIEW OF SYSTEMS:    CONSTITUTIONAL:  No fevers, chills, sweats    HEENT:  Eyes:  No diplopia or blurred vision. ENT:  No earache, sore throat or runny nose. sinus headache or postnasl drip    CARDIOVASCULAR:  No pressure, squeezing, tightness, or heaviness about the chest; no palpitations, leg swelling, orthopnea or PND    RESPIRATORY:  above    GASTROINTESTINAL:  No abdominal pain, nausea, vomiting or diarrhea.    GENITOURINARY:  No dysuria, frequency or urgency.    NEUROLOGIC:  No paresthesias, fasciculations, seizures or weakness.    PSYCHIATRIC:  No disorder of thought or mood.    Vital Signs Last 24 Hrs  T(C): 37 (31 May 2019 10:51), Max: 37 (31 May 2019 10:51)  T(F): 98.6 (31 May 2019 10:51), Max: 98.6 (31 May 2019 10:51)  HR: 116 (31 May 2019 12:50) (116 - 120)  BP: 136/52 (31 May 2019 10:51) (136/52 - 136/52)  BP(mean): --  RR: 26 (31 May 2019 10:51) (26 - 26)  SpO2: 99% (31 May 2019 12:50) (99% - 99%)    PHYSICAL EXAMINATION:    GENERAL: The patient is a well-developed, well-nourished _____in no apparent distress. on BIPAP    HEENT: Head is normocephalic and atraumatic. Extraocular muscles are intact. Mucous membranes are moist.     NECK: Supple.     LUNGS: scattered wheezes dustin Respirations unlabored    HEART: Regular rate and rhythm without murmur.    ABDOMEN: Soft, nontender, and nondistended.  No hepatosplenomegaly is noted.    EXTREMITIES: Without any cyanosis, clubbing, rash, lesions or edema.    NEUROLOGIC: Grossly intact.      LABS:                        12.9   15.5  )-----------( 274      ( 31 May 2019 11:12 )             39.9     05-31    133<L>  |  89<L>  |  63.0<H>  ----------------------------<  433<H>  5.1   |  24.0  |  1.65<H>    Ca    9.8      31 May 2019 11:12  Mg     1.9     05-31    TPro  7.8  /  Alb  4.1  /  TBili  0.5  /  DBili  x   /  AST  16  /  ALT  13  /  AlkPhos  81  05-31    PT/INR - ( 31 May 2019 11:12 )   PT: 10.7 sec;   INR: 0.93 ratio         PTT - ( 31 May 2019 11:12 )  PTT:25.9 sec      CARDIAC MARKERS ( 31 May 2019 11:12 )  x     / <0.01 ng/mL / x     / x     / x            Serum Pro-Brain Natriuretic Peptide: 815 pg/mL (05-31-19 @ 11:12)          MICROBIOLOGY:    RADIOLOGY & ADDITIONAL STUDIES:  < from: Xray Chest 1 View- PORTABLE-Urgent (05.31.19 @ 11:38) >   EXAM:  XR CHEST PORTABLE URGENT 1V                          PROCEDURE DATE:  05/31/2019          INTERPRETATION:  CLINICAL INFORMATION: COPD exacerbation. . .     EXAM: AP chest.    COMPARISON: Prior radiographs dated 3/29/2019    FINDINGS:  No focal consolidation.  No pleural effusions or pneumothorax.  There is cardiomegaly.   Atherosclerotic calcifications in the aorta. Coronary arterial stent.    IMPRESSION: No focal consolidation.                JOEY BRAXTON   This document has been electronically signed. May 31 2019 11:48AM    < end of copied text >  All films reviewed on PACS

## 2019-05-31 NOTE — ED ADULT NURSE NOTE - CHPI ED NUR SYMPTOMS NEG
no headache/no hemoptysis/no chest pain/no chills/no fever/no wheezing/no edema/no diaphoresis/no body aches

## 2019-05-31 NOTE — ED PROVIDER NOTE - OBJECTIVE STATEMENT
Pertinent PMH/PSH/FHx/SHx and Review of Systems contained within:  Patient presents to the ED for SOB with possible COPD exacerbation.  VSS but tachy and mildly /70.  PMH of HTN, sleep apnea, HLD, COPD, CAD with stent x4 (most recent in March, per patient), Cardio is [place] and PMD is [place](?).  Sx for 2 days.  Today, BIBA with duoneb x2 and solumedrol 125.  Speaking in full sentences.  AAox3.  [facility] bedside.  At presentation, As interpreted by ED physician, ECG is tachy to 126 but NSR with STD in lateral leads and qwaves in V1/V2 but largely unchanged from ECG on record from 3/2019.  Otherwise normal intervals/axis, no other changes in QRS, no other ST/T changes. Non toxic. BIPAP initiated immmediately upon arrival.  No aggravating or relieving factors. No other pertinent PMH.  No other pertinent PSH.  No other pertinent FHx.  Patient denies EtOH/tobacco/illicit substance use. No fever/chills, No photophobia/eye pain/changes in vision, No ear pain/sore throat/dysphagia, No chest pain/palpitations, no cough/wheeze/stridor, No abdominal pain, No V/D, no dysuria/frequency/discharge, No neck/back pain, no rash, no changes in neurological status/function. During bipap attempt, patient became nauseated and zofran given. Pertinent PMH/PSH/FHx/SHx and Review of Systems contained within:  Patient presents to the ED for SOB with possible COPD exacerbation.  VSS but tachy and mildly /70.  PMH of HTN, sleep apnea, HLD, COPD, CAD with stent x4 (most recent in March, per patient), Cardio is [facility] and PMD is [place], Endo is [facility], Ionica is Pulm.  Sx for 2 days.  Today, BIBA with duoneb x2 and solumedrol 125.  Speaking in full sentences.  AAox3.  HHA bedside.  At presentation, As interpreted by ED physician, ECG is tachy to 126 but NSR with STD in lateral leads and qwaves in V1/V2 but largely unchanged from ECG on record from 3/2019.  Otherwise normal intervals/axis, no other changes in QRS, no other ST/T changes. Non toxic. BIPAP initiated immmediately upon arrival.  No aggravating or relieving factors. No other pertinent PMH.  No other pertinent PSH.  No other pertinent FHx.  Patient denies EtOH/tobacco/illicit substance use. No fever/chills, No photophobia/eye pain/changes in vision, No ear pain/sore throat/dysphagia, No chest pain/palpitations, no cough/wheeze/stridor, No abdominal pain, No V/D, no dysuria/frequency/discharge, No neck/back pain, no rash, no changes in neurological status/function. During bipap attempt, patient became nauseated and zofran given.

## 2019-05-31 NOTE — ED ADULT NURSE NOTE - OBJECTIVE STATEMENT
Assume pt care @ 1300 from critical care ED.  Pt received on BIPAP and Antibiotics. Pt appears comfortable on BIPAP, VS stable. Denies any pain/discomfort at this time. PT with hx of COPD and multiple stents. Does not require 02 or NIV at home.

## 2019-05-31 NOTE — H&P ADULT - NSHPPHYSICALEXAM_GEN_ALL_CORE
Vital Signs Last 24 Hrs  T(C): 37 (31 May 2019 10:51), Max: 37 (31 May 2019 10:51)  T(F): 98.6 (31 May 2019 10:51), Max: 98.6 (31 May 2019 10:51)  HR: 117 (31 May 2019 16:18) (115 - 120)  BP: 152/72 (31 May 2019 16:18) (136/52 - 152/72)  BP(mean): --  RR: 23 (31 May 2019 16:18) (23 - 26)  SpO2: 96% (31 May 2019 16:18) (93% - 99%)    CAPILLARY BLOOD GLUCOSE      POCT Blood Glucose.: 391 mg/dL (31 May 2019 18:01)  POCT Blood Glucose.: 406 mg/dL (31 May 2019 14:44)  POCT Blood Glucose.: 453 mg/dL (31 May 2019 10:53)    I&O's Detail    GENERAL: Not in distress. Alert. morbid obesity  HEENT:  Normocephalic and atraumatic. PEARLA,EOMI  NECK: Supple.  No JVD.    CARDIOVASCULAR: RRR S1, S2. No rubs/gallop. 2/6 SM  LUNGS: BLAE+, no rales, no wheezing, no rhonchi.    ABDOMEN: ND. Soft,  NT, no guarding / rebound / rigidity. BS normoactive. No CVA tenderness.    BACK: No spine tenderness.  EXTREMITIES: no cyanosis, no clubbing, +edema. no leg or calf TP+  SKIN: no rash. No cellulitis  NEUROLOGIC: AAO*3.strength is symmetric, sensation intact, speech fluent.    PSYCHIATRIC: Calm.  No agitation. Vital Signs Last 24 Hrs  T(C): 37 (31 May 2019 10:51), Max: 37 (31 May 2019 10:51)  T(F): 98.6 (31 May 2019 10:51), Max: 98.6 (31 May 2019 10:51)  HR: 117 (31 May 2019 16:18) (115 - 120)  BP: 152/72 (31 May 2019 16:18) (136/52 - 152/72)  BP(mean): --  RR: 23 (31 May 2019 16:18) (23 - 26)  SpO2: 96% (31 May 2019 16:18) (93% - 99%)    CAPILLARY BLOOD GLUCOSE      POCT Blood Glucose.: 391 mg/dL (31 May 2019 18:01)  POCT Blood Glucose.: 406 mg/dL (31 May 2019 14:44)  POCT Blood Glucose.: 453 mg/dL (31 May 2019 10:53)    I&O's Detail    GENERAL: Not in distress. Alert. morbid obesity  HEENT:  Normocephalic and atraumatic. PEARLA,EOMI. MM dry  NECK: Supple.  No JVD.    CARDIOVASCULAR: RRR S1, S2. No rubs/gallop. 2/6 SM  LUNGS: BLAE+, no rales, no wheezing, no rhonchi.    ABDOMEN: ND. Soft,  NT, no guarding / rebound / rigidity. BS normoactive. No CVA tenderness.    BACK: No spine tenderness.  EXTREMITIES: no cyanosis, no clubbing, +edema. no leg or calf TP+  SKIN: no rash. No cellulitis  NEUROLOGIC: AAO*3.strength is symmetric, sensation intact, speech fluent.    PSYCHIATRIC: Calm.  No agitation.

## 2019-05-31 NOTE — PATIENT PROFILE ADULT - INDICATE TYPE
Health Care Proxy (HCP)/Do Not Resuscitate (DNR) Health Care Proxy (HCP)/Medical Orders for Life-Sustaining Treatment (MOLST)/Do Not Resuscitate (DNR)

## 2019-05-31 NOTE — H&P ADULT - HISTORY OF PRESENT ILLNESS
ED HPI: ":Patient presents to the ED for SOB with possible COPD exacerbation.  VSS but tachy and mildly /70.  PMH of HTN, sleep apnea, HLD, COPD, CAD with stent x4 (most recent in March, per patient), Cardio is [facility] and PMD is [place], Endo is [facility], Ionica is Pulm.  Sx for 2 days.  Today, BIBA with duoneb x2 and solumedrol 125.  Speaking in full sentences.  AAox3.  [facility] bedside.  At presentation, As interpreted by ED physician, ECG is tachy to 126 but NSR with STD in lateral leads and qwaves in V1/V2 but largely unchanged from ECG on record from 3/2019.  Otherwise normal intervals/axis, no other changes in QRS, no other ST/T changes. Non toxic. BIPAP initiated immmediately upon arrival.  No aggravating or relieving factors. No other pertinent PMH.  No other pertinent PSH.  No other pertinent FHx.  Patient denies EtOH/tobacco/illicit substance use. No fever/chills, No photophobia/eye pain/changes in vision, No ear pain/sore throat/dysphagia, No chest pain/palpitations, no cough/wheeze/stridor, No abdominal pain, No V/D, no dysuria/frequency/discharge, No neck/back pain, no rash, no changes in neurological status/function. During bipap attempt, patient became nauseated and zofran given:    Tele-H HPI: " 84 yr old female with known history of DM, htn, Hld, CAD with recent Stents, moderate AS , Right side CHF ,COPD and multiple hospitalizations over the past year presents with CC of two days of SOB, worse with minimal exertion, worse with Lying down , no cough , no fever , some chest pressure , and difficulty breathing , initially in ED was unable to tolerate BIPAP, but now as per ED attending has been keeping it off, complains of being very thirsty and wants to drink water . SOB is moderate to severe in intensity, no relieving factors, minimally improved since being in ED- has received nebulizers and BIPAP" ED HPI: ":Patient presents to the ED for SOB with possible COPD exacerbation.  VSS but tachy and mildly /70.  PMH of HTN, sleep apnea, HLD, COPD, CAD with stent x4 (most recent in March, per patient), Cardio is [facility] and PMD is [place], Endo is [facility]ginn[facility], Ionica is Pulm.  Sx for 2 days.  Today, BIBA with duoneb x2 and solumedrol 125.  Speaking in full sentences.  AAox3.  [facility] bedside.  At presentation, As interpreted by ED physician, ECG is tachy to 126 but NSR with STD in lateral leads and qwaves in V1/V2 but largely unchanged from ECG on record from 3/2019.  Otherwise normal intervals/axis, no other changes in QRS, no other ST/T changes. Non toxic. BIPAP initiated immmediately upon arrival.  No aggravating or relieving factors. No other pertinent PMH.  No other pertinent PSH.  No other pertinent FHx.  Patient denies EtOH/tobacco/illicit substance use. No fever/chills, No photophobia/eye pain/changes in vision, No ear pain/sore throat/dysphagia, No chest pain/palpitations, no cough/wheeze/stridor, No abdominal pain, No V/D, no dysuria/frequency/discharge, No neck/back pain, no rash, no changes in neurological status/function. During bipap attempt, patient became nauseated and zofran given:    Tele-H HPI: " 84 yr old female with known history of DM, htn, Hld, CAD with recent Stents, moderate AS , Right side CHF ,COPD and multiple hospitalizations over the past year presents with CC of two days of SOB, worse with minimal exertion, worse with Lying down , no cough , no fever , some chest pressure , and difficulty breathing , initially in ED was unable to tolerate BIPAP, but now as per ED attending has been keeping it off, complains of being very thirsty and wants to drink water . SOB is moderate to severe in intensity, no relieving factors, minimally improved since being in ED- has received nebulizers and BIPAP"    above HPI reviewed and verified with patient and son at bedside. SOB+, back pain after lying back, better when placed a pillow behind the back. no CP/palpitation/ no abd pain/n/v/d/c/dysuria/flank pain/fever/chills. no headaches/limb weakness. no leg or calf pain. seen by cardio and pulmonary

## 2019-05-31 NOTE — ED PROVIDER NOTE - PHYSICAL EXAMINATION
Gen: Alert, increased work of breathing  Head: NC, AT, PERRL, EOMI, normal lids/conjunctiva  ENT: normal hearing, patent oropharynx without erythema/exudate, uvula midline  Neck: +supple, no tenderness/meningismus/JVD, +Trachea midline  Pulm: Bilateral BS, increased resp effort, no wheeze/stridor/retractions  CV: RRR, no M/R/G, +dist pulses  Abd: soft, NT/ND, +BS, no hepatosplenomegaly  Mskel: apparent kyphysis, no edema/erythema/cyanosis  Skin: no rash  Neuro: AAOx3, no gross sensory/motor deficits, CN 2-12 intact

## 2019-05-31 NOTE — ED ADULT NURSE REASSESSMENT NOTE - NS ED NURSE REASSESS COMMENT FT1
pt status unchanged, refer to flowsheet and chart, pt safety maintained, pt hemodynamically stable on BiPap, will continue to monitor

## 2019-06-01 DIAGNOSIS — E87.5 HYPERKALEMIA: ICD-10-CM

## 2019-06-01 DIAGNOSIS — R73.9 HYPERGLYCEMIA, UNSPECIFIED: ICD-10-CM

## 2019-06-01 DIAGNOSIS — J44.1 CHRONIC OBSTRUCTIVE PULMONARY DISEASE WITH (ACUTE) EXACERBATION: ICD-10-CM

## 2019-06-01 DIAGNOSIS — E87.2 ACIDOSIS: ICD-10-CM

## 2019-06-01 LAB
CK SERPL-CCNC: 81 U/L — SIGNIFICANT CHANGE UP (ref 25–170)
GLUCOSE BLDC GLUCOMTR-MCNC: 268 MG/DL — HIGH (ref 70–99)
GLUCOSE BLDC GLUCOMTR-MCNC: 382 MG/DL — HIGH (ref 70–99)
GLUCOSE BLDC GLUCOMTR-MCNC: 396 MG/DL — HIGH (ref 70–99)
GLUCOSE BLDC GLUCOMTR-MCNC: 447 MG/DL — HIGH (ref 70–99)
HBA1C BLD-MCNC: 10.1 % — HIGH (ref 4–5.6)
LACTATE SERPL-SCNC: 3.3 MMOL/L — HIGH (ref 0.5–2)
TROPONIN T SERPL-MCNC: 0.07 NG/ML — HIGH (ref 0–0.06)
TSH SERPL-MCNC: 0.26 UIU/ML — LOW (ref 0.27–4.2)
VANCOMYCIN FLD-MCNC: 7.6 UG/ML — SIGNIFICANT CHANGE UP

## 2019-06-01 PROCEDURE — 71250 CT THORAX DX C-: CPT | Mod: 26

## 2019-06-01 PROCEDURE — 99232 SBSQ HOSP IP/OBS MODERATE 35: CPT

## 2019-06-01 PROCEDURE — 99233 SBSQ HOSP IP/OBS HIGH 50: CPT

## 2019-06-01 RX ORDER — INSULIN LISPRO 100/ML
35 VIAL (ML) SUBCUTANEOUS
Refills: 0 | Status: DISCONTINUED | OUTPATIENT
Start: 2019-06-01 | End: 2019-06-03

## 2019-06-01 RX ORDER — INSULIN LISPRO 100/ML
VIAL (ML) SUBCUTANEOUS
Refills: 0 | Status: DISCONTINUED | OUTPATIENT
Start: 2019-06-01 | End: 2019-06-03

## 2019-06-01 RX ORDER — PIPERACILLIN AND TAZOBACTAM 4; .5 G/20ML; G/20ML
3.38 INJECTION, POWDER, LYOPHILIZED, FOR SOLUTION INTRAVENOUS EVERY 8 HOURS
Refills: 0 | Status: DISCONTINUED | OUTPATIENT
Start: 2019-06-01 | End: 2019-06-02

## 2019-06-01 RX ORDER — VANCOMYCIN HCL 1 G
750 VIAL (EA) INTRAVENOUS DAILY
Refills: 0 | Status: DISCONTINUED | OUTPATIENT
Start: 2019-06-02 | End: 2019-06-02

## 2019-06-01 RX ORDER — VANCOMYCIN HCL 1 G
750 VIAL (EA) INTRAVENOUS EVERY 24 HOURS
Refills: 0 | Status: DISCONTINUED | OUTPATIENT
Start: 2019-06-01 | End: 2019-06-01

## 2019-06-01 RX ADMIN — Medication 6: at 10:59

## 2019-06-01 RX ADMIN — Medication 20 UNIT(S): at 07:46

## 2019-06-01 RX ADMIN — Medication 5: at 07:46

## 2019-06-01 RX ADMIN — INSULIN GLARGINE 40 UNIT(S): 100 INJECTION, SOLUTION SUBCUTANEOUS at 21:54

## 2019-06-01 RX ADMIN — Medication 240 MILLIGRAM(S): at 05:26

## 2019-06-01 RX ADMIN — Medication 100 MILLIGRAM(S): at 21:53

## 2019-06-01 RX ADMIN — Medication 3 MILLILITER(S): at 09:03

## 2019-06-01 RX ADMIN — Medication 3 MILLILITER(S): at 20:11

## 2019-06-01 RX ADMIN — Medication 60 MILLIGRAM(S): at 05:27

## 2019-06-01 RX ADMIN — HEPARIN SODIUM 5000 UNIT(S): 5000 INJECTION INTRAVENOUS; SUBCUTANEOUS at 17:07

## 2019-06-01 RX ADMIN — Medication 81 MILLIGRAM(S): at 11:00

## 2019-06-01 RX ADMIN — Medication 0.5 MILLIGRAM(S): at 20:10

## 2019-06-01 RX ADMIN — Medication 40 MILLIGRAM(S): at 17:07

## 2019-06-01 RX ADMIN — Medication 60 MILLIGRAM(S): at 11:00

## 2019-06-01 RX ADMIN — Medication 250 MILLIGRAM(S): at 10:42

## 2019-06-01 RX ADMIN — Medication 20 UNIT(S): at 10:58

## 2019-06-01 RX ADMIN — ISOSORBIDE MONONITRATE 30 MILLIGRAM(S): 60 TABLET, EXTENDED RELEASE ORAL at 11:00

## 2019-06-01 RX ADMIN — PIPERACILLIN AND TAZOBACTAM 25 GRAM(S): 4; .5 INJECTION, POWDER, LYOPHILIZED, FOR SOLUTION INTRAVENOUS at 15:41

## 2019-06-01 RX ADMIN — PIPERACILLIN AND TAZOBACTAM 25 GRAM(S): 4; .5 INJECTION, POWDER, LYOPHILIZED, FOR SOLUTION INTRAVENOUS at 21:53

## 2019-06-01 RX ADMIN — Medication 0.5 MILLIGRAM(S): at 09:03

## 2019-06-01 RX ADMIN — GABAPENTIN 100 MILLIGRAM(S): 400 CAPSULE ORAL at 21:52

## 2019-06-01 RX ADMIN — Medication 3 MILLILITER(S): at 02:50

## 2019-06-01 RX ADMIN — Medication 50 MILLIGRAM(S): at 05:26

## 2019-06-01 RX ADMIN — PANTOPRAZOLE SODIUM 40 MILLIGRAM(S): 20 TABLET, DELAYED RELEASE ORAL at 05:26

## 2019-06-01 RX ADMIN — Medication 15: at 16:11

## 2019-06-01 RX ADMIN — Medication 125 MICROGRAM(S): at 05:27

## 2019-06-01 RX ADMIN — GABAPENTIN 100 MILLIGRAM(S): 400 CAPSULE ORAL at 14:12

## 2019-06-01 RX ADMIN — GABAPENTIN 100 MILLIGRAM(S): 400 CAPSULE ORAL at 05:27

## 2019-06-01 RX ADMIN — SODIUM CHLORIDE 100 MILLILITER(S): 9 INJECTION, SOLUTION INTRAVENOUS at 01:19

## 2019-06-01 RX ADMIN — Medication 100 MILLIGRAM(S): at 01:17

## 2019-06-01 RX ADMIN — INSULIN GLARGINE 40 UNIT(S): 100 INJECTION, SOLUTION SUBCUTANEOUS at 07:46

## 2019-06-01 RX ADMIN — Medication 35 UNIT(S): at 16:10

## 2019-06-01 RX ADMIN — PIPERACILLIN AND TAZOBACTAM 25 GRAM(S): 4; .5 INJECTION, POWDER, LYOPHILIZED, FOR SOLUTION INTRAVENOUS at 05:26

## 2019-06-01 RX ADMIN — SODIUM CHLORIDE 100 MILLILITER(S): 9 INJECTION, SOLUTION INTRAVENOUS at 14:12

## 2019-06-01 RX ADMIN — HEPARIN SODIUM 5000 UNIT(S): 5000 INJECTION INTRAVENOUS; SUBCUTANEOUS at 05:28

## 2019-06-01 NOTE — PROGRESS NOTE ADULT - ASSESSMENT
AECOPD responding well to rx  Intolerant CPAP/BIPAP    Plan:  decrease steroids  O2  drug nebs  ambulate  consider transfer to general med floor

## 2019-06-01 NOTE — PHARMACOTHERAPY INTERVENTION NOTE - COMMENTS
Ordered random vancomycin level.  Patient received 1g on 5/31, SCr 1.77.  Will dose vancomycin per level for now.

## 2019-06-01 NOTE — PROVIDER CONTACT NOTE (CRITICAL VALUE NOTIFICATION) - ACTION/TREATMENT ORDERED:
MD AWARE, NO FURTHER ORDERS GIVEN.
MD AWARE, NO NEW ORDERS.
MD AWARE.  NO NEW ORDERS GIVEN.
IVF bolus order

## 2019-06-01 NOTE — PROGRESS NOTE ADULT - ASSESSMENT
84 yr old female with known history of DM, htn, Hld, CAD with recent Stents, moderate AS , Right side CHF ,COPD and multiple hospitalizations over the past year presents with CC of two days of SOB. a/w     Acute hypoxic resp failure: likely due to COPD exacerbation. no sign of CHF/ACS  s/p BIPAP. currently off biPAP and pt refuses to wear BiPAP as she does not like it.  ABG done after treatment with BiPAP is good  D/G to [facility]  c/w neb, steroid, taper, Abx  pulm eval appreciated    AECOPD  as above    clinical exam with rales, no evidence of fluid overload, CXR without any inf focus. 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 because of clinical exam  sepsis ruled out- no fever/ cough/ infiltrate although initially treated as sepsis  CT chest non con  c/w vanco, zosyn  f/u BC    Uncontrolled DM:   sec to steroids  insulin titrated accordingly  antibx infusions agents changed to NS  endocrine appreciated    Elevated lactate: likely multifactorial  hypoxia, dehydration, TRENTON on CKD    TRENTON on CKD3:  s/p gentle hydration without any change in Crt  hold ACEI, Lasix for now  also vanco a contributing agent- if CT chest neg for inf focus will d/c Vanco  avoid nephrotoxic meds  monitor renal function    CAD: no ssx of ACS  see by cardio. suggested to trend CE and EKG  neg CE and EKG*2  c/w BB, statin, ASA    DVT-P: heparin     d/w son and d-i-l at bedside- explained pt's glucose, steroids, WBC, lactic acid, renal fxn, 84 yr old female with known history of DM, htn, Hld, CAD with recent Stents, moderate AS , Right side CHF ,COPD and multiple hospitalizations over the past year presents with CC of two days of SOB. a/w     Acute hypoxic resp failure: likely due to COPD exacerbation. no sign of CHF/ACS  s/p BIPAP. currently off biPAP and pt refuses to wear BiPAP as she does not like it.  ABG done after treatment with BiPAP is good  D/G to [facility]  c/w neb, steroid, taper, Abx  pulm eval appreciated  ECHo pending    AECOPD  as above    clinical exam with rales, no evidence of fluid overload, CXR without any inf focus. 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 because of clinical exam  sepsis ruled out- no fever/ cough/ infiltrate although initially treated as sepsis  CT chest non con  c/w vanco, zosyn  f/u BC    Uncontrolled DM:   sec to steroids  insulin titrated accordingly  antibx infusions agents changed to NS  endocrine appreciated    Elevated lactate: likely multifactorial  hypoxia, dehydration, TRENTON on CKD    TRENTON on CKD3:  s/p gentle hydration without any change in Crt  hold ACEI, Lasix for now  also vanco a contributing agent- if CT chest neg for inf focus will d/c Vanco  avoid nephrotoxic meds  monitor renal function    CAD: stable  c/w BB, statin, ASA    DVT-P: heparin     d/w son and d-i-l at bedside- explained pt's glucose, steroids, WBC, lactic acid, renal fxn,

## 2019-06-01 NOTE — PROGRESS NOTE ADULT - ASSESSMENT
84F with T2DM, hypothyroidism, htn, Hld, CAD with recent Stents, moderate AS , Right side CHF ,COPD and multiple hospitalizations over the past year presents with two days of SOB, worse with minimal exertion, worse with Lying down, some chest pressure and difficulty breathing. s/p solumedro 125x1.     Uncontrolled T2DM- hyperglycemic due to being on steroids  -A1c pending  -check sugars AC and bedtime  -ensure diabetic diet  -on solumedrol for dyspnea, tapering steroids  -recommend the following     continue lantus 40 units QHS     continue lantus 40 untis A<     change to lispro 35 units TID     continue insulin sliding scale to AC    Hypothyroidism- TSH borderline likely due to lowering effect of steroids. recheck TFTs as an outpatient.  continue LT4 125mcg daily    Hyponatremia- resolved. monitor for now    HLD- continue statin

## 2019-06-02 LAB
ANION GAP SERPL CALC-SCNC: 16 MMOL/L — SIGNIFICANT CHANGE UP (ref 5–17)
BUN SERPL-MCNC: 62 MG/DL — HIGH (ref 8–20)
CALCIUM SERPL-MCNC: 9 MG/DL — SIGNIFICANT CHANGE UP (ref 8.6–10.2)
CHLORIDE SERPL-SCNC: 97 MMOL/L — LOW (ref 98–107)
CO2 SERPL-SCNC: 24 MMOL/L — SIGNIFICANT CHANGE UP (ref 22–29)
CREAT SERPL-MCNC: 1.59 MG/DL — HIGH (ref 0.5–1.3)
GLUCOSE BLDC GLUCOMTR-MCNC: 238 MG/DL — HIGH (ref 70–99)
GLUCOSE BLDC GLUCOMTR-MCNC: 279 MG/DL — HIGH (ref 70–99)
GLUCOSE BLDC GLUCOMTR-MCNC: 332 MG/DL — HIGH (ref 70–99)
GLUCOSE BLDC GLUCOMTR-MCNC: 346 MG/DL — HIGH (ref 70–99)
GLUCOSE BLDC GLUCOMTR-MCNC: 380 MG/DL — HIGH (ref 70–99)
GLUCOSE SERPL-MCNC: 326 MG/DL — HIGH (ref 70–115)
POTASSIUM SERPL-MCNC: 4.3 MMOL/L — SIGNIFICANT CHANGE UP (ref 3.5–5.3)
POTASSIUM SERPL-SCNC: 4.3 MMOL/L — SIGNIFICANT CHANGE UP (ref 3.5–5.3)
SODIUM SERPL-SCNC: 137 MMOL/L — SIGNIFICANT CHANGE UP (ref 135–145)

## 2019-06-02 PROCEDURE — 99233 SBSQ HOSP IP/OBS HIGH 50: CPT

## 2019-06-02 RX ADMIN — Medication 35 UNIT(S): at 11:59

## 2019-06-02 RX ADMIN — Medication 100 MILLIGRAM(S): at 06:05

## 2019-06-02 RX ADMIN — Medication 81 MILLIGRAM(S): at 12:00

## 2019-06-02 RX ADMIN — Medication 3 MILLILITER(S): at 09:02

## 2019-06-02 RX ADMIN — Medication 35 UNIT(S): at 16:53

## 2019-06-02 RX ADMIN — Medication 3 MILLILITER(S): at 02:26

## 2019-06-02 RX ADMIN — Medication 3 MILLILITER(S): at 14:34

## 2019-06-02 RX ADMIN — Medication 40 MILLIGRAM(S): at 06:11

## 2019-06-02 RX ADMIN — PANTOPRAZOLE SODIUM 40 MILLIGRAM(S): 20 TABLET, DELAYED RELEASE ORAL at 06:11

## 2019-06-02 RX ADMIN — Medication 35 UNIT(S): at 08:30

## 2019-06-02 RX ADMIN — Medication 6: at 16:53

## 2019-06-02 RX ADMIN — Medication 3 MILLILITER(S): at 20:08

## 2019-06-02 RX ADMIN — INSULIN GLARGINE 40 UNIT(S): 100 INJECTION, SOLUTION SUBCUTANEOUS at 22:06

## 2019-06-02 RX ADMIN — GABAPENTIN 100 MILLIGRAM(S): 400 CAPSULE ORAL at 22:05

## 2019-06-02 RX ADMIN — Medication 50 MILLIGRAM(S): at 06:05

## 2019-06-02 RX ADMIN — Medication 0.5 MILLIGRAM(S): at 09:02

## 2019-06-02 RX ADMIN — HEPARIN SODIUM 5000 UNIT(S): 5000 INJECTION INTRAVENOUS; SUBCUTANEOUS at 06:05

## 2019-06-02 RX ADMIN — ISOSORBIDE MONONITRATE 30 MILLIGRAM(S): 60 TABLET, EXTENDED RELEASE ORAL at 13:44

## 2019-06-02 RX ADMIN — GABAPENTIN 100 MILLIGRAM(S): 400 CAPSULE ORAL at 06:05

## 2019-06-02 RX ADMIN — Medication 125 MICROGRAM(S): at 06:05

## 2019-06-02 RX ADMIN — Medication 0.5 MILLIGRAM(S): at 20:08

## 2019-06-02 RX ADMIN — HEPARIN SODIUM 5000 UNIT(S): 5000 INJECTION INTRAVENOUS; SUBCUTANEOUS at 16:54

## 2019-06-02 RX ADMIN — Medication 15: at 11:59

## 2019-06-02 RX ADMIN — INSULIN GLARGINE 40 UNIT(S): 100 INJECTION, SOLUTION SUBCUTANEOUS at 09:44

## 2019-06-02 RX ADMIN — PIPERACILLIN AND TAZOBACTAM 25 GRAM(S): 4; .5 INJECTION, POWDER, LYOPHILIZED, FOR SOLUTION INTRAVENOUS at 06:11

## 2019-06-02 RX ADMIN — Medication 240 MILLIGRAM(S): at 06:05

## 2019-06-02 RX ADMIN — Medication 12: at 08:31

## 2019-06-02 RX ADMIN — GABAPENTIN 100 MILLIGRAM(S): 400 CAPSULE ORAL at 13:44

## 2019-06-02 NOTE — PROGRESS NOTE ADULT - ASSESSMENT
84 yr old female with known history of DM, htn, Hld, CAD with recent Stents, moderate AS , Right side CHF ,COPD and multiple hospitalizations over the past year presents with CC of two days of SOB. a/w     Acute hypoxic resp failure: likely due to COPD exacerbation. no sign of CHF/ACS  s/p BIPAP. currently off biPAP and pt refuses to wear BiPAP as she does not like it.  ABG done after treatment with BiPAP is good  D/G to [facility]  c/w neb, steroid, taper, Abx  pulm eval appreciated  ECHo pending    AECOPD  as above    clinical exam with rales, no evidence of fluid overload, CXR without any inf focus. initially treated as Pneumonia due to GN/GP rods because of clinical exam  sepsis ruled out- no fever/ cough/ infiltrate although initially treated as sepsis  CT chest non con- rules out pneumonia. shows scarring with plaques and fibrosis  D/C vanco, zosyn. levaquin Po to complete 5 day course of antibx  f/u BC    Uncontrolled DM:   sec to steroids  insulin titrated further accordingly   antibx infusions agents changed to NS  endocrine appreciated    Elevated lactate: likely multifactorial  hypoxia, dehydration, TRENTON on CKD    TRENTON on CKD3:  improving  s/p gentle hydration without any change in Crt  hold ACEI, Lasix for now  d/c Vanco  avoid nephrotoxic meds  monitor renal function    CAD: stable  c/w BB, statin, ASA    DVT-P: heparin

## 2019-06-02 NOTE — ADVANCED PRACTICE NURSE CONSULT - ASSESSMENT
pt is a+ox3 c/o 0 pain sitting comfortably in gerie chair. she states that she has diabetes and takes insulin, she takes basoglar 40 units and 25 of humalog. she states she is always high. she can ambulate w assist wo o2 supplement. she states @ home she does nt become hypoglycemic and that she is on low dose of prednisone. pt was advised to continue ambulate w assist as tolerated. pt cassie w dr cavazos

## 2019-06-02 NOTE — ADVANCED PRACTICE NURSE CONSULT - RECOMMEDATIONS
continue diabetes self management education  pls consider to increase humalog to 20 units before meals if no improvement ( pt takes @ home 25 units befor meals)

## 2019-06-02 NOTE — PROGRESS NOTE ADULT - ASSESSMENT
AECOPD resolved  No Pna or masses on CCT  Intolerant of BIPAP    Plan:  Change to PO steroids  Consider Trelegy for LAMA/LABA/ICS  Taper prednisone over 12 days  Complete ABX  F/u with us within 2wks  Recall prn  Will sign off

## 2019-06-03 DIAGNOSIS — R06.02 SHORTNESS OF BREATH: ICD-10-CM

## 2019-06-03 LAB
GLUCOSE BLDC GLUCOMTR-MCNC: 125 MG/DL — HIGH (ref 70–99)
GLUCOSE BLDC GLUCOMTR-MCNC: 181 MG/DL — HIGH (ref 70–99)
GLUCOSE BLDC GLUCOMTR-MCNC: 194 MG/DL — HIGH (ref 70–99)
GLUCOSE BLDC GLUCOMTR-MCNC: 205 MG/DL — HIGH (ref 70–99)

## 2019-06-03 PROCEDURE — 99232 SBSQ HOSP IP/OBS MODERATE 35: CPT

## 2019-06-03 PROCEDURE — 99233 SBSQ HOSP IP/OBS HIGH 50: CPT

## 2019-06-03 PROCEDURE — 99222 1ST HOSP IP/OBS MODERATE 55: CPT

## 2019-06-03 RX ORDER — INSULIN LISPRO 100/ML
VIAL (ML) SUBCUTANEOUS
Refills: 0 | Status: DISCONTINUED | OUTPATIENT
Start: 2019-06-03 | End: 2019-06-04

## 2019-06-03 RX ORDER — SACCHAROMYCES BOULARDII 250 MG
250 POWDER IN PACKET (EA) ORAL
Refills: 0 | Status: DISCONTINUED | OUTPATIENT
Start: 2019-06-03 | End: 2019-06-04

## 2019-06-03 RX ORDER — INSULIN LISPRO 100/ML
30 VIAL (ML) SUBCUTANEOUS
Refills: 0 | Status: DISCONTINUED | OUTPATIENT
Start: 2019-06-03 | End: 2019-06-04

## 2019-06-03 RX ADMIN — Medication 50 MILLIGRAM(S): at 06:11

## 2019-06-03 RX ADMIN — INSULIN GLARGINE 40 UNIT(S): 100 INJECTION, SOLUTION SUBCUTANEOUS at 21:37

## 2019-06-03 RX ADMIN — Medication 600 MILLIGRAM(S): at 15:56

## 2019-06-03 RX ADMIN — Medication 30 UNIT(S): at 16:36

## 2019-06-03 RX ADMIN — ISOSORBIDE MONONITRATE 30 MILLIGRAM(S): 60 TABLET, EXTENDED RELEASE ORAL at 11:33

## 2019-06-03 RX ADMIN — Medication 1: at 16:35

## 2019-06-03 RX ADMIN — Medication 60 MILLIGRAM(S): at 06:11

## 2019-06-03 RX ADMIN — Medication 35 UNIT(S): at 12:42

## 2019-06-03 RX ADMIN — Medication 0.5 MILLIGRAM(S): at 19:33

## 2019-06-03 RX ADMIN — Medication 250 MILLIGRAM(S): at 16:35

## 2019-06-03 RX ADMIN — Medication 3 MILLILITER(S): at 09:12

## 2019-06-03 RX ADMIN — Medication 35 UNIT(S): at 08:12

## 2019-06-03 RX ADMIN — Medication 125 MICROGRAM(S): at 06:11

## 2019-06-03 RX ADMIN — INSULIN GLARGINE 40 UNIT(S): 100 INJECTION, SOLUTION SUBCUTANEOUS at 08:13

## 2019-06-03 RX ADMIN — GABAPENTIN 100 MILLIGRAM(S): 400 CAPSULE ORAL at 15:56

## 2019-06-03 RX ADMIN — GABAPENTIN 100 MILLIGRAM(S): 400 CAPSULE ORAL at 21:37

## 2019-06-03 RX ADMIN — HEPARIN SODIUM 5000 UNIT(S): 5000 INJECTION INTRAVENOUS; SUBCUTANEOUS at 06:12

## 2019-06-03 RX ADMIN — Medication 0.5 MILLIGRAM(S): at 09:12

## 2019-06-03 RX ADMIN — HEPARIN SODIUM 5000 UNIT(S): 5000 INJECTION INTRAVENOUS; SUBCUTANEOUS at 16:36

## 2019-06-03 RX ADMIN — Medication 3: at 08:12

## 2019-06-03 RX ADMIN — GABAPENTIN 100 MILLIGRAM(S): 400 CAPSULE ORAL at 06:13

## 2019-06-03 RX ADMIN — Medication 240 MILLIGRAM(S): at 06:12

## 2019-06-03 RX ADMIN — Medication 3 MILLILITER(S): at 15:13

## 2019-06-03 RX ADMIN — Medication 3 MILLILITER(S): at 19:33

## 2019-06-03 RX ADMIN — PANTOPRAZOLE SODIUM 40 MILLIGRAM(S): 20 TABLET, DELAYED RELEASE ORAL at 06:11

## 2019-06-03 RX ADMIN — Medication 81 MILLIGRAM(S): at 11:33

## 2019-06-03 RX ADMIN — Medication 3 MILLILITER(S): at 03:47

## 2019-06-03 NOTE — PROGRESS NOTE ADULT - PROVIDER SPECIALTY LIST ADULT
Cardiology
Endocrinology
Internal Medicine
Internal Medicine
Pulmonology
Pulmonology
Internal Medicine
Endocrinology

## 2019-06-03 NOTE — PROGRESS NOTE ADULT - ASSESSMENT
84 yr old female with known history of DM, htn, Hld, CAD with recent Stents, moderate AS , Right side CHF ,COPD and multiple hospitalizations over the past year presents with CC of two days of SOB. a/w     Acute hypoxic resp failure: likely due to COPD exacerbation. no sign of CHF/ACS  s/p BIPAP. currently off biPAP and pt refuses to wear BiPAP as she does not like it.  ABG done after treatment with BiPAP is good  D/G to [facility]  c/w neb, steroid, taper, Abx  pulm eval appreciated  ECHo - grade 2 diastolic dysfxn and borderline pulm HTN. on BB. will not increase or change sec to lung disease    AECOPD  as above  resolving well    clinical exam with rales, no evidence of fluid overload, CXR without any inf focus. initially treated as Pneumonia due to GN/GP rods because of clinical exam  sepsis ruled out- no fever/ cough/ infiltrate although initially treated as sepsis  CT chest non con- rules out pneumonia. shows scarring with plaques and fibrosis  D/C vanco, zosyn. levaquin Po to complete 5 day course of antibx  f/u BC    Uncontrolled DM:   sec to steroids  insulin titrated further accordingly - reduced on 6/3  antibx infusions agents changed to NS  endocrine appreciated    Elevated lactate: likely multifactorial  hypoxia, dehydration, TRENTON on CKD    TRENTON on CKD3:  improving  s/p gentle hydration without any change in Crt  hold ACEI, Lasix for now  d/c Vanco  avoid nephrotoxic meds  monitor renal function    CAD: stable  c/w BB, statin, ASA    DVT-P: heparin     D/W pt, daughter and CM- plan for d/c in AM

## 2019-06-03 NOTE — PROGRESS NOTE ADULT - SUBJECTIVE AND OBJECTIVE BOX
INTERVAL HPI/OVERNIGHT EVENTS: follow up of diabetes    MEDICATIONS  (STANDING):  ALBUTerol/ipratropium for Nebulization 3 milliLiter(s) Nebulizer every 6 hours  aspirin enteric coated 81 milliGRAM(s) Oral daily  buDESOnide   0.5 milliGRAM(s) Respule 0.5 milliGRAM(s) Inhalation every 12 hours  dextrose 5%. 1000 milliLiter(s) (50 mL/Hr) IV Continuous <Continuous>  dextrose 50% Injectable 12.5 Gram(s) IV Push once  dextrose 50% Injectable 25 Gram(s) IV Push once  dextrose 50% Injectable 25 Gram(s) IV Push once  diltiazem    milliGRAM(s) Oral daily  gabapentin 100 milliGRAM(s) Oral three times a day  heparin  Injectable 5000 Unit(s) SubCutaneous every 12 hours  insulin glargine Injectable (LANTUS) 40 Unit(s) SubCutaneous at bedtime  insulin glargine Injectable (LANTUS) 40 Unit(s) SubCutaneous every morning  insulin lispro (HumaLOG) corrective regimen sliding scale   SubCutaneous three times a day with meals  insulin lispro Injectable (HumaLOG) 35 Unit(s) SubCutaneous three times a day before meals  isosorbide   mononitrate ER Tablet (IMDUR) 30 milliGRAM(s) Oral daily  levoFLOXacin  Tablet 250 milliGRAM(s) Oral every 24 hours  levothyroxine 125 MICROGram(s) Oral daily  metoprolol succinate ER 50 milliGRAM(s) Oral daily  pantoprazole    Tablet 40 milliGRAM(s) Oral before breakfast  predniSONE   Tablet 60 milliGRAM(s) Oral daily    MEDICATIONS  (PRN):  benzonatate 100 milliGRAM(s) Oral three times a day PRN Cough  dextrose 40% Gel 15 Gram(s) Oral once PRN Blood Glucose LESS THAN 70 milliGRAM(s)/deciliter  glucagon  Injectable 1 milliGRAM(s) IntraMuscular once PRN Glucose LESS THAN 70 milligrams/deciliter      Allergies    iodine (Hives)  iodine containing compounds (Unknown)  shellfish (Anaphylaxis)  shellfish (Swelling; Short breath)    Intolerances        Review of systems: nocturnal dyspnea    Vital Signs Last 24 Hrs  T(C): 36.3 (03 Jun 2019 08:00), Max: 36.7 (02 Jun 2019 15:52)  T(F): 97.3 (03 Jun 2019 08:00), Max: 98 (02 Jun 2019 15:52)  HR: 63 (03 Jun 2019 09:27) (59 - 98)  BP: 148/76 (03 Jun 2019 08:00) (115/60 - 148/76)  BP(mean): --  RR: 18 (03 Jun 2019 08:00) (18 - 18)  SpO2: 97% (03 Jun 2019 08:00) (92% - 97%)    LABS:    06-02    137  |  97<L>  |  62.0<H>  ----------------------------<  326<H>  4.3   |  24.0  |  1.59<H>    Ca    9.0      02 Jun 2019 09:23            POCT Blood Glucose.: 194 mg/dL (06-03-19 @ 07:42)  POCT Blood Glucose.: 279 mg/dL (06-02-19 @ 22:01)  POCT Blood Glucose.: 238 mg/dL (06-02-19 @ 16:16)  POCT Blood Glucose.: 380 mg/dL (06-02-19 @ 11:00)  POCT Blood Glucose.: 332 mg/dL (06-02-19 @ 09:35)  POCT Blood Glucose.: 346 mg/dL (06-02-19 @ 07:40)  POCT Blood Glucose.: 268 mg/dL (06-01-19 @ 21:51)  POCT Blood Glucose.: 382 mg/dL (06-01-19 @ 15:53)  POCT Blood Glucose.: 447 mg/dL (06-01-19 @ 10:56)  POCT Blood Glucose.: 396 mg/dL (06-01-19 @ 07:43)  POCT Blood Glucose.: 332 mg/dL (05-31-19 @ 22:04)  POCT Blood Glucose.: 335 mg/dL (05-31-19 @ 20:56)  POCT Blood Glucose.: 391 mg/dL (05-31-19 @ 18:01)  POCT Blood Glucose.: 406 mg/dL (05-31-19 @ 14:44)  POCT Blood Glucose.: 453 mg/dL (05-31-19 @ 10:53)
[facility]                                                        Office: [address] [address]                                                       Telephone: [phone]. Fax:[phone]                                                                             PROGRESS NOTE   Reason for follow up: Dyspnea                            Overnight: No new events.   Update: Pt c/t wheeze and c/o persistent cough, though states SOB is improved.    Subjective: "I feel better and want to go home"   Complains of: cough  Review of symptoms: Cardiac:  see HPI  Respiratory: see HPI  Gastrointestinal: No diarrhea. No abdominal pain. No bleeding.     Past medical history: No updates.   Chronic conditions:  Hypertension: controlled. CHF: Stable. CAD: Stable ischemic heart disease. DM: Stable;    	  Vitals:  T(C): 36.3 (06-03-19 @ 08:00), Max: 36.7 (06-02-19 @ 15:52)  HR: 63 (06-03-19 @ 09:27) (59 - 98)  BP: 148/76 (06-03-19 @ 08:00) (118/62 - 148/76)  RR: 18 (06-03-19 @ 08:00) (18 - 18)  SpO2: 97% (06-03-19 @ 08:00) (92% - 97%)  Wt(kg): --  I&O's Summary    Weight (kg): 94.3 (05-31 @ 19:55)    PHYSICAL EXAM:  Appearance: Comfortable. No acute distress  HEENT:  Head and neck: Atraumatic. Normocephalic.  Normal oral mucosa, PERRL, Neck is supple. No JVD, Right carotid bruit.   Neurologic: A & O x 3, no focal deficits. EOMI , Cranial nerves are intact.  Lymphatic: No cervical lymphadenopathy  Cardiovascular: Normal S1 S2, II/VI murmur, rubs/gallops. No JVD, 1-2+ b/l pitting edema  Respiratory: b/l wheeze  Gastrointestinal:  Soft, Non-tender, + BS  Lower Extremities: 1-2+ b/l pitting edema  Psychiatry: Patient is calm. No agitation. Mood & affect appropriate  Skin: No rashes/ ecchymoses/cyanosis/ulcers visualized on the face, hands or feet.    CURRENT MEDICATIONS:  diltiazem    milliGRAM(s) Oral daily  isosorbide   mononitrate ER Tablet (IMDUR) 30 milliGRAM(s) Oral daily  metoprolol succinate ER 50 milliGRAM(s) Oral daily    ALBUTerol/ipratropium for Nebulization  buDESOnide   0.5 milliGRAM(s) Respule  guaiFENesin ER  levoFLOXacin  Tablet  gabapentin  pantoprazole    Tablet  dextrose 50% Injectable  dextrose 50% Injectable  dextrose 50% Injectable  insulin glargine Injectable (LANTUS)  insulin glargine Injectable (LANTUS)  insulin lispro (HumaLOG) corrective regimen sliding scale  insulin lispro Injectable (HumaLOG)  levothyroxine  predniSONE   Tablet  aspirin enteric coated  dextrose 5%.  heparin  Injectable      LABS:	 	  CARDIAC MARKERS ( 01 Jun 2019 04:59 )  x     / 0.07 ng/mL / 81 U/L / x     / x      p-BNP 01 Jun 2019 04:59: x    , CARDIAC MARKERS ( 31 May 2019 20:23 )  x     / 0.06 ng/mL / 93 U/L / x     / x      p-BNP 31 May 2019 20:23: x            06-02    137  |  97<L>  |  62.0<H>  ----------------------------<  326<H>  4.3   |  24.0  |  1.59<H>    Ca    9.0      02 Jun 2019 09:23      proBNP: Serum Pro-Brain Natriuretic Peptide: 815 pg/mL (05-31 @ 11:12)    Lipid Profile:   HgA1c: Hemoglobin A1C, Whole Blood: 10.1 %  TSH: Thyroid Stimulating Hormone, Serum: 0.26 uIU/mL      TELEMETRY: Reviewed    ECG:  Reviewed by me. 	    DIAGNOSTIC TESTING:  [x] Echocardiogram:   < from: TTE Echo Complete w/Doppler (06.03.19 @ 10:15) >  Summary:   1. Left ventricular ejection fraction, by visual estimation, is >75%.   2. Technically difficult study.   3. Hyperdynamic global left ventricular systolic function.   4. Mildly increased LV wall thickness.   5. Normal left ventricular internal cavity size.   6. Spectral Doppler shows pseudonormal pattern of left ventricular   myocardial filling (Grade II diastolic dysfunction).   7. There is no evidence of pericardial effusion.   8. Severe mitral annular calcification.   9. Estimated pulmonary artery systolic pressure is 35.7 mmHg assuming a   right atrial pressure of 5 mmHg, which is consistent with borderline   pulmonary hypertension.  10. Mitral valve mean gradient is 2.0 mmHg consistent with mild mitral   stenosis.  11. Peak transaortic gradient equals 40.4 mmHg, mean transaortic gradient   equals 21.5 mmHg, the calculated aortic valve area equals 1.73 cm² by the   continuity equation consistent with mild aortic stenosis.    MD Herbert Electronically signed on 6/3/2019 at 2:11:27 PM    < end of copied text >    [x]  Catheterization:  < from: Cardiac Cath Lab - Adult (12.12.18 @ 14:19) >  VENTRICLES: There were no left ventricular global or regional wall motion  abnormalities. Global left ventricular function was hypercontractile. EF  calculated by contrast ventriculography was 80 % and EF estimated was 80  %.  VALVES: AORTIC VALVE: There was moderate aortic stenosis.  CORONARY VESSELS: The coronary circulation is right dominant.  LM:   --  LM: Normal. The vessel was normal sized, not calcified, and not  tortuous. Angiography showed no evidence of disease.  LAD:   --  LAD: Normal. The vessel was normal sized, not calcified, and not  tortuous. Angiography showed minor luminal irregularities with no flow  limiting lesions. Patent stents in the mid portion of the LAD.  CX:   --  Circumflex: Normal. The vessel was normal sized, not calcified,  and not tortuous. Angiography showed a single discrete lesion. There was a  tubular 95 % stenosis in the proximal third of the vessel segment. The  lesion was without evidence of thrombus. There was RUBIN grade 3 flow  through the vessel (brisk flow). This lesion is a likely culprit forthe  patient's recent myocardial infarction. It appears amenable to  percutaneous intervention.  --  Proximal circumflex:  RCA:   --  RCA: Normal. The vessel was normal sized, not calcified, and not  tortuous. Angiography showed a single discrete lesion. There was a diffuse  100 % stenosis in the middle third of the vessel segment. The lesion was  without evidence of thrombus. There was RUBIN grade 0 flow through the  vessel (no flow). This lesion is a chronic total occlusion. Fills via  collaterals from the RCA (faintly) and from the left system (good  collaterals).  COMPLICATIONS: There were no complications. No complications occurred  during the cath lab visit. No complications occurred during the cath lab  visit.  SUMMARY:  Summary: Addendum 12/13/2018: case reconfirmed to add Stent Procedure to  [facility] Interventional report  DIAGNOSTIC IMPRESSIONS: There is significant double vessel coronary artery  disease.  Prox LCx=95%  Mid IEE=879%  Successful PCI of Prox LCx with ADELIA x 1 (Bono 3.0x18mm) Left ventricular  function is normal.  LVEF=80%  Mild Elevation of Rt Hear Pressures:  RA=10 mmHg, RV=20/14 mmHg, PCWP=19 mmHg  Mild Pulmonary HTN (38/20 - 27 mmHg)  Normal CO (5.79 L/min) and CI (2.81 L/min/m2)  Moderate Aortic Stenosis (AVG=24 mmHg and BILL=1.3 cm2)  Successful Deployment of Closure Device (Angioseal)  DIAGNOSTIC RECOMMENDATIONS: The patient should continue with the present  medications. Patient management should include aggressive medical therapy,  close monitoring of BUN and creatinine, resumption of all previous  activities in 5 days, a cardiac rehabilitation program, and weight  reduction. The patient should follow a low fat and low calorie diet.  Medical management is recommended.  Prepared and signed by  Waylon Benavides MD    < end of copied text >    [x] Stress Test:    < from: Stress Echocardiogram (05.04.18 @ 13:33) >  Summary:   1. Negativestress echo for ischemia.   2. Resting LVOT Vmax 152 cm/sec      Resting Aortic Valve Vmax 290cm/sed, PG 34 mm Hg, Mean gradient is 20   mm Hg suggestive of Moderate Aortic Stenosis.                  At Low Dose There was Midcavity LVOT obstruction with a Vmax 400   cm/sec, PG 64 mm Hg.      Noy SHANNON Electronically signed on 5/8/2018 at 11:37:31 AM    < end of copied text >
HEALTH ISSUES - PROBLEM Dx:    AECOPD    INTERVAL HPI/OVERNIGHT EVENTS:  sitting OOB to C  breathing at baseline no SOB  no cough or fever    REVIEW OF SYSTEMS:    sob- fever- cough- obese + edema -    Vital Signs Last 24 Hrs  T(C): 36.6 (01 Jun 2019 13:16), Max: 36.8 (31 May 2019 20:33)  T(F): 97.8 (01 Jun 2019 13:16), Max: 98.2 (31 May 2019 20:33)  HR: 89 (01 Jun 2019 15:33) (89 - 115)  BP: 121/49 (01 Jun 2019 15:33) (104/64 - 144/66)  BP(mean): 67 (01 Jun 2019 15:33) (67 - 79)  RR: 20 (01 Jun 2019 15:33) (15 - 22)  SpO2: 96% (01 Jun 2019 15:33) (92% - 98%)    PHYSICAL EXAM-  GENERAL: Not in distress. Alert. morbid obesity  HEENT:  Normocephalic and atraumatic. PEARLA,EOMI. MM dry  NECK: Supple.  No JVD.    CARDIOVASCULAR: RRR S1, S2. No rubs/gallop. 2/6 SM  LUNGS: left basal coarse rales + no wheezing, no rhonchi.    ABDOMEN: ND. Soft,  NT, no guarding / rebound / rigidity. BS normoactive. No CVA tenderness.  obese abd  BACK: No spine tenderness.  EXTREMITIES: no cyanosis, no clubbing, +edema. no leg or calf TP+  SKIN: no rash. No cellulitis  NEUROLOGIC: AAO*3.strength is symmetric, sensation intact, speech fluent.    PSYCHIATRIC: Calm.  No agitation.  LABS:                        12.9   15.5  )-----------( 274      ( 31 May 2019 11:12 )             39.9     05-31    135  |  93<L>  |  57.0<H>  ----------------------------<  368<H>  5.2   |  22.0  |  1.77<H>    Ca    9.7      31 May 2019 20:23  Phos  3.8     05-31  Mg     2.6     05-31    TPro  7.8  /  Alb  4.1  /  TBili  0.5  /  DBili  x   /  AST  16  /  ALT  13  /  AlkPhos  81  05-31    PT/INR - ( 31 May 2019 11:12 )   PT: 10.7 sec;   INR: 0.93 ratio         PTT - ( 31 May 2019 11:12 )  PTT:25.9 sec        Assessment and Plan
HEALTH ISSUES - PROBLEM Dx:    AECOPD    INTERVAL HPI/OVERNIGHT EVENTS:  sitting in bed  c/o inability to sleep last night- various reasons like noise, cool air etc  at which point she asked for oxygen to be given  coughing ++ today    REVIEW OF SYSTEMS:    sob- fever- cough+ obese + edema -    Vital Signs Last 24 Hrs  T(C): 36.3 (03 Jun 2019 08:00), Max: 36.7 (02 Jun 2019 15:52)  T(F): 97.3 (03 Jun 2019 08:00), Max: 98 (02 Jun 2019 15:52)  HR: 63 (03 Jun 2019 09:27) (59 - 98)  BP: 148/76 (03 Jun 2019 08:00) (118/62 - 148/76)  BP(mean): --  RR: 18 (03 Jun 2019 08:00) (18 - 18)  SpO2: 97% (03 Jun 2019 08:00) (92% - 97%)    PHYSICAL EXAM-  GENERAL: Not in distress. Alert. morbid obesity  HEENT:  Normocephalic and atraumatic. PEARLA,EOMI. MM dry  NECK: Supple.  No JVD.    CARDIOVASCULAR: RRR S1, S2. No rubs/gallop. 2/6 SM  LUNGS: CTA dustin, no wheezing, no rhonchi.    ABDOMEN: ND. Soft,  NT, no guarding / rebound / rigidity. BS normoactive. No CVA tenderness.  obese abd  BACK: No spine tenderness.  EXTREMITIES: no cyanosis, no clubbing, +edema. no leg or calf TP+  SKIN: no rash. No cellulitis  NEUROLOGIC: AAO*3.strength is symmetric, sensation intact, speech fluent.    PSYCHIATRIC: Calm.  No agitation.    LABS:    06-02    137  |  97<L>  |  62.0<H>  ----------------------------<  326<H>  4.3   |  24.0  |  1.59<H>    Ca    9.0      02 Jun 2019 09:23    Assessment and Plan
Interval Events:  no overnight events  follow up on type 2 diabetes    patient seen and examined at bedside.  patient breathing better, feeling better  family at bedside    REVIEW OF SYSTEMS:    CONSTITUTIONAL: No fever, weight loss, or fatigue  EYES: No eye pain, visual disturbances, or discharge  ENMT:  No difficulty hearing, tinnitus, vertigo; No sinus or throat pain  NECK: No pain or stiffness  RESPIRATORY: No cough, wheezing, chills or hemoptysis; No shortness of breath  CARDIOVASCULAR: No chest pain, palpitations, dizziness, or leg swelling  GASTROINTESTINAL: No abdominal or epigastric pain. No nausea, vomiting, or hematemesis; No diarrhea or constipation. No melena or hematochezia.  NEUROLOGICAL: No headaches, memory loss, loss of strength, numbness, or tremors  SKIN: No itching, burning, rashes, or lesions   MUSCULOSKELETAL: No joint pain or swelling; No muscle, back, or extremity pain  PSYCHIATRIC: No depression, anxiety, mood swings, or difficulty sleeping        iodine (Hives)  iodine containing compounds (Unknown)  shellfish (Anaphylaxis)  shellfish (Swelling; Short breath)      MEDICATIONS  (STANDING):  ALBUTerol/ipratropium for Nebulization 3 milliLiter(s) Nebulizer every 6 hours  aspirin enteric coated 81 milliGRAM(s) Oral daily  buDESOnide   0.5 milliGRAM(s) Respule 0.5 milliGRAM(s) Inhalation every 12 hours  dextrose 5%. 1000 milliLiter(s) (50 mL/Hr) IV Continuous <Continuous>  dextrose 50% Injectable 12.5 Gram(s) IV Push once  dextrose 50% Injectable 25 Gram(s) IV Push once  dextrose 50% Injectable 25 Gram(s) IV Push once  diltiazem    milliGRAM(s) Oral daily  gabapentin 100 milliGRAM(s) Oral three times a day  heparin  Injectable 5000 Unit(s) SubCutaneous every 12 hours  insulin glargine Injectable (LANTUS) 40 Unit(s) SubCutaneous at bedtime  insulin glargine Injectable (LANTUS) 40 Unit(s) SubCutaneous every morning  insulin lispro (HumaLOG) corrective regimen sliding scale   SubCutaneous three times a day with meals  insulin lispro Injectable (HumaLOG) 35 Unit(s) SubCutaneous three times a day before meals  isosorbide   mononitrate ER Tablet (IMDUR) 30 milliGRAM(s) Oral daily  lactated ringers. 1000 milliLiter(s) (100 mL/Hr) IV Continuous <Continuous>  levothyroxine 125 MICROGram(s) Oral daily  methylPREDNISolone sodium succinate Injectable 40 milliGRAM(s) IV Push every 12 hours  metoprolol succinate ER 50 milliGRAM(s) Oral daily  pantoprazole    Tablet 40 milliGRAM(s) Oral before breakfast  piperacillin/tazobactam IVPB. 3.375 Gram(s) IV Intermittent every 8 hours    MEDICATIONS  (PRN):  benzonatate 100 milliGRAM(s) Oral three times a day PRN Cough  dextrose 40% Gel 15 Gram(s) Oral once PRN Blood Glucose LESS THAN 70 milliGRAM(s)/deciliter  glucagon  Injectable 1 milliGRAM(s) IntraMuscular once PRN Glucose LESS THAN 70 milligrams/deciliter      Vital Signs Last 24 Hrs  T(C): 36.6 (01 Jun 2019 13:16), Max: 36.8 (31 May 2019 20:33)  T(F): 97.8 (01 Jun 2019 13:16), Max: 98.2 (31 May 2019 20:33)  HR: 94 (01 Jun 2019 10:47) (94 - 117)  BP: 143/56 (01 Jun 2019 10:47) (104/64 - 152/72)  BP(mean): 79 (01 Jun 2019 10:47) (69 - 79)  RR: 19 (01 Jun 2019 10:47) (15 - 23)  SpO2: 92% (01 Jun 2019 10:47) (92% - 98%)    PHYSICAL EXAM:    Constitutional: NAD, elderly  HEENT: EOMI, no exophalmos  Neck: trachea midline, no thyroid enlargement  Respiratory: CTAB, crackles at left base, some bronchial breath sounds  Cardiovascular: S1 and S2, RRR  Gastrointestinal: BS+, soft, nntnd  Extremities: No peripheral edema  Neurological: A/O x 3, no focal deficits  Psychiatric: Normal mood, normal affect        LABS  05-31    135  |  93<L>  |  57.0<H>  ----------------------------<  368<H>  5.2   |  22.0  |  1.77<H>    Ca    9.7      31 May 2019 20:23  Phos  3.8     05-31  Mg     2.6     05-31    TPro  7.8  /  Alb  4.1  /  TBili  0.5  /  DBili  x   /  AST  16  /  ALT  13  /  AlkPhos  81  05-31                          12.9   15.5  )-----------( 274      ( 31 May 2019 11:12 )             39.9       Hemoglobin A1C, Whole Blood: 10.1 % (06-01-19 @ 04:59)  Thyroid Stimulating Hormone, Serum: 0.26 uIU/mL (06-01-19 @ 04:59)    Alkaline Phosphatase, Serum: 81 U/L (05-31-19 @ 11:12)  Alanine Aminotransferase (ALT/SGPT): 13 U/L (05-31-19 @ 11:12)  Albumin, Serum: 4.1 g/dL (05-31-19 @ 11:12)  Aspartate Aminotransferase (AST/SGOT): 16 U/L (05-31-19 @ 11:12)    CAPILLARY BLOOD GLUCOSE      POCT Blood Glucose.: 447 mg/dL (01 Jun 2019 10:56)  POCT Blood Glucose.: 396 mg/dL (01 Jun 2019 07:43)  POCT Blood Glucose.: 332 mg/dL (31 May 2019 22:04)  POCT Blood Glucose.: 335 mg/dL (31 May 2019 20:56)  POCT Blood Glucose.: 391 mg/dL (31 May 2019 18:01)
PULMONARY PROGRESS NOTE      SIVAN [id number]    Patient is a 84y old  Female who presents with a chief complaint of COPD exacerbation (01 Jun 2019 17:04)      INTERVAL HPI/OVERNIGHT EVENTS:  Awake alert feeling much better  no sputum or chest pain    MEDICATIONS  (STANDING):  ALBUTerol/ipratropium for Nebulization 3 milliLiter(s) Nebulizer every 6 hours  aspirin enteric coated 81 milliGRAM(s) Oral daily  buDESOnide   0.5 milliGRAM(s) Respule 0.5 milliGRAM(s) Inhalation every 12 hours  dextrose 5%. 1000 milliLiter(s) (50 mL/Hr) IV Continuous <Continuous>  dextrose 50% Injectable 12.5 Gram(s) IV Push once  dextrose 50% Injectable 25 Gram(s) IV Push once  dextrose 50% Injectable 25 Gram(s) IV Push once  diltiazem    milliGRAM(s) Oral daily  gabapentin 100 milliGRAM(s) Oral three times a day  heparin  Injectable 5000 Unit(s) SubCutaneous every 12 hours  insulin glargine Injectable (LANTUS) 40 Unit(s) SubCutaneous at bedtime  insulin glargine Injectable (LANTUS) 40 Unit(s) SubCutaneous every morning  insulin lispro (HumaLOG) corrective regimen sliding scale   SubCutaneous three times a day with meals  insulin lispro Injectable (HumaLOG) 35 Unit(s) SubCutaneous three times a day before meals  isosorbide   mononitrate ER Tablet (IMDUR) 30 milliGRAM(s) Oral daily  levoFLOXacin  Tablet 500 milliGRAM(s) Oral every 24 hours  levothyroxine 125 MICROGram(s) Oral daily  methylPREDNISolone sodium succinate Injectable 40 milliGRAM(s) IV Push every 12 hours  metoprolol succinate ER 50 milliGRAM(s) Oral daily  pantoprazole    Tablet 40 milliGRAM(s) Oral before breakfast      MEDICATIONS  (PRN):  benzonatate 100 milliGRAM(s) Oral three times a day PRN Cough  dextrose 40% Gel 15 Gram(s) Oral once PRN Blood Glucose LESS THAN 70 milliGRAM(s)/deciliter  glucagon  Injectable 1 milliGRAM(s) IntraMuscular once PRN Glucose LESS THAN 70 milligrams/deciliter      Allergies    iodine (Hives)  iodine containing compounds (Unknown)  shellfish (Anaphylaxis)  shellfish (Swelling; Short breath)    Intolerances        PAST MEDICAL & SURGICAL HISTORY:  NSTEMI (non-ST elevated myocardial infarction)  Hypothyroid  HLD (hyperlipidemia)  HTN (hypertension)  CAD (coronary artery disease)  Asthma  Diabetes  Gastroesophageal reflux disease without esophagitis  Pure hypercholesterolemia  Hypothyroidism, unspecified type  Essential hypertension  DM2 (diabetes mellitus, type 2)  Uncomplicated asthma, unspecified asthma severity  Abnormal findings on cardiac catheterization: Cardiac Cath  History of appendectomy  History of appendectomy      SOCIAL HISTORY  Smoking History:       REVIEW OF SYSTEMS:    CONSTITUTIONAL:  No distress    HEENT:  Eyes:  No diplopia or blurred vision. ENT:  No earache, sore throat or runny nose.    CARDIOVASCULAR:  No pressure, squeezing, tightness, heaviness or aching about the chest; no palpitations.    RESPIRATORY:  No cough, shortness of breath, PND or orthopnea. Mild SOBOE    GASTROINTESTINAL:  No nausea, vomiting or diarrhea.    GENITOURINARY:  No dysuria, frequency or urgency.    NEUROLOGIC:  No paresthesias, fasciculations, seizures or weakness.    Extremities: No cyanosis, clubbing or edema    PSYCHIATRIC:  No disorder of thought or mood.    Vital Signs Last 24 Hrs  T(C): 36.6 (02 Jun 2019 07:47), Max: 36.7 (01 Jun 2019 17:26)  T(F): 97.9 (02 Jun 2019 07:47), Max: 98.1 (01 Jun 2019 23:20)  HR: 73 (02 Jun 2019 07:47) (71 - 110)  BP: 142/72 (02 Jun 2019 07:47) (117/73 - 143/56)  BP(mean): 67 (01 Jun 2019 15:33) (67 - 79)  RR: 18 (02 Jun 2019 07:47) (18 - 20)  SpO2: 97% (02 Jun 2019 07:47) (92% - 97%)    PHYSICAL EXAMINATION:    GENERAL: The patient is awake and alert in no apparent distress.     HEENT: Head is normocephalic and atraumatic. Extraocular muscles are intact. Mucous membranes are moist.    NECK: Supple.    LUNGS: Clear to auscultation without wheezing, rales or rhonchi; respirations unlabored    HEART: Regular rate and rhythm without murmur.    ABDOMEN: Soft, nontender, and nondistended.      EXTREMITIES: Without any cyanosis, clubbing, rash, lesions or edema.    NEUROLOGIC: Grossly intact.    LABS:                        12.9   15.5  )-----------( 274      ( 31 May 2019 11:12 )             39.9     05-31    135  |  93<L>  |  57.0<H>  ----------------------------<  368<H>  5.2   |  22.0  |  1.77<H>    Ca    9.7      31 May 2019 20:23  Phos  3.8     05-31  Mg     2.6     05-31    TPro  7.8  /  Alb  4.1  /  TBili  0.5  /  DBili  x   /  AST  16  /  ALT  13  /  AlkPhos  81  05-31    PT/INR - ( 31 May 2019 11:12 )   PT: 10.7 sec;   INR: 0.93 ratio         PTT - ( 31 May 2019 11:12 )  PTT:25.9 sec    ABG - ( 31 May 2019 13:40 )  pH, Arterial: 7.43  pH, Blood: x     /  pCO2: 36    /  pO2: 89    / HCO3: 25    / Base Excess: 0.2   /  SaO2: 98                CARDIAC MARKERS ( 01 Jun 2019 04:59 )  x     / 0.07 ng/mL / 81 U/L / x     / x      CARDIAC MARKERS ( 31 May 2019 20:23 )  x     / 0.06 ng/mL / 93 U/L / x     / x      CARDIAC MARKERS ( 31 May 2019 14:48 )  x     / 0.02 ng/mL / x     / x     / x      CARDIAC MARKERS ( 31 May 2019 11:12 )  x     / <0.01 ng/mL / x     / x     / x            Serum Pro-Brain Natriuretic Peptide: 815 pg/mL (05-31-19 @ 11:12)      Procalcitonin, Serum: 0.08 ng/mL (05-31-19 @ 20:23)      MICROBIOLOGY:    RADIOLOGY & ADDITIONAL STUDIES:  < from: CT Chest No Cont (06.01.19 @ 19:53) >     EXAM:  CT CHEST                          PROCEDURE DATE:  06/01/2019          INTERPRETATION:    CLINICAL HISTORY:    84 years old, female; Condition or disease; Other: Shortness of breath.   Pneumonia    TECHNIQUE:    Imaging protocol: Axial computed tomography images of the chest without   intravenous contrast. Coronal and sagittal reformatted images were   created and   reviewed.    3D rendering: MIP reconstructed images were created and reviewed.     COMPARISON:    CT CHEST 3/29/2019 9:22 AM     FINDINGS:    Lungs: Moderate platelike atelectatic changes lung bases.    Pleural space: Normal. No pneumothorax. No pleural effusion.    Heart: Mild cardiomegaly.    Aorta: Moderate atherosclerotic wall calcification thoracic aortic arch.    Lymph nodes: Unremarkable. No enlarged lymph nodes.    Bones/joints: Unremarkable. No acute fracture.    Soft tissues: Unremarkable.    Other findings: Suggestion of mild pulmonary fibrotic changes.     IMPRESSION:   1. Moderate platelike atelectatic changes lung bases.   2. Mild cardiomegaly.                DAMON QUILES M.D., ATTENDING RADIOLOGIST  This document has been electronically signed. Jun 2 2019  7:21AM    < end of copied text >  All films reviewed on PACS
PULMONARY PROGRESS NOTE      SIVAN PAEZRN-[id number]    Patient is a 84y old  Female who presents with a chief complaint of COPD exacerbation (31 May 2019 21:10)      INTERVAL HPI/OVERNIGHT EVENTS:  Feeling much better  Less SOB \  Decrease wheeze  Did not use CPAP/BIPAP  On [employer] sitting in chair    MEDICATIONS  (STANDING):  ALBUTerol/ipratropium for Nebulization 3 milliLiter(s) Nebulizer every 6 hours  aspirin enteric coated 81 milliGRAM(s) Oral daily  buDESOnide   0.5 milliGRAM(s) Respule 0.5 milliGRAM(s) Inhalation every 12 hours  dextrose 5%. 1000 milliLiter(s) (50 mL/Hr) IV Continuous <Continuous>  dextrose 50% Injectable 12.5 Gram(s) IV Push once  dextrose 50% Injectable 25 Gram(s) IV Push once  dextrose 50% Injectable 25 Gram(s) IV Push once  diltiazem    milliGRAM(s) Oral daily  gabapentin 100 milliGRAM(s) Oral three times a day  heparin  Injectable 5000 Unit(s) SubCutaneous every 12 hours  insulin glargine Injectable (LANTUS) 40 Unit(s) SubCutaneous at bedtime  insulin glargine Injectable (LANTUS) 40 Unit(s) SubCutaneous every morning  insulin lispro (HumaLOG) corrective regimen sliding scale   SubCutaneous three times a day with meals  insulin lispro Injectable (HumaLOG) 20 Unit(s) SubCutaneous three times a day before meals  isosorbide   mononitrate ER Tablet (IMDUR) 30 milliGRAM(s) Oral daily  lactated ringers. 1000 milliLiter(s) (100 mL/Hr) IV Continuous <Continuous>  levothyroxine 125 MICROGram(s) Oral daily  methylPREDNISolone sodium succinate Injectable 60 milliGRAM(s) IV Push every 6 hours  metoprolol succinate ER 50 milliGRAM(s) Oral daily  pantoprazole    Tablet 40 milliGRAM(s) Oral before breakfast  piperacillin/tazobactam IVPB. 3.375 Gram(s) IV Intermittent every 8 hours  vancomycin  IVPB 750 milliGRAM(s) IV Intermittent every 24 hours      MEDICATIONS  (PRN):  benzonatate 100 milliGRAM(s) Oral three times a day PRN Cough  dextrose 40% Gel 15 Gram(s) Oral once PRN Blood Glucose LESS THAN 70 milliGRAM(s)/deciliter  glucagon  Injectable 1 milliGRAM(s) IntraMuscular once PRN Glucose LESS THAN 70 milligrams/deciliter      Allergies    iodine (Hives)  iodine containing compounds (Unknown)  shellfish (Anaphylaxis)  shellfish (Swelling; Short breath)    Intolerances        PAST MEDICAL & SURGICAL HISTORY:  NSTEMI (non-ST elevated myocardial infarction)  Hypothyroid  HLD (hyperlipidemia)  HTN (hypertension)  CAD (coronary artery disease)  Asthma  Diabetes  Gastroesophageal reflux disease without esophagitis  Pure hypercholesterolemia  Hypothyroidism, unspecified type  Essential hypertension  DM2 (diabetes mellitus, type 2)  Uncomplicated asthma, unspecified asthma severity  Abnormal findings on cardiac catheterization: Cardiac Cath  History of appendectomy  History of appendectomy      SOCIAL HISTORY  Smoking History:       REVIEW OF SYSTEMS:    CONSTITUTIONAL:  No distress    HEENT:  Eyes:  No diplopia or blurred vision. ENT:  No earache, sore throat or runny nose.    CARDIOVASCULAR:  No pressure, squeezing, tightness, heaviness or aching about the chest; no palpitations.    RESPIRATORY:  above    GASTROINTESTINAL:  No nausea, vomiting or diarrhea.    GENITOURINARY:  No dysuria, frequency or urgency.    NEUROLOGIC:  No paresthesias, fasciculations, seizures or weakness.    Extremities: No cyanosis, clubbing or edema    PSYCHIATRIC:  No disorder of thought or mood.    Vital Signs Last 24 Hrs  T(C): 36.6 (01 Jun 2019 08:38), Max: 36.8 (31 May 2019 20:33)  T(F): 97.8 (01 Jun 2019 08:38), Max: 98.2 (31 May 2019 20:33)  HR: 94 (01 Jun 2019 10:47) (94 - 117)  BP: 143/56 (01 Jun 2019 10:47) (104/64 - 152/72)  BP(mean): 79 (01 Jun 2019 10:47) (69 - 79)  RR: 19 (01 Jun 2019 10:47) (15 - 24)  SpO2: 92% (01 Jun 2019 10:47) (92% - 99%)    PHYSICAL EXAMINATION:    GENERAL: The patient is awake and alert in no apparent distress.     HEENT: Head is normocephalic and atraumatic. Extraocular muscles are intact. Mucous membranes are moist.    NECK: Supple.    LUNGS: Clear to auscultation without wheezing, rales or rhonchi; respirations unlabored    HEART: Regular rate and rhythm without murmur.    ABDOMEN: Soft, nontender, and nondistended.      EXTREMITIES: Without any cyanosis, clubbing, rash, lesions or edema.    NEUROLOGIC: Grossly intact.    LABS:                        12.9   15.5  )-----------( 274      ( 31 May 2019 11:12 )             39.9     05-31    135  |  93<L>  |  57.0<H>  ----------------------------<  368<H>  5.2   |  22.0  |  1.77<H>    Ca    9.7      31 May 2019 20:23  Phos  3.8     05-31  Mg     2.6     05-31    TPro  7.8  /  Alb  4.1  /  TBili  0.5  /  DBili  x   /  AST  16  /  ALT  13  /  AlkPhos  81  05-31    PT/INR - ( 31 May 2019 11:12 )   PT: 10.7 sec;   INR: 0.93 ratio         PTT - ( 31 May 2019 11:12 )  PTT:25.9 sec    ABG - ( 31 May 2019 13:40 )  pH, Arterial: 7.43  pH, Blood: x     /  pCO2: 36    /  pO2: 89    / HCO3: 25    / Base Excess: 0.2   /  SaO2: 98                CARDIAC MARKERS ( 01 Jun 2019 04:59 )  x     / 0.07 ng/mL / 81 U/L / x     / x      CARDIAC MARKERS ( 31 May 2019 20:23 )  x     / 0.06 ng/mL / 93 U/L / x     / x      CARDIAC MARKERS ( 31 May 2019 14:48 )  x     / 0.02 ng/mL / x     / x     / x      CARDIAC MARKERS ( 31 May 2019 11:12 )  x     / <0.01 ng/mL / x     / x     / x            Serum Pro-Brain Natriuretic Peptide: 815 pg/mL (05-31-19 @ 11:12)    Lactate, Blood: 3.3 mmol/L (06-01-19 @ 04:59)    Procalcitonin, Serum: 0.08 ng/mL (05-31-19 @ 20:23)      MICROBIOLOGY:    RADIOLOGY & ADDITIONAL STUDIES:  no new films
HEALTH ISSUES - PROBLEM Dx:    AECOPD    INTERVAL HPI/OVERNIGHT EVENTS:  sitting OOB to C  breathing at baseline no SOB  no cough or fever    REVIEW OF SYSTEMS:    sob- fever- cough- obese + edema -      Vital Signs Last 24 Hrs  T(C): 36.6 (02 Jun 2019 07:47), Max: 36.7 (01 Jun 2019 17:26)  T(F): 97.9 (02 Jun 2019 07:47), Max: 98.1 (01 Jun 2019 23:20)  HR: 62 (02 Jun 2019 14:34) (62 - 89)  BP: 115/60 (02 Jun 2019 13:40) (115/60 - 142/72)  BP(mean): 67 (01 Jun 2019 15:33) (67 - 67)  RR: 18 (02 Jun 2019 07:47) (18 - 20)  SpO2: 93% (02 Jun 2019 14:34) (93% - 97%)    PHYSICAL EXAM-  GENERAL: Not in distress. Alert. morbid obesity  HEENT:  Normocephalic and atraumatic. PEARLA,EOMI. MM dry  NECK: Supple.  No JVD.    CARDIOVASCULAR: RRR S1, S2. No rubs/gallop. 2/6 SM  LUNGS: CTA dustin, no wheezing, no rhonchi.    ABDOMEN: ND. Soft,  NT, no guarding / rebound / rigidity. BS normoactive. No CVA tenderness.  obese abd  BACK: No spine tenderness.  EXTREMITIES: no cyanosis, no clubbing, +edema. no leg or calf TP+  SKIN: no rash. No cellulitis  NEUROLOGIC: AAO*3.strength is symmetric, sensation intact, speech fluent.    PSYCHIATRIC: Calm.  No agitation.  LABS:    06-02    137  |  97<L>  |  62.0<H>  ----------------------------<  326<H>  4.3   |  24.0  |  1.59<H>    Ca    9.0      02 Jun 2019 09:23  Phos  3.8     05-31  Mg     2.6     05-31    Assessment and Plan

## 2019-06-03 NOTE — GOALS OF CARE CONVERSATION - PERSONAL ADVANCE DIRECTIVE - CONVERSATION DETAILS
SW met with patient and dgt at bedside. SW educated both regarding palliative care services and symptom support with advanced illness. Pt alert and oriented and actively engaged with SW. Pt lives at [facility] for last 3 years. Pt has 2 dgts and son who are involved. Pt has aide 3 x weekly who assists with cleaning and household needs. Pt reports she remains independent with showering and personal care needs at home. Pts family does shopping for her. Dgt reports concerns with numerous rehospitalizations. Dgt reports patient has been rehospitalized at least 10-12 times in last 18 months. SW reviewed patients understanding of chronic illness and progressive needs. MOLST had been completed on chart. Palliative will follow.

## 2019-06-03 NOTE — PROGRESS NOTE ADULT - ASSESSMENT
A/P:  85yo female with PMH of CAD with stent x4 (most recent NSTEMI 12/2018 ADELIA to pLCX, previous ADELIA pLAD, ADELIA x mLAD, ADELIA OM1, ADELIA to LCx, and  to % with no intervention to date), HFpEF (EF>75%), COPD, asthma, moderate AS, mild pulmonary HTN, STEVENSON (CPAP), insulin dependent DMII, HTN, HLD, and hypothyroidism Cardio is Kennedy and PMD is [place], Endo is Ionica, Mcginnis is Pulm. Per ED attending "BIBA with duoneb x2 and solumedrol 125.  Speaking in full sentences.  AAox3.  HHA bedside.  At presentation, As interpreted by ED physician, ECG is tachy to 126 but NSR with STD in lateral leads and qwaves in V1/V2 but largely unchanged from ECG on record from 3/2019.  Otherwise normal intervals/axis, no other changes in QRS, no other ST/T changes. Non toxic. BIPAP initiated immmediately upon arrival." SOB, productive cough, feverish, and elevated BS worsening for 2 days. c/o chest "soreness" since cough started, increases with inspiration and cough. Pt c/t wheeze and c/o persistent cough, though states SOB is improving.     - TTE essentially unchanged  - dyspnea likely r/t COPD exacerbation vs resp. infectious process, does not appear to be cardiac related  - c/w ASA, metoprolol, diltiazem, lisinopril, lipitor, lasix  - PMT management for respiratory issues  - f/u with Dr. eBnavides in 2 weeks      Thank you for allowing me to participate in care of your patient.   Please call as needed.

## 2019-06-03 NOTE — PROGRESS NOTE ADULT - ASSESSMENT
Dm type 2, uncontrolled due to steroid rx for COPD. Now that pt. on prednisone should see a reduction in insulin requirements; dose modifications may be needed. Will follow.

## 2019-06-03 NOTE — PHYSICAL THERAPY INITIAL EVALUATION ADULT - PERTINENT HX OF CURRENT PROBLEM, REHAB EVAL
pt presents to [facility] due to SOB, COPD exacerbation, acute hypoxic respiratory failure

## 2019-06-04 ENCOUNTER — TRANSCRIPTION ENCOUNTER (OUTPATIENT)
Age: 84
End: 2019-06-04

## 2019-06-04 ENCOUNTER — INBOUND DOCUMENT (OUTPATIENT)
Age: 84
End: 2019-06-04

## 2019-06-04 ENCOUNTER — FORM ENCOUNTER (OUTPATIENT)
Age: 84
End: 2019-06-04

## 2019-06-04 VITALS
OXYGEN SATURATION: 97 % | TEMPERATURE: 98 F | RESPIRATION RATE: 18 BRPM | DIASTOLIC BLOOD PRESSURE: 74 MMHG | HEART RATE: 72 BPM | SYSTOLIC BLOOD PRESSURE: 146 MMHG

## 2019-06-04 LAB
ANION GAP SERPL CALC-SCNC: 11 MMOL/L — SIGNIFICANT CHANGE UP (ref 5–17)
BUN SERPL-MCNC: 70 MG/DL — HIGH (ref 8–20)
CALCIUM SERPL-MCNC: 9 MG/DL — SIGNIFICANT CHANGE UP (ref 8.6–10.2)
CHLORIDE SERPL-SCNC: 101 MMOL/L — SIGNIFICANT CHANGE UP (ref 98–107)
CO2 SERPL-SCNC: 27 MMOL/L — SIGNIFICANT CHANGE UP (ref 22–29)
CREAT SERPL-MCNC: 1.51 MG/DL — HIGH (ref 0.5–1.3)
GLUCOSE BLDC GLUCOMTR-MCNC: 104 MG/DL — HIGH (ref 70–99)
GLUCOSE BLDC GLUCOMTR-MCNC: 254 MG/DL — HIGH (ref 70–99)
GLUCOSE SERPL-MCNC: 103 MG/DL — SIGNIFICANT CHANGE UP (ref 70–115)
POTASSIUM SERPL-MCNC: 4.4 MMOL/L — SIGNIFICANT CHANGE UP (ref 3.5–5.3)
POTASSIUM SERPL-SCNC: 4.4 MMOL/L — SIGNIFICANT CHANGE UP (ref 3.5–5.3)
SODIUM SERPL-SCNC: 139 MMOL/L — SIGNIFICANT CHANGE UP (ref 135–145)

## 2019-06-04 PROCEDURE — 84132 ASSAY OF SERUM POTASSIUM: CPT

## 2019-06-04 PROCEDURE — 96375 TX/PRO/DX INJ NEW DRUG ADDON: CPT

## 2019-06-04 PROCEDURE — 71045 X-RAY EXAM CHEST 1 VIEW: CPT

## 2019-06-04 PROCEDURE — 96365 THER/PROPH/DIAG IV INF INIT: CPT

## 2019-06-04 PROCEDURE — 97163 PT EVAL HIGH COMPLEX 45 MIN: CPT

## 2019-06-04 PROCEDURE — 83880 ASSAY OF NATRIURETIC PEPTIDE: CPT

## 2019-06-04 PROCEDURE — 82435 ASSAY OF BLOOD CHLORIDE: CPT

## 2019-06-04 PROCEDURE — 84484 ASSAY OF TROPONIN QUANT: CPT

## 2019-06-04 PROCEDURE — 93005 ELECTROCARDIOGRAM TRACING: CPT

## 2019-06-04 PROCEDURE — 82803 BLOOD GASES ANY COMBINATION: CPT

## 2019-06-04 PROCEDURE — 85610 PROTHROMBIN TIME: CPT

## 2019-06-04 PROCEDURE — 94760 N-INVAS EAR/PLS OXIMETRY 1: CPT

## 2019-06-04 PROCEDURE — 82947 ASSAY GLUCOSE BLOOD QUANT: CPT

## 2019-06-04 PROCEDURE — 82550 ASSAY OF CK (CPK): CPT

## 2019-06-04 PROCEDURE — 36415 COLL VENOUS BLD VENIPUNCTURE: CPT

## 2019-06-04 PROCEDURE — 94640 AIRWAY INHALATION TREATMENT: CPT

## 2019-06-04 PROCEDURE — 83605 ASSAY OF LACTIC ACID: CPT

## 2019-06-04 PROCEDURE — 99239 HOSP IP/OBS DSCHRG MGMT >30: CPT

## 2019-06-04 PROCEDURE — 84443 ASSAY THYROID STIM HORMONE: CPT

## 2019-06-04 PROCEDURE — 84145 PROCALCITONIN (PCT): CPT

## 2019-06-04 PROCEDURE — 84100 ASSAY OF PHOSPHORUS: CPT

## 2019-06-04 PROCEDURE — 82010 KETONE BODYS QUAN: CPT

## 2019-06-04 PROCEDURE — 99291 CRITICAL CARE FIRST HOUR: CPT | Mod: 25

## 2019-06-04 PROCEDURE — 84295 ASSAY OF SERUM SODIUM: CPT

## 2019-06-04 PROCEDURE — 93306 TTE W/DOPPLER COMPLETE: CPT

## 2019-06-04 PROCEDURE — 85014 HEMATOCRIT: CPT

## 2019-06-04 PROCEDURE — 85730 THROMBOPLASTIN TIME PARTIAL: CPT

## 2019-06-04 PROCEDURE — 82330 ASSAY OF CALCIUM: CPT

## 2019-06-04 PROCEDURE — 83735 ASSAY OF MAGNESIUM: CPT

## 2019-06-04 PROCEDURE — 85027 COMPLETE CBC AUTOMATED: CPT

## 2019-06-04 PROCEDURE — 80048 BASIC METABOLIC PNL TOTAL CA: CPT

## 2019-06-04 PROCEDURE — 82962 GLUCOSE BLOOD TEST: CPT

## 2019-06-04 PROCEDURE — 80053 COMPREHEN METABOLIC PANEL: CPT

## 2019-06-04 PROCEDURE — 71250 CT THORAX DX C-: CPT

## 2019-06-04 PROCEDURE — 83036 HEMOGLOBIN GLYCOSYLATED A1C: CPT

## 2019-06-04 PROCEDURE — 94660 CPAP INITIATION&MGMT: CPT

## 2019-06-04 RX ORDER — INSULIN LISPRO 100/ML
15 VIAL (ML) SUBCUTANEOUS
Refills: 0 | Status: DISCONTINUED | OUTPATIENT
Start: 2019-06-04 | End: 2019-06-04

## 2019-06-04 RX ORDER — INSULIN GLARGINE 100 [IU]/ML
40 INJECTION, SOLUTION SUBCUTANEOUS
Qty: 2 | Refills: 0
Start: 2019-06-04 | End: 2019-07-03

## 2019-06-04 RX ORDER — INSULIN LISPRO 100/ML
20 VIAL (ML) SUBCUTANEOUS
Refills: 0 | Status: DISCONTINUED | OUTPATIENT
Start: 2019-06-04 | End: 2019-06-04

## 2019-06-04 RX ORDER — INSULIN ASPART 100 [IU]/ML
25 INJECTION, SOLUTION SUBCUTANEOUS
Qty: 2 | Refills: 0
Start: 2019-06-04

## 2019-06-04 RX ADMIN — HEPARIN SODIUM 5000 UNIT(S): 5000 INJECTION INTRAVENOUS; SUBCUTANEOUS at 06:12

## 2019-06-04 RX ADMIN — GABAPENTIN 100 MILLIGRAM(S): 400 CAPSULE ORAL at 06:12

## 2019-06-04 RX ADMIN — ISOSORBIDE MONONITRATE 30 MILLIGRAM(S): 60 TABLET, EXTENDED RELEASE ORAL at 11:34

## 2019-06-04 RX ADMIN — Medication 600 MILLIGRAM(S): at 06:12

## 2019-06-04 RX ADMIN — Medication 0.5 MILLIGRAM(S): at 09:09

## 2019-06-04 RX ADMIN — Medication 250 MILLIGRAM(S): at 06:11

## 2019-06-04 RX ADMIN — Medication 3 MILLILITER(S): at 09:09

## 2019-06-04 RX ADMIN — Medication 81 MILLIGRAM(S): at 11:34

## 2019-06-04 RX ADMIN — Medication 125 MICROGRAM(S): at 06:12

## 2019-06-04 RX ADMIN — Medication 240 MILLIGRAM(S): at 06:12

## 2019-06-04 RX ADMIN — Medication 60 MILLIGRAM(S): at 06:11

## 2019-06-04 RX ADMIN — PANTOPRAZOLE SODIUM 40 MILLIGRAM(S): 20 TABLET, DELAYED RELEASE ORAL at 06:12

## 2019-06-04 RX ADMIN — Medication 20 UNIT(S): at 11:34

## 2019-06-04 RX ADMIN — Medication 3 MILLILITER(S): at 14:30

## 2019-06-04 RX ADMIN — INSULIN GLARGINE 40 UNIT(S): 100 INJECTION, SOLUTION SUBCUTANEOUS at 08:35

## 2019-06-04 RX ADMIN — Medication 50 MILLIGRAM(S): at 06:12

## 2019-06-04 RX ADMIN — Medication 3: at 11:35

## 2019-06-04 RX ADMIN — Medication 3 MILLILITER(S): at 03:05

## 2019-06-04 NOTE — DISCHARGE NOTE PROVIDER - CARE PROVIDER_API CALL
Shen Kerns)  Critical Care Medicine; Internal Medicine; Pulmonary Disease  [address], [address]  [place] [address]  Phone: [phone]  Fax: [phone]  Follow Up Time: 1 week    Waylon Benavides)  Cardiovascular Disease; Internal Medicine; Interventional Cardiology  Phone: [phone]  Fax: [phone]  Follow Up Time: 2 weeks Shen Kerns)  Critical Care Medicine; Internal Medicine; Pulmonary Disease  [address], [address]  [place] [address]  Phone: [phone]  Fax: [phone]  Follow Up Time: 1 week    Waylon Benavides)  Cardiovascular Disease; Internal Medicine; Interventional Cardiology  Phone: [phone]  Fax: [phone]  Follow Up Time: 2 weeks    Mesha Barfield)  EndocrinologyMetabDiabetes  [address], [place] [id number]  Phone: [phone]  Fax: [phone]  Follow Up Time:     Maik Coyne (DO)  Family Medicine  [address], [address]  [place] [address]  Phone: [phone]  Fax: [phone]  Follow Up Time:

## 2019-06-04 NOTE — DISCHARGE NOTE PROVIDER - NSDCCPCAREPLAN_GEN_ALL_CORE_FT
PRINCIPAL DISCHARGE DIAGNOSIS  Diagnosis: SOB (shortness of breath)  Assessment and Plan of Treatment: Resolved      SECONDARY DISCHARGE DIAGNOSES  Diagnosis: Respiratory failure  Assessment and Plan of Treatment: Resolving: finish steroid taper and antibiotic PRINCIPAL DISCHARGE DIAGNOSIS  Diagnosis: COPD exacerbation  Assessment and Plan of Treatment: COPD exacerbation      SECONDARY DISCHARGE DIAGNOSES  Diagnosis: Acute hypoxemic respiratory failure  Assessment and Plan of Treatment:

## 2019-06-04 NOTE — DISCHARGE NOTE PROVIDER - HOSPITAL COURSE
84 yr old female presented to the ED on 5/31/19 c/o SOB for 2 days. She had a known history of DM, HTN, CAD with recent Stents, moderate AS , Right side CHF ,COPD and multiple hospitalizations over the past year. She was admitted to SDU. She was consulted by Pulmonary and put on IV steroids, O2, and nebulizer treatments. She could not tolerate Bipap. After one day in SDU she improved enough to be transferred to . She was started on antibiotics and sent for a Chest CT which showed mild atelectatic changes and mild cardiomegaly. Pulmonary recommended changing to PO steroids and signed off on 6/02, they said to F/U in 2 weeks. Patient was also evaluated by Cariology and had a SHANNON. The SHANNON was unchanged since last exam. Cariology cleared her and said F/U in 2 weeks.  Patient was seen by PT yesterday. She was recommended to go home with no assistance needed. Patient's O2 was 93-94% without O2 while ambulating. 84 yr old female presented to the ED on 5/31/19 by EMS c/o SOB for 2 days. She had a known history of DM, HTN, CAD with recent Stents, moderate AS , Right side CHF ,COPD and multiple hospitalizations over the past year. She was admitted to SDU. She was consulted by Pulmonary and put on IV steroids, O2, and nebulizer treatments. She could not tolerate Bipap. After one day in SDU she improved enough to be transferred to . She was started on antibiotics and sent for a Chest CT which showed mild atelectatic changes and mild cardiomegaly. Pulmonary recommended changing to PO steroids and signed off on 6/02, they said to F/U in 2 weeks. Patient was also evaluated by Cariology and had a SHANNON. The SHANNON was unchanged since last exam. Cariology cleared her and said F/U in 2 weeks.  Patient was seen by PT yesterday. She was recommended to go home with no assistance needed. Patient's O2 was 93-94% without O2 while ambulating. Patient was feeling well today, sitting in a chair in NAD. She was not on O2 and her Sat was 94%. She will be discharged home with home health aides. 84 yr old female presented to the ED on 5/31/19 by EMS c/o SOB for 2 days. She had a known history of DM, HTN, CAD with recent Stents, moderate AS , Right side CHF ,COPD and multiple hospitalizations over the past year. She was admitted to SDU. She was consulted by Pulmonary and put on IV steroids, O2, and nebulizer treatments. She could not tolerate Bipap. After one day in SDU she improved enough to be transferred to . She was started on antibiotics and sent for a Chest CT which showed mild atelectatic changes and mild cardiomegaly. No pneumonia. Pulmonary recommended changing to PO steroids and signed off on 6/02, they said to F/U in 2 weeks. Patient was also evaluated by Cariology and had a TTE. The TTE was unchanged since last exam. Cariology cleared her and said F/U in 2 weeks.  Patient was seen by PT yesterday. She was recommended to go home with no assistance needed. Patient's O2 was 93-94% without O2 while ambulating. Patient was feeling well today, sitting in a chair in NAD. She was not on O2 and her Sat was 94%. She will be discharged home with home health aides.        Vital Signs Last 24 Hrs    T(C): 36.4 (06-04-19 @ 09:48), Max: 36.6 (06-03-19 @ 16:17)    T(F): 97.6 (06-04-19 @ 09:48), Max: 97.9 (06-03-19 @ 16:17)    HR: 66 (06-04-19 @ 09:48) (62 - 72)    BP: 148/67 (06-04-19 @ 09:48) (138/73 - 148/67)    BP(mean): --    RR: 18 (06-04-19 @ 09:48) (18 - 18)    SpO2: 98% (06-04-19 @ 09:48) (90% - 98%)    GENERAL: Not in distress. Alert. morbid obesity    HEENT:  Normocephalic and atraumatic. PEARLA, EOMI. MM dry    NECK: Supple.  No JVD.      CARDIOVASCULAR: RRR S1, S2. No rubs/gallop. 2/6 SM    LUNGS: CTA dustin, no wheezing, no rhonchi.      ABDOMEN: ND. Soft,  NT, no guarding / rebound / rigidity. BS normoactive. No CVA tenderness.  obese abd    BACK: No spine tenderness.    EXTREMITIES: no cyanosis, no clubbing, no edema. no leg or calf TP+    SKIN: no rash. No cellulitis    NEUROLOGIC: AAO*3.strength is symmetric, sensation intact, speech fluent.      PSYCHIATRIC: Calm.  No agitation.         TIME 42 mins 84 yr old female presented to the ED on 5/31/19 by EMS c/o SOB for 2 days. She had a known history of DM, HTN, CAD with recent Stents, moderate AS , Right side CHF ,COPD and multiple hospitalizations over the past year. She was admitted to SDU. She was consulted by Pulmonary and put on IV steroids, O2, and nebulizer treatments. She could not tolerate Bipap. After one day in SDU she improved enough to be transferred to . She was started on antibiotics and sent for a Chest CT which showed mild atelectatic changes and mild cardiomegaly. No pneumonia. Pulmonary recommended changing to PO steroids and signed off on 6/02, they said to F/U in 2 weeks. Patient was also evaluated by Cariology and had a TTE. The TTE was unchanged since last exam. Cariology cleared her and said F/U in 2 weeks.  Patient was seen by PT yesterday. She was recommended to go home with no assistance needed. Patient's O2 was 93-94% without O2 while ambulating. Patient was feeling well today, sitting in a chair in NAD. She was not on O2 and her Sat was 94%. She will be discharged home with home health aides.    she uses 40 units basiglar bid and 25 units novolog tid at home. follow with endocrine psot discharge as she will beon prednisone. after compelting proscribed prednisone, to maintain of 5mg prednisone which will be given by pulmonary during f/u visit in office        Vital Signs Last 24 Hrs    T(C): 36.4 (06-04-19 @ 09:48), Max: 36.6 (06-03-19 @ 16:17)    T(F): 97.6 (06-04-19 @ 09:48), Max: 97.9 (06-03-19 @ 16:17)    HR: 66 (06-04-19 @ 09:48) (62 - 72)    BP: 148/67 (06-04-19 @ 09:48) (138/73 - 148/67)    BP(mean): --    RR: 18 (06-04-19 @ 09:48) (18 - 18)    SpO2: 98% (06-04-19 @ 09:48) (90% - 98%)    GENERAL: Not in distress. Alert. morbid obesity    HEENT:  Normocephalic and atraumatic. PEARLA, EOMI. MM dry    NECK: Supple.  No JVD.      CARDIOVASCULAR: RRR S1, S2. No rubs/gallop. 2/6 SM    LUNGS: CTA dustin, no wheezing, no rhonchi.      ABDOMEN: ND. Soft,  NT, no guarding / rebound / rigidity. BS normoactive. No CVA tenderness.  obese abd    BACK: No spine tenderness.    EXTREMITIES: no cyanosis, no clubbing, no edema. no leg or calf TP+    SKIN: no rash. No cellulitis    NEUROLOGIC: AAO*3.strength is symmetric, sensation intact, speech fluent.      PSYCHIATRIC: Calm.  No agitation.         TIME 42 mins

## 2019-06-04 NOTE — DISCHARGE NOTE NURSING/CASE MANAGEMENT/SOCIAL WORK - NSDCDPATPORTLINK_GEN_ALL_CORE
You can access the [facility] Patient Portal, offered by [facility], by registering with the following website: [email or web address]

## 2019-06-04 NOTE — DISCHARGE NOTE PROVIDER - PROVIDER TOKENS
PROVIDER:[TOKEN:[[id number]],FOLLOWUP:[1 week]],PROVIDER:[TOKEN:[[id number]],FOLLOWUP:[2 weeks]] PROVIDER:[TOKEN:[[id number]],FOLLOWUP:[1 week]],PROVIDER:[TOKEN:[[id number]],FOLLOWUP:[2 weeks]],PROVIDER:[TOKEN:[[id number]]],PROVIDER:[TOKEN:[[id number]]]

## 2019-06-04 NOTE — DISCHARGE NOTE PROVIDER - CARE PROVIDERS DIRECT ADDRESSES
,[email or web address] ,[email or web address],DirectAddress_Unknown,DirectAddress_Unknown

## 2019-06-07 ENCOUNTER — APPOINTMENT (OUTPATIENT)
Dept: CARE COORDINATION | Facility: HOME HEALTH | Age: 84
End: 2019-06-07

## 2019-06-07 ENCOUNTER — MOBILE ON CALL (OUTPATIENT)
Age: 84
End: 2019-06-07

## 2019-06-07 ENCOUNTER — RX CHANGE (OUTPATIENT)
Age: 84
End: 2019-06-07

## 2019-06-07 DIAGNOSIS — Z09 ENCOUNTER FOR FOLLOW-UP EXAMINATION AFTER COMPLETED TREATMENT FOR CONDITIONS OTHER THAN MALIGNANT NEOPLASM: ICD-10-CM

## 2019-06-10 ENCOUNTER — RX CHANGE (OUTPATIENT)
Age: 84
End: 2019-06-10

## 2019-06-13 ENCOUNTER — FORM ENCOUNTER (OUTPATIENT)
Age: 84
End: 2019-06-13

## 2019-06-14 ENCOUNTER — APPOINTMENT (OUTPATIENT)
Dept: RADIOLOGY | Facility: CLINIC | Age: 84
End: 2019-06-14
Payer: MEDICARE

## 2019-06-14 ENCOUNTER — APPOINTMENT (OUTPATIENT)
Dept: CARE COORDINATION | Facility: HOME HEALTH | Age: 84
End: 2019-06-14

## 2019-06-14 ENCOUNTER — OUTPATIENT (OUTPATIENT)
Dept: OUTPATIENT SERVICES | Facility: HOSPITAL | Age: 84
LOS: 1 days | End: 2019-06-14
Payer: MEDICARE

## 2019-06-14 DIAGNOSIS — Z90.49 ACQUIRED ABSENCE OF OTHER SPECIFIED PARTS OF DIGESTIVE TRACT: Chronic | ICD-10-CM

## 2019-06-14 DIAGNOSIS — Z00.8 ENCOUNTER FOR OTHER GENERAL EXAMINATION: ICD-10-CM

## 2019-06-14 DIAGNOSIS — R93.1 ABNORMAL FINDINGS ON DIAGNOSTIC IMAGING OF HEART AND CORONARY CIRCULATION: Chronic | ICD-10-CM

## 2019-06-14 DIAGNOSIS — M81.0 AGE-RELATED OSTEOPOROSIS WITHOUT CURRENT PATHOLOGICAL FRACTURE: ICD-10-CM

## 2019-06-14 PROCEDURE — 77080 DXA BONE DENSITY AXIAL: CPT | Mod: 26

## 2019-06-14 PROCEDURE — 77080 DXA BONE DENSITY AXIAL: CPT

## 2019-06-18 ENCOUNTER — MEDICATION RENEWAL (OUTPATIENT)
Age: 84
End: 2019-06-18

## 2019-06-19 ENCOUNTER — OTHER (OUTPATIENT)
Age: 84
End: 2019-06-19

## 2019-06-19 ENCOUNTER — APPOINTMENT (OUTPATIENT)
Dept: CARE COORDINATION | Facility: HOME HEALTH | Age: 84
End: 2019-06-19

## 2019-06-19 PROBLEM — Z09 HOSPITAL DISCHARGE FOLLOW-UP: Status: ACTIVE | Noted: 2019-04-10

## 2019-06-19 RX ORDER — METOPROLOL SUCCINATE 100 MG/1
100 TABLET, EXTENDED RELEASE ORAL
Qty: 30 | Refills: 3 | Status: DISCONTINUED | COMMUNITY
Start: 2017-04-11 | End: 2019-06-07

## 2019-06-20 ENCOUNTER — RX RENEWAL (OUTPATIENT)
Age: 84
End: 2019-06-20

## 2019-06-21 ENCOUNTER — MEDICATION RENEWAL (OUTPATIENT)
Age: 84
End: 2019-06-21

## 2019-06-21 ENCOUNTER — EMERGENCY (EMERGENCY)
Facility: HOSPITAL | Age: 84
LOS: 1 days | Discharge: HOME | End: 2019-06-21
Attending: STUDENT IN AN ORGANIZED HEALTH CARE EDUCATION/TRAINING PROGRAM
Payer: MEDICARE

## 2019-06-21 VITALS
RESPIRATION RATE: 16 BRPM | HEART RATE: 71 BPM | DIASTOLIC BLOOD PRESSURE: 73 MMHG | OXYGEN SATURATION: 95 % | HEIGHT: 60 IN | WEIGHT: 210.1 LBS | TEMPERATURE: 98 F | SYSTOLIC BLOOD PRESSURE: 120 MMHG

## 2019-06-21 VITALS — TEMPERATURE: 100 F

## 2019-06-21 DIAGNOSIS — R93.1 ABNORMAL FINDINGS ON DIAGNOSTIC IMAGING OF HEART AND CORONARY CIRCULATION: Chronic | ICD-10-CM

## 2019-06-21 DIAGNOSIS — Z90.49 ACQUIRED ABSENCE OF OTHER SPECIFIED PARTS OF DIGESTIVE TRACT: Chronic | ICD-10-CM

## 2019-06-21 LAB
ALBUMIN SERPL ELPH-MCNC: 3.7 G/DL — SIGNIFICANT CHANGE UP (ref 3.3–5.2)
ALP SERPL-CCNC: 60 U/L — SIGNIFICANT CHANGE UP (ref 40–120)
ALT FLD-CCNC: 27 U/L — SIGNIFICANT CHANGE UP
ANION GAP SERPL CALC-SCNC: 17 MMOL/L — SIGNIFICANT CHANGE UP (ref 5–17)
AST SERPL-CCNC: 29 U/L — SIGNIFICANT CHANGE UP
BASOPHILS # BLD AUTO: 0 K/UL — SIGNIFICANT CHANGE UP (ref 0–0.2)
BASOPHILS NFR BLD AUTO: 0.1 % — SIGNIFICANT CHANGE UP (ref 0–2)
BILIRUB SERPL-MCNC: 0.3 MG/DL — LOW (ref 0.4–2)
BUN SERPL-MCNC: 58 MG/DL — HIGH (ref 8–20)
CALCIUM SERPL-MCNC: 9 MG/DL — SIGNIFICANT CHANGE UP (ref 8.6–10.2)
CHLORIDE SERPL-SCNC: 98 MMOL/L — SIGNIFICANT CHANGE UP (ref 98–107)
CO2 SERPL-SCNC: 20 MMOL/L — LOW (ref 22–29)
CREAT SERPL-MCNC: 1.5 MG/DL — HIGH (ref 0.5–1.3)
EOSINOPHIL # BLD AUTO: 0 K/UL — SIGNIFICANT CHANGE UP (ref 0–0.5)
EOSINOPHIL NFR BLD AUTO: 0 % — SIGNIFICANT CHANGE UP (ref 0–6)
GLUCOSE SERPL-MCNC: 401 MG/DL — HIGH (ref 70–115)
HCT VFR BLD CALC: 33 % — LOW (ref 37–47)
HGB BLD-MCNC: 10.3 G/DL — LOW (ref 12–16)
LYMPHOCYTES # BLD AUTO: 0.5 K/UL — LOW (ref 1–4.8)
LYMPHOCYTES # BLD AUTO: 4.1 % — LOW (ref 20–55)
MCHC RBC-ENTMCNC: 26.9 PG — LOW (ref 27–31)
MCHC RBC-ENTMCNC: 31.2 G/DL — LOW (ref 32–36)
MCV RBC AUTO: 86.2 FL — SIGNIFICANT CHANGE UP (ref 81–99)
MONOCYTES # BLD AUTO: 0.1 K/UL — SIGNIFICANT CHANGE UP (ref 0–0.8)
MONOCYTES NFR BLD AUTO: 0.7 % — LOW (ref 3–10)
NEUTROPHILS # BLD AUTO: 10.9 K/UL — HIGH (ref 1.8–8)
NEUTROPHILS NFR BLD AUTO: 94.8 % — HIGH (ref 37–73)
PLATELET # BLD AUTO: 164 K/UL — SIGNIFICANT CHANGE UP (ref 150–400)
POTASSIUM SERPL-MCNC: 5.1 MMOL/L — SIGNIFICANT CHANGE UP (ref 3.5–5.3)
POTASSIUM SERPL-SCNC: 5.1 MMOL/L — SIGNIFICANT CHANGE UP (ref 3.5–5.3)
PROT SERPL-MCNC: 6.8 G/DL — SIGNIFICANT CHANGE UP (ref 6.6–8.7)
RBC # BLD: 3.83 M/UL — LOW (ref 4.4–5.2)
RBC # FLD: 15.3 % — SIGNIFICANT CHANGE UP (ref 11–15.6)
SODIUM SERPL-SCNC: 135 MMOL/L — SIGNIFICANT CHANGE UP (ref 135–145)
WBC # BLD: 11.5 K/UL — HIGH (ref 4.8–10.8)
WBC # FLD AUTO: 11.5 K/UL — HIGH (ref 4.8–10.8)

## 2019-06-21 PROCEDURE — 99283 EMERGENCY DEPT VISIT LOW MDM: CPT

## 2019-06-21 PROCEDURE — 80053 COMPREHEN METABOLIC PANEL: CPT

## 2019-06-21 PROCEDURE — 99284 EMERGENCY DEPT VISIT MOD MDM: CPT

## 2019-06-21 PROCEDURE — 36415 COLL VENOUS BLD VENIPUNCTURE: CPT

## 2019-06-21 PROCEDURE — 82962 GLUCOSE BLOOD TEST: CPT

## 2019-06-21 PROCEDURE — 85027 COMPLETE CBC AUTOMATED: CPT

## 2019-06-21 RX ORDER — SODIUM CHLORIDE 9 MG/ML
1000 INJECTION INTRAMUSCULAR; INTRAVENOUS; SUBCUTANEOUS ONCE
Refills: 0 | Status: COMPLETED | OUTPATIENT
Start: 2019-06-21 | End: 2019-06-21

## 2019-06-21 RX ORDER — INSULIN HUMAN 100 [IU]/ML
8 INJECTION, SOLUTION SUBCUTANEOUS ONCE
Refills: 0 | Status: COMPLETED | OUTPATIENT
Start: 2019-06-21 | End: 2019-06-21

## 2019-06-21 RX ADMIN — SODIUM CHLORIDE 1000 MILLILITER(S): 9 INJECTION INTRAMUSCULAR; INTRAVENOUS; SUBCUTANEOUS at 18:07

## 2019-06-21 RX ADMIN — INSULIN HUMAN 8 UNIT(S): 100 INJECTION, SOLUTION SUBCUTANEOUS at 17:06

## 2019-06-21 RX ADMIN — SODIUM CHLORIDE 1000 MILLILITER(S): 9 INJECTION INTRAMUSCULAR; INTRAVENOUS; SUBCUTANEOUS at 14:23

## 2019-06-21 NOTE — ED ADULT NURSE NOTE - CHPI ED NUR SYMPTOMS NEG
no change in level of consciousness/no dizziness/no loss of consciousness/no weakness/no blurred vision/no vomiting/no fever/no nausea/no numbness/no confusion

## 2019-06-21 NOTE — ED PROVIDER NOTE - CLINICAL SUMMARY MEDICAL DECISION MAKING FREE TEXT BOX
labs reviewed. Pt glucose improving with IVF and insulin. no DKA. will d/c to home with outpatient f/up. instructed to return for worsening glucose, fever, vomiting, or any other concerns.  Pt given a copy of all results and instructed to f/up with pcp regarding any abnormal results.

## 2019-06-21 NOTE — ED ADULT NURSE NOTE - PRO INTERPRETER NEED 2
February 28, 2018     Patient: Claudetta Madrid  YOB: 2005   Date of Visit: 2/28/2018       To Whom it May Concern:    Portillo Szymanski is under my professional care  He was seen in my office on 2/28/2018  He may return to school on 03/02/2018  If you have any questions or concerns, please don't hesitate to call           Sincerely,          Davie Finn MD English

## 2019-06-21 NOTE — ED ADULT NURSE NOTE - OBJECTIVE STATEMENT
Pt presents to ED with Glucose level of 510 at home. At triage pt FS with 416. Pt is Awake and alert, denies any discomfort and complains, states, "I feel fine". Pt on home insulin, take morning and lunch dose. Denies sob, chest pain, NV, fever, numbness and tingling.

## 2019-06-21 NOTE — ED PROVIDER NOTE - OBJECTIVE STATEMENT
Pt is an 83 yo F co hyperglycemia.  pmhx significant for cad, htn, hld, dm. Pt states that her FSBS was in the 500s at home so she was sent to the hospital. Pt has no complaints at this time. PT takes basaglar and humalog at home.

## 2019-06-21 NOTE — ED CLERICAL - NS ED CLERK NOTE PRE-ARRIVAL INFORMATION; ADDITIONAL PRE-ARRIVAL INFORMATION
This patient is enrolled in the readmission reduction program and has active care navigation. This patient can be followed up by the care navigation team within 24 hours. To arrange close follow-up or to obtain additional clinical information about this patient, please call the contact number above. Please speak with the [place] ED Case Manager for assistance with discharge planning

## 2019-06-21 NOTE — ED ADULT NURSE NOTE - NSIMPLEMENTINTERV_GEN_ALL_ED
Implemented All Fall with Harm Risk Interventions:  [place] to call system. Call bell, personal items and telephone within reach. Instruct patient to call for assistance. Room bathroom lighting operational. Non-slip footwear when patient is off stretcher. Physically safe environment: no spills, clutter or unnecessary equipment. Stretcher in lowest position, wheels locked, appropriate side rails in place. Provide visual cue, wrist band, yellow gown, etc. Monitor gait and stability. Monitor for mental status changes and reorient to person, place, and time. Review medications for side effects contributing to fall risk. Reinforce activity limits and safety measures with patient and family. Provide visual clues: red socks.

## 2019-06-24 ENCOUNTER — APPOINTMENT (OUTPATIENT)
Age: 84
End: 2019-06-24

## 2019-06-24 ENCOUNTER — APPOINTMENT (OUTPATIENT)
Dept: HOME HEALTH SERVICES | Facility: HOME HEALTH | Age: 84
End: 2019-06-24
Payer: MEDICARE

## 2019-06-24 VITALS
HEART RATE: 74 BPM | WEIGHT: 217 LBS | BODY MASS INDEX: 42.6 KG/M2 | HEIGHT: 60 IN | OXYGEN SATURATION: 96 % | DIASTOLIC BLOOD PRESSURE: 60 MMHG | RESPIRATION RATE: 14 BRPM | SYSTOLIC BLOOD PRESSURE: 140 MMHG | TEMPERATURE: 98.8 F

## 2019-06-24 DIAGNOSIS — Z71.89 OTHER SPECIFIED COUNSELING: ICD-10-CM

## 2019-06-24 PROCEDURE — G0506: CPT

## 2019-06-24 PROCEDURE — 99345 HOME/RES VST NEW HIGH MDM 75: CPT | Mod: 25

## 2019-06-24 PROCEDURE — 99497 ADVNCD CARE PLAN 30 MIN: CPT

## 2019-06-26 ENCOUNTER — CLINICAL ADVICE (OUTPATIENT)
Age: 84
End: 2019-06-26

## 2019-06-26 ENCOUNTER — TRANSCRIPTION ENCOUNTER (OUTPATIENT)
Age: 84
End: 2019-06-26

## 2019-06-29 PROBLEM — Z71.89 ACP (ADVANCE CARE PLANNING): Status: ACTIVE | Noted: 2019-06-29

## 2019-06-29 NOTE — REVIEW OF SYSTEMS
[Vision Problems] : vision problems [Hearing Loss] : hearing loss [Lower Ext Edema] : lower extremity edema [Orthopnea] : orthopnea [Cough] : cough [Dyspnea on Exertion] : dyspnea on exertion [Frequency] : frequency [Negative] : Psychiatric [Pain] : no pain [Discharge] : no discharge [Redness] : no redness [Dryness] : no dryness  [Itching] : no itching [Earache] : no earache [Nosebleed] : no nosebleeds [Hoarseness] : no hoarseness [Nasal Discharge] : no nasal discharge [Sore Throat] : no sore throat [Postnasal Drip] : no postnasal drip [Chest Pain] : no chest pain [Palpitations] : no palpitations [Leg Claudication] : no leg claudication [Paroysmal Nocturnal Dyspnea] : no paroysmal nocturnal dyspnea [Wheezing] : no wheezing [Shortness Of Breath] : no shortness of breath [Nausea] : no nausea [Abdominal Pain] : no abdominal pain [Constipation] : no constipation [Diarrhea] : diarrhea [Heartburn] : no heartburn [Vomiting] : no vomiting [Melena] : no melena [Nocturia] : no nocturia [Dysuria] : no dysuria [Poor Libido] : libido not poor [Hematuria] : no hematuria [Vaginal Discharge] : no vaginal discharge [Dysmenorrhea] : no dysmenorrhea [Joint Pain] : no joint pain [Joint Stiffness] : no joint stiffness [Joint Swelling] : no joint swelling [Muscle Weakness] : no muscle weakness [Back Pain] : no back pain [Muscle Pain] : no muscle pain

## 2019-06-29 NOTE — REASON FOR VISIT
[Initial Eval - Existing Diagnosis] : an initial evaluation of an existing diagnosis [Formal Caregiver] : formal caregiver [Pre-Visit Preparation] : pre-visit preparation was done [Intercurrent Specialty/Sub-specialty Visits] : the patient has intercurrent specialty/sub-specialty visits [FreeTextEntry2] : chart review  [FreeTextEntry3] : cardiology, endocrine, pulmonary

## 2019-06-29 NOTE — HISTORY OF PRESENT ILLNESS
[Family Member] : family member [Patient] : patient [FreeTextEntry1] : COPD, CHF  [FreeTextEntry2] : This is an 84 year-old female with history of CHF, COPD, CAD (4 stents in place), Morbid Obesity, DM with Neuropathy & Nephropathy, HTN, STEVENSON & Mild Pulmonary HTN who is being seen today to enroll into the [facility]. \par \par Patient with multiple hospitalizations, most recently in 5/31/19 - 6/4/19 for COPD exacerbation. She was put on IV steroids, O2 and nebulizer treatments.   Patient also went to ED for hyperglycemia - . At the time, patient was asymptomatic. \par \par At today's visit, patient is seen sitting in a chair, she appears comfortable and in no distress.  Patient reports she feels "pretty good" today.  Today, she was able to go downstairs for breakfast, first time in a month. \par \par Patient on both prednisone and insulin - which affects her BS, she checks 5x/day.  Her BS are all over the place, it will rarely be in the 100's - usually between 300 and 400. She also has neuropathy, manages with Gabapentin. \par COPD - "heavy" She gets RABAGO.  If she is sitting with her legs up, her breathing is not an issue.  Patient had asthma all her life. \par Ambulation - uses rollator outside of her apartment, while inside she does not use assist device. No recent falls.\par CHF - intermittent swelling, does report weight gain - she is on prednisone.  Weighs herself daily, this morning was 217. Does require 2+ pillows to sleep.  \par Sleep - "I have sleep apnea"   Doesn't sleep through the night, gets up to urinate. \par BM - "perfect" "only thing that’s working" \par Mood/Behavior - "they are always up"  \par Appetite - "good" Where she is living, her meals are provided for her throughout the course of the day. \par

## 2019-06-29 NOTE — CURRENT MEDS
[Medication and Allergies Reconciled] : medication and allergies reconciled [Compliant with medications] : Patient is compliant with medications [High Risk Medications Reviewed and Reconciled (Beers Criteria)] : high risk medications reviewed and reconciled [de-identified] : medications managed by patient's family

## 2019-06-29 NOTE — COUNSELING
[Obese (BMI >29.9)] : Obese - BMI >29.9 [Sodium restriction 2gm recommended] : Sodium restriction 2 gm recommended [Non - Smoker] : non-smoker [Use assistive device to avoid falls] : use assistive device to avoid falls [Remove clutter and unsafe carpeting to avoid falls] : remove clutter and unsafe carpeting to avoid falls [Not Indicated] : not indicated [Improve exercise tolerance] : improve exercise tolerance [Improve mobility] : improve mobility [Decrease hospital use] : decrease hospital use [Minimize unnecessary interventions] : minimize unnecessary interventions [Maintain functional ability] : maintain functional ability [Completed DNR] : completed DNR [Completed Medical Orders for Life-Sustaining Treatment] : completed medical orders for life-sustaining treatment [DNR] : Code Status: DNR [Limited] : Treatment Guidelines: Limited [DNI] : Intubation: DNI [[facility] Ver[facility] Date: _____] [facility]/last verification date: [unfilled] [ - New patient with 2 or more chronic conditions; CCM discussed and patient-centered care plan established] : New patient with 2 or more chronic conditions; CCM discussed and patient-centered care plan established [FreeTextEntry3] : diabetic diet

## 2019-06-29 NOTE — PHYSICAL EXAM
[No Acute Distress] : no acute distress [Well Nourished] : well nourished [Well Developed] : well developed [Normal Voice/Communication] : normal voice communication [Normal Sclera/Conjunctiva] : normal sclera/conjunctiva [PERRL] : pupils equal, round and reactive to light [Normal Outer Ear/Nose] : the ears and nose were normal in appearance [Normal Oropharynx] : the oropharynx was normal [Normal TMs] : both tympanic membranes were normal [Supple] : the neck was supple [Thyroid Normal, No Nodules] : the thyroid was normal and there were no nodules present [No Respiratory Distress] : no respiratory distress [Clear to Auscultation] : lungs were clear to auscultation bilaterally [No Accessory Muscle Use] : no accessory muscle use [Normal Rate] : heart rate was normal  [Regular Rhythm] : with a regular rhythm [Normal S1, S2] : normal S1 and S2 [Pedal Pulses Present] : the pedal pulses are present [Normal Bowel Sounds] : normal bowel sounds [Non Tender] : non-tender [Soft] : abdomen soft [No Masses] : no abdominal mass palpated [Normal Supraclavicular Nodes] : no supraclavicular lymphadenopathy [Normal Post Cervical Nodes] : no posterior cervical lymphadenopathy [No Joint Swelling] : no joint swelling seen [Normal Strength/Tone] : muscle strength and tone were normal [No Rash] : no rash [No Skin Lesions] : no skin lesions [Cranial Nerves Intact] : cranial nerves 2-12 were intact [No Motor Deficits] : the motor exam was normal [Oriented x3] : oriented to person, place, and time [Normal Affect] : the affect was normal [Normal Mood] : the mood was normal [Acne] : no acne [de-identified] : obese female, in no distress  [de-identified] : +RJ 2/6.  bilateral lower extremity pitting edema  [de-identified] : obese

## 2019-06-29 NOTE — CHRONIC CARE ASSESSMENT
[Current] : Current Pain: [0] : currently ~his/her~ pain is 0 out of 10 [Oriented To Person] : ~L oriented to person [Oriented To Place] : ~L oriented to place [Oriented To Time] : ~L oriented to time [Reviewed Mini-Cog Outcomes from Comprehensive Assessment] : Reviewed Mini-Cog outcomes from comprehensive assessment [Oriented To Situation] : ~L oriented to situation [ADL Assessment Completed] : ADL assessment completed [iADL Assessment Completed] : iADL assessment completed [Reviewed Fall Risk from Comprehensive Assessment] : Reviewed fall risk from comprehensive assessment [Relies on Community Resources] : relies on community resources [Reviewed Home Safety Evaluation] : Reviewed home safety evaluation [No Action Needed] : No action needed [Walking] : walking [Uses glasses] : uses glasses [PPS Score: ____] : Palliative Performance Scale (PPS) Score: [unfilled] [Class III] : [place] Heart Association Class Output: Class III [Reports changes in hearing] : reports no changes in hearing [de-identified] : cataract in right eye -

## 2019-06-30 PROCEDURE — 99487 CPLX CHRNC CARE 1ST 60 MIN: CPT

## 2019-07-05 ENCOUNTER — RX RENEWAL (OUTPATIENT)
Age: 84
End: 2019-07-05

## 2019-07-08 ENCOUNTER — APPOINTMENT (OUTPATIENT)
Dept: ENDOCRINOLOGY | Facility: CLINIC | Age: 84
End: 2019-07-08
Payer: MEDICARE

## 2019-07-08 VITALS — HEART RATE: 63 BPM | SYSTOLIC BLOOD PRESSURE: 120 MMHG | DIASTOLIC BLOOD PRESSURE: 78 MMHG

## 2019-07-08 VITALS — HEIGHT: 60 IN | WEIGHT: 216 LBS | BODY MASS INDEX: 42.41 KG/M2

## 2019-07-08 DIAGNOSIS — E55.9 VITAMIN D DEFICIENCY, UNSPECIFIED: ICD-10-CM

## 2019-07-08 DIAGNOSIS — M81.0 AGE-RELATED OSTEOPOROSIS W/OUT CURRENT PATHOLOGICAL FRACTURE: ICD-10-CM

## 2019-07-08 DIAGNOSIS — E89.0 POSTPROCEDURAL HYPOTHYROIDISM: ICD-10-CM

## 2019-07-08 PROCEDURE — 99215 OFFICE O/P EST HI 40 MIN: CPT

## 2019-07-08 NOTE — HISTORY OF PRESENT ILLNESS
[FreeTextEntry1] : Dm2 for 20 yr and HTN, HLD and obesity. CAd s/p Mi and PCI with stents x4. Has nephropathy with GFr 35. \par she takes basal -bolus insulin \par Bgs 4x day .\par

## 2019-07-08 NOTE — ASSESSMENT
[Carbohydrate Consistent Diet] : carbohydrate consistent diet [Long Term Vascular Complications] : long term vascular complications of diabetes [Importance of Diet and Exercise] : importance of diet and exercise to improve glycemic control, achieve weight loss and improve cardiovascular health [Diabetes Foot Care] : diabetes foot care [Injection Technique, Storage, Sharps Disposal] : injection technique, storage, and sharps disposal [Insulin Self-Administration] : insulin self-administration [Self Monitoring of Blood Glucose] : self monitoring of blood glucose [Retinopathy Screening] : Patient was referred to ophthalmology for retinopathy screening [Levothyroxine] : The patient was instructed to take Levothyroxine on an empty stomach, separate from vitamins, and wait at least 30 minutes before eating [Bisphosphonates] : The patient was instructed to take bisphosphonates on an empty stomach with a full glass of water,and wait at least 30 minutes before eating or lying down [Hypoglycemia Management] : hypoglycemia management [FreeTextEntry1] : DM2 moderately uncontrolled  A1c 9.5 going up due to steroids use \par continue  basaglar 40 u BID\par increase novolog to 19 U TID w meals. \par take 5 u novolog for night snacks \par encouraged more exercise walking 30 min 3 x week\par nephropathy with GFR 35. Monitor and continue ACEI \par has diabetic neuropathy: controlled symptoms with gabapentin \par monitor GFR . GFR 35 CKD \par eye exam done. She needs cataract surgery \par bgs 4 x day \par glucagon kit discussed with son usage \par \par HTN :  bp at target on meds. Continue current management.\par discussed low salt diet \par \par HLD: LDL at target.\par continue atorvastatin 80 mg qd . \par History of CAD s/p MI and stents \par \par Obesity: discussed diet and exercise\par encouraged more exercise walking 30 min 3 x week\par \par hypoT :\par pt euthyroid clinically and biochemically.\par continue lt4 125 mcg qd \par takes LT4 appropriately in am\par \par vitamin d deficiency: she does not take any vitamins \par continue vitamin d 1000 u qd \par \par age osteoporosis: \par she had an ankle fracture many years ago. \par FRAX score 19/ 5.6 %. she needs treatment. \par will start Tums 500 mg BID \par continue osteoporosis \par start Fosamax 70 mg weekly . discussed how to administration

## 2019-07-08 NOTE — PHYSICAL EXAM
[Alert] : alert [No Acute Distress] : no acute distress [Well Nourished] : well nourished [Well Developed] : well developed [Normal Sclera/Conjunctiva] : normal sclera/conjunctiva [EOMI] : extra ocular movement intact [Normal Oropharynx] : the oropharynx was normal [No Proptosis] : no proptosis [Thyroid Not Enlarged] : the thyroid was not enlarged [No Thyroid Nodules] : there were no palpable thyroid nodules [No Respiratory Distress] : no respiratory distress [No Accessory Muscle Use] : no accessory muscle use [Clear to Auscultation] : lungs were clear to auscultation bilaterally [Normal Rate] : heart rate was normal  [Normal S1, S2] : normal S1 and S2 [Regular Rhythm] : with a regular rhythm [Pedal Pulses Normal] : the pedal pulses are present [Normal Bowel Sounds] : normal bowel sounds [Not Tender] : non-tender [Soft] : abdomen soft [Not Distended] : not distended [Post Cervical Nodes] : posterior cervical nodes [Anterior Cervical Nodes] : anterior cervical nodes [Normal] : normal and non tender [Spine Straight] : spine straight [No Stigmata of Cushings Syndrome] : no stigmata of cushings syndrome [Normal Gait] : normal gait [Normal Strength/Tone] : muscle strength and tone were normal [No Rash] : no rash [No Tremors] : no tremors [Normal Reflexes] : deep tendon reflexes were 2+ and symmetric [Oriented x3] : oriented to person, place, and time [Acanthosis Nigricans] : no acanthosis nigricans [de-identified] : pitting edema  2+ B? L [de-identified] : obese

## 2019-07-15 ENCOUNTER — TRANSCRIPTION ENCOUNTER (OUTPATIENT)
Age: 84
End: 2019-07-15

## 2019-07-16 ENCOUNTER — TRANSCRIPTION ENCOUNTER (OUTPATIENT)
Age: 84
End: 2019-07-16

## 2019-07-18 ENCOUNTER — TRANSCRIPTION ENCOUNTER (OUTPATIENT)
Age: 84
End: 2019-07-18

## 2019-07-20 ENCOUNTER — OTHER (OUTPATIENT)
Age: 84
End: 2019-07-20

## 2019-07-20 DIAGNOSIS — M79.89 OTHER SPECIFIED SOFT TISSUE DISORDERS: ICD-10-CM

## 2019-07-22 ENCOUNTER — TRANSCRIPTION ENCOUNTER (OUTPATIENT)
Age: 84
End: 2019-07-22

## 2019-07-22 ENCOUNTER — CLINICAL ADVICE (OUTPATIENT)
Age: 84
End: 2019-07-22

## 2019-07-22 ENCOUNTER — INPATIENT (INPATIENT)
Facility: HOSPITAL | Age: 84
LOS: 7 days | Discharge: HOME | DRG: 264 | End: 2019-07-30
Attending: HOSPITALIST | Admitting: INTERNAL MEDICINE
Payer: MEDICARE

## 2019-07-22 VITALS
DIASTOLIC BLOOD PRESSURE: 83 MMHG | RESPIRATION RATE: 22 BRPM | TEMPERATURE: 98 F | OXYGEN SATURATION: 98 % | HEART RATE: 94 BPM | SYSTOLIC BLOOD PRESSURE: 167 MMHG

## 2019-07-22 DIAGNOSIS — R93.1 ABNORMAL FINDINGS ON DIAGNOSTIC IMAGING OF HEART AND CORONARY CIRCULATION: Chronic | ICD-10-CM

## 2019-07-22 DIAGNOSIS — Z90.49 ACQUIRED ABSENCE OF OTHER SPECIFIED PARTS OF DIGESTIVE TRACT: Chronic | ICD-10-CM

## 2019-07-22 LAB
BASOPHILS # BLD AUTO: 0.07 K/UL — SIGNIFICANT CHANGE UP (ref 0–0.2)
BASOPHILS NFR BLD AUTO: 0.5 % — SIGNIFICANT CHANGE UP (ref 0–2)
EOSINOPHIL # BLD AUTO: 0.19 K/UL — SIGNIFICANT CHANGE UP (ref 0–0.5)
EOSINOPHIL NFR BLD AUTO: 1.4 % — SIGNIFICANT CHANGE UP (ref 0–6)
HCT VFR BLD CALC: 38.9 % — SIGNIFICANT CHANGE UP (ref 34.5–45)
HGB BLD-MCNC: 12.2 G/DL — SIGNIFICANT CHANGE UP (ref 11.5–15.5)
IMM GRANULOCYTES NFR BLD AUTO: 0.8 % — SIGNIFICANT CHANGE UP (ref 0–1.5)
LYMPHOCYTES # BLD AUTO: 24.5 % — SIGNIFICANT CHANGE UP (ref 13–44)
LYMPHOCYTES # BLD AUTO: 3.38 K/UL — HIGH (ref 1–3.3)
MCHC RBC-ENTMCNC: 27.6 PG — SIGNIFICANT CHANGE UP (ref 27–34)
MCHC RBC-ENTMCNC: 31.4 GM/DL — LOW (ref 32–36)
MCV RBC AUTO: 88 FL — SIGNIFICANT CHANGE UP (ref 80–100)
MONOCYTES # BLD AUTO: 0.99 K/UL — HIGH (ref 0–0.9)
MONOCYTES NFR BLD AUTO: 7.2 % — SIGNIFICANT CHANGE UP (ref 2–14)
NEUTROPHILS # BLD AUTO: 9.05 K/UL — HIGH (ref 1.8–7.4)
NEUTROPHILS NFR BLD AUTO: 65.6 % — SIGNIFICANT CHANGE UP (ref 43–77)
PLATELET # BLD AUTO: 280 K/UL — SIGNIFICANT CHANGE UP (ref 150–400)
RBC # BLD: 4.42 M/UL — SIGNIFICANT CHANGE UP (ref 3.8–5.2)
RBC # FLD: 14.7 % — HIGH (ref 10.3–14.5)
WBC # BLD: 13.79 K/UL — HIGH (ref 3.8–10.5)
WBC # FLD AUTO: 13.79 K/UL — HIGH (ref 3.8–10.5)

## 2019-07-22 PROCEDURE — 93010 ELECTROCARDIOGRAM REPORT: CPT

## 2019-07-22 PROCEDURE — 99285 EMERGENCY DEPT VISIT HI MDM: CPT

## 2019-07-22 RX ORDER — FUROSEMIDE 40 MG
40 TABLET ORAL ONCE
Refills: 0 | Status: COMPLETED | OUTPATIENT
Start: 2019-07-22 | End: 2019-07-22

## 2019-07-22 RX ADMIN — Medication 40 MILLIGRAM(S): at 23:46

## 2019-07-22 NOTE — ED PROVIDER NOTE - CLINICAL SUMMARY MEDICAL DECISION MAKING FREE TEXT BOX
unable to mabulate room to romm severe worsening leg edema will require admission diuresis PT evlaution SW decopensating at home

## 2019-07-22 NOTE — ED PROVIDER NOTE - TEMPLATE, MLM
Can you please do a follow-up call with this patient early next week I had him do a regular diet with at least meals including carbohydrates over the weekend and back to very low calorie diet starting next week. Thank you! !!
Respiratory

## 2019-07-22 NOTE — ED ADULT TRIAGE NOTE - CHIEF COMPLAINT QUOTE
patient biba from home with shortness of breath and swelling to bilateral legs, patient denies any complaints of chest pain at this time, states that she has pain to bilateral legs, +3 edema to bilateral lower legs, patient received 1 duoneb by ems with relief.

## 2019-07-22 NOTE — ED PROVIDER NOTE - OBJECTIVE STATEMENT
An 84 year old female pt with a hx of CHF presents to the ED c/o leg pain, SOB. Pt has hx of chronic pain to lower extremities secondary to edema, was recently taken off of tramadol and placed on Vicodin yesterday, states that she has been having increased pain. Pt also reports CHF exacerbation with difficulty breathing. denies fever. denies HA or neck pain. no chest pain. no abd pain. no n/v/d. no urinary f/u/d. no back pain. no motor or sensory deficits. denies illicit drug use. no recent travel. no rash. no other acute issues symptoms or concerns

## 2019-07-22 NOTE — ED CLERICAL - NS ED CLERK NOTE PRE-ARRIVAL INFORMATION; ADDITIONAL PRE-ARRIVAL INFORMATION

## 2019-07-23 DIAGNOSIS — I50.9 HEART FAILURE, UNSPECIFIED: ICD-10-CM

## 2019-07-23 LAB
ALBUMIN SERPL ELPH-MCNC: 3.8 G/DL — SIGNIFICANT CHANGE UP (ref 3.3–5.2)
ALP SERPL-CCNC: 64 U/L — SIGNIFICANT CHANGE UP (ref 40–120)
ALT FLD-CCNC: 12 U/L — SIGNIFICANT CHANGE UP
ANION GAP SERPL CALC-SCNC: 14 MMOL/L — SIGNIFICANT CHANGE UP (ref 5–17)
ANION GAP SERPL CALC-SCNC: 16 MMOL/L — SIGNIFICANT CHANGE UP (ref 5–17)
APTT BLD: 27.8 SEC — SIGNIFICANT CHANGE UP (ref 27.5–36.3)
AST SERPL-CCNC: 18 U/L — SIGNIFICANT CHANGE UP
BASOPHILS # BLD AUTO: 0.12 K/UL — SIGNIFICANT CHANGE UP (ref 0–0.2)
BASOPHILS NFR BLD AUTO: 0.8 % — SIGNIFICANT CHANGE UP (ref 0–2)
BILIRUB SERPL-MCNC: 0.4 MG/DL — SIGNIFICANT CHANGE UP (ref 0.4–2)
BUN SERPL-MCNC: 34 MG/DL — HIGH (ref 8–20)
BUN SERPL-MCNC: 38 MG/DL — HIGH (ref 8–20)
CALCIUM SERPL-MCNC: 8.8 MG/DL — SIGNIFICANT CHANGE UP (ref 8.6–10.2)
CALCIUM SERPL-MCNC: 8.8 MG/DL — SIGNIFICANT CHANGE UP (ref 8.6–10.2)
CHLORIDE SERPL-SCNC: 101 MMOL/L — SIGNIFICANT CHANGE UP (ref 98–107)
CHLORIDE SERPL-SCNC: 101 MMOL/L — SIGNIFICANT CHANGE UP (ref 98–107)
CK SERPL-CCNC: 100 U/L — SIGNIFICANT CHANGE UP (ref 25–170)
CK SERPL-CCNC: 102 U/L — SIGNIFICANT CHANGE UP (ref 25–170)
CO2 SERPL-SCNC: 20 MMOL/L — LOW (ref 22–29)
CO2 SERPL-SCNC: 24 MMOL/L — SIGNIFICANT CHANGE UP (ref 22–29)
CREAT SERPL-MCNC: 1.23 MG/DL — SIGNIFICANT CHANGE UP (ref 0.5–1.3)
CREAT SERPL-MCNC: 1.33 MG/DL — HIGH (ref 0.5–1.3)
EOSINOPHIL # BLD AUTO: 0.13 K/UL — SIGNIFICANT CHANGE UP (ref 0–0.5)
EOSINOPHIL NFR BLD AUTO: 0.9 % — SIGNIFICANT CHANGE UP (ref 0–6)
GLUCOSE BLDC GLUCOMTR-MCNC: 186 MG/DL — HIGH (ref 70–99)
GLUCOSE BLDC GLUCOMTR-MCNC: 190 MG/DL — HIGH (ref 70–99)
GLUCOSE BLDC GLUCOMTR-MCNC: 223 MG/DL — HIGH (ref 70–99)
GLUCOSE BLDC GLUCOMTR-MCNC: 62 MG/DL — LOW (ref 70–99)
GLUCOSE BLDC GLUCOMTR-MCNC: 75 MG/DL — SIGNIFICANT CHANGE UP (ref 70–99)
GLUCOSE SERPL-MCNC: 211 MG/DL — HIGH (ref 70–115)
GLUCOSE SERPL-MCNC: 224 MG/DL — HIGH (ref 70–115)
HCT VFR BLD CALC: 41.6 % — SIGNIFICANT CHANGE UP (ref 34.5–45)
HGB BLD-MCNC: 12.4 G/DL — SIGNIFICANT CHANGE UP (ref 11.5–15.5)
IMM GRANULOCYTES NFR BLD AUTO: 1.4 % — SIGNIFICANT CHANGE UP (ref 0–1.5)
INR BLD: 1.01 RATIO — SIGNIFICANT CHANGE UP (ref 0.88–1.16)
LYMPHOCYTES # BLD AUTO: 21.3 % — SIGNIFICANT CHANGE UP (ref 13–44)
LYMPHOCYTES # BLD AUTO: 3.24 K/UL — SIGNIFICANT CHANGE UP (ref 1–3.3)
MAGNESIUM SERPL-MCNC: 2.1 MG/DL — SIGNIFICANT CHANGE UP (ref 1.6–2.6)
MAGNESIUM SERPL-MCNC: 2.3 MG/DL — SIGNIFICANT CHANGE UP (ref 1.6–2.6)
MCHC RBC-ENTMCNC: 27.1 PG — SIGNIFICANT CHANGE UP (ref 27–34)
MCHC RBC-ENTMCNC: 29.8 GM/DL — LOW (ref 32–36)
MCV RBC AUTO: 90.8 FL — SIGNIFICANT CHANGE UP (ref 80–100)
MONOCYTES # BLD AUTO: 1.39 K/UL — HIGH (ref 0–0.9)
MONOCYTES NFR BLD AUTO: 9.2 % — SIGNIFICANT CHANGE UP (ref 2–14)
NEUTROPHILS # BLD AUTO: 10.1 K/UL — HIGH (ref 1.8–7.4)
NEUTROPHILS NFR BLD AUTO: 66.4 % — SIGNIFICANT CHANGE UP (ref 43–77)
NT-PROBNP SERPL-SCNC: 1058 PG/ML — HIGH (ref 0–300)
PLATELET # BLD AUTO: 234 K/UL — SIGNIFICANT CHANGE UP (ref 150–400)
POTASSIUM SERPL-MCNC: 4.7 MMOL/L — SIGNIFICANT CHANGE UP (ref 3.5–5.3)
POTASSIUM SERPL-MCNC: 4.8 MMOL/L — SIGNIFICANT CHANGE UP (ref 3.5–5.3)
POTASSIUM SERPL-SCNC: 4.7 MMOL/L — SIGNIFICANT CHANGE UP (ref 3.5–5.3)
POTASSIUM SERPL-SCNC: 4.8 MMOL/L — SIGNIFICANT CHANGE UP (ref 3.5–5.3)
PROT SERPL-MCNC: 7.3 G/DL — SIGNIFICANT CHANGE UP (ref 6.6–8.7)
PROTHROM AB SERPL-ACNC: 11.6 SEC — SIGNIFICANT CHANGE UP (ref 10–12.9)
RBC # BLD: 4.58 M/UL — SIGNIFICANT CHANGE UP (ref 3.8–5.2)
RBC # FLD: 15.1 % — HIGH (ref 10.3–14.5)
SODIUM SERPL-SCNC: 137 MMOL/L — SIGNIFICANT CHANGE UP (ref 135–145)
SODIUM SERPL-SCNC: 139 MMOL/L — SIGNIFICANT CHANGE UP (ref 135–145)
TROPONIN T SERPL-MCNC: <0.01 NG/ML — SIGNIFICANT CHANGE UP (ref 0–0.06)
TROPONIN T SERPL-MCNC: <0.01 NG/ML — SIGNIFICANT CHANGE UP (ref 0–0.06)
WBC # BLD: 15.19 K/UL — HIGH (ref 3.8–10.5)
WBC # FLD AUTO: 15.19 K/UL — HIGH (ref 3.8–10.5)

## 2019-07-23 PROCEDURE — 71045 X-RAY EXAM CHEST 1 VIEW: CPT | Mod: 26

## 2019-07-23 PROCEDURE — 99223 1ST HOSP IP/OBS HIGH 75: CPT

## 2019-07-23 PROCEDURE — 99223 1ST HOSP IP/OBS HIGH 75: CPT | Mod: 25

## 2019-07-23 PROCEDURE — 12345: CPT | Mod: NC

## 2019-07-23 RX ORDER — MORPHINE SULFATE 50 MG/1
4 CAPSULE, EXTENDED RELEASE ORAL ONCE
Refills: 0 | Status: DISCONTINUED | OUTPATIENT
Start: 2019-07-23 | End: 2019-07-23

## 2019-07-23 RX ORDER — NITROGLYCERIN 6.5 MG
2 CAPSULE, EXTENDED RELEASE ORAL ONCE
Refills: 0 | Status: DISCONTINUED | OUTPATIENT
Start: 2019-07-23 | End: 2019-07-23

## 2019-07-23 RX ORDER — HYDROCHLOROTHIAZIDE 25 MG
25 TABLET ORAL ONCE
Refills: 0 | Status: COMPLETED | OUTPATIENT
Start: 2019-07-23 | End: 2019-07-23

## 2019-07-23 RX ORDER — ACETAMINOPHEN 500 MG
1000 TABLET ORAL ONCE
Refills: 0 | Status: COMPLETED | OUTPATIENT
Start: 2019-07-23 | End: 2019-07-23

## 2019-07-23 RX ORDER — DEXTROSE 50 % IN WATER 50 %
12.5 SYRINGE (ML) INTRAVENOUS ONCE
Refills: 0 | Status: DISCONTINUED | OUTPATIENT
Start: 2019-07-23 | End: 2019-07-30

## 2019-07-23 RX ORDER — HYDRALAZINE HCL 50 MG
10 TABLET ORAL EVERY 6 HOURS
Refills: 0 | Status: DISCONTINUED | OUTPATIENT
Start: 2019-07-23 | End: 2019-07-30

## 2019-07-23 RX ORDER — INSULIN LISPRO 100/ML
10 VIAL (ML) SUBCUTANEOUS
Refills: 0 | Status: DISCONTINUED | OUTPATIENT
Start: 2019-07-23 | End: 2019-07-27

## 2019-07-23 RX ORDER — DEXTROSE 50 % IN WATER 50 %
25 SYRINGE (ML) INTRAVENOUS ONCE
Refills: 0 | Status: DISCONTINUED | OUTPATIENT
Start: 2019-07-23 | End: 2019-07-30

## 2019-07-23 RX ORDER — TIOTROPIUM BROMIDE 18 UG/1
1 CAPSULE ORAL; RESPIRATORY (INHALATION) DAILY
Refills: 0 | Status: DISCONTINUED | OUTPATIENT
Start: 2019-07-23 | End: 2019-07-30

## 2019-07-23 RX ORDER — MORPHINE SULFATE 50 MG/1
2 CAPSULE, EXTENDED RELEASE ORAL EVERY 4 HOURS
Refills: 0 | Status: DISCONTINUED | OUTPATIENT
Start: 2019-07-23 | End: 2019-07-25

## 2019-07-23 RX ORDER — DILTIAZEM HCL 120 MG
240 CAPSULE, EXT RELEASE 24 HR ORAL DAILY
Refills: 0 | Status: DISCONTINUED | OUTPATIENT
Start: 2019-07-23 | End: 2019-07-23

## 2019-07-23 RX ORDER — GABAPENTIN 400 MG/1
100 CAPSULE ORAL THREE TIMES A DAY
Refills: 0 | Status: DISCONTINUED | OUTPATIENT
Start: 2019-07-23 | End: 2019-07-29

## 2019-07-23 RX ORDER — ACETAMINOPHEN 500 MG
650 TABLET ORAL EVERY 6 HOURS
Refills: 0 | Status: DISCONTINUED | OUTPATIENT
Start: 2019-07-23 | End: 2019-07-26

## 2019-07-23 RX ORDER — METOPROLOL TARTRATE 50 MG
100 TABLET ORAL DAILY
Refills: 0 | Status: DISCONTINUED | OUTPATIENT
Start: 2019-07-23 | End: 2019-07-23

## 2019-07-23 RX ORDER — INSULIN GLARGINE 100 [IU]/ML
20 INJECTION, SOLUTION SUBCUTANEOUS
Refills: 0 | Status: DISCONTINUED | OUTPATIENT
Start: 2019-07-23 | End: 2019-07-27

## 2019-07-23 RX ORDER — IPRATROPIUM/ALBUTEROL SULFATE 18-103MCG
3 AEROSOL WITH ADAPTER (GRAM) INHALATION EVERY 6 HOURS
Refills: 0 | Status: DISCONTINUED | OUTPATIENT
Start: 2019-07-23 | End: 2019-07-30

## 2019-07-23 RX ORDER — HEPARIN SODIUM 5000 [USP'U]/ML
5000 INJECTION INTRAVENOUS; SUBCUTANEOUS EVERY 8 HOURS
Refills: 0 | Status: DISCONTINUED | OUTPATIENT
Start: 2019-07-23 | End: 2019-07-30

## 2019-07-23 RX ORDER — GLUCAGON INJECTION, SOLUTION 0.5 MG/.1ML
1 INJECTION, SOLUTION SUBCUTANEOUS ONCE
Refills: 0 | Status: DISCONTINUED | OUTPATIENT
Start: 2019-07-23 | End: 2019-07-30

## 2019-07-23 RX ORDER — DEXTROSE 50 % IN WATER 50 %
15 SYRINGE (ML) INTRAVENOUS ONCE
Refills: 0 | Status: COMPLETED | OUTPATIENT
Start: 2019-07-23 | End: 2019-07-23

## 2019-07-23 RX ORDER — MORPHINE SULFATE 50 MG/1
4 CAPSULE, EXTENDED RELEASE ORAL EVERY 6 HOURS
Refills: 0 | Status: DISCONTINUED | OUTPATIENT
Start: 2019-07-23 | End: 2019-07-25

## 2019-07-23 RX ORDER — ISOSORBIDE MONONITRATE 60 MG/1
30 TABLET, EXTENDED RELEASE ORAL DAILY
Refills: 0 | Status: DISCONTINUED | OUTPATIENT
Start: 2019-07-23 | End: 2019-07-30

## 2019-07-23 RX ORDER — DEXTROSE 50 % IN WATER 50 %
15 SYRINGE (ML) INTRAVENOUS ONCE
Refills: 0 | Status: DISCONTINUED | OUTPATIENT
Start: 2019-07-23 | End: 2019-07-30

## 2019-07-23 RX ORDER — LISINOPRIL 2.5 MG/1
10 TABLET ORAL DAILY
Refills: 0 | Status: DISCONTINUED | OUTPATIENT
Start: 2019-07-23 | End: 2019-07-24

## 2019-07-23 RX ORDER — IPRATROPIUM/ALBUTEROL SULFATE 18-103MCG
3 AEROSOL WITH ADAPTER (GRAM) INHALATION ONCE
Refills: 0 | Status: COMPLETED | OUTPATIENT
Start: 2019-07-23 | End: 2019-07-23

## 2019-07-23 RX ORDER — LISINOPRIL 2.5 MG/1
5 TABLET ORAL DAILY
Refills: 0 | Status: DISCONTINUED | OUTPATIENT
Start: 2019-07-23 | End: 2019-07-23

## 2019-07-23 RX ORDER — SODIUM CHLORIDE 9 MG/ML
1000 INJECTION, SOLUTION INTRAVENOUS
Refills: 0 | Status: DISCONTINUED | OUTPATIENT
Start: 2019-07-23 | End: 2019-07-30

## 2019-07-23 RX ORDER — FUROSEMIDE 40 MG
40 TABLET ORAL ONCE
Refills: 0 | Status: COMPLETED | OUTPATIENT
Start: 2019-07-23 | End: 2019-07-23

## 2019-07-23 RX ORDER — METOPROLOL TARTRATE 50 MG
12.5 TABLET ORAL
Refills: 0 | Status: DISCONTINUED | OUTPATIENT
Start: 2019-07-23 | End: 2019-07-23

## 2019-07-23 RX ORDER — ASPIRIN/CALCIUM CARB/MAGNESIUM 324 MG
81 TABLET ORAL DAILY
Refills: 0 | Status: DISCONTINUED | OUTPATIENT
Start: 2019-07-23 | End: 2019-07-30

## 2019-07-23 RX ORDER — FUROSEMIDE 40 MG
40 TABLET ORAL DAILY
Refills: 0 | Status: DISCONTINUED | OUTPATIENT
Start: 2019-07-23 | End: 2019-07-25

## 2019-07-23 RX ORDER — PANTOPRAZOLE SODIUM 20 MG/1
40 TABLET, DELAYED RELEASE ORAL
Refills: 0 | Status: DISCONTINUED | OUTPATIENT
Start: 2019-07-23 | End: 2019-07-30

## 2019-07-23 RX ORDER — LEVOTHYROXINE SODIUM 125 MCG
125 TABLET ORAL DAILY
Refills: 0 | Status: DISCONTINUED | OUTPATIENT
Start: 2019-07-23 | End: 2019-07-30

## 2019-07-23 RX ORDER — METOPROLOL TARTRATE 50 MG
50 TABLET ORAL
Refills: 0 | Status: DISCONTINUED | OUTPATIENT
Start: 2019-07-23 | End: 2019-07-30

## 2019-07-23 RX ORDER — METOPROLOL TARTRATE 50 MG
50 TABLET ORAL DAILY
Refills: 0 | Status: DISCONTINUED | OUTPATIENT
Start: 2019-07-23 | End: 2019-07-23

## 2019-07-23 RX ORDER — ATORVASTATIN CALCIUM 80 MG/1
80 TABLET, FILM COATED ORAL AT BEDTIME
Refills: 0 | Status: DISCONTINUED | OUTPATIENT
Start: 2019-07-23 | End: 2019-07-30

## 2019-07-23 RX ADMIN — ISOSORBIDE MONONITRATE 30 MILLIGRAM(S): 60 TABLET, EXTENDED RELEASE ORAL at 12:36

## 2019-07-23 RX ADMIN — GABAPENTIN 100 MILLIGRAM(S): 400 CAPSULE ORAL at 17:09

## 2019-07-23 RX ADMIN — MORPHINE SULFATE 2 MILLIGRAM(S): 50 CAPSULE, EXTENDED RELEASE ORAL at 11:55

## 2019-07-23 RX ADMIN — Medication 125 MICROGRAM(S): at 06:50

## 2019-07-23 RX ADMIN — Medication 10 UNIT(S): at 10:11

## 2019-07-23 RX ADMIN — Medication 400 MILLIGRAM(S): at 04:21

## 2019-07-23 RX ADMIN — MORPHINE SULFATE 4 MILLIGRAM(S): 50 CAPSULE, EXTENDED RELEASE ORAL at 00:25

## 2019-07-23 RX ADMIN — MORPHINE SULFATE 2 MILLIGRAM(S): 50 CAPSULE, EXTENDED RELEASE ORAL at 04:31

## 2019-07-23 RX ADMIN — Medication 15 GRAM(S): at 17:11

## 2019-07-23 RX ADMIN — Medication 81 MILLIGRAM(S): at 12:36

## 2019-07-23 RX ADMIN — LISINOPRIL 5 MILLIGRAM(S): 2.5 TABLET ORAL at 06:50

## 2019-07-23 RX ADMIN — Medication 40 MILLIGRAM(S): at 06:50

## 2019-07-23 RX ADMIN — Medication 3 MILLILITER(S): at 04:02

## 2019-07-23 RX ADMIN — HEPARIN SODIUM 5000 UNIT(S): 5000 INJECTION INTRAVENOUS; SUBCUTANEOUS at 17:09

## 2019-07-23 RX ADMIN — Medication 40 MILLIGRAM(S): at 20:32

## 2019-07-23 RX ADMIN — INSULIN GLARGINE 20 UNIT(S): 100 INJECTION, SOLUTION SUBCUTANEOUS at 22:20

## 2019-07-23 RX ADMIN — MORPHINE SULFATE 4 MILLIGRAM(S): 50 CAPSULE, EXTENDED RELEASE ORAL at 10:10

## 2019-07-23 RX ADMIN — INSULIN GLARGINE 20 UNIT(S): 100 INJECTION, SOLUTION SUBCUTANEOUS at 10:11

## 2019-07-23 RX ADMIN — Medication 50 MILLIGRAM(S): at 06:57

## 2019-07-23 RX ADMIN — HEPARIN SODIUM 5000 UNIT(S): 5000 INJECTION INTRAVENOUS; SUBCUTANEOUS at 06:57

## 2019-07-23 RX ADMIN — GABAPENTIN 100 MILLIGRAM(S): 400 CAPSULE ORAL at 06:50

## 2019-07-23 RX ADMIN — Medication 25 MILLIGRAM(S): at 17:10

## 2019-07-23 RX ADMIN — Medication 240 MILLIGRAM(S): at 06:50

## 2019-07-23 RX ADMIN — Medication 50 MILLIGRAM(S): at 17:09

## 2019-07-23 RX ADMIN — ATORVASTATIN CALCIUM 80 MILLIGRAM(S): 80 TABLET, FILM COATED ORAL at 21:08

## 2019-07-23 RX ADMIN — GABAPENTIN 100 MILLIGRAM(S): 400 CAPSULE ORAL at 21:08

## 2019-07-23 RX ADMIN — Medication 3 MILLILITER(S): at 18:38

## 2019-07-23 RX ADMIN — MORPHINE SULFATE 2 MILLIGRAM(S): 50 CAPSULE, EXTENDED RELEASE ORAL at 11:20

## 2019-07-23 RX ADMIN — Medication 10 UNIT(S): at 12:37

## 2019-07-23 NOTE — CHART NOTE - NSCHARTNOTEFT_GEN_A_CORE
patient being seen for acute chf exacerbation, chronic pain and med management. patient seen at bedside with son and continues to complain of le pain     vitals reviewed    pe:  gen: nad, obese  heent: perrl  cvs: s1s2+  lungs: diminished  abd: obese  ext: tenderm, le edema  neuro: aaox 3     labs reviewed    le duplex ordered    a.p  84yoF hx CAD, HFpEF, HTN, COPD, T2DM, recently hospitalized at [facility] in 6/2019 for COPD exacerbation, now presenting with worsening exertional dyspnea associated with orthopnea and acute on chronic leg swelling for the past 1-2 days, being admitted for acute decompensated heart failure    #Acute decompensated CHF- patient states she was taking her lasix  -> spoke to cardio  --> currently being evaluated for cardio MEMS    #Leg pain and swelling  - consulted pain management   --> le duplex ordered to rule out dvt     #CAD- ASA and statin.     #COPD- Spiriva. DuoNebs PRN.     #T2DM- c,w current plan     #HTN- Metoprolol, imdur and ace-I  --> cardizem discontinued     #Hypothyroidism- Synthroid.     dnr dni

## 2019-07-23 NOTE — ED ADULT NURSE NOTE - OBJECTIVE STATEMENT
Patient BIBA  from home with  difficulty breathing, shortness of breath and bilateral leg swelling, +3 edema to bilateral lower legs,  Patient presents to ED A/Ox4, VSS, denies chest pain, Respirations are even and unlabored, lungs bilateral wheezes, +bowel x4 quads, abdomen soft, nontender/nondistended, skin w/d/i.

## 2019-07-23 NOTE — PATIENT PROFILE ADULT - NSPROSPIRITUALVALUESFT_GEN_A_NUR
REGIONAL ANESTHESIA PAIN SERVICE EPIDURAL NOTE  Myrtle Vaz is a 72 year old female with history of Nissen complicated by esophageal perforation 11/2018 s/p spit fistula now POD #4 s/p redo laparotomy and partial sternotomy, esophagogastrostomy on 4/26, placement of T8-9 epidural catheter for pain management.      SUBJECTIVE  Interval History: Overnight no acute events CXR increased pleural edema, continues to require highflow NC. Patient awake, semiupright in bed, reports left neck incision pain, and soreness anterior chest, currently rating 4/10 with epidural infusion, Dilaudid PCA, scheduled Tylenol  (see below).  Denies LE weakness, paresthesias, circumoral numbness, metallic taste or tinnitus.  Patient moves upper and lower extremities.  Currently denies nausea, tolerating tube feedings, NPO. BM x 2, urinary catheter in place, pharyngostomy tube to low suction.                 Clinically Aligned Pain Assessment (CAPA):  Comfort (How is your pain?): Tolerable with discomfort  Change in Pain (Since your last medication/intervention?): About the same  Pain Control (How are your pain treatments working?):  Partially effective pain control  Functioning (Are you able to do activities to get better?) : Can do most things, but pain gets in the way of some                  Pain Intensity using Numerical Rating Scale (NRS):    3/10 at rest and 4/10 with activity        Antithrombotic/Thrombolytic Therapy ordered:    heparin sodium injection 5,000 Units 5,000 Units, SC, Q8H         Analgesic Medications:              Medications related to Pain Management (From now, onward)     Start     Dose/Rate Route Frequency Ordered Stop     04/29/19 1230   bisacodyl (DULCOLAX) Suppository 10 mg      10 mg Rectal DAILY 04/29/19 1218       04/29/19 0000   acetaminophen (TYLENOL) tablet 650 mg      650 mg Per J Tube EVERY 4 HOURS PRN 04/26/19 2103       04/28/19 0915   bupivacaine (MARCAINE) 0.125 % in sodium chloride 0.9 % 250 mL  "EPIDURAL Infusion      6 mL/hr  EPIDURAL CONTINUOUS 04/28/19 0904       04/27/19 1400   acetaminophen (TYLENOL) tablet 975 mg      975 mg Per J Tube EVERY 8 HOURS 04/27/19 0612       04/27/19 1225   lidocaine (LMX4) cream        Topical ONCE PRN 04/27/19 1227       04/27/19 0800   polyethylene glycol (MIRALAX/GLYCOLAX) Packet 17 g      17 g Per J Tube DAILY 04/27/19 0612       04/27/19 0800   senna-docusate (SENOKOT-S/PERICOLACE) 8.6-50 MG per tablet 2 tablet      2 tablet Per J Tube 2 TIMES DAILY 04/27/19 0612 04/26/19 2115   HYDROmorphone (DILAUDID) PCA 1 mg/mL OPIOID NAIVE        Intravenous CONTINUOUS 04/26/19 2103 04/26/19 2103   hydrOXYzine (ATARAX) tablet 10 mg      10 mg Oral EVERY 6 HOURS PRN 04/26/19 2103                  OBJECTIVE  Lab Results:       Recent Labs   Lab Test 04/30/19  0313   WBC 9.9   RBC 2.93*   HGB 8.1*   HCT 27.1*   MCV 93   MCH 27.6   MCHC 29.9*   RDW 19.7*                  Lab Results   Component Value Date     INR 1.25 04/30/2019     INR 1.80 04/28/2019     INR 1.10 02/04/2019         Vitals:              Temp:  [97.7  F (36.5  C)-98.8  F (37.1  C)] 98  F (36.7  C)  Heart Rate:  [61-74] 69  Resp:  [16-20] 16  BP: ()/(51-78) 107/65  FiO2 (%):  [40 %-50 %] 40 %  SpO2:  [93 %-98 %] 95 %  /65   Pulse 73   Temp 98  F (36.7  C) (Oral)   Resp 16   Ht 1.651 m (5' 5\")   Wt 82.8 kg (182 lb 8.7 oz)   SpO2 95%   BMI 30.38 kg/m          Exam:   GEN: alert and no distress  NEURO/MSK: Extent of sensory blockade:  Strength BLE 5/5  and overall symmetric  SKIN: Epidural catheter site with dressing c/d/i, no tenderness, erythema, heme, edema       ASSESSMENT/PLAN:    Patient is receiving adequate analgesia with current multimodal therapy including infusion of bupivacaine 0.125% at 6mL/hr.  Motor function intact and adequate sensory block, is meeting activity goals.  No evidence of adverse side effects related to local anesthetic. Adequate urine output.       - " continue current epidural infusion bupivacaine 0.125% at 6mL/hour  - antithrombotic/thrombolytic therapy okay to continue Heparin SQ Q 8 hrs as ordered. Please contact [facility] (#[phone]) prior to any medication changes  - will continue to follow and adjust as needed     Adalberto Peña MD on 4/30/2019 at 11:35 AM  [facility] Fellow     [facility] Contact Info (24 hour job code pager is the last 4 digits) For in-house use only:   [employer] phone: [employer] [phone], [employer] [phone], [employer] [phone], then enter call-back number.    Text: Use [employer] on the Intranet <Paging/Directory> tab and enter Jobcode ID.   If no call back at any time, contact the hospital  and ask for [facility] attending or backup             Anglican

## 2019-07-23 NOTE — H&P ADULT - ASSESSMENT
84yoF hx CAD, HFpEF, HTN, COPD, T2DM, recently hospitalized at [facility] in 6/2019 for COPD exacerbation, now presenting with worsening exertional dyspnea associated with orthopnea and acute on chronic leg swelling for the past 1-2 days, being admitted for acute decompensated heart failure    #Acute decompensated CHF- Likely precipitated by non-compliance as pt report missed lasix doses. Admit to telemetry. Start IV lasix 40mg daily. Last TTE in 6/2019, so will not reorder.  Repeat cardiac enzymes.  Monitor Is/Os.    #Leg pain- likely related to edema. Diuresis as above.  Pt recently started on vicodin per don. Pain control with morphine.     #Leukocytosis- WBC 13, afebrile.  Suspect reactive.  Monitor.     #CAD- ASA and statin.     #COPD- Spiriva. DuoNebs PRN.     #T2DM- On basaglar 40U BID and novolog 20U TID with meals, will reduce regimen to half for now- lantus 20U BID and humalog 10U TID with insulin sliding scale.  Gabapentin resumed.     #HTN- Metoprolol, diltiazem, imdur, lisinopril.     #Hypothyroidism- Synthroid.     #Prophylactic measure- heparin.     #Advanced care planning- GOC note from 6/3/2019 reveals pt has MOLST (copy scanned into alpha during 6/2019 hospitalization). Pt is DNR/DNI.  Confirmed status with HCP, Calvin Chávez who says he will bring in MOLST from on 7/23. 84yoF hx CAD, HFpEF, HTN, COPD, T2DM, recently hospitalized at [facility] in 6/2019 for COPD exacerbation, now presenting with worsening exertional dyspnea associated with orthopnea and acute on chronic leg swelling for the past 1-2 days, being admitted for acute decompensated heart failure    #Acute decompensated CHF- Likely precipitated by non-compliance as pt report missed lasix doses. Admit to telemetry. Start IV lasix 40mg daily. Last TTE in 6/2019, so will not reorder.  Repeat cardiac enzymes.  Monitor Is/Os.  Cardiology consulted.     #Leg pain- likely related to edema. Diuresis as above.  Pt recently started on vicodin per don. Pain control with morphine.     #Leukocytosis- WBC 13, afebrile.  Suspect reactive.  Monitor.     #CAD- ASA and statin.     #COPD- Spiriva. DuoNebs PRN.     #T2DM- On basaglar 40U BID and novolog 20U TID with meals, will reduce regimen to half for now- lantus 20U BID and humalog 10U TID with insulin sliding scale.  Gabapentin resumed.     #HTN- Metoprolol, diltiazem, imdur, lisinopril.     #Hypothyroidism- Synthroid.     #Prophylactic measure- heparin.     #Advanced care planning- GOC note from 6/3/2019 reveals pt has MOLST (copy scanned into alpha during 6/2019 hospitalization). Pt is DNR/DNI.  Confirmed status with HCP, Calvin Chávez who says he will bring in MOLST from on 7/23. 84yoF hx CAD, HFpEF, HTN, COPD, T2DM, recently hospitalized at [facility] in 6/2019 for COPD exacerbation, now presenting with worsening exertional dyspnea associated with orthopnea and acute on chronic leg swelling for the past 1-2 days, being admitted for acute decompensated heart failure    #Acute decompensated CHF- Likely precipitated by non-compliance as pt report missed lasix doses. Admit to telemetry. Start IV lasix 40mg daily. Last TTE in 6/2019, so will not reorder.  Repeat cardiac enzymes.  Monitor Is/Os.  Cardiology consulted.     #Leg pain- likely related to edema. Diuresis as above.  Pt recently started on vicodin per son. Pain control with morphine.     #Leukocytosis- WBC 13, afebrile.  Suspect reactive.  Monitor.     #CAD- ASA and statin.     #COPD- Spiriva. DuoNebs PRN.     #T2DM- On basaglar 40U BID and novolog 20U TID with meals, will reduce regimen to half for now- lantus 20U BID and humalog 10U TID with insulin sliding scale.  Gabapentin resumed.     #HTN- Metoprolol, diltiazem, imdur, lisinopril.     #Hypothyroidism- Synthroid.     #Prophylactic measure- heparin.     #Advanced care planning- GOC note from 6/3/2019 reveals pt has MOLST (copy scanned into alpha during 6/2019 hospitalization). Pt is DNR/DNI.  Confirmed status with HCP, Calvin Chávez who says he will bring in MOLST from on 7/23.

## 2019-07-23 NOTE — CHART NOTE - NSCHARTNOTEFT_GEN_A_CORE
patient arrived on floor complaining of sob  02 sat 96  b/p 110/70  rr 15  neck no jvd  Lungs decreased breath sounds  Heart s1&s2 reg  Abd soft  Ext trace edema  neuro alert oriented x2     1. Left ventricular ejection fraction, by visual estimation, is >75%.   2. Technically difficult study.   3. Hyperdynamic global left ventricular systolic function.   4. Mildly increased LV wall thickness.   5. Normal left ventricular internal cavity size.   6. Spectral Doppler shows pseudonormal pattern of left ventricular   myocardial filling (Grade II diastolic dysfunction).   7. There is no evidence of pericardial effusion.   8. Severe mitral annular calcification.   9. Estimated pulmonary artery systolic pressure is 35.7 mmHg assuming a   right atrial pressure of 5 mmHg, which is consistent with borderline   pulmonary hypertension.  10. Mitral valve mean gradient is 2.0 mmHg consistent with mild mitral   stenosis.  11. Peak transaortic gradient equals 40.4 mmHg, mean transaortic gradient   equals 21.5 mmHg, the calculated aortic valve area equals 1.73 cm² by the   continuity equation consistent with mild aortic stenosis.    83y/o female with PMH of  CAD with known  of RCA, s/p PCI with 1 ADELIA to LCx - 12/12/2018, s/p PCI with stent to mLAD and pLCx - 4/2017, HFpEF, moderate AS, HTN, HLD, DM on insulin, CKD, COPD and STEVENSON. Pt c/o R leg pain, being treated out pt with vicodin. According to visiting RN notes, doppler and xrays negative last week. States pain got worse, causing difficulty breathing. non compliance with lasix this past week.    Chf  Lasix 40mg ivp x 1   Monitor strict i&O  daily weight   low na diet    HX Copd  stat nebulizer rx ordered  May need duoneb atc

## 2019-07-23 NOTE — ED ADULT NURSE NOTE - HOW OFTEN DO YOU HAVE A DRINK CONTAINING ALCOHOL?
I examined this patient on early AM personal rounds and then again on SICU morning rounds with the team.  All relevant studies were reviewed and I discussed the case with Dr. Bingham (ENT attending).  Agree with the documentation above.  Issues:    Potential for airway compromise:  This elderly woman who at baseline is non verbal and bed bound secondary to severe dementia presents with parotiditis (severe) with associated supraglottic edema.  On presentation to the ED she had audible stridor.  She received pulse Decadron and broads spectrum antibiotics.    Acute on chronic (stage 3-4) kidney disease with a significant non-lactic, anion gap acidosis.  She is hyperglycemic (300's) with poorly controlled type 2 diabetes exacerbated by pulse steriods.    She is not hypotensive - and it is impossible to determine alteration from baseline mental status.    Severe - parotid infection on antibiotics.  Airway compromise with potential for loss.  Likely difficult airway to manage.  Trach set at bedside.  Given current QoL it would be appropriate to have a sit down "goals of care" discussion with the daughters. Never

## 2019-07-23 NOTE — CONSULT NOTE ADULT - SUBJECTIVE AND OBJECTIVE BOX
[place] [facility]                                                        Office: [address] [address]                                                       Telephone: [phone]. Fax:[phone]                                                              CARDIOLOGY CONSULTATION NOTE                                                                                             Consult requested by:      PCP    Primary Cardiologist.    Reason for Consultation:     History obtained by: Patient and medical record     obtained: No    Chief complaint:    Patient is a 84y old  Female who presents with a chief complaint of acute decompensated CHF (23 Jul 2019 04:23)      HPI:  84yoF hx CAD, HFpEF, HTN, COPD, T2DM, recently hospitalized at [facility] in 6/2019 for COPD exacerbation, now presenting with worsening exertional dyspnea associated with orthopnea and acute on chronic leg swelling for the past 1-2 days.  Pt reports some non-compliance with her lasix pills this week.  Pt also reporting severe leg pain since worsening of her leg swelling and per ED provider note and son Calvin Chávez via phone, pt had been prescribed vicodin PRN by house calls program. Pt denies any fevers, chills, cough, abdominal pain, nausea, vomiting, diarrhea. On admission, proBNP ~1000, troponin negative. (23 Jul 2019 04:23)        ALLERGIES: Allergies    doxycycline (Unknown)  iodine (Hives)  iodine containing compounds (Unknown)  shellfish (Anaphylaxis)  shellfish (Swelling; Short breath)    Intolerances          CURRENT MEDICATIONS:  MEDICATIONS  (STANDING):  aspirin enteric coated 81 milliGRAM(s) Oral daily  atorvastatin 80 milliGRAM(s) Oral at bedtime  dextrose 5%. 1000 milliLiter(s) (50 mL/Hr) IV Continuous <Continuous>  dextrose 50% Injectable 12.5 Gram(s) IV Push once  dextrose 50% Injectable 25 Gram(s) IV Push once  dextrose 50% Injectable 25 Gram(s) IV Push once  diltiazem    milliGRAM(s) Oral daily  furosemide   Injectable 40 milliGRAM(s) IV Push daily  gabapentin 100 milliGRAM(s) Oral three times a day  heparin  Injectable 5000 Unit(s) SubCutaneous every 8 hours  insulin glargine Injectable (LANTUS) 20 Unit(s) SubCutaneous two times a day  insulin lispro Injectable (HumaLOG) 10 Unit(s) SubCutaneous three times a day before meals  isosorbide   mononitrate ER Tablet (IMDUR) 30 milliGRAM(s) Oral daily  levothyroxine 125 MICROGram(s) Oral daily  lisinopril 5 milliGRAM(s) Oral daily  metoprolol succinate  milliGRAM(s) Oral daily  pantoprazole    Tablet 40 milliGRAM(s) Oral before breakfast  tiotropium 18 MICROgram(s) Capsule 1 Capsule(s) Inhalation daily      HOME MEDICATIONS:  Home Medications:  albuterol 0.63 mg/3 mL (0.021%) inhalation solution: 3 milliliter(s) inhaled every 6 hours, As Needed (23 Jul 2019 05:50)  aspirin 81 mg oral delayed release tablet: 1 tab(s) orally once a day (23 Jul 2019 05:50)  DilTIAZem Hydrochloride  mg/24 hours oral capsule, extended release: 1 cap(s) orally once a day (23 Jul 2019 05:50)  furosemide 40 mg oral tablet: 1 tab(s) orally once a day (23 Jul 2019 05:50)  gabapentin 100 mg oral capsule: 1 cap(s) orally 3 times a day (23 Jul 2019 05:50)  levothyroxine 125 mcg (0.125 mg) oral tablet: 1 tab(s) orally once a day (23 Jul 2019 05:50)  lisinopril 5 mg oral tablet: 1 tab(s) orally once a day (23 Jul 2019 05:50)  metoprolol succinate 50 mg oral tablet, extended release: 1 tab(s) orally once a day (23 Jul 2019 05:50)  omeprazole 40 mg oral delayed release capsule: 1 cap(s) orally once a day (23 Jul 2019 05:50)  Spiriva 18 mcg inhalation capsule: 1 cap(s) inhaled once a day (23 Jul 2019 05:50)    PAST MEDICAL HISTORY  NSTEMI (non-ST elevated myocardial infarction)  Hypothyroid  HLD (hyperlipidemia)  HTN (hypertension)  CAD (coronary artery disease)  Asthma  Diabetes  Gastroesophageal reflux disease without esophagitis  Pure hypercholesterolemia  Hypothyroidism, unspecified type  Essential hypertension  DM2 (diabetes mellitus, type 2)  Uncomplicated asthma, unspecified asthma severity      PAST SURGICAL HISTORY  Abnormal findings on cardiac catheterization  History of appendectomy  History of appendectomy  No significant past surgical history      FAMILY HISTORY:  Family history of stomach cancer  Family history of MI (myocardial infarction)  Family history of cancer in mother  Family history of heart disease      SOCIAL HISTORY:       CIGARETTES:       ALCOHOL    DRUG ABUSE      REVIEW OF SYSTEMS:  CONSTITUTIONAL:  as per HPI  HEENT:  Eyes:  No diplopia or blurred vision. ENT:  No earache, sore throat or runny nose.  CARDIOVASCULAR:  No pressure, squeezing, strangling, tightness, heaviness or aching about the chest, neck, axilla or epigastrium.  RESPIRATORY:  No cough, shortness of breath, PND or orthopnea.  GASTROINTESTINAL:  No nausea, vomiting or diarrhea.  GENITOURINARY:  No dysuria, frequency or urgency. No Blood in urine  MUSCULOSKELETAL:  no joint aches, no muscle pain  SKIN:  No change in skin, hair or nails.  NEUROLOGIC:  No paresthesias, fasciculations, seizures or weakness.  PSYCHIATRIC:  No disorder of thought or mood.  ENDOCRINE:  No heat or cold intolerance, polyuria or polydipsia.  HEMATOLOGICAL:  No easy bruising or bleeding.           	        Vital Signs Last 24 Hrs  T(C): 36.8 (07-23-19 @ 08:35), Max: 36.9 (07-23-19 @ 04:23)  T(F): 98.2 (07-23-19 @ 08:35), Max: 98.5 (07-23-19 @ 04:23)  HR: 99 (07-23-19 @ 08:35) (94 - 105)  BP: 148/64 (07-23-19 @ 08:35) (138/60 - 167/83)  BP(mean): --  RR: 18 (07-23-19 @ 08:35) (18 - 22)  SpO2: 98% (07-23-19 @ 08:35) (95% - 98%)    PHYSICAL EXAM:  Constitutional: Comfortable . No acute distress.   HEENT: Atraumatic and normcephalic , neck is supple . no JVD. Unremarkable  CNS: Alert and awake, Grossly nonfocal.  Lymph Nodes: Cervical : Not palpable.  Respiratory: Bilateral clear breath sounds.  Cardiovascular: Normal S1S2. . No murmur/rubs or gallop.  Gastrointestinal: Soft non-tender and non distended . +Bowel sounds.   Extremities: No leg edema.   Psychiatric: Calm . no agitation.  Skin: No skin rash.    Intake and output:   07-23 @ 07:01  -  07-23 @ 09:11  --------------------------------------------------------  IN: 0 mL / OUT: 1100 mL / NET: -1100 mL        LABS:                        12.2   13.79 )-----------( 280      ( 22 Jul 2019 23:53 )             38.9     07-23    137  |  101  |  38.0<H>  ----------------------------<  224<H>  4.8   |  20.0<L>  |  1.33<H>    Ca    8.8      23 Jul 2019 07:38  Mg     2.1     07-23    TPro  7.3  /  Alb  3.8  /  TBili  0.4  /  DBili  x   /  AST  18  /  ALT  12  /  AlkPhos  64  07-22    CARDIAC MARKERS ( 23 Jul 2019 07:38 )  x     / <0.01 ng/mL / 102 U/L / x     / x      CARDIAC MARKERS ( 22 Jul 2019 23:53 )  x     / <0.01 ng/mL / 100 U/L / x     / x        ;p-BNP=Serum Pro-Brain Natriuretic Peptide: 1058 pg/mL (07-22 @ 23:53)    PT/INR - ( 22 Jul 2019 23:53 )   PT: 11.6 sec;   INR: 1.01 ratio         PTT - ( 22 Jul 2019 23:53 )  PTT:27.8 sec    LIVER FUNCTIONS - ( 22 Jul 2019 23:53 )  Alb: 3.8 g/dL / Pro: 7.3 g/dL / ALK PHOS: 64 U/L / ALT: 12 U/L / AST: 18 U/L / GGT: x           INTERPRETATION OF TELEMETRY: Reviewed by me.   ECG: Reviewed by me.     RADIOLOGY & ADDITIONAL STUDIES/ECHO/CARDIAC CATH:   < from: TTE Echo Complete w/Doppler (06.03.19 @ 10:15) >    Summary:   1. Left ventricular ejection fraction, by visual estimation, is >75%.   2. Technically difficult study.   3. Hyperdynamic global left ventricular systolic function.   4. Mildly increased LV wall thickness.   5. Normal left ventricular internal cavity size.   6. Spectral Doppler shows pseudonormal pattern of left ventricular   myocardial filling (Grade II diastolic dysfunction).   7. There is no evidence of pericardial effusion.   8. Severe mitral annular calcification.   9. Estimated pulmonary artery systolic pressure is 35.7 mmHg assuming a   right atrial pressure of 5 mmHg, which is consistent with borderline   pulmonary hypertension.  10. Mitral valve mean gradient is 2.0 mmHg consistent with mild mitral   stenosis.  11. Peak transaortic gradient equals 40.4 mmHg, mean transaortic gradient   equals 21.5 mmHg, the calculated aortic valve area equals 1.73 cm² by the   continuity equation consistent with mild aortic stenosis.    MD Herbert Electronically signed on 6/3/2019 at 2:11:27 PM       < end of copied text > [place] [facility]                                                        Office: [address] [address]                                                       Telephone: [phone]. Fax:[phone]                                                              CARDIOLOGY CONSULTATION NOTE                                                                                             Consult requested by:  Dr. Chaney    Primary Cardiologist.: Dr. Benavides    Reason for Consultation: HF    History obtained by: Patient and medical record     obtained: No    Chief complaint:    Patient is a 84y old  Female who presents with a chief complaint of acute decompensated CHF (23 Jul 2019 04:23)      HPI:  84yoF hx CAD, HFpEF, HTN, COPD, T2DM, recently hospitalized at [facility] in 6/2019 for COPD exacerbation, now presenting with worsening exertional dyspnea associated with orthopnea and acute on chronic leg swelling for the past 1-2 days.  Pt reports some non-compliance with her lasix pills this week.  Pt also reporting severe leg pain since worsening of her leg swelling and per ED provider note and son Calvin Chávez via phone, pt had been prescribed vicodin PRN by house calls program. Pt denies any fevers, chills, cough, abdominal pain, nausea, vomiting, diarrhea. On admission, proBNP ~1000, troponin negative. (23 Jul 2019 04:23)    Appreciate above HPI  Pt seen and examined in ED, Pt c/o R leg pain, being treated out pt with vicodin. States pain got worse, causing difficulty breathing. Pt states has been taking lasix, although         ALLERGIES: Allergies    doxycycline (Unknown)  iodine (Hives)  iodine containing compounds (Unknown)  shellfish (Anaphylaxis)  shellfish (Swelling; Short breath)    Intolerances          CURRENT MEDICATIONS:  MEDICATIONS  (STANDING):  aspirin enteric coated 81 milliGRAM(s) Oral daily  atorvastatin 80 milliGRAM(s) Oral at bedtime  dextrose 5%. 1000 milliLiter(s) (50 mL/Hr) IV Continuous <Continuous>  dextrose 50% Injectable 12.5 Gram(s) IV Push once  dextrose 50% Injectable 25 Gram(s) IV Push once  dextrose 50% Injectable 25 Gram(s) IV Push once  diltiazem    milliGRAM(s) Oral daily  furosemide   Injectable 40 milliGRAM(s) IV Push daily  gabapentin 100 milliGRAM(s) Oral three times a day  heparin  Injectable 5000 Unit(s) SubCutaneous every 8 hours  insulin glargine Injectable (LANTUS) 20 Unit(s) SubCutaneous two times a day  insulin lispro Injectable (HumaLOG) 10 Unit(s) SubCutaneous three times a day before meals  isosorbide   mononitrate ER Tablet (IMDUR) 30 milliGRAM(s) Oral daily  levothyroxine 125 MICROGram(s) Oral daily  lisinopril 5 milliGRAM(s) Oral daily  metoprolol succinate  milliGRAM(s) Oral daily  pantoprazole    Tablet 40 milliGRAM(s) Oral before breakfast  tiotropium 18 MICROgram(s) Capsule 1 Capsule(s) Inhalation daily      HOME MEDICATIONS:  Home Medications:  albuterol 0.63 mg/3 mL (0.021%) inhalation solution: 3 milliliter(s) inhaled every 6 hours, As Needed (23 Jul 2019 05:50)  aspirin 81 mg oral delayed release tablet: 1 tab(s) orally once a day (23 Jul 2019 05:50)  DilTIAZem Hydrochloride  mg/24 hours oral capsule, extended release: 1 cap(s) orally once a day (23 Jul 2019 05:50)  furosemide 40 mg oral tablet: 1 tab(s) orally once a day (23 Jul 2019 05:50)  gabapentin 100 mg oral capsule: 1 cap(s) orally 3 times a day (23 Jul 2019 05:50)  levothyroxine 125 mcg (0.125 mg) oral tablet: 1 tab(s) orally once a day (23 Jul 2019 05:50)  lisinopril 5 mg oral tablet: 1 tab(s) orally once a day (23 Jul 2019 05:50)  metoprolol succinate 50 mg oral tablet, extended release: 1 tab(s) orally once a day (23 Jul 2019 05:50)  omeprazole 40 mg oral delayed release capsule: 1 cap(s) orally once a day (23 Jul 2019 05:50)  Spiriva 18 mcg inhalation capsule: 1 cap(s) inhaled once a day (23 Jul 2019 05:50)    PAST MEDICAL HISTORY  NSTEMI (non-ST elevated myocardial infarction)  Hypothyroid  HLD (hyperlipidemia)  HTN (hypertension)  CAD (coronary artery disease)  Asthma  Diabetes  Gastroesophageal reflux disease without esophagitis  Pure hypercholesterolemia  Hypothyroidism, unspecified type  Essential hypertension  DM2 (diabetes mellitus, type 2)  Uncomplicated asthma, unspecified asthma severity      PAST SURGICAL HISTORY  Abnormal findings on cardiac catheterization  History of appendectomy  History of appendectomy  No significant past surgical history      FAMILY HISTORY:  Family history of stomach cancer  Family history of MI (myocardial infarction)  Family history of cancer in mother  Family history of heart disease      SOCIAL HISTORY:       CIGARETTES:       ALCOHOL    DRUG ABUSE      REVIEW OF SYSTEMS:  CONSTITUTIONAL:  as per HPI  HEENT:  Eyes:  No diplopia or blurred vision. ENT:  No earache, sore throat or runny nose.  CARDIOVASCULAR:  No pressure, squeezing, strangling, tightness, heaviness or aching about the chest, neck, axilla or epigastrium.  RESPIRATORY:  No cough, shortness of breath, PND or orthopnea.  GASTROINTESTINAL:  No nausea, vomiting or diarrhea.  GENITOURINARY:  No dysuria, frequency or urgency. No Blood in urine  MUSCULOSKELETAL:  no joint aches, no muscle pain  SKIN:  No change in skin, hair or nails.  NEUROLOGIC:  No paresthesias, fasciculations, seizures or weakness.  PSYCHIATRIC:  No disorder of thought or mood.  ENDOCRINE:  No heat or cold intolerance, polyuria or polydipsia.  HEMATOLOGICAL:  No easy bruising or bleeding.           	        Vital Signs Last 24 Hrs  T(C): 36.8 (07-23-19 @ 08:35), Max: 36.9 (07-23-19 @ 04:23)  T(F): 98.2 (07-23-19 @ 08:35), Max: 98.5 (07-23-19 @ 04:23)  HR: 99 (07-23-19 @ 08:35) (94 - 105)  BP: 148/64 (07-23-19 @ 08:35) (138/60 - 167/83)  BP(mean): --  RR: 18 (07-23-19 @ 08:35) (18 - 22)  SpO2: 98% (07-23-19 @ 08:35) (95% - 98%)    PHYSICAL EXAM:  Constitutional: Comfortable . No acute distress.   HEENT: Atraumatic and normcephalic , neck is supple . no JVD. Unremarkable  CNS: Alert and awake, Grossly nonfocal.  Lymph Nodes: Cervical : Not palpable.  Respiratory: Bilateral clear breath sounds.  Cardiovascular: Normal S1S2. . No murmur/rubs or gallop.  Gastrointestinal: Soft non-tender and non distended . +Bowel sounds.   Extremities: No leg edema.   Psychiatric: Calm . no agitation.  Skin: No skin rash.    Intake and output:   07-23 @ 07:01  -  07-23 @ 09:11  --------------------------------------------------------  IN: 0 mL / OUT: 1100 mL / NET: -1100 mL        LABS:                        12.2   13.79 )-----------( 280      ( 22 Jul 2019 23:53 )             38.9     07-23    137  |  101  |  38.0<H>  ----------------------------<  224<H>  4.8   |  20.0<L>  |  1.33<H>    Ca    8.8      23 Jul 2019 07:38  Mg     2.1     07-23    TPro  7.3  /  Alb  3.8  /  TBili  0.4  /  DBili  x   /  AST  18  /  ALT  12  /  AlkPhos  64  07-22    CARDIAC MARKERS ( 23 Jul 2019 07:38 )  x     / <0.01 ng/mL / 102 U/L / x     / x      CARDIAC MARKERS ( 22 Jul 2019 23:53 )  x     / <0.01 ng/mL / 100 U/L / x     / x        ;p-BNP=Serum Pro-Brain Natriuretic Peptide: 1058 pg/mL (07-22 @ 23:53)    PT/INR - ( 22 Jul 2019 23:53 )   PT: 11.6 sec;   INR: 1.01 ratio         PTT - ( 22 Jul 2019 23:53 )  PTT:27.8 sec    LIVER FUNCTIONS - ( 22 Jul 2019 23:53 )  Alb: 3.8 g/dL / Pro: 7.3 g/dL / ALK PHOS: 64 U/L / ALT: 12 U/L / AST: 18 U/L / GGT: x           INTERPRETATION OF TELEMETRY: Reviewed by me.   ECG: Reviewed by me.     RADIOLOGY & ADDITIONAL STUDIES/ECHO/CARDIAC CATH:   < from: TTE Echo Complete w/Doppler (06.03.19 @ 10:15) >    Summary:   1. Left ventricular ejection fraction, by visual estimation, is >75%.   2. Technically difficult study.   3. Hyperdynamic global left ventricular systolic function.   4. Mildly increased LV wall thickness.   5. Normal left ventricular internal cavity size.   6. Spectral Doppler shows pseudonormal pattern of left ventricular   myocardial filling (Grade II diastolic dysfunction).   7. There is no evidence of pericardial effusion.   8. Severe mitral annular calcification.   9. Estimated pulmonary artery systolic pressure is 35.7 mmHg assuming a   right atrial pressure of 5 mmHg, which is consistent with borderline   pulmonary hypertension.  10. Mitral valve mean gradient is 2.0 mmHg consistent with mild mitral   stenosis.  11. Peak transaortic gradient equals 40.4 mmHg, mean transaortic gradient   equals 21.5 mmHg, the calculated aortic valve area equals 1.73 cm² by the   continuity equation consistent with mild aortic stenosis.    MD Herbert Electronically signed on 6/3/2019 at 2:11:27 PM       < end of copied text > [place] [facility]                                                        Office: [address] [address]                                                       Telephone: [phone]. Fax:[phone]                                                              CARDIOLOGY CONSULTATION NOTE                                                                                             Consult requested by:  Dr. Chaney    Primary Cardiologist.: Dr. Benavides    Reason for Consultation: HF    History obtained by: Patient and medical record     obtained: No    Chief complaint:    Patient is a 84y old  Female who presents with a chief complaint of acute decompensated CHF (23 Jul 2019 04:23)      HPI:  84yoF hx CAD, HFpEF, HTN, COPD, T2DM, recently hospitalized at [facility] in 6/2019 for COPD exacerbation, now presenting with worsening exertional dyspnea associated with orthopnea and acute on chronic leg swelling for the past 1-2 days.  Pt reports some non-compliance with her lasix pills this week.  Pt also reporting severe leg pain since worsening of her leg swelling and per ED provider note and son Calvin Chávez via phone, pt had been prescribed vicodin PRN by house calls program. Pt denies any fevers, chills, cough, abdominal pain, nausea, vomiting, diarrhea. On admission, proBNP ~1000, troponin negative. (23 Jul 2019 04:23)    Appreciate above HPI  Pt seen and examined in ED, history of CAD with known  of RCA, s/p PCI with 1 ADELIA to LCx - 12/12/2018, s/p PCI with stent to mLAD and pLCx - 4/2017, HFpEF (ECHO6/19- EF >75%, LVH, mild AS, DD II), moderate AS, HTN, HLD, DM on insulin, CKD, COPD and STEVENSON. Pt c/o R leg pain, being treated out pt with vicodin. According to visiting RN notes, doppler and xrays negative last week. States pain got worse, causing difficulty breathing. non compliance with lasix this past week. Denies fever, chills, cough, phlegm production, chest pain, pressure, palpitations, irregular, fast heart beat, vomiting, melena, rectal bleed, hematuria, lightheadedness, dizziness, syncope, near syncope.      ALLERGIES: Allergies    doxycycline (Unknown)  iodine (Hives)  iodine containing compounds (Unknown)  shellfish (Anaphylaxis)  shellfish (Swelling; Short breath)    Intolerances          CURRENT MEDICATIONS:  MEDICATIONS  (STANDING):  aspirin enteric coated 81 milliGRAM(s) Oral daily  atorvastatin 80 milliGRAM(s) Oral at bedtime  dextrose 5%. 1000 milliLiter(s) (50 mL/Hr) IV Continuous <Continuous>  dextrose 50% Injectable 12.5 Gram(s) IV Push once  dextrose 50% Injectable 25 Gram(s) IV Push once  dextrose 50% Injectable 25 Gram(s) IV Push once  diltiazem    milliGRAM(s) Oral daily  furosemide   Injectable 40 milliGRAM(s) IV Push daily  gabapentin 100 milliGRAM(s) Oral three times a day  heparin  Injectable 5000 Unit(s) SubCutaneous every 8 hours  insulin glargine Injectable (LANTUS) 20 Unit(s) SubCutaneous two times a day  insulin lispro Injectable (HumaLOG) 10 Unit(s) SubCutaneous three times a day before meals  isosorbide   mononitrate ER Tablet (IMDUR) 30 milliGRAM(s) Oral daily  levothyroxine 125 MICROGram(s) Oral daily  lisinopril 5 milliGRAM(s) Oral daily  metoprolol succinate  milliGRAM(s) Oral daily  pantoprazole    Tablet 40 milliGRAM(s) Oral before breakfast  tiotropium 18 MICROgram(s) Capsule 1 Capsule(s) Inhalation daily      HOME MEDICATIONS:  Home Medications:  albuterol 0.63 mg/3 mL (0.021%) inhalation solution: 3 milliliter(s) inhaled every 6 hours, As Needed (23 Jul 2019 05:50)  aspirin 81 mg oral delayed release tablet: 1 tab(s) orally once a day (23 Jul 2019 05:50)  DilTIAZem Hydrochloride  mg/24 hours oral capsule, extended release: 1 cap(s) orally once a day (23 Jul 2019 05:50)  furosemide 40 mg oral tablet: 1 tab(s) orally once a day (23 Jul 2019 05:50)  gabapentin 100 mg oral capsule: 1 cap(s) orally 3 times a day (23 Jul 2019 05:50)  levothyroxine 125 mcg (0.125 mg) oral tablet: 1 tab(s) orally once a day (23 Jul 2019 05:50)  lisinopril 5 mg oral tablet: 1 tab(s) orally once a day (23 Jul 2019 05:50)  metoprolol succinate 50 mg oral tablet, extended release: 1 tab(s) orally once a day (23 Jul 2019 05:50)  omeprazole 40 mg oral delayed release capsule: 1 cap(s) orally once a day (23 Jul 2019 05:50)  Spiriva 18 mcg inhalation capsule: 1 cap(s) inhaled once a day (23 Jul 2019 05:50)    PAST MEDICAL HISTORY  NSTEMI (non-ST elevated myocardial infarction)  Hypothyroid  HLD (hyperlipidemia)  HTN (hypertension)  CAD (coronary artery disease)  Asthma  Diabetes  Gastroesophageal reflux disease without esophagitis  Pure hypercholesterolemia  Hypothyroidism, unspecified type  Essential hypertension  DM2 (diabetes mellitus, type 2)  Uncomplicated asthma, unspecified asthma severity      PAST SURGICAL HISTORY  Abnormal findings on cardiac catheterization  History of appendectomy  No significant past surgical history      FAMILY HISTORY:  Family history of stomach cancer  Family history of MI (myocardial infarction)  Family history of cancer in mother  Family history of heart disease      SOCIAL HISTORY:     CIGARETTES:   denies  ALCOHOL: denies  DRUG ABUSE: Denies       REVIEW OF SYSTEMS:  CONSTITUTIONAL:  as per HPI  HEENT:  Eyes:  No diplopia or blurred vision. ENT:  No earache, sore throat or runny nose.  CARDIOVASCULAR: +EDEMA  No pressure, squeezing, strangling, tightness, heaviness or aching about the chest, neck, axilla or epigastrium.  RESPIRATORY: + ORTHOPNEA, +DYSPNEA; No cough, PND.  GASTROINTESTINAL:  No nausea, vomiting or diarrhea.  GENITOURINARY:  No dysuria, frequency or urgency. No Blood in urine  MUSCULOSKELETAL:  no joint aches, no muscle pain  SKIN:  No change in skin, hair or nails.  NEUROLOGIC:  No paresthesias, fasciculations, seizures or weakness.  PSYCHIATRIC:  No disorder of thought or mood.  ENDOCRINE:  No heat or cold intolerance, polyuria or polydipsia.  HEMATOLOGICAL:  No easy bruising or bleeding.         Vital Signs Last 24 Hrs  T(C): 36.8 (07-23-19 @ 08:35), Max: 36.9 (07-23-19 @ 04:23)  T(F): 98.2 (07-23-19 @ 08:35), Max: 98.5 (07-23-19 @ 04:23)  HR: 99 (07-23-19 @ 08:35) (94 - 105)  BP: 148/64 (07-23-19 @ 08:35) (138/60 - 167/83)  RR: 18 (07-23-19 @ 08:35) (18 - 22)  SpO2: 98% (07-23-19 @ 08:35) (95% - 98%)        PHYSICAL EXAM:  Constitutional: Comfortable . No acute distress.   HEENT: Atraumatic and normocephalic , neck is supple . no JVD. Unremarkable  CNS: Alert and awake, Grossly nonfocal.  Lymph Nodes: Cervical : Not palpable.  Respiratory: Bilateral clear breath sounds., no wheezes, no rales, no rhonchi   Cardiovascular: Normal S1S2. No murmur/rubs or gallop.  Gastrointestinal: Soft non-tender and non distended . +Bowel sounds.   Extremities: B/L LE edema   Psychiatric: Calm . no agitation.  Skin: No skin rash.      Intake and output:   07-23 @ 07:01  -  07-23 @ 09:11  --------------------------------------------------------  IN: 0 mL / OUT: 1100 mL / NET: -1100 mL      LABS:                      12.2   13.79 )-----------( 280      ( 22 Jul 2019 23:53 )             38.9     07-23    137  |  101  |  38.0<H>  ----------------------------<  224<H>  4.8   |  20.0<L>  |  1.33<H>    Ca    8.8      23 Jul 2019 07:38  Mg     2.1     07-23    TPro  7.3  /  Alb  3.8  /  TBili  0.4  /  DBili  x   /  AST  18  /  ALT  12  /  AlkPhos  64  07-22    CARDIAC MARKERS ( 23 Jul 2019 07:38 )  x     / <0.01 ng/mL / 102 U/L / x     / x      CARDIAC MARKERS ( 22 Jul 2019 23:53 )  x     / <0.01 ng/mL / 100 U/L / x     / x        ;p-BNP=Serum Pro-Brain Natriuretic Peptide: 1058 pg/mL (07-22 @ 23:53)    PT/INR - ( 22 Jul 2019 23:53 )   PT: 11.6 sec;   INR: 1.01 ratio         PTT - ( 22 Jul 2019 23:53 )  PTT:27.8 sec    LIVER FUNCTIONS - ( 22 Jul 2019 23:53 )  Alb: 3.8 g/dL / Pro: 7.3 g/dL / ALK PHOS: 64 U/L / ALT: 12 U/L / AST: 18 U/L / GGT: x           INTERPRETATION OF TELEMETRY: Reviewed by me.   ECG: Reviewed by me.     RADIOLOGY & ADDITIONAL STUDIES/ECHO/CARDIAC CATH:   < from: TTE Echo Complete w/Doppler (06.03.19 @ 10:15) >    Summary:   1. Left ventricular ejection fraction, by visual estimation, is >75%.   2. Technically difficult study.   3. Hyperdynamic global left ventricular systolic function.   4. Mildly increased LV wall thickness.   5. Normal left ventricular internal cavity size.   6. Spectral Doppler shows pseudonormal pattern of left ventricular   myocardial filling (Grade II diastolic dysfunction).   7. There is no evidence of pericardial effusion.   8. Severe mitral annular calcification.   9. Estimated pulmonary artery systolic pressure is 35.7 mmHg assuming a   right atrial pressure of 5 mmHg, which is consistent with borderline   pulmonary hypertension.  10. Mitral valve mean gradient is 2.0 mmHg consistent with mild mitral   stenosis.  11. Peak transaortic gradient equals 40.4 mmHg, mean transaortic gradient   equals 21.5 mmHg, the calculated aortic valve area equals 1.73 cm² by the   continuity equation consistent with mild aortic stenosis.    MD Herbert Electronically signed on 6/3/2019 at 2:11:27 PM       < end of copied text >

## 2019-07-23 NOTE — CONSULT NOTE ADULT - ATTENDING COMMENTS
Patient seen and examined by me.  I have discussed my recommendation with the PA which are outlined above.    Dr Benavides will follow the patient from 7/24/2019

## 2019-07-23 NOTE — H&P ADULT - HISTORY OF PRESENT ILLNESS
84yoF hx CAD, HFpEF, HTN, COPD, T2DM, recently hospitalized at [facility] in 6/2019 for COPD exacerbation, now presenting with worsening exertional dyspnea associated with orthopnea and acute on chronic leg swelling for the past 1-2 days.  Pt reports some non-compliance with her lasix pills this week.  Pt also reporting severe leg pain since worsening of her leg swelling and per ED provider note and son Calvin Chávez via phone, pt had been prescribed vicodin PRN by house calls program. Pt denies any fevers, chills, cough, abdominal pain, nausea, vomiting, diarrhea. On admission, proBNP ~1000, troponin negative.

## 2019-07-23 NOTE — ED ADULT NURSE NOTE - GASTROINTESTINAL WDL
History of hip surgery  R hip fixation  History of permanent cardiac pacemaker placement  FanFound 3/16  No significant past surgical history Abdomen soft, nontender, nondistended, bowel sounds present in all 4 quadrants.

## 2019-07-23 NOTE — CONSULT NOTE ADULT - ASSESSMENT
history of CAD with known  of RCA, s/p PCI with 1 ADELIA to LCx - 12/12/2018, s/p PCI with stent to mLAD and pLCx - 4/2017, HFpEF, moderate AS, HTN, HLD, DM on insulin, CKD, COPD and STEVENSON. Pt c/o R leg pain, being treated out pt with vicodin. According to visiting RN notes, doppler and xrays negative last week. States pain got worse, causing difficulty breathing. non compliance with lasix this past week.    Acute on chronic HFpEF  - Monitor on telemetry.    - Strict i/o and daily standing weights (if possible).  - Keep K > 4, Mg > 2.   - LAsix 40 IV daily    - Monitor renal function with ongoing diuresis. History CKD  - pt on metoprolol 50mg PO daily- increase metoprolol to 25mg BID  - TTE 6/19- EF >75%, LVH, mild AS, DD II    CAD  -  of RCA, s/p PCI with 1 ADELIA to LCx - 12/12/2018, s/p PCI with stent to mLAD and pLCx - 4/2017  - continue ASA/Statin history of CAD with known  of RCA, s/p PCI with 1 ADELIA to LCx - 12/12/2018, s/p PCI with stent to mLAD and pLCx - 4/2017, HFpEF, moderate AS, HTN, HLD, DM on insulin, CKD, COPD and STEVENSON. Pt c/o R leg pain, being treated out pt with vicodin. According to visiting RN notes, doppler and xrays negative last week. States pain got worse, causing difficulty breathing. non compliance with lasix this past week.    Acute on chronic HFpEF  - Monitor on telemetry.    - Strict i/o and daily standing weights (if possible).  - Keep K > 4, Mg > 2.   - LAsix 40 IV daily    - Monitor renal function with ongoing diuresis. History CKD  - pt on metoprolol 50mg PO daily- increase metoprolol to 25mg BID  - TTE 6/19- EF >75%, LVH, mild AS, DD II    CAD  -  of RCA, s/p PCI with 1 ADELIA to LCx - 12/12/2018, s/p PCI with stent to mLAD and pLCx - 4/2017  - continue ASA/Statin    HTN  - d/c cardizem  -increase lisinopril to 10mg daily  - increase metoprolol 100 mg daily  - add thiazide diuretic history of CAD with known  of RCA, s/p PCI with 1 ADELIA to LCx - 12/12/2018, s/p PCI with stent to mLAD and pLCx - 4/2017, HFpEF, moderate AS, HTN, HLD, DM on insulin, CKD, COPD and STEVENSON. Pt c/o R leg pain, being treated out pt with vicodin. According to visiting RN notes, doppler and xrays negative last week. States pain got worse, causing difficulty breathing. non compliance with lasix this past week.    Acute on chronic HFpEF  - Monitor on telemetry.    - Strict i/o and daily standing weights (if possible).  - Keep K > 4, Mg > 2.   - LAsix 40 IV daily    - Monitor renal function with ongoing diuresis. History CKD  - pt on metoprolol 50mg PO daily- increase metoprolol to 25mg BID  - TTE 6/19- EF >75%, LVH, mild AS, DD II  - candidate for cardio-mems, possible placement this hospitalization     CAD  -  of RCA, s/p PCI with 1 ADELIA to LCx - 12/12/2018, s/p PCI with stent to mLAD and pLCx - 4/2017  - continue ASA/Statin    HTN  - d/c cardizem  - increase lisinopril to 10mg daily  - increase metoprolol 100 mg daily  - add HCTZ 25mg PO daily     Lower extremity pain   - consider pain management consultation   - lower extremity duplex if not already completed.

## 2019-07-23 NOTE — H&P ADULT - NSHPPHYSICALEXAM_GEN_ALL_CORE
Vital Signs Last 24 Hrs  T(C): 36.9 (23 Jul 2019 04:23), Max: 36.9 (23 Jul 2019 04:23)  T(F): 98.5 (23 Jul 2019 04:23), Max: 98.5 (23 Jul 2019 04:23)  HR: 105 (23 Jul 2019 04:23) (94 - 105)  BP: 141/75 (23 Jul 2019 04:23) (141/75 - 167/83)  BP(mean): --  RR: 20 (23 Jul 2019 04:23) (20 - 22)  SpO2: 95% (23 Jul 2019 04:23) (95% - 98%)    GENERAL:  Elderly female, appears uncomfortable due to pain   EYES:  Clear conjunctiva, extraocular movement intact  ENT: Moist mucous membranes  RESP:  Non-labored breathing pattern, scattered wheezing   CV: Regular rate and rhythm, no murmurs appreciated, bilateral leg edema  GI: Soft, non-tender, non-distended  NEURO: Awake, alert, conversant, UE strength 4/5, LE strength 2-3/5   PSYCH: Calm, cooperative  SKIN: No rash or lesions, warm and dry Vital Signs Last 24 Hrs  T(C): 36.9 (23 Jul 2019 04:23), Max: 36.9 (23 Jul 2019 04:23)  T(F): 98.5 (23 Jul 2019 04:23), Max: 98.5 (23 Jul 2019 04:23)  HR: 105 (23 Jul 2019 04:23) (94 - 105)  BP: 141/75 (23 Jul 2019 04:23) (141/75 - 167/83)  BP(mean): --  RR: 20 (23 Jul 2019 04:23) (20 - 22)  SpO2: 95% (23 Jul 2019 04:23) (95% - 98%)    GENERAL:  Elderly female, appears uncomfortable due to pain   EYES:  Clear conjunctiva, extraocular movement intact  ENT: Moist mucous membranes  RESP:  Non-labored breathing pattern, scattered wheezing   CV: Regular rate and rhythm, no murmurs appreciated, bilateral leg edema  GI: Soft, non-tender, non-distended  NEURO: Awake, alert, conversant, UE strength 4/5, LE strength 2-3/5   PSYCH: Calm, cooperative  SKIN: mild erythema around both ankles, warm and dry

## 2019-07-23 NOTE — H&P ADULT - NSHPLABSRESULTS_GEN_ALL_CORE
12.2   13.79 )-----------( 280      ( 22 Jul 2019 23:53 )             38.9       07-22    139  |  101  |  34.0<H>  ----------------------------<  211<H>  4.7   |  24.0  |  1.23    Ca    8.8      22 Jul 2019 23:53  Mg     2.3     07-22    TPro  7.3  /  Alb  3.8  /  TBili  0.4  /  DBili  x   /  AST  18  /  ALT  12  /  AlkPhos  64  07-22

## 2019-07-24 ENCOUNTER — APPOINTMENT (OUTPATIENT)
Dept: CARDIOLOGY | Facility: CLINIC | Age: 84
End: 2019-07-24

## 2019-07-24 LAB
ALBUMIN SERPL ELPH-MCNC: 3.6 G/DL — SIGNIFICANT CHANGE UP (ref 3.3–5.2)
ALP SERPL-CCNC: 60 U/L — SIGNIFICANT CHANGE UP (ref 40–120)
ALT FLD-CCNC: 12 U/L — SIGNIFICANT CHANGE UP
ANION GAP SERPL CALC-SCNC: 14 MMOL/L — SIGNIFICANT CHANGE UP (ref 5–17)
AST SERPL-CCNC: 20 U/L — SIGNIFICANT CHANGE UP
BILIRUB SERPL-MCNC: 0.4 MG/DL — SIGNIFICANT CHANGE UP (ref 0.4–2)
BUN SERPL-MCNC: 42 MG/DL — HIGH (ref 8–20)
CALCIUM SERPL-MCNC: 8.7 MG/DL — SIGNIFICANT CHANGE UP (ref 8.6–10.2)
CHLORIDE SERPL-SCNC: 101 MMOL/L — SIGNIFICANT CHANGE UP (ref 98–107)
CO2 SERPL-SCNC: 26 MMOL/L — SIGNIFICANT CHANGE UP (ref 22–29)
CREAT SERPL-MCNC: 1.73 MG/DL — HIGH (ref 0.5–1.3)
GLUCOSE BLDC GLUCOMTR-MCNC: 145 MG/DL — HIGH (ref 70–99)
GLUCOSE BLDC GLUCOMTR-MCNC: 157 MG/DL — HIGH (ref 70–99)
GLUCOSE BLDC GLUCOMTR-MCNC: 172 MG/DL — HIGH (ref 70–99)
GLUCOSE BLDC GLUCOMTR-MCNC: 175 MG/DL — HIGH (ref 70–99)
GLUCOSE BLDC GLUCOMTR-MCNC: 179 MG/DL — HIGH (ref 70–99)
GLUCOSE SERPL-MCNC: 156 MG/DL — HIGH (ref 70–115)
HCT VFR BLD CALC: 36.7 % — SIGNIFICANT CHANGE UP (ref 34.5–45)
HGB BLD-MCNC: 11.5 G/DL — SIGNIFICANT CHANGE UP (ref 11.5–15.5)
MAGNESIUM SERPL-MCNC: 2.2 MG/DL — SIGNIFICANT CHANGE UP (ref 1.6–2.6)
MCHC RBC-ENTMCNC: 28 PG — SIGNIFICANT CHANGE UP (ref 27–34)
MCHC RBC-ENTMCNC: 31.3 GM/DL — LOW (ref 32–36)
MCV RBC AUTO: 89.3 FL — SIGNIFICANT CHANGE UP (ref 80–100)
PLATELET # BLD AUTO: 277 K/UL — SIGNIFICANT CHANGE UP (ref 150–400)
POTASSIUM SERPL-MCNC: 4.9 MMOL/L — SIGNIFICANT CHANGE UP (ref 3.5–5.3)
POTASSIUM SERPL-SCNC: 4.9 MMOL/L — SIGNIFICANT CHANGE UP (ref 3.5–5.3)
PROT SERPL-MCNC: 6.9 G/DL — SIGNIFICANT CHANGE UP (ref 6.6–8.7)
RBC # BLD: 4.11 M/UL — SIGNIFICANT CHANGE UP (ref 3.8–5.2)
RBC # FLD: 15.4 % — HIGH (ref 10.3–14.5)
SODIUM SERPL-SCNC: 141 MMOL/L — SIGNIFICANT CHANGE UP (ref 135–145)
WBC # BLD: 15.59 K/UL — HIGH (ref 3.8–10.5)
WBC # FLD AUTO: 15.59 K/UL — HIGH (ref 3.8–10.5)

## 2019-07-24 PROCEDURE — 99223 1ST HOSP IP/OBS HIGH 75: CPT

## 2019-07-24 PROCEDURE — 99233 SBSQ HOSP IP/OBS HIGH 50: CPT | Mod: 25

## 2019-07-24 PROCEDURE — 93970 EXTREMITY STUDY: CPT | Mod: 26

## 2019-07-24 PROCEDURE — 99233 SBSQ HOSP IP/OBS HIGH 50: CPT

## 2019-07-24 RX ORDER — POLYETHYLENE GLYCOL 3350 17 G/17G
17 POWDER, FOR SOLUTION ORAL DAILY
Refills: 0 | Status: DISCONTINUED | OUTPATIENT
Start: 2019-07-24 | End: 2019-07-30

## 2019-07-24 RX ORDER — MULTIVIT WITH MIN/MFOLATE/K2 340-15/3 G
1 POWDER (GRAM) ORAL ONCE
Refills: 0 | Status: COMPLETED | OUTPATIENT
Start: 2019-07-24 | End: 2019-07-25

## 2019-07-24 RX ADMIN — Medication 10 UNIT(S): at 08:52

## 2019-07-24 RX ADMIN — Medication 10 UNIT(S): at 17:36

## 2019-07-24 RX ADMIN — TIOTROPIUM BROMIDE 1 CAPSULE(S): 18 CAPSULE ORAL; RESPIRATORY (INHALATION) at 10:00

## 2019-07-24 RX ADMIN — GABAPENTIN 100 MILLIGRAM(S): 400 CAPSULE ORAL at 22:45

## 2019-07-24 RX ADMIN — MORPHINE SULFATE 4 MILLIGRAM(S): 50 CAPSULE, EXTENDED RELEASE ORAL at 09:11

## 2019-07-24 RX ADMIN — MORPHINE SULFATE 2 MILLIGRAM(S): 50 CAPSULE, EXTENDED RELEASE ORAL at 22:58

## 2019-07-24 RX ADMIN — ATORVASTATIN CALCIUM 80 MILLIGRAM(S): 80 TABLET, FILM COATED ORAL at 22:45

## 2019-07-24 RX ADMIN — LISINOPRIL 10 MILLIGRAM(S): 2.5 TABLET ORAL at 05:05

## 2019-07-24 RX ADMIN — Medication 3 MILLILITER(S): at 09:59

## 2019-07-24 RX ADMIN — MORPHINE SULFATE 4 MILLIGRAM(S): 50 CAPSULE, EXTENDED RELEASE ORAL at 11:00

## 2019-07-24 RX ADMIN — INSULIN GLARGINE 20 UNIT(S): 100 INJECTION, SOLUTION SUBCUTANEOUS at 22:45

## 2019-07-24 RX ADMIN — Medication 125 MICROGRAM(S): at 05:05

## 2019-07-24 RX ADMIN — GABAPENTIN 100 MILLIGRAM(S): 400 CAPSULE ORAL at 05:07

## 2019-07-24 RX ADMIN — INSULIN GLARGINE 20 UNIT(S): 100 INJECTION, SOLUTION SUBCUTANEOUS at 08:51

## 2019-07-24 RX ADMIN — HEPARIN SODIUM 5000 UNIT(S): 5000 INJECTION INTRAVENOUS; SUBCUTANEOUS at 05:05

## 2019-07-24 RX ADMIN — POLYETHYLENE GLYCOL 3350 17 GRAM(S): 17 POWDER, FOR SOLUTION ORAL at 16:12

## 2019-07-24 RX ADMIN — GABAPENTIN 100 MILLIGRAM(S): 400 CAPSULE ORAL at 16:16

## 2019-07-24 RX ADMIN — MORPHINE SULFATE 2 MILLIGRAM(S): 50 CAPSULE, EXTENDED RELEASE ORAL at 22:43

## 2019-07-24 RX ADMIN — ISOSORBIDE MONONITRATE 30 MILLIGRAM(S): 60 TABLET, EXTENDED RELEASE ORAL at 13:57

## 2019-07-24 RX ADMIN — HEPARIN SODIUM 5000 UNIT(S): 5000 INJECTION INTRAVENOUS; SUBCUTANEOUS at 16:16

## 2019-07-24 RX ADMIN — PANTOPRAZOLE SODIUM 40 MILLIGRAM(S): 20 TABLET, DELAYED RELEASE ORAL at 05:05

## 2019-07-24 RX ADMIN — Medication 10 UNIT(S): at 13:56

## 2019-07-24 RX ADMIN — Medication 50 MILLIGRAM(S): at 05:05

## 2019-07-24 RX ADMIN — Medication 40 MILLIGRAM(S): at 05:05

## 2019-07-24 RX ADMIN — Medication 81 MILLIGRAM(S): at 08:55

## 2019-07-24 RX ADMIN — HEPARIN SODIUM 5000 UNIT(S): 5000 INJECTION INTRAVENOUS; SUBCUTANEOUS at 22:44

## 2019-07-24 NOTE — PROGRESS NOTE ADULT - SUBJECTIVE AND OBJECTIVE BOX
FLOYD PAEZ    [id number]    84y      Female    INTERVAL HPI/OVERNIGHT EVENTS:    REVIEW OF SYSTEMS:    CONSTITUTIONAL: No fever, weight loss, or fatigue  RESPIRATORY: No cough, wheezing, hemoptysis; No shortness of breath  CARDIOVASCULAR: No chest pain, palpitations  GASTROINTESTINAL: No abdominal or epigastric pain. No nausea, vomiting  NEUROLOGICAL: No headaches, memory loss, loss of strength.  MISCELLANEOUS:      Vital Signs Last 24 Hrs  T(C): 36.5 (24 Jul 2019 08:07), Max: 37 (23 Jul 2019 21:07)  T(F): 97.7 (24 Jul 2019 08:07), Max: 98.6 (23 Jul 2019 21:07)  HR: 58 (24 Jul 2019 10:09) (58 - 71)  BP: 114/69 (24 Jul 2019 08:07) (94/57 - 138/68)  BP(mean): --  RR: 18 (24 Jul 2019 08:07) (17 - 18)  SpO2: 100% (24 Jul 2019 10:09) (94% - 100%)    PHYSICAL EXAM:    GENERAL: NAD, well-groomed  HEENT: PERRL, +EOMI  NECK: soft, Supple, No JVD,   CHEST/LUNG: Clear to auscultation bilaterally; No wheezing  HEART: S1S2+, Regular rate and rhythm; No murmurs, rubs, or gallops  ABDOMEN: Soft, Nontender, Nondistended; Bowel sounds present  EXTREMITIES:  2+ Peripheral Pulses, No clubbing, cyanosis, or edema  SKIN: No rashes or lesions  NEURO: AAOX3, no focal deficits, no motor r sensory loss  PSYCH: normal mood      LABS:                        11.5   15.59 )-----------( 277      ( 24 Jul 2019 06:21 )             36.7     07-24    141  |  101  |  42.0<H>  ----------------------------<  156<H>  4.9   |  26.0  |  1.73<H>    Ca    8.7      24 Jul 2019 06:11  Mg     2.2     07-24    TPro  6.9  /  Alb  3.6  /  TBili  0.4  /  DBili  x   /  AST  20  /  ALT  12  /  AlkPhos  60  07-24    PT/INR - ( 22 Jul 2019 23:53 )   PT: 11.6 sec;   INR: 1.01 ratio         PTT - ( 22 Jul 2019 23:53 )  PTT:27.8 sec        MEDICATIONS  (STANDING):  aspirin enteric coated 81 milliGRAM(s) Oral daily  atorvastatin 80 milliGRAM(s) Oral at bedtime  dextrose 5%. 1000 milliLiter(s) (50 mL/Hr) IV Continuous <Continuous>  dextrose 50% Injectable 12.5 Gram(s) IV Push once  dextrose 50% Injectable 25 Gram(s) IV Push once  dextrose 50% Injectable 25 Gram(s) IV Push once  furosemide   Injectable 40 milliGRAM(s) IV Push daily  gabapentin 100 milliGRAM(s) Oral three times a day  heparin  Injectable 5000 Unit(s) SubCutaneous every 8 hours  insulin glargine Injectable (LANTUS) 20 Unit(s) SubCutaneous two times a day  insulin lispro Injectable (HumaLOG) 10 Unit(s) SubCutaneous three times a day before meals  isosorbide   mononitrate ER Tablet (IMDUR) 30 milliGRAM(s) Oral daily  levothyroxine 125 MICROGram(s) Oral daily  metoprolol tartrate 50 milliGRAM(s) Oral two times a day  pantoprazole    Tablet 40 milliGRAM(s) Oral before breakfast  tiotropium 18 MICROgram(s) Capsule 1 Capsule(s) Inhalation daily    MEDICATIONS  (PRN):  acetaminophen   Tablet .. 650 milliGRAM(s) Oral every 6 hours PRN Temp greater or equal to 38C (100.4F), Mild Pain (1 - 3)  ALBUTerol/ipratropium for Nebulization 3 milliLiter(s) Nebulizer every 6 hours PRN Shortness of Breath and/or Wheezing  dextrose 40% Gel 15 Gram(s) Oral once PRN Blood Glucose LESS THAN 70 milliGRAM(s)/deciliter  glucagon  Injectable 1 milliGRAM(s) IntraMuscular once PRN Glucose LESS THAN 70 milligrams/deciliter  hydrALAZINE Injectable 10 milliGRAM(s) IV Push every 6 hours PRN for sbp above 160 mmhg  morphine  - Injectable 2 milliGRAM(s) IV Push every 4 hours PRN Moderate Pain (4 - 6)  morphine  - Injectable 4 milliGRAM(s) IV Push every 6 hours PRN Severe Pain (7 - 10)      RADIOLOGY & ADDITIONAL TESTS: FLOYD PAEZ    [id number]    84y      Female    INTERVAL HPI/OVERNIGHT EVENTS:    patient being seen for chf exacerbation and chronic pain. Patient sen at bedside and states feeling much better. patirnt complains of constipation    REVIEW OF SYSTEMS:    CONSTITUTIONAL: No fever, weight loss, or fatigue  RESPIRATORY: No cough, wheezing, hemoptysis; No shortness of breath  CARDIOVASCULAR: No chest pain, palpitations  GASTROINTESTINAL: constipation   NEUROLOGICAL: No headaches, memory loss, loss of strength.  MISCELLANEOUS:      Vital Signs Last 24 Hrs  T(C): 36.5 (24 Jul 2019 08:07), Max: 37 (23 Jul 2019 21:07)  T(F): 97.7 (24 Jul 2019 08:07), Max: 98.6 (23 Jul 2019 21:07)  HR: 58 (24 Jul 2019 10:09) (58 - 71)  BP: 114/69 (24 Jul 2019 08:07) (94/57 - 138/68)  BP(mean): --  RR: 18 (24 Jul 2019 08:07) (17 - 18)  SpO2: 100% (24 Jul 2019 10:09) (94% - 100%)    PHYSICAL EXAM:    gen: nad, obese, pleasant   heent: perrl  cvs: s1s2+  lungs: diminished  abd: obese, distended, nt  ext: tender le edema  neuro: aaox 3     LABS:                        11.5   15.59 )-----------( 277      ( 24 Jul 2019 06:21 )             36.7     07-24    141  |  101  |  42.0<H>  ----------------------------<  156<H>  4.9   |  26.0  |  1.73<H>    Ca    8.7      24 Jul 2019 06:11  Mg     2.2     07-24    TPro  6.9  /  Alb  3.6  /  TBili  0.4  /  DBili  x   /  AST  20  /  ALT  12  /  AlkPhos  60  07-24    PT/INR - ( 22 Jul 2019 23:53 )   PT: 11.6 sec;   INR: 1.01 ratio         PTT - ( 22 Jul 2019 23:53 )  PTT:27.8 sec        MEDICATIONS  (STANDING):  aspirin enteric coated 81 milliGRAM(s) Oral daily  atorvastatin 80 milliGRAM(s) Oral at bedtime  dextrose 5%. 1000 milliLiter(s) (50 mL/Hr) IV Continuous <Continuous>  dextrose 50% Injectable 12.5 Gram(s) IV Push once  dextrose 50% Injectable 25 Gram(s) IV Push once  dextrose 50% Injectable 25 Gram(s) IV Push once  furosemide   Injectable 40 milliGRAM(s) IV Push daily  gabapentin 100 milliGRAM(s) Oral three times a day  heparin  Injectable 5000 Unit(s) SubCutaneous every 8 hours  insulin glargine Injectable (LANTUS) 20 Unit(s) SubCutaneous two times a day  insulin lispro Injectable (HumaLOG) 10 Unit(s) SubCutaneous three times a day before meals  isosorbide   mononitrate ER Tablet (IMDUR) 30 milliGRAM(s) Oral daily  levothyroxine 125 MICROGram(s) Oral daily  metoprolol tartrate 50 milliGRAM(s) Oral two times a day  pantoprazole    Tablet 40 milliGRAM(s) Oral before breakfast  tiotropium 18 MICROgram(s) Capsule 1 Capsule(s) Inhalation daily    MEDICATIONS  (PRN):  acetaminophen   Tablet .. 650 milliGRAM(s) Oral every 6 hours PRN Temp greater or equal to 38C (100.4F), Mild Pain (1 - 3)  ALBUTerol/ipratropium for Nebulization 3 milliLiter(s) Nebulizer every 6 hours PRN Shortness of Breath and/or Wheezing  dextrose 40% Gel 15 Gram(s) Oral once PRN Blood Glucose LESS THAN 70 milliGRAM(s)/deciliter  glucagon  Injectable 1 milliGRAM(s) IntraMuscular once PRN Glucose LESS THAN 70 milligrams/deciliter  hydrALAZINE Injectable 10 milliGRAM(s) IV Push every 6 hours PRN for sbp above 160 mmhg  morphine  - Injectable 2 milliGRAM(s) IV Push every 4 hours PRN Moderate Pain (4 - 6)  morphine  - Injectable 4 milliGRAM(s) IV Push every 6 hours PRN Severe Pain (7 - 10)      RADIOLOGY & ADDITIONAL TESTS:

## 2019-07-24 NOTE — PROGRESS NOTE ADULT - SUBJECTIVE AND OBJECTIVE BOX
[facility]                                                        Office: [address] [address]                                                       Telephone: [phone]. Fax: [phone]      CC: Acute on chronic CHF exacerbation     INTERVAL HISTORY: 84yoF hx CAD, HFpEF, mild aortic stenosis (BILL=1.7cm2), HTN, COPD, T2DM, recently hospitalized at [facility] in 6/2019 for COPD exacerbation, now presenting with worsening exertional dyspnea associated with orthopnea and acute on chronic leg swelling for the past 1-2 days.  Pt reports some non-compliance with her lasix pills this week.  Pt also reporting severe leg pain since worsening of her leg swelling and per ED provider note and son Calvin Chávez via phone, pt had been prescribed vicodin PRN by house calls program. Pt denies any fevers, chills, cough, abdominal pain, nausea, vomiting, diarrhea. On admission, proBNP ~1000, troponin negative. (23 Jul 2019 04:23).  Pt seen and examined in ED, history of CAD with known  of RCA, s/p PCI with 1 ADELIA to LCx - 12/12/2018, s/p PCI with stent to mLAD and pLCx - 4/2017, HFpEF (ECHO6/19- EF >75%, LVH, mild AS, DD II), moderate AS, HTN, HLD, DM on insulin, CKD, COPD and STEVENSON. Pt c/o R leg pain, being treated out pt with vicodin. According to visiting RN notes, doppler and xrays negative last week. States pain got worse, causing difficulty breathing. non compliance with lasix this past week. Denies fever, chills, cough, phlegm production, chest pain, pressure, palpitations, irregular, fast heart beat, vomiting, melena, rectal bleed, hematuria, lightheadedness, dizziness, syncope, near syncope.    This morning significant improvement in her status, less leg pain and breathing is back to baseline.  Pt denies any fevers, chills, cough, abdominal pain, nausea, vomiting, diarrhea. On admission, proBNP ~1000, troponin negative. (23 Jul 2019 04:23)  candidate for cardio-mems, possible placement this hospitalization       MEDICATIONS  (STANDING):  aspirin enteric coated 81 milliGRAM(s) Oral daily  atorvastatin 80 milliGRAM(s) Oral at bedtime  dextrose 5%. 1000 milliLiter(s) (50 mL/Hr) IV Continuous <Continuous>  dextrose 50% Injectable 12.5 Gram(s) IV Push once  dextrose 50% Injectable 25 Gram(s) IV Push once  dextrose 50% Injectable 25 Gram(s) IV Push once  furosemide   Injectable 40 milliGRAM(s) IV Push daily  gabapentin 100 milliGRAM(s) Oral three times a day  heparin  Injectable 5000 Unit(s) SubCutaneous every 8 hours  insulin glargine Injectable (LANTUS) 20 Unit(s) SubCutaneous two times a day  insulin lispro Injectable (HumaLOG) 10 Unit(s) SubCutaneous three times a day before meals  isosorbide   mononitrate ER Tablet (IMDUR) 30 milliGRAM(s) Oral daily  levothyroxine 125 MICROGram(s) Oral daily  magnesium citrate Oral Solution 1 Bottle Oral once  metoprolol tartrate 50 milliGRAM(s) Oral two times a day  pantoprazole    Tablet 40 milliGRAM(s) Oral before breakfast  polyethylene glycol 3350 17 Gram(s) Oral daily  tiotropium 18 MICROgram(s) Capsule 1 Capsule(s) Inhalation daily    ROS: All others negative     PHYSICAL EXAM:  T(C): 36.7 (07-24-19 @ 16:43), Max: 37 (07-23-19 @ 21:07)  HR: 71 (07-24-19 @ 17:39) (58 - 71)  BP: 98/55 (07-24-19 @ 17:39) (98/55 - 138/68)  RR: 18 (07-24-19 @ 16:43) (17 - 18)  SpO2: 98% (07-24-19 @ 16:43) (94% - 100%)  Wt(kg): --  I&O's Summary    23 Jul 2019 07:01  -  24 Jul 2019 07:00  --------------------------------------------------------  IN: 240 mL / OUT: 1650 mL / NET: -1410 mL    24 Jul 2019 07:01  -  24 Jul 2019 18:11  --------------------------------------------------------  IN: 0 mL / OUT: 450 mL / NET: -450 mL        Appearance: Normal	  HEENT:   Normal oral mucosa, PERRL, EOMI	  Lymphatic: No lymphadenopathy  Cardiovascular: Normal S1 S2, No JVD, No murmurs, distant sounds, + 1 BE edema  Respiratory: Lungs clear to auscultation	  Psychiatry: A & O x 3, Mood & affect appropriate  Gastrointestinal:  [protuberant, soft, Non-tender, + BS	  Skin: No rashes, No ecchymoses, No cyanosis  Neurologic: Non-focal  Extremities: Normal range of motion, No clubbing, cyanosis +1 edema  Vascular: Peripheral pulses palpable 2+ bilaterally    TELEMETRY: 	      LABS:	 	                        11.5   15.59 )-----------( 277      ( 24 Jul 2019 06:21 )             36.7     07-24    141  |  101  |  42.0<H>  ----------------------------<  156<H>  4.9   |  26.0  |  1.73<H>    Ca    8.7      24 Jul 2019 06:11  Mg     2.2     07-24    TPro  6.9  /  Alb  3.6  /  TBili  0.4  /  DBili  x   /  AST  20  /  ALT  12  /  AlkPhos  60  07-24

## 2019-07-24 NOTE — PROGRESS NOTE ADULT - ASSESSMENT
84yoF hx CAD, HFpEF, HTN, COPD, T2DM, recently hospitalized at [facility] in 6/2019 for COPD exacerbation, now presenting with worsening exertional dyspnea associated with orthopnea and acute on chronic leg swelling for the past 1-2 days, being admitted for acute decompensated heart failure    #Acute decompensated CHF- improving   -> cardio following   --> currently being evaluated for cardio MEMS    #Leg pain and swelling  - consulted pain management again   --> le duplex pending     #CAD- ASA and statin.     #COPD- Spiriva. DuoNebs PRN.     #T2DM- c,w current plan     #HTN- Metoprolol, imdur and ace-I  --> cardizem discontinued     #Hypothyroidism- Synthroid.     #constipaiton - added miralax and mag citrate    dnr dni. 84yoF hx CAD, HFpEF, HTN, COPD, T2DM, recently hospitalized at [facility] in 6/2019 for COPD exacerbation, now presenting with worsening exertional dyspnea associated with orthopnea and acute on chronic leg swelling for the past 1-2 days, being admitted for acute decompensated heart failure    #Acute decompensated CHF- improving   -> cardio following   --> currently being evaluated for cardio MEMS    #Leg pain and swelling  - consulted pain management again   --> le duplex pending     #CAD- ASA and statin.     #COPD- Spiriva. DuoNebs PRN.     #T2DM- c,w current plan     #HTN- Metoprolol, imdur   --> cardizem discontinued     #Hypothyroidism- Synthroid.     #constipaiton - added miralax and mag citrate    dnr dni.

## 2019-07-24 NOTE — PROGRESS NOTE ADULT - ASSESSMENT
85 yo woman admitted with acute decompensated heart failure (HFpEF).  Seems that she was non compliant with her meds  Restated her meds  Will consider CardioMems for monitoring right heart pressures

## 2019-07-24 NOTE — CONSULT NOTE ADULT - SUBJECTIVE AND OBJECTIVE BOX
[facility] DIVISION OF KIDNEY DISEASES AND HYPERTENSION -- INITIAL CONSULT NOTE  --------------------------------------------------------------------------------  HPI:  84F with CAD, CHF, HTN, COPD, T2DM, recent hospitalization for COPD; now with RABAGO and edema; seen/examined this AM;   Med HPI:  84yoF hx CAD, HFpEF, HTN, COPD, T2DM, recently hospitalized at [facility] in 6/2019 for COPD exacerbation, now presenting with worsening exertional dyspnea associated with orthopnea and acute on chronic leg swelling for the past 1-2 days.  Pt reports some non-compliance with her lasix pills this week.  Pt also reporting severe leg pain since worsening of her leg swelling and per ED provider note and son Calvin Chávez via phone, pt had been prescribed vicodin PRN by house calls program. Pt denies any fevers, chills, cough, abdominal pain, nausea, vomiting, diarrhea. On admission, proBNP ~1000, troponin negative.     PAST HISTORY  --------------------------------------------------------------------------------  PAST MEDICAL & SURGICAL HISTORY:  NSTEMI (non-ST elevated myocardial infarction)  Hypothyroid  HLD (hyperlipidemia)  HTN (hypertension)  CAD (coronary artery disease)  Asthma  Diabetes  Gastroesophageal reflux disease without esophagitis  Pure hypercholesterolemia  Hypothyroidism, unspecified type  Essential hypertension  DM2 (diabetes mellitus, type 2)  Uncomplicated asthma, unspecified asthma severity  Abnormal findings on cardiac catheterization: Cardiac Cath  History of appendectomy  History of appendectomy    FAMILY HISTORY:  Family history of stomach cancer  Family history of MI (myocardial infarction)  Family history of cancer in mother  Family history of heart disease    PAST SOCIAL HISTORY:    ALLERGIES & MEDICATIONS  --------------------------------------------------------------------------------  Allergies    doxycycline (Unknown)  iodine (Hives)  iodine containing compounds (Unknown)  shellfish (Anaphylaxis)  shellfish (Swelling; Short breath)    Intolerances      Standing Inpatient Medications  aspirin enteric coated 81 milliGRAM(s) Oral daily  atorvastatin 80 milliGRAM(s) Oral at bedtime  dextrose 5%. 1000 milliLiter(s) IV Continuous <Continuous>  dextrose 50% Injectable 12.5 Gram(s) IV Push once  dextrose 50% Injectable 25 Gram(s) IV Push once  dextrose 50% Injectable 25 Gram(s) IV Push once  furosemide   Injectable 40 milliGRAM(s) IV Push daily  gabapentin 100 milliGRAM(s) Oral three times a day  heparin  Injectable 5000 Unit(s) SubCutaneous every 8 hours  insulin glargine Injectable (LANTUS) 20 Unit(s) SubCutaneous two times a day  insulin lispro Injectable (HumaLOG) 10 Unit(s) SubCutaneous three times a day before meals  isosorbide   mononitrate ER Tablet (IMDUR) 30 milliGRAM(s) Oral daily  levothyroxine 125 MICROGram(s) Oral daily  magnesium citrate Oral Solution 1 Bottle Oral once  metoprolol tartrate 50 milliGRAM(s) Oral two times a day  pantoprazole    Tablet 40 milliGRAM(s) Oral before breakfast  polyethylene glycol 3350 17 Gram(s) Oral daily  tiotropium 18 MICROgram(s) Capsule 1 Capsule(s) Inhalation daily    PRN Inpatient Medications  acetaminophen   Tablet .. 650 milliGRAM(s) Oral every 6 hours PRN  ALBUTerol/ipratropium for Nebulization 3 milliLiter(s) Nebulizer every 6 hours PRN  dextrose 40% Gel 15 Gram(s) Oral once PRN  glucagon  Injectable 1 milliGRAM(s) IntraMuscular once PRN  hydrALAZINE Injectable 10 milliGRAM(s) IV Push every 6 hours PRN  morphine  - Injectable 2 milliGRAM(s) IV Push every 4 hours PRN  morphine  - Injectable 4 milliGRAM(s) IV Push every 6 hours PRN      REVIEW OF SYSTEMS  --------------------------------------------------------------------------------  Gen: No weight changes, fatigue, fevers/chills, weakness  Skin: No rashes  Head/Eyes/Ears/Mouth: No headache; Normal hearing; Normal vision w/o blurriness; No sinus pain/discomfort, sore throat  Respiratory: ARBAGO  CV: No chest pain, PND, orthopnea  GI: No abdominal pain, diarrhea, constipation, nausea, vomiting, melena, hematochezia  : No increased frequency, dysuria, hematuria, nocturia  MSK: No joint pain/swelling; no back pain; + edema  Neuro: No dizziness/lightheadedness, weakness, seizures, numbness, tingling  Heme: No easy bruising or bleeding  Endo: No heat/cold intolerance  Psych: No significant nervousness, anxiety, stress, depression    All other systems were reviewed and are negative, except as noted.    VITALS/PHYSICAL EXAM  --------------------------------------------------------------------------------  T(C): 36.5 (07-24-19 @ 08:07), Max: 37 (07-23-19 @ 21:07)  HR: 58 (07-24-19 @ 10:09) (58 - 67)  BP: 114/69 (07-24-19 @ 08:07) (94/57 - 138/68)  RR: 18 (07-24-19 @ 08:07) (17 - 18)  SpO2: 100% (07-24-19 @ 10:09) (94% - 100%)  Wt(kg): --        07-23-19 @ 07:01  -  07-24-19 @ 07:00  --------------------------------------------------------  IN: 240 mL / OUT: 1650 mL / NET: -1410 mL    07-24-19 @ 07:01  -  07-24-19 @ 13:22  --------------------------------------------------------  IN: 0 mL / OUT: 200 mL / NET: -200 mL      Physical Exam:  	GENERAL:  Elderly female, appears uncomfortable due to pain   	EYES:  Clear conjunctiva, extraocular movement intact  	ENT: Moist mucous membranes  	RESP:  Non-labored breathing pattern, scattered wheezing   	CV: Regular rate and rhythm, no murmurs appreciated, bilateral leg edema  	GI: Soft, non-tender, non-distended  	NEURO: Awake, alert, conversant, UE strength 4/5, LE strength 2-3/5   	PSYCH: Calm, cooperative  SKIN: mild erythema around both ankles, warm and dry.    LABS/STUDIES  --------------------------------------------------------------------------------              11.5   15.59 >-----------<  277      [07-24-19 @ 06:21]              36.7     141  |  101  |  42.0  ----------------------------<  156      [07-24-19 @ 06:11]  4.9   |  26.0  |  1.73        Ca     8.7     [07-24-19 @ 06:11]      Mg     2.2     [07-24-19 @ 06:11]    TPro  6.9  /  Alb  3.6  /  TBili  0.4  /  DBili  x   /  AST  20  /  ALT  12  /  AlkPhos  60  [07-24-19 @ 06:11]    PT/INR: PT 11.6 , INR 1.01       [07-22-19 @ 23:53]  PTT: 27.8       [07-22-19 @ 23:53]    Troponin <0.01      [07-23-19 @ 07:38]        [07-23-19 @ 07:38]    Creatinine Trend:  SCr 1.73 [07-24 @ 06:11]  SCr 1.33 [07-23 @ 07:38]  SCr 1.23 [07-22 @ 23:53]    Urinalysis - [03-31-19 @ 04:28]      Color Yellow / Appearance Clear / SG 1.020 / pH 5.0      Gluc 1000 / Ketone Negative  / Bili Negative / Urobili Negative       Blood Negative / Protein 15 / Leuk Est Negative / Nitrite Negative      RBC 0-2 / WBC 0-2 / Hyaline  / Gran  / Sq Epi  / Non Sq Epi Few / Bacteria Negative      HbA1c 10.1      [06-01-19 @ 04:59]  TSH 0.26      [06-01-19 @ 04:59]  Lipid: chol 180, , HDL 36, LDL 98      [12-12-18 @ 04:17]

## 2019-07-24 NOTE — CONSULT NOTE ADULT - ASSESSMENT
1) TRENTON on CKD  2) HTN  3) Edema  4) CHF    UA with micro  Shira, UCl, UOsm  Renal sonogram  Place on lasix 40mg IVP qd for now;   Needs decongestion  d/w Dr Garcia

## 2019-07-24 NOTE — PROGRESS NOTE ADULT - ATTENDING COMMENTS
83 yo woman with multiple comorbities, including HFpEF and mild aortic stenosis (1.7cm2). Multiple hospitalizations for acute decompensated heart failure with preserved EF (HFpEF). Not clear if patient had been non compliant with her meds.   Treated medically for now  Will consider CardioMems for right heart pressure monitoring  There is a slight elevation in her WBC this AM  US duplex revealed no evidence of deep venous thrombosis in either lower extremity.

## 2019-07-25 DIAGNOSIS — M79.604 PAIN IN RIGHT LEG: ICD-10-CM

## 2019-07-25 DIAGNOSIS — I50.9 HEART FAILURE, UNSPECIFIED: ICD-10-CM

## 2019-07-25 DIAGNOSIS — N17.9 ACUTE KIDNEY FAILURE, UNSPECIFIED: ICD-10-CM

## 2019-07-25 DIAGNOSIS — I25.10 ATHEROSCLEROTIC HEART DISEASE OF NATIVE CORONARY ARTERY WITHOUT ANGINA PECTORIS: ICD-10-CM

## 2019-07-25 LAB
ANION GAP SERPL CALC-SCNC: 13 MMOL/L — SIGNIFICANT CHANGE UP (ref 5–17)
APPEARANCE UR: CLEAR — SIGNIFICANT CHANGE UP
BACTERIA # UR AUTO: ABNORMAL
BILIRUB UR-MCNC: NEGATIVE — SIGNIFICANT CHANGE UP
BUN SERPL-MCNC: 52 MG/DL — HIGH (ref 8–20)
CALCIUM SERPL-MCNC: 8.5 MG/DL — LOW (ref 8.6–10.2)
CHLORIDE SERPL-SCNC: 101 MMOL/L — SIGNIFICANT CHANGE UP (ref 98–107)
CHLORIDE UR-SCNC: 36 MMOL/L — SIGNIFICANT CHANGE UP
CO2 SERPL-SCNC: 25 MMOL/L — SIGNIFICANT CHANGE UP (ref 22–29)
COLOR SPEC: YELLOW — SIGNIFICANT CHANGE UP
CREAT SERPL-MCNC: 2.16 MG/DL — HIGH (ref 0.5–1.3)
DIFF PNL FLD: NEGATIVE — SIGNIFICANT CHANGE UP
EPI CELLS # UR: ABNORMAL
GLUCOSE BLDC GLUCOMTR-MCNC: 137 MG/DL — HIGH (ref 70–99)
GLUCOSE BLDC GLUCOMTR-MCNC: 139 MG/DL — HIGH (ref 70–99)
GLUCOSE BLDC GLUCOMTR-MCNC: 151 MG/DL — HIGH (ref 70–99)
GLUCOSE BLDC GLUCOMTR-MCNC: 72 MG/DL — SIGNIFICANT CHANGE UP (ref 70–99)
GLUCOSE BLDC GLUCOMTR-MCNC: 80 MG/DL — SIGNIFICANT CHANGE UP (ref 70–99)
GLUCOSE BLDC GLUCOMTR-MCNC: 93 MG/DL — SIGNIFICANT CHANGE UP (ref 70–99)
GLUCOSE SERPL-MCNC: 120 MG/DL — HIGH (ref 70–115)
GLUCOSE UR QL: NEGATIVE MG/DL — SIGNIFICANT CHANGE UP
HCT VFR BLD CALC: 35.7 % — SIGNIFICANT CHANGE UP (ref 34.5–45)
HGB BLD-MCNC: 11 G/DL — LOW (ref 11.5–15.5)
KETONES UR-MCNC: NEGATIVE — SIGNIFICANT CHANGE UP
LEUKOCYTE ESTERASE UR-ACNC: ABNORMAL
MAGNESIUM SERPL-MCNC: 2.2 MG/DL — SIGNIFICANT CHANGE UP (ref 1.8–2.6)
MCHC RBC-ENTMCNC: 27.5 PG — SIGNIFICANT CHANGE UP (ref 27–34)
MCHC RBC-ENTMCNC: 30.8 GM/DL — LOW (ref 32–36)
MCV RBC AUTO: 89.3 FL — SIGNIFICANT CHANGE UP (ref 80–100)
NITRITE UR-MCNC: NEGATIVE — SIGNIFICANT CHANGE UP
OSMOLALITY UR: 466 MOSM/KG — SIGNIFICANT CHANGE UP (ref 300–1000)
PH UR: 5 — SIGNIFICANT CHANGE UP (ref 5–8)
PLATELET # BLD AUTO: 246 K/UL — SIGNIFICANT CHANGE UP (ref 150–400)
POTASSIUM SERPL-MCNC: 5.1 MMOL/L — SIGNIFICANT CHANGE UP (ref 3.5–5.3)
POTASSIUM SERPL-SCNC: 5.1 MMOL/L — SIGNIFICANT CHANGE UP (ref 3.5–5.3)
PROT UR-MCNC: NEGATIVE MG/DL — SIGNIFICANT CHANGE UP
RBC # BLD: 4 M/UL — SIGNIFICANT CHANGE UP (ref 3.8–5.2)
RBC # FLD: 15.4 % — HIGH (ref 10.3–14.5)
SODIUM SERPL-SCNC: 139 MMOL/L — SIGNIFICANT CHANGE UP (ref 135–145)
SODIUM UR-SCNC: 63 MMOL/L — SIGNIFICANT CHANGE UP
SP GR SPEC: 1.02 — SIGNIFICANT CHANGE UP (ref 1.01–1.02)
UROBILINOGEN FLD QL: NEGATIVE MG/DL — SIGNIFICANT CHANGE UP
WBC # BLD: 13.78 K/UL — HIGH (ref 3.8–10.5)
WBC # FLD AUTO: 13.78 K/UL — HIGH (ref 3.8–10.5)
WBC UR QL: SIGNIFICANT CHANGE UP

## 2019-07-25 PROCEDURE — 99232 SBSQ HOSP IP/OBS MODERATE 35: CPT

## 2019-07-25 PROCEDURE — 73590 X-RAY EXAM OF LOWER LEG: CPT | Mod: 26,RT

## 2019-07-25 PROCEDURE — 99233 SBSQ HOSP IP/OBS HIGH 50: CPT

## 2019-07-25 RX ORDER — TRAMADOL HYDROCHLORIDE 50 MG/1
50 TABLET ORAL EVERY 6 HOURS
Refills: 0 | Status: DISCONTINUED | OUTPATIENT
Start: 2019-07-25 | End: 2019-07-26

## 2019-07-25 RX ORDER — LACTULOSE 10 G/15ML
10 SOLUTION ORAL ONCE
Refills: 0 | Status: COMPLETED | OUTPATIENT
Start: 2019-07-25 | End: 2019-07-25

## 2019-07-25 RX ORDER — ACETAMINOPHEN 500 MG
650 TABLET ORAL EVERY 6 HOURS
Refills: 0 | Status: DISCONTINUED | OUTPATIENT
Start: 2019-07-25 | End: 2019-07-26

## 2019-07-25 RX ORDER — OXYCODONE HYDROCHLORIDE 5 MG/1
5 TABLET ORAL EVERY 6 HOURS
Refills: 0 | Status: DISCONTINUED | OUTPATIENT
Start: 2019-07-25 | End: 2019-07-30

## 2019-07-25 RX ORDER — IPRATROPIUM/ALBUTEROL SULFATE 18-103MCG
3 AEROSOL WITH ADAPTER (GRAM) INHALATION ONCE
Refills: 0 | Status: COMPLETED | OUTPATIENT
Start: 2019-07-25 | End: 2019-07-25

## 2019-07-25 RX ADMIN — Medication 81 MILLIGRAM(S): at 09:04

## 2019-07-25 RX ADMIN — LACTULOSE 10 GRAM(S): 10 SOLUTION ORAL at 13:49

## 2019-07-25 RX ADMIN — GABAPENTIN 100 MILLIGRAM(S): 400 CAPSULE ORAL at 21:25

## 2019-07-25 RX ADMIN — Medication 3 MILLILITER(S): at 14:35

## 2019-07-25 RX ADMIN — GABAPENTIN 100 MILLIGRAM(S): 400 CAPSULE ORAL at 05:30

## 2019-07-25 RX ADMIN — TIOTROPIUM BROMIDE 1 CAPSULE(S): 18 CAPSULE ORAL; RESPIRATORY (INHALATION) at 09:45

## 2019-07-25 RX ADMIN — INSULIN GLARGINE 20 UNIT(S): 100 INJECTION, SOLUTION SUBCUTANEOUS at 08:56

## 2019-07-25 RX ADMIN — Medication 50 MILLIGRAM(S): at 05:30

## 2019-07-25 RX ADMIN — Medication 10 UNIT(S): at 17:39

## 2019-07-25 RX ADMIN — POLYETHYLENE GLYCOL 3350 17 GRAM(S): 17 POWDER, FOR SOLUTION ORAL at 13:54

## 2019-07-25 RX ADMIN — HEPARIN SODIUM 5000 UNIT(S): 5000 INJECTION INTRAVENOUS; SUBCUTANEOUS at 05:29

## 2019-07-25 RX ADMIN — Medication 50 MILLIGRAM(S): at 17:40

## 2019-07-25 RX ADMIN — GABAPENTIN 100 MILLIGRAM(S): 400 CAPSULE ORAL at 13:49

## 2019-07-25 RX ADMIN — ATORVASTATIN CALCIUM 80 MILLIGRAM(S): 80 TABLET, FILM COATED ORAL at 21:25

## 2019-07-25 RX ADMIN — TRAMADOL HYDROCHLORIDE 50 MILLIGRAM(S): 50 TABLET ORAL at 21:26

## 2019-07-25 RX ADMIN — Medication 10 UNIT(S): at 08:56

## 2019-07-25 RX ADMIN — HEPARIN SODIUM 5000 UNIT(S): 5000 INJECTION INTRAVENOUS; SUBCUTANEOUS at 13:49

## 2019-07-25 RX ADMIN — INSULIN GLARGINE 20 UNIT(S): 100 INJECTION, SOLUTION SUBCUTANEOUS at 21:25

## 2019-07-25 RX ADMIN — TRAMADOL HYDROCHLORIDE 50 MILLIGRAM(S): 50 TABLET ORAL at 22:20

## 2019-07-25 RX ADMIN — MORPHINE SULFATE 2 MILLIGRAM(S): 50 CAPSULE, EXTENDED RELEASE ORAL at 10:00

## 2019-07-25 RX ADMIN — MORPHINE SULFATE 2 MILLIGRAM(S): 50 CAPSULE, EXTENDED RELEASE ORAL at 09:02

## 2019-07-25 RX ADMIN — HEPARIN SODIUM 5000 UNIT(S): 5000 INJECTION INTRAVENOUS; SUBCUTANEOUS at 21:25

## 2019-07-25 RX ADMIN — Medication 3 MILLILITER(S): at 20:39

## 2019-07-25 RX ADMIN — PANTOPRAZOLE SODIUM 40 MILLIGRAM(S): 20 TABLET, DELAYED RELEASE ORAL at 05:30

## 2019-07-25 RX ADMIN — Medication 125 MICROGRAM(S): at 05:30

## 2019-07-25 RX ADMIN — Medication 1 BOTTLE: at 05:29

## 2019-07-25 RX ADMIN — Medication 40 MILLIGRAM(S): at 05:29

## 2019-07-25 NOTE — PROGRESS NOTE ADULT - SUBJECTIVE AND OBJECTIVE BOX
[facility]                                                        Office: [address] [address]                                                       Telephone: [phone]. Fax: [phone]      CC: SOB and BE edema     INTERVAL HISTORY: 85 yo woman with PMH of CAD, HFpEF, mild aortic stenosis (BILL=1.7cm2), HTN, COPD, T2DM, on insulin recently hospitalized at [facility] in 6/2019 for COPD exacerbation, now presenting with worsening exertional dyspnea associated with orthopnea and acute on chronic leg swelling for the past 1-2 days.  Pt reports some non-compliance with her lasix pills this week.  Pt also reporting severe leg pain since worsening of her leg swelling and per ED provider note and son Calvin Chávez via phone, pt had been prescribed vicodin PRN by house calls program. Pt denies any fevers, chills, cough, abdominal pain, nausea, vomiting, diarrhea. On admission, proBNP ~1000, troponin negative. (23 Jul 2019 04:23).  Has known CAD with  of RCA, s/p PCI with 1 ADELIA to LCx - 12/12/2018, s/p PCI with stent to mLAD and pLCx - 4/2017, HFpEF (ECHO 6/19- EF >75%, LVH, mild AS, DD II),   She has been c/o R leg pain, being treated out pt with vicodin. According to visiting RN notes, doppler and x rays negative last week. States pain got worse, causing difficulty breathing. Denies fever, chills, cough, phlegm production, chest pain, pressure, palpitations, irregular, fast heart beat, vomiting, melena, rectal bleed, hematuria, lightheadedness, dizziness, syncope, near syncope.  Pt denies any fevers, chills, cough, abdominal pain, nausea, vomiting, diarrhea. On admission, proBNP ~1000, troponin negative. (23 Jul 2019 04:23)  Possible candidate for CardioMEMS during this hospitalization, will discuss this with son. Cardiac cath in 12/2018 revealed mild elevation of right heart pressures with mild Pul HTN (MPAP=27 mmHg).   This morning significant improvement in her status, less leg pain and breathing is back to baseline. Had a leg X ray that was WNL. Neuropathic pain?  There is still no improvement in her kidney function (Scr this morning 2.16 mg/dL GFR 20 mL/min) (see note by Dr. Leach).       MEDICATIONS  (STANDING):  aspirin enteric coated 81 milliGRAM(s) Oral daily  atorvastatin 80 milliGRAM(s) Oral at bedtime  bisacodyl Suppository 10 milliGRAM(s) Rectal once  dextrose 5%. 1000 milliLiter(s) (50 mL/Hr) IV Continuous <Continuous>  dextrose 50% Injectable 12.5 Gram(s) IV Push once  dextrose 50% Injectable 25 Gram(s) IV Push once  dextrose 50% Injectable 25 Gram(s) IV Push once  gabapentin 100 milliGRAM(s) Oral three times a day  heparin  Injectable 5000 Unit(s) SubCutaneous every 8 hours  insulin glargine Injectable (LANTUS) 20 Unit(s) SubCutaneous two times a day  insulin lispro Injectable (HumaLOG) 10 Unit(s) SubCutaneous three times a day before meals  isosorbide   mononitrate ER Tablet (IMDUR) 30 milliGRAM(s) Oral daily  lactulose Syrup 10 Gram(s) Oral once  levothyroxine 125 MICROGram(s) Oral daily  metoprolol tartrate 50 milliGRAM(s) Oral two times a day  pantoprazole    Tablet 40 milliGRAM(s) Oral before breakfast  polyethylene glycol 3350 17 Gram(s) Oral daily  tiotropium 18 MICROgram(s) Capsule 1 Capsule(s) Inhalation daily    ROS: All others negative     PHYSICAL EXAM:  T(C): 36.9 (07-25-19 @ 09:30), Max: 37.1 (07-24-19 @ 22:35)  HR: 71 (07-25-19 @ 10:34) (65 - 93)  BP: 106/70 (07-25-19 @ 10:34) (79/41 - 131/59)  RR: 16 (07-25-19 @ 09:30) (16 - 19)  SpO2: 92% (07-25-19 @ 09:45) (92% - 99%)  Wt(kg): --  I&O's Summary    24 Jul 2019 07:01  -  25 Jul 2019 07:00  --------------------------------------------------------  IN: 240 mL / OUT: 900 mL / NET: -660 mL    25 Jul 2019 07:01  -  25 Jul 2019 12:56  --------------------------------------------------------  IN: 240 mL / OUT: 0 mL / NET: 240 mL        Appearance: Normal	  HEENT:   Normal oral mucosa, PERRL, EOMI	  Lymphatic: No lymphadenopathy  Cardiovascular: Normal S1 S2, No JVD, Distant sounds, systolic mejection murmur 3/6 at base/LSB. BE edema  Respiratory: Lungs clear to auscultation	  Psychiatry: A & O x 3, Mood & affect appropriate  Gastrointestinal: Protuberant, soft, Non-tender, + BS	  Skin: No rashes, No ecchymoses, No cyanosis. Mild BE erythema.   Neurologic: Non-focal  Extremities: Normal range of motion, No clubbing, cyanosis. Edema +2  Vascular: Peripheral pulses palpable 2+ bilaterally    TELEMETRY: 	      LABS:	 	                        11.0   13.78 )-----------( 246      ( 25 Jul 2019 08:13 )             35.7     07-25    139  |  101  |  52.0<H>  ----------------------------<  120<H>  5.1   |  25.0  |  2.16<H>    Ca    8.5<L>      25 Jul 2019 06:07  Mg     2.2     07-25    TPro  6.9  /  Alb  3.6  /  TBili  0.4  /  DBili  x   /  AST  20  /  ALT  12  /  AlkPhos  60  07-24

## 2019-07-25 NOTE — PROGRESS NOTE ADULT - ASSESSMENT
DX : CRS, CKD - 4, CHF    Anemia,    Chronic Pain,     Continue current Management,    D/W The family,    OP F/U when Discharged,  TY,

## 2019-07-25 NOTE — PROGRESS NOTE ADULT - ASSESSMENT
84yoF hx CAD, HFpEF, HTN, COPD, T2DM, recently hospitalized at [facility] in 6/2019 for COPD exacerbation, now presenting with worsening exertional dyspnea associated with orthopnea and acute on chronic leg swelling for the past 1-2 days, being admitted for acute decompensated heart failure    #Acute decompensated CHF- improving   -> cardio following   --> currently being evaluated for cardio MEMS    #Leg pain and swelling  - duplex neg for dvt  --> pain manageemnt consult appreciated    #acute on chronic renal failure - likely secondary to hctz and ace-I  --> will hold lasix  --> f.u renal us     #CAD- ASA and statin.     #COPD- Spiriva. DuoNebs PRN.     #T2DM- c,w current plan     #HTN- Metoprolol, imdur  --> cardizem discontinued     #Hypothyroidism- Synthroid.     #constipaiton - will order suppository     dnr dni.

## 2019-07-25 NOTE — CONSULT NOTE ADULT - SUBJECTIVE AND OBJECTIVE BOX
Asked to see patient regarding pain right LE.  Per patient, pain in right distal leg is sharp and feels "like a fork being stabbed into my lower leg".  Pain is not perceived in foot, nor even in ankle, proper.  Pain is distal half of right lower ext.  US was neg for DVT.  Patient denies antecedant trauma, noted pain started acutely perhaps one week ago.  Took Alleve and tylenol with modest relief.  Took tramadol which did help "some".  Took hydrocodone 5/325 which she states did not help at all.  Presently she reports pain right LE, distal aspect that is worse with contact and/or weight bearing.      Admitted for CHF exacerbation; Admit H&P notes:  84yoF hx CAD, HFpEF, HTN, COPD, T2DM, recently hospitalized at [facility] in 6/2019 for COPD exacerbation, now presenting with worsening exertional dyspnea associated with orthopnea and acute on chronic leg swelling for the past 1-2 days.  Pt reports some non-compliance with her lasix pills this week.  Pt also reporting severe leg pain since worsening of her leg swelling and per ED provider note and son Calvin Chávez via phone, pt had been prescribed vicodin PRN by house calls program. Pt denies any fevers, chills, cough, abdominal pain, nausea, vomiting, diarrhea. On admission, proBNP ~1000, troponin negative. (23 Jul 2019 04:23)    Cardiology consult adds:  Pt seen and examined in ED, history of CAD with known  of RCA, s/p PCI with 1 ADELIA to LCx - 12/12/2018, s/p PCI with stent to mLAD and pLCx - 4/2017, HFpEF (ECHO6/19- EF >75%, LVH, mild AS, DD II), moderate AS, HTN, HLD, DM on insulin, CKD, COPD and STEVENSON. Pt c/o R leg pain, being treated out pt with vicodin. According to visiting RN notes, doppler and xrays negative last week. States pain got worse, causing difficulty breathing. non compliance with lasix this past week. Denies fever, chills, cough, phlegm production, chest pain, pressure, palpitations, irregular, fast heart beat, vomiting, melena, rectal bleed, hematuria, lightheadedness, dizziness, syncope, near syncope.    PE:  Pleasant 84 year old, moderately obese (wt 195#).  A bit of a poor historian; generally alert and oriented.  VSS, afebrile.  CN 2-12 intact.  Chest exam deferred to primary/cardiology.  LE note +1 PTE bilat.  Full ROM right ankle, good pulses right foot.  Tender to palpation to light touch distal aspect right LE.  Neg calf tenderness.  Skin intact, no integument changes noted RLE.      ASSESS:  1. CHF with preserved EF; acute decompensation prompting admit  2. acute on chronic renal failure: BUN/Cr = 52/2/16  eGFR=20  3. COPD  4. DM type II  5. CAD s/p endovascular stents  6. Right lower ext pain; etiology obscure    REC:  1. XRay Right LE, may need MRI if no obvious osseous pathology (occult fx?)  2. Tylenol, tramadol and oxycodone for mild/mod/severe pain  3. avoid NSAID agents  4. given some cognitive dysfunction noted this am, will try to avoid too many central acting agents (gabapentin may help with neuropathic pain, but can further contribute to LE edema and cog impairment).

## 2019-07-25 NOTE — PROGRESS NOTE ADULT - ASSESSMENT
83 yo woman with recurrent hospitalization for acute decompensated diastolic heart failure (HFpEF).  There is improvement in her symptoms  Still C/O severe Rt leg pain, neuropathic?  Will discuss with son the possibility of implanting a CardioMEMS to continuously monitor heart rate and PA pressures (surrogate for heart failure decompensation).

## 2019-07-25 NOTE — CONSULT NOTE ADULT - PROBLEM SELECTOR RECOMMENDATION 9
1. XRay Right LE, may need MRI if no obvious osseous pathology (occult fx?)  2. Tylenol, tramadol and oxycodone for mild/mod/severe pain  3. avoid NSAID agents  4. given some cognitive dysfunction noted this am, will try to avoid too many central acting agents (gabapentin may help with neuropathic pain, but can further contribute to LE edema and cog impairment).

## 2019-07-25 NOTE — PROGRESS NOTE ADULT - SUBJECTIVE AND OBJECTIVE BOX
Vital Signs Last 24 Hrs,    T(C): 36.9 (2019 09:30), Max: 37.1 (2019 22:35)  T(F): 98.5 (2019 09:30), Max: 98.8 (2019 22:35)  HR: 71 (2019 10:34) (65 - 93)  BP: 106/70 (2019 10:34) (79/41 - 131/59)  RR: 16 (2019 09:30) (16 - 19)  SpO2: 92% (2019 09:45) (92% - 99%)    139    |  101    |  52.0<H>  ----------------------------<  120<H>  Ca:8.5<L> (2019 06:07)  5.1     |  25.0   |  2.16<H>    eGFR if Non : 20 <L>    TPro  6.9    /  Alb  3.6    /  TBili  0.4    /  DBili  x      /  AST  20     /  ALT  12     /  AlkPhos  60     2019 06:11                               11.0<L>  13.78<H> )-----------( 246      ( 2019 08:13 )                    35.7     M.2 mg/dL.,    HPI:    84F with CAD, CHF, HTN, COPD, T2DM, recent hospitalization for COPD; now with RABAGO and edema; seen/examined this AM;     Med HPI:            85yo F hx CAD, HFpEF, HTN, COPD, T2DM, recently hospitalized at [facility] in 2019 for COPD exacerbation, now presenting with worsening exertional dyspnea associated with orthopnea and acute on chronic leg swelling for the past 1-2 days.  Pt reports some non-compliance with her lasix ,   Pt also reporting severe leg pain since worsening of her leg swelling and per ED provider note and son Calvin Chávez via phone, pt had been prescribed Vicodin PRN by house calls program.     Pt denies any fevers, chills, cough, abdominal pain, nausea, vomiting, diarrhea. On admission, proBNP ~1000, troponin negative.     PAST HISTORY  --------------------------------------------------------------------------------  PAST MEDICAL & SURGICAL HISTORY:    COPD,   NSTEMI (non-ST elevated myocardial infarction)  HTN (hypertension)  CAD (coronary artery disease)  Gastroesophageal reflux disease without esophagitis  Pure hypercholesterolemia  Hypothyroidism, unspecified type  DM2 (diabetes mellitus, type 2)    History of appendectomy    FAMILY HISTORY:  Family history of stomach cancer  Family history of MI (myocardial infarction)  Family history of cancer in mother  Family history of heart disease    PAST SOCIAL HISTORY: Non Smoker,     ALLERGIES & MEDICATIONS  --------------------------------------------------------------------------------  Allergies    doxycycline (Unknown)  iodine (Hives)  shellfish (Anaphylaxis)    Standing Inpatient Medications  aspirin enteric coated 81 milliGRAM(s) Oral daily  atorvastatin 80 milliGRAM(s) Oral at bedtime  dextrose 5%. 1000 milliLiter(s) IV Continuous <Continuous>  dextrose 50% Injectable 12.5 Gram(s) IV Push once  dextrose 50% Injectable 25 Gram(s) IV Push once  dextrose 50% Injectable 25 Gram(s) IV Push once  furosemide   Injectable 40 milliGRAM(s) IV Push daily  gabapentin 100 milliGRAM(s) Oral three times a day  heparin  Injectable 5000 Unit(s) SubCutaneous every 8 hours  insulin glargine Injectable (LANTUS) 20 Unit(s) SubCutaneous two times a day  insulin lispro Injectable (HumaLOG) 10 Unit(s) SubCutaneous three times a day before meals  isosorbide   mononitrate ER Tablet (IMDUR) 30 milliGRAM(s) Oral daily  levothyroxine 125 MICROGram(s) Oral daily  magnesium citrate Oral Solution 1 Bottle Oral once  metoprolol tartrate 50 milliGRAM(s) Oral two times a day  pantoprazole    Tablet 40 milliGRAM(s) Oral before breakfast  polyethylene glycol 3350 17 Gram(s) Oral daily  tiotropium 18 MICROgram(s) Capsule 1 Capsule(s) Inhalation daily    PRN Inpatient Medications  acetaminophen   Tablet .. 650 milliGRAM(s) Oral every 6 hours PRN  ALBUTerol/ipratropium for Nebulization 3 milliLiter(s) Nebulizer every 6 hours PRN  dextrose 40% Gel 15 Gram(s) Oral once PRN  glucagon  Injectable 1 milliGRAM(s) IntraMuscular once PRN  hydrALAZINE Injectable 10 milliGRAM(s) IV Push every 6 hours PRN  morphine  - Injectable 2 milliGRAM(s) IV Push every 4 hours PRN  morphine  - Injectable 4 milliGRAM(s) IV Push every 6 hours PRN    REVIEW OF SYSTEMS  --------------------------------------------------------------------------------  Gen: No weight changes, fatigue, fevers/chills, weakness  Skin: No rashes  Head/Eyes/Ears/Mouth: No headache; Normal hearing; Normal vision w/o blurriness; No sinus pain/discomfort, sore throat  Respiratory: RABAGO  CV: No chest pain, PND, orthopnea  GI: No abdominal pain, diarrhea, constipation, nausea, vomiting, melena, hematochezia  : No increased frequency, dysuria, hematuria, nocturia  MSK: No joint pain/swelling; no back pain; + edema  Neuro: No dizziness/lightheadedness, weakness, seizures, numbness, tingling  Heme: No easy bruising or bleeding  Endo: No heat/cold intolerance  Psych: No significant nervousness, anxiety, stress, depression    All other systems were reviewed and are negative, except as noted.    VITALS/PHYSICAL EXAM  --------------------------------------------------------------------------------  T(C): 36.5 (19 @ 08:07), Max: 37 (19 @ 21:07)  HR: 58 (19 @ 10:09) (58 - 67)  BP: 114/69 (19 @ 08:07) (94/57 - 138/68)  RR: 18 (19 @ 08:07) (17 - 18)  SpO2: 100% (19 @ 10:09) (94% - 100%)    19 @ 07:01  -  19 @ 07:00  --------------------------------------------------------  IN: 240 mL / OUT: 1650 mL / NET: -1410 mL    19 @ 07:01  -  19 @ 13:22  --------------------------------------------------------  IN: 0 mL / OUT: 200 mL / NET: -200 mL    Physical Exam:    	GENERAL:  Elderly female, appears uncomfortable due to pain   	EYES:  Clear conjunctiva, extraocular movement intact  	ENT: Moist mucous membranes  	RESP:  Non-labored breathing pattern, scattered wheezing   	CV: Regular rate and rhythm, no murmurs appreciated, bilateral leg edema  	GI: Soft, non-tender, non-distended  	NEURO: Awake, alert, conversant, UE strength 4/5, LE strength 2-3/5   	PSYCH: Calm, cooperative    SKIN: mild erythema around both ankles, warm and dry.    LABS/STUDIES  --------------------------------------------------------------------------------              11.5   15.59 >-----------<  277      [19 @ 06:21]              36.7     141  |  101  |  42.0  ----------------------------<  156      [19 @ 06:11]  4.9   |  26.0  |  1.73        Ca     8.7     [19 @ 06:11]      Mg     2.2     [19 06:11]    TPro  6.9  /  Alb  3.6  /  TBili  0.4  /  DBili  x   /  AST  20  /  ALT  12  /  AlkPhos  60  [19 06:11]    PT/INR: PT 11.6 , INR 1.01       [19 23:53]  PTT: 27.8       [19 23:53]    Troponin <0.01      [19 07:38]        [19 07:38]    Creatinine Trend:  SCr 1.73 [ 06:11]  SCr 1.33 [:38]  SCr 1.23 [ 23:53]    Urinalysis - [19 @ 04:28]      Color Yellow / Appearance Clear / SG 1.020 / pH 5.0      Gluc 1000 / Ketone Negative  / Bili Negative / Urobili Negative       Blood Negative / Protein 15 / Leuk Est Negative / Nitrite Negative      RBC 0-2 / WBC 0-2 / Hyaline  / Gran  / Sq Epi  / Non Sq Epi Few / Bacteria Negative    HbA1c 10.1      [19 @ 04:59]  TSH 0.26      [19 @ 04:59]  Lipid: chol 180, , HDL 36, LDL 98      [18 @ 04:17]    EXAM:  XR CHEST AP OR PA 1V                          PROCEDURE DATE:  2019      INTERPRETATION:  CLINICAL INFORMATION: cough/sob. . .     EXAM: AP chest.    COMPARISON: Prior radiograph dated 2019.    FINDINGS:  No focal consolidation.  No pleural effusions or pneumothorax.  There is cardiomegaly.   Atherosclerotic calcifications in the aorta. Coronary arterial stent.    IMPRESSION: No focal consolidation.    JOEY BRAXTON   This document has been electronically signed. 2019  2:25PM

## 2019-07-25 NOTE — PROGRESS NOTE ADULT - SUBJECTIVE AND OBJECTIVE BOX
FLOYD PAEZ    [id number]    84y      Female    INTERVAL HPI/OVERNIGHT EVENTS:    patient being seen for chf, acute on chronic renal failure and pain management. Patient seen at bedside and breathing is improved. Patient still complains of pain in her leg       REVIEW OF SYSTEMS:    CONSTITUTIONAL: pain   RESPIRATORY: No cough, wheezing, hemoptysis; No shortness of breath  CARDIOVASCULAR: No chest pain, palpitations  GASTROINTESTINAL: No abdominal or epigastric pain. No nausea, vomiting  NEUROLOGICAL: No headaches, memory loss, loss of strength.  MISCELLANEOUS:      Vital Signs Last 24 Hrs  T(C): 36.9 (25 Jul 2019 09:30), Max: 37.1 (24 Jul 2019 22:35)  T(F): 98.5 (25 Jul 2019 09:30), Max: 98.8 (24 Jul 2019 22:35)  HR: 71 (25 Jul 2019 10:34) (65 - 93)  BP: 106/70 (25 Jul 2019 10:34) (79/41 - 131/59)  BP(mean): --  RR: 16 (25 Jul 2019 09:30) (16 - 19)  SpO2: 92% (25 Jul 2019 09:45) (92% - 99%)    PHYSICAL EXAM:    gen: nad, obese, pleasant   heent: perrl  cvs: s1s2+  lungs: clear  abd: obese, distended, nt  ext: improved edema, tenderness improved  neuro: aaox 3     LABS:                        11.0   13.78 )-----------( 246      ( 25 Jul 2019 08:13 )             35.7     07-25    139  |  101  |  52.0<H>  ----------------------------<  120<H>  5.1   |  25.0  |  2.16<H>    Ca    8.5<L>      25 Jul 2019 06:07  Mg     2.2     07-25    TPro  6.9  /  Alb  3.6  /  TBili  0.4  /  DBili  x   /  AST  20  /  ALT  12  /  AlkPhos  60  07-24      MEDICATIONS  (STANDING):  aspirin enteric coated 81 milliGRAM(s) Oral daily  atorvastatin 80 milliGRAM(s) Oral at bedtime  dextrose 5%. 1000 milliLiter(s) (50 mL/Hr) IV Continuous <Continuous>  dextrose 50% Injectable 12.5 Gram(s) IV Push once  dextrose 50% Injectable 25 Gram(s) IV Push once  dextrose 50% Injectable 25 Gram(s) IV Push once  gabapentin 100 milliGRAM(s) Oral three times a day  heparin  Injectable 5000 Unit(s) SubCutaneous every 8 hours  insulin glargine Injectable (LANTUS) 20 Unit(s) SubCutaneous two times a day  insulin lispro Injectable (HumaLOG) 10 Unit(s) SubCutaneous three times a day before meals  isosorbide   mononitrate ER Tablet (IMDUR) 30 milliGRAM(s) Oral daily  levothyroxine 125 MICROGram(s) Oral daily  metoprolol tartrate 50 milliGRAM(s) Oral two times a day  pantoprazole    Tablet 40 milliGRAM(s) Oral before breakfast  polyethylene glycol 3350 17 Gram(s) Oral daily  tiotropium 18 MICROgram(s) Capsule 1 Capsule(s) Inhalation daily    MEDICATIONS  (PRN):  acetaminophen   Tablet .. 650 milliGRAM(s) Oral every 6 hours PRN Temp greater or equal to 38C (100.4F), Mild Pain (1 - 3)  acetaminophen   Tablet .. 650 milliGRAM(s) Oral every 6 hours PRN Mild Pain (1 - 3)  ALBUTerol/ipratropium for Nebulization 3 milliLiter(s) Nebulizer every 6 hours PRN Shortness of Breath and/or Wheezing  dextrose 40% Gel 15 Gram(s) Oral once PRN Blood Glucose LESS THAN 70 milliGRAM(s)/deciliter  glucagon  Injectable 1 milliGRAM(s) IntraMuscular once PRN Glucose LESS THAN 70 milligrams/deciliter  hydrALAZINE Injectable 10 milliGRAM(s) IV Push every 6 hours PRN for sbp above 160 mmhg  oxyCODONE    IR 5 milliGRAM(s) Oral every 6 hours PRN Severe Pain (7 - 10)  traMADol 50 milliGRAM(s) Oral every 6 hours PRN Moderate Pain (4 - 6)      RADIOLOGY & ADDITIONAL TESTS:

## 2019-07-25 NOTE — PHYSICAL THERAPY INITIAL EVALUATION ADULT - ADDITIONAL COMMENTS
Pt reports own a RW and a RW with a seat. She does not have stairs to negotiate and reports that there is PT available on site.

## 2019-07-25 NOTE — DIETITIAN INITIAL EVALUATION ADULT. - OTHER INFO
Pt with hx CAD, HFpEF, HTN, COPD, T2DM, recently hospitalized at [facility] in 6/2019 for COPD exacerbation, now presenting with worsening exertional dyspnea associated with orthopnea and acute on chronic leg swelling for the past 1-2 days, admitted for acute decompensated heart failure.  Pt reports good po intake at meals since admission and at home.  Pt denies wt loss prior to admission.  Aware 3+ moderate edema b/l extremities.  Pt reports following low na, cons cho meal plan at home.  Reviewed heart failure nutrition guidelines- pt verbalized understanding.  Heart failure nutrition literature provided.

## 2019-07-26 DIAGNOSIS — M25.571 PAIN IN RIGHT ANKLE AND JOINTS OF RIGHT FOOT: ICD-10-CM

## 2019-07-26 LAB
ALBUMIN SERPL ELPH-MCNC: 3.3 G/DL — SIGNIFICANT CHANGE UP (ref 3.3–5.2)
ALP SERPL-CCNC: 55 U/L — SIGNIFICANT CHANGE UP (ref 40–120)
ALT FLD-CCNC: 8 U/L — SIGNIFICANT CHANGE UP
ANION GAP SERPL CALC-SCNC: 12 MMOL/L — SIGNIFICANT CHANGE UP (ref 5–17)
AST SERPL-CCNC: 15 U/L — SIGNIFICANT CHANGE UP
BILIRUB SERPL-MCNC: 0.4 MG/DL — SIGNIFICANT CHANGE UP (ref 0.4–2)
BUN SERPL-MCNC: 48 MG/DL — HIGH (ref 8–20)
CALCIUM SERPL-MCNC: 8.3 MG/DL — LOW (ref 8.6–10.2)
CHLORIDE SERPL-SCNC: 103 MMOL/L — SIGNIFICANT CHANGE UP (ref 98–107)
CO2 SERPL-SCNC: 26 MMOL/L — SIGNIFICANT CHANGE UP (ref 22–29)
CREAT SERPL-MCNC: 1.65 MG/DL — HIGH (ref 0.5–1.3)
GLUCOSE BLDC GLUCOMTR-MCNC: 100 MG/DL — HIGH (ref 70–99)
GLUCOSE BLDC GLUCOMTR-MCNC: 126 MG/DL — HIGH (ref 70–99)
GLUCOSE BLDC GLUCOMTR-MCNC: 66 MG/DL — LOW (ref 70–99)
GLUCOSE BLDC GLUCOMTR-MCNC: 77 MG/DL — SIGNIFICANT CHANGE UP (ref 70–99)
GLUCOSE SERPL-MCNC: 84 MG/DL — SIGNIFICANT CHANGE UP (ref 70–115)
HCT VFR BLD CALC: 34.9 % — SIGNIFICANT CHANGE UP (ref 34.5–45)
HGB BLD-MCNC: 10.8 G/DL — LOW (ref 11.5–15.5)
MAGNESIUM SERPL-MCNC: 2.7 MG/DL — HIGH (ref 1.6–2.6)
MCHC RBC-ENTMCNC: 27.3 PG — SIGNIFICANT CHANGE UP (ref 27–34)
MCHC RBC-ENTMCNC: 30.9 GM/DL — LOW (ref 32–36)
MCV RBC AUTO: 88.1 FL — SIGNIFICANT CHANGE UP (ref 80–100)
PLATELET # BLD AUTO: 246 K/UL — SIGNIFICANT CHANGE UP (ref 150–400)
POTASSIUM SERPL-MCNC: 4.2 MMOL/L — SIGNIFICANT CHANGE UP (ref 3.5–5.3)
POTASSIUM SERPL-SCNC: 4.2 MMOL/L — SIGNIFICANT CHANGE UP (ref 3.5–5.3)
PROT SERPL-MCNC: 6.5 G/DL — LOW (ref 6.6–8.7)
RBC # BLD: 3.96 M/UL — SIGNIFICANT CHANGE UP (ref 3.8–5.2)
RBC # FLD: 15.3 % — HIGH (ref 10.3–14.5)
SODIUM SERPL-SCNC: 141 MMOL/L — SIGNIFICANT CHANGE UP (ref 135–145)
WBC # BLD: 11.08 K/UL — HIGH (ref 3.8–10.5)
WBC # FLD AUTO: 11.08 K/UL — HIGH (ref 3.8–10.5)

## 2019-07-26 PROCEDURE — 99233 SBSQ HOSP IP/OBS HIGH 50: CPT | Mod: 25

## 2019-07-26 PROCEDURE — 33289 TCAT IMPL WRLS P-ART PRS SNR: CPT

## 2019-07-26 RX ORDER — ACETAMINOPHEN 500 MG
1000 TABLET ORAL ONCE
Refills: 0 | Status: COMPLETED | OUTPATIENT
Start: 2019-07-27 | End: 2019-07-27

## 2019-07-26 RX ORDER — OXYCODONE HYDROCHLORIDE 5 MG/1
2.5 TABLET ORAL EVERY 4 HOURS
Refills: 0 | Status: DISCONTINUED | OUTPATIENT
Start: 2019-07-26 | End: 2019-07-30

## 2019-07-26 RX ORDER — ACETAMINOPHEN 500 MG
650 TABLET ORAL
Refills: 0 | Status: DISCONTINUED | OUTPATIENT
Start: 2019-07-26 | End: 2019-07-30

## 2019-07-26 RX ORDER — CLOPIDOGREL BISULFATE 75 MG/1
150 TABLET, FILM COATED ORAL ONCE
Refills: 0 | Status: COMPLETED | OUTPATIENT
Start: 2019-07-26 | End: 2019-07-26

## 2019-07-26 RX ORDER — ACETAMINOPHEN 500 MG
1000 TABLET ORAL ONCE
Refills: 0 | Status: COMPLETED | OUTPATIENT
Start: 2019-07-26 | End: 2019-07-26

## 2019-07-26 RX ORDER — LIDOCAINE 4 G/100G
1 CREAM TOPICAL DAILY
Refills: 0 | Status: DISCONTINUED | OUTPATIENT
Start: 2019-07-26 | End: 2019-07-30

## 2019-07-26 RX ORDER — DEXTROSE 50 % IN WATER 50 %
15 SYRINGE (ML) INTRAVENOUS ONCE
Refills: 0 | Status: COMPLETED | OUTPATIENT
Start: 2019-07-26 | End: 2019-07-26

## 2019-07-26 RX ORDER — CLOPIDOGREL BISULFATE 75 MG/1
75 TABLET, FILM COATED ORAL DAILY
Refills: 0 | Status: DISCONTINUED | OUTPATIENT
Start: 2019-07-26 | End: 2019-07-30

## 2019-07-26 RX ORDER — ACETAMINOPHEN 500 MG
1000 TABLET ORAL ONCE
Refills: 0 | Status: COMPLETED | OUTPATIENT
Start: 2019-07-28 | End: 2019-07-28

## 2019-07-26 RX ORDER — HYDROMORPHONE HYDROCHLORIDE 2 MG/ML
0.3 INJECTION INTRAMUSCULAR; INTRAVENOUS; SUBCUTANEOUS EVERY 12 HOURS
Refills: 0 | Status: DISCONTINUED | OUTPATIENT
Start: 2019-07-26 | End: 2019-07-30

## 2019-07-26 RX ORDER — SODIUM CHLORIDE 9 MG/ML
1000 INJECTION, SOLUTION INTRAVENOUS
Refills: 0 | Status: DISCONTINUED | OUTPATIENT
Start: 2019-07-26 | End: 2019-07-30

## 2019-07-26 RX ADMIN — Medication 50 MILLIGRAM(S): at 05:18

## 2019-07-26 RX ADMIN — ATORVASTATIN CALCIUM 80 MILLIGRAM(S): 80 TABLET, FILM COATED ORAL at 23:13

## 2019-07-26 RX ADMIN — TRAMADOL HYDROCHLORIDE 50 MILLIGRAM(S): 50 TABLET ORAL at 14:11

## 2019-07-26 RX ADMIN — INSULIN GLARGINE 20 UNIT(S): 100 INJECTION, SOLUTION SUBCUTANEOUS at 23:02

## 2019-07-26 RX ADMIN — SODIUM CHLORIDE 80 MILLILITER(S): 9 INJECTION, SOLUTION INTRAVENOUS at 23:03

## 2019-07-26 RX ADMIN — OXYCODONE HYDROCHLORIDE 5 MILLIGRAM(S): 5 TABLET ORAL at 16:46

## 2019-07-26 RX ADMIN — HYDROMORPHONE HYDROCHLORIDE 0.3 MILLIGRAM(S): 2 INJECTION INTRAMUSCULAR; INTRAVENOUS; SUBCUTANEOUS at 18:45

## 2019-07-26 RX ADMIN — Medication 15 GRAM(S): at 21:45

## 2019-07-26 RX ADMIN — LIDOCAINE 1 PATCH: 4 CREAM TOPICAL at 15:54

## 2019-07-26 RX ADMIN — OXYCODONE HYDROCHLORIDE 5 MILLIGRAM(S): 5 TABLET ORAL at 15:50

## 2019-07-26 RX ADMIN — LIDOCAINE 1 PATCH: 4 CREAM TOPICAL at 20:42

## 2019-07-26 RX ADMIN — Medication 50 MILLIGRAM(S): at 17:32

## 2019-07-26 RX ADMIN — GABAPENTIN 100 MILLIGRAM(S): 400 CAPSULE ORAL at 05:18

## 2019-07-26 RX ADMIN — Medication 10 UNIT(S): at 16:51

## 2019-07-26 RX ADMIN — GABAPENTIN 100 MILLIGRAM(S): 400 CAPSULE ORAL at 23:12

## 2019-07-26 RX ADMIN — Medication 125 MICROGRAM(S): at 05:18

## 2019-07-26 RX ADMIN — HYDROMORPHONE HYDROCHLORIDE 0.3 MILLIGRAM(S): 2 INJECTION INTRAMUSCULAR; INTRAVENOUS; SUBCUTANEOUS at 16:50

## 2019-07-26 RX ADMIN — CLOPIDOGREL BISULFATE 150 MILLIGRAM(S): 75 TABLET, FILM COATED ORAL at 14:10

## 2019-07-26 RX ADMIN — HEPARIN SODIUM 5000 UNIT(S): 5000 INJECTION INTRAVENOUS; SUBCUTANEOUS at 05:18

## 2019-07-26 RX ADMIN — Medication 81 MILLIGRAM(S): at 12:50

## 2019-07-26 RX ADMIN — PANTOPRAZOLE SODIUM 40 MILLIGRAM(S): 20 TABLET, DELAYED RELEASE ORAL at 05:18

## 2019-07-26 RX ADMIN — ISOSORBIDE MONONITRATE 30 MILLIGRAM(S): 60 TABLET, EXTENDED RELEASE ORAL at 12:50

## 2019-07-26 RX ADMIN — TRAMADOL HYDROCHLORIDE 50 MILLIGRAM(S): 50 TABLET ORAL at 12:49

## 2019-07-26 RX ADMIN — Medication 650 MILLIGRAM(S): at 15:00

## 2019-07-26 RX ADMIN — GABAPENTIN 100 MILLIGRAM(S): 400 CAPSULE ORAL at 14:09

## 2019-07-26 RX ADMIN — HEPARIN SODIUM 5000 UNIT(S): 5000 INJECTION INTRAVENOUS; SUBCUTANEOUS at 23:02

## 2019-07-26 RX ADMIN — Medication 10 UNIT(S): at 12:52

## 2019-07-26 RX ADMIN — Medication 650 MILLIGRAM(S): at 14:10

## 2019-07-26 NOTE — PROGRESS NOTE ADULT - SUBJECTIVE AND OBJECTIVE BOX
Department of Cardiology                                                                  [facility]/[place] [address] [address] [facility]                                                            Telephone: [phone]. Fax:[phone]                                                                                         POST PCI NOTE       Subjective:  84y  Female who had a decompensated Heart failure post cardio MEMS.       via right groin vein , with out complications.       Narrative HPI  84yoF hx CAD, HFpEF, HTN, COPD, T2DM, recently hospitalized at [facility] in 6/2019 for COPD exacerbation, now presenting with worsening exertional dyspnea associated with orthopnea and acute on chronic leg swelling for the past 1-2 days.  Pt reports some non-compliance with her lasix pills this week.  Pt also reporting severe leg pain since worsening of her leg swelling and per ED provider note and son Calvin Chávez via phone, pt had been prescribed vicodin PRN by house calls program. Pt denies any fevers, chills, cough, abdominal pain, nausea, vomiting, diarrhea. On admission, proBNP ~1000, troponin negative.         PAST MEDICAL & SURGICAL HISTORY:  NSTEMI (non-ST elevated myocardial infarction)  Hypothyroid  HLD (hyperlipidemia)  HTN (hypertension)  CAD (coronary artery disease)  Asthma  Diabetes  Gastroesophageal reflux disease without esophagitis  Pure hypercholesterolemia  Hypothyroidism, unspecified type  Essential hypertension  DM2 (diabetes mellitus, type 2)  Uncomplicated asthma, unspecified asthma severity  Abnormal findings on cardiac catheterization: Cardiac Cath  History of appendectomy  History of appendectomy    FAMILY HISTORY:  Family history of stomach cancer  Family history of MI (myocardial infarction)  Family history of cancer in mother  Family history of heart disease    HEALTH ISSUES - PROBLEM Dx:  Acute renal failure (ARF): Acute renal failure (ARF)  CAD (coronary artery disease): CAD (coronary artery disease)  CHF (congestive heart failure): CHF (congestive heart failure)  Right leg pain: Right leg pain        General: No fatigue, no fevers/chills  Respiratory: No dyspnea, no cough, no wheeze  CV: No chest pain, no palpitations  Abd: No nausea  Neuro: No headache, no dizziness  doxycycline (Unknown)  iodine (Hives)  iodine containing compounds (Unknown)  shellfish (Anaphylaxis)  shellfish (Swelling; Short breath)      Objective:  Vital Signs Last 24 Hrs  T(C): 36.6 (26 Jul 2019 07:20), Max: 37 (25 Jul 2019 21:15)  T(F): 97.8 (26 Jul 2019 07:20), Max: 98.6 (25 Jul 2019 21:15)  HR: 60 (26 Jul 2019 11:30) (58 - 91)  BP: 150/65 (26 Jul 2019 11:30) (99/41 - 154/67)  BP(mean): --  RR: 16 (26 Jul 2019 11:30) (13 - 18)  SpO2: 96% (26 Jul 2019 11:30) (94% - 100%)    CM: SR  Neuro: A&OX3, CN 2-12 intact  HEENT: NC, AT  Lungs: CTA B/L  CV: S1, S2, no murmur, RRR  Abd: Soft  Right Groin: Soft, no bleeding, no hematoma  Extremity: + distal pulses                          10.8   11.08 )-----------( 246      ( 26 Jul 2019 07:36 )             34.9     07-26    141  |  103  |  48.0<H>  ----------------------------<  84  4.2   |  26.0  |  1.65<H>    Ca    8.3<L>      26 Jul 2019 07:36  Mg     2.7     07-26    TPro  6.5<L>  /  Alb  3.3  /  TBili  0.4  /  DBili  x   /  AST  15  /  ALT  8   /  AlkPhos  55  07-26            PLAN:    - bedrest for   4   hours  - DISCUSSED AND RECOMMEND CARDIAC REHAB TO OPTIMIZE FULL RECOVERY   - Antiplatelet therapy for 30 days asa and plavix  - follow up with cardiology 2 weeks post procedure   - post produral care and instructions reviewed with the patient as well as questions answered.    - plan for discharge home when stable   - Diet instructions given for low cholesterol and Low sodium.   -will have CHF NP review MEMS monitor,

## 2019-07-26 NOTE — PROGRESS NOTE ADULT - ASSESSMENT
83 yo woman admitted with worsening SOB and BE edema. Treated successfully with diuretics.  Today she underwent CardioMEMS implantation and PAP were normal mil pul HTN  Patient stable on current medications, may need long term diuretic prior to discharge

## 2019-07-26 NOTE — PROGRESS NOTE ADULT - SUBJECTIVE AND OBJECTIVE BOX
Patient is a 84y old  Female who presents with a chief complaint of acute decompensated CHF (26 Jul 2019 12:39)  She has ongoing RLE pain, X-ray negative for acute fracture. She is s/p procedure this AM. Tramadol, Gabapentin and Tylenol given since return. Patient denies relief with medications.    VERBAL REPORT: "The medications didn't help."    PAIN SCORE: 5-6/10  (VNRS)    MEDICATIONS  (STANDING):  acetaminophen   Tablet .. 650 milliGRAM(s) Oral <User Schedule>  acetaminophen  IVPB .. 1000 milliGRAM(s) IV Intermittent once  ALBUTerol/ipratropium for Nebulization. 3 milliLiter(s) Nebulizer once  aspirin enteric coated 81 milliGRAM(s) Oral daily  atorvastatin 80 milliGRAM(s) Oral at bedtime  clopidogrel Tablet 75 milliGRAM(s) Oral daily  dextrose 5%. 1000 milliLiter(s) (50 mL/Hr) IV Continuous <Continuous>  dextrose 50% Injectable 12.5 Gram(s) IV Push once  dextrose 50% Injectable 25 Gram(s) IV Push once  dextrose 50% Injectable 25 Gram(s) IV Push once  gabapentin 100 milliGRAM(s) Oral three times a day  heparin  Injectable 5000 Unit(s) SubCutaneous every 8 hours  insulin glargine Injectable (LANTUS) 20 Unit(s) SubCutaneous two times a day  insulin lispro Injectable (HumaLOG) 10 Unit(s) SubCutaneous three times a day before meals  isosorbide   mononitrate ER Tablet (IMDUR) 30 milliGRAM(s) Oral daily  levothyroxine 125 MICROGram(s) Oral daily  lidocaine   Patch 1 Patch Transdermal daily  metoprolol tartrate 50 milliGRAM(s) Oral two times a day  pantoprazole    Tablet 40 milliGRAM(s) Oral before breakfast  polyethylene glycol 3350 17 Gram(s) Oral daily  tiotropium 18 MICROgram(s) Capsule 1 Capsule(s) Inhalation daily    MEDICATIONS  (PRN):  ALBUTerol/ipratropium for Nebulization 3 milliLiter(s) Nebulizer every 6 hours PRN Shortness of Breath and/or Wheezing  dextrose 40% Gel 15 Gram(s) Oral once PRN Blood Glucose LESS THAN 70 milliGRAM(s)/deciliter  glucagon  Injectable 1 milliGRAM(s) IntraMuscular once PRN Glucose LESS THAN 70 milligrams/deciliter  hydrALAZINE Injectable 10 milliGRAM(s) IV Push every 6 hours PRN for sbp above 160 mmhg  oxyCODONE    IR 5 milliGRAM(s) Oral every 6 hours PRN Severe Pain (7 - 10)  oxyCODONE    IR 2.5 milliGRAM(s) Oral every 4 hours PRN Moderate Pain (4 - 6)      PHYSICAL EXAM:  Lightly asleep. Easily awakens to light touch. Oriented x3, NAD. Right ankle +2-3 edema, tender to touch, pitting      T(F): , Max: 98.6 (21:15)  HR:  (58 - 91)  BP:  (117/76 - 155/81)  RR:  (13 - 18)  SpO2:  (94% - 100%)    GET  REVIEWED: Reference #[id number]    Pain Service  Text page via [employer]  PIN: [phone]

## 2019-07-26 NOTE — PROGRESS NOTE ADULT - SUBJECTIVE AND OBJECTIVE BOX
[facility]                                                       [facility]                                                        Office: [address] [address]                                                       Telephone: [phone]. Fax: [phone]      CC: SOB    INTERVAL HISTORY: 83 yo woman with PMH of CAD, HFpEF, mild aortic stenosis (BILL=1.7cm2), HTN, COPD, T2DM, on insulin recently hospitalized at [facility] in 6/2019 for COPD exacerbation, now presenting with worsening exertional dyspnea associated with orthopnea and acute on chronic leg swelling for the past 1-2 days.  Pt reports some non-compliance with her lasix pills this week.  Pt also reporting severe leg pain since worsening of her leg swelling and per ED provider note and son Calvin Chávez via phone, pt had been prescribed vicodin PRN by house calls program. Pt denies any fevers, chills, cough, abdominal pain, nausea, vomiting, diarrhea. On admission, proBNP ~1000, troponin negative. (23 Jul 2019 04:23).  Has known CAD with  of RCA, s/p PCI with 1 ADELIA to LCx - 12/12/2018, s/p PCI with stent to mLAD and pLCx - 4/2017, HFpEF (ECHO 6/19- EF >75%, LVH, mild AS, DD II),   She has been c/o R leg pain, being treated out pt with vicodin. According to visiting RN notes, doppler and x rays negative last week. States pain got worse, causing difficulty breathing. Denies fever, chills, cough, phlegm production, chest pain, pressure, palpitations, irregular, fast heart beat, vomiting, melena, rectal bleed, hematuria, lightheadedness, dizziness, syncope, near syncope.  Pt denies any fevers, chills, cough, abdominal pain, nausea, vomiting, diarrhea. On admission, proBNP ~1000, troponin negative. (23 Jul 2019 04:23)  After discussing the options with patient and son, it was decided to implant a CardioMEMS to monitor HR and PAP. Cardiac cath in 12/2018 revealed mild elevation of right heart pressures with mild Pul HTN (MPAP=27 mmHg).   This morning patient underwent Rt heart cath and a CardioMEMS was successfully implanted in the lower left Posterior PA branch. Pulmonary artery pressures were low normal (Mean PA was 24-26 mmHg) with reduced CO and CI. There were no complications.  Kidney function has improved since yesterday (1.65 mg/dL), in view of her PAP, maybe patient was on the dry side and may benefit from some gentle hydration.      MEDICATIONS  (STANDING):  ALBUTerol/ipratropium for Nebulization. 3 milliLiter(s) Nebulizer once  aspirin enteric coated 81 milliGRAM(s) Oral daily  atorvastatin 80 milliGRAM(s) Oral at bedtime  clopidogrel Tablet 150 milliGRAM(s) Oral once  clopidogrel Tablet 75 milliGRAM(s) Oral daily  dextrose 5%. 1000 milliLiter(s) (50 mL/Hr) IV Continuous <Continuous>  dextrose 50% Injectable 12.5 Gram(s) IV Push once  dextrose 50% Injectable 25 Gram(s) IV Push once  dextrose 50% Injectable 25 Gram(s) IV Push once  gabapentin 100 milliGRAM(s) Oral three times a day  heparin  Injectable 5000 Unit(s) SubCutaneous every 8 hours  insulin glargine Injectable (LANTUS) 20 Unit(s) SubCutaneous two times a day  insulin lispro Injectable (HumaLOG) 10 Unit(s) SubCutaneous three times a day before meals  isosorbide   mononitrate ER Tablet (IMDUR) 30 milliGRAM(s) Oral daily  levothyroxine 125 MICROGram(s) Oral daily  metoprolol tartrate 50 milliGRAM(s) Oral two times a day  pantoprazole    Tablet 40 milliGRAM(s) Oral before breakfast  polyethylene glycol 3350 17 Gram(s) Oral daily  tiotropium 18 MICROgram(s) Capsule 1 Capsule(s) Inhalation daily    ROS: All others negative     PHYSICAL EXAM:  T(C): 36.6 (07-26-19 @ 07:20), Max: 37 (07-25-19 @ 21:15)  HR: 62 (07-26-19 @ 12:18) (58 - 91)  BP: 152/67 (07-26-19 @ 12:18) (99/41 - 154/67)  RR: 16 (07-26-19 @ 12:18) (13 - 18)  SpO2: 98% (07-26-19 @ 12:18) (94% - 100%)  Wt(kg): --  I&O's Summary    25 Jul 2019 07:01  -  26 Jul 2019 07:00  --------------------------------------------------------  IN: 720 mL / OUT: 350 mL / NET: 370 mL        Appearance: Normal	  HEENT:   Normal oral mucosa, PERRL, EOMI	  Lymphatic: No lymphadenopathy  Cardiovascular: Normal S1 S2, No JVD, No murmurs, No edema  Respiratory: Lungs clear to auscultation	  Psychiatry: A & O x 3, Mood & affect appropriate  Gastrointestinal:  Soft, Non-tender, + BS	  Skin: No rashes, No ecchymoses, No cyanosis  Neurologic: Non-focal  Extremities: Normal range of motion, No clubbing, cyanosis or edema  Vascular: Peripheral pulses palpable 2+ bilaterally    TELEMETRY: 	      LABS:	 	                        10.8   11.08 )-----------( 246      ( 26 Jul 2019 07:36 )             34.9     07-26    141  |  103  |  48.0<H>  ----------------------------<  84  4.2   |  26.0  |  1.65<H>    Ca    8.3<L>      26 Jul 2019 07:36  Mg     2.7     07-26    TPro  6.5<L>  /  Alb  3.3  /  TBili  0.4  /  DBili  x   /  AST  15  /  ALT  8   /  AlkPhos  55  07-26

## 2019-07-26 NOTE — PROGRESS NOTE ADULT - SUBJECTIVE AND OBJECTIVE BOX
Patient remains off floor to Cath Lab.     RN at bedside explains that patient denies pain when assessed by staff, but then tells family members that she has pain when they visit or call. Spoke to adult son Neil Chávez, at length, who admits his mother will not call for pain medications, out of fear that she will be bothering the staff. He inquires about use of a Fentanyl Patch, versus having medications as a standing order. He is concerned that her pain has ineffectively managed, subsequently worsening over the last few weeks, and he would like better plan for analgesia. The concern for oversedation noted, due to her compromised renal function and sensitivity to previously administered opioids.    Plan discussed:  1. Lidocaine patch to affected limb  2. Tylenol 650mg PO q6hrs ATC  - May be given in conjunction with Oxycodone IR, if pain score is greater than 4/10  3. Discontinue Tramadol  4. Offer Oxycodone IR 2.5-5mg mg PO q6hrs PRN moderate-severe pain  5. Offer Dilaudid 0.3mg q6hrs PRN severe pain (as an alternative to Oxycodone IR 5mg)  6. No long acting opioids advised at this time. HOLD short acting opioids for concern of oversedation.  7. Pain service to follow up for re-assessment    He is amenable to this plan.

## 2019-07-27 LAB
ALBUMIN SERPL ELPH-MCNC: 3.1 G/DL — LOW (ref 3.3–5.2)
ALP SERPL-CCNC: 60 U/L — SIGNIFICANT CHANGE UP (ref 40–120)
ALT FLD-CCNC: 8 U/L — SIGNIFICANT CHANGE UP
ANION GAP SERPL CALC-SCNC: 11 MMOL/L — SIGNIFICANT CHANGE UP (ref 5–17)
AST SERPL-CCNC: 12 U/L — SIGNIFICANT CHANGE UP
BILIRUB SERPL-MCNC: 0.5 MG/DL — SIGNIFICANT CHANGE UP (ref 0.4–2)
BUN SERPL-MCNC: 46 MG/DL — HIGH (ref 8–20)
CALCIUM SERPL-MCNC: 8.5 MG/DL — LOW (ref 8.6–10.2)
CHLORIDE SERPL-SCNC: 101 MMOL/L — SIGNIFICANT CHANGE UP (ref 98–107)
CO2 SERPL-SCNC: 24 MMOL/L — SIGNIFICANT CHANGE UP (ref 22–29)
CREAT SERPL-MCNC: 1.35 MG/DL — HIGH (ref 0.5–1.3)
GLUCOSE BLDC GLUCOMTR-MCNC: 121 MG/DL — HIGH (ref 70–99)
GLUCOSE BLDC GLUCOMTR-MCNC: 133 MG/DL — HIGH (ref 70–99)
GLUCOSE BLDC GLUCOMTR-MCNC: 157 MG/DL — HIGH (ref 70–99)
GLUCOSE BLDC GLUCOMTR-MCNC: 187 MG/DL — HIGH (ref 70–99)
GLUCOSE BLDC GLUCOMTR-MCNC: 55 MG/DL — LOW (ref 70–99)
GLUCOSE BLDC GLUCOMTR-MCNC: 57 MG/DL — LOW (ref 70–99)
GLUCOSE BLDC GLUCOMTR-MCNC: 78 MG/DL — SIGNIFICANT CHANGE UP (ref 70–99)
GLUCOSE BLDC GLUCOMTR-MCNC: 84 MG/DL — SIGNIFICANT CHANGE UP (ref 70–99)
GLUCOSE BLDC GLUCOMTR-MCNC: 98 MG/DL — SIGNIFICANT CHANGE UP (ref 70–99)
GLUCOSE SERPL-MCNC: 142 MG/DL — HIGH (ref 70–115)
HCT VFR BLD CALC: 35.1 % — SIGNIFICANT CHANGE UP (ref 34.5–45)
HGB BLD-MCNC: 10.8 G/DL — LOW (ref 11.5–15.5)
MAGNESIUM SERPL-MCNC: 2.7 MG/DL — HIGH (ref 1.8–2.6)
MCHC RBC-ENTMCNC: 27.3 PG — SIGNIFICANT CHANGE UP (ref 27–34)
MCHC RBC-ENTMCNC: 30.8 GM/DL — LOW (ref 32–36)
MCV RBC AUTO: 88.6 FL — SIGNIFICANT CHANGE UP (ref 80–100)
PLATELET # BLD AUTO: 248 K/UL — SIGNIFICANT CHANGE UP (ref 150–400)
POTASSIUM SERPL-MCNC: 4.6 MMOL/L — SIGNIFICANT CHANGE UP (ref 3.5–5.3)
POTASSIUM SERPL-SCNC: 4.6 MMOL/L — SIGNIFICANT CHANGE UP (ref 3.5–5.3)
PROT SERPL-MCNC: 6.5 G/DL — LOW (ref 6.6–8.7)
RBC # BLD: 3.96 M/UL — SIGNIFICANT CHANGE UP (ref 3.8–5.2)
RBC # FLD: 15.3 % — HIGH (ref 10.3–14.5)
SODIUM SERPL-SCNC: 136 MMOL/L — SIGNIFICANT CHANGE UP (ref 135–145)
WBC # BLD: 14.97 K/UL — HIGH (ref 3.8–10.5)
WBC # FLD AUTO: 14.97 K/UL — HIGH (ref 3.8–10.5)

## 2019-07-27 PROCEDURE — 76775 US EXAM ABDO BACK WALL LIM: CPT | Mod: 26

## 2019-07-27 PROCEDURE — 99233 SBSQ HOSP IP/OBS HIGH 50: CPT

## 2019-07-27 RX ORDER — DEXTROSE 50 % IN WATER 50 %
15 SYRINGE (ML) INTRAVENOUS ONCE
Refills: 0 | Status: COMPLETED | OUTPATIENT
Start: 2019-07-27 | End: 2019-07-27

## 2019-07-27 RX ORDER — INSULIN GLARGINE 100 [IU]/ML
20 INJECTION, SOLUTION SUBCUTANEOUS AT BEDTIME
Refills: 0 | Status: DISCONTINUED | OUTPATIENT
Start: 2019-07-27 | End: 2019-07-30

## 2019-07-27 RX ORDER — FUROSEMIDE 40 MG
20 TABLET ORAL DAILY
Refills: 0 | Status: DISCONTINUED | OUTPATIENT
Start: 2019-07-27 | End: 2019-07-30

## 2019-07-27 RX ADMIN — ISOSORBIDE MONONITRATE 30 MILLIGRAM(S): 60 TABLET, EXTENDED RELEASE ORAL at 14:35

## 2019-07-27 RX ADMIN — HEPARIN SODIUM 5000 UNIT(S): 5000 INJECTION INTRAVENOUS; SUBCUTANEOUS at 14:34

## 2019-07-27 RX ADMIN — LIDOCAINE 1 PATCH: 4 CREAM TOPICAL at 14:35

## 2019-07-27 RX ADMIN — LIDOCAINE 1 PATCH: 4 CREAM TOPICAL at 20:06

## 2019-07-27 RX ADMIN — Medication 81 MILLIGRAM(S): at 14:35

## 2019-07-27 RX ADMIN — Medication 125 MICROGRAM(S): at 06:05

## 2019-07-27 RX ADMIN — OXYCODONE HYDROCHLORIDE 2.5 MILLIGRAM(S): 5 TABLET ORAL at 09:15

## 2019-07-27 RX ADMIN — TIOTROPIUM BROMIDE 1 CAPSULE(S): 18 CAPSULE ORAL; RESPIRATORY (INHALATION) at 08:14

## 2019-07-27 RX ADMIN — CLOPIDOGREL BISULFATE 75 MILLIGRAM(S): 75 TABLET, FILM COATED ORAL at 14:35

## 2019-07-27 RX ADMIN — Medication 650 MILLIGRAM(S): at 06:04

## 2019-07-27 RX ADMIN — Medication 50 MILLIGRAM(S): at 06:05

## 2019-07-27 RX ADMIN — GABAPENTIN 100 MILLIGRAM(S): 400 CAPSULE ORAL at 23:41

## 2019-07-27 RX ADMIN — Medication 20 MILLIGRAM(S): at 14:35

## 2019-07-27 RX ADMIN — ATORVASTATIN CALCIUM 80 MILLIGRAM(S): 80 TABLET, FILM COATED ORAL at 23:37

## 2019-07-27 RX ADMIN — Medication 650 MILLIGRAM(S): at 06:34

## 2019-07-27 RX ADMIN — INSULIN GLARGINE 20 UNIT(S): 100 INJECTION, SOLUTION SUBCUTANEOUS at 08:56

## 2019-07-27 RX ADMIN — Medication 50 MILLIGRAM(S): at 17:20

## 2019-07-27 RX ADMIN — HEPARIN SODIUM 5000 UNIT(S): 5000 INJECTION INTRAVENOUS; SUBCUTANEOUS at 23:39

## 2019-07-27 RX ADMIN — HEPARIN SODIUM 5000 UNIT(S): 5000 INJECTION INTRAVENOUS; SUBCUTANEOUS at 06:05

## 2019-07-27 RX ADMIN — PANTOPRAZOLE SODIUM 40 MILLIGRAM(S): 20 TABLET, DELAYED RELEASE ORAL at 06:05

## 2019-07-27 RX ADMIN — OXYCODONE HYDROCHLORIDE 2.5 MILLIGRAM(S): 5 TABLET ORAL at 08:56

## 2019-07-27 RX ADMIN — Medication 10 UNIT(S): at 08:56

## 2019-07-27 RX ADMIN — GABAPENTIN 100 MILLIGRAM(S): 400 CAPSULE ORAL at 14:35

## 2019-07-27 RX ADMIN — LIDOCAINE 1 PATCH: 4 CREAM TOPICAL at 03:30

## 2019-07-27 RX ADMIN — Medication 15 GRAM(S): at 12:48

## 2019-07-27 RX ADMIN — GABAPENTIN 100 MILLIGRAM(S): 400 CAPSULE ORAL at 06:07

## 2019-07-27 RX ADMIN — Medication 650 MILLIGRAM(S): at 14:35

## 2019-07-27 RX ADMIN — POLYETHYLENE GLYCOL 3350 17 GRAM(S): 17 POWDER, FOR SOLUTION ORAL at 14:34

## 2019-07-27 RX ADMIN — Medication 650 MILLIGRAM(S): at 15:30

## 2019-07-27 NOTE — PROGRESS NOTE ADULT - ASSESSMENT
83 y/o female with PMH Copd, LD, Chf, mild dementia admiited for copd excab/chf  Echo EF 75, DD, Mild pulm htn, Severe annular calcification, mild as, Cardiomens inserted with low filling pressures and currently off lasix

## 2019-07-27 NOTE — PROGRESS NOTE ADULT - SUBJECTIVE AND OBJECTIVE BOX
FLOYD PAEZ    [id number]    84y      Female    INTERVAL HPI/OVERNIGHT EVENTS:    patent being seen for CHF and is s/p cardiomems yesterday. Patient seen at bedside and denies any complaints. Patient is pleasantly confused    REVIEW OF SYSTEMS:    CONSTITUTIONAL: No fever, weight loss, or fatigue  RESPIRATORY: No cough, wheezing, hemoptysis; No shortness of breath  CARDIOVASCULAR: No chest pain, palpitations  GASTROINTESTINAL: No abdominal or epigastric pain. No nausea, vomiting  NEUROLOGICAL: No headaches, memory loss, loss of strength.  MISCELLANEOUS:      Vital Signs Last 24 Hrs  T(C): 36.6 (2019 06:13), Max: 37.1 (2019 22:52)  T(F): 97.8 (2019 06:13), Max: 98.8 (2019 22:52)  HR: 69 (2019 08:17) (69 - 80)  BP: 151/92 (2019 06:13) (102/64 - 151/92)  BP(mean): --  RR: 18 (2019 06:13) (18 - 18)  SpO2: 96% (2019 08:17) (91% - 96%)    PHYSICAL EXAM:    GENERAL: NAD, pleasantly forgetful   HEENT: PERRL, +EOMI  NECK: soft, Supple, No JVD,   CHEST/LUNG: Clear to auscultation bilaterally; No wheezing  HEART: S1S2+, Regular rate and rhythm; No murmurs, rubs, or gallops  ABDOMEN: Soft, obese  EXTREMITIES:  2+ Peripheral Pulses, edema improved  SKIN: No rashes or lesions  NEURO: AAOX2-3, no focal deficits,   PSYCH: normal mood      LABS:                        10.8   14.97 )-----------( 248      ( 2019 06:35 )             35.1     07-27    136  |  101  |  46.0<H>  ----------------------------<  142<H>  4.6   |  24.0  |  1.35<H>    Ca    8.5<L>      2019 06:35  Mg     2.7     07-27    TPro  6.5<L>  /  Alb  3.1<L>  /  TBili  0.5  /  DBili  x   /  AST  12  /  ALT  8   /  AlkPhos  60  07-27      Urinalysis Basic - ( 2019 22:26 )    Color: Yellow / Appearance: Clear / S.020 / pH: x  Gluc: x / Ketone: Negative  / Bili: Negative / Urobili: Negative mg/dL   Blood: x / Protein: Negative mg/dL / Nitrite: Negative   Leuk Esterase: Small / RBC: x / WBC 3-5   Sq Epi: x / Non Sq Epi: Moderate / Bacteria: Occasional          MEDICATIONS  (STANDING):  acetaminophen   Tablet .. 650 milliGRAM(s) Oral <User Schedule>  acetaminophen  IVPB .. 1000 milliGRAM(s) IV Intermittent once  aspirin enteric coated 81 milliGRAM(s) Oral daily  atorvastatin 80 milliGRAM(s) Oral at bedtime  clopidogrel Tablet 75 milliGRAM(s) Oral daily  dextrose 5% + sodium chloride 0.9%. 1000 milliLiter(s) (80 mL/Hr) IV Continuous <Continuous>  dextrose 5%. 1000 milliLiter(s) (50 mL/Hr) IV Continuous <Continuous>  dextrose 50% Injectable 12.5 Gram(s) IV Push once  dextrose 50% Injectable 25 Gram(s) IV Push once  dextrose 50% Injectable 25 Gram(s) IV Push once  furosemide    Tablet 20 milliGRAM(s) Oral daily  gabapentin 100 milliGRAM(s) Oral three times a day  heparin  Injectable 5000 Unit(s) SubCutaneous every 8 hours  insulin glargine Injectable (LANTUS) 20 Unit(s) SubCutaneous at bedtime  isosorbide   mononitrate ER Tablet (IMDUR) 30 milliGRAM(s) Oral daily  levothyroxine 125 MICROGram(s) Oral daily  lidocaine   Patch 1 Patch Transdermal daily  metoprolol tartrate 50 milliGRAM(s) Oral two times a day  pantoprazole    Tablet 40 milliGRAM(s) Oral before breakfast  polyethylene glycol 3350 17 Gram(s) Oral daily  tiotropium 18 MICROgram(s) Capsule 1 Capsule(s) Inhalation daily    MEDICATIONS  (PRN):  ALBUTerol/ipratropium for Nebulization 3 milliLiter(s) Nebulizer every 6 hours PRN Shortness of Breath and/or Wheezing  dextrose 40% Gel 15 Gram(s) Oral once PRN Blood Glucose LESS THAN 70 milliGRAM(s)/deciliter  glucagon  Injectable 1 milliGRAM(s) IntraMuscular once PRN Glucose LESS THAN 70 milligrams/deciliter  hydrALAZINE Injectable 10 milliGRAM(s) IV Push every 6 hours PRN for sbp above 160 mmhg  HYDROmorphone  Injectable 0.3 milliGRAM(s) IV Push every 12 hours PRN Severe Pain persisting 1 hour after Oxycodone  oxyCODONE    IR 5 milliGRAM(s) Oral every 6 hours PRN Severe Pain (7 - 10)  oxyCODONE    IR 2.5 milliGRAM(s) Oral every 4 hours PRN Moderate Pain (4 - 6)      RADIOLOGY & ADDITIONAL TESTS:

## 2019-07-27 NOTE — PROGRESS NOTE ADULT - SUBJECTIVE AND OBJECTIVE BOX
Patient seen and examined at bedside. Nursing reports overnight increased confusion with titrated pain medication dosing; improvement reported throughout today especially with use of lidocaine patch. At bedside, patient sleeping comfortably, opens eyes to voice. Reports minimal pain at present, significant improvement with use of lidocaine patch on ankle. Denies chest pain, new weakness, paresthesias, headache.     GENERAL: Lying comfortably in bed, NAD   HEENT: PERRL, +EOMI  CHEST/LUNG:  breathing non labored  EXTREMITIES:  2+ Peripheral Pulses, edema  MSK: SAMANIEGO spontanouesly  Light touch sensation grossly intact

## 2019-07-27 NOTE — PROGRESS NOTE ADULT - ASSESSMENT
84yoF hx CAD, HFpEF, HTN, COPD, T2DM, recently hospitalized at [facility] in 6/2019 for COPD exacerbation, now presenting with worsening exertional dyspnea associated with orthopnea and acute on chronic leg swelling for the past 1-2 days, being admitted for acute decompensated heart failure    #Acute decompensated CHF- improving   -> cardio following   --> s/p cardio mems on friday   --> resume po lasix     #Leg pain and swelling  - duplex neg for dvt  --> pain manageemnt following  -> pain resolved    #acute on chronic renal failure - likely secondary to hctz and ace-I  -->renal following  --> improved  --> resume lasix     #CAD- ASA and statin.     #COPD- Spiriva. DuoNebs PRN.     #hypoglycemia -> cut back in lantus and dc premeal   --> c.w fluids    #HTN- Metoprolol, imdur      #Hypothyroidism- Synthroid.     #constipaiton - resolved    dnr dni.  hopeful dc back with home aides on monday   spoke to son and discussed plan

## 2019-07-27 NOTE — PROGRESS NOTE ADULT - SUBJECTIVE AND OBJECTIVE BOX
[facility]  [address], [place] [address]    REASON FOR VISIT: Follow up on chf  UPDATE:  States breathing pattern is better, less sob    TELEMETRY MONITORIN-27    136  |  101  |  46.0<H>  ----------------------------<  142<H>  4.6   |  24.0  |  1.35<H>    Ca    8.5<L>      2019 06:35  Mg     2.7         TPro  6.5<L>  /  Alb  3.1<L>  /  TBili  0.5  /  DBili  x   /  AST  12  /  ALT  8   /  AlkPhos  60      LIVER FUNCTIONS - ( 2019 06:35 )  Alb: 3.1 g/dL / Pro: 6.5 g/dL / ALK PHOS: 60 U/L / ALT: 8 U/L / AST: 12 U/L / GGT: x           19 @ 07:01  -  - @ 07:00  --------------------------------------------------------  IN: 600 mL / OUT: 250 mL / NET: 350 mL    MEDICATIONS  (STANDING):  acetaminophen   Tablet .. 650 milliGRAM(s) Oral <User Schedule>  acetaminophen  IVPB .. 1000 milliGRAM(s) IV Intermittent once  aspirin enteric coated 81 milliGRAM(s) Oral daily  atorvastatin 80 milliGRAM(s) Oral at bedtime  clopidogrel Tablet 75 milliGRAM(s) Oral daily  gabapentin 100 milliGRAM(s) Oral three times a day  heparin  Injectable 5000 Unit(s) SubCutaneous every 8 hours  insulin glargine Injectable (LANTUS) 20 Unit(s) SubCutaneous two times a day  insulin lispro Injectable (HumaLOG) 10 Unit(s) SubCutaneous three times a day before meals  isosorbide   mononitrate ER Tablet (IMDUR) 30 milliGRAM(s) Oral daily  levothyroxine 125 MICROGram(s) Oral daily  lidocaine   Patch 1 Patch Transdermal daily  metoprolol tartrate 50 milliGRAM(s) Oral two times a day  pantoprazole    Tablet 40 milliGRAM(s) Oral before breakfast  polyethylene glycol 3350 17 Gram(s) Oral daily  tiotropium 18 MICROgram(s) Capsule 1 Capsule(s) Inhalation daily    ROS:  No fever chills  Cardiac  No cp sob or palp  Resp  no cough no mucus production  Gi  no abd pain no melana  Ext No calf tenderness, no edema    Vital Signs Last 24 Hrs  T(C): 36.6 (2019 06:13), Max: 37.1 (2019 22:52)  T(F): 97.8 (2019 06:13), Max: 98.8 (2019 22:52)  HR: 69 (2019 08:17) (58 - 80)  BP: 151/92 (2019 06:13) (102/64 - 155/81)  BP(mean): --  RR: 18 (2019 06:13) (13 - 18)  SpO2: 96% (2019 08:17) (91% - 98%)  T(C): 36.6 (19 @ 06:13), Max: 37.1 (19 @ 22:52)  HR: 69 (19 @ 08:17) (58 - 80)  BP: 151/92 (19 @ 06:13) (102/64 - 155/81)  RR: 18 (19 @ 06:13) (13 - 18)  SpO2: 96% (19 @ 08:17) (91% - 98%)    HEENT Head atraumatic eomi, oral mucosa moist  CV S1&S2      No r/g/ m   RESP    GI  Soft active bowel sounds non tender  EXT  No clubbing/Cyanosis /Edema  NEURO  Alert oriented  No gross motor or sensory deficits [place] [facility]  [address], [place] [address]    REASON FOR VISIT: Follow up on chf  UPDATE:  States breathing pattern is better, less sob    TELEMETRY MONITORING:  Sinus margaret  No events    07-27    136  |  101  |  46.0<H>  ----------------------------<  142<H>  4.6   |  24.0  |  1.35<H>    Ca    8.5<L>      27 Jul 2019 06:35  Mg     2.7     07-27    TPro  6.5<L>  /  Alb  3.1<L>  /  TBili  0.5  /  DBili  x   /  AST  12  /  ALT  8   /  AlkPhos  60  07-27    LIVER FUNCTIONS - ( 27 Jul 2019 06:35 )  Alb: 3.1 g/dL / Pro: 6.5 g/dL / ALK PHOS: 60 U/L / ALT: 8 U/L / AST: 12 U/L / GGT: x           07-26-19 @ 07:01  -  07-27-19 @ 07:00  --------------------------------------------------------  IN: 600 mL / OUT: 250 mL / NET: 350 mL    MEDICATIONS  (STANDING):  acetaminophen   Tablet .. 650 milliGRAM(s) Oral <User Schedule>  acetaminophen  IVPB .. 1000 milliGRAM(s) IV Intermittent once  aspirin enteric coated 81 milliGRAM(s) Oral daily  atorvastatin 80 milliGRAM(s) Oral at bedtime  clopidogrel Tablet 75 milliGRAM(s) Oral daily  gabapentin 100 milliGRAM(s) Oral three times a day  heparin  Injectable 5000 Unit(s) SubCutaneous every 8 hours  insulin glargine Injectable (LANTUS) 20 Unit(s) SubCutaneous two times a day  insulin lispro Injectable (HumaLOG) 10 Unit(s) SubCutaneous three times a day before meals  isosorbide   mononitrate ER Tablet (IMDUR) 30 milliGRAM(s) Oral daily  levothyroxine 125 MICROGram(s) Oral daily  lidocaine   Patch 1 Patch Transdermal daily  metoprolol tartrate 50 milliGRAM(s) Oral two times a day  pantoprazole    Tablet 40 milliGRAM(s) Oral before breakfast  polyethylene glycol 3350 17 Gram(s) Oral daily  tiotropium 18 MICROgram(s) Capsule 1 Capsule(s) Inhalation daily    ROS:  No fever chills  Cardiac  No cp sob or palp  Resp  no cough no mucus production  Gi  no abd pain no melana  Ext No calf tenderness, no edema    Vital Signs Last 24 Hrs  T(C): 36.6 (27 Jul 2019 06:13), Max: 37.1 (26 Jul 2019 22:52)  T(F): 97.8 (27 Jul 2019 06:13), Max: 98.8 (26 Jul 2019 22:52)  HR: 69 (27 Jul 2019 08:17) (58 - 80)  BP: 151/92 (27 Jul 2019 06:13) (102/64 - 155/81)  BP(mean): --  RR: 18 (27 Jul 2019 06:13) (13 - 18)  SpO2: 96% (27 Jul 2019 08:17) (91% - 98%)  T(C): 36.6 (07-27-19 @ 06:13), Max: 37.1 (07-26-19 @ 22:52)  HR: 69 (07-27-19 @ 08:17) (58 - 80)  BP: 151/92 (07-27-19 @ 06:13) (102/64 - 155/81)  RR: 18 (07-27-19 @ 06:13) (13 - 18)  SpO2: 96% (07-27-19 @ 08:17) (91% - 98%)    HEENT Head atraumatic eomi, oral mucosa moist  CV S1&S2      No r/g/ m   RESP    GI  Soft active bowel sounds non tender  EXT  No clubbing/Cyanosis /Edema  NEURO  Alert oriented  No gross motor or sensory deficits [place] [facility]  [facility]  [address], [place] [address]    REASON FOR VISIT: Follow up on chf  UPDATE:  States breathing pattern is better, less sob  Filling pressures low with cardiomens  TELEMETRY MONITORING:  Sinus margaret  No events    07-27    136  |  101  |  46.0<H>  ----------------------------<  142<H>  4.6   |  24.0  |  1.35<H>    Ca    8.5<L>      27 Jul 2019 06:35  Mg     2.7     07-27    TPro  6.5<L>  /  Alb  3.1<L>  /  TBili  0.5  /  DBili  x   /  AST  12  /  ALT  8   /  AlkPhos  60  07-27    LIVER FUNCTIONS - ( 27 Jul 2019 06:35 )  Alb: 3.1 g/dL / Pro: 6.5 g/dL / ALK PHOS: 60 U/L / ALT: 8 U/L / AST: 12 U/L / GGT: x           07-26-19 @ 07:01  -  07-27-19 @ 07:00  --------------------------------------------------------  IN: 600 mL / OUT: 250 mL / NET: 350 mL    MEDICATIONS  (STANDING):  acetaminophen   Tablet .. 650 milliGRAM(s) Oral <User Schedule>  acetaminophen  IVPB .. 1000 milliGRAM(s) IV Intermittent once  aspirin enteric coated 81 milliGRAM(s) Oral daily  atorvastatin 80 milliGRAM(s) Oral at bedtime  clopidogrel Tablet 75 milliGRAM(s) Oral daily  gabapentin 100 milliGRAM(s) Oral three times a day  heparin  Injectable 5000 Unit(s) SubCutaneous every 8 hours  insulin glargine Injectable (LANTUS) 20 Unit(s) SubCutaneous two times a day  insulin lispro Injectable (HumaLOG) 10 Unit(s) SubCutaneous three times a day before meals  isosorbide   mononitrate ER Tablet (IMDUR) 30 milliGRAM(s) Oral daily  levothyroxine 125 MICROGram(s) Oral daily  lidocaine   Patch 1 Patch Transdermal daily  metoprolol tartrate 50 milliGRAM(s) Oral two times a day  pantoprazole    Tablet 40 milliGRAM(s) Oral before breakfast  polyethylene glycol 3350 17 Gram(s) Oral daily  tiotropium 18 MICROgram(s) Capsule 1 Capsule(s) Inhalation daily    ROS:  No fever chills  Cardiac  No cp sob or palp  Resp  no cough no mucus production  Gi  no abd pain no melana  Ext No calf tenderness, no edema    Vital Signs Last 24 Hrs  T(C): 36.6 (27 Jul 2019 06:13), Max: 37.1 (26 Jul 2019 22:52)  T(F): 97.8 (27 Jul 2019 06:13), Max: 98.8 (26 Jul 2019 22:52)  HR: 69 (27 Jul 2019 08:17) (58 - 80)  BP: 151/92 (27 Jul 2019 06:13) (102/64 - 155/81)  BP(mean): --  RR: 18 (27 Jul 2019 06:13) (13 - 18)  SpO2: 96% (27 Jul 2019 08:17) (91% - 98%)  T(C): 36.6 (07-27-19 @ 06:13), Max: 37.1 (07-26-19 @ 22:52)  HR: 69 (07-27-19 @ 08:17) (58 - 80)  BP: 151/92 (07-27-19 @ 06:13) (102/64 - 155/81)  RR: 18 (07-27-19 @ 06:13) (13 - 18)  SpO2: 96% (07-27-19 @ 08:17) (91% - 98%)    HEENT Head atraumatic eomi, oral mucosa moist  CV S1&S2      No r/g/ m   RESP  CTA  GI  Soft active bowel sounds non tender Right groin no hematoma  EXT  No clubbing/Cyanosis / trace pedal edema  NEURO  Alert Some confusion  No gross motor or sensory deficits    Cardiac Cath Lab - Adult (07.26.19 @ 09:14) >  DIAGNOSTIC IMPRESSIONS: Mild Elevation of Rt Heart Pressures:  RA=8 mmHg; RV=47/19; PCWP=23 mmHg  Mild Pulmonary Hypertension: 41/14 - 26 mmHg  Reduced CO (4.17 mL/min) and CI (2.15 mL/min/m2)  Successful Implantation and Calibration of CardioMEMS HF Sensor  DIAGNOSTIC RECOMMENDATIONS: The patient should continue with the present  medications. Patient management should include aggressive medical therapy,  close monitoring of BUN and creatinine, resumption of all previous  activities in 2 days, an exercise program, a cardiac rehabilitation  program, and weight reduction. The patient should follow a low fat and low  calorie diet. Medical management is recommended.  INTERVENTIONAL IMPRESSIONS: Mild Elevation of Rt Heart Pressures:  RA=8 mmHg; RV=47/19; PCWP=23 mmHg  Mild Pulmonary Hypertension: 41/14 - 26 mmHg  Reduced CO (4.17 mL/min) and CI (2.15 mL/min/m2)    < from: TTE Echo Complete w/Doppler (06.03.19 @ 10:15) >  Summary:   1. Left ventricular ejection fraction, by visual estimation, is >75%.   2. Technically difficult study.   3. Hyperdynamic global left ventricular systolic function.   4. Mildly increased LV wall thickness.   5. Normal left ventricular internal cavity size.   6. Spectral Doppler shows pseudonormal pattern of left ventricular   myocardial filling (Grade II diastolic dysfunction).   7. There is no evidence of pericardial effusion.   8. Severe mitral annular calcification.   9. Estimated pulmonary artery systolic pressure is 35.7 mmHg assuming a   right atrial pressure of 5 mmHg, which is consistent with borderline   pulmonary hypertension.  10. Mitral valve mean gradient is 2.0 mmHg consistent with mild mitral   stenosis.  11. Peak transaortic gradient equals 40.4 mmHg, mean transaortic gradient   equals 21.5 mmHg, the calculated aortic valve area equals 1.73 cm² by the   continuity equation consistent with mild aortic stenosis.

## 2019-07-27 NOTE — PROGRESS NOTE ADULT - ASSESSMENT
83 y/o F with PMHx CAD, HTN, COPD, T2DM, recently hospitalized at [facility] in 6/2019 for COPD exacerbation, now admitted with worsening exertional dyspnea associated with orthopnea and acute on chronic leg swelling; acute decompensated heart failure. Pain currently well controlled.  -Maximize use of nonopiate medication due to potential side effects; continue oxycodone 2.5mg/5mg q6h prn mod/severe pain  -Continue use of lidocaine patch, tylenol  -Care per cardio/primary team

## 2019-07-28 LAB
GLUCOSE BLDC GLUCOMTR-MCNC: 115 MG/DL — HIGH (ref 70–99)
GLUCOSE BLDC GLUCOMTR-MCNC: 137 MG/DL — HIGH (ref 70–99)
GLUCOSE BLDC GLUCOMTR-MCNC: 187 MG/DL — HIGH (ref 70–99)
GLUCOSE BLDC GLUCOMTR-MCNC: 187 MG/DL — HIGH (ref 70–99)

## 2019-07-28 PROCEDURE — 71046 X-RAY EXAM CHEST 2 VIEWS: CPT | Mod: 26

## 2019-07-28 PROCEDURE — 99233 SBSQ HOSP IP/OBS HIGH 50: CPT

## 2019-07-28 RX ADMIN — ISOSORBIDE MONONITRATE 30 MILLIGRAM(S): 60 TABLET, EXTENDED RELEASE ORAL at 12:23

## 2019-07-28 RX ADMIN — Medication 20 MILLIGRAM(S): at 06:06

## 2019-07-28 RX ADMIN — OXYCODONE HYDROCHLORIDE 5 MILLIGRAM(S): 5 TABLET ORAL at 17:11

## 2019-07-28 RX ADMIN — CLOPIDOGREL BISULFATE 75 MILLIGRAM(S): 75 TABLET, FILM COATED ORAL at 12:23

## 2019-07-28 RX ADMIN — GABAPENTIN 100 MILLIGRAM(S): 400 CAPSULE ORAL at 22:56

## 2019-07-28 RX ADMIN — Medication 650 MILLIGRAM(S): at 13:10

## 2019-07-28 RX ADMIN — POLYETHYLENE GLYCOL 3350 17 GRAM(S): 17 POWDER, FOR SOLUTION ORAL at 12:25

## 2019-07-28 RX ADMIN — HEPARIN SODIUM 5000 UNIT(S): 5000 INJECTION INTRAVENOUS; SUBCUTANEOUS at 06:07

## 2019-07-28 RX ADMIN — LIDOCAINE 1 PATCH: 4 CREAM TOPICAL at 12:24

## 2019-07-28 RX ADMIN — GABAPENTIN 100 MILLIGRAM(S): 400 CAPSULE ORAL at 06:06

## 2019-07-28 RX ADMIN — LIDOCAINE 1 PATCH: 4 CREAM TOPICAL at 19:01

## 2019-07-28 RX ADMIN — Medication 650 MILLIGRAM(S): at 06:07

## 2019-07-28 RX ADMIN — OXYCODONE HYDROCHLORIDE 5 MILLIGRAM(S): 5 TABLET ORAL at 16:11

## 2019-07-28 RX ADMIN — Medication 125 MICROGRAM(S): at 06:06

## 2019-07-28 RX ADMIN — HEPARIN SODIUM 5000 UNIT(S): 5000 INJECTION INTRAVENOUS; SUBCUTANEOUS at 15:47

## 2019-07-28 RX ADMIN — TIOTROPIUM BROMIDE 1 CAPSULE(S): 18 CAPSULE ORAL; RESPIRATORY (INHALATION) at 08:45

## 2019-07-28 RX ADMIN — Medication 650 MILLIGRAM(S): at 12:24

## 2019-07-28 RX ADMIN — ATORVASTATIN CALCIUM 80 MILLIGRAM(S): 80 TABLET, FILM COATED ORAL at 22:57

## 2019-07-28 RX ADMIN — HYDROMORPHONE HYDROCHLORIDE 0.3 MILLIGRAM(S): 2 INJECTION INTRAMUSCULAR; INTRAVENOUS; SUBCUTANEOUS at 10:00

## 2019-07-28 RX ADMIN — PANTOPRAZOLE SODIUM 40 MILLIGRAM(S): 20 TABLET, DELAYED RELEASE ORAL at 06:06

## 2019-07-28 RX ADMIN — INSULIN GLARGINE 20 UNIT(S): 100 INJECTION, SOLUTION SUBCUTANEOUS at 22:57

## 2019-07-28 RX ADMIN — HEPARIN SODIUM 5000 UNIT(S): 5000 INJECTION INTRAVENOUS; SUBCUTANEOUS at 22:57

## 2019-07-28 RX ADMIN — Medication 81 MILLIGRAM(S): at 12:23

## 2019-07-28 RX ADMIN — LIDOCAINE 1 PATCH: 4 CREAM TOPICAL at 02:35

## 2019-07-28 RX ADMIN — GABAPENTIN 100 MILLIGRAM(S): 400 CAPSULE ORAL at 16:12

## 2019-07-28 RX ADMIN — Medication 50 MILLIGRAM(S): at 06:06

## 2019-07-28 RX ADMIN — HYDROMORPHONE HYDROCHLORIDE 0.3 MILLIGRAM(S): 2 INJECTION INTRAMUSCULAR; INTRAVENOUS; SUBCUTANEOUS at 09:06

## 2019-07-28 NOTE — PROGRESS NOTE ADULT - SUBJECTIVE AND OBJECTIVE BOX
FLOYD PAEZ    [id number]    84y      Female    INTERVAL HPI/OVERNIGHT EVENTS:    patient being seen for chf and veda. Patient seen at bedside with family and denies any complaints    last 24 hours patient had a fever of 101.6.     REVIEW OF SYSTEMS:    CONSTITUTIONAL: No fever, weight loss, or fatigue  RESPIRATORY: No cough, wheezing, hemoptysis; No shortness of breath  CARDIOVASCULAR: No chest pain, palpitations  GASTROINTESTINAL: No abdominal or epigastric pain. No nausea, vomiting  NEUROLOGICAL: No headaches, memory loss, loss of strength.  MISCELLANEOUS:      Vital Signs Last 24 Hrs  T(C): 36.9 (28 Jul 2019 07:54), Max: 38.7 (28 Jul 2019 06:20)  T(F): 98.5 (28 Jul 2019 07:54), Max: 101.6 (28 Jul 2019 06:20)  HR: 70 (28 Jul 2019 08:45) (70 - 80)  BP: 137/70 (28 Jul 2019 06:18) (112/70 - 152/76)  BP(mean): --  RR: 18 (28 Jul 2019 06:18) (18 - 18)  SpO2: 95% (28 Jul 2019 08:45) (93% - 98%)    PHYSICAL EXAM:    GENERAL: NAD, pleasantly forgetful   HEENT: PERRL, +EOMI  NECK: soft, Supple, No JVD,   CHEST/LUNG: Clear to auscultation bilaterally;   HEART: S1S2+, Regular rate and rhythm; No murmurs, rubs, or gallops  ABDOMEN: Soft, obese  EXTREMITIES:  2+ Peripheral Pulses, edema improved  SKIN: No rashes or lesions  NEURO: AAOX2-3, no focal deficits,   PSYCH: normal mood    LABS:                        10.8   14.97 )-----------( 248      ( 27 Jul 2019 06:35 )             35.1     07-27    136  |  101  |  46.0<H>  ----------------------------<  142<H>  4.6   |  24.0  |  1.35<H>    Ca    8.5<L>      27 Jul 2019 06:35  Mg     2.7     07-27    TPro  6.5<L>  /  Alb  3.1<L>  /  TBili  0.5  /  DBili  x   /  AST  12  /  ALT  8   /  AlkPhos  60  07-27            MEDICATIONS  (STANDING):  acetaminophen   Tablet .. 650 milliGRAM(s) Oral <User Schedule>  acetaminophen  IVPB .. 1000 milliGRAM(s) IV Intermittent once  aspirin enteric coated 81 milliGRAM(s) Oral daily  atorvastatin 80 milliGRAM(s) Oral at bedtime  clopidogrel Tablet 75 milliGRAM(s) Oral daily  dextrose 5% + sodium chloride 0.9%. 1000 milliLiter(s) (80 mL/Hr) IV Continuous <Continuous>  dextrose 5%. 1000 milliLiter(s) (50 mL/Hr) IV Continuous <Continuous>  dextrose 50% Injectable 12.5 Gram(s) IV Push once  dextrose 50% Injectable 25 Gram(s) IV Push once  dextrose 50% Injectable 25 Gram(s) IV Push once  furosemide    Tablet 20 milliGRAM(s) Oral daily  gabapentin 100 milliGRAM(s) Oral three times a day  heparin  Injectable 5000 Unit(s) SubCutaneous every 8 hours  insulin glargine Injectable (LANTUS) 20 Unit(s) SubCutaneous at bedtime  isosorbide   mononitrate ER Tablet (IMDUR) 30 milliGRAM(s) Oral daily  levothyroxine 125 MICROGram(s) Oral daily  lidocaine   Patch 1 Patch Transdermal daily  metoprolol tartrate 50 milliGRAM(s) Oral two times a day  pantoprazole    Tablet 40 milliGRAM(s) Oral before breakfast  polyethylene glycol 3350 17 Gram(s) Oral daily  tiotropium 18 MICROgram(s) Capsule 1 Capsule(s) Inhalation daily    MEDICATIONS  (PRN):  ALBUTerol/ipratropium for Nebulization 3 milliLiter(s) Nebulizer every 6 hours PRN Shortness of Breath and/or Wheezing  dextrose 40% Gel 15 Gram(s) Oral once PRN Blood Glucose LESS THAN 70 milliGRAM(s)/deciliter  glucagon  Injectable 1 milliGRAM(s) IntraMuscular once PRN Glucose LESS THAN 70 milligrams/deciliter  hydrALAZINE Injectable 10 milliGRAM(s) IV Push every 6 hours PRN for sbp above 160 mmhg  HYDROmorphone  Injectable 0.3 milliGRAM(s) IV Push every 12 hours PRN Severe Pain persisting 1 hour after Oxycodone  oxyCODONE    IR 5 milliGRAM(s) Oral every 6 hours PRN Severe Pain (7 - 10)  oxyCODONE    IR 2.5 milliGRAM(s) Oral every 4 hours PRN Moderate Pain (4 - 6)      RADIOLOGY & ADDITIONAL TESTS:

## 2019-07-28 NOTE — PROGRESS NOTE ADULT - ASSESSMENT
84yoF hx CAD, HFpEF, HTN, COPD, T2DM, recently hospitalized at [facility] in 6/2019 for COPD exacerbation, now presenting with worsening exertional dyspnea associated with orthopnea and acute on chronic leg swelling for the past 1-2 days, being admitted for acute decompensated heart failure and is s.p cardiomems. Patient spiked a fever overnight and is being ruled out for infection     # fever - ua, urine culture, blood culture and chest xray ordered  --> observe off abx    #Acute decompensated CHF- resolved  -> cardio following   --> s/p cardio mems on friday   --> lasix    #Leg pain and swelling  - duplex neg for dvt  --> pain manageemnt following  -> pain resolved    #acute on chronic renal failure - likely secondary to hctz and ace-I  -->renal following  --> improved  #CAD- ASA and statin.     #COPD- Spiriva. DuoNebs PRN.     #hypoglycemia -> resolved    #HTN- Metoprolol, imdur    #Hypothyroidism- Synthroid.     dnr dni.  transfer to any bed

## 2019-07-29 LAB
ALBUMIN SERPL ELPH-MCNC: 3 G/DL — LOW (ref 3.3–5.2)
ALP SERPL-CCNC: 63 U/L — SIGNIFICANT CHANGE UP (ref 40–120)
ALT FLD-CCNC: 8 U/L — SIGNIFICANT CHANGE UP
ANION GAP SERPL CALC-SCNC: 10 MMOL/L — SIGNIFICANT CHANGE UP (ref 5–17)
APPEARANCE UR: CLEAR — SIGNIFICANT CHANGE UP
AST SERPL-CCNC: 12 U/L — SIGNIFICANT CHANGE UP
BILIRUB SERPL-MCNC: 0.6 MG/DL — SIGNIFICANT CHANGE UP (ref 0.4–2)
BILIRUB UR-MCNC: NEGATIVE — SIGNIFICANT CHANGE UP
BUN SERPL-MCNC: 40 MG/DL — HIGH (ref 8–20)
CALCIUM SERPL-MCNC: 8.5 MG/DL — LOW (ref 8.6–10.2)
CHLORIDE SERPL-SCNC: 99 MMOL/L — SIGNIFICANT CHANGE UP (ref 98–107)
CO2 SERPL-SCNC: 27 MMOL/L — SIGNIFICANT CHANGE UP (ref 22–29)
COLOR SPEC: YELLOW — SIGNIFICANT CHANGE UP
CREAT SERPL-MCNC: 1.38 MG/DL — HIGH (ref 0.5–1.3)
DIFF PNL FLD: NEGATIVE — SIGNIFICANT CHANGE UP
GLUCOSE BLDC GLUCOMTR-MCNC: 132 MG/DL — HIGH (ref 70–99)
GLUCOSE BLDC GLUCOMTR-MCNC: 179 MG/DL — HIGH (ref 70–99)
GLUCOSE SERPL-MCNC: 109 MG/DL — SIGNIFICANT CHANGE UP (ref 70–115)
GLUCOSE UR QL: NEGATIVE MG/DL — SIGNIFICANT CHANGE UP
HCT VFR BLD CALC: 32.2 % — LOW (ref 34.5–45)
HGB BLD-MCNC: 9.8 G/DL — LOW (ref 11.5–15.5)
KETONES UR-MCNC: NEGATIVE — SIGNIFICANT CHANGE UP
LEUKOCYTE ESTERASE UR-ACNC: NEGATIVE — SIGNIFICANT CHANGE UP
MAGNESIUM SERPL-MCNC: 2.2 MG/DL — SIGNIFICANT CHANGE UP (ref 1.6–2.6)
MCHC RBC-ENTMCNC: 26.9 PG — LOW (ref 27–34)
MCHC RBC-ENTMCNC: 30.4 GM/DL — LOW (ref 32–36)
MCV RBC AUTO: 88.5 FL — SIGNIFICANT CHANGE UP (ref 80–100)
NITRITE UR-MCNC: NEGATIVE — SIGNIFICANT CHANGE UP
PH UR: 5 — SIGNIFICANT CHANGE UP (ref 5–8)
PLATELET # BLD AUTO: 215 K/UL — SIGNIFICANT CHANGE UP (ref 150–400)
POTASSIUM SERPL-MCNC: 4.1 MMOL/L — SIGNIFICANT CHANGE UP (ref 3.5–5.3)
POTASSIUM SERPL-SCNC: 4.1 MMOL/L — SIGNIFICANT CHANGE UP (ref 3.5–5.3)
PROT SERPL-MCNC: 6.4 G/DL — LOW (ref 6.6–8.7)
PROT UR-MCNC: NEGATIVE MG/DL — SIGNIFICANT CHANGE UP
RBC # BLD: 3.64 M/UL — LOW (ref 3.8–5.2)
RBC # FLD: 15.2 % — HIGH (ref 10.3–14.5)
SODIUM SERPL-SCNC: 136 MMOL/L — SIGNIFICANT CHANGE UP (ref 135–145)
SP GR SPEC: 1.01 — SIGNIFICANT CHANGE UP (ref 1.01–1.02)
UROBILINOGEN FLD QL: NEGATIVE MG/DL — SIGNIFICANT CHANGE UP
WBC # BLD: 12.75 K/UL — HIGH (ref 3.8–10.5)
WBC # FLD AUTO: 12.75 K/UL — HIGH (ref 3.8–10.5)

## 2019-07-29 PROCEDURE — 99232 SBSQ HOSP IP/OBS MODERATE 35: CPT

## 2019-07-29 RX ORDER — GABAPENTIN 400 MG/1
300 CAPSULE ORAL AT BEDTIME
Refills: 0 | Status: DISCONTINUED | OUTPATIENT
Start: 2019-07-29 | End: 2019-07-30

## 2019-07-29 RX ORDER — GABAPENTIN 400 MG/1
100 CAPSULE ORAL
Refills: 0 | Status: DISCONTINUED | OUTPATIENT
Start: 2019-07-29 | End: 2019-07-30

## 2019-07-29 RX ADMIN — Medication 50 MILLIGRAM(S): at 06:27

## 2019-07-29 RX ADMIN — Medication 650 MILLIGRAM(S): at 15:55

## 2019-07-29 RX ADMIN — Medication 650 MILLIGRAM(S): at 07:00

## 2019-07-29 RX ADMIN — LIDOCAINE 1 PATCH: 4 CREAM TOPICAL at 00:42

## 2019-07-29 RX ADMIN — HEPARIN SODIUM 5000 UNIT(S): 5000 INJECTION INTRAVENOUS; SUBCUTANEOUS at 06:27

## 2019-07-29 RX ADMIN — OXYCODONE HYDROCHLORIDE 5 MILLIGRAM(S): 5 TABLET ORAL at 11:08

## 2019-07-29 RX ADMIN — CLOPIDOGREL BISULFATE 75 MILLIGRAM(S): 75 TABLET, FILM COATED ORAL at 11:11

## 2019-07-29 RX ADMIN — GABAPENTIN 100 MILLIGRAM(S): 400 CAPSULE ORAL at 15:57

## 2019-07-29 RX ADMIN — Medication 650 MILLIGRAM(S): at 06:28

## 2019-07-29 RX ADMIN — Medication 20 MILLIGRAM(S): at 06:27

## 2019-07-29 RX ADMIN — Medication 650 MILLIGRAM(S): at 17:20

## 2019-07-29 RX ADMIN — ATORVASTATIN CALCIUM 80 MILLIGRAM(S): 80 TABLET, FILM COATED ORAL at 23:52

## 2019-07-29 RX ADMIN — GABAPENTIN 300 MILLIGRAM(S): 400 CAPSULE ORAL at 23:53

## 2019-07-29 RX ADMIN — Medication 81 MILLIGRAM(S): at 11:11

## 2019-07-29 RX ADMIN — HEPARIN SODIUM 5000 UNIT(S): 5000 INJECTION INTRAVENOUS; SUBCUTANEOUS at 15:55

## 2019-07-29 RX ADMIN — LIDOCAINE 1 PATCH: 4 CREAM TOPICAL at 11:12

## 2019-07-29 RX ADMIN — INSULIN GLARGINE 20 UNIT(S): 100 INJECTION, SOLUTION SUBCUTANEOUS at 23:53

## 2019-07-29 RX ADMIN — ISOSORBIDE MONONITRATE 30 MILLIGRAM(S): 60 TABLET, EXTENDED RELEASE ORAL at 11:12

## 2019-07-29 RX ADMIN — LIDOCAINE 1 PATCH: 4 CREAM TOPICAL at 23:44

## 2019-07-29 RX ADMIN — Medication 125 MICROGRAM(S): at 06:27

## 2019-07-29 RX ADMIN — OXYCODONE HYDROCHLORIDE 5 MILLIGRAM(S): 5 TABLET ORAL at 12:00

## 2019-07-29 RX ADMIN — GABAPENTIN 100 MILLIGRAM(S): 400 CAPSULE ORAL at 06:27

## 2019-07-29 RX ADMIN — PANTOPRAZOLE SODIUM 40 MILLIGRAM(S): 20 TABLET, DELAYED RELEASE ORAL at 06:27

## 2019-07-29 RX ADMIN — HEPARIN SODIUM 5000 UNIT(S): 5000 INJECTION INTRAVENOUS; SUBCUTANEOUS at 23:53

## 2019-07-29 NOTE — PROGRESS NOTE ADULT - SUBJECTIVE AND OBJECTIVE BOX
FLOYD PAEZ    [id number]    84y      Female    INTERVAL HPI/OVERNIGHT EVENTS:    REVIEW OF SYSTEMS:    CONSTITUTIONAL: No fever, weight loss, or fatigue  RESPIRATORY: No cough, wheezing, hemoptysis; No shortness of breath  CARDIOVASCULAR: No chest pain, palpitations  GASTROINTESTINAL: No abdominal or epigastric pain. No nausea, vomiting  NEUROLOGICAL: No headaches, memory loss, loss of strength.  MISCELLANEOUS:      Vital Signs Last 24 Hrs  T(C): 36.7 (29 Jul 2019 11:24), Max: 37.1 (28 Jul 2019 23:41)  T(F): 98 (29 Jul 2019 11:24), Max: 98.7 (28 Jul 2019 23:41)  HR: 65 (29 Jul 2019 11:24) (65 - 84)  BP: 105/63 (29 Jul 2019 11:24) (100/58 - 124/75)  BP(mean): --  RR: 18 (29 Jul 2019 11:24) (18 - 18)  SpO2: 95% (29 Jul 2019 11:24) (93% - 95%)    PHYSICAL EXAM:    GENERAL: NAD, well-groomed  HEENT: PERRL, +EOMI  NECK: soft, Supple, No JVD,   CHEST/LUNG: Clear to auscultation bilaterally; No wheezing  HEART: S1S2+, Regular rate and rhythm; No murmurs, rubs, or gallops  ABDOMEN: Soft, Nontender, Nondistended; Bowel sounds present  EXTREMITIES:  2+ Peripheral Pulses, No clubbing, cyanosis, or edema  SKIN: No rashes or lesions  NEURO: AAOX3, no focal deficits, no motor r sensory loss  PSYCH: normal mood      LABS:                        9.8    12.75 )-----------( 215      ( 29 Jul 2019 08:33 )             32.2     07-29    136  |  99  |  40.0<H>  ----------------------------<  109  4.1   |  27.0  |  1.38<H>    Ca    8.5<L>      29 Jul 2019 08:33  Mg     2.2     07-29    TPro  6.4<L>  /  Alb  3.0<L>  /  TBili  0.6  /  DBili  x   /  AST  12  /  ALT  8   /  AlkPhos  63  07-29        MEDICATIONS  (STANDING):  acetaminophen   Tablet .. 650 milliGRAM(s) Oral <User Schedule>  aspirin enteric coated 81 milliGRAM(s) Oral daily  atorvastatin 80 milliGRAM(s) Oral at bedtime  clopidogrel Tablet 75 milliGRAM(s) Oral daily  dextrose 5% + sodium chloride 0.9%. 1000 milliLiter(s) (80 mL/Hr) IV Continuous <Continuous>  dextrose 5%. 1000 milliLiter(s) (50 mL/Hr) IV Continuous <Continuous>  dextrose 50% Injectable 12.5 Gram(s) IV Push once  dextrose 50% Injectable 25 Gram(s) IV Push once  dextrose 50% Injectable 25 Gram(s) IV Push once  furosemide    Tablet 20 milliGRAM(s) Oral daily  gabapentin 300 milliGRAM(s) Oral at bedtime  gabapentin 100 milliGRAM(s) Oral <User Schedule>  heparin  Injectable 5000 Unit(s) SubCutaneous every 8 hours  insulin glargine Injectable (LANTUS) 20 Unit(s) SubCutaneous at bedtime  isosorbide   mononitrate ER Tablet (IMDUR) 30 milliGRAM(s) Oral daily  levothyroxine 125 MICROGram(s) Oral daily  lidocaine   Patch 1 Patch Transdermal daily  metoprolol tartrate 50 milliGRAM(s) Oral two times a day  pantoprazole    Tablet 40 milliGRAM(s) Oral before breakfast  polyethylene glycol 3350 17 Gram(s) Oral daily  tiotropium 18 MICROgram(s) Capsule 1 Capsule(s) Inhalation daily    MEDICATIONS  (PRN):  ALBUTerol/ipratropium for Nebulization 3 milliLiter(s) Nebulizer every 6 hours PRN Shortness of Breath and/or Wheezing  dextrose 40% Gel 15 Gram(s) Oral once PRN Blood Glucose LESS THAN 70 milliGRAM(s)/deciliter  glucagon  Injectable 1 milliGRAM(s) IntraMuscular once PRN Glucose LESS THAN 70 milligrams/deciliter  hydrALAZINE Injectable 10 milliGRAM(s) IV Push every 6 hours PRN for sbp above 160 mmhg  HYDROmorphone  Injectable 0.3 milliGRAM(s) IV Push every 12 hours PRN Severe Pain persisting 1 hour after Oxycodone  oxyCODONE    IR 5 milliGRAM(s) Oral every 6 hours PRN Severe Pain (7 - 10)  oxyCODONE    IR 2.5 milliGRAM(s) Oral every 4 hours PRN Moderate Pain (4 - 6)      RADIOLOGY & ADDITIONAL TESTS: FLOYD PAEZ    [id number]    84y      Female    INTERVAL HPI/OVERNIGHT EVENTS:    patient being seen for chf and fever. Patient seen at bedside and denies any complaints    last 24 hours patient is afebrile.     REVIEW OF SYSTEMS:    CONSTITUTIONAL: No fever, weight loss, or fatigue  RESPIRATORY: No cough, wheezing, hemoptysis; No shortness of breath  CARDIOVASCULAR: No chest pain, palpitations  GASTROINTESTINAL: No abdominal or epigastric pain. No nausea, vomiting  NEUROLOGICAL: No headaches, memory loss, loss of strength.  MISCELLANEOUS:      Vital Signs Last 24 Hrs  T(C): 36.7 (29 Jul 2019 11:24), Max: 37.1 (28 Jul 2019 23:41)  T(F): 98 (29 Jul 2019 11:24), Max: 98.7 (28 Jul 2019 23:41)  HR: 65 (29 Jul 2019 11:24) (65 - 84)  BP: 105/63 (29 Jul 2019 11:24) (100/58 - 124/75)  BP(mean): --  RR: 18 (29 Jul 2019 11:24) (18 - 18)  SpO2: 95% (29 Jul 2019 11:24) (93% - 95%)    PHYSICAL EXAM:    GENERAL: NAD,  HEENT: PERRL, +EOMI  NECK: soft, Supple, No JVD,   CHEST/LUNG: Clear to auscultation bilaterally;   HEART: S1S2+, Regular rate and rhythm; No murmurs, rubs, or gallops  ABDOMEN: Soft, obese  EXTREMITIES:  2+ Peripheral Pulses, edema improved  SKIN: No rashes or lesions  NEURO: AAOX2-3, no focal deficits,   PSYCH: normal mood        LABS:                        9.8    12.75 )-----------( 215      ( 29 Jul 2019 08:33 )             32.2     07-29    136  |  99  |  40.0<H>  ----------------------------<  109  4.1   |  27.0  |  1.38<H>    Ca    8.5<L>      29 Jul 2019 08:33  Mg     2.2     07-29    TPro  6.4<L>  /  Alb  3.0<L>  /  TBili  0.6  /  DBili  x   /  AST  12  /  ALT  8   /  AlkPhos  63  07-29        MEDICATIONS  (STANDING):  acetaminophen   Tablet .. 650 milliGRAM(s) Oral <User Schedule>  aspirin enteric coated 81 milliGRAM(s) Oral daily  atorvastatin 80 milliGRAM(s) Oral at bedtime  clopidogrel Tablet 75 milliGRAM(s) Oral daily  dextrose 5% + sodium chloride 0.9%. 1000 milliLiter(s) (80 mL/Hr) IV Continuous <Continuous>  dextrose 5%. 1000 milliLiter(s) (50 mL/Hr) IV Continuous <Continuous>  dextrose 50% Injectable 12.5 Gram(s) IV Push once  dextrose 50% Injectable 25 Gram(s) IV Push once  dextrose 50% Injectable 25 Gram(s) IV Push once  furosemide    Tablet 20 milliGRAM(s) Oral daily  gabapentin 300 milliGRAM(s) Oral at bedtime  gabapentin 100 milliGRAM(s) Oral <User Schedule>  heparin  Injectable 5000 Unit(s) SubCutaneous every 8 hours  insulin glargine Injectable (LANTUS) 20 Unit(s) SubCutaneous at bedtime  isosorbide   mononitrate ER Tablet (IMDUR) 30 milliGRAM(s) Oral daily  levothyroxine 125 MICROGram(s) Oral daily  lidocaine   Patch 1 Patch Transdermal daily  metoprolol tartrate 50 milliGRAM(s) Oral two times a day  pantoprazole    Tablet 40 milliGRAM(s) Oral before breakfast  polyethylene glycol 3350 17 Gram(s) Oral daily  tiotropium 18 MICROgram(s) Capsule 1 Capsule(s) Inhalation daily    MEDICATIONS  (PRN):  ALBUTerol/ipratropium for Nebulization 3 milliLiter(s) Nebulizer every 6 hours PRN Shortness of Breath and/or Wheezing  dextrose 40% Gel 15 Gram(s) Oral once PRN Blood Glucose LESS THAN 70 milliGRAM(s)/deciliter  glucagon  Injectable 1 milliGRAM(s) IntraMuscular once PRN Glucose LESS THAN 70 milligrams/deciliter  hydrALAZINE Injectable 10 milliGRAM(s) IV Push every 6 hours PRN for sbp above 160 mmhg  HYDROmorphone  Injectable 0.3 milliGRAM(s) IV Push every 12 hours PRN Severe Pain persisting 1 hour after Oxycodone  oxyCODONE    IR 5 milliGRAM(s) Oral every 6 hours PRN Severe Pain (7 - 10)  oxyCODONE    IR 2.5 milliGRAM(s) Oral every 4 hours PRN Moderate Pain (4 - 6)      RADIOLOGY & ADDITIONAL TESTS:

## 2019-07-29 NOTE — PROGRESS NOTE ADULT - PROBLEM SELECTOR PLAN 2
defer to primary team
Right leg pain ext to ankle  Pain management  pain is on at this time  Confusion as pain meds are titrated up ?  decrease dose

## 2019-07-29 NOTE — PROGRESS NOTE ADULT - PROVIDER SPECIALTY LIST ADULT
Cardiology
Internal Medicine
Nephrology
Pain Medicine
Internal Medicine
Internal Medicine
Cardiology

## 2019-07-29 NOTE — PROGRESS NOTE ADULT - PROBLEM SELECTOR PLAN 1
increase gabapentin dose hs  no other med changes at this time
1. Lidocaine patch to affected limb  2. Tylenol 650mg PO q6hrs ATC  - May be given in conjunction with Oxycodone IR, if pain score is greater than 4/10  3. Discontinue Tramadol  4. Offer Oxycodone IR 2.5-5mg mg PO q6hrs PRN moderate-severe pain  5. Offer Dilaudid 0.3mg q6hrs PRN severe pain (as an alternative to Oxycodone IR 5mg)  6. No long acting opioids advised at this time. HOLD short acting opioids for concern of oversedation.
blood pressure controlled  Low na diet  Off lasix at this point will need to consider restarting as out patient

## 2019-07-29 NOTE — PROGRESS NOTE ADULT - REASON FOR ADMISSION
acute decompensated CHF

## 2019-07-29 NOTE — PROGRESS NOTE ADULT - ASSESSMENT
84yoF hx CAD, HFpEF, HTN, COPD, T2DM, recently hospitalized at [facility] in 6/2019 for COPD exacerbation, now presenting with worsening exertional dyspnea associated with orthopnea and acute on chronic leg swelling for the past 1-2 days, being admitted for acute decompensated heart failure and is s.p cardiomems.    # fever - ua, urine culture, blood culture and chest xray ordered  --> observe off abx    #Acute decompensated CHF- resolved  -> cardio following   --> s/p cardio mems on friday   --> lasix    #Leg pain and swelling  - duplex neg for dvt  --> pain manageemnt following  -> pain resolved    #acute on chronic renal failure -  -->renal following  --> improved    #CAD- ASA and statin.     #COPD- Spiriva. DuoNebs PRN.     #hypoglycemia -> resolved    #HTN- Metoprolol, imdur    #Hypothyroidism- Synthroid.     dnr dni.  spoke to son, likely dc tomorrow if blood cultures negative

## 2019-07-29 NOTE — PROGRESS NOTE ADULT - SUBJECTIVE AND OBJECTIVE BOX
This am patient is seen sitting in hospital chair.  Lidoderm patch helping right lower leg pain.  Has been walking with walker; notes pain is problematic with weight bearing, but overall improved since admission.  At this point she feels that the gabapentin is helping perhaps more than other agents.  Notes nominal help with tylenol (receiving twice daily).  Oxycodone she thinks helps some, but EMR notes use is pretty light; had a 2.5 mg does two days agon, and a 5 mg dose yesterday 16:11.      Xrays of right lower leg show no osseous pathology.    PE:  Drowsy, but easily awakens and appears generally well oriented.  LE strength symmetric.  Does have some bilat lower ext distal lower leg erythema/satsis but nominal edema.  ROM ankles is full.  Has some light touch pain/allodynia.    ASSESS:  1. neuropathic pain RLE  2. multiple comorbidities: CHF, COPD etc    REC:  1. increase hs dose of gabapentin  2. agree with lidoderm patch  3. continue acetaminophen despite patient's thought that this is not helping; I suspect it is affording some relief and the down side at this dose is small  4. do continue to make low dose oxycodone 2.5/5 mg available.

## 2019-07-30 ENCOUNTER — TRANSCRIPTION ENCOUNTER (OUTPATIENT)
Age: 84
End: 2019-07-30

## 2019-07-30 VITALS
DIASTOLIC BLOOD PRESSURE: 70 MMHG | HEART RATE: 69 BPM | RESPIRATION RATE: 19 BRPM | TEMPERATURE: 98 F | SYSTOLIC BLOOD PRESSURE: 108 MMHG | OXYGEN SATURATION: 95 %

## 2019-07-30 LAB
CULTURE RESULTS: SIGNIFICANT CHANGE UP
GLUCOSE BLDC GLUCOMTR-MCNC: 173 MG/DL — HIGH (ref 70–99)
SPECIMEN SOURCE: SIGNIFICANT CHANGE UP

## 2019-07-30 PROCEDURE — 99239 HOSP IP/OBS DSCHRG MGMT >30: CPT

## 2019-07-30 RX ORDER — CLOPIDOGREL BISULFATE 75 MG/1
1 TABLET, FILM COATED ORAL
Qty: 0 | Refills: 0 | DISCHARGE
Start: 2019-07-30

## 2019-07-30 RX ORDER — LIDOCAINE 4 G/100G
1 CREAM TOPICAL
Qty: 30 | Refills: 0
Start: 2019-07-30 | End: 2019-08-28

## 2019-07-30 RX ORDER — LISINOPRIL 2.5 MG/1
1 TABLET ORAL
Qty: 0 | Refills: 0 | DISCHARGE

## 2019-07-30 RX ORDER — OXYCODONE HYDROCHLORIDE 5 MG/1
1 TABLET ORAL
Qty: 40 | Refills: 0
Start: 2019-07-30 | End: 2019-08-08

## 2019-07-30 RX ORDER — TIOTROPIUM BROMIDE 18 UG/1
1 CAPSULE ORAL; RESPIRATORY (INHALATION)
Qty: 0 | Refills: 0 | DISCHARGE

## 2019-07-30 RX ORDER — DILTIAZEM HCL 120 MG
1 CAPSULE, EXT RELEASE 24 HR ORAL
Qty: 0 | Refills: 0 | DISCHARGE

## 2019-07-30 RX ORDER — LIDOCAINE 4 G/100G
1 CREAM TOPICAL
Qty: 0 | Refills: 0 | DISCHARGE
Start: 2019-07-30

## 2019-07-30 RX ADMIN — Medication 81 MILLIGRAM(S): at 12:24

## 2019-07-30 RX ADMIN — Medication 125 MICROGRAM(S): at 06:25

## 2019-07-30 RX ADMIN — OXYCODONE HYDROCHLORIDE 5 MILLIGRAM(S): 5 TABLET ORAL at 06:48

## 2019-07-30 RX ADMIN — OXYCODONE HYDROCHLORIDE 5 MILLIGRAM(S): 5 TABLET ORAL at 01:29

## 2019-07-30 RX ADMIN — Medication 650 MILLIGRAM(S): at 06:25

## 2019-07-30 RX ADMIN — GABAPENTIN 100 MILLIGRAM(S): 400 CAPSULE ORAL at 06:48

## 2019-07-30 RX ADMIN — TIOTROPIUM BROMIDE 1 CAPSULE(S): 18 CAPSULE ORAL; RESPIRATORY (INHALATION) at 08:42

## 2019-07-30 RX ADMIN — Medication 50 MILLIGRAM(S): at 06:25

## 2019-07-30 RX ADMIN — PANTOPRAZOLE SODIUM 40 MILLIGRAM(S): 20 TABLET, DELAYED RELEASE ORAL at 06:25

## 2019-07-30 RX ADMIN — Medication 20 MILLIGRAM(S): at 06:25

## 2019-07-30 RX ADMIN — POLYETHYLENE GLYCOL 3350 17 GRAM(S): 17 POWDER, FOR SOLUTION ORAL at 12:24

## 2019-07-30 RX ADMIN — HEPARIN SODIUM 5000 UNIT(S): 5000 INJECTION INTRAVENOUS; SUBCUTANEOUS at 06:25

## 2019-07-30 RX ADMIN — CLOPIDOGREL BISULFATE 75 MILLIGRAM(S): 75 TABLET, FILM COATED ORAL at 12:24

## 2019-07-30 RX ADMIN — LIDOCAINE 1 PATCH: 4 CREAM TOPICAL at 00:41

## 2019-07-30 RX ADMIN — ISOSORBIDE MONONITRATE 30 MILLIGRAM(S): 60 TABLET, EXTENDED RELEASE ORAL at 12:24

## 2019-07-30 RX ADMIN — OXYCODONE HYDROCHLORIDE 5 MILLIGRAM(S): 5 TABLET ORAL at 09:35

## 2019-07-30 RX ADMIN — LIDOCAINE 1 PATCH: 4 CREAM TOPICAL at 12:24

## 2019-07-30 NOTE — DISCHARGE NOTE PROVIDER - HOSPITAL COURSE
84yoF hx CAD, HFpEF, HTN, COPD, T2DM, recently hospitalized at [facility] in 6/2019 for COPD exacerbation, now presenting with worsening exertional dyspnea associated with orthopnea and acute on chronic leg swelling.  Pt reports some non-compliance with her lasix pills this week.        Patient admitted to medicine and seen by cardio, nephro and pain management in consult. Patient was diuresed and had a cardiomems placed. Patient then developed veda and hctz and lisinopril was held and improved. Patient also seen by pain maanegemnt who started her on oral meds. Patient was wacthed over the weekend due to a fever and worked up for infections and all cultures were negative.         patient is now ready for dc to home with home care.         time spent on dc 32 mintues

## 2019-07-30 NOTE — DISCHARGE NOTE PROVIDER - CARE PROVIDER_API CALL
Waylon Willoughby)  Anesthesiology; Pain Medicine  [address], [place] [address]  Phone: [phone]  Fax: [phone]  Follow Up Time:     Maik Coyne (DO)  Family Medicine  [address]  [place] [address]  Phone: [phone]  Fax: [phone]  Follow Up Time:     Waylon Benavides)  Cardiovascular Disease; Internal Medicine; Interventional Cardiology  Phone: [phone]  Fax: [phone]  Follow Up Time:

## 2019-07-30 NOTE — DISCHARGE NOTE PROVIDER - NSDCCPCAREPLAN_GEN_ALL_CORE_FT
PRINCIPAL DISCHARGE DIAGNOSIS  Diagnosis: CHF (congestive heart failure)  Assessment and Plan of Treatment:

## 2019-07-30 NOTE — DISCHARGE NOTE NURSING/CASE MANAGEMENT/SOCIAL WORK - NSDCDPATPORTLINK_GEN_ALL_CORE
You can access the [facility] Patient Portal, offered by [facility], by registering with the following website: [email or web address]

## 2019-07-30 NOTE — DISCHARGE NOTE PROVIDER - PROVIDER TOKENS
PROVIDER:[TOKEN:[[id number]]],PROVIDER:[TOKEN:[[id number]]],PROVIDER:[TOKEN:[[id number]]]

## 2019-07-30 NOTE — DISCHARGE NOTE PROVIDER - CARE PROVIDERS DIRECT ADDRESSES
,DirectAddress_Unknown,DirectAddress_Unknown,[email or web address]

## 2019-07-31 ENCOUNTER — TRANSCRIPTION ENCOUNTER (OUTPATIENT)
Age: 84
End: 2019-07-31

## 2019-07-31 ENCOUNTER — CLINICAL ADVICE (OUTPATIENT)
Age: 84
End: 2019-07-31

## 2019-07-31 ENCOUNTER — APPOINTMENT (OUTPATIENT)
Age: 84
End: 2019-07-31

## 2019-07-31 VITALS
HEART RATE: 80 BPM | RESPIRATION RATE: 14 BRPM | OXYGEN SATURATION: 94 % | DIASTOLIC BLOOD PRESSURE: 78 MMHG | TEMPERATURE: 98 F | SYSTOLIC BLOOD PRESSURE: 128 MMHG

## 2019-07-31 RX ORDER — TRAMADOL HYDROCHLORIDE 50 MG/1
50 TABLET, COATED ORAL TWICE DAILY
Qty: 60 | Refills: 0 | Status: DISCONTINUED | COMMUNITY
Start: 2019-07-18 | End: 2019-07-31

## 2019-07-31 RX ORDER — TIOTROPIUM BROMIDE 18 UG/1
18 CAPSULE ORAL; RESPIRATORY (INHALATION)
Qty: 3 | Refills: 3 | Status: DISCONTINUED | COMMUNITY
Start: 2018-07-09 | End: 2019-07-31

## 2019-07-31 RX ORDER — ALBUTEROL SULFATE 90 UG/1
108 (90 BASE) AEROSOL, METERED RESPIRATORY (INHALATION)
Qty: 3 | Refills: 3 | Status: DISCONTINUED | COMMUNITY
Start: 2017-09-13 | End: 2019-07-31

## 2019-07-31 RX ORDER — LISINOPRIL 5 MG/1
5 TABLET ORAL DAILY
Qty: 30 | Refills: 3 | Status: DISCONTINUED | COMMUNITY
End: 2019-07-31

## 2019-08-01 ENCOUNTER — EMERGENCY (EMERGENCY)
Facility: HOSPITAL | Age: 84
LOS: 1 days | Discharge: HOME | End: 2019-08-01
Attending: EMERGENCY MEDICINE
Payer: MEDICARE

## 2019-08-01 ENCOUNTER — CLINICAL ADVICE (OUTPATIENT)
Age: 84
End: 2019-08-01

## 2019-08-01 VITALS
OXYGEN SATURATION: 97 % | RESPIRATION RATE: 24 BRPM | TEMPERATURE: 98 F | HEIGHT: 66 IN | DIASTOLIC BLOOD PRESSURE: 63 MMHG | HEART RATE: 87 BPM | WEIGHT: 220.02 LBS | SYSTOLIC BLOOD PRESSURE: 102 MMHG

## 2019-08-01 VITALS
SYSTOLIC BLOOD PRESSURE: 115 MMHG | OXYGEN SATURATION: 96 % | HEART RATE: 77 BPM | DIASTOLIC BLOOD PRESSURE: 62 MMHG | RESPIRATION RATE: 20 BRPM

## 2019-08-01 DIAGNOSIS — R93.1 ABNORMAL FINDINGS ON DIAGNOSTIC IMAGING OF HEART AND CORONARY CIRCULATION: Chronic | ICD-10-CM

## 2019-08-01 DIAGNOSIS — Z90.49 ACQUIRED ABSENCE OF OTHER SPECIFIED PARTS OF DIGESTIVE TRACT: Chronic | ICD-10-CM

## 2019-08-01 LAB
ALBUMIN SERPL ELPH-MCNC: 3.4 G/DL — SIGNIFICANT CHANGE UP (ref 3.3–5.2)
ALP SERPL-CCNC: 89 U/L — SIGNIFICANT CHANGE UP (ref 40–120)
ALT FLD-CCNC: 14 U/L — SIGNIFICANT CHANGE UP
ANION GAP SERPL CALC-SCNC: 16 MMOL/L — SIGNIFICANT CHANGE UP (ref 5–17)
AST SERPL-CCNC: 20 U/L — SIGNIFICANT CHANGE UP
BILIRUB SERPL-MCNC: 0.3 MG/DL — LOW (ref 0.4–2)
BUN SERPL-MCNC: 56 MG/DL — HIGH (ref 8–20)
CALCIUM SERPL-MCNC: 8.8 MG/DL — SIGNIFICANT CHANGE UP (ref 8.6–10.2)
CHLORIDE SERPL-SCNC: 97 MMOL/L — LOW (ref 98–107)
CK SERPL-CCNC: 56 U/L — SIGNIFICANT CHANGE UP (ref 25–170)
CO2 SERPL-SCNC: 25 MMOL/L — SIGNIFICANT CHANGE UP (ref 22–29)
CREAT SERPL-MCNC: 2.1 MG/DL — HIGH (ref 0.5–1.3)
GLUCOSE SERPL-MCNC: 66 MG/DL — LOW (ref 70–115)
HCT VFR BLD CALC: 34.4 % — LOW (ref 34.5–45)
HGB BLD-MCNC: 10.8 G/DL — LOW (ref 11.5–15.5)
MCHC RBC-ENTMCNC: 27.2 PG — SIGNIFICANT CHANGE UP (ref 27–34)
MCHC RBC-ENTMCNC: 31.4 GM/DL — LOW (ref 32–36)
MCV RBC AUTO: 86.6 FL — SIGNIFICANT CHANGE UP (ref 80–100)
NT-PROBNP SERPL-SCNC: 1237 PG/ML — HIGH (ref 0–300)
PLATELET # BLD AUTO: 269 K/UL — SIGNIFICANT CHANGE UP (ref 150–400)
POTASSIUM SERPL-MCNC: 3.8 MMOL/L — SIGNIFICANT CHANGE UP (ref 3.5–5.3)
POTASSIUM SERPL-SCNC: 3.8 MMOL/L — SIGNIFICANT CHANGE UP (ref 3.5–5.3)
PROT SERPL-MCNC: 7.3 G/DL — SIGNIFICANT CHANGE UP (ref 6.6–8.7)
RBC # BLD: 3.97 M/UL — SIGNIFICANT CHANGE UP (ref 3.8–5.2)
RBC # FLD: 14.7 % — HIGH (ref 10.3–14.5)
SODIUM SERPL-SCNC: 138 MMOL/L — SIGNIFICANT CHANGE UP (ref 135–145)
TROPONIN T SERPL-MCNC: <0.01 NG/ML — SIGNIFICANT CHANGE UP (ref 0–0.06)
WBC # BLD: 16.54 K/UL — HIGH (ref 3.8–10.5)
WBC # FLD AUTO: 16.54 K/UL — HIGH (ref 3.8–10.5)

## 2019-08-01 PROCEDURE — 36415 COLL VENOUS BLD VENIPUNCTURE: CPT

## 2019-08-01 PROCEDURE — 99285 EMERGENCY DEPT VISIT HI MDM: CPT

## 2019-08-01 PROCEDURE — 71045 X-RAY EXAM CHEST 1 VIEW: CPT

## 2019-08-01 PROCEDURE — 99284 EMERGENCY DEPT VISIT MOD MDM: CPT | Mod: 25

## 2019-08-01 PROCEDURE — 83880 ASSAY OF NATRIURETIC PEPTIDE: CPT

## 2019-08-01 PROCEDURE — 80053 COMPREHEN METABOLIC PANEL: CPT

## 2019-08-01 PROCEDURE — 93005 ELECTROCARDIOGRAM TRACING: CPT

## 2019-08-01 PROCEDURE — 71045 X-RAY EXAM CHEST 1 VIEW: CPT | Mod: 26

## 2019-08-01 PROCEDURE — 82550 ASSAY OF CK (CPK): CPT

## 2019-08-01 PROCEDURE — 85027 COMPLETE CBC AUTOMATED: CPT

## 2019-08-01 PROCEDURE — 84484 ASSAY OF TROPONIN QUANT: CPT

## 2019-08-01 PROCEDURE — 93010 ELECTROCARDIOGRAM REPORT: CPT

## 2019-08-01 NOTE — PHYSICAL EXAM
[Alert] : alert [No Acute Distress] : no acute distress [Well Nourished] : well nourished [Well Developed] : well developed [Normal Sclera/Conjunctiva] : normal sclera/conjunctiva [EOMI] : extra ocular movement intact [No Proptosis] : no proptosis [Normal Oropharynx] : the oropharynx was normal [Thyroid Not Enlarged] : the thyroid was not enlarged [No Thyroid Nodules] : there were no palpable thyroid nodules [No Respiratory Distress] : no respiratory distress [No Accessory Muscle Use] : no accessory muscle use [Clear to Auscultation] : lungs were clear to auscultation bilaterally [Normal Rate] : heart rate was normal  [Normal S1, S2] : normal S1 and S2 [Regular Rhythm] : with a regular rhythm [Pedal Pulses Normal] : the pedal pulses are present [No Edema] : there was no peripheral edema [Normal Bowel Sounds] : normal bowel sounds [Not Tender] : non-tender [Soft] : abdomen soft [Not Distended] : not distended [Post Cervical Nodes] : posterior cervical nodes [Anterior Cervical Nodes] : anterior cervical nodes [Normal] : normal and non tender [Spine Straight] : spine straight [No Stigmata of Cushings Syndrome] : no stigmata of cushings syndrome [Normal Gait] : normal gait [Normal Strength/Tone] : muscle strength and tone were normal [No Rash] : no rash [Normal Reflexes] : deep tendon reflexes were 2+ and symmetric [No Tremors] : no tremors [Oriented x3] : oriented to person, place, and time [Acanthosis Nigricans] : no acanthosis nigricans [de-identified] : obese

## 2019-08-01 NOTE — ED ADULT NURSE NOTE - GASTROINTESTINAL WDL
Abdomen soft, nontender, nondistended, bowel sounds present in all 4 quadrants. as per aide, BM yesterday, formed

## 2019-08-01 NOTE — ED ADULT TRIAGE NOTE - CHIEF COMPLAINT QUOTE
sent from [facility] for shortness of breath, gradual onset starting today. Hx of CHF, DM, medicated with combi treatment via EMS

## 2019-08-01 NOTE — CONSULT NOTE ADULT - ATTENDING COMMENTS
83 yo female with PMH of  COPD, asthma, mild to moderate AS, mild pulmonary HTN, STEVENSON (CPAP), insulin dependent DMII, HTN, HLD, and hypothyroidism. Has known CAD with multiple PCIs in the past (most recent NSTEMI 12/2018 ADELIA to pLCX, previous ADELIA pLAD, ADELIA x mLAD, ADELIA OM1, ADELIA to LCx, and  % with no intervention to date), HFpEF (EF>75%). Multiple admissions for SOB. During her last admission on 7/30/2019 had a CardioMEMS implanted that revealed normal-low PA pressures. Since her discharge home 48 hours ago, has had three events of SOB/orthopnea that required EMS to come to her house and was treated with IV diuretics. BIBA this morning CardioMEMS interrogation revealed a mean PAP=20 mmHg, which probably reflects that these events are not related to fluid overload or arrhythmia. In the meantime she has developed worsening kidney function that could be related to the diuretic and pre-renal azotemia.  At the present time she is completely asymptomatic, lying flat with no orthopnea or PND.   She can be discharged home if no other medical issues arise and would recommend diuretic "vacation" for 48 hours and repeat blood work in 72 hours to assess kidney function.   Follow up at cardiology clinic.

## 2019-08-01 NOTE — ED PROVIDER NOTE - OBJECTIVE STATEMENT
83 yo F presents to ED with aide c/o waking from sleep this AM with increased SOB.  Pt with extensive cardiac hx including CAD, CHF, HTN, high chol and COPD.  Pt denies any assoc CP, cough, fever, N/V, diaphoresis or syncope.  Pt s/p d/c from [facility] 2 days ago after 9 day admission for CHF,  As per aide pt took her breathing treatment last night

## 2019-08-01 NOTE — ED PROVIDER NOTE - PROGRESS NOTE DETAILS
Aranza Chávez (daughter in law) [phone] or [phone] Afshan SHANNON: [facility] saw patient, patient asymptomatic. Cards to send script for rpt bloodwork at home on Monday. Discussed findings with patient and daughter in law, Daughter in law to  patient.

## 2019-08-01 NOTE — ED ADULT NURSE REASSESSMENT NOTE - NS ED NURSE REASSESS COMMENT FT1
pt status unchanged, refer to flowsheet and chart, pt safety maintained, pt hemodynamically stable, pending cardiology consult

## 2019-08-01 NOTE — ED CLERICAL - NS ED CLERK NOTE PRE-ARRIVAL INFORMATION; ADDITIONAL PRE-ARRIVAL INFORMATION

## 2019-08-01 NOTE — ED ADULT TRIAGE NOTE - NS ED TRIAGE AVPU SCALE
Alert-The patient is alert, awake and responds to voice. The patient is oriented to time, place, and person. The triage nurse is able to obtain subjective information. Fever greater than 101/Bleeding that does not stop Unable to Urinate/Persistent Nausea and Vomiting/Bleeding that does not stop/Pain not relieved by Medications/Fever greater than 101/Inability to Tolerate Liquids or Foods

## 2019-08-01 NOTE — ASSESSMENT
[Carbohydrate Consistent Diet] : carbohydrate consistent diet [Hypoglycemia Management] : hypoglycemia management [Long Term Vascular Complications] : long term vascular complications of diabetes [Importance of Diet and Exercise] : importance of diet and exercise to improve glycemic control, achieve weight loss and improve cardiovascular health [Self Monitoring of Blood Glucose] : self monitoring of blood glucose [Insulin Self-Administration] : insulin self-administration [Levothyroxine] : The patient was instructed to take Levothyroxine on an empty stomach, separate from vitamins, and wait at least 30 minutes before eating [FreeTextEntry1] : DM2 moderately uncontrolled  A1c 9.5 going up due to recent steroids use \par increase basaglar 40 u BID for now while on steroids \par increase novolog to 25 U TID w meals. advsied to take them for all meals.  \par fax me logbook in 5-7 days to adjust doses of insulins \par she uses a SSI (actually 2 SSI given by 2 physicians) and she varies doses. \par discussed diet and exercise\par encouraged more exercise walking 30 min 3 x week\par nephropathy with GFR 35. Monitor and continue ACEI \par has diabetic neuropathy: controlled symptoms with gabapentin \par monitor GFR . GFR 35 CKD \par eye exam done. She needs cataract surgery but not while Bgs high. \par Glucose Sensor Necessity: This patient with diabetes performs 4 or more glucose checks per day utilizing a home blood glucose monitor. The patient is treated with insulin via 3 or more injections daily (or a subcutaneous insulin infusion pump.) This patient requires frequent adjustments to their insulin treatment on the basis of therapeutic continuous glucose monitoring results. In addition, the patient has been to our office for an evaluation of their diabetes control within the past 6 months. \par \par \par \par HTN :  bp at target on meds. Continue current management.\par discussed low salt diet \par \par HLD: LDL at target.\par continue atorvastatin 80 mg qd . \par History of CAD s/p MI and stents \par \par Obesity: discussed diet and exercise\par encouraged more exercise walking 30 min 3 x week\par \par hypoT :\par pt euthyroid clinically and biochemically.\par continue lt4 125 mcg qd \par takes LT4 appropriately in am\par \par vitamin d deficiency: she does not take any vitamins \par vitamin d low 25.start taking vitamin d 1000 u qd \par \par age osteoporosis: check DXA scan but she did not go \par she had an ankle fracture many years ago. no steroids. \par height 60 in. no alcohol. no RA no family h/o fractures or hip fractures \par

## 2019-08-01 NOTE — ED PROVIDER NOTE - CONSTITUTIONAL, MLM
normal... Sleeping easily arousable awake, alert, oriented to person, place, time/situation and in no acute resp distress

## 2019-08-01 NOTE — ED ADULT NURSE NOTE - NSIMPLEMENTINTERV_GEN_ALL_ED
Implemented All Fall with Harm Risk Interventions:  [place] to call system. Call bell, personal items and telephone within reach. Instruct patient to call for assistance. Room bathroom lighting operational. Non-slip footwear when patient is off stretcher. Physically safe environment: no spills, clutter or unnecessary equipment. Stretcher in lowest position, wheels locked, appropriate side rails in place. Provide visual cue, wrist band, yellow gown, etc. Monitor gait and stability. Monitor for mental status changes and reorient to person, place, and time. Review medications for side effects contributing to fall risk. Reinforce activity limits and safety measures with patient and family. Provide visual clues: red socks.

## 2019-08-01 NOTE — ED ADULT NURSE NOTE - CHPI ED NUR SYMPTOMS NEG
no pain/no nausea/no tingling/no chills/no decreased eating/drinking/no fever/no vomiting/no dizziness

## 2019-08-01 NOTE — ED ADULT NURSE NOTE - CHIEF COMPLAINT QUOTE
sent from [facility] for shortness of breath, gradual onset starting today. Hx of CHF, DM, medicated with combi treatment via EMS

## 2019-08-01 NOTE — ED ADULT NURSE REASSESSMENT NOTE - NS ED NURSE REASSESS COMMENT FT1
As per family, pt received a visit at group home from paramedics x2 yesterday. Received a total of 40 lasix between the 2 paramedic visits. MD mina.

## 2019-08-01 NOTE — ED ADULT NURSE NOTE - OBJECTIVE STATEMENT
sent from [facility] for shortness of breath, gradual onset starting today. Hx of CHF, DM, medicated with combi treatment via EMS    Pt BIBA from [facility] (Aide at bedside) with c/o of SOB and difficulty breathing starting "a couple days ago". As per patient aide, pt was seen here for same reason and d/c but had gradually become more SOB/increase diff breathing. Pt is arousable to voice but lethargic at this time. Pt hx COPD, CHF, DM. Pt placed on cardiac monitor and 3 L NC, O2 sat 96 at this time. Call bell within reach. Aide at bedside.

## 2019-08-01 NOTE — CONSULT NOTE ADULT - ASSESSMENT
A/P:  85yo female with PMH of CAD with stent x4 (most recent NSTEMI 12/2018 ADELIA to pLCX, previous ADELIA pLAD, ADELIA x mLAD, ADELIA OM1, ADELIA to LCx, and  to % with no intervention to date), HFpEF (EF>75%) now s/p Cardiomems implantation, COPD, asthma, moderate AS, mild pulmonary HTN, STEVENSON (CPAP), insulin dependent DMII, HTN, HLD, and hypothyroidism Cardio is Kennedy and PMD is [place], Endo is Ionica, Mcginnis is Pulm who presented to the ED with worsening dyspnea. Patient was recently discharged from the hospital on 07/30/19, and states that she has been experiencing worsening dyspnea at rest, and worsening right ankle swelling. Patient states that she has been taking all of her medications as prescribed, but doesn't feel like her symptoms have improved. Patient isn't on home oxygen, and states she has chronic orthopnea that hasn't improved or worsened. Patient also notes some intermittent palpitations, but denies any fevers, chills, chest pain, syncope, near syncope, abdominal pain, N/V/D, headache, or dizziness.   Troponin neg x 1   BNP 1237    1. HFpEF  - Last EF >75% with grade II DD  - Cardiomems checked last night PA diastolic of 20 (baseline appears to be around 18)  - Pt also with [place] on CKD, Cr now up to 2.10 from 1.38 a few days ago  - Will discuss lasix dosage with Dr. Castellanos    2. CAD  - No symptoms of ACS   - Known disease  of RCA  - Cont ASA and statin    Preliminary evaluation, please await official recommendations by Dr. Castellanos

## 2019-08-01 NOTE — ED PROVIDER NOTE - NSFOLLOWUPINSTRUCTIONS_ED_ALL_ED_FT
- Follow up with your doctor within 2-3 days.  - Obtain repeat bloodwork on Monday (CBC and CMP).   - Bring results with you to the appointment.   - Return to the ED for any new or worsening symptoms.

## 2019-08-01 NOTE — HISTORY OF PRESENT ILLNESS
[FreeTextEntry1] : Dm2 for 20 yr and HTN, HLD and obesity. CAd s/p Mi and PCI with stents x4. Has nephropathy with GFr 35. \par she takes basal -bolus insulin \par Bgs 4x day .\par she takes steroids all Bgs 300-500's  .

## 2019-08-01 NOTE — CONSULT NOTE ADULT - SUBJECTIVE AND OBJECTIVE BOX
Patient is a 84y old  Female who presents with a chief complaint of     HPI: 83yo female with PMH of CAD with stent x4 (most recent NSTEMI 12/2018 ADELIA to pLCX, previous ADELIA pLAD, ADELIA x mLAD, ADELIA OM1, ADELIA to LCx, and  to % with no intervention to date), HFpEF (EF>75%) now s/p Cardiomems implantation, COPD, asthma, moderate AS, mild pulmonary HTN, STEVENSON (CPAP), insulin dependent DMII, HTN, HLD, and hypothyroidism Cardio is Kennedy and PMD is [place], Endo is Ionica, Mcginnis is Pulm who presented to the ED with worsening dyspnea. Patient was recently discharged from the hospital on 07/30/19, and states that she has been experiencing worsening dyspnea at rest, and worsening right ankle swelling. Patient states that she has been taking all of her medications as prescribed, but doesn't feel like her symptoms have improved. Patient isn't on home oxygen, and states she has chronic orthopnea that hasn't improved or worsened. Patient also notes some intermittent palpitations, but denies any fevers, chills, chest pain, syncope, near syncope, abdominal pain, N/V/D, headache, or dizziness.       PAST MEDICAL & SURGICAL HISTORY:  NSTEMI (non-ST elevated myocardial infarction)  Hypothyroid  HLD (hyperlipidemia)  HTN (hypertension)  CAD (coronary artery disease)  Asthma  Diabetes  Gastroesophageal reflux disease without esophagitis  Pure hypercholesterolemia  Hypothyroidism, unspecified type  Essential hypertension  DM2 (diabetes mellitus, type 2)  Uncomplicated asthma, unspecified asthma severity  Abnormal findings on cardiac catheterization: Cardiac Cath  History of appendectomy  History of appendectomy      PREVIOUS DIAGNOSTIC TESTING:      ECHO  FINDINGS:  < from: TTE Echo Complete w/Doppler (06.03.19 @ 10:15) >  PHYSICIAN INTERPRETATION:  Left Ventricle: The left ventricular internal cavity size is normal. Left   ventricular wall thickness is mildly increased.  Global LV systolic function was hyperdynamic. Left ventricular ejection   fraction, by visual estimation, is >75%. Spectral Doppler shows   pseudonormal pattern of left ventricular myocardial filling (Grade II   diastolic dysfunction).  Right Ventricle: The right ventricular size is normal. RV systolic   function is normal. TV S' 0.9 m/s.  Left Atrium: Moderately enlarged left atrium.  Right Atrium: Normal right atrial size.  Pericardium: There is no evidence of pericardial effusion.  Mitral Valve: There is severe mitral annular calcification. Peak   transmitral mean gradient equals 2.0 mmHg, calculated mitral valve area   by pressure half time equals 3.48 cm² consistent with No evidence of   mitral stenosis. Mitral valve mean gradient is 2.0 mmHg consistent with   mild mitral stenosis. Mild mitral valve regurgitation is seen.  Tricuspid Valve: The tricuspid valve is normal in structure. Mild   tricuspid regurgitation is visualized. Estimated pulmonary artery   systolic pressure is 35.7 mmHg assuming a right atrial pressure of 5   mmHg, which is consistent with borderline pulmonary hypertension.  Aortic Valve: The aortic valve is trileaflet. Peak transaortic gradient   equals 40.4 mmHg, mean transaortic gradient equals 21.5 mmHg, the   calculated aortic valve area equals 1.73 cm² by the continuity equation   consistent with mild aortic stenosis. No evidence of aortic valve   regurgitation is seen.  Pulmonic Valve: The pulmonic valve was not well visualized.  Aorta: The aortic root and ascending aorta are structurally normal, with   no evidence of dilitation.  Pulmonary Artery: The pulmonary artery is not well seen.  Venous: A normal flow pattern is recorded from the right upper pulmonary   vein. The inferior vena cava is not well visualized.       Summary:   1. Left ventricular ejection fraction, by visual estimation, is >75%.   2. Technically difficult study.   3. Hyperdynamic global left ventricular systolic function.   4. Mildly increased LV wall thickness.   5. Normal left ventricular internal cavity size.   6. Spectral Doppler shows pseudonormal pattern of left ventricular   myocardial filling (Grade II diastolic dysfunction).   7. There is no evidence of pericardial effusion.   8. Severe mitral annular calcification.   9. Estimated pulmonary artery systolic pressure is 35.7 mmHg assuming a   right atrial pressure of 5 mmHg, which is consistent with borderline   pulmonary hypertension.  10. Mitral valve mean gradient is 2.0 mmHg consistent with mild mitral   stenosis.  11. Peak transaortic gradient equals 40.4 mmHg, mean transaortic gradient   equals 21.5 mmHg, the calculated aortic valve area equals 1.73 cm² by the   continuity equation consistent with mild aortic stenosis.    MD Herbert Electronically signed on 6/3/2019 at 2:11:27 PM       < end of copied text >    CATHETERIZATION  FINDINGS:  < from: Cardiac Cath Lab - Adult (12.12.18 @ 14:19) >  VENTRICLES: There were no left ventricular global or regional wall motion  abnormalities. Global left ventricular function was hypercontractile. EF  calculated by contrast ventriculography was 80 % and EF estimated was 80  %.  VALVES: AORTIC VALVE: There was moderate aortic stenosis.  CORONARY VESSELS: The coronary circulation is right dominant.  LM:   --  LM: Normal. The vessel was normal sized, not calcified, and not  tortuous. Angiography showed no evidence of disease.  LAD:   --  LAD: Normal. The vessel was normal sized, not calcified, and not  tortuous. Angiography showed minor luminal irregularities with no flow  limiting lesions. Patent stents in the mid portion of the LAD.  CX:   --  Circumflex: Normal. The vessel was normal sized, not calcified,  and not tortuous. Angiography showed a single discrete lesion. There was a  tubular 95 % stenosis in the proximal third of the vessel segment. The  lesion was without evidence of thrombus. There was RUBIN grade 3 flow  through the vessel (brisk flow). This lesion is a likely culprit forthe  patient's recent myocardial infarction. It appears amenable to  percutaneous intervention.  --  Proximal circumflex:  RCA:   --  RCA: Normal. The vessel was normal sized, not calcified, and not  tortuous. Angiography showed a single discrete lesion. There was a diffuse  100 % stenosis in the middle third of the vessel segment. The lesion was  without evidence of thrombus. There was RUBIN grade 0 flow through the  vessel (no flow). This lesion is a chronic total occlusion. Fills via  collaterals from the RCA (faintly) and from the left system (good  collaterals).  COMPLICATIONS: There were no complications. No complications occurred  during the cath lab visit. No complications occurred during the cath lab  visit.  SUMMARY:  Summary: Addendum 12/13/2018: case reconfirmed to add Stent Procedure to  [facility] Interventional report  DIAGNOSTIC IMPRESSIONS: There is significant double vessel coronary artery  disease.  Prox LCx=95%  Mid AME=895%  Successful PCI of Prox LCx with ADELIA x 1 ([place] 3.0x18mm) Left ventricular  function is normal.  LVEF=80%  Mild Elevation of Rt Hear Pressures:  RA=10 mmHg, RV=20/14 mmHg, PCWP=19 mmHg  Mild Pulmonary HTN (38/20 - 27 mmHg)  Normal CO (5.79 L/min) and CI (2.81 L/min/m2)  Moderate Aortic Stenosis (AVG=24 mmHg and BILL=1.3 cm2)  Successful Deployment of Closure Device (Angioseal)  DIAGNOSTIC RECOMMENDATIONS: The patient should continue with the present  medications. Patient management should include aggressive medical therapy,  close monitoring of BUN and creatinine, resumption of all previous  activities in 5 days, a cardiac rehabilitation program, and weight  reduction. The patient should follow a low fat and low calorie diet.  Medical management is recommended.  Prepared and signed by  Waylon Benavides MD  Signed 12/13/2018 15:40:30    < end of copied text >        Allergies    doxycycline (Unknown)  iodine (Hives)  iodine containing compounds (Unknown)  shellfish (Anaphylaxis)  shellfish (Swelling; Short breath)    Intolerances        MEDICATIONS  (STANDING):    Home Medications:  albuterol 0.63 mg/3 mL (0.021%) inhalation solution: 3 milliliter(s) inhaled every 6 hours, As Needed (23 Jul 2019 05:50)  aspirin 81 mg oral delayed release tablet: 1 tab(s) orally once a day (23 Jul 2019 05:50)  clopidogrel 75 mg oral tablet: 1 tab(s) orally once a day (30 Jul 2019 12:09)  furosemide 40 mg oral tablet: 1 tab(s) orally once a day (23 Jul 2019 05:50)  gabapentin 100 mg oral capsule: 1 cap(s) orally 3 times a day (23 Jul 2019 05:50)  levothyroxine 125 mcg (0.125 mg) oral tablet: 1 tab(s) orally once a day (23 Jul 2019 05:50)  metoprolol succinate 50 mg oral tablet, extended release: 1 tab(s) orally once a day (23 Jul 2019 05:50)  omeprazole 40 mg oral delayed release capsule: 1 cap(s) orally once a day (23 Jul 2019 05:50)    FAMILY HISTORY:  Family history of stomach cancer  Family history of MI (myocardial infarction)  Family history of cancer in mother  Family history of heart disease      SOCIAL HISTORY:  lives alone in an adult independent living community    CIGARETTES:   quit 40+yrs ago    ALCOHOL: denies    DRUGS: denies      REVIEW OF SYSTEMS:  CONSTITUTIONAL: No fever, weight loss, or fatigue  Cardiovascular:  AS PER HPI  Respiratory: AS PER HPI  Genitourinary:  No dysuria, no hematuria;   Gastrointestinal:   No dark color stool, no melena, no diarrhea, no constipation, no abdominal pain;   Neurological: No headache, no dizziness, no slurred speech;    Psychiatric: No agitation, no anxiety.    ALL OTHER REVIEW OF SYSTEMS ARE NEGATIVE.    Vital Signs Last 24 Hrs  T(C): 36.4 (01 Aug 2019 07:28), Max: 36.4 (01 Aug 2019 02:31)  T(F): 97.5 (01 Aug 2019 07:28), Max: 97.5 (01 Aug 2019 02:31)  HR: 80 (01 Aug 2019 07:28) (78 - 88)  BP: 118/66 (01 Aug 2019 07:28) (102/63 - 126/60)  BP(mean): 78 (01 Aug 2019 03:11) (78 - 78)  RR: 18 (01 Aug 2019 07:28) (18 - 24)  SpO2: 98% (01 Aug 2019 07:28) (97% - 100%)    Daily Height in cm: 167.64 (01 Aug 2019 02:31)    Daily     I&O's Detail      PHYSICAL EXAM:  Appearance: Normal, well nourished	  HEENT:   Normal oral mucosa, PERRL, EOMI, sclera non-icteric	  Lymphatic: No cervical lymphadenopathy  Cardiovascular: Normal S1 S2, No JVD, No cardiac murmurs, No carotid bruits, No peripheral edema  Respiratory: Lungs clear to auscultation	  Psychiatry: A & O x 3, Mood & affect appropriate  Gastrointestinal:  Soft, Non-tender, + BS, no bruits	  Skin: No rashes, No ecchymoses, No cyanosis  Neurologic: Grossly non-focal with full strength in all four extremities  Extremities: Normal range of motion, No clubbing, cyanosis or edema  Vascular: Peripheral pulses palpable 2+ bilaterally      INTERPRETATION OF TELEMETRY:    ECG:    LABS:                        10.8   16.54 )-----------( 269      ( 01 Aug 2019 02:54 )             34.4     08-01    138  |  97<L>  |  56.0<H>  ----------------------------<  66<L>  3.8   |  25.0  |  2.10<H>    Ca    8.8      01 Aug 2019 02:54    TPro  7.3  /  Alb  3.4  /  TBili  0.3<L>  /  DBili  x   /  AST  20  /  ALT  14  /  AlkPhos  89  08-01    CARDIAC MARKERS ( 01 Aug 2019 04:30 )  x     / <0.01 ng/mL / 56 U/L / x     / x              I&O's Summary    BNPSerum Pro-Brain Natriuretic Peptide: 1237 pg/mL (08-01 @ 04:32)    Serum Pro-Brain Natriuretic Peptide: 1237 pg/mL (08-01-19 @ 04:32)    RADIOLOGY & ADDITIONAL STUDIES: Patient is a 84y old  Female who presents with a chief complaint of     HPI: 83yo female with PMH of CAD with stent x4 (most recent NSTEMI 12/2018 ADELIA to pLCX, previous ADELIA pLAD, ADELIA x mLAD, ADELIA OM1, ADELIA to LCx, and  to % with no intervention to date), HFpEF (EF>75%) now s/p Cardiomems implantation, COPD, asthma, moderate AS, mild pulmonary HTN, STEVENSON (CPAP), insulin dependent DMII, HTN, HLD, and hypothyroidism Cardio is Kennedy and PMD is [place], Endo is Ionica, Mcginnis is Pulm who presented to the ED with worsening dyspnea. Patient was recently discharged from the hospital on 07/30/19, and states that she has been experiencing worsening dyspnea at rest, and worsening right ankle swelling. Patient states that she has been taking all of her medications as prescribed, but doesn't feel like her symptoms have improved. Patient isn't on home oxygen, and states she has chronic orthopnea that hasn't improved or worsened. Patient also notes some intermittent palpitations, but denies any fevers, chills, chest pain, syncope, near syncope, abdominal pain, N/V/D, headache, or dizziness.       PAST MEDICAL & SURGICAL HISTORY:  NSTEMI (non-ST elevated myocardial infarction)  Hypothyroid  HLD (hyperlipidemia)  HTN (hypertension)  CAD (coronary artery disease)  Asthma  Diabetes  Gastroesophageal reflux disease without esophagitis  Pure hypercholesterolemia  Hypothyroidism, unspecified type  Essential hypertension  DM2 (diabetes mellitus, type 2)  Uncomplicated asthma, unspecified asthma severity  Abnormal findings on cardiac catheterization: Cardiac Cath  History of appendectomy  History of appendectomy      PREVIOUS DIAGNOSTIC TESTING:      ECHO  FINDINGS:  < from: TTE Echo Complete w/Doppler (06.03.19 @ 10:15) >  PHYSICIAN INTERPRETATION:  Left Ventricle: The left ventricular internal cavity size is normal. Left   ventricular wall thickness is mildly increased.  Global LV systolic function was hyperdynamic. Left ventricular ejection   fraction, by visual estimation, is >75%. Spectral Doppler shows   pseudonormal pattern of left ventricular myocardial filling (Grade II   diastolic dysfunction).  Right Ventricle: The right ventricular size is normal. RV systolic   function is normal. TV S' 0.9 m/s.  Left Atrium: Moderately enlarged left atrium.  Right Atrium: Normal right atrial size.  Pericardium: There is no evidence of pericardial effusion.  Mitral Valve: There is severe mitral annular calcification. Peak   transmitral mean gradient equals 2.0 mmHg, calculated mitral valve area   by pressure half time equals 3.48 cm² consistent with No evidence of   mitral stenosis. Mitral valve mean gradient is 2.0 mmHg consistent with   mild mitral stenosis. Mild mitral valve regurgitation is seen.  Tricuspid Valve: The tricuspid valve is normal in structure. Mild   tricuspid regurgitation is visualized. Estimated pulmonary artery   systolic pressure is 35.7 mmHg assuming a right atrial pressure of 5   mmHg, which is consistent with borderline pulmonary hypertension.  Aortic Valve: The aortic valve is trileaflet. Peak transaortic gradient   equals 40.4 mmHg, mean transaortic gradient equals 21.5 mmHg, the   calculated aortic valve area equals 1.73 cm² by the continuity equation   consistent with mild aortic stenosis. No evidence of aortic valve   regurgitation is seen.  Pulmonic Valve: The pulmonic valve was not well visualized.  Aorta: The aortic root and ascending aorta are structurally normal, with   no evidence of dilitation.  Pulmonary Artery: The pulmonary artery is not well seen.  Venous: A normal flow pattern is recorded from the right upper pulmonary   vein. The inferior vena cava is not well visualized.       Summary:   1. Left ventricular ejection fraction, by visual estimation, is >75%.   2. Technically difficult study.   3. Hyperdynamic global left ventricular systolic function.   4. Mildly increased LV wall thickness.   5. Normal left ventricular internal cavity size.   6. Spectral Doppler shows pseudonormal pattern of left ventricular   myocardial filling (Grade II diastolic dysfunction).   7. There is no evidence of pericardial effusion.   8. Severe mitral annular calcification.   9. Estimated pulmonary artery systolic pressure is 35.7 mmHg assuming a   right atrial pressure of 5 mmHg, which is consistent with borderline   pulmonary hypertension.  10. Mitral valve mean gradient is 2.0 mmHg consistent with mild mitral   stenosis.  11. Peak transaortic gradient equals 40.4 mmHg, mean transaortic gradient   equals 21.5 mmHg, the calculated aortic valve area equals 1.73 cm² by the   continuity equation consistent with mild aortic stenosis.    MD Herbret Electronically signed on 6/3/2019 at 2:11:27 PM       < end of copied text >    CATHETERIZATION  FINDINGS:  < from: Cardiac Cath Lab - Adult (12.12.18 @ 14:19) >  VENTRICLES: There were no left ventricular global or regional wall motion  abnormalities. Global left ventricular function was hypercontractile. EF  calculated by contrast ventriculography was 80 % and EF estimated was 80  %.  VALVES: AORTIC VALVE: There was moderate aortic stenosis.  CORONARY VESSELS: The coronary circulation is right dominant.  LM:   --  LM: Normal. The vessel was normal sized, not calcified, and not  tortuous. Angiography showed no evidence of disease.  LAD:   --  LAD: Normal. The vessel was normal sized, not calcified, and not  tortuous. Angiography showed minor luminal irregularities with no flow  limiting lesions. Patent stents in the mid portion of the LAD.  CX:   --  Circumflex: Normal. The vessel was normal sized, not calcified,  and not tortuous. Angiography showed a single discrete lesion. There was a  tubular 95 % stenosis in the proximal third of the vessel segment. The  lesion was without evidence of thrombus. There was RUBIN grade 3 flow  through the vessel (brisk flow). This lesion is a likely culprit forthe  patient's recent myocardial infarction. It appears amenable to  percutaneous intervention.  --  Proximal circumflex:  RCA:   --  RCA: Normal. The vessel was normal sized, not calcified, and not  tortuous. Angiography showed a single discrete lesion. There was a diffuse  100 % stenosis in the middle third of the vessel segment. The lesion was  without evidence of thrombus. There was RUBIN grade 0 flow through the  vessel (no flow). This lesion is a chronic total occlusion. Fills via  collaterals from the RCA (faintly) and from the left system (good  collaterals).  COMPLICATIONS: There were no complications. No complications occurred  during the cath lab visit. No complications occurred during the cath lab  visit.  SUMMARY:  Summary: Addendum 12/13/2018: case reconfirmed to add Stent Procedure to  [facility] Interventional report  DIAGNOSTIC IMPRESSIONS: There is significant double vessel coronary artery  disease.  Prox LCx=95%  Mid ESR=742%  Successful PCI of Prox LCx with ADELIA x 1 ([place] 3.0x18mm) Left ventricular  function is normal.  LVEF=80%  Mild Elevation of Rt Hear Pressures:  RA=10 mmHg, RV=20/14 mmHg, PCWP=19 mmHg  Mild Pulmonary HTN (38/20 - 27 mmHg)  Normal CO (5.79 L/min) and CI (2.81 L/min/m2)  Moderate Aortic Stenosis (AVG=24 mmHg and BILL=1.3 cm2)  Successful Deployment of Closure Device (Angioseal)  DIAGNOSTIC RECOMMENDATIONS: The patient should continue with the present  medications. Patient management should include aggressive medical therapy,  close monitoring of BUN and creatinine, resumption of all previous  activities in 5 days, a cardiac rehabilitation program, and weight  reduction. The patient should follow a low fat and low calorie diet.  Medical management is recommended.  Prepared and signed by  Waylon Benavides MD  Signed 12/13/2018 15:40:30    < end of copied text >        Allergies    doxycycline (Unknown)  iodine (Hives)  iodine containing compounds (Unknown)  shellfish (Anaphylaxis)  shellfish (Swelling; Short breath)    Intolerances        MEDICATIONS  (STANDING):    Home Medications:  albuterol 0.63 mg/3 mL (0.021%) inhalation solution: 3 milliliter(s) inhaled every 6 hours, As Needed (23 Jul 2019 05:50)  aspirin 81 mg oral delayed release tablet: 1 tab(s) orally once a day (23 Jul 2019 05:50)  clopidogrel 75 mg oral tablet: 1 tab(s) orally once a day (30 Jul 2019 12:09)  furosemide 40 mg oral tablet: 1 tab(s) orally once a day (23 Jul 2019 05:50)  gabapentin 100 mg oral capsule: 1 cap(s) orally 3 times a day (23 Jul 2019 05:50)  levothyroxine 125 mcg (0.125 mg) oral tablet: 1 tab(s) orally once a day (23 Jul 2019 05:50)  metoprolol succinate 50 mg oral tablet, extended release: 1 tab(s) orally once a day (23 Jul 2019 05:50)  omeprazole 40 mg oral delayed release capsule: 1 cap(s) orally once a day (23 Jul 2019 05:50)    FAMILY HISTORY:  Family history of stomach cancer  Family history of MI (myocardial infarction)  Family history of cancer in mother  Family history of heart disease      SOCIAL HISTORY:  lives alone in an adult independent living community    CIGARETTES:   quit 40+yrs ago    ALCOHOL: denies    DRUGS: denies      REVIEW OF SYSTEMS:  CONSTITUTIONAL: No fever, weight loss, or fatigue  Cardiovascular:  AS PER HPI  Respiratory: AS PER HPI  Genitourinary:  No dysuria, no hematuria;   Gastrointestinal:   No dark color stool, no melena, no diarrhea, no constipation, no abdominal pain;   Neurological: No headache, no dizziness, no slurred speech;    Psychiatric: No agitation, no anxiety.    ALL OTHER REVIEW OF SYSTEMS ARE NEGATIVE.    Vital Signs Last 24 Hrs  T(C): 36.4 (01 Aug 2019 07:28), Max: 36.4 (01 Aug 2019 02:31)  T(F): 97.5 (01 Aug 2019 07:28), Max: 97.5 (01 Aug 2019 02:31)  HR: 80 (01 Aug 2019 07:28) (78 - 88)  BP: 118/66 (01 Aug 2019 07:28) (102/63 - 126/60)  BP(mean): 78 (01 Aug 2019 03:11) (78 - 78)  RR: 18 (01 Aug 2019 07:28) (18 - 24)  SpO2: 98% (01 Aug 2019 07:28) (97% - 100%)    Daily Height in cm: 167.64 (01 Aug 2019 02:31)    Daily     I&O's Detail      PHYSICAL EXAM:  Appearance: Normal, well nourished	  HEENT:   Normal oral mucosa, PERRL, EOMI, sclera non-icteric	  Lymphatic: No cervical lymphadenopathy  Cardiovascular: Normal S1 S2, No JVD, No cardiac murmurs, No carotid bruits, 2+ b/l LE edema  Respiratory: Trace rales at b/l bases   Psychiatry: A & O x 3, Mood & affect appropriate  Gastrointestinal:  Soft, Non-tender, + BS, no bruits	  Skin: No rashes, No ecchymoses, No cyanosis  Neurologic: Grossly non-focal with full strength in all four extremities  Extremities: Normal range of motion, No clubbing, cyanosis      INTERPRETATION OF TELEMETRY:    ECG:  SR 1st degree AV block, LAD, old inferior infarct, LVH with repolarization abnormalities     LABS:                        10.8   16.54 )-----------( 269      ( 01 Aug 2019 02:54 )             34.4     08-01    138  |  97<L>  |  56.0<H>  ----------------------------<  66<L>  3.8   |  25.0  |  2.10<H>    Ca    8.8      01 Aug 2019 02:54    TPro  7.3  /  Alb  3.4  /  TBili  0.3<L>  /  DBili  x   /  AST  20  /  ALT  14  /  AlkPhos  89  08-01    CARDIAC MARKERS ( 01 Aug 2019 04:30 )  x     / <0.01 ng/mL / 56 U/L / x     / x              I&O's Summary    BNPSerum Pro-Brain Natriuretic Peptide: 1237 pg/mL (08-01 @ 04:32)    Serum Pro-Brain Natriuretic Peptide: 1237 pg/mL (08-01-19 @ 04:32)    RADIOLOGY & ADDITIONAL STUDIES:  < from: Xray Chest 1 View-PORTABLE IMMEDIATE (08.01.19 @ 04:32) >   EXAM:  XR CHEST PORTABLE IMMED 1V                          PROCEDURE DATE:  08/01/2019          INTERPRETATION:  Portable chest radiograph        CLINICAL INFORMATION:   Short of breath.    TECHNIQUE:  Portable  AP view of the chest was obtained.    COMPARISON: 7/28/2019 available for review.    FINDINGS:   The lungs  are clear.  No pleural abnormality is seen.    The  heart is enlarged in transverse diameter. No hilar mass. Trachea   midline.   Visualized osseous structures are intact.        IMPRESSION: Cardiomegaly No evidence of active chest disease.        < end of copied text > Patient is a 84y old  Female who presents with a chief complaint of     HPI: 83yo female with PMH of CAD with stent x4 (most recent NSTEMI 12/2018 ADELIA to pLCX, previous ADELIA pLAD, ADELIA x mLAD, ADELIA OM1, ADELIA to LCx, and  % with no intervention to date), HFpEF (EF>75%) now s/p Cardiomems implantation, COPD, asthma, moderate AS, mild pulmonary HTN, STEVENSON (CPAP), insulin dependent DMII, HTN, HLD, and hypothyroidism Cardio is Kennedy and PMD is [place], Endo is Ionica, Mcginnis is Pulm who presented to the ED with worsening dyspnea. Patient was recently discharged from the hospital on 07/30/19, and states that she has been experiencing worsening dyspnea at rest, and worsening right ankle swelling. Patient states that she has been taking all of her medications as prescribed, but doesn't feel like her symptoms have improved. Patient isn't on home oxygen, and states she has chronic orthopnea that hasn't improved or worsened. Patient also notes some intermittent palpitations, but denies any fevers, chills, chest pain, syncope, near syncope, abdominal pain, N/V/D, headache, or dizziness.       PAST MEDICAL & SURGICAL HISTORY:  NSTEMI (non-ST elevated myocardial infarction)  Hypothyroid  HLD (hyperlipidemia)  HTN (hypertension)  CAD (coronary artery disease)  Asthma  Diabetes  Gastroesophageal reflux disease without esophagitis  Pure hypercholesterolemia  Hypothyroidism, unspecified type  Essential hypertension  DM2 (diabetes mellitus, type 2)  Uncomplicated asthma, unspecified asthma severity  Abnormal findings on cardiac catheterization: Cardiac Cath  History of appendectomy  History of appendectomy      PREVIOUS DIAGNOSTIC TESTING:      ECHO  FINDINGS:  < from: TTE Echo Complete w/Doppler (06.03.19 @ 10:15) >  PHYSICIAN INTERPRETATION:  Left Ventricle: The left ventricular internal cavity size is normal. Left   ventricular wall thickness is mildly increased.  Global LV systolic function was hyperdynamic. Left ventricular ejection   fraction, by visual estimation, is >75%. Spectral Doppler shows   pseudonormal pattern of left ventricular myocardial filling (Grade II   diastolic dysfunction).  Right Ventricle: The right ventricular size is normal. RV systolic   function is normal. TV S' 0.9 m/s.  Left Atrium: Moderately enlarged left atrium.  Right Atrium: Normal right atrial size.  Pericardium: There is no evidence of pericardial effusion.  Mitral Valve: There is severe mitral annular calcification. Peak   transmitral mean gradient equals 2.0 mmHg, calculated mitral valve area   by pressure half time equals 3.48 cm² consistent with No evidence of   mitral stenosis. Mitral valve mean gradient is 2.0 mmHg consistent with   mild mitral stenosis. Mild mitral valve regurgitation is seen.  Tricuspid Valve: The tricuspid valve is normal in structure. Mild   tricuspid regurgitation is visualized. Estimated pulmonary artery   systolic pressure is 35.7 mmHg assuming a right atrial pressure of 5   mmHg, which is consistent with borderline pulmonary hypertension.  Aortic Valve: The aortic valve is trileaflet. Peak transaortic gradient   equals 40.4 mmHg, mean transaortic gradient equals 21.5 mmHg, the   calculated aortic valve area equals 1.73 cm² by the continuity equation   consistent with mild aortic stenosis. No evidence of aortic valve   regurgitation is seen.  Pulmonic Valve: The pulmonic valve was not well visualized.  Aorta: The aortic root and ascending aorta are structurally normal, with   no evidence of dilitation.  Pulmonary Artery: The pulmonary artery is not well seen.  Venous: A normal flow pattern is recorded from the right upper pulmonary   vein. The inferior vena cava is not well visualized.       Summary:   1. Left ventricular ejection fraction, by visual estimation, is >75%.   2. Technically difficult study.   3. Hyperdynamic global left ventricular systolic function.   4. Mildly increased LV wall thickness.   5. Normal left ventricular internal cavity size.   6. Spectral Doppler shows pseudonormal pattern of left ventricular   myocardial filling (Grade II diastolic dysfunction).   7. There is no evidence of pericardial effusion.   8. Severe mitral annular calcification.   9. Estimated pulmonary artery systolic pressure is 35.7 mmHg assuming a   right atrial pressure of 5 mmHg, which is consistent with borderline   pulmonary hypertension.  10. Mitral valve mean gradient is 2.0 mmHg consistent with mild mitral   stenosis.  11. Peak transaortic gradient equals 40.4 mmHg, mean transaortic gradient   equals 21.5 mmHg, the calculated aortic valve area equals 1.73 cm² by the   continuity equation consistent with mild aortic stenosis.    MD Herbert Electronically signed on 6/3/2019 at 2:11:27 PM       < end of copied text >    CATHETERIZATION  FINDINGS:  < from: Cardiac Cath Lab - Adult (12.12.18 @ 14:19) >  VENTRICLES: There were no left ventricular global or regional wall motion  abnormalities. Global left ventricular function was hypercontractile. EF  calculated by contrast ventriculography was 80 % and EF estimated was 80  %.  VALVES: AORTIC VALVE: There was moderate aortic stenosis.  CORONARY VESSELS: The coronary circulation is right dominant.  LM:   --  LM: Normal. The vessel was normal sized, not calcified, and not  tortuous. Angiography showed no evidence of disease.  LAD:   --  LAD: Normal. The vessel was normal sized, not calcified, and not  tortuous. Angiography showed minor luminal irregularities with no flow  limiting lesions. Patent stents in the mid portion of the LAD.  CX:   --  Circumflex: Normal. The vessel was normal sized, not calcified,  and not tortuous. Angiography showed a single discrete lesion. There was a  tubular 95 % stenosis in the proximal third of the vessel segment. The  lesion was without evidence of thrombus. There was RUBIN grade 3 flow  through the vessel (brisk flow). This lesion is a likely culprit forthe  patient's recent myocardial infarction. It appears amenable to  percutaneous intervention.  --  Proximal circumflex:  RCA:   --  RCA: Normal. The vessel was normal sized, not calcified, and not  tortuous. Angiography showed a single discrete lesion. There was a diffuse  100 % stenosis in the middle third of the vessel segment. The lesion was  without evidence of thrombus. There was RUBIN grade 0 flow through the  vessel (no flow). This lesion is a chronic total occlusion. Fills via  collaterals from the RCA (faintly) and from the left system (good  collaterals).  COMPLICATIONS: There were no complications. No complications occurred  during the cath lab visit. No complications occurred during the cath lab  visit.  SUMMARY:  Summary: Addendum 12/13/2018: case reconfirmed to add Stent Procedure to  [facility] Interventional report  DIAGNOSTIC IMPRESSIONS: There is significant double vessel coronary artery  disease.  Prox LCx=95%  Mid AMB=835%  Successful PCI of Prox LCx with ADELIA x 1 ([place] 3.0x18mm) Left ventricular  function is normal.  LVEF=80%  Mild Elevation of Rt Hear Pressures:  RA=10 mmHg, RV=20/14 mmHg, PCWP=19 mmHg  Mild Pulmonary HTN (38/20 - 27 mmHg)  Normal CO (5.79 L/min) and CI (2.81 L/min/m2)  Moderate Aortic Stenosis (AVG=24 mmHg and BILL=1.3 cm2)  Successful Deployment of Closure Device (Angioseal)  DIAGNOSTIC RECOMMENDATIONS: The patient should continue with the present  medications. Patient management should include aggressive medical therapy,  close monitoring of BUN and creatinine, resumption of all previous  activities in 5 days, a cardiac rehabilitation program, and weight  reduction. The patient should follow a low fat and low calorie diet.  Medical management is recommended.  Prepared and signed by  Waylon Benavides MD  Signed 12/13/2018 15:40:30    < end of copied text >        Allergies    doxycycline (Unknown)  iodine (Hives)  iodine containing compounds (Unknown)  shellfish (Anaphylaxis)  shellfish (Swelling; Short breath)    Intolerances        MEDICATIONS  (STANDING):    Home Medications:  albuterol 0.63 mg/3 mL (0.021%) inhalation solution: 3 milliliter(s) inhaled every 6 hours, As Needed (23 Jul 2019 05:50)  aspirin 81 mg oral delayed release tablet: 1 tab(s) orally once a day (23 Jul 2019 05:50)  clopidogrel 75 mg oral tablet: 1 tab(s) orally once a day (30 Jul 2019 12:09)  furosemide 40 mg oral tablet: 1 tab(s) orally once a day (23 Jul 2019 05:50)  gabapentin 100 mg oral capsule: 1 cap(s) orally 3 times a day (23 Jul 2019 05:50)  levothyroxine 125 mcg (0.125 mg) oral tablet: 1 tab(s) orally once a day (23 Jul 2019 05:50)  metoprolol succinate 50 mg oral tablet, extended release: 1 tab(s) orally once a day (23 Jul 2019 05:50)  omeprazole 40 mg oral delayed release capsule: 1 cap(s) orally once a day (23 Jul 2019 05:50)    FAMILY HISTORY:  Family history of stomach cancer  Family history of MI (myocardial infarction)  Family history of cancer in mother  Family history of heart disease      SOCIAL HISTORY:  lives alone in an adult independent living community    CIGARETTES:   quit 40+yrs ago    ALCOHOL: denies    DRUGS: denies      REVIEW OF SYSTEMS:  CONSTITUTIONAL: No fever, weight loss, or fatigue  Cardiovascular:  AS PER HPI  Respiratory: AS PER HPI  Genitourinary:  No dysuria, no hematuria;   Gastrointestinal:   No dark color stool, no melena, no diarrhea, no constipation, no abdominal pain;   Neurological: No headache, no dizziness, no slurred speech;    Psychiatric: No agitation, no anxiety.    ALL OTHER REVIEW OF SYSTEMS ARE NEGATIVE.    Vital Signs Last 24 Hrs  T(C): 36.4 (01 Aug 2019 07:28), Max: 36.4 (01 Aug 2019 02:31)  T(F): 97.5 (01 Aug 2019 07:28), Max: 97.5 (01 Aug 2019 02:31)  HR: 80 (01 Aug 2019 07:28) (78 - 88)  BP: 118/66 (01 Aug 2019 07:28) (102/63 - 126/60)  BP(mean): 78 (01 Aug 2019 03:11) (78 - 78)  RR: 18 (01 Aug 2019 07:28) (18 - 24)  SpO2: 98% (01 Aug 2019 07:28) (97% - 100%)    Daily Height in cm: 167.64 (01 Aug 2019 02:31)    Daily     I&O's Detail      PHYSICAL EXAM:  Appearance: Normal, well nourished	  HEENT:   Normal oral mucosa, PERRL, EOMI, sclera non-icteric	  Lymphatic: No cervical lymphadenopathy  Cardiovascular: Normal S1 S2, No JVD, No cardiac murmurs, No carotid bruits, 2+ b/l LE edema  Respiratory: Trace rales at b/l bases   Psychiatry: A & O x 3, Mood & affect appropriate  Gastrointestinal:  Soft, Non-tender, + BS, no bruits	  Skin: No rashes, No ecchymoses, No cyanosis  Neurologic: Grossly non-focal with full strength in all four extremities  Extremities: Normal range of motion, No clubbing, cyanosis      INTERPRETATION OF TELEMETRY:    ECG:  SR 1st degree AV block, LAD, old inferior infarct, LVH with repolarization abnormalities     LABS:                        10.8   16.54 )-----------( 269      ( 01 Aug 2019 02:54 )             34.4     08-01    138  |  97<L>  |  56.0<H>  ----------------------------<  66<L>  3.8   |  25.0  |  2.10<H>    Ca    8.8      01 Aug 2019 02:54    TPro  7.3  /  Alb  3.4  /  TBili  0.3<L>  /  DBili  x   /  AST  20  /  ALT  14  /  AlkPhos  89  08-01    CARDIAC MARKERS ( 01 Aug 2019 04:30 )  x     / <0.01 ng/mL / 56 U/L / x     / x              I&O's Summary    BNPSerum Pro-Brain Natriuretic Peptide: 1237 pg/mL (08-01 @ 04:32)    Serum Pro-Brain Natriuretic Peptide: 1237 pg/mL (08-01-19 @ 04:32)    RADIOLOGY & ADDITIONAL STUDIES:  < from: Xray Chest 1 View-PORTABLE IMMEDIATE (08.01.19 @ 04:32) >   EXAM:  XR CHEST PORTABLE IMMED 1V                          PROCEDURE DATE:  08/01/2019          INTERPRETATION:  Portable chest radiograph        CLINICAL INFORMATION:   Short of breath.    TECHNIQUE:  Portable  AP view of the chest was obtained.    COMPARISON: 7/28/2019 available for review.    FINDINGS:   The lungs  are clear.  No pleural abnormality is seen.    The  heart is enlarged in transverse diameter. No hilar mass. Trachea   midline.   Visualized osseous structures are intact.        IMPRESSION: Cardiomegaly No evidence of active chest disease.        < end of copied text > Patient is a 84y old  Female who presents with a chief complaint of     HPI: 83yo female with PMH of CAD with stent x4 (most recent NSTEMI 12/2018 ADELIA to pLCX, previous ADELIA pLAD, ADELIA x mLAD, ADELIA OM1, ADELIA to LCx, and  % with no intervention to date), HFpEF (EF>75%) now s/p Cardiomems implantation, COPD, asthma, moderate AS, mild pulmonary HTN, STEVENSON (CPAP), insulin dependent DMII, HTN, HLD, and hypothyroidism Cardio is [facility] and PMD is [place], Endo is Ionica, Mcginnis is Pulm who presented to the ED with worsening dyspnea. Patient was evaluated by EMS 2 times at home, given IV lasix by EMS, but the 3rd day decided to bring her to the ED. Patient was recently discharged from the hospital on 07/30/19, and states that she has been experiencing worsening dyspnea at rest, and worsening right ankle swelling. Patient states that she has been taking all of her medications as prescribed, but doesn't feel like her symptoms have improved. Patient isn't on home oxygen, and states she has chronic orthopnea that hasn't improved or worsened. Patient also notes some intermittent palpitations, but denies any fevers, chills, chest pain, syncope, near syncope, abdominal pain, N/V/D, headache, or dizziness.       PAST MEDICAL & SURGICAL HISTORY:  NSTEMI (non-ST elevated myocardial infarction)  Hypothyroid  HLD (hyperlipidemia)  HTN (hypertension)  CAD (coronary artery disease)  Asthma  Diabetes  Gastroesophageal reflux disease without esophagitis  Pure hypercholesterolemia  Hypothyroidism, unspecified type  Essential hypertension  DM2 (diabetes mellitus, type 2)  Uncomplicated asthma, unspecified asthma severity  Abnormal findings on cardiac catheterization: Cardiac Cath  History of appendectomy  History of appendectomy      PREVIOUS DIAGNOSTIC TESTING:      ECHO  FINDINGS:  < from: TTE Echo Complete w/Doppler (06.03.19 @ 10:15) >  PHYSICIAN INTERPRETATION:  Left Ventricle: The left ventricular internal cavity size is normal. Left   ventricular wall thickness is mildly increased.  Global LV systolic function was hyperdynamic. Left ventricular ejection   fraction, by visual estimation, is >75%. Spectral Doppler shows   pseudonormal pattern of left ventricular myocardial filling (Grade II   diastolic dysfunction).  Right Ventricle: The right ventricular size is normal. RV systolic   function is normal. TV S' 0.9 m/s.  Left Atrium: Moderately enlarged left atrium.  Right Atrium: Normal right atrial size.  Pericardium: There is no evidence of pericardial effusion.  Mitral Valve: There is severe mitral annular calcification. Peak   transmitral mean gradient equals 2.0 mmHg, calculated mitral valve area   by pressure half time equals 3.48 cm² consistent with No evidence of   mitral stenosis. Mitral valve mean gradient is 2.0 mmHg consistent with   mild mitral stenosis. Mild mitral valve regurgitation is seen.  Tricuspid Valve: The tricuspid valve is normal in structure. Mild   tricuspid regurgitation is visualized. Estimated pulmonary artery   systolic pressure is 35.7 mmHg assuming a right atrial pressure of 5   mmHg, which is consistent with borderline pulmonary hypertension.  Aortic Valve: The aortic valve is trileaflet. Peak transaortic gradient   equals 40.4 mmHg, mean transaortic gradient equals 21.5 mmHg, the   calculated aortic valve area equals 1.73 cm² by the continuity equation   consistent with mild aortic stenosis. No evidence of aortic valve   regurgitation is seen.  Pulmonic Valve: The pulmonic valve was not well visualized.  Aorta: The aortic root and ascending aorta are structurally normal, with   no evidence of dilitation.  Pulmonary Artery: The pulmonary artery is not well seen.  Venous: A normal flow pattern is recorded from the right upper pulmonary   vein. The inferior vena cava is not well visualized.       Summary:   1. Left ventricular ejection fraction, by visual estimation, is >75%.   2. Technically difficult study.   3. Hyperdynamic global left ventricular systolic function.   4. Mildly increased LV wall thickness.   5. Normal left ventricular internal cavity size.   6. Spectral Doppler shows pseudonormal pattern of left ventricular   myocardial filling (Grade II diastolic dysfunction).   7. There is no evidence of pericardial effusion.   8. Severe mitral annular calcification.   9. Estimated pulmonary artery systolic pressure is 35.7 mmHg assuming a   right atrial pressure of 5 mmHg, which is consistent with borderline   pulmonary hypertension.  10. Mitral valve mean gradient is 2.0 mmHg consistent with mild mitral   stenosis.  11. Peak transaortic gradient equals 40.4 mmHg, mean transaortic gradient   equals 21.5 mmHg, the calculated aortic valve area equals 1.73 cm² by the   continuity equation consistent with mild aortic stenosis.    MD Herbert Electronically signed on 6/3/2019 at 2:11:27 PM       < end of copied text >    CATHETERIZATION  FINDINGS:  < from: Cardiac Cath Lab - Adult (12.12.18 @ 14:19) >  VENTRICLES: There were no left ventricular global or regional wall motion  abnormalities. Global left ventricular function was hypercontractile. EF  calculated by contrast ventriculography was 80 % and EF estimated was 80  %.  VALVES: AORTIC VALVE: There was moderate aortic stenosis.  CORONARY VESSELS: The coronary circulation is right dominant.  LM:   --  LM: Normal. The vessel was normal sized, not calcified, and not  tortuous. Angiography showed no evidence of disease.  LAD:   --  LAD: Normal. The vessel was normal sized, not calcified, and not  tortuous. Angiography showed minor luminal irregularities with no flow  limiting lesions. Patent stents in the mid portion of the LAD.  CX:   --  Circumflex: Normal. The vessel was normal sized, not calcified,  and not tortuous. Angiography showed a single discrete lesion. There was a  tubular 95 % stenosis in the proximal third of the vessel segment. The  lesion was without evidence of thrombus. There was RUBIN grade 3 flow  through the vessel (brisk flow). This lesion is a likely culprit forthe  patient's recent myocardial infarction. It appears amenable to  percutaneous intervention.  --  Proximal circumflex:  RCA:   --  RCA: Normal. The vessel was normal sized, not calcified, and not  tortuous. Angiography showed a single discrete lesion. There was a diffuse  100 % stenosis in the middle third of the vessel segment. The lesion was  without evidence of thrombus. There was RUBIN grade 0 flow through the  vessel (no flow). This lesion is a chronic total occlusion. Fills via  collaterals from the RCA (faintly) and from the left system (good  collaterals).  COMPLICATIONS: There were no complications. No complications occurred  during the cath lab visit. No complications occurred during the cath lab  visit.  SUMMARY:  Summary: Addendum 12/13/2018: case reconfirmed to add Stent Procedure to  [facility] Interventional report  DIAGNOSTIC IMPRESSIONS: There is significant double vessel coronary artery  disease.  Prox LCx=95%  Mid VWT=230%  Successful PCI of Prox LCx with ADELIA x 1 ([place] 3.0x18mm) Left ventricular  function is normal.  LVEF=80%  Mild Elevation of Rt Hear Pressures:  RA=10 mmHg, RV=20/14 mmHg, PCWP=19 mmHg  Mild Pulmonary HTN (38/20 - 27 mmHg)  Normal CO (5.79 L/min) and CI (2.81 L/min/m2)  Moderate Aortic Stenosis (AVG=24 mmHg and BILL=1.3 cm2)  Successful Deployment of Closure Device (Angioseal)  DIAGNOSTIC RECOMMENDATIONS: The patient should continue with the present  medications. Patient management should include aggressive medical therapy,  close monitoring of BUN and creatinine, resumption of all previous  activities in 5 days, a cardiac rehabilitation program, and weight  reduction. The patient should follow a low fat and low calorie diet.  Medical management is recommended.  Prepared and signed by  Waylon Benavides MD  Signed 12/13/2018 15:40:30    < end of copied text >        Allergies    doxycycline (Unknown)  iodine (Hives)  iodine containing compounds (Unknown)  shellfish (Anaphylaxis)  shellfish (Swelling; Short breath)    Intolerances        MEDICATIONS  (STANDING):    Home Medications:  albuterol 0.63 mg/3 mL (0.021%) inhalation solution: 3 milliliter(s) inhaled every 6 hours, As Needed (23 Jul 2019 05:50)  aspirin 81 mg oral delayed release tablet: 1 tab(s) orally once a day (23 Jul 2019 05:50)  clopidogrel 75 mg oral tablet: 1 tab(s) orally once a day (30 Jul 2019 12:09)  furosemide 40 mg oral tablet: 1 tab(s) orally once a day (23 Jul 2019 05:50)  gabapentin 100 mg oral capsule: 1 cap(s) orally 3 times a day (23 Jul 2019 05:50)  levothyroxine 125 mcg (0.125 mg) oral tablet: 1 tab(s) orally once a day (23 Jul 2019 05:50)  metoprolol succinate 50 mg oral tablet, extended release: 1 tab(s) orally once a day (23 Jul 2019 05:50)  omeprazole 40 mg oral delayed release capsule: 1 cap(s) orally once a day (23 Jul 2019 05:50)    FAMILY HISTORY:  Family history of stomach cancer  Family history of MI (myocardial infarction)  Family history of cancer in mother  Family history of heart disease      SOCIAL HISTORY:  lives alone in an adult independent living community    CIGARETTES:   quit 40+yrs ago    ALCOHOL: denies    DRUGS: denies      REVIEW OF SYSTEMS:  CONSTITUTIONAL: No fever, weight loss, or fatigue  Cardiovascular:  AS PER HPI  Respiratory: AS PER HPI  Genitourinary:  No dysuria, no hematuria;   Gastrointestinal:   No dark color stool, no melena, no diarrhea, no constipation, no abdominal pain;   Neurological: No headache, no dizziness, no slurred speech;    Psychiatric: No agitation, no anxiety.    ALL OTHER REVIEW OF SYSTEMS ARE NEGATIVE.    Vital Signs Last 24 Hrs  T(C): 36.4 (01 Aug 2019 07:28), Max: 36.4 (01 Aug 2019 02:31)  T(F): 97.5 (01 Aug 2019 07:28), Max: 97.5 (01 Aug 2019 02:31)  HR: 80 (01 Aug 2019 07:28) (78 - 88)  BP: 118/66 (01 Aug 2019 07:28) (102/63 - 126/60)  BP(mean): 78 (01 Aug 2019 03:11) (78 - 78)  RR: 18 (01 Aug 2019 07:28) (18 - 24)  SpO2: 98% (01 Aug 2019 07:28) (97% - 100%)    Daily Height in cm: 167.64 (01 Aug 2019 02:31)    Daily     I&O's Detail      PHYSICAL EXAM:  Appearance: Normal, well nourished	  HEENT:   Normal oral mucosa, PERRL, EOMI, sclera non-icteric	  Lymphatic: No cervical lymphadenopathy  Cardiovascular: Normal S1 S2, No JVD, No cardiac murmurs, No carotid bruits, 2+ b/l LE edema  Respiratory: Trace rales at b/l bases   Psychiatry: A & O x 3, Mood & affect appropriate  Gastrointestinal:  Soft, Non-tender, + BS, no bruits	  Skin: No rashes, No ecchymoses, No cyanosis  Neurologic: Grossly non-focal with full strength in all four extremities  Extremities: Normal range of motion, No clubbing, cyanosis      INTERPRETATION OF TELEMETRY:    ECG:  SR 1st degree AV block, LAD, old inferior infarct, LVH with repolarization abnormalities     LABS:                        10.8   16.54 )-----------( 269      ( 01 Aug 2019 02:54 )             34.4     08-01    138  |  97<L>  |  56.0<H>  ----------------------------<  66<L>  3.8   |  25.0  |  2.10<H>    Ca    8.8      01 Aug 2019 02:54    TPro  7.3  /  Alb  3.4  /  TBili  0.3<L>  /  DBili  x   /  AST  20  /  ALT  14  /  AlkPhos  89  08-01    CARDIAC MARKERS ( 01 Aug 2019 04:30 )  x     / <0.01 ng/mL / 56 U/L / x     / x              I&O's Summary    BNPSerum Pro-Brain Natriuretic Peptide: 1237 pg/mL (08-01 @ 04:32)    Serum Pro-Brain Natriuretic Peptide: 1237 pg/mL (08-01-19 @ 04:32)    RADIOLOGY & ADDITIONAL STUDIES:  < from: Xray Chest 1 View-PORTABLE IMMEDIATE (08.01.19 @ 04:32) >   EXAM:  XR CHEST PORTABLE IMMED 1V                          PROCEDURE DATE:  08/01/2019          INTERPRETATION:  Portable chest radiograph        CLINICAL INFORMATION:   Short of breath.    TECHNIQUE:  Portable  AP view of the chest was obtained.    COMPARISON: 7/28/2019 available for review.    FINDINGS:   The lungs  are clear.  No pleural abnormality is seen.    The  heart is enlarged in transverse diameter. No hilar mass. Trachea   midline.   Visualized osseous structures are intact.        IMPRESSION: Cardiomegaly No evidence of active chest disease.        < end of copied text >

## 2019-08-02 ENCOUNTER — TRANSCRIPTION ENCOUNTER (OUTPATIENT)
Age: 84
End: 2019-08-02

## 2019-08-02 ENCOUNTER — APPOINTMENT (OUTPATIENT)
Dept: HOME HEALTH SERVICES | Facility: HOME HEALTH | Age: 84
End: 2019-08-02
Payer: MEDICARE

## 2019-08-02 VITALS
HEART RATE: 70 BPM | DIASTOLIC BLOOD PRESSURE: 60 MMHG | OXYGEN SATURATION: 93 % | RESPIRATION RATE: 16 BRPM | TEMPERATURE: 97 F | SYSTOLIC BLOOD PRESSURE: 114 MMHG

## 2019-08-02 DIAGNOSIS — K21.9 GASTRO-ESOPHAGEAL REFLUX DISEASE W/OUT ESOPHAGITIS: ICD-10-CM

## 2019-08-02 DIAGNOSIS — I27.81 COR PULMONALE (CHRONIC): ICD-10-CM

## 2019-08-02 PROCEDURE — 99350 HOME/RES VST EST HIGH MDM 60: CPT

## 2019-08-02 RX ORDER — HYDROCODONE BITARTRATE AND ACETAMINOPHEN 5; 325 MG/1; MG/1
5-325 TABLET ORAL
Qty: 20 | Refills: 0 | Status: DISCONTINUED | COMMUNITY
Start: 2019-07-20 | End: 2019-08-02

## 2019-08-03 ENCOUNTER — TRANSCRIPTION ENCOUNTER (OUTPATIENT)
Age: 84
End: 2019-08-03

## 2019-08-03 PROBLEM — K21.9 GERD (GASTROESOPHAGEAL REFLUX DISEASE): Status: ACTIVE | Noted: 2019-04-10

## 2019-08-03 PROBLEM — I27.81 COR PULMONALE: Status: ACTIVE | Noted: 2017-09-13

## 2019-08-03 RX ORDER — PANTOPRAZOLE 40 MG/1
40 TABLET, DELAYED RELEASE ORAL
Qty: 90 | Refills: 3 | Status: DISCONTINUED | COMMUNITY
Start: 2017-04-11 | End: 2019-08-03

## 2019-08-03 NOTE — CURRENT MEDS
[High Risk Medications Reviewed and Reconciled (Beers Criteria)] : high risk medications reviewed and reconciled [Reviewed patient reported medication adherence from Comprehensive Assessment] : Reviewed patient reported medication adherence from comprehensive assessment [Medication and Allergies Reconciled] : medication and allergies reconciled [Adherent to medications] : Patient is adherent to medications as prescribed

## 2019-08-03 NOTE — COMPREHENSIVE ASSESSMENT
[Comprehensive Assessment Reviewed] : Comprehensive assessment reviewed [de-identified] : see below

## 2019-08-03 NOTE — ASSESSMENT
[FreeTextEntry1] : \par will need paperwork for assisted living - which I filled out at this visit.\par \par \par will put in for wheelchair.  I will order a standard wheelchair as patient has difficulty in balance and is a fall risk that impairs his ability to participate in one or more MRADLs. Use of the chair will significantly improve the ability to participate in MRADLs in the home.  Pt's home provides adequate access for the use of the chair in the home.  The patient's mobility limitation cannot be resolved by use of a walker/fitted walker.\par \par Furthermore, needs hose replacement and portable neb (battery operated)

## 2019-08-03 NOTE — HISTORY OF PRESENT ILLNESS
[Patient] : patient [Family Member] : family member [Formal Caregiver] : formal caregiver [FreeTextEntry1] : COPD, CHF [FreeTextEntry2] : Pt is a 85 yo woman with CHF, COPD, CAD (4 stents in place), Morbid Obesity, DM with Neuropathy & Nephropathy, HTN, STEVENSON & Mild Pulmonary HTN, being seen today for an acute post-ED visit.\par \par History as elicited from pt:\par Since coming home from ED yesterday - feeling "not so good".  However, when asked about what she meant, she started talking about her meal.  "Came home from lunch, my daughter had a nice lunch waiting for me."  "Nothing satisfied the sinus."  "I had a salad instead".  Was speaking tangentially, but would go between watching TV, having difficulty finding words, and then closing her eyes or looking down on the ground.   fading in and out of lucidity/understanding.\par \par History as eliecited from pt's son and HHA:\par Last 18 months - hosp 15 times, alternating btw breathing problems and heart problems.  Had a hard time controlling FSBS  in range.  \par \par Does have stents.  Had cardioMEMS this past monday.  \par \par Her peripheral neuropathy from DM is progressively getting worse.  gabapentin, tramadol didn't help \par hydrocodone 5 mg was tried - no help, and caused water retention.\par Morphine 4 mg tried in hospital.  Morphine 6 mg ended up being successful.  Was sent home on oxycodone 5 mg and needs refills.\par \par came home tuesday afternoon - had a lot of pain b/c wasn't taking gabapentin or oxycodone.\par developed swelling, received IV lasix 40 mg x 2 on tuesday with swelling.\par \par came home yesterday afternoon from ED - not in any pain right now.  5 mg oxycodone last given at 11 am.\par Had difficulty  with a lot of anxiety and SOB this am.\par FSBS 200-300s.  endocrinologist called, and adjusted novolog to 25 U with meals.  basaglar 40 units bid.\par pt gave herself fsbs, but accidentally almost gave herself 58 U of novolog.  Son is concerned as she can't administer meds by herself safely, and is non-adherent to plan.  Is looking into assisted living.\par \par need refills of: xanax and oxycodone 5 mg, which I sent today.\par \par son wondering re: pantoprazole and omeprazole meds.  pt never complained of reflux before.  will d/c it, as was likely d'c'd home on the PPI as preventive, and never d/c'd.

## 2019-08-03 NOTE — PHYSICAL EXAM
[No Acute Distress] : no acute distress [Well Nourished] : well nourished [Well Developed] : well developed [Normal Voice/Communication] : normal voice communication [Normal Outer Ear/Nose] : the ears and nose were normal in appearance [Normal Sclera/Conjunctiva] : normal sclera/conjunctiva [No JVD] : no jugular venous distention [Supple] : the neck was supple [Clear to Auscultation] : lungs were clear to auscultation bilaterally [No Respiratory Distress] : no respiratory distress [Normal Rate] : heart rate was normal  [No Accessory Muscle Use] : no accessory muscle use [Normal S1, S2] : normal S1 and S2 [Regular Rhythm] : with a regular rhythm [No Murmurs] : no murmurs heard [Normal Bowel Sounds] : normal bowel sounds [Non Tender] : non-tender [Soft] : abdomen soft [Cranial Nerves Intact] : cranial nerves 2-12 were intact [No Rash] : no rash [Not Distended] : not distended [de-identified] : see hpi re: lucidity [de-identified] : fades in and out of lucidity/understanding (see hpi)

## 2019-08-03 NOTE — CHRONIC CARE ASSESSMENT
[PPS Score: ____] : Palliative Performance Scale (PPS) Score: [unfilled] [Class III] : New York Heart Association Class Output: Class III

## 2019-08-07 ENCOUNTER — APPOINTMENT (OUTPATIENT)
Dept: HOME HEALTH SERVICES | Facility: HOME HEALTH | Age: 84
End: 2019-08-07

## 2019-08-15 ENCOUNTER — TRANSCRIPTION ENCOUNTER (OUTPATIENT)
Age: 84
End: 2019-08-15

## 2019-08-15 ENCOUNTER — CLINICAL ADVICE (OUTPATIENT)
Age: 84
End: 2019-08-15

## 2019-08-23 ENCOUNTER — EMERGENCY (EMERGENCY)
Facility: HOSPITAL | Age: 84
LOS: 1 days | Discharge: HOME | End: 2019-08-23
Attending: STUDENT IN AN ORGANIZED HEALTH CARE EDUCATION/TRAINING PROGRAM
Payer: MEDICARE

## 2019-08-23 ENCOUNTER — TRANSCRIPTION ENCOUNTER (OUTPATIENT)
Age: 84
End: 2019-08-23

## 2019-08-23 VITALS
DIASTOLIC BLOOD PRESSURE: 74 MMHG | WEIGHT: 160.06 LBS | HEART RATE: 68 BPM | SYSTOLIC BLOOD PRESSURE: 120 MMHG | RESPIRATION RATE: 18 BRPM | HEIGHT: 65 IN | TEMPERATURE: 98 F | OXYGEN SATURATION: 98 %

## 2019-08-23 VITALS
SYSTOLIC BLOOD PRESSURE: 138 MMHG | HEART RATE: 84 BPM | TEMPERATURE: 99 F | DIASTOLIC BLOOD PRESSURE: 73 MMHG | OXYGEN SATURATION: 98 % | RESPIRATION RATE: 20 BRPM

## 2019-08-23 DIAGNOSIS — Z90.49 ACQUIRED ABSENCE OF OTHER SPECIFIED PARTS OF DIGESTIVE TRACT: Chronic | ICD-10-CM

## 2019-08-23 DIAGNOSIS — R93.1 ABNORMAL FINDINGS ON DIAGNOSTIC IMAGING OF HEART AND CORONARY CIRCULATION: Chronic | ICD-10-CM

## 2019-08-23 LAB
ANION GAP SERPL CALC-SCNC: 14 MMOL/L — SIGNIFICANT CHANGE UP (ref 5–17)
APPEARANCE UR: CLEAR — SIGNIFICANT CHANGE UP
BACTERIA # UR AUTO: ABNORMAL
BASOPHILS # BLD AUTO: 0.05 K/UL — SIGNIFICANT CHANGE UP (ref 0–0.2)
BASOPHILS NFR BLD AUTO: 0.4 % — SIGNIFICANT CHANGE UP (ref 0–2)
BILIRUB UR-MCNC: NEGATIVE — SIGNIFICANT CHANGE UP
BUN SERPL-MCNC: 31 MG/DL — HIGH (ref 8–20)
CALCIUM SERPL-MCNC: 9.3 MG/DL — SIGNIFICANT CHANGE UP (ref 8.6–10.2)
CHLORIDE SERPL-SCNC: 98 MMOL/L — SIGNIFICANT CHANGE UP (ref 98–107)
CO2 SERPL-SCNC: 27 MMOL/L — SIGNIFICANT CHANGE UP (ref 22–29)
COLOR SPEC: YELLOW — SIGNIFICANT CHANGE UP
CREAT SERPL-MCNC: 1.63 MG/DL — HIGH (ref 0.5–1.3)
DIFF PNL FLD: NEGATIVE — SIGNIFICANT CHANGE UP
EOSINOPHIL # BLD AUTO: 0.29 K/UL — SIGNIFICANT CHANGE UP (ref 0–0.5)
EOSINOPHIL NFR BLD AUTO: 2.5 % — SIGNIFICANT CHANGE UP (ref 0–6)
EPI CELLS # UR: ABNORMAL
GLUCOSE SERPL-MCNC: 163 MG/DL — HIGH (ref 70–115)
GLUCOSE UR QL: NEGATIVE MG/DL — SIGNIFICANT CHANGE UP
HCT VFR BLD CALC: 35.8 % — SIGNIFICANT CHANGE UP (ref 34.5–45)
HGB BLD-MCNC: 11.1 G/DL — LOW (ref 11.5–15.5)
IMM GRANULOCYTES NFR BLD AUTO: 0.4 % — SIGNIFICANT CHANGE UP (ref 0–1.5)
KETONES UR-MCNC: NEGATIVE — SIGNIFICANT CHANGE UP
LEUKOCYTE ESTERASE UR-ACNC: ABNORMAL
LYMPHOCYTES # BLD AUTO: 2.59 K/UL — SIGNIFICANT CHANGE UP (ref 1–3.3)
LYMPHOCYTES # BLD AUTO: 22.7 % — SIGNIFICANT CHANGE UP (ref 13–44)
MCHC RBC-ENTMCNC: 27.3 PG — SIGNIFICANT CHANGE UP (ref 27–34)
MCHC RBC-ENTMCNC: 31 GM/DL — LOW (ref 32–36)
MCV RBC AUTO: 88.2 FL — SIGNIFICANT CHANGE UP (ref 80–100)
MONOCYTES # BLD AUTO: 0.89 K/UL — SIGNIFICANT CHANGE UP (ref 0–0.9)
MONOCYTES NFR BLD AUTO: 7.8 % — SIGNIFICANT CHANGE UP (ref 2–14)
NEUTROPHILS # BLD AUTO: 7.56 K/UL — HIGH (ref 1.8–7.4)
NEUTROPHILS NFR BLD AUTO: 66.2 % — SIGNIFICANT CHANGE UP (ref 43–77)
NITRITE UR-MCNC: NEGATIVE — SIGNIFICANT CHANGE UP
NT-PROBNP SERPL-SCNC: 703 PG/ML — HIGH (ref 0–300)
PH UR: 5 — SIGNIFICANT CHANGE UP (ref 5–8)
PLATELET # BLD AUTO: 244 K/UL — SIGNIFICANT CHANGE UP (ref 150–400)
POTASSIUM SERPL-MCNC: 4.7 MMOL/L — SIGNIFICANT CHANGE UP (ref 3.5–5.3)
POTASSIUM SERPL-SCNC: 4.7 MMOL/L — SIGNIFICANT CHANGE UP (ref 3.5–5.3)
PROT UR-MCNC: NEGATIVE MG/DL — SIGNIFICANT CHANGE UP
RBC # BLD: 4.06 M/UL — SIGNIFICANT CHANGE UP (ref 3.8–5.2)
RBC # FLD: 14.6 % — HIGH (ref 10.3–14.5)
RBC CASTS # UR COMP ASSIST: NEGATIVE /HPF — SIGNIFICANT CHANGE UP (ref 0–4)
SODIUM SERPL-SCNC: 139 MMOL/L — SIGNIFICANT CHANGE UP (ref 135–145)
SP GR SPEC: 1.02 — SIGNIFICANT CHANGE UP (ref 1.01–1.02)
UROBILINOGEN FLD QL: NEGATIVE MG/DL — SIGNIFICANT CHANGE UP
WBC # BLD: 11.43 K/UL — HIGH (ref 3.8–10.5)
WBC # FLD AUTO: 11.43 K/UL — HIGH (ref 3.8–10.5)
WBC UR QL: ABNORMAL

## 2019-08-23 PROCEDURE — 80048 BASIC METABOLIC PNL TOTAL CA: CPT

## 2019-08-23 PROCEDURE — 83880 ASSAY OF NATRIURETIC PEPTIDE: CPT

## 2019-08-23 PROCEDURE — 87086 URINE CULTURE/COLONY COUNT: CPT

## 2019-08-23 PROCEDURE — 99284 EMERGENCY DEPT VISIT MOD MDM: CPT

## 2019-08-23 PROCEDURE — 36415 COLL VENOUS BLD VENIPUNCTURE: CPT

## 2019-08-23 PROCEDURE — 71045 X-RAY EXAM CHEST 1 VIEW: CPT | Mod: 26

## 2019-08-23 PROCEDURE — 82962 GLUCOSE BLOOD TEST: CPT

## 2019-08-23 PROCEDURE — 71045 X-RAY EXAM CHEST 1 VIEW: CPT

## 2019-08-23 PROCEDURE — 85027 COMPLETE CBC AUTOMATED: CPT

## 2019-08-23 PROCEDURE — 99283 EMERGENCY DEPT VISIT LOW MDM: CPT | Mod: 25

## 2019-08-23 PROCEDURE — 81001 URINALYSIS AUTO W/SCOPE: CPT

## 2019-08-23 RX ORDER — CEPHALEXIN 500 MG
500 CAPSULE ORAL ONCE
Refills: 0 | Status: DISCONTINUED | OUTPATIENT
Start: 2019-08-23 | End: 2019-08-30

## 2019-08-23 RX ORDER — CEPHALEXIN 500 MG
1 CAPSULE ORAL
Qty: 20 | Refills: 0
Start: 2019-08-23 | End: 2019-09-01

## 2019-08-23 NOTE — ED PROVIDER NOTE - MUSCULOSKELETAL, MLM
Spine appears normal, range of motion is not limited, no muscle or joint tenderness. YANIRA 1+ pitting edema.

## 2019-08-23 NOTE — ED PROVIDER NOTE - CHPI ED SYMPTOMS NEG
no dizziness/no fever/no loss of consciousness/no nausea/no vomiting/no headache/no chills/no weakness, no chills, no focal neuro deficits, no CP, no SOB, no cough, no abd pain, no diarrhea, no urinary symptoms

## 2019-08-23 NOTE — ED ADULT NURSE NOTE - OBJECTIVE STATEMENT
pt took insulin multiple times without eating. family reports they checked her sugar and it was 58. family reports after checking sugar they called 911 to come to the er. pt was given oral glucose and iv d50 enroot to the hospital. pt given food and drink in er. sugar was in 60s on initial fingerstick. a and o to self with periods of confusion and forgetfulness (baseline). breathing even and unlabored. sitting calm in bed. will continue to monitor.

## 2019-08-23 NOTE — ED ADULT NURSE REASSESSMENT NOTE - NS ED NURSE REASSESS COMMENT FT1
Received report from PORFIRIO Suarez. Pt aox4, breathing equal and unlabored on 2L NC, color good. Pt POC explained with family bedside, verbalized understanding. Pt awaiting dispo.

## 2019-08-23 NOTE — ED PROVIDER NOTE - CLINICAL SUMMARY MEDICAL DECISION MAKING FREE TEXT BOX
86 y/o F pt BIBA with significant PMHx of COPD, CHF, DM presents to the ED c/o hypoglycemia and YANIRA pedal edema. 86 y/o F pt BIBA with significant PMHx of COPD, CHF, DM presents to the ED c/o hypoglycemia and YANIRA pedal edema - pt with uti, and hypogycemia 2/2 over medication - pt here with stable fsg and labs improved from baseline - abx, education and follow up with pmd

## 2019-08-23 NOTE — ED ADULT NURSE NOTE - CHIEF COMPLAINT QUOTE
pt BIBA from home s.p hypoglycemic episode. pt given insulin without food. BS 63 on arrival, 18g to right AC given 1/2 amp D50 PTa. AOX3. aide at [facility]

## 2019-08-23 NOTE — ED ADULT NURSE REASSESSMENT NOTE - NS ED NURSE REASSESS COMMENT FT1
pt hemodynamically stable, PIV removed, refer to flowsheet and chart, pt to be discharged, pt understood discharge instructions and plan of care. Medically cleared by MD Isabel.

## 2019-08-23 NOTE — ED ADULT NURSE NOTE - NSIMPLEMENTINTERV_GEN_ALL_ED
Implemented All Fall with Harm Risk Interventions:  [place] to call system. Call bell, personal items and telephone within reach. Instruct patient to call for assistance. Room bathroom lighting operational. Non-slip footwear when patient is off stretcher. Physically safe environment: no spills, clutter or unnecessary equipment. Stretcher in lowest position, wheels locked, appropriate side rails in place. Provide visual cue, wrist band, yellow gown, etc. Monitor gait and stability. Monitor for mental status changes and reorient to person, place, and time. Review medications for side effects contributing to fall risk. Reinforce activity limits and safety measures with patient and family. Provide visual clues: red socks.

## 2019-08-23 NOTE — ED CLERICAL - NS ED CLERK NOTE PRE-ARRIVAL INFORMATION; ADDITIONAL PRE-ARRIVAL INFORMATION

## 2019-08-23 NOTE — ED PROVIDER NOTE - OBJECTIVE STATEMENT
84 y/o F pt BIBA with significant PMHx of COPD, CHF, DM presents to the ED c/o hypoglycemia and YANIRA pedal edema. Pt states she was going to dinner at ~16:35 when she took Novolog, but due to her sugar being so low she was supposed to take a different dose of Insulin. Pt's sugar was 58 and began coming up to 75, but because it dropped back to 52 she was brought into the hospital. Normally her sugar levels are around 200. Pt was coming from home and states her 24 hour Aid called the ambulance. 3 weeks ago pt was in [facility] for Diabetic neuropathy. Pt usually ambulates with a walker. Currently on Basaglar and Lasix. Pt denies weakness, dizziness fevers/chills, ha, loc, focal neuro deficits, cp/sob/palp, cough, abd pain/n/v/d, urinary symptoms, recent travel and sick contacts.   Endocrinologist:   Pulmonologist:    Cardiologist:

## 2019-08-23 NOTE — ED PROVIDER NOTE - PATIENT PORTAL LINK FT
You can access the FollowMyHealth Patient Portal offered by [facility] by registering at the following website: [email or web address]. By joining [employer]’s FollowMyHealth portal, you will also be able to view your health information using other applications (apps) compatible with our system.

## 2019-08-23 NOTE — ED ADULT TRIAGE NOTE - CHIEF COMPLAINT QUOTE
pt BIBA from home s.p hypoglycemic episode. pt given insulin without food. BS 63 on arrival, 18g to right AC given 1/2 amp D50 PTa. AOX3. aide at [facility]

## 2019-08-24 ENCOUNTER — TRANSCRIPTION ENCOUNTER (OUTPATIENT)
Age: 84
End: 2019-08-24

## 2019-08-24 ENCOUNTER — MOBILE ON CALL (OUTPATIENT)
Age: 84
End: 2019-08-24

## 2019-08-24 ENCOUNTER — CLINICAL ADVICE (OUTPATIENT)
Age: 84
End: 2019-08-24

## 2019-08-24 DIAGNOSIS — R06.02 SHORTNESS OF BREATH: ICD-10-CM

## 2019-08-25 ENCOUNTER — TRANSCRIPTION ENCOUNTER (OUTPATIENT)
Age: 84
End: 2019-08-25

## 2019-08-25 LAB
CULTURE RESULTS: SIGNIFICANT CHANGE UP
SPECIMEN SOURCE: SIGNIFICANT CHANGE UP

## 2019-08-26 ENCOUNTER — TRANSCRIPTION ENCOUNTER (OUTPATIENT)
Age: 84
End: 2019-08-26

## 2019-08-26 ENCOUNTER — RX CHANGE (OUTPATIENT)
Age: 84
End: 2019-08-26

## 2019-08-26 PROBLEM — R06.02 SHORTNESS OF BREATH: Status: ACTIVE | Noted: 2019-08-02

## 2019-08-27 ENCOUNTER — APPOINTMENT (OUTPATIENT)
Age: 84
End: 2019-08-27

## 2019-08-27 ENCOUNTER — TRANSCRIPTION ENCOUNTER (OUTPATIENT)
Age: 84
End: 2019-08-27

## 2019-08-27 ENCOUNTER — OTHER (OUTPATIENT)
Age: 84
End: 2019-08-27

## 2019-08-27 VITALS
OXYGEN SATURATION: 91 % | HEART RATE: 71 BPM | DIASTOLIC BLOOD PRESSURE: 60 MMHG | RESPIRATION RATE: 16 BRPM | SYSTOLIC BLOOD PRESSURE: 118 MMHG

## 2019-08-28 ENCOUNTER — NON-APPOINTMENT (OUTPATIENT)
Age: 84
End: 2019-08-28

## 2019-08-28 ENCOUNTER — APPOINTMENT (OUTPATIENT)
Dept: CARDIOLOGY | Facility: CLINIC | Age: 84
End: 2019-08-28
Payer: MEDICARE

## 2019-08-28 VITALS — OXYGEN SATURATION: 92 % | DIASTOLIC BLOOD PRESSURE: 75 MMHG | SYSTOLIC BLOOD PRESSURE: 136 MMHG | HEART RATE: 83 BPM

## 2019-08-28 PROCEDURE — 93000 ELECTROCARDIOGRAM COMPLETE: CPT

## 2019-08-28 PROCEDURE — 99215 OFFICE O/P EST HI 40 MIN: CPT | Mod: 25

## 2019-08-28 RX ORDER — PREDNISONE 5 MG/1
5 TABLET ORAL DAILY
Qty: 90 | Refills: 3 | Status: DISCONTINUED | COMMUNITY
Start: 2019-04-10 | End: 2019-08-28

## 2019-08-28 NOTE — DISCUSSION/SUMMARY
[FreeTextEntry1] : 85 yo woman with known moderate AS and HFrEF. Recent hospitalization for SOB, had a CardioMEMES implanted and has been well controlled since then. \par Still C/O BE edema.\par Will continue same meds.\par Routine Follow up in 3 months

## 2019-08-28 NOTE — REVIEW OF SYSTEMS
[Eyeglasses] : currently wearing eyeglasses [Shortness Of Breath] : shortness of breath [Dyspnea on exertion] : dyspnea during exertion [Lower Ext Edema] : lower extremity edema [Cough] : cough [Wheezing] : wheezing [Joint Pain] : joint pain [Earache] : no earache [Chest  Pressure] : no chest pressure [Chest Pain] : no chest pain [Leg Claudication] : no intermittent leg claudication [Palpitations] : no palpitations [Negative] : ENT

## 2019-08-28 NOTE — ASSESSMENT
[FreeTextEntry1] : ECG performed today at the office revealed NSR, borderline prolonged IA (218 ms) with Q in the inferior leads, normal AQRS, QRS and QTc. Poor R prog precordial leads

## 2019-08-28 NOTE — HISTORY OF PRESENT ILLNESS
[FreeTextEntry1] : 86 yo woman, , mother of 4 with known CAD since 1990, seems that at that time she had a procedure at [facility]. In April 2017 she was admitted to [facility] with chest pain and then transferred to [facility] where she underwent PCI with staged procedure to OM1 and ?  \par Under follow up for increasing SOB, unable to walk without getting SOB, NYHA 3/4, no CP, but has orthopnea and PND. However she has long standing asthma. She also has noticed BE edema. Has not been compliant with the furosemide because of incontinence and the need to urinate.\par Seems that the last echo was done in 2016?\par On 4/19/2018 she underwent cardiac cath that revealed patent stent in the LAD, 50% stenosis in the LCx (iFR normal) and 100% occlusion of the Mid RCA. LV function was normal and there was moderate AS with an BILL=1.21 cm2 and mild Pul HTN and normal CO/CI. \par On 5/4/2018 she had a dobutamine stress test that revealed a completely normal LV function with a peak gradient of 34 mmHg.\par On 10/28/2018 she was in the [facility] for SOB. Underwent tests and was discharged the next day. \par On 12/11/2018 she was admitted to [facility] with ACS/NSTEMI. Cardiac cath revealed a  of the RCA and a new 95% stenosis in the prox LCX that was treated with ADELIA x 1. She was enrolled in the CLEAR Maulik study (1 month DAPT). \par On 3/29/2019 with cough and SOB. Seems that it was COPD exacerbation. Treated with antibiotics, steroids and nebulizers with improvement in her state. Still coughing, worse when lying flat. Has severe SIOB, orthopnea, unable to walk more than 10 ft. NYHA 3/4 -4/4,  associated chest pain constant, worsens with the cough. No fever, no sputum. Discharged home on 4/3/2019. \par On July 1, 2019  after multiple admissions to the hospital with SOB a CardioMEMS was successfully implanted. Pulmonary pressures were normal, borderline elevated at the time. Since then she has been under follow up. Today for routine office follow up, at the present time she is asymptomatic, denies CP, SOB, orthopnea or PND. Has BE edema. Blood work from yesterday revealed mild elevation of WBC and normal renal function and electrolytes. \par Compliant with her meds. \par Underwent cataract of the left eye in January 2018\par Diabetes since 2000 on insulin\par HTN treated with meds\par HLD\par Smoked until age 40\par Denies alcohol drinking

## 2019-08-28 NOTE — REASON FOR VISIT
[Follow-Up - Clinic] : a clinic follow-up of [Formal Caregiver] : formal caregiver [FreeTextEntry1] : Follow up for SOB and CAD

## 2019-08-28 NOTE — PHYSICAL EXAM
[General Appearance - Well Developed] : well developed [Well Groomed] : well groomed [Normal Appearance] : normal appearance [General Appearance - Well Nourished] : well nourished [No Deformities] : no deformities [General Appearance - In No Acute Distress] : no acute distress [Normal Conjunctiva] : the conjunctiva exhibited no abnormalities [Normal Oral Mucosa] : normal oral mucosa [Normal Oropharynx] : normal oropharynx [Normal Jugular Venous V Waves Present] : normal jugular venous V waves present [Respiration, Rhythm And Depth] : normal respiratory rhythm and effort [Exaggerated Use Of Accessory Muscles For Inspiration] : no accessory muscle use [Auscultation Breath Sounds / Voice Sounds] : lungs were clear to auscultation bilaterally [Lungs Percussion] : the lungs were normal to percussion [Chest Palpation] : palpation of the chest revealed no abnormalities [Heart Rate And Rhythm] : heart rate and rhythm were normal [Arterial Pulses Normal] : the arterial pulses were normal [Heart Sounds] : normal S1 and S2 [Bowel Sounds] : normal bowel sounds [Abdomen Tenderness] : non-tender [Abdomen Soft] : soft [Abdomen Mass (___ Cm)] : no abdominal mass palpated [Abdomen Hernia] : no hernia was discovered [Abnormal Walk] : normal gait [Nail Clubbing] : no clubbing of the fingernails [Cyanosis, Localized] : no localized cyanosis [Skin Color & Pigmentation] : normal skin color and pigmentation [Skin Turgor] : normal skin turgor [No Venous Stasis] : no venous stasis [] : no rash [Skin Lesions] : no skin lesions [No Skin Ulcers] : no skin ulcer [Impaired Insight] : insight and judgment were intact [Oriented To Time, Place, And Person] : oriented to person, place, and time [No Anxiety] : not feeling anxious [FreeTextEntry1] : Edema +1 BE

## 2019-08-29 ENCOUNTER — TRANSCRIPTION ENCOUNTER (OUTPATIENT)
Age: 84
End: 2019-08-29

## 2019-08-29 ENCOUNTER — MESSAGE (OUTPATIENT)
Age: 84
End: 2019-08-29

## 2019-08-29 LAB
ALBUMIN SERPL ELPH-MCNC: 3.7 G/DL
ALP BLD-CCNC: 76 U/L
ALT SERPL-CCNC: 13 U/L
ANION GAP SERPL CALC-SCNC: 17 MMOL/L
AST SERPL-CCNC: 15 U/L
BASOPHILS # BLD AUTO: 0.07 K/UL
BASOPHILS NFR BLD AUTO: 0.6 %
BILIRUB SERPL-MCNC: 0.4 MG/DL
BUN SERPL-MCNC: 19 MG/DL
CALCIUM SERPL-MCNC: 9.2 MG/DL
CHLORIDE SERPL-SCNC: 100 MMOL/L
CO2 SERPL-SCNC: 26 MMOL/L
CREAT SERPL-MCNC: 1.29 MG/DL
EOSINOPHIL # BLD AUTO: 0.4 K/UL
EOSINOPHIL NFR BLD AUTO: 3.4 %
HCT VFR BLD CALC: 36.3 %
HGB BLD-MCNC: 11.1 G/DL
IMM GRANULOCYTES NFR BLD AUTO: 0.3 %
LYMPHOCYTES # BLD AUTO: 4.71 K/UL
LYMPHOCYTES NFR BLD AUTO: 39.9 %
MAN DIFF?: NORMAL
MCHC RBC-ENTMCNC: 26.9 PG
MCHC RBC-ENTMCNC: 30.6 GM/DL
MCV RBC AUTO: 88.1 FL
MONOCYTES # BLD AUTO: 0.9 K/UL
MONOCYTES NFR BLD AUTO: 7.6 %
NEUTROPHILS # BLD AUTO: 5.67 K/UL
NEUTROPHILS NFR BLD AUTO: 48.2 %
PLATELET # BLD AUTO: 255 K/UL
POTASSIUM SERPL-SCNC: 3.9 MMOL/L
PROT SERPL-MCNC: 6.5 G/DL
RBC # BLD: 4.12 M/UL
RBC # FLD: 14.4 %
SODIUM SERPL-SCNC: 143 MMOL/L
WBC # FLD AUTO: 11.79 K/UL

## 2019-08-30 ENCOUNTER — TRANSCRIPTION ENCOUNTER (OUTPATIENT)
Age: 84
End: 2019-08-30

## 2019-09-04 ENCOUNTER — APPOINTMENT (OUTPATIENT)
Dept: HOME HEALTH SERVICES | Facility: HOME HEALTH | Age: 84
End: 2019-09-04
Payer: MEDICARE

## 2019-09-04 VITALS
BODY MASS INDEX: 39.66 KG/M2 | WEIGHT: 202 LBS | DIASTOLIC BLOOD PRESSURE: 70 MMHG | HEIGHT: 60 IN | HEART RATE: 79 BPM | TEMPERATURE: 98.6 F | SYSTOLIC BLOOD PRESSURE: 130 MMHG | OXYGEN SATURATION: 94 % | RESPIRATION RATE: 24 BRPM

## 2019-09-04 DIAGNOSIS — Z79.4 LONG TERM (CURRENT) USE OF INSULIN: ICD-10-CM

## 2019-09-04 DIAGNOSIS — J44.9 CHRONIC OBSTRUCTIVE PULMONARY DISEASE, UNSPECIFIED: ICD-10-CM

## 2019-09-04 DIAGNOSIS — I50.30 UNSPECIFIED DIASTOLIC (CONGESTIVE) HEART FAILURE: ICD-10-CM

## 2019-09-04 DIAGNOSIS — E11.65 TYPE 2 DIABETES MELLITUS WITH HYPERGLYCEMIA: ICD-10-CM

## 2019-09-04 PROCEDURE — 99349 HOME/RES VST EST MOD MDM 40: CPT

## 2019-09-04 RX ORDER — ALENDRONATE SODIUM 70 MG/1
70 TABLET ORAL
Qty: 13 | Refills: 1 | Status: DISCONTINUED | COMMUNITY
Start: 2019-07-08 | End: 2019-09-04

## 2019-09-04 RX ORDER — GLUCAGON 1 MG
1 KIT INJECTION
Qty: 1 | Refills: 1 | Status: ACTIVE | COMMUNITY
Start: 2019-07-08

## 2019-09-04 RX ORDER — BLOOD-GLUCOSE METER
W/DEVICE EACH MISCELLANEOUS
Qty: 1 | Refills: 0 | Status: ACTIVE | COMMUNITY

## 2019-09-04 RX ORDER — INHALER, ASSIST DEVICES
SPACER (EA) MISCELLANEOUS
Qty: 1 | Refills: 1 | Status: ACTIVE | COMMUNITY
Start: 2017-10-02

## 2019-09-04 RX ORDER — LANCETS 33 GAUGE
EACH MISCELLANEOUS
Qty: 400 | Refills: 0 | Status: ACTIVE | COMMUNITY

## 2019-09-04 RX ORDER — UBIDECARENONE/VIT E ACET 100MG-5
25 MCG CAPSULE ORAL
Qty: 90 | Refills: 0 | Status: ACTIVE | COMMUNITY
Start: 2019-05-22

## 2019-09-04 NOTE — HISTORY OF PRESENT ILLNESS
[Patient] : patient [Family Member] : family member [FreeTextEntry1] : COPD, CHF  [FreeTextEntry2] : This is an 84 year-old female with history of CHF, COPD, CAD (4 stents in place), Morbid Obesity, DM with Neuropathy & Nephropathy, HTN, STEVENSON & Mild Pulmonary HTN.  Patient is being seen today for routine follow-up of chronic conditions. \par \par In the interval, patient had many calls to the [facility] - related to her SOB, FS. Patient had three CP responses and ED visit and hospitalization for CHF. Patient also had routine follow-up visit with Endo & Cardiology. \par \par At today's visit, patient is seen sitting in a chair, she appears comfortable and in no distress. Patient states she feels "alright"  Patient now has 24 hour RN's rather than aides with her.  Patient denies pain, she is very sleeping - sleeping on and off throughout the visit. \par \par \par COPD - no cough, or wheezing.  Using Pro-Air and Nebulizer throughout the day.  \par Ambulation - Uses rollator inside the apartment and then the wheelchair when she leaves. \par CHF - intermittent swelling, does report weight gain. She will put her feet up throughout the course of the day. \par Sleep - Has been sleeping through the night, gets up during the course of the night to urinate. She also dozes through the course of the day. \par BM - 2 BM's today and 2 BM's yesterday. \par Mood/Behavior -  describes as "just regular" \par Appetite - could be better, it's fair as per RN. Eats about half her breakfast.  She is going to go downstairs for dinner. \par Skin - no breakdown, showers everyday.

## 2019-09-04 NOTE — COUNSELING
[Obese -  ( BMI  >29.9 )] : obese - ( BMI  >29.9 ) [Sodium restriction 2gm recommended] : sodium restriction 2 gm recommended [TLC diet recommended] : TLC diet recommended [Non - Smoker] : non-smoker [Use assistive device to avoid falls] : use assistive device to avoid falls [Medical alert] : medical alert [___] : [unfilled] [] : diabetic screening [Improve mobility] : improve mobility [Decrease hospital use] : decrease hospital use [Minimize unnecessary interventions] : minimize unnecessary interventions [Maintain functional ability] : maintain functional ability

## 2019-09-04 NOTE — CURRENT MEDS
[Medication and Allergies Reconciled] : medication and allergies reconciled [Adherent to medications] : Patient is adherent to medications as prescribed [High Risk Medications Reviewed and Reconciled (Beers Criteria)] : high risk medications reviewed and reconciled [de-identified] : medications managed by RNs in the home

## 2019-09-04 NOTE — REVIEW OF SYSTEMS
[Vision Problems] : vision problems [Hearing Loss] : hearing loss [Orthopnea] : orthopnea [Lower Ext Edema] : lower extremity edema [Dyspnea on Exertion] : dyspnea on exertion [Frequency] : frequency [Negative] : Psychiatric [Discharge] : no discharge [Redness] : no redness [Pain] : no pain [Dryness] : no dryness  [Itching] : no itching [Nosebleed] : no nosebleeds [Earache] : no earache [Nasal Discharge] : no nasal discharge [Hoarseness] : no hoarseness [Postnasal Drip] : no postnasal drip [Sore Throat] : no sore throat [Chest Pain] : no chest pain [Leg Claudication] : no leg claudication [Palpitations] : no palpitations [Shortness Of Breath] : no shortness of breath [Paroysmal Nocturnal Dyspnea] : no paroysmal nocturnal dyspnea [Cough] : no cough [Wheezing] : no wheezing [Abdominal Pain] : no abdominal pain [Nausea] : no nausea [Constipation] : no constipation [Diarrhea] : diarrhea [Vomiting] : no vomiting [Heartburn] : no heartburn [Nocturia] : no nocturia [Dysuria] : no dysuria [Melena] : no melena [Hematuria] : no hematuria [Poor Libido] : libido not poor [Vaginal Discharge] : no vaginal discharge [Dysmenorrhea] : no dysmenorrhea [Joint Pain] : no joint pain [Joint Stiffness] : no joint stiffness [Joint Swelling] : no joint swelling [Muscle Weakness] : no muscle weakness [Muscle Pain] : no muscle pain [Back Pain] : no back pain

## 2019-09-04 NOTE — PHYSICAL EXAM
[No Acute Distress] : no acute distress [Well Nourished] : well nourished [Well Developed] : well developed [Normal Voice/Communication] : normal voice communication [Normal Sclera/Conjunctiva] : normal sclera/conjunctiva [Normal Outer Ear/Nose] : the ears and nose were normal in appearance [PERRL] : pupils equal, round and reactive to light [Normal Oropharynx] : the oropharynx was normal [Normal TMs] : both tympanic membranes were normal [Thyroid Normal, No Nodules] : the thyroid was normal and there were no nodules present [Supple] : the neck was supple [Clear to Auscultation] : lungs were clear to auscultation bilaterally [No Respiratory Distress] : no respiratory distress [Normal Rate] : heart rate was normal  [No Accessory Muscle Use] : no accessory muscle use [Regular Rhythm] : with a regular rhythm [Normal S1, S2] : normal S1 and S2 [Pedal Pulses Present] : the pedal pulses are present [Non Tender] : non-tender [Normal Bowel Sounds] : normal bowel sounds [No Masses] : no abdominal mass palpated [Soft] : abdomen soft [Normal Supraclavicular Nodes] : no supraclavicular lymphadenopathy [Normal Post Cervical Nodes] : no posterior cervical lymphadenopathy [No CVA Tenderness] : no ~M costovertebral angle tenderness [No Spinal Tenderness] : no spinal tenderness [No Joint Swelling] : no joint swelling seen [Kyphosis] :  kyphosis present [No Rash] : no rash [Normal Strength/Tone] : muscle strength and tone were normal [No Skin Lesions] : no skin lesions [Cranial Nerves Intact] : cranial nerves 2-12 were intact [No Motor Deficits] : the motor exam was normal [Oriented x3] : oriented to person, place, and time [Normal Affect] : the affect was normal [Normal Mood] : the mood was normal [Acne] : no acne [de-identified] : patient seen sitting in the chair - she appears very tired, sleeping throughout visit.  [de-identified] : +RJ 2/6.  2+ LE edema  [de-identified] : obese [de-identified] : ambulates with walker

## 2019-09-04 NOTE — REASON FOR VISIT
[Follow-Up] : a follow-up visit [Formal Caregiver] : formal caregiver [Pre-Visit Preparation] : pre-visit preparation was done [Intercurrent Specialty/Sub-specialty Visits] : the patient has intercurrent specialty/sub-specialty visits [FreeTextEntry3] : cardiology, endocrine  [FreeTextEntry2] : chart review

## 2019-09-04 NOTE — HEALTH RISK ASSESSMENT
[Independent] : feeding [Some assistance needed] : managing finances [No falls in past year] : Patient reported no falls in the past year [No] : The patient does not have visual impairment [HRA Reviewed] : Health risk assessments reviewed

## 2019-09-12 ENCOUNTER — RX RENEWAL (OUTPATIENT)
Age: 84
End: 2019-09-12

## 2019-09-13 ENCOUNTER — APPOINTMENT (OUTPATIENT)
Dept: CARDIOLOGY | Facility: CLINIC | Age: 84
End: 2019-09-13
Payer: MEDICARE

## 2019-09-13 ENCOUNTER — MEDICATION RENEWAL (OUTPATIENT)
Age: 84
End: 2019-09-13

## 2019-09-13 PROCEDURE — 93264 REM MNTR WRLS P-ART PRS SNR: CPT

## 2019-09-17 ENCOUNTER — RX CHANGE (OUTPATIENT)
Age: 84
End: 2019-09-17

## 2019-09-18 RX ORDER — CLOPIDOGREL BISULFATE 75 MG/1
75 TABLET, FILM COATED ORAL
Qty: 90 | Refills: 3 | Status: ACTIVE | COMMUNITY
Start: 2019-07-31 | End: 1900-01-01

## 2019-09-18 RX ORDER — ATORVASTATIN CALCIUM 80 MG/1
80 TABLET, FILM COATED ORAL
Qty: 90 | Refills: 3 | Status: ACTIVE | COMMUNITY
Start: 2018-12-13 | End: 1900-01-01

## 2019-09-18 RX ORDER — LIDOCAINE 5% 700 MG/1
5 PATCH TOPICAL
Qty: 3 | Refills: 3 | Status: ACTIVE | COMMUNITY
Start: 2019-07-31 | End: 1900-01-01

## 2019-09-18 RX ORDER — ALPRAZOLAM 0.5 MG/1
0.5 TABLET ORAL
Qty: 30 | Refills: 0 | Status: ACTIVE | COMMUNITY
Start: 2019-08-02 | End: 1900-01-01

## 2019-09-18 RX ORDER — ALBUTEROL SULFATE 90 UG/1
108 (90 BASE) AEROSOL, METERED RESPIRATORY (INHALATION)
Qty: 3 | Refills: 4 | Status: ACTIVE | COMMUNITY
Start: 2019-09-04 | End: 1900-01-01

## 2019-09-18 RX ORDER — OXYCODONE 5 MG/1
5 TABLET ORAL
Qty: 120 | Refills: 0 | Status: ACTIVE | COMMUNITY
Start: 2019-07-31 | End: 1900-01-01

## 2019-09-18 RX ORDER — ISOSORBIDE MONONITRATE 30 MG/1
30 TABLET, EXTENDED RELEASE ORAL DAILY
Qty: 90 | Refills: 1 | Status: ACTIVE | COMMUNITY
Start: 2018-10-29 | End: 1900-01-01

## 2019-09-18 RX ORDER — METOPROLOL SUCCINATE 50 MG/1
50 TABLET, EXTENDED RELEASE ORAL
Qty: 90 | Refills: 0 | Status: ACTIVE | COMMUNITY
Start: 2019-01-07 | End: 1900-01-01

## 2019-09-18 RX ORDER — INSULIN GLARGINE 100 [IU]/ML
100 INJECTION, SOLUTION SUBCUTANEOUS
Qty: 1 | Refills: 2 | Status: ACTIVE | COMMUNITY
Start: 1900-01-01 | End: 1900-01-01

## 2019-09-18 RX ORDER — ALBUTEROL SULFATE 0.63 MG/3ML
0.63 SOLUTION RESPIRATORY (INHALATION)
Qty: 1 | Refills: 1 | Status: ACTIVE | COMMUNITY
Start: 2017-09-13 | End: 1900-01-01

## 2019-09-18 RX ORDER — GABAPENTIN 100 MG/1
100 CAPSULE ORAL 3 TIMES DAILY
Qty: 270 | Refills: 2 | Status: ACTIVE | COMMUNITY
Start: 2019-02-02 | End: 1900-01-01

## 2019-09-18 RX ORDER — FLUTICASONE PROPIONATE AND SALMETEROL 250; 50 UG/1; UG/1
250-50 POWDER RESPIRATORY (INHALATION)
Qty: 3 | Refills: 3 | Status: ACTIVE | COMMUNITY
Start: 2019-04-10 | End: 1900-01-01

## 2019-09-19 LAB
MUV AB SER-ACNC: POSITIVE
MUV IGG SER QL IA: 45.3 AU/ML

## 2019-09-29 PROCEDURE — 85730 THROMBOPLASTIN TIME PARTIAL: CPT

## 2019-09-29 PROCEDURE — 97530 THERAPEUTIC ACTIVITIES: CPT

## 2019-09-29 PROCEDURE — 97116 GAIT TRAINING THERAPY: CPT

## 2019-09-29 PROCEDURE — 80053 COMPREHEN METABOLIC PANEL: CPT

## 2019-09-29 PROCEDURE — 81003 URINALYSIS AUTO W/O SCOPE: CPT

## 2019-09-29 PROCEDURE — 96375 TX/PRO/DX INJ NEW DRUG ADDON: CPT

## 2019-09-29 PROCEDURE — 82962 GLUCOSE BLOOD TEST: CPT

## 2019-09-29 PROCEDURE — 96374 THER/PROPH/DIAG INJ IV PUSH: CPT

## 2019-09-29 PROCEDURE — 33289 TCAT IMPL WRLS P-ART PRS SNR: CPT

## 2019-09-29 PROCEDURE — 71046 X-RAY EXAM CHEST 2 VIEWS: CPT

## 2019-09-29 PROCEDURE — 84484 ASSAY OF TROPONIN QUANT: CPT

## 2019-09-29 PROCEDURE — 71045 X-RAY EXAM CHEST 1 VIEW: CPT

## 2019-09-29 PROCEDURE — 81001 URINALYSIS AUTO W/SCOPE: CPT

## 2019-09-29 PROCEDURE — 99285 EMERGENCY DEPT VISIT HI MDM: CPT | Mod: 25

## 2019-09-29 PROCEDURE — 83935 ASSAY OF URINE OSMOLALITY: CPT

## 2019-09-29 PROCEDURE — 85610 PROTHROMBIN TIME: CPT

## 2019-09-29 PROCEDURE — 87086 URINE CULTURE/COLONY COUNT: CPT

## 2019-09-29 PROCEDURE — 93970 EXTREMITY STUDY: CPT

## 2019-09-29 PROCEDURE — 87040 BLOOD CULTURE FOR BACTERIA: CPT

## 2019-09-29 PROCEDURE — 82436 ASSAY OF URINE CHLORIDE: CPT

## 2019-09-29 PROCEDURE — C2624: CPT

## 2019-09-29 PROCEDURE — 84300 ASSAY OF URINE SODIUM: CPT

## 2019-09-29 PROCEDURE — 73590 X-RAY EXAM OF LOWER LEG: CPT

## 2019-09-29 PROCEDURE — 85027 COMPLETE CBC AUTOMATED: CPT

## 2019-09-29 PROCEDURE — 36415 COLL VENOUS BLD VENIPUNCTURE: CPT

## 2019-09-29 PROCEDURE — C1889: CPT

## 2019-09-29 PROCEDURE — 82550 ASSAY OF CK (CPK): CPT

## 2019-09-29 PROCEDURE — 96376 TX/PRO/DX INJ SAME DRUG ADON: CPT

## 2019-09-29 PROCEDURE — 83880 ASSAY OF NATRIURETIC PEPTIDE: CPT

## 2019-09-29 PROCEDURE — 93005 ELECTROCARDIOGRAM TRACING: CPT

## 2019-09-29 PROCEDURE — 80048 BASIC METABOLIC PNL TOTAL CA: CPT

## 2019-09-29 PROCEDURE — 83735 ASSAY OF MAGNESIUM: CPT

## 2019-09-29 PROCEDURE — 76775 US EXAM ABDO BACK WALL LIM: CPT

## 2019-09-29 PROCEDURE — C1769: CPT

## 2019-09-29 PROCEDURE — C1894: CPT

## 2019-09-29 PROCEDURE — 94640 AIRWAY INHALATION TREATMENT: CPT

## 2019-10-16 ENCOUNTER — RX CHANGE (OUTPATIENT)
Age: 84
End: 2019-10-16

## 2019-10-16 DIAGNOSIS — E87.6 HYPOKALEMIA: ICD-10-CM

## 2019-10-16 RX ORDER — POTASSIUM CHLORIDE 750 MG/1
10 CAPSULE, EXTENDED RELEASE ORAL
Qty: 90 | Refills: 1 | Status: ACTIVE | COMMUNITY
Start: 2019-10-16 | End: 1900-01-01

## 2019-10-18 ENCOUNTER — APPOINTMENT (OUTPATIENT)
Dept: CARDIOLOGY | Facility: CLINIC | Age: 84
End: 2019-10-18
Payer: MEDICARE

## 2019-10-18 PROCEDURE — 93015 CV STRESS TEST SUPVJ I&R: CPT

## 2019-11-16 ENCOUNTER — EMERGENCY (EMERGENCY)
Facility: HOSPITAL | Age: 84
LOS: 1 days | Discharge: HOME | End: 2019-11-16
Attending: INTERNAL MEDICINE | Admitting: INTERNAL MEDICINE
Payer: MEDICARE

## 2019-11-16 VITALS
TEMPERATURE: 98 F | OXYGEN SATURATION: 97 % | SYSTOLIC BLOOD PRESSURE: 178 MMHG | HEART RATE: 87 BPM | DIASTOLIC BLOOD PRESSURE: 92 MMHG | RESPIRATION RATE: 15 BRPM

## 2019-11-16 VITALS
DIASTOLIC BLOOD PRESSURE: 76 MMHG | HEART RATE: 85 BPM | OXYGEN SATURATION: 97 % | WEIGHT: 210.1 LBS | RESPIRATION RATE: 16 BRPM | TEMPERATURE: 98 F | SYSTOLIC BLOOD PRESSURE: 187 MMHG

## 2019-11-16 DIAGNOSIS — Z90.49 ACQUIRED ABSENCE OF OTHER SPECIFIED PARTS OF DIGESTIVE TRACT: Chronic | ICD-10-CM

## 2019-11-16 DIAGNOSIS — R93.1 ABNORMAL FINDINGS ON DIAGNOSTIC IMAGING OF HEART AND CORONARY CIRCULATION: Chronic | ICD-10-CM

## 2019-11-16 PROCEDURE — 99284 EMERGENCY DEPT VISIT MOD MDM: CPT | Mod: 25

## 2019-11-16 PROCEDURE — 72100 X-RAY EXAM L-S SPINE 2/3 VWS: CPT

## 2019-11-16 PROCEDURE — 96372 THER/PROPH/DIAG INJ SC/IM: CPT

## 2019-11-16 PROCEDURE — 72100 X-RAY EXAM L-S SPINE 2/3 VWS: CPT | Mod: 26

## 2019-11-16 PROCEDURE — 99284 EMERGENCY DEPT VISIT MOD MDM: CPT

## 2019-11-16 RX ORDER — OXYCODONE AND ACETAMINOPHEN 5; 325 MG/1; MG/1
1 TABLET ORAL ONCE
Refills: 0 | Status: DISCONTINUED | OUTPATIENT
Start: 2019-11-16 | End: 2019-11-16

## 2019-11-16 RX ORDER — TRAMADOL HYDROCHLORIDE 50 MG/1
1 TABLET ORAL
Qty: 20 | Refills: 0
Start: 2019-11-16 | End: 2019-11-20

## 2019-11-16 RX ORDER — OMEPRAZOLE 10 MG/1
1 CAPSULE, DELAYED RELEASE ORAL
Qty: 0 | Refills: 0 | DISCHARGE

## 2019-11-16 RX ORDER — METHOCARBAMOL 500 MG/1
500 TABLET, FILM COATED ORAL ONCE
Refills: 0 | Status: COMPLETED | OUTPATIENT
Start: 2019-11-16 | End: 2019-11-16

## 2019-11-16 RX ORDER — METOPROLOL TARTRATE 50 MG
1 TABLET ORAL
Qty: 0 | Refills: 0 | DISCHARGE

## 2019-11-16 RX ADMIN — METHOCARBAMOL 500 MILLIGRAM(S): 500 TABLET, FILM COATED ORAL at 04:50

## 2019-11-16 RX ADMIN — OXYCODONE AND ACETAMINOPHEN 1 TABLET(S): 5; 325 TABLET ORAL at 06:00

## 2019-11-16 RX ADMIN — Medication 125 MILLIGRAM(S): at 05:20

## 2019-11-16 RX ADMIN — OXYCODONE AND ACETAMINOPHEN 1 TABLET(S): 5; 325 TABLET ORAL at 04:50

## 2019-11-16 NOTE — ED ADULT NURSE REASSESSMENT NOTE - NS ED NURSE REASSESS COMMENT FT1
patient ambulated to bathroom and back to stretcher with use of walker.  patient tolerated ambulation well, requests to sit in chair upon return to room.  patient appears more comfortable at this time
Pt received sleeping, breathing even unlabored. No distress noted. Pending transport to assisted living facility.

## 2019-11-16 NOTE — ED ADULT TRIAGE NOTE - CHIEF COMPLAINT QUOTE
c/o lower back pain , radiating to the left leg * 3 days, patient denies any trauma to the leg, patient denies any falls

## 2019-11-16 NOTE — ED PROVIDER NOTE - OBJECTIVE STATEMENT
84 y/o WF with left sciatic pain x 3-4 days, no relief with Lidoderm patch, no trauma, no weakness, no numbness, no bladder, no bowel changes, no fever, no chills.

## 2019-11-16 NOTE — ED PROVIDER NOTE - SIGNIFICANT NEGATIVE FINDINGS
no headache, no chest pain, no SOB, no palpitations, no abdominal pain, no n/v, no urinary symptoms, no neuro changes.

## 2019-11-16 NOTE — ED ADULT NURSE NOTE - OBJECTIVE STATEMENT
patient a/o x 4 with a calm affect c/o lower back pain radiating down through left leg.  patient denies any precipitating event.

## 2019-11-16 NOTE — ED PROVIDER NOTE - PATIENT PORTAL LINK FT
You can access the FollowMyHealth Patient Portal offered by [facility] by registering at the following website: [email or web address]. By joining "[employer]’s FollowMyHealth portal, you will also be able to view your health information using other applications (apps) compatible with our system.

## 2019-11-22 ENCOUNTER — APPOINTMENT (OUTPATIENT)
Dept: CARDIOLOGY | Facility: CLINIC | Age: 84
End: 2019-11-22
Payer: MEDICARE

## 2019-11-22 PROCEDURE — 93264 REM MNTR WRLS P-ART PRS SNR: CPT

## 2019-12-04 ENCOUNTER — APPOINTMENT (OUTPATIENT)
Dept: CARDIOLOGY | Facility: CLINIC | Age: 84
End: 2019-12-04

## 2019-12-13 ENCOUNTER — EMERGENCY (EMERGENCY)
Facility: HOSPITAL | Age: 84
LOS: 1 days | Discharge: HOME | End: 2019-12-13
Attending: EMERGENCY MEDICINE | Admitting: EMERGENCY MEDICINE
Payer: MEDICARE

## 2019-12-13 VITALS
HEART RATE: 77 BPM | RESPIRATION RATE: 16 BRPM | TEMPERATURE: 97 F | SYSTOLIC BLOOD PRESSURE: 150 MMHG | OXYGEN SATURATION: 100 % | DIASTOLIC BLOOD PRESSURE: 67 MMHG

## 2019-12-13 VITALS
TEMPERATURE: 98 F | OXYGEN SATURATION: 99 % | RESPIRATION RATE: 16 BRPM | DIASTOLIC BLOOD PRESSURE: 72 MMHG | WEIGHT: 210.1 LBS | HEART RATE: 77 BPM | SYSTOLIC BLOOD PRESSURE: 161 MMHG

## 2019-12-13 DIAGNOSIS — R93.1 ABNORMAL FINDINGS ON DIAGNOSTIC IMAGING OF HEART AND CORONARY CIRCULATION: Chronic | ICD-10-CM

## 2019-12-13 DIAGNOSIS — Z90.49 ACQUIRED ABSENCE OF OTHER SPECIFIED PARTS OF DIGESTIVE TRACT: Chronic | ICD-10-CM

## 2019-12-13 LAB
ALBUMIN SERPL ELPH-MCNC: 3 G/DL — LOW (ref 3.3–5)
ALP SERPL-CCNC: 95 U/L — SIGNIFICANT CHANGE UP (ref 40–120)
ALT FLD-CCNC: 16 U/L — SIGNIFICANT CHANGE UP (ref 12–78)
ANION GAP SERPL CALC-SCNC: 7 MMOL/L — SIGNIFICANT CHANGE UP (ref 5–17)
APPEARANCE UR: ABNORMAL
AST SERPL-CCNC: 24 U/L — SIGNIFICANT CHANGE UP (ref 15–37)
BACTERIA # UR AUTO: ABNORMAL
BASE EXCESS BLDV CALC-SCNC: 3.1 MMOL/L — HIGH (ref -2–2)
BASOPHILS # BLD AUTO: 0.06 K/UL — SIGNIFICANT CHANGE UP (ref 0–0.2)
BASOPHILS NFR BLD AUTO: 0.5 % — SIGNIFICANT CHANGE UP (ref 0–2)
BILIRUB SERPL-MCNC: 0.4 MG/DL — SIGNIFICANT CHANGE UP (ref 0.2–1.2)
BILIRUB UR-MCNC: NEGATIVE — SIGNIFICANT CHANGE UP
BLOOD GAS COMMENTS, VENOUS: SIGNIFICANT CHANGE UP
BUN SERPL-MCNC: 28 MG/DL — HIGH (ref 7–23)
CALCIUM SERPL-MCNC: 8.9 MG/DL — SIGNIFICANT CHANGE UP (ref 8.5–10.1)
CHLORIDE SERPL-SCNC: 104 MMOL/L — SIGNIFICANT CHANGE UP (ref 96–108)
CK MB CFR SERPL CALC: 1.6 NG/ML — SIGNIFICANT CHANGE UP (ref 0–3.6)
CO2 SERPL-SCNC: 27 MMOL/L — SIGNIFICANT CHANGE UP (ref 22–31)
COLOR SPEC: SIGNIFICANT CHANGE UP
CREAT SERPL-MCNC: 1.3 MG/DL — SIGNIFICANT CHANGE UP (ref 0.5–1.3)
DIFF PNL FLD: ABNORMAL
EOSINOPHIL # BLD AUTO: 0.3 K/UL — SIGNIFICANT CHANGE UP (ref 0–0.5)
EOSINOPHIL NFR BLD AUTO: 2.5 % — SIGNIFICANT CHANGE UP (ref 0–6)
EPI CELLS # UR: SIGNIFICANT CHANGE UP
GLUCOSE SERPL-MCNC: 143 MG/DL — HIGH (ref 70–99)
GLUCOSE UR QL: NEGATIVE — SIGNIFICANT CHANGE UP
HCO3 BLDV-SCNC: 26 MMOL/L — SIGNIFICANT CHANGE UP (ref 21–29)
HCT VFR BLD CALC: 37.8 % — SIGNIFICANT CHANGE UP (ref 34.5–45)
HGB BLD-MCNC: 11.7 G/DL — SIGNIFICANT CHANGE UP (ref 11.5–15.5)
HOROWITZ INDEX BLDV+IHG-RTO: 21 — SIGNIFICANT CHANGE UP
IMM GRANULOCYTES NFR BLD AUTO: 0.5 % — SIGNIFICANT CHANGE UP (ref 0–1.5)
KETONES UR-MCNC: NEGATIVE — SIGNIFICANT CHANGE UP
LEUKOCYTE ESTERASE UR-ACNC: ABNORMAL
LYMPHOCYTES # BLD AUTO: 25 % — SIGNIFICANT CHANGE UP (ref 13–44)
LYMPHOCYTES # BLD AUTO: 3.01 K/UL — SIGNIFICANT CHANGE UP (ref 1–3.3)
MAGNESIUM SERPL-MCNC: 2.1 MG/DL — SIGNIFICANT CHANGE UP (ref 1.6–2.6)
MCHC RBC-ENTMCNC: 25.6 PG — LOW (ref 27–34)
MCHC RBC-ENTMCNC: 31 GM/DL — LOW (ref 32–36)
MCV RBC AUTO: 82.7 FL — SIGNIFICANT CHANGE UP (ref 80–100)
MONOCYTES # BLD AUTO: 0.91 K/UL — HIGH (ref 0–0.9)
MONOCYTES NFR BLD AUTO: 7.6 % — SIGNIFICANT CHANGE UP (ref 2–14)
NEUTROPHILS # BLD AUTO: 7.7 K/UL — HIGH (ref 1.8–7.4)
NEUTROPHILS NFR BLD AUTO: 63.9 % — SIGNIFICANT CHANGE UP (ref 43–77)
NITRITE UR-MCNC: NEGATIVE — SIGNIFICANT CHANGE UP
NRBC # BLD: 0 /100 WBCS — SIGNIFICANT CHANGE UP (ref 0–0)
NT-PROBNP SERPL-SCNC: 659 PG/ML — HIGH (ref 0–450)
PCO2 BLDV: 47 MMHG — SIGNIFICANT CHANGE UP (ref 35–50)
PH BLDV: 7.39 — SIGNIFICANT CHANGE UP (ref 7.35–7.45)
PH UR: 6 — SIGNIFICANT CHANGE UP (ref 5–8)
PLATELET # BLD AUTO: 261 K/UL — SIGNIFICANT CHANGE UP (ref 150–400)
PO2 BLDV: <44 MMHG — SIGNIFICANT CHANGE UP (ref 25–45)
POTASSIUM SERPL-MCNC: 4.7 MMOL/L — SIGNIFICANT CHANGE UP (ref 3.5–5.3)
POTASSIUM SERPL-SCNC: 4.7 MMOL/L — SIGNIFICANT CHANGE UP (ref 3.5–5.3)
PROT SERPL-MCNC: 7.4 G/DL — SIGNIFICANT CHANGE UP (ref 6–8.3)
PROT UR-MCNC: NEGATIVE — SIGNIFICANT CHANGE UP
RBC # BLD: 4.57 M/UL — SIGNIFICANT CHANGE UP (ref 3.8–5.2)
RBC # FLD: 15.2 % — HIGH (ref 10.3–14.5)
RBC CASTS # UR COMP ASSIST: SIGNIFICANT CHANGE UP /HPF (ref 0–4)
SAO2 % BLDV: 63 % — LOW (ref 67–88)
SODIUM SERPL-SCNC: 138 MMOL/L — SIGNIFICANT CHANGE UP (ref 135–145)
SP GR SPEC: 1 — LOW (ref 1.01–1.02)
TROPONIN I SERPL-MCNC: <.015 NG/ML — SIGNIFICANT CHANGE UP (ref 0.01–0.04)
TSH SERPL-MCNC: 0.42 UIU/ML — SIGNIFICANT CHANGE UP (ref 0.36–3.74)
UROBILINOGEN FLD QL: NEGATIVE — SIGNIFICANT CHANGE UP
WBC # BLD: 12.04 K/UL — HIGH (ref 3.8–10.5)
WBC # FLD AUTO: 12.04 K/UL — HIGH (ref 3.8–10.5)
WBC UR QL: >50

## 2019-12-13 PROCEDURE — 85027 COMPLETE CBC AUTOMATED: CPT

## 2019-12-13 PROCEDURE — 81001 URINALYSIS AUTO W/SCOPE: CPT

## 2019-12-13 PROCEDURE — 82962 GLUCOSE BLOOD TEST: CPT

## 2019-12-13 PROCEDURE — 36415 COLL VENOUS BLD VENIPUNCTURE: CPT

## 2019-12-13 PROCEDURE — 71045 X-RAY EXAM CHEST 1 VIEW: CPT

## 2019-12-13 PROCEDURE — 85610 PROTHROMBIN TIME: CPT

## 2019-12-13 PROCEDURE — 93005 ELECTROCARDIOGRAM TRACING: CPT

## 2019-12-13 PROCEDURE — 83735 ASSAY OF MAGNESIUM: CPT

## 2019-12-13 PROCEDURE — 83880 ASSAY OF NATRIURETIC PEPTIDE: CPT

## 2019-12-13 PROCEDURE — 84443 ASSAY THYROID STIM HORMONE: CPT

## 2019-12-13 PROCEDURE — 82803 BLOOD GASES ANY COMBINATION: CPT

## 2019-12-13 PROCEDURE — 96365 THER/PROPH/DIAG IV INF INIT: CPT

## 2019-12-13 PROCEDURE — 85730 THROMBOPLASTIN TIME PARTIAL: CPT

## 2019-12-13 PROCEDURE — 87086 URINE CULTURE/COLONY COUNT: CPT

## 2019-12-13 PROCEDURE — 82553 CREATINE MB FRACTION: CPT

## 2019-12-13 PROCEDURE — 80053 COMPREHEN METABOLIC PANEL: CPT

## 2019-12-13 PROCEDURE — 71045 X-RAY EXAM CHEST 1 VIEW: CPT | Mod: 26

## 2019-12-13 PROCEDURE — 84484 ASSAY OF TROPONIN QUANT: CPT

## 2019-12-13 PROCEDURE — 99285 EMERGENCY DEPT VISIT HI MDM: CPT

## 2019-12-13 PROCEDURE — 70450 CT HEAD/BRAIN W/O DYE: CPT | Mod: 26

## 2019-12-13 PROCEDURE — 70450 CT HEAD/BRAIN W/O DYE: CPT

## 2019-12-13 PROCEDURE — 93010 ELECTROCARDIOGRAM REPORT: CPT

## 2019-12-13 PROCEDURE — 99284 EMERGENCY DEPT VISIT MOD MDM: CPT | Mod: 25

## 2019-12-13 RX ORDER — CEFUROXIME AXETIL 250 MG
1 TABLET ORAL
Qty: 20 | Refills: 0
Start: 2019-12-13 | End: 2019-12-22

## 2019-12-13 RX ORDER — CEFTRIAXONE 500 MG/1
1000 INJECTION, POWDER, FOR SOLUTION INTRAMUSCULAR; INTRAVENOUS ONCE
Refills: 0 | Status: COMPLETED | OUTPATIENT
Start: 2019-12-13 | End: 2019-12-13

## 2019-12-13 RX ADMIN — CEFTRIAXONE 1000 MILLIGRAM(S): 500 INJECTION, POWDER, FOR SOLUTION INTRAMUSCULAR; INTRAVENOUS at 14:54

## 2019-12-13 RX ADMIN — CEFTRIAXONE 100 MILLIGRAM(S): 500 INJECTION, POWDER, FOR SOLUTION INTRAMUSCULAR; INTRAVENOUS at 14:23

## 2019-12-13 NOTE — ED ADULT NURSE NOTE - CHPI ED NUR SYMPTOMS NEG
no chills/no decreased eating/drinking/no tingling/no fever/no nausea/no dizziness/no vomiting/no weakness/no pain

## 2019-12-13 NOTE — ED PROVIDER NOTE - CROS ED NEURO ALL NEG
Encounter addended by: Fay Duggan MA on: 8/11/2017  5:01 PM<BR>    Actions taken: Letter status changed
negative...

## 2019-12-13 NOTE — ED PROVIDER NOTE - OBJECTIVE STATEMENT
85 female sent from [facility] living with report of altered mental status, daughter at the bedside, patient's son had called her this morning, asking her questions and she could not answer them, asked what she ate for breakfast, if she took her medications. Patient states she feels well and now remembers what she ate, and is at her normal mental baseline now.

## 2019-12-13 NOTE — ED ADULT NURSE NOTE - NSIMPLEMENTINTERV_GEN_ALL_ED
Implemented All Fall with Harm Risk Interventions:  Christine to call system. Call bell, personal items and telephone within reach. Instruct patient to call for assistance. Room bathroom lighting operational. Non-slip footwear when patient is off stretcher. Physically safe environment: no spills, clutter or unnecessary equipment. Stretcher in lowest position, wheels locked, appropriate side rails in place. Provide visual cue, wrist band, yellow gown, etc. Monitor gait and stability. Monitor for mental status changes and reorient to person, place, and time. Review medications for side effects contributing to fall risk. Reinforce activity limits and safety measures with patient and family. Provide visual clues: red socks.

## 2019-12-13 NOTE — ED ADULT NURSE NOTE - OBJECTIVE STATEMENT
patient came in ED from [facility]isted [facility], BIBA with family at the bedside sent to ED for confusion since yesterday. alert and oriented X 3. non-labored respiration noted. abdomen nondistended, nontender. afebrile 98.1F rectal temp. skin warm and dry.  bedside blood sugar 152mg/dl. vital sign stable. will monitor.

## 2019-12-13 NOTE — ED PROVIDER NOTE - PATIENT PORTAL LINK FT
You can access the FollowMyHealth Patient Portal offered by [facility] by registering at the following website: [email or web address]. By joining [facility]’s FollowMyHealth portal, you will also be able to view your health information using other applications (apps) compatible with our system.

## 2019-12-13 NOTE — ED PROVIDER NOTE - CARE PROVIDER_API CALL
Blake Yadav)  Internal Medicine  [address] [place] [id number]  Phone: [phone]  Fax: [phone]  Follow Up Time:

## 2019-12-13 NOTE — ED PROVIDER NOTE - NSFOLLOWUPINSTRUCTIONS_ED_ALL_ED_FT
Urinary Tract Infection, Adult     A urinary tract infection (UTI) is an infection of any part of the urinary tract. The urinary tract includes the kidneys, ureters, bladder, and urethra. These organs make, store, and get rid of urine in the body.  Your health care provider may use other names to describe the infection. An upper UTI affects the ureters and kidneys (pyelonephritis). A lower UTI affects the bladder (cystitis) and urethra (urethritis).  What are the causes?  Most urinary tract infections are caused by bacteria in your genital area, around the entrance to your urinary tract (urethra). These bacteria grow and cause inflammation of your urinary tract.  What increases the risk?  You are more likely to develop this condition if:  You have a urinary catheter that stays in place (indwelling).You are not able to control when you urinate or have a bowel movement (you have incontinence).You are female and you:  Use a spermicide or diaphragm for birth control.Have low estrogen levels.Are pregnant.You have certain genes that increase your risk (genetics).You are sexually active.You take antibiotic medicines.You have a condition that causes your flow of urine to slow down, such as:  An enlarged prostate, if you are male.Blockage in your urethra (stricture).A kidney stone.A nerve condition that affects your bladder control (neurogenic bladder).Not getting enough to drink, or not urinating often.You have certain medical conditions, such as:  Diabetes.A weak disease-fighting system (immunesystem).Sickle cell disease.Gout.Spinal cord injury.What are the signs or symptoms?  Symptoms of this condition include:  Needing to urinate right away (urgently).Frequent urination or passing small amounts of urine frequently.Pain or burning with urination.Blood in the urine.Urine that smells bad or unusual.Trouble urinating.Cloudy urine.Vaginal discharge, if you are female.Pain in the abdomen or the lower back.You may also have:  Vomiting or a decreased appetite.Confusion.Irritability or tiredness.A fever.Diarrhea.The first symptom in older adults may be confusion. In some cases, they may not have any symptoms until the infection has worsened.  How is this diagnosed?  This condition is diagnosed based on your medical history and a physical exam. You may also have other tests, including:  Urine tests.Blood tests.Tests for sexually transmitted infections (STIs).If you have had more than one UTI, a cystoscopy or imaging studies may be done to determine the cause of the infections.  How is this treated?  Treatment for this condition includes:  Antibiotic medicine.Over-the-counter medicines to treat discomfort.Drinking enough water to stay hydrated.If you have frequent infections or have other conditions such as a kidney stone, you may need to see a health care provider who specializes in the urinary tract (urologist).  In rare cases, urinary tract infections can cause sepsis. Sepsis is a life-threatening condition that occurs when the body responds to an infection. Sepsis is treated in the hospital with IV antibiotics, fluids, and other medicines.  Follow these instructions at home:     Medicines     Take over-the-counter and prescription medicines only as told by your health care provider.If you were prescribed an antibiotic medicine, take it as told by your health care provider. Do not stop using the antibiotic even if you start to feel better.General instructions     Make sure you:  Empty your bladder often and completely. Do not hold urine for long periods of time.Empty your bladder after sex.Wipe from front to back after a bowel movement if you are female. Use each tissue one time when you wipe.Drink enough fluid to keep your urine pale yellow.Keep all follow-up visits as told by your health care provider. This is important.Contact a health care provider if:  Your symptoms do not get better after 1–2 days.Your symptoms go away and then return.Get help right away if you have:  Severe pain in your back or your lower abdomen.A fever.Nausea or vomiting.Summary  A urinary tract infection (UTI) is an infection of any part of the urinary tract, which includes the kidneys, ureters, bladder, and urethra.Most urinary tract infections are caused by bacteria in your genital area, around the entrance to your urinary tract (urethra).Treatment for this condition often includes antibiotic medicines.If you were prescribed an antibiotic medicine, take it as told by your health care provider. Do not stop using the antibiotic even if you start to feel better.Keep all follow-up visits as told by your health care provider. This is important.This information is not intended to replace advice given to you by your health care provider. Make sure you discuss any questions you have with your health care provider.

## 2019-12-13 NOTE — ED PROVIDER NOTE - PROGRESS NOTE DETAILS
patient feeling well, alert and oriented, wants to go home, labs, ekg, ct head reviwed, no acute findings, noted uti, to get abx and f/u as outpatient

## 2019-12-14 LAB
CULTURE RESULTS: SIGNIFICANT CHANGE UP
SPECIMEN SOURCE: SIGNIFICANT CHANGE UP

## 2019-12-31 ENCOUNTER — APPOINTMENT (OUTPATIENT)
Dept: CARDIOLOGY | Facility: CLINIC | Age: 84
End: 2019-12-31
Payer: MEDICARE

## 2019-12-31 PROCEDURE — 93264 REM MNTR WRLS P-ART PRS SNR: CPT

## 2020-01-06 NOTE — ED PROVIDER NOTE - TOBACCO USE
Becca Santiago MD   Former smoker

## 2020-01-07 NOTE — ED PROVIDER NOTE - PRO INTERPRETER NEED 2
English healthy. Your doctor has checked your overall health and may have suggested ways to take good care of yourself. He or she also may have recommended tests. At home, you can help prevent illness with healthy eating, regular exercise, and other steps. Follow-up care is a key part of your treatment and safety. Be sure to make and go to all appointments, and call your doctor if you are having problems. It's also a good idea to know your test results and keep a list of the medicines you take. How can you care for yourself at home? Reach and stay at a healthy weight. This will lower your risk for many problems, such as obesity, diabetes, heart disease, and high blood pressure. Get at least 30 minutes of exercise on most days of the week. Walking is a good choice. You also may want to do other activities, such as running, swimming, cycling, or playing tennis or team sports. Do not smoke. Smoking can make health problems worse. If you need help quitting, talk to your doctor about stop-smoking programs and medicines. These can increase your chances of quitting for good. Protect your skin from too much sun. When you're outdoors from 10 a.m. to 4 p.m., stay in the shade or cover up with clothing and a hat with a wide brim. Wear sunglasses that block UV rays. Even when it's cloudy, put broad-spectrum sunscreen (SPF 30 or higher) on any exposed skin. See a dentist one or two times a year for checkups and to have your teeth cleaned. Wear a seat belt in the car. Limit alcohol to 2 drinks a day for men and 1 drink a day for women. Too much alcohol can cause health problems. Follow your doctor's advice about when to have certain tests. These tests can spot problems early. For men and women  Cholesterol. Your doctor will tell you how often to have this done based on your overall health and other things that can increase your risk for heart attack and stroke. Blood pressure.  Have your blood pressure checked during a routine doctor visit. Your doctor will tell you how often to check your blood pressure based on your age, your blood pressure results, and other factors. Diabetes. Ask your doctor whether you should have tests for diabetes. Vision. Experts recommend that you have yearly exams for glaucoma and other age-related eye problems. Hearing. Tell your doctor if you notice any change in your hearing. You can have tests to find out how well you hear. Colon cancer tests. Keep having colon cancer tests as your doctor recommends. You can have one of several types of tests. Heart attack and stroke risk. At least every 4 to 6 years, you should have your risk for heart attack and stroke assessed. Your doctor uses factors such as your age, blood pressure, cholesterol, and whether you smoke or have diabetes to show what your risk for a heart attack or stroke is over the next 10 years. Osteoporosis. Talk to your doctor about whether you should have a bone density test to find out whether you have thinning bones. Also ask your doctor about whether you should take calcium and vitamin D supplements. For women  Pap test and pelvic exam. You may no longer need a Pap test. Talk with your doctor about whether to stop or continue to have Pap tests. Breast exam and mammogram. Ask how often you should have a mammogram, which is an X-ray of your breasts. A mammogram can spot breast cancer before it can be felt and when it is easiest to treat. Thyroid disease. Talk to your doctor about whether to have your thyroid checked as part of a regular physical exam. Women have an increased chance of a thyroid problem. For men  Prostate exam. Talk to your doctor about whether you should have a blood test (called a PSA test) for prostate cancer. Experts recommend that you discuss the benefits and risks of the test with your doctor before you decide whether to have this test. Some experts say that men ages 79 and older no longer need testing.   Abdominal life-support methods? Would you want to be able to walk? To speak? To eat on your own? To live without the help of machines? If you have a choice, where do you want to be cared for? In your home? At a hospital or nursing home? Do you want certain Nondenominational practices performed if you become very ill? If you have a choice at the end of your life, where would you prefer to die? At home? In a hospital or nursing home? Somewhere else? Would you prefer to be buried or cremated? Do you want your organs to be donated after you die? What should you do with your living will? Make sure that your family members and your health care agent have copies of your living will. Give your doctor a copy of your living will to keep in your medical record. If you have more than one doctor, make sure that each one has a copy. You may want to put a copy of your living will where it can be easily found. Where can you learn more? Go to https://BRD MotorcyclespeSocial Shopping Network Â®eb.Coursmos. org and sign in to your [employer]vance account. Enter N341 in the [employer] box to learn more about \"Learning About Living [place]roy. \"     If you do not have an account, please click on the \"Sign Up Now\" link. Current as of: April 1, 2019  Content Version: 12.1  © 4421-7393 Healthwise, Incorporated. Care instructions adapted under license by [facility] ([facility]). If you have questions about a medical condition or this instruction, always ask your healthcare professional. [place] [address] any warranty or liability for your use of this information. Patient Education        Advance Directives: Care Instructions  Your Care Instructions  An advance directive is a legal way to state your wishes at the end of your life. It tells your family and your doctor what to do if you can no longer say what you want. There are two main types of advance directives. You can change them any time that your wishes change.   A living will tells your family and your doctor your wishes about life support and other treatment. A durable power of  for health care lets you name a person to make treatment decisions for you when you can't speak for yourself. This person is called a health care agent. If you do not have an advance directive, decisions about your medical care may be made by a doctor or a  who doesn't know you. It may help to think of an advance directive as a gift to the people who care for you. If you have one, they won't have to make tough decisions by themselves. Follow-up care is a key part of your treatment and safety. Be sure to make and go to all appointments, and call your doctor if you are having problems. It's also a good idea to know your test results and keep a list of the medicines you take. How can you care for yourself at home? Discuss your wishes with your loved ones and your doctor. This way, there are no surprises. Many states have a unique form. Or you might use a universal form that has been approved by many states. This kind of form can sometimes be completed and stored online. Your electronic copy will then be available wherever you have a connection to the Internet. In most cases, doctors will respect your wishes even if you have a form from a different state. You don't need a  to do an advance directive. But you may want to get legal advice. Think about these questions when you prepare an advance directive: Who do you want to make decisions about your medical care if you are not able to? Many people choose a family member or close friend. Do you know enough about life support methods that might be used? If not, talk to your doctor so you understand. What are you most afraid of that might happen? You might be afraid of having pain, losing your independence, or being kept alive by machines. Where would you prefer to die? Choices include your home, a hospital, or a nursing home.   Would you like to have information about hospice care to support you and your family? Do you want to donate organs when you die? Do you want certain Scientologist practices performed before you die? If so, put your wishes in the advance directive. Read your advance directive every year, and make changes as needed. When should you call for help? Be sure to contact your doctor if you have any questions. Where can you learn more? Go to https://Centrixpepiceweb.Chance (app). org and sign in to your [employer] account. Enter R264 in the [employer] box to learn more about \"Advance Directives: Care Instructions. \"     If you do not have an account, please click on the \"Sign Up Now\" link. Current as of: April 1, 2019  Content Version: 12.1  © 2006-2019 Minded. Care instructions adapted under license by [employer] ([facility]). If you have questions about a medical condition or this instruction, always ask your healthcare professional. [address] any warranty or liability for your use of this information. Patient Education         Advance Directives (02:51)  Your health professional recommends that you watch this short online health video. Learn how advance directives let others know your care preferences when you can't speak for yourself. How to watch the video    Scan the QR code   OR Visit the website    [email or web address]   Current as of: April 1, 2019  Content Version: 12.1  © 3185-1026 Minded. Care instructions adapted under license by [employer] ([facility]). If you have questions about a medical condition or this instruction, always ask your healthcare professional. [address] any warranty or liability for your use of this information.

## 2020-01-20 ENCOUNTER — EMERGENCY (EMERGENCY)
Facility: HOSPITAL | Age: 85
LOS: 1 days | Discharge: HOME | End: 2020-01-20
Attending: EMERGENCY MEDICINE | Admitting: EMERGENCY MEDICINE
Payer: MEDICARE

## 2020-01-20 VITALS
TEMPERATURE: 98 F | RESPIRATION RATE: 18 BRPM | OXYGEN SATURATION: 95 % | HEART RATE: 74 BPM | DIASTOLIC BLOOD PRESSURE: 82 MMHG | SYSTOLIC BLOOD PRESSURE: 156 MMHG

## 2020-01-20 DIAGNOSIS — Z90.49 ACQUIRED ABSENCE OF OTHER SPECIFIED PARTS OF DIGESTIVE TRACT: Chronic | ICD-10-CM

## 2020-01-20 DIAGNOSIS — R93.1 ABNORMAL FINDINGS ON DIAGNOSTIC IMAGING OF HEART AND CORONARY CIRCULATION: Chronic | ICD-10-CM

## 2020-01-20 PROCEDURE — 73030 X-RAY EXAM OF SHOULDER: CPT | Mod: 26,LT

## 2020-01-20 PROCEDURE — 99284 EMERGENCY DEPT VISIT MOD MDM: CPT

## 2020-01-20 RX ORDER — DEXTROSE 50 % IN WATER 50 %
50 SYRINGE (ML) INTRAVENOUS ONCE
Refills: 0 | Status: COMPLETED | OUTPATIENT
Start: 2020-01-20 | End: 2020-01-20

## 2020-01-20 RX ADMIN — Medication 50 MILLILITER(S): at 23:15

## 2020-01-20 NOTE — ED ADULT NURSE NOTE - CHIEF COMPLAINT QUOTE
Brought in by EMS for fall. Patient hit head. Patient on aspirin and Plavix. Complaining of left shoulder pain. MS attempted to do fingerstick results " low "

## 2020-01-20 NOTE — ED ADULT NURSE NOTE - OBJECTIVE STATEMENT
received pt with fingerstick 63mg/dl pt evaluated and c/o fall and lt shoulder pain pt given 1 amp of d50 and fs repeated 172 mg/dl xray abnd ct scan done awaiting

## 2020-01-20 NOTE — ED ADULT NURSE NOTE - NSIMPLEMENTINTERV_GEN_ALL_ED
Implemented All Fall with Harm Risk Interventions:  Hewitt to call system. Call bell, personal items and telephone within reach. Instruct patient to call for assistance. Room bathroom lighting operational. Non-slip footwear when patient is off stretcher. Physically safe environment: no spills, clutter or unnecessary equipment. Stretcher in lowest position, wheels locked, appropriate side rails in place. Provide visual cue, wrist band, yellow gown, etc. Monitor gait and stability. Monitor for mental status changes and reorient to person, place, and time. Review medications for side effects contributing to fall risk. Reinforce activity limits and safety measures with patient and family. Provide visual clues: red socks.

## 2020-01-20 NOTE — ED PROVIDER NOTE - OBJECTIVE STATEMENT
84yo female bib ems s/p fall pt tripped and fell forward, hit her head and shoulder, no LOC< pt c/o left shoulder pain no dizziness, no neck pain, no other complaints

## 2020-01-20 NOTE — ED PROVIDER NOTE - PATIENT PORTAL LINK FT
You can access the FollowMyHealth Patient Portal offered by [facility] by registering at the following website: [email or web address]. By joining [employer]’s FollowMyHealth portal, you will also be able to view your health information using other applications (apps) compatible with our system.

## 2020-01-20 NOTE — ED ADULT TRIAGE NOTE - CHIEF COMPLAINT QUOTE
Brought in by EMS for fall. Patient hit head. Patient on aspirin and Plavix. EMS attempted to do fingerstick results " low " Brought in by EMS for fall. Patient hit head. Patient on aspirin and Plavix. Complaining of left shoulder pain. MS attempted to do fingerstick results " low "

## 2020-01-20 NOTE — ED PROVIDER NOTE - CLINICAL SUMMARY MEDICAL DECISION MAKING FREE TEXT BOX
86yo female s/p fall with head injury, shoulder pain, hypoglycemia, iv amp, ct head and neck xr shoulder

## 2020-01-20 NOTE — ED PROVIDER NOTE - CARE PLAN
Principal Discharge DX:	Fracture of humeral head, left, closed Principal Discharge DX:	Fracture of humeral head, left, closed  Secondary Diagnosis:	Head injury due to trauma

## 2020-01-21 VITALS
DIASTOLIC BLOOD PRESSURE: 78 MMHG | RESPIRATION RATE: 16 BRPM | SYSTOLIC BLOOD PRESSURE: 150 MMHG | HEART RATE: 84 BPM | TEMPERATURE: 99 F | OXYGEN SATURATION: 97 %

## 2020-01-21 PROCEDURE — 70450 CT HEAD/BRAIN W/O DYE: CPT

## 2020-01-21 PROCEDURE — 99284 EMERGENCY DEPT VISIT MOD MDM: CPT | Mod: 25

## 2020-01-21 PROCEDURE — 73030 X-RAY EXAM OF SHOULDER: CPT

## 2020-01-21 PROCEDURE — 82962 GLUCOSE BLOOD TEST: CPT

## 2020-01-21 PROCEDURE — 72125 CT NECK SPINE W/O DYE: CPT

## 2020-01-21 PROCEDURE — 72125 CT NECK SPINE W/O DYE: CPT | Mod: 26

## 2020-01-21 PROCEDURE — 70450 CT HEAD/BRAIN W/O DYE: CPT | Mod: 26

## 2020-01-21 NOTE — ED ADULT NURSE REASSESSMENT NOTE - NS ED NURSE REASSESS COMMENT FT1
Patient initial fingerstick 63. Dr. Browne made aware. Patient given 8 oz of juice. Repeat fingerstick 61. Patient given another 8 oz of juice. Repeat fingerstick 56. Dr. Browne made aware. Dextrose 50% ordered and given. Oxana MONROY
pt d/c back to nursing home via [facility] in no distress
pt reavaluated and vital signs stable fingerstick 171 mg/dl  back to nursing home to follow up

## 2020-01-22 ENCOUNTER — RX RENEWAL (OUTPATIENT)
Age: 85
End: 2020-01-22

## 2020-01-22 RX ORDER — BLOOD SUGAR DIAGNOSTIC
STRIP MISCELLANEOUS 3 TIMES DAILY
Qty: 300 | Refills: 0 | Status: ACTIVE | COMMUNITY
Start: 2020-01-22 | End: 1900-01-01

## 2020-01-26 ENCOUNTER — EMERGENCY (EMERGENCY)
Facility: HOSPITAL | Age: 85
LOS: 1 days | Discharge: HOME | End: 2020-01-26
Attending: EMERGENCY MEDICINE | Admitting: EMERGENCY MEDICINE
Payer: MEDICARE

## 2020-01-26 VITALS
RESPIRATION RATE: 16 BRPM | DIASTOLIC BLOOD PRESSURE: 79 MMHG | TEMPERATURE: 98 F | SYSTOLIC BLOOD PRESSURE: 170 MMHG | HEART RATE: 75 BPM | OXYGEN SATURATION: 97 %

## 2020-01-26 VITALS
WEIGHT: 164.91 LBS | DIASTOLIC BLOOD PRESSURE: 69 MMHG | TEMPERATURE: 98 F | SYSTOLIC BLOOD PRESSURE: 142 MMHG | OXYGEN SATURATION: 94 % | HEART RATE: 73 BPM | HEIGHT: 64 IN | RESPIRATION RATE: 16 BRPM

## 2020-01-26 DIAGNOSIS — R93.1 ABNORMAL FINDINGS ON DIAGNOSTIC IMAGING OF HEART AND CORONARY CIRCULATION: Chronic | ICD-10-CM

## 2020-01-26 DIAGNOSIS — Z90.49 ACQUIRED ABSENCE OF OTHER SPECIFIED PARTS OF DIGESTIVE TRACT: Chronic | ICD-10-CM

## 2020-01-26 LAB
ALBUMIN SERPL ELPH-MCNC: 2.5 G/DL — LOW (ref 3.3–5)
ALP SERPL-CCNC: 84 U/L — SIGNIFICANT CHANGE UP (ref 40–120)
ALT FLD-CCNC: 16 U/L — SIGNIFICANT CHANGE UP (ref 12–78)
ANION GAP SERPL CALC-SCNC: 8 MMOL/L — SIGNIFICANT CHANGE UP (ref 5–17)
APPEARANCE UR: CLEAR — SIGNIFICANT CHANGE UP
AST SERPL-CCNC: 22 U/L — SIGNIFICANT CHANGE UP (ref 15–37)
BASOPHILS # BLD AUTO: 0.06 K/UL — SIGNIFICANT CHANGE UP (ref 0–0.2)
BASOPHILS NFR BLD AUTO: 0.6 % — SIGNIFICANT CHANGE UP (ref 0–2)
BILIRUB SERPL-MCNC: 0.4 MG/DL — SIGNIFICANT CHANGE UP (ref 0.2–1.2)
BILIRUB UR-MCNC: NEGATIVE — SIGNIFICANT CHANGE UP
BUN SERPL-MCNC: 29 MG/DL — HIGH (ref 7–23)
CALCIUM SERPL-MCNC: 8.9 MG/DL — SIGNIFICANT CHANGE UP (ref 8.5–10.1)
CHLORIDE SERPL-SCNC: 103 MMOL/L — SIGNIFICANT CHANGE UP (ref 96–108)
CO2 SERPL-SCNC: 29 MMOL/L — SIGNIFICANT CHANGE UP (ref 22–31)
COLOR SPEC: YELLOW — SIGNIFICANT CHANGE UP
CREAT SERPL-MCNC: 1.5 MG/DL — HIGH (ref 0.5–1.3)
DIFF PNL FLD: NEGATIVE — SIGNIFICANT CHANGE UP
EOSINOPHIL # BLD AUTO: 0.27 K/UL — SIGNIFICANT CHANGE UP (ref 0–0.5)
EOSINOPHIL NFR BLD AUTO: 2.6 % — SIGNIFICANT CHANGE UP (ref 0–6)
GLUCOSE SERPL-MCNC: 137 MG/DL — HIGH (ref 70–99)
GLUCOSE UR QL: NEGATIVE — SIGNIFICANT CHANGE UP
HCT VFR BLD CALC: 33.2 % — LOW (ref 34.5–45)
HGB BLD-MCNC: 10.5 G/DL — LOW (ref 11.5–15.5)
IMM GRANULOCYTES NFR BLD AUTO: 0.5 % — SIGNIFICANT CHANGE UP (ref 0–1.5)
KETONES UR-MCNC: NEGATIVE — SIGNIFICANT CHANGE UP
LEUKOCYTE ESTERASE UR-ACNC: NEGATIVE — SIGNIFICANT CHANGE UP
LYMPHOCYTES # BLD AUTO: 1.43 K/UL — SIGNIFICANT CHANGE UP (ref 1–3.3)
LYMPHOCYTES # BLD AUTO: 13.6 % — SIGNIFICANT CHANGE UP (ref 13–44)
MCHC RBC-ENTMCNC: 26 PG — LOW (ref 27–34)
MCHC RBC-ENTMCNC: 31.6 GM/DL — LOW (ref 32–36)
MCV RBC AUTO: 82.2 FL — SIGNIFICANT CHANGE UP (ref 80–100)
MONOCYTES # BLD AUTO: 0.82 K/UL — SIGNIFICANT CHANGE UP (ref 0–0.9)
MONOCYTES NFR BLD AUTO: 7.8 % — SIGNIFICANT CHANGE UP (ref 2–14)
NEUTROPHILS # BLD AUTO: 7.92 K/UL — HIGH (ref 1.8–7.4)
NEUTROPHILS NFR BLD AUTO: 74.9 % — SIGNIFICANT CHANGE UP (ref 43–77)
NITRITE UR-MCNC: NEGATIVE — SIGNIFICANT CHANGE UP
NRBC # BLD: 0 /100 WBCS — SIGNIFICANT CHANGE UP (ref 0–0)
PH UR: 6.5 — SIGNIFICANT CHANGE UP (ref 5–8)
PLATELET # BLD AUTO: 266 K/UL — SIGNIFICANT CHANGE UP (ref 150–400)
POTASSIUM SERPL-MCNC: 3.9 MMOL/L — SIGNIFICANT CHANGE UP (ref 3.5–5.3)
POTASSIUM SERPL-SCNC: 3.9 MMOL/L — SIGNIFICANT CHANGE UP (ref 3.5–5.3)
PROT SERPL-MCNC: 6.7 G/DL — SIGNIFICANT CHANGE UP (ref 6–8.3)
PROT UR-MCNC: NEGATIVE — SIGNIFICANT CHANGE UP
RBC # BLD: 4.04 M/UL — SIGNIFICANT CHANGE UP (ref 3.8–5.2)
RBC # FLD: 15.3 % — HIGH (ref 10.3–14.5)
SODIUM SERPL-SCNC: 140 MMOL/L — SIGNIFICANT CHANGE UP (ref 135–145)
SP GR SPEC: 1.01 — SIGNIFICANT CHANGE UP (ref 1.01–1.02)
UROBILINOGEN FLD QL: NEGATIVE — SIGNIFICANT CHANGE UP
WBC # BLD: 10.55 K/UL — HIGH (ref 3.8–10.5)
WBC # FLD AUTO: 10.55 K/UL — HIGH (ref 3.8–10.5)

## 2020-01-26 PROCEDURE — 93005 ELECTROCARDIOGRAM TRACING: CPT

## 2020-01-26 PROCEDURE — 70450 CT HEAD/BRAIN W/O DYE: CPT | Mod: 26

## 2020-01-26 PROCEDURE — 71045 X-RAY EXAM CHEST 1 VIEW: CPT | Mod: 26

## 2020-01-26 PROCEDURE — 93010 ELECTROCARDIOGRAM REPORT: CPT

## 2020-01-26 PROCEDURE — 80053 COMPREHEN METABOLIC PANEL: CPT

## 2020-01-26 PROCEDURE — 81003 URINALYSIS AUTO W/O SCOPE: CPT

## 2020-01-26 PROCEDURE — 99284 EMERGENCY DEPT VISIT MOD MDM: CPT

## 2020-01-26 PROCEDURE — 85027 COMPLETE CBC AUTOMATED: CPT

## 2020-01-26 PROCEDURE — 99284 EMERGENCY DEPT VISIT MOD MDM: CPT | Mod: 25

## 2020-01-26 PROCEDURE — 36415 COLL VENOUS BLD VENIPUNCTURE: CPT

## 2020-01-26 PROCEDURE — 71045 X-RAY EXAM CHEST 1 VIEW: CPT

## 2020-01-26 PROCEDURE — 82962 GLUCOSE BLOOD TEST: CPT

## 2020-01-26 PROCEDURE — 70450 CT HEAD/BRAIN W/O DYE: CPT

## 2020-01-26 RX ORDER — SODIUM CHLORIDE 9 MG/ML
1000 INJECTION INTRAMUSCULAR; INTRAVENOUS; SUBCUTANEOUS ONCE
Refills: 0 | Status: COMPLETED | OUTPATIENT
Start: 2020-01-26 | End: 2020-01-26

## 2020-01-26 RX ADMIN — SODIUM CHLORIDE 1000 MILLILITER(S): 9 INJECTION INTRAMUSCULAR; INTRAVENOUS; SUBCUTANEOUS at 19:34

## 2020-01-26 NOTE — ED PROVIDER NOTE - PATIENT PORTAL LINK FT
You can access the FollowMyHealth Patient Portal offered by [facility] by registering at the following website: [email or web address]. By joining [employer]’s FollowMyHealth portal, you will also be able to view your health information using other applications (apps) compatible with our system.

## 2020-01-26 NOTE — ED PROVIDER NOTE - OBJECTIVE STATEMENT
84 y/o F from rehab facility for low blood sugar.  Pt was NPO for left arm injury and was still getting her usual insulin. EMS gave 1 am D50 with improvement of symptoms.  pt denies any complaint sin the ED.

## 2020-01-26 NOTE — ED ADULT NURSE NOTE - OBJECTIVE STATEMENT
Pt BIB EMS from [facility] for hypoglycemia per EMS, pt w/ decreased eating due to shoulder fracture and new meds but continued to receive insulin at baseline. Pt given dextrose prior to arrival.

## 2020-02-03 RX ORDER — FUROSEMIDE 40 MG/1
40 TABLET ORAL
Qty: 90 | Refills: 0 | Status: DISCONTINUED | COMMUNITY
Start: 2017-04-11 | End: 2020-02-03

## 2020-02-03 RX ORDER — FUROSEMIDE 40 MG/1
40 TABLET ORAL TWICE DAILY
Qty: 4 | Refills: 0 | Status: DISCONTINUED | COMMUNITY
Start: 2019-10-16 | End: 2020-02-03

## 2020-02-07 ENCOUNTER — APPOINTMENT (OUTPATIENT)
Dept: CARDIOLOGY | Facility: CLINIC | Age: 85
End: 2020-02-07
Payer: MEDICARE

## 2020-02-07 PROCEDURE — 93264 REM MNTR WRLS P-ART PRS SNR: CPT

## 2020-02-24 NOTE — H&P ADULT - LV FUNCTION ASSESSMENT
Other (Free Text): Father Note Text (......Xxx Chief Complaint.): This diagnosis correlates with the Detail Level: Simple yes

## 2020-03-13 ENCOUNTER — APPOINTMENT (OUTPATIENT)
Dept: CARDIOLOGY | Facility: CLINIC | Age: 85
End: 2020-03-13
Payer: MEDICARE

## 2020-03-13 PROCEDURE — 93264 REM MNTR WRLS P-ART PRS SNR: CPT

## 2020-04-07 ENCOUNTER — RX RENEWAL (OUTPATIENT)
Age: 85
End: 2020-04-07

## 2020-04-07 RX ORDER — LEVOTHYROXINE SODIUM 0.12 MG/1
125 TABLET ORAL
Qty: 90 | Refills: 1 | Status: ACTIVE | COMMUNITY
Start: 2018-11-02 | End: 1900-01-01

## 2020-04-17 ENCOUNTER — APPOINTMENT (OUTPATIENT)
Dept: CARDIOLOGY | Facility: CLINIC | Age: 85
End: 2020-04-17
Payer: MEDICARE

## 2020-04-17 PROCEDURE — 93264 REM MNTR WRLS P-ART PRS SNR: CPT

## 2020-05-22 ENCOUNTER — APPOINTMENT (OUTPATIENT)
Dept: CARDIOLOGY | Facility: CLINIC | Age: 85
End: 2020-05-22
Payer: MEDICARE

## 2020-05-22 PROCEDURE — 93264 REM MNTR WRLS P-ART PRS SNR: CPT

## 2020-06-22 ENCOUNTER — RX RENEWAL (OUTPATIENT)
Age: 85
End: 2020-06-22

## 2020-06-26 ENCOUNTER — APPOINTMENT (OUTPATIENT)
Dept: CARDIOLOGY | Facility: CLINIC | Age: 85
End: 2020-06-26
Payer: MEDICARE

## 2020-06-26 PROCEDURE — 93264 REM MNTR WRLS P-ART PRS SNR: CPT

## 2020-06-30 ENCOUNTER — EMERGENCY (EMERGENCY)
Facility: HOSPITAL | Age: 85
LOS: 1 days | Discharge: HOME | End: 2020-06-30
Attending: EMERGENCY MEDICINE | Admitting: EMERGENCY MEDICINE
Payer: MEDICARE

## 2020-06-30 VITALS
HEART RATE: 78 BPM | DIASTOLIC BLOOD PRESSURE: 79 MMHG | RESPIRATION RATE: 18 BRPM | TEMPERATURE: 98 F | WEIGHT: 169.98 LBS | OXYGEN SATURATION: 95 % | SYSTOLIC BLOOD PRESSURE: 149 MMHG

## 2020-06-30 VITALS
SYSTOLIC BLOOD PRESSURE: 172 MMHG | DIASTOLIC BLOOD PRESSURE: 83 MMHG | OXYGEN SATURATION: 97 % | RESPIRATION RATE: 16 BRPM | TEMPERATURE: 98 F | HEART RATE: 73 BPM

## 2020-06-30 DIAGNOSIS — R93.1 ABNORMAL FINDINGS ON DIAGNOSTIC IMAGING OF HEART AND CORONARY CIRCULATION: Chronic | ICD-10-CM

## 2020-06-30 DIAGNOSIS — Z90.49 ACQUIRED ABSENCE OF OTHER SPECIFIED PARTS OF DIGESTIVE TRACT: Chronic | ICD-10-CM

## 2020-06-30 PROCEDURE — 99283 EMERGENCY DEPT VISIT LOW MDM: CPT

## 2020-06-30 PROCEDURE — 70450 CT HEAD/BRAIN W/O DYE: CPT

## 2020-06-30 PROCEDURE — 99284 EMERGENCY DEPT VISIT MOD MDM: CPT | Mod: 25

## 2020-06-30 PROCEDURE — 70450 CT HEAD/BRAIN W/O DYE: CPT | Mod: 26

## 2020-06-30 RX ORDER — ACETAMINOPHEN 500 MG
650 TABLET ORAL ONCE
Refills: 0 | Status: COMPLETED | OUTPATIENT
Start: 2020-06-30 | End: 2020-06-30

## 2020-06-30 RX ADMIN — Medication 650 MILLIGRAM(S): at 20:30

## 2020-06-30 NOTE — ED PROVIDER NOTE - OBJECTIVE STATEMENT
Pt is a 86 yo female with pmhx of Asthma CAD Diabetes Essential hypertension Gastroesophageal reflux disease without esophagitis HLD HTN Hypothyroid    NSTEMI on asa and plavix sent in from [facility] living for fall, pt states she tripped and fell hit head no loc no neck pain no sob no nvd no dizziness no numbness tingling was able to ambulate after.

## 2020-06-30 NOTE — ED PROVIDER NOTE - CONSTITUTIONAL, MLM
normal... Well appearing, awake, alert, oriented to person, place, time/situation and in no apparent distress. + swelling forehead redness no laceration

## 2020-06-30 NOTE — ED ADULT NURSE NOTE - CHPI ED NUR SYMPTOMS NEG
no blurred vision/no change in level of consciousness/no loss of consciousness/no syncope/no confusion/no weakness/no dizziness/no seizure/no vomiting/no nausea

## 2020-06-30 NOTE — ED PROVIDER NOTE - NSFOLLOWUPINSTRUCTIONS_ED_ALL_ED_FT
CT head negative for internal injury  Ice forehead   follow up with pcp return to er for any worsening symptoms

## 2020-06-30 NOTE — ED PROVIDER NOTE - PATIENT PORTAL LINK FT
You can access the FollowMyHealth Patient Portal offered by [facility] by registering at the following website: [email or web address]. By joining [employer]’s FollowMyHealth portal, you will also be able to view your health information using other applications (apps) compatible with our system.

## 2020-06-30 NOTE — ED PROVIDER NOTE - ATTENDING CONTRIBUTION TO CARE
86 y/o F with c/o mechanical trip and fall with head trauma.  pt denies blood thinners, nausea/vomiting, headache, LOC.  PE unremarkable.  CT head

## 2020-06-30 NOTE — ED ADULT NURSE NOTE - CHIEF COMPLAINT QUOTE
patient came In ED from [facility], with c/o head injury. s/p tripped and fall. denies LOC.

## 2020-06-30 NOTE — ED PROVIDER NOTE - PROGRESS NOTE DETAILS
ct head negative will dc back to assisted living  spoke with nurse at assisted living accepts transfer back no covid needed

## 2020-06-30 NOTE — ED ADULT TRIAGE NOTE - CHIEF COMPLAINT QUOTE
patient came In ED from [facility], with c/o head injury. s/p tripped and fall. denies LOC.

## 2020-06-30 NOTE — ED ADULT NURSE NOTE - NSIMPLEMENTINTERV_GEN_ALL_ED
Implemented All Fall with Harm Risk Interventions:  [place] to call system. Call bell, personal items and telephone within reach. Instruct patient to call for assistance. Room bathroom lighting operational. Non-slip footwear when patient is off stretcher. Physically safe environment: no spills, clutter or unnecessary equipment. Stretcher in lowest position, wheels locked, appropriate side rails in place. Provide visual cue, wrist band, yellow gown, etc. Monitor gait and stability. Monitor for mental status changes and reorient to person, place, and time. Review medications for side effects contributing to fall risk. Reinforce activity limits and safety measures with patient and family. Provide visual clues: red socks.

## 2020-06-30 NOTE — ED ADULT NURSE NOTE - OBJECTIVE STATEMENT
patient came in ED from [facility] with c/o head injury, left side of the forehead bruising and lump. s/p tripped and fall in her room and landed on the floor. alert and oriented X 3. afebrile. non-labored respiration noted. denies dizziness. ice pack applied on the forehead. on fall precaution.

## 2020-07-14 ENCOUNTER — RX RENEWAL (OUTPATIENT)
Age: 85
End: 2020-07-14

## 2020-07-31 ENCOUNTER — APPOINTMENT (OUTPATIENT)
Dept: CARDIOLOGY | Facility: CLINIC | Age: 85
End: 2020-07-31
Payer: MEDICARE

## 2020-07-31 PROCEDURE — 93264 REM MNTR WRLS P-ART PRS SNR: CPT

## 2020-09-04 ENCOUNTER — APPOINTMENT (OUTPATIENT)
Dept: CARDIOLOGY | Facility: CLINIC | Age: 85
End: 2020-09-04
Payer: MEDICARE

## 2020-09-04 PROCEDURE — 93264 REM MNTR WRLS P-ART PRS SNR: CPT

## 2020-10-09 ENCOUNTER — APPOINTMENT (OUTPATIENT)
Dept: CARDIOLOGY | Facility: CLINIC | Age: 85
End: 2020-10-09
Payer: MEDICARE

## 2020-10-09 PROCEDURE — 93264 REM MNTR WRLS P-ART PRS SNR: CPT

## 2020-10-09 NOTE — CONSULT NOTE ADULT - CONSULT REQUESTED DATE/TIME
Interval History:   Patient feels well this morning. Tolerating diet.   IR drains slowing down in output.   UOP good per urostomy.       Objective:     Temp:  [96.4 °F (35.8 °C)-97.9 °F (36.6 °C)] 97 °F (36.1 °C)  Pulse:  [] 101  Resp:  [18] 18  SpO2:  [95 %-98 %] 95 %  BP: (131-160)/(73-77) 160/74     Body mass index is 28.94 kg/m².           Drains     Drain                 Urostomy 09/11/20 ileal conduit LLQ 27 days         Ileostomy 09/18/20 RLQ 20 days         Closed/Suction Drain 10/05/20 1038 Right;Superior Abdomen Bulb 10 Fr. 2 days         Closed/Suction Drain 10/05/20 1039 Right;Inferior Bulb 10 Fr. 2 days                Physical Exam   Constitutional: No distress.   HENT:   Head: Normocephalic and atraumatic.   Eyes: Conjunctivae are normal. No scleral icterus.   Neck: Normal range of motion.   Cardiovascular: Normal rate.    Pulmonary/Chest: Effort normal. No respiratory distress.   Abdominal: Soft. She exhibits no distension. There is no abdominal tenderness. There is no rebound and no guarding.   Urostomy p/p/p draining clear yellow urine  Ileostomy output thickened   Midline incision with wound vac in place   Right superior drain with thin green output.   Right Inferior drain with thin green output.    Musculoskeletal: Normal range of motion.      Comments: SCDs in place   Neurological: She is alert.   Skin: Skin is warm and dry. She is not diaphoretic.         Significant Labs:    BMP:  Recent Labs   Lab 10/07/20  0420 10/08/20  0512 10/09/20  0553    143 147*   K 3.4* 2.9* 2.9*    108 109   CO2 25 26 27   BUN 10 11 11   CREATININE 0.9 0.9 0.9   CALCIUM 8.5* 8.2* 8.3*       CBC:   Recent Labs   Lab 10/07/20  0420 10/08/20  0513 10/09/20  0553   WBC 11.59 8.32 6.60   HGB 6.8* 6.6* 6.6*   HCT 24.3* 24.2* 23.7*    309 345       Blood Culture:   Recent Labs   Lab 10/06/20  0628 10/06/20  0631   LABBLOO No Growth to date  No Growth to date  No Growth to date No Growth to date   No Growth to date  No Growth to date     Urine Culture: No results for input(s): LABURIN in the last 168 hours.  Urine Studies: No results for input(s): COLORU, APPEARANCEUA, PHUR, SPECGRAV, PROTEINUA, GLUCUA, KETONESU, BILIRUBINUA, OCCULTUA, NITRITE, UROBILINOGEN, LEUKOCYTESUR, RBCUA, WBCUA, BACTERIA, SQUAMEPITHEL, HYALINECASTS in the last 168 hours.    Invalid input(s): WRIGHTSUR    Significant Imaging:  All pertinent imaging results/findings from the past 24 hours have been reviewed.           29-Mar-2019 12:52

## 2020-10-29 ENCOUNTER — NON-APPOINTMENT (OUTPATIENT)
Age: 85
End: 2020-10-29

## 2020-11-13 ENCOUNTER — APPOINTMENT (OUTPATIENT)
Dept: CARDIOLOGY | Facility: CLINIC | Age: 85
End: 2020-11-13
Payer: MEDICARE

## 2020-11-13 PROCEDURE — 93264 REM MNTR WRLS P-ART PRS SNR: CPT

## 2020-12-02 NOTE — PHYSICAL THERAPY INITIAL EVALUATION ADULT - TRANSFER SKILLS, REHAB EVAL
Palpations: Abdomen is soft. Tenderness: There is no abdominal tenderness. There is no guarding or rebound. Musculoskeletal:      Left knee: He exhibits decreased range of motion. Tenderness found. Thoracic back: He exhibits decreased range of motion. Lumbar back: He exhibits decreased range of motion. Skin:     General: Skin is warm and dry. Findings: No rash. Neurological:      Mental Status: He is alert and oriented to person, place, and time. Motor: Abnormal muscle tone present. Coordination: Coordination normal.      Gait: Gait abnormal.      Deep Tendon Reflexes: Reflexes are normal and symmetric. Comments: Decreased strength on right. Right foot drop. Brace on right leg   Psychiatric:         Behavior: Behavior normal.         Thought Content: Thought content normal.         Judgment: Judgment normal.         /76   Pulse 60   Temp 97.6 °F (36.4 °C) (Temporal)   Resp 18   Ht 5' 11\" (1.803 m)   Wt 196 lb (88.9 kg)   SpO2 98%   BMI 27.34 kg/m²     Assessment:      Diagnosis Orders   1. Chronic pain of right knee  XR KNEE RIGHT (3 VIEWS)   2. MD [facility]muscular dystrophy) ([facility]     3. Neuropathy         No results found for this visit on 12/02/20. Plan: Will continue current medication regimen. Xray of right knee. Patient is to make appointment for right knee injection if needed after xray has been completed. KNEE INJECTION:  Patient was given verbal informed consent and agreed verbally and with signed consent to the risks and benefits of procedure. Risks discussed included bleeding, infection, damage to structures, and potentially other yet unlikely unforeseen consequences of those risks. An impression was made with a pen for injection site left knee. The area was cleaned w/ betadine and alcohol swab.   It was then sprayed w/ ethyl chloride and injected w/ a 25 gauge 1.5\" needle into the the tissue between the tibial plateau and distal femur and into the joint space. It was aspirated and no bloody return into syringe. The medicine was administered w/o issue or resistance. 8.0 cc of bupivacaine and 1.0 cc of 40 mg/mL kenalog was administered. A bandage was applied directly thereafter. All bleeding stopped. EBL - 0 cc. Pt was instructed on basic cleansing and rest of affected area. Pt noted some relief prior to leaving the office. Return in about 4 months (around 4/2/2021) for Office Visit, Refill with UDS. Orders Placed This Encounter   Procedures    XR KNEE RIGHT (3 VIEWS)     Standing Status:   Future     Standing Expiration Date:   12/2/2021       No orders of the defined types were placed in this encounter. Patient offered educational handouts and has had all questions answered. Patient voices understanding and agrees to plans along with risks and benefits of plan. Patient is instructed to continue prior meds, diet, and exercise plans as instructed. Patient agrees to follow up as instructed and sooner if needed. Patient agrees to go to ER if condition becomes emergent. EMR Dragon/transcription disclaimer: Some of this encounter note is an electronic transcription/translation of spoken language to printed text. The electronic translation of spoken language may permit erroneous, or at times, nonsensical words or phrases to be inadvertently transcribed.  Although I have reviewed the note for such errors, some may still exist.    Electronically signed by ANTHONY Valerio on 12/2/2020 at 10:07 AM needs device/independent

## 2020-12-07 ENCOUNTER — NON-APPOINTMENT (OUTPATIENT)
Age: 85
End: 2020-12-07

## 2020-12-24 ENCOUNTER — APPOINTMENT (OUTPATIENT)
Dept: CARDIOLOGY | Facility: CLINIC | Age: 85
End: 2020-12-24
Payer: MEDICARE

## 2020-12-24 PROCEDURE — 93264 REM MNTR WRLS P-ART PRS SNR: CPT

## 2021-01-04 ENCOUNTER — APPOINTMENT (OUTPATIENT)
Dept: SURGERY | Facility: CLINIC | Age: 86
End: 2021-01-04
Payer: MEDICARE

## 2021-01-04 VITALS — SYSTOLIC BLOOD PRESSURE: 183 MMHG | HEART RATE: 73 BPM | DIASTOLIC BLOOD PRESSURE: 78 MMHG | OXYGEN SATURATION: 94 %

## 2021-01-04 DIAGNOSIS — Z87.09 PERSONAL HISTORY OF OTHER DISEASES OF THE RESPIRATORY SYSTEM: ICD-10-CM

## 2021-01-04 DIAGNOSIS — Z86.79 PERSONAL HISTORY OF OTHER DISEASES OF THE CIRCULATORY SYSTEM: ICD-10-CM

## 2021-01-04 DIAGNOSIS — Z86.39 PERSONAL HISTORY OF OTHER ENDOCRINE, NUTRITIONAL AND METABOLIC DISEASE: ICD-10-CM

## 2021-01-04 DIAGNOSIS — K80.12 CALCULUS OF GALLBLADDER WITH ACUTE AND CHRONIC CHOLECYSTITIS W/OUT OBSTRUCTION: ICD-10-CM

## 2021-01-04 PROCEDURE — 99204 OFFICE O/P NEW MOD 45 MIN: CPT

## 2021-01-04 NOTE — PHYSICAL EXAM
[JVD] : no jugular venous distention  [Normal Thyroid] : the thyroid was normal [Carotid Bruits] : no carotid bruits [Normal Breath Sounds] : Normal breath sounds [Normal Heart Sounds] : normal heart sounds [Normal Rate and Rhythm] : normal rate and rhythm [No Rash or Lesion] : No rash or lesion [Alert] : alert [Oriented to Person] : oriented to person [Oriented to Place] : oriented to place [Oriented to Time] : oriented to time [Calm] : calm [de-identified] : obese female wheel chair bound, in no acute distress [de-identified] : noninjected and nonicteric [de-identified] : without adenopathy [de-identified] : refused [de-identified] : normal bowel sounds, without distension, with mild RUQ tenderness on deep palpation, without guarding or rebound tenderness [de-identified] : without calf pain

## 2021-01-04 NOTE — ASSESSMENT
[FreeTextEntry1] : all risks, benefits and options discussed including pts increased risk for Surgery. Therefore pt has opted to try a low fat diet

## 2021-01-04 NOTE — REVIEW OF SYSTEMS
[Eye Pain] : no eye pain [Red Eyes] : eyes not red [Eyesight Problems] : eyesight problems [Discharge From Eyes] : no purulent discharge from the eyes [Dry Eyes] : no dryness of the eyes [Eyes Itch] : no itching of the eyes [Heart Rate Is Slow] : the heart rate was not slow [Heart Rate Is Fast] : the heart rate was not fast [Chest Pain] : chest pain [Palpitations] : palpitations [Leg Claudication] : no intermittent leg claudication [Lower Ext Edema] : no lower extremity edema [Shortness Of Breath] : shortness of breath [Wheezing] : wheezing [Cough] : cough [Orthopnea] : no orthopnea [PND] : no PND [Abdominal Pain] : abdominal pain [Vomiting] : no vomiting [Constipation] : no constipation [Diarrhea] : no diarrhea [Heartburn] : no heartburn [Melena] : no melena [Suicidal] : not suicidal [Sleep Disturbances] : no sleep disturbances [Anxiety] : no anxiety [Depression] : depression [Change In Personality] : no personality change [Emotional Problems] : no emotional problems [Negative] : Heme/Lymph [FreeTextEntry3] : cataracts

## 2021-01-04 NOTE — HISTORY OF PRESENT ILLNESS
[de-identified] : gallstones [de-identified] : 86 year old white female who presents from assisted living c/o 6 weeks of episodes of abdominal pain;Pt states her pain is mostly RUQ but does radiate to her back and across her abdomen. She denies any fevers or chills. No nausea or vomiting or changes in her bowel habits.  No episodes of jaundice. Family history is negative for gallbladder disease.

## 2021-01-15 NOTE — PROGRESS NOTE ADULT - PROBLEM SELECTOR PLAN 4
metFORMIN HCl 500 MG Oral Tab         Si tablet in the evening before supper    Disp:  90 tablet    Refills:  0    Start: 1/15/2021    Class: Normal    Non-formulary    Last ordered: 1 month ago by Divya Calzada MD     Diabetic Medication Protocol F insulin therapy protocol

## 2021-01-29 ENCOUNTER — APPOINTMENT (OUTPATIENT)
Dept: CARDIOLOGY | Facility: CLINIC | Age: 86
End: 2021-01-29
Payer: MEDICARE

## 2021-01-29 PROCEDURE — 93264 REM MNTR WRLS P-ART PRS SNR: CPT

## 2021-01-29 PROCEDURE — 99072 ADDL SUPL MATRL&STAF TM PHE: CPT

## 2021-02-04 ENCOUNTER — APPOINTMENT (OUTPATIENT)
Dept: SURGERY | Facility: CLINIC | Age: 86
End: 2021-02-04

## 2021-02-18 NOTE — DIETITIAN INITIAL EVALUATION ADULT. - PROBLEM SELECTOR PROBLEM 9
-- DO NOT REPLY / DO NOT REPLY ALL --  -- Message is from the [facility]--    PATIENT WANTS TO SET UP PROXY FOR THEIR CHILD IN [facility]    Team Member confirmed that parent has a [facility]    Names of children with a Virtual Visit, on a Wait List or in Virtual Care Program & : Shivani Vale  2017    Alternate Call Back #:   [phone]    Route to the [facility] Fiber Options Proxy Request Pool: [facility]TACT [facility] PROXY REQUEST POOL [[id number]]    \"I will route this to our Registration team, and they will process your request and give you a call back.\"    
Need for prophylactic measure

## 2021-02-26 ENCOUNTER — NON-APPOINTMENT (OUTPATIENT)
Age: 86
End: 2021-02-26

## 2021-03-05 ENCOUNTER — APPOINTMENT (OUTPATIENT)
Dept: CARDIOLOGY | Facility: CLINIC | Age: 86
End: 2021-03-05
Payer: MEDICARE

## 2021-03-05 PROCEDURE — 93264 REM MNTR WRLS P-ART PRS SNR: CPT

## 2021-03-05 PROCEDURE — 99072 ADDL SUPL MATRL&STAF TM PHE: CPT

## 2021-03-09 ENCOUNTER — NON-APPOINTMENT (OUTPATIENT)
Age: 86
End: 2021-03-09

## 2021-03-10 ENCOUNTER — APPOINTMENT (OUTPATIENT)
Dept: CARDIOLOGY | Facility: CLINIC | Age: 86
End: 2021-03-10
Payer: MEDICARE

## 2021-03-10 ENCOUNTER — NON-APPOINTMENT (OUTPATIENT)
Age: 86
End: 2021-03-10

## 2021-03-10 VITALS
OXYGEN SATURATION: 93 % | HEART RATE: 77 BPM | BODY MASS INDEX: 41.43 KG/M2 | TEMPERATURE: 98.1 F | WEIGHT: 211 LBS | DIASTOLIC BLOOD PRESSURE: 80 MMHG | SYSTOLIC BLOOD PRESSURE: 150 MMHG | HEIGHT: 60 IN

## 2021-03-10 VITALS — SYSTOLIC BLOOD PRESSURE: 144 MMHG | DIASTOLIC BLOOD PRESSURE: 78 MMHG

## 2021-03-10 DIAGNOSIS — H44.002 UNSPECIFIED PURULENT ENDOPHTHALMITIS, LEFT EYE: ICD-10-CM

## 2021-03-10 PROCEDURE — 99072 ADDL SUPL MATRL&STAF TM PHE: CPT

## 2021-03-10 PROCEDURE — 93000 ELECTROCARDIOGRAM COMPLETE: CPT

## 2021-03-10 PROCEDURE — 99214 OFFICE O/P EST MOD 30 MIN: CPT | Mod: 24

## 2021-03-10 RX ORDER — QUETIAPINE FUMARATE 25 MG/1
25 TABLET ORAL
Qty: 180 | Refills: 0 | Status: DISCONTINUED | COMMUNITY
Start: 2019-08-25 | End: 2021-03-10

## 2021-03-10 NOTE — HISTORY OF PRESENT ILLNESS
[FreeTextEntry1] : 87 yo woman, , mother of 4 with known CAD since 1990, seems that at that time she had a procedure at [facility]. In April 2017 she was admitted to [facility] with chest pain and then transferred to [facility] where she underwent PCI with staged procedure to OM1 and ?  \par Under follow up for increasing SOB, unable to walk without getting SOB, NYHA 3/4, no CP, but has orthopnea and PND. However she has long standing asthma. She also has noticed BE edema. Has not been compliant with the furosemide because of incontinence and the need to urinate.\par Seems that the last echo was done in 2016?\par On 4/19/2018 she underwent cardiac cath that revealed patent stent in the LAD, 50% stenosis in the LCx (iFR normal) and 100% occlusion of the Mid RCA. LV function was normal and there was moderate AS with an BILL=1.21 cm2 and mild Pul HTN and normal CO/CI. \par On 5/4/2018 she had a dobutamine stress test that revealed a completely normal LV function with a peak gradient of 34 mmHg.\par On 10/28/2018 she was in the [facility] for SOB. Underwent tests and was discharged the next day. \par On 12/11/2018 she was admitted to [facility] with ACS/NSTEMI. Cardiac cath revealed a  of the RCA and a new 95% stenosis in the prox LCX that was treated with ADELIA x 1. She was enrolled in the CLEAR Maulik study (1 month DAPT). \par On 3/29/2019 with cough and SOB. Seems that it was COPD exacerbation. Treated with antibiotics, steroids and nebulizers with improvement in her state. Still coughing, worse when lying flat. Has severe SIOB, orthopnea, unable to walk more than 10 ft. NYHA 3/4 -4/4,  associated chest pain constant, worsens with the cough. No fever, no sputum. Discharged home on 4/3/2019. \par On July 1, 2019  after multiple admissions to the hospital with SOB a CardioMEMS was successfully implanted. Pulmonary pressures were normal, borderline elevated at the time. Since then she has been under follow up. Today for routine office follow up after a prolonged hiatus. C/O increasing SOB and possible chest pain. Also C/O left eye pain and redness \par Denies orthopnea or PND. Has BE edema. \par Compliant with her meds. \par Underwent cataract of the left eye in January 2018\par Diabetes since 2000 on insulin\par HTN treated with meds\par HLD\par Smoked until age 40\par Denies alcohol drinking

## 2021-03-10 NOTE — DISCUSSION/SUMMARY
[FreeTextEntry1] : 83 yo woman with known CAD, mild to moderate AS and HFpEF (LVEF=75%). Had a CardioMEMS implanted and has been well controlled since then. Today for routine follow up, vague complains of SOB and maybe chest pain. However patient has deteriorated mentally. Discussed with family members (daughter) the options and they all agreed to have a conservative management and continue same medications and avoid extra testing. \par She also has Left eye pain (had cataract surgery in that eye in 2018. Will request an urgent opthalmology consult.\par Had blood work this morning. Results are not available yet.\par Will continue same meds.\par Routine Follow up in 6 months

## 2021-03-10 NOTE — REVIEW OF SYSTEMS
[Eye Pain] : eye pain [Eyeglasses] : currently wearing eyeglasses [Shortness Of Breath] : shortness of breath [Dyspnea on exertion] : dyspnea during exertion [Lower Ext Edema] : lower extremity edema [Wheezing] : wheezing [Joint Pain] : joint pain [Negative] : Heme/Lymph [Earache] : no earache [Chest  Pressure] : no chest pressure [Chest Pain] : no chest pain [Leg Claudication] : no intermittent leg claudication [Palpitations] : no palpitations [Cough] : no cough

## 2021-03-10 NOTE — ASSESSMENT
[FreeTextEntry1] : ECG performed today at the office revealed NSR 71, prolonged HI (0.24 s) with Q in the inferior leads, normal AQRS, QRS and QTc.  ICLBBB pattern Poor R prog precordial leads

## 2021-03-10 NOTE — PHYSICAL EXAM
[General Appearance - Well Developed] : well developed [Normal Appearance] : normal appearance [Well Groomed] : well groomed [General Appearance - Well Nourished] : well nourished [No Deformities] : no deformities [General Appearance - In No Acute Distress] : no acute distress [Normal Jugular Venous V Waves Present] : normal jugular venous V waves present [Respiration, Rhythm And Depth] : normal respiratory rhythm and effort [Exaggerated Use Of Accessory Muscles For Inspiration] : no accessory muscle use [Auscultation Breath Sounds / Voice Sounds] : lungs were clear to auscultation bilaterally [Chest Palpation] : palpation of the chest revealed no abnormalities [Lungs Percussion] : the lungs were normal to percussion [Heart Rate And Rhythm] : heart rate and rhythm were normal [Heart Sounds] : normal S1 and S2 [Arterial Pulses Normal] : the arterial pulses were normal [Bowel Sounds] : normal bowel sounds [Abdomen Soft] : soft [Abdomen Tenderness] : non-tender [Abdomen Mass (___ Cm)] : no abdominal mass palpated [Abdomen Hernia] : no hernia was discovered [Abnormal Walk] : normal gait [Nail Clubbing] : no clubbing of the fingernails [Cyanosis, Localized] : no localized cyanosis [Skin Color & Pigmentation] : normal skin color and pigmentation [Skin Turgor] : normal skin turgor [] : no rash [No Venous Stasis] : no venous stasis [Skin Lesions] : no skin lesions [No Skin Ulcers] : no skin ulcer [No Anxiety] : not feeling anxious [FreeTextEntry1] : Edema +1 BE

## 2021-03-17 ENCOUNTER — NON-APPOINTMENT (OUTPATIENT)
Age: 86
End: 2021-03-17

## 2021-04-16 ENCOUNTER — APPOINTMENT (OUTPATIENT)
Dept: CARDIOLOGY | Facility: CLINIC | Age: 86
End: 2021-04-16
Payer: MEDICARE

## 2021-04-16 PROCEDURE — 93264 REM MNTR WRLS P-ART PRS SNR: CPT

## 2021-04-16 PROCEDURE — 99072 ADDL SUPL MATRL&STAF TM PHE: CPT

## 2021-05-21 ENCOUNTER — APPOINTMENT (OUTPATIENT)
Dept: CARDIOLOGY | Facility: CLINIC | Age: 86
End: 2021-05-21
Payer: MEDICARE

## 2021-05-21 PROCEDURE — 93264 REM MNTR WRLS P-ART PRS SNR: CPT

## 2021-06-07 NOTE — ED ADULT NURSE REASSESSMENT NOTE - DISTAL EXTREMITY CAPILLARY REFILL
Patient to clinic for NP appointment and treatment. See NP note for full assessment. Labs drawn and reviewed with patient, all questions answered. Medications administered as ordered by MD, patient tolerated well. Reminded of next appointment and to call with any questions or concerns. Patient left ambulatory in stable condition alone at 11:51.   2 seconds or less

## 2021-07-06 ENCOUNTER — EMERGENCY (EMERGENCY)
Facility: HOSPITAL | Age: 86
LOS: 1 days | Discharge: HOME | End: 2021-07-06
Attending: EMERGENCY MEDICINE | Admitting: EMERGENCY MEDICINE
Payer: MEDICARE

## 2021-07-06 VITALS
HEART RATE: 84 BPM | TEMPERATURE: 98 F | SYSTOLIC BLOOD PRESSURE: 125 MMHG | OXYGEN SATURATION: 100 % | RESPIRATION RATE: 16 BRPM | DIASTOLIC BLOOD PRESSURE: 94 MMHG | HEIGHT: 64 IN

## 2021-07-06 DIAGNOSIS — Z90.49 ACQUIRED ABSENCE OF OTHER SPECIFIED PARTS OF DIGESTIVE TRACT: Chronic | ICD-10-CM

## 2021-07-06 DIAGNOSIS — R93.1 ABNORMAL FINDINGS ON DIAGNOSTIC IMAGING OF HEART AND CORONARY CIRCULATION: Chronic | ICD-10-CM

## 2021-07-06 PROCEDURE — 99284 EMERGENCY DEPT VISIT MOD MDM: CPT

## 2021-07-06 PROCEDURE — 99284 EMERGENCY DEPT VISIT MOD MDM: CPT | Mod: 25

## 2021-07-06 PROCEDURE — 70450 CT HEAD/BRAIN W/O DYE: CPT | Mod: 26,MA

## 2021-07-06 PROCEDURE — 70450 CT HEAD/BRAIN W/O DYE: CPT | Mod: MA

## 2021-07-06 NOTE — ED ADULT NURSE NOTE - OBJECTIVE STATEMENT
Pt BIBA from [facility] s/p fall - Pt BIBA from [facility] s/p fall -states she was getting up from dinner and turned to get her walker and fell backward hitting her head - denies loc -

## 2021-07-06 NOTE — ED PROVIDER NOTE - OBJECTIVE STATEMENT
85 y/o F with c/o fall.  No blood thinners.  pt states she was getting up from dinner and turned o get her walked and fell backward hitting the back of her head.  No LOC, HA, dizziness, laceration.

## 2021-07-06 NOTE — ED PROVIDER NOTE - IV ALTEPLASE ADMIN OUTSIDE HIDDEN
63 y.o. 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 Deng, pt p/w c/o frequent episodes of mod chest discomfort triggered by exertion, associated with RASMUSSEN, SOB , palpitations, fatigue, EKG had no significant, changes, denies syncope seen & evaluated by cardiologist Dr Hines & now presents for R & L heart cath.
show

## 2021-07-06 NOTE — ED PROVIDER NOTE - PATIENT PORTAL LINK FT
You can access the FollowMyHealth Patient Portal offered by [facility] by registering at the following website: [email or web address]. By joining [facility]’s FollowMyHealth portal, you will also be able to view your health information using other applications (apps) compatible with our system.

## 2021-07-06 NOTE — ED ADULT NURSE NOTE - ED STAT RN HANDOFF DETAILS
Report given to Anna Marie MONROY at [facility] , all questions and concerns addressed at this time.

## 2021-07-07 VITALS
DIASTOLIC BLOOD PRESSURE: 83 MMHG | TEMPERATURE: 98 F | OXYGEN SATURATION: 95 % | HEART RATE: 74 BPM | SYSTOLIC BLOOD PRESSURE: 159 MMHG | RESPIRATION RATE: 18 BRPM

## 2021-07-07 NOTE — ED ADULT NURSE REASSESSMENT NOTE - NS ED NURSE REASSESS COMMENT FT1
Report given to Anna Marie MONROY at [facility], all questions and concerns addressed at this time.

## 2021-07-09 ENCOUNTER — APPOINTMENT (OUTPATIENT)
Dept: CARDIOLOGY | Facility: CLINIC | Age: 86
End: 2021-07-09
Payer: MEDICARE

## 2021-07-09 PROCEDURE — 93264 REM MNTR WRLS P-ART PRS SNR: CPT

## 2021-08-18 NOTE — ASU PATIENT PROFILE, ADULT - NS PRO MODE OF ARRIVAL
ambulatory [Normal] : The patient is in no apparent respiratory distress. They're taking full deep breaths without use of accessory muscles or evidence of audible wheezes or stridor without the use of a stethoscope [FreeTextEntry1] : Focused exam of RLE:\par Skin intact, no erythema, effusion or ecchymoses\par Compartments soft and compressible\par Pt ambulating without issue, also running in the pt room\par Moving her RLE spontaneously without observable pain\par Sensation grossly intact\par PROM of hip/knee/ankle/toes not eliciting any pain or distress\par Brisk cap refill, WWP

## 2021-08-18 NOTE — PATIENT PROFILE ADULT - NSPROEDALEARNPREF_GEN_A_NUR
4 Eyes Skin Assessment Completed by CHALO Briggs and CHALO Means.    Head WDL  Ears WDL  Nose WDL  Mouth WDL  Neck WDL  Breast/Chest WDL  Shoulder Blades WDL  Spine WDL  (R) Arm/Elbow/Hand WDL  (L) Arm/Elbow/Hand WDL  Abdomen WDL  Groin Incision, bilateral groin sites, CDI  Scrotum/Coccyx/Buttocks WDL  (R) Leg WDL  (L) Leg WDL  (R) Heel/Foot/Toe WDL  (L) Heel/Foot/Toe WDL          Devices In Places Tele Box      Interventions In Place Pillows, Heels Loaded W/Pillows and Pressure Redistribution Mattress    Possible Skin Injury No    Pictures Uploaded Into Epic N/A  Wound Consult Placed N/A  RN Wound Prevention Protocol Ordered No      written material

## 2021-09-03 ENCOUNTER — APPOINTMENT (OUTPATIENT)
Dept: CARDIOLOGY | Facility: CLINIC | Age: 86
End: 2021-09-03
Payer: MEDICARE

## 2021-09-03 PROCEDURE — 93264 REM MNTR WRLS P-ART PRS SNR: CPT

## 2021-09-15 ENCOUNTER — APPOINTMENT (OUTPATIENT)
Dept: CARDIOLOGY | Facility: CLINIC | Age: 86
End: 2021-09-15

## 2021-09-22 NOTE — PROGRESS NOTE ADULT - PROBLEM/PLAN-4
Bactrim Counseling:  I discussed with the patient the risks of sulfa antibiotics including but not limited to GI upset, allergic reaction, drug rash, diarrhea, dizziness, photosensitivity, and yeast infections.  Rarely, more serious reactions can occur including but not limited to aplastic anemia, agranulocytosis, methemoglobinemia, blood dyscrasias, liver or kidney failure, lung infiltrates or desquamative/blistering drug rashes. Dapsone Counseling: I discussed with the patient the risks of dapsone including but not limited to hemolytic anemia, agranulocytosis, rashes, methemoglobinemia, kidney failure, peripheral neuropathy, headaches, GI upset, and liver toxicity.  Patients who start dapsone require monitoring including baseline LFTs and weekly CBCs for the first month, then every month thereafter.  The patient verbalized understanding of the proper use and possible adverse effects of dapsone.  All of the patient's questions and concerns were addressed. Erythromycin Counseling:  I discussed with the patient the risks of erythromycin including but not limited to GI upset, allergic reaction, drug rash, diarrhea, increase in liver enzymes, and yeast infections. High Dose Vitamin A Counseling: Side effects reviewed, pt to contact office should one occur. Topical Retinoid counseling:  Patient advised to apply a pea-sized amount only at bedtime and wait 30 minutes after washing their face before applying.  If too drying, patient may add a non-comedogenic moisturizer. The patient verbalized understanding of the proper use and possible adverse effects of retinoids.  All of the patient's questions and concerns were addressed. Tetracycline Counseling: Patient counseled regarding possible photosensitivity and increased risk for sunburn.  Patient instructed to avoid sunlight, if possible.  When exposed to sunlight, patients should wear protective clothing, sunglasses, and sunscreen.  The patient was instructed to call the office immediately if the following severe adverse effects occur:  hearing changes, easy bruising/bleeding, severe headache, or vision changes.  The patient verbalized understanding of the proper use and possible adverse effects of tetracycline.  All of the patient's questions and concerns were addressed. Patient understands to avoid pregnancy while on therapy due to potential birth defects. Sarecycline Counseling: Patient advised regarding possible photosensitivity and discoloration of the teeth, skin, lips, tongue and gums.  Patient instructed to avoid sunlight, if possible.  When exposed to sunlight, patients should wear protective clothing, sunglasses, and sunscreen.  The patient was instructed to call the office immediately if the following severe adverse effects occur:  hearing changes, easy bruising/bleeding, severe headache, or vision changes.  The patient verbalized understanding of the proper use and possible adverse effects of sarecycline.  All of the patient's questions and concerns were addressed. Topical Clindamycin Counseling: Patient counseled that this medication may cause skin irritation or allergic reactions.  In the event of skin irritation, the patient was advised to reduce the amount of the drug applied or use it less frequently.   The patient verbalized understanding of the proper use and possible adverse effects of clindamycin.  All of the patient's questions and concerns were addressed. Sunscreen Recommendations: Zinc and Titanium based sunscreens - aveeno ultra calming, Neutrogena Doxycycline Pregnancy And Lactation Text: This medication is Pregnancy Category D and not consider safe during pregnancy. It is also excreted in breast milk but is considered safe for shorter treatment courses. Azithromycin Pregnancy And Lactation Text: This medication is considered safe during pregnancy and is also secreted in breast milk. DISPLAY PLAN FREE TEXT Minocycline Pregnancy And Lactation Text: This medication is Pregnancy Category D and not consider safe during pregnancy. It is also excreted in breast milk. Birth Control Pills Pregnancy And Lactation Text: This medication should be avoided if pregnant and for the first 30 days post-partum. Spironolactone Pregnancy And Lactation Text: This medication can cause feminization of the male fetus and should be avoided during pregnancy. The active metabolite is also found in breast milk. Benzoyl Peroxide Pregnancy And Lactation Text: This medication is Pregnancy Category C. It is unknown if benzoyl peroxide is excreted in breast milk. Include Pregnancy/Lactation Warning?: No Topical Sulfur Applications Pregnancy And Lactation Text: This medication is Pregnancy Category C and has an unknown safety profile during pregnancy. It is unknown if this topical medication is excreted in breast milk. Isotretinoin Pregnancy And Lactation Text: This medication is Pregnancy Category X and is considered extremely dangerous during pregnancy. It is unknown if it is excreted in breast milk. Tazorac Pregnancy And Lactation Text: This medication is not safe during pregnancy. It is unknown if this medication is excreted in breast milk. Birth Control Pills Counseling: Birth Control Pill Counseling: I discussed with the patient the potential side effects of OCPs including but not limited to increased risk of stroke, heart attack, thrombophlebitis, deep venous thrombosis, hepatic adenomas, breast changes, GI upset, headaches, and depression.  The patient verbalized understanding of the proper use and possible adverse effects of OCPs. All of the patient's questions and concerns were addressed. Azithromycin Counseling:  I discussed with the patient the risks of azithromycin including but not limited to GI upset, allergic reaction, drug rash, diarrhea, and yeast infections. Doxycycline Counseling:  Patient counseled regarding possible photosensitivity and increased risk for sunburn.  Patient instructed to avoid sunlight, if possible.  When exposed to sunlight, patients should wear protective clothing, sunglasses, and sunscreen.  The patient was instructed to call the office immediately if the following severe adverse effects occur:  hearing changes, easy bruising/bleeding, severe headache, or vision changes.  The patient verbalized understanding of the proper use and possible adverse effects of doxycycline.  All of the patient's questions and concerns were addressed. Benzoyl Peroxide Counseling: Patient counseled that medicine may cause skin irritation and bleach clothing.  In the event of skin irritation, the patient was advised to reduce the amount of the drug applied or use it less frequently.   The patient verbalized understanding of the proper use and possible adverse effects of benzoyl peroxide.  All of the patient's questions and concerns were addressed. Minocycline Counseling: Patient advised regarding possible photosensitivity and discoloration of the teeth, skin, lips, tongue and gums.  Patient instructed to avoid sunlight, if possible.  When exposed to sunlight, patients should wear protective clothing, sunglasses, and sunscreen.  The patient was instructed to call the office immediately if the following severe adverse effects occur:  hearing changes, easy bruising/bleeding, severe headache, or vision changes.  The patient verbalized understanding of the proper use and possible adverse effects of minocycline.  All of the patient's questions and concerns were addressed. Isotretinoin Counseling: Patient should get monthly blood tests, not donate blood, not drive at night if vision affected, not share medication, and not undergo elective surgery for 6 months after tx completed. Side effects reviewed, pt to contact office should one occur. Topical Sulfur Applications Counseling: Topical Sulfur Counseling: Patient counseled that this medication may cause skin irritation or allergic reactions.  In the event of skin irritation, the patient was advised to reduce the amount of the drug applied or use it less frequently.   The patient verbalized understanding of the proper use and possible adverse effects of topical sulfur application.  All of the patient's questions and concerns were addressed. Spironolactone Counseling: Patient advised regarding risks of diarrhea, abdominal pain, hyperkalemia, birth defects (for female patients), liver toxicity and renal toxicity. The patient may need blood work to monitor liver and kidney function and potassium levels while on therapy. The patient verbalized understanding of the proper use and possible adverse effects of spironolactone.  All of the patient's questions and concerns were addressed. Tazorac Counseling:  Patient advised that medication is irritating and drying.  Patient may need to apply sparingly and wash off after an hour before eventually leaving it on overnight.  The patient verbalized understanding of the proper use and possible adverse effects of tazorac.  All of the patient's questions and concerns were addressed. Bactrim Pregnancy And Lactation Text: This medication is Pregnancy Category D and is known to cause fetal risk.  It is also excreted in breast milk. Detail Level: Zone Erythromycin Pregnancy And Lactation Text: This medication is Pregnancy Category B and is considered safe during pregnancy. It is also excreted in breast milk. High Dose Vitamin A Pregnancy And Lactation Text: High dose vitamin A therapy is contraindicated during pregnancy and breast feeding. Topical Retinoid Pregnancy And Lactation Text: This medication is Pregnancy Category C. It is unknown if this medication is excreted in breast milk. Dapsone Pregnancy And Lactation Text: This medication is Pregnancy Category C and is not considered safe during pregnancy or breast feeding. Topical Clindamycin Pregnancy And Lactation Text: This medication is Pregnancy Category B and is considered safe during pregnancy. It is unknown if it is excreted in breast milk.

## 2021-10-04 ENCOUNTER — NON-APPOINTMENT (OUTPATIENT)
Age: 86
End: 2021-10-04

## 2021-10-08 ENCOUNTER — APPOINTMENT (OUTPATIENT)
Dept: CARDIOLOGY | Facility: CLINIC | Age: 86
End: 2021-10-08
Payer: MEDICARE

## 2021-10-08 PROCEDURE — 93264 REM MNTR WRLS P-ART PRS SNR: CPT

## 2021-10-13 ENCOUNTER — APPOINTMENT (OUTPATIENT)
Dept: CARDIOLOGY | Facility: CLINIC | Age: 86
End: 2021-10-13
Payer: MEDICARE

## 2021-10-13 ENCOUNTER — NON-APPOINTMENT (OUTPATIENT)
Age: 86
End: 2021-10-13

## 2021-10-13 VITALS
HEART RATE: 73 BPM | BODY MASS INDEX: 42.01 KG/M2 | OXYGEN SATURATION: 94 % | DIASTOLIC BLOOD PRESSURE: 72 MMHG | HEIGHT: 60 IN | SYSTOLIC BLOOD PRESSURE: 119 MMHG | TEMPERATURE: 98.9 F | WEIGHT: 214 LBS

## 2021-10-13 DIAGNOSIS — I35.0 NONRHEUMATIC AORTIC (VALVE) STENOSIS: ICD-10-CM

## 2021-10-13 DIAGNOSIS — G47.33 OBSTRUCTIVE SLEEP APNEA (ADULT) (PEDIATRIC): ICD-10-CM

## 2021-10-13 DIAGNOSIS — I25.118 ATHEROSCLEROTIC HEART DISEASE OF NATIVE CORONARY ARTERY WITH OTHER FORMS OF ANGINA PECTORIS: ICD-10-CM

## 2021-10-13 DIAGNOSIS — E66.01 MORBID (SEVERE) OBESITY DUE TO EXCESS CALORIES: ICD-10-CM

## 2021-10-13 DIAGNOSIS — F41.9 ANXIETY DISORDER, UNSPECIFIED: ICD-10-CM

## 2021-10-13 DIAGNOSIS — E78.00 PURE HYPERCHOLESTEROLEMIA, UNSPECIFIED: ICD-10-CM

## 2021-10-13 DIAGNOSIS — I10 ESSENTIAL (PRIMARY) HYPERTENSION: ICD-10-CM

## 2021-10-13 PROCEDURE — 99213 OFFICE O/P EST LOW 20 MIN: CPT

## 2021-10-13 PROCEDURE — 93000 ELECTROCARDIOGRAM COMPLETE: CPT

## 2021-10-13 RX ORDER — ESCITALOPRAM OXALATE 10 MG/1
10 TABLET, FILM COATED ORAL DAILY
Qty: 90 | Refills: 1 | Status: ACTIVE | COMMUNITY
Start: 2021-10-13

## 2021-10-13 RX ORDER — FLUTICASONE PROPIONATE 50 UG/1
50 SPRAY, METERED NASAL DAILY
Qty: 1 | Refills: 2 | Status: ACTIVE | COMMUNITY
Start: 2021-10-13

## 2021-10-13 NOTE — DISCUSSION/SUMMARY
[FreeTextEntry1] : 88 yo woman with known CAD, mild to moderate AS and HFpEF (LVEF=75%). Had a CardioMEMS implanted and has been well controlled since then. Pressures measured last week were WNL.\par Today for routine follow up, vague complains of SOB and orthopnea. Reduced breath sounds in the left lower base of the lung. \par Family agreed to have a conservative management and continue same medications and avoid extra testing. \par Will request an echo to assess LV function and aortic valve and blood work, but the family will decide if they want to proceed with the testing. \par Will continue same meds.\par Routine Follow up in 6 months

## 2021-10-13 NOTE — ASSESSMENT
[FreeTextEntry1] : ECG performed today at the office revealed NSR 73, prolonged SC (0.24 s) with Q in the inferior leads, normal AQRS, QRS and QTc.  ICLBBB pattern Poor R prog precordial leads. No change.

## 2021-10-13 NOTE — PHYSICAL EXAM
[Well Developed] : well developed [Well Nourished] : well nourished [No Acute Distress] : no acute distress [Obese] : obese [Normal Conjunctiva] : normal conjunctiva [Normal Venous Pressure] : normal venous pressure [Normal S1, S2] : normal S1, S2 [No Rub] : no rub [No Gallop] : no gallop [Clear Lung Fields] : clear lung fields [No Respiratory Distress] : no respiratory distress  [Soft] : abdomen soft [Non Tender] : non-tender [No Masses/organomegaly] : no masses/organomegaly [Normal Bowel Sounds] : normal bowel sounds [Gait - Sufficient for Exercise Testing] : gait - sufficient for exercise testing [No Edema] : no edema [No Cyanosis] : no cyanosis [No Clubbing] : no clubbing [No Varicosities] : no varicosities [No Rash] : no rash [No Skin Lesions] : no skin lesions [Moves all extremities] : moves all extremities [No Focal Deficits] : no focal deficits [Normal Speech] : normal speech [Alert and Oriented] : alert and oriented [Normal memory] : normal memory [de-identified] : Left carotid bruit.  [de-identified] : RJ 4/6 with reduced S2 [de-identified] : Reduced breath sounds left lower base [de-identified] : Protuberant [de-identified] : Walks with a walker

## 2021-10-27 LAB
ALBUMIN SERPL ELPH-MCNC: 3.9 G/DL
ALP BLD-CCNC: 88 U/L
ALT SERPL-CCNC: 13 U/L
ANION GAP SERPL CALC-SCNC: 16 MMOL/L
AST SERPL-CCNC: 15 U/L
BASOPHILS # BLD AUTO: 0.06 K/UL
BASOPHILS NFR BLD AUTO: 0.5 %
BILIRUB DIRECT SERPL-MCNC: 0.1 MG/DL
BILIRUB INDIRECT SERPL-MCNC: 0.2 MG/DL
BILIRUB SERPL-MCNC: 0.2 MG/DL
BUN SERPL-MCNC: 42 MG/DL
CALCIUM SERPL-MCNC: 8.9 MG/DL
CHLORIDE SERPL-SCNC: 106 MMOL/L
CHOLEST SERPL-MCNC: 143 MG/DL
CK SERPL-CCNC: 90 U/L
CO2 SERPL-SCNC: 19 MMOL/L
CREAT SERPL-MCNC: 1.39 MG/DL
EOSINOPHIL # BLD AUTO: 0.27 K/UL
EOSINOPHIL NFR BLD AUTO: 2.1 %
ESTIMATED AVERAGE GLUCOSE: 186 MG/DL
GLUCOSE SERPL-MCNC: 156 MG/DL
HBA1C MFR BLD HPLC: 8.1 %
HCT VFR BLD CALC: 39.5 %
HDLC SERPL-MCNC: 38 MG/DL
HGB BLD-MCNC: 11.7 G/DL
IMM GRANULOCYTES NFR BLD AUTO: 0.5 %
LDLC SERPL CALC-MCNC: 74 MG/DL
LYMPHOCYTES # BLD AUTO: 3.29 K/UL
LYMPHOCYTES NFR BLD AUTO: 25 %
MAN DIFF?: NORMAL
MCHC RBC-ENTMCNC: 27.7 PG
MCHC RBC-ENTMCNC: 29.6 GM/DL
MCV RBC AUTO: 93.6 FL
MONOCYTES # BLD AUTO: 0.68 K/UL
MONOCYTES NFR BLD AUTO: 5.2 %
NEUTROPHILS # BLD AUTO: 8.8 K/UL
NEUTROPHILS NFR BLD AUTO: 66.7 %
NONHDLC SERPL-MCNC: 105 MG/DL
NT-PROBNP SERPL-MCNC: 959 PG/ML
PLATELET # BLD AUTO: 202 K/UL
POTASSIUM SERPL-SCNC: 4.6 MMOL/L
PROT SERPL-MCNC: 6.6 G/DL
RBC # BLD: 4.22 M/UL
RBC # FLD: 14.4 %
SODIUM SERPL-SCNC: 141 MMOL/L
TRIGL SERPL-MCNC: 153 MG/DL
TSH SERPL-ACNC: 0.1 UIU/ML
WBC # FLD AUTO: 13.17 K/UL

## 2021-11-11 ENCOUNTER — NON-APPOINTMENT (OUTPATIENT)
Age: 86
End: 2021-11-11

## 2021-11-12 ENCOUNTER — EMERGENCY (EMERGENCY)
Facility: HOSPITAL | Age: 86
LOS: 1 days | Discharge: HOME | End: 2021-11-12
Attending: EMERGENCY MEDICINE | Admitting: EMERGENCY MEDICINE
Payer: MEDICARE

## 2021-11-12 ENCOUNTER — NON-APPOINTMENT (OUTPATIENT)
Age: 86
End: 2021-11-12

## 2021-11-12 VITALS
OXYGEN SATURATION: 96 % | SYSTOLIC BLOOD PRESSURE: 168 MMHG | DIASTOLIC BLOOD PRESSURE: 76 MMHG | RESPIRATION RATE: 17 BRPM | HEART RATE: 61 BPM

## 2021-11-12 VITALS
TEMPERATURE: 98 F | RESPIRATION RATE: 16 BRPM | OXYGEN SATURATION: 97 % | DIASTOLIC BLOOD PRESSURE: 74 MMHG | HEIGHT: 64 IN | HEART RATE: 70 BPM | SYSTOLIC BLOOD PRESSURE: 164 MMHG

## 2021-11-12 DIAGNOSIS — Z90.49 ACQUIRED ABSENCE OF OTHER SPECIFIED PARTS OF DIGESTIVE TRACT: Chronic | ICD-10-CM

## 2021-11-12 DIAGNOSIS — R93.1 ABNORMAL FINDINGS ON DIAGNOSTIC IMAGING OF HEART AND CORONARY CIRCULATION: Chronic | ICD-10-CM

## 2021-11-12 PROCEDURE — 73502 X-RAY EXAM HIP UNI 2-3 VIEWS: CPT | Mod: 26,RT

## 2021-11-12 PROCEDURE — 70450 CT HEAD/BRAIN W/O DYE: CPT | Mod: 26,MA

## 2021-11-12 PROCEDURE — 71045 X-RAY EXAM CHEST 1 VIEW: CPT | Mod: 26

## 2021-11-12 PROCEDURE — 71045 X-RAY EXAM CHEST 1 VIEW: CPT

## 2021-11-12 PROCEDURE — 99285 EMERGENCY DEPT VISIT HI MDM: CPT

## 2021-11-12 PROCEDURE — 73552 X-RAY EXAM OF FEMUR 2/>: CPT

## 2021-11-12 PROCEDURE — 72125 CT NECK SPINE W/O DYE: CPT | Mod: 26,MA

## 2021-11-12 PROCEDURE — 70450 CT HEAD/BRAIN W/O DYE: CPT | Mod: MA

## 2021-11-12 PROCEDURE — 73700 CT LOWER EXTREMITY W/O DYE: CPT | Mod: MA

## 2021-11-12 PROCEDURE — 72100 X-RAY EXAM L-S SPINE 2/3 VWS: CPT

## 2021-11-12 PROCEDURE — 76376 3D RENDER W/INTRP POSTPROCES: CPT

## 2021-11-12 PROCEDURE — 82962 GLUCOSE BLOOD TEST: CPT

## 2021-11-12 PROCEDURE — 76376 3D RENDER W/INTRP POSTPROCES: CPT | Mod: 26

## 2021-11-12 PROCEDURE — 72100 X-RAY EXAM L-S SPINE 2/3 VWS: CPT | Mod: 26

## 2021-11-12 PROCEDURE — 73552 X-RAY EXAM OF FEMUR 2/>: CPT | Mod: 26,RT

## 2021-11-12 PROCEDURE — 72125 CT NECK SPINE W/O DYE: CPT | Mod: MA

## 2021-11-12 PROCEDURE — 99284 EMERGENCY DEPT VISIT MOD MDM: CPT | Mod: 25

## 2021-11-12 PROCEDURE — 73502 X-RAY EXAM HIP UNI 2-3 VIEWS: CPT

## 2021-11-12 PROCEDURE — 73700 CT LOWER EXTREMITY W/O DYE: CPT | Mod: 26,RT,MA

## 2021-11-12 RX ORDER — ACETAMINOPHEN 500 MG
650 TABLET ORAL ONCE
Refills: 0 | Status: COMPLETED | OUTPATIENT
Start: 2021-11-12 | End: 2021-11-12

## 2021-11-12 RX ADMIN — Medication 650 MILLIGRAM(S): at 18:33

## 2021-11-12 NOTE — ED ADULT NURSE NOTE - NSIMPLEMENTINTERV_GEN_ALL_ED
Implemented All Fall Risk Interventions:  [place] to call system. Call bell, personal items and telephone within reach. Instruct patient to call for assistance. Room bathroom lighting operational. Non-slip footwear when patient is off stretcher. Physically safe environment: no spills, clutter or unnecessary equipment. Stretcher in lowest position, wheels locked, appropriate side rails in place. Provide visual cue, wrist band, yellow gown, etc. Monitor gait and stability. Monitor for mental status changes and reorient to person, place, and time. Review medications for side effects contributing to fall risk. Reinforce activity limits and safety measures with patient and family.

## 2021-11-12 NOTE — ED PROVIDER NOTE - NSFOLLOWUPINSTRUCTIONS_ED_ALL_ED_FT
Follow up with your PMD in 1-2 days for re-evaluation, ongoing care and treatment. Rest, weight bearing as tolerated with walker, tylenol as directed for pain as needed. If having worsening of symptoms or other related symptoms, RETURN TO THE ER IMMEDIATELY.

## 2021-11-12 NOTE — ED PROVIDER NOTE - OBJECTIVE STATEMENT
88 y/o F with hx of DM, COPD, anxiety, Hypothyroidism, CHF, depression, CAD, HTN, HLD BIBA from [facility] for evaluation of R hip pain s/p fall today. Pt states that she usually uses a walker but was walking today without a walker and tripped and fell injuring her R hip. States that she could not get up after the fall due to pain. Denies head trauma, LOC, CP, SOB, neck/back pain, other injuries/symptoms. Pt is on plavix and aspirin.   pcp; Dr. Yadav

## 2021-11-12 NOTE — ED PROVIDER NOTE - PROGRESS NOTE DETAILS
Pt ambulating in ED with walker without any distress. states that hip pain has resolved. Will d/c back to facility and f/u pcp.

## 2021-11-12 NOTE — ED ADULT NURSE NOTE - OBJECTIVE STATEMENT
Presents to ER S/P fall. Pt states she fell out of her chair, now has pain down the entire right leg.

## 2021-11-12 NOTE — ED PROVIDER NOTE - CLINICAL SUMMARY MEDICAL DECISION MAKING FREE TEXT BOX
86 y/o F with hx of DM, COPD, anxiety, Hypothyroidism, CHF, depression [facility] [place] R hip pain s/p fall today. Pt states that she usually uses a walker but was walking today without a walker and tripped and fell injuring her R hip. States that she could not get up after the fall due to pain. Denies head trauma, LOC, CP, SOB, neck/back pain, other injuries/symptoms. Pt is on plavix and aspirin. PE: as above A/P: will get xrays, pain meds, ct scans, reassess

## 2021-11-12 NOTE — ED PROVIDER NOTE - PATIENT PORTAL LINK FT
You can access the FollowMyHealth Patient Portal offered by [facility] by registering at the following website: [email or web address]. By joining [facility]’s FollowMyHealth portal, you will also be able to view your health information using other applications (apps) compatible with our system.

## 2021-11-12 NOTE — ED PROVIDER NOTE - ATTENDING CONTRIBUTION TO CARE
88 y/o F with c/o right hip pain s/p unwitnessed fall    well appearing, no distress  MM  heart/lungs/abd wnl  right leg shortened TTP hip pulses intact    labs. xray hip, pre op clearance

## 2021-11-19 ENCOUNTER — APPOINTMENT (OUTPATIENT)
Dept: CARDIOLOGY | Facility: CLINIC | Age: 86
End: 2021-11-19
Payer: MEDICARE

## 2021-11-19 PROCEDURE — 93264 REM MNTR WRLS P-ART PRS SNR: CPT

## 2022-01-03 NOTE — CONSULT NOTE ADULT - CONSULT REASON
Chief Complaint   Patient presents with   • Constipation   • Abdominal Pain   • Nausea     Subjective     HPI  Carly Gotti is a 83 y.o. female who presents today for follow up.  Her biggest complaint today is excess flatulence  Her bowels are regular on MiraLAX daily  She has occasional nausea when she is constipated  No vomiting  No weight loss  No abdominal pain  Minimal dysphagia with a history of Schatzki's ring    Objective   Vitals:    01/03/22 1527   Temp: 96.5 °F (35.8 °C)       Physical Exam  Vitals reviewed.   Constitutional:       Appearance: She is well-developed.   HENT:      Head: Normocephalic and atraumatic.   Abdominal:      General: Bowel sounds are normal. There is no distension.      Palpations: Abdomen is soft. There is no mass.      Tenderness: There is no abdominal tenderness.      Hernia: No hernia is present.   Skin:     General: Skin is warm and dry.   Neurological:      Mental Status: She is alert and oriented to person, place, and time.   Psychiatric:         Behavior: Behavior normal.         Thought Content: Thought content normal.         Judgment: Judgment normal.       The following data was reviewed by: Blayne Cai MD on 01/03/2022:  Common labs    Common Labsle 10/13/21 10/13/21    0005 0005   Glucose  84   BUN  40 (A)   Creatinine  1.78 (A)   eGFR Non African Am  27 (A)   Sodium  137   Potassium  3.7   Chloride  94 (A)   Calcium  10.3   Albumin  5.00   Total Bilirubin  0.4   Alkaline Phosphatase  54   AST (SGOT)  37 (A)   ALT (SGPT)  25   WBC 7.95    Hemoglobin 14.5    Hematocrit 42.4    Platelets 224    (A) Abnormal value       Comments are available for some flowsheets but are not being displayed.           CMP    CMP 10/13/21   Glucose 84   BUN 40 (A)   Creatinine 1.78 (A)   eGFR Non African Am 27 (A)   Sodium 137   Potassium 3.7   Chloride 94 (A)   Calcium 10.3   Albumin 5.00   Total Bilirubin 0.4   Alkaline Phosphatase 54   AST (SGOT) 37 (A)   ALT (SGPT) 25   (A)  Abnormal value       Comments are available for some flowsheets but are not being displayed.           CBC    CBC 10/13/21   WBC 7.95   RBC 4.53   Hemoglobin 14.5   Hematocrit 42.4   MCV 93.6   MCH 32.0   MCHC 34.2   RDW 12.1 (A)   Platelets 224   (A) Abnormal value            CBC w/diff    CBC w/Diff 10/13/21   WBC 7.95   RBC 4.53   Hemoglobin 14.5   Hematocrit 42.4   MCV 93.6   MCH 32.0   MCHC 34.2   RDW 12.1 (A)   Platelets 224   Neutrophil Rel % 76.6 (A)   Immature Granulocyte Rel % 0.3   Lymphocyte Rel % 12.6 (A)   Monocyte Rel % 9.4   Eosinophil Rel % 1.0   Basophil Rel % 0.1   (A) Abnormal value                    Data reviewed: GI studies He had an EGD with dilation of a Schatzki's ring 2 years ago     Assessment/Plan   Assessment:     Dysphagia and Schatzki's ring  Nausea  Constipation  Gas and bloating      Plan:   We reviewed foods that can cause gas and she will investigate these gas producing foods on the Internet and avoid them  Probiotic, align 1 tablet p.o. every morning for bloating and gas  No indication for colonoscopy at this time  Her dysphagia is very minimal and intermittent so no EGD at this time  Continue famotidine 40 mg p.o. every morning  Continue MiraLAX  Office visit 6 months to discuss whether EGD will be needed for her history of Schatzki's ring    I spent 35 minutes caring for Carly on this date of service. This time includes time spent by me in the following activities:preparing for the visit, reviewing tests, obtaining and/or reviewing a separately obtained history, performing a medically appropriate examination and/or evaluation , counseling and educating the patient/family/caregiver, ordering medications, tests, or procedures, referring and communicating with other health care professionals , documenting information in the medical record, independently interpreting results and communicating that information with the patient/family/caregiver and care coordination    Blayne WATTERS  MD Trell  [facility]  [facility] [place] [address]  Office: [phone]   uncontrolled dm

## 2022-01-11 ENCOUNTER — NON-APPOINTMENT (OUTPATIENT)
Age: 87
End: 2022-01-11

## 2022-01-21 ENCOUNTER — APPOINTMENT (OUTPATIENT)
Dept: CARDIOLOGY | Facility: CLINIC | Age: 87
End: 2022-01-21
Payer: MEDICARE

## 2022-01-21 PROCEDURE — 93264 REM MNTR WRLS P-ART PRS SNR: CPT

## 2022-03-07 NOTE — ED PROVIDER NOTE - DISCUSSED CLINICAL AND RADIOLOGICAL FINDINGS WITH, MDM
I called the pt and scheduled him for cysto. I also added him to waitlist. I discussed procedure and instructions. Pt verbalized understanding and all questions were answered. patient/family

## 2022-03-09 ENCOUNTER — INPATIENT (INPATIENT)
Facility: HOSPITAL | Age: 87
LOS: 5 days | Discharge: NURSING, REHAB OR LONG-TERM CARE | DRG: 191 | End: 2022-03-15
Attending: INTERNAL MEDICINE | Admitting: INTERNAL MEDICINE
Payer: MEDICARE

## 2022-03-09 VITALS — RESPIRATION RATE: 18 BRPM | HEIGHT: 64 IN | WEIGHT: 199.96 LBS | OXYGEN SATURATION: 98 % | HEART RATE: 90 BPM

## 2022-03-09 DIAGNOSIS — J44.1 CHRONIC OBSTRUCTIVE PULMONARY DISEASE WITH (ACUTE) EXACERBATION: ICD-10-CM

## 2022-03-09 DIAGNOSIS — I10 ESSENTIAL (PRIMARY) HYPERTENSION: ICD-10-CM

## 2022-03-09 DIAGNOSIS — Z90.49 ACQUIRED ABSENCE OF OTHER SPECIFIED PARTS OF DIGESTIVE TRACT: Chronic | ICD-10-CM

## 2022-03-09 DIAGNOSIS — K21.9 GASTRO-ESOPHAGEAL REFLUX DISEASE WITHOUT ESOPHAGITIS: ICD-10-CM

## 2022-03-09 DIAGNOSIS — E03.9 HYPOTHYROIDISM, UNSPECIFIED: ICD-10-CM

## 2022-03-09 DIAGNOSIS — I50.32 CHRONIC DIASTOLIC (CONGESTIVE) HEART FAILURE: ICD-10-CM

## 2022-03-09 DIAGNOSIS — I25.10 ATHEROSCLEROTIC HEART DISEASE OF NATIVE CORONARY ARTERY WITHOUT ANGINA PECTORIS: ICD-10-CM

## 2022-03-09 DIAGNOSIS — J45.909 UNSPECIFIED ASTHMA, UNCOMPLICATED: ICD-10-CM

## 2022-03-09 DIAGNOSIS — E11.9 TYPE 2 DIABETES MELLITUS WITHOUT COMPLICATIONS: ICD-10-CM

## 2022-03-09 DIAGNOSIS — J18.9 PNEUMONIA, UNSPECIFIED ORGANISM: ICD-10-CM

## 2022-03-09 DIAGNOSIS — Z29.9 ENCOUNTER FOR PROPHYLACTIC MEASURES, UNSPECIFIED: ICD-10-CM

## 2022-03-09 DIAGNOSIS — R93.1 ABNORMAL FINDINGS ON DIAGNOSTIC IMAGING OF HEART AND CORONARY CIRCULATION: Chronic | ICD-10-CM

## 2022-03-09 LAB
ALBUMIN SERPL ELPH-MCNC: 3.3 G/DL — SIGNIFICANT CHANGE UP (ref 3.3–5)
ALP SERPL-CCNC: 82 U/L — SIGNIFICANT CHANGE UP (ref 40–120)
ALT FLD-CCNC: 23 U/L — SIGNIFICANT CHANGE UP (ref 12–78)
ANION GAP SERPL CALC-SCNC: 6 MMOL/L — SIGNIFICANT CHANGE UP (ref 5–17)
APTT BLD: 28.9 SEC — SIGNIFICANT CHANGE UP (ref 27.5–35.5)
AST SERPL-CCNC: 13 U/L — LOW (ref 15–37)
BASOPHILS # BLD AUTO: 0.08 K/UL — SIGNIFICANT CHANGE UP (ref 0–0.2)
BASOPHILS NFR BLD AUTO: 0.6 % — SIGNIFICANT CHANGE UP (ref 0–2)
BILIRUB SERPL-MCNC: 0.3 MG/DL — SIGNIFICANT CHANGE UP (ref 0.2–1.2)
BUN SERPL-MCNC: 61 MG/DL — HIGH (ref 7–23)
CALCIUM SERPL-MCNC: 8.5 MG/DL — SIGNIFICANT CHANGE UP (ref 8.5–10.1)
CHLORIDE SERPL-SCNC: 111 MMOL/L — HIGH (ref 96–108)
CK MB BLD-MCNC: 3.5 % — SIGNIFICANT CHANGE UP (ref 0–3.5)
CK MB CFR SERPL CALC: 2.9 NG/ML — SIGNIFICANT CHANGE UP (ref 0–3.6)
CK SERPL-CCNC: 84 U/L — SIGNIFICANT CHANGE UP (ref 26–192)
CO2 SERPL-SCNC: 23 MMOL/L — SIGNIFICANT CHANGE UP (ref 22–31)
CREAT SERPL-MCNC: 1.7 MG/DL — HIGH (ref 0.5–1.3)
EGFR: 29 ML/MIN/1.73M2 — LOW
EOSINOPHIL # BLD AUTO: 0.22 K/UL — SIGNIFICANT CHANGE UP (ref 0–0.5)
EOSINOPHIL NFR BLD AUTO: 1.6 % — SIGNIFICANT CHANGE UP (ref 0–6)
GLUCOSE SERPL-MCNC: 201 MG/DL — HIGH (ref 70–99)
HCT VFR BLD CALC: 39.8 % — SIGNIFICANT CHANGE UP (ref 34.5–45)
HGB BLD-MCNC: 12.5 G/DL — SIGNIFICANT CHANGE UP (ref 11.5–15.5)
IMM GRANULOCYTES NFR BLD AUTO: 0.5 % — SIGNIFICANT CHANGE UP (ref 0–1.5)
INR BLD: 1.01 RATIO — SIGNIFICANT CHANGE UP (ref 0.88–1.16)
LACTATE SERPL-SCNC: 1.4 MMOL/L — SIGNIFICANT CHANGE UP (ref 0.7–2)
LYMPHOCYTES # BLD AUTO: 22.9 % — SIGNIFICANT CHANGE UP (ref 13–44)
LYMPHOCYTES # BLD AUTO: 3.06 K/UL — SIGNIFICANT CHANGE UP (ref 1–3.3)
MCHC RBC-ENTMCNC: 28.2 PG — SIGNIFICANT CHANGE UP (ref 27–34)
MCHC RBC-ENTMCNC: 31.4 GM/DL — LOW (ref 32–36)
MCV RBC AUTO: 89.6 FL — SIGNIFICANT CHANGE UP (ref 80–100)
MONOCYTES # BLD AUTO: 0.91 K/UL — HIGH (ref 0–0.9)
MONOCYTES NFR BLD AUTO: 6.8 % — SIGNIFICANT CHANGE UP (ref 2–14)
NEUTROPHILS # BLD AUTO: 9.02 K/UL — HIGH (ref 1.8–7.4)
NEUTROPHILS NFR BLD AUTO: 67.6 % — SIGNIFICANT CHANGE UP (ref 43–77)
NRBC # BLD: 0 /100 WBCS — SIGNIFICANT CHANGE UP (ref 0–0)
NT-PROBNP SERPL-SCNC: 886 PG/ML — HIGH (ref 0–450)
PLATELET # BLD AUTO: 190 K/UL — SIGNIFICANT CHANGE UP (ref 150–400)
POTASSIUM SERPL-MCNC: 4.6 MMOL/L — SIGNIFICANT CHANGE UP (ref 3.5–5.3)
POTASSIUM SERPL-SCNC: 4.6 MMOL/L — SIGNIFICANT CHANGE UP (ref 3.5–5.3)
PROT SERPL-MCNC: 7.4 G/DL — SIGNIFICANT CHANGE UP (ref 6–8.3)
PROTHROM AB SERPL-ACNC: 11.8 SEC — SIGNIFICANT CHANGE UP (ref 10.5–13.4)
RAPID RVP RESULT: SIGNIFICANT CHANGE UP
RBC # BLD: 4.44 M/UL — SIGNIFICANT CHANGE UP (ref 3.8–5.2)
RBC # FLD: 14.3 % — SIGNIFICANT CHANGE UP (ref 10.3–14.5)
SARS-COV-2 RNA SPEC QL NAA+PROBE: SIGNIFICANT CHANGE UP
SARS-COV-2 RNA SPEC QL NAA+PROBE: SIGNIFICANT CHANGE UP
SODIUM SERPL-SCNC: 140 MMOL/L — SIGNIFICANT CHANGE UP (ref 135–145)
TROPONIN I, HIGH SENSITIVITY RESULT: 34.8 NG/L — SIGNIFICANT CHANGE UP
WBC # BLD: 13.36 K/UL — HIGH (ref 3.8–10.5)
WBC # FLD AUTO: 13.36 K/UL — HIGH (ref 3.8–10.5)

## 2022-03-09 PROCEDURE — 71250 CT THORAX DX C-: CPT | Mod: 26

## 2022-03-09 PROCEDURE — 71045 X-RAY EXAM CHEST 1 VIEW: CPT | Mod: 26

## 2022-03-09 PROCEDURE — 99285 EMERGENCY DEPT VISIT HI MDM: CPT

## 2022-03-09 PROCEDURE — 73610 X-RAY EXAM OF ANKLE: CPT | Mod: 26,LT

## 2022-03-09 PROCEDURE — 93010 ELECTROCARDIOGRAM REPORT: CPT

## 2022-03-09 PROCEDURE — 99053 MED SERV 10PM-8AM 24 HR FAC: CPT

## 2022-03-09 PROCEDURE — 93970 EXTREMITY STUDY: CPT | Mod: 26

## 2022-03-09 RX ORDER — CHOLECALCIFEROL (VITAMIN D3) 125 MCG
2000 CAPSULE ORAL DAILY
Refills: 0 | Status: DISCONTINUED | OUTPATIENT
Start: 2022-03-09 | End: 2022-03-15

## 2022-03-09 RX ORDER — VANCOMYCIN HCL 1 G
1000 VIAL (EA) INTRAVENOUS ONCE
Refills: 0 | Status: COMPLETED | OUTPATIENT
Start: 2022-03-09 | End: 2022-03-09

## 2022-03-09 RX ORDER — ACETAMINOPHEN 500 MG
650 TABLET ORAL EVERY 6 HOURS
Refills: 0 | Status: DISCONTINUED | OUTPATIENT
Start: 2022-03-09 | End: 2022-03-15

## 2022-03-09 RX ORDER — LEVOTHYROXINE SODIUM 125 MCG
125 TABLET ORAL
Refills: 0 | Status: DISCONTINUED | OUTPATIENT
Start: 2022-03-10 | End: 2022-03-15

## 2022-03-09 RX ORDER — DEXTROSE 50 % IN WATER 50 %
15 SYRINGE (ML) INTRAVENOUS ONCE
Refills: 0 | Status: DISCONTINUED | OUTPATIENT
Start: 2022-03-09 | End: 2022-03-15

## 2022-03-09 RX ORDER — SODIUM CHLORIDE 9 MG/ML
1000 INJECTION, SOLUTION INTRAVENOUS
Refills: 0 | Status: DISCONTINUED | OUTPATIENT
Start: 2022-03-09 | End: 2022-03-15

## 2022-03-09 RX ORDER — LEVOTHYROXINE SODIUM 125 MCG
1 TABLET ORAL
Qty: 0 | Refills: 0 | DISCHARGE

## 2022-03-09 RX ORDER — INSULIN LISPRO 100/ML
VIAL (ML) SUBCUTANEOUS
Refills: 0 | Status: DISCONTINUED | OUTPATIENT
Start: 2022-03-09 | End: 2022-03-11

## 2022-03-09 RX ORDER — OXYCODONE HYDROCHLORIDE 5 MG/1
1 TABLET ORAL
Qty: 0 | Refills: 0 | DISCHARGE

## 2022-03-09 RX ORDER — DEXTROSE 50 % IN WATER 50 %
25 SYRINGE (ML) INTRAVENOUS ONCE
Refills: 0 | Status: DISCONTINUED | OUTPATIENT
Start: 2022-03-09 | End: 2022-03-15

## 2022-03-09 RX ORDER — PIPERACILLIN AND TAZOBACTAM 4; .5 G/20ML; G/20ML
3.38 INJECTION, POWDER, LYOPHILIZED, FOR SOLUTION INTRAVENOUS ONCE
Refills: 0 | Status: COMPLETED | OUTPATIENT
Start: 2022-03-09 | End: 2022-03-09

## 2022-03-09 RX ORDER — ALBUTEROL 90 UG/1
3 AEROSOL, METERED ORAL
Qty: 0 | Refills: 0 | DISCHARGE

## 2022-03-09 RX ORDER — INSULIN LISPRO 100/ML
VIAL (ML) SUBCUTANEOUS AT BEDTIME
Refills: 0 | Status: DISCONTINUED | OUTPATIENT
Start: 2022-03-09 | End: 2022-03-11

## 2022-03-09 RX ORDER — TRAMADOL HYDROCHLORIDE 50 MG/1
50 TABLET ORAL THREE TIMES A DAY
Refills: 0 | Status: DISCONTINUED | OUTPATIENT
Start: 2022-03-09 | End: 2022-03-15

## 2022-03-09 RX ORDER — INSULIN GLARGINE 100 [IU]/ML
26 INJECTION, SOLUTION SUBCUTANEOUS
Qty: 0 | Refills: 0 | DISCHARGE

## 2022-03-09 RX ORDER — FUROSEMIDE 40 MG
40 TABLET ORAL ONCE
Refills: 0 | Status: COMPLETED | OUTPATIENT
Start: 2022-03-09 | End: 2022-03-09

## 2022-03-09 RX ORDER — ALBUTEROL 90 UG/1
1 AEROSOL, METERED ORAL
Qty: 0 | Refills: 0 | DISCHARGE

## 2022-03-09 RX ORDER — CLOPIDOGREL BISULFATE 75 MG/1
75 TABLET, FILM COATED ORAL DAILY
Refills: 0 | Status: DISCONTINUED | OUTPATIENT
Start: 2022-03-09 | End: 2022-03-15

## 2022-03-09 RX ORDER — LACTOBACILLUS ACIDOPHILUS 100MM CELL
1 CAPSULE ORAL
Refills: 0 | Status: DISCONTINUED | OUTPATIENT
Start: 2022-03-09 | End: 2022-03-15

## 2022-03-09 RX ORDER — LANOLIN ALCOHOL/MO/W.PET/CERES
3 CREAM (GRAM) TOPICAL AT BEDTIME
Refills: 0 | Status: DISCONTINUED | OUTPATIENT
Start: 2022-03-09 | End: 2022-03-15

## 2022-03-09 RX ORDER — ALBUTEROL 90 UG/1
2 AEROSOL, METERED ORAL EVERY 6 HOURS
Refills: 0 | Status: DISCONTINUED | OUTPATIENT
Start: 2022-03-09 | End: 2022-03-15

## 2022-03-09 RX ORDER — ESCITALOPRAM OXALATE 10 MG/1
10 TABLET, FILM COATED ORAL DAILY
Refills: 0 | Status: DISCONTINUED | OUTPATIENT
Start: 2022-03-09 | End: 2022-03-15

## 2022-03-09 RX ORDER — METOPROLOL TARTRATE 50 MG
50 TABLET ORAL DAILY
Refills: 0 | Status: DISCONTINUED | OUTPATIENT
Start: 2022-03-09 | End: 2022-03-15

## 2022-03-09 RX ORDER — ATORVASTATIN CALCIUM 80 MG/1
80 TABLET, FILM COATED ORAL AT BEDTIME
Refills: 0 | Status: DISCONTINUED | OUTPATIENT
Start: 2022-03-09 | End: 2022-03-15

## 2022-03-09 RX ORDER — GABAPENTIN 400 MG/1
100 CAPSULE ORAL THREE TIMES A DAY
Refills: 0 | Status: DISCONTINUED | OUTPATIENT
Start: 2022-03-09 | End: 2022-03-15

## 2022-03-09 RX ORDER — GLUCAGON INJECTION, SOLUTION 0.5 MG/.1ML
1 INJECTION, SOLUTION SUBCUTANEOUS ONCE
Refills: 0 | Status: DISCONTINUED | OUTPATIENT
Start: 2022-03-09 | End: 2022-03-15

## 2022-03-09 RX ORDER — ASPIRIN/CALCIUM CARB/MAGNESIUM 324 MG
81 TABLET ORAL DAILY
Refills: 0 | Status: DISCONTINUED | OUTPATIENT
Start: 2022-03-09 | End: 2022-03-15

## 2022-03-09 RX ORDER — DEXTROSE 50 % IN WATER 50 %
12.5 SYRINGE (ML) INTRAVENOUS ONCE
Refills: 0 | Status: DISCONTINUED | OUTPATIENT
Start: 2022-03-09 | End: 2022-03-15

## 2022-03-09 RX ORDER — ALPRAZOLAM 0.25 MG
0.5 TABLET ORAL AT BEDTIME
Refills: 0 | Status: DISCONTINUED | OUTPATIENT
Start: 2022-03-09 | End: 2022-03-15

## 2022-03-09 RX ORDER — LIDOCAINE 4 G/100G
1 CREAM TOPICAL
Qty: 0 | Refills: 0 | DISCHARGE

## 2022-03-09 RX ORDER — LEVOTHYROXINE SODIUM 125 MCG
125 TABLET ORAL DAILY
Refills: 0 | Status: DISCONTINUED | OUTPATIENT
Start: 2022-03-09 | End: 2022-03-09

## 2022-03-09 RX ORDER — HEPARIN SODIUM 5000 [USP'U]/ML
5000 INJECTION INTRAVENOUS; SUBCUTANEOUS EVERY 12 HOURS
Refills: 0 | Status: DISCONTINUED | OUTPATIENT
Start: 2022-03-09 | End: 2022-03-15

## 2022-03-09 RX ORDER — ISOSORBIDE MONONITRATE 60 MG/1
30 TABLET, EXTENDED RELEASE ORAL DAILY
Refills: 0 | Status: DISCONTINUED | OUTPATIENT
Start: 2022-03-09 | End: 2022-03-15

## 2022-03-09 RX ORDER — ONDANSETRON 8 MG/1
4 TABLET, FILM COATED ORAL EVERY 8 HOURS
Refills: 0 | Status: DISCONTINUED | OUTPATIENT
Start: 2022-03-09 | End: 2022-03-15

## 2022-03-09 RX ORDER — BUDESONIDE AND FORMOTEROL FUMARATE DIHYDRATE 160; 4.5 UG/1; UG/1
2 AEROSOL RESPIRATORY (INHALATION)
Refills: 0 | Status: DISCONTINUED | OUTPATIENT
Start: 2022-03-09 | End: 2022-03-15

## 2022-03-09 RX ADMIN — ALBUTEROL 2 PUFF(S): 90 AEROSOL, METERED ORAL at 14:21

## 2022-03-09 RX ADMIN — ESCITALOPRAM OXALATE 10 MILLIGRAM(S): 10 TABLET, FILM COATED ORAL at 12:00

## 2022-03-09 RX ADMIN — Medication 1 DROP(S): at 17:24

## 2022-03-09 RX ADMIN — BUDESONIDE AND FORMOTEROL FUMARATE DIHYDRATE 2 PUFF(S): 160; 4.5 AEROSOL RESPIRATORY (INHALATION) at 18:32

## 2022-03-09 RX ADMIN — Medication 81 MILLIGRAM(S): at 12:00

## 2022-03-09 RX ADMIN — ISOSORBIDE MONONITRATE 30 MILLIGRAM(S): 60 TABLET, EXTENDED RELEASE ORAL at 12:01

## 2022-03-09 RX ADMIN — Medication 1 TABLET(S): at 17:25

## 2022-03-09 RX ADMIN — PIPERACILLIN AND TAZOBACTAM 200 GRAM(S): 4; .5 INJECTION, POWDER, LYOPHILIZED, FOR SOLUTION INTRAVENOUS at 06:48

## 2022-03-09 RX ADMIN — Medication 250 MILLIGRAM(S): at 08:17

## 2022-03-09 RX ADMIN — Medication 0.5 MILLIGRAM(S): at 21:22

## 2022-03-09 RX ADMIN — Medication 40 MILLIGRAM(S): at 06:48

## 2022-03-09 RX ADMIN — HEPARIN SODIUM 5000 UNIT(S): 5000 INJECTION INTRAVENOUS; SUBCUTANEOUS at 17:24

## 2022-03-09 RX ADMIN — Medication 650 MILLIGRAM(S): at 13:46

## 2022-03-09 RX ADMIN — Medication 2000 UNIT(S): at 12:01

## 2022-03-09 RX ADMIN — GABAPENTIN 100 MILLIGRAM(S): 400 CAPSULE ORAL at 14:21

## 2022-03-09 RX ADMIN — CLOPIDOGREL BISULFATE 75 MILLIGRAM(S): 75 TABLET, FILM COATED ORAL at 12:01

## 2022-03-09 RX ADMIN — Medication 650 MILLIGRAM(S): at 12:46

## 2022-03-09 RX ADMIN — ALBUTEROL 2 PUFF(S): 90 AEROSOL, METERED ORAL at 18:33

## 2022-03-09 RX ADMIN — Medication 3: at 12:01

## 2022-03-09 RX ADMIN — GABAPENTIN 100 MILLIGRAM(S): 400 CAPSULE ORAL at 21:22

## 2022-03-09 RX ADMIN — Medication 2: at 17:00

## 2022-03-09 RX ADMIN — ATORVASTATIN CALCIUM 80 MILLIGRAM(S): 80 TABLET, FILM COATED ORAL at 21:22

## 2022-03-09 NOTE — ED ADULT TRIAGE NOTE - CHIEF COMPLAINT QUOTE
Patient brought in by ambulance from [facility] as reported having difficulty breathing was given neb treatment x 2 received with non-rebreather mask.

## 2022-03-09 NOTE — PHARMACOTHERAPY INTERVENTION NOTE - COMMENTS
Medication reconciliation was completed by pharmacy representative. The following discrepancies were discussed with Dr. Reid.     Facility Med				Current Order/Not Ordered  Fluticasone-Salmeterol 250/50mcg 1P BID 		Not ordered   Lantus 18units BID 					Not ordered   Furosemide 40mg 1T QD				Not ordered   Levothyroxine 125mcg 1T M-F			Levothyroxine 125mcg 1T QD   Potassium Cl 10meq 1T BID			 	Not ordered     MD aware and would like to adjust synthroid to match facility dose and enter order for inhaler [therapeutic interchange with symbicort]. MD does not wish to restart other medications at this time.

## 2022-03-09 NOTE — PATIENT PROFILE ADULT - VISION (WITH CORRECTIVE LENSES IF THE PATIENT USUALLY WEARS THEM):
Pt wears eye glasess but she does not have them with her at this time/Partially impaired: cannot see medication labels or newsprint, but can see obstacles in path, and the surrounding layout; can count fingers at arm's length

## 2022-03-09 NOTE — ED ADULT NURSE NOTE - OBJECTIVE STATEMENT
Pt BIBA. As per EMS pt woke up with wheezing, arrived with non rebreather in ED, breathing treatment x 2 en route. Pt sat 95-96% on RA, placed on monitor, assessed by Dr Valentin at bedside. Redness noted on mid upper back, b/l lower shin, MASD around perineum. Safety maintained, call bell within reach. Nursing care ongoing.

## 2022-03-09 NOTE — ED ADULT NURSE NOTE - NSIMPLEMENTINTERV_GEN_ALL_ED
Implemented All Fall with Harm Risk Interventions:  [place] to call system. Call bell, personal items and telephone within reach. Instruct patient to call for assistance. Room bathroom lighting operational. Non-slip footwear when patient is off stretcher. Physically safe environment: no spills, clutter or unnecessary equipment. Stretcher in lowest position, wheels locked, appropriate side rails in place. Provide visual cue, wrist band, yellow gown, etc. Monitor gait and stability. Monitor for mental status changes and reorient to person, place, and time. Review medications for side effects contributing to fall risk. Reinforce activity limits and safety measures with patient and family. Provide visual clues: red socks.

## 2022-03-09 NOTE — ED PROVIDER NOTE - INTERPRETATION
Chief Complaint   Patient presents with     Establish Care     first visit, believes to have hypothyroid, saw a doctor in [place]     Physical       Mellissa Clark, EMT at 9:29 AM on 7/12/2021  
normal sinus rhythm

## 2022-03-09 NOTE — ED PROVIDER NOTE - OBJECTIVE STATEMENT
87 year old female with HTN, CAD, HLD, hypothyroid, asthma presents with SOB.  Patient resides at [facility].  EMS reports that patient woke up at 5am and c/o SOB.  Staff at NH noted wheezing and she was given 2 nebs with significant relief.  When EMS arrived, they noted O2 sat 100% on RA with no respiratory distress.  Patient is DNR/DNI.  PMD Dr. Yadav 87 year old female with HTN, CAD, HLD, hypothyroid, DM, COPD, CHF, asthma presents with SOB.  Patient resides at [facility].  EMS reports that patient woke up at 5am and c/o SOB.  Staff at NH noted wheezing and she was given 2 nebs with significant relief.  When EMS arrived, they noted O2 sat 100% on RA with no respiratory distress.  Patient is DNR/DNI.  PMD Dr. Yadav

## 2022-03-09 NOTE — CONSULT NOTE ADULT - SUBJECTIVE AND OBJECTIVE BOX
FLOYD PAEZ    [facility]    Allergies    doxycycline (Unknown)  iodine (Hives)  iodine containing compounds (Unknown)  shellfish (Anaphylaxis)  shellfish (Swelling; Short breath)    Intolerances        PAST MEDICAL & SURGICAL HISTORY:  Uncomplicated asthma, unspecified asthma severity    DM2 (diabetes mellitus, type 2)    Essential hypertension    Hypothyroidism, unspecified type    Pure hypercholesterolemia    Gastroesophageal reflux disease without esophagitis    Diabetes    Asthma    CAD (coronary artery disease)    HTN (hypertension)    HLD (hyperlipidemia)    Hypothyroid    NSTEMI (non-ST elevated myocardial infarction)    History of appendectomy    History of appendectomy    Abnormal findings on cardiac catheterization  Cardiac Cath        FAMILY HISTORY:  Family history of heart disease    Family history of cancer in mother    Family history of MI (myocardial infarction)    Family history of stomach cancer        Home Medications:  albuterol 0.63 mg/3 mL (0.021%) inhalation solution: 3 milliliter(s) inhaled every 6 hours, As Needed (09 Mar 2022 05:55)  albuterol 0.63 mg/3 mL (0.021%) inhalation solution: 3 milliliter(s) inhaled 2 times a day (09 Mar 2022 05:55)  ALPRAZolam 0.5 mg oral tablet: 1 tab(s) orally once a day (at bedtime) (09 Mar 2022 05:55)  aspirin 81 mg oral tablet: 1 tab(s) orally once a day (09 Mar 2022 05:55)  atorvastatin 80 mg oral tablet: 1 tab(s) orally once a day (09 Mar 2022 05:55)  Basaglar KwikPen 100 units/mL subcutaneous solution: 26 unit(s) subcutaneous 2 times a day (09 Mar 2022 05:55)  clopidogrel 75 mg oral tablet: 1 tab(s) orally once a day (09 Mar 2022 05:55)  fluticasone-salmeterol 250 mcg-50 mcg inhalation powder: 1 inhaler(s) inhaled 2 times a day (09 Mar 2022 05:55)  furosemide 40 mg oral tablet: 1 tab(s) orally once a day (09 Mar 2022 05:55)  gabapentin 100 mg oral capsule: 1 cap(s) orally 3 times a day (09 Mar 2022 05:55)  isosorbide mononitrate 30 mg oral tablet, extended release: 1 tab(s) orally once a day (in the morning) (09 Mar 2022 05:55)  levothyroxine 125 mcg (0.125 mg) oral tablet: 1 tab(s) orally once a day (09 Mar 2022 05:55)  lidocaine 5% topical film: Apply topically to affected area once a day (09 Mar 2022 05:55)  Metoprolol Succinate ER 50 mg oral tablet, extended release: 1 tab(s) orally once a day (09 Mar 2022 05:55)  NovoLOG FlexPen 100 units/mL injectable solution: 15 unit(s) subcutaneous (09 Mar 2022 05:55)  oxyCODONE 5 mg oral capsule: 1 cap(s) orally 2 times a day (09 Mar 2022 05:55)  potassium chloride 10 mEq oral capsule, extended release: 1 cap(s) orally once a day (09 Mar 2022 05:55)  ProAir HFA 90 mcg/inh inhalation aerosol: 1 puff(s) inhaled every 4 hours, As Needed (09 Mar 2022 05:55)      MEDICATIONS  (STANDING):  vancomycin  IVPB. 1000 milliGRAM(s) IV Intermittent once    MEDICATIONS  (PRN):              Vital Signs Last 24 Hrs  T(C): 36.5 (09 Mar 2022 05:43), Max: 36.5 (09 Mar 2022 05:43)  T(F): 97.7 (09 Mar 2022 05:43), Max: 97.7 (09 Mar 2022 05:43)  HR: 87 (09 Mar 2022 06:48) (87 - 90)  BP: 121/62 (09 Mar 2022 06:48) (121/62 - 191/82)  BP(mean): --  RR: 19 (09 Mar 2022 06:48) (18 - 19)  SpO2: 95% (09 Mar 2022 06:48) (95% - 98%)              LABS:                        12.5   13.36 )-----------( 190      ( 09 Mar 2022 06:05 )             39.8     03-09    140  |  111<H>  |  61<H>  ----------------------------<  201<H>  4.6   |  23  |  1.70<H>    Ca    8.5      09 Mar 2022 06:05    TPro  7.4  /  Alb  3.3  /  TBili  0.3  /  DBili  x   /  AST  13<L>  /  ALT  23  /  AlkPhos  82  03-09    PT/INR - ( 09 Mar 2022 06:05 )   PT: 11.8 sec;   INR: 1.01 ratio         PTT - ( 09 Mar 2022 06:05 )  PTT:28.9 sec          WBC:  WBC Count: 13.36 K/uL (03-09 @ 06:05)      MICROBIOLOGY:  RECENT CULTURES:        CARDIAC MARKERS ( 09 Mar 2022 06:05 )  x     / x     / 84 U/L / x     / 2.9 ng/mL        PT/INR - ( 09 Mar 2022 06:05 )   PT: 11.8 sec;   INR: 1.01 ratio         PTT - ( 09 Mar 2022 06:05 )  PTT:28.9 sec    Sodium:  Sodium, Serum: 140 mmol/L (03-09 @ 06:05)      1.70 mg/dL 03-09 @ 06:05      Hemoglobin:  Hemoglobin: 12.5 g/dL (03-09 @ 06:05)      Platelets: Platelet Count - Automated: 190 K/uL (03-09 @ 06:05)      LIVER FUNCTIONS - ( 09 Mar 2022 06:05 )  Alb: 3.3 g/dL / Pro: 7.4 g/dL / ALK PHOS: 82 U/L / ALT: 23 U/L / AST: 13 U/L / GGT: x                 RADIOLOGY & ADDITIONAL STUDIES:      MICROBIOLOGY:  RECENT CULTURES:

## 2022-03-09 NOTE — CONSULT NOTE ADULT - SUBJECTIVE AND OBJECTIVE BOX
Date/Time Patient Seen:  		  Referring MD:   Data Reviewed	       Patient is a 87y old  Female who presents with a chief complaint of     Subjective/HPI    in bed  seen and examined  vs noted  labs reviewed  h and p reviewed  er provider note reviewed    · Chief Complaint: The patient is a 87y Female complaining of difficulty breathing.  · HPI Objective Statement: 87 year old female with HTN, CAD, HLD, hypothyroid, DM, COPD, CHF, asthma presents with SOB.  Patient resides at [facility].  EMS reports that patient woke up at 5am and c/o SOB.  Staff at NH noted wheezing and she was given 2 nebs with significant relief.  When EMS arrived, they noted O2 sat 100% on RA with no respiratory distress.  Patient is DNR/DNI.  PMD Dr. Yadav  · Presenting Symptoms: SHORTNESS OF BREATH, WHEEZING  · Negative Findings: no body aches, no cough, no diaphoresis, no edema, no fever, no headache, no hemoptysis  · Timing: gradual onset  · Duration: hour(s)  1   · Quality: alteration in breathing pattern, wheezing  · Context: unknown  · Recent Exposure To: none known  · Aggravated Factors: breathing deeply  · Relieving Factors: albuterol         PAST MEDICAL & SURGICAL HISTORY:  Uncomplicated asthma, unspecified asthma severity    DM2 (diabetes mellitus, type 2)    Essential hypertension    Hypothyroidism, unspecified type    Pure hypercholesterolemia    Gastroesophageal reflux disease without esophagitis    Diabetes    Asthma    CAD (coronary artery disease)    HTN (hypertension)    HLD (hyperlipidemia)    Hypothyroid    NSTEMI (non-ST elevated myocardial infarction)    No significant past surgical history    History of appendectomy    History of appendectomy    Abnormal findings on cardiac catheterization  Cardiac Cath          Medication list         MEDICATIONS  (STANDING):  ALBUTerol    90 MICROgram(s) HFA Inhaler 2 Puff(s) Inhalation every 6 hours  ALPRAZolam 0.5 milliGRAM(s) Oral at bedtime  artificial  tears Solution 1 Drop(s) Both EYES two times a day  aspirin enteric coated 81 milliGRAM(s) Oral daily  atorvastatin 80 milliGRAM(s) Oral at bedtime  budesonide 160 MICROgram(s)/formoterol 4.5 MICROgram(s) Inhaler 2 Puff(s) Inhalation two times a day  cholecalciferol 2000 Unit(s) Oral daily  clopidogrel Tablet 75 milliGRAM(s) Oral daily  dextrose 40% Gel 15 Gram(s) Oral once  dextrose 5%. 1000 milliLiter(s) (50 mL/Hr) IV Continuous <Continuous>  dextrose 5%. 1000 milliLiter(s) (100 mL/Hr) IV Continuous <Continuous>  dextrose 50% Injectable 25 Gram(s) IV Push once  dextrose 50% Injectable 12.5 Gram(s) IV Push once  dextrose 50% Injectable 25 Gram(s) IV Push once  escitalopram 10 milliGRAM(s) Oral daily  gabapentin 100 milliGRAM(s) Oral three times a day  glucagon  Injectable 1 milliGRAM(s) IntraMuscular once  heparin   Injectable 5000 Unit(s) SubCutaneous every 12 hours  insulin lispro (ADMELOG) corrective regimen sliding scale   SubCutaneous three times a day before meals  isosorbide   mononitrate ER Tablet (IMDUR) 30 milliGRAM(s) Oral daily  lactobacillus acidophilus 1 Tablet(s) Oral two times a day  methylPREDNISolone sodium succinate Injectable 40 milliGRAM(s) IV Push daily  metoprolol succinate ER 50 milliGRAM(s) Oral daily    MEDICATIONS  (PRN):  acetaminophen     Tablet .. 650 milliGRAM(s) Oral every 6 hours PRN Temp greater or equal to 38C (100.4F), Mild Pain (1 - 3)  aluminum hydroxide/magnesium hydroxide/simethicone Suspension 30 milliLiter(s) Oral every 4 hours PRN Dyspepsia  melatonin 3 milliGRAM(s) Oral at bedtime PRN Insomnia  ondansetron Injectable 4 milliGRAM(s) IV Push every 8 hours PRN Nausea and/or Vomiting  traMADol 50 milliGRAM(s) Oral three times a day PRN Moderate Pain (4 - 6)         Vitals log        ICU Vital Signs Last 24 Hrs  T(C): 36.6 (09 Mar 2022 11:45), Max: 37.4 (09 Mar 2022 10:17)  T(F): 97.8 (09 Mar 2022 11:45), Max: 99.4 (09 Mar 2022 10:17)  HR: 82 (09 Mar 2022 11:45) (82 - 90)  BP: 157/55 (09 Mar 2022 11:45) (121/62 - 191/82)  BP(mean): --  ABP: --  ABP(mean): --  RR: 17 (09 Mar 2022 11:45) (17 - 19)  SpO2: 95% (09 Mar 2022 11:45) (95% - 98%)       non smoker  non drinker  lives in [place]  ros - weakness sob ortiz  family hx - ashd - hf      Input and Output:  I&O's Detail      Lab Data                        12.5   13.36 )-----------( 190      ( 09 Mar 2022 06:05 )             39.8     03-09    140  |  111<H>  |  61<H>  ----------------------------<  201<H>  4.6   |  23  |  1.70<H>    Ca    8.5      09 Mar 2022 06:05    TPro  7.4  /  Alb  3.3  /  TBili  0.3  /  DBili  x   /  AST  13<L>  /  ALT  23  /  AlkPhos  82  03-09      CARDIAC MARKERS ( 09 Mar 2022 06:05 )  x     / x     / 84 U/L / x     / 2.9 ng/mL        Review of Systems	  weakness  sob  ortiz      Objective     Physical Examination    heart s1s2  lung dec BS  abd soft  head nc  obese  verbal  alert    Pertinent Lab findings & Imaging      Schwarz:  NO   Adequate UO     I&O's Detail           Discussed with:     Cultures:	        Radiology        ACC: [id number] EXAM:  CT CHEST                          PROCEDURE DATE:  03/09/2022          INTERPRETATION:  CLINICAL INFORMATION: Shortness of breath with   respiratory difficulty.    COMPARISON: Chest radiograph 3/9/2022 and CT scan chest 6/1/2019.    CONTRAST/COMPLICATIONS:  IV Contrast: NONE  Oral Contrast: NONE  Complications: None reported at time of study completion    PROCEDURE:  CT of the Chest was performed.  Sagittal and coronal reformats were performed.    FINDINGS:    LUNGS AND AIRWAYS:  PLEURA:  There are minimal linear atelectatic changes at the bilateral lung bases.  No lobar lung consolidation.    Mild nodular thickening along the pleural surface of the right minor   fissure.  No pleural effusion.    The central airways are patent.    MEDIASTINUM AND JOÃO:  Small, subcentimeter short axis prevascular right paratracheal,   precarinal and subcarinal mediastinal lymph nodes.    VESSELS: Atherosclerotic changes thoracic aorta and coronary artery   calcifications.    HEART: The heart is mildly enlarged. Mitral and aortic valvular   calcifications.  No pericardial effusion.    CHEST WALL AND LOWER NECK: Within normal limits.    VISUALIZED UPPER ABDOMEN: Within normal limits.    BONES: Within normal limits.      IMPRESSION:    Minimal bibasilar atelectatic changes.    Other findings as discussed above.        --- End of Report ---            SACHI NICK MD; Attending Radiologist  This document has been electronically signed. Mar  9 2022  9:58AM             1. Left ventricular ejection fraction, by visual estimation, is >75%.   2. Technically difficult study.   3. Hyperdynamic global left ventricular systolic function.   4. Mildly increased LV wall thickness.   5. Normal left ventricular internal cavity size.   6. Spectral Doppler shows pseudonormal pattern of left ventricular   myocardial filling (Grade II diastolic dysfunction).   7. There is no evidence of pericardial effusion.   8. Severe mitral annular calcification.   9. Estimated pulmonary artery systolic pressure is 35.7 mmHg assuming a   right atrial pressure of 5 mmHg, which is consistent with borderline   pulmonary hypertension.  10. Mitral valve mean gradient is 2.0 mmHg consistent with mild mitral   stenosis.  11. Peak transaortic gradient equals 40.4 mmHg, mean transaortic gradient   equals 21.5 mmHg, the calculated aortic valve area equals 1.73 cm² by the   continuity equation consistent with mild aortic stenosis.

## 2022-03-09 NOTE — ED ADULT NURSE NOTE - CHIEF COMPLAINT QUOTE
Patient brought in by ambulance from [facility] as reported having difficulty breathing was given neb treatment x 2 received with non-rebreather mask.

## 2022-03-09 NOTE — CONSULT NOTE ADULT - SUBJECTIVE AND OBJECTIVE BOX
Patient is a 87y old  Female who presents with a chief complaint of     HPI:      Asked to see patient for ID Consult    PAST MEDICAL & SURGICAL HISTORY:  Uncomplicated asthma, unspecified asthma severity    DM2 (diabetes mellitus, type 2)    Essential hypertension    Hypothyroidism, unspecified type    Pure hypercholesterolemia    Gastroesophageal reflux disease without esophagitis    Diabetes    Asthma    CAD (coronary artery disease)    HTN (hypertension)    HLD (hyperlipidemia)    Hypothyroid    NSTEMI (non-ST elevated myocardial infarction)    History of appendectomy    History of appendectomy    Abnormal findings on cardiac catheterization  Cardiac Cath        Allergies    doxycycline (Unknown)  iodine (Hives)  iodine containing compounds (Unknown)  shellfish (Anaphylaxis)  shellfish (Swelling; Short breath)    Intolerances        REVIEW OF SYSTEMS:  All systems below were reviewed and are negative [  ]  HEENT:  ID:  Pulmonary:  Cardiac:  GI:  Renal:  Musculoskeletal:  All other systems above were reviewed and are negative   [  ]    HOME MEDICATIONS:    MEDICATIONS  (STANDING):  ALBUTerol    90 MICROgram(s) HFA Inhaler 2 Puff(s) Inhalation every 6 hours  ALPRAZolam 0.5 milliGRAM(s) Oral at bedtime  artificial  tears Solution 1 Drop(s) Both EYES two times a day  aspirin enteric coated 81 milliGRAM(s) Oral daily  atorvastatin 80 milliGRAM(s) Oral at bedtime  budesonide 160 MICROgram(s)/formoterol 4.5 MICROgram(s) Inhaler 2 Puff(s) Inhalation two times a day  cholecalciferol 2000 Unit(s) Oral daily  clopidogrel Tablet 75 milliGRAM(s) Oral daily  dextrose 40% Gel 15 Gram(s) Oral once  dextrose 5%. 1000 milliLiter(s) (50 mL/Hr) IV Continuous <Continuous>  dextrose 5%. 1000 milliLiter(s) (100 mL/Hr) IV Continuous <Continuous>  dextrose 50% Injectable 25 Gram(s) IV Push once  dextrose 50% Injectable 12.5 Gram(s) IV Push once  dextrose 50% Injectable 25 Gram(s) IV Push once  escitalopram 10 milliGRAM(s) Oral daily  gabapentin 100 milliGRAM(s) Oral three times a day  glucagon  Injectable 1 milliGRAM(s) IntraMuscular once  heparin   Injectable 5000 Unit(s) SubCutaneous every 12 hours  insulin lispro (ADMELOG) corrective regimen sliding scale   SubCutaneous three times a day before meals  isosorbide   mononitrate ER Tablet (IMDUR) 30 milliGRAM(s) Oral daily  lactobacillus acidophilus 1 Tablet(s) Oral two times a day  methylPREDNISolone sodium succinate Injectable 40 milliGRAM(s) IV Push daily  metoprolol succinate ER 50 milliGRAM(s) Oral daily    MEDICATIONS  (PRN):  acetaminophen     Tablet .. 650 milliGRAM(s) Oral every 6 hours PRN Temp greater or equal to 38C (100.4F), Mild Pain (1 - 3)  aluminum hydroxide/magnesium hydroxide/simethicone Suspension 30 milliLiter(s) Oral every 4 hours PRN Dyspepsia  melatonin 3 milliGRAM(s) Oral at bedtime PRN Insomnia  ondansetron Injectable 4 milliGRAM(s) IV Push every 8 hours PRN Nausea and/or Vomiting  traMADol 50 milliGRAM(s) Oral three times a day PRN Moderate Pain (4 - 6)      Vital Signs Last 24 Hrs  T(C): 36.6 (09 Mar 2022 11:45), Max: 37.4 (09 Mar 2022 10:17)  T(F): 97.8 (09 Mar 2022 11:45), Max: 99.4 (09 Mar 2022 10:17)  HR: 99 (09 Mar 2022 18:08) (82 - 99)  BP: 157/55 (09 Mar 2022 11:45) (121/62 - 191/82)  BP(mean): --  RR: 17 (09 Mar 2022 11:45) (17 - 19)  SpO2: 95% (09 Mar 2022 11:45) (95% - 98%)    PHYSICAL EXAM:  HEENT:  Neck:  Lungs:  Heart:  Abdomen:  Genital/ Rectal:  Extremities:  Neurologic:  Vascular:    I&O's Summary      LABORATORY:                          12.5   13.36 )-----------( 190      ( 09 Mar 2022 06:05 )             39.8           03-09    140  |  111<H>  |  61<H>  ----------------------------<  201<H>  4.6   |  23  |  1.70<H>    Ca    8.5      09 Mar 2022 06:05    TPro  7.4  /  Alb  3.3  /  TBili  0.3  /  DBili  x   /  AST  13<L>  /  ALT  23  /  AlkPhos  82  03-09      Rapid Respiratory Viral Panel Result        03-09 @ 08:06  Rapid RVP [facility]  Coronovirus --  Adenovirus --  Bordetella Pertussis --  Chlamydia Pneumonia --  Entero/Rhinovirus--  HKU1 Coronovirus --  HMPV Coronovirus --  Influenza A --  Influenza AH1 --  Influenza AH1 2009 --  Influenza AH3 --  Influenza B --  Mycoplasma Pneumoniae --  NL63 Coronovirus --  OC43 Coronovirus --  Parainfluenza 1 --  Parainfluenza 2 --  Parainfluenza 3 --  Parainfluenza 4 --  Resp Syncytial Virus --      LABORATORY:    CBC Full  -  ( 09 Mar 2022 06:05 )  WBC Count : 13.36 K/uL  RBC Count : 4.44 M/uL  Hemoglobin : 12.5 g/dL  Hematocrit : 39.8 %  Platelet Count - Automated : 190 K/uL  Mean Cell Volume : 89.6 fl  Mean Cell Hemoglobin : 28.2 pg  Mean Cell Hemoglobin Concentration : 31.4 gm/dL  Auto Neutrophil # : 9.02 K/uL  Auto Lymphocyte # : 3.06 K/uL  Auto Monocyte # : 0.91 K/uL  Auto Eosinophil # : 0.22 K/uL  Auto Basophil # : 0.08 K/uL  Auto Neutrophil % : 67.6 %  Auto Lymphocyte % : 22.9 %  Auto Monocyte % : 6.8 %  Auto Eosinophil % : 1.6 %  Auto Basophil % : 0.6 %          03-09    140  |  111<H>  |  61<H>  ----------------------------<  201<H>  4.6   |  23  |  1.70<H>    Ca    8.5      09 Mar 2022 06:05    TPro  7.4  /  Alb  3.3  /  TBili  0.3  /  DBili  x   /  AST  13<L>  /  ALT  23  /  AlkPhos  82  03-09                                            Rapid Respiratory Viral Panel Result        03-09 @ 08:06  Rapid RVP [facility]  Coronovirus --  Adenovirus --  Bordetella Pertussis --  Chlamydia Pneumonia --  Entero/Rhinovirus--  HKU1 Coronovirus --  HMPV Coronovirus --  Influenza A --  Influenza AH1 --  Influenza AH1 2009 --  Influenza AH3 --  Influenza B --  Mycoplasma Pneumoniae --  NL63 Coronovirus --  OC43 Coronovirus --  Parainfluenza 1 --  Parainfluenza 2 --  Parainfluenza 3 --  Parainfluenza 4 --  Resp Syncytial Virus --           Patient is a 87y old  Female who presents with a chief complaint of     The patient is an 87 year old female with a PMH of COPD, asthma, type 2 DM, CAD, HTN and hypothyroidism who was brought to the ED yesterday from the [facility] for SOB over one day. The patient was given 2 neb treatments at the [facility] and she felt better by the time EMS arrived. In the ED she was afebrile but had high WBC of 13K, Chest Ct did not show infiltrates. She was given IV Zosyn and Vancomycin in the ED and was admitted. She is afebrile today and is on RA.       PAST MEDICAL & SURGICAL HISTORY:  Uncomplicated asthma, unspecified asthma severity    DM2 (diabetes mellitus, type 2)    Essential hypertension    Hypothyroidism, unspecified type    Pure hypercholesterolemia    Gastroesophageal reflux disease without esophagitis    Diabetes    Asthma    CAD (coronary artery disease)    HTN (hypertension)    HLD (hyperlipidemia)    Hypothyroid    NSTEMI (non-ST elevated myocardial infarction)    History of appendectomy    History of appendectomy    Abnormal findings on cardiac catheterization  Cardiac Cath        Allergies    doxycycline (Unknown)  iodine (Hives)  iodine containing compounds (Unknown)  shellfish (Anaphylaxis)  shellfish (Swelling; Short breath)    Intolerances        REVIEW OF SYSTEMS:  No vomiting or diarrhea  No dysuria     HOME MEDICATIONS:    MEDICATIONS  (STANDING):  ALBUTerol    90 MICROgram(s) HFA Inhaler 2 Puff(s) Inhalation every 6 hours  ALPRAZolam 0.5 milliGRAM(s) Oral at bedtime  artificial  tears Solution 1 Drop(s) Both EYES two times a day  aspirin enteric coated 81 milliGRAM(s) Oral daily  atorvastatin 80 milliGRAM(s) Oral at bedtime  budesonide 160 MICROgram(s)/formoterol 4.5 MICROgram(s) Inhaler 2 Puff(s) Inhalation two times a day  cholecalciferol 2000 Unit(s) Oral daily  clopidogrel Tablet 75 milliGRAM(s) Oral daily  dextrose 40% Gel 15 Gram(s) Oral once  dextrose 5%. 1000 milliLiter(s) (50 mL/Hr) IV Continuous <Continuous>  dextrose 5%. 1000 milliLiter(s) (100 mL/Hr) IV Continuous <Continuous>  dextrose 50% Injectable 25 Gram(s) IV Push once  dextrose 50% Injectable 12.5 Gram(s) IV Push once  dextrose 50% Injectable 25 Gram(s) IV Push once  escitalopram 10 milliGRAM(s) Oral daily  gabapentin 100 milliGRAM(s) Oral three times a day  glucagon  Injectable 1 milliGRAM(s) IntraMuscular once  heparin   Injectable 5000 Unit(s) SubCutaneous every 12 hours  insulin lispro (ADMELOG) corrective regimen sliding scale   SubCutaneous three times a day before meals  isosorbide   mononitrate ER Tablet (IMDUR) 30 milliGRAM(s) Oral daily  lactobacillus acidophilus 1 Tablet(s) Oral two times a day  methylPREDNISolone sodium succinate Injectable 40 milliGRAM(s) IV Push daily  metoprolol succinate ER 50 milliGRAM(s) Oral daily    MEDICATIONS  (PRN):  acetaminophen     Tablet .. 650 milliGRAM(s) Oral every 6 hours PRN Temp greater or equal to 38C (100.4F), Mild Pain (1 - 3)  aluminum hydroxide/magnesium hydroxide/simethicone Suspension 30 milliLiter(s) Oral every 4 hours PRN Dyspepsia  melatonin 3 milliGRAM(s) Oral at bedtime PRN Insomnia  ondansetron Injectable 4 milliGRAM(s) IV Push every 8 hours PRN Nausea and/or Vomiting  traMADol 50 milliGRAM(s) Oral three times a day PRN Moderate Pain (4 - 6)      Vital Signs Last 24 Hrs  T(C): 36.6 (09 Mar 2022 11:45), Max: 37.4 (09 Mar 2022 10:17)  T(F): 97.8 (09 Mar 2022 11:45), Max: 99.4 (09 Mar 2022 10:17)  HR: 99 (09 Mar 2022 18:08) (82 - 99)  BP: 157/55 (09 Mar 2022 11:45) (121/62 - 191/82)  BP(mean): --  RR: 17 (09 Mar 2022 11:45) (17 - 19)  SpO2: 95% (09 Mar 2022 11:45) (95% - 98%)    PHYSICAL EXAM:  HEENT: NC/AT  Neck: Soft, no tenderness.   Lungs: Coarse BS bilaterally, no wheezing.   Heart: RRR, no murmurs.   Abdomen: Soft, no tenderness.   Genital/ Rectal: No torres   Extremities: No edema of lower extremities.. No ulcers.   Neurologic: Confused.         LABORATORY:                          12.5   13.36 )-----------( 190      ( 09 Mar 2022 06:05 )             39.8           03-09    140  |  111<H>  |  61<H>  ----------------------------<  201<H>  4.6   |  23  |  1.70<H>    Ca    8.5      09 Mar 2022 06:05    TPro  7.4  /  Alb  3.3  /  TBili  0.3  /  DBili  x   /  AST  13<L>  /  ALT  23  /  AlkPhos  82  03-09      Rapid Respiratory Viral Panel Result        03-09 @ 08:06  Rapid RVP [facility]  Coronovirus --  Adenovirus --  Bordetella Pertussis --  Chlamydia Pneumonia --  Entero/Rhinovirus--  HKU1 Coronovirus --  HMPV Coronovirus --  Influenza A --  Influenza AH1 --  Influenza AH1 2009 --  Influenza AH3 --  Influenza B --  Mycoplasma Pneumoniae --  NL63 Coronovirus --  OC43 Coronovirus --  Parainfluenza 1 --  Parainfluenza 2 --  Parainfluenza 3 --  Parainfluenza 4 --  Resp Syncytial Virus --      LABORATORY:    CBC Full  -  ( 09 Mar 2022 06:05 )  WBC Count : 13.36 K/uL  RBC Count : 4.44 M/uL  Hemoglobin : 12.5 g/dL  Hematocrit : 39.8 %  Platelet Count - Automated : 190 K/uL  Mean Cell Volume : 89.6 fl  Mean Cell Hemoglobin : 28.2 pg  Mean Cell Hemoglobin Concentration : 31.4 gm/dL  Auto Neutrophil # : 9.02 K/uL  Auto Lymphocyte # : 3.06 K/uL  Auto Monocyte # : 0.91 K/uL  Auto Eosinophil # : 0.22 K/uL  Auto Basophil # : 0.08 K/uL  Auto Neutrophil % : 67.6 %  Auto Lymphocyte % : 22.9 %  Auto Monocyte % : 6.8 %  Auto Eosinophil % : 1.6 %  Auto Basophil % : 0.6 %      140  |  111<H>  |  61<H>  ----------------------------<  201<H>  4.6   |  23  |  1.70<H>    Ca    8.5      09 Mar 2022 06:05    TPro  7.4  /  Alb  3.3  /  TBili  0.3  /  DBili  x   /  AST  13<L>  /  ALT  23  /  AlkPhos  82  03-09      Assessment and Plan;    1. COPD exacerbation.  2. Leukocytosis.    . No active infections noted. Leukocytosis was likely due to COPD exacerbation/steroid. Monitor off antibiotics..   . Continue IV Solumedrol as per pulmonary.  . Supportive care.    Thank you.

## 2022-03-09 NOTE — CONSULT NOTE ADULT - SUBJECTIVE AND OBJECTIVE BOX
CARDIOLOGY CONSULT NOTE    Patient is a 87y Female with a known history of : admitted with sob and wheezing   hx of asthma    HPI:      REVIEW OF SYSTEMS:    CONSTITUTIONAL: No fever, weight loss, or fatigue  EYES: No eye pain, visual disturbances, or discharge  ENMT:  No difficulty hearing, tinnitus, vertigo; No sinus or throat pain  NECK: No pain or stiffness  BREASTS: No pain, masses, or nipple discharge  RESPIRATORY: No cough, wheezing, chills or hemoptysis; No shortness of breath  CARDIOVASCULAR: No chest pain, palpitations, dizziness, or leg swelling  GASTROINTESTINAL: No abdominal or epigastric pain. No nausea, vomiting, or hematemesis; No diarrhea or constipation. No melena or hematochezia.  GENITOURINARY: No dysuria, frequency, hematuria, or incontinence  NEUROLOGICAL: No headaches, memory loss, loss of strength, numbness, or tremors  SKIN: No itching, burning, rashes, or lesions   LYMPH NODES: No enlarged glands  ENDOCRINE: No heat or cold intolerance; No hair loss  MUSCULOSKELETAL: No joint pain or swelling; No muscle, back, or extremity pain  PSYCHIATRIC: No depression, anxiety, mood swings, or difficulty sleeping  HEME/LYMPH: No easy bruising, or bleeding gums  ALLERGY AND IMMUNOLOGIC: No hives or eczema    MEDICATIONS  (STANDING):  ALBUTerol    90 MICROgram(s) HFA Inhaler 2 Puff(s) Inhalation every 6 hours  ALPRAZolam 0.5 milliGRAM(s) Oral at bedtime  artificial  tears Solution 1 Drop(s) Both EYES two times a day  aspirin enteric coated 81 milliGRAM(s) Oral daily  atorvastatin 80 milliGRAM(s) Oral at bedtime  cholecalciferol 2000 Unit(s) Oral daily  clopidogrel Tablet 75 milliGRAM(s) Oral daily  dextrose 40% Gel 15 Gram(s) Oral once  dextrose 5%. 1000 milliLiter(s) (50 mL/Hr) IV Continuous <Continuous>  dextrose 5%. 1000 milliLiter(s) (100 mL/Hr) IV Continuous <Continuous>  dextrose 50% Injectable 25 Gram(s) IV Push once  dextrose 50% Injectable 12.5 Gram(s) IV Push once  dextrose 50% Injectable 25 Gram(s) IV Push once  escitalopram 10 milliGRAM(s) Oral daily  gabapentin 100 milliGRAM(s) Oral three times a day  glucagon  Injectable 1 milliGRAM(s) IntraMuscular once  heparin   Injectable 5000 Unit(s) SubCutaneous every 12 hours  insulin lispro (ADMELOG) corrective regimen sliding scale   SubCutaneous three times a day before meals  isosorbide   mononitrate ER Tablet (IMDUR) 30 milliGRAM(s) Oral daily  lactobacillus acidophilus 1 Tablet(s) Oral two times a day  levothyroxine 125 MICROGram(s) Oral daily  methylPREDNISolone sodium succinate Injectable 40 milliGRAM(s) IV Push daily  metoprolol succinate ER 50 milliGRAM(s) Oral daily    MEDICATIONS  (PRN):  acetaminophen     Tablet .. 650 milliGRAM(s) Oral every 6 hours PRN Temp greater or equal to 38C (100.4F), Mild Pain (1 - 3)  aluminum hydroxide/magnesium hydroxide/simethicone Suspension 30 milliLiter(s) Oral every 4 hours PRN Dyspepsia  melatonin 3 milliGRAM(s) Oral at bedtime PRN Insomnia  ondansetron Injectable 4 milliGRAM(s) IV Push every 8 hours PRN Nausea and/or Vomiting      ALLERGIES: doxycycline (Unknown)  iodine (Hives)  iodine containing compounds (Unknown)  shellfish (Anaphylaxis)  shellfish (Swelling; Short breath)      Social History:     FAMILY HISTORY:  Family history of heart disease    Family history of cancer in mother    Family history of MI (myocardial infarction)    Family history of stomach cancer        PAST MEDICAL & SURGICAL HISTORY:  Uncomplicated asthma, unspecified asthma severity    DM2 (diabetes mellitus, type 2)    Essential hypertension    Hypothyroidism, unspecified type    Pure hypercholesterolemia    Gastroesophageal reflux disease without esophagitis    Diabetes    Asthma    CAD (coronary artery disease)    HTN (hypertension)    HLD (hyperlipidemia)    Hypothyroid    NSTEMI (non-ST elevated myocardial infarction)    History of appendectomy    History of appendectomy    Abnormal findings on cardiac catheterization  Cardiac Cath          PHYSICAL EXAMINATION:  -----------------------------  T(C): 36.7 (03-09-22 @ 09:17), Max: 36.7 (03-09-22 @ 09:17)  HR: 84 (03-09-22 @ 09:17) (84 - 90)  BP: 126/65 (03-09-22 @ 09:17) (121/62 - 191/82)  RR: 18 (03-09-22 @ 09:17) (18 - 19)  SpO2: 95% (03-09-22 @ 09:17) (95% - 98%)  Wt(kg): --    Height (cm): 162.6 (03-09 @ 05:36)  Weight (kg): 90.7 (03-09 @ 05:36)  BMI (kg/m2): 34.3 (03-09 @ 05:36)  BSA (m2): 1.96 (03-09 @ 05:36)    Constitutional: well developed, normal appearance, well groomed, well nourished, no deformities and no acute distress.   Eyes: the conjunctiva exhibited no abnormalities and the eyelids demonstrated no xanthelasmas.   HEENT: normal oral mucosa, no oral pallor and no oral cyanosis.   Neck: normal jugular venous A waves present, normal jugular venous V waves present and no jugular venous wagner A waves.   Pulmonary: no respiratory distress, normal respiratory rhythm and effort, no accessory muscle use and lungs were clear to auscultation bilaterally.   Cardiovascular: heart rate and rhythm were normal, normal S1 and S2 and no murmur, gallop, rub, heave or thrill are present.   Abdomen: soft, non-tender, no hepato-splenomegaly and no abdominal mass palpated.   Musculoskeletal: the gait could not be assessed..   Extremities: no clubbing of the fingernails, no localized cyanosis, no petechial hemorrhages and no ischemic changes.   Skin: normal skin color and pigmentation, no rash, no venous stasis, no skin lesions, no skin ulcer and no xanthoma was observed.   Psychiatric: oriented to person, place, and time, the affect was normal, the mood was normal and not feeling anxious.     LABS:   --------  03-09    140  |  111<H>  |  61<H>  ----------------------------<  201<H>  4.6   |  23  |  1.70<H>    Ca    8.5      09 Mar 2022 06:05    TPro  7.4  /  Alb  3.3  /  TBili  0.3  /  DBili  x   /  AST  13<L>  /  ALT  23  /  AlkPhos  82  03-09                         12.5   13.36 )-----------( 190      ( 09 Mar 2022 06:05 )             39.8     PT/INR - ( 09 Mar 2022 06:05 )   PT: 11.8 sec;   INR: 1.01 ratio         PTT - ( 09 Mar 2022 06:05 )  PTT:28.9 sec  03-09 @ 06:05 BNP: 886 pg/mL            RADIOLOGY:  -----------------        ECG:     ECHO

## 2022-03-09 NOTE — ED PROVIDER NOTE - RESPIRATORY, MLM
Mild expiratory wheezing b/l, speaking in short sentences, no accessory muscle use.  No crackles or rales

## 2022-03-09 NOTE — SWALLOW BEDSIDE ASSESSMENT ADULT - ASR SWALLOW RECOMMEND DIAG
objective testing is not warranted at this time d/t no overt signs on baseline diet level; will continue to monitor at bedside

## 2022-03-09 NOTE — ED PROVIDER NOTE - CLINICAL SUMMARY MEDICAL DECISION MAKING FREE TEXT BOX
87 year old female with CHF, COPD, asthma p/w SOB and wheezing.  Given 2 neb treatment at assisted living [facility] with significant relief.  Check labs, CXR, BNP, EKG, evaluate for COVID, CHF, PNA, pulmonary edema, admit.

## 2022-03-10 LAB
A1C WITH ESTIMATED AVERAGE GLUCOSE RESULT: 8.4 % — HIGH (ref 4–5.6)
ALBUMIN SERPL ELPH-MCNC: 3.1 G/DL — LOW (ref 3.3–5)
ALP SERPL-CCNC: 77 U/L — SIGNIFICANT CHANGE UP (ref 40–120)
ALT FLD-CCNC: 19 U/L — SIGNIFICANT CHANGE UP (ref 12–78)
ANION GAP SERPL CALC-SCNC: 7 MMOL/L — SIGNIFICANT CHANGE UP (ref 5–17)
AST SERPL-CCNC: 12 U/L — LOW (ref 15–37)
BASOPHILS # BLD AUTO: 0.09 K/UL — SIGNIFICANT CHANGE UP (ref 0–0.2)
BASOPHILS NFR BLD AUTO: 0.7 % — SIGNIFICANT CHANGE UP (ref 0–2)
BILIRUB SERPL-MCNC: 0.5 MG/DL — SIGNIFICANT CHANGE UP (ref 0.2–1.2)
BUN SERPL-MCNC: 56 MG/DL — HIGH (ref 7–23)
CALCIUM SERPL-MCNC: 8.6 MG/DL — SIGNIFICANT CHANGE UP (ref 8.5–10.1)
CHLORIDE SERPL-SCNC: 109 MMOL/L — HIGH (ref 96–108)
CO2 SERPL-SCNC: 23 MMOL/L — SIGNIFICANT CHANGE UP (ref 22–31)
CREAT SERPL-MCNC: 1.6 MG/DL — HIGH (ref 0.5–1.3)
EGFR: 31 ML/MIN/1.73M2 — LOW
EOSINOPHIL # BLD AUTO: 0.17 K/UL — SIGNIFICANT CHANGE UP (ref 0–0.5)
EOSINOPHIL NFR BLD AUTO: 1.3 % — SIGNIFICANT CHANGE UP (ref 0–6)
ESTIMATED AVERAGE GLUCOSE: 194 MG/DL — HIGH (ref 68–114)
GLUCOSE SERPL-MCNC: 240 MG/DL — HIGH (ref 70–99)
HCT VFR BLD CALC: 37.5 % — SIGNIFICANT CHANGE UP (ref 34.5–45)
HGB BLD-MCNC: 12 G/DL — SIGNIFICANT CHANGE UP (ref 11.5–15.5)
IMM GRANULOCYTES NFR BLD AUTO: 0.6 % — SIGNIFICANT CHANGE UP (ref 0–1.5)
INR BLD: 1.07 RATIO — SIGNIFICANT CHANGE UP (ref 0.88–1.16)
LYMPHOCYTES # BLD AUTO: 1.25 K/UL — SIGNIFICANT CHANGE UP (ref 1–3.3)
LYMPHOCYTES # BLD AUTO: 9.9 % — LOW (ref 13–44)
MAGNESIUM SERPL-MCNC: 2.2 MG/DL — SIGNIFICANT CHANGE UP (ref 1.6–2.6)
MCHC RBC-ENTMCNC: 28.2 PG — SIGNIFICANT CHANGE UP (ref 27–34)
MCHC RBC-ENTMCNC: 32 GM/DL — SIGNIFICANT CHANGE UP (ref 32–36)
MCV RBC AUTO: 88 FL — SIGNIFICANT CHANGE UP (ref 80–100)
MONOCYTES # BLD AUTO: 0.42 K/UL — SIGNIFICANT CHANGE UP (ref 0–0.9)
MONOCYTES NFR BLD AUTO: 3.3 % — SIGNIFICANT CHANGE UP (ref 2–14)
NEUTROPHILS # BLD AUTO: 10.63 K/UL — HIGH (ref 1.8–7.4)
NEUTROPHILS NFR BLD AUTO: 84.2 % — HIGH (ref 43–77)
NRBC # BLD: 0 /100 WBCS — SIGNIFICANT CHANGE UP (ref 0–0)
NT-PROBNP SERPL-SCNC: 1096 PG/ML — HIGH (ref 0–450)
PHOSPHATE SERPL-MCNC: 4.1 MG/DL — SIGNIFICANT CHANGE UP (ref 2.5–4.5)
PLATELET # BLD AUTO: 171 K/UL — SIGNIFICANT CHANGE UP (ref 150–400)
POTASSIUM SERPL-MCNC: 4.7 MMOL/L — SIGNIFICANT CHANGE UP (ref 3.5–5.3)
POTASSIUM SERPL-SCNC: 4.7 MMOL/L — SIGNIFICANT CHANGE UP (ref 3.5–5.3)
PROCALCITONIN SERPL-MCNC: <0.05 — SIGNIFICANT CHANGE UP (ref 0–0.04)
PROCALCITONIN SERPL-MCNC: <0.05 — SIGNIFICANT CHANGE UP (ref 0–0.04)
PROT SERPL-MCNC: 7 G/DL — SIGNIFICANT CHANGE UP (ref 6–8.3)
PROTHROM AB SERPL-ACNC: 12.5 SEC — SIGNIFICANT CHANGE UP (ref 10.5–13.4)
RBC # BLD: 4.26 M/UL — SIGNIFICANT CHANGE UP (ref 3.8–5.2)
RBC # FLD: 14.3 % — SIGNIFICANT CHANGE UP (ref 10.3–14.5)
SODIUM SERPL-SCNC: 139 MMOL/L — SIGNIFICANT CHANGE UP (ref 135–145)
TSH SERPL-MCNC: 0.46 UIU/ML — SIGNIFICANT CHANGE UP (ref 0.36–3.74)
WBC # BLD: 12.64 K/UL — HIGH (ref 3.8–10.5)
WBC # FLD AUTO: 12.64 K/UL — HIGH (ref 3.8–10.5)

## 2022-03-10 RX ADMIN — Medication 1 DROP(S): at 05:07

## 2022-03-10 RX ADMIN — CLOPIDOGREL BISULFATE 75 MILLIGRAM(S): 75 TABLET, FILM COATED ORAL at 11:23

## 2022-03-10 RX ADMIN — Medication 50 MILLIGRAM(S): at 05:13

## 2022-03-10 RX ADMIN — ATORVASTATIN CALCIUM 80 MILLIGRAM(S): 80 TABLET, FILM COATED ORAL at 21:38

## 2022-03-10 RX ADMIN — ALBUTEROL 2 PUFF(S): 90 AEROSOL, METERED ORAL at 15:06

## 2022-03-10 RX ADMIN — Medication 2: at 21:47

## 2022-03-10 RX ADMIN — ALBUTEROL 2 PUFF(S): 90 AEROSOL, METERED ORAL at 05:08

## 2022-03-10 RX ADMIN — HEPARIN SODIUM 5000 UNIT(S): 5000 INJECTION INTRAVENOUS; SUBCUTANEOUS at 05:06

## 2022-03-10 RX ADMIN — GABAPENTIN 100 MILLIGRAM(S): 400 CAPSULE ORAL at 15:06

## 2022-03-10 RX ADMIN — ESCITALOPRAM OXALATE 10 MILLIGRAM(S): 10 TABLET, FILM COATED ORAL at 11:23

## 2022-03-10 RX ADMIN — Medication 0.5 MILLIGRAM(S): at 21:38

## 2022-03-10 RX ADMIN — Medication 40 MILLIGRAM(S): at 05:06

## 2022-03-10 RX ADMIN — Medication 2000 UNIT(S): at 11:23

## 2022-03-10 RX ADMIN — Medication 1 TABLET(S): at 05:07

## 2022-03-10 RX ADMIN — Medication 4: at 17:07

## 2022-03-10 RX ADMIN — Medication 3: at 08:10

## 2022-03-10 RX ADMIN — ALBUTEROL 2 PUFF(S): 90 AEROSOL, METERED ORAL at 18:33

## 2022-03-10 RX ADMIN — Medication 1 DROP(S): at 17:10

## 2022-03-10 RX ADMIN — Medication 81 MILLIGRAM(S): at 11:23

## 2022-03-10 RX ADMIN — ISOSORBIDE MONONITRATE 30 MILLIGRAM(S): 60 TABLET, EXTENDED RELEASE ORAL at 11:23

## 2022-03-10 RX ADMIN — Medication 125 MICROGRAM(S): at 05:06

## 2022-03-10 RX ADMIN — GABAPENTIN 100 MILLIGRAM(S): 400 CAPSULE ORAL at 05:06

## 2022-03-10 RX ADMIN — Medication 4: at 12:03

## 2022-03-10 RX ADMIN — HEPARIN SODIUM 5000 UNIT(S): 5000 INJECTION INTRAVENOUS; SUBCUTANEOUS at 17:10

## 2022-03-10 RX ADMIN — Medication 1 TABLET(S): at 17:10

## 2022-03-10 RX ADMIN — GABAPENTIN 100 MILLIGRAM(S): 400 CAPSULE ORAL at 21:38

## 2022-03-10 RX ADMIN — BUDESONIDE AND FORMOTEROL FUMARATE DIHYDRATE 2 PUFF(S): 160; 4.5 AEROSOL RESPIRATORY (INHALATION) at 05:08

## 2022-03-10 RX ADMIN — BUDESONIDE AND FORMOTEROL FUMARATE DIHYDRATE 2 PUFF(S): 160; 4.5 AEROSOL RESPIRATORY (INHALATION) at 18:33

## 2022-03-10 NOTE — SWALLOW BEDSIDE ASSESSMENT ADULT - SLP GENERAL OBSERVATIONS
Pt received upright on bedside chair A&A Ox2, +hoarse vocal quality, pain scale 0/10 pre & post assessment

## 2022-03-10 NOTE — SWALLOW BEDSIDE ASSESSMENT ADULT - SWALLOW EVAL: RECOMMENDED DIET
soft & bite-sized and thin liquids, aspiration precautions, as tolerated
Easy to chew with Thin liquids as tolerated

## 2022-03-10 NOTE — PROGRESS NOTE ADULT - SUBJECTIVE AND OBJECTIVE BOX
Interval History:    CENTRAL LINE:   [  ] YES       [  ] NO  PACE:                 [  ] YES       [  ] NO         REVIEW OF SYSTEMS:  All Systems below were reviewed and are negative [  ]  HEENT:  ID:  Pulmonary:  Cardiac:  GI:  Renal:  Musculoskeletal:  All other systems above were reviewed and are negative   [  ]      MEDICATIONS  (STANDING):  ALBUTerol    90 MICROgram(s) HFA Inhaler 2 Puff(s) Inhalation every 6 hours  ALPRAZolam 0.5 milliGRAM(s) Oral at bedtime  artificial  tears Solution 1 Drop(s) Both EYES two times a day  aspirin enteric coated 81 milliGRAM(s) Oral daily  atorvastatin 80 milliGRAM(s) Oral at bedtime  budesonide 160 MICROgram(s)/formoterol 4.5 MICROgram(s) Inhaler 2 Puff(s) Inhalation two times a day  cholecalciferol 2000 Unit(s) Oral daily  clopidogrel Tablet 75 milliGRAM(s) Oral daily  dextrose 40% Gel 15 Gram(s) Oral once  dextrose 5%. 1000 milliLiter(s) (50 mL/Hr) IV Continuous <Continuous>  dextrose 5%. 1000 milliLiter(s) (100 mL/Hr) IV Continuous <Continuous>  dextrose 50% Injectable 25 Gram(s) IV Push once  dextrose 50% Injectable 12.5 Gram(s) IV Push once  dextrose 50% Injectable 25 Gram(s) IV Push once  escitalopram 10 milliGRAM(s) Oral daily  gabapentin 100 milliGRAM(s) Oral three times a day  glucagon  Injectable 1 milliGRAM(s) IntraMuscular once  heparin   Injectable 5000 Unit(s) SubCutaneous every 12 hours  insulin lispro (ADMELOG) corrective regimen sliding scale   SubCutaneous at bedtime  insulin lispro (ADMELOG) corrective regimen sliding scale   SubCutaneous three times a day before meals  isosorbide   mononitrate ER Tablet (IMDUR) 30 milliGRAM(s) Oral daily  lactobacillus acidophilus 1 Tablet(s) Oral two times a day  levothyroxine 125 MICROGram(s) Oral <User Schedule>  metoprolol succinate ER 50 milliGRAM(s) Oral daily    MEDICATIONS  (PRN):  acetaminophen     Tablet .. 650 milliGRAM(s) Oral every 6 hours PRN Temp greater or equal to 38C (100.4F), Mild Pain (1 - 3)  aluminum hydroxide/magnesium hydroxide/simethicone Suspension 30 milliLiter(s) Oral every 4 hours PRN Dyspepsia  melatonin 3 milliGRAM(s) Oral at bedtime PRN Insomnia  ondansetron Injectable 4 milliGRAM(s) IV Push every 8 hours PRN Nausea and/or Vomiting  traMADol 50 milliGRAM(s) Oral three times a day PRN Moderate Pain (4 - 6)      Vital Signs Last 24 Hrs  T(C): 36.6 (10 Mar 2022 12:44), Max: 36.7 (09 Mar 2022 20:52)  T(F): 97.8 (10 Mar 2022 12:44), Max: 98.1 (09 Mar 2022 20:52)  HR: 78 (10 Mar 2022 12:44) (77 - 95)  BP: 159/74 (10 Mar 2022 12:44) (143/80 - 178/68)  BP(mean): --  RR: 18 (10 Mar 2022 12:44) (17 - 18)  SpO2: 92% (10 Mar 2022 14:24) (92% - 94%)    I&O's Summary    09 Mar 2022 07:01  -  10 Mar 2022 07:00  --------------------------------------------------------  IN: 0 mL / OUT: 800 mL / NET: -800 mL    10 Mar 2022 07:01  -  10 Mar 2022 19:06  --------------------------------------------------------  IN: 0 mL / OUT: 700 mL / NET: -700 mL        PHYSICAL EXAM:  HEENT: NC/AT; PERRLA  Neck: Soft; no tenderness  Lungs: CTA bilaterally; no wheezing.   Heart:  Abdomen:  Genital/ Rectal:  Extremities:  Neurologic:  Vascular:      LABORATORY:    CBC Full  -  ( 10 Mar 2022 07:42 )  WBC Count : 12.64 K/uL  RBC Count : 4.26 M/uL  Hemoglobin : 12.0 g/dL  Hematocrit : 37.5 %  Platelet Count - Automated : 171 K/uL  Mean Cell Volume : 88.0 fl  Mean Cell Hemoglobin : 28.2 pg  Mean Cell Hemoglobin Concentration : 32.0 gm/dL  Auto Neutrophil # : 10.63 K/uL  Auto Lymphocyte # : 1.25 K/uL  Auto Monocyte # : 0.42 K/uL  Auto Eosinophil # : 0.17 K/uL  Auto Basophil # : 0.09 K/uL  Auto Neutrophil % : 84.2 %  Auto Lymphocyte % : 9.9 %  Auto Monocyte % : 3.3 %  Auto Eosinophil % : 1.3 %  Auto Basophil % : 0.7 %      ESR:                   03-10 @ 07:43  --    C-Reactive Protein:     03-10 @ 07:43  --    Procalcitonin:           03-10 @ 07:43   <0.05  ESR:                   03-10 @ 07:42  --    C-Reactive Protein:     03-10 @ 07:42  --    Procalcitonin:           03-10 @ 07:42   <0.05      03-10    139  |  109<H>  |  56<H>  ----------------------------<  240<H>  4.7   |  23  |  1.60<H>    Ca    8.6      10 Mar 2022 07:43  Phos  4.1     03-10  Mg     2.2     03-10    TPro  7.0  /  Alb  3.1<L>  /  TBili  0.5  /  DBili  x   /  AST  12<L>  /  ALT  19  /  AlkPhos  77  03-10      Rapid Respiratory Viral Panel Result        03-09 @ 08:06  [facility]  Coronovirus --  Adenovirus --  Bordetella Pertussis --  Chlamydia Pneumonia --  Entero/Rhinovirus--  HKU1 Coronovirus --  HMPV Coronovirus --  Influenza A --  Influenza AH1 --  Influenza AH1 2009 --  Influenza AH3 --  Influenza B --  Mycoplasma Pneumoniae --  NL63 Coronovirus --  OC43 Coronovirus --  Parainfluenza 1 --  Parainfluenza 2 --  Parainfluenza 3 --  Parainfluenza 4 --  Resp Syncytial Virus --      Assessment and Plan:          Lazaro De La Rosa MD   [phone].  She is afebrile  On RA with no hypoxia  Feeling better.      MEDICATIONS  (STANDING):  ALBUTerol    90 MICROgram(s) HFA Inhaler 2 Puff(s) Inhalation every 6 hours  ALPRAZolam 0.5 milliGRAM(s) Oral at bedtime  artificial  tears Solution 1 Drop(s) Both EYES two times a day  aspirin enteric coated 81 milliGRAM(s) Oral daily  atorvastatin 80 milliGRAM(s) Oral at bedtime  budesonide 160 MICROgram(s)/formoterol 4.5 MICROgram(s) Inhaler 2 Puff(s) Inhalation two times a day  cholecalciferol 2000 Unit(s) Oral daily  clopidogrel Tablet 75 milliGRAM(s) Oral daily  dextrose 40% Gel 15 Gram(s) Oral once  dextrose 5%. 1000 milliLiter(s) (50 mL/Hr) IV Continuous <Continuous>  dextrose 5%. 1000 milliLiter(s) (100 mL/Hr) IV Continuous <Continuous>  dextrose 50% Injectable 25 Gram(s) IV Push once  dextrose 50% Injectable 12.5 Gram(s) IV Push once  dextrose 50% Injectable 25 Gram(s) IV Push once  escitalopram 10 milliGRAM(s) Oral daily  gabapentin 100 milliGRAM(s) Oral three times a day  glucagon  Injectable 1 milliGRAM(s) IntraMuscular once  heparin   Injectable 5000 Unit(s) SubCutaneous every 12 hours  insulin lispro (ADMELOG) corrective regimen sliding scale   SubCutaneous at bedtime  insulin lispro (ADMELOG) corrective regimen sliding scale   SubCutaneous three times a day before meals  isosorbide   mononitrate ER Tablet (IMDUR) 30 milliGRAM(s) Oral daily  lactobacillus acidophilus 1 Tablet(s) Oral two times a day  levothyroxine 125 MICROGram(s) Oral <User Schedule>  metoprolol succinate ER 50 milliGRAM(s) Oral daily    MEDICATIONS  (PRN):  acetaminophen     Tablet .. 650 milliGRAM(s) Oral every 6 hours PRN Temp greater or equal to 38C (100.4F), Mild Pain (1 - 3)  aluminum hydroxide/magnesium hydroxide/simethicone Suspension 30 milliLiter(s) Oral every 4 hours PRN Dyspepsia  melatonin 3 milliGRAM(s) Oral at bedtime PRN Insomnia  ondansetron Injectable 4 milliGRAM(s) IV Push every 8 hours PRN Nausea and/or Vomiting  traMADol 50 milliGRAM(s) Oral three times a day PRN Moderate Pain (4 - 6)      Vital Signs Last 24 Hrs  T(C): 36.6 (10 Mar 2022 12:44), Max: 36.7 (09 Mar 2022 20:52)  T(F): 97.8 (10 Mar 2022 12:44), Max: 98.1 (09 Mar 2022 20:52)  HR: 78 (10 Mar 2022 12:44) (77 - 95)  BP: 159/74 (10 Mar 2022 12:44) (143/80 - 178/68)  BP(mean): --  RR: 18 (10 Mar 2022 12:44) (17 - 18)  SpO2: 92% (10 Mar 2022 14:24) (92% - 94%)    I&O's Summary    09 Mar 2022 07:01  -  10 Mar 2022 07:00  --------------------------------------------------------  IN: 0 mL / OUT: 800 mL / NET: -800 mL    10 Mar 2022 07:01  -  10 Mar 2022 19:06  --------------------------------------------------------  IN: 0 mL / OUT: 700 mL / NET: -700 mL        PHYSICAL EXAM:  HEENT: NC/AT; PERRLA  Neck: Soft; no tenderness  Lungs: Coarse BS bilaterally; no wheezing.   Heart: RRR, no murmurs.   Abdomen: Soft, no tenderness.   Genital/ Rectal: No torres.   Extremities: No edema or ulcers.   Neurologic: Awake.      LABORATORY:    CBC Full  -  ( 10 Mar 2022 07:42 )  WBC Count : 12.64 K/uL  RBC Count : 4.26 M/uL  Hemoglobin : 12.0 g/dL  Hematocrit : 37.5 %  Platelet Count - Automated : 171 K/uL  Mean Cell Volume : 88.0 fl  Mean Cell Hemoglobin : 28.2 pg  Mean Cell Hemoglobin Concentration : 32.0 gm/dL  Auto Neutrophil # : 10.63 K/uL  Auto Lymphocyte # : 1.25 K/uL  Auto Monocyte # : 0.42 K/uL  Auto Eosinophil # : 0.17 K/uL  Auto Basophil # : 0.09 K/uL  Auto Neutrophil % : 84.2 %  Auto Lymphocyte % : 9.9 %  Auto Monocyte % : 3.3 %  Auto Eosinophil % : 1.3 %  Auto Basophil % : 0.7 %      ESR:                   03-10 @ 07:43  --    C-Reactive Protein:     03-10 @ 07:43  --    Procalcitonin:           03-10 @ 07:43   <0.05  ESR:                   03-10 @ 07:42  --    C-Reactive Protein:     03-10 @ 07:42  --    Procalcitonin:           03-10 @ 07:42   <0.05      03-10    139  |  109<H>  |  56<H>  ----------------------------<  240<H>  4.7   |  23  |  1.60<H>    Ca    8.6      10 Mar 2022 07:43  Phos  4.1     03-10  Mg     2.2     03-10    TPro  7.0  /  Alb  3.1<L>  /  TBili  0.5  /  DBili  x   /  AST  12<L>  /  ALT  19  /  AlkPhos  77  03-10        Assessment and Plan:      1. COPD exacerbation.  2. Leukocytosis.    . No active infections noted. Leukocytosis was likely due to COPD exacerbation/steroid. Monitor off antibiotics.. WBC is trending down.   . Continue IV Solumedrol as per pulmonary.  . Supportive care.        Lazaro De La Rosa MD   [phone].

## 2022-03-10 NOTE — SWALLOW BEDSIDE ASSESSMENT ADULT - ASR SWALLOW REFERRAL
Consider ENT consult due to hoarse vocal quality
Consider ENT consult to further assess laryngeal function if concern for abovementioned dysphonia/ENT

## 2022-03-10 NOTE — DIETITIAN INITIAL EVALUATION ADULT. - OTHER INFO
88 y/o female adm with left lower lobe pneumonia. PMH MI, hypothyroidism, HLD, HTN, CAD, asthma, DM, GERD, high chol, HF. Pt visited at bedside this am. Pt reports appetite is good, tolerating meals. No problems chewing or swallowing. Allergic to shellfish. SLP eval completed on 3/9 which rx soft bite sized. Elevated POCT gluc noted, h/o DM, on solumedrol.

## 2022-03-10 NOTE — SWALLOW BEDSIDE ASSESSMENT ADULT - SWALLOW EVAL: RECOMMENDED FEEDING/EATING TECHNIQUES
alternate food with liquid/maintain upright posture during/after eating for 30 mins/no straws/oral hygiene/position upright (90 degrees)/small sips/bites
allow for swallow between intakes/maintain upright posture during/after eating for 30 mins/oral hygiene/position upright (90 degrees)/small sips/bites

## 2022-03-10 NOTE — SWALLOW BEDSIDE ASSESSMENT ADULT - SWALLOW EVAL: DIAGNOSIS
1. Patient demonstrates a functional oral phase for pureed, moderately thick, mildly thick, and thin liquids marked by timely collection, transfer, and transport. 2. Patient demonstrates a mild oral dysphagia for regular solids marked by prolonged mastication resulting in delayed collection, transfer, and transport. Trace stasis observed post swallow, which reduced with liquid wash. 3. Patient demonstrates a mild pharyngeal dysphagia for pureed, regular solids, moderately thick, mildly thick, and thin liquids marked by suspected delayed pharyngeal swallow trigger and hyolaryngeal elevation noted by digital palpation without evidence of airway penetration/aspiration. 4. Recommend soft & bite-sized and thin liquids, with aspiration precautions, as tolerated. Facilitate upright position, slow pacing, single/small bites/sips, and alternate solids with liquids. Continue to monitor and notify SLP if changes occur.
Oral stage WFL for puree and thin liquids.  Mild oral dysphagia with soft & bite sized, easy to chew and regular solids marked by increased mastication time and increased oral transit time.  Pharyngeal stage deemed WFL, no overt s/s penetration/aspiration.

## 2022-03-10 NOTE — SWALLOW BEDSIDE ASSESSMENT ADULT - ADDITIONAL RECOMMENDATIONS
Speech to follow to check PO tolerance and reassess at bedside x1.
This department will continue to follow the patient for diet tolerance/advancement during this admission, as schedule permits.

## 2022-03-10 NOTE — DIETITIAN INITIAL EVALUATION ADULT. - PERTINENT LABORATORY DATA
03-10 Na 139 mmol/L Glu 240 mg/dL<H> K+ 4.7 mmol/L Cr  1.60 mg/dL<H> BUN 56 mg/dL<H> Phos 4.1 mg/dL Alb 3.1 g/dL<L> PAB n/a   Hgb n/a   Hct n/a

## 2022-03-10 NOTE — PROGRESS NOTE ADULT - SUBJECTIVE AND OBJECTIVE BOX
PROGRESS NOTE-late entry ,seen 03/09/22  Patient is a 87y old  Female who presents with a chief complaint of   Seen with  during rounds Son was at bedside   OVERNIGHT  No new issues reported by medical staff . All above noted   Patient is resting in a bed comfortably .Confused ,poor mentation .No distress noted   HPI:    PAST MEDICAL & SURGICAL HISTORY:  Pure hypercholesterolemia    Diabetes    Asthma    HTN (hypertension)    HLD (hyperlipidemia)    Hypothyroid    NSTEMI (non-ST elevated myocardial infarction)    Uncomplicated asthma, unspecified asthma severity    DM2 (diabetes mellitus, type 2)    Essential hypertension    Gastroesophageal reflux disease without esophagitis    CAD (coronary artery disease)    Hypothyroidism, unspecified type    History of appendectomy    History of appendectomy    Abnormal findings on cardiac catheterization  Cardiac Cath        MEDICATIONS  (STANDING):  ALBUTerol    90 MICROgram(s) HFA Inhaler 2 Puff(s) Inhalation every 6 hours  ALPRAZolam 0.5 milliGRAM(s) Oral at bedtime  artificial  tears Solution 1 Drop(s) Both EYES two times a day  aspirin enteric coated 81 milliGRAM(s) Oral daily  atorvastatin 80 milliGRAM(s) Oral at bedtime  budesonide 160 MICROgram(s)/formoterol 4.5 MICROgram(s) Inhaler 2 Puff(s) Inhalation two times a day  cholecalciferol 2000 Unit(s) Oral daily  clopidogrel Tablet 75 milliGRAM(s) Oral daily  dextrose 40% Gel 15 Gram(s) Oral once  dextrose 5%. 1000 milliLiter(s) (50 mL/Hr) IV Continuous <Continuous>  dextrose 5%. 1000 milliLiter(s) (100 mL/Hr) IV Continuous <Continuous>  dextrose 50% Injectable 25 Gram(s) IV Push once  dextrose 50% Injectable 12.5 Gram(s) IV Push once  dextrose 50% Injectable 25 Gram(s) IV Push once  escitalopram 10 milliGRAM(s) Oral daily  gabapentin 100 milliGRAM(s) Oral three times a day  glucagon  Injectable 1 milliGRAM(s) IntraMuscular once  heparin   Injectable 5000 Unit(s) SubCutaneous every 12 hours  insulin lispro (ADMELOG) corrective regimen sliding scale   SubCutaneous three times a day before meals  insulin lispro (ADMELOG) corrective regimen sliding scale   SubCutaneous at bedtime  insulin lispro Injectable (ADMELOG) 3 Unit(s) SubCutaneous three times a day before meals  isosorbide   mononitrate ER Tablet (IMDUR) 30 milliGRAM(s) Oral daily  lactobacillus acidophilus 1 Tablet(s) Oral two times a day  levothyroxine 125 MICROGram(s) Oral <User Schedule>  methylPREDNISolone sodium succinate Injectable 20 milliGRAM(s) IV Push daily  metoprolol succinate ER 50 milliGRAM(s) Oral daily    MEDICATIONS  (PRN):  acetaminophen     Tablet .. 650 milliGRAM(s) Oral every 6 hours PRN Temp greater or equal to 38C (100.4F), Mild Pain (1 - 3)  aluminum hydroxide/magnesium hydroxide/simethicone Suspension 30 milliLiter(s) Oral every 4 hours PRN Dyspepsia  melatonin 3 milliGRAM(s) Oral at bedtime PRN Insomnia  ondansetron Injectable 4 milliGRAM(s) IV Push every 8 hours PRN Nausea and/or Vomiting  traMADol 50 milliGRAM(s) Oral three times a day PRN Moderate Pain (4 - 6)      OBJECTIVE    T(C): 36.7 (03-11-22 @ 12:06), Max: 36.7 (03-10-22 @ 21:14)  HR: 69 (03-11-22 @ 12:06) (66 - 74)  BP: 165/84 (03-11-22 @ 12:06) (146/79 - 165/84)  RR: 18 (03-11-22 @ 12:06) (18 - 18)  SpO2: 93% (03-11-22 @ 12:06) (93% - 94%)  Wt(kg): --  I&O's Summary    10 Mar 2022 07:01  -  11 Mar 2022 07:00  --------------------------------------------------------  IN: 0 mL / OUT: 800 mL / NET: -800 mL          REVIEW OF SYSTEMS:  CONSTITUTIONAL: No fever, weight loss, or fatigue  EYES: No eye pain, visual disturbances, or discharge  ENMT:   No sinus or throat pain  NECK: No pain or stiffness  RESPIRATORY: No cough, wheezing, chills or hemoptysis; No shortness of breath  CARDIOVASCULAR: No chest pain, palpitations, dizziness, or leg swelling  GASTROINTESTINAL: No abdominal pain. No nausea, vomiting; No diarrhea or constipation. No melena or hematochezia.  GENITOURINARY: No dysuria, frequency, hematuria, or incontinence  NEUROLOGICAL: No headaches, memory loss, loss of strength, numbness, or tremors  SKIN: No itching, burning, rashes, or lesions   MUSCULOSKELETAL: No joint pain or swelling; No muscle, back, or extremity pain    PHYSICAL EXAM:  Appearance: NAD. VS past 24 hrs -as above   HEENT:   Moist oral mucosa. Conjunctiva clear b/l.   Neck : supple  Respiratory: Lungs CTAB.  Gastrointestinal:  Soft, nontender. No rebound. No rigidity. BS present	  Cardiovascular: RRR ,S1S2 present  Neurologic: Non-focal. Moving all extremities.  Extremities: No edema. No erythema. No calf tenderness.  Skin: No rashes, No ecchymoses, No cyanosis.	  wounds ,skin lesions-See skin assesment flow sheet   LABS:                        12.9   14.73 )-----------( 182      ( 11 Mar 2022 07:54 )             39.4     03-11    139  |  108  |  62<H>  ----------------------------<  284<H>  5.0   |  25  |  1.70<H>    Ca    9.2      11 Mar 2022 07:54  Phos  4.1     03-10  Mg     2.2     03-10    TPro  7.0  /  Alb  3.1<L>  /  TBili  0.5  /  DBili  x   /  AST  12<L>  /  ALT  19  /  AlkPhos  77  03-10    CAPILLARY BLOOD GLUCOSE      POCT Blood Glucose.: 450 mg/dL (11 Mar 2022 11:46)  POCT Blood Glucose.: 447 mg/dL (11 Mar 2022 11:44)  POCT Blood Glucose.: 288 mg/dL (11 Mar 2022 08:01)  POCT Blood Glucose.: 305 mg/dL (10 Mar 2022 21:24)  POCT Blood Glucose.: 302 mg/dL (10 Mar 2022 17:04)    PT/INR - ( 10 Mar 2022 07:42 )   PT: 12.5 sec;   INR: 1.07 ratio               Culture - Blood (collected 09 Mar 2022 08:30)  Source: .Blood Blood-Peripheral  Preliminary Report (10 Mar 2022 09:02):    No growth to date.    Culture - Blood (collected 09 Mar 2022 08:30)  Source: .Blood Blood-Peripheral  Preliminary Report (10 Mar 2022 09:02):    No growth to date.      RADIOLOGY & ADDITIONAL TESTS:   reviewed elctronically  ASSESSMENT/PLAN: 	  25 minutes aggregate time was spent on this visit, 50% visit time spent in care co-ordination with other attendings and counselling patient .I have discussed care plan with patient / HCP/family member  son at bedside ,who expressed understanding of problems treatment and their effect and side effects, alternatives in details. I have asked if they have any questions and concerns and appropriately addressed them to best of my ability. Advance care planning was discussed , pallitaive care issues ,CMO ,hospice levels of care were discussed in details , forms ,advance directives were reviewed .All questions were answered to the best of my knowledge .Additional 25 min spent.

## 2022-03-10 NOTE — DIETITIAN INITIAL EVALUATION ADULT. - PERTINENT MEDS FT
MEDICATIONS  (STANDING):  ALBUTerol    90 MICROgram(s) HFA Inhaler 2 Puff(s) Inhalation every 6 hours  ALPRAZolam 0.5 milliGRAM(s) Oral at bedtime  artificial  tears Solution 1 Drop(s) Both EYES two times a day  aspirin enteric coated 81 milliGRAM(s) Oral daily  atorvastatin 80 milliGRAM(s) Oral at bedtime  budesonide 160 MICROgram(s)/formoterol 4.5 MICROgram(s) Inhaler 2 Puff(s) Inhalation two times a day  cholecalciferol 2000 Unit(s) Oral daily  clopidogrel Tablet 75 milliGRAM(s) Oral daily  dextrose 40% Gel 15 Gram(s) Oral once  dextrose 5%. 1000 milliLiter(s) (100 mL/Hr) IV Continuous <Continuous>  dextrose 5%. 1000 milliLiter(s) (50 mL/Hr) IV Continuous <Continuous>  dextrose 50% Injectable 25 Gram(s) IV Push once  dextrose 50% Injectable 12.5 Gram(s) IV Push once  dextrose 50% Injectable 25 Gram(s) IV Push once  escitalopram 10 milliGRAM(s) Oral daily  gabapentin 100 milliGRAM(s) Oral three times a day  glucagon  Injectable 1 milliGRAM(s) IntraMuscular once  heparin   Injectable 5000 Unit(s) SubCutaneous every 12 hours  insulin lispro (ADMELOG) corrective regimen sliding scale   SubCutaneous three times a day before meals  insulin lispro (ADMELOG) corrective regimen sliding scale   SubCutaneous at bedtime  isosorbide   mononitrate ER Tablet (IMDUR) 30 milliGRAM(s) Oral daily  lactobacillus acidophilus 1 Tablet(s) Oral two times a day  levothyroxine 125 MICROGram(s) Oral <User Schedule>  metoprolol succinate ER 50 milliGRAM(s) Oral daily    MEDICATIONS  (PRN):  acetaminophen     Tablet .. 650 milliGRAM(s) Oral every 6 hours PRN Temp greater or equal to 38C (100.4F), Mild Pain (1 - 3)  aluminum hydroxide/magnesium hydroxide/simethicone Suspension 30 milliLiter(s) Oral every 4 hours PRN Dyspepsia  melatonin 3 milliGRAM(s) Oral at bedtime PRN Insomnia  ondansetron Injectable 4 milliGRAM(s) IV Push every 8 hours PRN Nausea and/or Vomiting  traMADol 50 milliGRAM(s) Oral three times a day PRN Moderate Pain (4 - 6)

## 2022-03-10 NOTE — PROGRESS NOTE ADULT - SUBJECTIVE AND OBJECTIVE BOX
Patient is a 87y Female with a known history of :  Uncomplicated asthma, unspecified asthma severity [J45.909]    DM2 (diabetes mellitus, type 2) [E11.9]    Essential hypertension [I10]    Hypothyroidism, unspecified type [E03.9]    Gastroesophageal reflux disease without esophagitis [K21.9]    CAD (coronary artery disease) [I25.10]    Hypothyroidism, unspecified type [E03.9]    COPD exacerbation [J44.1]    Chronic diastolic congestive heart failure [I50.32]    Prophylactic measure [Z29.9]      HPI:      REVIEW OF SYSTEMS:    CONSTITUTIONAL: No fever, weight loss, or fatigue  EYES: No eye pain, visual disturbances, or discharge  ENMT:  No difficulty hearing, tinnitus, vertigo; No sinus or throat pain  NECK: No pain or stiffness  BREASTS: No pain, masses, or nipple discharge  RESPIRATORY: No cough, wheezing, chills or hemoptysis; No shortness of breath  CARDIOVASCULAR: No chest pain, palpitations, dizziness, or leg swelling  GASTROINTESTINAL: No abdominal or epigastric pain. No nausea, vomiting, or hematemesis; No diarrhea or constipation. No melena or hematochezia.  GENITOURINARY: No dysuria, frequency, hematuria, or incontinence  NEUROLOGICAL: No headaches, memory loss, loss of strength, numbness, or tremors  SKIN: No itching, burning, rashes, or lesions   LYMPH NODES: No enlarged glands  ENDOCRINE: No heat or cold intolerance; No hair loss  MUSCULOSKELETAL: No joint pain or swelling; No muscle, back, or extremity pain  PSYCHIATRIC: No depression, anxiety, mood swings, or difficulty sleeping  HEME/LYMPH: No easy bruising, or bleeding gums  ALLERGY AND IMMUNOLOGIC: No hives or eczema    MEDICATIONS  (STANDING):  ALBUTerol    90 MICROgram(s) HFA Inhaler 2 Puff(s) Inhalation every 6 hours  ALPRAZolam 0.5 milliGRAM(s) Oral at bedtime  artificial  tears Solution 1 Drop(s) Both EYES two times a day  aspirin enteric coated 81 milliGRAM(s) Oral daily  atorvastatin 80 milliGRAM(s) Oral at bedtime  budesonide 160 MICROgram(s)/formoterol 4.5 MICROgram(s) Inhaler 2 Puff(s) Inhalation two times a day  cholecalciferol 2000 Unit(s) Oral daily  clopidogrel Tablet 75 milliGRAM(s) Oral daily  dextrose 40% Gel 15 Gram(s) Oral once  dextrose 5%. 1000 milliLiter(s) (50 mL/Hr) IV Continuous <Continuous>  dextrose 5%. 1000 milliLiter(s) (100 mL/Hr) IV Continuous <Continuous>  dextrose 50% Injectable 25 Gram(s) IV Push once  dextrose 50% Injectable 12.5 Gram(s) IV Push once  dextrose 50% Injectable 25 Gram(s) IV Push once  escitalopram 10 milliGRAM(s) Oral daily  gabapentin 100 milliGRAM(s) Oral three times a day  glucagon  Injectable 1 milliGRAM(s) IntraMuscular once  heparin   Injectable 5000 Unit(s) SubCutaneous every 12 hours  insulin lispro (ADMELOG) corrective regimen sliding scale   SubCutaneous at bedtime  insulin lispro (ADMELOG) corrective regimen sliding scale   SubCutaneous three times a day before meals  isosorbide   mononitrate ER Tablet (IMDUR) 30 milliGRAM(s) Oral daily  lactobacillus acidophilus 1 Tablet(s) Oral two times a day  levothyroxine 125 MICROGram(s) Oral <User Schedule>  metoprolol succinate ER 50 milliGRAM(s) Oral daily    MEDICATIONS  (PRN):  acetaminophen     Tablet .. 650 milliGRAM(s) Oral every 6 hours PRN Temp greater or equal to 38C (100.4F), Mild Pain (1 - 3)  aluminum hydroxide/magnesium hydroxide/simethicone Suspension 30 milliLiter(s) Oral every 4 hours PRN Dyspepsia  melatonin 3 milliGRAM(s) Oral at bedtime PRN Insomnia  ondansetron Injectable 4 milliGRAM(s) IV Push every 8 hours PRN Nausea and/or Vomiting  traMADol 50 milliGRAM(s) Oral three times a day PRN Moderate Pain (4 - 6)      ALLERGIES: doxycycline (Unknown)  iodine (Hives)  iodine containing compounds (Unknown)  shellfish (Anaphylaxis)  shellfish (Swelling; Short breath)      FAMILY HISTORY:  Family history of heart disease    Family history of cancer in mother    Family history of MI (myocardial infarction)    Family history of stomach cancer        PHYSICAL EXAMINATION:  -----------------------------  T(C): 36.4 (03-10-22 @ 05:10), Max: 37.4 (03-09-22 @ 10:17)  HR: 86 (03-10-22 @ 05:10) (82 - 99)  BP: 145/71 (03-10-22 @ 05:10) (126/65 - 157/55)  RR: 17 (03-10-22 @ 05:10) (17 - 18)  SpO2: 94% (03-10-22 @ 05:10) (93% - 95%)  Wt(kg): --    03-09 @ 07:01  -  03-10 @ 07:00  --------------------------------------------------------  IN:  Total IN: 0 mL    OUT:    Voided (mL): 800 mL  Total OUT: 800 mL    Total NET: -800 mL            VITALS  T(C): 36.4 (03-10-22 @ 05:10), Max: 37.4 (03-09-22 @ 10:17)  HR: 86 (03-10-22 @ 05:10) (82 - 99)  BP: 145/71 (03-10-22 @ 05:10) (126/65 - 157/55)  RR: 17 (03-10-22 @ 05:10) (17 - 18)  SpO2: 94% (03-10-22 @ 05:10) (93% - 95%)    Constitutional: well developed, normal appearance, well groomed, well nourished, no deformities and no acute distress.   Eyes: the conjunctiva exhibited no abnormalities and the eyelids demonstrated no xanthelasmas.   HEENT: normal oral mucosa, no oral pallor and no oral cyanosis.   Neck: normal jugular venous A waves present, normal jugular venous V waves present and no jugular venous wagner A waves.   Pulmonary: no respiratory distress, normal respiratory rhythm and effort, no accessory muscle use and lungs were clear to auscultation bilaterally.   Cardiovascular: heart rate and rhythm were normal, normal S1 and S2 and no murmur, gallop, rub, heave or thrill are present.   Abdomen: soft, non-tender, no hepato-splenomegaly and no abdominal mass palpated.   Musculoskeletal: the gait could not be assessed..   Extremities: no clubbing of the fingernails, no localized cyanosis, no petechial hemorrhages and no ischemic changes.   Skin: normal skin color and pigmentation, no rash, no venous stasis, no skin lesions, no skin ulcer and no xanthoma was observed.   Psychiatric: oriented to person, place, and time, the affect was normal, the mood was normal and not feeling anxious.     LABS:   --------  03-10    139  |  109<H>  |  56<H>  ----------------------------<  240<H>  4.7   |  23  |  1.60<H>    Ca    8.6      10 Mar 2022 07:43    TPro  7.0  /  Alb  3.1<L>  /  TBili  0.5  /  DBili  x   /  AST  12<L>  /  ALT  19  /  AlkPhos  77  03-10                         12.0   12.64 )-----------( 171      ( 10 Mar 2022 07:42 )             37.5     PT/INR - ( 10 Mar 2022 07:42 )   PT: 12.5 sec;   INR: 1.07 ratio         PTT - ( 09 Mar 2022 06:05 )  PTT:28.9 sec            RADIOLOGY:  -----------------    ECG:     ECHO:

## 2022-03-10 NOTE — PROGRESS NOTE ADULT - SUBJECTIVE AND OBJECTIVE BOX
Date/Time Patient Seen:  		  Referring MD:   Data Reviewed	       Patient is a 87y old  Female who presents with a chief complaint of     Subjective/HPI     PAST MEDICAL & SURGICAL HISTORY:  Uncomplicated asthma, unspecified asthma severity    DM2 (diabetes mellitus, type 2)    Essential hypertension    Hypothyroidism, unspecified type    Pure hypercholesterolemia    Gastroesophageal reflux disease without esophagitis    Diabetes    Asthma    CAD (coronary artery disease)    HTN (hypertension)    HLD (hyperlipidemia)    Hypothyroid    NSTEMI (non-ST elevated myocardial infarction)    No significant past surgical history    History of appendectomy    History of appendectomy    Abnormal findings on cardiac catheterization  Cardiac Cath          Medication list         MEDICATIONS  (STANDING):  ALBUTerol    90 MICROgram(s) HFA Inhaler 2 Puff(s) Inhalation every 6 hours  ALPRAZolam 0.5 milliGRAM(s) Oral at bedtime  artificial  tears Solution 1 Drop(s) Both EYES two times a day  aspirin enteric coated 81 milliGRAM(s) Oral daily  atorvastatin 80 milliGRAM(s) Oral at bedtime  budesonide 160 MICROgram(s)/formoterol 4.5 MICROgram(s) Inhaler 2 Puff(s) Inhalation two times a day  cholecalciferol 2000 Unit(s) Oral daily  clopidogrel Tablet 75 milliGRAM(s) Oral daily  dextrose 40% Gel 15 Gram(s) Oral once  dextrose 5%. 1000 milliLiter(s) (50 mL/Hr) IV Continuous <Continuous>  dextrose 5%. 1000 milliLiter(s) (100 mL/Hr) IV Continuous <Continuous>  dextrose 50% Injectable 25 Gram(s) IV Push once  dextrose 50% Injectable 12.5 Gram(s) IV Push once  dextrose 50% Injectable 25 Gram(s) IV Push once  escitalopram 10 milliGRAM(s) Oral daily  gabapentin 100 milliGRAM(s) Oral three times a day  glucagon  Injectable 1 milliGRAM(s) IntraMuscular once  heparin   Injectable 5000 Unit(s) SubCutaneous every 12 hours  insulin lispro (ADMELOG) corrective regimen sliding scale   SubCutaneous three times a day before meals  insulin lispro (ADMELOG) corrective regimen sliding scale   SubCutaneous at bedtime  isosorbide   mononitrate ER Tablet (IMDUR) 30 milliGRAM(s) Oral daily  lactobacillus acidophilus 1 Tablet(s) Oral two times a day  levothyroxine 125 MICROGram(s) Oral <User Schedule>  methylPREDNISolone sodium succinate Injectable 40 milliGRAM(s) IV Push daily  metoprolol succinate ER 50 milliGRAM(s) Oral daily    MEDICATIONS  (PRN):  acetaminophen     Tablet .. 650 milliGRAM(s) Oral every 6 hours PRN Temp greater or equal to 38C (100.4F), Mild Pain (1 - 3)  aluminum hydroxide/magnesium hydroxide/simethicone Suspension 30 milliLiter(s) Oral every 4 hours PRN Dyspepsia  melatonin 3 milliGRAM(s) Oral at bedtime PRN Insomnia  ondansetron Injectable 4 milliGRAM(s) IV Push every 8 hours PRN Nausea and/or Vomiting  traMADol 50 milliGRAM(s) Oral three times a day PRN Moderate Pain (4 - 6)         Vitals log        ICU Vital Signs Last 24 Hrs  T(C): 36.4 (10 Mar 2022 05:10), Max: 37.4 (09 Mar 2022 10:17)  T(F): 97.6 (10 Mar 2022 05:10), Max: 99.4 (09 Mar 2022 10:17)  HR: 86 (10 Mar 2022 05:10) (82 - 99)  BP: 145/71 (10 Mar 2022 05:10) (126/65 - 157/55)  BP(mean): --  ABP: --  ABP(mean): --  RR: 17 (10 Mar 2022 05:10) (17 - 18)  SpO2: 94% (10 Mar 2022 05:10) (93% - 95%)           Input and Output:  I&O's Detail    09 Mar 2022 07:01  -  10 Mar 2022 07:00  --------------------------------------------------------  IN:  Total IN: 0 mL    OUT:    Voided (mL): 800 mL  Total OUT: 800 mL    Total NET: -800 mL          Lab Data                        12.0   12.64 )-----------( 171      ( 10 Mar 2022 07:42 )             37.5     03-10    139  |  109<H>  |  56<H>  ----------------------------<  240<H>  4.7   |  23  |  1.60<H>    Ca    8.6      10 Mar 2022 07:43    TPro  7.0  /  Alb  3.1<L>  /  TBili  0.5  /  DBili  x   /  AST  12<L>  /  ALT  19  /  AlkPhos  77  03-10      CARDIAC MARKERS ( 09 Mar 2022 06:05 )  x     / x     / 84 U/L / x     / 2.9 ng/mL        Review of Systems	      Objective     Physical Examination    heart s1s2  lung dec BS  abd soft    Pertinent Lab findings & Imaging      Chong:  NO   Adequate UO     I&O's Detail    09 Mar 2022 07:01  -  10 Mar 2022 07:00  --------------------------------------------------------  IN:  Total IN: 0 mL    OUT:    Voided (mL): 800 mL  Total OUT: 800 mL    Total NET: -800 mL               Discussed with:     Cultures:	        Radiology

## 2022-03-10 NOTE — PROGRESS NOTE ADULT - SUBJECTIVE AND OBJECTIVE BOX
FLOYD JEANNINE    [facility] 2NOR 229 W1    Allergies    doxycycline (Unknown)  iodine (Hives)  iodine containing compounds (Unknown)  shellfish (Anaphylaxis)  shellfish (Swelling; Short breath)    Intolerances        PAST MEDICAL & SURGICAL HISTORY:  Uncomplicated asthma, unspecified asthma severity    DM2 (diabetes mellitus, type 2)    Essential hypertension    Hypothyroidism, unspecified type    Pure hypercholesterolemia    Gastroesophageal reflux disease without esophagitis    Diabetes    Asthma    CAD (coronary artery disease)    HTN (hypertension)    HLD (hyperlipidemia)    Hypothyroid    NSTEMI (non-ST elevated myocardial infarction)    History of appendectomy    History of appendectomy    Abnormal findings on cardiac catheterization  Cardiac Cath        FAMILY HISTORY:  Family history of heart disease    Family history of cancer in mother    Family history of MI (myocardial infarction)    Family history of stomach cancer        Home Medications:  acetaminophen 500 mg oral tablet: 2 tab(s) orally 3 times a day (09 Mar 2022 08:41)  ALPRAZolam 0.5 mg oral tablet: 1 tab(s) orally once a day (at bedtime) (09 Mar 2022 08:41)  aspirin 81 mg oral tablet: 1 tab(s) orally once a day (09 Mar 2022 08:41)  atorvastatin 80 mg oral tablet: 1 tab(s) orally once a day (09 Mar 2022 08:41)  clopidogrel 75 mg oral tablet: 1 tab(s) orally once a day (09 Mar 2022 08:41)  escitalopram 10 mg oral tablet: 1 tab(s) orally once a day (09 Mar 2022 08:41)  fluticasone-salmeterol 250 mcg-50 mcg inhalation powder: 1 inhaler(s) inhaled 2 times a day (09 Mar 2022 08:41)  furosemide 40 mg oral tablet: 1 tab(s) orally once a day (09 Mar 2022 08:41)  gabapentin 100 mg oral capsule: 1 cap(s) orally 3 times a day (09 Mar 2022 08:41)  ipratropium-albuterol 0.5 mg-2.5 mg/3 mL inhalation solution: 3 milliliter(s) inhaled every 6 hours, As Needed (09 Mar 2022 08:41)  ipratropium-albuterol 0.5 mg-2.5 mg/3 mL inhalation solution: 3 milliliter(s) inhaled 2 times a day (09 Mar 2022 08:41)  isosorbide mononitrate 30 mg oral tablet, extended release: 1 tab(s) orally once a day (in the morning) (09 Mar 2022 08:41)  Lantus 100 units/mL subcutaneous solution: 18 unit(s) subcutaneous 2 times a day (09 Mar 2022 08:41)  levothyroxine 125 mcg (0.125 mg) oral tablet: 1 tab(s) orally Monday through Friday (09 Mar 2022 08:41)  Metoprolol Succinate ER 50 mg oral tablet, extended release: 1 tab(s) orally once a day (09 Mar 2022 08:41)  NovoLOG FlexPen 100 units/mL injectable solution: 15 unit(s) subcutaneous before meals  (09 Mar 2022 08:41)  potassium chloride 10 mEq oral capsule, extended release: 1 cap(s) orally once a day (09 Mar 2022 08:41)  ProAir HFA 90 mcg/inh inhalation aerosol: 2 puff(s) inhaled every 4 hours, As Needed (09 Mar 2022 08:41)  Refresh Relieva ophthalmic solution: 1 drop(s) in each eye every 2 hours, As Needed for Dry eyes (09 Mar 2022 08:41)  Refresh Relieva ophthalmic solution: 1 drop(s) in each eye 2 times a day (09 Mar 2022 08:41)  Vitamin D3 50 mcg (2000 intl units) oral tablet: 1 tab(s) orally once a day (09 Mar 2022 08:41)  ZeaSORB 0.5%-0.22% topical powder: Apply to under bilateral abdominal topically twice a day  (09 Mar 2022 08:41)      MEDICATIONS  (STANDING):  ALBUTerol    90 MICROgram(s) HFA Inhaler 2 Puff(s) Inhalation every 6 hours  ALPRAZolam 0.5 milliGRAM(s) Oral at bedtime  artificial  tears Solution 1 Drop(s) Both EYES two times a day  aspirin enteric coated 81 milliGRAM(s) Oral daily  atorvastatin 80 milliGRAM(s) Oral at bedtime  budesonide 160 MICROgram(s)/formoterol 4.5 MICROgram(s) Inhaler 2 Puff(s) Inhalation two times a day  cholecalciferol 2000 Unit(s) Oral daily  clopidogrel Tablet 75 milliGRAM(s) Oral daily  dextrose 40% Gel 15 Gram(s) Oral once  dextrose 5%. 1000 milliLiter(s) (50 mL/Hr) IV Continuous <Continuous>  dextrose 5%. 1000 milliLiter(s) (100 mL/Hr) IV Continuous <Continuous>  dextrose 50% Injectable 25 Gram(s) IV Push once  dextrose 50% Injectable 12.5 Gram(s) IV Push once  dextrose 50% Injectable 25 Gram(s) IV Push once  escitalopram 10 milliGRAM(s) Oral daily  gabapentin 100 milliGRAM(s) Oral three times a day  glucagon  Injectable 1 milliGRAM(s) IntraMuscular once  heparin   Injectable 5000 Unit(s) SubCutaneous every 12 hours  insulin lispro (ADMELOG) corrective regimen sliding scale   SubCutaneous at bedtime  insulin lispro (ADMELOG) corrective regimen sliding scale   SubCutaneous three times a day before meals  isosorbide   mononitrate ER Tablet (IMDUR) 30 milliGRAM(s) Oral daily  lactobacillus acidophilus 1 Tablet(s) Oral two times a day  levothyroxine 125 MICROGram(s) Oral <User Schedule>  metoprolol succinate ER 50 milliGRAM(s) Oral daily    MEDICATIONS  (PRN):  acetaminophen     Tablet .. 650 milliGRAM(s) Oral every 6 hours PRN Temp greater or equal to 38C (100.4F), Mild Pain (1 - 3)  aluminum hydroxide/magnesium hydroxide/simethicone Suspension 30 milliLiter(s) Oral every 4 hours PRN Dyspepsia  melatonin 3 milliGRAM(s) Oral at bedtime PRN Insomnia  ondansetron Injectable 4 milliGRAM(s) IV Push every 8 hours PRN Nausea and/or Vomiting  traMADol 50 milliGRAM(s) Oral three times a day PRN Moderate Pain (4 - 6)      Diet, Consistent Carbohydrate w/Evening Snack:   DASH/TLC Sodium & Cholesterol Restricted  Soft and Bite Sized (SOFTBTSZ) (03-09-22 @ 12:41) [Active]          Vital Signs Last 24 Hrs  T(C): 36.4 (10 Mar 2022 05:10), Max: 37.4 (09 Mar 2022 10:17)  T(F): 97.6 (10 Mar 2022 05:10), Max: 99.4 (09 Mar 2022 10:17)  HR: 86 (10 Mar 2022 05:10) (82 - 99)  BP: 145/71 (10 Mar 2022 05:10) (143/80 - 157/55)  BP(mean): --  RR: 17 (10 Mar 2022 05:10) (17 - 17)  SpO2: 94% (10 Mar 2022 05:10) (93% - 95%)      03-09-22 @ 07:01  -  03-10-22 @ 07:00  --------------------------------------------------------  IN: 0 mL / OUT: 800 mL / NET: -800 mL              LABS:                        12.0   12.64 )-----------( 171      ( 10 Mar 2022 07:42 )             37.5     03-10    139  |  109<H>  |  56<H>  ----------------------------<  240<H>  4.7   |  23  |  1.60<H>    Ca    8.6      10 Mar 2022 07:43  Phos  4.1     03-10  Mg     2.2     03-10    TPro  7.0  /  Alb  3.1<L>  /  TBili  0.5  /  DBili  x   /  AST  12<L>  /  ALT  19  /  AlkPhos  77  03-10    PT/INR - ( 10 Mar 2022 07:42 )   PT: 12.5 sec;   INR: 1.07 ratio         PTT - ( 09 Mar 2022 06:05 )  PTT:28.9 sec          WBC:  WBC Count: 12.64 K/uL (03-10 @ 07:42)  WBC Count: 13.36 K/uL (03-09 @ 06:05)      MICROBIOLOGY:  RECENT CULTURES:  03-09 .Blood Blood-Peripheral XXXX XXXX   No growth to date.          CARDIAC MARKERS ( 09 Mar 2022 06:05 )  x     / x     / 84 U/L / x     / 2.9 ng/mL        PT/INR - ( 10 Mar 2022 07:42 )   PT: 12.5 sec;   INR: 1.07 ratio         PTT - ( 09 Mar 2022 06:05 )  PTT:28.9 sec    Sodium:  Sodium, Serum: 139 mmol/L (03-10 @ 07:43)  Sodium, Serum: 140 mmol/L (03-09 @ 06:05)      1.60 mg/dL 03-10 @ 07:43  1.70 mg/dL 03-09 @ 06:05      Hemoglobin:  Hemoglobin: 12.0 g/dL (03-10 @ 07:42)  Hemoglobin: 12.5 g/dL (03-09 @ 06:05)      Platelets: Platelet Count - Automated: 171 K/uL (03-10 @ 07:42)  Platelet Count - Automated: 190 K/uL (03-09 @ 06:05)      LIVER FUNCTIONS - ( 10 Mar 2022 07:43 )  Alb: 3.1 g/dL / Pro: 7.0 g/dL / ALK PHOS: 77 U/L / ALT: 19 U/L / AST: 12 U/L / GGT: x                 RADIOLOGY & ADDITIONAL STUDIES:      MICROBIOLOGY:  RECENT CULTURES:  03-09 .Blood Blood-Peripheral XXXX XXXX   No growth to date.

## 2022-03-11 LAB
ANION GAP SERPL CALC-SCNC: 6 MMOL/L — SIGNIFICANT CHANGE UP (ref 5–17)
BUN SERPL-MCNC: 62 MG/DL — HIGH (ref 7–23)
CALCIUM SERPL-MCNC: 9.2 MG/DL — SIGNIFICANT CHANGE UP (ref 8.5–10.1)
CHLORIDE SERPL-SCNC: 108 MMOL/L — SIGNIFICANT CHANGE UP (ref 96–108)
CO2 SERPL-SCNC: 25 MMOL/L — SIGNIFICANT CHANGE UP (ref 22–31)
CREAT SERPL-MCNC: 1.7 MG/DL — HIGH (ref 0.5–1.3)
CULTURE RESULTS: SIGNIFICANT CHANGE UP
EGFR: 29 ML/MIN/1.73M2 — LOW
GLUCOSE SERPL-MCNC: 284 MG/DL — HIGH (ref 70–99)
HCT VFR BLD CALC: 39.4 % — SIGNIFICANT CHANGE UP (ref 34.5–45)
HGB BLD-MCNC: 12.9 G/DL — SIGNIFICANT CHANGE UP (ref 11.5–15.5)
MCHC RBC-ENTMCNC: 28.7 PG — SIGNIFICANT CHANGE UP (ref 27–34)
MCHC RBC-ENTMCNC: 32.7 GM/DL — SIGNIFICANT CHANGE UP (ref 32–36)
MCV RBC AUTO: 87.6 FL — SIGNIFICANT CHANGE UP (ref 80–100)
NRBC # BLD: 0 /100 WBCS — SIGNIFICANT CHANGE UP (ref 0–0)
PLATELET # BLD AUTO: 182 K/UL — SIGNIFICANT CHANGE UP (ref 150–400)
POTASSIUM SERPL-MCNC: 5 MMOL/L — SIGNIFICANT CHANGE UP (ref 3.5–5.3)
POTASSIUM SERPL-SCNC: 5 MMOL/L — SIGNIFICANT CHANGE UP (ref 3.5–5.3)
RBC # BLD: 4.5 M/UL — SIGNIFICANT CHANGE UP (ref 3.8–5.2)
RBC # FLD: 14.1 % — SIGNIFICANT CHANGE UP (ref 10.3–14.5)
SODIUM SERPL-SCNC: 139 MMOL/L — SIGNIFICANT CHANGE UP (ref 135–145)
SPECIMEN SOURCE: SIGNIFICANT CHANGE UP
WBC # BLD: 14.73 K/UL — HIGH (ref 3.8–10.5)
WBC # FLD AUTO: 14.73 K/UL — HIGH (ref 3.8–10.5)

## 2022-03-11 PROCEDURE — 99221 1ST HOSP IP/OBS SF/LOW 40: CPT

## 2022-03-11 RX ORDER — INSULIN LISPRO 100/ML
VIAL (ML) SUBCUTANEOUS
Refills: 0 | Status: DISCONTINUED | OUTPATIENT
Start: 2022-03-11 | End: 2022-03-15

## 2022-03-11 RX ORDER — INSULIN GLARGINE 100 [IU]/ML
15 INJECTION, SOLUTION SUBCUTANEOUS ONCE
Refills: 0 | Status: COMPLETED | OUTPATIENT
Start: 2022-03-11 | End: 2022-03-11

## 2022-03-11 RX ORDER — INSULIN LISPRO 100/ML
3 VIAL (ML) SUBCUTANEOUS
Refills: 0 | Status: DISCONTINUED | OUTPATIENT
Start: 2022-03-11 | End: 2022-03-12

## 2022-03-11 RX ORDER — INSULIN LISPRO 100/ML
VIAL (ML) SUBCUTANEOUS AT BEDTIME
Refills: 0 | Status: DISCONTINUED | OUTPATIENT
Start: 2022-03-11 | End: 2022-03-15

## 2022-03-11 RX ORDER — INSULIN GLARGINE 100 [IU]/ML
15 INJECTION, SOLUTION SUBCUTANEOUS EVERY MORNING
Refills: 0 | Status: DISCONTINUED | OUTPATIENT
Start: 2022-03-12 | End: 2022-03-12

## 2022-03-11 RX ADMIN — Medication 8: at 17:16

## 2022-03-11 RX ADMIN — ALBUTEROL 2 PUFF(S): 90 AEROSOL, METERED ORAL at 13:45

## 2022-03-11 RX ADMIN — Medication 0.5 MILLIGRAM(S): at 22:11

## 2022-03-11 RX ADMIN — HEPARIN SODIUM 5000 UNIT(S): 5000 INJECTION INTRAVENOUS; SUBCUTANEOUS at 05:26

## 2022-03-11 RX ADMIN — Medication 2000 UNIT(S): at 11:54

## 2022-03-11 RX ADMIN — Medication 3 UNIT(S): at 11:53

## 2022-03-11 RX ADMIN — Medication 20 MILLIGRAM(S): at 05:26

## 2022-03-11 RX ADMIN — Medication 3: at 08:28

## 2022-03-11 RX ADMIN — GABAPENTIN 100 MILLIGRAM(S): 400 CAPSULE ORAL at 05:26

## 2022-03-11 RX ADMIN — Medication 50 MILLIGRAM(S): at 05:26

## 2022-03-11 RX ADMIN — Medication 3 UNIT(S): at 17:15

## 2022-03-11 RX ADMIN — ESCITALOPRAM OXALATE 10 MILLIGRAM(S): 10 TABLET, FILM COATED ORAL at 11:54

## 2022-03-11 RX ADMIN — Medication 1 TABLET(S): at 17:15

## 2022-03-11 RX ADMIN — Medication 125 MICROGRAM(S): at 05:26

## 2022-03-11 RX ADMIN — Medication 1 DROP(S): at 17:15

## 2022-03-11 RX ADMIN — ATORVASTATIN CALCIUM 80 MILLIGRAM(S): 80 TABLET, FILM COATED ORAL at 22:11

## 2022-03-11 RX ADMIN — Medication 1 TABLET(S): at 05:26

## 2022-03-11 RX ADMIN — BUDESONIDE AND FORMOTEROL FUMARATE DIHYDRATE 2 PUFF(S): 160; 4.5 AEROSOL RESPIRATORY (INHALATION) at 05:27

## 2022-03-11 RX ADMIN — ALBUTEROL 2 PUFF(S): 90 AEROSOL, METERED ORAL at 18:23

## 2022-03-11 RX ADMIN — HEPARIN SODIUM 5000 UNIT(S): 5000 INJECTION INTRAVENOUS; SUBCUTANEOUS at 17:15

## 2022-03-11 RX ADMIN — Medication 1: at 22:34

## 2022-03-11 RX ADMIN — ALBUTEROL 2 PUFF(S): 90 AEROSOL, METERED ORAL at 00:41

## 2022-03-11 RX ADMIN — Medication 81 MILLIGRAM(S): at 11:54

## 2022-03-11 RX ADMIN — CLOPIDOGREL BISULFATE 75 MILLIGRAM(S): 75 TABLET, FILM COATED ORAL at 11:54

## 2022-03-11 RX ADMIN — Medication 1 DROP(S): at 05:27

## 2022-03-11 RX ADMIN — ALBUTEROL 2 PUFF(S): 90 AEROSOL, METERED ORAL at 05:27

## 2022-03-11 RX ADMIN — Medication 12: at 11:52

## 2022-03-11 RX ADMIN — GABAPENTIN 100 MILLIGRAM(S): 400 CAPSULE ORAL at 13:25

## 2022-03-11 RX ADMIN — BUDESONIDE AND FORMOTEROL FUMARATE DIHYDRATE 2 PUFF(S): 160; 4.5 AEROSOL RESPIRATORY (INHALATION) at 18:23

## 2022-03-11 RX ADMIN — ISOSORBIDE MONONITRATE 30 MILLIGRAM(S): 60 TABLET, EXTENDED RELEASE ORAL at 13:25

## 2022-03-11 RX ADMIN — INSULIN GLARGINE 15 UNIT(S): 100 INJECTION, SOLUTION SUBCUTANEOUS at 11:53

## 2022-03-11 NOTE — PROVIDER CONTACT NOTE (OTHER) - ASSESSMENT
Pt asymptomatic. Denies symptoms. Negative headache, blurred vision, fatigue, increased thirst or urination.

## 2022-03-11 NOTE — CONSULT NOTE ADULT - PROBLEM SELECTOR RECOMMENDATION 9
Type 2 A1c 8.4% adm: PN  Recommend endocrine-Perlman on consult if BG levels do not improve  FU appt: returning to SNF  DSC recommendations: return to home regimen and glucose monitoring  diabetes education provided  Diabetes support info and cell # [phone] given   Goal 100-180 mg/dL; 140-180 mg/dL in critical care areas Type 2 A1c 8.4% adm: PN  Recommend endocrine-Perlman on consult if BG levels do not improve  Lantus 15 units stat/resume in am, lispro 3 AC x3, corrective scale mod AC and low HS.  FU appt: returning to SNF  DSC recommendations: return to home regimen and glucose monitoring  diabetes education provided  Diabetes support info and cell # [phone] given   Goal 100-180 mg/dL; 140-180 mg/dL in critical care areas

## 2022-03-11 NOTE — CONSULT NOTE ADULT - SUBJECTIVE AND OBJECTIVE BOX
Patient is a 87y old  Female who presents with a chief complaint of   Type:2 DX >15 yrs.  receives insulin therapy at the nursing home. states insulin 2xday. seems a little forgetful. diabetes education provided verbally and handout. hyperglycemia- needs basal/bolus insulin restarted         HPI:      PAST MEDICAL & SURGICAL HISTORY:  Uncomplicated asthma, unspecified asthma severity    DM2 (diabetes mellitus, type 2)    Essential hypertension    Hypothyroidism, unspecified type    Pure hypercholesterolemia    Gastroesophageal reflux disease without esophagitis    Diabetes    Asthma    CAD (coronary artery disease)    HTN (hypertension)    HLD (hyperlipidemia)    Hypothyroid    NSTEMI (non-ST elevated myocardial infarction)    History of appendectomy    History of appendectomy    Abnormal findings on cardiac catheterization  Cardiac Cath        REVIEW OF SYSTEMS  General:	as above  Respiratory: NAD, No SOB, no cough  Cardiovascular: No chest pain, no palpitations	  Endocrine: no polyuria, no polydipsia, or S/S of hypoglycemia        Allergies    doxycycline (Unknown)  iodine (Hives)  iodine containing compounds (Unknown)  shellfish (Anaphylaxis)  shellfish (Swelling; Short breath)    Intolerances        MEDICATIONS  (STANDING):  ALBUTerol    90 MICROgram(s) HFA Inhaler 2 Puff(s) Inhalation every 6 hours  ALPRAZolam 0.5 milliGRAM(s) Oral at bedtime  artificial  tears Solution 1 Drop(s) Both EYES two times a day  aspirin enteric coated 81 milliGRAM(s) Oral daily  atorvastatin 80 milliGRAM(s) Oral at bedtime  budesonide 160 MICROgram(s)/formoterol 4.5 MICROgram(s) Inhaler 2 Puff(s) Inhalation two times a day  cholecalciferol 2000 Unit(s) Oral daily  clopidogrel Tablet 75 milliGRAM(s) Oral daily  dextrose 40% Gel 15 Gram(s) Oral once  dextrose 5%. 1000 milliLiter(s) (50 mL/Hr) IV Continuous <Continuous>  dextrose 5%. 1000 milliLiter(s) (100 mL/Hr) IV Continuous <Continuous>  dextrose 50% Injectable 25 Gram(s) IV Push once  dextrose 50% Injectable 12.5 Gram(s) IV Push once  dextrose 50% Injectable 25 Gram(s) IV Push once  escitalopram 10 milliGRAM(s) Oral daily  gabapentin 100 milliGRAM(s) Oral three times a day  glucagon  Injectable 1 milliGRAM(s) IntraMuscular once  heparin   Injectable 5000 Unit(s) SubCutaneous every 12 hours  insulin glargine Injectable (LANTUS) 15 Unit(s) SubCutaneous once  insulin lispro (ADMELOG) corrective regimen sliding scale   SubCutaneous three times a day before meals  insulin lispro (ADMELOG) corrective regimen sliding scale   SubCutaneous at bedtime  insulin lispro Injectable (ADMELOG) 3 Unit(s) SubCutaneous three times a day before meals  isosorbide   mononitrate ER Tablet (IMDUR) 30 milliGRAM(s) Oral daily  lactobacillus acidophilus 1 Tablet(s) Oral two times a day  levothyroxine 125 MICROGram(s) Oral <User Schedule>  methylPREDNISolone sodium succinate Injectable 20 milliGRAM(s) IV Push daily  metoprolol succinate ER 50 milliGRAM(s) Oral daily

## 2022-03-11 NOTE — PROGRESS NOTE ADULT - SUBJECTIVE AND OBJECTIVE BOX
Date/Time Patient Seen:  		  Referring MD:   Data Reviewed	       Patient is a 87y old  Female who presents with a chief complaint of     Subjective/HPI     PAST MEDICAL & SURGICAL HISTORY:  Uncomplicated asthma, unspecified asthma severity    DM2 (diabetes mellitus, type 2)    Essential hypertension    Hypothyroidism, unspecified type    Pure hypercholesterolemia    Gastroesophageal reflux disease without esophagitis    Diabetes    Asthma    CAD (coronary artery disease)    HTN (hypertension)    HLD (hyperlipidemia)    Hypothyroid    NSTEMI (non-ST elevated myocardial infarction)    No significant past surgical history    History of appendectomy    History of appendectomy    Abnormal findings on cardiac catheterization  Cardiac Cath          Medication list         MEDICATIONS  (STANDING):  ALBUTerol    90 MICROgram(s) HFA Inhaler 2 Puff(s) Inhalation every 6 hours  ALPRAZolam 0.5 milliGRAM(s) Oral at bedtime  artificial  tears Solution 1 Drop(s) Both EYES two times a day  aspirin enteric coated 81 milliGRAM(s) Oral daily  atorvastatin 80 milliGRAM(s) Oral at bedtime  budesonide 160 MICROgram(s)/formoterol 4.5 MICROgram(s) Inhaler 2 Puff(s) Inhalation two times a day  cholecalciferol 2000 Unit(s) Oral daily  clopidogrel Tablet 75 milliGRAM(s) Oral daily  dextrose 40% Gel 15 Gram(s) Oral once  dextrose 5%. 1000 milliLiter(s) (50 mL/Hr) IV Continuous <Continuous>  dextrose 5%. 1000 milliLiter(s) (100 mL/Hr) IV Continuous <Continuous>  dextrose 50% Injectable 25 Gram(s) IV Push once  dextrose 50% Injectable 12.5 Gram(s) IV Push once  dextrose 50% Injectable 25 Gram(s) IV Push once  escitalopram 10 milliGRAM(s) Oral daily  gabapentin 100 milliGRAM(s) Oral three times a day  glucagon  Injectable 1 milliGRAM(s) IntraMuscular once  heparin   Injectable 5000 Unit(s) SubCutaneous every 12 hours  insulin lispro (ADMELOG) corrective regimen sliding scale   SubCutaneous at bedtime  insulin lispro (ADMELOG) corrective regimen sliding scale   SubCutaneous three times a day before meals  isosorbide   mononitrate ER Tablet (IMDUR) 30 milliGRAM(s) Oral daily  lactobacillus acidophilus 1 Tablet(s) Oral two times a day  levothyroxine 125 MICROGram(s) Oral <User Schedule>  methylPREDNISolone sodium succinate Injectable 20 milliGRAM(s) IV Push daily  metoprolol succinate ER 50 milliGRAM(s) Oral daily    MEDICATIONS  (PRN):  acetaminophen     Tablet .. 650 milliGRAM(s) Oral every 6 hours PRN Temp greater or equal to 38C (100.4F), Mild Pain (1 - 3)  aluminum hydroxide/magnesium hydroxide/simethicone Suspension 30 milliLiter(s) Oral every 4 hours PRN Dyspepsia  melatonin 3 milliGRAM(s) Oral at bedtime PRN Insomnia  ondansetron Injectable 4 milliGRAM(s) IV Push every 8 hours PRN Nausea and/or Vomiting  traMADol 50 milliGRAM(s) Oral three times a day PRN Moderate Pain (4 - 6)         Vitals log        ICU Vital Signs Last 24 Hrs  T(C): 36.3 (11 Mar 2022 05:03), Max: 36.7 (10 Mar 2022 11:17)  T(F): 97.4 (11 Mar 2022 05:03), Max: 98 (10 Mar 2022 11:17)  HR: 67 (11 Mar 2022 05:03) (67 - 80)  BP: 163/78 (11 Mar 2022 05:03) (146/79 - 178/68)  BP(mean): --  ABP: --  ABP(mean): --  RR: 18 (11 Mar 2022 05:03) (18 - 18)  SpO2: 94% (11 Mar 2022 05:03) (92% - 94%)           Input and Output:  I&O's Detail    09 Mar 2022 07:01  -  10 Mar 2022 07:00  --------------------------------------------------------  IN:  Total IN: 0 mL    OUT:    Voided (mL): 800 mL  Total OUT: 800 mL    Total NET: -800 mL      10 Mar 2022 07:01  -  11 Mar 2022 05:39  --------------------------------------------------------  IN:  Total IN: 0 mL    OUT:    Voided (mL): 700 mL  Total OUT: 700 mL    Total NET: -700 mL          Lab Data                        12.0   12.64 )-----------( 171      ( 10 Mar 2022 07:42 )             37.5     03-10    139  |  109<H>  |  56<H>  ----------------------------<  240<H>  4.7   |  23  |  1.60<H>    Ca    8.6      10 Mar 2022 07:43  Phos  4.1     03-10  Mg     2.2     03-10    TPro  7.0  /  Alb  3.1<L>  /  TBili  0.5  /  DBili  x   /  AST  12<L>  /  ALT  19  /  AlkPhos  77  03-10      CARDIAC MARKERS ( 09 Mar 2022 06:05 )  x     / x     / 84 U/L / x     / 2.9 ng/mL        Review of Systems	      Objective     Physical Examination    heart s1s2  lung dec BS  abd soft  head nc      Pertinent Lab findings & Imaging      Chong:  NO   Adequate UO     I&O's Detail    09 Mar 2022 07:01  -  10 Mar 2022 07:00  --------------------------------------------------------  IN:  Total IN: 0 mL    OUT:    Voided (mL): 800 mL  Total OUT: 800 mL    Total NET: -800 mL      10 Mar 2022 07:01  -  11 Mar 2022 05:39  --------------------------------------------------------  IN:  Total IN: 0 mL    OUT:    Voided (mL): 700 mL  Total OUT: 700 mL    Total NET: -700 mL               Discussed with:     Cultures:	        Radiology

## 2022-03-11 NOTE — PROGRESS NOTE ADULT - SUBJECTIVE AND OBJECTIVE BOX
PROGRESS NOTE  Patient is a 87y old  Female who presents with a chief complaint of   Chart and available morning labs /imaging are reviewed electronically , urgent issues addressed . More information  is being added upon completion of rounds , when more information is collected and management discussed with consultants , medical staff and social service/case management on the floor   OVERNIGHT  No new issues reported by medical staff . All above noted Patient is resting in a bed comfortably . .No distress noted   BG NOTED   HPI:    PAST MEDICAL & SURGICAL HISTORY:  Pure hypercholesterolemia    Diabetes    Asthma    HTN (hypertension)    HLD (hyperlipidemia)    Hypothyroid    NSTEMI (non-ST elevated myocardial infarction)    Uncomplicated asthma, unspecified asthma severity    DM2 (diabetes mellitus, type 2)    Essential hypertension    Gastroesophageal reflux disease without esophagitis    CAD (coronary artery disease)    Hypothyroidism, unspecified type    History of appendectomy    History of appendectomy    Abnormal findings on cardiac catheterization  Cardiac Cath        MEDICATIONS  (STANDING):  ALBUTerol    90 MICROgram(s) HFA Inhaler 2 Puff(s) Inhalation every 6 hours  ALPRAZolam 0.5 milliGRAM(s) Oral at bedtime  artificial  tears Solution 1 Drop(s) Both EYES two times a day  aspirin enteric coated 81 milliGRAM(s) Oral daily  atorvastatin 80 milliGRAM(s) Oral at bedtime  budesonide 160 MICROgram(s)/formoterol 4.5 MICROgram(s) Inhaler 2 Puff(s) Inhalation two times a day  cholecalciferol 2000 Unit(s) Oral daily  clopidogrel Tablet 75 milliGRAM(s) Oral daily  dextrose 40% Gel 15 Gram(s) Oral once  dextrose 5%. 1000 milliLiter(s) (50 mL/Hr) IV Continuous <Continuous>  dextrose 5%. 1000 milliLiter(s) (100 mL/Hr) IV Continuous <Continuous>  dextrose 50% Injectable 25 Gram(s) IV Push once  dextrose 50% Injectable 12.5 Gram(s) IV Push once  dextrose 50% Injectable 25 Gram(s) IV Push once  escitalopram 10 milliGRAM(s) Oral daily  gabapentin 100 milliGRAM(s) Oral three times a day  glucagon  Injectable 1 milliGRAM(s) IntraMuscular once  heparin   Injectable 5000 Unit(s) SubCutaneous every 12 hours  insulin lispro (ADMELOG) corrective regimen sliding scale   SubCutaneous three times a day before meals  insulin lispro (ADMELOG) corrective regimen sliding scale   SubCutaneous at bedtime  insulin lispro Injectable (ADMELOG) 3 Unit(s) SubCutaneous three times a day before meals  isosorbide   mononitrate ER Tablet (IMDUR) 30 milliGRAM(s) Oral daily  lactobacillus acidophilus 1 Tablet(s) Oral two times a day  levothyroxine 125 MICROGram(s) Oral <User Schedule>  methylPREDNISolone sodium succinate Injectable 20 milliGRAM(s) IV Push daily  metoprolol succinate ER 50 milliGRAM(s) Oral daily    MEDICATIONS  (PRN):  acetaminophen     Tablet .. 650 milliGRAM(s) Oral every 6 hours PRN Temp greater or equal to 38C (100.4F), Mild Pain (1 - 3)  aluminum hydroxide/magnesium hydroxide/simethicone Suspension 30 milliLiter(s) Oral every 4 hours PRN Dyspepsia  melatonin 3 milliGRAM(s) Oral at bedtime PRN Insomnia  ondansetron Injectable 4 milliGRAM(s) IV Push every 8 hours PRN Nausea and/or Vomiting  traMADol 50 milliGRAM(s) Oral three times a day PRN Moderate Pain (4 - 6)      OBJECTIVE    T(C): 36.7 (03-11-22 @ 12:06), Max: 36.7 (03-10-22 @ 21:14)  HR: 69 (03-11-22 @ 12:06) (66 - 74)  BP: 165/84 (03-11-22 @ 12:06) (146/79 - 165/84)  RR: 18 (03-11-22 @ 12:06) (18 - 18)  SpO2: 93% (03-11-22 @ 12:06) (93% - 94%)  Wt(kg): --  I&O's Summary    10 Mar 2022 07:01  -  11 Mar 2022 07:00  --------------------------------------------------------  IN: 0 mL / OUT: 800 mL / NET: -800 mL          REVIEW OF SYSTEMS:  CONSTITUTIONAL: No fever, weight loss, or fatigue  EYES: No eye pain, visual disturbances, or discharge  ENMT:   No sinus or throat pain  NECK: No pain or stiffness  RESPIRATORY: No cough, wheezing, chills or hemoptysis; No shortness of breath  CARDIOVASCULAR: No chest pain, palpitations, dizziness, or leg swelling  GASTROINTESTINAL: No abdominal pain. No nausea, vomiting; No diarrhea or constipation. No melena or hematochezia.  GENITOURINARY: No dysuria, frequency, hematuria, or incontinence  NEUROLOGICAL: No headaches, memory loss, loss of strength, numbness, or tremors  SKIN: No itching, burning, rashes, or lesions   MUSCULOSKELETAL: No joint pain or swelling; No muscle, back, or extremity pain    PHYSICAL EXAM:  Appearance: NAD. VS past 24 hrs -as above   HEENT:   Moist oral mucosa. Conjunctiva clear b/l.   Neck : supple  Respiratory: Lungs CTAB.  Gastrointestinal:  Soft, nontender. No rebound. No rigidity. BS present	  Cardiovascular: RRR ,S1S2 present  Neurologic: Non-focal. Moving all extremities.  Extremities: No edema. No erythema. No calf tenderness.  Skin: No rashes, No ecchymoses, No cyanosis.	  wounds ,skin lesions-See skin assesment flow sheet   LABS:                        12.9   14.73 )-----------( 182      ( 11 Mar 2022 07:54 )             39.4     03-11    139  |  108  |  62<H>  ----------------------------<  284<H>  5.0   |  25  |  1.70<H>    Ca    9.2      11 Mar 2022 07:54  Phos  4.1     03-10  Mg     2.2     03-10    TPro  7.0  /  Alb  3.1<L>  /  TBili  0.5  /  DBili  x   /  AST  12<L>  /  ALT  19  /  AlkPhos  77  03-10    CAPILLARY BLOOD GLUCOSE      POCT Blood Glucose.: 450 mg/dL (11 Mar 2022 11:46)  POCT Blood Glucose.: 447 mg/dL (11 Mar 2022 11:44)  POCT Blood Glucose.: 288 mg/dL (11 Mar 2022 08:01)  POCT Blood Glucose.: 305 mg/dL (10 Mar 2022 21:24)  POCT Blood Glucose.: 302 mg/dL (10 Mar 2022 17:04)    PT/INR - ( 10 Mar 2022 07:42 )   PT: 12.5 sec;   INR: 1.07 ratio               Culture - Blood (collected 09 Mar 2022 08:30)  Source: .Blood Blood-Peripheral  Preliminary Report (10 Mar 2022 09:02):    No growth to date.    Culture - Blood (collected 09 Mar 2022 08:30)  Source: .Blood Blood-Peripheral  Preliminary Report (10 Mar 2022 09:02):    No growth to date.      RADIOLOGY & ADDITIONAL TESTS:   reviewed elctronically  ASSESSMENT/PLAN: 	    25 minutes aggregate time was spent on this visit, 50% visit time spent in care co-ordination with other attendings and counselling patient .I have discussed care plan with patient / HCP/family member ,who expressed understanding of problems treatment and their effect and side effects, alternatives in details. I have asked if they have any questions and concerns and appropriately addressed them to best of my ability.

## 2022-03-11 NOTE — PROGRESS NOTE ADULT - SUBJECTIVE AND OBJECTIVE BOX
Interval History:    CENTRAL LINE:   [  ] YES       [  ] NO  PACE:                 [  ] YES       [  ] NO         REVIEW OF SYSTEMS:  All Systems below were reviewed and are negative [  ]  HEENT:  ID:  Pulmonary:  Cardiac:  GI:  Renal:  Musculoskeletal:  All other systems above were reviewed and are negative   [  ]      MEDICATIONS  (STANDING):  ALBUTerol    90 MICROgram(s) HFA Inhaler 2 Puff(s) Inhalation every 6 hours  ALPRAZolam 0.5 milliGRAM(s) Oral at bedtime  artificial  tears Solution 1 Drop(s) Both EYES two times a day  aspirin enteric coated 81 milliGRAM(s) Oral daily  atorvastatin 80 milliGRAM(s) Oral at bedtime  budesonide 160 MICROgram(s)/formoterol 4.5 MICROgram(s) Inhaler 2 Puff(s) Inhalation two times a day  cholecalciferol 2000 Unit(s) Oral daily  clopidogrel Tablet 75 milliGRAM(s) Oral daily  dextrose 40% Gel 15 Gram(s) Oral once  dextrose 5%. 1000 milliLiter(s) (50 mL/Hr) IV Continuous <Continuous>  dextrose 5%. 1000 milliLiter(s) (100 mL/Hr) IV Continuous <Continuous>  dextrose 50% Injectable 25 Gram(s) IV Push once  dextrose 50% Injectable 12.5 Gram(s) IV Push once  dextrose 50% Injectable 25 Gram(s) IV Push once  escitalopram 10 milliGRAM(s) Oral daily  gabapentin 100 milliGRAM(s) Oral three times a day  glucagon  Injectable 1 milliGRAM(s) IntraMuscular once  heparin   Injectable 5000 Unit(s) SubCutaneous every 12 hours  insulin lispro (ADMELOG) corrective regimen sliding scale   SubCutaneous three times a day before meals  insulin lispro (ADMELOG) corrective regimen sliding scale   SubCutaneous at bedtime  insulin lispro Injectable (ADMELOG) 3 Unit(s) SubCutaneous three times a day before meals  isosorbide   mononitrate ER Tablet (IMDUR) 30 milliGRAM(s) Oral daily  lactobacillus acidophilus 1 Tablet(s) Oral two times a day  levothyroxine 125 MICROGram(s) Oral <User Schedule>  methylPREDNISolone sodium succinate Injectable 20 milliGRAM(s) IV Push daily  metoprolol succinate ER 50 milliGRAM(s) Oral daily    MEDICATIONS  (PRN):  acetaminophen     Tablet .. 650 milliGRAM(s) Oral every 6 hours PRN Temp greater or equal to 38C (100.4F), Mild Pain (1 - 3)  aluminum hydroxide/magnesium hydroxide/simethicone Suspension 30 milliLiter(s) Oral every 4 hours PRN Dyspepsia  melatonin 3 milliGRAM(s) Oral at bedtime PRN Insomnia  ondansetron Injectable 4 milliGRAM(s) IV Push every 8 hours PRN Nausea and/or Vomiting  traMADol 50 milliGRAM(s) Oral three times a day PRN Moderate Pain (4 - 6)      Vital Signs Last 24 Hrs  T(C): 36.7 (11 Mar 2022 12:06), Max: 36.7 (10 Mar 2022 21:14)  T(F): 98 (11 Mar 2022 12:06), Max: 98 (10 Mar 2022 21:14)  HR: 69 (11 Mar 2022 12:06) (66 - 74)  BP: 165/84 (11 Mar 2022 12:06) (146/79 - 165/84)  BP(mean): --  RR: 18 (11 Mar 2022 12:06) (18 - 18)  SpO2: 93% (11 Mar 2022 12:06) (93% - 94%)    I&O's Summary    10 Mar 2022 07:01  -  11 Mar 2022 07:00  --------------------------------------------------------  IN: 0 mL / OUT: 800 mL / NET: -800 mL        PHYSICAL EXAM:  HEENT: NC/AT; PERRLA  Neck: Soft; no tenderness  Lungs: CTA bilaterally; no wheezing.   Heart:  Abdomen:  Genital/ Rectal:  Extremities:  Neurologic:  Vascular:      LABORATORY:    CBC Full  -  ( 11 Mar 2022 07:54 )  WBC Count : 14.73 K/uL  RBC Count : 4.50 M/uL  Hemoglobin : 12.9 g/dL  Hematocrit : 39.4 %  Platelet Count - Automated : 182 K/uL  Mean Cell Volume : 87.6 fl  Mean Cell Hemoglobin : 28.7 pg  Mean Cell Hemoglobin Concentration : 32.7 gm/dL  Auto Neutrophil # : x  Auto Lymphocyte # : x  Auto Monocyte # : x  Auto Eosinophil # : x  Auto Basophil # : x  Auto Neutrophil % : x  Auto Lymphocyte % : x  Auto Monocyte % : x  Auto Eosinophil % : x  Auto Basophil % : x      ESR:                   03-10 @ 07:43  --    C-Reactive Protein:     03-10 @ 07:43  --    Procalcitonin:           03-10 @ 07:43   <0.05  ESR:                   03-10 @ 07:42  --    C-Reactive Protein:     03-10 @ 07:42  --    Procalcitonin:           03-10 @ 07:42   <0.05      03-11    139  |  108  |  62<H>  ----------------------------<  284<H>  5.0   |  25  |  1.70<H>    Ca    9.2      11 Mar 2022 07:54  Phos  4.1     03-10  Mg     2.2     03-10    TPro  7.0  /  Alb  3.1<L>  /  TBili  0.5  /  DBili  x   /  AST  12<L>  /  ALT  19  /  AlkPhos  77  03-10      Rapid Respiratory Viral Panel Result        03-09 @ 08:06  Rapid RVP [facility]  Coronovirus --  Adenovirus --  Bordetella Pertussis --  Chlamydia Pneumonia --  Entero/Rhinovirus--  HKU1 Coronovirus --  HMPV Coronovirus --  Influenza A --  Influenza AH1 --  Influenza AH1 2009 --  Influenza AH3 --  Influenza B --  Mycoplasma Pneumoniae --  NL63 Coronovirus --  OC43 Coronovirus --  Parainfluenza 1 --  Parainfluenza 2 --  Parainfluenza 3 --  Parainfluenza 4 --  Resp Syncytial Virus --      Assessment and Plan:          Lazaro De La Rosa MD   [phone].    She is afebrile  Comfortable    MEDICATIONS  (STANDING):  ALBUTerol    90 MICROgram(s) HFA Inhaler 2 Puff(s) Inhalation every 6 hours  ALPRAZolam 0.5 milliGRAM(s) Oral at bedtime  artificial  tears Solution 1 Drop(s) Both EYES two times a day  aspirin enteric coated 81 milliGRAM(s) Oral daily  atorvastatin 80 milliGRAM(s) Oral at bedtime  budesonide 160 MICROgram(s)/formoterol 4.5 MICROgram(s) Inhaler 2 Puff(s) Inhalation two times a day  cholecalciferol 2000 Unit(s) Oral daily  clopidogrel Tablet 75 milliGRAM(s) Oral daily  dextrose 40% Gel 15 Gram(s) Oral once  dextrose 5%. 1000 milliLiter(s) (50 mL/Hr) IV Continuous <Continuous>  dextrose 5%. 1000 milliLiter(s) (100 mL/Hr) IV Continuous <Continuous>  dextrose 50% Injectable 25 Gram(s) IV Push once  dextrose 50% Injectable 12.5 Gram(s) IV Push once  dextrose 50% Injectable 25 Gram(s) IV Push once  escitalopram 10 milliGRAM(s) Oral daily  gabapentin 100 milliGRAM(s) Oral three times a day  glucagon  Injectable 1 milliGRAM(s) IntraMuscular once  heparin   Injectable 5000 Unit(s) SubCutaneous every 12 hours  insulin lispro (ADMELOG) corrective regimen sliding scale   SubCutaneous three times a day before meals  insulin lispro (ADMELOG) corrective regimen sliding scale   SubCutaneous at bedtime  insulin lispro Injectable (ADMELOG) 3 Unit(s) SubCutaneous three times a day before meals  isosorbide   mononitrate ER Tablet (IMDUR) 30 milliGRAM(s) Oral daily  lactobacillus acidophilus 1 Tablet(s) Oral two times a day  levothyroxine 125 MICROGram(s) Oral <User Schedule>  methylPREDNISolone sodium succinate Injectable 20 milliGRAM(s) IV Push daily  metoprolol succinate ER 50 milliGRAM(s) Oral daily    MEDICATIONS  (PRN):  acetaminophen     Tablet .. 650 milliGRAM(s) Oral every 6 hours PRN Temp greater or equal to 38C (100.4F), Mild Pain (1 - 3)  aluminum hydroxide/magnesium hydroxide/simethicone Suspension 30 milliLiter(s) Oral every 4 hours PRN Dyspepsia  melatonin 3 milliGRAM(s) Oral at bedtime PRN Insomnia  ondansetron Injectable 4 milliGRAM(s) IV Push every 8 hours PRN Nausea and/or Vomiting  traMADol 50 milliGRAM(s) Oral three times a day PRN Moderate Pain (4 - 6)      Vital Signs Last 24 Hrs  T(C): 36.7 (11 Mar 2022 12:06), Max: 36.7 (10 Mar 2022 21:14)  T(F): 98 (11 Mar 2022 12:06), Max: 98 (10 Mar 2022 21:14)  HR: 69 (11 Mar 2022 12:06) (66 - 74)  BP: 165/84 (11 Mar 2022 12:06) (146/79 - 165/84)  BP(mean): --  RR: 18 (11 Mar 2022 12:06) (18 - 18)  SpO2: 93% (11 Mar 2022 12:06) (93% - 94%)    I&O's Summary    10 Mar 2022 07:01  -  11 Mar 2022 07:00  --------------------------------------------------------  IN: 0 mL / OUT: 800 mL / NET: -800 mL        PHYSICAL EXAM:  HEENT: NC/AT; PERRLA  Neck: Soft; no tenderness  Lungs: CTA bilaterally; no wheezing.   Heart: RRR, no murmurs  Abdomen: Soft, no tenderness.   Genital/ Rectal: No torres  Extremities: No ulcers.   Neurologic: Confused.       LABORATORY:    CBC Full  -  ( 11 Mar 2022 07:54 )  WBC Count : 14.73 K/uL  RBC Count : 4.50 M/uL  Hemoglobin : 12.9 g/dL  Hematocrit : 39.4 %  Platelet Count - Automated : 182 K/uL  Mean Cell Volume : 87.6 fl  Mean Cell Hemoglobin : 28.7 pg  Mean Cell Hemoglobin Concentration : 32.7 gm/dL  Auto Neutrophil # : x  Auto Lymphocyte # : x  Auto Monocyte # : x  Auto Eosinophil # : x  Auto Basophil # : x  Auto Neutrophil % : x  Auto Lymphocyte % : x  Auto Monocyte % : x  Auto Eosinophil % : x  Auto Basophil % : x      ESR:                   03-10 @ 07:43  --    C-Reactive Protein:     03-10 @ 07:43  --    Procalcitonin:           03-10 @ 07:43   <0.05  ESR:                   03-10 @ 07:42  --    C-Reactive Protein:     03-10 @ 07:42  --    Procalcitonin:           03-10 @ 07:42   <0.05      03-11    139  |  108  |  62<H>  ----------------------------<  284<H>  5.0   |  25  |  1.70<H>    Ca    9.2      11 Mar 2022 07:54  Phos  4.1     03-10  Mg     2.2     03-10    TPro  7.0  /  Alb  3.1<L>  /  TBili  0.5  /  DBili  x   /  AST  12<L>  /  ALT  19  /  AlkPhos  77  03-10      Rapid Respiratory Viral Panel Result        03-09 @ 08:06  Rapid RVP [facility]  Coronovirus --  Adenovirus --  Bordetella Pertussis --  Chlamydia Pneumonia --  Entero/Rhinovirus--  HKU1 Coronovirus --  HMPV Coronovirus --  Influenza A --  Influenza AH1 --  Influenza AH1 2009 --  Influenza AH3 --  Influenza B --  Mycoplasma Pneumoniae --  NL63 Coronovirus --  OC43 Coronovirus --  Parainfluenza 1 --  Parainfluenza 2 --  Parainfluenza 3 --  Parainfluenza 4 --  Resp Syncytial Virus --      Assessment and Plan:    1. COPD exacerbation.  2. Leukocytosis.    . No active infections noted. Leukocytosis was likely due to COPD exacerbation/steroid. Monitor off antibiotics.. WBC is trending down.   . Continue IV Solumedrol as per pulmonary.  . Supportive care.        Lazaro De La Rosa MD   [phone].

## 2022-03-11 NOTE — CONSULT NOTE ADULT - ASSESSMENT
87 year old female with HTN, CAD, HLD, hypothyroid, DM, COPD, CHF, asthma presents with SOB.  Patient resides at [facility].  EMS reports that patient woke up at 5am and c/o SOB.    [facility] rx regimen  TTE reviewed  COPD - Asthma management  assist with needs - ADL  monitor VS and HD and Sat  dietary discretion  MOLST filled out - DNR DNI - HCP - Son Calvin Chávez  pt lives in intermediate -   spoke with pt - daughters -   
Physical Exam:   Vital Signs Last 24 Hrs  T(C): 36.3 (11 Mar 2022 05:03), Max: 36.7 (10 Mar 2022 11:17)  T(F): 97.4 (11 Mar 2022 05:03), Max: 98 (10 Mar 2022 11:17)  HR: 66 (11 Mar 2022 07:52) (66 - 80)  BP: 163/78 (11 Mar 2022 05:03) (146/79 - 178/68)  BP(mean): --  RR: 18 (11 Mar 2022 05:03) (18 - 18)  SpO2: 94% (11 Mar 2022 05:03) (92% - 94%)    General: NAD, denies Fever, chills  CVS: S1S2 no M/R/G  Resp: CTA in all fields         CAPILLARY BLOOD GLUCOSE      POCT Blood Glucose.: 288 mg/dL (11 Mar 2022 08:01)  POCT Blood Glucose.: 305 mg/dL (10 Mar 2022 21:24)  POCT Blood Glucose.: 302 mg/dL (10 Mar 2022 17:04)  POCT Blood Glucose.: 321 mg/dL (10 Mar 2022 11:38)      Cholesterol, Serum: 113 mg/dL (05.19.21 @ 08:36)     HDL Cholesterol, Serum: 22 mg/dL (05.19.21 @ 08:36)     LDL Cholesterol Calculated: 66 mg/dL (05.19.21 @ 08:36)     DIET: CC  >50%        
pt with exacerbation of copd  iv steroids and bronchodilator  ashd  hypertension  dyslipidemia  hx of mi  dm2- on coverage insulin  echo for lv function
    JEANNINE BARRIENTOS 87 f 3/9/2022 8/21/1934 DR LAMONT THOMPSON     Initial evaluation/Pulmonary Critical Care consultation requested on  3/9/2022 by Dr REDDY  from Dr Watson   Patient examined chart reviewed    HOSPITAL ADMISSION   PATIENT CAME  FROM (if information available)      JEANNINE BARRIENTOS 87 f 3/9/2022 8/21/1934 DR LAMONT THOMPSON     REVIEW OF SYMPTOMS      Able to give ROS  Yes     RELIABLE +/-   CONSTITUTIONAL Weakness Yes  Chills No   ENDOCRINE  No heat or cold intolerance    ALLERGY No hives  Sore throat No stridor  RESP Coughing blood no  Shortness of breath YES   NEURO No Headache  Confusion Pain neck No   CARDIAC No Chest pain No Palpitations   GI  Pain abdomen NO   Vomiting NO     PHYSICAL EXAM    HEENT Unremarkable  atraumatic   RESP Fair air entry EXP prolonged    Harsh breath sound Resp distres mild   CARDIAC S1 S2 No S3     NO JVD    ABDOMEN SOFT BS PRESENT NOT DISTENDED No hepatosplenomegaly PEDAL EDEMA present No calf tenderness  NO rash     PATIENT PRESENTATION.  3/9/2022 87 year old female with HTN, CAD, HLD, hypothyroid, DM, COPD, CHF, asthma presents with SOB.  Patient resides at [facility].  EMS reports that patient woke up at 5am and c/o SOB.  Staff at NH noted wheezing and she was given 2 nebs with significant relief.  When EMS arrived, they noted O2 sat 100% on RA with no respiratory distress.  Patient is DNR/DNI.  PMD Dr. Thompson  Pulm consulted 3/9/2022                                                              DOA/CC/PROBLEMS poa .  3/9/2022   87 f  Patient brought in by ambulance from [facility] assisted living as reported having difficulty breathing was given neb treatment x 2 received with non-rebreather mask.      PMH-PSH .  pmh Hytn  pmh CAD  pmh HLD   pmh Hypothyroid  pmh DM  pmh COPD  pmh CHF       NOTABLE HOME MEDS.  ceftin tramadol duonebxanax .5 asa 81 insulin plavx lasix 40 levoxyl 125 metoprolol 50     PROBLEMS .  Shortness of breath poa 3/9/2022   COPD ex poa 3/9/2022   Possible l lower zone pneum 3/9/2022 cxr   CHF 3/9/2022 bnp 884  TRENTON poa 3/9/2022 Cr 1.7     COVID/ICU/CODE STATUS.                       COVID  STATUS.           pcr 3/9/2022 (-)   ICU STAY. none  GOC.   3/9/2022 dnr     BEST PRACTICE ISSUES.                                                  HEAD OF BED ELEVATION. Yes  DVT PROPHYLAXIS.    3/9/2022 hpsc   JIMÉNEZ PROPHYLAXIS.                                                                                        DIET.   3/9/2022 cons carb     GENERAL ISSUES .                                       ALLERGY.    Iodine shellfish doxycycline                        WEIGHT.                                  3/9/2022 90  BMI.                           3/9/2022 34           VITALS/PO/IO/VENT/DRIPS.     3/9/2022 87 120/80      PROBLEM DATA/PLAN.      RESP.   Monitor po Target po 90-95%      PMH/PSH PROBLEMS.  Management continued/modified as indicated    OXYGEN REQUIREMENTS.  3/9/2022 ra 95%  Will need home oxygen at discharge if ra rest or ra exercise PO drops below 88%      INFECTION.  W 3/9/2022 w 13   cxr 3/9/2022 LLZ opac   CT ch 3/9/2022 mini basal atelect changes   RVP 3/9/2022 (-)  AR  3/9/2022 Patient got one dose of vanco zosyn in er  3/9/2022 No strong susp for active infectn   3/9/2022 Check procala     RO VTE.  V duplx 3/9/2022 (-)     COPD ex poa 3/9/2022  3/9/2022 albuterol hfa   3/9/2022 solumed 40     CAD.  Tr 3/9/2022 tr 34   3/9/2022 asa 81   3/9/2022 plavx 75   3/9/2022 imdur 30   3/9/2022 metoprolol 50     CHF.  Bnp 3/9/2022 bnp 884    TRENTON.  Cr 3/9/2022 Cr 1.7      ASSESSMENT/RECOMMENDATIONS.  SHORTNESS OF BREATH poa  Likely multifactorial sec to copd ex CHF  DVT ruled out  No pneumonia reported on ct ch   Check procalc   If fever start abio  Wean off steroids    TRENTON   Monitor lytes cr         TIME SPENT   Over 55 minutes aggregate care time spent on encounter; activities included   direct patient care, counseling and/or coordinating care reviewing notes, lab data/ imaging , discussion with multidisciplinary team/ patient  /family and explaining in detail risks, benefits, alternatives  of the recommendations     JEANNINE BARRIENTOS 87 f 3/9/2022 8/21/1934 DR LAMONT THOMPSON

## 2022-03-11 NOTE — PROGRESS NOTE ADULT - SUBJECTIVE AND OBJECTIVE BOX
Patient is a 87y Female with a known history of :  Uncomplicated asthma, unspecified asthma severity [J45.909]    DM2 (diabetes mellitus, type 2) [E11.9]    Essential hypertension [I10]    Hypothyroidism, unspecified type [E03.9]    Gastroesophageal reflux disease without esophagitis [K21.9]    CAD (coronary artery disease) [I25.10]    Hypothyroidism, unspecified type [E03.9]    COPD exacerbation [J44.1]    Chronic diastolic congestive heart failure [I50.32]    Prophylactic measure [Z29.9]      HPI:      REVIEW OF SYSTEMS:    CONSTITUTIONAL: No fever, weight loss, or fatigue  EYES: No eye pain, visual disturbances, or discharge  ENMT:  No difficulty hearing, tinnitus, vertigo; No sinus or throat pain  NECK: No pain or stiffness  BREASTS: No pain, masses, or nipple discharge  RESPIRATORY: No cough, wheezing, chills or hemoptysis; No shortness of breath  CARDIOVASCULAR: No chest pain, palpitations, dizziness, or leg swelling  GASTROINTESTINAL: No abdominal or epigastric pain. No nausea, vomiting, or hematemesis; No diarrhea or constipation. No melena or hematochezia.  GENITOURINARY: No dysuria, frequency, hematuria, or incontinence  NEUROLOGICAL: No headaches, memory loss, loss of strength, numbness, or tremors  SKIN: No itching, burning, rashes, or lesions   LYMPH NODES: No enlarged glands  ENDOCRINE: No heat or cold intolerance; No hair loss  MUSCULOSKELETAL: No joint pain or swelling; No muscle, back, or extremity pain  PSYCHIATRIC: No depression, anxiety, mood swings, or difficulty sleeping  HEME/LYMPH: No easy bruising, or bleeding gums  ALLERGY AND IMMUNOLOGIC: No hives or eczema    MEDICATIONS  (STANDING):  ALBUTerol    90 MICROgram(s) HFA Inhaler 2 Puff(s) Inhalation every 6 hours  ALPRAZolam 0.5 milliGRAM(s) Oral at bedtime  artificial  tears Solution 1 Drop(s) Both EYES two times a day  aspirin enteric coated 81 milliGRAM(s) Oral daily  atorvastatin 80 milliGRAM(s) Oral at bedtime  budesonide 160 MICROgram(s)/formoterol 4.5 MICROgram(s) Inhaler 2 Puff(s) Inhalation two times a day  cholecalciferol 2000 Unit(s) Oral daily  clopidogrel Tablet 75 milliGRAM(s) Oral daily  dextrose 40% Gel 15 Gram(s) Oral once  dextrose 5%. 1000 milliLiter(s) (50 mL/Hr) IV Continuous <Continuous>  dextrose 5%. 1000 milliLiter(s) (100 mL/Hr) IV Continuous <Continuous>  dextrose 50% Injectable 25 Gram(s) IV Push once  dextrose 50% Injectable 12.5 Gram(s) IV Push once  dextrose 50% Injectable 25 Gram(s) IV Push once  escitalopram 10 milliGRAM(s) Oral daily  gabapentin 100 milliGRAM(s) Oral three times a day  glucagon  Injectable 1 milliGRAM(s) IntraMuscular once  heparin   Injectable 5000 Unit(s) SubCutaneous every 12 hours  insulin lispro (ADMELOG) corrective regimen sliding scale   SubCutaneous at bedtime  insulin lispro (ADMELOG) corrective regimen sliding scale   SubCutaneous three times a day before meals  isosorbide   mononitrate ER Tablet (IMDUR) 30 milliGRAM(s) Oral daily  lactobacillus acidophilus 1 Tablet(s) Oral two times a day  levothyroxine 125 MICROGram(s) Oral <User Schedule>  methylPREDNISolone sodium succinate Injectable 20 milliGRAM(s) IV Push daily  metoprolol succinate ER 50 milliGRAM(s) Oral daily    MEDICATIONS  (PRN):  acetaminophen     Tablet .. 650 milliGRAM(s) Oral every 6 hours PRN Temp greater or equal to 38C (100.4F), Mild Pain (1 - 3)  aluminum hydroxide/magnesium hydroxide/simethicone Suspension 30 milliLiter(s) Oral every 4 hours PRN Dyspepsia  melatonin 3 milliGRAM(s) Oral at bedtime PRN Insomnia  ondansetron Injectable 4 milliGRAM(s) IV Push every 8 hours PRN Nausea and/or Vomiting  traMADol 50 milliGRAM(s) Oral three times a day PRN Moderate Pain (4 - 6)      ALLERGIES: doxycycline (Unknown)  iodine (Hives)  iodine containing compounds (Unknown)  shellfish (Anaphylaxis)  shellfish (Swelling; Short breath)      FAMILY HISTORY:  Family history of heart disease    Family history of cancer in mother    Family history of MI (myocardial infarction)    Family history of stomach cancer        PHYSICAL EXAMINATION:  -----------------------------  T(C): 36.3 (03-11-22 @ 05:03), Max: 36.7 (03-10-22 @ 11:17)  HR: 66 (03-11-22 @ 07:52) (66 - 80)  BP: 163/78 (03-11-22 @ 05:03) (146/79 - 178/68)  RR: 18 (03-11-22 @ 05:03) (18 - 18)  SpO2: 94% (03-11-22 @ 05:03) (92% - 94%)  Wt(kg): --    03-10 @ 07:01  -  03-11 @ 07:00  --------------------------------------------------------  IN:  Total IN: 0 mL    OUT:    Voided (mL): 800 mL  Total OUT: 800 mL    Total NET: -800 mL            VITALS  T(C): 36.3 (03-11-22 @ 05:03), Max: 36.7 (03-10-22 @ 11:17)  HR: 66 (03-11-22 @ 07:52) (66 - 80)  BP: 163/78 (03-11-22 @ 05:03) (146/79 - 178/68)  RR: 18 (03-11-22 @ 05:03) (18 - 18)  SpO2: 94% (03-11-22 @ 05:03) (92% - 94%)    Constitutional: well developed, normal appearance, well groomed, well nourished, no deformities and no acute distress.   Eyes: the conjunctiva exhibited no abnormalities and the eyelids demonstrated no xanthelasmas.   HEENT: normal oral mucosa, no oral pallor and no oral cyanosis.   Neck: normal jugular venous A waves present, normal jugular venous V waves present and no jugular venous wagner A waves.   Pulmonary: no respiratory distress, normal respiratory rhythm and effort, no accessory muscle use and lungs were clear to auscultation bilaterally.   Cardiovascular: heart rate and rhythm were normal, normal S1 and S2 and no murmur, gallop, rub, heave or thrill are present.   Abdomen: soft, non-tender, no hepato-splenomegaly and no abdominal mass palpated.   Musculoskeletal: the gait could not be assessed..   Extremities: no clubbing of the fingernails, no localized cyanosis, no petechial hemorrhages and no ischemic changes.   Skin: normal skin color and pigmentation, no rash, no venous stasis, no skin lesions, no skin ulcer and no xanthoma was observed.   Psychiatric: oriented to person, place, and time, the affect was normal, the mood was normal and not feeling anxious.     LABS:   --------  03-11    139  |  108  |  62<H>  ----------------------------<  284<H>  5.0   |  25  |  1.70<H>    Ca    9.2      11 Mar 2022 07:54  Phos  4.1     03-10  Mg     2.2     03-10    TPro  7.0  /  Alb  3.1<L>  /  TBili  0.5  /  DBili  x   /  AST  12<L>  /  ALT  19  /  AlkPhos  77  03-10                         12.9   14.73 )-----------( 182      ( 11 Mar 2022 07:54 )             39.4     PT/INR - ( 10 Mar 2022 07:42 )   PT: 12.5 sec;   INR: 1.07 ratio                     RADIOLOGY:  -----------------    ECG:     ECHO:

## 2022-03-11 NOTE — PROGRESS NOTE ADULT - SUBJECTIVE AND OBJECTIVE BOX
FLOYD JEANNINE    PLV 2NOR 229 W1    Allergies    doxycycline (Unknown)  iodine (Hives)  iodine containing compounds (Unknown)  shellfish (Anaphylaxis)  shellfish (Swelling; Short breath)    Intolerances        PAST MEDICAL & SURGICAL HISTORY:  Uncomplicated asthma, unspecified asthma severity    DM2 (diabetes mellitus, type 2)    Essential hypertension    Hypothyroidism, unspecified type    Pure hypercholesterolemia    Gastroesophageal reflux disease without esophagitis    Diabetes    Asthma    CAD (coronary artery disease)    HTN (hypertension)    HLD (hyperlipidemia)    Hypothyroid    NSTEMI (non-ST elevated myocardial infarction)    History of appendectomy    History of appendectomy    Abnormal findings on cardiac catheterization  Cardiac Cath        FAMILY HISTORY:  Family history of heart disease    Family history of cancer in mother    Family history of MI (myocardial infarction)    Family history of stomach cancer        Home Medications:  acetaminophen 500 mg oral tablet: 2 tab(s) orally 3 times a day (09 Mar 2022 08:41)  ALPRAZolam 0.5 mg oral tablet: 1 tab(s) orally once a day (at bedtime) (09 Mar 2022 08:41)  aspirin 81 mg oral tablet: 1 tab(s) orally once a day (09 Mar 2022 08:41)  atorvastatin 80 mg oral tablet: 1 tab(s) orally once a day (09 Mar 2022 08:41)  clopidogrel 75 mg oral tablet: 1 tab(s) orally once a day (09 Mar 2022 08:41)  escitalopram 10 mg oral tablet: 1 tab(s) orally once a day (09 Mar 2022 08:41)  fluticasone-salmeterol 250 mcg-50 mcg inhalation powder: 1 inhaler(s) inhaled 2 times a day (09 Mar 2022 08:41)  furosemide 40 mg oral tablet: 1 tab(s) orally once a day (09 Mar 2022 08:41)  gabapentin 100 mg oral capsule: 1 cap(s) orally 3 times a day (09 Mar 2022 08:41)  ipratropium-albuterol 0.5 mg-2.5 mg/3 mL inhalation solution: 3 milliliter(s) inhaled every 6 hours, As Needed (09 Mar 2022 08:41)  ipratropium-albuterol 0.5 mg-2.5 mg/3 mL inhalation solution: 3 milliliter(s) inhaled 2 times a day (09 Mar 2022 08:41)  isosorbide mononitrate 30 mg oral tablet, extended release: 1 tab(s) orally once a day (in the morning) (09 Mar 2022 08:41)  Lantus 100 units/mL subcutaneous solution: 18 unit(s) subcutaneous 2 times a day (09 Mar 2022 08:41)  levothyroxine 125 mcg (0.125 mg) oral tablet: 1 tab(s) orally Monday through Friday (09 Mar 2022 08:41)  Metoprolol Succinate ER 50 mg oral tablet, extended release: 1 tab(s) orally once a day (09 Mar 2022 08:41)  NovoLOG FlexPen 100 units/mL injectable solution: 15 unit(s) subcutaneous before meals  (09 Mar 2022 08:41)  potassium chloride 10 mEq oral capsule, extended release: 1 cap(s) orally once a day (09 Mar 2022 08:41)  ProAir HFA 90 mcg/inh inhalation aerosol: 2 puff(s) inhaled every 4 hours, As Needed (09 Mar 2022 08:41)  Refresh Relieva ophthalmic solution: 1 drop(s) in each eye every 2 hours, As Needed for Dry eyes (09 Mar 2022 08:41)  Refresh Relieva ophthalmic solution: 1 drop(s) in each eye 2 times a day (09 Mar 2022 08:41)  Vitamin D3 50 mcg (2000 intl units) oral tablet: 1 tab(s) orally once a day (09 Mar 2022 08:41)  ZeaSORB 0.5%-0.22% topical powder: Apply to under bilateral abdominal topically twice a day  (09 Mar 2022 08:41)      MEDICATIONS  (STANDING):  ALBUTerol    90 MICROgram(s) HFA Inhaler 2 Puff(s) Inhalation every 6 hours  ALPRAZolam 0.5 milliGRAM(s) Oral at bedtime  artificial  tears Solution 1 Drop(s) Both EYES two times a day  aspirin enteric coated 81 milliGRAM(s) Oral daily  atorvastatin 80 milliGRAM(s) Oral at bedtime  budesonide 160 MICROgram(s)/formoterol 4.5 MICROgram(s) Inhaler 2 Puff(s) Inhalation two times a day  cholecalciferol 2000 Unit(s) Oral daily  clopidogrel Tablet 75 milliGRAM(s) Oral daily  dextrose 40% Gel 15 Gram(s) Oral once  dextrose 5%. 1000 milliLiter(s) (50 mL/Hr) IV Continuous <Continuous>  dextrose 5%. 1000 milliLiter(s) (100 mL/Hr) IV Continuous <Continuous>  dextrose 50% Injectable 25 Gram(s) IV Push once  dextrose 50% Injectable 12.5 Gram(s) IV Push once  dextrose 50% Injectable 25 Gram(s) IV Push once  escitalopram 10 milliGRAM(s) Oral daily  gabapentin 100 milliGRAM(s) Oral three times a day  glucagon  Injectable 1 milliGRAM(s) IntraMuscular once  heparin   Injectable 5000 Unit(s) SubCutaneous every 12 hours  insulin lispro (ADMELOG) corrective regimen sliding scale   SubCutaneous at bedtime  insulin lispro (ADMELOG) corrective regimen sliding scale   SubCutaneous three times a day before meals  isosorbide   mononitrate ER Tablet (IMDUR) 30 milliGRAM(s) Oral daily  lactobacillus acidophilus 1 Tablet(s) Oral two times a day  levothyroxine 125 MICROGram(s) Oral <User Schedule>  methylPREDNISolone sodium succinate Injectable 20 milliGRAM(s) IV Push daily  metoprolol succinate ER 50 milliGRAM(s) Oral daily    MEDICATIONS  (PRN):  acetaminophen     Tablet .. 650 milliGRAM(s) Oral every 6 hours PRN Temp greater or equal to 38C (100.4F), Mild Pain (1 - 3)  aluminum hydroxide/magnesium hydroxide/simethicone Suspension 30 milliLiter(s) Oral every 4 hours PRN Dyspepsia  melatonin 3 milliGRAM(s) Oral at bedtime PRN Insomnia  ondansetron Injectable 4 milliGRAM(s) IV Push every 8 hours PRN Nausea and/or Vomiting  traMADol 50 milliGRAM(s) Oral three times a day PRN Moderate Pain (4 - 6)      Diet, Consistent Carbohydrate w/Evening Snack:   DASH/TLC Sodium & Cholesterol Restricted  Easy to Chew (EASYTOCHEW) (03-10-22 @ 19:55) [Active]          Vital Signs Last 24 Hrs  T(C): 36.3 (11 Mar 2022 05:03), Max: 36.7 (10 Mar 2022 11:17)  T(F): 97.4 (11 Mar 2022 05:03), Max: 98 (10 Mar 2022 11:17)  HR: 66 (11 Mar 2022 07:52) (66 - 80)  BP: 163/78 (11 Mar 2022 05:03) (146/79 - 178/68)  BP(mean): --  RR: 18 (11 Mar 2022 05:03) (18 - 18)  SpO2: 94% (11 Mar 2022 05:03) (92% - 94%)      03-10-22 @ 07:01  -  03-11-22 @ 07:00  --------------------------------------------------------  IN: 0 mL / OUT: 800 mL / NET: -800 mL              LABS:                        12.9   14.73 )-----------( 182      ( 11 Mar 2022 07:54 )             39.4     03-11    139  |  108  |  62<H>  ----------------------------<  284<H>  5.0   |  25  |  1.70<H>    Ca    9.2      11 Mar 2022 07:54  Phos  4.1     03-10  Mg     2.2     03-10    TPro  7.0  /  Alb  3.1<L>  /  TBili  0.5  /  DBili  x   /  AST  12<L>  /  ALT  19  /  AlkPhos  77  03-10    PT/INR - ( 10 Mar 2022 07:42 )   PT: 12.5 sec;   INR: 1.07 ratio                   WBC:  WBC Count: 14.73 K/uL (03-11 @ 07:54)  WBC Count: 12.64 K/uL (03-10 @ 07:42)  WBC Count: 13.36 K/uL (03-09 @ 06:05)      MICROBIOLOGY:  RECENT CULTURES:  03-09 .Blood Blood-Peripheral XXXX XXXX   No growth to date.                PT/INR - ( 10 Mar 2022 07:42 )   PT: 12.5 sec;   INR: 1.07 ratio             Sodium:  Sodium, Serum: 139 mmol/L (03-11 @ 07:54)  Sodium, Serum: 139 mmol/L (03-10 @ 07:43)  Sodium, Serum: 140 mmol/L (03-09 @ 06:05)      1.70 mg/dL 03-11 @ 07:54  1.60 mg/dL 03-10 @ 07:43  1.70 mg/dL 03-09 @ 06:05      Hemoglobin:  Hemoglobin: 12.9 g/dL (03-11 @ 07:54)  Hemoglobin: 12.0 g/dL (03-10 @ 07:42)  Hemoglobin: 12.5 g/dL (03-09 @ 06:05)      Platelets: Platelet Count - Automated: 182 K/uL (03-11 @ 07:54)  Platelet Count - Automated: 171 K/uL (03-10 @ 07:42)  Platelet Count - Automated: 190 K/uL (03-09 @ 06:05)      LIVER FUNCTIONS - ( 10 Mar 2022 07:43 )  Alb: 3.1 g/dL / Pro: 7.0 g/dL / ALK PHOS: 77 U/L / ALT: 19 U/L / AST: 12 U/L / GGT: x                 RADIOLOGY & ADDITIONAL STUDIES:      MICROBIOLOGY:  RECENT CULTURES:  03-09 .Blood Blood-Peripheral XXXX XXXX   No growth to date.

## 2022-03-11 NOTE — PROVIDER CONTACT NOTE (OTHER) - SITUATION
Pt afternoon blood glucose 447 at 1144. Re-checked glucose 450 at 1146.  Patricia Weil, Diabetes nurse practitioner notified.

## 2022-03-12 LAB
ANION GAP SERPL CALC-SCNC: 9 MMOL/L — SIGNIFICANT CHANGE UP (ref 5–17)
BUN SERPL-MCNC: 68 MG/DL — HIGH (ref 7–23)
CALCIUM SERPL-MCNC: 9.2 MG/DL — SIGNIFICANT CHANGE UP (ref 8.5–10.1)
CHLORIDE SERPL-SCNC: 107 MMOL/L — SIGNIFICANT CHANGE UP (ref 96–108)
CO2 SERPL-SCNC: 22 MMOL/L — SIGNIFICANT CHANGE UP (ref 22–31)
CREAT SERPL-MCNC: 1.6 MG/DL — HIGH (ref 0.5–1.3)
EGFR: 31 ML/MIN/1.73M2 — LOW
GLUCOSE SERPL-MCNC: 214 MG/DL — HIGH (ref 70–99)
HCT VFR BLD CALC: 35.5 % — SIGNIFICANT CHANGE UP (ref 34.5–45)
HGB BLD-MCNC: 11.4 G/DL — LOW (ref 11.5–15.5)
MCHC RBC-ENTMCNC: 28.1 PG — SIGNIFICANT CHANGE UP (ref 27–34)
MCHC RBC-ENTMCNC: 32.1 GM/DL — SIGNIFICANT CHANGE UP (ref 32–36)
MCV RBC AUTO: 87.7 FL — SIGNIFICANT CHANGE UP (ref 80–100)
NRBC # BLD: 0 /100 WBCS — SIGNIFICANT CHANGE UP (ref 0–0)
PLATELET # BLD AUTO: 181 K/UL — SIGNIFICANT CHANGE UP (ref 150–400)
POTASSIUM SERPL-MCNC: 4.2 MMOL/L — SIGNIFICANT CHANGE UP (ref 3.5–5.3)
POTASSIUM SERPL-SCNC: 4.2 MMOL/L — SIGNIFICANT CHANGE UP (ref 3.5–5.3)
RBC # BLD: 4.05 M/UL — SIGNIFICANT CHANGE UP (ref 3.8–5.2)
RBC # FLD: 13.8 % — SIGNIFICANT CHANGE UP (ref 10.3–14.5)
SODIUM SERPL-SCNC: 138 MMOL/L — SIGNIFICANT CHANGE UP (ref 135–145)
WBC # BLD: 14.33 K/UL — HIGH (ref 3.8–10.5)
WBC # FLD AUTO: 14.33 K/UL — HIGH (ref 3.8–10.5)

## 2022-03-12 RX ORDER — INSULIN LISPRO 100/ML
6 VIAL (ML) SUBCUTANEOUS
Refills: 0 | Status: DISCONTINUED | OUTPATIENT
Start: 2022-03-12 | End: 2022-03-15

## 2022-03-12 RX ORDER — INSULIN GLARGINE 100 [IU]/ML
20 INJECTION, SOLUTION SUBCUTANEOUS EVERY MORNING
Refills: 0 | Status: DISCONTINUED | OUTPATIENT
Start: 2022-03-13 | End: 2022-03-15

## 2022-03-12 RX ADMIN — ALBUTEROL 2 PUFF(S): 90 AEROSOL, METERED ORAL at 06:19

## 2022-03-12 RX ADMIN — BUDESONIDE AND FORMOTEROL FUMARATE DIHYDRATE 2 PUFF(S): 160; 4.5 AEROSOL RESPIRATORY (INHALATION) at 18:12

## 2022-03-12 RX ADMIN — Medication 1 DROP(S): at 06:15

## 2022-03-12 RX ADMIN — Medication 0.5 MILLIGRAM(S): at 21:34

## 2022-03-12 RX ADMIN — GABAPENTIN 100 MILLIGRAM(S): 400 CAPSULE ORAL at 06:16

## 2022-03-12 RX ADMIN — ALBUTEROL 2 PUFF(S): 90 AEROSOL, METERED ORAL at 12:38

## 2022-03-12 RX ADMIN — Medication 20 MILLIGRAM(S): at 06:15

## 2022-03-12 RX ADMIN — HEPARIN SODIUM 5000 UNIT(S): 5000 INJECTION INTRAVENOUS; SUBCUTANEOUS at 18:11

## 2022-03-12 RX ADMIN — Medication 50 MILLIGRAM(S): at 06:16

## 2022-03-12 RX ADMIN — Medication 1 DROP(S): at 18:11

## 2022-03-12 RX ADMIN — GABAPENTIN 100 MILLIGRAM(S): 400 CAPSULE ORAL at 00:09

## 2022-03-12 RX ADMIN — Medication 81 MILLIGRAM(S): at 12:37

## 2022-03-12 RX ADMIN — CLOPIDOGREL BISULFATE 75 MILLIGRAM(S): 75 TABLET, FILM COATED ORAL at 12:38

## 2022-03-12 RX ADMIN — ESCITALOPRAM OXALATE 10 MILLIGRAM(S): 10 TABLET, FILM COATED ORAL at 12:38

## 2022-03-12 RX ADMIN — GABAPENTIN 100 MILLIGRAM(S): 400 CAPSULE ORAL at 21:35

## 2022-03-12 RX ADMIN — Medication 6: at 12:37

## 2022-03-12 RX ADMIN — Medication 1 TABLET(S): at 17:00

## 2022-03-12 RX ADMIN — GABAPENTIN 100 MILLIGRAM(S): 400 CAPSULE ORAL at 14:33

## 2022-03-12 RX ADMIN — Medication 6: at 08:05

## 2022-03-12 RX ADMIN — Medication 6 UNIT(S): at 17:00

## 2022-03-12 RX ADMIN — INSULIN GLARGINE 15 UNIT(S): 100 INJECTION, SOLUTION SUBCUTANEOUS at 08:01

## 2022-03-12 RX ADMIN — Medication 1 TABLET(S): at 06:16

## 2022-03-12 RX ADMIN — ATORVASTATIN CALCIUM 80 MILLIGRAM(S): 80 TABLET, FILM COATED ORAL at 21:35

## 2022-03-12 RX ADMIN — BUDESONIDE AND FORMOTEROL FUMARATE DIHYDRATE 2 PUFF(S): 160; 4.5 AEROSOL RESPIRATORY (INHALATION) at 06:19

## 2022-03-12 RX ADMIN — Medication 2000 UNIT(S): at 12:37

## 2022-03-12 RX ADMIN — Medication 3 UNIT(S): at 08:05

## 2022-03-12 RX ADMIN — Medication 8: at 17:00

## 2022-03-12 RX ADMIN — ALBUTEROL 2 PUFF(S): 90 AEROSOL, METERED ORAL at 18:12

## 2022-03-12 RX ADMIN — Medication 2: at 21:35

## 2022-03-12 RX ADMIN — HEPARIN SODIUM 5000 UNIT(S): 5000 INJECTION INTRAVENOUS; SUBCUTANEOUS at 06:16

## 2022-03-12 RX ADMIN — ISOSORBIDE MONONITRATE 30 MILLIGRAM(S): 60 TABLET, EXTENDED RELEASE ORAL at 12:38

## 2022-03-12 RX ADMIN — Medication 6 UNIT(S): at 12:37

## 2022-03-12 NOTE — REASON FOR VISIT
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, hemiplegia  diffuse idiopathic skeletal hyperostosis  Hypertension dyslipidemia  h/o CVA 2020, minimal R sided weakness  Atrial fibrillation with controlled ventricular rate    PLAN:   NEURO: neurochecs q1h, PRN pain meds with acetaminophen  angio today  will f/u with radiology  EEG this afternoon after angio  REHAB:  physical therapy evaluation and management    EARLY MOB:  bedrest for now, HOB up    PULM:  Room air, incentive spirometry  CARDIO:  SBP goal 100-140mm Hg, cardene drip as needed; holding carvedilol for 1st degree AV block, get EKG and echo with BS  ENDO:  Blood sugar goals 140-180 mg/dL, continue insulin sliding scale  GI:  bowel regimen - add miralax  DIET: NPO   RENAL:  continue 3% and NS, Na goal 140-145, repeat BMP this afternoon  HEM/ONC: Hb stable INR 1.17  VTE Prophylaxis: SCDs, no DVT chemoprophylaxis for now as patient is high risk for bleed (giant aneurysm); baseline LE Doppler for DVT suspected on admission (h/o CVA)  ID: febrile, leukocytosis; panculture if respikes  Social: Rafiq Forrester  is HCP     ATTENDING ATTESTATION:  I was physically present for the key portions of the evaluation and management (E/M) service provided.  I agree with the above history, physical and plan, which I have reviewed and edited where appropriate.    Patient at high risk for neurological deterioration or death due to:  ICU delirium, aspiration PNA, DVT / PE.  Critical care time:  I have personally provided 45 minutes of critical care time, excluding time spent on separate procedures.      Plan discussed with RN, house staff. 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, hemiplegia  diffuse idiopathic skeletal hyperostosis  Hypertension dyslipidemia  h/o CVA 2020, minimal R sided weakness  Atrial fibrillation with controlled ventricular rate    PLAN:   NEURO: neurochecks q1h, PRN pain meds with acetaminophen  angio done - f/u final plans neurointerventional   f/u official 24h EEG read, if NEG d/c  REHAB:  physical therapy evaluation and management    EARLY MOB:  OOB to chair     PULM:  PRN O2 support to keep sats >/=92%, incentive spirometry  CARDIO:  SBP goal 100-160mm Hg, holding carvedilol for 1st degree AV block  ENDO:  Blood sugar goals 140-180 mg/dL, d/c insulin sliding scale  GI:  bowel regimen - add miralax  DIET: NPO, for now; speech and swallow, possibly NGT if agreeable  RENAL:  continue NS, Na goal 140-145, repeat BMP this afternoon; off 3%  HEM/ONC: Hb stable INR 1.17  VTE Prophylaxis: SCDs, no DVT chemoprophylaxis for now as patient is high risk for bleed (giant aneurysm); baseline LE Doppler for DVT suspected on admission (h/o CVA)  ID: afebrile, leukocytosis  Social: Rafiq Forrester  is HCP; discussion re: feeding tube    ATTENDING ATTESTATION:  I was physically present for the key portions of the evaluation and management (E/M) service provided.  I agree with the above history, physical and plan, which I have reviewed and edited where appropriate.    Patient at high risk for neurological deterioration or death due to:  ICU delirium, aspiration PNA, DVT / PE.  Critical care time:  I have personally provided 45 minutes of critical care time, excluding time spent on separate procedures.      Plan discussed with RN, house staff. [Consultation] : a consultation visit [FreeTextEntry1] : Aortic Stenosis/TAVR

## 2022-03-12 NOTE — PROGRESS NOTE ADULT - SUBJECTIVE AND OBJECTIVE BOX
PROGRESS NOTE  Patient is a 87y old  Female who presents with a chief complaint of     OVERNIGHT      HPI:    PAST MEDICAL & SURGICAL HISTORY:  Pure hypercholesterolemia    Diabetes    Asthma    HTN (hypertension)    HLD (hyperlipidemia)    Hypothyroid    NSTEMI (non-ST elevated myocardial infarction)    Uncomplicated asthma, unspecified asthma severity    DM2 (diabetes mellitus, type 2)    Essential hypertension    Gastroesophageal reflux disease without esophagitis    CAD (coronary artery disease)    Hypothyroidism, unspecified type    History of appendectomy    History of appendectomy    Abnormal findings on cardiac catheterization  Cardiac Cath        MEDICATIONS  (STANDING):  ALBUTerol    90 MICROgram(s) HFA Inhaler 2 Puff(s) Inhalation every 6 hours  ALPRAZolam 0.5 milliGRAM(s) Oral at bedtime  artificial  tears Solution 1 Drop(s) Both EYES two times a day  aspirin enteric coated 81 milliGRAM(s) Oral daily  atorvastatin 80 milliGRAM(s) Oral at bedtime  budesonide 160 MICROgram(s)/formoterol 4.5 MICROgram(s) Inhaler 2 Puff(s) Inhalation two times a day  cholecalciferol 2000 Unit(s) Oral daily  clopidogrel Tablet 75 milliGRAM(s) Oral daily  dextrose 40% Gel 15 Gram(s) Oral once  dextrose 5%. 1000 milliLiter(s) (50 mL/Hr) IV Continuous <Continuous>  dextrose 5%. 1000 milliLiter(s) (100 mL/Hr) IV Continuous <Continuous>  dextrose 50% Injectable 25 Gram(s) IV Push once  dextrose 50% Injectable 12.5 Gram(s) IV Push once  dextrose 50% Injectable 25 Gram(s) IV Push once  escitalopram 10 milliGRAM(s) Oral daily  gabapentin 100 milliGRAM(s) Oral three times a day  glucagon  Injectable 1 milliGRAM(s) IntraMuscular once  heparin   Injectable 5000 Unit(s) SubCutaneous every 12 hours  insulin glargine Injectable (LANTUS) 15 Unit(s) SubCutaneous every morning  insulin lispro (ADMELOG) corrective regimen sliding scale   SubCutaneous three times a day before meals  insulin lispro (ADMELOG) corrective regimen sliding scale   SubCutaneous at bedtime  insulin lispro Injectable (ADMELOG) 3 Unit(s) SubCutaneous three times a day before meals  isosorbide   mononitrate ER Tablet (IMDUR) 30 milliGRAM(s) Oral daily  lactobacillus acidophilus 1 Tablet(s) Oral two times a day  levothyroxine 125 MICROGram(s) Oral <User Schedule>  methylPREDNISolone sodium succinate Injectable 20 milliGRAM(s) IV Push daily  metoprolol succinate ER 50 milliGRAM(s) Oral daily    MEDICATIONS  (PRN):  acetaminophen     Tablet .. 650 milliGRAM(s) Oral every 6 hours PRN Temp greater or equal to 38C (100.4F), Mild Pain (1 - 3)  aluminum hydroxide/magnesium hydroxide/simethicone Suspension 30 milliLiter(s) Oral every 4 hours PRN Dyspepsia  melatonin 3 milliGRAM(s) Oral at bedtime PRN Insomnia  ondansetron Injectable 4 milliGRAM(s) IV Push every 8 hours PRN Nausea and/or Vomiting  traMADol 50 milliGRAM(s) Oral three times a day PRN Moderate Pain (4 - 6)      OBJECTIVE    T(C): 36.3 (03-12-22 @ 04:45), Max: 36.7 (03-11-22 @ 12:06)  HR: 60 (03-12-22 @ 04:45) (60 - 69)  BP: 112/65 (03-12-22 @ 04:45) (112/65 - 165/84)  RR: 18 (03-12-22 @ 04:45) (18 - 18)  SpO2: 93% (03-12-22 @ 04:45) (93% - 93%)  Wt(kg): --  I&O's Summary        REVIEW OF SYSTEMS:  CONSTITUTIONAL: No fever, weight loss, or fatigue  EYES: No eye pain, visual disturbances, or discharge  ENMT:   No sinus or throat pain  NECK: No pain or stiffness  RESPIRATORY: No cough, wheezing, chills or hemoptysis; No shortness of breath  CARDIOVASCULAR: No chest pain, palpitations, dizziness, or leg swelling  GASTROINTESTINAL: No abdominal pain. No nausea, vomiting; No diarrhea or constipation. No melena or hematochezia.  GENITOURINARY: No dysuria, frequency, hematuria, or incontinence  NEUROLOGICAL: No headaches, memory loss, loss of strength, numbness, or tremors  SKIN: No itching, burning, rashes, or lesions   MUSCULOSKELETAL: No joint pain or swelling; No muscle, back, or extremity pain    PHYSICAL EXAM:  Appearance: NAD. VS past 24 hrs -as above   HEENT:   Moist oral mucosa. Conjunctiva clear b/l.   Neck : supple  Respiratory: Lungs CTAB.  Gastrointestinal:  Soft, nontender. No rebound. No rigidity. BS present	  Cardiovascular: RRR ,S1S2 present  Neurologic: Non-focal. Moving all extremities.  Extremities: No edema. No erythema. No calf tenderness.  Skin: No rashes, No ecchymoses, No cyanosis.	  wounds ,skin lesions-See skin assesment flow sheet   LABS:                        12.9   14.73 )-----------( 182      ( 11 Mar 2022 07:54 )             39.4     03-11    139  |  108  |  62<H>  ----------------------------<  284<H>  5.0   |  25  |  1.70<H>    Ca    9.2      11 Mar 2022 07:54      CAPILLARY BLOOD GLUCOSE      POCT Blood Glucose.: 269 mg/dL (11 Mar 2022 22:27)  POCT Blood Glucose.: 341 mg/dL (11 Mar 2022 17:06)  POCT Blood Glucose.: 450 mg/dL (11 Mar 2022 11:46)  POCT Blood Glucose.: 447 mg/dL (11 Mar 2022 11:44)  POCT Blood Glucose.: 288 mg/dL (11 Mar 2022 08:01)          GI PCR Panel, Stool (collected 11 Mar 2022 15:29)  Source: .Stool Feces  Final Report (11 Mar 2022 19:07):    GI PCR Results: NOT detected    *******Please Note:*******    GI panel PCR evaluates for:    Campylobacter, Plesiomonas shigelloides, Salmonella,    Vibrio, Yersinia enterocolitica, Enteroaggregative    Escherichia coli (EAEC), Enteropathogenic E.coli (EPEC),    Enterotoxigenic E. coli (ETEC) lt/st, Shiga-like    toxin-producing E. coli (STEC) stx1/stx2,    Shigella/ Enteroinvasive E. coli (EIEC), Cryptosporidium,    Cyclospora cayetanensis, Entamoeba histolytica,    Giardia lamblia, Adenovirus F 40/41, Astrovirus,    Norovirus GI/GII, Rotavirus A, Sapovirus    Culture - Blood (collected 09 Mar 2022 08:30)  Source: .Blood Blood-Peripheral  Preliminary Report (10 Mar 2022 09:02):    No growth to date.    Culture - Blood (collected 09 Mar 2022 08:30)  Source: .Blood Blood-Peripheral  Preliminary Report (10 Mar 2022 09:02):    No growth to date.      RADIOLOGY & ADDITIONAL TESTS:   reviewed elctronically  ASSESSMENT/PLAN: 	     PROGRESS NOTE  Patient is a 87y old  Female who presents with a chief complaint of   Chart and available morning labs /imaging are reviewed electronically , urgent issues addressed . More information  is being added upon completion of rounds , when more information is collected and management discussed with consultants , medical staff and social service/case management on the floor   OVERNIGHT  No new issues reported by medical staff . All above noted     HPI:    PAST MEDICAL & SURGICAL HISTORY:  Pure hypercholesterolemia    Diabetes    Asthma    HTN (hypertension)    HLD (hyperlipidemia)    Hypothyroid    NSTEMI (non-ST elevated myocardial infarction)    Uncomplicated asthma, unspecified asthma severity    DM2 (diabetes mellitus, type 2)    Essential hypertension    Gastroesophageal reflux disease without esophagitis    CAD (coronary artery disease)    Hypothyroidism, unspecified type    History of appendectomy    History of appendectomy    Abnormal findings on cardiac catheterization  Cardiac Cath        MEDICATIONS  (STANDING):  ALBUTerol    90 MICROgram(s) HFA Inhaler 2 Puff(s) Inhalation every 6 hours  ALPRAZolam 0.5 milliGRAM(s) Oral at bedtime  artificial  tears Solution 1 Drop(s) Both EYES two times a day  aspirin enteric coated 81 milliGRAM(s) Oral daily  atorvastatin 80 milliGRAM(s) Oral at bedtime  budesonide 160 MICROgram(s)/formoterol 4.5 MICROgram(s) Inhaler 2 Puff(s) Inhalation two times a day  cholecalciferol 2000 Unit(s) Oral daily  clopidogrel Tablet 75 milliGRAM(s) Oral daily  dextrose 40% Gel 15 Gram(s) Oral once  dextrose 5%. 1000 milliLiter(s) (50 mL/Hr) IV Continuous <Continuous>  dextrose 5%. 1000 milliLiter(s) (100 mL/Hr) IV Continuous <Continuous>  dextrose 50% Injectable 25 Gram(s) IV Push once  dextrose 50% Injectable 12.5 Gram(s) IV Push once  dextrose 50% Injectable 25 Gram(s) IV Push once  escitalopram 10 milliGRAM(s) Oral daily  gabapentin 100 milliGRAM(s) Oral three times a day  glucagon  Injectable 1 milliGRAM(s) IntraMuscular once  heparin   Injectable 5000 Unit(s) SubCutaneous every 12 hours  insulin glargine Injectable (LANTUS) 15 Unit(s) SubCutaneous every morning  insulin lispro (ADMELOG) corrective regimen sliding scale   SubCutaneous three times a day before meals  insulin lispro (ADMELOG) corrective regimen sliding scale   SubCutaneous at bedtime  insulin lispro Injectable (ADMELOG) 3 Unit(s) SubCutaneous three times a day before meals  isosorbide   mononitrate ER Tablet (IMDUR) 30 milliGRAM(s) Oral daily  lactobacillus acidophilus 1 Tablet(s) Oral two times a day  levothyroxine 125 MICROGram(s) Oral <User Schedule>  methylPREDNISolone sodium succinate Injectable 20 milliGRAM(s) IV Push daily  metoprolol succinate ER 50 milliGRAM(s) Oral daily    MEDICATIONS  (PRN):  acetaminophen     Tablet .. 650 milliGRAM(s) Oral every 6 hours PRN Temp greater or equal to 38C (100.4F), Mild Pain (1 - 3)  aluminum hydroxide/magnesium hydroxide/simethicone Suspension 30 milliLiter(s) Oral every 4 hours PRN Dyspepsia  melatonin 3 milliGRAM(s) Oral at bedtime PRN Insomnia  ondansetron Injectable 4 milliGRAM(s) IV Push every 8 hours PRN Nausea and/or Vomiting  traMADol 50 milliGRAM(s) Oral three times a day PRN Moderate Pain (4 - 6)      OBJECTIVE    T(C): 36.3 (03-12-22 @ 04:45), Max: 36.7 (03-11-22 @ 12:06)  HR: 60 (03-12-22 @ 04:45) (60 - 69)  BP: 112/65 (03-12-22 @ 04:45) (112/65 - 165/84)  RR: 18 (03-12-22 @ 04:45) (18 - 18)  SpO2: 93% (03-12-22 @ 04:45) (93% - 93%)  Wt(kg): --  I&O's Summary        REVIEW OF SYSTEMS:  CONSTITUTIONAL: No fever, weight loss, or fatigue  EYES: No eye pain, visual disturbances, or discharge  ENMT:   No sinus or throat pain  NECK: No pain or stiffness  RESPIRATORY: No cough, wheezing, chills or hemoptysis; No shortness of breath  CARDIOVASCULAR: No chest pain, palpitations, dizziness, or leg swelling  GASTROINTESTINAL: No abdominal pain. No nausea, vomiting; No diarrhea or constipation. No melena or hematochezia.  GENITOURINARY: No dysuria, frequency, hematuria, or incontinence  NEUROLOGICAL: No headaches, memory loss, loss of strength, numbness, or tremors  SKIN: No itching, burning, rashes, or lesions   MUSCULOSKELETAL: No joint pain or swelling; No muscle, back, or extremity pain    PHYSICAL EXAM:  Appearance: NAD. VS past 24 hrs -as above   HEENT:   Moist oral mucosa. Conjunctiva clear b/l.   Neck : supple  Respiratory: Lungs CTAB.  Gastrointestinal:  Soft, nontender. No rebound. No rigidity. BS present	  Cardiovascular: RRR ,S1S2 present  Neurologic: Non-focal. Moving all extremities.  Extremities: No edema. No erythema. No calf tenderness.  Skin: No rashes, No ecchymoses, No cyanosis.	  wounds ,skin lesions-See skin assesment flow sheet   LABS:                        12.9   14.73 )-----------( 182      ( 11 Mar 2022 07:54 )             39.4     03-11    139  |  108  |  62<H>  ----------------------------<  284<H>  5.0   |  25  |  1.70<H>    Ca    9.2      11 Mar 2022 07:54      CAPILLARY BLOOD GLUCOSE      POCT Blood Glucose.: 269 mg/dL (11 Mar 2022 22:27)  POCT Blood Glucose.: 341 mg/dL (11 Mar 2022 17:06)  POCT Blood Glucose.: 450 mg/dL (11 Mar 2022 11:46)  POCT Blood Glucose.: 447 mg/dL (11 Mar 2022 11:44)  POCT Blood Glucose.: 288 mg/dL (11 Mar 2022 08:01)          GI PCR Panel, Stool (collected 11 Mar 2022 15:29)  Source: .Stool Feces  Final Report (11 Mar 2022 19:07):    GI PCR Results: NOT detected    *******Please Note:*******    GI panel PCR evaluates for:    Campylobacter, Plesiomonas shigelloides, Salmonella,    Vibrio, Yersinia enterocolitica, Enteroaggregative    Escherichia coli (EAEC), Enteropathogenic E.coli (EPEC),    Enterotoxigenic E. coli (ETEC) lt/st, Shiga-like    toxin-producing E. coli (STEC) stx1/stx2,    Shigella/ Enteroinvasive E. coli (EIEC), Cryptosporidium,    Cyclospora cayetanensis, Entamoeba histolytica,    Giardia lamblia, Adenovirus F 40/41, Astrovirus,    Norovirus GI/GII, Rotavirus A, Sapovirus    Culture - Blood (collected 09 Mar 2022 08:30)  Source: .Blood Blood-Peripheral  Preliminary Report (10 Mar 2022 09:02):    No growth to date.    Culture - Blood (collected 09 Mar 2022 08:30)  Source: .Blood Blood-Peripheral  Preliminary Report (10 Mar 2022 09:02):    No growth to date.      RADIOLOGY & ADDITIONAL TESTS:   reviewed elctronically  ASSESSMENT/PLAN: 	    25 minutes aggregate time was spent on this visit, 50% visit time spent in care co-ordination with other attendings and counselling patient .I have discussed care plan with patient / HCP/family member ,who expressed understanding of problems treatment and their effect and side effects, alternatives in details. I have asked if they have any questions and concerns and appropriately addressed them to best of my ability.

## 2022-03-12 NOTE — CONSULT NOTE ADULT - SUBJECTIVE AND OBJECTIVE BOX
Patient is a 87y old  Female who presents with a chief complaint of     Reason For Consult: dm2 uncontrolled    HPI:dm2 uncontrolled      PAST MEDICAL & SURGICAL HISTORY:  Uncomplicated asthma, unspecified asthma severity    DM2 (diabetes mellitus, type 2)    Essential hypertension    Hypothyroidism, unspecified type    Pure hypercholesterolemia    Gastroesophageal reflux disease without esophagitis    Diabetes    Asthma    CAD (coronary artery disease)    HTN (hypertension)    HLD (hyperlipidemia)    Hypothyroid    NSTEMI (non-ST elevated myocardial infarction)    History of appendectomy    History of appendectomy    Abnormal findings on cardiac catheterization  Cardiac Cath        FAMILY HISTORY:  Family history of heart disease    Family history of cancer in mother    Family history of MI (myocardial infarction)    Family history of stomach cancer          Social History:    MEDICATIONS  (STANDING):  ALBUTerol    90 MICROgram(s) HFA Inhaler 2 Puff(s) Inhalation every 6 hours  ALPRAZolam 0.5 milliGRAM(s) Oral at bedtime  artificial  tears Solution 1 Drop(s) Both EYES two times a day  aspirin enteric coated 81 milliGRAM(s) Oral daily  atorvastatin 80 milliGRAM(s) Oral at bedtime  budesonide 160 MICROgram(s)/formoterol 4.5 MICROgram(s) Inhaler 2 Puff(s) Inhalation two times a day  cholecalciferol 2000 Unit(s) Oral daily  clopidogrel Tablet 75 milliGRAM(s) Oral daily  dextrose 40% Gel 15 Gram(s) Oral once  dextrose 5%. 1000 milliLiter(s) (50 mL/Hr) IV Continuous <Continuous>  dextrose 5%. 1000 milliLiter(s) (100 mL/Hr) IV Continuous <Continuous>  dextrose 50% Injectable 25 Gram(s) IV Push once  dextrose 50% Injectable 12.5 Gram(s) IV Push once  dextrose 50% Injectable 25 Gram(s) IV Push once  escitalopram 10 milliGRAM(s) Oral daily  gabapentin 100 milliGRAM(s) Oral three times a day  glucagon  Injectable 1 milliGRAM(s) IntraMuscular once  heparin   Injectable 5000 Unit(s) SubCutaneous every 12 hours  insulin glargine Injectable (LANTUS) 15 Unit(s) SubCutaneous every morning  insulin lispro (ADMELOG) corrective regimen sliding scale   SubCutaneous three times a day before meals  insulin lispro (ADMELOG) corrective regimen sliding scale   SubCutaneous at bedtime  insulin lispro Injectable (ADMELOG) 3 Unit(s) SubCutaneous three times a day before meals  isosorbide   mononitrate ER Tablet (IMDUR) 30 milliGRAM(s) Oral daily  lactobacillus acidophilus 1 Tablet(s) Oral two times a day  levothyroxine 125 MICROGram(s) Oral <User Schedule>  metoprolol succinate ER 50 milliGRAM(s) Oral daily  predniSONE   Tablet 20 milliGRAM(s) Oral daily    MEDICATIONS  (PRN):  acetaminophen     Tablet .. 650 milliGRAM(s) Oral every 6 hours PRN Temp greater or equal to 38C (100.4F), Mild Pain (1 - 3)  aluminum hydroxide/magnesium hydroxide/simethicone Suspension 30 milliLiter(s) Oral every 4 hours PRN Dyspepsia  melatonin 3 milliGRAM(s) Oral at bedtime PRN Insomnia  ondansetron Injectable 4 milliGRAM(s) IV Push every 8 hours PRN Nausea and/or Vomiting  traMADol 50 milliGRAM(s) Oral three times a day PRN Moderate Pain (4 - 6)        T(C): 36.3 (03-12-22 @ 04:45), Max: 36.7 (03-11-22 @ 12:06)  HR: 60 (03-12-22 @ 04:45) (60 - 69)  BP: 112/65 (03-12-22 @ 04:45) (112/65 - 165/84)  RR: 18 (03-12-22 @ 04:45) (18 - 18)  SpO2: 93% (03-12-22 @ 04:45) (93% - 93%)  Wt(kg): --    PHYSICAL EXAM:  GENERAL: NAD, well-groomed, well-developed  HEAD:  Atraumatic, Normocephalic  NECK: Supple, No JVD, Normal thyroid  CHEST/LUNG: Clear to percussion bilaterally; No rales, rhonchi, wheezing, or rubs  HEART: Regular rate and rhythm; No murmurs, rubs, or gallops  ABDOMEN: Soft, Nontender, Nondistended; Bowel sounds present  EXTREMITIES:  2+ Peripheral Pulses, No clubbing, cyanosis, or edema  SKIN: No rashes or lesions    CAPILLARY BLOOD GLUCOSE      POCT Blood Glucose.: 251 mg/dL (12 Mar 2022 07:46)  POCT Blood Glucose.: 269 mg/dL (11 Mar 2022 22:27)  POCT Blood Glucose.: 341 mg/dL (11 Mar 2022 17:06)  POCT Blood Glucose.: 450 mg/dL (11 Mar 2022 11:46)  POCT Blood Glucose.: 447 mg/dL (11 Mar 2022 11:44)                            11.4   14.33 )-----------( 181      ( 12 Mar 2022 07:51 )             35.5       CMP:  03-12 @ 07:51  SGPT --  Albumin --   Alk Phos --   Anion Gap 9   SGOT --   Total Bili --   BUN 68   Calcium Total 9.2   CO2 22   Chloride 107   Creatinine 1.60   eGFR if AA --   eGFR if non AA --   Glucose 214   Potassium 4.2   Protein --   Sodium 138      Thyroid Function Tests:      Diabetes Tests:       Radiology:

## 2022-03-12 NOTE — CONSULT NOTE ADULT - PROBLEM SELECTOR RECOMMENDATION 9
increase lantus 20 units qam  increase admelog 6 units 3x/day before meals  cont mod dose admelog corrective scale coverage qac/qhs  cont cons cho diet  goal bg 100-180 in hosp setting

## 2022-03-12 NOTE — PROGRESS NOTE ADULT - SUBJECTIVE AND OBJECTIVE BOX
Date/Time Patient Seen:  		  Referring MD:   Data Reviewed	       Patient is a 87y old  Female who presents with a chief complaint of     Subjective/HPI     PAST MEDICAL & SURGICAL HISTORY:  Uncomplicated asthma, unspecified asthma severity    DM2 (diabetes mellitus, type 2)    Essential hypertension    Hypothyroidism, unspecified type    Pure hypercholesterolemia    Gastroesophageal reflux disease without esophagitis    Diabetes    Asthma    CAD (coronary artery disease)    HTN (hypertension)    HLD (hyperlipidemia)    Hypothyroid    NSTEMI (non-ST elevated myocardial infarction)    No significant past surgical history    History of appendectomy    History of appendectomy    Abnormal findings on cardiac catheterization  Cardiac Cath          Medication list         MEDICATIONS  (STANDING):  ALBUTerol    90 MICROgram(s) HFA Inhaler 2 Puff(s) Inhalation every 6 hours  ALPRAZolam 0.5 milliGRAM(s) Oral at bedtime  artificial  tears Solution 1 Drop(s) Both EYES two times a day  aspirin enteric coated 81 milliGRAM(s) Oral daily  atorvastatin 80 milliGRAM(s) Oral at bedtime  budesonide 160 MICROgram(s)/formoterol 4.5 MICROgram(s) Inhaler 2 Puff(s) Inhalation two times a day  cholecalciferol 2000 Unit(s) Oral daily  clopidogrel Tablet 75 milliGRAM(s) Oral daily  dextrose 40% Gel 15 Gram(s) Oral once  dextrose 5%. 1000 milliLiter(s) (50 mL/Hr) IV Continuous <Continuous>  dextrose 5%. 1000 milliLiter(s) (100 mL/Hr) IV Continuous <Continuous>  dextrose 50% Injectable 25 Gram(s) IV Push once  dextrose 50% Injectable 12.5 Gram(s) IV Push once  dextrose 50% Injectable 25 Gram(s) IV Push once  escitalopram 10 milliGRAM(s) Oral daily  gabapentin 100 milliGRAM(s) Oral three times a day  glucagon  Injectable 1 milliGRAM(s) IntraMuscular once  heparin   Injectable 5000 Unit(s) SubCutaneous every 12 hours  insulin glargine Injectable (LANTUS) 15 Unit(s) SubCutaneous every morning  insulin lispro (ADMELOG) corrective regimen sliding scale   SubCutaneous three times a day before meals  insulin lispro (ADMELOG) corrective regimen sliding scale   SubCutaneous at bedtime  insulin lispro Injectable (ADMELOG) 3 Unit(s) SubCutaneous three times a day before meals  isosorbide   mononitrate ER Tablet (IMDUR) 30 milliGRAM(s) Oral daily  lactobacillus acidophilus 1 Tablet(s) Oral two times a day  levothyroxine 125 MICROGram(s) Oral <User Schedule>  methylPREDNISolone sodium succinate Injectable 20 milliGRAM(s) IV Push daily  metoprolol succinate ER 50 milliGRAM(s) Oral daily    MEDICATIONS  (PRN):  acetaminophen     Tablet .. 650 milliGRAM(s) Oral every 6 hours PRN Temp greater or equal to 38C (100.4F), Mild Pain (1 - 3)  aluminum hydroxide/magnesium hydroxide/simethicone Suspension 30 milliLiter(s) Oral every 4 hours PRN Dyspepsia  melatonin 3 milliGRAM(s) Oral at bedtime PRN Insomnia  ondansetron Injectable 4 milliGRAM(s) IV Push every 8 hours PRN Nausea and/or Vomiting  traMADol 50 milliGRAM(s) Oral three times a day PRN Moderate Pain (4 - 6)         Vitals log        ICU Vital Signs Last 24 Hrs  T(C): 36.3 (12 Mar 2022 04:45), Max: 36.7 (11 Mar 2022 12:06)  T(F): 97.3 (12 Mar 2022 04:45), Max: 98 (11 Mar 2022 12:06)  HR: 60 (12 Mar 2022 04:45) (60 - 69)  BP: 112/65 (12 Mar 2022 04:45) (112/65 - 165/84)  BP(mean): --  ABP: --  ABP(mean): --  RR: 18 (12 Mar 2022 04:45) (18 - 18)  SpO2: 93% (12 Mar 2022 04:45) (93% - 93%)           Input and Output:  I&O's Detail      Lab Data                        12.9   14.73 )-----------( 182      ( 11 Mar 2022 07:54 )             39.4     03-11    139  |  108  |  62<H>  ----------------------------<  284<H>  5.0   |  25  |  1.70<H>    Ca    9.2      11 Mar 2022 07:54              Review of Systems	      Objective     Physical Examination    heart s1s2  lung dec BS  abd soft  head nc      Pertinent Lab findings & Imaging      Schwarz:  NO   Adequate UO     I&O's Detail           Discussed with:     Cultures:	  Culture Results:   GI PCR Results: NOT detected  *******Please Note:*******  GI panel PCR evaluates for:  Campylobacter, Plesiomonas shigelloides, Salmonella,  Vibrio, Yersinia enterocolitica, Enteroaggregative  Escherichia coli (EAEC), Enteropathogenic E.coli (EPEC),  Enterotoxigenic E. coli (ETEC) lt/st, Shiga-like  toxin-producing E. coli (STEC) stx1/stx2,  Shigella/ Enteroinvasive E. coli (EIEC), Cryptosporidium,  Cyclospora cayetanensis, Entamoeba histolytica,  Giardia lamblia, Adenovirus F 40/41, Astrovirus,  Norovirus GI/GII, Rotavirus A, Sapovirus (03-11 @ 15:29)        Radiology

## 2022-03-12 NOTE — CONSULT NOTE ADULT - CONSULT REASON
87y A1C with Estimated Average Glucose Result: 8.4 % (03-10-22 @ 11:06)   diabetes mellitus uncontrolled type 2
Evaluation for sepsis
dysn
dm2 uncontrolled
cardiac evaluation
GOC  code status  pna  CHF  Obesity  Ataxic gait

## 2022-03-12 NOTE — PROGRESS NOTE ADULT - SUBJECTIVE AND OBJECTIVE BOX
Patient is a 87y Female with a known history of :  Uncomplicated asthma, unspecified asthma severity [J45.909]    DM2 (diabetes mellitus, type 2) [E11.9]    Essential hypertension [I10]    Hypothyroidism, unspecified type [E03.9]    Gastroesophageal reflux disease without esophagitis [K21.9]    CAD (coronary artery disease) [I25.10]    Hypothyroidism, unspecified type [E03.9]    COPD exacerbation [J44.1]    Chronic diastolic congestive heart failure [I50.32]    Prophylactic measure [Z29.9]      HPI:      REVIEW OF SYSTEMS:    CONSTITUTIONAL: No fever, weight loss, or fatigue  EYES: No eye pain, visual disturbances, or discharge  ENMT:  No difficulty hearing, tinnitus, vertigo; No sinus or throat pain  NECK: No pain or stiffness  BREASTS: No pain, masses, or nipple discharge  RESPIRATORY: No cough, wheezing, chills or hemoptysis; No shortness of breath  CARDIOVASCULAR: No chest pain, palpitations, dizziness, or leg swelling  GASTROINTESTINAL: No abdominal or epigastric pain. No nausea, vomiting, or hematemesis; No diarrhea or constipation. No melena or hematochezia.  GENITOURINARY: No dysuria, frequency, hematuria, or incontinence  NEUROLOGICAL: No headaches, memory loss, loss of strength, numbness, or tremors  SKIN: No itching, burning, rashes, or lesions   LYMPH NODES: No enlarged glands  ENDOCRINE: No heat or cold intolerance; No hair loss  MUSCULOSKELETAL: No joint pain or swelling; No muscle, back, or extremity pain  PSYCHIATRIC: No depression, anxiety, mood swings, or difficulty sleeping  HEME/LYMPH: No easy bruising, or bleeding gums  ALLERGY AND IMMUNOLOGIC: No hives or eczema    MEDICATIONS  (STANDING):  ALBUTerol    90 MICROgram(s) HFA Inhaler 2 Puff(s) Inhalation every 6 hours  ALPRAZolam 0.5 milliGRAM(s) Oral at bedtime  artificial  tears Solution 1 Drop(s) Both EYES two times a day  aspirin enteric coated 81 milliGRAM(s) Oral daily  atorvastatin 80 milliGRAM(s) Oral at bedtime  budesonide 160 MICROgram(s)/formoterol 4.5 MICROgram(s) Inhaler 2 Puff(s) Inhalation two times a day  cholecalciferol 2000 Unit(s) Oral daily  clopidogrel Tablet 75 milliGRAM(s) Oral daily  dextrose 40% Gel 15 Gram(s) Oral once  dextrose 5%. 1000 milliLiter(s) (50 mL/Hr) IV Continuous <Continuous>  dextrose 5%. 1000 milliLiter(s) (100 mL/Hr) IV Continuous <Continuous>  dextrose 50% Injectable 25 Gram(s) IV Push once  dextrose 50% Injectable 12.5 Gram(s) IV Push once  dextrose 50% Injectable 25 Gram(s) IV Push once  escitalopram 10 milliGRAM(s) Oral daily  gabapentin 100 milliGRAM(s) Oral three times a day  glucagon  Injectable 1 milliGRAM(s) IntraMuscular once  heparin   Injectable 5000 Unit(s) SubCutaneous every 12 hours  insulin glargine Injectable (LANTUS) 15 Unit(s) SubCutaneous every morning  insulin lispro (ADMELOG) corrective regimen sliding scale   SubCutaneous three times a day before meals  insulin lispro (ADMELOG) corrective regimen sliding scale   SubCutaneous at bedtime  insulin lispro Injectable (ADMELOG) 3 Unit(s) SubCutaneous three times a day before meals  isosorbide   mononitrate ER Tablet (IMDUR) 30 milliGRAM(s) Oral daily  lactobacillus acidophilus 1 Tablet(s) Oral two times a day  levothyroxine 125 MICROGram(s) Oral <User Schedule>  metoprolol succinate ER 50 milliGRAM(s) Oral daily  predniSONE   Tablet 20 milliGRAM(s) Oral daily    MEDICATIONS  (PRN):  acetaminophen     Tablet .. 650 milliGRAM(s) Oral every 6 hours PRN Temp greater or equal to 38C (100.4F), Mild Pain (1 - 3)  aluminum hydroxide/magnesium hydroxide/simethicone Suspension 30 milliLiter(s) Oral every 4 hours PRN Dyspepsia  melatonin 3 milliGRAM(s) Oral at bedtime PRN Insomnia  ondansetron Injectable 4 milliGRAM(s) IV Push every 8 hours PRN Nausea and/or Vomiting  traMADol 50 milliGRAM(s) Oral three times a day PRN Moderate Pain (4 - 6)      ALLERGIES: doxycycline (Unknown)  iodine (Hives)  iodine containing compounds (Unknown)  shellfish (Anaphylaxis)  shellfish (Swelling; Short breath)      FAMILY HISTORY:  Family history of heart disease    Family history of cancer in mother    Family history of MI (myocardial infarction)    Family history of stomach cancer        PHYSICAL EXAMINATION:  -----------------------------  T(C): 36.3 (03-12-22 @ 04:45), Max: 36.7 (03-11-22 @ 12:06)  HR: 60 (03-12-22 @ 04:45) (60 - 69)  BP: 112/65 (03-12-22 @ 04:45) (112/65 - 165/84)  RR: 18 (03-12-22 @ 04:45) (18 - 18)  SpO2: 93% (03-12-22 @ 04:45) (93% - 93%)  Wt(kg): --        VITALS  T(C): 36.3 (03-12-22 @ 04:45), Max: 36.7 (03-11-22 @ 12:06)  HR: 60 (03-12-22 @ 04:45) (60 - 69)  BP: 112/65 (03-12-22 @ 04:45) (112/65 - 165/84)  RR: 18 (03-12-22 @ 04:45) (18 - 18)  SpO2: 93% (03-12-22 @ 04:45) (93% - 93%)    Constitutional: well developed, normal appearance, well groomed, well nourished, no deformities and no acute distress.   Eyes: the conjunctiva exhibited no abnormalities and the eyelids demonstrated no xanthelasmas.   HEENT: normal oral mucosa, no oral pallor and no oral cyanosis.   Neck: normal jugular venous A waves present, normal jugular venous V waves present and no jugular venous wagner A waves.   Pulmonary: no respiratory distress, normal respiratory rhythm and effort, no accessory muscle use and lungs were clear to auscultation bilaterally.   Cardiovascular: heart rate and rhythm were normal, normal S1 and S2 and no murmur, gallop, rub, heave or thrill are present.   Abdomen: soft, non-tender, no hepato-splenomegaly and no abdominal mass palpated.   Musculoskeletal: the gait could not be assessed..   Extremities: no clubbing of the fingernails, no localized cyanosis, no petechial hemorrhages and no ischemic changes.   Skin: normal skin color and pigmentation, no rash, no venous stasis, no skin lesions, no skin ulcer and no xanthoma was observed.   Psychiatric: oriented to person, place, and time, the affect was normal, the mood was normal and not feeling anxious.     LABS:   --------  03-12    138  |  107  |  68<H>  ----------------------------<  214<H>  4.2   |  22  |  1.60<H>    Ca    9.2      12 Mar 2022 07:51                           11.4   14.33 )-----------( 181      ( 12 Mar 2022 07:51 )             35.5             Culture Results:   GI PCR Results: NOT detected  *******Please Note:*******  GI panel PCR evaluates for:  Campylobacter, Plesiomonas shigelloides, Salmonella,  Vibrio, Yersinia enterocolitica, Enteroaggregative  Escherichia coli (EAEC), Enteropathogenic E.coli (EPEC),  Enterotoxigenic E. coli (ETEC) lt/st, Shiga-like  toxin-producing E. coli (STEC) stx1/stx2,  Shigella/ Enteroinvasive E. coli (EIEC), Cryptosporidium,  Cyclospora cayetanensis, Entamoeba histolytica,  Giardia lamblia, Adenovirus F 40/41, Astrovirus,  Norovirus GI/GII, Rotavirus A, Sapovirus (03-11 @ 15:29)      RADIOLOGY:  -----------------    ECG:     ECHO:

## 2022-03-13 RX ORDER — TIOTROPIUM BROMIDE 18 UG/1
1 CAPSULE ORAL; RESPIRATORY (INHALATION) DAILY
Refills: 0 | Status: DISCONTINUED | OUTPATIENT
Start: 2022-03-13 | End: 2022-03-15

## 2022-03-13 RX ADMIN — Medication 4: at 07:40

## 2022-03-13 RX ADMIN — ALBUTEROL 2 PUFF(S): 90 AEROSOL, METERED ORAL at 13:05

## 2022-03-13 RX ADMIN — Medication 1 DROP(S): at 05:59

## 2022-03-13 RX ADMIN — Medication 81 MILLIGRAM(S): at 11:58

## 2022-03-13 RX ADMIN — Medication 6: at 17:05

## 2022-03-13 RX ADMIN — INSULIN GLARGINE 20 UNIT(S): 100 INJECTION, SOLUTION SUBCUTANEOUS at 07:41

## 2022-03-13 RX ADMIN — Medication 1 TABLET(S): at 05:59

## 2022-03-13 RX ADMIN — Medication 20 MILLIGRAM(S): at 05:59

## 2022-03-13 RX ADMIN — GABAPENTIN 100 MILLIGRAM(S): 400 CAPSULE ORAL at 13:05

## 2022-03-13 RX ADMIN — HEPARIN SODIUM 5000 UNIT(S): 5000 INJECTION INTRAVENOUS; SUBCUTANEOUS at 05:59

## 2022-03-13 RX ADMIN — Medication 6 UNIT(S): at 07:41

## 2022-03-13 RX ADMIN — Medication 1 DROP(S): at 17:06

## 2022-03-13 RX ADMIN — BUDESONIDE AND FORMOTEROL FUMARATE DIHYDRATE 2 PUFF(S): 160; 4.5 AEROSOL RESPIRATORY (INHALATION) at 18:34

## 2022-03-13 RX ADMIN — CLOPIDOGREL BISULFATE 75 MILLIGRAM(S): 75 TABLET, FILM COATED ORAL at 11:59

## 2022-03-13 RX ADMIN — Medication 50 MILLIGRAM(S): at 05:59

## 2022-03-13 RX ADMIN — GABAPENTIN 100 MILLIGRAM(S): 400 CAPSULE ORAL at 05:59

## 2022-03-13 RX ADMIN — HEPARIN SODIUM 5000 UNIT(S): 5000 INJECTION INTRAVENOUS; SUBCUTANEOUS at 17:06

## 2022-03-13 RX ADMIN — ISOSORBIDE MONONITRATE 30 MILLIGRAM(S): 60 TABLET, EXTENDED RELEASE ORAL at 11:59

## 2022-03-13 RX ADMIN — GABAPENTIN 100 MILLIGRAM(S): 400 CAPSULE ORAL at 21:35

## 2022-03-13 RX ADMIN — Medication 6 UNIT(S): at 11:58

## 2022-03-13 RX ADMIN — Medication 1 TABLET(S): at 17:06

## 2022-03-13 RX ADMIN — Medication 2000 UNIT(S): at 11:59

## 2022-03-13 RX ADMIN — ALBUTEROL 2 PUFF(S): 90 AEROSOL, METERED ORAL at 06:00

## 2022-03-13 RX ADMIN — Medication 0.5 MILLIGRAM(S): at 21:35

## 2022-03-13 RX ADMIN — ESCITALOPRAM OXALATE 10 MILLIGRAM(S): 10 TABLET, FILM COATED ORAL at 11:59

## 2022-03-13 RX ADMIN — Medication 6: at 11:58

## 2022-03-13 RX ADMIN — ATORVASTATIN CALCIUM 80 MILLIGRAM(S): 80 TABLET, FILM COATED ORAL at 21:36

## 2022-03-13 RX ADMIN — BUDESONIDE AND FORMOTEROL FUMARATE DIHYDRATE 2 PUFF(S): 160; 4.5 AEROSOL RESPIRATORY (INHALATION) at 06:00

## 2022-03-13 RX ADMIN — Medication 6 UNIT(S): at 17:06

## 2022-03-13 RX ADMIN — ALBUTEROL 2 PUFF(S): 90 AEROSOL, METERED ORAL at 18:34

## 2022-03-13 NOTE — PROGRESS NOTE ADULT - SUBJECTIVE AND OBJECTIVE BOX
FLOYD JEANNINE    PLV 2NOR 229 W1    Allergies    doxycycline (Unknown)  iodine (Hives)  iodine containing compounds (Unknown)  shellfish (Anaphylaxis)  shellfish (Swelling; Short breath)    Intolerances        PAST MEDICAL & SURGICAL HISTORY:  Uncomplicated asthma, unspecified asthma severity    DM2 (diabetes mellitus, type 2)    Essential hypertension    Hypothyroidism, unspecified type    Pure hypercholesterolemia    Gastroesophageal reflux disease without esophagitis    Diabetes    Asthma    CAD (coronary artery disease)    HTN (hypertension)    HLD (hyperlipidemia)    Hypothyroid    NSTEMI (non-ST elevated myocardial infarction)    History of appendectomy    History of appendectomy    Abnormal findings on cardiac catheterization  Cardiac Cath        FAMILY HISTORY:  Family history of heart disease    Family history of cancer in mother    Family history of MI (myocardial infarction)    Family history of stomach cancer        Home Medications:  acetaminophen 500 mg oral tablet: 2 tab(s) orally 3 times a day (09 Mar 2022 08:41)  ALPRAZolam 0.5 mg oral tablet: 1 tab(s) orally once a day (at bedtime) (09 Mar 2022 08:41)  aspirin 81 mg oral tablet: 1 tab(s) orally once a day (09 Mar 2022 08:41)  atorvastatin 80 mg oral tablet: 1 tab(s) orally once a day (09 Mar 2022 08:41)  clopidogrel 75 mg oral tablet: 1 tab(s) orally once a day (09 Mar 2022 08:41)  escitalopram 10 mg oral tablet: 1 tab(s) orally once a day (09 Mar 2022 08:41)  fluticasone-salmeterol 250 mcg-50 mcg inhalation powder: 1 inhaler(s) inhaled 2 times a day (09 Mar 2022 08:41)  furosemide 40 mg oral tablet: 1 tab(s) orally once a day (09 Mar 2022 08:41)  gabapentin 100 mg oral capsule: 1 cap(s) orally 3 times a day (09 Mar 2022 08:41)  ipratropium-albuterol 0.5 mg-2.5 mg/3 mL inhalation solution: 3 milliliter(s) inhaled every 6 hours, As Needed (09 Mar 2022 08:41)  ipratropium-albuterol 0.5 mg-2.5 mg/3 mL inhalation solution: 3 milliliter(s) inhaled 2 times a day (09 Mar 2022 08:41)  isosorbide mononitrate 30 mg oral tablet, extended release: 1 tab(s) orally once a day (in the morning) (09 Mar 2022 08:41)  Lantus 100 units/mL subcutaneous solution: 18 unit(s) subcutaneous 2 times a day (09 Mar 2022 08:41)  levothyroxine 125 mcg (0.125 mg) oral tablet: 1 tab(s) orally Monday through Friday (09 Mar 2022 08:41)  Metoprolol Succinate ER 50 mg oral tablet, extended release: 1 tab(s) orally once a day (09 Mar 2022 08:41)  NovoLOG FlexPen 100 units/mL injectable solution: 15 unit(s) subcutaneous before meals  (09 Mar 2022 08:41)  potassium chloride 10 mEq oral capsule, extended release: 1 cap(s) orally once a day (09 Mar 2022 08:41)  ProAir HFA 90 mcg/inh inhalation aerosol: 2 puff(s) inhaled every 4 hours, As Needed (09 Mar 2022 08:41)  Refresh Relieva ophthalmic solution: 1 drop(s) in each eye every 2 hours, As Needed for Dry eyes (09 Mar 2022 08:41)  Refresh Relieva ophthalmic solution: 1 drop(s) in each eye 2 times a day (09 Mar 2022 08:41)  Vitamin D3 50 mcg (2000 intl units) oral tablet: 1 tab(s) orally once a day (09 Mar 2022 08:41)  ZeaSORB 0.5%-0.22% topical powder: Apply to under bilateral abdominal topically twice a day  (09 Mar 2022 08:41)      MEDICATIONS  (STANDING):  ALBUTerol    90 MICROgram(s) HFA Inhaler 2 Puff(s) Inhalation every 6 hours  ALPRAZolam 0.5 milliGRAM(s) Oral at bedtime  artificial  tears Solution 1 Drop(s) Both EYES two times a day  aspirin enteric coated 81 milliGRAM(s) Oral daily  atorvastatin 80 milliGRAM(s) Oral at bedtime  budesonide 160 MICROgram(s)/formoterol 4.5 MICROgram(s) Inhaler 2 Puff(s) Inhalation two times a day  cholecalciferol 2000 Unit(s) Oral daily  clopidogrel Tablet 75 milliGRAM(s) Oral daily  dextrose 40% Gel 15 Gram(s) Oral once  dextrose 5%. 1000 milliLiter(s) (50 mL/Hr) IV Continuous <Continuous>  dextrose 5%. 1000 milliLiter(s) (100 mL/Hr) IV Continuous <Continuous>  dextrose 50% Injectable 25 Gram(s) IV Push once  dextrose 50% Injectable 12.5 Gram(s) IV Push once  dextrose 50% Injectable 25 Gram(s) IV Push once  escitalopram 10 milliGRAM(s) Oral daily  gabapentin 100 milliGRAM(s) Oral three times a day  glucagon  Injectable 1 milliGRAM(s) IntraMuscular once  heparin   Injectable 5000 Unit(s) SubCutaneous every 12 hours  insulin glargine Injectable (LANTUS) 20 Unit(s) SubCutaneous every morning  insulin lispro (ADMELOG) corrective regimen sliding scale   SubCutaneous three times a day before meals  insulin lispro (ADMELOG) corrective regimen sliding scale   SubCutaneous at bedtime  insulin lispro Injectable (ADMELOG) 6 Unit(s) SubCutaneous three times a day before meals  isosorbide   mononitrate ER Tablet (IMDUR) 30 milliGRAM(s) Oral daily  lactobacillus acidophilus 1 Tablet(s) Oral two times a day  levothyroxine 125 MICROGram(s) Oral <User Schedule>  metoprolol succinate ER 50 milliGRAM(s) Oral daily    MEDICATIONS  (PRN):  acetaminophen     Tablet .. 650 milliGRAM(s) Oral every 6 hours PRN Temp greater or equal to 38C (100.4F), Mild Pain (1 - 3)  aluminum hydroxide/magnesium hydroxide/simethicone Suspension 30 milliLiter(s) Oral every 4 hours PRN Dyspepsia  melatonin 3 milliGRAM(s) Oral at bedtime PRN Insomnia  ondansetron Injectable 4 milliGRAM(s) IV Push every 8 hours PRN Nausea and/or Vomiting  traMADol 50 milliGRAM(s) Oral three times a day PRN Moderate Pain (4 - 6)      Diet, Consistent Carbohydrate w/Evening Snack:   DASH/TLC Sodium & Cholesterol Restricted  Easy to Chew (EASYTOCHEW) (03-10-22 @ 19:55) [Active]          Vital Signs Last 24 Hrs  T(C): 36.3 (13 Mar 2022 04:11), Max: 36.9 (12 Mar 2022 13:06)  T(F): 97.4 (13 Mar 2022 04:11), Max: 98.5 (12 Mar 2022 13:06)  HR: 60 (13 Mar 2022 09:26) (56 - 64)  BP: 133/59 (13 Mar 2022 09:26) (133/59 - 170/84)  BP(mean): --  RR: 17 (13 Mar 2022 04:11) (17 - 17)  SpO2: 96% (13 Mar 2022 04:11) (95% - 96%)      03-12-22 @ 06:01  -  03-13-22 @ 07:00  --------------------------------------------------------  IN: 0 mL / OUT: 550 mL / NET: -550 mL              LABS:                        11.4   14.33 )-----------( 181      ( 12 Mar 2022 07:51 )             35.5     03-12    138  |  107  |  68<H>  ----------------------------<  214<H>  4.2   |  22  |  1.60<H>    Ca    9.2      12 Mar 2022 07:51                WBC:  WBC Count: 14.33 K/uL (03-12 @ 07:51)  WBC Count: 14.73 K/uL (03-11 @ 07:54)  WBC Count: 12.64 K/uL (03-10 @ 07:42)      MICROBIOLOGY:  RECENT CULTURES:  03-11 .Stool Feces XXXX XXXX   GI PCR Results: NOT detected  *******Please Note:*******  GI panel PCR evaluates for:  Campylobacter, Plesiomonas shigelloides, Salmonella,  Vibrio, Yersinia enterocolitica, Enteroaggregative  Escherichia coli (EAEC), Enteropathogenic E.coli (EPEC),  Enterotoxigenic E. coli (ETEC) lt/st, Shiga-like  toxin-producing E. coli (STEC) stx1/stx2,  Shigella/ Enteroinvasive E. coli (EIEC), Cryptosporidium,  Cyclospora cayetanensis, Entamoeba histolytica,  Giardia lamblia, Adenovirus F 40/41, Astrovirus,  Norovirus GI/GII, Rotavirus A, Sapovirus    03-09 .Blood Blood-Peripheral XXXX XXXX   No growth to date.                    Sodium:  Sodium, Serum: 138 mmol/L (03-12 @ 07:51)  Sodium, Serum: 139 mmol/L (03-11 @ 07:54)  Sodium, Serum: 139 mmol/L (03-10 @ 07:43)      1.60 mg/dL 03-12 @ 07:51  1.70 mg/dL 03-11 @ 07:54  1.60 mg/dL 03-10 @ 07:43      Hemoglobin:  Hemoglobin: 11.4 g/dL (03-12 @ 07:51)  Hemoglobin: 12.9 g/dL (03-11 @ 07:54)  Hemoglobin: 12.0 g/dL (03-10 @ 07:42)      Platelets: Platelet Count - Automated: 181 K/uL (03-12 @ 07:51)  Platelet Count - Automated: 182 K/uL (03-11 @ 07:54)  Platelet Count - Automated: 171 K/uL (03-10 @ 07:42)              RADIOLOGY & ADDITIONAL STUDIES:      MICROBIOLOGY:  RECENT CULTURES:  03-11 .Stool Feces XXXX XXXX   GI PCR Results: NOT detected  *******Please Note:*******  GI panel PCR evaluates for:  Campylobacter, Plesiomonas shigelloides, Salmonella,  Vibrio, Yersinia enterocolitica, Enteroaggregative  Escherichia coli (EAEC), Enteropathogenic E.coli (EPEC),  Enterotoxigenic E. coli (ETEC) lt/st, Shiga-like  toxin-producing E. coli (STEC) stx1/stx2,  Shigella/ Enteroinvasive E. coli (EIEC), Cryptosporidium,  Cyclospora cayetanensis, Entamoeba histolytica,  Giardia lamblia, Adenovirus F 40/41, Astrovirus,  Norovirus GI/GII, Rotavirus A, Sapovirus    03-09 .Blood Blood-Peripheral XXXX XXXX   No growth to date.

## 2022-03-13 NOTE — PROGRESS NOTE ADULT - SUBJECTIVE AND OBJECTIVE BOX
Patient is a 87y Female with a known history of :  Uncomplicated asthma, unspecified asthma severity [J45.909]    DM2 (diabetes mellitus, type 2) [E11.9]    Essential hypertension [I10]    Hypothyroidism, unspecified type [E03.9]    Gastroesophageal reflux disease without esophagitis [K21.9]    CAD (coronary artery disease) [I25.10]    Hypothyroidism, unspecified type [E03.9]    COPD exacerbation [J44.1]    Chronic diastolic congestive heart failure [I50.32]    Prophylactic measure [Z29.9]      HPI:      REVIEW OF SYSTEMS:    CONSTITUTIONAL: No fever, weight loss, or fatigue  EYES: No eye pain, visual disturbances, or discharge  ENMT:  No difficulty hearing, tinnitus, vertigo; No sinus or throat pain  NECK: No pain or stiffness  BREASTS: No pain, masses, or nipple discharge  RESPIRATORY: No cough, wheezing, chills or hemoptysis; No shortness of breath  CARDIOVASCULAR: No chest pain, palpitations, dizziness, or leg swelling  GASTROINTESTINAL: No abdominal or epigastric pain. No nausea, vomiting, or hematemesis; No diarrhea or constipation. No melena or hematochezia.  GENITOURINARY: No dysuria, frequency, hematuria, or incontinence  NEUROLOGICAL: No headaches, memory loss, loss of strength, numbness, or tremors  SKIN: No itching, burning, rashes, or lesions   LYMPH NODES: No enlarged glands  ENDOCRINE: No heat or cold intolerance; No hair loss  MUSCULOSKELETAL: No joint pain or swelling; No muscle, back, or extremity pain  PSYCHIATRIC: No depression, anxiety, mood swings, or difficulty sleeping  HEME/LYMPH: No easy bruising, or bleeding gums  ALLERGY AND IMMUNOLOGIC: No hives or eczema    MEDICATIONS  (STANDING):  ALBUTerol    90 MICROgram(s) HFA Inhaler 2 Puff(s) Inhalation every 6 hours  ALPRAZolam 0.5 milliGRAM(s) Oral at bedtime  artificial  tears Solution 1 Drop(s) Both EYES two times a day  aspirin enteric coated 81 milliGRAM(s) Oral daily  atorvastatin 80 milliGRAM(s) Oral at bedtime  budesonide 160 MICROgram(s)/formoterol 4.5 MICROgram(s) Inhaler 2 Puff(s) Inhalation two times a day  cholecalciferol 2000 Unit(s) Oral daily  clopidogrel Tablet 75 milliGRAM(s) Oral daily  dextrose 40% Gel 15 Gram(s) Oral once  dextrose 5%. 1000 milliLiter(s) (50 mL/Hr) IV Continuous <Continuous>  dextrose 5%. 1000 milliLiter(s) (100 mL/Hr) IV Continuous <Continuous>  dextrose 50% Injectable 25 Gram(s) IV Push once  dextrose 50% Injectable 12.5 Gram(s) IV Push once  dextrose 50% Injectable 25 Gram(s) IV Push once  escitalopram 10 milliGRAM(s) Oral daily  gabapentin 100 milliGRAM(s) Oral three times a day  glucagon  Injectable 1 milliGRAM(s) IntraMuscular once  heparin   Injectable 5000 Unit(s) SubCutaneous every 12 hours  insulin glargine Injectable (LANTUS) 20 Unit(s) SubCutaneous every morning  insulin lispro (ADMELOG) corrective regimen sliding scale   SubCutaneous three times a day before meals  insulin lispro (ADMELOG) corrective regimen sliding scale   SubCutaneous at bedtime  insulin lispro Injectable (ADMELOG) 6 Unit(s) SubCutaneous three times a day before meals  isosorbide   mononitrate ER Tablet (IMDUR) 30 milliGRAM(s) Oral daily  lactobacillus acidophilus 1 Tablet(s) Oral two times a day  levothyroxine 125 MICROGram(s) Oral <User Schedule>  metoprolol succinate ER 50 milliGRAM(s) Oral daily    MEDICATIONS  (PRN):  acetaminophen     Tablet .. 650 milliGRAM(s) Oral every 6 hours PRN Temp greater or equal to 38C (100.4F), Mild Pain (1 - 3)  aluminum hydroxide/magnesium hydroxide/simethicone Suspension 30 milliLiter(s) Oral every 4 hours PRN Dyspepsia  melatonin 3 milliGRAM(s) Oral at bedtime PRN Insomnia  ondansetron Injectable 4 milliGRAM(s) IV Push every 8 hours PRN Nausea and/or Vomiting  traMADol 50 milliGRAM(s) Oral three times a day PRN Moderate Pain (4 - 6)      ALLERGIES: doxycycline (Unknown)  iodine (Hives)  iodine containing compounds (Unknown)  shellfish (Anaphylaxis)  shellfish (Swelling; Short breath)      FAMILY HISTORY:  Family history of heart disease    Family history of cancer in mother    Family history of MI (myocardial infarction)    Family history of stomach cancer        PHYSICAL EXAMINATION:  -----------------------------  T(C): 36.3 (03-13-22 @ 04:11), Max: 36.9 (03-12-22 @ 13:06)  HR: 60 (03-13-22 @ 09:26) (56 - 64)  BP: 133/59 (03-13-22 @ 09:26) (133/59 - 170/84)  RR: 17 (03-13-22 @ 04:11) (17 - 17)  SpO2: 96% (03-13-22 @ 04:11) (95% - 96%)  Wt(kg): --    03-12 @ 06:01  -  03-13 @ 07:00  --------------------------------------------------------  IN:  Total IN: 0 mL    OUT:    Voided (mL): 550 mL  Total OUT: 550 mL    Total NET: -550 mL            VITALS  T(C): 36.3 (03-13-22 @ 04:11), Max: 36.9 (03-12-22 @ 13:06)  HR: 60 (03-13-22 @ 09:26) (56 - 64)  BP: 133/59 (03-13-22 @ 09:26) (133/59 - 170/84)  RR: 17 (03-13-22 @ 04:11) (17 - 17)  SpO2: 96% (03-13-22 @ 04:11) (95% - 96%)    Constitutional: well developed, normal appearance, well groomed, well nourished, no deformities and no acute distress.   Eyes: the conjunctiva exhibited no abnormalities and the eyelids demonstrated no xanthelasmas.   HEENT: normal oral mucosa, no oral pallor and no oral cyanosis.   Neck: normal jugular venous A waves present, normal jugular venous V waves present and no jugular venous wagner A waves.   Pulmonary: no respiratory distress, normal respiratory rhythm and effort, no accessory muscle use and lungs were clear to auscultation bilaterally.   Cardiovascular: heart rate and rhythm were normal, normal S1 and S2 and no murmur, gallop, rub, heave or thrill are present.   Abdomen: soft, non-tender, no hepato-splenomegaly and no abdominal mass palpated.   Musculoskeletal: the gait could not be assessed..   Extremities: no clubbing of the fingernails, no localized cyanosis, no petechial hemorrhages and no ischemic changes.   Skin: normal skin color and pigmentation, no rash, no venous stasis, no skin lesions, no skin ulcer and no xanthoma was observed.   Psychiatric: oriented to person, place, and time, the affect was normal, the mood was normal and not feeling anxious.     LABS:   --------  03-12    138  |  107  |  68<H>  ----------------------------<  214<H>  4.2   |  22  |  1.60<H>    Ca    9.2      12 Mar 2022 07:51                           11.4   14.33 )-----------( 181      ( 12 Mar 2022 07:51 )             35.5             Culture Results:   GI PCR Results: NOT detected  *******Please Note:*******  GI panel PCR evaluates for:  Campylobacter, Plesiomonas shigelloides, Salmonella,  Vibrio, Yersinia enterocolitica, Enteroaggregative  Escherichia coli (EAEC), Enteropathogenic E.coli (EPEC),  Enterotoxigenic E. coli (ETEC) lt/st, Shiga-like  toxin-producing E. coli (STEC) stx1/stx2,  Shigella/ Enteroinvasive E. coli (EIEC), Cryptosporidium,  Cyclospora cayetanensis, Entamoeba histolytica,  Giardia lamblia, Adenovirus F 40/41, Astrovirus,  Norovirus GI/GII, Rotavirus A, Sapovirus (03-11 @ 15:29)      RADIOLOGY:  -----------------    ECG:     ECHO:

## 2022-03-13 NOTE — PROGRESS NOTE ADULT - SUBJECTIVE AND OBJECTIVE BOX
Date/Time Patient Seen:  		  Referring MD:   Data Reviewed	       Patient is a 87y old  Female who presents with a chief complaint of     Subjective/HPI     PAST MEDICAL & SURGICAL HISTORY:  Uncomplicated asthma, unspecified asthma severity    DM2 (diabetes mellitus, type 2)    Essential hypertension    Hypothyroidism, unspecified type    Pure hypercholesterolemia    Gastroesophageal reflux disease without esophagitis    Diabetes    Asthma    CAD (coronary artery disease)    HTN (hypertension)    HLD (hyperlipidemia)    Hypothyroid    NSTEMI (non-ST elevated myocardial infarction)    No significant past surgical history    History of appendectomy    History of appendectomy    Abnormal findings on cardiac catheterization  Cardiac Cath          Medication list         MEDICATIONS  (STANDING):  ALBUTerol    90 MICROgram(s) HFA Inhaler 2 Puff(s) Inhalation every 6 hours  ALPRAZolam 0.5 milliGRAM(s) Oral at bedtime  artificial  tears Solution 1 Drop(s) Both EYES two times a day  aspirin enteric coated 81 milliGRAM(s) Oral daily  atorvastatin 80 milliGRAM(s) Oral at bedtime  budesonide 160 MICROgram(s)/formoterol 4.5 MICROgram(s) Inhaler 2 Puff(s) Inhalation two times a day  cholecalciferol 2000 Unit(s) Oral daily  clopidogrel Tablet 75 milliGRAM(s) Oral daily  dextrose 40% Gel 15 Gram(s) Oral once  dextrose 5%. 1000 milliLiter(s) (50 mL/Hr) IV Continuous <Continuous>  dextrose 5%. 1000 milliLiter(s) (100 mL/Hr) IV Continuous <Continuous>  dextrose 50% Injectable 25 Gram(s) IV Push once  dextrose 50% Injectable 12.5 Gram(s) IV Push once  dextrose 50% Injectable 25 Gram(s) IV Push once  escitalopram 10 milliGRAM(s) Oral daily  gabapentin 100 milliGRAM(s) Oral three times a day  glucagon  Injectable 1 milliGRAM(s) IntraMuscular once  heparin   Injectable 5000 Unit(s) SubCutaneous every 12 hours  insulin glargine Injectable (LANTUS) 20 Unit(s) SubCutaneous every morning  insulin lispro (ADMELOG) corrective regimen sliding scale   SubCutaneous three times a day before meals  insulin lispro (ADMELOG) corrective regimen sliding scale   SubCutaneous at bedtime  insulin lispro Injectable (ADMELOG) 6 Unit(s) SubCutaneous three times a day before meals  isosorbide   mononitrate ER Tablet (IMDUR) 30 milliGRAM(s) Oral daily  lactobacillus acidophilus 1 Tablet(s) Oral two times a day  levothyroxine 125 MICROGram(s) Oral <User Schedule>  metoprolol succinate ER 50 milliGRAM(s) Oral daily  predniSONE   Tablet 20 milliGRAM(s) Oral daily    MEDICATIONS  (PRN):  acetaminophen     Tablet .. 650 milliGRAM(s) Oral every 6 hours PRN Temp greater or equal to 38C (100.4F), Mild Pain (1 - 3)  aluminum hydroxide/magnesium hydroxide/simethicone Suspension 30 milliLiter(s) Oral every 4 hours PRN Dyspepsia  melatonin 3 milliGRAM(s) Oral at bedtime PRN Insomnia  ondansetron Injectable 4 milliGRAM(s) IV Push every 8 hours PRN Nausea and/or Vomiting  traMADol 50 milliGRAM(s) Oral three times a day PRN Moderate Pain (4 - 6)         Vitals log        ICU Vital Signs Last 24 Hrs  T(C): 36.3 (13 Mar 2022 04:11), Max: 36.9 (12 Mar 2022 13:06)  T(F): 97.4 (13 Mar 2022 04:11), Max: 98.5 (12 Mar 2022 13:06)  HR: 56 (13 Mar 2022 04:11) (56 - 64)  BP: 170/84 (13 Mar 2022 04:11) (150/73 - 170/84)  BP(mean): --  ABP: --  ABP(mean): --  RR: 17 (13 Mar 2022 04:11) (17 - 17)  SpO2: 96% (13 Mar 2022 04:11) (95% - 96%)           Input and Output:  I&O's Detail    12 Mar 2022 06:01  -  13 Mar 2022 06:29  --------------------------------------------------------  IN:  Total IN: 0 mL    OUT:    Voided (mL): 550 mL  Total OUT: 550 mL    Total NET: -550 mL          Lab Data                        11.4   14.33 )-----------( 181      ( 12 Mar 2022 07:51 )             35.5     03-12    138  |  107  |  68<H>  ----------------------------<  214<H>  4.2   |  22  |  1.60<H>    Ca    9.2      12 Mar 2022 07:51              Review of Systems	      Objective     Physical Examination    heart s1s2  lung dec BS  abd soft      Pertinent Lab findings & Imaging      Chong:  NO   Adequate UO     I&O's Detail    12 Mar 2022 06:01  -  13 Mar 2022 06:29  --------------------------------------------------------  IN:  Total IN: 0 mL    OUT:    Voided (mL): 550 mL  Total OUT: 550 mL    Total NET: -550 mL               Discussed with:     Cultures:	        Radiology

## 2022-03-13 NOTE — PROGRESS NOTE ADULT - SUBJECTIVE AND OBJECTIVE BOX
Interval History:    CENTRAL LINE:   [  ] YES       [  ] NO  PACE:                 [  ] YES       [  ] NO         REVIEW OF SYSTEMS:  All Systems below were reviewed and are negative [  ]  HEENT:  ID:  Pulmonary:  Cardiac:  GI:  Renal:  Musculoskeletal:  All other systems above were reviewed and are negative   [  ]      MEDICATIONS  (STANDING):  ALBUTerol    90 MICROgram(s) HFA Inhaler 2 Puff(s) Inhalation every 6 hours  ALPRAZolam 0.5 milliGRAM(s) Oral at bedtime  artificial  tears Solution 1 Drop(s) Both EYES two times a day  aspirin enteric coated 81 milliGRAM(s) Oral daily  atorvastatin 80 milliGRAM(s) Oral at bedtime  budesonide 160 MICROgram(s)/formoterol 4.5 MICROgram(s) Inhaler 2 Puff(s) Inhalation two times a day  cholecalciferol 2000 Unit(s) Oral daily  clopidogrel Tablet 75 milliGRAM(s) Oral daily  dextrose 40% Gel 15 Gram(s) Oral once  dextrose 5%. 1000 milliLiter(s) (50 mL/Hr) IV Continuous <Continuous>  dextrose 5%. 1000 milliLiter(s) (100 mL/Hr) IV Continuous <Continuous>  dextrose 50% Injectable 25 Gram(s) IV Push once  dextrose 50% Injectable 12.5 Gram(s) IV Push once  dextrose 50% Injectable 25 Gram(s) IV Push once  escitalopram 10 milliGRAM(s) Oral daily  gabapentin 100 milliGRAM(s) Oral three times a day  glucagon  Injectable 1 milliGRAM(s) IntraMuscular once  heparin   Injectable 5000 Unit(s) SubCutaneous every 12 hours  insulin glargine Injectable (LANTUS) 20 Unit(s) SubCutaneous every morning  insulin lispro (ADMELOG) corrective regimen sliding scale   SubCutaneous three times a day before meals  insulin lispro (ADMELOG) corrective regimen sliding scale   SubCutaneous at bedtime  insulin lispro Injectable (ADMELOG) 6 Unit(s) SubCutaneous three times a day before meals  isosorbide   mononitrate ER Tablet (IMDUR) 30 milliGRAM(s) Oral daily  lactobacillus acidophilus 1 Tablet(s) Oral two times a day  levothyroxine 125 MICROGram(s) Oral <User Schedule>  metoprolol succinate ER 50 milliGRAM(s) Oral daily    MEDICATIONS  (PRN):  acetaminophen     Tablet .. 650 milliGRAM(s) Oral every 6 hours PRN Temp greater or equal to 38C (100.4F), Mild Pain (1 - 3)  aluminum hydroxide/magnesium hydroxide/simethicone Suspension 30 milliLiter(s) Oral every 4 hours PRN Dyspepsia  melatonin 3 milliGRAM(s) Oral at bedtime PRN Insomnia  ondansetron Injectable 4 milliGRAM(s) IV Push every 8 hours PRN Nausea and/or Vomiting  traMADol 50 milliGRAM(s) Oral three times a day PRN Moderate Pain (4 - 6)      Vital Signs Last 24 Hrs  T(C): 36.2 (13 Mar 2022 13:41), Max: 36.6 (12 Mar 2022 20:06)  T(F): 97.2 (13 Mar 2022 13:41), Max: 97.9 (12 Mar 2022 20:06)  HR: 63 (13 Mar 2022 13:41) (56 - 64)  BP: 143/54 (13 Mar 2022 13:41) (133/59 - 170/84)  BP(mean): --  RR: 17 (13 Mar 2022 13:41) (17 - 17)  SpO2: 97% (13 Mar 2022 13:41) (95% - 97%)    I&O's Summary    12 Mar 2022 06:01  -  13 Mar 2022 07:00  --------------------------------------------------------  IN: 0 mL / OUT: 550 mL / NET: -550 mL    13 Mar 2022 07:01  -  13 Mar 2022 18:02  --------------------------------------------------------  IN: 0 mL / OUT: 450 mL / NET: -450 mL        PHYSICAL EXAM:  HEENT: NC/AT; PERRLA  Neck: Soft; no tenderness  Lungs: CTA bilaterally; no wheezing.   Heart:  Abdomen:  Genital/ Rectal:  Extremities:  Neurologic:  Vascular:      LABORATORY:    CBC Full  -  ( 12 Mar 2022 07:51 )  WBC Count : 14.33 K/uL  RBC Count : 4.05 M/uL  Hemoglobin : 11.4 g/dL  Hematocrit : 35.5 %  Platelet Count - Automated : 181 K/uL  Mean Cell Volume : 87.7 fl  Mean Cell Hemoglobin : 28.1 pg  Mean Cell Hemoglobin Concentration : 32.1 gm/dL  Auto Neutrophil # : x  Auto Lymphocyte # : x  Auto Monocyte # : x  Auto Eosinophil # : x  Auto Basophil # : x  Auto Neutrophil % : x  Auto Lymphocyte % : x  Auto Monocyte % : x  Auto Eosinophil % : x  Auto Basophil % : x      ESR:                   03-10 @ 07:43  --    C-Reactive Protein:     03-10 @ 07:43  --    Procalcitonin:           03-10 @ 07:43   <0.05  ESR:                   03-10 @ 07:42  --    C-Reactive Protein:     03-10 @ 07:42  --    Procalcitonin:           03-10 @ 07:42   <0.05      03-12    138  |  107  |  68<H>  ----------------------------<  214<H>  4.2   |  22  |  1.60<H>    Ca    9.2      12 Mar 2022 07:51        Rapid Respiratory Viral Panel Result        03-09 @ 08:06  Rapid RVP [facility]  Coronovirus --  Adenovirus --  Bordetella Pertussis --  Chlamydia Pneumonia --  Entero/Rhinovirus--  HKU1 Coronovirus --  HMPV Coronovirus --  Influenza A --  Influenza AH1 --  Influenza AH1 2009 --  Influenza AH3 --  Influenza B --  Mycoplasma Pneumoniae --  NL63 Coronovirus --  OC43 Coronovirus --  Parainfluenza 1 --  Parainfluenza 2 --  Parainfluenza 3 --  Parainfluenza 4 --  Resp Syncytial Virus --      Assessment and Plan:          Lazaro De La Rosa MD   [phone].    No fevers    MEDICATIONS  (STANDING):  ALBUTerol    90 MICROgram(s) HFA Inhaler 2 Puff(s) Inhalation every 6 hours  ALPRAZolam 0.5 milliGRAM(s) Oral at bedtime  artificial  tears Solution 1 Drop(s) Both EYES two times a day  aspirin enteric coated 81 milliGRAM(s) Oral daily  atorvastatin 80 milliGRAM(s) Oral at bedtime  budesonide 160 MICROgram(s)/formoterol 4.5 MICROgram(s) Inhaler 2 Puff(s) Inhalation two times a day  cholecalciferol 2000 Unit(s) Oral daily  clopidogrel Tablet 75 milliGRAM(s) Oral daily  dextrose 40% Gel 15 Gram(s) Oral once  dextrose 5%. 1000 milliLiter(s) (50 mL/Hr) IV Continuous <Continuous>  dextrose 5%. 1000 milliLiter(s) (100 mL/Hr) IV Continuous <Continuous>  dextrose 50% Injectable 25 Gram(s) IV Push once  dextrose 50% Injectable 12.5 Gram(s) IV Push once  dextrose 50% Injectable 25 Gram(s) IV Push once  escitalopram 10 milliGRAM(s) Oral daily  gabapentin 100 milliGRAM(s) Oral three times a day  glucagon  Injectable 1 milliGRAM(s) IntraMuscular once  heparin   Injectable 5000 Unit(s) SubCutaneous every 12 hours  insulin glargine Injectable (LANTUS) 20 Unit(s) SubCutaneous every morning  insulin lispro (ADMELOG) corrective regimen sliding scale   SubCutaneous three times a day before meals  insulin lispro (ADMELOG) corrective regimen sliding scale   SubCutaneous at bedtime  insulin lispro Injectable (ADMELOG) 6 Unit(s) SubCutaneous three times a day before meals  isosorbide   mononitrate ER Tablet (IMDUR) 30 milliGRAM(s) Oral daily  lactobacillus acidophilus 1 Tablet(s) Oral two times a day  levothyroxine 125 MICROGram(s) Oral <User Schedule>  metoprolol succinate ER 50 milliGRAM(s) Oral daily    MEDICATIONS  (PRN):  acetaminophen     Tablet .. 650 milliGRAM(s) Oral every 6 hours PRN Temp greater or equal to 38C (100.4F), Mild Pain (1 - 3)  aluminum hydroxide/magnesium hydroxide/simethicone Suspension 30 milliLiter(s) Oral every 4 hours PRN Dyspepsia  melatonin 3 milliGRAM(s) Oral at bedtime PRN Insomnia  ondansetron Injectable 4 milliGRAM(s) IV Push every 8 hours PRN Nausea and/or Vomiting  traMADol 50 milliGRAM(s) Oral three times a day PRN Moderate Pain (4 - 6)      Vital Signs Last 24 Hrs  T(C): 36.2 (13 Mar 2022 13:41), Max: 36.6 (12 Mar 2022 20:06)  T(F): 97.2 (13 Mar 2022 13:41), Max: 97.9 (12 Mar 2022 20:06)  HR: 63 (13 Mar 2022 13:41) (56 - 64)  BP: 143/54 (13 Mar 2022 13:41) (133/59 - 170/84)  BP(mean): --  RR: 17 (13 Mar 2022 13:41) (17 - 17)  SpO2: 97% (13 Mar 2022 13:41) (95% - 97%)    I&O's Summary    12 Mar 2022 06:01  -  13 Mar 2022 07:00  --------------------------------------------------------  IN: 0 mL / OUT: 550 mL / NET: -550 mL    13 Mar 2022 07:01  -  13 Mar 2022 18:02  --------------------------------------------------------  IN: 0 mL / OUT: 450 mL / NET: -450 mL        PHYSICAL EXAM:  HEENT: NC/AT; PERRLA  Neck: Soft; no tenderness  Lungs: CTA bilaterally; no wheezing.   Heart:  Abdomen:  Genital/ Rectal:  Extremities:  Neurologic:  Vascular:      LABORATORY:    CBC Full  -  ( 12 Mar 2022 07:51 )  WBC Count : 14.33 K/uL  RBC Count : 4.05 M/uL  Hemoglobin : 11.4 g/dL  Hematocrit : 35.5 %  Platelet Count - Automated : 181 K/uL  Mean Cell Volume : 87.7 fl  Mean Cell Hemoglobin : 28.1 pg  Mean Cell Hemoglobin Concentration : 32.1 gm/dL  Auto Neutrophil # : x  Auto Lymphocyte # : x  Auto Monocyte # : x  Auto Eosinophil # : x  Auto Basophil # : x  Auto Neutrophil % : x  Auto Lymphocyte % : x  Auto Monocyte % : x  Auto Eosinophil % : x  Auto Basophil % : x      ESR:                   03-10 @ 07:43  --    C-Reactive Protein:     03-10 @ 07:43  --    Procalcitonin:           03-10 @ 07:43   <0.05  ESR:                   03-10 @ 07:42  --    C-Reactive Protein:     03-10 @ 07:42  --    Procalcitonin:           03-10 @ 07:42   <0.05      03-12    138  |  107  |  68<H>  ----------------------------<  214<H>  4.2   |  22  |  1.60<H>    Ca    9.2      12 Mar 2022 07:51        Rapid Respiratory Viral Panel Result        03-09 @ 08:06  Rapid RVP [facility]  Coronovirus --  Adenovirus --  Bordetella Pertussis --  Chlamydia Pneumonia --  Entero/Rhinovirus--  HKU1 Coronovirus --  HMPV Coronovirus --  Influenza A --  Influenza AH1 --  Influenza AH1 2009 --  Influenza AH3 --  Influenza B --  Mycoplasma Pneumoniae --  NL63 Coronovirus --  OC43 Coronovirus --  Parainfluenza 1 --  Parainfluenza 2 --  Parainfluenza 3 --  Parainfluenza 4 --  Resp Syncytial Virus --      Assessment and Plan:    1. UTI.  2. Toxic metabolic encephalopathy.  3. Poor oral intake.    . Urine culture grew Klebsiella pneumoniae.   . Completed po Cipro.   . Very poor oral intake.. Palliative care evaluation in progress.   . Supportive care.         Lazaro De La Rosa MD   [phone].    No fevers,  Comfortable  On RA with no hypoxia.      MEDICATIONS  (STANDING):  ALBUTerol    90 MICROgram(s) HFA Inhaler 2 Puff(s) Inhalation every 6 hours  ALPRAZolam 0.5 milliGRAM(s) Oral at bedtime  artificial  tears Solution 1 Drop(s) Both EYES two times a day  aspirin enteric coated 81 milliGRAM(s) Oral daily  atorvastatin 80 milliGRAM(s) Oral at bedtime  budesonide 160 MICROgram(s)/formoterol 4.5 MICROgram(s) Inhaler 2 Puff(s) Inhalation two times a day  cholecalciferol 2000 Unit(s) Oral daily  clopidogrel Tablet 75 milliGRAM(s) Oral daily  dextrose 40% Gel 15 Gram(s) Oral once  dextrose 5%. 1000 milliLiter(s) (50 mL/Hr) IV Continuous <Continuous>  dextrose 5%. 1000 milliLiter(s) (100 mL/Hr) IV Continuous <Continuous>  dextrose 50% Injectable 25 Gram(s) IV Push once  dextrose 50% Injectable 12.5 Gram(s) IV Push once  dextrose 50% Injectable 25 Gram(s) IV Push once  escitalopram 10 milliGRAM(s) Oral daily  gabapentin 100 milliGRAM(s) Oral three times a day  glucagon  Injectable 1 milliGRAM(s) IntraMuscular once  heparin   Injectable 5000 Unit(s) SubCutaneous every 12 hours  insulin glargine Injectable (LANTUS) 20 Unit(s) SubCutaneous every morning  insulin lispro (ADMELOG) corrective regimen sliding scale   SubCutaneous three times a day before meals  insulin lispro (ADMELOG) corrective regimen sliding scale   SubCutaneous at bedtime  insulin lispro Injectable (ADMELOG) 6 Unit(s) SubCutaneous three times a day before meals  isosorbide   mononitrate ER Tablet (IMDUR) 30 milliGRAM(s) Oral daily  lactobacillus acidophilus 1 Tablet(s) Oral two times a day  levothyroxine 125 MICROGram(s) Oral <User Schedule>  metoprolol succinate ER 50 milliGRAM(s) Oral daily    MEDICATIONS  (PRN):  acetaminophen     Tablet .. 650 milliGRAM(s) Oral every 6 hours PRN Temp greater or equal to 38C (100.4F), Mild Pain (1 - 3)  aluminum hydroxide/magnesium hydroxide/simethicone Suspension 30 milliLiter(s) Oral every 4 hours PRN Dyspepsia  melatonin 3 milliGRAM(s) Oral at bedtime PRN Insomnia  ondansetron Injectable 4 milliGRAM(s) IV Push every 8 hours PRN Nausea and/or Vomiting  traMADol 50 milliGRAM(s) Oral three times a day PRN Moderate Pain (4 - 6)      Vital Signs Last 24 Hrs  T(C): 36.2 (13 Mar 2022 13:41), Max: 36.6 (12 Mar 2022 20:06)  T(F): 97.2 (13 Mar 2022 13:41), Max: 97.9 (12 Mar 2022 20:06)  HR: 63 (13 Mar 2022 13:41) (56 - 64)  BP: 143/54 (13 Mar 2022 13:41) (133/59 - 170/84)  BP(mean): --  RR: 17 (13 Mar 2022 13:41) (17 - 17)  SpO2: 97% (13 Mar 2022 13:41) (95% - 97%)    I&O's Summary    12 Mar 2022 06:01  -  13 Mar 2022 07:00  --------------------------------------------------------  IN: 0 mL / OUT: 550 mL / NET: -550 mL    13 Mar 2022 07:01  -  13 Mar 2022 18:02  --------------------------------------------------------  IN: 0 mL / OUT: 450 mL / NET: -450 mL        PHYSICAL EXAM:  HEENT: NC/AT; PERRLA  Neck: Soft; no tenderness  Lungs: Coarse BS bilaterally; no wheezing.   Heart: RRR, no murmurs.   Abdomen: Soft, no tenderness.   Genital/ Rectal: No torres.   Extremities: No ulcers.   Neurologic: Awake.       LABORATORY:    CBC Full  -  ( 12 Mar 2022 07:51 )  WBC Count : 14.33 K/uL  RBC Count : 4.05 M/uL  Hemoglobin : 11.4 g/dL  Hematocrit : 35.5 %  Platelet Count - Automated : 181 K/uL  Mean Cell Volume : 87.7 fl  Mean Cell Hemoglobin : 28.1 pg  Mean Cell Hemoglobin Concentration : 32.1 gm/dL  Auto Neutrophil # : x  Auto Lymphocyte # : x  Auto Monocyte # : x  Auto Eosinophil # : x  Auto Basophil # : x  Auto Neutrophil % : x  Auto Lymphocyte % : x  Auto Monocyte % : x  Auto Eosinophil % : x  Auto Basophil % : x      ESR:                   03-10 @ 07:43  --    C-Reactive Protein:     03-10 @ 07:43  --    Procalcitonin:           03-10 @ 07:43   <0.05  ESR:                   03-10 @ 07:42  --    C-Reactive Protein:     03-10 @ 07:42  --    Procalcitonin:           03-10 @ 07:42   <0.05      03-12    138  |  107  |  68<H>  ----------------------------<  214<H>  4.2   |  22  |  1.60<H>    Ca    9.2      12 Mar 2022 07:51        Rapid Respiratory Viral Panel Result        03-09 @ 08:06  Rapid RVP [facility]  Coronovirus --  Adenovirus --  Bordetella Pertussis --  Chlamydia Pneumonia --  Entero/Rhinovirus--  HKU1 Coronovirus --  HMPV Coronovirus --  Influenza A --  Influenza AH1 --  Influenza AH1 2009 --  Influenza AH3 --  Influenza B --  Mycoplasma Pneumoniae --  NL63 Coronovirus --  OC43 Coronovirus --  Parainfluenza 1 --  Parainfluenza 2 --  Parainfluenza 3 --  Parainfluenza 4 --  Resp Syncytial Virus --      Assessment and Plan:          Lazaro De La Rosa MD   [phone].    No fevers,  Comfortable  On RA with no hypoxia.      MEDICATIONS  (STANDING):  ALBUTerol    90 MICROgram(s) HFA Inhaler 2 Puff(s) Inhalation every 6 hours  ALPRAZolam 0.5 milliGRAM(s) Oral at bedtime  artificial  tears Solution 1 Drop(s) Both EYES two times a day  aspirin enteric coated 81 milliGRAM(s) Oral daily  atorvastatin 80 milliGRAM(s) Oral at bedtime  budesonide 160 MICROgram(s)/formoterol 4.5 MICROgram(s) Inhaler 2 Puff(s) Inhalation two times a day  cholecalciferol 2000 Unit(s) Oral daily  clopidogrel Tablet 75 milliGRAM(s) Oral daily  dextrose 40% Gel 15 Gram(s) Oral once  dextrose 5%. 1000 milliLiter(s) (50 mL/Hr) IV Continuous <Continuous>  dextrose 5%. 1000 milliLiter(s) (100 mL/Hr) IV Continuous <Continuous>  dextrose 50% Injectable 25 Gram(s) IV Push once  dextrose 50% Injectable 12.5 Gram(s) IV Push once  dextrose 50% Injectable 25 Gram(s) IV Push once  escitalopram 10 milliGRAM(s) Oral daily  gabapentin 100 milliGRAM(s) Oral three times a day  glucagon  Injectable 1 milliGRAM(s) IntraMuscular once  heparin   Injectable 5000 Unit(s) SubCutaneous every 12 hours  insulin glargine Injectable (LANTUS) 20 Unit(s) SubCutaneous every morning  insulin lispro (ADMELOG) corrective regimen sliding scale   SubCutaneous three times a day before meals  insulin lispro (ADMELOG) corrective regimen sliding scale   SubCutaneous at bedtime  insulin lispro Injectable (ADMELOG) 6 Unit(s) SubCutaneous three times a day before meals  isosorbide   mononitrate ER Tablet (IMDUR) 30 milliGRAM(s) Oral daily  lactobacillus acidophilus 1 Tablet(s) Oral two times a day  levothyroxine 125 MICROGram(s) Oral <User Schedule>  metoprolol succinate ER 50 milliGRAM(s) Oral daily    MEDICATIONS  (PRN):  acetaminophen     Tablet .. 650 milliGRAM(s) Oral every 6 hours PRN Temp greater or equal to 38C (100.4F), Mild Pain (1 - 3)  aluminum hydroxide/magnesium hydroxide/simethicone Suspension 30 milliLiter(s) Oral every 4 hours PRN Dyspepsia  melatonin 3 milliGRAM(s) Oral at bedtime PRN Insomnia  ondansetron Injectable 4 milliGRAM(s) IV Push every 8 hours PRN Nausea and/or Vomiting  traMADol 50 milliGRAM(s) Oral three times a day PRN Moderate Pain (4 - 6)      Vital Signs Last 24 Hrs  T(C): 36.2 (13 Mar 2022 13:41), Max: 36.6 (12 Mar 2022 20:06)  T(F): 97.2 (13 Mar 2022 13:41), Max: 97.9 (12 Mar 2022 20:06)  HR: 63 (13 Mar 2022 13:41) (56 - 64)  BP: 143/54 (13 Mar 2022 13:41) (133/59 - 170/84)  BP(mean): --  RR: 17 (13 Mar 2022 13:41) (17 - 17)  SpO2: 97% (13 Mar 2022 13:41) (95% - 97%)    I&O's Summary    12 Mar 2022 06:01  -  13 Mar 2022 07:00  --------------------------------------------------------  IN: 0 mL / OUT: 550 mL / NET: -550 mL    13 Mar 2022 07:01  -  13 Mar 2022 18:02  --------------------------------------------------------  IN: 0 mL / OUT: 450 mL / NET: -450 mL        PHYSICAL EXAM:  HEENT: NC/AT; PERRLA  Neck: Soft; no tenderness  Lungs: Coarse BS bilaterally; no wheezing.   Heart: RRR, no murmurs.   Abdomen: Soft, no tenderness.   Genital/ Rectal: No torres.   Extremities: No ulcers.   Neurologic: Awake.       LABORATORY:    CBC Full  -  ( 12 Mar 2022 07:51 )  WBC Count : 14.33 K/uL  RBC Count : 4.05 M/uL  Hemoglobin : 11.4 g/dL  Hematocrit : 35.5 %  Platelet Count - Automated : 181 K/uL  Mean Cell Volume : 87.7 fl  Mean Cell Hemoglobin : 28.1 pg  Mean Cell Hemoglobin Concentration : 32.1 gm/dL  Auto Neutrophil # : x  Auto Lymphocyte # : x  Auto Monocyte # : x  Auto Eosinophil # : x  Auto Basophil # : x  Auto Neutrophil % : x  Auto Lymphocyte % : x  Auto Monocyte % : x  Auto Eosinophil % : x  Auto Basophil % : x      ESR:                   03-10 @ 07:43  --    C-Reactive Protein:     03-10 @ 07:43  --    Procalcitonin:           03-10 @ 07:43   <0.05  ESR:                   03-10 @ 07:42  --    C-Reactive Protein:     03-10 @ 07:42  --    Procalcitonin:           03-10 @ 07:42   <0.05      03-12    138  |  107  |  68<H>  ----------------------------<  214<H>  4.2   |  22  |  1.60<H>    Ca    9.2      12 Mar 2022 07:51        Rapid Respiratory Viral Panel Result        03-09 @ 08:06  Rapid RVP [facility]  Coronovirus --  Adenovirus --  Bordetella Pertussis --  Chlamydia Pneumonia --  Entero/Rhinovirus--  HKU1 Coronovirus --  HMPV Coronovirus --  Influenza A --  Influenza AH1 --  Influenza AH1 2009 --  Influenza AH3 --  Influenza B --  Mycoplasma Pneumoniae --  NL63 Coronovirus --  OC43 Coronovirus --  Parainfluenza 1 --  Parainfluenza 2 --  Parainfluenza 3 --  Parainfluenza 4 --  Resp Syncytial Virus --      Assessment and Plan:    1. COPD exacerbation.  2. Leukocytosis.    . No active infections noted. Leukocytosis was likely due to COPD exacerbation/steroid. Monitor off antibiotics.. WBC is trending down.   . Continue po prednisone as per pulmonary.  . Supportive care.        Lazaro De La Rosa MD   [phone].

## 2022-03-14 ENCOUNTER — TRANSCRIPTION ENCOUNTER (OUTPATIENT)
Age: 87
End: 2022-03-14

## 2022-03-14 ENCOUNTER — NON-APPOINTMENT (OUTPATIENT)
Age: 87
End: 2022-03-14

## 2022-03-14 DIAGNOSIS — R19.7 DIARRHEA, UNSPECIFIED: ICD-10-CM

## 2022-03-14 DIAGNOSIS — D72.829 ELEVATED WHITE BLOOD CELL COUNT, UNSPECIFIED: ICD-10-CM

## 2022-03-14 LAB
ANION GAP SERPL CALC-SCNC: 6 MMOL/L — SIGNIFICANT CHANGE UP (ref 5–17)
ANION GAP SERPL CALC-SCNC: 7 MMOL/L — SIGNIFICANT CHANGE UP (ref 5–17)
BASOPHILS # BLD AUTO: 0.07 K/UL — SIGNIFICANT CHANGE UP (ref 0–0.2)
BASOPHILS NFR BLD AUTO: 0.4 % — SIGNIFICANT CHANGE UP (ref 0–2)
BUN SERPL-MCNC: 84 MG/DL — HIGH (ref 7–23)
BUN SERPL-MCNC: 85 MG/DL — HIGH (ref 7–23)
CALCIUM SERPL-MCNC: 9.2 MG/DL — SIGNIFICANT CHANGE UP (ref 8.5–10.1)
CALCIUM SERPL-MCNC: 9.6 MG/DL — SIGNIFICANT CHANGE UP (ref 8.5–10.1)
CHLORIDE SERPL-SCNC: 105 MMOL/L — SIGNIFICANT CHANGE UP (ref 96–108)
CHLORIDE SERPL-SCNC: 105 MMOL/L — SIGNIFICANT CHANGE UP (ref 96–108)
CO2 SERPL-SCNC: 25 MMOL/L — SIGNIFICANT CHANGE UP (ref 22–31)
CO2 SERPL-SCNC: 25 MMOL/L — SIGNIFICANT CHANGE UP (ref 22–31)
CREAT SERPL-MCNC: 2 MG/DL — HIGH (ref 0.5–1.3)
CREAT SERPL-MCNC: 2 MG/DL — HIGH (ref 0.5–1.3)
CULTURE RESULTS: SIGNIFICANT CHANGE UP
CULTURE RESULTS: SIGNIFICANT CHANGE UP
EGFR: 24 ML/MIN/1.73M2 — LOW
EGFR: 24 ML/MIN/1.73M2 — LOW
EOSINOPHIL # BLD AUTO: 0.18 K/UL — SIGNIFICANT CHANGE UP (ref 0–0.5)
EOSINOPHIL NFR BLD AUTO: 1 % — SIGNIFICANT CHANGE UP (ref 0–6)
GLUCOSE SERPL-MCNC: 168 MG/DL — HIGH (ref 70–99)
GLUCOSE SERPL-MCNC: 235 MG/DL — HIGH (ref 70–99)
HCT VFR BLD CALC: 39.5 % — SIGNIFICANT CHANGE UP (ref 34.5–45)
HCT VFR BLD CALC: 40.6 % — SIGNIFICANT CHANGE UP (ref 34.5–45)
HGB BLD-MCNC: 12.9 G/DL — SIGNIFICANT CHANGE UP (ref 11.5–15.5)
HGB BLD-MCNC: 13.1 G/DL — SIGNIFICANT CHANGE UP (ref 11.5–15.5)
IMM GRANULOCYTES NFR BLD AUTO: 0.8 % — SIGNIFICANT CHANGE UP (ref 0–1.5)
LYMPHOCYTES # BLD AUTO: 16.7 % — SIGNIFICANT CHANGE UP (ref 13–44)
LYMPHOCYTES # BLD AUTO: 3.03 K/UL — SIGNIFICANT CHANGE UP (ref 1–3.3)
MCHC RBC-ENTMCNC: 28.3 PG — SIGNIFICANT CHANGE UP (ref 27–34)
MCHC RBC-ENTMCNC: 28.7 PG — SIGNIFICANT CHANGE UP (ref 27–34)
MCHC RBC-ENTMCNC: 32.3 GM/DL — SIGNIFICANT CHANGE UP (ref 32–36)
MCHC RBC-ENTMCNC: 32.7 GM/DL — SIGNIFICANT CHANGE UP (ref 32–36)
MCV RBC AUTO: 87.7 FL — SIGNIFICANT CHANGE UP (ref 80–100)
MCV RBC AUTO: 88 FL — SIGNIFICANT CHANGE UP (ref 80–100)
MONOCYTES # BLD AUTO: 1.31 K/UL — HIGH (ref 0–0.9)
MONOCYTES NFR BLD AUTO: 7.2 % — SIGNIFICANT CHANGE UP (ref 2–14)
NEUTROPHILS # BLD AUTO: 13.41 K/UL — HIGH (ref 1.8–7.4)
NEUTROPHILS NFR BLD AUTO: 73.9 % — SIGNIFICANT CHANGE UP (ref 43–77)
NRBC # BLD: 0 /100 WBCS — SIGNIFICANT CHANGE UP (ref 0–0)
NRBC # BLD: 0 /100 WBCS — SIGNIFICANT CHANGE UP (ref 0–0)
PLATELET # BLD AUTO: 187 K/UL — SIGNIFICANT CHANGE UP (ref 150–400)
PLATELET # BLD AUTO: 192 K/UL — SIGNIFICANT CHANGE UP (ref 150–400)
POTASSIUM SERPL-MCNC: 4.4 MMOL/L — SIGNIFICANT CHANGE UP (ref 3.5–5.3)
POTASSIUM SERPL-MCNC: 4.8 MMOL/L — SIGNIFICANT CHANGE UP (ref 3.5–5.3)
POTASSIUM SERPL-SCNC: 4.4 MMOL/L — SIGNIFICANT CHANGE UP (ref 3.5–5.3)
POTASSIUM SERPL-SCNC: 4.8 MMOL/L — SIGNIFICANT CHANGE UP (ref 3.5–5.3)
PROCALCITONIN SERPL-MCNC: <0.05 — SIGNIFICANT CHANGE UP (ref 0–0.04)
RBC # BLD: 4.49 M/UL — SIGNIFICANT CHANGE UP (ref 3.8–5.2)
RBC # BLD: 4.63 M/UL — SIGNIFICANT CHANGE UP (ref 3.8–5.2)
RBC # FLD: 13.4 % — SIGNIFICANT CHANGE UP (ref 10.3–14.5)
RBC # FLD: 13.9 % — SIGNIFICANT CHANGE UP (ref 10.3–14.5)
SODIUM SERPL-SCNC: 136 MMOL/L — SIGNIFICANT CHANGE UP (ref 135–145)
SODIUM SERPL-SCNC: 137 MMOL/L — SIGNIFICANT CHANGE UP (ref 135–145)
SPECIMEN SOURCE: SIGNIFICANT CHANGE UP
SPECIMEN SOURCE: SIGNIFICANT CHANGE UP
WBC # BLD: 18.15 K/UL — HIGH (ref 3.8–10.5)
WBC # BLD: 18.64 K/UL — HIGH (ref 3.8–10.5)
WBC # FLD AUTO: 18.15 K/UL — HIGH (ref 3.8–10.5)
WBC # FLD AUTO: 18.64 K/UL — HIGH (ref 3.8–10.5)

## 2022-03-14 RX ORDER — METRONIDAZOLE 500 MG
500 TABLET ORAL EVERY 8 HOURS
Refills: 0 | Status: DISCONTINUED | OUTPATIENT
Start: 2022-03-14 | End: 2022-03-15

## 2022-03-14 RX ORDER — METOPROLOL TARTRATE 50 MG
1 TABLET ORAL
Qty: 0 | Refills: 0 | DISCHARGE

## 2022-03-14 RX ORDER — SODIUM CHLORIDE 9 MG/ML
1000 INJECTION, SOLUTION INTRAVENOUS
Refills: 0 | Status: DISCONTINUED | OUTPATIENT
Start: 2022-03-14 | End: 2022-03-15

## 2022-03-14 RX ORDER — TALC/CELLULOS/CHLOROXY/ALDIOXA
0 POWDER (GRAM) TOPICAL
Qty: 0 | Refills: 0 | DISCHARGE

## 2022-03-14 RX ORDER — METOPROLOL TARTRATE 50 MG
1 TABLET ORAL
Qty: 0 | Refills: 0 | DISCHARGE
Start: 2022-03-14

## 2022-03-14 RX ORDER — TIOTROPIUM BROMIDE 18 UG/1
1 CAPSULE ORAL; RESPIRATORY (INHALATION)
Qty: 15 | Refills: 0
Start: 2022-03-14 | End: 2022-03-28

## 2022-03-14 RX ADMIN — Medication 0.5 MILLIGRAM(S): at 21:48

## 2022-03-14 RX ADMIN — Medication 81 MILLIGRAM(S): at 11:54

## 2022-03-14 RX ADMIN — TIOTROPIUM BROMIDE 1 CAPSULE(S): 18 CAPSULE ORAL; RESPIRATORY (INHALATION) at 05:36

## 2022-03-14 RX ADMIN — GABAPENTIN 100 MILLIGRAM(S): 400 CAPSULE ORAL at 13:47

## 2022-03-14 RX ADMIN — Medication 50 MILLIGRAM(S): at 05:35

## 2022-03-14 RX ADMIN — Medication 6 UNIT(S): at 17:05

## 2022-03-14 RX ADMIN — ALBUTEROL 2 PUFF(S): 90 AEROSOL, METERED ORAL at 05:36

## 2022-03-14 RX ADMIN — Medication 2: at 07:59

## 2022-03-14 RX ADMIN — Medication 2000 UNIT(S): at 11:55

## 2022-03-14 RX ADMIN — ALBUTEROL 2 PUFF(S): 90 AEROSOL, METERED ORAL at 18:02

## 2022-03-14 RX ADMIN — GABAPENTIN 100 MILLIGRAM(S): 400 CAPSULE ORAL at 21:48

## 2022-03-14 RX ADMIN — Medication 6: at 17:05

## 2022-03-14 RX ADMIN — ISOSORBIDE MONONITRATE 30 MILLIGRAM(S): 60 TABLET, EXTENDED RELEASE ORAL at 11:55

## 2022-03-14 RX ADMIN — Medication 1 TABLET(S): at 17:04

## 2022-03-14 RX ADMIN — ALBUTEROL 2 PUFF(S): 90 AEROSOL, METERED ORAL at 13:49

## 2022-03-14 RX ADMIN — BUDESONIDE AND FORMOTEROL FUMARATE DIHYDRATE 2 PUFF(S): 160; 4.5 AEROSOL RESPIRATORY (INHALATION) at 05:36

## 2022-03-14 RX ADMIN — Medication 1 DROP(S): at 05:36

## 2022-03-14 RX ADMIN — Medication 1 DROP(S): at 17:04

## 2022-03-14 RX ADMIN — INSULIN GLARGINE 20 UNIT(S): 100 INJECTION, SOLUTION SUBCUTANEOUS at 08:00

## 2022-03-14 RX ADMIN — Medication 500 MILLIGRAM(S): at 21:48

## 2022-03-14 RX ADMIN — ATORVASTATIN CALCIUM 80 MILLIGRAM(S): 80 TABLET, FILM COATED ORAL at 21:48

## 2022-03-14 RX ADMIN — GABAPENTIN 100 MILLIGRAM(S): 400 CAPSULE ORAL at 05:35

## 2022-03-14 RX ADMIN — BUDESONIDE AND FORMOTEROL FUMARATE DIHYDRATE 2 PUFF(S): 160; 4.5 AEROSOL RESPIRATORY (INHALATION) at 18:02

## 2022-03-14 RX ADMIN — Medication 6 UNIT(S): at 07:59

## 2022-03-14 RX ADMIN — Medication 6: at 11:54

## 2022-03-14 RX ADMIN — SODIUM CHLORIDE 40 MILLILITER(S): 9 INJECTION, SOLUTION INTRAVENOUS at 11:47

## 2022-03-14 RX ADMIN — Medication 1 TABLET(S): at 05:35

## 2022-03-14 RX ADMIN — Medication 6 UNIT(S): at 11:54

## 2022-03-14 RX ADMIN — HEPARIN SODIUM 5000 UNIT(S): 5000 INJECTION INTRAVENOUS; SUBCUTANEOUS at 17:04

## 2022-03-14 RX ADMIN — Medication 125 MICROGRAM(S): at 05:35

## 2022-03-14 RX ADMIN — HEPARIN SODIUM 5000 UNIT(S): 5000 INJECTION INTRAVENOUS; SUBCUTANEOUS at 05:35

## 2022-03-14 RX ADMIN — CLOPIDOGREL BISULFATE 75 MILLIGRAM(S): 75 TABLET, FILM COATED ORAL at 11:54

## 2022-03-14 RX ADMIN — ESCITALOPRAM OXALATE 10 MILLIGRAM(S): 10 TABLET, FILM COATED ORAL at 11:55

## 2022-03-14 NOTE — PROGRESS NOTE ADULT - SUBJECTIVE AND OBJECTIVE BOX
PROGRESS NOTE  Patient is a 87y old  Female who presents with a chief complaint of   Chart and available morning labs /imaging are reviewed electronically , urgent issues addressed . More information  is being added upon completion of rounds , when more information is collected and management discussed with consultants , medical staff and social service/case management on the floor   OVERNIGHT  WBC elevation  reported by medical staff  ,ID CONS is aware . All above noted Patient is resting in a bed comfortably .Diarrhea reported this am and stool for c.diff ordered  .No distress noted IV FLUIDS started due to elevated creatinine   HPI:  PAST MEDICAL & SURGICAL HISTORY:  Uncomplicated asthma, unspecified asthma severity  DM2 (diabetes mellitus, type 2)  Essential hypertension  Hypothyroidism, unspecified type    Pure hypercholesterolemia    Gastroesophageal reflux disease without esophagitis    Diabetes    Asthma    CAD (coronary artery disease)    HTN (hypertension)    HLD (hyperlipidemia)    Hypothyroid    NSTEMI (non-ST elevated myocardial infarction)    History of appendectomy    History of appendectomy    Abnormal findings on cardiac catheterization  Cardiac Cath        MEDICATIONS  (STANDING):  ALBUTerol    90 MICROgram(s) HFA Inhaler 2 Puff(s) Inhalation every 6 hours  ALPRAZolam 0.5 milliGRAM(s) Oral at bedtime  artificial  tears Solution 1 Drop(s) Both EYES two times a day  aspirin enteric coated 81 milliGRAM(s) Oral daily  atorvastatin 80 milliGRAM(s) Oral at bedtime  budesonide 160 MICROgram(s)/formoterol 4.5 MICROgram(s) Inhaler 2 Puff(s) Inhalation two times a day  cholecalciferol 2000 Unit(s) Oral daily  clopidogrel Tablet 75 milliGRAM(s) Oral daily  dextrose 40% Gel 15 Gram(s) Oral once  dextrose 5%. 1000 milliLiter(s) (50 mL/Hr) IV Continuous <Continuous>  dextrose 5%. 1000 milliLiter(s) (100 mL/Hr) IV Continuous <Continuous>  dextrose 50% Injectable 25 Gram(s) IV Push once  dextrose 50% Injectable 12.5 Gram(s) IV Push once  dextrose 50% Injectable 25 Gram(s) IV Push once  escitalopram 10 milliGRAM(s) Oral daily  gabapentin 100 milliGRAM(s) Oral three times a day  glucagon  Injectable 1 milliGRAM(s) IntraMuscular once  heparin   Injectable 5000 Unit(s) SubCutaneous every 12 hours  insulin glargine Injectable (LANTUS) 20 Unit(s) SubCutaneous every morning  insulin lispro (ADMELOG) corrective regimen sliding scale   SubCutaneous three times a day before meals  insulin lispro (ADMELOG) corrective regimen sliding scale   SubCutaneous at bedtime  insulin lispro Injectable (ADMELOG) 6 Unit(s) SubCutaneous three times a day before meals  isosorbide   mononitrate ER Tablet (IMDUR) 30 milliGRAM(s) Oral daily  lactobacillus acidophilus 1 Tablet(s) Oral two times a day  levothyroxine 125 MICROGram(s) Oral <User Schedule>  metoprolol succinate ER 50 milliGRAM(s) Oral daily  predniSONE   Tablet 10 milliGRAM(s) Oral once  sodium chloride 0.45%. 1000 milliLiter(s) (40 mL/Hr) IV Continuous <Continuous>  tiotropium 18 MICROgram(s) Capsule 1 Capsule(s) Inhalation daily    MEDICATIONS  (PRN):  acetaminophen     Tablet .. 650 milliGRAM(s) Oral every 6 hours PRN Temp greater or equal to 38C (100.4F), Mild Pain (1 - 3)  aluminum hydroxide/magnesium hydroxide/simethicone Suspension 30 milliLiter(s) Oral every 4 hours PRN Dyspepsia  melatonin 3 milliGRAM(s) Oral at bedtime PRN Insomnia  ondansetron Injectable 4 milliGRAM(s) IV Push every 8 hours PRN Nausea and/or Vomiting  traMADol 50 milliGRAM(s) Oral three times a day PRN Moderate Pain (4 - 6)      OBJECTIVE    T(C): 36.7 (03-14-22 @ 05:15), Max: 36.7 (03-14-22 @ 05:15)  HR: 60 (03-14-22 @ 05:15) (60 - 66)  BP: 170/79 (03-14-22 @ 05:15) (141/62 - 170/79)  RR: 18 (03-14-22 @ 05:15) (17 - 18)  SpO2: 94% (03-14-22 @ 05:15) (94% - 97%)  Wt(kg): --  I&O's Summary    13 Mar 2022 07:01  -  14 Mar 2022 07:00  --------------------------------------------------------  IN: 0 mL / OUT: 700 mL / NET: -700 mL          REVIEW OF SYSTEMS:  CONSTITUTIONAL: No fever, weight loss, or fatigue  EYES: No eye pain, visual disturbances, or discharge  ENMT:   No sinus or throat pain  NECK: No pain or stiffness  RESPIRATORY: No cough, wheezing, chills or hemoptysis; No shortness of breath  CARDIOVASCULAR: No chest pain, palpitations, dizziness, or leg swelling  GASTROINTESTINAL: No abdominal pain. No nausea, vomiting; No diarrhea or constipation. No melena or hematochezia.  GENITOURINARY: No dysuria, frequency, hematuria, or incontinence  NEUROLOGICAL: No headaches, memory loss, loss of strength, numbness, or tremors  SKIN: No itching, burning, rashes, or lesions   MUSCULOSKELETAL: No joint pain or swelling; No muscle, back, or extremity pain    PHYSICAL EXAM:  Appearance: NAD. VS past 24 hrs -as above   HEENT:   Moist oral mucosa. Conjunctiva clear b/l.   Neck : supple  Respiratory: Lungs CTAB.  Gastrointestinal:  Soft, nontender. No rebound. No rigidity. BS present	  Cardiovascular: RRR ,S1S2 present  Neurologic: Non-focal. Moving all extremities.  Extremities: No edema. No erythema. No calf tenderness.  Skin: No rashes, No ecchymoses, No cyanosis.	  wounds ,skin lesions-See skin assesment flow sheet   LABS:                        13.1   18.15 )-----------( 187      ( 14 Mar 2022 10:19 )             40.6     03-14    136  |  105  |  84<H>  ----------------------------<  235<H>  4.4   |  25  |  2.00<H>    Ca    9.2      14 Mar 2022 10:19      CAPILLARY BLOOD GLUCOSE      POCT Blood Glucose.: 272 mg/dL (14 Mar 2022 11:50)  POCT Blood Glucose.: 177 mg/dL (14 Mar 2022 07:41)  POCT Blood Glucose.: 209 mg/dL (13 Mar 2022 21:39)  POCT Blood Glucose.: 276 mg/dL (13 Mar 2022 16:53)          GI PCR Panel, Stool (collected 11 Mar 2022 15:29)  Source: .Stool Feces  Final Report (11 Mar 2022 19:07):    GI PCR Results: NOT detected    *******Please Note:*******    GI panel PCR evaluates for:    Campylobacter, Plesiomonas shigelloides, Salmonella,    Vibrio, Yersinia enterocolitica, Enteroaggregative    Escherichia coli (EAEC), Enteropathogenic E.coli (EPEC),    Enterotoxigenic E. coli (ETEC) lt/st, Shiga-like    toxin-producing E. coli (STEC) stx1/stx2,    Shigella/ Enteroinvasive E. coli (EIEC), Cryptosporidium,    Cyclospora cayetanensis, Entamoeba histolytica,    Giardia lamblia, Adenovirus F 40/41, Astrovirus,    Norovirus GI/GII, Rotavirus A, Sapovirus    Culture - Blood (collected 09 Mar 2022 08:30)  Source: .Blood Blood-Peripheral  Final Report (14 Mar 2022 09:00):    No Growth Final    Culture - Blood (collected 09 Mar 2022 08:30)  Source: .Blood Blood-Peripheral  Final Report (14 Mar 2022 09:00):    No Growth Final      RADIOLOGY & ADDITIONAL TESTS:   reviewed elctronically  ASSESSMENT/PLAN: 	    25 minutes aggregate time was spent on this visit, 50% visit time spent in care co-ordination with other attendings and counselling patient .I have discussed care plan with patient / HCP/family member ,who expressed understanding of problems treatment and their effect and side effects, alternatives in details. I have asked if they have any questions and concerns and appropriately addressed them to best of my ability.

## 2022-03-14 NOTE — PROGRESS NOTE ADULT - SUBJECTIVE AND OBJECTIVE BOX
CAPILLARY BLOOD GLUCOSE      POCT Blood Glucose.: 177 mg/dL (14 Mar 2022 07:41)  POCT Blood Glucose.: 209 mg/dL (13 Mar 2022 21:39)  POCT Blood Glucose.: 276 mg/dL (13 Mar 2022 16:53)  POCT Blood Glucose.: 266 mg/dL (13 Mar 2022 11:51)      Vital Signs Last 24 Hrs  T(C): 36.7 (14 Mar 2022 05:15), Max: 36.7 (14 Mar 2022 05:15)  T(F): 98 (14 Mar 2022 05:15), Max: 98 (14 Mar 2022 05:15)  HR: 60 (14 Mar 2022 05:15) (60 - 66)  BP: 170/79 (14 Mar 2022 05:15) (133/59 - 170/79)  BP(mean): --  RR: 18 (14 Mar 2022 05:15) (17 - 18)  SpO2: 94% (14 Mar 2022 05:15) (94% - 97%)    General: WN/WD NAD  Respiratory: CTA B/L  CV: RRR, S1S2, no murmurs, rubs or gallops  Abdominal: Soft, NT, ND +BS, Last BM  Extremities: No edema, + peripheral pulses     03-12    138  |  107  |  68<H>  ----------------------------<  214<H>  4.2   |  22  |  1.60<H>    Ca    9.2      12 Mar 2022 07:51        atorvastatin 80 milliGRAM(s) Oral at bedtime  dextrose 40% Gel 15 Gram(s) Oral once  dextrose 50% Injectable 25 Gram(s) IV Push once  dextrose 50% Injectable 25 Gram(s) IV Push once  dextrose 50% Injectable 12.5 Gram(s) IV Push once  glucagon  Injectable 1 milliGRAM(s) IntraMuscular once  insulin glargine Injectable (LANTUS) 20 Unit(s) SubCutaneous every morning  insulin lispro (ADMELOG) corrective regimen sliding scale   SubCutaneous three times a day before meals  insulin lispro (ADMELOG) corrective regimen sliding scale   SubCutaneous at bedtime  insulin lispro Injectable (ADMELOG) 6 Unit(s) SubCutaneous three times a day before meals  levothyroxine 125 MICROGram(s) Oral <User Schedule>

## 2022-03-14 NOTE — PROGRESS NOTE ADULT - PROBLEM SELECTOR PLAN 6
continue home medications
Accuchecks monitoring and insulin corrective regimen  sliding scale coverage with short acting inslulin, add longacting insulin as needed ,no concentrated sweets diet, serial labs ,HbA1C,education
continue home medications
continue home medications

## 2022-03-14 NOTE — PROGRESS NOTE ADULT - PROBLEM SELECTOR PLAN 7
Admitted  to telemetry unit for monitoring , send 3 sets of cardiac enzymes to rule out acute coronary event, obtain ECHO to evaluate LVEF, cardiology consult  ,continue current management, O2 supply, anticoagulation plan as per cardiology consult
ppi
Admitted  to telemetry unit for monitoring , send 3 sets of cardiac enzymes to rule out acute coronary event, obtain ECHO to evaluate LVEF, cardiology consult  ,continue current management, O2 supply, anticoagulation plan as per cardiology consult
Admitted  to telemetry unit for monitoring , send 3 sets of cardiac enzymes to rule out acute coronary event, obtain ECHO to evaluate LVEF, cardiology consult  ,continue current management, O2 supply, anticoagulation plan as per cardiology consult

## 2022-03-14 NOTE — PROGRESS NOTE ADULT - PROBLEM SELECTOR PLAN 2
Admit to monitored unit for cardiac monitoring, obtain echo to evaluate LVEF, intravenous diuresis as per card consult , monitor ins/outs, monitor renal profile and electrolytes closely ,send 3 sets of enzymes, O2 supply, serial chest xrays, monitor weights and oral intake of fluids, nutritionist consult
likely steroid -induced ,rule out infectious causes and c.diff

## 2022-03-14 NOTE — DISCHARGE NOTE PROVIDER - NSDCMRMEDTOKEN_GEN_ALL_CORE_FT
acetaminophen 500 mg oral tablet: 2 tab(s) orally 3 times a day  ALPRAZolam 0.5 mg oral tablet: 1 tab(s) orally once a day (at bedtime)  aspirin 81 mg oral tablet: 1 tab(s) orally once a day  atorvastatin 80 mg oral tablet: 1 tab(s) orally once a day  clopidogrel 75 mg oral tablet: 1 tab(s) orally once a day  escitalopram 10 mg oral tablet: 1 tab(s) orally once a day  fluticasone-salmeterol 250 mcg-50 mcg inhalation powder: 1 inhaler(s) inhaled 2 times a day  furosemide 40 mg oral tablet: 1 tab(s) orally once a day  gabapentin 100 mg oral capsule: 1 cap(s) orally 3 times a day  ipratropium-albuterol 0.5 mg-2.5 mg/3 mL inhalation solution: 3 milliliter(s) inhaled every 6 hours, As Needed  ipratropium-albuterol 0.5 mg-2.5 mg/3 mL inhalation solution: 3 milliliter(s) inhaled 2 times a day  isosorbide mononitrate 30 mg oral tablet, extended release: 1 tab(s) orally once a day (in the morning)  Lantus 100 units/mL subcutaneous solution: 18 unit(s) subcutaneous 2 times a day  levothyroxine 125 mcg (0.125 mg) oral tablet: 1 tab(s) orally Monday through Friday  metoprolol succinate 50 mg oral tablet, extended release: 1 tab(s) orally once a day  NovoLOG FlexPen 100 units/mL injectable solution: 15 unit(s) subcutaneous before meals   potassium chloride 10 mEq oral capsule, extended release: 1 cap(s) orally once a day  ProAir HFA 90 mcg/inh inhalation aerosol: 2 puff(s) inhaled every 4 hours, As Needed  Refresh Relieva ophthalmic solution: 1 drop(s) in each eye every 2 hours, As Needed for Dry eyes  Refresh Relieva ophthalmic solution: 1 drop(s) in each eye 2 times a day  tiotropium 18 mcg inhalation capsule: 1 cap(s) inhaled once a day  Vitamin D3 50 mcg (2000 intl units) oral tablet: 1 tab(s) orally once a day   acetaminophen 500 mg oral tablet: 2 tab(s) orally 3 times a day  ALPRAZolam 0.5 mg oral tablet: 1 tab(s) orally once a day (at bedtime)  aspirin 81 mg oral tablet: 1 tab(s) orally once a day  atorvastatin 80 mg oral tablet: 1 tab(s) orally once a day  clopidogrel 75 mg oral tablet: 1 tab(s) orally once a day  escitalopram 10 mg oral tablet: 1 tab(s) orally once a day  fluticasone-salmeterol 250 mcg-50 mcg inhalation powder: 1 inhaler(s) inhaled 2 times a day  furosemide 40 mg oral tablet: 1 tab(s) orally once a day  gabapentin 100 mg oral capsule: 1 cap(s) orally 3 times a day  ipratropium-albuterol 0.5 mg-2.5 mg/3 mL inhalation solution: 3 milliliter(s) inhaled every 6 hours, As Needed  ipratropium-albuterol 0.5 mg-2.5 mg/3 mL inhalation solution: 3 milliliter(s) inhaled 2 times a day  isosorbide mononitrate 30 mg oral tablet, extended release: 1 tab(s) orally once a day (in the morning)  lactobacillus acidophilus oral tablet: 2 tab(s) orally once a day  Lantus 100 units/mL subcutaneous solution: 18 unit(s) subcutaneous 2 times a day  levothyroxine 125 mcg (0.125 mg) oral tablet: 1 tab(s) orally Monday through Friday  metoprolol succinate 50 mg oral tablet, extended release: 1 tab(s) orally once a day  metroNIDAZOLE 500 mg oral tablet: 1 tab(s) orally every 8 hours  NovoLOG FlexPen 100 units/mL injectable solution: 15 unit(s) subcutaneous before meals   potassium chloride 10 mEq oral capsule, extended release: 1 cap(s) orally once a day  ProAir HFA 90 mcg/inh inhalation aerosol: 2 puff(s) inhaled every 4 hours, As Needed  Refresh Relieva ophthalmic solution: 1 drop(s) in each eye every 2 hours, As Needed for Dry eyes  Refresh Relieva ophthalmic solution: 1 drop(s) in each eye 2 times a day  tiotropium 18 mcg inhalation capsule: 1 cap(s) inhaled once a day  Vitamin D3 50 mcg (2000 intl units) oral tablet: 1 tab(s) orally once a day   acetaminophen 500 mg oral tablet: 2 tab(s) orally 3 times a day  ALPRAZolam 0.5 mg oral tablet: 1 tab(s) orally once a day (at bedtime)  aspirin 81 mg oral tablet: 1 tab(s) orally once a day  atorvastatin 80 mg oral tablet: 1 tab(s) orally once a day  clopidogrel 75 mg oral tablet: 1 tab(s) orally once a day  escitalopram 10 mg oral tablet: 1 tab(s) orally once a day  fluticasone-salmeterol 250 mcg-50 mcg inhalation powder: 1 inhaler(s) inhaled 2 times a day  furosemide 40 mg oral tablet: 1 tab(s) orally once a day  gabapentin 100 mg oral capsule: 1 cap(s) orally 3 times a day  ipratropium-albuterol 0.5 mg-2.5 mg/3 mL inhalation solution: 3 milliliter(s) inhaled every 6 hours, As Needed  ipratropium-albuterol 0.5 mg-2.5 mg/3 mL inhalation solution: 3 milliliter(s) inhaled 2 times a day  isosorbide mononitrate 30 mg oral tablet, extended release: 1 tab(s) orally once a day (in the morning)  lactobacillus acidophilus oral tablet: 2 tab(s) orally once a day  Lantus 100 units/mL subcutaneous solution: 20 unit(s) subcutaneous once a day   levothyroxine 125 mcg (0.125 mg) oral tablet: 1 tab(s) orally Monday through Friday  metoprolol succinate 50 mg oral tablet, extended release: 1 tab(s) orally once a day  metroNIDAZOLE 500 mg oral tablet: 1 tab(s) orally every 8 hours  NovoLOG FlexPen 100 units/mL injectable solution: 8 unit(s) subcutaneous 3 times a day (before meals)  before meals   potassium chloride 10 mEq oral capsule, extended release: 1 cap(s) orally once a day  ProAir HFA 90 mcg/inh inhalation aerosol: 2 puff(s) inhaled every 4 hours, As Needed  Refresh Relieva ophthalmic solution: 1 drop(s) in each eye every 2 hours, As Needed for Dry eyes  Refresh Relieva ophthalmic solution: 1 drop(s) in each eye 2 times a day  tiotropium 18 mcg inhalation capsule: 1 cap(s) inhaled once a day  Vitamin D3 50 mcg (2000 intl units) oral tablet: 1 tab(s) orally once a day

## 2022-03-14 NOTE — PROGRESS NOTE ADULT - PROBLEM SELECTOR PLAN 4
Accuchecks monitoring and insulin corrective regimen  sliding scale coverage with short acting inslulin, add longacting insulin as needed ,no concentrated sweets diet, serial labs ,HbA1C,education
stable

## 2022-03-14 NOTE — DISCHARGE NOTE PROVIDER - NSDCCPCAREPLAN_GEN_ALL_CORE_FT
PRINCIPAL DISCHARGE DIAGNOSIS  Diagnosis: COPD exacerbation  Assessment and Plan of Treatment:       SECONDARY DISCHARGE DIAGNOSES  Diagnosis: Essential hypertension  Assessment and Plan of Treatment:     Diagnosis: DM2 (diabetes mellitus, type 2)  Assessment and Plan of Treatment:     Diagnosis: Gastroesophageal reflux disease without esophagitis  Assessment and Plan of Treatment:     Diagnosis: Chronic diastolic congestive heart failure  Assessment and Plan of Treatment:     Diagnosis: CAD (coronary artery disease)  Assessment and Plan of Treatment:     Diagnosis: Hypothyroidism, unspecified type  Assessment and Plan of Treatment:     Diagnosis: Prophylactic measure  Assessment and Plan of Treatment:

## 2022-03-14 NOTE — PROGRESS NOTE ADULT - PROBLEM SELECTOR PROBLEM 6
Hypothyroidism, unspecified type
DM2 (diabetes mellitus, type 2)
Hypothyroidism, unspecified type
Hypothyroidism, unspecified type

## 2022-03-14 NOTE — PROGRESS NOTE ADULT - PROBLEM SELECTOR PROBLEM 2
Chronic diastolic congestive heart failure
Leucocytosis
Chronic diastolic congestive heart failure
Chronic diastolic congestive heart failure

## 2022-03-14 NOTE — PROGRESS NOTE ADULT - SUBJECTIVE AND OBJECTIVE BOX
Date/Time Patient Seen:  		  Referring MD:   Data Reviewed	       Patient is a 87y old  Female who presents with a chief complaint of     Subjective/HPI     PAST MEDICAL & SURGICAL HISTORY:  Uncomplicated asthma, unspecified asthma severity    DM2 (diabetes mellitus, type 2)    Essential hypertension    Hypothyroidism, unspecified type    Pure hypercholesterolemia    Gastroesophageal reflux disease without esophagitis    Diabetes    Asthma    CAD (coronary artery disease)    HTN (hypertension)    HLD (hyperlipidemia)    Hypothyroid    NSTEMI (non-ST elevated myocardial infarction)    No significant past surgical history    History of appendectomy    History of appendectomy    Abnormal findings on cardiac catheterization  Cardiac Cath          Medication list         MEDICATIONS  (STANDING):  ALBUTerol    90 MICROgram(s) HFA Inhaler 2 Puff(s) Inhalation every 6 hours  ALPRAZolam 0.5 milliGRAM(s) Oral at bedtime  artificial  tears Solution 1 Drop(s) Both EYES two times a day  aspirin enteric coated 81 milliGRAM(s) Oral daily  atorvastatin 80 milliGRAM(s) Oral at bedtime  budesonide 160 MICROgram(s)/formoterol 4.5 MICROgram(s) Inhaler 2 Puff(s) Inhalation two times a day  cholecalciferol 2000 Unit(s) Oral daily  clopidogrel Tablet 75 milliGRAM(s) Oral daily  dextrose 40% Gel 15 Gram(s) Oral once  dextrose 5%. 1000 milliLiter(s) (50 mL/Hr) IV Continuous <Continuous>  dextrose 5%. 1000 milliLiter(s) (100 mL/Hr) IV Continuous <Continuous>  dextrose 50% Injectable 25 Gram(s) IV Push once  dextrose 50% Injectable 12.5 Gram(s) IV Push once  dextrose 50% Injectable 25 Gram(s) IV Push once  escitalopram 10 milliGRAM(s) Oral daily  gabapentin 100 milliGRAM(s) Oral three times a day  glucagon  Injectable 1 milliGRAM(s) IntraMuscular once  heparin   Injectable 5000 Unit(s) SubCutaneous every 12 hours  insulin glargine Injectable (LANTUS) 20 Unit(s) SubCutaneous every morning  insulin lispro (ADMELOG) corrective regimen sliding scale   SubCutaneous three times a day before meals  insulin lispro (ADMELOG) corrective regimen sliding scale   SubCutaneous at bedtime  insulin lispro Injectable (ADMELOG) 6 Unit(s) SubCutaneous three times a day before meals  isosorbide   mononitrate ER Tablet (IMDUR) 30 milliGRAM(s) Oral daily  lactobacillus acidophilus 1 Tablet(s) Oral two times a day  levothyroxine 125 MICROGram(s) Oral <User Schedule>  metoprolol succinate ER 50 milliGRAM(s) Oral daily  tiotropium 18 MICROgram(s) Capsule 1 Capsule(s) Inhalation daily    MEDICATIONS  (PRN):  acetaminophen     Tablet .. 650 milliGRAM(s) Oral every 6 hours PRN Temp greater or equal to 38C (100.4F), Mild Pain (1 - 3)  aluminum hydroxide/magnesium hydroxide/simethicone Suspension 30 milliLiter(s) Oral every 4 hours PRN Dyspepsia  melatonin 3 milliGRAM(s) Oral at bedtime PRN Insomnia  ondansetron Injectable 4 milliGRAM(s) IV Push every 8 hours PRN Nausea and/or Vomiting  traMADol 50 milliGRAM(s) Oral three times a day PRN Moderate Pain (4 - 6)         Vitals log        ICU Vital Signs Last 24 Hrs  T(C): 36.7 (14 Mar 2022 05:15), Max: 36.7 (14 Mar 2022 05:15)  T(F): 98 (14 Mar 2022 05:15), Max: 98 (14 Mar 2022 05:15)  HR: 60 (14 Mar 2022 05:15) (60 - 66)  BP: 170/79 (14 Mar 2022 05:15) (133/59 - 170/79)  BP(mean): --  ABP: --  ABP(mean): --  RR: 18 (14 Mar 2022 05:15) (17 - 18)  SpO2: 94% (14 Mar 2022 05:15) (94% - 97%)           Input and Output:  I&O's Detail    12 Mar 2022 06:01  -  13 Mar 2022 07:00  --------------------------------------------------------  IN:  Total IN: 0 mL    OUT:    Voided (mL): 550 mL  Total OUT: 550 mL    Total NET: -550 mL      13 Mar 2022 07:01  -  14 Mar 2022 06:09  --------------------------------------------------------  IN:  Total IN: 0 mL    OUT:    Voided (mL): 450 mL  Total OUT: 450 mL    Total NET: -450 mL          Lab Data                        11.4   14.33 )-----------( 181      ( 12 Mar 2022 07:51 )             35.5     03-12    138  |  107  |  68<H>  ----------------------------<  214<H>  4.2   |  22  |  1.60<H>    Ca    9.2      12 Mar 2022 07:51              Review of Systems	      Objective     Physical Examination    heart s1s2  lung dec bS  abd soft  head nc      Pertinent Lab findings & Imaging      Chong:  NO   Adequate UO     I&O's Detail    12 Mar 2022 06:01  -  13 Mar 2022 07:00  --------------------------------------------------------  IN:  Total IN: 0 mL    OUT:    Voided (mL): 550 mL  Total OUT: 550 mL    Total NET: -550 mL      13 Mar 2022 07:01  -  14 Mar 2022 06:09  --------------------------------------------------------  IN:  Total IN: 0 mL    OUT:    Voided (mL): 450 mL  Total OUT: 450 mL    Total NET: -450 mL               Discussed with:     Cultures:	        Radiology

## 2022-03-14 NOTE — PROGRESS NOTE ADULT - PROBLEM SELECTOR PLAN 1
increase lantus 25 units qam  cont admelog 6 units 3x/day before meals  cont mod dose admelog corrective scale coverage qac/qhs  cont cons cho diet   goal bg 100-180 in hosp setting
Admit for antibacterial management ,intravenous steroids,nebulised bronchodilators and pulmonology evaluation,O2 supply ,serial labs and chest xrays ,obtain ECHO to evaluate LVEF and rule out chf component
Admit for antibacterial managmnet ,intravenous steroids,nebulised bronchodilators and pulmonology evaluation,O2 supply,serial labs and chest xrays,obtain ECHO to evaluate LVEF and rule out chf component
Admit for antibacterial managmnet ,intravenous steroids,nebulised bronchodilators and pulmonology evaluation,O2 supply,serial labs and chest xrays,obtain ECHO to evaluate LVEF and rule out chf component
rule out C.DIFF COLITIS -stool sent and contact isolation ordered ,iv fluids and serial labs ,d/w ID CONS

## 2022-03-14 NOTE — PROGRESS NOTE ADULT - SUBJECTIVE AND OBJECTIVE BOX
Interval History:    CENTRAL LINE:   [  ] YES       [  ] NO  PACE:                 [  ] YES       [  ] NO         REVIEW OF SYSTEMS:  All Systems below were reviewed and are negative [  ]  HEENT:  ID:  Pulmonary:  Cardiac:  GI:  Renal:  Musculoskeletal:  All other systems above were reviewed and are negative   [  ]      MEDICATIONS  (STANDING):  ALBUTerol    90 MICROgram(s) HFA Inhaler 2 Puff(s) Inhalation every 6 hours  ALPRAZolam 0.5 milliGRAM(s) Oral at bedtime  artificial  tears Solution 1 Drop(s) Both EYES two times a day  aspirin enteric coated 81 milliGRAM(s) Oral daily  atorvastatin 80 milliGRAM(s) Oral at bedtime  budesonide 160 MICROgram(s)/formoterol 4.5 MICROgram(s) Inhaler 2 Puff(s) Inhalation two times a day  cholecalciferol 2000 Unit(s) Oral daily  clopidogrel Tablet 75 milliGRAM(s) Oral daily  dextrose 40% Gel 15 Gram(s) Oral once  dextrose 5%. 1000 milliLiter(s) (100 mL/Hr) IV Continuous <Continuous>  dextrose 5%. 1000 milliLiter(s) (50 mL/Hr) IV Continuous <Continuous>  dextrose 50% Injectable 25 Gram(s) IV Push once  dextrose 50% Injectable 12.5 Gram(s) IV Push once  dextrose 50% Injectable 25 Gram(s) IV Push once  escitalopram 10 milliGRAM(s) Oral daily  gabapentin 100 milliGRAM(s) Oral three times a day  glucagon  Injectable 1 milliGRAM(s) IntraMuscular once  heparin   Injectable 5000 Unit(s) SubCutaneous every 12 hours  insulin glargine Injectable (LANTUS) 20 Unit(s) SubCutaneous every morning  insulin lispro (ADMELOG) corrective regimen sliding scale   SubCutaneous three times a day before meals  insulin lispro (ADMELOG) corrective regimen sliding scale   SubCutaneous at bedtime  insulin lispro Injectable (ADMELOG) 6 Unit(s) SubCutaneous three times a day before meals  isosorbide   mononitrate ER Tablet (IMDUR) 30 milliGRAM(s) Oral daily  lactobacillus acidophilus 1 Tablet(s) Oral two times a day  levothyroxine 125 MICROGram(s) Oral <User Schedule>  metoprolol succinate ER 50 milliGRAM(s) Oral daily  metroNIDAZOLE    Tablet 500 milliGRAM(s) Oral every 8 hours  predniSONE   Tablet 10 milliGRAM(s) Oral once  sodium chloride 0.45%. 1000 milliLiter(s) (40 mL/Hr) IV Continuous <Continuous>  tiotropium 18 MICROgram(s) Capsule 1 Capsule(s) Inhalation daily    MEDICATIONS  (PRN):  acetaminophen     Tablet .. 650 milliGRAM(s) Oral every 6 hours PRN Temp greater or equal to 38C (100.4F), Mild Pain (1 - 3)  aluminum hydroxide/magnesium hydroxide/simethicone Suspension 30 milliLiter(s) Oral every 4 hours PRN Dyspepsia  melatonin 3 milliGRAM(s) Oral at bedtime PRN Insomnia  ondansetron Injectable 4 milliGRAM(s) IV Push every 8 hours PRN Nausea and/or Vomiting  traMADol 50 milliGRAM(s) Oral three times a day PRN Moderate Pain (4 - 6)      Vital Signs Last 24 Hrs  T(C): 36.7 (14 Mar 2022 12:59), Max: 36.7 (14 Mar 2022 05:15)  T(F): 98 (14 Mar 2022 12:59), Max: 98 (14 Mar 2022 05:15)  HR: 60 (14 Mar 2022 12:59) (60 - 66)  BP: 135/65 (14 Mar 2022 12:59) (135/65 - 170/79)  BP(mean): --  RR: 18 (14 Mar 2022 12:59) (18 - 18)  SpO2: 92% (14 Mar 2022 12:59) (92% - 95%)    I&O's Summary    13 Mar 2022 07:01  -  14 Mar 2022 07:00  --------------------------------------------------------  IN: 0 mL / OUT: 700 mL / NET: -700 mL        PHYSICAL EXAM:  HEENT: NC/AT; PERRLA  Neck: Soft; no tenderness  Lungs: CTA bilaterally; no wheezing.   Heart:  Abdomen:  Genital/ Rectal:  Extremities:  Neurologic:  Vascular:      LABORATORY:    CBC Full  -  ( 14 Mar 2022 10:19 )  WBC Count : 18.15 K/uL  RBC Count : 4.63 M/uL  Hemoglobin : 13.1 g/dL  Hematocrit : 40.6 %  Platelet Count - Automated : 187 K/uL  Mean Cell Volume : 87.7 fl  Mean Cell Hemoglobin : 28.3 pg  Mean Cell Hemoglobin Concentration : 32.3 gm/dL  Auto Neutrophil # : 13.41 K/uL  Auto Lymphocyte # : 3.03 K/uL  Auto Monocyte # : 1.31 K/uL  Auto Eosinophil # : 0.18 K/uL  Auto Basophil # : 0.07 K/uL  Auto Neutrophil % : 73.9 %  Auto Lymphocyte % : 16.7 %  Auto Monocyte % : 7.2 %  Auto Eosinophil % : 1.0 %  Auto Basophil % : 0.4 %      ESR:                   03-14 @ 10:19  --    C-Reactive Protein:     03-14 @ 10:19  --    Procalcitonin:           03-14 @ 10:19   <0.05  ESR:                   03-10 @ 07:43  --    C-Reactive Protein:     03-10 @ 07:43  --    Procalcitonin:           03-10 @ 07:43   <0.05  ESR:                   03-10 @ 07:42  --    C-Reactive Protein:     03-10 @ 07:42  --    Procalcitonin:           03-10 @ 07:42   <0.05      03-14    136  |  105  |  84<H>  ----------------------------<  235<H>  4.4   |  25  |  2.00<H>    Ca    9.2      14 Mar 2022 10:19        Rapid Respiratory Viral Panel Result        03-09 @ 08:06  Rapid RVP [facility]  Coronovirus --  Adenovirus --  Bordetella Pertussis --  Chlamydia Pneumonia --  Entero/Rhinovirus--  HKU1 Coronovirus --  HMPV Coronovirus --  Influenza A --  Influenza AH1 --  Influenza AH1 2009 --  Influenza AH3 --  Influenza B --  Mycoplasma Pneumoniae --  NL63 Coronovirus --  OC43 Coronovirus --  Parainfluenza 1 --  Parainfluenza 2 --  Parainfluenza 3 --  Parainfluenza 4 --  Resp Syncytial Virus --      Assessment and Plan:          Lazaro De La Rosa MD   [phone].    No fevers  The patient had diarrhea today.     MEDICATIONS  (STANDING):  ALBUTerol    90 MICROgram(s) HFA Inhaler 2 Puff(s) Inhalation every 6 hours  ALPRAZolam 0.5 milliGRAM(s) Oral at bedtime  artificial  tears Solution 1 Drop(s) Both EYES two times a day  aspirin enteric coated 81 milliGRAM(s) Oral daily  atorvastatin 80 milliGRAM(s) Oral at bedtime  budesonide 160 MICROgram(s)/formoterol 4.5 MICROgram(s) Inhaler 2 Puff(s) Inhalation two times a day  cholecalciferol 2000 Unit(s) Oral daily  clopidogrel Tablet 75 milliGRAM(s) Oral daily  dextrose 40% Gel 15 Gram(s) Oral once  dextrose 5%. 1000 milliLiter(s) (100 mL/Hr) IV Continuous <Continuous>  dextrose 5%. 1000 milliLiter(s) (50 mL/Hr) IV Continuous <Continuous>  dextrose 50% Injectable 25 Gram(s) IV Push once  dextrose 50% Injectable 12.5 Gram(s) IV Push once  dextrose 50% Injectable 25 Gram(s) IV Push once  escitalopram 10 milliGRAM(s) Oral daily  gabapentin 100 milliGRAM(s) Oral three times a day  glucagon  Injectable 1 milliGRAM(s) IntraMuscular once  heparin   Injectable 5000 Unit(s) SubCutaneous every 12 hours  insulin glargine Injectable (LANTUS) 20 Unit(s) SubCutaneous every morning  insulin lispro (ADMELOG) corrective regimen sliding scale   SubCutaneous three times a day before meals  insulin lispro (ADMELOG) corrective regimen sliding scale   SubCutaneous at bedtime  insulin lispro Injectable (ADMELOG) 6 Unit(s) SubCutaneous three times a day before meals  isosorbide   mononitrate ER Tablet (IMDUR) 30 milliGRAM(s) Oral daily  lactobacillus acidophilus 1 Tablet(s) Oral two times a day  levothyroxine 125 MICROGram(s) Oral <User Schedule>  metoprolol succinate ER 50 milliGRAM(s) Oral daily  metroNIDAZOLE    Tablet 500 milliGRAM(s) Oral every 8 hours  predniSONE   Tablet 10 milliGRAM(s) Oral once  sodium chloride 0.45%. 1000 milliLiter(s) (40 mL/Hr) IV Continuous <Continuous>  tiotropium 18 MICROgram(s) Capsule 1 Capsule(s) Inhalation daily    MEDICATIONS  (PRN):  acetaminophen     Tablet .. 650 milliGRAM(s) Oral every 6 hours PRN Temp greater or equal to 38C (100.4F), Mild Pain (1 - 3)  aluminum hydroxide/magnesium hydroxide/simethicone Suspension 30 milliLiter(s) Oral every 4 hours PRN Dyspepsia  melatonin 3 milliGRAM(s) Oral at bedtime PRN Insomnia  ondansetron Injectable 4 milliGRAM(s) IV Push every 8 hours PRN Nausea and/or Vomiting  traMADol 50 milliGRAM(s) Oral three times a day PRN Moderate Pain (4 - 6)      Vital Signs Last 24 Hrs  T(C): 36.7 (14 Mar 2022 12:59), Max: 36.7 (14 Mar 2022 05:15)  T(F): 98 (14 Mar 2022 12:59), Max: 98 (14 Mar 2022 05:15)  HR: 60 (14 Mar 2022 12:59) (60 - 66)  BP: 135/65 (14 Mar 2022 12:59) (135/65 - 170/79)  BP(mean): --  RR: 18 (14 Mar 2022 12:59) (18 - 18)  SpO2: 92% (14 Mar 2022 12:59) (92% - 95%)    I&O's Summary    13 Mar 2022 07:01  -  14 Mar 2022 07:00  --------------------------------------------------------  IN: 0 mL / OUT: 700 mL / NET: -700 mL        PHYSICAL EXAM:  HEENT: NC/AT; PERRLA  Neck: Soft; no tenderness  Lungs: CTA bilaterally; no wheezing.   Heart:  Abdomen:  Genital/ Rectal:  Extremities:  Neurologic:  Vascular:      LABORATORY:    CBC Full  -  ( 14 Mar 2022 10:19 )  WBC Count : 18.15 K/uL  RBC Count : 4.63 M/uL  Hemoglobin : 13.1 g/dL  Hematocrit : 40.6 %  Platelet Count - Automated : 187 K/uL  Mean Cell Volume : 87.7 fl  Mean Cell Hemoglobin : 28.3 pg  Mean Cell Hemoglobin Concentration : 32.3 gm/dL  Auto Neutrophil # : 13.41 K/uL  Auto Lymphocyte # : 3.03 K/uL  Auto Monocyte # : 1.31 K/uL  Auto Eosinophil # : 0.18 K/uL  Auto Basophil # : 0.07 K/uL  Auto Neutrophil % : 73.9 %  Auto Lymphocyte % : 16.7 %  Auto Monocyte % : 7.2 %  Auto Eosinophil % : 1.0 %  Auto Basophil % : 0.4 %      ESR:                   03-14 @ 10:19  --    C-Reactive Protein:     03-14 @ 10:19  --    Procalcitonin:           03-14 @ 10:19   <0.05  ESR:                   03-10 @ 07:43  --    C-Reactive Protein:     03-10 @ 07:43  --    Procalcitonin:           03-10 @ 07:43   <0.05  ESR:                   03-10 @ 07:42  --    C-Reactive Protein:     03-10 @ 07:42  --    Procalcitonin:           03-10 @ 07:42   <0.05      03-14    136  |  105  |  84<H>  ----------------------------<  235<H>  4.4   |  25  |  2.00<H>    Ca    9.2      14 Mar 2022 10:19        Rapid Respiratory Viral Panel Result        03-09 @ 08:06  Rapid RVP [facility]  Coronovirus --  Adenovirus --  Bordetella Pertussis --  Chlamydia Pneumonia --  Entero/Rhinovirus--  HKU1 Coronovirus --  HMPV Coronovirus --  Influenza A --  Influenza AH1 --  Influenza AH1 2009 --  Influenza AH3 --  Influenza B --  Mycoplasma Pneumoniae --  NL63 Coronovirus --  OC43 Coronovirus --  Parainfluenza 1 --  Parainfluenza 2 --  Parainfluenza 3 --  Parainfluenza 4 --  Resp Syncytial Virus --      Assessment and Plan:      1. COPD exacerbation.  2. Leukocytosis.    . Worsened leukocytosis while prednisone is being tapered   . Add po Flagyl empirically   . Monitor leukocytosis.      Lazaro De La Rosa MD   [phone].

## 2022-03-14 NOTE — PROGRESS NOTE ADULT - PROBLEM SELECTOR PROBLEM 5
Essential hypertension
Gastroesophageal reflux disease without esophagitis

## 2022-03-14 NOTE — DISCHARGE NOTE PROVIDER - PROVIDER TOKENS
PROVIDER:[TOKEN:[[id number]],FOLLOWUP:[1 week]],PROVIDER:[TOKEN:[[id number]],FOLLOWUP:[2 weeks]],PROVIDER:[TOKEN:[[id number]],FOLLOWUP:[2 weeks]]

## 2022-03-14 NOTE — PROGRESS NOTE ADULT - SUBJECTIVE AND OBJECTIVE BOX
FLOYD JEANNINE    PLV 2NOR 229 W1    Allergies    doxycycline (Unknown)  iodine (Hives)  iodine containing compounds (Unknown)  shellfish (Anaphylaxis)  shellfish (Swelling; Short breath)    Intolerances        PAST MEDICAL & SURGICAL HISTORY:  Uncomplicated asthma, unspecified asthma severity    DM2 (diabetes mellitus, type 2)    Essential hypertension    Hypothyroidism, unspecified type    Pure hypercholesterolemia    Gastroesophageal reflux disease without esophagitis    Diabetes    Asthma    CAD (coronary artery disease)    HTN (hypertension)    HLD (hyperlipidemia)    Hypothyroid    NSTEMI (non-ST elevated myocardial infarction)    History of appendectomy    History of appendectomy    Abnormal findings on cardiac catheterization  Cardiac Cath        FAMILY HISTORY:  Family history of heart disease    Family history of cancer in mother    Family history of MI (myocardial infarction)    Family history of stomach cancer        Home Medications:  acetaminophen 500 mg oral tablet: 2 tab(s) orally 3 times a day (09 Mar 2022 08:41)  ALPRAZolam 0.5 mg oral tablet: 1 tab(s) orally once a day (at bedtime) (09 Mar 2022 08:41)  aspirin 81 mg oral tablet: 1 tab(s) orally once a day (09 Mar 2022 08:41)  atorvastatin 80 mg oral tablet: 1 tab(s) orally once a day (09 Mar 2022 08:41)  clopidogrel 75 mg oral tablet: 1 tab(s) orally once a day (09 Mar 2022 08:41)  escitalopram 10 mg oral tablet: 1 tab(s) orally once a day (09 Mar 2022 08:41)  fluticasone-salmeterol 250 mcg-50 mcg inhalation powder: 1 inhaler(s) inhaled 2 times a day (09 Mar 2022 08:41)  furosemide 40 mg oral tablet: 1 tab(s) orally once a day (09 Mar 2022 08:41)  gabapentin 100 mg oral capsule: 1 cap(s) orally 3 times a day (09 Mar 2022 08:41)  ipratropium-albuterol 0.5 mg-2.5 mg/3 mL inhalation solution: 3 milliliter(s) inhaled every 6 hours, As Needed (09 Mar 2022 08:41)  ipratropium-albuterol 0.5 mg-2.5 mg/3 mL inhalation solution: 3 milliliter(s) inhaled 2 times a day (09 Mar 2022 08:41)  isosorbide mononitrate 30 mg oral tablet, extended release: 1 tab(s) orally once a day (in the morning) (09 Mar 2022 08:41)  Lantus 100 units/mL subcutaneous solution: 18 unit(s) subcutaneous 2 times a day (09 Mar 2022 08:41)  levothyroxine 125 mcg (0.125 mg) oral tablet: 1 tab(s) orally Monday through Friday (09 Mar 2022 08:41)  metoprolol succinate 50 mg oral tablet, extended release: 1 tab(s) orally once a day (14 Mar 2022 07:49)  NovoLOG FlexPen 100 units/mL injectable solution: 15 unit(s) subcutaneous before meals  (09 Mar 2022 08:41)  potassium chloride 10 mEq oral capsule, extended release: 1 cap(s) orally once a day (09 Mar 2022 08:41)  ProAir HFA 90 mcg/inh inhalation aerosol: 2 puff(s) inhaled every 4 hours, As Needed (09 Mar 2022 08:41)  Refresh Relieva ophthalmic solution: 1 drop(s) in each eye every 2 hours, As Needed for Dry eyes (09 Mar 2022 08:41)  Refresh Relieva ophthalmic solution: 1 drop(s) in each eye 2 times a day (09 Mar 2022 08:41)  Vitamin D3 50 mcg (2000 intl units) oral tablet: 1 tab(s) orally once a day (09 Mar 2022 08:41)      MEDICATIONS  (STANDING):  ALBUTerol    90 MICROgram(s) HFA Inhaler 2 Puff(s) Inhalation every 6 hours  ALPRAZolam 0.5 milliGRAM(s) Oral at bedtime  artificial  tears Solution 1 Drop(s) Both EYES two times a day  aspirin enteric coated 81 milliGRAM(s) Oral daily  atorvastatin 80 milliGRAM(s) Oral at bedtime  budesonide 160 MICROgram(s)/formoterol 4.5 MICROgram(s) Inhaler 2 Puff(s) Inhalation two times a day  cholecalciferol 2000 Unit(s) Oral daily  clopidogrel Tablet 75 milliGRAM(s) Oral daily  dextrose 40% Gel 15 Gram(s) Oral once  dextrose 5%. 1000 milliLiter(s) (50 mL/Hr) IV Continuous <Continuous>  dextrose 5%. 1000 milliLiter(s) (100 mL/Hr) IV Continuous <Continuous>  dextrose 50% Injectable 25 Gram(s) IV Push once  dextrose 50% Injectable 25 Gram(s) IV Push once  dextrose 50% Injectable 12.5 Gram(s) IV Push once  escitalopram 10 milliGRAM(s) Oral daily  gabapentin 100 milliGRAM(s) Oral three times a day  glucagon  Injectable 1 milliGRAM(s) IntraMuscular once  heparin   Injectable 5000 Unit(s) SubCutaneous every 12 hours  insulin glargine Injectable (LANTUS) 20 Unit(s) SubCutaneous every morning  insulin lispro (ADMELOG) corrective regimen sliding scale   SubCutaneous three times a day before meals  insulin lispro (ADMELOG) corrective regimen sliding scale   SubCutaneous at bedtime  insulin lispro Injectable (ADMELOG) 6 Unit(s) SubCutaneous three times a day before meals  isosorbide   mononitrate ER Tablet (IMDUR) 30 milliGRAM(s) Oral daily  lactobacillus acidophilus 1 Tablet(s) Oral two times a day  levothyroxine 125 MICROGram(s) Oral <User Schedule>  metoprolol succinate ER 50 milliGRAM(s) Oral daily  predniSONE   Tablet 10 milliGRAM(s) Oral once  sodium chloride 0.45%. 1000 milliLiter(s) (40 mL/Hr) IV Continuous <Continuous>  tiotropium 18 MICROgram(s) Capsule 1 Capsule(s) Inhalation daily    MEDICATIONS  (PRN):  acetaminophen     Tablet .. 650 milliGRAM(s) Oral every 6 hours PRN Temp greater or equal to 38C (100.4F), Mild Pain (1 - 3)  aluminum hydroxide/magnesium hydroxide/simethicone Suspension 30 milliLiter(s) Oral every 4 hours PRN Dyspepsia  melatonin 3 milliGRAM(s) Oral at bedtime PRN Insomnia  ondansetron Injectable 4 milliGRAM(s) IV Push every 8 hours PRN Nausea and/or Vomiting  traMADol 50 milliGRAM(s) Oral three times a day PRN Moderate Pain (4 - 6)      Diet, Consistent Carbohydrate w/Evening Snack:   DASH/TLC Sodium & Cholesterol Restricted  Easy to Chew (EASYTOCHEW) (03-10-22 @ 19:55) [Active]          Vital Signs Last 24 Hrs  T(C): 36.7 (14 Mar 2022 05:15), Max: 36.7 (14 Mar 2022 05:15)  T(F): 98 (14 Mar 2022 05:15), Max: 98 (14 Mar 2022 05:15)  HR: 60 (14 Mar 2022 05:15) (60 - 66)  BP: 170/79 (14 Mar 2022 05:15) (141/62 - 170/79)  BP(mean): --  RR: 18 (14 Mar 2022 05:15) (17 - 18)  SpO2: 94% (14 Mar 2022 05:15) (94% - 97%)      03-13-22 @ 07:01  -  03-14-22 @ 07:00  --------------------------------------------------------  IN: 0 mL / OUT: 700 mL / NET: -700 mL              LABS:                        13.1   18.15 )-----------( 187      ( 14 Mar 2022 10:19 )             40.6     03-14    136  |  105  |  84<H>  ----------------------------<  235<H>  4.4   |  25  |  2.00<H>    Ca    9.2      14 Mar 2022 10:19                WBC:  WBC Count: 18.15 K/uL (03-14 @ 10:19)  WBC Count: 18.64 K/uL (03-14 @ 07:20)  WBC Count: 14.33 K/uL (03-12 @ 07:51)  WBC Count: 14.73 K/uL (03-11 @ 07:54)      MICROBIOLOGY:  RECENT CULTURES:  03-11 .Stool Feces XXXX XXXX   GI PCR Results: NOT detected  *******Please Note:*******  GI panel PCR evaluates for:  Campylobacter, Plesiomonas shigelloides, Salmonella,  Vibrio, Yersinia enterocolitica, Enteroaggregative  Escherichia coli (EAEC), Enteropathogenic E.coli (EPEC),  Enterotoxigenic E. coli (ETEC) lt/st, Shiga-like  toxin-producing E. coli (STEC) stx1/stx2,  Shigella/ Enteroinvasive E. coli (EIEC), Cryptosporidium,  Cyclospora cayetanensis, Entamoeba histolytica,  Giardia lamblia, Adenovirus F 40/41, Astrovirus,  Norovirus GI/GII, Rotavirus A, Sapovirus    03-09 .Blood Blood-Peripheral XXXX XXXX   No Growth Final                    Sodium:  Sodium, Serum: 136 mmol/L (03-14 @ 10:19)  Sodium, Serum: 137 mmol/L (03-14 @ 07:20)  Sodium, Serum: 138 mmol/L (03-12 @ 07:51)  Sodium, Serum: 139 mmol/L (03-11 @ 07:54)      2.00 mg/dL 03-14 @ 10:19  2.00 mg/dL 03-14 @ 07:20  1.60 mg/dL 03-12 @ 07:51  1.70 mg/dL 03-11 @ 07:54      Hemoglobin:  Hemoglobin: 13.1 g/dL (03-14 @ 10:19)  Hemoglobin: 12.9 g/dL (03-14 @ 07:20)  Hemoglobin: 11.4 g/dL (03-12 @ 07:51)  Hemoglobin: 12.9 g/dL (03-11 @ 07:54)      Platelets: Platelet Count - Automated: 187 K/uL (03-14 @ 10:19)  Platelet Count - Automated: 192 K/uL (03-14 @ 07:20)  Platelet Count - Automated: 181 K/uL (03-12 @ 07:51)  Platelet Count - Automated: 182 K/uL (03-11 @ 07:54)              RADIOLOGY & ADDITIONAL STUDIES:      MICROBIOLOGY:  RECENT CULTURES:  03-11 .Stool Feces XXXX XXXX   GI PCR Results: NOT detected  *******Please Note:*******  GI panel PCR evaluates for:  Campylobacter, Plesiomonas shigelloides, Salmonella,  Vibrio, Yersinia enterocolitica, Enteroaggregative  Escherichia coli (EAEC), Enteropathogenic E.coli (EPEC),  Enterotoxigenic E. coli (ETEC) lt/st, Shiga-like  toxin-producing E. coli (STEC) stx1/stx2,  Shigella/ Enteroinvasive E. coli (EIEC), Cryptosporidium,  Cyclospora cayetanensis, Entamoeba histolytica,  Giardia lamblia, Adenovirus F 40/41, Astrovirus,  Norovirus GI/GII, Rotavirus A, Sapovirus    03-09 .Blood Blood-Peripheral XXXX XXXX   No Growth Final

## 2022-03-14 NOTE — PROGRESS NOTE ADULT - PROBLEM SELECTOR PROBLEM 7
CAD (coronary artery disease)
CAD (coronary artery disease)
Gastroesophageal reflux disease without esophagitis
CAD (coronary artery disease)

## 2022-03-14 NOTE — DISCHARGE NOTE PROVIDER - CARE PROVIDER_API CALL
Nas Watson)  Critical Care Medicine; Internal Medicine; Pulmonary Disease  [address] [place] [address]  Phone: [phone]  Fax: [phone]  Follow Up Time: 1 week    Mi Reid)  Internal Medicine  [address], [address]  [place] [address]  Phone: [phone]  Fax: [phone]  Follow Up Time: 2 weeks    Perlman, Craig D  [address], [address]  [place] [address]  Phone: [phone]  Fax: [phone]  Follow Up Time: 2 weeks

## 2022-03-14 NOTE — PROGRESS NOTE ADULT - SUBJECTIVE AND OBJECTIVE BOX
Patient is a 87y Female with a known history of :  Uncomplicated asthma, unspecified asthma severity [J45.909]    DM2 (diabetes mellitus, type 2) [E11.9]    Essential hypertension [I10]    Hypothyroidism, unspecified type [E03.9]    Gastroesophageal reflux disease without esophagitis [K21.9]    CAD (coronary artery disease) [I25.10]    Hypothyroidism, unspecified type [E03.9]    COPD exacerbation [J44.1]    Chronic diastolic congestive heart failure [I50.32]    Prophylactic measure [Z29.9]      HPI:      REVIEW OF SYSTEMS:    CONSTITUTIONAL: No fever, weight loss, or fatigue  EYES: No eye pain, visual disturbances, or discharge  ENMT:  No difficulty hearing, tinnitus, vertigo; No sinus or throat pain  NECK: No pain or stiffness  BREASTS: No pain, masses, or nipple discharge  RESPIRATORY: No cough, wheezing, chills or hemoptysis; No shortness of breath  CARDIOVASCULAR: No chest pain, palpitations, dizziness, or leg swelling  GASTROINTESTINAL: No abdominal or epigastric pain. No nausea, vomiting, or hematemesis; No diarrhea or constipation. No melena or hematochezia.  GENITOURINARY: No dysuria, frequency, hematuria, or incontinence  NEUROLOGICAL: No headaches, memory loss, loss of strength, numbness, or tremors  SKIN: No itching, burning, rashes, or lesions   LYMPH NODES: No enlarged glands  ENDOCRINE: No heat or cold intolerance; No hair loss  MUSCULOSKELETAL: No joint pain or swelling; No muscle, back, or extremity pain  PSYCHIATRIC: No depression, anxiety, mood swings, or difficulty sleeping  HEME/LYMPH: No easy bruising, or bleeding gums  ALLERGY AND IMMUNOLOGIC: No hives or eczema    MEDICATIONS  (STANDING):  ALBUTerol    90 MICROgram(s) HFA Inhaler 2 Puff(s) Inhalation every 6 hours  ALPRAZolam 0.5 milliGRAM(s) Oral at bedtime  artificial  tears Solution 1 Drop(s) Both EYES two times a day  aspirin enteric coated 81 milliGRAM(s) Oral daily  atorvastatin 80 milliGRAM(s) Oral at bedtime  budesonide 160 MICROgram(s)/formoterol 4.5 MICROgram(s) Inhaler 2 Puff(s) Inhalation two times a day  cholecalciferol 2000 Unit(s) Oral daily  clopidogrel Tablet 75 milliGRAM(s) Oral daily  dextrose 40% Gel 15 Gram(s) Oral once  dextrose 5%. 1000 milliLiter(s) (50 mL/Hr) IV Continuous <Continuous>  dextrose 5%. 1000 milliLiter(s) (100 mL/Hr) IV Continuous <Continuous>  dextrose 50% Injectable 25 Gram(s) IV Push once  dextrose 50% Injectable 25 Gram(s) IV Push once  dextrose 50% Injectable 12.5 Gram(s) IV Push once  escitalopram 10 milliGRAM(s) Oral daily  gabapentin 100 milliGRAM(s) Oral three times a day  glucagon  Injectable 1 milliGRAM(s) IntraMuscular once  heparin   Injectable 5000 Unit(s) SubCutaneous every 12 hours  insulin glargine Injectable (LANTUS) 20 Unit(s) SubCutaneous every morning  insulin lispro (ADMELOG) corrective regimen sliding scale   SubCutaneous three times a day before meals  insulin lispro (ADMELOG) corrective regimen sliding scale   SubCutaneous at bedtime  insulin lispro Injectable (ADMELOG) 6 Unit(s) SubCutaneous three times a day before meals  isosorbide   mononitrate ER Tablet (IMDUR) 30 milliGRAM(s) Oral daily  lactobacillus acidophilus 1 Tablet(s) Oral two times a day  levothyroxine 125 MICROGram(s) Oral <User Schedule>  metoprolol succinate ER 50 milliGRAM(s) Oral daily  predniSONE   Tablet 10 milliGRAM(s) Oral once  tiotropium 18 MICROgram(s) Capsule 1 Capsule(s) Inhalation daily    MEDICATIONS  (PRN):  acetaminophen     Tablet .. 650 milliGRAM(s) Oral every 6 hours PRN Temp greater or equal to 38C (100.4F), Mild Pain (1 - 3)  aluminum hydroxide/magnesium hydroxide/simethicone Suspension 30 milliLiter(s) Oral every 4 hours PRN Dyspepsia  melatonin 3 milliGRAM(s) Oral at bedtime PRN Insomnia  ondansetron Injectable 4 milliGRAM(s) IV Push every 8 hours PRN Nausea and/or Vomiting  traMADol 50 milliGRAM(s) Oral three times a day PRN Moderate Pain (4 - 6)      ALLERGIES: doxycycline (Unknown)  iodine (Hives)  iodine containing compounds (Unknown)  shellfish (Anaphylaxis)  shellfish (Swelling; Short breath)      FAMILY HISTORY:  Family history of heart disease    Family history of cancer in mother    Family history of MI (myocardial infarction)    Family history of stomach cancer        PHYSICAL EXAMINATION:  -----------------------------  T(C): 36.7 (03-14-22 @ 05:15), Max: 36.7 (03-14-22 @ 05:15)  HR: 60 (03-14-22 @ 05:15) (60 - 66)  BP: 170/79 (03-14-22 @ 05:15) (133/59 - 170/79)  RR: 18 (03-14-22 @ 05:15) (17 - 18)  SpO2: 94% (03-14-22 @ 05:15) (94% - 97%)  Wt(kg): --    03-13 @ 07:01  -  03-14 @ 07:00  --------------------------------------------------------  IN:  Total IN: 0 mL    OUT:    Voided (mL): 700 mL  Total OUT: 700 mL    Total NET: -700 mL            VITALS  T(C): 36.7 (03-14-22 @ 05:15), Max: 36.7 (03-14-22 @ 05:15)  HR: 60 (03-14-22 @ 05:15) (60 - 66)  BP: 170/79 (03-14-22 @ 05:15) (133/59 - 170/79)  RR: 18 (03-14-22 @ 05:15) (17 - 18)  SpO2: 94% (03-14-22 @ 05:15) (94% - 97%)    Constitutional: well developed, normal appearance, well groomed, well nourished, no deformities and no acute distress.   Eyes: the conjunctiva exhibited no abnormalities and the eyelids demonstrated no xanthelasmas.   HEENT: normal oral mucosa, no oral pallor and no oral cyanosis.   Neck: normal jugular venous A waves present, normal jugular venous V waves present and no jugular venous wagner A waves.   Pulmonary: no respiratory distress, normal respiratory rhythm and effort, no accessory muscle use and lungs were clear to auscultation bilaterally.   Cardiovascular: heart rate and rhythm were normal, normal S1 and S2 and no murmur, gallop, rub, heave or thrill are present.   Abdomen: soft, non-tender, no hepato-splenomegaly and no abdominal mass palpated.   Musculoskeletal: the gait could not be assessed..   Extremities: no clubbing of the fingernails, no localized cyanosis, no petechial hemorrhages and no ischemic changes.   Skin: normal skin color and pigmentation, no rash, no venous stasis, no skin lesions, no skin ulcer and no xanthoma was observed.   Psychiatric: oriented to person, place, and time, the affect was normal, the mood was normal and not feeling anxious.     LABS:   --------  03-14    137  |  105  |  85<H>  ----------------------------<  168<H>  4.8   |  25  |  2.00<H>    Ca    9.6      14 Mar 2022 07:20                           12.9   18.64 )-----------( 192      ( 14 Mar 2022 07:20 )             39.5                 RADIOLOGY:  -----------------    ECG:     ECHO:

## 2022-03-14 NOTE — PROGRESS NOTE ADULT - PROBLEM SELECTOR PLAN 3
improved ,steroids tapered
Admitted  to telemetry unit for monitoring , send 3 sets of cardiac enzymes to rule out acute coronary event, obtain ECHO to evaluate LVEF, cardiology consult  ,continue current management, O2 supply, anticoagulation plan as per cardiology consult

## 2022-03-14 NOTE — DISCHARGE NOTE PROVIDER - HOSPITAL COURSE
87 year old female with HTN, CAD, HLD, hypothyroid, DM, COPD, CHF, asthma presents with SOB.  Patient resides at [facility].  EMS reports that patient woke up at 5am and c/o SOB.  Staff at NH noted wheezing and she was given 2 nebs with significant relief.  When EMS arrived, they noted O2 sat 100% on RA with no respiratory distress.  Patient is DNR/DNI.  PMD Dr. Yadav      Problem/Plan - 1:  ·  Problem: COPD exacerbation.   ·  Plan: Admit for antibacterial management ,intravenous steroids,nebulised bronchodilators and pulmonology evaluation,O2 supply ,serial labs and chest xrays ,obtain ECHO to evaluate LVEF and rule out chf component.    Problem/Plan - 2:  ·  Problem: Chronic diastolic congestive heart failure.   ·  Plan: Admit to monitored unit for cardiac monitoring, obtain echo to evaluate LVEF, intravenous diuresis as per card consult , monitor ins/outs, monitor renal profile and electrolytes closely ,send 3 sets of enzymes, O2 supply, serial chest xrays, monitor weights and oral intake of fluids, nutritionist consult.    Problem/Plan - 3:  ·  Problem: Essential hypertension.   ·  Plan: Admitted  to telemetry unit for monitoring , send 3 sets of cardiac enzymes to rule out acute coronary event, obtain ECHO to evaluate LVEF, cardiology consult  ,continue current management, O2 supply, anticoagulation plan as per cardiology consult.    Problem/Plan - 4:  ·  Problem: DM2 (diabetes mellitus, type 2).   ·  Plan: Accuchecks monitoring and insulin corrective regimen  sliding scale coverage with short acting inslulin, add longacting insulin as needed ,no concentrated sweets diet, serial labs ,HbA1C,education.    Problem/Plan - 5:  ·  Problem: Gastroesophageal reflux disease without esophagitis.   ·  Plan: ppi.    Problem/Plan - 6:  ·  Problem: Hypothyroidism, unspecified type.   ·  Plan: continue home medications.    Problem/Plan - 7:  ·  Problem: CAD (coronary artery disease).   ·  Plan: Admitted  to telemetry unit for monitoring , send 3 sets of cardiac enzymes to rule out acute coronary event, obtain ECHO to evaluate LVEF, cardiology consult  ,continue current management, O2 supply, anticoagulation plan as per cardiology consult.    Problem/Plan - 8:  ·  Problem: Prophylactic measure.

## 2022-03-14 NOTE — DISCHARGE NOTE PROVIDER - NSDCCAREPROVSEEN_GEN_ALL_CORE_FT
Jeanette, Mi De La Rosa, Andrew Perlman, Bradford Rosas, Tamika Copeland, Mickey Watson, Sanjiv Weil, Greer

## 2022-03-14 NOTE — PROGRESS NOTE ADULT - PROBLEM SELECTOR PROBLEM 4
DM2 (diabetes mellitus, type 2)
Chronic diastolic congestive heart failure

## 2022-03-14 NOTE — PROGRESS NOTE ADULT - PROBLEM SELECTOR PLAN 8
continue home medications
Gastrointestinal stress ulcer prophylaxis and DVT prophylaxis administered

## 2022-03-15 ENCOUNTER — TRANSCRIPTION ENCOUNTER (OUTPATIENT)
Age: 87
End: 2022-03-15

## 2022-03-15 VITALS
RESPIRATION RATE: 18 BRPM | OXYGEN SATURATION: 98 % | SYSTOLIC BLOOD PRESSURE: 162 MMHG | HEART RATE: 76 BPM | DIASTOLIC BLOOD PRESSURE: 71 MMHG | TEMPERATURE: 98 F

## 2022-03-15 LAB
ALBUMIN SERPL ELPH-MCNC: 2.8 G/DL — LOW (ref 3.3–5)
ALP SERPL-CCNC: 75 U/L — SIGNIFICANT CHANGE UP (ref 40–120)
ALT FLD-CCNC: 36 U/L — SIGNIFICANT CHANGE UP (ref 12–78)
ANION GAP SERPL CALC-SCNC: 8 MMOL/L — SIGNIFICANT CHANGE UP (ref 5–17)
AST SERPL-CCNC: 25 U/L — SIGNIFICANT CHANGE UP (ref 15–37)
BASOPHILS # BLD AUTO: 0.08 K/UL — SIGNIFICANT CHANGE UP (ref 0–0.2)
BASOPHILS NFR BLD AUTO: 0.4 % — SIGNIFICANT CHANGE UP (ref 0–2)
BILIRUB SERPL-MCNC: 0.4 MG/DL — SIGNIFICANT CHANGE UP (ref 0.2–1.2)
BUN SERPL-MCNC: 77 MG/DL — HIGH (ref 7–23)
CALCIUM SERPL-MCNC: 9 MG/DL — SIGNIFICANT CHANGE UP (ref 8.5–10.1)
CHLORIDE SERPL-SCNC: 109 MMOL/L — HIGH (ref 96–108)
CO2 SERPL-SCNC: 19 MMOL/L — LOW (ref 22–31)
CREAT SERPL-MCNC: 1.6 MG/DL — HIGH (ref 0.5–1.3)
EGFR: 31 ML/MIN/1.73M2 — LOW
EOSINOPHIL # BLD AUTO: 0.31 K/UL — SIGNIFICANT CHANGE UP (ref 0–0.5)
EOSINOPHIL NFR BLD AUTO: 1.7 % — SIGNIFICANT CHANGE UP (ref 0–6)
GLUCOSE SERPL-MCNC: 136 MG/DL — HIGH (ref 70–99)
HCT VFR BLD CALC: 40.2 % — SIGNIFICANT CHANGE UP (ref 34.5–45)
HGB BLD-MCNC: 12.8 G/DL — SIGNIFICANT CHANGE UP (ref 11.5–15.5)
IMM GRANULOCYTES NFR BLD AUTO: 0.8 % — SIGNIFICANT CHANGE UP (ref 0–1.5)
LYMPHOCYTES # BLD AUTO: 16.3 % — SIGNIFICANT CHANGE UP (ref 13–44)
LYMPHOCYTES # BLD AUTO: 2.99 K/UL — SIGNIFICANT CHANGE UP (ref 1–3.3)
MCHC RBC-ENTMCNC: 27.9 PG — SIGNIFICANT CHANGE UP (ref 27–34)
MCHC RBC-ENTMCNC: 31.8 GM/DL — LOW (ref 32–36)
MCV RBC AUTO: 87.8 FL — SIGNIFICANT CHANGE UP (ref 80–100)
MONOCYTES # BLD AUTO: 1.41 K/UL — HIGH (ref 0–0.9)
MONOCYTES NFR BLD AUTO: 7.7 % — SIGNIFICANT CHANGE UP (ref 2–14)
NEUTROPHILS # BLD AUTO: 13.47 K/UL — HIGH (ref 1.8–7.4)
NEUTROPHILS NFR BLD AUTO: 73.1 % — SIGNIFICANT CHANGE UP (ref 43–77)
NRBC # BLD: 0 /100 WBCS — SIGNIFICANT CHANGE UP (ref 0–0)
PLATELET # BLD AUTO: 179 K/UL — SIGNIFICANT CHANGE UP (ref 150–400)
POTASSIUM SERPL-MCNC: 4.6 MMOL/L — SIGNIFICANT CHANGE UP (ref 3.5–5.3)
POTASSIUM SERPL-SCNC: 4.6 MMOL/L — SIGNIFICANT CHANGE UP (ref 3.5–5.3)
PROT SERPL-MCNC: 6.5 G/DL — SIGNIFICANT CHANGE UP (ref 6–8.3)
RBC # BLD: 4.58 M/UL — SIGNIFICANT CHANGE UP (ref 3.8–5.2)
RBC # FLD: 13.7 % — SIGNIFICANT CHANGE UP (ref 10.3–14.5)
SARS-COV-2 RNA SPEC QL NAA+PROBE: SIGNIFICANT CHANGE UP
SODIUM SERPL-SCNC: 136 MMOL/L — SIGNIFICANT CHANGE UP (ref 135–145)
WBC # BLD: 18.4 K/UL — HIGH (ref 3.8–10.5)
WBC # FLD AUTO: 18.4 K/UL — HIGH (ref 3.8–10.5)

## 2022-03-15 PROCEDURE — 87507 IADNA-DNA/RNA PROBE TQ 12-25: CPT

## 2022-03-15 PROCEDURE — 96374 THER/PROPH/DIAG INJ IV PUSH: CPT

## 2022-03-15 PROCEDURE — 97162 PT EVAL MOD COMPLEX 30 MIN: CPT

## 2022-03-15 PROCEDURE — 92610 EVALUATE SWALLOWING FUNCTION: CPT

## 2022-03-15 PROCEDURE — 84443 ASSAY THYROID STIM HORMONE: CPT

## 2022-03-15 PROCEDURE — 83735 ASSAY OF MAGNESIUM: CPT

## 2022-03-15 PROCEDURE — 93970 EXTREMITY STUDY: CPT

## 2022-03-15 PROCEDURE — 94640 AIRWAY INHALATION TREATMENT: CPT

## 2022-03-15 PROCEDURE — 97116 GAIT TRAINING THERAPY: CPT

## 2022-03-15 PROCEDURE — 84145 PROCALCITONIN (PCT): CPT

## 2022-03-15 PROCEDURE — 0225U NFCT DS DNA&RNA 21 SARSCOV2: CPT

## 2022-03-15 PROCEDURE — 80048 BASIC METABOLIC PNL TOTAL CA: CPT

## 2022-03-15 PROCEDURE — 97530 THERAPEUTIC ACTIVITIES: CPT

## 2022-03-15 PROCEDURE — 82962 GLUCOSE BLOOD TEST: CPT

## 2022-03-15 PROCEDURE — 83605 ASSAY OF LACTIC ACID: CPT

## 2022-03-15 PROCEDURE — 87040 BLOOD CULTURE FOR BACTERIA: CPT

## 2022-03-15 PROCEDURE — 84484 ASSAY OF TROPONIN QUANT: CPT

## 2022-03-15 PROCEDURE — 82553 CREATINE MB FRACTION: CPT

## 2022-03-15 PROCEDURE — 84100 ASSAY OF PHOSPHORUS: CPT

## 2022-03-15 PROCEDURE — 96375 TX/PRO/DX INJ NEW DRUG ADDON: CPT

## 2022-03-15 PROCEDURE — 71045 X-RAY EXAM CHEST 1 VIEW: CPT

## 2022-03-15 PROCEDURE — 93306 TTE W/DOPPLER COMPLETE: CPT

## 2022-03-15 PROCEDURE — 85027 COMPLETE CBC AUTOMATED: CPT

## 2022-03-15 PROCEDURE — 87635 SARS-COV-2 COVID-19 AMP PRB: CPT

## 2022-03-15 PROCEDURE — 71250 CT THORAX DX C-: CPT | Mod: MA

## 2022-03-15 PROCEDURE — 83880 ASSAY OF NATRIURETIC PEPTIDE: CPT

## 2022-03-15 PROCEDURE — 85610 PROTHROMBIN TIME: CPT

## 2022-03-15 PROCEDURE — 93005 ELECTROCARDIOGRAM TRACING: CPT

## 2022-03-15 PROCEDURE — 85025 COMPLETE CBC W/AUTO DIFF WBC: CPT

## 2022-03-15 PROCEDURE — 36415 COLL VENOUS BLD VENIPUNCTURE: CPT

## 2022-03-15 PROCEDURE — 99285 EMERGENCY DEPT VISIT HI MDM: CPT | Mod: 25

## 2022-03-15 PROCEDURE — 73610 X-RAY EXAM OF ANKLE: CPT

## 2022-03-15 PROCEDURE — 92526 ORAL FUNCTION THERAPY: CPT

## 2022-03-15 PROCEDURE — 85730 THROMBOPLASTIN TIME PARTIAL: CPT

## 2022-03-15 PROCEDURE — 83036 HEMOGLOBIN GLYCOSYLATED A1C: CPT

## 2022-03-15 PROCEDURE — 80053 COMPREHEN METABOLIC PANEL: CPT

## 2022-03-15 PROCEDURE — 82550 ASSAY OF CK (CPK): CPT

## 2022-03-15 RX ORDER — INSULIN GLARGINE 100 [IU]/ML
20 INJECTION, SOLUTION SUBCUTANEOUS
Qty: 300 | Refills: 0
Start: 2022-03-15 | End: 2022-03-29

## 2022-03-15 RX ORDER — METRONIDAZOLE 500 MG
1 TABLET ORAL
Qty: 21 | Refills: 0
Start: 2022-03-15 | End: 2022-03-21

## 2022-03-15 RX ORDER — LACTOBACILLUS ACIDOPHILUS 100MM CELL
2 CAPSULE ORAL
Qty: 60 | Refills: 0
Start: 2022-03-15 | End: 2022-04-13

## 2022-03-15 RX ORDER — INSULIN GLARGINE 100 [IU]/ML
18 INJECTION, SOLUTION SUBCUTANEOUS
Qty: 0 | Refills: 0 | DISCHARGE

## 2022-03-15 RX ORDER — INSULIN GLARGINE 100 [IU]/ML
15 INJECTION, SOLUTION SUBCUTANEOUS
Qty: 0 | Refills: 0 | DISCHARGE
Start: 2022-03-15 | End: 2022-03-29

## 2022-03-15 RX ORDER — INSULIN ASPART 100 [IU]/ML
8 INJECTION, SOLUTION SUBCUTANEOUS
Qty: 300 | Refills: 0
Start: 2022-03-15 | End: 2022-03-29

## 2022-03-15 RX ORDER — INSULIN GLARGINE 100 [IU]/ML
30 INJECTION, SOLUTION SUBCUTANEOUS
Qty: 0 | Refills: 0 | DISCHARGE
Start: 2022-03-15 | End: 2022-03-29

## 2022-03-15 RX ORDER — INSULIN ASPART 100 [IU]/ML
15 INJECTION, SOLUTION SUBCUTANEOUS
Qty: 0 | Refills: 0 | DISCHARGE

## 2022-03-15 RX ADMIN — BUDESONIDE AND FORMOTEROL FUMARATE DIHYDRATE 2 PUFF(S): 160; 4.5 AEROSOL RESPIRATORY (INHALATION) at 05:24

## 2022-03-15 RX ADMIN — Medication 125 MICROGRAM(S): at 05:24

## 2022-03-15 RX ADMIN — Medication 2000 UNIT(S): at 12:08

## 2022-03-15 RX ADMIN — GABAPENTIN 100 MILLIGRAM(S): 400 CAPSULE ORAL at 13:49

## 2022-03-15 RX ADMIN — GABAPENTIN 100 MILLIGRAM(S): 400 CAPSULE ORAL at 05:24

## 2022-03-15 RX ADMIN — ALBUTEROL 2 PUFF(S): 90 AEROSOL, METERED ORAL at 05:24

## 2022-03-15 RX ADMIN — Medication 500 MILLIGRAM(S): at 05:24

## 2022-03-15 RX ADMIN — HEPARIN SODIUM 5000 UNIT(S): 5000 INJECTION INTRAVENOUS; SUBCUTANEOUS at 05:24

## 2022-03-15 RX ADMIN — Medication 6 UNIT(S): at 08:19

## 2022-03-15 RX ADMIN — ISOSORBIDE MONONITRATE 30 MILLIGRAM(S): 60 TABLET, EXTENDED RELEASE ORAL at 12:08

## 2022-03-15 RX ADMIN — Medication 2: at 08:18

## 2022-03-15 RX ADMIN — Medication 500 MILLIGRAM(S): at 13:49

## 2022-03-15 RX ADMIN — Medication 81 MILLIGRAM(S): at 12:08

## 2022-03-15 RX ADMIN — CLOPIDOGREL BISULFATE 75 MILLIGRAM(S): 75 TABLET, FILM COATED ORAL at 12:08

## 2022-03-15 RX ADMIN — Medication 1 DROP(S): at 05:25

## 2022-03-15 RX ADMIN — TIOTROPIUM BROMIDE 1 CAPSULE(S): 18 CAPSULE ORAL; RESPIRATORY (INHALATION) at 05:24

## 2022-03-15 RX ADMIN — INSULIN GLARGINE 20 UNIT(S): 100 INJECTION, SOLUTION SUBCUTANEOUS at 08:19

## 2022-03-15 RX ADMIN — ESCITALOPRAM OXALATE 10 MILLIGRAM(S): 10 TABLET, FILM COATED ORAL at 12:08

## 2022-03-15 RX ADMIN — Medication 6 UNIT(S): at 12:10

## 2022-03-15 RX ADMIN — Medication 1 TABLET(S): at 05:24

## 2022-03-15 RX ADMIN — ALBUTEROL 2 PUFF(S): 90 AEROSOL, METERED ORAL at 13:49

## 2022-03-15 RX ADMIN — Medication 50 MILLIGRAM(S): at 05:24

## 2022-03-15 NOTE — DISCHARGE NOTE NURSING/CASE MANAGEMENT/SOCIAL WORK - NSDCPEFALRISK_GEN_ALL_CORE
For information on Fall & Injury Prevention, visit: [email or web address] OR  [email or web address] OR  https://www.cdc.gov/steadi/patient.html

## 2022-03-15 NOTE — DISCHARGE NOTE NURSING/CASE MANAGEMENT/SOCIAL WORK - PATIENT PORTAL LINK FT
You can access the FollowMyHealth Patient Portal offered by [facility] by registering at the following website: [email or web address]. By joining [facility]’s FollowMyHealth portal, you will also be able to view your health information using other applications (apps) compatible with our system.

## 2022-03-15 NOTE — PROGRESS NOTE ADULT - PROVIDER SPECIALTY LIST ADULT
Hospitalist
Hospitalist
Infectious Disease
Palliative Care
Palliative Care
Pulmonology
Cardiology
Hospitalist
Infectious Disease
Palliative Care
Palliative Care
Pulmonology
Palliative Care
Palliative Care
Cardiology
Endocrinology
Cardiology
Internal Medicine
Hospitalist
Hospitalist

## 2022-03-15 NOTE — PROGRESS NOTE ADULT - SUBJECTIVE AND OBJECTIVE BOX
Patient is a 87y Female with a known history of :  Uncomplicated asthma, unspecified asthma severity [J45.909]    DM2 (diabetes mellitus, type 2) [E11.9]    Essential hypertension [I10]    Hypothyroidism, unspecified type [E03.9]    Gastroesophageal reflux disease without esophagitis [K21.9]    CAD (coronary artery disease) [I25.10]    Hypothyroidism, unspecified type [E03.9]    COPD exacerbation [J44.1]    Chronic diastolic congestive heart failure [I50.32]    Prophylactic measure [Z29.9]    Diarrhea [R19.7]    Leucocytosis [D72.829]      HPI:      REVIEW OF SYSTEMS:    CONSTITUTIONAL: No fever, weight loss, or fatigue  EYES: No eye pain, visual disturbances, or discharge  ENMT:  No difficulty hearing, tinnitus, vertigo; No sinus or throat pain  NECK: No pain or stiffness  BREASTS: No pain, masses, or nipple discharge  RESPIRATORY: No cough, wheezing, chills or hemoptysis; No shortness of breath  CARDIOVASCULAR: No chest pain, palpitations, dizziness, or leg swelling  GASTROINTESTINAL: No abdominal or epigastric pain. No nausea, vomiting, or hematemesis; No diarrhea or constipation. No melena or hematochezia.  GENITOURINARY: No dysuria, frequency, hematuria, or incontinence  NEUROLOGICAL: No headaches, memory loss, loss of strength, numbness, or tremors  SKIN: No itching, burning, rashes, or lesions   LYMPH NODES: No enlarged glands  ENDOCRINE: No heat or cold intolerance; No hair loss  MUSCULOSKELETAL: No joint pain or swelling; No muscle, back, or extremity pain  PSYCHIATRIC: No depression, anxiety, mood swings, or difficulty sleeping  HEME/LYMPH: No easy bruising, or bleeding gums  ALLERGY AND IMMUNOLOGIC: No hives or eczema    MEDICATIONS  (STANDING):  ALBUTerol    90 MICROgram(s) HFA Inhaler 2 Puff(s) Inhalation every 6 hours  ALPRAZolam 0.5 milliGRAM(s) Oral at bedtime  artificial  tears Solution 1 Drop(s) Both EYES two times a day  aspirin enteric coated 81 milliGRAM(s) Oral daily  atorvastatin 80 milliGRAM(s) Oral at bedtime  budesonide 160 MICROgram(s)/formoterol 4.5 MICROgram(s) Inhaler 2 Puff(s) Inhalation two times a day  cholecalciferol 2000 Unit(s) Oral daily  clopidogrel Tablet 75 milliGRAM(s) Oral daily  dextrose 40% Gel 15 Gram(s) Oral once  dextrose 5%. 1000 milliLiter(s) (50 mL/Hr) IV Continuous <Continuous>  dextrose 5%. 1000 milliLiter(s) (100 mL/Hr) IV Continuous <Continuous>  dextrose 50% Injectable 25 Gram(s) IV Push once  dextrose 50% Injectable 12.5 Gram(s) IV Push once  dextrose 50% Injectable 25 Gram(s) IV Push once  escitalopram 10 milliGRAM(s) Oral daily  gabapentin 100 milliGRAM(s) Oral three times a day  glucagon  Injectable 1 milliGRAM(s) IntraMuscular once  heparin   Injectable 5000 Unit(s) SubCutaneous every 12 hours  insulin glargine Injectable (LANTUS) 20 Unit(s) SubCutaneous every morning  insulin lispro (ADMELOG) corrective regimen sliding scale   SubCutaneous three times a day before meals  insulin lispro (ADMELOG) corrective regimen sliding scale   SubCutaneous at bedtime  insulin lispro Injectable (ADMELOG) 6 Unit(s) SubCutaneous three times a day before meals  isosorbide   mononitrate ER Tablet (IMDUR) 30 milliGRAM(s) Oral daily  lactobacillus acidophilus 1 Tablet(s) Oral two times a day  levothyroxine 125 MICROGram(s) Oral <User Schedule>  metoprolol succinate ER 50 milliGRAM(s) Oral daily  metroNIDAZOLE    Tablet 500 milliGRAM(s) Oral every 8 hours  predniSONE   Tablet 10 milliGRAM(s) Oral once  sodium chloride 0.45%. 1000 milliLiter(s) (40 mL/Hr) IV Continuous <Continuous>  tiotropium 18 MICROgram(s) Capsule 1 Capsule(s) Inhalation daily    MEDICATIONS  (PRN):  acetaminophen     Tablet .. 650 milliGRAM(s) Oral every 6 hours PRN Temp greater or equal to 38C (100.4F), Mild Pain (1 - 3)  aluminum hydroxide/magnesium hydroxide/simethicone Suspension 30 milliLiter(s) Oral every 4 hours PRN Dyspepsia  melatonin 3 milliGRAM(s) Oral at bedtime PRN Insomnia  ondansetron Injectable 4 milliGRAM(s) IV Push every 8 hours PRN Nausea and/or Vomiting  traMADol 50 milliGRAM(s) Oral three times a day PRN Moderate Pain (4 - 6)      ALLERGIES: doxycycline (Unknown)  iodine (Hives)  iodine containing compounds (Unknown)  shellfish (Anaphylaxis)  shellfish (Swelling; Short breath)      FAMILY HISTORY:  Family history of heart disease    Family history of cancer in mother    Family history of MI (myocardial infarction)    Family history of stomach cancer        PHYSICAL EXAMINATION:  -----------------------------  T(C): 36.2 (03-15-22 @ 04:41), Max: 36.7 (03-14-22 @ 12:59)  HR: 64 (03-15-22 @ 05:24) (57 - 64)  BP: 166/73 (03-15-22 @ 04:41) (135/65 - 166/73)  RR: 18 (03-15-22 @ 04:41) (18 - 18)  SpO2: 97% (03-15-22 @ 04:41) (92% - 97%)  Wt(kg): --    03-14 @ 07:01  -  03-15 @ 07:00  --------------------------------------------------------  IN:    sodium chloride 0.45%: 480 mL  Total IN: 480 mL    OUT:    Voided (mL): 1 mL  Total OUT: 1 mL    Total NET: 479 mL            VITALS  T(C): 36.2 (03-15-22 @ 04:41), Max: 36.7 (03-14-22 @ 12:59)  HR: 64 (03-15-22 @ 05:24) (57 - 64)  BP: 166/73 (03-15-22 @ 04:41) (135/65 - 166/73)  RR: 18 (03-15-22 @ 04:41) (18 - 18)  SpO2: 97% (03-15-22 @ 04:41) (92% - 97%)    Constitutional: well developed, normal appearance, well groomed, well nourished, no deformities and no acute distress.   Eyes: the conjunctiva exhibited no abnormalities and the eyelids demonstrated no xanthelasmas.   HEENT: normal oral mucosa, no oral pallor and no oral cyanosis.   Neck: normal jugular venous A waves present, normal jugular venous V waves present and no jugular venous wagner A waves.   Pulmonary: no respiratory distress, normal respiratory rhythm and effort, no accessory muscle use and lungs were clear to auscultation bilaterally.   Cardiovascular: heart rate and rhythm were normal, normal S1 and S2 and no murmur, gallop, rub, heave or thrill are present.   Abdomen: soft, non-tender, no hepato-splenomegaly and no abdominal mass palpated.   Musculoskeletal: the gait could not be assessed..   Extremities: no clubbing of the fingernails, no localized cyanosis, no petechial hemorrhages and no ischemic changes.   Skin: normal skin color and pigmentation, no rash, no venous stasis, no skin lesions, no skin ulcer and no xanthoma was observed.   Psychiatric: oriented to person, place, and time, the affect was normal, the mood was normal and not feeling anxious.     LABS:   --------  03-15    136  |  109<H>  |  77<H>  ----------------------------<  136<H>  4.6   |  19<L>  |  1.60<H>    Ca    9.0      15 Mar 2022 08:12    TPro  6.5  /  Alb  2.8<L>  /  TBili  0.4  /  DBili  x   /  AST  25  /  ALT  36  /  AlkPhos  75  03-15                         12.8   18.40 )-----------( 179      ( 15 Mar 2022 09:11 )             40.2                 RADIOLOGY:  -----------------    ECG:     ECHO:

## 2022-03-15 NOTE — PROGRESS NOTE ADULT - SUBJECTIVE AND OBJECTIVE BOX
PROGRESS NOTE  Patient is a 87y old  Female who presents with a chief complaint of     OVERNIGHT      HPI:    PAST MEDICAL & SURGICAL HISTORY:  Uncomplicated asthma, unspecified asthma severity    DM2 (diabetes mellitus, type 2)    Essential hypertension    Hypothyroidism, unspecified type    Pure hypercholesterolemia    Gastroesophageal reflux disease without esophagitis    Diabetes    Asthma    CAD (coronary artery disease)    HTN (hypertension)    HLD (hyperlipidemia)    Hypothyroid    NSTEMI (non-ST elevated myocardial infarction)    History of appendectomy    History of appendectomy    Abnormal findings on cardiac catheterization  Cardiac Cath        MEDICATIONS  (STANDING):  ALBUTerol    90 MICROgram(s) HFA Inhaler 2 Puff(s) Inhalation every 6 hours  ALPRAZolam 0.5 milliGRAM(s) Oral at bedtime  artificial  tears Solution 1 Drop(s) Both EYES two times a day  aspirin enteric coated 81 milliGRAM(s) Oral daily  atorvastatin 80 milliGRAM(s) Oral at bedtime  budesonide 160 MICROgram(s)/formoterol 4.5 MICROgram(s) Inhaler 2 Puff(s) Inhalation two times a day  cholecalciferol 2000 Unit(s) Oral daily  clopidogrel Tablet 75 milliGRAM(s) Oral daily  dextrose 40% Gel 15 Gram(s) Oral once  dextrose 5%. 1000 milliLiter(s) (50 mL/Hr) IV Continuous <Continuous>  dextrose 5%. 1000 milliLiter(s) (100 mL/Hr) IV Continuous <Continuous>  dextrose 50% Injectable 25 Gram(s) IV Push once  dextrose 50% Injectable 12.5 Gram(s) IV Push once  dextrose 50% Injectable 25 Gram(s) IV Push once  escitalopram 10 milliGRAM(s) Oral daily  gabapentin 100 milliGRAM(s) Oral three times a day  glucagon  Injectable 1 milliGRAM(s) IntraMuscular once  heparin   Injectable 5000 Unit(s) SubCutaneous every 12 hours  insulin glargine Injectable (LANTUS) 20 Unit(s) SubCutaneous every morning  insulin lispro (ADMELOG) corrective regimen sliding scale   SubCutaneous three times a day before meals  insulin lispro (ADMELOG) corrective regimen sliding scale   SubCutaneous at bedtime  insulin lispro Injectable (ADMELOG) 6 Unit(s) SubCutaneous three times a day before meals  isosorbide   mononitrate ER Tablet (IMDUR) 30 milliGRAM(s) Oral daily  lactobacillus acidophilus 1 Tablet(s) Oral two times a day  levothyroxine 125 MICROGram(s) Oral <User Schedule>  metoprolol succinate ER 50 milliGRAM(s) Oral daily  metroNIDAZOLE    Tablet 500 milliGRAM(s) Oral every 8 hours  predniSONE   Tablet 10 milliGRAM(s) Oral once  sodium chloride 0.45%. 1000 milliLiter(s) (40 mL/Hr) IV Continuous <Continuous>  tiotropium 18 MICROgram(s) Capsule 1 Capsule(s) Inhalation daily    MEDICATIONS  (PRN):  acetaminophen     Tablet .. 650 milliGRAM(s) Oral every 6 hours PRN Temp greater or equal to 38C (100.4F), Mild Pain (1 - 3)  aluminum hydroxide/magnesium hydroxide/simethicone Suspension 30 milliLiter(s) Oral every 4 hours PRN Dyspepsia  melatonin 3 milliGRAM(s) Oral at bedtime PRN Insomnia  ondansetron Injectable 4 milliGRAM(s) IV Push every 8 hours PRN Nausea and/or Vomiting  traMADol 50 milliGRAM(s) Oral three times a day PRN Moderate Pain (4 - 6)      OBJECTIVE    T(C): 36.2 (03-15-22 @ 04:41), Max: 36.7 (03-14-22 @ 12:59)  HR: 64 (03-15-22 @ 05:24) (57 - 64)  BP: 166/73 (03-15-22 @ 04:41) (135/65 - 166/73)  RR: 18 (03-15-22 @ 04:41) (18 - 18)  SpO2: 97% (03-15-22 @ 04:41) (92% - 97%)  Wt(kg): --  I&O's Summary    14 Mar 2022 07:01  -  15 Mar 2022 07:00  --------------------------------------------------------  IN: 480 mL / OUT: 1 mL / NET: 479 mL          REVIEW OF SYSTEMS:  CONSTITUTIONAL: No fever, weight loss, or fatigue  EYES: No eye pain, visual disturbances, or discharge  ENMT:   No sinus or throat pain  NECK: No pain or stiffness  RESPIRATORY: No cough, wheezing, chills or hemoptysis; No shortness of breath  CARDIOVASCULAR: No chest pain, palpitations, dizziness, or leg swelling  GASTROINTESTINAL: No abdominal pain. No nausea, vomiting; No diarrhea or constipation. No melena or hematochezia.  GENITOURINARY: No dysuria, frequency, hematuria, or incontinence  NEUROLOGICAL: No headaches, memory loss, loss of strength, numbness, or tremors  SKIN: No itching, burning, rashes, or lesions   MUSCULOSKELETAL: No joint pain or swelling; No muscle, back, or extremity pain    PHYSICAL EXAM:  Appearance: NAD. VS past 24 hrs -as above   HEENT:   Moist oral mucosa. Conjunctiva clear b/l.   Neck : supple  Respiratory: Lungs CTAB.  Gastrointestinal:  Soft, nontender. No rebound. No rigidity. BS present	  Cardiovascular: RRR ,S1S2 present  Neurologic: Non-focal. Moving all extremities.  Extremities: No edema. No erythema. No calf tenderness.  Skin: No rashes, No ecchymoses, No cyanosis.	  wounds ,skin lesions-See skin assesment flow sheet   LABS:                        12.8   18.40 )-----------( 179      ( 15 Mar 2022 09:11 )             40.2     03-15    136  |  109<H>  |  77<H>  ----------------------------<  136<H>  4.6   |  19<L>  |  1.60<H>    Ca    9.0      15 Mar 2022 08:12    TPro  6.5  /  Alb  2.8<L>  /  TBili  0.4  /  DBili  x   /  AST  25  /  ALT  36  /  AlkPhos  75  03-15    CAPILLARY BLOOD GLUCOSE      POCT Blood Glucose.: 166 mg/dL (15 Mar 2022 07:37)  POCT Blood Glucose.: 192 mg/dL (14 Mar 2022 21:42)  POCT Blood Glucose.: 270 mg/dL (14 Mar 2022 16:49)  POCT Blood Glucose.: 272 mg/dL (14 Mar 2022 11:50)          GI PCR Panel, Stool (collected 11 Mar 2022 15:29)  Source: .Stool Feces  Final Report (11 Mar 2022 19:07):    GI PCR Results: NOT detected    *******Please Note:*******    GI panel PCR evaluates for:    Campylobacter, Plesiomonas shigelloides, Salmonella,    Vibrio, Yersinia enterocolitica, Enteroaggregative    Escherichia coli (EAEC), Enteropathogenic E.coli (EPEC),    Enterotoxigenic E. coli (ETEC) lt/st, Shiga-like    toxin-producing E. coli (STEC) stx1/stx2,    Shigella/ Enteroinvasive E. coli (EIEC), Cryptosporidium,    Cyclospora cayetanensis, Entamoeba histolytica,    Giardia lamblia, Adenovirus F 40/41, Astrovirus,    Norovirus GI/GII, Rotavirus A, Sapovirus    Culture - Blood (collected 09 Mar 2022 08:30)  Source: .Blood Blood-Peripheral  Final Report (14 Mar 2022 09:00):    No Growth Final    Culture - Blood (collected 09 Mar 2022 08:30)  Source: .Blood Blood-Peripheral  Final Report (14 Mar 2022 09:00):    No Growth Final      RADIOLOGY & ADDITIONAL TESTS:   reviewed elctronically  ASSESSMENT/PLAN:

## 2022-03-15 NOTE — PROGRESS NOTE ADULT - ASSESSMENT
87 year old female with HTN, CAD, HLD, hypothyroid, DM, COPD, CHF, asthma presents with SOB.  Patient resides at [place], [facility].  EMS reports that patient woke up at 5am and c/o SOB.    vs noted  dc back to [facility]  ENDO cx noted  Spiriva added  on Flagyl -   on IVF -     cvs rx regimen  TTE reviewed  COPD - Asthma management  assist with needs - ADL  monitor VS and HD and Sat  dietary discretion  MOLST filled out - DNR DNI - HCP - Son Calvin Chávez  pt lives in [facility] -   spoke with pt - daughters - 
87 year old female with HTN, CAD, HLD, hypothyroid, DM, COPD, CHF, asthma presents with SOB.  Patient resides at [facility].  EMS reports that patient woke up at 5am and c/o SOB.    vs noted  dc back to [facility]  ENDO cx noted    cvs rx regimen  TTE reviewed  COPD - Asthma management  assist with needs - ADL  monitor VS and HD and Sat  dietary discretion  MOLST filled out - DNR DNI - HCP - Son Calvin Chávez  pt lives in [facility] -   spoke with pt - daughters - 
  87 year old female with HTN, CAD, HLD, hypothyroid, DM, COPD, CHF, asthma presents with SOB.  Patient resides at [facility].  EMS reports that patient woke up at 5am and c/o SOB.    cvs rx regimen  TTE reviewed  COPD - Asthma management  assist with needs - ADL  monitor VS and HD and Sat  dietary discretion  MOLST filled out - DNR DNI - HCP - Son Calvin Chávez  pt lives in [place] -   spoke with pt - daughters - 
87 year old female with HTN, CAD, HLD, hypothyroid, DM, COPD, CHF, asthma presents with SOB.  Patient resides at [place], [facility].  EMS reports that patient woke up at 5am and c/o SOB.    vs noted  dc back to [facility]  ENDO cx noted  Spiriva added    cvs rx regimen  TTE reviewed  COPD - Asthma management  assist with needs - ADL  monitor VS and HD and Sat  dietary discretion  MOLST filled out - DNR DNI - HCP - Son Calvin Chávez  pt lives in [facility] -   spoke with pt - daughters - 
87 year old female with HTN, CAD, HLD, hypothyroid, DM, COPD, CHF, asthma presents with SOB.  Patient resides at [facility].  EMS reports that patient woke up at 5am and c/o SOB.    cvs rx regimen  TTE reviewed  COPD - Asthma management  assist with needs - ADL  monitor VS and HD and Sat  dietary discretion  MOLST filled out - DNR DNI - HCP - Son Calvin Chávez  pt lives in shelter -   spoke with pt - daughters - 
pt with exacerbation of copd  iv steroids and bronchodilator  ashd  hypertension  dyslipidemia  hx of mi  dm2- on coverage insulin  echo for lv function  sob - improving - reduce iv steroid 20 mg solumedrol / daily  pt is started on po predisone 3/12  reduce prednisone 10mg po daily
    JEANNINE BARRIENTOS 87 f 3/9/2022 8/21/1934 DR LAMONT THOMPSON     REVIEW OF SYMPTOMS      Able to give (reliable) ROS  NO     PHYSICAL EXAM    HEENT Unremarkable  atraumatic   RESP Fair air entry EXP prolonged    Harsh breath sound Resp distres mild   CARDIAC S1 S2 No S3     NO JVD    ABDOMEN SOFT BS PRESENT NOT DISTENDED No hepatosplenomegaly   PEDAL EDEMA present No calf tenderness  NO rash     DOA/CC/PROBLEMS poa .  3/9/2022   87 f  Patient brought in by ambulance from [facility] living as reported having difficulty breathing was given neb treatment x 2 received with non-rebreather mask.      PMH-PSH .  pmh Hytn  pmh CAD  pmh HLD   pmh Hypothyroid  pmh DM  pmh COPD  pmh CHF       NOTABLE HOME MEDS.  ceftin tramadol duonebxanax .5 asa 81 insulin plavx lasix 40 levoxyl 125 metoprolol 50     PROBLEMS .  Shortness of breath poa 3/9/2022   COPD ex poa 3/9/2022   CHF 3/9/2022 bnp 884  AORTIC STENOSIS echo 3/11 ef 65% severe as grad 55 william .7  TRENTON poa 3/9/2022 Cr 1.7       COVID/ICU/CODE STATUS.                       COVID  STATUS.           pcr 3/9/2022 (-)   ICU STAY. none  GOC.   3/9/2022 dnr     BEST PRACTICE ISSUES.                                                  HEAD OF BED ELEVATION. Yes  DVT PROPHYLAXIS.    3/9/2022 hpsc   JIMÉNEZ PROPHYLAXIS.                                                                                        DIET.   3/9/2022 cons carb     VITALS/PO/IO/VENT/DRIPS.     3/13/2022 afeb 60 140/60      PROBLEM DATA/PLAN.    RESP.   Monitor po Target po 90-95%    PMH/PSH PROBLEMS.  Management continued/modified as indicated    OXYGEN REQUIREMENTS.   3/13/2022 ra 95%      INFECTION.  W 3/9-3/10/2022 w 13 - 12  pr 3/10/2022 (-)  cxr 3/9/2022 LLZ opac   CT ch 3/9/2022 mini basal atelect changes   RVP 3/9/2022 (-)  3/9/2022 Patient got one dose of vanco zosyn in er  [place]  3/9/2022 No strong susp for active infectn    DIARRHEA   GI pcr 3/11/2022 (-)      RO VTE.  V duplx 3/9/2022 (-)     COPD ex poa 3/9/2022  3/9/2022 albuterol hfa   3/9 symbicort 160   3/10-3/13/2022 solu 20 dced    CAD.  Tr 3/9/2022 tr 34   3/9/2022 asa 81   3/9/2022 plavx 75   3/9/2022 imdur 30   3/9/2022 metoprolol 50     CHF.  Bnp 3/9/2022 bnp 884  echo 3/11 ef 65% severe as grad 55 william .7    TRENTON.  Cr 3/9-3/10-3/12/2022 Cr 1.7-1.6-1.6       TIME SPENT   Over 25 minutes aggregate care time spent on encounter; activities included   direct patient care, counseling and/or coordinating care reviewing notes, lab data/ imaging , discussion with multidisciplinary team/ patient  /family and explaining in detail risks, benefits, alternatives  of the recommendations     JEANNINE BARRIENTOS 87 f 3/9/2022 8/21/1934 DR LAMONT THOMPSON     
  JEANNINE BARRIENTOS 87 f 3/9/2022 8/21/1934 DR LAMONT THOMPSON     REVIEW OF SYMPTOMS      Able to give (reliable) ROS  NO     PHYSICAL EXAM    HEENT Unremarkable  atraumatic   RESP Fair air entry EXP prolonged    Harsh breath sound Resp distres mild   CARDIAC S1 S2 No S3     NO JVD    ABDOMEN SOFT BS PRESENT NOT DISTENDED No hepatosplenomegaly   PEDAL EDEMA present No calf tenderness  NO rash       DOA/CC/PROBLEMS poa .  3/9/2022   87 f  Patient brought in by ambulance from [facility] living as reported having difficulty breathing was given neb treatment x 2 received with non-rebreather mask.      PMH-PSH .  pmh Hytn  pmh CAD  pmh HLD   pmh Hypothyroid  pmh DM  pmh COPD  pmh CHF       NOTABLE HOME MEDS.  ceftin tramadol duonebxanax .5 asa 81 insulin plavx lasix 40 levoxyl 125 metoprolol 50     PROBLEMS .  Shortness of breath poa 3/9/2022   COPD ex poa 3/9/2022   CHF 3/9/2022 bnp 884  AORTIC STENOSIS echo 3/11 ef 65% severe as grad 55 william .7  TRENTON poa 3/9/2022 Cr 1.7       COVID/ICU/CODE STATUS.                       COVID  STATUS.           pcr 3/9/2022 (-)   ICU STAY. none  GOC.   3/9/2022 dnr     BEST PRACTICE ISSUES.                                                  HEAD OF BED ELEVATION. Yes  DVT PROPHYLAXIS.    3/9/2022 hpsc   JIMÉNEZ PROPHYLAXIS.                                                                                        DIET.   3/9/2022 cons carb               PROBLEM DATA/PLAN.    RESP.   Monitor po Target po 90-95%    PMH/PSH PROBLEMS.  Management continued/modified as indicated    OXYGEN REQUIREMENTS.   3/13/2022 ra 95%      INFECTION.  W 3/9-3/10/2022 w 13 - 12  pr 3/10/2022 (-)  cxr 3/9/2022 LLZ opac   CT ch 3/9/2022 mini basal atelect changes   RVP 3/9/2022 (-)  3/9/2022 Patient got one dose of vanco zosyn in er  AR  3/9/2022 No strong susp for active infectn    DIARRHEA   GI pcr 3/11/2022 (-)      RO VTE.  V duplx 3/9/2022 (-)     COPD ex poa 3/9/2022  3/9/2022 albuterol hfa   3/9 symbicort 160   3/10-3/13/2022 solu 20 dced    CAD.  Tr 3/9/2022 tr 34   3/9/2022 asa 81   3/9/2022 plavx 75   3/9/2022 imdur 30   3/9/2022 metoprolol 50     CHF.  Bnp 3/9/2022 bnp 884  echo 3/11 ef 65% severe as grad 55 william .7    TRENTON.  Cr 3/9-3/10-3/12/2022 Cr 1.7-1.6-1.6       TIME SPENT   Over 25 minutes aggregate care time spent on encounter; activities included   direct patient care, counseling and/or coordinating care reviewing notes, lab data/ imaging , discussion with multidisciplinary team/ patient  /family and explaining in detail risks, benefits, alternatives  of the recommendations     JEANNINE BARRIENTOS 87 f 3/9/2022 8/21/1934 DR LAMONT THOMPSON   
  JEANNINE BARRIENTOS 87 f 3/9/2022 8/21/1934 DR LAMONT THOMPSON     REVIEW OF SYMPTOMS      Able to give (reliable) ROS  NO     PHYSICAL EXAM    HEENT Unremarkable  atraumatic   RESP Fair air entry EXP prolonged    Harsh breath sound Resp distres mild   CARDIAC S1 S2 No S3     NO JVD    ABDOMEN SOFT BS PRESENT NOT DISTENDED No hepatosplenomegaly   PEDAL EDEMA present No calf tenderness  NO rash     DOA/CC/PROBLEMS poa .  3/9/2022   87 f  Patient brought in by ambulance from [facility] living as reported having difficulty breathing was given neb treatment x 2 received with non-rebreather mask.      PMH-PSH .  pmh Hytn  pmh CAD  pmh HLD   pmh Hypothyroid  pmh DM  pmh COPD  pmh CHF       NOTABLE HOME MEDS.  ceftin tramadol duonebxanax .5 asa 81 insulin plavx lasix 40 levoxyl 125 metoprolol 50     PROBLEMS .  Shortness of breath poa 3/9/2022   COPD ex poa 3/9/2022   CHF 3/9/2022 bnp 884  AORTIC STENOSIS echo 3/11 ef 65% severe as grad 55 william .7  TRENTON poa 3/9/2022 Cr 1.7       COVID/ICU/CODE STATUS.                       COVID  STATUS.           pcr 3/9/2022 (-)   ICU STAY. none  GOC.   3/9/2022 dnr     BEST PRACTICE ISSUES.                                                  HEAD OF BED ELEVATION. Yes  DVT PROPHYLAXIS.    3/9/2022 hpsc   JIMÉNEZ PROPHYLAXIS.                                                                                        DIET.   3/9/2022 cons carb               PROBLEM DATA/PLAN.    RESP.   Monitor po Target po 90-95%    PMH/PSH PROBLEMS.  Management continued/modified as indicated    OXYGEN REQUIREMENTS.   3/13/2022 ra 95%      INFECTION.  W 3/9-3/10/2022 w 13 - 12  pr 3/10/2022 (-)  cxr 3/9/2022 LLZ opac   CT ch 3/9/2022 mini basal atelect changes   RVP 3/9/2022 (-)  3/9/2022 Patient got one dose of vanco zosyn in er  AR  3/9/2022 No strong susp for active infectn    DIARRHEA   GI pcr 3/11/2022 (-)      RO VTE.  V duplx 3/9/2022 (-)     COPD ex poa 3/9/2022  3/9/2022 albuterol hfa   3/9 symbicort 160   3/10-3/13/2022 solu 20 dced    CAD.  Tr 3/9/2022 tr 34   3/9/2022 asa 81   3/9/2022 plavx 75   3/9/2022 imdur 30   3/9/2022 metoprolol 50     CHF.  Bnp 3/9/2022 bnp 884  echo 3/11 ef 65% severe as grad 55 william .7    TRENTON.  Cr 3/9-3/10-3/12/2022 Cr 1.7-1.6-1.6       TIME SPENT   Over 25 minutes aggregate care time spent on encounter; activities included   direct patient care, counseling and/or coordinating care reviewing notes, lab data/ imaging , discussion with multidisciplinary team/ patient  /family and explaining in detail risks, benefits, alternatives  of the recommendations     JEANNINE BARRIENTOS 87 f 3/9[birth year]2022 8/21/1934 DR LAMONT THOMPSON   
  JEANNINE BARRIENTOS 87 f 3/[birth year]/2022 8/21/1934 DR LAMONT THOMPSON     REVIEW OF SYMPTOMS      Able to give ROS  Yes     RELIABLE +/-   CONSTITUTIONAL Weakness Yes  Chills No   ENDOCRINE  No heat or cold intolerance    ALLERGY No hives  Sore throat No stridor  RESP Coughing blood no  Shortness of breath YES   NEURO No Headache  Confusion Pain neck No   CARDIAC No Chest pain No Palpitations   GI  Pain abdomen NO   Vomiting NO     PHYSICAL EXAM    HEENT Unremarkable  atraumatic   RESP Fair air entry EXP prolonged    Harsh breath sound Resp distres mild   CARDIAC S1 S2 No S3     NO JVD    ABDOMEN SOFT BS PRESENT NOT DISTENDED No hepatosplenomegaly PEDAL EDEMA present No calf tenderness  NO rash       DOA/CC/PROBLEMS poa .  3/9/2022   87 f  Patient brought in by ambulance from [facility] assisted living as reported having difficulty breathing was given neb treatment x 2 received with non-rebreather mask.      PMH-PSH .  pmh Hytn  pmh CAD  pmh HLD   pmh Hypothyroid  pmh DM  pmh COPD  pmh CHF       NOTABLE HOME MEDS.  ceftin tramadol duonebxanax .5 asa 81 insulin plavx lasix 40 levoxyl 125 metoprolol 50     PROBLEMS .  Shortness of breath poa 3/9/2022   COPD ex poa 3/9/2022   CHF 3/9/2022 bnp 884  TRENTON poa 3/9/2022 Cr 1.7       COVID/ICU/CODE STATUS.                       COVID  STATUS.           pcr 3/9/2022 (-)   ICU STAY. none  GOC.   3/9/2022 dnr     BEST PRACTICE ISSUES.                                                  HEAD OF BED ELEVATION. Yes  DVT PROPHYLAXIS.    3/9/2022 hpsc   JIMÉNEZ PROPHYLAXIS.                                                                                        DIET.   3/9/2022 cons carb               PROBLEM DATA/PLAN.    RESP.   Monitor po Target po 90-95%    PMH/PSH PROBLEMS.  Management continued/modified as indicated    OXYGEN REQUIREMENTS  3/11/2022 ra 93%.      INFECTION.  W 3/9-3/10/2022 w 13 - 12  pr 3/10/2022 (-)  cxr 3/9/2022 LLZ opac   CT ch 3/9/2022 mini basal atelect changes   RVP 3/9/2022 (-)  3/9/2022 Patient got one dose of vanco zosyn in er  [place]  3/9/2022 No strong susp for active infectn     RO VTE.  V duplx 3/9/2022 (-)     COPD ex poa 3/9/2022  3/9/2022 albuterol hfa   3/10/2022 solu 20     CAD.  Tr 3/9/2022 tr 34   3/9/2022 asa 81   3/9/2022 plavx 75   3/9/2022 imdur 30   3/9/2022 metoprolol 50     CHF.  Bnp 3/9/2022 bnp 884    TRENTON.  Cr 3/9-3/10/2022 Cr 1.7-1.6       TIME SPENT   Over 25 minutes aggregate care time spent on encounter; activities included   direct patient care, counseling and/or coordinating care reviewing notes, lab data/ imaging , discussion with multidisciplinary team/ patient  /family and explaining in detail risks, benefits, alternatives  of the recommendations     JEANNINE BARRIENTOS 87 f 3/9/2022 8/21/1934 DR LAMONT THOMPSON     
  JEANNINE BARRIENTOS 87 f 3/9/2022 8/21/1934 DR LAMONT THOMPSON     REVIEW OF SYMPTOMS      Able to give ROS  Yes     RELIABLE +/-   CONSTITUTIONAL Weakness Yes  Chills No   ENDOCRINE  No heat or cold intolerance    ALLERGY No hives  Sore throat No stridor  RESP Coughing blood no  Shortness of breath YES   NEURO No Headache  Confusion Pain neck No   CARDIAC No Chest pain No Palpitations   GI  Pain abdomen NO   Vomiting NO     PHYSICAL EXAM    HEENT Unremarkable  atraumatic   RESP Fair air entry EXP prolonged    Harsh breath sound Resp distres mild   CARDIAC S1 S2 No S3     NO JVD    ABDOMEN SOFT BS PRESENT NOT DISTENDED No hepatosplenomegaly PEDAL EDEMA present No calf tenderness  NO rash       DOA/CC/PROBLEMS poa .  3/9/2022   87 f  Patient brought in by ambulance from [facility] assisted living as reported having difficulty breathing was given neb treatment x 2 received with non-rebreather mask.      PMH-PSH .  pmh Hytn  pmh CAD  pmh HLD   pmh Hypothyroid  pmh DM  pmh COPD  pmh CHF       NOTABLE HOME MEDS.  ceftin tramadol duonebxanax .5 asa 81 insulin plavx lasix 40 levoxyl 125 metoprolol 50     PROBLEMS .  Shortness of breath poa 3/9/2022   COPD ex poa 3/9/2022   CHF 3/9/2022 bnp 884  TRENTON poa 3/9/2022 Cr 1.7       COVID/ICU/CODE STATUS.                       COVID  STATUS.           pcr 3/9/2022 (-)   ICU STAY. none  GOC.   3/9/2022 dnr     BEST PRACTICE ISSUES.                                                  HEAD OF BED ELEVATION. Yes  DVT PROPHYLAXIS.    3/9/2022 hpsc   JIMÉNEZ PROPHYLAXIS.                                                                                        DIET.   3/9/2022 cons carb                VITALS/PO/IO/VENT/DRIPS.     3/10/2022 afeb 70 140/70      PROBLEM DATA/PLAN.    RESP.   Monitor po Target po 90-95%    PMH/PSH PROBLEMS.  Management continued/modified as indicated    OXYGEN REQUIREMENTS.  3/9/2022 ra 95%  Will need home oxygen at discharge if ra rest or ra exercise PO drops below 88%        INFECTION.  W 3/9-3/10/2022 w 13 - 12  pr 3/10/2022 (-)  cxr 3/9/2022 LLZ opac   CT ch 3/9/2022 mini basal atelect changes   RVP 3/9/2022 (-)  3/9/2022 Patient got one dose of vanco zosyn in er  [place]  3/9/2022 No strong susp for active infectn     RO VTE.  V duplx 3/9/2022 (-)     COPD ex poa 3/9/2022  3/9/2022 albuterol hfa   3/10/2022 solu 20     CAD.  Tr 3/9/2022 tr 34   3/9/2022 asa 81   3/9/2022 plavx 75   3/9/2022 imdur 30   3/9/2022 metoprolol 50     CHF.  Bnp 3/9/2022 bnp 884    TRENTON.  Cr 3/9-3/10/2022 Cr 1.7-1.6       TIME SPENT   Over 25 minutes aggregate care time spent on encounter; activities included   direct patient care, counseling and/or coordinating care reviewing notes, lab data/ imaging , discussion with multidisciplinary team/ patient  /family and explaining in detail risks, benefits, alternatives  of the recommendations     JEANNINE BARRIENTOS 87 f 3/9/2022 8/21/1934 DR LAMONT THOMPSON     
87 year old female with HTN, CAD, HLD, hypothyroid, DM, COPD, CHF, asthma presents with SOB.  Patient resides at [facility].  EMS reports that patient woke up at 5am and c/o SOB.    vs noted  remains on IV solumedrol    cvs rx regimen  TTE reviewed  COPD - Asthma management  assist with needs - ADL  monitor VS and HD and Sat  dietary discretion  MOLST filled out - DNR DNI - HCP - Son Calvin Chávez  pt lives in [place] -   spoke with pt - daughters -   
JEANNINE BARRIENTOS 87 f 3/9/2022 8/21/1934 DR LAMONT THOMPSON     REVIEW OF SYMPTOMS      Able to give (reliable) ROS  NO     PHYSICAL EXAM    HEENT Unremarkable  atraumatic   RESP Fair air entry EXP prolonged    Harsh breath sound Resp distres mild   CARDIAC S1 S2 No S3     NO JVD    ABDOMEN SOFT BS PRESENT NOT DISTENDED No hepatosplenomegaly   PEDAL EDEMA present No calf tenderness  NO rash       DOA/CC/PROBLEMS poa .  3/9/2022   87 f  Patient brought in by ambulance from [facility] assisted living as reported having difficulty breathing was given neb treatment x 2 received with non-rebreather mask.      PMH-PSH .  pmh Hytn  pmh CAD  pmh HLD   pmh Hypothyroid  pmh DM  pmh COPD  pmh CHF       NOTABLE HOME MEDS.  ceftin tramadol duonebxanax .5 asa 81 insulin plavx lasix 40 levoxyl 125 metoprolol 50     PROBLEMS .  Shortness of breath poa 3/9/2022   COPD ex poa 3/9/2022   CHF 3/9/2022 bnp 884  TRENTON poa 3/9/2022 Cr 1.7       COVID/ICU/CODE STATUS.                       COVID  STATUS.           pcr 3/9/2022 (-)   ICU STAY. none  GOC.   3/9/2022 dnr     BEST PRACTICE ISSUES.                                                  HEAD OF BED ELEVATION. Yes  DVT PROPHYLAXIS.    3/9/2022 hpsc   JIMÉNEZ PROPHYLAXIS.                                                                                        DIET.   3/9/2022 cons carb               PROBLEM DATA/PLAN.    RESP.   Monitor po Target po 90-95%    PMH/PSH PROBLEMS.  Management continued/modified as indicated    OXYGEN REQUIREMENTS  3/12/2022 ra 95%      INFECTION.  W 3/9-3/10/2022 w 13 - 12  pr 3/10/2022 (-)  cxr 3/9/2022 LLZ opac   CT ch 3/9/2022 mini basal atelect changes   RVP 3/9/2022 (-)  3/9/2022 Patient got one dose of vanco zosyn in er  AR  3/9/2022 No strong susp for active infectn    DIARRHEA   GI pcr 3/11/2022 (-)      RO VTE.  V duplx 3/9/2022 (-)     COPD ex poa 3/9/2022  3/9/2022 albuterol hfa   3/10/2022 solu 20     CAD.  Tr 3/9/2022 tr 34   3/9/2022 asa 81   3/9/2022 plavx 75   3/9/2022 imdur 30   3/9/2022 metoprolol 50     CHF.  Bnp 3/9/2022 bnp 884  echo 3/11 ef 65% severe as grad 55 william .7    [place]ON.  Cr 3/9-3/10-3/12/2022 Cr 1.7-1.6-1.6       TIME SPENT   Over 25 minutes aggregate care time spent on encounter; activities included   direct patient care, counseling and/or coordinating care reviewing notes, lab data/ imaging , discussion with multidisciplinary team/ patient  /family and explaining in detail risks, benefits, alternatives  of the recommendations     JEANNINE BARRIENTOS 87 f 3/9/2022 8/21/1934 DR LAMONT THOMPSON     
pt with exacerbation of copd  iv steroids and bronchodilator  ashd  hypertension  dyslipidemia  hx of mi  dm2- on coverage insulin  echo for lv function  sob - improving - reduce iv steroid 20 mg solumedrol / daily
pt with exacerbation of copd  iv steroids and bronchodilator  ashd  hypertension  dyslipidemia  hx of mi  dm2- on coverage insulin  echo for lv function  sob - improving - reduce iv steroid 20 mg solumedrol / daily  pt is started on po predisone 3/12  reduce prednisone 10mg po daily
pt with exacerbation of copd  iv steroids and bronchodilator  ashd  hypertension  dyslipidemia  hx of mi  dm2- on coverage insulin  echo for lv function  sob - improving - reduce iv steroid 20 mg solumedrol / daily  pt is started on po predisone 3/12
pt with exacerbation of copd  iv steroids and bronchodilator  ashd  hypertension  dyslipidemia  hx of mi  dm2- on coverage insulin  echo for lv function  sob - improving - reduce iv steroid 20 mg solumedrol / daily
pt with exacerbation of copd  iv steroids and bronchodilator  ashd  hypertension  dyslipidemia  hx of mi  dm2- on coverage insulin  echo for lv function  sob - improving - reduce iv steroid 20 mg solumedrol / daily  pt is started on po predisone 3/12  reduce prednisone 10mg po daily
87 year old female with HTN, CAD, HLD, hypothyroid, DM, COPD, CHF, asthma presents with SOB.  Patient resides at [facility].  EMS reports that patient woke up at 5am and c/o SOB.  Staff at NH noted wheezing and she was given 2 nebs with significant relief.  When EMS arrived, they noted O2 sat 100% on RA with no respiratory distress.  Patient is DNR/DNI.  PMD Dr. Yadav
87 year old female with HTN, CAD, HLD, hypothyroid, DM, COPD, CHF, asthma presents with SOB.  Patient resides at [facility].  EMS reports that patient woke up at 5am and c/o SOB.  Staff at NH noted wheezing and she was given 2 nebs with significant relief.  When EMS arrived, they noted O2 sat 100% on RA with no respiratory distress.  Patient is DNR/DNI.  PMD Dr. Yadav
87 year old female with HTN, CAD, HLD, hypothyroid, DM, COPD, CHF, asthma presents with SOB.  Patient resides at [facility].  EMS reports that patient woke up at 5am and c/o SOB.  Staff at NH noted wheezing and she was given 2 nebs with significant relief.  When EMS arrived, they noted O2 sat 100% on RA with no respiratory distress.  Patient is DNR/DNI.  PMD Dr. Yadav
87 year old female with HTN, CAD, HLD, hypothyroid, DM, COPD, CHF, asthma presents with SOB.  Patient resides at [facility].  EMS reports that patient woke up at 5am and c/o SOB.  Staff at NH noted wheezing and she was given 2 nebs with significant relief.  When EMS arrived, they noted O2 sat 100% on RA with no respiratory distress.  Patient is DNR/DNI.  PMD Dr. Yadav

## 2022-03-15 NOTE — PROGRESS NOTE ADULT - SUBJECTIVE AND OBJECTIVE BOX
Date/Time Patient Seen:  		  Referring MD:   Data Reviewed	       Patient is a 87y old  Female who presents with a chief complaint of     Subjective/HPI     PAST MEDICAL & SURGICAL HISTORY:  Uncomplicated asthma, unspecified asthma severity    DM2 (diabetes mellitus, type 2)    Essential hypertension    Hypothyroidism, unspecified type    Pure hypercholesterolemia    Gastroesophageal reflux disease without esophagitis    Diabetes    Asthma    CAD (coronary artery disease)    HTN (hypertension)    HLD (hyperlipidemia)    Hypothyroid    NSTEMI (non-ST elevated myocardial infarction)    No significant past surgical history    History of appendectomy    History of appendectomy    Abnormal findings on cardiac catheterization  Cardiac Cath          Medication list         MEDICATIONS  (STANDING):  ALBUTerol    90 MICROgram(s) HFA Inhaler 2 Puff(s) Inhalation every 6 hours  ALPRAZolam 0.5 milliGRAM(s) Oral at bedtime  artificial  tears Solution 1 Drop(s) Both EYES two times a day  aspirin enteric coated 81 milliGRAM(s) Oral daily  atorvastatin 80 milliGRAM(s) Oral at bedtime  budesonide 160 MICROgram(s)/formoterol 4.5 MICROgram(s) Inhaler 2 Puff(s) Inhalation two times a day  cholecalciferol 2000 Unit(s) Oral daily  clopidogrel Tablet 75 milliGRAM(s) Oral daily  dextrose 40% Gel 15 Gram(s) Oral once  dextrose 5%. 1000 milliLiter(s) (50 mL/Hr) IV Continuous <Continuous>  dextrose 5%. 1000 milliLiter(s) (100 mL/Hr) IV Continuous <Continuous>  dextrose 50% Injectable 25 Gram(s) IV Push once  dextrose 50% Injectable 12.5 Gram(s) IV Push once  dextrose 50% Injectable 25 Gram(s) IV Push once  escitalopram 10 milliGRAM(s) Oral daily  gabapentin 100 milliGRAM(s) Oral three times a day  glucagon  Injectable 1 milliGRAM(s) IntraMuscular once  heparin   Injectable 5000 Unit(s) SubCutaneous every 12 hours  insulin glargine Injectable (LANTUS) 20 Unit(s) SubCutaneous every morning  insulin lispro (ADMELOG) corrective regimen sliding scale   SubCutaneous three times a day before meals  insulin lispro (ADMELOG) corrective regimen sliding scale   SubCutaneous at bedtime  insulin lispro Injectable (ADMELOG) 6 Unit(s) SubCutaneous three times a day before meals  isosorbide   mononitrate ER Tablet (IMDUR) 30 milliGRAM(s) Oral daily  lactobacillus acidophilus 1 Tablet(s) Oral two times a day  levothyroxine 125 MICROGram(s) Oral <User Schedule>  metoprolol succinate ER 50 milliGRAM(s) Oral daily  metroNIDAZOLE    Tablet 500 milliGRAM(s) Oral every 8 hours  predniSONE   Tablet 10 milliGRAM(s) Oral once  sodium chloride 0.45%. 1000 milliLiter(s) (40 mL/Hr) IV Continuous <Continuous>  tiotropium 18 MICROgram(s) Capsule 1 Capsule(s) Inhalation daily    MEDICATIONS  (PRN):  acetaminophen     Tablet .. 650 milliGRAM(s) Oral every 6 hours PRN Temp greater or equal to 38C (100.4F), Mild Pain (1 - 3)  aluminum hydroxide/magnesium hydroxide/simethicone Suspension 30 milliLiter(s) Oral every 4 hours PRN Dyspepsia  melatonin 3 milliGRAM(s) Oral at bedtime PRN Insomnia  ondansetron Injectable 4 milliGRAM(s) IV Push every 8 hours PRN Nausea and/or Vomiting  traMADol 50 milliGRAM(s) Oral three times a day PRN Moderate Pain (4 - 6)         Vitals log        ICU Vital Signs Last 24 Hrs  T(C): 36.2 (15 Mar 2022 04:41), Max: 36.7 (14 Mar 2022 12:59)  T(F): 97.2 (15 Mar 2022 04:41), Max: 98 (14 Mar 2022 12:59)  HR: 64 (15 Mar 2022 05:24) (57 - 64)  BP: 166/73 (15 Mar 2022 04:41) (135/65 - 166/73)  BP(mean): --  ABP: --  ABP(mean): --  RR: 18 (15 Mar 2022 04:41) (18 - 18)  SpO2: 97% (15 Mar 2022 04:41) (92% - 97%)           Input and Output:  I&O's Detail    13 Mar 2022 07:01  -  14 Mar 2022 07:00  --------------------------------------------------------  IN:  Total IN: 0 mL    OUT:    Voided (mL): 700 mL  Total OUT: 700 mL    Total NET: -700 mL      14 Mar 2022 07:01  -  15 Mar 2022 05:35  --------------------------------------------------------  IN:    sodium chloride 0.45%: 400 mL  Total IN: 400 mL    OUT:    Voided (mL): 1 mL  Total OUT: 1 mL    Total NET: 399 mL          Lab Data                        13.1   18.15 )-----------( 187      ( 14 Mar 2022 10:19 )             40.6     03-14    136  |  105  |  84<H>  ----------------------------<  235<H>  4.4   |  25  |  2.00<H>    Ca    9.2      14 Mar 2022 10:19              Review of Systems	      Objective     Physical Examination    heart s1s2  lung dec BS  abd soft  head nc      Pertinent Lab findings & Imaging      Chong:  NO   Adequate UO     I&O's Detail    13 Mar 2022 07:01  -  14 Mar 2022 07:00  --------------------------------------------------------  IN:  Total IN: 0 mL    OUT:    Voided (mL): 700 mL  Total OUT: 700 mL    Total NET: -700 mL      14 Mar 2022 07:01  -  15 Mar 2022 05:35  --------------------------------------------------------  IN:    sodium chloride 0.45%: 400 mL  Total IN: 400 mL    OUT:    Voided (mL): 1 mL  Total OUT: 1 mL    Total NET: 399 mL               Discussed with:     Cultures:	        Radiology

## 2022-03-15 NOTE — PROGRESS NOTE ADULT - SUBJECTIVE AND OBJECTIVE BOX
FLOYD JEANNINE    PLV 2NOR [address] W1    Allergies    doxycycline (Unknown)  iodine (Hives)  iodine containing compounds (Unknown)  shellfish (Anaphylaxis)  shellfish (Swelling; Short breath)    Intolerances        PAST MEDICAL & SURGICAL HISTORY:  Uncomplicated asthma, unspecified asthma severity    DM2 (diabetes mellitus, type 2)    Essential hypertension    Hypothyroidism, unspecified type    Pure hypercholesterolemia    Gastroesophageal reflux disease without esophagitis    Diabetes    Asthma    CAD (coronary artery disease)    HTN (hypertension)    HLD (hyperlipidemia)    Hypothyroid    NSTEMI (non-ST elevated myocardial infarction)    History of appendectomy    History of appendectomy    Abnormal findings on cardiac catheterization  Cardiac Cath        FAMILY HISTORY:  Family history of heart disease    Family history of cancer in mother    Family history of MI (myocardial infarction)    Family history of stomach cancer        Home Medications:  acetaminophen 500 mg oral tablet: 2 tab(s) orally 3 times a day (09 Mar 2022 08:41)  ALPRAZolam 0.5 mg oral tablet: 1 tab(s) orally once a day (at bedtime) (09 Mar 2022 08:41)  aspirin 81 mg oral tablet: 1 tab(s) orally once a day (09 Mar 2022 08:41)  atorvastatin 80 mg oral tablet: 1 tab(s) orally once a day (09 Mar 2022 08:41)  clopidogrel 75 mg oral tablet: 1 tab(s) orally once a day (09 Mar 2022 08:41)  escitalopram 10 mg oral tablet: 1 tab(s) orally once a day (09 Mar 2022 08:41)  fluticasone-salmeterol 250 mcg-50 mcg inhalation powder: 1 inhaler(s) inhaled 2 times a day (09 Mar 2022 08:41)  furosemide 40 mg oral tablet: 1 tab(s) orally once a day (09 Mar 2022 08:41)  gabapentin 100 mg oral capsule: 1 cap(s) orally 3 times a day (09 Mar 2022 08:41)  ipratropium-albuterol 0.5 mg-2.5 mg/3 mL inhalation solution: 3 milliliter(s) inhaled every 6 hours, As Needed (09 Mar 2022 08:41)  ipratropium-albuterol 0.5 mg-2.5 mg/3 mL inhalation solution: 3 milliliter(s) inhaled 2 times a day (09 Mar 2022 08:41)  isosorbide mononitrate 30 mg oral tablet, extended release: 1 tab(s) orally once a day (in the morning) (09 Mar 2022 08:41)  Lantus 100 units/mL subcutaneous solution: 18 unit(s) subcutaneous 2 times a day (09 Mar 2022 08:41)  levothyroxine 125 mcg (0.125 mg) oral tablet: 1 tab(s) orally Monday through Friday (09 Mar 2022 08:41)  metoprolol succinate 50 mg oral tablet, extended release: 1 tab(s) orally once a day (14 Mar 2022 07:49)  NovoLOG FlexPen 100 units/mL injectable solution: 15 unit(s) subcutaneous before meals  (09 Mar 2022 08:41)  potassium chloride 10 mEq oral capsule, extended release: 1 cap(s) orally once a day (09 Mar 2022 08:41)  ProAir HFA 90 mcg/inh inhalation aerosol: 2 puff(s) inhaled every 4 hours, As Needed (09 Mar 2022 08:41)  Refresh Relieva ophthalmic solution: 1 drop(s) in each eye every 2 hours, As Needed for Dry eyes (09 Mar 2022 08:41)  Refresh Relieva ophthalmic solution: 1 drop(s) in each eye 2 times a day (09 Mar 2022 08:41)  Vitamin D3 50 mcg (2000 intl units) oral tablet: 1 tab(s) orally once a day (09 Mar 2022 08:41)      MEDICATIONS  (STANDING):  ALBUTerol    90 MICROgram(s) HFA Inhaler 2 Puff(s) Inhalation every 6 hours  ALPRAZolam 0.5 milliGRAM(s) Oral at bedtime  artificial  tears Solution 1 Drop(s) Both EYES two times a day  aspirin enteric coated 81 milliGRAM(s) Oral daily  atorvastatin 80 milliGRAM(s) Oral at bedtime  budesonide 160 MICROgram(s)/formoterol 4.5 MICROgram(s) Inhaler 2 Puff(s) Inhalation two times a day  cholecalciferol 2000 Unit(s) Oral daily  clopidogrel Tablet 75 milliGRAM(s) Oral daily  dextrose 40% Gel 15 Gram(s) Oral once  dextrose 5%. 1000 milliLiter(s) (50 mL/Hr) IV Continuous <Continuous>  dextrose 5%. 1000 milliLiter(s) (100 mL/Hr) IV Continuous <Continuous>  dextrose 50% Injectable 25 Gram(s) IV Push once  dextrose 50% Injectable 12.5 Gram(s) IV Push once  dextrose 50% Injectable 25 Gram(s) IV Push once  escitalopram 10 milliGRAM(s) Oral daily  gabapentin 100 milliGRAM(s) Oral three times a day  glucagon  Injectable 1 milliGRAM(s) IntraMuscular once  heparin   Injectable 5000 Unit(s) SubCutaneous every 12 hours  insulin glargine Injectable (LANTUS) 20 Unit(s) SubCutaneous every morning  insulin lispro (ADMELOG) corrective regimen sliding scale   SubCutaneous three times a day before meals  insulin lispro (ADMELOG) corrective regimen sliding scale   SubCutaneous at bedtime  insulin lispro Injectable (ADMELOG) 6 Unit(s) SubCutaneous three times a day before meals  isosorbide   mononitrate ER Tablet (IMDUR) 30 milliGRAM(s) Oral daily  lactobacillus acidophilus 1 Tablet(s) Oral two times a day  levothyroxine 125 MICROGram(s) Oral <User Schedule>  metoprolol succinate ER 50 milliGRAM(s) Oral daily  metroNIDAZOLE    Tablet 500 milliGRAM(s) Oral every 8 hours  predniSONE   Tablet 10 milliGRAM(s) Oral once  sodium chloride 0.45%. 1000 milliLiter(s) (40 mL/Hr) IV Continuous <Continuous>  tiotropium 18 MICROgram(s) Capsule 1 Capsule(s) Inhalation daily    MEDICATIONS  (PRN):  acetaminophen     Tablet .. 650 milliGRAM(s) Oral every 6 hours PRN Temp greater or equal to 38C (100.4F), Mild Pain (1 - 3)  aluminum hydroxide/magnesium hydroxide/simethicone Suspension 30 milliLiter(s) Oral every 4 hours PRN Dyspepsia  melatonin 3 milliGRAM(s) Oral at bedtime PRN Insomnia  ondansetron Injectable 4 milliGRAM(s) IV Push every 8 hours PRN Nausea and/or Vomiting  traMADol 50 milliGRAM(s) Oral three times a day PRN Moderate Pain (4 - 6)      Diet, Consistent Carbohydrate w/Evening Snack:   DASH/TLC Sodium & Cholesterol Restricted  Easy to Chew (EASYTOCHEW) (03-10-22 @ 19:55) [Active]          Vital Signs Last 24 Hrs  T(C): 36.2 (15 Mar 2022 04:41), Max: 36.7 (14 Mar 2022 12:59)  T(F): 97.2 (15 Mar 2022 04:41), Max: 98 (14 Mar 2022 12:59)  HR: 64 (15 Mar 2022 05:24) (57 - 64)  BP: 166/73 (15 Mar 2022 04:41) (135/65 - 166/73)  BP(mean): --  RR: 18 (15 Mar 2022 04:41) (18 - 18)  SpO2: 97% (15 Mar 2022 04:41) (92% - 97%)      03-14-22 @ 07:01  -  03-15-22 @ 07:00  --------------------------------------------------------  IN: 480 mL / OUT: 1 mL / NET: 479 mL              LABS:                        12.8   18.40 )-----------( 179      ( 15 Mar 2022 09:11 )             40.2     03-15    136  |  109<H>  |  77<H>  ----------------------------<  136<H>  4.6   |  19<L>  |  1.60<H>    Ca    9.0      15 Mar 2022 08:12    TPro  6.5  /  Alb  2.8<L>  /  TBili  0.4  /  DBili  x   /  AST  25  /  ALT  36  /  AlkPhos  75  03-15              WBC:  WBC Count: 18.40 K/uL (03-15 @ 09:11)  WBC Count: 18.15 K/uL (03-14 @ 10:19)  WBC Count: 18.64 K/uL (03-14 @ 07:20)  WBC Count: 14.33 K/uL (03-12 @ 07:51)      MICROBIOLOGY:  RECENT CULTURES:  03-11 .Stool Feces XXXX XXXX   GI PCR Results: NOT detected  *******Please Note:*******  GI panel PCR evaluates for:  Campylobacter, Plesiomonas shigelloides, Salmonella,  Vibrio, Yersinia enterocolitica, Enteroaggregative  Escherichia coli (EAEC), Enteropathogenic E.coli (EPEC),  Enterotoxigenic E. coli (ETEC) lt/st, Shiga-like  toxin-producing E. coli (STEC) stx1/stx2,  Shigella/ Enteroinvasive E. coli (EIEC), Cryptosporidium,  Cyclospora cayetanensis, Entamoeba histolytica,  Giardia lamblia, Adenovirus F 40/41, Astrovirus,  Norovirus GI/GII, Rotavirus A, Sapovirus    03-09 .Blood Blood-Peripheral XXXX XXXX   No Growth Final                    Sodium:  Sodium, Serum: 136 mmol/L (03-15 @ 08:12)  Sodium, Serum: 136 mmol/L (03-14 @ 10:19)  Sodium, Serum: 137 mmol/L (03-14 @ 07:20)  Sodium, Serum: 138 mmol/L (03-12 @ 07:51)      1.60 mg/dL 03-15 @ 08:12  2.00 mg/dL 03-14 @ 10:19  2.00 mg/dL 03-14 @ 07:20  1.60 mg/dL 03-12 @ 07:51      Hemoglobin:  Hemoglobin: 12.8 g/dL (03-15 @ 09:11)  Hemoglobin: 13.1 g/dL (03-14 @ 10:19)  Hemoglobin: 12.9 g/dL (03-14 @ 07:20)  Hemoglobin: 11.4 g/dL (03-12 @ 07:51)      Platelets: Platelet Count - Automated: 179 K/uL (03-15 @ 09:11)  Platelet Count - Automated: 187 K/uL (03-14 @ 10:19)  Platelet Count - Automated: 192 K/uL (03-14 @ 07:20)  Platelet Count - Automated: 181 K/uL (03-12 @ 07:51)      LIVER FUNCTIONS - ( 15 Mar 2022 08:12 )  Alb: 2.8 g/dL / Pro: 6.5 g/dL / ALK PHOS: 75 U/L / ALT: 36 U/L / AST: 25 U/L / GGT: x                 RADIOLOGY & ADDITIONAL STUDIES:      MICROBIOLOGY:  RECENT CULTURES:  03-11 .Stool Feces XXXX XXXX   GI PCR Results: NOT detected  *******Please Note:*******  GI panel PCR evaluates for:  Campylobacter, Plesiomonas shigelloides, Salmonella,  Vibrio, Yersinia enterocolitica, Enteroaggregative  Escherichia coli (EAEC), Enteropathogenic E.coli (EPEC),  Enterotoxigenic E. coli (ETEC) lt/st, Shiga-like  toxin-producing E. coli (STEC) stx1/stx2,  Shigella/ Enteroinvasive E. coli (EIEC), Cryptosporidium,  Cyclospora cayetanensis, Entamoeba histolytica,  Giardia lamblia, Adenovirus F 40/41, Astrovirus,  Norovirus GI/GII, Rotavirus A, Sapovirus    03-09 .Blood Blood-Peripheral XXXX XXXX   No Growth Final

## 2022-03-25 ENCOUNTER — APPOINTMENT (OUTPATIENT)
Dept: CARDIOLOGY | Facility: CLINIC | Age: 87
End: 2022-03-25
Payer: MEDICARE

## 2022-03-25 PROCEDURE — 93264 REM MNTR WRLS P-ART PRS SNR: CPT

## 2022-04-06 NOTE — ED ADULT NURSE NOTE - PAIN: BODY LOCATION
Quality 137: Melanoma: Continuity Of Care - Recall System: Patient information entered into a recall system that includes: target date for the next exam specified AND a process to follow up with patients regarding missed or unscheduled appointments
Detail Level: Zone
No
ankle/Bilateral:

## 2022-04-13 ENCOUNTER — APPOINTMENT (OUTPATIENT)
Dept: CARDIOLOGY | Facility: CLINIC | Age: 87
End: 2022-04-13

## 2022-04-19 ENCOUNTER — EMERGENCY (EMERGENCY)
Facility: HOSPITAL | Age: 87
LOS: 1 days | Discharge: HOME | End: 2022-04-19
Attending: EMERGENCY MEDICINE | Admitting: EMERGENCY MEDICINE
Payer: MEDICARE

## 2022-04-19 VITALS
RESPIRATION RATE: 18 BRPM | HEART RATE: 74 BPM | DIASTOLIC BLOOD PRESSURE: 65 MMHG | OXYGEN SATURATION: 94 % | SYSTOLIC BLOOD PRESSURE: 144 MMHG | TEMPERATURE: 98 F

## 2022-04-19 VITALS
TEMPERATURE: 97 F | WEIGHT: 184.97 LBS | HEIGHT: 64 IN | RESPIRATION RATE: 18 BRPM | OXYGEN SATURATION: 94 % | HEART RATE: 74 BPM

## 2022-04-19 DIAGNOSIS — Z90.49 ACQUIRED ABSENCE OF OTHER SPECIFIED PARTS OF DIGESTIVE TRACT: Chronic | ICD-10-CM

## 2022-04-19 DIAGNOSIS — R93.1 ABNORMAL FINDINGS ON DIAGNOSTIC IMAGING OF HEART AND CORONARY CIRCULATION: Chronic | ICD-10-CM

## 2022-04-19 LAB
ALBUMIN SERPL ELPH-MCNC: 3.2 G/DL — LOW (ref 3.3–5)
ALP SERPL-CCNC: 80 U/L — SIGNIFICANT CHANGE UP (ref 40–120)
ALT FLD-CCNC: 17 U/L — SIGNIFICANT CHANGE UP (ref 12–78)
ANION GAP SERPL CALC-SCNC: 9 MMOL/L — SIGNIFICANT CHANGE UP (ref 5–17)
APTT BLD: 31.3 SEC — SIGNIFICANT CHANGE UP (ref 27.5–35.5)
AST SERPL-CCNC: 13 U/L — LOW (ref 15–37)
BASOPHILS # BLD AUTO: 0.06 K/UL — SIGNIFICANT CHANGE UP (ref 0–0.2)
BASOPHILS NFR BLD AUTO: 0.5 % — SIGNIFICANT CHANGE UP (ref 0–2)
BILIRUB SERPL-MCNC: 0.4 MG/DL — SIGNIFICANT CHANGE UP (ref 0.2–1.2)
BUN SERPL-MCNC: 33 MG/DL — HIGH (ref 7–23)
CALCIUM SERPL-MCNC: 8.9 MG/DL — SIGNIFICANT CHANGE UP (ref 8.5–10.1)
CHLORIDE SERPL-SCNC: 102 MMOL/L — SIGNIFICANT CHANGE UP (ref 96–108)
CK MB BLD-MCNC: 2 % — SIGNIFICANT CHANGE UP (ref 0–3.5)
CK MB CFR SERPL CALC: 2 NG/ML — SIGNIFICANT CHANGE UP (ref 0–3.6)
CK SERPL-CCNC: 102 U/L — SIGNIFICANT CHANGE UP (ref 26–192)
CO2 SERPL-SCNC: 27 MMOL/L — SIGNIFICANT CHANGE UP (ref 22–31)
CREAT SERPL-MCNC: 1.5 MG/DL — HIGH (ref 0.5–1.3)
EGFR: 34 ML/MIN/1.73M2 — LOW
EOSINOPHIL # BLD AUTO: 0.18 K/UL — SIGNIFICANT CHANGE UP (ref 0–0.5)
EOSINOPHIL NFR BLD AUTO: 1.5 % — SIGNIFICANT CHANGE UP (ref 0–6)
GLUCOSE SERPL-MCNC: 435 MG/DL — HIGH (ref 70–99)
HCT VFR BLD CALC: 37.5 % — SIGNIFICANT CHANGE UP (ref 34.5–45)
HGB BLD-MCNC: 12.2 G/DL — SIGNIFICANT CHANGE UP (ref 11.5–15.5)
IMM GRANULOCYTES NFR BLD AUTO: 0.8 % — SIGNIFICANT CHANGE UP (ref 0–1.5)
INR BLD: 1 RATIO — SIGNIFICANT CHANGE UP (ref 0.88–1.16)
LYMPHOCYTES # BLD AUTO: 1.86 K/UL — SIGNIFICANT CHANGE UP (ref 1–3.3)
LYMPHOCYTES # BLD AUTO: 15.1 % — SIGNIFICANT CHANGE UP (ref 13–44)
MCHC RBC-ENTMCNC: 28.7 PG — SIGNIFICANT CHANGE UP (ref 27–34)
MCHC RBC-ENTMCNC: 32.5 GM/DL — SIGNIFICANT CHANGE UP (ref 32–36)
MCV RBC AUTO: 88.2 FL — SIGNIFICANT CHANGE UP (ref 80–100)
MONOCYTES # BLD AUTO: 0.73 K/UL — SIGNIFICANT CHANGE UP (ref 0–0.9)
MONOCYTES NFR BLD AUTO: 5.9 % — SIGNIFICANT CHANGE UP (ref 2–14)
NEUTROPHILS # BLD AUTO: 9.41 K/UL — HIGH (ref 1.8–7.4)
NEUTROPHILS NFR BLD AUTO: 76.2 % — SIGNIFICANT CHANGE UP (ref 43–77)
NRBC # BLD: 0 /100 WBCS — SIGNIFICANT CHANGE UP (ref 0–0)
NT-PROBNP SERPL-SCNC: 2018 PG/ML — HIGH (ref 0–450)
PLATELET # BLD AUTO: 183 K/UL — SIGNIFICANT CHANGE UP (ref 150–400)
POTASSIUM SERPL-MCNC: 4.6 MMOL/L — SIGNIFICANT CHANGE UP (ref 3.5–5.3)
POTASSIUM SERPL-SCNC: 4.6 MMOL/L — SIGNIFICANT CHANGE UP (ref 3.5–5.3)
PROT SERPL-MCNC: 7.2 G/DL — SIGNIFICANT CHANGE UP (ref 6–8.3)
PROTHROM AB SERPL-ACNC: 11.7 SEC — SIGNIFICANT CHANGE UP (ref 10.5–13.4)
RBC # BLD: 4.25 M/UL — SIGNIFICANT CHANGE UP (ref 3.8–5.2)
RBC # FLD: 14 % — SIGNIFICANT CHANGE UP (ref 10.3–14.5)
SARS-COV-2 RNA SPEC QL NAA+PROBE: SIGNIFICANT CHANGE UP
SODIUM SERPL-SCNC: 138 MMOL/L — SIGNIFICANT CHANGE UP (ref 135–145)
TROPONIN I, HIGH SENSITIVITY RESULT: 17.2 NG/L — SIGNIFICANT CHANGE UP
TROPONIN I, HIGH SENSITIVITY RESULT: 18.5 NG/L — SIGNIFICANT CHANGE UP
WBC # BLD: 12.34 K/UL — HIGH (ref 3.8–10.5)
WBC # FLD AUTO: 12.34 K/UL — HIGH (ref 3.8–10.5)

## 2022-04-19 PROCEDURE — 82550 ASSAY OF CK (CPK): CPT

## 2022-04-19 PROCEDURE — 85610 PROTHROMBIN TIME: CPT

## 2022-04-19 PROCEDURE — 99284 EMERGENCY DEPT VISIT MOD MDM: CPT

## 2022-04-19 PROCEDURE — 87635 SARS-COV-2 COVID-19 AMP PRB: CPT

## 2022-04-19 PROCEDURE — 84484 ASSAY OF TROPONIN QUANT: CPT

## 2022-04-19 PROCEDURE — 36415 COLL VENOUS BLD VENIPUNCTURE: CPT

## 2022-04-19 PROCEDURE — 82553 CREATINE MB FRACTION: CPT

## 2022-04-19 PROCEDURE — 93005 ELECTROCARDIOGRAM TRACING: CPT

## 2022-04-19 PROCEDURE — 85025 COMPLETE CBC W/AUTO DIFF WBC: CPT

## 2022-04-19 PROCEDURE — 80053 COMPREHEN METABOLIC PANEL: CPT

## 2022-04-19 PROCEDURE — 93010 ELECTROCARDIOGRAM REPORT: CPT

## 2022-04-19 PROCEDURE — 71045 X-RAY EXAM CHEST 1 VIEW: CPT

## 2022-04-19 PROCEDURE — 85730 THROMBOPLASTIN TIME PARTIAL: CPT

## 2022-04-19 PROCEDURE — 83880 ASSAY OF NATRIURETIC PEPTIDE: CPT

## 2022-04-19 PROCEDURE — 99285 EMERGENCY DEPT VISIT HI MDM: CPT | Mod: 25

## 2022-04-19 PROCEDURE — 71045 X-RAY EXAM CHEST 1 VIEW: CPT | Mod: 26

## 2022-04-19 NOTE — ED ADULT NURSE NOTE - OBJECTIVE STATEMENT
pt alert and oriented x4, No signs of acute distress.  Pt reports having constant substernal chest pain immediately after breakfast phyllis any other complaints. Pt noted to abdominal distention

## 2022-04-19 NOTE — ED PROVIDER NOTE - CARDIAC, MLM
Normal rate, regular rhythm.  Heart sounds S1, S2.  No murmurs, rubs or gallops. Chest wall tender midsternum

## 2022-04-19 NOTE — ED PROVIDER NOTE - NS ED ATTENDING STATEMENT MOD
This was a shared visit with the SHANTAL. I reviewed and verified the documentation and independently performed the documented:

## 2022-04-19 NOTE — ED PROVIDER NOTE - NSFOLLOWUPINSTRUCTIONS_ED_ALL_ED_FT
1) Follow up with her cardiologist and primary doctor within next 1-2 days.  2) Return to the ED immediately for new or worsening symptoms as we discussed.      Nonspecific Chest Pain, Adult      Chest pain can be caused by many different conditions. It can be caused by a condition that is life-threatening and requires treatment right away. It can also be caused by something that is not life-threatening. If you have chest pain, it can be hard to know the difference, so it is important to get help right away to make sure that you do not have a serious condition.    Some life-threatening causes of chest pain include:  •Heart attack.      •A tear in the body's main blood vessel (aortic dissection).      •Inflammation around your heart (pericarditis).      •A problem in the lungs, such as a blood clot (pulmonary embolism) or a collapsed lung (pneumothorax).      Some non life-threatening causes of chest pain include:  •Heartburn.      •Anxiety or stress.      •Damage to the bones, muscles, and cartilage that make up your chest wall.      •Pneumonia or bronchitis.      •Shingles infection (varicella-zoster virus).      Chest pain can feel like:  •Pain or discomfort on the surface of your chest or deep in your chest.      •Crushing, pressure, aching, or squeezing pain.      •Burning or tingling.      •Dull or sharp pain that is worse when you move, cough, or take a deep breath.      •Pain or discomfort that is also felt in your back, neck, jaw, shoulder, or arm, or pain that spreads to any of these areas.      Your chest pain may come and go. It may also be constant. Your health care provider will do lab tests and other studies to find the cause of your pain. Treatment will depend on the cause of your chest pain.      Follow these instructions at home:    Medicines     •Take over-the-counter and prescription medicines only as told by your health care provider.      •If you were prescribed an antibiotic, take it as told by your health care provider. Do not stop taking the antibiotic even if you start to feel better.        Lifestyle      •Rest as directed by your health care provider.      • Do not use any products that contain nicotine or tobacco, such as cigarettes and e-cigarettes. If you need help quitting, ask your health care provider.      • Do not drink alcohol.    •Make healthy lifestyle choices as recommended. These may include:  •Getting regular exercise. Ask your health care provider to suggest some activities that are safe for you.      •Eating a heart-healthy diet. This includes plenty of fresh fruits and vegetables, whole grains, low-fat (lean) protein, and low-fat dairy products. A dietitian can help you find healthy eating options.      •Maintaining a healthy weight.      •Managing any other health conditions you have, such as high blood pressure (hypertension) or diabetes.      •Reducing stress, such as with yoga or relaxation techniques.        General instructions     •Pay attention to any changes in your symptoms. Tell your health care provider about them or any new symptoms.      •Avoid any activities that cause chest pain.      •Keep all follow-up visits as told by your health care provider. This is important. This includes visits for any further testing if your chest pain does not go away.        Contact a health care provider if:    •Your chest pain does not go away.      •You feel depressed.      •You have a fever.        Get help right away if:    •Your chest pain gets worse.      •You have a cough that gets worse, or you cough up blood.      •You have severe pain in your abdomen.      •You faint.      •You have sudden, unexplained chest discomfort.      •You have sudden, unexplained discomfort in your arms, back, neck, or jaw.      •You have shortness of breath at any time.      •You suddenly start to sweat, or your skin gets clammy.      •You feel nausea or you vomit.      •You suddenly feel lightheaded or dizzy.      •You have severe weakness, or unexplained weakness or fatigue.      •Your heart begins to beat quickly, or it feels like it is skipping beats.      These symptoms may represent a serious problem that is an emergency. Do not wait to see if the symptoms will go away. Get medical help right away. Call your local emergency services (911 in the [place] Do not drive yourself to the hospital.       Summary    •Chest pain can be caused by a condition that is serious and requires urgent treatment. It may also be caused by something that is not life-threatening.      •If you have chest pain, it is very important to see your health care provider. Your health care provider may do lab tests and other studies to find the cause of your pain.      •Follow your health care provider's instructions on taking medicines, making lifestyle changes, and getting emergency treatment if symptoms become worse.      •Keep all follow-up visits as told by your health care provider. This includes visits for any further testing if your chest pain does not go away.      This information is not intended to replace advice given to you by your health care provider. Make sure you discuss any questions you have with your health care provider.      Document Revised: 06/20/2019 Document Reviewed: 06/20/2019    Elsevier Patient Education © 2022 [employer]

## 2022-04-19 NOTE — ED PROVIDER NOTE - CARE PROVIDER_API CALL
Blake Yadav)  Internal Medicine  [address] [place] [id number]  Phone: [phone]  Fax: [phone]  Follow Up Time:

## 2022-04-19 NOTE — ED PROVIDER NOTE - CHIEF COMPLAINT
"Chief Complaint   Patient presents with     Physical       Initial /79 (BP Location: Left arm, Patient Position: Chair, Cuff Size: Adult Regular)  Pulse 86  Temp 98.9  F (37.2  C) (Oral)  Resp 16  Ht 5' 8\" (1.727 m)  Wt 134 lb 8 oz (61 kg)  LMP 07/20/2017  SpO2 98%  Breastfeeding? No  BMI 20.45 kg/m2 Estimated body mass index is 20.45 kg/(m^2) as calculated from the following:    Height as of this encounter: 5' 8\" (1.727 m).    Weight as of this encounter: 134 lb 8 oz (61 kg).  Medication Reconciliation: complete     Shannon Tena MA      " The patient is a 87y Female complaining of chest pain.

## 2022-04-19 NOTE — ED ADULT NURSE REASSESSMENT NOTE - NS ED NURSE REASSESS COMMENT FT1
Patient ate dinner with good appetite.  She is waiting for ambulance to take her back to [facility].

## 2022-04-19 NOTE — ED PROVIDER NOTE - CLINICAL SUMMARY MEDICAL DECISION MAKING FREE TEXT BOX
87 f asthma/COPD, DM, HTN, hypothyroidism, GERD, CAD, CHF presents with a circular chest pain onset today after a recent hosp for chf copd exacerbation 1 month ago.  pt has no current sx on my exam. the sx lasted 2 hours and was after her breakfast meal  on eval :  obese female pleasant nad  heent nc at mm perrla, anicteric sclera no pallor no g f r  neck supple  cor loud blowing stefano  chest cta  abd obese soft nt nd no rebound no guard  back pt has severe kyphosis  ext pt has pitting vascular disease of bl le and warmth to touch of bl le she is also very tdner to light touch cw neruopathy of legs  neuro not focal but weak  plan ro acs ro pn ro chf ro copd consult with cardiology ekg labs   ekg unchanged, xrya neg ce unremarkable labs improved from last admission will rept trop and dispo

## 2022-04-19 NOTE — ED ADULT TRIAGE NOTE - CHIEF COMPLAINT QUOTE
Patient is a 86yo female complaining of chest pain and shortness of breath this morning Patient blood sugar was at 439 in ambulance

## 2022-04-19 NOTE — ED PROVIDER NOTE - PATIENT PORTAL LINK FT
You can access the FollowMyHealth Patient Portal offered by [facility] by registering at the following website: [email or web address]. By joining [employer]’s FollowMyHealth portal, you will also be able to view your health information using other applications (apps) compatible with our system.

## 2022-04-19 NOTE — ED ADULT NURSE NOTE - ED STAT RN HANDOFF DETAILS
verbal report and update on patient given to Maribell MONROY at [facility]. Transport to be arranged via amb[facility]lette

## 2022-04-19 NOTE — ED PROVIDER NOTE - OBJECTIVE STATEMENT
86 yo F PMHx asthma/COPD, DM, HTN, hypothyroidism, GERD, CAD, CHF presents to ED c/o midsternal chest pain x about 2 hours. Pt states pain started suddenly after breakfast. Describes pain as a "circular" deep pressure - denies associated symptoms/radiation of pain. Moving makes pain worse as per pt. Denies SOB, back pain, numbness/tingling, fever/chills, N/V.

## 2022-04-19 NOTE — ED PROVIDER NOTE - PROGRESS NOTE DETAILS
pt improved. chest pain free. states she feels well. at her baseline as per daughter who is at bedside. trop neg x 2. Discussed the results of all diagnostic testing in ED and copies of all reports given.   Pt was given an opportunity to have all questions answered to satisfaction.  Discussed the importance of prompt, close medical follow-up. ED return precautions discussed at length.  Pt verbalizes agreement and understanding of plan and ED return precautions. Pt well appearing, stable for DC home. No emergent concerns at this time.  Daughter wishes to drive pt back to facility, nursing will call to verify that this is in accord with their policy. pt will return to facility via ambulance

## 2022-04-27 ENCOUNTER — NON-APPOINTMENT (OUTPATIENT)
Age: 87
End: 2022-04-27

## 2022-04-27 RX ORDER — FUROSEMIDE 20 MG/1
20 TABLET ORAL DAILY
Qty: 30 | Refills: 2 | Status: ACTIVE | COMMUNITY
Start: 2020-02-03 | End: 1900-01-01

## 2022-05-05 NOTE — ED ADULT TRIAGE NOTE - WEIGHT IN LBS
Patient has a fasting lab scheduled for 5.19.22 from [facility] and will need an order submitted. Please review and advise.    210.1

## 2022-06-10 RX ORDER — GUAIFENESIN/PSEUDOEPHEDRNE HCL 1200-120MG
1 TABLET, EXTENDED RELEASE 12 HR ORAL
Qty: 0 | Refills: 0 | DISCHARGE
Start: 2022-06-10 | End: 2022-06-17

## 2022-06-13 ENCOUNTER — INPATIENT (INPATIENT)
Facility: HOSPITAL | Age: 87
LOS: 17 days | Discharge: NURSING, REHAB OR LONG-TERM CARE | DRG: 189 | End: 2022-07-01
Attending: STUDENT IN AN ORGANIZED HEALTH CARE EDUCATION/TRAINING PROGRAM | Admitting: INTERNAL MEDICINE
Payer: MEDICARE

## 2022-06-13 VITALS — HEART RATE: 91 BPM | OXYGEN SATURATION: 99 %

## 2022-06-13 DIAGNOSIS — Z90.49 ACQUIRED ABSENCE OF OTHER SPECIFIED PARTS OF DIGESTIVE TRACT: Chronic | ICD-10-CM

## 2022-06-13 DIAGNOSIS — R06.02 SHORTNESS OF BREATH: ICD-10-CM

## 2022-06-13 DIAGNOSIS — I10 ESSENTIAL (PRIMARY) HYPERTENSION: ICD-10-CM

## 2022-06-13 DIAGNOSIS — E03.9 HYPOTHYROIDISM, UNSPECIFIED: ICD-10-CM

## 2022-06-13 DIAGNOSIS — I25.10 ATHEROSCLEROTIC HEART DISEASE OF NATIVE CORONARY ARTERY WITHOUT ANGINA PECTORIS: ICD-10-CM

## 2022-06-13 DIAGNOSIS — E11.9 TYPE 2 DIABETES MELLITUS WITHOUT COMPLICATIONS: ICD-10-CM

## 2022-06-13 DIAGNOSIS — E78.5 HYPERLIPIDEMIA, UNSPECIFIED: ICD-10-CM

## 2022-06-13 DIAGNOSIS — J44.1 CHRONIC OBSTRUCTIVE PULMONARY DISEASE WITH (ACUTE) EXACERBATION: ICD-10-CM

## 2022-06-13 DIAGNOSIS — F41.9 ANXIETY DISORDER, UNSPECIFIED: ICD-10-CM

## 2022-06-13 DIAGNOSIS — E78.0 PURE HYPERCHOLESTEROLEMIA: ICD-10-CM

## 2022-06-13 DIAGNOSIS — R93.1 ABNORMAL FINDINGS ON DIAGNOSTIC IMAGING OF HEART AND CORONARY CIRCULATION: Chronic | ICD-10-CM

## 2022-06-13 DIAGNOSIS — Z29.9 ENCOUNTER FOR PROPHYLACTIC MEASURES, UNSPECIFIED: ICD-10-CM

## 2022-06-13 DIAGNOSIS — J96.01 ACUTE RESPIRATORY FAILURE WITH HYPOXIA: ICD-10-CM

## 2022-06-13 DIAGNOSIS — N17.9 ACUTE KIDNEY FAILURE, UNSPECIFIED: ICD-10-CM

## 2022-06-13 LAB
ALBUMIN SERPL ELPH-MCNC: 3.2 G/DL — LOW (ref 3.3–5)
ALP SERPL-CCNC: 75 U/L — SIGNIFICANT CHANGE UP (ref 40–120)
ALT FLD-CCNC: 23 U/L — SIGNIFICANT CHANGE UP (ref 12–78)
ANION GAP SERPL CALC-SCNC: 8 MMOL/L — SIGNIFICANT CHANGE UP (ref 5–17)
APTT BLD: 32.5 SEC — SIGNIFICANT CHANGE UP (ref 27.5–35.5)
AST SERPL-CCNC: 23 U/L — SIGNIFICANT CHANGE UP (ref 15–37)
BASE EXCESS BLDA CALC-SCNC: -4.1 MMOL/L — LOW (ref -2–3)
BASE EXCESS BLDV CALC-SCNC: -5.2 MMOL/L — LOW (ref -2–3)
BASOPHILS # BLD AUTO: 0.04 K/UL — SIGNIFICANT CHANGE UP (ref 0–0.2)
BASOPHILS NFR BLD AUTO: 0.3 % — SIGNIFICANT CHANGE UP (ref 0–2)
BILIRUB SERPL-MCNC: 0.3 MG/DL — SIGNIFICANT CHANGE UP (ref 0.2–1.2)
BLOOD GAS COMMENTS ARTERIAL: SIGNIFICANT CHANGE UP
BLOOD GAS COMMENTS ARTERIAL: SIGNIFICANT CHANGE UP
BLOOD GAS COMMENTS, VENOUS: SIGNIFICANT CHANGE UP
BUN SERPL-MCNC: 31 MG/DL — HIGH (ref 7–23)
CALCIUM SERPL-MCNC: 9 MG/DL — SIGNIFICANT CHANGE UP (ref 8.5–10.1)
CHLORIDE SERPL-SCNC: 108 MMOL/L — SIGNIFICANT CHANGE UP (ref 96–108)
CO2 SERPL-SCNC: 24 MMOL/L — SIGNIFICANT CHANGE UP (ref 22–31)
CREAT SERPL-MCNC: 1.5 MG/DL — HIGH (ref 0.5–1.3)
CRP SERPL-MCNC: 21 MG/L — HIGH
EGFR: 34 ML/MIN/1.73M2 — LOW
EOSINOPHIL # BLD AUTO: 0.15 K/UL — SIGNIFICANT CHANGE UP (ref 0–0.5)
EOSINOPHIL NFR BLD AUTO: 1.2 % — SIGNIFICANT CHANGE UP (ref 0–6)
GAS PNL BLDA: SIGNIFICANT CHANGE UP
GAS PNL BLDV: SIGNIFICANT CHANGE UP
GLUCOSE SERPL-MCNC: 188 MG/DL — HIGH (ref 70–99)
HCO3 BLDA-SCNC: 22 MMOL/L — SIGNIFICANT CHANGE UP (ref 21–28)
HCO3 BLDV-SCNC: 21 MMOL/L — LOW (ref 22–29)
HCT VFR BLD CALC: 40.1 % — SIGNIFICANT CHANGE UP (ref 34.5–45)
HGB BLD-MCNC: 12.2 G/DL — SIGNIFICANT CHANGE UP (ref 11.5–15.5)
HMPV RNA SPEC QL NAA+PROBE: DETECTED
HOROWITZ INDEX BLDA+IHG-RTO: 36 — SIGNIFICANT CHANGE UP
IMM GRANULOCYTES NFR BLD AUTO: 0.5 % — SIGNIFICANT CHANGE UP (ref 0–1.5)
INR BLD: 1.06 RATIO — SIGNIFICANT CHANGE UP (ref 0.88–1.16)
LACTATE SERPL-SCNC: 1.2 MMOL/L — SIGNIFICANT CHANGE UP (ref 0.7–2)
LYMPHOCYTES # BLD AUTO: 25.6 % — SIGNIFICANT CHANGE UP (ref 13–44)
LYMPHOCYTES # BLD AUTO: 3.23 K/UL — SIGNIFICANT CHANGE UP (ref 1–3.3)
MCHC RBC-ENTMCNC: 27.9 PG — SIGNIFICANT CHANGE UP (ref 27–34)
MCHC RBC-ENTMCNC: 30.4 GM/DL — LOW (ref 32–36)
MCV RBC AUTO: 91.6 FL — SIGNIFICANT CHANGE UP (ref 80–100)
MONOCYTES # BLD AUTO: 1.02 K/UL — HIGH (ref 0–0.9)
MONOCYTES NFR BLD AUTO: 8.1 % — SIGNIFICANT CHANGE UP (ref 2–14)
NEUTROPHILS # BLD AUTO: 8.14 K/UL — HIGH (ref 1.8–7.4)
NEUTROPHILS NFR BLD AUTO: 64.3 % — SIGNIFICANT CHANGE UP (ref 43–77)
NRBC # BLD: 0 /100 WBCS — SIGNIFICANT CHANGE UP (ref 0–0)
NT-PROBNP SERPL-SCNC: 1430 PG/ML — HIGH (ref 0–450)
PCO2 BLDA: 39 MMHG — HIGH (ref 32–35)
PCO2 BLDV: 42 MMHG — SIGNIFICANT CHANGE UP (ref 39–42)
PH BLDA: 7.35 — SIGNIFICANT CHANGE UP (ref 7.35–7.45)
PH BLDV: 7.31 — LOW (ref 7.32–7.43)
PLATELET # BLD AUTO: 178 K/UL — SIGNIFICANT CHANGE UP (ref 150–400)
PO2 BLDA: 192 MMHG — HIGH (ref 83–108)
PO2 BLDV: 100 MMHG — HIGH (ref 25–45)
POTASSIUM SERPL-MCNC: 4.6 MMOL/L — SIGNIFICANT CHANGE UP (ref 3.5–5.3)
POTASSIUM SERPL-SCNC: 4.6 MMOL/L — SIGNIFICANT CHANGE UP (ref 3.5–5.3)
PROT SERPL-MCNC: 7.5 G/DL — SIGNIFICANT CHANGE UP (ref 6–8.3)
PROTHROM AB SERPL-ACNC: 12.4 SEC — SIGNIFICANT CHANGE UP (ref 10.5–13.4)
RAPID RVP RESULT: DETECTED
RBC # BLD: 4.38 M/UL — SIGNIFICANT CHANGE UP (ref 3.8–5.2)
RBC # FLD: 13.7 % — SIGNIFICANT CHANGE UP (ref 10.3–14.5)
SAO2 % BLDA: 100 % — HIGH (ref 94–98)
SAO2 % BLDV: 98.9 % — HIGH (ref 67–88)
SARS-COV-2 RNA SPEC QL NAA+PROBE: SIGNIFICANT CHANGE UP
SARS-COV-2 RNA SPEC QL NAA+PROBE: SIGNIFICANT CHANGE UP
SODIUM SERPL-SCNC: 140 MMOL/L — SIGNIFICANT CHANGE UP (ref 135–145)
TROPONIN I, HIGH SENSITIVITY RESULT: 26.9 NG/L — SIGNIFICANT CHANGE UP
WBC # BLD: 12.64 K/UL — HIGH (ref 3.8–10.5)
WBC # FLD AUTO: 12.64 K/UL — HIGH (ref 3.8–10.5)

## 2022-06-13 PROCEDURE — 99223 1ST HOSP IP/OBS HIGH 75: CPT | Mod: GC

## 2022-06-13 PROCEDURE — 99285 EMERGENCY DEPT VISIT HI MDM: CPT

## 2022-06-13 PROCEDURE — 99053 MED SERV 10PM-8AM 24 HR FAC: CPT

## 2022-06-13 PROCEDURE — 93010 ELECTROCARDIOGRAM REPORT: CPT

## 2022-06-13 RX ORDER — ALBUTEROL 90 UG/1
2 AEROSOL, METERED ORAL
Qty: 0 | Refills: 0 | DISCHARGE

## 2022-06-13 RX ORDER — BUDESONIDE AND FORMOTEROL FUMARATE DIHYDRATE 160; 4.5 UG/1; UG/1
2 AEROSOL RESPIRATORY (INHALATION)
Refills: 0 | Status: DISCONTINUED | OUTPATIENT
Start: 2022-06-13 | End: 2022-06-13

## 2022-06-13 RX ORDER — HEPARIN SODIUM 5000 [USP'U]/ML
5000 INJECTION INTRAVENOUS; SUBCUTANEOUS EVERY 12 HOURS
Refills: 0 | Status: DISCONTINUED | OUTPATIENT
Start: 2022-06-13 | End: 2022-07-01

## 2022-06-13 RX ORDER — SODIUM CHLORIDE 9 MG/ML
1000 INJECTION, SOLUTION INTRAVENOUS
Refills: 0 | Status: DISCONTINUED | OUTPATIENT
Start: 2022-06-13 | End: 2022-06-16

## 2022-06-13 RX ORDER — INSULIN LISPRO 100/ML
VIAL (ML) SUBCUTANEOUS AT BEDTIME
Refills: 0 | Status: DISCONTINUED | OUTPATIENT
Start: 2022-06-13 | End: 2022-06-13

## 2022-06-13 RX ORDER — METOPROLOL TARTRATE 50 MG
50 TABLET ORAL DAILY
Refills: 0 | Status: DISCONTINUED | OUTPATIENT
Start: 2022-06-13 | End: 2022-07-01

## 2022-06-13 RX ORDER — LEVOTHYROXINE SODIUM 125 MCG
125 TABLET ORAL DAILY
Refills: 0 | Status: DISCONTINUED | OUTPATIENT
Start: 2022-06-13 | End: 2022-07-01

## 2022-06-13 RX ORDER — MAGNESIUM SULFATE 500 MG/ML
1 VIAL (ML) INJECTION ONCE
Refills: 0 | Status: COMPLETED | OUTPATIENT
Start: 2022-06-13 | End: 2022-06-13

## 2022-06-13 RX ORDER — INSULIN LISPRO 100/ML
VIAL (ML) SUBCUTANEOUS EVERY 6 HOURS
Refills: 0 | Status: DISCONTINUED | OUTPATIENT
Start: 2022-06-13 | End: 2022-06-16

## 2022-06-13 RX ORDER — CHOLECALCIFEROL (VITAMIN D3) 125 MCG
2000 CAPSULE ORAL DAILY
Refills: 0 | Status: DISCONTINUED | OUTPATIENT
Start: 2022-06-13 | End: 2022-07-01

## 2022-06-13 RX ORDER — DEXTROSE 50 % IN WATER 50 %
12.5 SYRINGE (ML) INTRAVENOUS ONCE
Refills: 0 | Status: DISCONTINUED | OUTPATIENT
Start: 2022-06-13 | End: 2022-06-16

## 2022-06-13 RX ORDER — FLUTICASONE PROPIONATE AND SALMETEROL 50; 250 UG/1; UG/1
1 POWDER ORAL; RESPIRATORY (INHALATION)
Qty: 0 | Refills: 0 | DISCHARGE

## 2022-06-13 RX ORDER — ASPIRIN/CALCIUM CARB/MAGNESIUM 324 MG
81 TABLET ORAL DAILY
Refills: 0 | Status: DISCONTINUED | OUTPATIENT
Start: 2022-06-13 | End: 2022-07-01

## 2022-06-13 RX ORDER — FUROSEMIDE 40 MG
0.5 TABLET ORAL
Qty: 0 | Refills: 0 | DISCHARGE

## 2022-06-13 RX ORDER — MAGNESIUM SULFATE 500 MG/ML
1 VIAL (ML) INJECTION ONCE
Refills: 0 | Status: DISCONTINUED | OUTPATIENT
Start: 2022-06-13 | End: 2022-06-13

## 2022-06-13 RX ORDER — DEXTROSE 50 % IN WATER 50 %
15 SYRINGE (ML) INTRAVENOUS ONCE
Refills: 0 | Status: DISCONTINUED | OUTPATIENT
Start: 2022-06-13 | End: 2022-06-16

## 2022-06-13 RX ORDER — IPRATROPIUM/ALBUTEROL SULFATE 18-103MCG
3 AEROSOL WITH ADAPTER (GRAM) INHALATION EVERY 6 HOURS
Refills: 0 | Status: DISCONTINUED | OUTPATIENT
Start: 2022-06-13 | End: 2022-06-13

## 2022-06-13 RX ORDER — ESCITALOPRAM OXALATE 10 MG/1
10 TABLET, FILM COATED ORAL DAILY
Refills: 0 | Status: DISCONTINUED | OUTPATIENT
Start: 2022-06-13 | End: 2022-07-01

## 2022-06-13 RX ORDER — CLOPIDOGREL BISULFATE 75 MG/1
75 TABLET, FILM COATED ORAL DAILY
Refills: 0 | Status: DISCONTINUED | OUTPATIENT
Start: 2022-06-13 | End: 2022-07-01

## 2022-06-13 RX ORDER — TIOTROPIUM BROMIDE 18 UG/1
1 CAPSULE ORAL; RESPIRATORY (INHALATION) DAILY
Refills: 0 | Status: DISCONTINUED | OUTPATIENT
Start: 2022-06-13 | End: 2022-06-13

## 2022-06-13 RX ORDER — GABAPENTIN 400 MG/1
100 CAPSULE ORAL THREE TIMES A DAY
Refills: 0 | Status: DISCONTINUED | OUTPATIENT
Start: 2022-06-13 | End: 2022-07-01

## 2022-06-13 RX ORDER — INSULIN LISPRO 100/ML
VIAL (ML) SUBCUTANEOUS
Refills: 0 | Status: DISCONTINUED | OUTPATIENT
Start: 2022-06-13 | End: 2022-06-13

## 2022-06-13 RX ORDER — ALPRAZOLAM 0.25 MG
0.5 TABLET ORAL AT BEDTIME
Refills: 0 | Status: DISCONTINUED | OUTPATIENT
Start: 2022-06-13 | End: 2022-06-14

## 2022-06-13 RX ORDER — DEXTROSE 50 % IN WATER 50 %
25 SYRINGE (ML) INTRAVENOUS ONCE
Refills: 0 | Status: DISCONTINUED | OUTPATIENT
Start: 2022-06-13 | End: 2022-06-16

## 2022-06-13 RX ORDER — DEXTROSE 50 % IN WATER 50 %
25 SYRINGE (ML) INTRAVENOUS ONCE
Refills: 0 | Status: DISCONTINUED | OUTPATIENT
Start: 2022-06-13 | End: 2022-07-01

## 2022-06-13 RX ORDER — SODIUM CHLORIDE 9 MG/ML
1000 INJECTION INTRAMUSCULAR; INTRAVENOUS; SUBCUTANEOUS
Refills: 0 | Status: DISCONTINUED | OUTPATIENT
Start: 2022-06-13 | End: 2022-06-14

## 2022-06-13 RX ORDER — FUROSEMIDE 40 MG
20 TABLET ORAL DAILY
Refills: 0 | Status: DISCONTINUED | OUTPATIENT
Start: 2022-06-13 | End: 2022-06-13

## 2022-06-13 RX ORDER — ALBUTEROL 90 UG/1
2.5 AEROSOL, METERED ORAL EVERY 4 HOURS
Refills: 0 | Status: DISCONTINUED | OUTPATIENT
Start: 2022-06-13 | End: 2022-06-27

## 2022-06-13 RX ORDER — INSULIN GLARGINE 100 [IU]/ML
20 INJECTION, SOLUTION SUBCUTANEOUS EVERY MORNING
Refills: 0 | Status: DISCONTINUED | OUTPATIENT
Start: 2022-06-13 | End: 2022-06-13

## 2022-06-13 RX ORDER — INSULIN LISPRO 100/ML
6 VIAL (ML) SUBCUTANEOUS
Refills: 0 | Status: DISCONTINUED | OUTPATIENT
Start: 2022-06-13 | End: 2022-06-13

## 2022-06-13 RX ORDER — ACETAMINOPHEN 500 MG
2 TABLET ORAL
Qty: 0 | Refills: 0 | DISCHARGE

## 2022-06-13 RX ORDER — ISOSORBIDE MONONITRATE 60 MG/1
30 TABLET, EXTENDED RELEASE ORAL DAILY
Refills: 0 | Status: DISCONTINUED | OUTPATIENT
Start: 2022-06-13 | End: 2022-07-01

## 2022-06-13 RX ORDER — ASPIRIN/CALCIUM CARB/MAGNESIUM 324 MG
1 TABLET ORAL
Qty: 0 | Refills: 0 | DISCHARGE

## 2022-06-13 RX ORDER — FUROSEMIDE 40 MG
1 TABLET ORAL
Qty: 0 | Refills: 0 | DISCHARGE

## 2022-06-13 RX ORDER — GLUCAGON INJECTION, SOLUTION 0.5 MG/.1ML
1 INJECTION, SOLUTION SUBCUTANEOUS ONCE
Refills: 0 | Status: DISCONTINUED | OUTPATIENT
Start: 2022-06-13 | End: 2022-07-01

## 2022-06-13 RX ORDER — INSULIN GLARGINE 100 [IU]/ML
15 INJECTION, SOLUTION SUBCUTANEOUS EVERY MORNING
Refills: 0 | Status: DISCONTINUED | OUTPATIENT
Start: 2022-06-14 | End: 2022-07-01

## 2022-06-13 RX ORDER — ACETAMINOPHEN 500 MG
650 TABLET ORAL EVERY 6 HOURS
Refills: 0 | Status: DISCONTINUED | OUTPATIENT
Start: 2022-06-13 | End: 2022-07-01

## 2022-06-13 RX ORDER — ALBUTEROL 90 UG/1
2 AEROSOL, METERED ORAL EVERY 6 HOURS
Refills: 0 | Status: DISCONTINUED | OUTPATIENT
Start: 2022-06-13 | End: 2022-06-13

## 2022-06-13 RX ORDER — METOPROLOL TARTRATE 50 MG
1 TABLET ORAL
Qty: 0 | Refills: 0 | DISCHARGE

## 2022-06-13 RX ORDER — ATORVASTATIN CALCIUM 80 MG/1
80 TABLET, FILM COATED ORAL AT BEDTIME
Refills: 0 | Status: DISCONTINUED | OUTPATIENT
Start: 2022-06-13 | End: 2022-07-01

## 2022-06-13 RX ORDER — POTASSIUM CHLORIDE 20 MEQ
1 PACKET (EA) ORAL
Qty: 0 | Refills: 0 | DISCHARGE

## 2022-06-13 RX ORDER — SODIUM CHLORIDE 9 MG/ML
1000 INJECTION INTRAMUSCULAR; INTRAVENOUS; SUBCUTANEOUS
Refills: 0 | Status: DISCONTINUED | OUTPATIENT
Start: 2022-06-13 | End: 2022-06-13

## 2022-06-13 RX ORDER — LANOLIN ALCOHOL/MO/W.PET/CERES
3 CREAM (GRAM) TOPICAL AT BEDTIME
Refills: 0 | Status: DISCONTINUED | OUTPATIENT
Start: 2022-06-13 | End: 2022-07-01

## 2022-06-13 RX ADMIN — GABAPENTIN 100 MILLIGRAM(S): 400 CAPSULE ORAL at 22:15

## 2022-06-13 RX ADMIN — HEPARIN SODIUM 5000 UNIT(S): 5000 INJECTION INTRAVENOUS; SUBCUTANEOUS at 07:22

## 2022-06-13 RX ADMIN — Medication 6 UNIT(S): at 08:54

## 2022-06-13 RX ADMIN — Medication 81 MILLIGRAM(S): at 07:22

## 2022-06-13 RX ADMIN — Medication 2000 UNIT(S): at 12:50

## 2022-06-13 RX ADMIN — Medication 6: at 12:50

## 2022-06-13 RX ADMIN — ATORVASTATIN CALCIUM 80 MILLIGRAM(S): 80 TABLET, FILM COATED ORAL at 22:15

## 2022-06-13 RX ADMIN — Medication 1 DROP(S): at 17:58

## 2022-06-13 RX ADMIN — Medication 4: at 17:58

## 2022-06-13 RX ADMIN — SODIUM CHLORIDE 50 MILLILITER(S): 9 INJECTION INTRAMUSCULAR; INTRAVENOUS; SUBCUTANEOUS at 16:30

## 2022-06-13 RX ADMIN — Medication 6: at 08:54

## 2022-06-13 RX ADMIN — GABAPENTIN 100 MILLIGRAM(S): 400 CAPSULE ORAL at 16:06

## 2022-06-13 RX ADMIN — ESCITALOPRAM OXALATE 10 MILLIGRAM(S): 10 TABLET, FILM COATED ORAL at 12:50

## 2022-06-13 RX ADMIN — Medication 125 MILLIGRAM(S): at 02:00

## 2022-06-13 RX ADMIN — HEPARIN SODIUM 5000 UNIT(S): 5000 INJECTION INTRAVENOUS; SUBCUTANEOUS at 17:58

## 2022-06-13 RX ADMIN — Medication 3 MILLILITER(S): at 08:32

## 2022-06-13 RX ADMIN — INSULIN GLARGINE 20 UNIT(S): 100 INJECTION, SOLUTION SUBCUTANEOUS at 08:53

## 2022-06-13 RX ADMIN — ISOSORBIDE MONONITRATE 30 MILLIGRAM(S): 60 TABLET, EXTENDED RELEASE ORAL at 07:22

## 2022-06-13 RX ADMIN — Medication 150 GRAM(S): at 02:00

## 2022-06-13 RX ADMIN — Medication 125 MICROGRAM(S): at 08:54

## 2022-06-13 RX ADMIN — CLOPIDOGREL BISULFATE 75 MILLIGRAM(S): 75 TABLET, FILM COATED ORAL at 07:22

## 2022-06-13 RX ADMIN — Medication 50 MILLIGRAM(S): at 18:37

## 2022-06-13 NOTE — PROGRESS NOTE ADULT - PROBLEM SELECTOR PLAN 1
- Patient with sob at rest, switched to nassal cannula , sob worsen with increase WOB. Now back on BiPAP. 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 Will attempt to wean patient to baseline O2 status. COPD patient will keep SpO2 goal 88-93%   - Encourage incentive spirometry  - f/u CXR to r/o infection vs fluid over load  - Pulmonary (Dr. Copeland) consulted, f/u recs acute respiratory failure 2/2 COPD exacerbation   patient on bipap on admission, attempted to wean off bipap this am however patient with tachypnea and increased work of breathing requiring bipap to again be used   abg and vbg noted  npo while on bipap   continue solumedrol 40mg q8 iv   Dr boggs following

## 2022-06-13 NOTE — H&P ADULT - PROBLEM SELECTOR PLAN 1
Patient presents with dyspnea likely 2/2 COPD exacerbation  - Admit to F  - S/p Solumedrol 125 mg IVP x1 in ED; Solumedrol 40 mg IVP q8h with plans for taper  - Duonebs q6h  - Continue home medications - Duonebs q6h, Spiriva, Proventil  - Supplemental O2 PRN. Will attempt to wean patient to baseline O2 status. COPD patient will keep SpO2 goal 88-93%   - Encourage incentive spirometry  - Monitor respiratory status   - ABG as needed  - F/u RVP  - Pulmonary (Dr. Copeland) consulted, f/u recs

## 2022-06-13 NOTE — PROGRESS NOTE ADULT - PROBLEM SELECTOR PLAN 3
Chronic, stable on admission  - Continue home Metoprolol Succinate 50 mg qd with hold parameters  - Monitor routine hemodynamics - Chronic, known history of T2DM  - Patient is NPO as on Bipap will keep NPO for now, once stable will start DASH/TLC diet   - home lantus 30units qam, will decrease Lantus 15 U qd and  Moderate dose insulin corrective scale while npo  - hold home premeal insulin while npo   - Gentle hydration with close monitoring of volume status  - Hypoglycemia protocol, fingerstick glucose QAC&HS   - F/u AM HbA1c

## 2022-06-13 NOTE — ED PROVIDER NOTE - INTERPRETATION
Baby Kanchan Logan  [id number]       Date of procedure: 2021     Procedure performed by: Joy Guadarrama CNP    Sedation: None      Indication: unplanned extubation     Procedure performed: Intubation     Description of procedure:   A 00 - size laryngoscope was used to visualize the vocal cords and a 2.5 ETT was inserted to a depth of 5.5 cm at the lip, advanced to 6 cm after CXR. Patient was successfully intubated on the first attempt. ETT secured. Position was verified by color change on CO2 detector, symmetric chest rise and CXR. Patient then placed on ventilator INV SR. Patient tolerated procedure well.      Joy Guadarrama CNP    I was present for the entire procedure.  Paula Li MD         sinus rhythm with 1st degree av block

## 2022-06-13 NOTE — H&P ADULT - PROBLEM SELECTOR PLAN 2
TRENTON likely 2/2 pre-renal azotemia in the setting of dehydration  - BUN/Cr 31/1.5 on admission  - Baseline creatinine unknown ~1.6  - Monitor BMP  - Hold home Lasix 20 mg PO qd in the setting of TRENTON  - Avoid nephrotoxic medications  - Monitor renal indices  - Consider nephrology consult for worsening renal function

## 2022-06-13 NOTE — ED PROVIDER NOTE - PROGRESS NOTE DETAILS
xray and labs ordered during downtime, xray visualized, no effusion, no consolidation unable to reach Dr. Hirsch, Dr. Peterson will admit

## 2022-06-13 NOTE — ED PROVIDER NOTE - PHYSICAL EXAMINATION
Gen: Alert, in moderate respiratory distress, unable to speak full sentences  Head/eyes: NC/AT, PERRL  ENT: airway patent  Neck: supple  Pulm/lung: b/l wheeze  CV/heart: RRR  GI/Abd: soft, NT/ND, +BS, no guarding/rebound tenderness  Musculoskeletal: no edema/erythema/cyanosis  Skin: no rash  Neuro: AAOx3, grossly intact

## 2022-06-13 NOTE — PROGRESS NOTE ADULT - PROBLEM SELECTOR PLAN 4
continue Lexapro 10 mg daily and ativan 0.5mg PRN Chronic, stable on admission  - Continue home Metoprolol Succinate 50 mg qd with hold parameters  - Monitor routine hemodynamics

## 2022-06-13 NOTE — PHARMACOTHERAPY INTERVENTION NOTE - COMMENTS
Medication reconciliation was completed by pharmacy representative. The following discrepancies were discussed with Dr. Hernandez:    Current order                                       Home Medication  Not ordered                                        Xanax 0.5 mg 1 tab oral qhs    MD aware and would like to adjust medications to match home dose and order as PRN.

## 2022-06-13 NOTE — PROGRESS NOTE ADULT - PROBLEM SELECTOR PLAN 9
VTE ppx: Heparin 5000 subq q12h    IMPROVE VTE Individual Risk Assessment          RISK                                                          Points  [  ] Previous VTE                                                3  [  ] Thrombophilia                                             2  [  ] Lower limb paralysis                                   2        (unable to hold up >15 seconds)    [  ] Current Cancer                                             2         (within 6 months)  [  ] Immobilization > 24 hrs                              1  [  ] ICU/CCU stay > 24 hours                             1  [ x ] Age > 60                                                         1    IMPROVE VTE Score: 1      Patient is DNR/DNI - MOLST filled

## 2022-06-13 NOTE — ED PROVIDER NOTE - CARE PLAN
1 Principal Discharge DX:	Shortness of breath   Principal Discharge DX:	Shortness of breath  Secondary Diagnosis:	COPD exacerbation

## 2022-06-13 NOTE — PROGRESS NOTE ADULT - PROBLEM SELECTOR PLAN 2
- Chronic, known history of T2DM  - Patient is NPO as on Bipap will keep NPO for now, once stable will start DASH/TLC diet   - Started on Lantus 15 U qd and  Moderate dose insulin corrective scale  -Gentle hydration   - Hypoglycemia protocol, fingerstick glucose QAC&HS   - Consistent carb/DASH diet  - F/u AM HbA1c - Patient with sob at rest, switched to nassal cannula , sob worsen with increase WOB. Now back on BiPAP. 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 Will attempt to wean patient to baseline O2 status. COPD patient will keep SpO2 goal 88-93%   - Encourage incentive spirometry  - f/u CXR to r/o infection vs fluid over load  - Pulmonary (Dr. Copeland) consulted, f/u recs

## 2022-06-13 NOTE — H&P ADULT - NSHPREVIEWOFSYSTEMS_GEN_ALL_CORE
Constitutional: denies fever, chills, diaphoresis   HEENT: denies blurry vision, double vision, eye pain, difficulty hearing  Respiratory: admits SOB, denies cough, sputum production  Cardiovascular: denies CP, palpitations, edema  Gastrointestinal: denies nausea, vomiting, diarrhea, constipation, abdominal pain  Genitourinary: denies dysuria, frequency, urgency, hematuria   Skin/Breast: denies rash, itching  Neurologic: denies headache, weakness, dizziness, paresthesias, numbness/tingling  Psychiatric: denies anxiety, depression, suicidal, homicidal thoughts  ROS negative except as noted above Unable to obtain meaningful ROS 2/2 confusion

## 2022-06-13 NOTE — CONSULT NOTE ADULT - SUBJECTIVE AND OBJECTIVE BOX
Date/Time Patient Seen:  		  Referring MD:   Data Reviewed	       Patient is a 87y old  Female who presents with a chief complaint of COPD Exacerbation (13 Jun 2022 06:00)      Subjective/HPI  vs noted  labs reviewed  H and P reviewed  ER provider note reviewed  imaging reviewed  old records reviewed     History of Present Illness:   87 year old female with PMHx COPD, HTN, HLD, T2DM on insulin, hx of MI presents from assisted living facility with dyspnea, wheezing, labored breathing. Patient recently admitted to [facility] 3/9-15/2022 for COPD exacerbation. History obtained from son Calvin, as patient is limited historian at baseline. Son states he received a call from [facility] this AM stating patient is having labored breathing. Patient was sent to [facility] for further management.     PAST MEDICAL & SURGICAL HISTORY:  Uncomplicated asthma, unspecified asthma severity    DM2 (diabetes mellitus, type 2)    Essential hypertension    Hypothyroidism, unspecified type    Pure hypercholesterolemia    Gastroesophageal reflux disease without esophagitis    Diabetes    Asthma    CAD (coronary artery disease)    HTN (hypertension)    HLD (hyperlipidemia)    Hypothyroid    NSTEMI (non-ST elevated myocardial infarction)    No significant past surgical history    History of appendectomy    History of appendectomy    Abnormal findings on cardiac catheterization  Cardiac Cath    FAMILY HISTORY:  Family history of cancer in mother  Family history of heart disease  Family history of MI (myocardial infarction)  Family history of stomach cancer.     Social History:  Social History (marital status, living situation, occupation, tobacco use, alcohol and drug use, and sexual history): Tobacco: former smoker, denies  EtOH: denies  Recreational drug use: denies  Lives with: assisted living facility   Ambulates: walker  ADLs: assisted living facility     Tobacco Screening:  · Core Measure Site	Yes  · Has the patient used tobacco in the past 30 days?	No    Risk Assessment:    Present on Admission:  Deep Venous Thrombosis	no  Pulmonary Embolus	no     Heart Failure:  Does this patient have a history of or has been diagnosed with heart failure? yes.     LV Function Assessment (LVS function was evaluated before arrival and/or during hospitalization) unknown.        Medication list         MEDICATIONS  (STANDING):  albuterol/ipratropium for Nebulization 3 milliLiter(s) Nebulizer every 6 hours  artificial tears (preservative free) Ophthalmic Solution 1 Drop(s) Both EYES two times a day  aspirin  chewable 81 milliGRAM(s) Oral daily  atorvastatin 80 milliGRAM(s) Oral at bedtime  budesonide 160 MICROgram(s)/formoterol 4.5 MICROgram(s) Inhaler 2 Puff(s) Inhalation two times a day  cholecalciferol 2000 Unit(s) Oral daily  clopidogrel Tablet 75 milliGRAM(s) Oral daily  dextrose 5%. 1000 milliLiter(s) (50 mL/Hr) IV Continuous <Continuous>  dextrose 5%. 1000 milliLiter(s) (100 mL/Hr) IV Continuous <Continuous>  dextrose 50% Injectable 25 Gram(s) IV Push once  dextrose 50% Injectable 12.5 Gram(s) IV Push once  dextrose 50% Injectable 25 Gram(s) IV Push once  escitalopram 10 milliGRAM(s) Oral daily  gabapentin 100 milliGRAM(s) Oral three times a day  glucagon  Injectable 1 milliGRAM(s) IntraMuscular once  heparin   Injectable 5000 Unit(s) SubCutaneous every 12 hours  insulin glargine Injectable (LANTUS) 20 Unit(s) SubCutaneous every morning  insulin lispro (ADMELOG) corrective regimen sliding scale   SubCutaneous three times a day before meals  insulin lispro (ADMELOG) corrective regimen sliding scale   SubCutaneous at bedtime  insulin lispro Injectable (ADMELOG) 6 Unit(s) SubCutaneous three times a day before meals  isosorbide   mononitrate ER Tablet (IMDUR) 30 milliGRAM(s) Oral daily  levothyroxine 125 MICROGram(s) Oral daily  methylPREDNISolone sodium succinate Injectable 40 milliGRAM(s) IV Push every 8 hours  metoprolol succinate ER 50 milliGRAM(s) Oral daily  tiotropium 18 MICROgram(s) Capsule 1 Capsule(s) Inhalation daily    MEDICATIONS  (PRN):  acetaminophen     Tablet .. 650 milliGRAM(s) Oral every 6 hours PRN Mild Pain (1 - 3)  ALBUTerol    90 MICROgram(s) HFA Inhaler 2 Puff(s) Inhalation every 6 hours PRN Shortness of Breath and/or Wheezing  dextrose Oral Gel 15 Gram(s) Oral once PRN Blood Glucose LESS THAN 70 milliGRAM(s)/deciliter  melatonin 3 milliGRAM(s) Oral at bedtime PRN Insomnia         Vitals log        ICU Vital Signs Last 24 Hrs  T(C): 36.6 (13 Jun 2022 06:51), Max: 36.6 (13 Jun 2022 06:51)  T(F): 97.8 (13 Jun 2022 06:51), Max: 97.8 (13 Jun 2022 06:51)  HR: 85 (13 Jun 2022 06:51) (81 - 91)  BP: 180/81 (13 Jun 2022 06:51) (180/81 - 180/81)  BP(mean): --  ABP: --  ABP(mean): --  RR: 22 (13 Jun 2022 06:51) (22 - 22)  SpO2: 99% (13 Jun 2022 06:51) (96% - 99%)           Input and Output:  I&O's Detail      Lab Data                        12.2   12.64 )-----------( 178      ( 13 Jun 2022 02:27 )             40.1     06-13    140  |  108  |  31<H>  ----------------------------<  188<H>  4.6   |  24  |  1.50<H>    Ca    9.0      13 Jun 2022 02:27    TPro  7.5  /  Alb  3.2<L>  /  TBili  0.3  /  DBili  x   /  AST  23  /  ALT  23  /  AlkPhos  75  06-13            Review of Systems	      Objective     Physical Examination        Pertinent Lab findings & Imaging      Schwarz:  NO   Adequate UO     I&O's Detail           Discussed with:     Cultures:	        Radiology      ACC: [id number] EXAM:  XR CHEST PORTABLE URGENT 1V                          PROCEDURE DATE:  04/19/2022          INTERPRETATION:  AP semierect chest on April 19, 2022 at 12:12 PM.   Patient has chest pain.    Heart magnified by technique. Coronary stent and Sensor device in a   branch of the left pulmonary artery again noted.    Lungs remain clear.    Chest is similar to November 12, 2021.    IMPRESSION: No acute finding or change.    --- End of Report ---            LARS HENDERSON MD; Attending Radiologist  This document has been electronically signed. Apr 19 2022 12:21PM                         Date/Time Patient Seen:  		  Referring MD:   Data Reviewed	       Patient is a 87y old  Female who presents with a chief complaint of COPD Exacerbation (13 Jun 2022 06:00)      Subjective/HPI  vs noted  labs reviewed  H and P reviewed  ER provider note reviewed  imaging reviewed  old records reviewed     History of Present Illness:   87 year old female with PMHx COPD, HTN, HLD, T2DM on insulin, hx of MI presents from assisted living facility with dyspnea, wheezing, labored breathing. Patient recently admitted to [facility] 3/9-15/2022 for COPD exacerbation. History obtained from son Calvin, as patient is limited historian at baseline. Son states he received a call from [facility] this AM stating patient is having labored breathing. Patient was sent to [facility] for further management.     PAST MEDICAL & SURGICAL HISTORY:  Uncomplicated asthma, unspecified asthma severity    DM2 (diabetes mellitus, type 2)    Essential hypertension    Hypothyroidism, unspecified type    Pure hypercholesterolemia    Gastroesophageal reflux disease without esophagitis    Diabetes    Asthma    CAD (coronary artery disease)    HTN (hypertension)    HLD (hyperlipidemia)    Hypothyroid    NSTEMI (non-ST elevated myocardial infarction)    No significant past surgical history    History of appendectomy    History of appendectomy    Abnormal findings on cardiac catheterization  Cardiac Cath    FAMILY HISTORY:  Family history of cancer in mother  Family history of heart disease  Family history of MI (myocardial infarction)  Family history of stomach cancer.     Social History:  Social History (marital status, living situation, occupation, tobacco use, alcohol and drug use, and sexual history): Tobacco: former smoker, denies  EtOH: denies  Recreational drug use: denies  Lives with: assisted living facility   Ambulates: walker  ADLs: assisted living facility     Tobacco Screening:  · Core Measure Site	Yes  · Has the patient used tobacco in the past 30 days?	No    Risk Assessment:    Present on Admission:  Deep Venous Thrombosis	no  Pulmonary Embolus	no     Heart Failure:  Does this patient have a history of or has been diagnosed with heart failure? yes.     LV Function Assessment (LVS function was evaluated before arrival and/or during hospitalization) unknown.        Medication list         MEDICATIONS  (STANDING):  albuterol/ipratropium for Nebulization 3 milliLiter(s) Nebulizer every 6 hours  artificial tears (preservative free) Ophthalmic Solution 1 Drop(s) Both EYES two times a day  aspirin  chewable 81 milliGRAM(s) Oral daily  atorvastatin 80 milliGRAM(s) Oral at bedtime  budesonide 160 MICROgram(s)/formoterol 4.5 MICROgram(s) Inhaler 2 Puff(s) Inhalation two times a day  cholecalciferol 2000 Unit(s) Oral daily  clopidogrel Tablet 75 milliGRAM(s) Oral daily  dextrose 5%. 1000 milliLiter(s) (50 mL/Hr) IV Continuous <Continuous>  dextrose 5%. 1000 milliLiter(s) (100 mL/Hr) IV Continuous <Continuous>  dextrose 50% Injectable 25 Gram(s) IV Push once  dextrose 50% Injectable 12.5 Gram(s) IV Push once  dextrose 50% Injectable 25 Gram(s) IV Push once  escitalopram 10 milliGRAM(s) Oral daily  gabapentin 100 milliGRAM(s) Oral three times a day  glucagon  Injectable 1 milliGRAM(s) IntraMuscular once  heparin   Injectable 5000 Unit(s) SubCutaneous every 12 hours  insulin glargine Injectable (LANTUS) 20 Unit(s) SubCutaneous every morning  insulin lispro (ADMELOG) corrective regimen sliding scale   SubCutaneous three times a day before meals  insulin lispro (ADMELOG) corrective regimen sliding scale   SubCutaneous at bedtime  insulin lispro Injectable (ADMELOG) 6 Unit(s) SubCutaneous three times a day before meals  isosorbide   mononitrate ER Tablet (IMDUR) 30 milliGRAM(s) Oral daily  levothyroxine 125 MICROGram(s) Oral daily  methylPREDNISolone sodium succinate Injectable 40 milliGRAM(s) IV Push every 8 hours  metoprolol succinate ER 50 milliGRAM(s) Oral daily  tiotropium 18 MICROgram(s) Capsule 1 Capsule(s) Inhalation daily    MEDICATIONS  (PRN):  acetaminophen     Tablet .. 650 milliGRAM(s) Oral every 6 hours PRN Mild Pain (1 - 3)  ALBUTerol    90 MICROgram(s) HFA Inhaler 2 Puff(s) Inhalation every 6 hours PRN Shortness of Breath and/or Wheezing  dextrose Oral Gel 15 Gram(s) Oral once PRN Blood Glucose LESS THAN 70 milliGRAM(s)/deciliter  melatonin 3 milliGRAM(s) Oral at bedtime PRN Insomnia         Vitals log        ICU Vital Signs Last 24 Hrs  T(C): 36.6 (13 Jun 2022 06:51), Max: 36.6 (13 Jun 2022 06:51)  T(F): 97.8 (13 Jun 2022 06:51), Max: 97.8 (13 Jun 2022 06:51)  HR: 85 (13 Jun 2022 06:51) (81 - 91)  BP: 180/81 (13 Jun 2022 06:51) (180/81 - 180/81)  BP(mean): --  ABP: --  ABP(mean): --  RR: 22 (13 Jun 2022 06:51) (22 - 22)  SpO2: 99% (13 Jun 2022 06:51) (96% - 99%)           Input and Output:  I&O's Detail      Lab Data                        12.2   12.64 )-----------( 178      ( 13 Jun 2022 02:27 )             40.1     06-13    140  |  108  |  31<H>  ----------------------------<  188<H>  4.6   |  24  |  1.50<H>    Ca    9.0      13 Jun 2022 02:27    TPro  7.5  /  Alb  3.2<L>  /  TBili  0.3  /  DBili  x   /  AST  23  /  ALT  23  /  AlkPhos  75  06-13            Review of Systems	  forgetful      Objective     Physical Examination    heart s1s2  lung dec BS  abd soft  obese      Pertinent Lab findings & Imaging      Schwarz:  NO   Adequate UO     I&O's Detail           Discussed with:     Cultures:	        Radiology      ACC: [id number] EXAM:  XR CHEST PORTABLE URGENT 1V                          PROCEDURE DATE:  04/19/2022          INTERPRETATION:  AP semierect chest on April 19, 2022 at 12:12 PM.   Patient has chest pain.    Heart magnified by technique. Coronary stent and Sensor device in a   branch of the left pulmonary artery again noted.    Lungs remain clear.    Chest is similar to November 12, 2021.    IMPRESSION: No acute finding or change.    --- End of Report ---            LARS HENDERSON MD; Attending Radiologist  This document has been electronically signed. Apr 19 2022 12:21PM

## 2022-06-13 NOTE — PROGRESS NOTE ADULT - PROBLEM SELECTOR PLAN 8
VTE ppx: Heparin 5000 subq q12h    IMPROVE VTE Individual Risk Assessment          RISK                                                          Points  [  ] Previous VTE                                                3  [  ] Thrombophilia                                             2  [  ] Lower limb paralysis                                   2        (unable to hold up >15 seconds)    [  ] Current Cancer                                             2         (within 6 months)  [  ] Immobilization > 24 hrs                              1  [  ] ICU/CCU stay > 24 hours                             1  [ x ] Age > 60                                                         1    IMPROVE VTE Score: 1      Patient is DNR/DNI - MOLST filled - Hx of MI  - Continue home Aspirin 81 mg PO qd, Plavix 75 mg PO qd, imdur 30mg daily   - Echo 3/11/2022: Left ventricle was of normal size, mild left ventricular hypertrophy LV   systolic function EF 65%. Moderate to severe aortic stenosis

## 2022-06-13 NOTE — H&P ADULT - PROBLEM SELECTOR PLAN 6
Hx of MI  - Continue home Aspirin 81 mg PO qd, Plavix 75 mg PO qd  - Echo 3/11/2022: Left ventricle was of normal size, mild left ventricular hypertrophy LV   systolic function EF 65%. Moderate to severe aortic stenosis

## 2022-06-13 NOTE — ED PROVIDER NOTE - OBJECTIVE STATEMENT
88 yo female pmhx asthma/COPD, DM, HTN, hypothyroidism, GERD, CAD, CHF BIBEMS from assisted living for shortness of breath/wheezing tonight, given 2 duonebs.  Pt is poor historian.

## 2022-06-13 NOTE — H&P ADULT - ASSESSMENT
87 year old female with PMHx COPD, HTN, HLD, T2DM on insulin, hx of MI presents from assisted living facility with dyspnea, wheezing, labored breathing admitted for COPD exacerbation.

## 2022-06-13 NOTE — H&P ADULT - NSHPSOCIALHISTORY_GEN_ALL_CORE
Tobacco:   EtOH:   Recreational drug use:  Lives with:  Ambulates:  ADLs:  Occupation:  Vaccinations:  Mammogram/Pap Smear/Colonoscopy: Tobacco: former smoker, denies  EtOH: denies  Recreational drug use: denies  Lives with: assisted living facility   Ambulates: walker  ADLs: assisted living facility

## 2022-06-13 NOTE — PROGRESS NOTE ADULT - SUBJECTIVE AND OBJECTIVE BOX
Patient is a 87y old  Female who presents with a chief complaint of COPD Exacerbation (13 Jun 2022 07:48)      INTERVAL HPI/OVERNIGHT EVENTS: No acute events overnight. Patient seen and examined at bedside. Admits sob at rest. Patient with nasal cannula with increase WOB and SOB worsening at rest, back on  BiPAP. Denies fever/chills, chest pain, headache, dizziness, nausea/vomiting, abdominal pain.     MEDICATIONS  (STANDING):  artificial tears (preservative free) Ophthalmic Solution 1 Drop(s) Both EYES two times a day  aspirin  chewable 81 milliGRAM(s) Oral daily  atorvastatin 80 milliGRAM(s) Oral at bedtime  cholecalciferol 2000 Unit(s) Oral daily  clopidogrel Tablet 75 milliGRAM(s) Oral daily  dextrose 5%. 1000 milliLiter(s) (50 mL/Hr) IV Continuous <Continuous>  dextrose 5%. 1000 milliLiter(s) (100 mL/Hr) IV Continuous <Continuous>  dextrose 50% Injectable 25 Gram(s) IV Push once  dextrose 50% Injectable 12.5 Gram(s) IV Push once  dextrose 50% Injectable 25 Gram(s) IV Push once  escitalopram 10 milliGRAM(s) Oral daily  gabapentin 100 milliGRAM(s) Oral three times a day  glucagon  Injectable 1 milliGRAM(s) IntraMuscular once  heparin   Injectable 5000 Unit(s) SubCutaneous every 12 hours  insulin lispro (ADMELOG) corrective regimen sliding scale   SubCutaneous every 6 hours  isosorbide   mononitrate ER Tablet (IMDUR) 30 milliGRAM(s) Oral daily  levothyroxine 125 MICROGram(s) Oral daily  metoprolol succinate ER 50 milliGRAM(s) Oral daily  sodium chloride 0.9%. 1000 milliLiter(s) (50 mL/Hr) IV Continuous <Continuous>    MEDICATIONS  (PRN):  acetaminophen     Tablet .. 650 milliGRAM(s) Oral every 6 hours PRN Mild Pain (1 - 3)  ALBUTerol    0.083% 2.5 milliGRAM(s) Nebulizer every 4 hours PRN Shortness of Breath and/or Wheezing  ALPRAZolam 0.5 milliGRAM(s) Oral at bedtime PRN anxiety  dextrose Oral Gel 15 Gram(s) Oral once PRN Blood Glucose LESS THAN 70 milliGRAM(s)/deciliter  melatonin 3 milliGRAM(s) Oral at bedtime PRN Insomnia      Allergies    doxycycline (Unknown)  iodine (Hives)  iodine containing compounds (Unknown)  shellfish (Anaphylaxis)  shellfish (Swelling; Short breath)    Intolerances        REVIEW OF SYSTEMS: Unable to obtain meaningful ROS 2/2 confusion    Vital Signs Last 24 Hrs  T(C): 36.7 (13 Jun 2022 13:09), Max: 36.7 (13 Jun 2022 13:09)  T(F): 98 (13 Jun 2022 13:09), Max: 98 (13 Jun 2022 13:09)  HR: 78 (13 Jun 2022 13:09) (78 - 92)  BP: 161/87 (13 Jun 2022 13:09) (161/87 - 180/81)  BP(mean): --  RR: 18 (13 Jun 2022 13:09) (18 - 22)  SpO2: 98% (13 Jun 2022 13:09) (96% - 99%)    PHYSICAL EXAM:  GENERAL: sob at rest with nasal cannula, now on Bipap   HEENT:  anicteric, moist mucous membranes  CHEST/LUNG: decrease breath sound b/l at the base, no rales, wheezes, or rhonchi  HEART:  RRR, S1, S2  ABDOMEN:  BS+, soft, nontender, nondistended  EXTREMITIES: no edema, cyanosis, or calf tenderness  NERVOUS SYSTEM: awake, alert, oriented x0    LABS:                        12.2   12.64 )-----------( 178      ( 13 Jun 2022 02:27 )             40.1     CBC Full  -  ( 13 Jun 2022 02:27 )  WBC Count : 12.64 K/uL  Hemoglobin : 12.2 g/dL  Hematocrit : 40.1 %  Platelet Count - Automated : 178 K/uL  Mean Cell Volume : 91.6 fl  Mean Cell Hemoglobin : 27.9 pg  Mean Cell Hemoglobin Concentration : 30.4 gm/dL  Auto Neutrophil # : 8.14 K/uL  Auto Lymphocyte # : 3.23 K/uL  Auto Monocyte # : 1.02 K/uL  Auto Eosinophil # : 0.15 K/uL  Auto Basophil # : 0.04 K/uL  Auto Neutrophil % : 64.3 %  Auto Lymphocyte % : 25.6 %  Auto Monocyte % : 8.1 %  Auto Eosinophil % : 1.2 %  Auto Basophil % : 0.3 %    13 Jun 2022 02:27    140    |  108    |  31     ----------------------------<  188    4.6     |  24     |  1.50     Ca    9.0        13 Jun 2022 02:27    TPro  7.5    /  Alb  3.2    /  TBili  0.3    /  DBili  x      /  AST  23     /  ALT  23     /  AlkPhos  75     13 Jun 2022 02:27    PT/INR - ( 13 Jun 2022 02:27 )   PT: 12.4 sec;   INR: 1.06 ratio         PTT - ( 13 Jun 2022 02:27 )  PTT:32.5 sec    CAPILLARY BLOOD GLUCOSE      POCT Blood Glucose.: 293 mg/dL (13 Jun 2022 12:18)  POCT Blood Glucose.: 293 mg/dL (13 Jun 2022 08:43)          RADIOLOGY & ADDITIONAL TESTS:        Personally reviewed.     Consultant(s) Notes Reviewed:  [x] YES  [ ] NO     Patient is a 87y old  Female who presents with a chief complaint of COPD Exacerbation (13 Jun 2022 07:48)      INTERVAL HPI/OVERNIGHT EVENTS: No acute events overnight. Patient seen and examined at bedside. Admits sob at rest. Patient with nasal cannula with increase WOB and SOB worsening at rest, placed back on  BiPAP. Denies fever/chills, chest pain, headache, dizziness, nausea/vomiting, abdominal pain.     MEDICATIONS  (STANDING):  artificial tears (preservative free) Ophthalmic Solution 1 Drop(s) Both EYES two times a day  aspirin  chewable 81 milliGRAM(s) Oral daily  atorvastatin 80 milliGRAM(s) Oral at bedtime  cholecalciferol 2000 Unit(s) Oral daily  clopidogrel Tablet 75 milliGRAM(s) Oral daily  dextrose 5%. 1000 milliLiter(s) (50 mL/Hr) IV Continuous <Continuous>  dextrose 5%. 1000 milliLiter(s) (100 mL/Hr) IV Continuous <Continuous>  dextrose 50% Injectable 25 Gram(s) IV Push once  dextrose 50% Injectable 12.5 Gram(s) IV Push once  dextrose 50% Injectable 25 Gram(s) IV Push once  escitalopram 10 milliGRAM(s) Oral daily  gabapentin 100 milliGRAM(s) Oral three times a day  glucagon  Injectable 1 milliGRAM(s) IntraMuscular once  heparin   Injectable 5000 Unit(s) SubCutaneous every 12 hours  insulin lispro (ADMELOG) corrective regimen sliding scale   SubCutaneous every 6 hours  isosorbide   mononitrate ER Tablet (IMDUR) 30 milliGRAM(s) Oral daily  levothyroxine 125 MICROGram(s) Oral daily  metoprolol succinate ER 50 milliGRAM(s) Oral daily  sodium chloride 0.9%. 1000 milliLiter(s) (50 mL/Hr) IV Continuous <Continuous>    MEDICATIONS  (PRN):  acetaminophen     Tablet .. 650 milliGRAM(s) Oral every 6 hours PRN Mild Pain (1 - 3)  ALBUTerol    0.083% 2.5 milliGRAM(s) Nebulizer every 4 hours PRN Shortness of Breath and/or Wheezing  ALPRAZolam 0.5 milliGRAM(s) Oral at bedtime PRN anxiety  dextrose Oral Gel 15 Gram(s) Oral once PRN Blood Glucose LESS THAN 70 milliGRAM(s)/deciliter  melatonin 3 milliGRAM(s) Oral at bedtime PRN Insomnia      Allergies    doxycycline (Unknown)  iodine (Hives)  iodine containing compounds (Unknown)  shellfish (Anaphylaxis)  shellfish (Swelling; Short breath)    Intolerances        REVIEW OF SYSTEMS: Unable to obtain meaningful ROS 2/2 confusion    Vital Signs Last 24 Hrs  T(C): 36.7 (13 Jun 2022 13:09), Max: 36.7 (13 Jun 2022 13:09)  T(F): 98 (13 Jun 2022 13:09), Max: 98 (13 Jun 2022 13:09)  HR: 78 (13 Jun 2022 13:09) (78 - 92)  BP: 161/87 (13 Jun 2022 13:09) (161/87 - 180/81)  BP(mean): --  RR: 18 (13 Jun 2022 13:09) (18 - 22)  SpO2: 98% (13 Jun 2022 13:09) (96% - 99%)    PHYSICAL EXAM:  GENERAL: sob at rest with nasal cannula, now on Bipap   HEENT:  anicteric, moist mucous membranes  CHEST/LUNG: decrease breath sound b/l at the base, no rales, wheezes, or rhonchi  HEART:  RRR, S1, S2  ABDOMEN:  BS+, soft, nontender, nondistended  EXTREMITIES: no edema, cyanosis, or calf tenderness  NERVOUS SYSTEM: awake, alert, oriented x0    LABS:                        12.2   12.64 )-----------( 178      ( 13 Jun 2022 02:27 )             40.1     CBC Full  -  ( 13 Jun 2022 02:27 )  WBC Count : 12.64 K/uL  Hemoglobin : 12.2 g/dL  Hematocrit : 40.1 %  Platelet Count - Automated : 178 K/uL  Mean Cell Volume : 91.6 fl  Mean Cell Hemoglobin : 27.9 pg  Mean Cell Hemoglobin Concentration : 30.4 gm/dL  Auto Neutrophil # : 8.14 K/uL  Auto Lymphocyte # : 3.23 K/uL  Auto Monocyte # : 1.02 K/uL  Auto Eosinophil # : 0.15 K/uL  Auto Basophil # : 0.04 K/uL  Auto Neutrophil % : 64.3 %  Auto Lymphocyte % : 25.6 %  Auto Monocyte % : 8.1 %  Auto Eosinophil % : 1.2 %  Auto Basophil % : 0.3 %    13 Jun 2022 02:27    140    |  108    |  31     ----------------------------<  188    4.6     |  24     |  1.50     Ca    9.0        13 Jun 2022 02:27    TPro  7.5    /  Alb  3.2    /  TBili  0.3    /  DBili  x      /  AST  23     /  ALT  23     /  AlkPhos  75     13 Jun 2022 02:27    PT/INR - ( 13 Jun 2022 02:27 )   PT: 12.4 sec;   INR: 1.06 ratio         PTT - ( 13 Jun 2022 02:27 )  PTT:32.5 sec    CAPILLARY BLOOD GLUCOSE      POCT Blood Glucose.: 293 mg/dL (13 Jun 2022 12:18)  POCT Blood Glucose.: 293 mg/dL (13 Jun 2022 08:43)          RADIOLOGY & ADDITIONAL TESTS:        Personally reviewed.     Consultant(s) Notes Reviewed:  [x] YES  [ ] NO

## 2022-06-13 NOTE — H&P ADULT - NSHPPHYSICALEXAM_GEN_ALL_CORE
T(C): --  HR: 81 (06-13-22 @ 05:36) (81 - 91)  BP: --  RR: --  SpO2: 96% (06-13-22 @ 05:36) (96% - 99%)    GENERAL: patient appears well, no acute distress, appropriate, pleasant  EYES: sclera clear, no exudates  ENMT: oropharynx clear without erythema, no exudates, moist mucous membranes  NECK: supple, soft, no thyromegaly noted  LUNGS: good air entry bilaterally, clear to auscultation, symmetric breath sounds, no wheezing or rhonchi appreciated  HEART: soft S1/S2, regular rate and rhythm, no murmurs noted, no lower extremity edema  GASTROINTESTINAL: abdomen is soft, nontender, nondistended, normoactive bowel sounds, no palpable masses  INTEGUMENT: good skin turgor, no lesions noted  MUSCULOSKELETAL: no clubbing or cyanosis, no obvious deformity  NEUROLOGIC: awake, alert, oriented x3, good muscle tone in 4 extremities, no obvious sensory deficits  PSYCHIATRIC: mood is good, affect is congruent, linear and logical thought process  HEME/LYMPH: no palpable supraclavicular nodules, no obvious ecchymosis or petechiae T(C): --  HR: 81 (06-13-22 @ 05:36) (81 - 91)  BP: --  RR: --  SpO2: 96% (06-13-22 @ 05:36) (96% - 99%)    GENERAL: patient appears well, no acute distress, on BIPAP  EYES: sclera clear, no exudates  ENMT: oropharynx clear without erythema, no exudates, moist mucous membranes  NECK: supple, soft, no thyromegaly noted  LUNGS: diminished breath sounds bilaterally, no wheezing or rhonchi appreciated  HEART: soft S1/S2, regular rate and rhythm, no murmurs noted, no lower extremity edema  GASTROINTESTINAL: abdomen is soft, nontender, nondistended, normoactive bowel sounds  INTEGUMENT: good skin turgor, no lesions noted  MUSCULOSKELETAL: no clubbing or cyanosis, no obvious deformity  NEUROLOGIC: awake, alert, oriented x0  HEME/LYMPH: no palpable supraclavicular nodules, no obvious ecchymosis or petechiae

## 2022-06-13 NOTE — H&P ADULT - ATTENDING COMMENTS
Pt seen and examined. Case discussed with resident. Attending changes made to assessment/plan otherwise agree with documentation above.     87F with PMHX COPD, CAD/MI, HTN, HLD, IDDM presents from [facility] to [facility] ER c/o SOB/RABAGO admitted for Acute Hypoxic Respiratory Failure 2/2 Acute COPD Exacerbation on NIPPV BIPAP.    A/P:  SOB/RABAGO 2/2 Acute Hypoxic Resp Failure 2/2 Acute COPD Exacerbation  -Cont NIPPV BIPAP 10/5  -ABG in AM  -Monitor Pulse Ox Goal SPO2 88-96%  -Solumedrol 125mg IV x1  -Continue Solumedrol 40mg IV q8  -Duonebs q6 ATC  -MgSO4 2g IV x1  -Repeat Labs  -Trend WBC  -CXR Hyperinflated/Flat Diaphragms no obvious infiltrate   -Hold ABX for now  -Pulm Consulted

## 2022-06-13 NOTE — ED ADULT NURSE NOTE - CHPI ED NUR DURATION
Provider Procedure Text (A): After obtaining clear surgical margins the defect was repaired by another provider. day(s)

## 2022-06-13 NOTE — PROGRESS NOTE ADULT - PROBLEM SELECTOR PLAN 7
- Hx of MI  - Continue home Aspirin 81 mg PO qd, Plavix 75 mg PO qd, imdur 30mg daily   - Echo 3/11/2022: Left ventricle was of normal size, mild left ventricular hypertrophy LV   systolic function EF 65%. Moderate to severe aortic stenosis Chronic  - Continue home Atorvastatin 80 mg PO qhs

## 2022-06-13 NOTE — H&P ADULT - HISTORY OF PRESENT ILLNESS
ED course:  Vitals: HR 91 SpO2 on BIPAP  Labs: WBC 12.64, BUN 31/1.5, Glu 188, ProBNP 1430  Given Mag Sulfate IVPB 1 g, Solumedrol 125 mg IVP x1 87 year old female with PMHx COPD, HTN, HLD, T2DM on insulin, hx of MI presents from assisted living facility with dyspnea, wheezing, labored breathing. Patient recently admitted to [facility] 3/9-15/2022 for COPD exacerbation. History obtained from son Calvin, as patient is limited historian at baseline. Son states he received a call from [facility] this AM stating patient is having labored breathing. Patient was sent to [facility] for further management.     ED course:  Vitals: HR 91 SpO2 on BIPAP  Labs: WBC 12.64, BUN 31/1.5, Glu 188, ProBNP 1430  Given Mag Sulfate IVPB 1 g, Solumedrol 125 mg IVP x1

## 2022-06-13 NOTE — CONSULT NOTE ADULT - ASSESSMENT
87 year old female with PMHx COPD, HTN, HLD, T2DM on insulin, hx of MI presents from assisted living facility with dyspnea, wheezing, labored breathing    copd  copd ex  TRENTON  HTN  OP  OA  DM  HLD  Moderate to Severe AS  CAD 87 year old female with PMHx COPD, HTN, HLD, T2DM on insulin, hx of MI presents from assisted living facility with dyspnea, wheezing, labored breathing    copd  copd ex  TRENTON CKD  HTN  OP  OA  DM  HLD  Moderate to Severe AS  CAD    cvs rx regimen  BP control  serial renal indices  I and O  monitor VS and HD and Sat  HOB elev - asp prec - assist with needs - ADL - GOC discussion  pt has hMPV - resp viral infection - isolation precs - acap PRN - robitussin PRN -   cxr - labs reviewed  copd - rx regimen PRN for sob and or wheeze, NEBS, not a candidate for Inhaler use  may need Systemic Steroids - will monitor clinical course and decide as clinically indicated  proBNP noted  ABG noted  old records reviewed  spoke with daughter at the bedside

## 2022-06-13 NOTE — H&P ADULT - PROBLEM SELECTOR PLAN 7
Chronic, known history of T2DM  - Continue Lantus 20 U qd and Novolog 8 U TID before meals  - Moderate dose insulin corrective scale  - Hypoglycemia protocol, fingerstick glucose QAC&HS   - Consistent carb/DASH diet  - F/u AM HbA1c

## 2022-06-14 LAB
A1C WITH ESTIMATED AVERAGE GLUCOSE RESULT: 10 % — HIGH (ref 4–5.6)
ALBUMIN SERPL ELPH-MCNC: 3.1 G/DL — LOW (ref 3.3–5)
ALP SERPL-CCNC: 74 U/L — SIGNIFICANT CHANGE UP (ref 40–120)
ALT FLD-CCNC: 23 U/L — SIGNIFICANT CHANGE UP (ref 12–78)
ANION GAP SERPL CALC-SCNC: 9 MMOL/L — SIGNIFICANT CHANGE UP (ref 5–17)
AST SERPL-CCNC: 23 U/L — SIGNIFICANT CHANGE UP (ref 15–37)
BASE EXCESS BLDA CALC-SCNC: -3.9 MMOL/L — LOW (ref -2–3)
BASE EXCESS BLDV CALC-SCNC: -3.1 MMOL/L — LOW (ref -2–3)
BASOPHILS # BLD AUTO: 0.04 K/UL — SIGNIFICANT CHANGE UP (ref 0–0.2)
BASOPHILS NFR BLD AUTO: 0.2 % — SIGNIFICANT CHANGE UP (ref 0–2)
BILIRUB SERPL-MCNC: 0.4 MG/DL — SIGNIFICANT CHANGE UP (ref 0.2–1.2)
BLOOD GAS COMMENTS ARTERIAL: SIGNIFICANT CHANGE UP
BLOOD GAS COMMENTS, VENOUS: SIGNIFICANT CHANGE UP
BUN SERPL-MCNC: 40 MG/DL — HIGH (ref 7–23)
CALCIUM SERPL-MCNC: 8.7 MG/DL — SIGNIFICANT CHANGE UP (ref 8.5–10.1)
CHLORIDE SERPL-SCNC: 107 MMOL/L — SIGNIFICANT CHANGE UP (ref 96–108)
CO2 SERPL-SCNC: 23 MMOL/L — SIGNIFICANT CHANGE UP (ref 22–31)
CREAT SERPL-MCNC: 1.4 MG/DL — HIGH (ref 0.5–1.3)
EGFR: 36 ML/MIN/1.73M2 — LOW
EOSINOPHIL # BLD AUTO: 0 K/UL — SIGNIFICANT CHANGE UP (ref 0–0.5)
EOSINOPHIL NFR BLD AUTO: 0 % — SIGNIFICANT CHANGE UP (ref 0–6)
ESTIMATED AVERAGE GLUCOSE: 240 MG/DL — HIGH (ref 68–114)
GAS PNL BLDA: SIGNIFICANT CHANGE UP
GLUCOSE SERPL-MCNC: 236 MG/DL — HIGH (ref 70–99)
HCO3 BLDA-SCNC: 21 MMOL/L — SIGNIFICANT CHANGE UP (ref 21–28)
HCO3 BLDV-SCNC: 23 MMOL/L — SIGNIFICANT CHANGE UP (ref 22–29)
HCT VFR BLD CALC: 41.9 % — SIGNIFICANT CHANGE UP (ref 34.5–45)
HGB BLD-MCNC: 12.9 G/DL — SIGNIFICANT CHANGE UP (ref 11.5–15.5)
IMM GRANULOCYTES NFR BLD AUTO: 0.8 % — SIGNIFICANT CHANGE UP (ref 0–1.5)
LYMPHOCYTES # BLD AUTO: 2.14 K/UL — SIGNIFICANT CHANGE UP (ref 1–3.3)
LYMPHOCYTES # BLD AUTO: 9.7 % — LOW (ref 13–44)
MAGNESIUM SERPL-MCNC: 2.3 MG/DL — SIGNIFICANT CHANGE UP (ref 1.6–2.6)
MCHC RBC-ENTMCNC: 27.7 PG — SIGNIFICANT CHANGE UP (ref 27–34)
MCHC RBC-ENTMCNC: 30.8 GM/DL — LOW (ref 32–36)
MCV RBC AUTO: 90.1 FL — SIGNIFICANT CHANGE UP (ref 80–100)
MONOCYTES # BLD AUTO: 1.47 K/UL — HIGH (ref 0–0.9)
MONOCYTES NFR BLD AUTO: 6.7 % — SIGNIFICANT CHANGE UP (ref 2–14)
NEUTROPHILS # BLD AUTO: 18.23 K/UL — HIGH (ref 1.8–7.4)
NEUTROPHILS NFR BLD AUTO: 82.6 % — HIGH (ref 43–77)
NRBC # BLD: 0 /100 WBCS — SIGNIFICANT CHANGE UP (ref 0–0)
PCO2 BLDA: 37 MMHG — HIGH (ref 32–35)
PCO2 BLDV: 42 MMHG — SIGNIFICANT CHANGE UP (ref 39–42)
PH BLDA: 7.37 — SIGNIFICANT CHANGE UP (ref 7.35–7.45)
PH BLDV: 7.34 — SIGNIFICANT CHANGE UP (ref 7.32–7.43)
PLATELET # BLD AUTO: 214 K/UL — SIGNIFICANT CHANGE UP (ref 150–400)
PO2 BLDA: 95 MMHG — SIGNIFICANT CHANGE UP (ref 83–108)
PO2 BLDV: 72 MMHG — HIGH (ref 25–45)
POTASSIUM SERPL-MCNC: 4.3 MMOL/L — SIGNIFICANT CHANGE UP (ref 3.5–5.3)
POTASSIUM SERPL-SCNC: 4.3 MMOL/L — SIGNIFICANT CHANGE UP (ref 3.5–5.3)
PROT SERPL-MCNC: 7.4 G/DL — SIGNIFICANT CHANGE UP (ref 6–8.3)
RBC # BLD: 4.65 M/UL — SIGNIFICANT CHANGE UP (ref 3.8–5.2)
RBC # FLD: 13.7 % — SIGNIFICANT CHANGE UP (ref 10.3–14.5)
SAO2 % BLDA: 99.4 % — HIGH (ref 94–98)
SAO2 % BLDV: 96.6 % — HIGH (ref 67–88)
SODIUM SERPL-SCNC: 139 MMOL/L — SIGNIFICANT CHANGE UP (ref 135–145)
WBC # BLD: 22.06 K/UL — HIGH (ref 3.8–10.5)
WBC # FLD AUTO: 22.06 K/UL — HIGH (ref 3.8–10.5)

## 2022-06-14 PROCEDURE — 99233 SBSQ HOSP IP/OBS HIGH 50: CPT | Mod: GC

## 2022-06-14 PROCEDURE — 71045 X-RAY EXAM CHEST 1 VIEW: CPT | Mod: 26

## 2022-06-14 RX ORDER — FUROSEMIDE 40 MG
40 TABLET ORAL ONCE
Refills: 0 | Status: COMPLETED | OUTPATIENT
Start: 2022-06-14 | End: 2022-06-14

## 2022-06-14 RX ORDER — FUROSEMIDE 40 MG
40 TABLET ORAL
Refills: 0 | Status: DISCONTINUED | OUTPATIENT
Start: 2022-06-15 | End: 2022-06-16

## 2022-06-14 RX ORDER — ALPRAZOLAM 0.25 MG
0.5 TABLET ORAL ONCE
Refills: 0 | Status: DISCONTINUED | OUTPATIENT
Start: 2022-06-14 | End: 2022-06-14

## 2022-06-14 RX ORDER — HYDRALAZINE HCL 50 MG
10 TABLET ORAL ONCE
Refills: 0 | Status: COMPLETED | OUTPATIENT
Start: 2022-06-14 | End: 2022-06-14

## 2022-06-14 RX ORDER — IPRATROPIUM/ALBUTEROL SULFATE 18-103MCG
3 AEROSOL WITH ADAPTER (GRAM) INHALATION EVERY 6 HOURS
Refills: 0 | Status: DISCONTINUED | OUTPATIENT
Start: 2022-06-14 | End: 2022-06-27

## 2022-06-14 RX ADMIN — Medication 2000 UNIT(S): at 11:03

## 2022-06-14 RX ADMIN — Medication 40 MILLIGRAM(S): at 03:46

## 2022-06-14 RX ADMIN — Medication 1 DROP(S): at 07:06

## 2022-06-14 RX ADMIN — GABAPENTIN 100 MILLIGRAM(S): 400 CAPSULE ORAL at 13:08

## 2022-06-14 RX ADMIN — Medication 125 MICROGRAM(S): at 07:06

## 2022-06-14 RX ADMIN — HEPARIN SODIUM 5000 UNIT(S): 5000 INJECTION INTRAVENOUS; SUBCUTANEOUS at 18:24

## 2022-06-14 RX ADMIN — ALBUTEROL 2.5 MILLIGRAM(S): 90 AEROSOL, METERED ORAL at 03:02

## 2022-06-14 RX ADMIN — Medication 40 MILLIGRAM(S): at 12:54

## 2022-06-14 RX ADMIN — Medication 1 DROP(S): at 18:30

## 2022-06-14 RX ADMIN — Medication 40 MILLIGRAM(S): at 11:02

## 2022-06-14 RX ADMIN — Medication 2: at 11:14

## 2022-06-14 RX ADMIN — Medication 10 MILLIGRAM(S): at 01:52

## 2022-06-14 RX ADMIN — Medication 2: at 01:08

## 2022-06-14 RX ADMIN — HEPARIN SODIUM 5000 UNIT(S): 5000 INJECTION INTRAVENOUS; SUBCUTANEOUS at 07:06

## 2022-06-14 RX ADMIN — Medication 3 MILLILITER(S): at 19:19

## 2022-06-14 RX ADMIN — ISOSORBIDE MONONITRATE 30 MILLIGRAM(S): 60 TABLET, EXTENDED RELEASE ORAL at 11:03

## 2022-06-14 RX ADMIN — Medication 81 MILLIGRAM(S): at 11:03

## 2022-06-14 RX ADMIN — ESCITALOPRAM OXALATE 10 MILLIGRAM(S): 10 TABLET, FILM COATED ORAL at 11:04

## 2022-06-14 RX ADMIN — Medication 40 MILLIGRAM(S): at 18:25

## 2022-06-14 RX ADMIN — INSULIN GLARGINE 15 UNIT(S): 100 INJECTION, SOLUTION SUBCUTANEOUS at 08:02

## 2022-06-14 RX ADMIN — Medication 4: at 07:20

## 2022-06-14 RX ADMIN — GABAPENTIN 100 MILLIGRAM(S): 400 CAPSULE ORAL at 07:06

## 2022-06-14 RX ADMIN — Medication 2: at 17:35

## 2022-06-14 RX ADMIN — Medication 50 MILLIGRAM(S): at 07:07

## 2022-06-14 RX ADMIN — Medication 0.5 MILLIGRAM(S): at 12:56

## 2022-06-14 RX ADMIN — CLOPIDOGREL BISULFATE 75 MILLIGRAM(S): 75 TABLET, FILM COATED ORAL at 11:03

## 2022-06-14 NOTE — CHART NOTE - NSCHARTNOTEFT_GEN_A_CORE
Called by RN as patient's . RN rechecked with manual BP cuff and /90 which is more consistent with patient's baseline during admission. Patient currently asymptomatic. Not due for any antihypertensive medications.       T(C): 36.7 (06-13-22 @ 19:43), Max: 36.7 (06-13-22 @ 13:09)  HR: 79 (06-14-22 @ 01:17) (78 - 92)  BP: 185/90 (06-14-22 @ 01:17) (161/87 - 185/90)  RR: 18 (06-14-22 @ 01:17) (17 - 22)  SpO2: 98% (06-14-22 @ 01:17) (96% - 99%)  Wt(kg): --    Physical :  Gen - NAD  Cardio - RRR, +S1/S2, no murmur  Lung - decrease breath sound b/l at the base, no wheezes, rales, or rhonchi   Abdomen - +BS, NT/ND, no guarding, no rebound, no masses  Ext - no edema, 2+ pulses b/l  Neuro - A&Ox0, non-focal        Assessment/Plan  87 year old female with PMHx COPD, HTN, HLD, T2DM on insulin, hx of MI presents from assisted living facility with dyspnea, wheezing, labored breathing admitted for COPD exacerbation.     1) Hypertension   - STAT Hydralazine 10 mg IVP x1   - Recheck BP in 1 hour  - Will continue to monitor  - RN to notify of any changes Called by RN as patient's . RN rechecked with manual BP cuff and /90 which is more consistent with patient's baseline during admission. Patient currently asymptomatic. Not due for any antihypertensive medications.       T(C): 36.7 (06-13-22 @ 19:43), Max: 36.7 (06-13-22 @ 13:09)  HR: 79 (06-14-22 @ 01:17) (78 - 92)  BP: 185/90 (06-14-22 @ 01:17) (161/87 - 185/90)  RR: 18 (06-14-22 @ 01:17) (17 - 22)  SpO2: 98% (06-14-22 @ 01:17) (96% - 99%)  Wt(kg): --    Physical :  Gen - NAD  Cardio - RRR, +S1/S2, no murmur  Lung - decrease breath sound b/l at the base, no wheezes, rales, or rhonchi   Abdomen - +BS, NT/ND, no guarding, no rebound, no masses  Ext - no edema, 2+ pulses b/l  Neuro - A&Ox0, non-focal, follows commands appropriately       Assessment/Plan  87 year old female with PMHx COPD, HTN, HLD, T2DM on insulin, hx of MI presents from assisted living facility with dyspnea, wheezing, labored breathing admitted for COPD exacerbation.     1) Hypertension   - STAT Hydralazine 10 mg IVP x1   - Recheck BP in 1 hour  - Will continue to monitor  - RN to notify of any changes      -----------------------------------------------------  ADDENDUM 3:30   Patient feeling SOB after being moved on stretcher by nurses. O2 sat ~90% on 3L NC, HR 110s, SBP 190s. Placed back on BiPAP due to increased WOB with improvement of both O2 sat to 92% and HR to 90s. Physical exam notable for some scattered wheezing and rhonchi. Will give STAT Lasix 40 mg IVP x1 Called by RN as patient's . RN rechecked with manual BP cuff and /90 which is more consistent with patient's baseline during admission. Patient currently asymptomatic. Not due for any antihypertensive medications.       T(C): 36.7 (06-13-22 @ 19:43), Max: 36.7 (06-13-22 @ 13:09)  HR: 79 (06-14-22 @ 01:17) (78 - 92)  BP: 185/90 (06-14-22 @ 01:17) (161/87 - 185/90)  RR: 18 (06-14-22 @ 01:17) (17 - 22)  SpO2: 98% (06-14-22 @ 01:17) (96% - 99%)  Wt(kg): --    Physical :  Gen - NAD  Cardio - RRR, +S1/S2, no murmur  Lung - decrease breath sound b/l at the base, no wheezes, rales, or rhonchi   Abdomen - +BS, NT/ND, no guarding, no rebound, no masses  Ext - no edema, 2+ pulses b/l  Neuro - A&Ox0, non-focal, follows commands appropriately       Assessment/Plan  87 year old female with PMHx COPD, HTN, HLD, T2DM on insulin, hx of MI presents from assisted living facility with dyspnea, wheezing, labored breathing admitted for COPD exacerbation.     1) Hypertension   - STAT Hydralazine 10 mg IVP x1   - Recheck BP in 1 hour  - Will continue to monitor  - RN to notify of any changes      -----------------------------------------------------  ADDENDUM 3:30   Patient feeling SOB after being moved on stretcher by nurses. O2 sat ~90% on 3L NC, HR 110s, SBP 190s. Received PRN albuterol and placed back on BiPAP due to respiratory distress with improvement of both O2 sat to 92%, HR to 90s and /87. Physical exam notable for some scattered wheezing and rhonchi but patient appears comfortable. No further intervention at this time. RN to call if any changes. Called by RN as patient's . RN rechecked with manual BP cuff and /90 which is more consistent with patient's baseline during admission. Patient currently asymptomatic. Not due for any antihypertensive medications.       T(C): 36.7 (06-13-22 @ 19:43), Max: 36.7 (06-13-22 @ 13:09)  HR: 79 (06-14-22 @ 01:17) (78 - 92)  BP: 185/90 (06-14-22 @ 01:17) (161/87 - 185/90)  RR: 18 (06-14-22 @ 01:17) (17 - 22)  SpO2: 98% (06-14-22 @ 01:17) (96% - 99%)  Wt(kg): --    Physical :  Gen - NAD  Cardio - RRR, +S1/S2, no murmur  Lung - decrease breath sound b/l at the base, no wheezes, rales, or rhonchi   Abdomen - +BS, NT/ND, no guarding, no rebound, no masses  Ext - no edema, 2+ pulses b/l  Neuro - A&Ox0, non-focal, follows commands appropriately       Assessment/Plan  87 year old female with PMHx COPD, HTN, HLD, T2DM on insulin, hx of MI presents from assisted living facility with dyspnea, wheezing, labored breathing admitted for COPD exacerbation.     1) Hypertension   - STAT Hydralazine 10 mg IVP x1   - Recheck BP in 1 hour  - Will continue to monitor  - RN to notify of any changes      -----------------------------------------------------  ADDENDUM 3:30   Patient feeling SOB after being moved on stretcher by nurses. O2 sat ~90% on 3L NC, HR 110s, SBP 190s. Received PRN albuterol and placed back on BiPAP due to respiratory distress with improvement of both O2 sat to 92%, HR to 90s and /87. Physical exam notable for some diminished towards bases with some scattered wheezing and rhonchi but patient appears comfortable. Will give STAT dose of Lasix 40 mg IVP x1. RN to call if any changes.

## 2022-06-14 NOTE — PROGRESS NOTE ADULT - PROBLEM SELECTOR PLAN 1
- Likely due to hMPV - resp viral infection + on admission   - patient on BiPAP, attempted to wean off BiPAP this am however patient with tachypnea and increased work of breathing requiring BiPAP  - ABG 7.37/37/95/21  - VBG 7.34/42/72/23   - abg and vbg AM   - NPO while on BiPAP    - acap PRN - robitussin PRN -   - Continue solumedrol 40mg q8 iv, NEBS PRN   - Dr boggs following - Likely due to hMPV - resp viral infection + on admission   - patient on BiPAP, attempted to wean off BiPAP this am however patient with tachypnea and increased work of breathing requiring BiPAP  - ABG 7.37/37/95/21  - VBG 7.34/42/72/23   - abg and vbg AM   - NPO while on BiPAP    - acap PRN - robitussin PRN -   - Continue solumedrol 40mg q12h iv, NEBS PRN   - Dr boggs following

## 2022-06-14 NOTE — PROGRESS NOTE ADULT - ASSESSMENT
87 year old female with PMHx COPD, HTN, HLD, T2DM on insulin, hx of MI presents from assisted living facility with dyspnea, wheezing, labored breathing    copd  copd ex  [place] CKD  HTN  OP  OA  DM  HLD  Moderate to Severe AS  CAD    hyperglycemia noted  vs noted  on NIPPV overnight    cvs rx regimen  BP control  serial renal indices  I and O  monitor VS and HD and Sat  HOB elev - asp prec - assist with needs - ADL - GOC discussion  pt has hMPV - resp viral infection - isolation precs - acap PRN - robitussin PRN -   cxr - labs reviewed  copd - rx regimen PRN for sob and or wheeze, NEBS, not a candidate for Inhaler use  may need Systemic Steroids - will monitor clinical course and decide as clinically indicated  proBNP noted  ABG noted  old records reviewed  spoke with daughter at the bedside

## 2022-06-14 NOTE — PROGRESS NOTE ADULT - SUBJECTIVE AND OBJECTIVE BOX
Date/Time Patient Seen:  		  Referring MD:   Data Reviewed	       Patient is a 87y old  Female who presents with a chief complaint of COPD Exacerbation (13 Jun 2022 15:58)      Subjective/HPI     PAST MEDICAL & SURGICAL HISTORY:  Uncomplicated asthma, unspecified asthma severity    DM2 (diabetes mellitus, type 2)    Essential hypertension    Hypothyroidism, unspecified type    Pure hypercholesterolemia    Gastroesophageal reflux disease without esophagitis    Diabetes    Asthma    CAD (coronary artery disease)    HTN (hypertension)    HLD (hyperlipidemia)    Hypothyroid    NSTEMI (non-ST elevated myocardial infarction)    No significant past surgical history    History of appendectomy    History of appendectomy    Abnormal findings on cardiac catheterization  Cardiac Cath          Medication list         MEDICATIONS  (STANDING):  artificial tears (preservative free) Ophthalmic Solution 1 Drop(s) Both EYES two times a day  aspirin  chewable 81 milliGRAM(s) Oral daily  atorvastatin 80 milliGRAM(s) Oral at bedtime  cholecalciferol 2000 Unit(s) Oral daily  clopidogrel Tablet 75 milliGRAM(s) Oral daily  dextrose 5%. 1000 milliLiter(s) (50 mL/Hr) IV Continuous <Continuous>  dextrose 5%. 1000 milliLiter(s) (100 mL/Hr) IV Continuous <Continuous>  dextrose 50% Injectable 25 Gram(s) IV Push once  dextrose 50% Injectable 12.5 Gram(s) IV Push once  dextrose 50% Injectable 25 Gram(s) IV Push once  escitalopram 10 milliGRAM(s) Oral daily  gabapentin 100 milliGRAM(s) Oral three times a day  glucagon  Injectable 1 milliGRAM(s) IntraMuscular once  heparin   Injectable 5000 Unit(s) SubCutaneous every 12 hours  insulin glargine Injectable (LANTUS) 15 Unit(s) SubCutaneous every morning  insulin lispro (ADMELOG) corrective regimen sliding scale   SubCutaneous every 6 hours  isosorbide   mononitrate ER Tablet (IMDUR) 30 milliGRAM(s) Oral daily  levothyroxine 125 MICROGram(s) Oral daily  metoprolol succinate ER 50 milliGRAM(s) Oral daily  sodium chloride 0.9%. 1000 milliLiter(s) (50 mL/Hr) IV Continuous <Continuous>    MEDICATIONS  (PRN):  acetaminophen     Tablet .. 650 milliGRAM(s) Oral every 6 hours PRN Mild Pain (1 - 3)  ALBUTerol    0.083% 2.5 milliGRAM(s) Nebulizer every 4 hours PRN Shortness of Breath and/or Wheezing  ALPRAZolam 0.5 milliGRAM(s) Oral at bedtime PRN anxiety  dextrose Oral Gel 15 Gram(s) Oral once PRN Blood Glucose LESS THAN 70 milliGRAM(s)/deciliter  melatonin 3 milliGRAM(s) Oral at bedtime PRN Insomnia         Vitals log        ICU Vital Signs Last 24 Hrs  T(C): 36.7 (13 Jun 2022 19:43), Max: 36.7 (13 Jun 2022 13:09)  T(F): 98.1 (13 Jun 2022 19:43), Max: 98.1 (13 Jun 2022 19:43)  HR: 76 (14 Jun 2022 04:00) (75 - 92)  BP: 151/87 (14 Jun 2022 03:35) (151/87 - 185/90)  BP(mean): --  ABP: --  ABP(mean): --  RR: 29 (14 Jun 2022 03:35) (17 - 29)  SpO2: 94% (14 Jun 2022 04:00) (94% - 99%)           Input and Output:  I&O's Detail      Lab Data                        12.2   12.64 )-----------( 178      ( 13 Jun 2022 02:27 )             40.1     06-13    140  |  108  |  31<H>  ----------------------------<  188<H>  4.6   |  24  |  1.50<H>    Ca    9.0      13 Jun 2022 02:27    TPro  7.5  /  Alb  3.2<L>  /  TBili  0.3  /  DBili  x   /  AST  23  /  ALT  23  /  AlkPhos  75  06-13    ABG - ( 13 Jun 2022 08:42 )  pH, Arterial: 7.35  pH, Blood: x     /  pCO2: 39    /  pO2: 192   / HCO3: 22    / Base Excess: -4.1  /  SaO2: 100.0                   Review of Systems	      Objective     Physical Examination    heart s1s2  lung dec BS  abd soft      Pertinent Lab findings & Imaging      Schwarz:  NO   Adequate UO     I&O's Detail           Discussed with:     Cultures:	        Radiology

## 2022-06-14 NOTE — PROGRESS NOTE ADULT - PROBLEM SELECTOR PLAN 4
- over night  elevated BP, s/p 1x hydralazine 10mg and Lasix 40mg x1. -170  - will give another dose of Lasix 40mg  x1 --> as patient has sob with mild crackle at the base, creatinin3 stable 1.4(baseline 1.6)   - Continue home Metoprolol Succinate 50 mg qd with hold parameters  - Monitor routine hemodynamics

## 2022-06-14 NOTE — PROGRESS NOTE ADULT - SUBJECTIVE AND OBJECTIVE BOX
Patient is a 87y old  Female who presents with a chief complaint of COPD Exacerbation (14 Jun 2022 05:51)      INTERVAL HPI/OVERNIGHT EVENTS: Over night, elevated BP, s/p 1x hydralazine 10mg and Lasix 40mg x1. Received PRN albuterol and placed back on BiPAP due to respiratory distress with improvement of both O2 sat to 92%, HR to 90s and /87. Patient seen and examined at bedside. Patient with nasal cannula with increase WOB and SOB worsening at rest, placed back on  BiPAP. + shaking and agitation, x1 xanax. Denies fever/chills, chest pain, headache, dizziness, nausea/vomiting, abdominal pain.     MEDICATIONS  (STANDING):  artificial tears (preservative free) Ophthalmic Solution 1 Drop(s) Both EYES two times a day  aspirin  chewable 81 milliGRAM(s) Oral daily  atorvastatin 80 milliGRAM(s) Oral at bedtime  cholecalciferol 2000 Unit(s) Oral daily  clopidogrel Tablet 75 milliGRAM(s) Oral daily  dextrose 5%. 1000 milliLiter(s) (50 mL/Hr) IV Continuous <Continuous>  dextrose 5%. 1000 milliLiter(s) (100 mL/Hr) IV Continuous <Continuous>  dextrose 50% Injectable 25 Gram(s) IV Push once  dextrose 50% Injectable 12.5 Gram(s) IV Push once  dextrose 50% Injectable 25 Gram(s) IV Push once  escitalopram 10 milliGRAM(s) Oral daily  gabapentin 100 milliGRAM(s) Oral three times a day  glucagon  Injectable 1 milliGRAM(s) IntraMuscular once  heparin   Injectable 5000 Unit(s) SubCutaneous every 12 hours  insulin glargine Injectable (LANTUS) 15 Unit(s) SubCutaneous every morning  insulin lispro (ADMELOG) corrective regimen sliding scale   SubCutaneous every 6 hours  isosorbide   mononitrate ER Tablet (IMDUR) 30 milliGRAM(s) Oral daily  levothyroxine 125 MICROGram(s) Oral daily  methylPREDNISolone sodium succinate Injectable 40 milliGRAM(s) IV Push two times a day  metoprolol succinate ER 50 milliGRAM(s) Oral daily  sodium chloride 0.9%. 1000 milliLiter(s) (50 mL/Hr) IV Continuous <Continuous>    MEDICATIONS  (PRN):  acetaminophen     Tablet .. 650 milliGRAM(s) Oral every 6 hours PRN Mild Pain (1 - 3)  ALBUTerol    0.083% 2.5 milliGRAM(s) Nebulizer every 4 hours PRN Shortness of Breath and/or Wheezing  dextrose Oral Gel 15 Gram(s) Oral once PRN Blood Glucose LESS THAN 70 milliGRAM(s)/deciliter  melatonin 3 milliGRAM(s) Oral at bedtime PRN Insomnia      Allergies    doxycycline (Unknown)  iodine (Hives)  iodine containing compounds (Unknown)  shellfish (Anaphylaxis)  shellfish (Swelling; Short breath)    Intolerances      REVIEW OF SYSTEMS: Denies chest pain/ pressure, headache, + sob,  Rest of ROS Unable to obtain meaningful ROS 2/2 confusion        Vital Signs Last 24 Hrs  T(C): 36.1 (14 Jun 2022 10:09), Max: 36.7 (13 Jun 2022 18:37)  T(F): 97 (14 Jun 2022 10:09), Max: 98.1 (13 Jun 2022 19:43)  HR: 68 (14 Jun 2022 12:40) (68 - 96)  BP: 170/83 (14 Jun 2022 10:09) (151/87 - 185/90)  BP(mean): --  RR: 23 (14 Jun 2022 10:09) (17 - 29)  SpO2: 97% (14 Jun 2022 12:40) (94% - 98%)    PHYSICAL EXAM:  GENERAL: Mild distress, + shaking, on BiPAP   HEENT:  anicteric, moist mucous membranes  CHEST/LUNG: decrease breath sound b/l at the base, + mild crackles at the bases. no rales, wheezes, or rhonchi  HEART:  RRR, S1, S2  ABDOMEN:  BS+, soft, nontender, nondistended  EXTREMITIES: no edema, cyanosis, or calf tenderness  NERVOUS SYSTEM: awake, alert, oriented x1    LABS:                        12.9   22.06 )-----------( 214      ( 14 Jun 2022 06:54 )             41.9     CBC Full  -  ( 14 Jun 2022 06:54 )  WBC Count : 22.06 K/uL  Hemoglobin : 12.9 g/dL  Hematocrit : 41.9 %  Platelet Count - Automated : 214 K/uL  Mean Cell Volume : 90.1 fl  Mean Cell Hemoglobin : 27.7 pg  Mean Cell Hemoglobin Concentration : 30.8 gm/dL  Auto Neutrophil # : 18.23 K/uL  Auto Lymphocyte # : 2.14 K/uL  Auto Monocyte # : 1.47 K/uL  Auto Eosinophil # : 0.00 K/uL  Auto Basophil # : 0.04 K/uL  Auto Neutrophil % : 82.6 %  Auto Lymphocyte % : 9.7 %  Auto Monocyte % : 6.7 %  Auto Eosinophil % : 0.0 %  Auto Basophil % : 0.2 %    14 Jun 2022 06:54    139    |  107    |  40     ----------------------------<  236    4.3     |  23     |  1.40     Ca    8.7        14 Jun 2022 06:54  Mg     2.3       14 Jun 2022 06:54    TPro  7.4    /  Alb  3.1    /  TBili  0.4    /  DBili  x      /  AST  23     /  ALT  23     /  AlkPhos  74     14 Jun 2022 06:54    PT/INR - ( 13 Jun 2022 02:27 )   PT: 12.4 sec;   INR: 1.06 ratio         PTT - ( 13 Jun 2022 02:27 )  PTT:32.5 sec    CAPILLARY BLOOD GLUCOSE      POCT Blood Glucose.: 191 mg/dL (14 Jun 2022 11:09)  POCT Blood Glucose.: 208 mg/dL (14 Jun 2022 07:17)  POCT Blood Glucose.: 198 mg/dL (14 Jun 2022 00:53)  POCT Blood Glucose.: 238 mg/dL (13 Jun 2022 17:52)        Culture - Blood (collected 06-13-22 @ 08:51)  Source: .Blood Blood  Preliminary Report (06-14-22 @ 09:01):    No growth to date.    Culture - Blood (collected 06-13-22 @ 08:51)  Source: .Blood Blood  Preliminary Report (06-14-22 @ 09:01):    No growth to date.        RADIOLOGY & ADDITIONAL TESTS:      Personally reviewed.     Consultant(s) Notes Reviewed:  [x] YES  [ ] NO

## 2022-06-14 NOTE — PROGRESS NOTE ADULT - ATTENDING COMMENTS
Hospitalist attending  I have personally seen and examined the patient.  I fully participated in the care of this patient.  I have made amendments to the documentation where necessary, and agree with the history, physical exam, and plan as documented by the resident.    PHYSICAL EXAM:  GENERAL: Mild distress, + shaking, on BiPAP   HEENT:  anicteric, moist mucous membranes  CHEST/LUNG: decrease breath sound b/l at the base, + mild crackles at the bases. no rales, wheezes, or rhonchi  HEART:  RRR, S1, S2  ABDOMEN:  BS+, soft, nontender, nondistended  EXTREMITIES: no edema, cyanosis, or calf tenderness  NERVOUS SYSTEM: awake, alert, oriented x1    87 year old female with PMHx COPD, HTN, HLD, T2DM on insulin, hx of MI presents from assisted living facility with dyspnea, wheezing, labored breathing admitted for COPD exacerbation.     - Patient currently requiring Bipap. NPO. Maintenance IVF while on Bipap  - Started solumedrol 40 mg Q12H  - DNR/DNI. C/w ongoing GOC. Hospitalist attending  I have personally seen and examined the patient.  I fully participated in the care of this patient.  I have made amendments to the documentation where necessary, and agree with the history, physical exam, and plan as documented by the resident.    PHYSICAL EXAM:  GENERAL: Mild distress, + shaking, on BiPAP   HEENT:  anicteric, moist mucous membranes  CHEST/LUNG: decrease breath sound b/l at the base, + mild crackles at the bases. no rales, wheezes, or rhonchi  HEART:  RRR, S1, S2  ABDOMEN:  BS+, soft, nontender, nondistended  EXTREMITIES: no edema, cyanosis, or calf tenderness  NERVOUS SYSTEM: awake, alert, oriented x1    87 year old female with PMHx COPD, HTN, HLD, T2DM on insulin, hx of MI presents from assisted living facility with dyspnea, wheezing, labored breathing admitted for COPD exacerbation.     - Patient currently requiring Bipap. NPO. Iv LASIX 40mg IV x1 --> will give one more dose at bedtime   - Started solumedrol 40 mg Q12H  - DNR/DNI. C/w ongoing GOC.

## 2022-06-14 NOTE — PATIENT PROFILE ADULT - FALL HARM RISK - HARM RISK INTERVENTIONS
Assistance with ambulation/Assistance OOB with selected safe patient handling equipment/Communicate Risk of Fall with Harm to all staff/Discuss with provider need for PT consult/Monitor for mental status changes/Monitor gait and stability/Move patient closer to nurses' station/Provide patient with walking aids - walker, cane, crutches/Reinforce activity limits and safety measures with patient and family/Reorient to person, place and time as needed/Tailored Fall Risk Interventions/Toileting schedule using arm’s reach rule for commode and bathroom/Use of alarms - bed, chair and/or voice tab/Visual Cue: Yellow wristband and red socks/Bed in lowest position, wheels locked, appropriate side rails in place/Call bell, personal items and telephone in reach/Instruct patient to call for assistance before getting out of bed or chair/Non-slip footwear when patient is out of bed/[place] to call system/Physically safe environment - no spills, clutter or unnecessary equipment/Purposeful Proactive Rounding/Room/bathroom lighting operational, light cord in reach

## 2022-06-14 NOTE — PROGRESS NOTE ADULT - PROBLEM SELECTOR PLAN 2
- sob worsen with increase WOB. On BiPAP. Likely 2/2 to Hmpv virus   - RVP + HMPV   - NPO as  patient on Bipap  - S/p Solumedrol 125 mg IVP x1 in ED; Solumedrol 40 mg IVP q8h with plans for taper  - Continue home medications - Duoneb q6h, Spiriva, Proventil  - Supplemental O2 PRN. Will attempt to wean patient to baseline O2 status. COPD patient will keep SpO2 goal 88-93%   - Encourage incentive spirometry  - CXR to r/o infection vs fluid over load  - Pulmonary (Dr. Copeland) consulted, f/u recs - sob worsen with increase WOB. On BiPAP. Likely 2/2 to Hmpv virus   - RVP + HMPV   - NPO as  patient on Bipap  - S/p Solumedrol 125 mg IVP x1 in ED; Solumedrol 40 mg IVP q12h with plans for taper  - Continue home medications - Duoneb q6h, Spiriva, Proventil  - Supplemental O2 PRN. Will attempt to wean patient to baseline O2 status. COPD patient will keep SpO2 goal 88-93%   - Encourage incentive spirometry  - CXR to r/o infection vs fluid over load  - Pulmonary (Dr. Copeland) consulted, f/u recs

## 2022-06-14 NOTE — PROGRESS NOTE ADULT - PROBLEM SELECTOR PLAN 3
- Chronic, known history of T2DM  - Patient is NPO as on Bipap will keep NPO for now, once stable will start DASH/TLC diet   - home Lantus 30 units, will decrease Lantus 15 U qd and  Moderate dose insulin corrective scale while npo  - hold home premeal insulin while npo   - Gentle hydration with close monitoring of volume status  - Hypoglycemia protocol, fingerstick glucose QAC&HS   - HbA1c: 10

## 2022-06-14 NOTE — PATIENT PROFILE ADULT - FUNCTIONAL SCREEN CURRENT LEVEL: COMMUNICATION, MLM
0 = understands/communicates without difficulty 2 = difficulty speaking (not related to language barrier)

## 2022-06-14 NOTE — PROGRESS NOTE ADULT - PROBLEM SELECTOR PLAN 8
- Hx of MI  - Continue home Aspirin 81 mg PO qd, Plavix 75 mg PO qd, imdur 30mg daily   - Echo 3/11/2022: Left ventricle was of normal size, mild left ventricular hypertrophy LV   systolic function EF 65%. Moderate to severe aortic stenosis

## 2022-06-15 LAB
ALBUMIN SERPL ELPH-MCNC: 3 G/DL — LOW (ref 3.3–5)
ALP SERPL-CCNC: 69 U/L — SIGNIFICANT CHANGE UP (ref 40–120)
ALT FLD-CCNC: 21 U/L — SIGNIFICANT CHANGE UP (ref 12–78)
ANION GAP SERPL CALC-SCNC: 8 MMOL/L — SIGNIFICANT CHANGE UP (ref 5–17)
AST SERPL-CCNC: 15 U/L — SIGNIFICANT CHANGE UP (ref 15–37)
BASE EXCESS BLDV CALC-SCNC: -3.3 MMOL/L — LOW (ref -2–3)
BASOPHILS # BLD AUTO: 0.02 K/UL — SIGNIFICANT CHANGE UP (ref 0–0.2)
BASOPHILS NFR BLD AUTO: 0.2 % — SIGNIFICANT CHANGE UP (ref 0–2)
BILIRUB SERPL-MCNC: 0.5 MG/DL — SIGNIFICANT CHANGE UP (ref 0.2–1.2)
BLOOD GAS COMMENTS, VENOUS: SIGNIFICANT CHANGE UP
BUN SERPL-MCNC: 59 MG/DL — HIGH (ref 7–23)
CALCIUM SERPL-MCNC: 8.9 MG/DL — SIGNIFICANT CHANGE UP (ref 8.5–10.1)
CHLORIDE SERPL-SCNC: 108 MMOL/L — SIGNIFICANT CHANGE UP (ref 96–108)
CO2 SERPL-SCNC: 23 MMOL/L — SIGNIFICANT CHANGE UP (ref 22–31)
CREAT SERPL-MCNC: 1.7 MG/DL — HIGH (ref 0.5–1.3)
EGFR: 29 ML/MIN/1.73M2 — LOW
EOSINOPHIL # BLD AUTO: 0 K/UL — SIGNIFICANT CHANGE UP (ref 0–0.5)
EOSINOPHIL NFR BLD AUTO: 0 % — SIGNIFICANT CHANGE UP (ref 0–6)
GLUCOSE SERPL-MCNC: 249 MG/DL — HIGH (ref 70–99)
HCO3 BLDV-SCNC: 23 MMOL/L — SIGNIFICANT CHANGE UP (ref 22–29)
HCT VFR BLD CALC: 38.6 % — SIGNIFICANT CHANGE UP (ref 34.5–45)
HGB BLD-MCNC: 12.1 G/DL — SIGNIFICANT CHANGE UP (ref 11.5–15.5)
IMM GRANULOCYTES NFR BLD AUTO: 0.8 % — SIGNIFICANT CHANGE UP (ref 0–1.5)
LYMPHOCYTES # BLD AUTO: 1.38 K/UL — SIGNIFICANT CHANGE UP (ref 1–3.3)
LYMPHOCYTES # BLD AUTO: 10.6 % — LOW (ref 13–44)
MAGNESIUM SERPL-MCNC: 2.6 MG/DL — SIGNIFICANT CHANGE UP (ref 1.6–2.6)
MCHC RBC-ENTMCNC: 28.3 PG — SIGNIFICANT CHANGE UP (ref 27–34)
MCHC RBC-ENTMCNC: 31.3 GM/DL — LOW (ref 32–36)
MCV RBC AUTO: 90.4 FL — SIGNIFICANT CHANGE UP (ref 80–100)
MONOCYTES # BLD AUTO: 0.34 K/UL — SIGNIFICANT CHANGE UP (ref 0–0.9)
MONOCYTES NFR BLD AUTO: 2.6 % — SIGNIFICANT CHANGE UP (ref 2–14)
NEUTROPHILS # BLD AUTO: 11.22 K/UL — HIGH (ref 1.8–7.4)
NEUTROPHILS NFR BLD AUTO: 85.8 % — HIGH (ref 43–77)
NRBC # BLD: 0 /100 WBCS — SIGNIFICANT CHANGE UP (ref 0–0)
PCO2 BLDV: 47 MMHG — HIGH (ref 39–42)
PH BLDV: 7.3 — LOW (ref 7.32–7.43)
PHOSPHATE SERPL-MCNC: 4.9 MG/DL — HIGH (ref 2.5–4.5)
PLATELET # BLD AUTO: 181 K/UL — SIGNIFICANT CHANGE UP (ref 150–400)
PO2 BLDV: 87 MMHG — HIGH (ref 25–45)
POTASSIUM SERPL-MCNC: 4.5 MMOL/L — SIGNIFICANT CHANGE UP (ref 3.5–5.3)
POTASSIUM SERPL-SCNC: 4.5 MMOL/L — SIGNIFICANT CHANGE UP (ref 3.5–5.3)
PROT SERPL-MCNC: 7 G/DL — SIGNIFICANT CHANGE UP (ref 6–8.3)
RBC # BLD: 4.27 M/UL — SIGNIFICANT CHANGE UP (ref 3.8–5.2)
RBC # FLD: 13.8 % — SIGNIFICANT CHANGE UP (ref 10.3–14.5)
SAO2 % BLDV: 98.7 % — HIGH (ref 67–88)
SODIUM SERPL-SCNC: 139 MMOL/L — SIGNIFICANT CHANGE UP (ref 135–145)
WBC # BLD: 13.06 K/UL — HIGH (ref 3.8–10.5)
WBC # FLD AUTO: 13.06 K/UL — HIGH (ref 3.8–10.5)

## 2022-06-15 PROCEDURE — 93306 TTE W/DOPPLER COMPLETE: CPT | Mod: 26

## 2022-06-15 PROCEDURE — 71045 X-RAY EXAM CHEST 1 VIEW: CPT | Mod: 26

## 2022-06-15 PROCEDURE — 99233 SBSQ HOSP IP/OBS HIGH 50: CPT

## 2022-06-15 RX ORDER — INSULIN LISPRO 100/ML
8 VIAL (ML) SUBCUTANEOUS
Refills: 0 | Status: DISCONTINUED | OUTPATIENT
Start: 2022-06-15 | End: 2022-07-01

## 2022-06-15 RX ORDER — ALPRAZOLAM 0.25 MG
0.5 TABLET ORAL
Refills: 0 | Status: DISCONTINUED | OUTPATIENT
Start: 2022-06-15 | End: 2022-06-15

## 2022-06-15 RX ADMIN — Medication 8 UNIT(S): at 12:40

## 2022-06-15 RX ADMIN — ATORVASTATIN CALCIUM 80 MILLIGRAM(S): 80 TABLET, FILM COATED ORAL at 22:19

## 2022-06-15 RX ADMIN — Medication 8 UNIT(S): at 17:23

## 2022-06-15 RX ADMIN — Medication 50 MILLIGRAM(S): at 08:39

## 2022-06-15 RX ADMIN — Medication 3 MILLILITER(S): at 00:18

## 2022-06-15 RX ADMIN — Medication 0.5 MILLIGRAM(S): at 12:53

## 2022-06-15 RX ADMIN — Medication 81 MILLIGRAM(S): at 12:39

## 2022-06-15 RX ADMIN — ALBUTEROL 2.5 MILLIGRAM(S): 90 AEROSOL, METERED ORAL at 12:29

## 2022-06-15 RX ADMIN — GABAPENTIN 100 MILLIGRAM(S): 400 CAPSULE ORAL at 17:17

## 2022-06-15 RX ADMIN — Medication 40 MILLIGRAM(S): at 06:22

## 2022-06-15 RX ADMIN — Medication 2000 UNIT(S): at 12:40

## 2022-06-15 RX ADMIN — Medication 4: at 17:18

## 2022-06-15 RX ADMIN — Medication 650 MILLIGRAM(S): at 09:58

## 2022-06-15 RX ADMIN — ESCITALOPRAM OXALATE 10 MILLIGRAM(S): 10 TABLET, FILM COATED ORAL at 12:40

## 2022-06-15 RX ADMIN — Medication 8: at 12:37

## 2022-06-15 RX ADMIN — ISOSORBIDE MONONITRATE 30 MILLIGRAM(S): 60 TABLET, EXTENDED RELEASE ORAL at 12:39

## 2022-06-15 RX ADMIN — Medication 40 MILLIGRAM(S): at 08:36

## 2022-06-15 RX ADMIN — INSULIN GLARGINE 15 UNIT(S): 100 INJECTION, SOLUTION SUBCUTANEOUS at 08:37

## 2022-06-15 RX ADMIN — Medication 125 MICROGRAM(S): at 08:35

## 2022-06-15 RX ADMIN — Medication 3 MILLILITER(S): at 14:53

## 2022-06-15 RX ADMIN — Medication 1 DROP(S): at 17:19

## 2022-06-15 RX ADMIN — GABAPENTIN 100 MILLIGRAM(S): 400 CAPSULE ORAL at 22:19

## 2022-06-15 RX ADMIN — HEPARIN SODIUM 5000 UNIT(S): 5000 INJECTION INTRAVENOUS; SUBCUTANEOUS at 17:18

## 2022-06-15 RX ADMIN — Medication 650 MILLIGRAM(S): at 10:58

## 2022-06-15 RX ADMIN — HEPARIN SODIUM 5000 UNIT(S): 5000 INJECTION INTRAVENOUS; SUBCUTANEOUS at 06:21

## 2022-06-15 RX ADMIN — Medication 4: at 06:22

## 2022-06-15 RX ADMIN — Medication 1 DROP(S): at 06:21

## 2022-06-15 RX ADMIN — CLOPIDOGREL BISULFATE 75 MILLIGRAM(S): 75 TABLET, FILM COATED ORAL at 12:40

## 2022-06-15 RX ADMIN — Medication 3 MILLILITER(S): at 07:35

## 2022-06-15 RX ADMIN — GABAPENTIN 100 MILLIGRAM(S): 400 CAPSULE ORAL at 09:58

## 2022-06-15 RX ADMIN — Medication 3 MILLILITER(S): at 20:12

## 2022-06-15 NOTE — PROGRESS NOTE ADULT - ASSESSMENT
87 year old female with PMHx COPD, HTN, HLD, T2DM on insulin, hx of MI presents from assisted living facility with dyspnea, wheezing, labored breathing    copd  copd ex  [place] CKD  HTN  OP  OA  DM  HLD  Moderate to Severe AS  CAD    hyperglycemia noted  poorly controlled DM  on lasix  on nebs  on steroids - will taper this am   ABG noted  vs noted  on NIPPV overnight    cvs rx regimen  BP control  serial renal indices  I and O  monitor VS and HD and Sat  HOB elev - asp prec - assist with needs - ADL - GOC discussion  pt has hMPV - resp viral infection - isolation precs - acap PRN - robitussin PRN -   cxr - labs reviewed  copd - rx regimen PRN for sob and or wheeze, NEBS, not a candidate for Inhaler use  may need Systemic Steroids - will monitor clinical course and decide as clinically indicated  proBNP noted  ABG noted  old records reviewed  spoke with daughter at the bedside

## 2022-06-15 NOTE — PROGRESS NOTE ADULT - PROBLEM SELECTOR PLAN 3
- Chronic, known history of T2DM  - started po carb cons DASH/TLC diet   - home Lantus 30 units, will decrease Lantus 15 U qd and  Moderate dose insulin corrective scale   - Resume home premeal insulin   - Gentle hydration with close monitoring of volume status  - Hypoglycemia protocol, fingerstick glucose QAC&HS   - HbA1c: 10

## 2022-06-15 NOTE — ED ADULT NURSE REASSESSMENT NOTE - NS ED NURSE REASSESS COMMENT FT1
Dr Schultz in the see pt. Pt anxious.  Med to be ordered.  VS stable.  pt repeatedly pulling off oxygen.  Educated pt multiple times on oxygen use.
Patient began not tolerating NC stated feeling short of breath, placed back on bipap as per resident instructions. Will continue to monitor.
pt admitted no bed available vital signs stable pt made confortable on hospital bed still awaiting
pt reavaluated and total care given and medicated as ordered awaiting bed assignment
tolerating NC with noted no SOB and pulse ox98%.  Await in pt bed
Received pt from [facility].  Pt alert, anxious.  Pt vs stable.  Lasix given.  Repositioned patient. Pt o2 95% 2 L. Awaiting bed.

## 2022-06-15 NOTE — PHYSICAL THERAPY INITIAL EVALUATION ADULT - ADDITIONAL COMMENTS
Pt from [facility]. Pt ambulated short distances in group home with RW. Pt required assistance with ADLs from group home staff,

## 2022-06-15 NOTE — PROGRESS NOTE ADULT - PROBLEM SELECTOR PLAN 1
- Likely due to hMPV - resp viral infection + on admission   - patient was on BiPAP, weaned off BiPAP this am to 2L O2 NC. saturating well 95%  - Will Start on PO diet   -Xanax  PRN - robitussin PRN -   - Continue solumedrol 40mg q12h iv, NEBS PRN   - Dr boggs following

## 2022-06-15 NOTE — PROGRESS NOTE ADULT - SUBJECTIVE AND OBJECTIVE BOX
Patient is a 87y old  Female who presents with a chief complaint of COPD Exacerbation (15 Charbel 2022 05:52)      INTERVAL HPI/OVERNIGHT EVENTS: Pt seen and examined bedside, in ED. Pt is NPO and is on O2 NC. Pt is shaking probably 2/2 anxiety. She is also pulling on NC. She is alert awake, deneis any CP, abd pain. or palpitationa. c/o anxiety and SOB. Pt was on BIPAP and switched to O2 NC. saturating well to 95% on 2L.  MEDICATIONS  (STANDING):  albuterol/ipratropium for Nebulization 3 milliLiter(s) Nebulizer every 6 hours  artificial tears (preservative free) Ophthalmic Solution 1 Drop(s) Both EYES two times a day  aspirin  chewable 81 milliGRAM(s) Oral daily  atorvastatin 80 milliGRAM(s) Oral at bedtime  cholecalciferol 2000 Unit(s) Oral daily  clopidogrel Tablet 75 milliGRAM(s) Oral daily  dextrose 5%. 1000 milliLiter(s) (100 mL/Hr) IV Continuous <Continuous>  dextrose 5%. 1000 milliLiter(s) (50 mL/Hr) IV Continuous <Continuous>  dextrose 50% Injectable 25 Gram(s) IV Push once  dextrose 50% Injectable 12.5 Gram(s) IV Push once  dextrose 50% Injectable 25 Gram(s) IV Push once  escitalopram 10 milliGRAM(s) Oral daily  furosemide   Injectable 40 milliGRAM(s) IV Push <User Schedule>  gabapentin 100 milliGRAM(s) Oral three times a day  glucagon  Injectable 1 milliGRAM(s) IntraMuscular once  heparin   Injectable 5000 Unit(s) SubCutaneous every 12 hours  insulin glargine Injectable (LANTUS) 15 Unit(s) SubCutaneous every morning  insulin lispro (ADMELOG) corrective regimen sliding scale   SubCutaneous every 6 hours  isosorbide   mononitrate ER Tablet (IMDUR) 30 milliGRAM(s) Oral daily  levothyroxine 125 MICROGram(s) Oral daily  methylPREDNISolone sodium succinate Injectable 40 milliGRAM(s) IV Push daily  metoprolol succinate ER 50 milliGRAM(s) Oral daily    MEDICATIONS  (PRN):  acetaminophen     Tablet .. 650 milliGRAM(s) Oral every 6 hours PRN Mild Pain (1 - 3)  ALBUTerol    0.083% 2.5 milliGRAM(s) Nebulizer every 4 hours PRN Shortness of Breath and/or Wheezing  ALPRAZolam 0.5 milliGRAM(s) Oral four times a day PRN Anxiety  dextrose Oral Gel 15 Gram(s) Oral once PRN Blood Glucose LESS THAN 70 milliGRAM(s)/deciliter  melatonin 3 milliGRAM(s) Oral at bedtime PRN Insomnia      Allergies    doxycycline (Unknown)  iodine (Hives)  iodine containing compounds (Unknown)  shellfish (Anaphylaxis)  shellfish (Swelling; Short breath)    Intolerances        REVIEW OF SYSTEMS:  CONSTITUTIONAL: No fever or chills, Anxious  HEENT:  No headache, no sore throat  RESPIRATORY: No cough, wheezing, +shortness of breath  CARDIOVASCULAR: No chest pain, palpitations, or leg swelling  GASTROINTESTINAL: No abd pain, nausea, vomiting, or diarrhea  GENITOURINARY: No dysuria, frequency, or hematuria  NEUROLOGICAL: no focal weakness or dizziness  MUSCULOSKELETAL:+ myalgias     Vital Signs Last 24 Hrs  T(C): 36.9 (15 Charbel 2022 08:09), Max: 36.9 (15 Charbel 2022 08:09)  T(F): 98.5 (15 Charbel 2022 08:09), Max: 98.5 (15 Charbel 2022 08:09)  HR: 74 (15 Charbel 2022 09:50) (66 - 77)  BP: 133/73 (15 Charbel 2022 09:50) (129/72 - 155/87)  BP(mean): --  RR: 19 (15 Charbel 2022 09:50) (19 - 21)  SpO2: 95% (15 Charbel 2022 09:50) (94% - 99%)    PHYSICAL EXAM:  GENERAL: Shaking and anxious.  HEENT:  EOMI, moist mucous membranes  CHEST/LUNG:  CTA b/l, no rales, wheezes, or rhonchi  HEART:  RRR, S1, S2  ABDOMEN:  BS+, soft, nontender, nondistended  EXTREMITIES: no edema, cyanosis, or calf tenderness  NERVOUS SYSTEM: AA&Ox2    LABS:                        12.1   13.06 )-----------( 181      ( 15 Charbel 2022 07:21 )             38.6     CBC Full  -  ( 15 Charbel 2022 07:21 )  WBC Count : 13.06 K/uL  Hemoglobin : 12.1 g/dL  Hematocrit : 38.6 %  Platelet Count - Automated : 181 K/uL  Mean Cell Volume : 90.4 fl  Mean Cell Hemoglobin : 28.3 pg  Mean Cell Hemoglobin Concentration : 31.3 gm/dL  Auto Neutrophil # : 11.22 K/uL  Auto Lymphocyte # : 1.38 K/uL  Auto Monocyte # : 0.34 K/uL  Auto Eosinophil # : 0.00 K/uL  Auto Basophil # : 0.02 K/uL  Auto Neutrophil % : 85.8 %  Auto Lymphocyte % : 10.6 %  Auto Monocyte % : 2.6 %  Auto Eosinophil % : 0.0 %  Auto Basophil % : 0.2 %    15 Charbel 2022 07:21    139    |  108    |  59     ----------------------------<  249    4.5     |  23     |  1.70     Ca    8.9        15 Charbel 2022 07:21  Phos  4.9       15 Charbel 2022 07:21  Mg     2.6       15 Charbel 2022 07:21    TPro  7.0    /  Alb  3.0    /  TBili  0.5    /  DBili  x      /  AST  15     /  ALT  21     /  AlkPhos  69     15 Charbel 2022 07:21        CAPILLARY BLOOD GLUCOSE      POCT Blood Glucose.: 232 mg/dL (15 Charbel 2022 05:44)  POCT Blood Glucose.: 235 mg/dL (14 Jun 2022 22:31)  POCT Blood Glucose.: 199 mg/dL (14 Jun 2022 16:49)  POCT Blood Glucose.: 191 mg/dL (14 Jun 2022 11:09)        Culture - Blood (collected 06-13-22 @ 08:51)  Source: .Blood Blood  Preliminary Report (06-14-22 @ 09:01):    No growth to date.    Culture - Blood (collected 06-13-22 @ 08:51)  Source: .Blood Blood  Preliminary Report (06-14-22 @ 09:01):    No growth to date.        RADIOLOGY & ADDITIONAL TESTS:    Personally reviewed.     Consultant(s) Notes Reviewed:  [x] YES  [ ] NO    Care Discussed with [x] Consultants  [x] Patient  [ ] Family  [ ]      [ ] Other; RN  DVT ppx

## 2022-06-15 NOTE — PROGRESS NOTE ADULT - SUBJECTIVE AND OBJECTIVE BOX
Date/Time Patient Seen:  		  Referring MD:   Data Reviewed	       Patient is a 87y old  Female who presents with a chief complaint of COPD Exacerbation (14 Jun 2022 13:55)      Subjective/HPI     PAST MEDICAL & SURGICAL HISTORY:  Uncomplicated asthma, unspecified asthma severity    DM2 (diabetes mellitus, type 2)    Essential hypertension    Hypothyroidism, unspecified type    Pure hypercholesterolemia    Gastroesophageal reflux disease without esophagitis    Diabetes    Asthma    CAD (coronary artery disease)    HTN (hypertension)    HLD (hyperlipidemia)    Hypothyroid    NSTEMI (non-ST elevated myocardial infarction)    No significant past surgical history    History of appendectomy    History of appendectomy    Abnormal findings on cardiac catheterization  Cardiac Cath          Medication list         MEDICATIONS  (STANDING):  albuterol/ipratropium for Nebulization 3 milliLiter(s) Nebulizer every 6 hours  artificial tears (preservative free) Ophthalmic Solution 1 Drop(s) Both EYES two times a day  aspirin  chewable 81 milliGRAM(s) Oral daily  atorvastatin 80 milliGRAM(s) Oral at bedtime  cholecalciferol 2000 Unit(s) Oral daily  clopidogrel Tablet 75 milliGRAM(s) Oral daily  dextrose 5%. 1000 milliLiter(s) (50 mL/Hr) IV Continuous <Continuous>  dextrose 5%. 1000 milliLiter(s) (100 mL/Hr) IV Continuous <Continuous>  dextrose 50% Injectable 25 Gram(s) IV Push once  dextrose 50% Injectable 12.5 Gram(s) IV Push once  dextrose 50% Injectable 25 Gram(s) IV Push once  escitalopram 10 milliGRAM(s) Oral daily  furosemide   Injectable 40 milliGRAM(s) IV Push <User Schedule>  gabapentin 100 milliGRAM(s) Oral three times a day  glucagon  Injectable 1 milliGRAM(s) IntraMuscular once  heparin   Injectable 5000 Unit(s) SubCutaneous every 12 hours  insulin glargine Injectable (LANTUS) 15 Unit(s) SubCutaneous every morning  insulin lispro (ADMELOG) corrective regimen sliding scale   SubCutaneous every 6 hours  isosorbide   mononitrate ER Tablet (IMDUR) 30 milliGRAM(s) Oral daily  levothyroxine 125 MICROGram(s) Oral daily  methylPREDNISolone sodium succinate Injectable 40 milliGRAM(s) IV Push two times a day  metoprolol succinate ER 50 milliGRAM(s) Oral daily    MEDICATIONS  (PRN):  acetaminophen     Tablet .. 650 milliGRAM(s) Oral every 6 hours PRN Mild Pain (1 - 3)  ALBUTerol    0.083% 2.5 milliGRAM(s) Nebulizer every 4 hours PRN Shortness of Breath and/or Wheezing  dextrose Oral Gel 15 Gram(s) Oral once PRN Blood Glucose LESS THAN 70 milliGRAM(s)/deciliter  melatonin 3 milliGRAM(s) Oral at bedtime PRN Insomnia         Vitals log        ICU Vital Signs Last 24 Hrs  T(C): 36.6 (14 Jun 2022 17:03), Max: 36.6 (14 Jun 2022 17:03)  T(F): 97.9 (14 Jun 2022 17:03), Max: 97.9 (14 Jun 2022 17:03)  HR: 70 (15 Charbel 2022 00:26) (66 - 96)  BP: 129/72 (14 Jun 2022 17:03) (129/72 - 172/63)  BP(mean): --  ABP: --  ABP(mean): --  RR: 20 (14 Jun 2022 17:03) (20 - 24)  SpO2: 99% (15 Charbel 2022 00:26) (96% - 99%)           Input and Output:  I&O's Detail      Lab Data                        12.9   22.06 )-----------( 214      ( 14 Jun 2022 06:54 )             41.9     06-14    139  |  107  |  40<H>  ----------------------------<  236<H>  4.3   |  23  |  1.40<H>    Ca    8.7      14 Jun 2022 06:54  Mg     2.3     06-14    TPro  7.4  /  Alb  3.1<L>  /  TBili  0.4  /  DBili  x   /  AST  23  /  ALT  23  /  AlkPhos  74  06-14    ABG - ( 14 Jun 2022 12:33 )  pH, Arterial: 7.37  pH, Blood: x     /  pCO2: 37    /  pO2: 95    / HCO3: 21    / Base Excess: -3.9  /  SaO2: 99.4                    Review of Systems	      Objective     Physical Examination    heart 1s2  lung dec BS  abd soft      Pertinent Lab findings & Imaging      Schwarz:  NO   Adequate UO     I&O's Detail           Discussed with:     Cultures:	        Radiology

## 2022-06-15 NOTE — PROGRESS NOTE ADULT - PROBLEM SELECTOR PLAN 2
- Switched to O2NC   - RVP + HMPV   - S/p Solumedrol 125 mg IVP x1 in ED; Solumedrol 40 mg IVP q12h with plans for taper  - Continue home medications - Duoneb q6h, Spiriva, Proventil  - Supplemental O2 PRN. COPD patient will keep SpO2 goal 88-93%.   - Encourage incentive spirometry  - CXR to r/o infection vs fluid over load  - Pulmonary (Dr. Copeland) following

## 2022-06-15 NOTE — ED PROVIDER NOTE - CPE EDP [facility] NORM
- - - Patient with recent admission to [facility].  As per patient Cath showed clean coronaries.  Patient self reports EF to be ~15%  -Continue IV Lasix 80 BID   -Toprol Xl 50 QD  -Entresto 24/26  -Repeat Echocardiogram   -Ins/ out- daily weight and Tele   -[place] cardiology consulted.

## 2022-06-15 NOTE — PROGRESS NOTE ADULT - PROBLEM SELECTOR PLAN 4
BP is stable on current meds.   - c/w lasix.  - Continue home Metoprolol Succinate 50 mg qd with hold parameters  - Monitor routine hemodynamics

## 2022-06-15 NOTE — PHYSICAL THERAPY INITIAL EVALUATION ADULT - PERTINENT HX OF CURRENT PROBLEM, REHAB EVAL
87 yr old female admitted for COPD exacerbation, SOB, and RABAGO. +HMPV. Chest X rays confirm low lung volumes. From [facility].

## 2022-06-15 NOTE — ED ADULT NURSE REASSESSMENT NOTE - NSIMPLEMENTINTERV_GEN_ALL_ED
Implemented All Fall with Harm Risk Interventions:  [place] to call system. Call bell, personal items and telephone within reach. Instruct patient to call for assistance. Room bathroom lighting operational. Non-slip footwear when patient is off stretcher. Physically safe environment: no spills, clutter or unnecessary equipment. Stretcher in lowest position, wheels locked, appropriate side rails in place. Provide visual cue, wrist band, yellow gown, etc. Monitor gait and stability. Monitor for mental status changes and reorient to person, place, and time. Review medications for side effects contributing to fall risk. Reinforce activity limits and safety measures with patient and family. Provide visual clues: red socks.

## 2022-06-16 DIAGNOSIS — N17.9 ACUTE KIDNEY FAILURE, UNSPECIFIED: ICD-10-CM

## 2022-06-16 LAB
ANION GAP SERPL CALC-SCNC: 6 MMOL/L — SIGNIFICANT CHANGE UP (ref 5–17)
APPEARANCE UR: ABNORMAL
BACTERIA # UR AUTO: ABNORMAL
BILIRUB UR-MCNC: NEGATIVE — SIGNIFICANT CHANGE UP
BUN SERPL-MCNC: 72 MG/DL — HIGH (ref 7–23)
CALCIUM SERPL-MCNC: 9.4 MG/DL — SIGNIFICANT CHANGE UP (ref 8.5–10.1)
CHLORIDE SERPL-SCNC: 107 MMOL/L — SIGNIFICANT CHANGE UP (ref 96–108)
CO2 SERPL-SCNC: 27 MMOL/L — SIGNIFICANT CHANGE UP (ref 22–31)
COLOR SPEC: SIGNIFICANT CHANGE UP
CREAT SERPL-MCNC: 1.7 MG/DL — HIGH (ref 0.5–1.3)
DIFF PNL FLD: ABNORMAL
EGFR: 29 ML/MIN/1.73M2 — LOW
EPI CELLS # UR: SIGNIFICANT CHANGE UP
GLUCOSE SERPL-MCNC: 179 MG/DL — HIGH (ref 70–99)
GLUCOSE UR QL: NEGATIVE — SIGNIFICANT CHANGE UP
HCT VFR BLD CALC: 38.7 % — SIGNIFICANT CHANGE UP (ref 34.5–45)
HGB BLD-MCNC: 12.1 G/DL — SIGNIFICANT CHANGE UP (ref 11.5–15.5)
KETONES UR-MCNC: NEGATIVE — SIGNIFICANT CHANGE UP
LEUKOCYTE ESTERASE UR-ACNC: ABNORMAL
MCHC RBC-ENTMCNC: 28.8 PG — SIGNIFICANT CHANGE UP (ref 27–34)
MCHC RBC-ENTMCNC: 31.3 GM/DL — LOW (ref 32–36)
MCV RBC AUTO: 92.1 FL — SIGNIFICANT CHANGE UP (ref 80–100)
NITRITE UR-MCNC: NEGATIVE — SIGNIFICANT CHANGE UP
NRBC # BLD: 0 /100 WBCS — SIGNIFICANT CHANGE UP (ref 0–0)
PH UR: 7 — SIGNIFICANT CHANGE UP (ref 5–8)
PLATELET # BLD AUTO: 196 K/UL — SIGNIFICANT CHANGE UP (ref 150–400)
POTASSIUM SERPL-MCNC: 4.4 MMOL/L — SIGNIFICANT CHANGE UP (ref 3.5–5.3)
POTASSIUM SERPL-SCNC: 4.4 MMOL/L — SIGNIFICANT CHANGE UP (ref 3.5–5.3)
PROT UR-MCNC: NEGATIVE — SIGNIFICANT CHANGE UP
RBC # BLD: 4.2 M/UL — SIGNIFICANT CHANGE UP (ref 3.8–5.2)
RBC # FLD: 13.4 % — SIGNIFICANT CHANGE UP (ref 10.3–14.5)
RBC CASTS # UR COMP ASSIST: ABNORMAL /HPF (ref 0–4)
SODIUM SERPL-SCNC: 140 MMOL/L — SIGNIFICANT CHANGE UP (ref 135–145)
SP GR SPEC: 1.01 — SIGNIFICANT CHANGE UP (ref 1.01–1.02)
UROBILINOGEN FLD QL: NEGATIVE — SIGNIFICANT CHANGE UP
WBC # BLD: 13.76 K/UL — HIGH (ref 3.8–10.5)
WBC # FLD AUTO: 13.76 K/UL — HIGH (ref 3.8–10.5)
WBC UR QL: ABNORMAL

## 2022-06-16 PROCEDURE — 99233 SBSQ HOSP IP/OBS HIGH 50: CPT

## 2022-06-16 RX ORDER — SODIUM CHLORIDE 9 MG/ML
1000 INJECTION, SOLUTION INTRAVENOUS
Refills: 0 | Status: DISCONTINUED | OUTPATIENT
Start: 2022-06-16 | End: 2022-07-01

## 2022-06-16 RX ORDER — INSULIN LISPRO 100/ML
VIAL (ML) SUBCUTANEOUS AT BEDTIME
Refills: 0 | Status: DISCONTINUED | OUTPATIENT
Start: 2022-06-16 | End: 2022-07-01

## 2022-06-16 RX ORDER — DEXTROSE 50 % IN WATER 50 %
15 SYRINGE (ML) INTRAVENOUS ONCE
Refills: 0 | Status: DISCONTINUED | OUTPATIENT
Start: 2022-06-16 | End: 2022-07-01

## 2022-06-16 RX ORDER — INSULIN LISPRO 100/ML
VIAL (ML) SUBCUTANEOUS
Refills: 0 | Status: DISCONTINUED | OUTPATIENT
Start: 2022-06-16 | End: 2022-07-01

## 2022-06-16 RX ORDER — DEXTROSE 50 % IN WATER 50 %
25 SYRINGE (ML) INTRAVENOUS ONCE
Refills: 0 | Status: DISCONTINUED | OUTPATIENT
Start: 2022-06-16 | End: 2022-07-01

## 2022-06-16 RX ORDER — DEXTROSE 50 % IN WATER 50 %
12.5 SYRINGE (ML) INTRAVENOUS ONCE
Refills: 0 | Status: DISCONTINUED | OUTPATIENT
Start: 2022-06-16 | End: 2022-07-01

## 2022-06-16 RX ORDER — GLUCAGON INJECTION, SOLUTION 0.5 MG/.1ML
1 INJECTION, SOLUTION SUBCUTANEOUS ONCE
Refills: 0 | Status: DISCONTINUED | OUTPATIENT
Start: 2022-06-16 | End: 2022-07-01

## 2022-06-16 RX ADMIN — ESCITALOPRAM OXALATE 10 MILLIGRAM(S): 10 TABLET, FILM COATED ORAL at 12:01

## 2022-06-16 RX ADMIN — ISOSORBIDE MONONITRATE 30 MILLIGRAM(S): 60 TABLET, EXTENDED RELEASE ORAL at 12:01

## 2022-06-16 RX ADMIN — Medication 3 MILLILITER(S): at 01:16

## 2022-06-16 RX ADMIN — Medication 40 MILLIGRAM(S): at 06:29

## 2022-06-16 RX ADMIN — Medication 50 MILLIGRAM(S): at 06:29

## 2022-06-16 RX ADMIN — Medication 2000 UNIT(S): at 12:01

## 2022-06-16 RX ADMIN — Medication 8 UNIT(S): at 09:54

## 2022-06-16 RX ADMIN — GABAPENTIN 100 MILLIGRAM(S): 400 CAPSULE ORAL at 17:19

## 2022-06-16 RX ADMIN — Medication 1 DROP(S): at 17:19

## 2022-06-16 RX ADMIN — Medication 4: at 17:20

## 2022-06-16 RX ADMIN — Medication 4: at 00:16

## 2022-06-16 RX ADMIN — Medication 3 MILLILITER(S): at 08:07

## 2022-06-16 RX ADMIN — GABAPENTIN 100 MILLIGRAM(S): 400 CAPSULE ORAL at 06:29

## 2022-06-16 RX ADMIN — GABAPENTIN 100 MILLIGRAM(S): 400 CAPSULE ORAL at 22:36

## 2022-06-16 RX ADMIN — INSULIN GLARGINE 15 UNIT(S): 100 INJECTION, SOLUTION SUBCUTANEOUS at 09:55

## 2022-06-16 RX ADMIN — Medication 81 MILLIGRAM(S): at 12:01

## 2022-06-16 RX ADMIN — HEPARIN SODIUM 5000 UNIT(S): 5000 INJECTION INTRAVENOUS; SUBCUTANEOUS at 17:19

## 2022-06-16 RX ADMIN — Medication 2: at 06:51

## 2022-06-16 RX ADMIN — Medication 3 MILLILITER(S): at 13:08

## 2022-06-16 RX ADMIN — Medication 1 DROP(S): at 06:29

## 2022-06-16 RX ADMIN — Medication 40 MILLIGRAM(S): at 09:56

## 2022-06-16 RX ADMIN — Medication 8 UNIT(S): at 17:20

## 2022-06-16 RX ADMIN — ATORVASTATIN CALCIUM 80 MILLIGRAM(S): 80 TABLET, FILM COATED ORAL at 22:36

## 2022-06-16 RX ADMIN — Medication 8 UNIT(S): at 12:02

## 2022-06-16 RX ADMIN — Medication 6: at 12:02

## 2022-06-16 RX ADMIN — HEPARIN SODIUM 5000 UNIT(S): 5000 INJECTION INTRAVENOUS; SUBCUTANEOUS at 06:30

## 2022-06-16 RX ADMIN — Medication 125 MICROGRAM(S): at 06:29

## 2022-06-16 RX ADMIN — CLOPIDOGREL BISULFATE 75 MILLIGRAM(S): 75 TABLET, FILM COATED ORAL at 12:01

## 2022-06-16 RX ADMIN — Medication 3 MILLILITER(S): at 19:48

## 2022-06-16 NOTE — PROVIDER CONTACT NOTE (OTHER) - ASSESSMENT
RN called and spoke with Milton Workman, requested to change sliding scale coverage from Q6h to ACHS as pt is not currently NPO. Diet, Consistent Carbohydrate Renal/No Snacks.

## 2022-06-16 NOTE — PROGRESS NOTE ADULT - PROBLEM SELECTOR PLAN 5
Improving  continue Lexapro 10 mg daily and ativan 0.5mg PRN BP is stable on current meds.   - c/w lasix.  - Continue home Metoprolol Succinate 50 mg qd with hold parameters  - Monitor routine hemodynamics

## 2022-06-16 NOTE — CONSULT NOTE ADULT - ASSESSMENT
TRENTON on CKD 3b  Respiratory Acidosis  HTN  Human Metapneumovirus      -BLCr 1.4  -Check UA  -Check Urin lytes  -Hold further lasix  -Avoid IVF given respiratory status  -Pulm eval reviewed  -CXR clear 6/15/22

## 2022-06-16 NOTE — PROGRESS NOTE ADULT - PROBLEM SELECTOR PLAN 4
BP is stable on current meds.   - c/w lasix.  - Continue home Metoprolol Succinate 50 mg qd with hold parameters  - Monitor routine hemodynamics - Chronic, known history of T2DM  - started po carb cons DASH/TLC diet   - C/W Lantus 15 U qd and and 8U pre meals  - Moderate dose insulin corrective scale   - Gentle hydration with close monitoring of volume status  - Hypoglycemia protocol, fingerstick glucose QAC&HS   - HbA1c: 10

## 2022-06-16 NOTE — PROGRESS NOTE ADULT - ASSESSMENT
87 year old female with PMHx COPD, HTN, HLD, T2DM on insulin, hx of MI presents from assisted living facility with dyspnea, wheezing, labored breathing    copd  copd ex  TRENTON CKD  HTN  OP  OA  DM  HLD  Moderate to Severe AS  CAD    on lasix  on nebs  on steroids - will taper this am   ABG noted  vs noted      cvs rx regimen  BP control  serial renal indices  I and O  monitor VS and HD and Sat  HOB elev - asp prec - assist with needs - ADL - GOC discussion  pt has hMPV - resp viral infection - isolation precs - acap PRN - robitussin PRN -   cxr - labs reviewed  copd - rx regimen PRN for sob and or wheeze, NEBS, not a candidate for Inhaler use  may need Systemic Steroids - will monitor clinical course and decide as clinically indicated  proBNP noted  ABG noted  old records reviewed  spoke with daughter at the bedside

## 2022-06-16 NOTE — CONSULT NOTE ADULT - SUBJECTIVE AND OBJECTIVE BOX
Patient is a 87y old  Female who presents with a chief complaint of COPD Exacerbation (16 Jun 2022 06:06)      Reason For Consult: dm2 uncontrolled    HPI:  87 year old female with PMHx COPD, HTN, HLD, T2DM on insulin, hx of MI presents from assisted living facility with dyspnea, wheezing, labored breathing. Patient recently admitted to [facility] 3/9-15/2022 for COPD exacerbation. History obtained from son Calvin, as patient is limited historian at baseline. Son states he received a call from [facility] this AM stating patient is having labored breathing. Patient was sent to [facility] for further management.     ED course:  Vitals: HR 91 SpO2 on BIPAP  Labs: WBC 12.64, BUN 31/1.5, Glu 188, ProBNP 1430  Given Mag Sulfate IVPB 1 g, Solumedrol 125 mg IVP x1 (13 Jun 2022 06:00)      PAST MEDICAL & SURGICAL HISTORY:  Uncomplicated asthma, unspecified asthma severity      DM2 (diabetes mellitus, type 2)      Essential hypertension      Hypothyroidism, unspecified type      Pure hypercholesterolemia      Gastroesophageal reflux disease without esophagitis      Diabetes      Asthma      CAD (coronary artery disease)      HTN (hypertension)      HLD (hyperlipidemia)      Hypothyroid      NSTEMI (non-ST elevated myocardial infarction)      History of appendectomy      History of appendectomy      Abnormal findings on cardiac catheterization  Cardiac Cath          FAMILY HISTORY:  Family history of heart disease    Family history of cancer in mother    Family history of MI (myocardial infarction)    Family history of stomach cancer          Social History:    MEDICATIONS  (STANDING):  albuterol/ipratropium for Nebulization 3 milliLiter(s) Nebulizer every 6 hours  artificial tears (preservative free) Ophthalmic Solution 1 Drop(s) Both EYES two times a day  aspirin  chewable 81 milliGRAM(s) Oral daily  atorvastatin 80 milliGRAM(s) Oral at bedtime  cholecalciferol 2000 Unit(s) Oral daily  clopidogrel Tablet 75 milliGRAM(s) Oral daily  dextrose 5%. 1000 milliLiter(s) (50 mL/Hr) IV Continuous <Continuous>  dextrose 5%. 1000 milliLiter(s) (100 mL/Hr) IV Continuous <Continuous>  dextrose 50% Injectable 25 Gram(s) IV Push once  dextrose 50% Injectable 12.5 Gram(s) IV Push once  dextrose 50% Injectable 25 Gram(s) IV Push once  escitalopram 10 milliGRAM(s) Oral daily  furosemide   Injectable 40 milliGRAM(s) IV Push <User Schedule>  gabapentin 100 milliGRAM(s) Oral three times a day  glucagon  Injectable 1 milliGRAM(s) IntraMuscular once  heparin   Injectable 5000 Unit(s) SubCutaneous every 12 hours  insulin glargine Injectable (LANTUS) 15 Unit(s) SubCutaneous every morning  insulin lispro (ADMELOG) corrective regimen sliding scale   SubCutaneous every 6 hours  insulin lispro Injectable (ADMELOG) 8 Unit(s) SubCutaneous three times a day before meals  isosorbide   mononitrate ER Tablet (IMDUR) 30 milliGRAM(s) Oral daily  levothyroxine 125 MICROGram(s) Oral daily  methylPREDNISolone sodium succinate Injectable 40 milliGRAM(s) IV Push daily  metoprolol succinate ER 50 milliGRAM(s) Oral daily    MEDICATIONS  (PRN):  acetaminophen     Tablet .. 650 milliGRAM(s) Oral every 6 hours PRN Mild Pain (1 - 3)  ALBUTerol    0.083% 2.5 milliGRAM(s) Nebulizer every 4 hours PRN Shortness of Breath and/or Wheezing  ALPRAZolam 0.5 milliGRAM(s) Oral two times a day PRN Anxiety  dextrose Oral Gel 15 Gram(s) Oral once PRN Blood Glucose LESS THAN 70 milliGRAM(s)/deciliter  melatonin 3 milliGRAM(s) Oral at bedtime PRN Insomnia        T(C): 36.6 (06-16-22 @ 04:54), Max: 36.9 (06-15-22 @ 17:47)  HR: 62 (06-16-22 @ 08:19) (62 - 76)  BP: 166/77 (06-16-22 @ 04:54) (145/82 - 185/76)  RR: 19 (06-16-22 @ 04:54) (18 - 20)  SpO2: 98% (06-16-22 @ 08:19) (94% - 99%)  Wt(kg): --    PHYSICAL EXAM:  GENERAL: NAD, well-groomed, well-developed  HEAD:  Atraumatic, Normocephalic  NECK: Supple, No JVD, Normal thyroid  CHEST/LUNG: Clear to percussion bilaterally; No rales, rhonchi, wheezing, or rubs  HEART: Regular rate and rhythm; No murmurs, rubs, or gallops  ABDOMEN: Soft, Nontender, Nondistended; Bowel sounds present  EXTREMITIES:  2+ Peripheral Pulses, No clubbing, cyanosis, or edema  SKIN: No rashes or lesions    CAPILLARY BLOOD GLUCOSE      POCT Blood Glucose.: 178 mg/dL (16 Jun 2022 06:00)  POCT Blood Glucose.: 214 mg/dL (16 Jun 2022 00:06)  POCT Blood Glucose.: 233 mg/dL (15 Charbel 2022 17:00)  POCT Blood Glucose.: 305 mg/dL (15 Charbel 2022 12:09)                            12.1   13.76 )-----------( 196      ( 16 Jun 2022 09:15 )             38.7       CMP:  06-16 @ 09:15  SGPT --  Albumin --   Alk Phos --   Anion Gap 6   SGOT --   Total Bili --   BUN 72   Calcium Total 9.4   CO2 27   Chloride 107   Creatinine 1.70   eGFR if AA --   eGFR if non AA --   Glucose 179   Potassium 4.4   Protein --   Sodium 140      Thyroid Function Tests:      Diabetes Tests:       Radiology:

## 2022-06-16 NOTE — PROGRESS NOTE ADULT - SUBJECTIVE AND OBJECTIVE BOX
Patient is a 87y old  Female who presents with a chief complaint of COPD Exacerbation (16 Jun 2022 10:16)      INTERVAL HPI/OVERNIGHT EVENTS: Pt seen and examined bedside, has no complaints. Pt is alert and awake, not well oriented. No shaking today.    MEDICATIONS  (STANDING):  albuterol/ipratropium for Nebulization 3 milliLiter(s) Nebulizer every 6 hours  artificial tears (preservative free) Ophthalmic Solution 1 Drop(s) Both EYES two times a day  aspirin  chewable 81 milliGRAM(s) Oral daily  atorvastatin 80 milliGRAM(s) Oral at bedtime  cholecalciferol 2000 Unit(s) Oral daily  clopidogrel Tablet 75 milliGRAM(s) Oral daily  dextrose 5%. 1000 milliLiter(s) (50 mL/Hr) IV Continuous <Continuous>  dextrose 5%. 1000 milliLiter(s) (100 mL/Hr) IV Continuous <Continuous>  dextrose 50% Injectable 25 Gram(s) IV Push once  dextrose 50% Injectable 12.5 Gram(s) IV Push once  dextrose 50% Injectable 25 Gram(s) IV Push once  escitalopram 10 milliGRAM(s) Oral daily  furosemide   Injectable 40 milliGRAM(s) IV Push <User Schedule>  gabapentin 100 milliGRAM(s) Oral three times a day  glucagon  Injectable 1 milliGRAM(s) IntraMuscular once  heparin   Injectable 5000 Unit(s) SubCutaneous every 12 hours  insulin glargine Injectable (LANTUS) 15 Unit(s) SubCutaneous every morning  insulin lispro (ADMELOG) corrective regimen sliding scale   SubCutaneous every 6 hours  insulin lispro Injectable (ADMELOG) 8 Unit(s) SubCutaneous three times a day before meals  isosorbide   mononitrate ER Tablet (IMDUR) 30 milliGRAM(s) Oral daily  levothyroxine 125 MICROGram(s) Oral daily  methylPREDNISolone sodium succinate Injectable 40 milliGRAM(s) IV Push daily  metoprolol succinate ER 50 milliGRAM(s) Oral daily    MEDICATIONS  (PRN):  acetaminophen     Tablet .. 650 milliGRAM(s) Oral every 6 hours PRN Mild Pain (1 - 3)  ALBUTerol    0.083% 2.5 milliGRAM(s) Nebulizer every 4 hours PRN Shortness of Breath and/or Wheezing  ALPRAZolam 0.5 milliGRAM(s) Oral two times a day PRN Anxiety  dextrose Oral Gel 15 Gram(s) Oral once PRN Blood Glucose LESS THAN 70 milliGRAM(s)/deciliter  melatonin 3 milliGRAM(s) Oral at bedtime PRN Insomnia      Allergies    doxycycline (Unknown)  iodine (Hives)  iodine containing compounds (Unknown)  shellfish (Anaphylaxis)  shellfish (Swelling; Short breath)    Intolerances        REVIEW OF SYSTEMS:  CONSTITUTIONAL: No fever or chills  HEENT:  No headache, no sore throat  RESPIRATORY: No cough, wheezing, or shortness of breath  CARDIOVASCULAR: No chest pain, palpitations, or leg swelling  GASTROINTESTINAL: No abd pain, nausea, vomiting, or diarrhea  GENITOURINARY: No dysuria, frequency, or hematuria  NEUROLOGICAL: no focal weakness or dizziness      Vital Signs Last 24 Hrs  T(C): 36.6 (16 Jun 2022 04:54), Max: 36.9 (15 Charbel 2022 17:47)  T(F): 97.8 (16 Jun 2022 04:54), Max: 98.5 (15 Charbel 2022 17:47)  HR: 62 (16 Jun 2022 08:19) (62 - 76)  BP: 166/77 (16 Jun 2022 04:54) (145/82 - 185/76)  BP(mean): --  RR: 19 (16 Jun 2022 04:54) (19 - 20)  SpO2: 98% (16 Jun 2022 08:19) (94% - 99%)    PHYSICAL EXAM:  GENERAL: NAD  HEENT:  EOMI, moist mucous membranes  CHEST/LUNG:  Decreased BS B/l, no rales, wheezes, or rhonchi  HEART:  RRR, S1, S2  ABDOMEN:  BS+, soft, nontender, nondistended  EXTREMITIES: no edema, cyanosis, or calf tenderness  NERVOUS SYSTEM: AA&Ox1    LABS:                        12.1   13.76 )-----------( 196      ( 16 Jun 2022 09:15 )             38.7     CBC Full  -  ( 16 Jun 2022 09:15 )  WBC Count : 13.76 K/uL  Hemoglobin : 12.1 g/dL  Hematocrit : 38.7 %  Platelet Count - Automated : 196 K/uL  Mean Cell Volume : 92.1 fl  Mean Cell Hemoglobin : 28.8 pg  Mean Cell Hemoglobin Concentration : 31.3 gm/dL  Auto Neutrophil # : x  Auto Lymphocyte # : x  Auto Monocyte # : x  Auto Eosinophil # : x  Auto Basophil # : x  Auto Neutrophil % : x  Auto Lymphocyte % : x  Auto Monocyte % : x  Auto Eosinophil % : x  Auto Basophil % : x    16 Jun 2022 09:15    140    |  107    |  72     ----------------------------<  179    4.4     |  27     |  1.70     Ca    9.4        16 Jun 2022 09:15          CAPILLARY BLOOD GLUCOSE      POCT Blood Glucose.: 178 mg/dL (16 Jun 2022 06:00)  POCT Blood Glucose.: 214 mg/dL (16 Jun 2022 00:06)  POCT Blood Glucose.: 233 mg/dL (15 Charbel 2022 17:00)  POCT Blood Glucose.: 305 mg/dL (15 Charbel 2022 12:09)        Culture - Blood (collected 06-13-22 @ 08:51)  Source: .Blood Blood  Preliminary Report (06-14-22 @ 09:01):    No growth to date.    Culture - Blood (collected 06-13-22 @ 08:51)  Source: .Blood Blood  Preliminary Report (06-14-22 @ 09:01):    No growth to date.        RADIOLOGY & ADDITIONAL TESTS:    Personally reviewed.     Consultant(s) Notes Reviewed:  [x] YES  [ ] NO    Care Discussed with [x] Consultants  [x] Patient  [ ] Family  [ ]      [ ] Other; RN  DVT ppx

## 2022-06-16 NOTE — PROGRESS NOTE ADULT - PROBLEM SELECTOR PLAN 6
Chronic  - Continue home Synthroid 125 mcg PO qd Improving  continue Lexapro 10 mg daily and ativan 0.5mg PRN

## 2022-06-16 NOTE — CONSULT NOTE ADULT - PROBLEM SELECTOR RECOMMENDATION 9
cont lantus 15 units qhs  cont admelog 8 units 3x/day before meals  cont mod dose admelog corrective scale coverage qac/qhs  cont cons cho diet  goal bg 100-180 in hosp setting

## 2022-06-16 NOTE — PROGRESS NOTE ADULT - PROBLEM SELECTOR PLAN 3
- Chronic, known history of T2DM  - started po carb cons DASH/TLC diet   - C/W Lantus 15 U qd and and 8U pre meals  - Moderate dose insulin corrective scale   - Gentle hydration with close monitoring of volume status  - Hypoglycemia protocol, fingerstick glucose QAC&HS   - HbA1c: 10 She has CKD base line Cr 1.4.  Now pt is on IV lasix and cr 1.7.  Will D/C IVF as pt is on PO diet now.  Monitor renal indices and nephrology consult requested.

## 2022-06-16 NOTE — PROGRESS NOTE ADULT - PROBLEM SELECTOR PLAN 2
- Switched to O2NC   - RVP + HMPV   - Tapering Solumedrol 40 mg IVP daily.  - Continue home medications - Duoneb q6h, Spiriva, Proventil  - Supplemental O2 PRN. COPD patient will keep SpO2 goal 88-93%.   - Encourage incentive spirometry  - CXR on 6/15 No acute findings   - Pulmonary (Dr. Copeland) following

## 2022-06-16 NOTE — PROGRESS NOTE ADULT - PROBLEM SELECTOR PLAN 1
- Likely due to hMPV - resp viral infection + on admission   - patient was on BiPAP, weaned off BiPAP. Now on 2L O2 NC. saturating well 95%  - on PO diet with assistance.   -Xanax  PRN - robitussin PRN -   - Continue solumedrol 40mg daily iv, NEBS PRN   - Dr boggs following

## 2022-06-16 NOTE — CONSULT NOTE ADULT - SUBJECTIVE AND OBJECTIVE BOX
Patient is a 87y old  Female who presents with a chief complaint of COPD Exacerbation (2022 11:01)       HPI:  87 year old female with PMHx COPD, HTN, HLD, T2DM on insulin, hx of MI presents from assisted living facility with dyspnea, wheezing, labored breathing. Patient recently admitted to [facility] 3/9-15/2022 for COPD exacerbation. History obtained from son Calvin, as patient is limited historian at baseline. Son states he received a call from [facility] this AM stating patient is having labored breathing. Patient was sent to [facility] for further management.     ED course:  Vitals: HR 91 SpO2 on BIPAP  Labs: WBC 12.64, BUN 31/1.5, Glu 188, ProBNP 1430  Given Mag Sulfate IVPB 1 g, Solumedrol 125 mg IVP x1 (2022 06:00)       PAST MEDICAL & SURGICAL HISTORY:  Uncomplicated asthma, unspecified asthma severity      DM2 (diabetes mellitus, type 2)      Essential hypertension      Hypothyroidism, unspecified type      Pure hypercholesterolemia      Gastroesophageal reflux disease without esophagitis      Diabetes      Asthma      CAD (coronary artery disease)      HTN (hypertension)      HLD (hyperlipidemia)      Hypothyroid      NSTEMI (non-ST elevated myocardial infarction)      History of appendectomy      History of appendectomy      Abnormal findings on cardiac catheterization  Cardiac Cath           FAMILY HISTORY:  Family history of heart disease    Family history of cancer in mother    Family history of MI (myocardial infarction)    Family history of stomach cancer    NC    Social History:Non smoker    MEDICATIONS  (STANDING):  albuterol/ipratropium for Nebulization 3 milliLiter(s) Nebulizer every 6 hours  artificial tears (preservative free) Ophthalmic Solution 1 Drop(s) Both EYES two times a day  aspirin  chewable 81 milliGRAM(s) Oral daily  atorvastatin 80 milliGRAM(s) Oral at bedtime  cholecalciferol 2000 Unit(s) Oral daily  clopidogrel Tablet 75 milliGRAM(s) Oral daily  dextrose 5%. 1000 milliLiter(s) (50 mL/Hr) IV Continuous <Continuous>  dextrose 5%. 1000 milliLiter(s) (100 mL/Hr) IV Continuous <Continuous>  dextrose 50% Injectable 25 Gram(s) IV Push once  dextrose 50% Injectable 12.5 Gram(s) IV Push once  dextrose 50% Injectable 25 Gram(s) IV Push once  escitalopram 10 milliGRAM(s) Oral daily  furosemide   Injectable 40 milliGRAM(s) IV Push <User Schedule>  gabapentin 100 milliGRAM(s) Oral three times a day  glucagon  Injectable 1 milliGRAM(s) IntraMuscular once  heparin   Injectable 5000 Unit(s) SubCutaneous every 12 hours  insulin glargine Injectable (LANTUS) 15 Unit(s) SubCutaneous every morning  insulin lispro (ADMELOG) corrective regimen sliding scale   SubCutaneous every 6 hours  insulin lispro Injectable (ADMELOG) 8 Unit(s) SubCutaneous three times a day before meals  isosorbide   mononitrate ER Tablet (IMDUR) 30 milliGRAM(s) Oral daily  levothyroxine 125 MICROGram(s) Oral daily  methylPREDNISolone sodium succinate Injectable 40 milliGRAM(s) IV Push daily  metoprolol succinate ER 50 milliGRAM(s) Oral daily    MEDICATIONS  (PRN):  acetaminophen     Tablet .. 650 milliGRAM(s) Oral every 6 hours PRN Mild Pain (1 - 3)  ALBUTerol    0.083% 2.5 milliGRAM(s) Nebulizer every 4 hours PRN Shortness of Breath and/or Wheezing  ALPRAZolam 0.5 milliGRAM(s) Oral two times a day PRN Anxiety  dextrose Oral Gel 15 Gram(s) Oral once PRN Blood Glucose LESS THAN 70 milliGRAM(s)/deciliter  melatonin 3 milliGRAM(s) Oral at bedtime PRN Insomnia   Meds reviewed    Allergies    doxycycline (Unknown)  iodine (Hives)  iodine containing compounds (Unknown)  shellfish (Anaphylaxis)  shellfish (Swelling; Short breath)    Intolerances         REVIEW OF SYSTEMS:    Review of Systems:   Constitutional: Denies fatigue  HEENT: Denies headaches and dizziness  Respiratory: denies SOB, cough, or wheezing  Cardiovascular: denies CP, palpitations  Gastrointestinal: Denies nausea, denies vomiting, diarrhea, constipation, abdominal pain, or bloody stools  Genitourinary: denies painful urination, increased frequency, urgency, or bloody urine  Skin: denies rashes or itching  Musculoskeletal: denies muscle aches, joint swelling  Neurologic: Denies generalized weakness, denies loss of sensation, numbness, or tingling      Vital Signs Last 24 Hrs  T(C): 36.6 (2022 04:54), Max: 36.9 (15 Charbel 2022 17:47)  T(F): 97.8 (2022 04:54), Max: 98.5 (15 Charbel 2022 17:47)  HR: 62 (2022 08:19) (62 - 76)  BP: 166/77 (2022 04:54) (145/82 - 185/76)  BP(mean): --  RR: 19 (2022 04:54) (19 - 20)  SpO2: 98% (2022 08:19) (94% - 99%)  Daily     Daily Weight in k.6 (15 Charbel 2022 12:15)    PHYSICAL EXAM:    GENERAL: NAD  HEAD:  Atraumatic, Normocephalic  EYES: EOMI, conjunctiva and sclera clear  ENMT: No Drainage from nares, No drainage from ears  NECK: Supple, neck  veins full  NERVOUS SYSTEM:  Awake and Alert  CHEST/LUNG: Clear to percussion bilaterally; No rales, rhonchi, wheezing, or rubs  HEART: Regular rate and rhythm; No murmurs, rubs, or gallops  ABDOMEN: Soft, Nontender, Nondistended; Bowel sounds present  EXTREMITIES:  No Edema  SKIN: No rashes No obvious ecchymosis      LABS:                        12.1   13.76 )-----------( 196      ( 2022 09:15 )             38.7     06-16    140  |  107  |  72<H>  ----------------------------<  179<H>  4.4   |  27  |  1.70<H>    Ca    9.4      2022 09:15  Phos  4.9     06-15  Mg     2.6     06-15    TPro  7.0  /  Alb  3.0<L>  /  TBili  0.5  /  DBili  x   /  AST  15  /  ALT  21  /  AlkPhos  69  06-15          ABG - ( 2022 12:33 )  pH, Arterial: 7.37  pH, Blood: x     /  pCO2: 37    /  pO2: 95    / HCO3: 21    / Base Excess: -3.9  /  SaO2: 99.4                  RADIOLOGY & ADDITIONAL TESTS:   Patient is a 87y old  Female who presents with a chief complaint of COPD Exacerbation (2022 11:01)       HPI:  87 year old female with PMHx COPD, HTN, HLD, T2DM on insulin, hx of MI presents from assisted living facility with dyspnea, wheezing, labored breathing. Patient recently admitted to [facility] 3/9-15/2022 for COPD exacerbation. History obtained from son Calvin, as patient is limited historian at baseline. Son states he received a call from [facility] this AM stating patient is having labored breathing. Patient was sent to [facility] for further management.   She states her breathing is improved.  Denies any N/V/Dizziness.  Has not seen nephrologist in the past.  No urinary issues. Denies NSAID usage.       PAST MEDICAL & SURGICAL HISTORY:  Uncomplicated asthma, unspecified asthma severity      DM2 (diabetes mellitus, type 2)      Essential hypertension      Hypothyroidism, unspecified type      Pure hypercholesterolemia      Gastroesophageal reflux disease without esophagitis      Diabetes      Asthma      CAD (coronary artery disease)      HTN (hypertension)      HLD (hyperlipidemia)      Hypothyroid      NSTEMI (non-ST elevated myocardial infarction)      History of appendectomy      History of appendectomy      Abnormal findings on cardiac catheterization  Cardiac Cath           FAMILY HISTORY:  Family history of heart disease    Family history of cancer in mother    Family history of MI (myocardial infarction)    Family history of stomach cancer    NC    Social History:Non smoker    MEDICATIONS  (STANDING):  albuterol/ipratropium for Nebulization 3 milliLiter(s) Nebulizer every 6 hours  artificial tears (preservative free) Ophthalmic Solution 1 Drop(s) Both EYES two times a day  aspirin  chewable 81 milliGRAM(s) Oral daily  atorvastatin 80 milliGRAM(s) Oral at bedtime  cholecalciferol 2000 Unit(s) Oral daily  clopidogrel Tablet 75 milliGRAM(s) Oral daily  dextrose 5%. 1000 milliLiter(s) (50 mL/Hr) IV Continuous <Continuous>  dextrose 5%. 1000 milliLiter(s) (100 mL/Hr) IV Continuous <Continuous>  dextrose 50% Injectable 25 Gram(s) IV Push once  dextrose 50% Injectable 12.5 Gram(s) IV Push once  dextrose 50% Injectable 25 Gram(s) IV Push once  escitalopram 10 milliGRAM(s) Oral daily  furosemide   Injectable 40 milliGRAM(s) IV Push <User Schedule>  gabapentin 100 milliGRAM(s) Oral three times a day  glucagon  Injectable 1 milliGRAM(s) IntraMuscular once  heparin   Injectable 5000 Unit(s) SubCutaneous every 12 hours  insulin glargine Injectable (LANTUS) 15 Unit(s) SubCutaneous every morning  insulin lispro (ADMELOG) corrective regimen sliding scale   SubCutaneous every 6 hours  insulin lispro Injectable (ADMELOG) 8 Unit(s) SubCutaneous three times a day before meals  isosorbide   mononitrate ER Tablet (IMDUR) 30 milliGRAM(s) Oral daily  levothyroxine 125 MICROGram(s) Oral daily  methylPREDNISolone sodium succinate Injectable 40 milliGRAM(s) IV Push daily  metoprolol succinate ER 50 milliGRAM(s) Oral daily    MEDICATIONS  (PRN):  acetaminophen     Tablet .. 650 milliGRAM(s) Oral every 6 hours PRN Mild Pain (1 - 3)  ALBUTerol    0.083% 2.5 milliGRAM(s) Nebulizer every 4 hours PRN Shortness of Breath and/or Wheezing  ALPRAZolam 0.5 milliGRAM(s) Oral two times a day PRN Anxiety  dextrose Oral Gel 15 Gram(s) Oral once PRN Blood Glucose LESS THAN 70 milliGRAM(s)/deciliter  melatonin 3 milliGRAM(s) Oral at bedtime PRN Insomnia   Meds reviewed    Allergies    doxycycline (Unknown)  iodine (Hives)  iodine containing compounds (Unknown)  shellfish (Anaphylaxis)  shellfish (Swelling; Short breath)    Intolerances         REVIEW OF SYSTEMS:    Review of Systems:   Constitutional: Denies fatigue  HEENT: Denies headaches and dizziness  Respiratory: denies  cough, or wheezing, improving SOB  Cardiovascular: denies CP, palpitations  Gastrointestinal: Denies nausea, denies vomiting, diarrhea, constipation, abdominal pain, or bloody stools  Genitourinary: denies painful urination, increased frequency, urgency, or bloody urine  Skin: denies rashes or itching  Musculoskeletal: denies muscle aches, joint swelling  Neurologic: Denies generalized weakness, denies loss of sensation, numbness, or tingling      Vital Signs Last 24 Hrs  T(C): 36.6 (2022 04:54), Max: 36.9 (15 Charbel 2022 17:47)  T(F): 97.8 (2022 04:54), Max: 98.5 (15 Charbel 2022 17:47)  HR: 62 (2022 08:19) (62 - 76)  BP: 166/77 (2022 04:54) (145/82 - 185/76)  BP(mean): --  RR: 19 (2022 04:54) (19 - 20)  SpO2: 98% (2022 08:19) (94% - 99%)  Daily     Daily Weight in k.6 (15 Charbel 2022 12:15)    PHYSICAL EXAM:    GENERAL: NAD  HEAD:  Atraumatic, Normocephalic  EYES: EOMI, conjunctiva and sclera clear  ENMT: No Drainage from nares, No drainage from ears  NECK: Supple, neck  veins full  NERVOUS SYSTEM:  Awake and Alert  CHEST/LUNG: Clear to percussion bilaterally; No rales, rhonchi, wheezing, or rubs  HEART: Regular rate and rhythm; No murmurs, rubs, or gallops  ABDOMEN: Soft, Nontender, Nondistended; Bowel sounds present  EXTREMITIES:  No Edema  SKIN: No rashes No obvious ecchymosis      LABS:                        12.1   13.76 )-----------( 196      ( 2022 09:15 )             38.7     06-16    140  |  107  |  72<H>  ----------------------------<  179<H>  4.4   |  27  |  1.70<H>    Ca    9.4      2022 09:15  Phos  4.9     06-15  Mg     2.6     06-15    TPro  7.0  /  Alb  3.0<L>  /  TBili  0.5  /  DBili  x   /  AST  15  /  ALT  21  /  AlkPhos  69  06-15          ABG - ( 2022 12:33 )  pH, Arterial: 7.37  pH, Blood: x     /  pCO2: 37    /  pO2: 95    / HCO3: 21    / Base Excess: -3.9  /  SaO2: 99.4                  RADIOLOGY & ADDITIONAL TESTS:

## 2022-06-16 NOTE — PROGRESS NOTE ADULT - SUBJECTIVE AND OBJECTIVE BOX
Date/Time Patient Seen:  		  Referring MD:   Data Reviewed	       Patient is a 87y old  Female who presents with a chief complaint of COPD Exacerbation (15 Charbel 2022 10:29)      Subjective/HPI     PAST MEDICAL & SURGICAL HISTORY:  Uncomplicated asthma, unspecified asthma severity    DM2 (diabetes mellitus, type 2)    Essential hypertension    Hypothyroidism, unspecified type    Pure hypercholesterolemia    Gastroesophageal reflux disease without esophagitis    Diabetes    Asthma    CAD (coronary artery disease)    HTN (hypertension)    HLD (hyperlipidemia)    Hypothyroid    NSTEMI (non-ST elevated myocardial infarction)    No significant past surgical history    History of appendectomy    History of appendectomy    Abnormal findings on cardiac catheterization  Cardiac Cath          Medication list         MEDICATIONS  (STANDING):  albuterol/ipratropium for Nebulization 3 milliLiter(s) Nebulizer every 6 hours  artificial tears (preservative free) Ophthalmic Solution 1 Drop(s) Both EYES two times a day  aspirin  chewable 81 milliGRAM(s) Oral daily  atorvastatin 80 milliGRAM(s) Oral at bedtime  cholecalciferol 2000 Unit(s) Oral daily  clopidogrel Tablet 75 milliGRAM(s) Oral daily  dextrose 5%. 1000 milliLiter(s) (50 mL/Hr) IV Continuous <Continuous>  dextrose 5%. 1000 milliLiter(s) (100 mL/Hr) IV Continuous <Continuous>  dextrose 50% Injectable 25 Gram(s) IV Push once  dextrose 50% Injectable 12.5 Gram(s) IV Push once  dextrose 50% Injectable 25 Gram(s) IV Push once  escitalopram 10 milliGRAM(s) Oral daily  furosemide   Injectable 40 milliGRAM(s) IV Push <User Schedule>  gabapentin 100 milliGRAM(s) Oral three times a day  glucagon  Injectable 1 milliGRAM(s) IntraMuscular once  heparin   Injectable 5000 Unit(s) SubCutaneous every 12 hours  insulin glargine Injectable (LANTUS) 15 Unit(s) SubCutaneous every morning  insulin lispro (ADMELOG) corrective regimen sliding scale   SubCutaneous every 6 hours  insulin lispro Injectable (ADMELOG) 8 Unit(s) SubCutaneous three times a day before meals  isosorbide   mononitrate ER Tablet (IMDUR) 30 milliGRAM(s) Oral daily  levothyroxine 125 MICROGram(s) Oral daily  methylPREDNISolone sodium succinate Injectable 40 milliGRAM(s) IV Push daily  metoprolol succinate ER 50 milliGRAM(s) Oral daily    MEDICATIONS  (PRN):  acetaminophen     Tablet .. 650 milliGRAM(s) Oral every 6 hours PRN Mild Pain (1 - 3)  ALBUTerol    0.083% 2.5 milliGRAM(s) Nebulizer every 4 hours PRN Shortness of Breath and/or Wheezing  ALPRAZolam 0.5 milliGRAM(s) Oral two times a day PRN Anxiety  dextrose Oral Gel 15 Gram(s) Oral once PRN Blood Glucose LESS THAN 70 milliGRAM(s)/deciliter  melatonin 3 milliGRAM(s) Oral at bedtime PRN Insomnia         Vitals log        ICU Vital Signs Last 24 Hrs  T(C): 36.6 (16 Jun 2022 04:54), Max: 36.9 (15 Charbel 2022 08:09)  T(F): 97.8 (16 Jun 2022 04:54), Max: 98.5 (15 Charbel 2022 08:09)  HR: 65 (16 Jun 2022 04:54) (63 - 77)  BP: 166/77 (16 Jun 2022 04:54) (133/73 - 185/76)  BP(mean): --  ABP: --  ABP(mean): --  RR: 19 (16 Jun 2022 04:54) (18 - 20)  SpO2: 98% (16 Jun 2022 04:54) (94% - 99%)           Input and Output:  I&O's Detail      Lab Data                        12.1   13.06 )-----------( 181      ( 15 Charbel 2022 07:21 )             38.6     06-15    139  |  108  |  59<H>  ----------------------------<  249<H>  4.5   |  23  |  1.70<H>    Ca    8.9      15 Charbel 2022 07:21  Phos  4.9     06-15  Mg     2.6     06-15    TPro  7.0  /  Alb  3.0<L>  /  TBili  0.5  /  DBili  x   /  AST  15  /  ALT  21  /  AlkPhos  69  06-15    ABG - ( 14 Jun 2022 12:33 )  pH, Arterial: 7.37  pH, Blood: x     /  pCO2: 37    /  pO2: 95    / HCO3: 21    / Base Excess: -3.9  /  SaO2: 99.4                    Review of Systems	      Objective     Physical Examination    heart s1s2  lung dec BS  abd soft      Pertinent Lab findings & Imaging      Schwarz:  NO   Adequate UO     I&O's Detail           Discussed with:     Cultures:	        Radiology

## 2022-06-17 ENCOUNTER — TRANSCRIPTION ENCOUNTER (OUTPATIENT)
Age: 87
End: 2022-06-17

## 2022-06-17 LAB
ANION GAP SERPL CALC-SCNC: 9 MMOL/L — SIGNIFICANT CHANGE UP (ref 5–17)
BUN SERPL-MCNC: 85 MG/DL — HIGH (ref 7–23)
CALCIUM SERPL-MCNC: 9.2 MG/DL — SIGNIFICANT CHANGE UP (ref 8.5–10.1)
CHLORIDE SERPL-SCNC: 101 MMOL/L — SIGNIFICANT CHANGE UP (ref 96–108)
CO2 SERPL-SCNC: 29 MMOL/L — SIGNIFICANT CHANGE UP (ref 22–31)
CREAT ?TM UR-MCNC: 65 MG/DL — SIGNIFICANT CHANGE UP
CREAT SERPL-MCNC: 1.7 MG/DL — HIGH (ref 0.5–1.3)
EGFR: 29 ML/MIN/1.73M2 — LOW
GLUCOSE SERPL-MCNC: 277 MG/DL — HIGH (ref 70–99)
HCT VFR BLD CALC: 40.2 % — SIGNIFICANT CHANGE UP (ref 34.5–45)
HGB BLD-MCNC: 12.4 G/DL — SIGNIFICANT CHANGE UP (ref 11.5–15.5)
MCHC RBC-ENTMCNC: 27.9 PG — SIGNIFICANT CHANGE UP (ref 27–34)
MCHC RBC-ENTMCNC: 30.8 GM/DL — LOW (ref 32–36)
MCV RBC AUTO: 90.3 FL — SIGNIFICANT CHANGE UP (ref 80–100)
NRBC # BLD: 0 /100 WBCS — SIGNIFICANT CHANGE UP (ref 0–0)
PLATELET # BLD AUTO: 211 K/UL — SIGNIFICANT CHANGE UP (ref 150–400)
POTASSIUM SERPL-MCNC: 4.6 MMOL/L — SIGNIFICANT CHANGE UP (ref 3.5–5.3)
POTASSIUM SERPL-SCNC: 4.6 MMOL/L — SIGNIFICANT CHANGE UP (ref 3.5–5.3)
RBC # BLD: 4.45 M/UL — SIGNIFICANT CHANGE UP (ref 3.8–5.2)
RBC # FLD: 13.3 % — SIGNIFICANT CHANGE UP (ref 10.3–14.5)
SARS-COV-2 RNA SPEC QL NAA+PROBE: SIGNIFICANT CHANGE UP
SODIUM SERPL-SCNC: 139 MMOL/L — SIGNIFICANT CHANGE UP (ref 135–145)
SODIUM UR-SCNC: 57 MMOL/L — SIGNIFICANT CHANGE UP
UUN UR-MCNC: 729 MG/DL — SIGNIFICANT CHANGE UP
WBC # BLD: 18.73 K/UL — HIGH (ref 3.8–10.5)
WBC # FLD AUTO: 18.73 K/UL — HIGH (ref 3.8–10.5)

## 2022-06-17 PROCEDURE — 99233 SBSQ HOSP IP/OBS HIGH 50: CPT

## 2022-06-17 RX ORDER — ACETAMINOPHEN 500 MG
2 TABLET ORAL
Qty: 0 | Refills: 0 | DISCHARGE

## 2022-06-17 RX ORDER — POTASSIUM CHLORIDE 20 MEQ
1 PACKET (EA) ORAL
Qty: 0 | Refills: 0 | DISCHARGE

## 2022-06-17 RX ORDER — FUROSEMIDE 40 MG
1 TABLET ORAL
Qty: 0 | Refills: 0 | DISCHARGE

## 2022-06-17 RX ORDER — ALPRAZOLAM 0.25 MG
1 TABLET ORAL
Qty: 0 | Refills: 0 | DISCHARGE

## 2022-06-17 RX ADMIN — Medication 3 MILLIGRAM(S): at 21:42

## 2022-06-17 RX ADMIN — ISOSORBIDE MONONITRATE 30 MILLIGRAM(S): 60 TABLET, EXTENDED RELEASE ORAL at 12:33

## 2022-06-17 RX ADMIN — HEPARIN SODIUM 5000 UNIT(S): 5000 INJECTION INTRAVENOUS; SUBCUTANEOUS at 17:55

## 2022-06-17 RX ADMIN — Medication 8 UNIT(S): at 09:06

## 2022-06-17 RX ADMIN — GABAPENTIN 100 MILLIGRAM(S): 400 CAPSULE ORAL at 21:42

## 2022-06-17 RX ADMIN — Medication 50 MILLIGRAM(S): at 05:17

## 2022-06-17 RX ADMIN — GABAPENTIN 100 MILLIGRAM(S): 400 CAPSULE ORAL at 05:17

## 2022-06-17 RX ADMIN — Medication 2000 UNIT(S): at 12:33

## 2022-06-17 RX ADMIN — Medication 8 UNIT(S): at 12:34

## 2022-06-17 RX ADMIN — CLOPIDOGREL BISULFATE 75 MILLIGRAM(S): 75 TABLET, FILM COATED ORAL at 12:33

## 2022-06-17 RX ADMIN — Medication 3 MILLILITER(S): at 13:25

## 2022-06-17 RX ADMIN — Medication 2: at 09:07

## 2022-06-17 RX ADMIN — ATORVASTATIN CALCIUM 80 MILLIGRAM(S): 80 TABLET, FILM COATED ORAL at 21:42

## 2022-06-17 RX ADMIN — Medication 81 MILLIGRAM(S): at 13:17

## 2022-06-17 RX ADMIN — GABAPENTIN 100 MILLIGRAM(S): 400 CAPSULE ORAL at 14:18

## 2022-06-17 RX ADMIN — ESCITALOPRAM OXALATE 10 MILLIGRAM(S): 10 TABLET, FILM COATED ORAL at 12:33

## 2022-06-17 RX ADMIN — INSULIN GLARGINE 15 UNIT(S): 100 INJECTION, SOLUTION SUBCUTANEOUS at 09:08

## 2022-06-17 RX ADMIN — Medication 40 MILLIGRAM(S): at 05:16

## 2022-06-17 RX ADMIN — HEPARIN SODIUM 5000 UNIT(S): 5000 INJECTION INTRAVENOUS; SUBCUTANEOUS at 05:17

## 2022-06-17 RX ADMIN — Medication 3: at 12:34

## 2022-06-17 RX ADMIN — Medication 1: at 17:58

## 2022-06-17 RX ADMIN — Medication 8 UNIT(S): at 17:58

## 2022-06-17 RX ADMIN — Medication 3 MILLILITER(S): at 01:14

## 2022-06-17 RX ADMIN — Medication 1 DROP(S): at 05:17

## 2022-06-17 RX ADMIN — Medication 125 MICROGRAM(S): at 05:17

## 2022-06-17 RX ADMIN — Medication 3 MILLILITER(S): at 07:59

## 2022-06-17 RX ADMIN — Medication 1 DROP(S): at 17:55

## 2022-06-17 NOTE — DIETITIAN INITIAL EVALUATION ADULT - PERTINENT LABORATORY DATA
06-16    140  |  107  |  72<H>  ----------------------------<  179<H>  4.4   |  27  |  1.70<H>    Ca    9.4      16 Jun 2022 09:15    POCT Blood Glucose.: 213 mg/dL (06-17-22 @ 08:29)  A1C with Estimated Average Glucose Result: 10.0 % (06-14-22 @ 09:06)  A1C with Estimated Average Glucose Result: 8.4 % (03-10-22 @ 11:06)

## 2022-06-17 NOTE — DISCHARGE NOTE PROVIDER - NSDCCPCAREPLAN_GEN_ALL_CORE_FT
PRINCIPAL DISCHARGE DIAGNOSIS  Diagnosis: COPD exacerbation  Assessment and Plan of Treatment: continue steroid taper:   20mg x 3 days  10mg x 3 days  5mg x 3 days then off  continue 2L nasal cannula      SECONDARY DISCHARGE DIAGNOSES  Diagnosis: DM2 (diabetes mellitus, type 2)  Assessment and Plan of Treatment: continue insulin and check FS    Diagnosis: CAD (coronary artery disease)  Assessment and Plan of Treatment: continue medications  cardio f/u 1 week    Diagnosis: COPD exacerbation  Assessment and Plan of Treatment:      PRINCIPAL DISCHARGE DIAGNOSIS  Diagnosis: COPD exacerbation  Assessment and Plan of Treatment: You have a known history of chronic obstructive pulmonary disease (COPD). It is caused by lung damage from chronic irritation and inflammation from smoking. COPD is a serious condition that can get worse over time. Exacerbations are treated with steroids and antibiotics, in addition to the common treatments for COPD which often invole inhalers and nebulizers. There are measures you can take to help you feel better and prevent exacerbations. These measures include smoking cessation (if you have not already), avoiding others who smoke, exercising for at least 20 minutes a day, annual influenza  vaccine and a pneumonia vaccine (if you have not received this already). When you feel short of breath, take a deep breath through your nose and slowly breathe out through your mouth with your lips pursed for twice as long as you inhaled. Continue to take your inhalers as directed to prevent future exacerbations. Please return to the ED should you have symptoms such as, but not limited to, severe shortness of breath, wheezing, cough, coughing up blood, vomiting associated with cough, chest pain. Follow up with your PCP and/or pulmonologist.        SECONDARY DISCHARGE DIAGNOSES  Diagnosis: DM2 (diabetes mellitus, type 2)  Assessment and Plan of Treatment: continue insulin and check FS    Diagnosis: CAD (coronary artery disease)  Assessment and Plan of Treatment: continue medications  cardio f/u 1 week

## 2022-06-17 NOTE — PROGRESS NOTE ADULT - PROBLEM SELECTOR PLAN 5
bp stable  - change lasix to 20mg prn on discharge per renal  - Continue home Metoprolol Succinate 50 mg qd with hold parameters  -monitor bp

## 2022-06-17 NOTE — PROGRESS NOTE ADULT - PROBLEM SELECTOR PLAN 4
- Chronic, known history of T2DM  - C/W Lantus 15 U qd and and 8U pre meals  - Moderate dose insulin corrective scale   - HbA1c: 10  -endocrine following appreciate recs

## 2022-06-17 NOTE — PROGRESS NOTE ADULT - PROBLEM SELECTOR PLAN 2
- plan as above  - Continue home medications - Duoneb q6h, Spiriva, Proventil  - Supplemental O2 PRN. COPD patient will keep SpO2 goal 88-93%.   - Encourage incentive spirometry

## 2022-06-17 NOTE — PROGRESS NOTE ADULT - PROBLEM SELECTOR PLAN 10
VTE ppx: Heparin 5000 subq q12h   DNR/DNI - [facility] filled    PT reeval: d/c back to assisted living vs [facility], f/u PT today    Dispo: discharge to rehab vs assisted living later today pending repeat PT eval  discussed with CM and Dr Copeland  spent 40 min on d/c time
VTE ppx: Heparin 5000 subq q12h    IMPROVE VTE Individual Risk Assessment          RISK                                                          Points  [  ] Previous VTE                                                3  [  ] Thrombophilia                                             2  [  ] Lower limb paralysis                                   2        (unable to hold up >15 seconds)    [  ] Current Cancer                                             2         (within 6 months)  [  ] Immobilization > 24 hrs                              1  [  ] ICU/CCU stay > 24 hours                             1  [ x ] Age > 60                                                         1    IMPROVE VTE Score: 1      Patient is DNR/DNI - [facility] filled

## 2022-06-17 NOTE — PROGRESS NOTE ADULT - PROBLEM SELECTOR PLAN 3
CKD cr at baseline ranging from 1.3-1.7 on outpatient HIE review  hold lasix per renal, change to po lasix 20mg prn on discharge

## 2022-06-17 NOTE — PROGRESS NOTE ADULT - SUBJECTIVE AND OBJECTIVE BOX
Date/Time Patient Seen:  		  Referring MD:   Data Reviewed	       Patient is a 87y old  Female who presents with a chief complaint of COPD Exacerbation (16 Jun 2022 11:43)      Subjective/HPI     PAST MEDICAL & SURGICAL HISTORY:  Uncomplicated asthma, unspecified asthma severity    DM2 (diabetes mellitus, type 2)    Essential hypertension    Hypothyroidism, unspecified type    Pure hypercholesterolemia    Gastroesophageal reflux disease without esophagitis    Diabetes    Asthma    CAD (coronary artery disease)    HTN (hypertension)    HLD (hyperlipidemia)    Hypothyroid    NSTEMI (non-ST elevated myocardial infarction)    No significant past surgical history    History of appendectomy    History of appendectomy    Abnormal findings on cardiac catheterization  Cardiac Cath          Medication list         MEDICATIONS  (STANDING):  albuterol/ipratropium for Nebulization 3 milliLiter(s) Nebulizer every 6 hours  artificial tears (preservative free) Ophthalmic Solution 1 Drop(s) Both EYES two times a day  aspirin  chewable 81 milliGRAM(s) Oral daily  atorvastatin 80 milliGRAM(s) Oral at bedtime  cholecalciferol 2000 Unit(s) Oral daily  clopidogrel Tablet 75 milliGRAM(s) Oral daily  dextrose 5%. 1000 milliLiter(s) (50 mL/Hr) IV Continuous <Continuous>  dextrose 5%. 1000 milliLiter(s) (100 mL/Hr) IV Continuous <Continuous>  dextrose 50% Injectable 25 Gram(s) IV Push once  dextrose 50% Injectable 25 Gram(s) IV Push once  dextrose 50% Injectable 12.5 Gram(s) IV Push once  dextrose 50% Injectable 25 Gram(s) IV Push once  escitalopram 10 milliGRAM(s) Oral daily  gabapentin 100 milliGRAM(s) Oral three times a day  glucagon  Injectable 1 milliGRAM(s) IntraMuscular once  glucagon  Injectable 1 milliGRAM(s) IntraMuscular once  heparin   Injectable 5000 Unit(s) SubCutaneous every 12 hours  insulin glargine Injectable (LANTUS) 15 Unit(s) SubCutaneous every morning  insulin lispro (ADMELOG) corrective regimen sliding scale   SubCutaneous three times a day before meals  insulin lispro (ADMELOG) corrective regimen sliding scale   SubCutaneous at bedtime  insulin lispro Injectable (ADMELOG) 8 Unit(s) SubCutaneous three times a day before meals  isosorbide   mononitrate ER Tablet (IMDUR) 30 milliGRAM(s) Oral daily  levothyroxine 125 MICROGram(s) Oral daily  methylPREDNISolone sodium succinate Injectable 40 milliGRAM(s) IV Push daily  metoprolol succinate ER 50 milliGRAM(s) Oral daily    MEDICATIONS  (PRN):  acetaminophen     Tablet .. 650 milliGRAM(s) Oral every 6 hours PRN Mild Pain (1 - 3)  ALBUTerol    0.083% 2.5 milliGRAM(s) Nebulizer every 4 hours PRN Shortness of Breath and/or Wheezing  ALPRAZolam 0.5 milliGRAM(s) Oral two times a day PRN Anxiety  dextrose Oral Gel 15 Gram(s) Oral once PRN Blood Glucose LESS THAN 70 milliGRAM(s)/deciliter  melatonin 3 milliGRAM(s) Oral at bedtime PRN Insomnia         Vitals log        ICU Vital Signs Last 24 Hrs  T(C): 36.4 (17 Jun 2022 05:01), Max: 36.7 (16 Jun 2022 13:06)  T(F): 97.5 (17 Jun 2022 05:01), Max: 98.1 (16 Jun 2022 13:06)  HR: 70 (17 Jun 2022 05:01) (62 - 70)  BP: 169/84 (17 Jun 2022 05:01) (144/70 - 169/84)  BP(mean): --  ABP: --  ABP(mean): --  RR: 18 (17 Jun 2022 05:01) (18 - 18)  SpO2: 95% (17 Jun 2022 05:01) (95% - 99%)           Input and Output:  I&O's Detail    15 Charbel 2022 07:01  -  16 Jun 2022 07:00  --------------------------------------------------------  IN:  Total IN: 0 mL    OUT:    Voided (mL): 400 mL  Total OUT: 400 mL    Total NET: -400 mL      16 Jun 2022 07:01  -  17 Jun 2022 06:41  --------------------------------------------------------  IN:  Total IN: 0 mL    OUT:    Voided (mL): 650 mL  Total OUT: 650 mL    Total NET: -650 mL          Lab Data                        12.1   13.76 )-----------( 196      ( 16 Jun 2022 09:15 )             38.7     06-16    140  |  107  |  72<H>  ----------------------------<  179<H>  4.4   |  27  |  1.70<H>    Ca    9.4      16 Jun 2022 09:15  Phos  4.9     06-15  Mg     2.6     06-15    TPro  7.0  /  Alb  3.0<L>  /  TBili  0.5  /  DBili  x   /  AST  15  /  ALT  21  /  AlkPhos  69  06-15            Review of Systems	      Objective     Physical Examination  heart s1s2  lung dec BS  abd soft      Pertinent Lab findings & Imaging      Chong:  NO   Adequate UO     I&O's Detail    15 Charbel 2022 07:01  -  16 Jun 2022 07:00  --------------------------------------------------------  IN:  Total IN: 0 mL    OUT:    Voided (mL): 400 mL  Total OUT: 400 mL    Total NET: -400 mL      16 Jun 2022 07:01  -  17 Jun 2022 06:41  --------------------------------------------------------  IN:  Total IN: 0 mL    OUT:    Voided (mL): 650 mL  Total OUT: 650 mL    Total NET: -650 mL               Discussed with:     Cultures:	        Radiology

## 2022-06-17 NOTE — DIETITIAN INITIAL EVALUATION ADULT - NUTRITIONGOAL OUTCOME1
Pt to continue monitor carbohydrate intake and taking insulin regimen; renal indices to return to baseline

## 2022-06-17 NOTE — DISCHARGE NOTE PROVIDER - CARE PROVIDER_API CALL
Blake Yadav)  Internal Medicine  [address] [place] [id number]  Phone: [phone]  Fax: [phone]  Established Patient  Follow Up Time: 1 week    Waylon Benavides)  Cardiovascular Disease; Internal Medicine; Interventional Cardiology  [address] [place] [address]  Phone: [phone]  Fax: [phone]  Established Patient  Follow Up Time: 1 week

## 2022-06-17 NOTE — DISCHARGE NOTE PROVIDER - NSDCMRMEDTOKEN_GEN_ALL_CORE_FT
acetaminophen 500 mg oral tablet: 2 tab(s) orally 3 times a day, As Needed pain  ALPRAZolam 0.5 mg oral tablet: 1 tab(s) orally once a day (at bedtime)  aspirin 81 mg oral tablet, chewable: 1 tab(s) orally once a day  atorvastatin 80 mg oral tablet: 1 tab(s) orally once a day  clopidogrel 75 mg oral tablet: 1 tab(s) orally once a day  escitalopram 10 mg oral tablet: 1 tab(s) orally once a day  fluticasone-salmeterol 250 mcg-50 mcg inhalation powder: 1 puff(s) inhaled 2 times a day  furosemide 20 mg oral tablet: 1 tab(s) orally once a day, As Needed leg swelling or fluid overload  gabapentin 100 mg oral capsule: 1 cap(s) orally 3 times a day  ipratropium-albuterol 0.5 mg-2.5 mg/3 mL inhalation solution: 3 milliliter(s) inhaled every 6 hours, As Needed  ipratropium-albuterol 0.5 mg-2.5 mg/3 mL inhalation solution: 3 milliliter(s) inhaled 2 times a day  isosorbide mononitrate 30 mg oral tablet, extended release: 1 tab(s) orally once a day (in the morning)  Lantus 100 units/mL subcutaneous solution: 15 unit(s) subcutaneous once a day (in the morning)  levothyroxine 125 mcg (0.125 mg) oral tablet: 1 tab(s) orally Monday through Friday  metoprolol succinate 50 mg oral tablet, extended release: 1 tab(s) orally once a day  NovoLOG 100 units/mL injectable solution: 8 unit(s) injectable 3 times a day (before meals)  NovoLOG FlexPen 100 units/mL injectable solution: inject per sliding scale before meals   potassium chloride 10 mEq oral capsule, extended release: 1 cap(s) orally once a day prn only when takes lasix  predniSONE 20 mg oral tablet: continue 20mg daily x 3 days, then taper to 10mg x 3 days, then 5mg x 3 days then off  ProAir HFA 90 mcg/inh inhalation aerosol: 2 puff(s) inhaled every 4 hours, As Needed  Refresh Relieva ophthalmic solution: 1 drop(s) in each eye every 2 hours, As Needed for Dry eyes  Refresh Relieva ophthalmic solution: 1 drop(s) in each eye 2 times a day  tiotropium 18 mcg inhalation capsule: 1 cap(s) inhaled once a day  Vitamin D3 50 mcg (2000 intl units) oral tablet: 1 tab(s) orally once a day  ZeaSORB 0.5%-0.22% topical powder: Apply topically to affected area 2 times a day for dry skin   acetaminophen 500 mg oral tablet: 2 tab(s) orally 3 times a day, As Needed pain  ALPRAZolam 0.5 mg oral tablet: 1 tab(s) orally once a day (at bedtime), As Needed insomnia, agitation  aspirin 81 mg oral tablet, chewable: 1 tab(s) orally once a day  atorvastatin 80 mg oral tablet: 1 tab(s) orally once a day  clopidogrel 75 mg oral tablet: 1 tab(s) orally once a day  escitalopram 10 mg oral tablet: 1 tab(s) orally once a day  fluticasone-salmeterol 250 mcg-50 mcg inhalation powder: 1 puff(s) inhaled 2 times a day  furosemide 20 mg oral tablet: 1 tab(s) orally once a day, As Needed leg swelling or fluid overload  gabapentin 100 mg oral capsule: 1 cap(s) orally 3 times a day  ipratropium-albuterol 0.5 mg-2.5 mg/3 mL inhalation solution: 3 milliliter(s) inhaled every 6 hours, As Needed  ipratropium-albuterol 0.5 mg-2.5 mg/3 mL inhalation solution: 3 milliliter(s) inhaled 2 times a day  isosorbide mononitrate 30 mg oral tablet, extended release: 1 tab(s) orally once a day (in the morning)  Lantus 100 units/mL subcutaneous solution: 15 unit(s) subcutaneous once a day (in the morning)  levothyroxine 125 mcg (0.125 mg) oral tablet: 1 tab(s) orally Monday through Friday  metoprolol succinate 50 mg oral tablet, extended release: 1 tab(s) orally once a day  NovoLOG 100 units/mL injectable solution: 8 unit(s) injectable 3 times a day (before meals)  NovoLOG FlexPen 100 units/mL injectable solution: inject per sliding scale before meals   potassium chloride 10 mEq oral capsule, extended release: 1 cap(s) orally once a day prn only when takes lasix  predniSONE 20 mg oral tablet: continue 20mg daily x 3 days, then taper to 10mg x 3 days, then 5mg x 3 days then off  ProAir HFA 90 mcg/inh inhalation aerosol: 2 puff(s) inhaled every 4 hours, As Needed  Refresh Relieva ophthalmic solution: 1 drop(s) in each eye every 2 hours, As Needed for Dry eyes  Refresh Relieva ophthalmic solution: 1 drop(s) in each eye 2 times a day  tiotropium 18 mcg inhalation capsule: 1 cap(s) inhaled once a day  Vitamin D3 50 mcg (2000 intl units) oral tablet: 1 tab(s) orally once a day  ZeaSORB 0.5%-0.22% topical powder: Apply topically to affected area 2 times a day for dry skin   acetaminophen 500 mg oral tablet: 2 tab(s) orally 3 times a day, As Needed pain  ALPRAZolam 0.5 mg oral tablet: 1 tab(s) orally once a day (at bedtime), As Needed insomnia, agitation  aspirin 81 mg oral tablet, chewable: 1 tab(s) orally once a day  atorvastatin 80 mg oral tablet: 1 tab(s) orally once a day  clopidogrel 75 mg oral tablet: 1 tab(s) orally once a day  escitalopram 10 mg oral tablet: 1 tab(s) orally once a day  fluticasone-salmeterol 250 mcg-50 mcg inhalation powder: 1 puff(s) inhaled 2 times a day  gabapentin 100 mg oral capsule: 1 cap(s) orally 3 times a day  ipratropium-albuterol 0.5 mg-2.5 mg/3 mL inhalation solution: 3 milliliter(s) inhaled every 6 hours, As Needed  ipratropium-albuterol 0.5 mg-2.5 mg/3 mL inhalation solution: 3 milliliter(s) inhaled 2 times a day  isosorbide mononitrate 30 mg oral tablet, extended release: 1 tab(s) orally once a day (in the morning)  Lantus 100 units/mL subcutaneous solution: 15 unit(s) subcutaneous once a day (in the morning)  levothyroxine 125 mcg (0.125 mg) oral tablet: 1 tab(s) orally Monday through Friday  metoprolol succinate 50 mg oral tablet, extended release: 1 tab(s) orally once a day  NovoLOG 100 units/mL injectable solution: 8 unit(s) injectable 3 times a day (before meals)  NovoLOG FlexPen 100 units/mL injectable solution: inject per sliding scale before meals   predniSONE 20 mg oral tablet: continue 20mg daily x 3 days, then taper to 10mg x 3 days, then 5mg x 3 days then off  ProAir HFA 90 mcg/inh inhalation aerosol: 2 puff(s) inhaled every 4 hours, As Needed  Refresh Relieva ophthalmic solution: 1 drop(s) in each eye every 2 hours, As Needed for Dry eyes  Refresh Relieva ophthalmic solution: 1 drop(s) in each eye 2 times a day  tiotropium 18 mcg inhalation capsule: 1 cap(s) inhaled once a day  Vitamin D3 50 mcg (2000 intl units) oral tablet: 1 tab(s) orally once a day  ZeaSORB 0.5%-0.22% topical powder: Apply topically to affected area 2 times a day for dry skin   acetaminophen 500 mg oral tablet: 2 tab(s) orally 3 times a day, As Needed pain  ALPRAZolam 0.5 mg oral tablet: 1 tab(s) orally once a day (at bedtime), As Needed insomnia, agitation  aspirin 81 mg oral tablet, chewable: 1 tab(s) orally once a day  atorvastatin 80 mg oral tablet: 1 tab(s) orally once a day  clopidogrel 75 mg oral tablet: 1 tab(s) orally once a day  escitalopram 10 mg oral tablet: 1 tab(s) orally once a day  fluticasone-salmeterol 250 mcg-50 mcg inhalation powder: 1 puff(s) inhaled 2 times a day  gabapentin 100 mg oral capsule: 1 cap(s) orally 3 times a day  ipratropium-albuterol 0.5 mg-2.5 mg/3 mL inhalation solution: 3 milliliter(s) inhaled every 6 hours, As Needed  ipratropium-albuterol 0.5 mg-2.5 mg/3 mL inhalation solution: 3 milliliter(s) inhaled 2 times a day  isosorbide mononitrate 30 mg oral tablet, extended release: 1 tab(s) orally once a day (in the morning)  Lantus 100 units/mL subcutaneous solution: 15 unit(s) subcutaneous once a day (in the morning)  levothyroxine 125 mcg (0.125 mg) oral tablet: 1 tab(s) orally Monday through Friday  metoprolol succinate 50 mg oral tablet, extended release: 1 tab(s) orally once a day  NovoLOG 100 units/mL injectable solution: 8 unit(s) injectable 3 times a day (before meals)  NovoLOG FlexPen 100 units/mL injectable solution: inject per sliding scale before meals   ProAir HFA 90 mcg/inh inhalation aerosol: 2 puff(s) inhaled every 4 hours, As Needed  Refresh Relieva ophthalmic solution: 1 drop(s) in each eye every 2 hours, As Needed for Dry eyes  Refresh Relieva ophthalmic solution: 1 drop(s) in each eye 2 times a day  tiotropium 18 mcg inhalation capsule: 1 cap(s) inhaled once a day  Vitamin D3 50 mcg (2000 intl units) oral tablet: 1 tab(s) orally once a day  ZeaSORB 0.5%-0.22% topical powder: Apply topically to affected area 2 times a day for dry skin

## 2022-06-17 NOTE — DIETITIAN INITIAL EVALUATION ADULT - NS FNS DIET ORDER
Diet, Consistent Carbohydrate Renal/No Snacks:   DASH/TLC {Sodium & Cholesterol Restricted} (06-15-22 @ 09:59) [Active]

## 2022-06-17 NOTE — DIETITIAN INITIAL EVALUATION ADULT - OTHER INFO
GI/Intake:  -Per RN, 50% intake this AM  -Last BM documented 6/15; no bowel regimen noted     Endo:   -Hx of T2DM   -Latest A1c: 10.0% - uncontrolled   -Noted on Lantus and Novolog PTA   -Currently noted on Prednisone, can lead to hyperglycemia   -SSI, 15 units long acting and x8 units prandial insulin ordered ; Consistent carbohydrate diet   -Unable to provide education this AM as pt is noted to be confused and currently being rounded on     Renal:   -TRENTON on CKD3 likely 2/2 dehydration   -Currently on renal diet; d/c as pt does not need this restriction at this time    Pulm/Resp:   -COPD exacerbation likely 2/2 HMPV  -Noted on NC for supplemental O2; BiPAP PRN

## 2022-06-17 NOTE — PROGRESS NOTE ADULT - ASSESSMENT
87 year old female with PMHx COPD, HTN, HLD, T2DM on insulin, hx of MI presents from assisted living facility with dyspnea, wheezing, labored breathing    copd  copd ex  TRENTON CKD  HTN  OP  OA  DM  HLD  Moderate to Severe AS  CAD    remains with Hyperglycemia  on nebs  on steroids - will taper this am   ABG noted  vs noted      cvs rx regimen  BP control  serial renal indices  I and O  monitor VS and HD and Sat  HOB elev - asp prec - assist with needs - ADL - GOC discussion  pt has hMPV - resp viral infection - isolation precs - acap PRN - robitussin PRN -   cxr - labs reviewed  copd - rx regimen PRN for sob and or wheeze, NEBS, not a candidate for Inhaler use  may need Systemic Steroids - will monitor clinical course and decide as clinically indicated  proBNP noted  ABG noted  old records reviewed  spoke with daughter at the bedside

## 2022-06-17 NOTE — DISCHARGE NOTE PROVIDER - PROVIDER TOKENS
PROVIDER:[TOKEN:[[id number]],FOLLOWUP:[1 week],ESTABLISHEDPATIENT:[T]],PROVIDER:[TOKEN:[[id number]],FOLLOWUP:[1 week],ESTABLISHEDPATIENT:[T]]

## 2022-06-17 NOTE — PROGRESS NOTE ADULT - PROBLEM SELECTOR PLAN 1
- COPD exacerbation  due to hMPV - resp viral infection + on admission   -was on BiPAP, weaned off BiPAP. Now on 2L O2 NC. saturating well 95% which is baseline, no sob   -Xanax  PRN - robitussin PRN, duoneb prn  - weaned to prednisone 20mg daily, taper by 10mg every 3 days, then off  - wbc oscillating between 12-20 likely stress, steroids induced, afebrile, cxr clear  - discussed with pulm, Dr Copeland patient stable for discharge with outpatient f/u - COPD exacerbation  due to hMPV - resp viral infection + on admission   -was on BiPAP, weaned off BiPAP. Now on 2L O2 NC. saturating well 95%, no sob,   -02 sat ra rest 88%, will need home 02 with 2L NC   -Xanax  PRN - robitussin PRN, duoneb prn  - weaned to prednisone 20mg daily, taper by 10mg every 3 days, then off  - wbc oscillating between 12-20 likely stress, steroids induced, afebrile, cxr clear  - discussed with pulm, Dr Copeland patient stable for discharge with outpatient f/u

## 2022-06-17 NOTE — DIETITIAN INITIAL EVALUATION ADULT - PERTINENT MEDS FT
MEDICATIONS  (STANDING):  albuterol/ipratropium for Nebulization 3 milliLiter(s) Nebulizer every 6 hours  artificial tears (preservative free) Ophthalmic Solution 1 Drop(s) Both EYES two times a day  aspirin  chewable 81 milliGRAM(s) Oral daily  atorvastatin 80 milliGRAM(s) Oral at bedtime  cholecalciferol 2000 Unit(s) Oral daily  clopidogrel Tablet 75 milliGRAM(s) Oral daily  dextrose 5%. 1000 milliLiter(s) (50 mL/Hr) IV Continuous <Continuous>  dextrose 5%. 1000 milliLiter(s) (100 mL/Hr) IV Continuous <Continuous>  dextrose 50% Injectable 25 Gram(s) IV Push once  dextrose 50% Injectable 12.5 Gram(s) IV Push once  dextrose 50% Injectable 25 Gram(s) IV Push once  dextrose 50% Injectable 25 Gram(s) IV Push once  escitalopram 10 milliGRAM(s) Oral daily  gabapentin 100 milliGRAM(s) Oral three times a day  glucagon  Injectable 1 milliGRAM(s) IntraMuscular once  glucagon  Injectable 1 milliGRAM(s) IntraMuscular once  heparin   Injectable 5000 Unit(s) SubCutaneous every 12 hours  insulin glargine Injectable (LANTUS) 15 Unit(s) SubCutaneous every morning  insulin lispro (ADMELOG) corrective regimen sliding scale   SubCutaneous three times a day before meals  insulin lispro (ADMELOG) corrective regimen sliding scale   SubCutaneous at bedtime  insulin lispro Injectable (ADMELOG) 8 Unit(s) SubCutaneous three times a day before meals  isosorbide   mononitrate ER Tablet (IMDUR) 30 milliGRAM(s) Oral daily  levothyroxine 125 MICROGram(s) Oral daily  metoprolol succinate ER 50 milliGRAM(s) Oral daily  predniSONE   Tablet 20 milliGRAM(s) Oral daily    MEDICATIONS  (PRN):  acetaminophen     Tablet .. 650 milliGRAM(s) Oral every 6 hours PRN Mild Pain (1 - 3)  ALBUTerol    0.083% 2.5 milliGRAM(s) Nebulizer every 4 hours PRN Shortness of Breath and/or Wheezing  ALPRAZolam 0.5 milliGRAM(s) Oral two times a day PRN Anxiety  dextrose Oral Gel 15 Gram(s) Oral once PRN Blood Glucose LESS THAN 70 milliGRAM(s)/deciliter  melatonin 3 milliGRAM(s) Oral at bedtime PRN Insomnia

## 2022-06-17 NOTE — PROGRESS NOTE ADULT - SUBJECTIVE AND OBJECTIVE BOX
Patient is a 87y old  Female who presents with a chief complaint of COPD Exacerbation (2022 11:01)       HPI:  87 year old female with PMHx COPD, HTN, HLD, T2DM on insulin, hx of MI presents from assisted living facility with dyspnea, wheezing, labored breathing. Patient recently admitted to [facility] 3/9-15/2022 for COPD exacerbation. History obtained from son Calvin, as patient is limited historian at baseline. Son states he received a call from [facility] this AM stating patient is having labored breathing. Patient was sent to [facility] for further management.   She states her breathing is improved.  Denies any N/V/Dizziness.  Has not seen nephrologist in the past.  No urinary issues. Denies NSAID usage.    No acute events noted       PAST MEDICAL & SURGICAL HISTORY:  Uncomplicated asthma, unspecified asthma severity      DM2 (diabetes mellitus, type 2)      Essential hypertension      Hypothyroidism, unspecified type      Pure hypercholesterolemia      Gastroesophageal reflux disease without esophagitis      Diabetes      Asthma      CAD (coronary artery disease)      HTN (hypertension)      HLD (hyperlipidemia)      Hypothyroid      NSTEMI (non-ST elevated myocardial infarction)      History of appendectomy      History of appendectomy      Abnormal findings on cardiac catheterization  Cardiac Cath           FAMILY HISTORY:  Family history of heart disease    Family history of cancer in mother    Family history of MI (myocardial infarction)    Family history of stomach cancer    NC    Social History:Non smoker    MEDICATIONS  (STANDING):  albuterol/ipratropium for Nebulization 3 milliLiter(s) Nebulizer every 6 hours  artificial tears (preservative free) Ophthalmic Solution 1 Drop(s) Both EYES two times a day  aspirin  chewable 81 milliGRAM(s) Oral daily  atorvastatin 80 milliGRAM(s) Oral at bedtime  cholecalciferol 2000 Unit(s) Oral daily  clopidogrel Tablet 75 milliGRAM(s) Oral daily  dextrose 5%. 1000 milliLiter(s) (50 mL/Hr) IV Continuous <Continuous>  dextrose 5%. 1000 milliLiter(s) (100 mL/Hr) IV Continuous <Continuous>  dextrose 50% Injectable 25 Gram(s) IV Push once  dextrose 50% Injectable 12.5 Gram(s) IV Push once  dextrose 50% Injectable 25 Gram(s) IV Push once  escitalopram 10 milliGRAM(s) Oral daily  furosemide   Injectable 40 milliGRAM(s) IV Push <User Schedule>  gabapentin 100 milliGRAM(s) Oral three times a day  glucagon  Injectable 1 milliGRAM(s) IntraMuscular once  heparin   Injectable 5000 Unit(s) SubCutaneous every 12 hours  insulin glargine Injectable (LANTUS) 15 Unit(s) SubCutaneous every morning  insulin lispro (ADMELOG) corrective regimen sliding scale   SubCutaneous every 6 hours  insulin lispro Injectable (ADMELOG) 8 Unit(s) SubCutaneous three times a day before meals  isosorbide   mononitrate ER Tablet (IMDUR) 30 milliGRAM(s) Oral daily  levothyroxine 125 MICROGram(s) Oral daily  methylPREDNISolone sodium succinate Injectable 40 milliGRAM(s) IV Push daily  metoprolol succinate ER 50 milliGRAM(s) Oral daily    MEDICATIONS  (PRN):  acetaminophen     Tablet .. 650 milliGRAM(s) Oral every 6 hours PRN Mild Pain (1 - 3)  ALBUTerol    0.083% 2.5 milliGRAM(s) Nebulizer every 4 hours PRN Shortness of Breath and/or Wheezing  ALPRAZolam 0.5 milliGRAM(s) Oral two times a day PRN Anxiety  dextrose Oral Gel 15 Gram(s) Oral once PRN Blood Glucose LESS THAN 70 milliGRAM(s)/deciliter  melatonin 3 milliGRAM(s) Oral at bedtime PRN Insomnia   Meds reviewed    Allergies    doxycycline (Unknown)  iodine (Hives)  iodine containing compounds (Unknown)  shellfish (Anaphylaxis)  shellfish (Swelling; Short breath)    Intolerances         REVIEW OF SYSTEMS:    Review of Systems:   Constitutional: Denies fatigue  HEENT: Denies headaches and dizziness  Respiratory: denies  cough, or wheezing, improving SOB  Cardiovascular: denies CP, palpitations  Gastrointestinal: Denies nausea, denies vomiting, diarrhea, constipation, abdominal pain, or bloody stools  Genitourinary: denies painful urination, increased frequency, urgency, or bloody urine  Skin: denies rashes or itching  Musculoskeletal: denies muscle aches, joint swelling  Neurologic: Denies generalized weakness, denies loss of sensation, numbness, or tingling      Vital Signs Last 24 Hrs  T(C): 36.4 (2022 05:01), Max: 36.7 (2022 13:06)  T(F): 97.5 (2022 05:01), Max: 98.1 (2022 13:06)  HR: 64 (2022 08:17) (63 - 70)  BP: 169/84 (2022 05:01) (144/70 - 169/84)  BP(mean): --  RR: 18 (2022 05:01) (18 - 18)  SpO2: 98% (2022 08:17) (95% - 99%)    PHYSICAL EXAM:    GENERAL: NAD  HEAD:  Atraumatic, Normocephalic  EYES: EOMI, conjunctiva and sclera clear  ENMT: No Drainage from nares, No drainage from ears  NECK: Supple, neck  veins full  NERVOUS SYSTEM:  Awake and Alert  CHEST/LUNG: Clear to percussion bilaterally; No rales, rhonchi, wheezing, or rubs  HEART: Regular rate and rhythm; No murmurs, rubs, or gallops  ABDOMEN: Soft, Nontender, Nondistended; Bowel sounds present  EXTREMITIES:  No Edema  SKIN: No rashes No obvious ecchymosis      LABS:                                           12.4   18.73 )-----------( 211      ( 2022 10:37 )             40.2     06-16    140  |  107  |  72<H>  ----------------------------<  179<H>  4.4   |  27  |  1.70<H>    Ca    9.4      2022 09:15        Urinalysis Basic - ( 2022 22:47 )    Color: Pale Yellow / Appearance: Slightly Turbid / S.010 / pH: x  Gluc: x / Ketone: Negative  / Bili: Negative / Urobili: Negative   Blood: x / Protein: Negative / Nitrite: Negative   Leuk Esterase: Small / RBC: 6-10 /HPF / WBC 6-10   Sq Epi: x / Non Sq Epi: Few / Bacteria: Many

## 2022-06-17 NOTE — DIETITIAN INITIAL EVALUATION ADULT - REASON FOR ADMISSION
86yo Female with PMH of HLD, HTN, CAD, DM, GERD, T2DM, COPD, CKD3. Pt presenting with COPD exacerbation likely 2/2 HMPV.

## 2022-06-17 NOTE — PROGRESS NOTE ADULT - SUBJECTIVE AND OBJECTIVE BOX
Patient is a 87y old  Female who presents with a chief complaint of COPD Exacerbation (2022 11:22)      SUBJECTIVE / OVERNIGHT EVENTS: No On events. Reports feeling much better. Denies sob, cp, palpitations, n/v, f/c.         ADDITIONAL REVIEW OF SYSTEMS: Negative except for above    MEDICATIONS  (STANDING):  albuterol/ipratropium for Nebulization 3 milliLiter(s) Nebulizer every 6 hours  artificial tears (preservative free) Ophthalmic Solution 1 Drop(s) Both EYES two times a day  aspirin  chewable 81 milliGRAM(s) Oral daily  atorvastatin 80 milliGRAM(s) Oral at bedtime  cholecalciferol 2000 Unit(s) Oral daily  clopidogrel Tablet 75 milliGRAM(s) Oral daily  dextrose 5%. 1000 milliLiter(s) (50 mL/Hr) IV Continuous <Continuous>  dextrose 5%. 1000 milliLiter(s) (100 mL/Hr) IV Continuous <Continuous>  dextrose 50% Injectable 25 Gram(s) IV Push once  dextrose 50% Injectable 12.5 Gram(s) IV Push once  dextrose 50% Injectable 25 Gram(s) IV Push once  dextrose 50% Injectable 25 Gram(s) IV Push once  escitalopram 10 milliGRAM(s) Oral daily  gabapentin 100 milliGRAM(s) Oral three times a day  glucagon  Injectable 1 milliGRAM(s) IntraMuscular once  glucagon  Injectable 1 milliGRAM(s) IntraMuscular once  heparin   Injectable 5000 Unit(s) SubCutaneous every 12 hours  insulin glargine Injectable (LANTUS) 15 Unit(s) SubCutaneous every morning  insulin lispro (ADMELOG) corrective regimen sliding scale   SubCutaneous three times a day before meals  insulin lispro (ADMELOG) corrective regimen sliding scale   SubCutaneous at bedtime  insulin lispro Injectable (ADMELOG) 8 Unit(s) SubCutaneous three times a day before meals  isosorbide   mononitrate ER Tablet (IMDUR) 30 milliGRAM(s) Oral daily  levothyroxine 125 MICROGram(s) Oral daily  metoprolol succinate ER 50 milliGRAM(s) Oral daily  predniSONE   Tablet 20 milliGRAM(s) Oral daily    MEDICATIONS  (PRN):  acetaminophen     Tablet .. 650 milliGRAM(s) Oral every 6 hours PRN Mild Pain (1 - 3)  ALBUTerol    0.083% 2.5 milliGRAM(s) Nebulizer every 4 hours PRN Shortness of Breath and/or Wheezing  ALPRAZolam 0.5 milliGRAM(s) Oral two times a day PRN Anxiety  dextrose Oral Gel 15 Gram(s) Oral once PRN Blood Glucose LESS THAN 70 milliGRAM(s)/deciliter  melatonin 3 milliGRAM(s) Oral at bedtime PRN Insomnia      CAPILLARY BLOOD GLUCOSE      POCT Blood Glucose.: 213 mg/dL (2022 08:29)  POCT Blood Glucose.: 181 mg/dL (2022 21:39)  POCT Blood Glucose.: 227 mg/dL (2022 17:01)  POCT Blood Glucose.: 284 mg/dL (2022 11:50)    I&O's Summary    2022 07:01  -  2022 07:00  --------------------------------------------------------  IN: 0 mL / OUT: 650 mL / NET: -650 mL        PHYSICAL EXAM:  Vital Signs Last 24 Hrs  T(C): 36.4 (2022 05:01), Max: 36.7 (2022 13:06)  T(F): 97.5 (2022 05:01), Max: 98.1 (2022 13:06)  HR: 64 (2022 08:17) (63 - 70)  BP: 169/84 (2022 05:01) (144/70 - 169/84)  BP(mean): --  RR: 18 (2022 05:01) (18 - 18)  SpO2: 98% (2022 08:17) (95% - 99%)    PHYSICAL EXAM:  GENERAL: NAD, well-developed comfortable on 2L NC satting well  HEAD:  Atraumatic, Normocephalic  NECK: Supple, No JVD  CHEST/LUNG: decreases b/s b/l bases  HEART: Regular rate and rhythm;   ABDOMEN: Soft, Nontender, Nondistended; Bowel sounds present  EXTREMITIES:  2+ Peripheral Pulses, No clubbing, cyanosis, or edema  PSYCH: AAOx2          LABS:                        12.4   18.73 )-----------( 211      ( 2022 10:37 )             40.2     06-17    139  |  101  |  85<H>  ----------------------------<  277<H>  4.6   |  29  |  1.70<H>    Ca    9.2      2022 10:37            Urinalysis Basic - ( 2022 22:47 )    Color: Pale Yellow / Appearance: Slightly Turbid / S.010 / pH: x  Gluc: x / Ketone: Negative  / Bili: Negative / Urobili: Negative   Blood: x / Protein: Negative / Nitrite: Negative   Leuk Esterase: Small / RBC: 6-10 /HPF / WBC 6-10   Sq Epi: x / Non Sq Epi: Few / Bacteria: Many          RADIOLOGY & ADDITIONAL TESTS:    Imaging Personally Reviewed:    Electrocardiogram Personally Reviewed:    COORDINATION OF CARE:  Care Discussed with Consultants/Other Providers [Y/N]:  Prior or Outpatient Records Reviewed [Y/N]:

## 2022-06-17 NOTE — DIETITIAN INITIAL EVALUATION ADULT - ADD RECOMMEND
1) Continue Consistent carbohydrate, DASH/TLC diet; d/c renal diet   2) Add Glucerna x1/day   3) Monitor PO intake, diet tolerance, weight trends, labs, GI function, and skin integrity

## 2022-06-17 NOTE — DISCHARGE NOTE PROVIDER - HOSPITAL COURSE
87 year old female with PMHx COPD, HTN, HLD, T2DM on insulin, hx of MI presents from assisted living facility with dyspnea, wheezing, labored breathing admitted for COPD exacerbation.      Problem/Plan - 1:  ·  Problem: Acute on chronic respiratory failure with hypoxia and hypercapnia.   ·  Plan: COPD exacerbation  due to hMPV - resp viral infection + on admission   -was on BiPAP, weaned off BiPAP. Now on 2L O2 NC. saturating well 95% which is baseline, no sob   -Xanax  PRN - robitussin PRN, duoneb prn  - weaned to prednisone 20mg daily, taper by 10mg every 3 days, then off  - wbc oscillating between 12-20 likely stress, steroids induced, afebrile, cxr clear  - discussed with pulm, Dr Copeland patient stable for discharge with outpatient f/u.     Problem/Plan - 2:  ·  Problem: COPD exacerbation.   ·  Plan: - plan as above  - Continue home medications - Duoneb q6h, Spiriva, Proventil  - Supplemental O2 PRN. COPD patient will keep SpO2 goal 88-93%.   - Encourage incentive spirometry.     Problem/Plan - 3:  ·  Problem: Renal failure (ARF), acute on chronic.   ·  Plan: CKD cr at baseline ranging from 1.3-1.7 on outpatient HIE review  hold lasix per renal, change to po lasix 20mg prn on discharge     Problem/Plan - 4:  ·  Problem: DM2 (diabetes mellitus, type 2).   ·  Plan: - Chronic, known history of T2DM  - C/W Lantus 15 U qd and and 8U pre meals  - Moderate dose insulin corrective scale   - HbA1c: 10  -endocrine following appreciate recs.     Problem/Plan - 5:  ·  Problem: HTN (hypertension).   ·  Plan: bp stable  - change lasix to 20mg prn on discharge per renal  - Continue home Metoprolol Succinate 50 mg qd with hold parameters  -monitor bp.     Problem/Plan - 6:  ·  Problem: Anxiety.   ·  Plan: stable  continue Lexapro 10 mg daily and ativan 0.5mg PRN.     Problem/Plan - 7:  ·  Problem: Hypothyroidism, unspecified type.   ·  Plan: Chronic  - Continue home Synthroid 125 mcg PO qd.     Problem/Plan - 8:  ·  Problem: HLD (hyperlipidemia).   ·  Plan: Chronic  - Continue home Atorvastatin 80 mg PO qhs.     Problem/Plan - 9:  ·  Problem: CAD (coronary artery disease).   ·  Plan: - Hx of MI  - Continue home Aspirin 81 mg PO qd, Plavix 75 mg PO qd, imdur 30mg daily   - Echo 3/11/2022: Left ventricle was of normal size, mild left ventricular hypertrophy LV   systolic function EF 65%. Moderate to severe aortic stenosis.     Problem/Plan - 10:  ·  Problem: Need for prophylactic measure.   ·  Plan; VTE ppx: Heparin 5000 subq q12h   DNR/DNI - [facility] filled    PT reeval: d/c back to assisted living vs [facility], f/u PT today    Dispo: discharge to rehab vs assisted living later today pending repeat PT eval  outpatient pcp and cards f/u 1 week   87 year old female with PMHx COPD, HTN, HLD, T2DM on insulin, hx of MI presents from assisted living facility with dyspnea, wheezing, labored breathing admitted for COPD exacerbation.      Problem/Plan - 1:  ·  Problem: Acute on chronic respiratory failure with hypoxia and hypercapnia.   ·  Plan: COPD exacerbation due to hMPV - resp viral infection + on admission   -was on BiPAP, weaned off BiPAP. Now on 2L O2 NC. saturating well 95% which is baseline, no sob   -Xanax  PRN - robitussin PRN, duoneb prn  - weaned to prednisone 20mg daily, taper by 10mg every 3 days, then off  - wbc oscillating between 12-20 likely stress, steroids induced, afebrile, cxr clear  - discussed with pulm, Dr Copeland patient stable for discharge with outpatient f/u     Problem/Plan - 2:  ·  Problem: COPD exacerbation.   ·  Plan: - plan as above  - Continue home medications - Duoneb q6h, Spiriva, Proventil  - Supplemental O2 PRN. COPD patient will keep SpO2 goal 88-93%.   - Encourage incentive spirometry.     Problem/Plan - 3:  ·  Problem: Renal failure (ARF), acute on chronic.   ·  Plan: CKD cr at baseline ranging from 1.3-1.7 on outpatient HIE review  hold lasix on dc per renal     Problem/Plan - 4:  ·  Problem: DM2 (diabetes mellitus, type 2).   ·  Plan: - Chronic, known history of T2DM  - C/W Lantus 15 U qd and and 8U pre meals  - Moderate dose insulin corrective scale   - HbA1c: 10  -endocrine following appreciate recs.     Problem/Plan - 5:  ·  Problem: HTN (hypertension).   ·  Plan: bp stable  - Continue home Metoprolol Succinate 50 mg qd with hold parameters  -monitor bp.     Problem/Plan - 6:  ·  Problem: Anxiety.   ·  Plan: stable  continue Lexapro 10 mg daily and ativan 0.5mg PRN.     Problem/Plan - 7:  ·  Problem: Hypothyroidism, unspecified type.   ·  Plan: Chronic  - Continue home Synthroid 125 mcg PO qd.     Problem/Plan - 8:  ·  Problem: HLD (hyperlipidemia).   ·  Plan: Chronic  - Continue home Atorvastatin 80 mg PO qhs.     Problem/Plan - 9:  ·  Problem: CAD (coronary artery disease).   ·  Plan: - Hx of MI  - Continue home Aspirin 81 mg PO qd, Plavix 75 mg PO qd, imdur 30mg daily   - Echo 3/11/2022: Left ventricle was of normal size, mild left ventricular hypertrophy LV   systolic function EF 65%. Moderate to severe aortic stenosis.     Problem/Plan - 10:  ·  Problem: Need for prophylactic measure.   ·  Plan; VTE ppx: Heparin 5000 subq q12h   DNR/DNI - [facility] filled    PT reeval: d/c back to assisted living vs [facility]   HPI:  87 year old female with PMHx COPD, HTN, HLD, T2DM on insulin, hx of MI presents from assisted living facility with dyspnea, wheezing, labored breathing. Patient recently admitted to [facility] 3/9-15/2022 for COPD exacerbation. History obtained from son Calvin, as patient is limited historian at baseline. Son states he received a call from [facility] this AM stating patient is having labored breathing. Patient was sent to [facility] for further management.     ED course:  Vitals: HR 91 SpO2 on BIPAP  Labs: WBC 12.64, BUN 31/1.5, Glu 188, ProBNP 1430  Given Mag Sulfate IVPB 1 g, Solumedrol 125 mg IVP x1 (13 Jun 2022 06:00)      ---  HOSPITAL COURSE:     ---  CONSULTANTS: Patient admitted with acute hypoixic respiratory failure 2/2 COPD exacerbation caused by hMPV. Patient initially was on BiPAP, weaned off BiPAP. Then on 2L O2 NC. Now saturating in 90s on RA. Completed steroid taper. Patient noted to have persistent leukocytosis so performed CT Chest which showed some groundglass opacity in LLL - discussed with ID Diallo), started on Augmentin for possible aspiration PNA ;  pt's family decided  to stop antibiotics. Overall, family would like to de-escalate medical interventions as they feel the pt has been rapidly declining and would not have wanted to be living  in hospitalized setting the way she has been. Further, they feel the Abx have caused pt to have significant diarrhea causing the pt discomfort and believe bacterial PNA is unlikely. For those reasons, family requested to discontinue antibiotics, which was done. Patient also noted to have TRENTON on CKD and home lasix was held. Patient medically stable for discharge to [facility] at this time.       ---  TIME SPENT:  I, the attending physician, was physically present for the key portions of the evaluation and management (E/M) service provided. The total amount of time spent reviewing the hospital notes, laboratory values, imaging findings, assessing/counseling the patient, discussing with consultant physicians, social work, nursing staff was 45 minutes    ---

## 2022-06-17 NOTE — PROGRESS NOTE ADULT - ASSESSMENT
TRENTON on CKD 3b  Respiratory Acidosis  HTN  Human Metapneumovirus      -BLCr 1.4  -Urine indices reviewed  -Hold further lasix  -Avoid IVF given respiratory status  -Pulm eval reviewed  -CXR clear 6/15/22  -Renal indices stabilized; monitor for now; repeat BMP pending, will follow up

## 2022-06-18 PROCEDURE — 99232 SBSQ HOSP IP/OBS MODERATE 35: CPT

## 2022-06-18 RX ADMIN — ISOSORBIDE MONONITRATE 30 MILLIGRAM(S): 60 TABLET, EXTENDED RELEASE ORAL at 12:47

## 2022-06-18 RX ADMIN — Medication 1 DROP(S): at 19:52

## 2022-06-18 RX ADMIN — HEPARIN SODIUM 5000 UNIT(S): 5000 INJECTION INTRAVENOUS; SUBCUTANEOUS at 05:53

## 2022-06-18 RX ADMIN — ESCITALOPRAM OXALATE 10 MILLIGRAM(S): 10 TABLET, FILM COATED ORAL at 12:48

## 2022-06-18 RX ADMIN — GABAPENTIN 100 MILLIGRAM(S): 400 CAPSULE ORAL at 21:47

## 2022-06-18 RX ADMIN — Medication 81 MILLIGRAM(S): at 12:47

## 2022-06-18 RX ADMIN — ATORVASTATIN CALCIUM 80 MILLIGRAM(S): 80 TABLET, FILM COATED ORAL at 21:47

## 2022-06-18 RX ADMIN — Medication 125 MICROGRAM(S): at 05:50

## 2022-06-18 RX ADMIN — GABAPENTIN 100 MILLIGRAM(S): 400 CAPSULE ORAL at 14:22

## 2022-06-18 RX ADMIN — Medication 8 UNIT(S): at 12:47

## 2022-06-18 RX ADMIN — Medication 3 MILLILITER(S): at 07:59

## 2022-06-18 RX ADMIN — Medication 8 UNIT(S): at 17:20

## 2022-06-18 RX ADMIN — HEPARIN SODIUM 5000 UNIT(S): 5000 INJECTION INTRAVENOUS; SUBCUTANEOUS at 17:21

## 2022-06-18 RX ADMIN — CLOPIDOGREL BISULFATE 75 MILLIGRAM(S): 75 TABLET, FILM COATED ORAL at 12:48

## 2022-06-18 RX ADMIN — Medication 3 MILLILITER(S): at 14:08

## 2022-06-18 RX ADMIN — Medication 3 MILLILITER(S): at 20:18

## 2022-06-18 RX ADMIN — Medication 2000 UNIT(S): at 12:48

## 2022-06-18 RX ADMIN — Medication 20 MILLIGRAM(S): at 05:50

## 2022-06-18 RX ADMIN — Medication 8 UNIT(S): at 09:18

## 2022-06-18 RX ADMIN — INSULIN GLARGINE 15 UNIT(S): 100 INJECTION, SOLUTION SUBCUTANEOUS at 09:17

## 2022-06-18 RX ADMIN — GABAPENTIN 100 MILLIGRAM(S): 400 CAPSULE ORAL at 05:51

## 2022-06-18 RX ADMIN — Medication 1: at 12:47

## 2022-06-18 RX ADMIN — Medication 50 MILLIGRAM(S): at 05:50

## 2022-06-18 RX ADMIN — Medication 1 DROP(S): at 05:50

## 2022-06-18 NOTE — PROGRESS NOTE ADULT - PROBLEM SELECTOR PLAN 1
cont lantus 15 units qam  cont admelog 8 units 3x/day before meals  cont mod dose admelog corrective scale coverage qac/qhs  cont cons cho diet  goal bg 100-180 in hosp setting

## 2022-06-18 NOTE — PROGRESS NOTE ADULT - ASSESSMENT
87 year old female with PMHx COPD, HTN, HLD, T2DM on insulin, hx of MI presents from assisted living facility with dyspnea, wheezing, labored breathing    copd  copd ex  TRENTON CKD  HTN  OP  OA  DM  HLD  Moderate to Severe AS  CAD    dc planning  on low dose steroids  on RA      cvs rx regimen  BP control  serial renal indices  I and O  monitor VS and HD and Sat  HOB elev - asp prec - assist with needs - ADL - GOC discussion  pt has hMPV - resp viral infection - isolation precs - acap PRN - robitussin PRN -   cxr - labs reviewed  copd - rx regimen PRN for sob and or wheeze, NEBS, not a candidate for Inhaler use  proBNP noted  ABG noted  old records reviewed  spoke with daughter at the bedside

## 2022-06-18 NOTE — PROGRESS NOTE ADULT - SUBJECTIVE AND OBJECTIVE BOX
Date/Time Patient Seen:  		  Referring MD:   Data Reviewed	       Patient is a 87y old  Female who presents with a chief complaint of COPD Exacerbation (17 Jun 2022 11:44)      Subjective/HPI     PAST MEDICAL & SURGICAL HISTORY:  Uncomplicated asthma, unspecified asthma severity    DM2 (diabetes mellitus, type 2)    Essential hypertension    Hypothyroidism, unspecified type    Pure hypercholesterolemia    Gastroesophageal reflux disease without esophagitis    Diabetes    Asthma    CAD (coronary artery disease)    HTN (hypertension)    HLD (hyperlipidemia)    Hypothyroid    NSTEMI (non-ST elevated myocardial infarction)    No significant past surgical history    History of appendectomy    History of appendectomy    Abnormal findings on cardiac catheterization  Cardiac Cath          Medication list         MEDICATIONS  (STANDING):  albuterol/ipratropium for Nebulization 3 milliLiter(s) Nebulizer every 6 hours  artificial tears (preservative free) Ophthalmic Solution 1 Drop(s) Both EYES two times a day  aspirin  chewable 81 milliGRAM(s) Oral daily  atorvastatin 80 milliGRAM(s) Oral at bedtime  cholecalciferol 2000 Unit(s) Oral daily  clopidogrel Tablet 75 milliGRAM(s) Oral daily  dextrose 5%. 1000 milliLiter(s) (100 mL/Hr) IV Continuous <Continuous>  dextrose 5%. 1000 milliLiter(s) (50 mL/Hr) IV Continuous <Continuous>  dextrose 50% Injectable 25 Gram(s) IV Push once  dextrose 50% Injectable 12.5 Gram(s) IV Push once  dextrose 50% Injectable 25 Gram(s) IV Push once  dextrose 50% Injectable 25 Gram(s) IV Push once  escitalopram 10 milliGRAM(s) Oral daily  gabapentin 100 milliGRAM(s) Oral three times a day  glucagon  Injectable 1 milliGRAM(s) IntraMuscular once  glucagon  Injectable 1 milliGRAM(s) IntraMuscular once  heparin   Injectable 5000 Unit(s) SubCutaneous every 12 hours  insulin glargine Injectable (LANTUS) 15 Unit(s) SubCutaneous every morning  insulin lispro (ADMELOG) corrective regimen sliding scale   SubCutaneous three times a day before meals  insulin lispro (ADMELOG) corrective regimen sliding scale   SubCutaneous at bedtime  insulin lispro Injectable (ADMELOG) 8 Unit(s) SubCutaneous three times a day before meals  isosorbide   mononitrate ER Tablet (IMDUR) 30 milliGRAM(s) Oral daily  levothyroxine 125 MICROGram(s) Oral daily  metoprolol succinate ER 50 milliGRAM(s) Oral daily  predniSONE   Tablet 20 milliGRAM(s) Oral daily    MEDICATIONS  (PRN):  acetaminophen     Tablet .. 650 milliGRAM(s) Oral every 6 hours PRN Mild Pain (1 - 3)  ALBUTerol    0.083% 2.5 milliGRAM(s) Nebulizer every 4 hours PRN Shortness of Breath and/or Wheezing  ALPRAZolam 0.5 milliGRAM(s) Oral two times a day PRN Anxiety  dextrose Oral Gel 15 Gram(s) Oral once PRN Blood Glucose LESS THAN 70 milliGRAM(s)/deciliter  melatonin 3 milliGRAM(s) Oral at bedtime PRN Insomnia         Vitals log        ICU Vital Signs Last 24 Hrs  T(C): 36.8 (18 Jun 2022 05:05), Max: 36.8 (17 Jun 2022 13:26)  T(F): 98.2 (18 Jun 2022 05:05), Max: 98.3 (17 Jun 2022 20:58)  HR: 59 (18 Jun 2022 05:05) (59 - 74)  BP: 142/64 (18 Jun 2022 05:05) (142/64 - 153/76)  BP(mean): --  ABP: --  ABP(mean): --  RR: 19 (18 Jun 2022 05:05) (18 - 19)  SpO2: 97% (18 Jun 2022 05:05) (88% - 98%)           Input and Output:  I&O's Detail    16 Jun 2022 07:01  -  17 Jun 2022 07:00  --------------------------------------------------------  IN:  Total IN: 0 mL    OUT:    Voided (mL): 650 mL  Total OUT: 650 mL    Total NET: -650 mL      17 Jun 2022 07:01  -  18 Jun 2022 06:02  --------------------------------------------------------  IN:    Oral Fluid: 400 mL  Total IN: 400 mL    OUT:  Total OUT: 0 mL    Total NET: 400 mL          Lab Data                        12.4   18.73 )-----------( 211      ( 17 Jun 2022 10:37 )             40.2     06-17    139  |  101  |  85<H>  ----------------------------<  277<H>  4.6   |  29  |  1.70<H>    Ca    9.2      17 Jun 2022 10:37              Review of Systems	      Objective     Physical Examination    heart s1s2  lung dc BS  abd soft      Pertinent Lab findings & Imaging      Chong:  NO   Adequate UO     I&O's Detail    16 Jun 2022 07:01  -  17 Jun 2022 07:00  --------------------------------------------------------  IN:  Total IN: 0 mL    OUT:    Voided (mL): 650 mL  Total OUT: 650 mL    Total NET: -650 mL      17 Jun 2022 07:01  -  18 Jun 2022 06:02  --------------------------------------------------------  IN:    Oral Fluid: 400 mL  Total IN: 400 mL    OUT:  Total OUT: 0 mL    Total NET: 400 mL               Discussed with:     Cultures:	        Radiology

## 2022-06-18 NOTE — PROGRESS NOTE ADULT - ASSESSMENT
87 year old female with PMHx COPD, HTN, HLD, T2DM on insulin, hx of MI presents from assisted living facility with dyspnea, wheezing, labored breathing admitted for COPD exacerbation.      Problem/Plan - 1:  ·  Problem: Acute respiratory failure with hypoxia and hypercapnia.   ·  Plan: - COPD exacerbation  due to hMPV - resp viral infection + on admission   -was on BiPAP, weaned off BiPAP. Now on 2L O2 NC. saturating well 95%, no sob,   -02 sat ra rest 88%, will need home 02 with 2L NC   -Xanax  PRN - robitussin PRN, duoneb prn  - weaned to prednisone 20mg daily, taper by 10mg every 3 days, then off  - wbc elevation likely stress, steroids induced, afebrile, cxr clear  - discussed with pulm, Dr Copeland patient stable for discharge with outpatient f/u.     Problem/Plan - 2:  ·  Problem: COPD exacerbation.   ·  Plan: - plan as above  - Continue home medications - Duoneb q6h, Spiriva, Proventil  - Supplemental O2 PRN. COPD patient will keep SpO2 goal 88-93%.   - Encourage incentive spirometry.     Problem/Plan - 3:  ·  Problem: Renal failure (ARF), acute on chronic.   ·  Plan: CKD cr at baseline ranging from 1.3-1.7 on outpatient HIE review  hold lasix per renal, change to po on discharge     Problem/Plan - 4:  ·  Problem: DM2 (diabetes mellitus, type 2).   ·  Plan: - Chronic, known history of T2DM  - C/W Lantus 15 U qd and and 8U pre meals  - Moderate dose insulin corrective scale   - HbA1c: 10  -endocrine following appreciate recs.     Problem/Plan - 5:  ·  Problem: HTN (hypertension).   ·  Plan: bp stable  - change lasix to 20mg prn on discharge per renal  - Continue home Metoprolol Succinate 50 mg qd with hold parameters  -monitor bp.     Problem/Plan - 6:  ·  Problem: Anxiety.   ·  Plan: stable  continue Xanax prn , lexapro.     Problem/Plan - 7:  ·  Problem: Hypothyroidism, unspecified type.   ·  Plan: Chronic  - Continue home Synthroid 125 mcg PO qd.     Problem/Plan - 8:  ·  Problem: HLD (hyperlipidemia).   ·  Plan: Chronic  - Continue home Atorvastatin 80 mg PO qhs.     Problem/Plan - 9:  ·  Problem: CAD (coronary artery disease).   ·  Plan: - Hx of MI  - Continue home Aspirin 81 mg PO qd, Plavix 75 mg PO qd, imdur 30mg daily   - Echo 3/11/2022: Left ventricle was of normal size, mild left ventricular hypertrophy LV   systolic function EF 65%. Moderate to severe aortic stenosis.     Problem/Plan - 10:  ·  Problem: Need for prophylactic measure.   ·  Plan; VTE ppx: Heparin 5000 subq q12h   DNR/DNI - MOLST filled    Dispo: discharge to  assisted living is help up pending swallow eval  as family/son wants swallow check to ascertain proper dietary consistency patient will tolerate.   Pending swallow evaluation by speech pathology.            87 year old female with PMHx COPD, HTN, HLD, T2DM on insulin, hx of MI presents from assisted living facility with dyspnea, wheezing, labored breathing admitted for COPD exacerbation.      Problem/Plan - 1:  ·  Problem: Acute respiratory failure with hypoxia and hypercapnia.   ·  Plan: - COPD exacerbation  due to hMPV - resp viral infection + on admission   -was on BiPAP, weaned off BiPAP. Now on 2L O2 NC. saturating well 95%, no sob,   -02 sat ra rest 88%, will need home 02 with 2L NC   -Xanax  PRN - robitussin PRN, duoneb prn  - weaned to prednisone 20mg daily, taper by 10mg every 3 days, then off  - wbc elevation likely stress, steroids induced, afebrile, cxr clear  - discussed with pulm, Dr Copeland patient stable for discharge with outpatient f/u.     Problem/Plan - 2:  ·  Problem: COPD exacerbation.   ·  Plan: - plan as above  - Continue home medications - Duoneb q6h, Spiriva, Proventil  - Supplemental O2 PRN. COPD patient will keep SpO2 goal 88-93%.   - Encourage incentive spirometry.     Problem/Plan - 3:  ·  Problem: Renal failure (ARF), acute on chronic.   ·  Plan: CKD cr at baseline ranging from 1.3-1.7 on outpatient HIE review  hold lasix per renal, change to po on discharge     Problem/Plan - 4:  ·  Problem: DM2 (diabetes mellitus, type 2).   ·  Plan: - Chronic, known history of T2DM  - C/W Lantus 15 U qd and and 8U pre meals  - Moderate dose insulin corrective scale   - HbA1c: 10  -endocrine following appreciate recs.     Problem/Plan - 5:  ·  Problem: HTN (hypertension).   ·  Plan: bp stable  - change lasix to 20mg prn on discharge per renal  - Continue home Metoprolol Succinate 50 mg qd with hold parameters  -monitor bp.     Problem/Plan - 6:  ·  Problem: Anxiety.   ·  Plan: stable  continue Xanax prn , lexapro.     Problem/Plan - 7:  ·  Problem: Hypothyroidism, unspecified type.   ·  Plan: Chronic  - Continue home Synthroid 125 mcg PO qd.     Problem/Plan - 8:  ·  Problem: HLD (hyperlipidemia).   ·  Plan: Chronic  - Continue home Atorvastatin 80 mg PO qhs.     Problem/Plan - 9:  ·  Problem: CAD (coronary artery disease).   ·  Plan: - Hx of MI  - Continue home Aspirin 81 mg PO qd, Plavix 75 mg PO qd, imdur 30mg daily   - Echo 3/11/2022: Left ventricle was of normal size, mild left ventricular hypertrophy LV   systolic function EF 65%. Moderate to severe aortic stenosis.     Problem/Plan - 10:  ·  Problem: Need for prophylactic measure.   ·  Plan; VTE ppx: Heparin 5000 subq q12h   DNR/DNI - MOLST filled    Dispo: discharge to  assisted living is held up pending swallow eval  as family/son wants swallow check to ascertain proper dietary consistency patient will tolerate.   Pending swallow evaluation by speech pathology.

## 2022-06-18 NOTE — PROGRESS NOTE ADULT - SUBJECTIVE AND OBJECTIVE BOX
CC: Resp failure from COPD, hMPV .    HPI:  87 year old female with PMHx COPD, HTN, HLD, T2DM on insulin, hx of MI presents from assisted living facility with dyspnea, wheezing, labored breathing. Patient recently admitted to [facility] 3/9-15/2022 for COPD exacerbation. History obtained from son Calvin, as patient is limited historian at baseline. Son states he received a call from [facility] this AM stating patient is having labored breathing. Patient was sent to [facility] for further management.     ED course:  Vitals: HR 91 SpO2 on BIPAP  Labs: WBC 12.64, BUN 31/1.5, Glu 188, ProBNP 1430  Given Mag Sulfate IVPB 1 g, Solumedrol 125 mg IVP x1 (2022 06:00)    REVIEW OF SYSTEMS:    Patient denied fever, chills, abdominal pain, nausea, vomiting, cough, shortness of breath, chest pain or palpitations    Vital Signs Last 24 Hrs  T(C): 36.8 (2022 12:18), Max: 36.8 (2022 13:26)  T(F): 98.3 (2022 12:18), Max: 98.3 (2022 20:58)  HR: 64 (2022 12:18) (57 - 74)  BP: 143/77 (2022 12:18) (142/64 - 153/76)  BP(mean): --  RR: 18 (2022 12:18) (18 - 19)  SpO2: 98% (2022 12:18) (88% - 98%)I&O's Summary    2022 07:  -  2022 07:00  --------------------------------------------------------  IN: 400 mL / OUT: 0 mL / NET: 400 mL    2022 07:01  -  2022 13:14  --------------------------------------------------------  IN: 480 mL / OUT: 0 mL / NET: 480 mL      PHYSICAL EXAM:  GENERAL: NAD   HEENT: PERRL, +EOMI, anicteric, no [place]  NECK: Supple, No JVD   CHEST/LUNG: CTA bilaterally; Normal effort  HEART: S1S2 Normal intensity, no murmurs, gallops or rubs noted  ABDOMEN: Soft, BS Normoactive, NT, ND, no HSM noted  EXTREMITIES: No cyanosis, or edema noted, Limited mobility   SKIN: No rashes or lesions noted  NEURO: A&Ox3, no focal deficits noted, CN II-XII intact  PSYCH: Depressed mood and affect; insight/judgement appropriate  LABS:                        12.4   18.73 )-----------( 211      ( 2022 10:37 )             40.2     06-17    139  |  101  |  85<H>  ----------------------------<  277<H>  4.6   |  29  |  1.70<H>    Ca    9.2      2022 10:37        Urinalysis Basic - ( 2022 22:47 )    Color: Pale Yellow / Appearance: Slightly Turbid / S.010 / pH: x  Gluc: x / Ketone: Negative  / Bili: Negative / Urobili: Negative   Blood: x / Protein: Negative / Nitrite: Negative   Leuk Esterase: Small / RBC: 6-10 /HPF / WBC 6-10   Sq Epi: x / Non Sq Epi: Few / Bacteria: Many      RADIOLOGY & ADDITIONAL TESTS:    MEDICATIONS:  MEDICATIONS  (STANDING):  albuterol/ipratropium for Nebulization 3 milliLiter(s) Nebulizer every 6 hours  artificial tears (preservative free) Ophthalmic Solution 1 Drop(s) Both EYES two times a day  aspirin  chewable 81 milliGRAM(s) Oral daily  atorvastatin 80 milliGRAM(s) Oral at bedtime  cholecalciferol 2000 Unit(s) Oral daily  clopidogrel Tablet 75 milliGRAM(s) Oral daily  dextrose 5%. 1000 milliLiter(s) (100 mL/Hr) IV Continuous <Continuous>  dextrose 5%. 1000 milliLiter(s) (50 mL/Hr) IV Continuous <Continuous>  dextrose 50% Injectable 25 Gram(s) IV Push once  dextrose 50% Injectable 12.5 Gram(s) IV Push once  dextrose 50% Injectable 25 Gram(s) IV Push once  dextrose 50% Injectable 25 Gram(s) IV Push once  escitalopram 10 milliGRAM(s) Oral daily  gabapentin 100 milliGRAM(s) Oral three times a day  glucagon  Injectable 1 milliGRAM(s) IntraMuscular once  glucagon  Injectable 1 milliGRAM(s) IntraMuscular once  heparin   Injectable 5000 Unit(s) SubCutaneous every 12 hours  insulin glargine Injectable (LANTUS) 15 Unit(s) SubCutaneous every morning  insulin lispro (ADMELOG) corrective regimen sliding scale   SubCutaneous three times a day before meals  insulin lispro (ADMELOG) corrective regimen sliding scale   SubCutaneous at bedtime  insulin lispro Injectable (ADMELOG) 8 Unit(s) SubCutaneous three times a day before meals  isosorbide   mononitrate ER Tablet (IMDUR) 30 milliGRAM(s) Oral daily  levothyroxine 125 MICROGram(s) Oral daily  metoprolol succinate ER 50 milliGRAM(s) Oral daily  predniSONE   Tablet 20 milliGRAM(s) Oral daily    MEDICATIONS  (PRN):  acetaminophen     Tablet .. 650 milliGRAM(s) Oral every 6 hours PRN Mild Pain (1 - 3)  ALBUTerol    0.083% 2.5 milliGRAM(s) Nebulizer every 4 hours PRN Shortness of Breath and/or Wheezing  ALPRAZolam 0.5 milliGRAM(s) Oral two times a day PRN Anxiety  dextrose Oral Gel 15 Gram(s) Oral once PRN Blood Glucose LESS THAN 70 milliGRAM(s)/deciliter  melatonin 3 milliGRAM(s) Oral at bedtime PRN Insomnia

## 2022-06-18 NOTE — PROGRESS NOTE ADULT - SUBJECTIVE AND OBJECTIVE BOX
Patient is a 87y old  Female who presents with a chief complaint of COPD Exacerbation (2022 11:01)       HPI:  87 year old female with PMHx COPD, HTN, HLD, T2DM on insulin, hx of MI presents from assisted living facility with dyspnea, wheezing, labored breathing. Patient recently admitted to [facility] 3/9-15/2022 for COPD exacerbation. History obtained from son Calvin, as patient is limited historian at baseline. Son states he received a call from [facility] this AM stating patient is having labored breathing. Patient was sent to [facility] for further management.   She states her breathing is improved.  Denies any N/V/Dizziness.  Has not seen nephrologist in the past.  No urinary issues. Denies NSAID usage.    No acute events noted       PAST MEDICAL & SURGICAL HISTORY:  Uncomplicated asthma, unspecified asthma severity      DM2 (diabetes mellitus, type 2)      Essential hypertension      Hypothyroidism, unspecified type      Pure hypercholesterolemia      Gastroesophageal reflux disease without esophagitis      Diabetes      Asthma      CAD (coronary artery disease)      HTN (hypertension)      HLD (hyperlipidemia)      Hypothyroid      NSTEMI (non-ST elevated myocardial infarction)      History of appendectomy      History of appendectomy      Abnormal findings on cardiac catheterization  Cardiac Cath           FAMILY HISTORY:  Family history of heart disease    Family history of cancer in mother    Family history of MI (myocardial infarction)    Family history of stomach cancer    NC    Social History:Non smoker    MEDICATIONS  (STANDING):  albuterol/ipratropium for Nebulization 3 milliLiter(s) Nebulizer every 6 hours  artificial tears (preservative free) Ophthalmic Solution 1 Drop(s) Both EYES two times a day  aspirin  chewable 81 milliGRAM(s) Oral daily  atorvastatin 80 milliGRAM(s) Oral at bedtime  cholecalciferol 2000 Unit(s) Oral daily  clopidogrel Tablet 75 milliGRAM(s) Oral daily  dextrose 5%. 1000 milliLiter(s) (50 mL/Hr) IV Continuous <Continuous>  dextrose 5%. 1000 milliLiter(s) (100 mL/Hr) IV Continuous <Continuous>  dextrose 50% Injectable 25 Gram(s) IV Push once  dextrose 50% Injectable 12.5 Gram(s) IV Push once  dextrose 50% Injectable 25 Gram(s) IV Push once  escitalopram 10 milliGRAM(s) Oral daily  furosemide   Injectable 40 milliGRAM(s) IV Push <User Schedule>  gabapentin 100 milliGRAM(s) Oral three times a day  glucagon  Injectable 1 milliGRAM(s) IntraMuscular once  heparin   Injectable 5000 Unit(s) SubCutaneous every 12 hours  insulin glargine Injectable (LANTUS) 15 Unit(s) SubCutaneous every morning  insulin lispro (ADMELOG) corrective regimen sliding scale   SubCutaneous every 6 hours  insulin lispro Injectable (ADMELOG) 8 Unit(s) SubCutaneous three times a day before meals  isosorbide   mononitrate ER Tablet (IMDUR) 30 milliGRAM(s) Oral daily  levothyroxine 125 MICROGram(s) Oral daily  methylPREDNISolone sodium succinate Injectable 40 milliGRAM(s) IV Push daily  metoprolol succinate ER 50 milliGRAM(s) Oral daily    MEDICATIONS  (PRN):  acetaminophen     Tablet .. 650 milliGRAM(s) Oral every 6 hours PRN Mild Pain (1 - 3)  ALBUTerol    0.083% 2.5 milliGRAM(s) Nebulizer every 4 hours PRN Shortness of Breath and/or Wheezing  ALPRAZolam 0.5 milliGRAM(s) Oral two times a day PRN Anxiety  dextrose Oral Gel 15 Gram(s) Oral once PRN Blood Glucose LESS THAN 70 milliGRAM(s)/deciliter  melatonin 3 milliGRAM(s) Oral at bedtime PRN Insomnia   Meds reviewed    Allergies    doxycycline (Unknown)  iodine (Hives)  iodine containing compounds (Unknown)  shellfish (Anaphylaxis)  shellfish (Swelling; Short breath)    Intolerances         REVIEW OF SYSTEMS:    Review of Systems:   Constitutional: Denies fatigue  HEENT: Denies headaches and dizziness  Respiratory: denies  cough, or wheezing, improving SOB  Cardiovascular: denies CP, palpitations  Gastrointestinal: Denies nausea, denies vomiting, diarrhea, constipation, abdominal pain, or bloody stools  Genitourinary: denies painful urination, increased frequency, urgency, or bloody urine  Skin: denies rashes or itching  Musculoskeletal: denies muscle aches, joint swelling  Neurologic: Denies generalized weakness, denies loss of sensation, numbness, or tingling      ICU Vital Signs Last 24 Hrs  T(C): 36.8 (2022 05:05), Max: 36.8 (2022 13:26)  T(F): 98.2 (2022 05:05), Max: 98.3 (2022 20:58)  HR: 57 (2022 08:07) (57 - 74)  BP: 142/64 (2022 05:05) (142/64 - 153/76)  BP(mean): --  ABP: --  ABP(mean): --  RR: 19 (2022 05:05) (18 - 19)  SpO2: 97% (2022 08:07) (88% - 98%)      PHYSICAL EXAM:    GENERAL: NAD  HEAD:  Atraumatic, Normocephalic  EYES: EOMI, conjunctiva and sclera clear  ENMT: No Drainage from nares, No drainage from ears  NECK: Supple, neck  veins full  NERVOUS SYSTEM:  Awake and Alert  CHEST/LUNG: Clear to percussion bilaterally; No rales, rhonchi, wheezing, or rubs  HEART: Regular rate and rhythm; No murmurs, rubs, or gallops  ABDOMEN: Soft, Nontender, Nondistended; Bowel sounds present  EXTREMITIES:  No Edema  SKIN: No rashes No obvious ecchymosis      LABS:                                                      12.4   18.73 )-----------( 211      ( 2022 10:37 )             40.2     06-17    139  |  101  |  85<H>  ----------------------------<  277<H>  4.6   |  29  |  1.70<H>    Ca    9.2      2022 10:37        Urinalysis Basic - ( 2022 22:47 )    Color: Pale Yellow / Appearance: Slightly Turbid / S.010 / pH: x  Gluc: x / Ketone: Negative  / Bili: Negative / Urobili: Negative   Blood: x / Protein: Negative / Nitrite: Negative   Leuk Esterase: Small / RBC: 6-10 /HPF / WBC 6-10   Sq Epi: x / Non Sq Epi: Few / Bacteria: Many

## 2022-06-18 NOTE — PROGRESS NOTE ADULT - SUBJECTIVE AND OBJECTIVE BOX
CAPILLARY BLOOD GLUCOSE      POCT Blood Glucose.: 145 mg/dL (18 Jun 2022 07:50)  POCT Blood Glucose.: 144 mg/dL (18 Jun 2022 07:32)  POCT Blood Glucose.: 158 mg/dL (17 Jun 2022 21:32)  POCT Blood Glucose.: 189 mg/dL (17 Jun 2022 17:54)  POCT Blood Glucose.: 208 mg/dL (17 Jun 2022 16:43)  POCT Blood Glucose.: 272 mg/dL (17 Jun 2022 11:51)  POCT Blood Glucose.: 213 mg/dL (17 Jun 2022 08:29)      Vital Signs Last 24 Hrs  T(C): 36.8 (18 Jun 2022 05:05), Max: 36.8 (17 Jun 2022 13:26)  T(F): 98.2 (18 Jun 2022 05:05), Max: 98.3 (17 Jun 2022 20:58)  HR: 57 (18 Jun 2022 08:07) (57 - 74)  BP: 142/64 (18 Jun 2022 05:05) (142/64 - 153/76)  BP(mean): --  RR: 19 (18 Jun 2022 05:05) (18 - 19)  SpO2: 97% (18 Jun 2022 08:07) (88% - 98%)    Respiratory: CTA B/L  CV: RRR, S1S2, no murmurs, rubs or gallops  Abdominal: Soft, NT, ND +BS, Last BM  Extremities: No edema, + peripheral pulses     06-17    139  |  101  |  85<H>  ----------------------------<  277<H>  4.6   |  29  |  1.70<H>    Ca    9.2      17 Jun 2022 10:37        atorvastatin 80 milliGRAM(s) Oral at bedtime  dextrose 50% Injectable 25 Gram(s) IV Push once  dextrose 50% Injectable 12.5 Gram(s) IV Push once  dextrose 50% Injectable 25 Gram(s) IV Push once  dextrose 50% Injectable 25 Gram(s) IV Push once  dextrose Oral Gel 15 Gram(s) Oral once PRN  glucagon  Injectable 1 milliGRAM(s) IntraMuscular once  glucagon  Injectable 1 milliGRAM(s) IntraMuscular once  insulin glargine Injectable (LANTUS) 15 Unit(s) SubCutaneous every morning  insulin lispro (ADMELOG) corrective regimen sliding scale   SubCutaneous three times a day before meals  insulin lispro (ADMELOG) corrective regimen sliding scale   SubCutaneous at bedtime  insulin lispro Injectable (ADMELOG) 8 Unit(s) SubCutaneous three times a day before meals  levothyroxine 125 MICROGram(s) Oral daily  predniSONE   Tablet 20 milliGRAM(s) Oral daily

## 2022-06-19 LAB
ANION GAP SERPL CALC-SCNC: 9 MMOL/L — SIGNIFICANT CHANGE UP (ref 5–17)
BUN SERPL-MCNC: 77 MG/DL — HIGH (ref 7–23)
CALCIUM SERPL-MCNC: 8.5 MG/DL — SIGNIFICANT CHANGE UP (ref 8.5–10.1)
CHLORIDE SERPL-SCNC: 105 MMOL/L — SIGNIFICANT CHANGE UP (ref 96–108)
CO2 SERPL-SCNC: 25 MMOL/L — SIGNIFICANT CHANGE UP (ref 22–31)
CREAT SERPL-MCNC: 1.5 MG/DL — HIGH (ref 0.5–1.3)
EGFR: 34 ML/MIN/1.73M2 — LOW
GLUCOSE SERPL-MCNC: 141 MG/DL — HIGH (ref 70–99)
MAGNESIUM SERPL-MCNC: 2.6 MG/DL — SIGNIFICANT CHANGE UP (ref 1.6–2.6)
PHOSPHATE SERPL-MCNC: 3.6 MG/DL — SIGNIFICANT CHANGE UP (ref 2.5–4.5)
POTASSIUM SERPL-MCNC: 3.9 MMOL/L — SIGNIFICANT CHANGE UP (ref 3.5–5.3)
POTASSIUM SERPL-SCNC: 3.9 MMOL/L — SIGNIFICANT CHANGE UP (ref 3.5–5.3)
SODIUM SERPL-SCNC: 139 MMOL/L — SIGNIFICANT CHANGE UP (ref 135–145)

## 2022-06-19 PROCEDURE — 99232 SBSQ HOSP IP/OBS MODERATE 35: CPT

## 2022-06-19 RX ADMIN — Medication 50 MILLIGRAM(S): at 05:46

## 2022-06-19 RX ADMIN — HEPARIN SODIUM 5000 UNIT(S): 5000 INJECTION INTRAVENOUS; SUBCUTANEOUS at 05:45

## 2022-06-19 RX ADMIN — Medication 3 MILLILITER(S): at 07:53

## 2022-06-19 RX ADMIN — CLOPIDOGREL BISULFATE 75 MILLIGRAM(S): 75 TABLET, FILM COATED ORAL at 18:52

## 2022-06-19 RX ADMIN — ATORVASTATIN CALCIUM 80 MILLIGRAM(S): 80 TABLET, FILM COATED ORAL at 21:20

## 2022-06-19 RX ADMIN — Medication 20 MILLIGRAM(S): at 05:46

## 2022-06-19 RX ADMIN — Medication 3 MILLILITER(S): at 19:46

## 2022-06-19 RX ADMIN — Medication 2: at 17:48

## 2022-06-19 RX ADMIN — GABAPENTIN 100 MILLIGRAM(S): 400 CAPSULE ORAL at 05:45

## 2022-06-19 RX ADMIN — Medication 3 MILLILITER(S): at 01:33

## 2022-06-19 RX ADMIN — ISOSORBIDE MONONITRATE 30 MILLIGRAM(S): 60 TABLET, EXTENDED RELEASE ORAL at 15:33

## 2022-06-19 RX ADMIN — Medication 3 MILLILITER(S): at 13:43

## 2022-06-19 RX ADMIN — Medication 125 MICROGRAM(S): at 05:46

## 2022-06-19 RX ADMIN — INSULIN GLARGINE 15 UNIT(S): 100 INJECTION, SOLUTION SUBCUTANEOUS at 08:53

## 2022-06-19 RX ADMIN — HEPARIN SODIUM 5000 UNIT(S): 5000 INJECTION INTRAVENOUS; SUBCUTANEOUS at 18:52

## 2022-06-19 RX ADMIN — Medication 8 UNIT(S): at 17:50

## 2022-06-19 RX ADMIN — Medication 81 MILLIGRAM(S): at 18:51

## 2022-06-19 RX ADMIN — GABAPENTIN 100 MILLIGRAM(S): 400 CAPSULE ORAL at 21:20

## 2022-06-19 RX ADMIN — Medication 8 UNIT(S): at 08:54

## 2022-06-19 RX ADMIN — Medication 1 DROP(S): at 18:51

## 2022-06-19 RX ADMIN — ESCITALOPRAM OXALATE 10 MILLIGRAM(S): 10 TABLET, FILM COATED ORAL at 15:33

## 2022-06-19 RX ADMIN — GABAPENTIN 100 MILLIGRAM(S): 400 CAPSULE ORAL at 15:33

## 2022-06-19 RX ADMIN — Medication 2000 UNIT(S): at 18:51

## 2022-06-19 RX ADMIN — Medication 1 DROP(S): at 05:45

## 2022-06-19 NOTE — PROGRESS NOTE ADULT - SUBJECTIVE AND OBJECTIVE BOX
CC: Resp failure, COPD exac triggered likely by viral infection hMPV.  Acute hypoxic resp failure is resolved.   HPI:  87 year old female with PMHx COPD, HTN, HLD, T2DM on insulin, hx of MI presents from assisted living facility with dyspnea, wheezing, labored breathing. Patient recently admitted to [facility] 3/9-15/2022 for COPD exacerbation. History obtained from son Calvin, as patient is limited historian at baseline. Son states he received a call from [facility] this AM stating patient is having labored breathing. Patient was sent to [facility] for further management.     ED course:  Vitals: HR 91 SpO2 on BIPAP  Labs: WBC 12.64, BUN 31/1.5, Glu 188, ProBNP 1430  Given Mag Sulfate IVPB 1 g, Solumedrol 125 mg IVP x1 (13 Jun 2022 06:00)    REVIEW OF SYSTEMS:    Patient denied fever, chills, abdominal pain, nausea, vomiting, cough, shortness of breath, chest pain or palpitations    Vital Signs Last 24 Hrs  T(C): 36.6 (19 Jun 2022 04:47), Max: 36.8 (18 Jun 2022 12:18)  T(F): 97.9 (19 Jun 2022 04:47), Max: 98.3 (18 Jun 2022 12:18)  HR: 62 (19 Jun 2022 08:07) (58 - 69)  BP: 153/57 (19 Jun 2022 04:47) (113/63 - 153/57)  BP(mean): --  RR: 18 (19 Jun 2022 04:47) (18 - 18)  SpO2: 98% (19 Jun 2022 08:07) (93% - 98%)I&O's Summary    18 Jun 2022 07:01  -  19 Jun 2022 07:00  --------------------------------------------------------  IN: 480 mL / OUT: 0 mL / NET: 480 mL      PHYSICAL EXAM:  GENERAL: NAD,   HEENT: PERRL, +EOMI, anicteric, no [place]  NECK: Supple, No JVD   CHEST/LUNG: CTA bilaterally; Normal effort  HEART: S1S2 Normal intensity, no murmurs, gallops or rubs noted  ABDOMEN: Soft, BS Normoactive, NT, ND, no HSM noted  EXTREMITIES: No cyanosis, or edema noted, Limited mobility   SKIN: No rashes or lesions noted  NEURO: A&Ox3, no focal deficits noted, CN II-XII intact  PSYCH: Depressed mood and affect; insight/judgement appropriate  LABS:                        12.4   18.73 )-----------( 211      ( 17 Jun 2022 10:37 )             40.2     06-19    139  |  105  |  77<H>  ----------------------------<  141<H>  3.9   |  25  |  1.50<H>    Ca    8.5      19 Jun 2022 08:45          RADIOLOGY & ADDITIONAL TESTS:    MEDICATIONS:  MEDICATIONS  (STANDING):  albuterol/ipratropium for Nebulization 3 milliLiter(s) Nebulizer every 6 hours  artificial tears (preservative free) Ophthalmic Solution 1 Drop(s) Both EYES two times a day  aspirin  chewable 81 milliGRAM(s) Oral daily  atorvastatin 80 milliGRAM(s) Oral at bedtime  cholecalciferol 2000 Unit(s) Oral daily  clopidogrel Tablet 75 milliGRAM(s) Oral daily  dextrose 5%. 1000 milliLiter(s) (50 mL/Hr) IV Continuous <Continuous>  dextrose 5%. 1000 milliLiter(s) (100 mL/Hr) IV Continuous <Continuous>  dextrose 50% Injectable 25 Gram(s) IV Push once  dextrose 50% Injectable 12.5 Gram(s) IV Push once  dextrose 50% Injectable 25 Gram(s) IV Push once  dextrose 50% Injectable 25 Gram(s) IV Push once  escitalopram 10 milliGRAM(s) Oral daily  gabapentin 100 milliGRAM(s) Oral three times a day  glucagon  Injectable 1 milliGRAM(s) IntraMuscular once  glucagon  Injectable 1 milliGRAM(s) IntraMuscular once  heparin   Injectable 5000 Unit(s) SubCutaneous every 12 hours  insulin glargine Injectable (LANTUS) 15 Unit(s) SubCutaneous every morning  insulin lispro (ADMELOG) corrective regimen sliding scale   SubCutaneous three times a day before meals  insulin lispro (ADMELOG) corrective regimen sliding scale   SubCutaneous at bedtime  insulin lispro Injectable (ADMELOG) 8 Unit(s) SubCutaneous three times a day before meals  isosorbide   mononitrate ER Tablet (IMDUR) 30 milliGRAM(s) Oral daily  levothyroxine 125 MICROGram(s) Oral daily  metoprolol succinate ER 50 milliGRAM(s) Oral daily    MEDICATIONS  (PRN):  acetaminophen     Tablet .. 650 milliGRAM(s) Oral every 6 hours PRN Mild Pain (1 - 3)  ALBUTerol    0.083% 2.5 milliGRAM(s) Nebulizer every 4 hours PRN Shortness of Breath and/or Wheezing  ALPRAZolam 0.5 milliGRAM(s) Oral two times a day PRN Anxiety  dextrose Oral Gel 15 Gram(s) Oral once PRN Blood Glucose LESS THAN 70 milliGRAM(s)/deciliter  melatonin 3 milliGRAM(s) Oral at bedtime PRN Insomnia

## 2022-06-19 NOTE — PROGRESS NOTE ADULT - ASSESSMENT
[place] on CKD 3b  Respiratory Acidosis  HTN  Human Metapneumovirus      -BLCr 1.4  -Urine indices reviewed  -Hold further lasix  -Avoid IVF given respiratory status  -Pulm eval reviewed  -CXR clear 6/15/22  -Renal indices improving

## 2022-06-19 NOTE — PROGRESS NOTE ADULT - SUBJECTIVE AND OBJECTIVE BOX
Date/Time Patient Seen:  		  Referring MD:   Data Reviewed	       Patient is a 87y old  Female who presents with a chief complaint of COPD Exacerbation (18 Jun 2022 13:13)      Subjective/HPI     PAST MEDICAL & SURGICAL HISTORY:  Uncomplicated asthma, unspecified asthma severity    DM2 (diabetes mellitus, type 2)    Essential hypertension    Hypothyroidism, unspecified type    Pure hypercholesterolemia    Gastroesophageal reflux disease without esophagitis    Diabetes    Asthma    CAD (coronary artery disease)    HTN (hypertension)    HLD (hyperlipidemia)    Hypothyroid    NSTEMI (non-ST elevated myocardial infarction)    No significant past surgical history    History of appendectomy    History of appendectomy    Abnormal findings on cardiac catheterization  Cardiac Cath          Medication list         MEDICATIONS  (STANDING):  albuterol/ipratropium for Nebulization 3 milliLiter(s) Nebulizer every 6 hours  artificial tears (preservative free) Ophthalmic Solution 1 Drop(s) Both EYES two times a day  aspirin  chewable 81 milliGRAM(s) Oral daily  atorvastatin 80 milliGRAM(s) Oral at bedtime  cholecalciferol 2000 Unit(s) Oral daily  clopidogrel Tablet 75 milliGRAM(s) Oral daily  dextrose 5%. 1000 milliLiter(s) (50 mL/Hr) IV Continuous <Continuous>  dextrose 5%. 1000 milliLiter(s) (100 mL/Hr) IV Continuous <Continuous>  dextrose 50% Injectable 25 Gram(s) IV Push once  dextrose 50% Injectable 12.5 Gram(s) IV Push once  dextrose 50% Injectable 25 Gram(s) IV Push once  dextrose 50% Injectable 25 Gram(s) IV Push once  escitalopram 10 milliGRAM(s) Oral daily  gabapentin 100 milliGRAM(s) Oral three times a day  glucagon  Injectable 1 milliGRAM(s) IntraMuscular once  glucagon  Injectable 1 milliGRAM(s) IntraMuscular once  heparin   Injectable 5000 Unit(s) SubCutaneous every 12 hours  insulin glargine Injectable (LANTUS) 15 Unit(s) SubCutaneous every morning  insulin lispro (ADMELOG) corrective regimen sliding scale   SubCutaneous three times a day before meals  insulin lispro (ADMELOG) corrective regimen sliding scale   SubCutaneous at bedtime  insulin lispro Injectable (ADMELOG) 8 Unit(s) SubCutaneous three times a day before meals  isosorbide   mononitrate ER Tablet (IMDUR) 30 milliGRAM(s) Oral daily  levothyroxine 125 MICROGram(s) Oral daily  metoprolol succinate ER 50 milliGRAM(s) Oral daily  predniSONE   Tablet 20 milliGRAM(s) Oral daily    MEDICATIONS  (PRN):  acetaminophen     Tablet .. 650 milliGRAM(s) Oral every 6 hours PRN Mild Pain (1 - 3)  ALBUTerol    0.083% 2.5 milliGRAM(s) Nebulizer every 4 hours PRN Shortness of Breath and/or Wheezing  ALPRAZolam 0.5 milliGRAM(s) Oral two times a day PRN Anxiety  dextrose Oral Gel 15 Gram(s) Oral once PRN Blood Glucose LESS THAN 70 milliGRAM(s)/deciliter  melatonin 3 milliGRAM(s) Oral at bedtime PRN Insomnia         Vitals log        ICU Vital Signs Last 24 Hrs  T(C): 36.6 (19 Jun 2022 04:47), Max: 36.8 (18 Jun 2022 12:18)  T(F): 97.9 (19 Jun 2022 04:47), Max: 98.3 (18 Jun 2022 12:18)  HR: 69 (19 Jun 2022 04:47) (57 - 69)  BP: 153/57 (19 Jun 2022 04:47) (113/63 - 153/57)  BP(mean): --  ABP: --  ABP(mean): --  RR: 18 (19 Jun 2022 04:47) (18 - 18)  SpO2: 93% (19 Jun 2022 04:47) (93% - 98%)           Input and Output:  I&O's Detail    17 Jun 2022 07:01  -  18 Jun 2022 07:00  --------------------------------------------------------  IN:    Oral Fluid: 400 mL  Total IN: 400 mL    OUT:  Total OUT: 0 mL    Total NET: 400 mL      18 Jun 2022 07:01  -  19 Jun 2022 06:40  --------------------------------------------------------  IN:    Oral Fluid: 480 mL  Total IN: 480 mL    OUT:  Total OUT: 0 mL    Total NET: 480 mL          Lab Data                        12.4   18.73 )-----------( 211      ( 17 Jun 2022 10:37 )             40.2     06-17    139  |  101  |  85<H>  ----------------------------<  277<H>  4.6   |  29  |  1.70<H>    Ca    9.2      17 Jun 2022 10:37              Review of Systems	      Objective     Physical Examination  heart s1s2  lung dec BS  abd soft        Pertinent Lab findings & Imaging      Chong:  NO   Adequate UO     I&O's Detail    17 Jun 2022 07:01  -  18 Jun 2022 07:00  --------------------------------------------------------  IN:    Oral Fluid: 400 mL  Total IN: 400 mL    OUT:  Total OUT: 0 mL    Total NET: 400 mL      18 Jun 2022 07:01  -  19 Jun 2022 06:40  --------------------------------------------------------  IN:    Oral Fluid: 480 mL  Total IN: 480 mL    OUT:  Total OUT: 0 mL    Total NET: 480 mL               Discussed with:     Cultures:	        Radiology

## 2022-06-19 NOTE — PROGRESS NOTE ADULT - ASSESSMENT
87 year old female with PMHx COPD, HTN, HLD, T2DM on insulin, hx of MI presents from assisted living facility with dyspnea, wheezing, labored breathing    copd  copd ex  TRENT[place] CKD  HTN  OP  OA  DM  HLD  Moderate to Severe AS  CAD    dc planning  will dc steroids  vs noted  remains on o2 support    cvs rx regimen  BP control  serial renal indices  I and O  monitor VS and HD and Sat  HOB elev - asp prec - assist with needs - ADL - GOC discussion  pt has hMPV - resp viral infection - isolation precs - acap PRN - robitussin PRN -   cxr - labs reviewed  copd - rx regimen PRN for sob and or wheeze, NEBS, not a candidate for Inhaler use  proBNP noted  ABG noted  old records reviewed

## 2022-06-19 NOTE — PROGRESS NOTE ADULT - ASSESSMENT
87 year old female with PMHx COPD, HTN, HLD, T2DM on insulin, hx of MI presents from assisted living facility with dyspnea, wheezing, labored breathing admitted for COPD exacerbation.      Problem/Plan - 1:  ·  Problem: Acute respiratory failure with hypoxia and hypercapnia.   ·  Plan: - COPD exacerbation  due to hMPV - resp viral infection + on admission   -was on BiPAP, weaned off BiPAP. Now on 2L O2 NC. saturating well over 95%, no sob,    -Xanax  PRN - duoneb prn  - prednisone tapered  off  - wbc elevation likely stress, steroids induced, afebrile, cxr clear  - Dr Copeland patient stable for discharge with outpatient f/u.      Problem/Plan - 2:  ·  Problem: COPD exacerbation.   - Continue home medications - Duoneb q6h,  Proventil  - Supplemental O2 PRN. COPD patient will keep SpO2 goal 88-93%.   - Encourage incentive spirometry.     Problem/Plan - 3:  ·  Problem: Renal failure (ARF), acute on chronic.   ·  Plan: CKD cr at baseline ranging from 1.3-1.7 on outpatient HIE review  hold lasix per renal, change to po on discharge     Problem/Plan - 4:  ·  Problem: DM2 (diabetes mellitus, type 2).   ·  Plan: - Chronic, known history of T2DM  - C/W Lantus 15 U qd and and 8U pre meals  - Moderate dose insulin corrective scale   - HbA1c: 10  -endocrine following appreciate recs.     Problem/Plan - 5:  ·  Problem: HTN (hypertension).   ·  Plan: bp stable  - change lasix to 20mg prn on discharge per renal  - Continue home Metoprolol Succinate 50 mg qd with hold parameters  -monitor bp.     Problem/Plan - 6:  ·  Problem: Anxiety.   ·  Plan: stable  continue Xanax prn , lexapro.     Problem/Plan - 7:  ·  Problem: Hypothyroidism, unspecified type.   ·  Plan: Chronic  - Continue home Synthroid 125 mcg PO qd.     Problem/Plan - 8:  ·  Problem: HLD (hyperlipidemia).   ·  Plan: Chronic  - Continue home Atorvastatin 80 mg PO qhs.     Problem/Plan - 9:  ·  Problem: CAD (coronary artery disease).   ·  Plan: - Hx of MI  - Continue home Aspirin 81 mg PO qd, Plavix 75 mg PO qd, imdur 30mg daily   - Echo 3/11/2022: Left ventricle was of normal size, mild left ventricular hypertrophy LV   systolic function EF 65%. Moderate to severe aortic stenosis.     Problem/Plan - 10:  ·  Problem: Need for prophylactic measure.   ·  Plan; VTE ppx: Heparin 5000 subq q12h   DNR/DNI - MOLST filled    Dispo: discharge to  assisted living is held up pending swallow eval  as family/son wants swallow check to ascertain proper dietary consistency that patient will tolerate.   Pending swallow evaluation by speech pathology. Dc 6/20

## 2022-06-19 NOTE — PROGRESS NOTE ADULT - SUBJECTIVE AND OBJECTIVE BOX
Patient is a 87y old  Female who presents with a chief complaint of COPD Exacerbation (16 Jun 2022 11:01)       HPI:  87 year old female with PMHx COPD, HTN, HLD, T2DM on insulin, hx of MI presents from assisted living facility with dyspnea, wheezing, labored breathing. Patient recently admitted to [facility] 3/9-15/2022 for COPD exacerbation. History obtained from son Calvin, as patient is limited historian at baseline. Son states he received a call from [facility] this AM stating patient is having labored breathing. Patient was sent to [facility] for further management.   She states her breathing is improved.  Denies any N/V/Dizziness.  Has not seen nephrologist in the past.  No urinary issues. Denies NSAID usage.    No acute events noted       PAST MEDICAL & SURGICAL HISTORY:  Uncomplicated asthma, unspecified asthma severity      DM2 (diabetes mellitus, type 2)      Essential hypertension      Hypothyroidism, unspecified type      Pure hypercholesterolemia      Gastroesophageal reflux disease without esophagitis      Diabetes      Asthma      CAD (coronary artery disease)      HTN (hypertension)      HLD (hyperlipidemia)      Hypothyroid      NSTEMI (non-ST elevated myocardial infarction)      History of appendectomy      History of appendectomy      Abnormal findings on cardiac catheterization  Cardiac Cath           FAMILY HISTORY:  Family history of heart disease    Family history of cancer in mother    Family history of MI (myocardial infarction)    Family history of stomach cancer    NC    Social History:Non smoker    MEDICATIONS  (STANDING):  albuterol/ipratropium for Nebulization 3 milliLiter(s) Nebulizer every 6 hours  artificial tears (preservative free) Ophthalmic Solution 1 Drop(s) Both EYES two times a day  aspirin  chewable 81 milliGRAM(s) Oral daily  atorvastatin 80 milliGRAM(s) Oral at bedtime  cholecalciferol 2000 Unit(s) Oral daily  clopidogrel Tablet 75 milliGRAM(s) Oral daily  dextrose 5%. 1000 milliLiter(s) (50 mL/Hr) IV Continuous <Continuous>  dextrose 5%. 1000 milliLiter(s) (100 mL/Hr) IV Continuous <Continuous>  dextrose 50% Injectable 25 Gram(s) IV Push once  dextrose 50% Injectable 12.5 Gram(s) IV Push once  dextrose 50% Injectable 25 Gram(s) IV Push once  escitalopram 10 milliGRAM(s) Oral daily  furosemide   Injectable 40 milliGRAM(s) IV Push <User Schedule>  gabapentin 100 milliGRAM(s) Oral three times a day  glucagon  Injectable 1 milliGRAM(s) IntraMuscular once  heparin   Injectable 5000 Unit(s) SubCutaneous every 12 hours  insulin glargine Injectable (LANTUS) 15 Unit(s) SubCutaneous every morning  insulin lispro (ADMELOG) corrective regimen sliding scale   SubCutaneous every 6 hours  insulin lispro Injectable (ADMELOG) 8 Unit(s) SubCutaneous three times a day before meals  isosorbide   mononitrate ER Tablet (IMDUR) 30 milliGRAM(s) Oral daily  levothyroxine 125 MICROGram(s) Oral daily  methylPREDNISolone sodium succinate Injectable 40 milliGRAM(s) IV Push daily  metoprolol succinate ER 50 milliGRAM(s) Oral daily    MEDICATIONS  (PRN):  acetaminophen     Tablet .. 650 milliGRAM(s) Oral every 6 hours PRN Mild Pain (1 - 3)  ALBUTerol    0.083% 2.5 milliGRAM(s) Nebulizer every 4 hours PRN Shortness of Breath and/or Wheezing  ALPRAZolam 0.5 milliGRAM(s) Oral two times a day PRN Anxiety  dextrose Oral Gel 15 Gram(s) Oral once PRN Blood Glucose LESS THAN 70 milliGRAM(s)/deciliter  melatonin 3 milliGRAM(s) Oral at bedtime PRN Insomnia   Meds reviewed    Allergies    doxycycline (Unknown)  iodine (Hives)  iodine containing compounds (Unknown)  shellfish (Anaphylaxis)  shellfish (Swelling; Short breath)    Intolerances         REVIEW OF SYSTEMS:    Review of Systems:   Constitutional: Denies fatigue  HEENT: Denies headaches and dizziness  Respiratory: denies  cough, or wheezing, improving SOB  Cardiovascular: denies CP, palpitations  Gastrointestinal: Denies nausea, denies vomiting, diarrhea, constipation, abdominal pain, or bloody stools  Genitourinary: denies painful urination, increased frequency, urgency, or bloody urine  Skin: denies rashes or itching  Musculoskeletal: denies muscle aches, joint swelling  Neurologic: Denies generalized weakness, denies loss of sensation, numbness, or tingling      ICU Vital Signs Last 24 Hrs  T(C): 36.6 (19 Jun 2022 04:47), Max: 36.8 (18 Jun 2022 12:18)  T(F): 97.9 (19 Jun 2022 04:47), Max: 98.3 (18 Jun 2022 12:18)  HR: 62 (19 Jun 2022 08:07) (58 - 69)  BP: 153/57 (19 Jun 2022 04:47) (113/63 - 153/57)  BP(mean): --  ABP: --  ABP(mean): --  RR: 18 (19 Jun 2022 04:47) (18 - 18)  SpO2: 98% (19 Jun 2022 08:07) (93% - 98%)      PHYSICAL EXAM:    GENERAL: NAD  HEAD:  Atraumatic, Normocephalic  EYES: EOMI, conjunctiva and sclera clear  ENMT: No Drainage from nares, No drainage from ears  NECK: Supple, neck  veins full  NERVOUS SYSTEM:  Awake and Alert  CHEST/LUNG: Clear to percussion bilaterally; No rales, rhonchi, wheezing, or rubs  HEART: Regular rate and rhythm; No murmurs, rubs, or gallops  ABDOMEN: Soft, Nontender, Nondistended; Bowel sounds present  EXTREMITIES:  No Edema  SKIN: No rashes No obvious ecchymosis      LABS:                                                        12.4   18.73 )-----------( 211      ( 17 Jun 2022 10:37 )             40.2     06-19    139  |  105  |  77<H>  ----------------------------<  141<H>  3.9   |  25  |  1.50<H>    Ca    8.5      19 Jun 2022 08:45  Phos  3.6     06-19  Mg     2.6     06-19

## 2022-06-20 LAB
ALBUMIN SERPL ELPH-MCNC: 2.9 G/DL — LOW (ref 3.3–5)
ALP SERPL-CCNC: 70 U/L — SIGNIFICANT CHANGE UP (ref 40–120)
ALT FLD-CCNC: 21 U/L — SIGNIFICANT CHANGE UP (ref 12–78)
ANION GAP SERPL CALC-SCNC: 9 MMOL/L — SIGNIFICANT CHANGE UP (ref 5–17)
AST SERPL-CCNC: 19 U/L — SIGNIFICANT CHANGE UP (ref 15–37)
BILIRUB SERPL-MCNC: 0.6 MG/DL — SIGNIFICANT CHANGE UP (ref 0.2–1.2)
BUN SERPL-MCNC: 77 MG/DL — HIGH (ref 7–23)
CALCIUM SERPL-MCNC: 8.7 MG/DL — SIGNIFICANT CHANGE UP (ref 8.5–10.1)
CHLORIDE SERPL-SCNC: 106 MMOL/L — SIGNIFICANT CHANGE UP (ref 96–108)
CO2 SERPL-SCNC: 25 MMOL/L — SIGNIFICANT CHANGE UP (ref 22–31)
CREAT SERPL-MCNC: 1.6 MG/DL — HIGH (ref 0.5–1.3)
EGFR: 31 ML/MIN/1.73M2 — LOW
GLUCOSE SERPL-MCNC: 178 MG/DL — HIGH (ref 70–99)
HCT VFR BLD CALC: 42.1 % — SIGNIFICANT CHANGE UP (ref 34.5–45)
HGB BLD-MCNC: 13.3 G/DL — SIGNIFICANT CHANGE UP (ref 11.5–15.5)
MCHC RBC-ENTMCNC: 28 PG — SIGNIFICANT CHANGE UP (ref 27–34)
MCHC RBC-ENTMCNC: 31.6 GM/DL — LOW (ref 32–36)
MCV RBC AUTO: 88.6 FL — SIGNIFICANT CHANGE UP (ref 80–100)
NRBC # BLD: 0 /100 WBCS — SIGNIFICANT CHANGE UP (ref 0–0)
PLATELET # BLD AUTO: 249 K/UL — SIGNIFICANT CHANGE UP (ref 150–400)
POTASSIUM SERPL-MCNC: 3.9 MMOL/L — SIGNIFICANT CHANGE UP (ref 3.5–5.3)
POTASSIUM SERPL-SCNC: 3.9 MMOL/L — SIGNIFICANT CHANGE UP (ref 3.5–5.3)
PROT SERPL-MCNC: 7 G/DL — SIGNIFICANT CHANGE UP (ref 6–8.3)
RBC # BLD: 4.75 M/UL — SIGNIFICANT CHANGE UP (ref 3.8–5.2)
RBC # FLD: 13 % — SIGNIFICANT CHANGE UP (ref 10.3–14.5)
SODIUM SERPL-SCNC: 140 MMOL/L — SIGNIFICANT CHANGE UP (ref 135–145)
WBC # BLD: 17.35 K/UL — HIGH (ref 3.8–10.5)
WBC # FLD AUTO: 17.35 K/UL — HIGH (ref 3.8–10.5)

## 2022-06-20 PROCEDURE — 99233 SBSQ HOSP IP/OBS HIGH 50: CPT

## 2022-06-20 RX ADMIN — Medication 1 DROP(S): at 06:08

## 2022-06-20 RX ADMIN — GABAPENTIN 100 MILLIGRAM(S): 400 CAPSULE ORAL at 21:09

## 2022-06-20 RX ADMIN — Medication 8 UNIT(S): at 08:05

## 2022-06-20 RX ADMIN — HEPARIN SODIUM 5000 UNIT(S): 5000 INJECTION INTRAVENOUS; SUBCUTANEOUS at 17:53

## 2022-06-20 RX ADMIN — ATORVASTATIN CALCIUM 80 MILLIGRAM(S): 80 TABLET, FILM COATED ORAL at 21:09

## 2022-06-20 RX ADMIN — GABAPENTIN 100 MILLIGRAM(S): 400 CAPSULE ORAL at 06:08

## 2022-06-20 RX ADMIN — ESCITALOPRAM OXALATE 10 MILLIGRAM(S): 10 TABLET, FILM COATED ORAL at 12:50

## 2022-06-20 RX ADMIN — Medication 50 MILLIGRAM(S): at 06:08

## 2022-06-20 RX ADMIN — Medication 1 DROP(S): at 17:53

## 2022-06-20 RX ADMIN — Medication 3 MILLILITER(S): at 00:10

## 2022-06-20 RX ADMIN — ISOSORBIDE MONONITRATE 30 MILLIGRAM(S): 60 TABLET, EXTENDED RELEASE ORAL at 12:50

## 2022-06-20 RX ADMIN — Medication 2000 UNIT(S): at 12:50

## 2022-06-20 RX ADMIN — Medication 81 MILLIGRAM(S): at 12:50

## 2022-06-20 RX ADMIN — GABAPENTIN 100 MILLIGRAM(S): 400 CAPSULE ORAL at 13:58

## 2022-06-20 RX ADMIN — HEPARIN SODIUM 5000 UNIT(S): 5000 INJECTION INTRAVENOUS; SUBCUTANEOUS at 06:08

## 2022-06-20 RX ADMIN — INSULIN GLARGINE 15 UNIT(S): 100 INJECTION, SOLUTION SUBCUTANEOUS at 08:05

## 2022-06-20 RX ADMIN — Medication 3 MILLILITER(S): at 19:10

## 2022-06-20 RX ADMIN — Medication 3 MILLILITER(S): at 15:00

## 2022-06-20 RX ADMIN — Medication 125 MICROGRAM(S): at 06:08

## 2022-06-20 RX ADMIN — CLOPIDOGREL BISULFATE 75 MILLIGRAM(S): 75 TABLET, FILM COATED ORAL at 12:50

## 2022-06-20 RX ADMIN — Medication 1: at 08:06

## 2022-06-20 RX ADMIN — Medication 3 MILLILITER(S): at 08:30

## 2022-06-20 RX ADMIN — Medication 8 UNIT(S): at 12:32

## 2022-06-20 NOTE — PROGRESS NOTE ADULT - PROBLEM SELECTOR PLAN 1
cont lantus 15 units qam  cont admelog 8 units 3x/day before meals  cont admelog corrective scale coverage qac/qhs  cont cons cho diet  goal bg 100-180 in hosp setting

## 2022-06-20 NOTE — SWALLOW BEDSIDE ASSESSMENT ADULT - ASR SWALLOW RECOMMEND DIAG
r/o silent aspiration/VFSS/MBS to determine safest/least restrictive PO diet and r/o aspiration component./VFSS/MBS

## 2022-06-20 NOTE — PROGRESS NOTE ADULT - SUBJECTIVE AND OBJECTIVE BOX
Patient is a 87y old  Female who presents with a chief complaint of COPD Exacerbation (16 Jun 2022 11:01)       HPI:  87 year old female with PMHx COPD, HTN, HLD, T2DM on insulin, hx of MI presents from assisted living facility with dyspnea, wheezing, labored breathing. Patient recently admitted to [facility] 3/9-15/2022 for COPD exacerbation. History obtained from son Calvin, as patient is limited historian at baseline. Son states he received a call from [facility] this AM stating patient is having labored breathing. Patient was sent to [facility] for further management.   She states her breathing is improved.  Denies any N/V/Dizziness.  Has not seen nephrologist in the past.  No urinary issues. Denies NSAID usage.    No acute events noted       PAST MEDICAL & SURGICAL HISTORY:  Uncomplicated asthma, unspecified asthma severity      DM2 (diabetes mellitus, type 2)      Essential hypertension      Hypothyroidism, unspecified type      Pure hypercholesterolemia      Gastroesophageal reflux disease without esophagitis      Diabetes      Asthma      CAD (coronary artery disease)      HTN (hypertension)      HLD (hyperlipidemia)      Hypothyroid      NSTEMI (non-ST elevated myocardial infarction)      History of appendectomy      History of appendectomy      Abnormal findings on cardiac catheterization  Cardiac Cath           FAMILY HISTORY:  Family history of heart disease    Family history of cancer in mother    Family history of MI (myocardial infarction)    Family history of stomach cancer    NC    Social History:Non smoker    MEDICATIONS  (STANDING):  albuterol/ipratropium for Nebulization 3 milliLiter(s) Nebulizer every 6 hours  artificial tears (preservative free) Ophthalmic Solution 1 Drop(s) Both EYES two times a day  aspirin  chewable 81 milliGRAM(s) Oral daily  atorvastatin 80 milliGRAM(s) Oral at bedtime  cholecalciferol 2000 Unit(s) Oral daily  clopidogrel Tablet 75 milliGRAM(s) Oral daily  dextrose 5%. 1000 milliLiter(s) (50 mL/Hr) IV Continuous <Continuous>  dextrose 5%. 1000 milliLiter(s) (100 mL/Hr) IV Continuous <Continuous>  dextrose 50% Injectable 25 Gram(s) IV Push once  dextrose 50% Injectable 12.5 Gram(s) IV Push once  dextrose 50% Injectable 25 Gram(s) IV Push once  escitalopram 10 milliGRAM(s) Oral daily  furosemide   Injectable 40 milliGRAM(s) IV Push <User Schedule>  gabapentin 100 milliGRAM(s) Oral three times a day  glucagon  Injectable 1 milliGRAM(s) IntraMuscular once  heparin   Injectable 5000 Unit(s) SubCutaneous every 12 hours  insulin glargine Injectable (LANTUS) 15 Unit(s) SubCutaneous every morning  insulin lispro (ADMELOG) corrective regimen sliding scale   SubCutaneous every 6 hours  insulin lispro Injectable (ADMELOG) 8 Unit(s) SubCutaneous three times a day before meals  isosorbide   mononitrate ER Tablet (IMDUR) 30 milliGRAM(s) Oral daily  levothyroxine 125 MICROGram(s) Oral daily  methylPREDNISolone sodium succinate Injectable 40 milliGRAM(s) IV Push daily  metoprolol succinate ER 50 milliGRAM(s) Oral daily    MEDICATIONS  (PRN):  acetaminophen     Tablet .. 650 milliGRAM(s) Oral every 6 hours PRN Mild Pain (1 - 3)  ALBUTerol    0.083% 2.5 milliGRAM(s) Nebulizer every 4 hours PRN Shortness of Breath and/or Wheezing  ALPRAZolam 0.5 milliGRAM(s) Oral two times a day PRN Anxiety  dextrose Oral Gel 15 Gram(s) Oral once PRN Blood Glucose LESS THAN 70 milliGRAM(s)/deciliter  melatonin 3 milliGRAM(s) Oral at bedtime PRN Insomnia   Meds reviewed    Allergies    doxycycline (Unknown)  iodine (Hives)  iodine containing compounds (Unknown)  shellfish (Anaphylaxis)  shellfish (Swelling; Short breath)    Intolerances         REVIEW OF SYSTEMS:    Review of Systems:   Constitutional: Denies fatigue  HEENT: Denies headaches and dizziness  Respiratory: denies  cough, or wheezing, improving SOB  Cardiovascular: denies CP, palpitations  Gastrointestinal: Denies nausea, denies vomiting, diarrhea, constipation, abdominal pain, or bloody stools  Genitourinary: denies painful urination, increased frequency, urgency, or bloody urine  Skin: denies rashes or itching  Musculoskeletal: denies muscle aches, joint swelling  Neurologic: Denies generalized weakness, denies loss of sensation, numbness, or tingling      ICU Vital Signs Last 24 Hrs  T(C): 36.4 (20 Jun 2022 13:19), Max: 36.9 (19 Jun 2022 20:41)  T(F): 97.5 (20 Jun 2022 13:19), Max: 98.4 (19 Jun 2022 20:41)  HR: 62 (20 Jun 2022 15:04) (62 - 67)  BP: 140/69 (20 Jun 2022 13:19) (140/69 - 166/72)  BP(mean): --  ABP: --  ABP(mean): --  RR: 17 (20 Jun 2022 13:19) (17 - 18)  SpO2: 96% (20 Jun 2022 15:04) (96% - 97%)        PHYSICAL EXAM:    GENERAL: NAD  HEAD:  Atraumatic, Normocephalic  EYES: EOMI, conjunctiva and sclera clear  ENMT: No Drainage from nares, No drainage from ears  NECK: Supple, neck  veins full  NERVOUS SYSTEM:  Awake and Alert  CHEST/LUNG: Clear to percussion bilaterally; No rales, rhonchi, wheezing, or rubs  HEART: Regular rate and rhythm; No murmurs, rubs, or gallops  ABDOMEN: Soft, Nontender, Nondistended; Bowel sounds present  EXTREMITIES:  No Edema  SKIN: No rashes No obvious ecchymosis      LABS:                                      13.3   17.35 )-----------( 249      ( 20 Jun 2022 10:02 )             42.1     06-20    140  |  106  |  77<H>  ----------------------------<  178<H>  3.9   |  25  |  1.60<H>    Ca    8.7      20 Jun 2022 10:02  Phos  3.6     06-19  Mg     2.6     06-19    TPro  7.0  /  Alb  2.9<L>  /  TBili  0.6  /  DBili  x   /  AST  19  /  ALT  21  /  AlkPhos  70  06-20

## 2022-06-20 NOTE — SWALLOW BEDSIDE ASSESSMENT ADULT - SWALLOW EVAL: PROGNOSIS
DIAGNOSIS CONTINUED: notify SLP. Recommend MBS to objectively assess oropharyngeal swallow mechanism and determine safest/least restrictive PO diet. Discussed with RN and Dr. Aguayo.

## 2022-06-20 NOTE — PROGRESS NOTE ADULT - SUBJECTIVE AND OBJECTIVE BOX
Patient is a 87y old  Female who presents with a chief complaint of COPD Exacerbation (20 Jun 2022 15:37)      INTERVAL HPI/OVERNIGHT EVENTS:    MEDICATIONS  (STANDING):  albuterol/ipratropium for Nebulization 3 milliLiter(s) Nebulizer every 6 hours  artificial tears (preservative free) Ophthalmic Solution 1 Drop(s) Both EYES two times a day  aspirin  chewable 81 milliGRAM(s) Oral daily  atorvastatin 80 milliGRAM(s) Oral at bedtime  cholecalciferol 2000 Unit(s) Oral daily  clopidogrel Tablet 75 milliGRAM(s) Oral daily  dextrose 5%. 1000 milliLiter(s) (100 mL/Hr) IV Continuous <Continuous>  dextrose 5%. 1000 milliLiter(s) (50 mL/Hr) IV Continuous <Continuous>  dextrose 50% Injectable 25 Gram(s) IV Push once  dextrose 50% Injectable 12.5 Gram(s) IV Push once  dextrose 50% Injectable 25 Gram(s) IV Push once  dextrose 50% Injectable 25 Gram(s) IV Push once  escitalopram 10 milliGRAM(s) Oral daily  gabapentin 100 milliGRAM(s) Oral three times a day  glucagon  Injectable 1 milliGRAM(s) IntraMuscular once  glucagon  Injectable 1 milliGRAM(s) IntraMuscular once  heparin   Injectable 5000 Unit(s) SubCutaneous every 12 hours  insulin glargine Injectable (LANTUS) 15 Unit(s) SubCutaneous every morning  insulin lispro (ADMELOG) corrective regimen sliding scale   SubCutaneous three times a day before meals  insulin lispro (ADMELOG) corrective regimen sliding scale   SubCutaneous at bedtime  insulin lispro Injectable (ADMELOG) 8 Unit(s) SubCutaneous three times a day before meals  isosorbide   mononitrate ER Tablet (IMDUR) 30 milliGRAM(s) Oral daily  levothyroxine 125 MICROGram(s) Oral daily  metoprolol succinate ER 50 milliGRAM(s) Oral daily    MEDICATIONS  (PRN):  acetaminophen     Tablet .. 650 milliGRAM(s) Oral every 6 hours PRN Mild Pain (1 - 3)  ALBUTerol    0.083% 2.5 milliGRAM(s) Nebulizer every 4 hours PRN Shortness of Breath and/or Wheezing  ALPRAZolam 0.5 milliGRAM(s) Oral two times a day PRN Anxiety  dextrose Oral Gel 15 Gram(s) Oral once PRN Blood Glucose LESS THAN 70 milliGRAM(s)/deciliter  melatonin 3 milliGRAM(s) Oral at bedtime PRN Insomnia      Allergies    doxycycline (Unknown)  iodine (Hives)  iodine containing compounds (Unknown)  shellfish (Anaphylaxis)  shellfish (Swelling; Short breath)    Intolerances        REVIEW OF SYSTEMS:  CONSTITUTIONAL: No fever or chills  HEENT:  No headache, no sore throat  RESPIRATORY: No cough, wheezing, or shortness of breath  CARDIOVASCULAR: No chest pain, palpitations, or leg swelling  GASTROINTESTINAL: No abd pain, nausea, vomiting, or diarrhea  GENITOURINARY: No dysuria, frequency, or hematuria  NEUROLOGICAL: no focal weakness or dizziness  MUSCULOSKELETAL: no myalgias     Vital Signs Last 24 Hrs  T(C): 36.4 (20 Jun 2022 13:19), Max: 36.9 (19 Jun 2022 20:41)  T(F): 97.5 (20 Jun 2022 13:19), Max: 98.4 (19 Jun 2022 20:41)  HR: 62 (20 Jun 2022 15:04) (62 - 67)  BP: 140/69 (20 Jun 2022 13:19) (140/69 - 166/72)  BP(mean): --  RR: 17 (20 Jun 2022 13:19) (17 - 18)  SpO2: 96% (20 Jun 2022 15:04) (96% - 97%)    PHYSICAL EXAM:  GENERAL: NAD  HEENT:  NC/AT, EOMI, moist mucous membranes  CHEST/LUNG:  CTA b/l, no rales, wheezes, or rhonchi  HEART:  RRR, S1, S2  ABDOMEN:  BS+, soft, nontender, nondistended  EXTREMITIES: no edema, cyanosis, or calf tenderness  NERVOUS SYSTEM: AA&Ox3    LABS:                        13.3   17.35 )-----------( 249      ( 20 Jun 2022 10:02 )             42.1     CBC Full  -  ( 20 Jun 2022 10:02 )  WBC Count : 17.35 K/uL  Hemoglobin : 13.3 g/dL  Hematocrit : 42.1 %  Platelet Count - Automated : 249 K/uL  Mean Cell Volume : 88.6 fl  Mean Cell Hemoglobin : 28.0 pg  Mean Cell Hemoglobin Concentration : 31.6 gm/dL  Auto Neutrophil # : x  Auto Lymphocyte # : x  Auto Monocyte # : x  Auto Eosinophil # : x  Auto Basophil # : x  Auto Neutrophil % : x  Auto Lymphocyte % : x  Auto Monocyte % : x  Auto Eosinophil % : x  Auto Basophil % : x    20 Jun 2022 10:02    140    |  106    |  77     ----------------------------<  178    3.9     |  25     |  1.60     Ca    8.7        20 Jun 2022 10:02    TPro  7.0    /  Alb  2.9    /  TBili  0.6    /  DBili  x      /  AST  19     /  ALT  21     /  AlkPhos  70     20 Jun 2022 10:02        CAPILLARY BLOOD GLUCOSE      POCT Blood Glucose.: 75 mg/dL (20 Jun 2022 16:51)  POCT Blood Glucose.: 147 mg/dL (20 Jun 2022 12:01)  POCT Blood Glucose.: 188 mg/dL (20 Jun 2022 07:35)  POCT Blood Glucose.: 183 mg/dL (19 Jun 2022 21:48)          RADIOLOGY & ADDITIONAL TESTS:    Personally reviewed.     Consultant(s) Notes Reviewed:  [x] YES  [ ] NO    Care Discussed with [x] Consultants  [x] Patient  [ ] Family  [ ]      [ ] Other; RN  DVT ppx   Patient is a 87y old  Female who presents with a chief complaint of COPD Exacerbation (20 Jun 2022 15:37)      INTERVAL HPI/OVERNIGHT EVENTS: Pt seen and examined at bedside. admits mild heavy breathing. no other complaints.     MEDICATIONS  (STANDING):  albuterol/ipratropium for Nebulization 3 milliLiter(s) Nebulizer every 6 hours  artificial tears (preservative free) Ophthalmic Solution 1 Drop(s) Both EYES two times a day  aspirin  chewable 81 milliGRAM(s) Oral daily  atorvastatin 80 milliGRAM(s) Oral at bedtime  cholecalciferol 2000 Unit(s) Oral daily  clopidogrel Tablet 75 milliGRAM(s) Oral daily  dextrose 5%. 1000 milliLiter(s) (100 mL/Hr) IV Continuous <Continuous>  dextrose 5%. 1000 milliLiter(s) (50 mL/Hr) IV Continuous <Continuous>  dextrose 50% Injectable 25 Gram(s) IV Push once  dextrose 50% Injectable 12.5 Gram(s) IV Push once  dextrose 50% Injectable 25 Gram(s) IV Push once  dextrose 50% Injectable 25 Gram(s) IV Push once  escitalopram 10 milliGRAM(s) Oral daily  gabapentin 100 milliGRAM(s) Oral three times a day  glucagon  Injectable 1 milliGRAM(s) IntraMuscular once  glucagon  Injectable 1 milliGRAM(s) IntraMuscular once  heparin   Injectable 5000 Unit(s) SubCutaneous every 12 hours  insulin glargine Injectable (LANTUS) 15 Unit(s) SubCutaneous every morning  insulin lispro (ADMELOG) corrective regimen sliding scale   SubCutaneous three times a day before meals  insulin lispro (ADMELOG) corrective regimen sliding scale   SubCutaneous at bedtime  insulin lispro Injectable (ADMELOG) 8 Unit(s) SubCutaneous three times a day before meals  isosorbide   mononitrate ER Tablet (IMDUR) 30 milliGRAM(s) Oral daily  levothyroxine 125 MICROGram(s) Oral daily  metoprolol succinate ER 50 milliGRAM(s) Oral daily    MEDICATIONS  (PRN):  acetaminophen     Tablet .. 650 milliGRAM(s) Oral every 6 hours PRN Mild Pain (1 - 3)  ALBUTerol    0.083% 2.5 milliGRAM(s) Nebulizer every 4 hours PRN Shortness of Breath and/or Wheezing  ALPRAZolam 0.5 milliGRAM(s) Oral two times a day PRN Anxiety  dextrose Oral Gel 15 Gram(s) Oral once PRN Blood Glucose LESS THAN 70 milliGRAM(s)/deciliter  melatonin 3 milliGRAM(s) Oral at bedtime PRN Insomnia      Allergies    doxycycline (Unknown)  iodine (Hives)  iodine containing compounds (Unknown)  shellfish (Anaphylaxis)  shellfish (Swelling; Short breath)    Intolerances        REVIEW OF SYSTEMS:  CONSTITUTIONAL: No fever or chills  HEENT:  No headache, no sore throat  RESPIRATORY: No cough, wheezing, (+) shortness of breath  CARDIOVASCULAR: No chest pain, palpitations, or leg swelling  GASTROINTESTINAL: No abd pain, nausea, vomiting, or diarrhea  GENITOURINARY: No dysuria, frequency, or hematuria  NEUROLOGICAL: no focal weakness or dizziness  MUSCULOSKELETAL: no myalgias     Vital Signs Last 24 Hrs  T(C): 36.4 (20 Jun 2022 13:19), Max: 36.9 (19 Jun 2022 20:41)  T(F): 97.5 (20 Jun 2022 13:19), Max: 98.4 (19 Jun 2022 20:41)  HR: 62 (20 Jun 2022 15:04) (62 - 67)  BP: 140/69 (20 Jun 2022 13:19) (140/69 - 166/72)  BP(mean): --  RR: 17 (20 Jun 2022 13:19) (17 - 18)  SpO2: 96% (20 Jun 2022 15:04) (96% - 97%)    PHYSICAL EXAM:  GENERAL: elderly F in NAD  HEENT:  NC/AT, EOMI, moist mucous membranes  CHEST/LUNG:  CTA b/l, no rales, wheezes, or rhonchi  HEART:  RRR, S1, S2  ABDOMEN:  BS+, soft, nontender, nondistended  EXTREMITIES: no edema, cyanosis, or calf tenderness  NERVOUS SYSTEM: Awake, alert    LABS:                        13.3   17.35 )-----------( 249      ( 20 Jun 2022 10:02 )             42.1     CBC Full  -  ( 20 Jun 2022 10:02 )  WBC Count : 17.35 K/uL  Hemoglobin : 13.3 g/dL  Hematocrit : 42.1 %  Platelet Count - Automated : 249 K/uL  Mean Cell Volume : 88.6 fl  Mean Cell Hemoglobin : 28.0 pg  Mean Cell Hemoglobin Concentration : 31.6 gm/dL  Auto Neutrophil # : x  Auto Lymphocyte # : x  Auto Monocyte # : x  Auto Eosinophil # : x  Auto Basophil # : x  Auto Neutrophil % : x  Auto Lymphocyte % : x  Auto Monocyte % : x  Auto Eosinophil % : x  Auto Basophil % : x    20 Jun 2022 10:02    140    |  106    |  77     ----------------------------<  178    3.9     |  25     |  1.60     Ca    8.7        20 Jun 2022 10:02    TPro  7.0    /  Alb  2.9    /  TBili  0.6    /  DBili  x      /  AST  19     /  ALT  21     /  AlkPhos  70     20 Jun 2022 10:02        CAPILLARY BLOOD GLUCOSE      POCT Blood Glucose.: 75 mg/dL (20 Jun 2022 16:51)  POCT Blood Glucose.: 147 mg/dL (20 Jun 2022 12:01)  POCT Blood Glucose.: 188 mg/dL (20 Jun 2022 07:35)  POCT Blood Glucose.: 183 mg/dL (19 Jun 2022 21:48)          RADIOLOGY & ADDITIONAL TESTS:    Personally reviewed.     Consultant(s) Notes Reviewed:  [x] YES  [ ] NO    Care Discussed with [x] Consultants  [x] Patient  [ ] Family  [ ]      [ ] Other; RN  DVT ppx

## 2022-06-20 NOTE — SWALLOW BEDSIDE ASSESSMENT ADULT - ORAL PHASE
Decreased anterior-posterior movement of the bolus suspected posterior loss suspected posterior loss/Decreased anterior-posterior movement of the bolus

## 2022-06-20 NOTE — PROGRESS NOTE ADULT - SUBJECTIVE AND OBJECTIVE BOX
Date/Time Patient Seen:  		  Referring MD:   Data Reviewed	       Patient is a 87y old  Female who presents with a chief complaint of COPD Exacerbation (19 Jun 2022 09:53)      Subjective/HPI     PAST MEDICAL & SURGICAL HISTORY:  Uncomplicated asthma, unspecified asthma severity    DM2 (diabetes mellitus, type 2)    Essential hypertension    Hypothyroidism, unspecified type    Pure hypercholesterolemia    Gastroesophageal reflux disease without esophagitis    Diabetes    Asthma    CAD (coronary artery disease)    HTN (hypertension)    HLD (hyperlipidemia)    Hypothyroid    NSTEMI (non-ST elevated myocardial infarction)    No significant past surgical history    History of appendectomy    History of appendectomy    Abnormal findings on cardiac catheterization  Cardiac Cath          Medication list         MEDICATIONS  (STANDING):  albuterol/ipratropium for Nebulization 3 milliLiter(s) Nebulizer every 6 hours  artificial tears (preservative free) Ophthalmic Solution 1 Drop(s) Both EYES two times a day  aspirin  chewable 81 milliGRAM(s) Oral daily  atorvastatin 80 milliGRAM(s) Oral at bedtime  cholecalciferol 2000 Unit(s) Oral daily  clopidogrel Tablet 75 milliGRAM(s) Oral daily  dextrose 5%. 1000 milliLiter(s) (100 mL/Hr) IV Continuous <Continuous>  dextrose 5%. 1000 milliLiter(s) (50 mL/Hr) IV Continuous <Continuous>  dextrose 50% Injectable 25 Gram(s) IV Push once  dextrose 50% Injectable 12.5 Gram(s) IV Push once  dextrose 50% Injectable 25 Gram(s) IV Push once  dextrose 50% Injectable 25 Gram(s) IV Push once  escitalopram 10 milliGRAM(s) Oral daily  gabapentin 100 milliGRAM(s) Oral three times a day  glucagon  Injectable 1 milliGRAM(s) IntraMuscular once  glucagon  Injectable 1 milliGRAM(s) IntraMuscular once  heparin   Injectable 5000 Unit(s) SubCutaneous every 12 hours  insulin glargine Injectable (LANTUS) 15 Unit(s) SubCutaneous every morning  insulin lispro (ADMELOG) corrective regimen sliding scale   SubCutaneous three times a day before meals  insulin lispro (ADMELOG) corrective regimen sliding scale   SubCutaneous at bedtime  insulin lispro Injectable (ADMELOG) 8 Unit(s) SubCutaneous three times a day before meals  isosorbide   mononitrate ER Tablet (IMDUR) 30 milliGRAM(s) Oral daily  levothyroxine 125 MICROGram(s) Oral daily  metoprolol succinate ER 50 milliGRAM(s) Oral daily    MEDICATIONS  (PRN):  acetaminophen     Tablet .. 650 milliGRAM(s) Oral every 6 hours PRN Mild Pain (1 - 3)  ALBUTerol    0.083% 2.5 milliGRAM(s) Nebulizer every 4 hours PRN Shortness of Breath and/or Wheezing  ALPRAZolam 0.5 milliGRAM(s) Oral two times a day PRN Anxiety  dextrose Oral Gel 15 Gram(s) Oral once PRN Blood Glucose LESS THAN 70 milliGRAM(s)/deciliter  melatonin 3 milliGRAM(s) Oral at bedtime PRN Insomnia         Vitals log        ICU Vital Signs Last 24 Hrs  T(C): 36.9 (19 Jun 2022 20:41), Max: 36.9 (19 Jun 2022 20:41)  T(F): 98.4 (19 Jun 2022 20:41), Max: 98.4 (19 Jun 2022 20:41)  HR: 66 (20 Jun 2022 00:10) (62 - 67)  BP: 145/74 (19 Jun 2022 20:41) (145/74 - 147/66)  BP(mean): --  ABP: --  ABP(mean): --  RR: 18 (19 Jun 2022 20:41) (18 - 18)  SpO2: 96% (20 Jun 2022 00:10) (94% - 98%)           Input and Output:  I&O's Detail    18 Jun 2022 07:01  -  19 Jun 2022 07:00  --------------------------------------------------------  IN:    Oral Fluid: 480 mL  Total IN: 480 mL    OUT:  Total OUT: 0 mL    Total NET: 480 mL      19 Jun 2022 07:01  -  20 Jun 2022 05:40  --------------------------------------------------------  IN:    Oral Fluid: 240 mL  Total IN: 240 mL    OUT:  Total OUT: 0 mL    Total NET: 240 mL          Lab Data    06-19    139  |  105  |  77<H>  ----------------------------<  141<H>  3.9   |  25  |  1.50<H>    Ca    8.5      19 Jun 2022 08:45  Phos  3.6     06-19  Mg     2.6     06-19              Review of Systems	      Objective     Physical Examination    heart s1s2  lung dec BS  abd soft      Pertinent Lab findings & Imaging      Chong:  NO   Adequate UO     I&O's Detail    18 Jun 2022 07:01  -  19 Jun 2022 07:00  --------------------------------------------------------  IN:    Oral Fluid: 480 mL  Total IN: 480 mL    OUT:  Total OUT: 0 mL    Total NET: 480 mL      19 Jun 2022 07:01  -  20 Jun 2022 05:40  --------------------------------------------------------  IN:    Oral Fluid: 240 mL  Total IN: 240 mL    OUT:  Total OUT: 0 mL    Total NET: 240 mL               Discussed with:     Cultures:	        Radiology

## 2022-06-20 NOTE — SWALLOW BEDSIDE ASSESSMENT ADULT - SWALLOW EVAL: RECOMMENDED DIET
puree with moderately thick liquids +aspiration precautions puree with moderately thick liquids via single/small cup sips +aspiration precautions

## 2022-06-20 NOTE — PROGRESS NOTE ADULT - ASSESSMENT
87 year old female with PMHx COPD, HTN, HLD, T2DM on insulin, hx of MI presents from assisted living facility with dyspnea, wheezing, labored breathing    copd  copd ex  [place] CKD  HTN  OP  OA  DM  HLD  Moderate to Severe AS  CAD    completed steroids   renal follow up noted  on o2 support      cvs rx regimen  BP control  serial renal indices  I and O  monitor VS and HD and Sat  HOB elev - asp prec - assist with needs - ADL - GOC discussion  pt has hMPV - resp viral infection - isolation precs - acap PRN - robitussin PRN -   cxr - labs reviewed  copd - rx regimen PRN for sob and or wheeze, NEBS, not a candidate for Inhaler use  proBNP noted  ABG noted  old records reviewed

## 2022-06-20 NOTE — PROGRESS NOTE ADULT - SUBJECTIVE AND OBJECTIVE BOX
CAPILLARY BLOOD GLUCOSE      POCT Blood Glucose.: 188 mg/dL (20 Jun 2022 07:35)  POCT Blood Glucose.: 183 mg/dL (19 Jun 2022 21:48)  POCT Blood Glucose.: 208 mg/dL (19 Jun 2022 17:06)  POCT Blood Glucose.: 193 mg/dL (19 Jun 2022 11:52)      Vital Signs Last 24 Hrs  T(C): 36.4 (20 Jun 2022 06:13), Max: 36.9 (19 Jun 2022 20:41)  T(F): 97.6 (20 Jun 2022 06:13), Max: 98.4 (19 Jun 2022 20:41)  HR: 65 (20 Jun 2022 06:13) (62 - 67)  BP: 166/72 (20 Jun 2022 06:13) (145/74 - 166/72)  BP(mean): --  RR: 18 (20 Jun 2022 06:13) (18 - 18)  SpO2: 97% (20 Jun 2022 06:13) (94% - 98%)    General: WN/WD NAD  Respiratory: CTA B/L  CV: RRR, S1S2, no murmurs, rubs or gallops  Abdominal: Soft, NT, ND +BS, Last BM  Extremities: No edema, + peripheral pulses     06-19    139  |  105  |  77<H>  ----------------------------<  141<H>  3.9   |  25  |  1.50<H>    Ca    8.5      19 Jun 2022 08:45  Phos  3.6     06-19  Mg     2.6     06-19        atorvastatin 80 milliGRAM(s) Oral at bedtime  dextrose 50% Injectable 25 Gram(s) IV Push once  dextrose 50% Injectable 12.5 Gram(s) IV Push once  dextrose 50% Injectable 25 Gram(s) IV Push once  dextrose 50% Injectable 25 Gram(s) IV Push once  dextrose Oral Gel 15 Gram(s) Oral once PRN  glucagon  Injectable 1 milliGRAM(s) IntraMuscular once  glucagon  Injectable 1 milliGRAM(s) IntraMuscular once  insulin glargine Injectable (LANTUS) 15 Unit(s) SubCutaneous every morning  insulin lispro (ADMELOG) corrective regimen sliding scale   SubCutaneous three times a day before meals  insulin lispro (ADMELOG) corrective regimen sliding scale   SubCutaneous at bedtime  insulin lispro Injectable (ADMELOG) 8 Unit(s) SubCutaneous three times a day before meals  levothyroxine 125 MICROGram(s) Oral daily

## 2022-06-20 NOTE — PROGRESS NOTE ADULT - ASSESSMENT
87 year old female with PMHx COPD, HTN, HLD, T2DM on insulin, hx of MI presents from assisted living facility with dyspnea, wheezing, labored breathing admitted for COPD exacerbation.      Problem/Plan - 1:  ·  Problem: Acute respiratory failure with hypoxia and hypercapnia.   ·  Plan: - COPD exacerbation  due to hMPV - resp viral infection + on admission   -was on BiPAP, weaned off BiPAP. Now on 2L O2 NC. saturating well over 95%, no sob,    -Xanax  PRN - duoneb prn  - prednisone tapered  off  - wbc elevation likely stress, steroids induced, afebrile, cxr clear  - Dr Copeland patient stable for discharge with outpatient f/u.      Problem/Plan - 2:  ·  Problem: COPD exacerbation.   - Continue home medications - Duoneb q6h,  Proventil  - Supplemental O2 PRN. COPD patient will keep SpO2 goal 88-93%.   - Encourage incentive spirometry.     Problem/Plan - 3:  ·  Problem: Renal failure (ARF), acute on chronic.   ·  Plan: CKD cr at baseline ranging from 1.3-1.7 on outpatient HIE review  hold lasix per renal, change to po on discharge     Problem/Plan - 4:  ·  Problem: DM2 (diabetes mellitus, type 2).   ·  Plan: - Chronic, known history of T2DM  - C/W Lantus 15 U qd and and 8U pre meals  - Moderate dose insulin corrective scale   - HbA1c: 10  -endocrine following appreciate recs.     Problem/Plan - 5:  ·  Problem: HTN (hypertension).   ·  Plan: bp stable  - change lasix to 20mg prn on discharge per renal  - Continue home Metoprolol Succinate 50 mg qd with hold parameters  -monitor bp.     Problem/Plan - 6:  ·  Problem: Anxiety.   ·  Plan: stable  continue Xanax prn , lexapro.     Problem/Plan - 7:  ·  Problem: Hypothyroidism, unspecified type.   ·  Plan: Chronic  - Continue home Synthroid 125 mcg PO qd.     Problem/Plan - 8:  ·  Problem: HLD (hyperlipidemia).   ·  Plan: Chronic  - Continue home Atorvastatin 80 mg PO qhs.     Problem/Plan - 9:  ·  Problem: CAD (coronary artery disease).   ·  Plan: - Hx of MI  - Continue home Aspirin 81 mg PO qd, Plavix 75 mg PO qd, imdur 30mg daily   - Echo 3/11/2022: Left ventricle was of normal size, mild left ventricular hypertrophy LV   systolic function EF 65%. Moderate to severe aortic stenosis.     Problem/Plan - 10:  ·  Problem: Need for prophylactic measure.   ·  Plan; VTE ppx: Heparin 5000 subq q12h   DNR/DNI - MOLST filled    Dispo: discharge to  assisted living is held up pending swallow eval  as family/son wants swallow check to ascertain proper dietary consistency that patient will tolerate.   Pending swallow evaluation by speech pathology. Dc 6/20                   87 year old female with PMHx COPD, HTN, HLD, T2DM on insulin, hx of MI presents from assisted living facility with dyspnea, wheezing, labored breathing admitted for COPD exacerbation.     6/20/22 - COPD exac 2/2 hMPV -- supportive care, satting well on 2L NC, had SLP eval today at son's request, rec pureed w/ mod thick liquids, and MBS will be done tomorrow to assess for aspiration. potential DC tomorrow. Hold Lasix on DC.   *son updated over phone     Problem/Plan - 1:  ·  Problem: Acute respiratory failure with hypoxia and hypercapnia.   ·  Plan: - COPD exacerbation  due to hMPV - resp viral infection + on admission   -was on BiPAP, weaned off BiPAP. Now on 2L O2 NC. saturating well over 95%, no sob,    -Xanax  PRN - duoneb prn  - prednisone tapered  off  - wbc elevation likely stress, steroids induced, afebrile, cxr clear  - Dr Copeland patient stable for discharge with outpatient f/u.      Problem/Plan - 2:  ·  Problem: COPD exacerbation.   - Continue home medications - Duoneb q6h,  Proventil  - Supplemental O2 PRN. COPD patient will keep SpO2 goal 88-93%.   - Encourage incentive spirometry.     Problem/Plan - 3:  ·  Problem: Renal failure (ARF), acute on chronic.   ·  Plan: CKD cr at baseline ranging from 1.3-1.7 on outpatient HIE review  hold lasix per renal, change to po on discharge     Problem/Plan - 4:  ·  Problem: DM2 (diabetes mellitus, type 2).   ·  Plan: - Chronic, known history of T2DM  - C/W Lantus 15 U qd and and 8U pre meals  - Moderate dose insulin corrective scale   - HbA1c: 10  -endocrine following appreciate recs.     Problem/Plan - 5:  ·  Problem: HTN (hypertension).   ·  Plan: bp stable  - change lasix to 20mg prn on discharge per renal  - Continue home Metoprolol Succinate 50 mg qd with hold parameters  -monitor bp.     Problem/Plan - 6:  ·  Problem: Anxiety.   ·  Plan: stable  continue Xanax prn , lexapro.     Problem/Plan - 7:  ·  Problem: Hypothyroidism, unspecified type.   ·  Plan: Chronic  - Continue home Synthroid 125 mcg PO qd.     Problem/Plan - 8:  ·  Problem: HLD (hyperlipidemia).   ·  Plan: Chronic  - Continue home Atorvastatin 80 mg PO qhs.     Problem/Plan - 9:  ·  Problem: CAD (coronary artery disease).   ·  Plan: - Hx of MI  - Continue home Aspirin 81 mg PO qd, Plavix 75 mg PO qd, imdur 30mg daily   - Echo 3/11/2022: Left ventricle was of normal size, mild left ventricular hypertrophy LV   systolic function EF 65%. Moderate to severe aortic stenosis.     Problem/Plan - 10:  ·  Problem: Need for prophylactic measure.   ·  Plan; VTE ppx: Heparin 5000 subq q12h   DNR/DNI - MOLST filled    Dispo: discharge to  assisted living is held up pending swallow eval  as family/son wants swallow check to ascertain proper dietary consistency that patient will tolerate.   Pending swallow evaluation by speech pathology. ID [id number]

## 2022-06-21 LAB
ANION GAP SERPL CALC-SCNC: 11 MMOL/L — SIGNIFICANT CHANGE UP (ref 5–17)
APPEARANCE UR: CLEAR — SIGNIFICANT CHANGE UP
BACTERIA # UR AUTO: ABNORMAL
BASOPHILS # BLD AUTO: 0.03 K/UL — SIGNIFICANT CHANGE UP (ref 0–0.2)
BASOPHILS NFR BLD AUTO: 0.2 % — SIGNIFICANT CHANGE UP (ref 0–2)
BILIRUB UR-MCNC: NEGATIVE — SIGNIFICANT CHANGE UP
BUN SERPL-MCNC: 75 MG/DL — HIGH (ref 7–23)
CALCIUM SERPL-MCNC: 8.9 MG/DL — SIGNIFICANT CHANGE UP (ref 8.5–10.1)
CHLORIDE SERPL-SCNC: 107 MMOL/L — SIGNIFICANT CHANGE UP (ref 96–108)
CO2 SERPL-SCNC: 23 MMOL/L — SIGNIFICANT CHANGE UP (ref 22–31)
COLOR SPEC: YELLOW — SIGNIFICANT CHANGE UP
COMMENT - URINE: SIGNIFICANT CHANGE UP
CREAT SERPL-MCNC: 1.5 MG/DL — HIGH (ref 0.5–1.3)
DIFF PNL FLD: ABNORMAL
EGFR: 34 ML/MIN/1.73M2 — LOW
EOSINOPHIL # BLD AUTO: 0.25 K/UL — SIGNIFICANT CHANGE UP (ref 0–0.5)
EOSINOPHIL NFR BLD AUTO: 1.3 % — SIGNIFICANT CHANGE UP (ref 0–6)
EPI CELLS # UR: SIGNIFICANT CHANGE UP
GLUCOSE SERPL-MCNC: 129 MG/DL — HIGH (ref 70–99)
GLUCOSE UR QL: NEGATIVE — SIGNIFICANT CHANGE UP
HCT VFR BLD CALC: 42.5 % — SIGNIFICANT CHANGE UP (ref 34.5–45)
HGB BLD-MCNC: 13.1 G/DL — SIGNIFICANT CHANGE UP (ref 11.5–15.5)
IMM GRANULOCYTES NFR BLD AUTO: 1 % — SIGNIFICANT CHANGE UP (ref 0–1.5)
KETONES UR-MCNC: NEGATIVE — SIGNIFICANT CHANGE UP
LEUKOCYTE ESTERASE UR-ACNC: ABNORMAL
LYMPHOCYTES # BLD AUTO: 12 % — LOW (ref 13–44)
LYMPHOCYTES # BLD AUTO: 2.34 K/UL — SIGNIFICANT CHANGE UP (ref 1–3.3)
MCHC RBC-ENTMCNC: 27.6 PG — SIGNIFICANT CHANGE UP (ref 27–34)
MCHC RBC-ENTMCNC: 30.8 GM/DL — LOW (ref 32–36)
MCV RBC AUTO: 89.7 FL — SIGNIFICANT CHANGE UP (ref 80–100)
MONOCYTES # BLD AUTO: 1.17 K/UL — HIGH (ref 0–0.9)
MONOCYTES NFR BLD AUTO: 6 % — SIGNIFICANT CHANGE UP (ref 2–14)
NEUTROPHILS # BLD AUTO: 15.6 K/UL — HIGH (ref 1.8–7.4)
NEUTROPHILS NFR BLD AUTO: 79.5 % — HIGH (ref 43–77)
NITRITE UR-MCNC: NEGATIVE — SIGNIFICANT CHANGE UP
NRBC # BLD: 0 /100 WBCS — SIGNIFICANT CHANGE UP (ref 0–0)
PH UR: 5 — SIGNIFICANT CHANGE UP (ref 5–8)
PLATELET # BLD AUTO: 238 K/UL — SIGNIFICANT CHANGE UP (ref 150–400)
POTASSIUM SERPL-MCNC: 4.4 MMOL/L — SIGNIFICANT CHANGE UP (ref 3.5–5.3)
POTASSIUM SERPL-SCNC: 4.4 MMOL/L — SIGNIFICANT CHANGE UP (ref 3.5–5.3)
PROT UR-MCNC: 15
RBC # BLD: 4.74 M/UL — SIGNIFICANT CHANGE UP (ref 3.8–5.2)
RBC # FLD: 13.2 % — SIGNIFICANT CHANGE UP (ref 10.3–14.5)
RBC CASTS # UR COMP ASSIST: SIGNIFICANT CHANGE UP /HPF (ref 0–4)
SODIUM SERPL-SCNC: 141 MMOL/L — SIGNIFICANT CHANGE UP (ref 135–145)
SP GR SPEC: 1.02 — SIGNIFICANT CHANGE UP (ref 1.01–1.02)
UROBILINOGEN FLD QL: NEGATIVE — SIGNIFICANT CHANGE UP
WBC # BLD: 19.58 K/UL — HIGH (ref 3.8–10.5)
WBC # FLD AUTO: 19.58 K/UL — HIGH (ref 3.8–10.5)
WBC UR QL: SIGNIFICANT CHANGE UP

## 2022-06-21 PROCEDURE — 71045 X-RAY EXAM CHEST 1 VIEW: CPT | Mod: 26

## 2022-06-21 PROCEDURE — 99233 SBSQ HOSP IP/OBS HIGH 50: CPT

## 2022-06-21 PROCEDURE — 74230 X-RAY XM SWLNG FUNCJ C+: CPT | Mod: 26

## 2022-06-21 PROCEDURE — 93970 EXTREMITY STUDY: CPT | Mod: 26

## 2022-06-21 RX ADMIN — Medication 125 MICROGRAM(S): at 05:32

## 2022-06-21 RX ADMIN — Medication 81 MILLIGRAM(S): at 12:04

## 2022-06-21 RX ADMIN — CLOPIDOGREL BISULFATE 75 MILLIGRAM(S): 75 TABLET, FILM COATED ORAL at 12:04

## 2022-06-21 RX ADMIN — ISOSORBIDE MONONITRATE 30 MILLIGRAM(S): 60 TABLET, EXTENDED RELEASE ORAL at 12:05

## 2022-06-21 RX ADMIN — Medication 3 MILLILITER(S): at 13:27

## 2022-06-21 RX ADMIN — ALBUTEROL 2.5 MILLIGRAM(S): 90 AEROSOL, METERED ORAL at 00:12

## 2022-06-21 RX ADMIN — Medication 2000 UNIT(S): at 12:05

## 2022-06-21 RX ADMIN — Medication 8 UNIT(S): at 09:10

## 2022-06-21 RX ADMIN — Medication 3 MILLILITER(S): at 20:17

## 2022-06-21 RX ADMIN — GABAPENTIN 100 MILLIGRAM(S): 400 CAPSULE ORAL at 22:29

## 2022-06-21 RX ADMIN — INSULIN GLARGINE 15 UNIT(S): 100 INJECTION, SOLUTION SUBCUTANEOUS at 09:10

## 2022-06-21 RX ADMIN — Medication 3 MILLILITER(S): at 00:28

## 2022-06-21 RX ADMIN — GABAPENTIN 100 MILLIGRAM(S): 400 CAPSULE ORAL at 14:23

## 2022-06-21 RX ADMIN — Medication 50 MILLIGRAM(S): at 05:32

## 2022-06-21 RX ADMIN — ESCITALOPRAM OXALATE 10 MILLIGRAM(S): 10 TABLET, FILM COATED ORAL at 12:05

## 2022-06-21 RX ADMIN — Medication 1 DROP(S): at 18:12

## 2022-06-21 RX ADMIN — GABAPENTIN 100 MILLIGRAM(S): 400 CAPSULE ORAL at 05:32

## 2022-06-21 RX ADMIN — Medication 1 DROP(S): at 05:32

## 2022-06-21 RX ADMIN — HEPARIN SODIUM 5000 UNIT(S): 5000 INJECTION INTRAVENOUS; SUBCUTANEOUS at 18:12

## 2022-06-21 RX ADMIN — HEPARIN SODIUM 5000 UNIT(S): 5000 INJECTION INTRAVENOUS; SUBCUTANEOUS at 05:32

## 2022-06-21 RX ADMIN — ATORVASTATIN CALCIUM 80 MILLIGRAM(S): 80 TABLET, FILM COATED ORAL at 22:29

## 2022-06-21 RX ADMIN — Medication 3 MILLILITER(S): at 07:44

## 2022-06-21 NOTE — PROGRESS NOTE ADULT - ASSESSMENT
TR[place] on CKD 3b  Respiratory Acidosis  HTN  Human Metapneumovirus      -BLCr 1.4  -Urine indices reviewed  -Hold further lasix  -Avoid IVF given respiratory status  -Pulm eval reviewed  -CXR clear 6/15/22  -Renal indices are stable, just above baseline. Monitor for now

## 2022-06-21 NOTE — PROGRESS NOTE ADULT - ASSESSMENT
87 year old female with PMHx COPD, HTN, HLD, T2DM on insulin, hx of MI presents from assisted living facility with dyspnea, wheezing, labored breathing    copd  copd ex  [place] CKD  HTN  OP  OA  DM  HLD  Moderate to Severe AS  CAD    completed steroids   renal follow up noted  on o2 support  swallow studies noted - on PO diet    cvs rx regimen  BP control  serial renal indices  I and O  monitor VS and HD and Sat  HOB elev - asp prec - assist with needs - ADL - GOC discussion  pt has hMPV - resp viral infection - isolation precs - acap PRN - robitussin PRN -   cxr - labs reviewed  copd - rx regimen PRN for sob and or wheeze, NEBS, not a candidate for Inhaler use  proBNP noted  ABG noted  old records reviewed

## 2022-06-21 NOTE — SWALLOW VFSS/MBS ASSESSMENT ADULT - ORAL PHASE
Pt with absent swallow elicitation with first presented trial, requiring suctioning of bolus from oral cavity via [place]auer by SLP.  After a rest period pt accepted 2 trials./Delayed oral transit time/Uncontrolled bolus / spillover in teresa-pharynx Delayed oral transit time/Uncontrolled bolus / spillover in teresa-pharynx Pt with absent swallow elicitation with first presented trial, requiring suctioning of bolus from oral cavity via Y[place]auer by SLP.  After a rest period, pt accepted 2 additional trials. Reduced attention to bolus, requiring redirection./Delayed oral transit time/Uncontrolled bolus / spillover in teresa-pharynx Reduced attention to bolus, requiring redirection./Delayed oral transit time/Uncontrolled bolus / spillover in teresa-pharynx

## 2022-06-21 NOTE — PROGRESS NOTE ADULT - SUBJECTIVE AND OBJECTIVE BOX
Date/Time Patient Seen:  		  Referring MD:   Data Reviewed	       Patient is a 87y old  Female who presents with a chief complaint of COPD Exacerbation (20 Jun 2022 17:15)      Subjective/HPI     PAST MEDICAL & SURGICAL HISTORY:  Uncomplicated asthma, unspecified asthma severity    DM2 (diabetes mellitus, type 2)    Essential hypertension    Hypothyroidism, unspecified type    Pure hypercholesterolemia    Gastroesophageal reflux disease without esophagitis    Diabetes    Asthma    CAD (coronary artery disease)    HTN (hypertension)    HLD (hyperlipidemia)    Hypothyroid    NSTEMI (non-ST elevated myocardial infarction)    No significant past surgical history    History of appendectomy    History of appendectomy    Abnormal findings on cardiac catheterization  Cardiac Cath          Medication list         MEDICATIONS  (STANDING):  albuterol/ipratropium for Nebulization 3 milliLiter(s) Nebulizer every 6 hours  artificial tears (preservative free) Ophthalmic Solution 1 Drop(s) Both EYES two times a day  aspirin  chewable 81 milliGRAM(s) Oral daily  atorvastatin 80 milliGRAM(s) Oral at bedtime  cholecalciferol 2000 Unit(s) Oral daily  clopidogrel Tablet 75 milliGRAM(s) Oral daily  dextrose 5%. 1000 milliLiter(s) (100 mL/Hr) IV Continuous <Continuous>  dextrose 5%. 1000 milliLiter(s) (50 mL/Hr) IV Continuous <Continuous>  dextrose 50% Injectable 25 Gram(s) IV Push once  dextrose 50% Injectable 25 Gram(s) IV Push once  dextrose 50% Injectable 12.5 Gram(s) IV Push once  dextrose 50% Injectable 25 Gram(s) IV Push once  escitalopram 10 milliGRAM(s) Oral daily  gabapentin 100 milliGRAM(s) Oral three times a day  glucagon  Injectable 1 milliGRAM(s) IntraMuscular once  glucagon  Injectable 1 milliGRAM(s) IntraMuscular once  heparin   Injectable 5000 Unit(s) SubCutaneous every 12 hours  insulin glargine Injectable (LANTUS) 15 Unit(s) SubCutaneous every morning  insulin lispro (ADMELOG) corrective regimen sliding scale   SubCutaneous three times a day before meals  insulin lispro (ADMELOG) corrective regimen sliding scale   SubCutaneous at bedtime  insulin lispro Injectable (ADMELOG) 8 Unit(s) SubCutaneous three times a day before meals  isosorbide   mononitrate ER Tablet (IMDUR) 30 milliGRAM(s) Oral daily  levothyroxine 125 MICROGram(s) Oral daily  metoprolol succinate ER 50 milliGRAM(s) Oral daily    MEDICATIONS  (PRN):  acetaminophen     Tablet .. 650 milliGRAM(s) Oral every 6 hours PRN Mild Pain (1 - 3)  ALBUTerol    0.083% 2.5 milliGRAM(s) Nebulizer every 4 hours PRN Shortness of Breath and/or Wheezing  ALPRAZolam 0.5 milliGRAM(s) Oral two times a day PRN Anxiety  dextrose Oral Gel 15 Gram(s) Oral once PRN Blood Glucose LESS THAN 70 milliGRAM(s)/deciliter  melatonin 3 milliGRAM(s) Oral at bedtime PRN Insomnia         Vitals log        ICU Vital Signs Last 24 Hrs  T(C): 36.3 (21 Jun 2022 05:26), Max: 36.7 (20 Jun 2022 20:05)  T(F): 97.4 (21 Jun 2022 05:26), Max: 98.1 (20 Jun 2022 20:05)  HR: 64 (21 Jun 2022 05:26) (62 - 71)  BP: 146/78 (21 Jun 2022 05:26) (125/69 - 166/72)  BP(mean): --  ABP: --  ABP(mean): --  RR: 18 (21 Jun 2022 05:26) (17 - 18)  SpO2: 93% (21 Jun 2022 05:26) (93% - 97%)           Input and Output:  I&O's Detail    19 Jun 2022 07:01  -  20 Jun 2022 07:00  --------------------------------------------------------  IN:    Oral Fluid: 480 mL  Total IN: 480 mL    OUT:    Voided (mL): 350 mL  Total OUT: 350 mL    Total NET: 130 mL          Lab Data                        13.3   17.35 )-----------( 249      ( 20 Jun 2022 10:02 )             42.1     06-20    140  |  106  |  77<H>  ----------------------------<  178<H>  3.9   |  25  |  1.60<H>    Ca    8.7      20 Jun 2022 10:02  Phos  3.6     06-19  Mg     2.6     06-19    TPro  7.0  /  Alb  2.9<L>  /  TBili  0.6  /  DBili  x   /  AST  19  /  ALT  21  /  AlkPhos  70  06-20            Review of Systems	      Objective     Physical Examination    heart s1s2  lung dec BS  abd soft      Pertinent Lab findings & Imaging      Chong:  NO   Adequate UO     I&O's Detail    19 Jun 2022 07:01  -  20 Jun 2022 07:00  --------------------------------------------------------  IN:    Oral Fluid: 480 mL  Total IN: 480 mL    OUT:    Voided (mL): 350 mL  Total OUT: 350 mL    Total NET: 130 mL               Discussed with:     Cultures:	        Radiology

## 2022-06-21 NOTE — PROGRESS NOTE ADULT - SUBJECTIVE AND OBJECTIVE BOX
Patient is a 87y old  Female who presents with a chief complaint of COPD Exacerbation (21 Jun 2022 10:23)      INTERVAL HPI/OVERNIGHT EVENTS: Pt seen and examined at bedside. admits heavy breathing. no other complaints.     MEDICATIONS  (STANDING):  albuterol/ipratropium for Nebulization 3 milliLiter(s) Nebulizer every 6 hours  artificial tears (preservative free) Ophthalmic Solution 1 Drop(s) Both EYES two times a day  aspirin  chewable 81 milliGRAM(s) Oral daily  atorvastatin 80 milliGRAM(s) Oral at bedtime  cholecalciferol 2000 Unit(s) Oral daily  clopidogrel Tablet 75 milliGRAM(s) Oral daily  dextrose 5%. 1000 milliLiter(s) (50 mL/Hr) IV Continuous <Continuous>  dextrose 5%. 1000 milliLiter(s) (100 mL/Hr) IV Continuous <Continuous>  dextrose 50% Injectable 25 Gram(s) IV Push once  dextrose 50% Injectable 25 Gram(s) IV Push once  dextrose 50% Injectable 25 Gram(s) IV Push once  dextrose 50% Injectable 12.5 Gram(s) IV Push once  escitalopram 10 milliGRAM(s) Oral daily  gabapentin 100 milliGRAM(s) Oral three times a day  glucagon  Injectable 1 milliGRAM(s) IntraMuscular once  glucagon  Injectable 1 milliGRAM(s) IntraMuscular once  heparin   Injectable 5000 Unit(s) SubCutaneous every 12 hours  insulin glargine Injectable (LANTUS) 15 Unit(s) SubCutaneous every morning  insulin lispro (ADMELOG) corrective regimen sliding scale   SubCutaneous three times a day before meals  insulin lispro (ADMELOG) corrective regimen sliding scale   SubCutaneous at bedtime  insulin lispro Injectable (ADMELOG) 8 Unit(s) SubCutaneous three times a day before meals  isosorbide   mononitrate ER Tablet (IMDUR) 30 milliGRAM(s) Oral daily  levothyroxine 125 MICROGram(s) Oral daily  metoprolol succinate ER 50 milliGRAM(s) Oral daily    MEDICATIONS  (PRN):  acetaminophen     Tablet .. 650 milliGRAM(s) Oral every 6 hours PRN Mild Pain (1 - 3)  ALBUTerol    0.083% 2.5 milliGRAM(s) Nebulizer every 4 hours PRN Shortness of Breath and/or Wheezing  ALPRAZolam 0.5 milliGRAM(s) Oral two times a day PRN Anxiety  dextrose Oral Gel 15 Gram(s) Oral once PRN Blood Glucose LESS THAN 70 milliGRAM(s)/deciliter  melatonin 3 milliGRAM(s) Oral at bedtime PRN Insomnia      Allergies    doxycycline (Unknown)  iodine (Hives)  iodine containing compounds (Unknown)  shellfish (Anaphylaxis)  shellfish (Swelling; Short breath)    Intolerances        REVIEW OF SYSTEMS:  CONSTITUTIONAL: No fever or chills  HEENT:  No headache, no sore throat  RESPIRATORY: No cough, wheezing, (+) shortness of breath  CARDIOVASCULAR: No chest pain, palpitations, or leg swelling  GASTROINTESTINAL: No abd pain, nausea, vomiting, or diarrhea  GENITOURINARY: No dysuria, frequency, or hematuria  NEUROLOGICAL: no focal weakness or dizziness  MUSCULOSKELETAL: no myalgias     Vital Signs Last 24 Hrs  T(C): 36.6 (21 Jun 2022 13:33), Max: 36.7 (20 Jun 2022 20:05)  T(F): 97.9 (21 Jun 2022 13:33), Max: 98.1 (20 Jun 2022 20:05)  HR: 67 (21 Jun 2022 13:33) (62 - 71)  BP: 128/82 (21 Jun 2022 13:33) (125/69 - 146/78)  BP(mean): --  RR: 18 (21 Jun 2022 13:33) (18 - 19)  SpO2: 93% (21 Jun 2022 13:33) (93% - 98%)    PHYSICAL EXAM:  GENERAL: elderly F in NAD  HEENT:  NC/AT, EOMI, moist mucous membranes  CHEST/LUNG:  CTA b/l, no rales, wheezes, or rhonchi  HEART:  RRR, S1, S2  ABDOMEN:  BS+, soft, nontender, nondistended  EXTREMITIES: no edema, cyanosis, or calf tenderness  NERVOUS SYSTEM: AA&Ox3    LABS:                        13.1   19.58 )-----------( 238      ( 21 Jun 2022 07:56 )             42.5     CBC Full  -  ( 21 Jun 2022 07:56 )  WBC Count : 19.58 K/uL  Hemoglobin : 13.1 g/dL  Hematocrit : 42.5 %  Platelet Count - Automated : 238 K/uL  Mean Cell Volume : 89.7 fl  Mean Cell Hemoglobin : 27.6 pg  Mean Cell Hemoglobin Concentration : 30.8 gm/dL  Auto Neutrophil # : 15.60 K/uL  Auto Lymphocyte # : 2.34 K/uL  Auto Monocyte # : 1.17 K/uL  Auto Eosinophil # : 0.25 K/uL  Auto Basophil # : 0.03 K/uL  Auto Neutrophil % : 79.5 %  Auto Lymphocyte % : 12.0 %  Auto Monocyte % : 6.0 %  Auto Eosinophil % : 1.3 %  Auto Basophil % : 0.2 %    21 Jun 2022 07:56    141    |  107    |  75     ----------------------------<  129    4.4     |  23     |  1.50     Ca    8.9        21 Jun 2022 07:56          CAPILLARY BLOOD GLUCOSE      POCT Blood Glucose.: 124 mg/dL (21 Jun 2022 12:08)  POCT Blood Glucose.: 149 mg/dL (21 Jun 2022 09:07)  POCT Blood Glucose.: 145 mg/dL (21 Jun 2022 07:54)  POCT Blood Glucose.: 87 mg/dL (20 Jun 2022 21:32)  POCT Blood Glucose.: 75 mg/dL (20 Jun 2022 16:51)          RADIOLOGY & ADDITIONAL TESTS:    Personally reviewed.     Consultant(s) Notes Reviewed:  [x] YES  [ ] NO    Care Discussed with [x] Consultants  [x] Patient  [ ] Family  [ ]      [ ] Other; RN  DVT ppx

## 2022-06-21 NOTE — SWALLOW VFSS/MBS ASSESSMENT ADULT - COMMENTS
Chart reviewed, order received for MBS.  Pt received as above.  Overall limited study, pt with reduced PO acceptance requiring max cues to participate, pt only accepted 3 trials of puree and 3 trials of moderately thick liquids via teaspoon despite max encouragement, pt reported "not feeling well", therefore, solids not assessed and moderately thick liquids via cup, mildly thick liquids and thin liquids unable to be assessed at this time.  PORFIRIO Nunez & Dr. Aguayo notified of current presentation and pt report of not feeling well.  Pt left with Radiology staff awaiting transport to unit NAD.  Will follow to check PO tolerance and assess candidacy for repeat MBS when pt medically optimized. Chart reviewed, order received for MBS.  Pt received as above.  Overall limited study, pt with reduced PO acceptance requiring max cues to participate, pt only accepted 3 trials of puree and 3 trials of moderately thick liquids via teaspoon despite max encouragement, pt reported "not feeling well", therefore, solids not assessed and moderately thick liquids via cup, mildly thick liquids and thin liquids unable to be assessed at this time.  PORFIRIO Nunez & Dr. Aguayo notified of current presentation and pt report of not feeling well.  Pt left with Radiology staff awaiting transport to unit NAD.  Will follow to check PO tolerance and assess candidacy for repeat MBS when pt medically optimized.    Per charting, "87 year old female with PMHx COPD, HTN, HLD, T2DM on insulin, hx of MI presents from assisted living facility with dyspnea, wheezing, labored breathing admitted for COPD exacerbation."    CXR 6/14: " Low lung volumes. Faint right perihilar opacity which could be due to subsegmental atelectasis or developing infection." Pt's WBC is elevated, no fever.

## 2022-06-21 NOTE — SWALLOW VFSS/MBS ASSESSMENT ADULT - RECOMMENDED FEEDING/EATING TECHNIQUES
allow for swallow between intakes/check mouth frequently for oral residue/pocketing/crush medication (when feasible)/maintain upright posture during/after eating for 30 mins/oral hygiene/position upright (90 degrees)/small sips/bites All Puree an Moderately thick liquids to be fed via teaspoon/allow for swallow between intakes/check mouth frequently for oral residue/pocketing/crush medication (when feasible)/maintain upright posture during/after eating for 30 mins/oral hygiene/position upright (90 degrees)/small sips/bites

## 2022-06-21 NOTE — PROGRESS NOTE ADULT - ASSESSMENT
87 year old female with PMHx COPD, HTN, HLD, T2DM on insulin, hx of MI presents from assisted living facility with dyspnea, wheezing, labored breathing admitted for COPD exacerbation.     6/20/22 - COPD exac 2/2 hMPV -- supportive care, satting well on 2L NC, had SLP eval today at son's request, rec pureed w/ mod thick liquids, and MBS will be done tomorrow to assess for aspiration. potential DC tomorrow. Hold Lasix on DC.   *son updated over phone    6/21/22 - MBS done, still rec pureed w/ mod thick liquids -- wbc increased, may be partly from recent steroids -- obtain cxr to r/o aspiration, ID Dr. De La Rosa consulted as well. potential DC tmrw if symptoms improve.  *son updated at bedside.      Problem/Plan - 1:  ·  Problem: Acute respiratory failure with hypoxia and hypercapnia.   ·  Plan: - COPD exacerbation  due to hMPV - resp viral infection + on admission   -was on BiPAP, weaned off BiPAP. Now on 2L O2 NC. saturating well over 95%, no sob,    -Xanax  PRN - duoneb prn  - prednisone tapered  off  - wbc elevation likely stress, steroids induced, afebrile, cxr clear  - Dr Copeland patient stable for discharge with outpatient f/u.      Problem/Plan - 2:  ·  Problem: COPD exacerbation.   - Continue home medications - Duoneb q6h,  Proventil  - Supplemental O2 PRN. COPD patient will keep SpO2 goal 88-93%.   - Encourage incentive spirometry.     Problem/Plan - 3:  ·  Problem: Renal failure (ARF), acute on chronic.   ·  Plan: CKD cr at baseline ranging from 1.3-1.7 on outpatient HIE review  hold lasix per renal, change to po on discharge     Problem/Plan - 4:  ·  Problem: DM2 (diabetes mellitus, type 2).   ·  Plan: - Chronic, known history of T2DM  - C/W Lantus 15 U qd and and 8U pre meals  - Moderate dose insulin corrective scale   - HbA1c: 10  -endocrine following appreciate recs.     Problem/Plan - 5:  ·  Problem: HTN (hypertension).   ·  Plan: bp stable  - change lasix to 20mg prn on discharge per renal  - Continue home Metoprolol Succinate 50 mg qd with hold parameters  -monitor bp.     Problem/Plan - 6:  ·  Problem: Anxiety.   ·  Plan: stable  continue Xanax prn , lexapro.     Problem/Plan - 7:  ·  Problem: Hypothyroidism, unspecified type.   ·  Plan: Chronic  - Continue home Synthroid 125 mcg PO qd.     Problem/Plan - 8:  ·  Problem: HLD (hyperlipidemia).   ·  Plan: Chronic  - Continue home Atorvastatin 80 mg PO qhs.     Problem/Plan - 9:  ·  Problem: CAD (coronary artery disease).   ·  Plan: - Hx of MI  - Continue home Aspirin 81 mg PO qd, Plavix 75 mg PO qd, imdur 30mg daily   - Echo 3/11/2022: Left ventricle was of normal size, mild left ventricular hypertrophy LV   systolic function EF 65%. Moderate to severe aortic stenosis.     Problem/Plan - 10:  ·  Problem: Need for prophylactic measure.   ·  Plan; VTE ppx: Heparin 5000 subq q12h   DNR/DNI - MOLST filled    Dispo: discharge to  assisted living is held up pending swallow eval  as family/son wants swallow check to ascertain proper dietary consistency that patient will tolerate.   Pending swallow evaluation by speech pathology. Izaiah 6/20                   87 year old female with PMHx COPD, HTN, HLD, T2DM on insulin, hx of MI presents from assisted living facility with dyspnea, wheezing, labored breathing admitted for COPD exacerbation.     6/20/22 - COPD exac 2/2 hMPV -- supportive care, satting well on 2L NC, had SLP eval today at son's request, rec pureed w/ mod thick liquids, and MBS will be done tomorrow to assess for aspiration. potential DC tomorrow. Hold Lasix on DC.   *son updated over phone    6/21/22 - MBS done, still rec pureed w/ mod thick liquids -- wbc increased, may be partly from recent steroids -- obtain cxr to r/o aspiration, ID Dr. De La Rosa consulted as well. potential DC tmrw if symptoms improve.  *son updated at bedside.      Problem/Plan - 1:  ·  Problem: Acute respiratory failure with hypoxia and hypercapnia.   ·  Plan: - COPD exacerbation  due to hMPV - resp viral infection + on admission   -was on BiPAP, weaned off BiPAP. Now on 2L O2 NC. saturating well over 95%, no sob,    -Xanax  PRN - duoneb prn  - prednisone tapered  off  - wbc elevation likely stress, steroids induced, afebrile, cxr clear  - Dr Copeland patient stable for discharge with outpatient f/u.      Problem/Plan - 2:  ·  Problem: COPD exacerbation.   - Continue home medications - Duoneb q6h,  Proventil  - Supplemental O2 PRN. COPD patient will keep SpO2 goal 88-93%.   - Encourage incentive spirometry.     Problem/Plan - 3:  ·  Problem: Renal failure (ARF), acute on chronic.   ·  Plan: CKD cr at baseline ranging from 1.3-1.7 on outpatient HIE review  hold lasix per renal, change to po on discharge     Problem/Plan - 4:  ·  Problem: DM2 (diabetes mellitus, type 2).   ·  Plan: - Chronic, known history of T2DM  - C/W Lantus 15 U qd and and 8U pre meals  - Moderate dose insulin corrective scale   - HbA1c: 10  -endocrine following appreciate recs.     Problem/Plan - 5:  ·  Problem: HTN (hypertension).   ·  Plan: bp stable  - change lasix to 20mg prn on discharge per renal  - Continue home Metoprolol Succinate 50 mg qd with hold parameters  -monitor bp.     Problem/Plan - 6:  ·  Problem: Anxiety.   ·  Plan: stable  continue Xanax prn , lexapro.     Problem/Plan - 7:  ·  Problem: Hypothyroidism, unspecified type.   ·  Plan: Chronic  - Continue home Synthroid 125 mcg PO qd.     Problem/Plan - 8:  ·  Problem: HLD (hyperlipidemia).   ·  Plan: Chronic  - Continue home Atorvastatin 80 mg PO qhs.     Problem/Plan - 9:  ·  Problem: CAD (coronary artery disease).   ·  Plan: - Hx of MI  - Continue home Aspirin 81 mg PO qd, Plavix 75 mg PO qd, imdur 30mg daily   - Echo 3/11/2022: Left ventricle was of normal size, mild left ventricular hypertrophy LV   systolic function EF 65%. Moderate to severe aortic stenosis.     Problem/Plan - 10:  ·  Problem: Need for prophylactic measure.   ·  Plan; VTE ppx: Heparin 5000 subq q12h   DNR/DNI - MOLST filled

## 2022-06-21 NOTE — SWALLOW VFSS/MBS ASSESSMENT ADULT - SLP GENERAL OBSERVATIONS
Pt received in Radiology upright on Cine chair A&A Ox2, reduced cognition, +O2NC 3L SpO2 96-97% throughout MBS, pain scale 0/10 pre & post MBS Pt received in Radiology upright on Cine chair A&A Ox2, reduced cognition, +O2NC 3L SpO2 96-97% throughout MBS, +baseline cough, pain scale 0/10 pre & post MBS

## 2022-06-21 NOTE — SWALLOW VFSS/MBS ASSESSMENT ADULT - CONSISTENCIES ADMINISTERED
pureed 3 trials via teaspoon only, pt declined to accept trials via cup despite encouragement/moderately thick

## 2022-06-21 NOTE — PROGRESS NOTE ADULT - SUBJECTIVE AND OBJECTIVE BOX
Patient is a 87y old  Female who presents with a chief complaint of COPD Exacerbation (16 Jun 2022 11:01)       HPI:  87 year old female with PMHx COPD, HTN, HLD, T2DM on insulin, hx of MI presents from assisted living facility with dyspnea, wheezing, labored breathing. Patient recently admitted to [facility] 3/9-15/2022 for COPD exacerbation. History obtained from son Calvin, as patient is limited historian at baseline. Son states he received a call from [facility] this AM stating patient is having labored breathing. Patient was sent to [facility] for further management.   She states her breathing is improved.  Denies any N/V/Dizziness.  Has not seen nephrologist in the past.  No urinary issues. Denies NSAID usage.    No acute events noted       PAST MEDICAL & SURGICAL HISTORY:  Uncomplicated asthma, unspecified asthma severity      DM2 (diabetes mellitus, type 2)      Essential hypertension      Hypothyroidism, unspecified type      Pure hypercholesterolemia      Gastroesophageal reflux disease without esophagitis      Diabetes      Asthma      CAD (coronary artery disease)      HTN (hypertension)      HLD (hyperlipidemia)      Hypothyroid      NSTEMI (non-ST elevated myocardial infarction)      History of appendectomy      History of appendectomy      Abnormal findings on cardiac catheterization  Cardiac Cath           FAMILY HISTORY:  Family history of heart disease    Family history of cancer in mother    Family history of MI (myocardial infarction)    Family history of stomach cancer    NC    Social History:Non smoker    MEDICATIONS  (STANDING):  albuterol/ipratropium for Nebulization 3 milliLiter(s) Nebulizer every 6 hours  artificial tears (preservative free) Ophthalmic Solution 1 Drop(s) Both EYES two times a day  aspirin  chewable 81 milliGRAM(s) Oral daily  atorvastatin 80 milliGRAM(s) Oral at bedtime  cholecalciferol 2000 Unit(s) Oral daily  clopidogrel Tablet 75 milliGRAM(s) Oral daily  dextrose 5%. 1000 milliLiter(s) (50 mL/Hr) IV Continuous <Continuous>  dextrose 5%. 1000 milliLiter(s) (100 mL/Hr) IV Continuous <Continuous>  dextrose 50% Injectable 25 Gram(s) IV Push once  dextrose 50% Injectable 12.5 Gram(s) IV Push once  dextrose 50% Injectable 25 Gram(s) IV Push once  escitalopram 10 milliGRAM(s) Oral daily  furosemide   Injectable 40 milliGRAM(s) IV Push <User Schedule>  gabapentin 100 milliGRAM(s) Oral three times a day  glucagon  Injectable 1 milliGRAM(s) IntraMuscular once  heparin   Injectable 5000 Unit(s) SubCutaneous every 12 hours  insulin glargine Injectable (LANTUS) 15 Unit(s) SubCutaneous every morning  insulin lispro (ADMELOG) corrective regimen sliding scale   SubCutaneous every 6 hours  insulin lispro Injectable (ADMELOG) 8 Unit(s) SubCutaneous three times a day before meals  isosorbide   mononitrate ER Tablet (IMDUR) 30 milliGRAM(s) Oral daily  levothyroxine 125 MICROGram(s) Oral daily  methylPREDNISolone sodium succinate Injectable 40 milliGRAM(s) IV Push daily  metoprolol succinate ER 50 milliGRAM(s) Oral daily    MEDICATIONS  (PRN):  acetaminophen     Tablet .. 650 milliGRAM(s) Oral every 6 hours PRN Mild Pain (1 - 3)  ALBUTerol    0.083% 2.5 milliGRAM(s) Nebulizer every 4 hours PRN Shortness of Breath and/or Wheezing  ALPRAZolam 0.5 milliGRAM(s) Oral two times a day PRN Anxiety  dextrose Oral Gel 15 Gram(s) Oral once PRN Blood Glucose LESS THAN 70 milliGRAM(s)/deciliter  melatonin 3 milliGRAM(s) Oral at bedtime PRN Insomnia   Meds reviewed    Allergies    doxycycline (Unknown)  iodine (Hives)  iodine containing compounds (Unknown)  shellfish (Anaphylaxis)  shellfish (Swelling; Short breath)    Intolerances         REVIEW OF SYSTEMS:    Review of Systems:   Constitutional: Denies fatigue  HEENT: Denies headaches and dizziness  Respiratory: denies  cough, or wheezing, improving SOB  Cardiovascular: denies CP, palpitations  Gastrointestinal: Denies nausea, denies vomiting, diarrhea, constipation, abdominal pain, or bloody stools  Genitourinary: denies painful urination, increased frequency, urgency, or bloody urine  Skin: denies rashes or itching  Musculoskeletal: denies muscle aches, joint swelling  Neurologic: Denies generalized weakness, denies loss of sensation, numbness, or tingling      Vital Signs Last 24 Hrs  T(C): 36.3 (21 Jun 2022 05:26), Max: 36.7 (20 Jun 2022 20:05)  T(F): 97.4 (21 Jun 2022 05:26), Max: 98.1 (20 Jun 2022 20:05)  HR: 66 (21 Jun 2022 07:45) (62 - 71)  BP: 146/78 (21 Jun 2022 05:26) (125/69 - 146/78)  BP(mean): --  RR: 19 (21 Jun 2022 09:18) (17 - 19)  SpO2: 94% (21 Jun 2022 09:18) (93% - 98%)        PHYSICAL EXAM:    GENERAL: NAD  HEAD:  Atraumatic, Normocephalic  EYES: EOMI, conjunctiva and sclera clear  ENMT: No Drainage from nares, No drainage from ears  NECK: Supple, neck  veins full  NERVOUS SYSTEM:  Awake and Alert  CHEST/LUNG: Clear to percussion bilaterally; No rales, rhonchi, wheezing, or rubs  HEART: Regular rate and rhythm; No murmurs, rubs, or gallops  ABDOMEN: Soft, Nontender, Nondistended; Bowel sounds present  EXTREMITIES:  No Edema  SKIN: No rashes No obvious ecchymosis      LABS:                                          13.1   19.58 )-----------( 238      ( 21 Jun 2022 07:56 )             42.5     06-20    140  |  106  |  77<H>  ----------------------------<  178<H>  3.9   |  25  |  1.60<H>    Ca    8.7      20 Jun 2022 10:02    TPro  7.0  /  Alb  2.9<L>  /  TBili  0.6  /  DBili  x   /  AST  19  /  ALT  21  /  AlkPhos  70  06-20

## 2022-06-21 NOTE — SWALLOW VFSS/MBS ASSESSMENT ADULT - ORAL PREPARATORY PHASE
Reduced oral grading, max cues required to accept bolus Reduced oral grading, max cues required to increase bolus acceptance

## 2022-06-22 LAB
ANION GAP SERPL CALC-SCNC: 9 MMOL/L — SIGNIFICANT CHANGE UP (ref 5–17)
BASOPHILS # BLD AUTO: 0.04 K/UL — SIGNIFICANT CHANGE UP (ref 0–0.2)
BASOPHILS NFR BLD AUTO: 0.2 % — SIGNIFICANT CHANGE UP (ref 0–2)
BUN SERPL-MCNC: 66 MG/DL — HIGH (ref 7–23)
CALCIUM SERPL-MCNC: 8.8 MG/DL — SIGNIFICANT CHANGE UP (ref 8.5–10.1)
CHLORIDE SERPL-SCNC: 107 MMOL/L — SIGNIFICANT CHANGE UP (ref 96–108)
CO2 SERPL-SCNC: 25 MMOL/L — SIGNIFICANT CHANGE UP (ref 22–31)
CREAT SERPL-MCNC: 1.4 MG/DL — HIGH (ref 0.5–1.3)
EGFR: 36 ML/MIN/1.73M2 — LOW
EOSINOPHIL # BLD AUTO: 0.27 K/UL — SIGNIFICANT CHANGE UP (ref 0–0.5)
EOSINOPHIL NFR BLD AUTO: 1.6 % — SIGNIFICANT CHANGE UP (ref 0–6)
GLUCOSE SERPL-MCNC: 130 MG/DL — HIGH (ref 70–99)
HCT VFR BLD CALC: 42.5 % — SIGNIFICANT CHANGE UP (ref 34.5–45)
HGB BLD-MCNC: 13 G/DL — SIGNIFICANT CHANGE UP (ref 11.5–15.5)
IMM GRANULOCYTES NFR BLD AUTO: 1.2 % — SIGNIFICANT CHANGE UP (ref 0–1.5)
LYMPHOCYTES # BLD AUTO: 13.5 % — SIGNIFICANT CHANGE UP (ref 13–44)
LYMPHOCYTES # BLD AUTO: 2.34 K/UL — SIGNIFICANT CHANGE UP (ref 1–3.3)
MCHC RBC-ENTMCNC: 27.5 PG — SIGNIFICANT CHANGE UP (ref 27–34)
MCHC RBC-ENTMCNC: 30.6 GM/DL — LOW (ref 32–36)
MCV RBC AUTO: 90 FL — SIGNIFICANT CHANGE UP (ref 80–100)
MONOCYTES # BLD AUTO: 1.17 K/UL — HIGH (ref 0–0.9)
MONOCYTES NFR BLD AUTO: 6.7 % — SIGNIFICANT CHANGE UP (ref 2–14)
NEUTROPHILS # BLD AUTO: 13.37 K/UL — HIGH (ref 1.8–7.4)
NEUTROPHILS NFR BLD AUTO: 76.8 % — SIGNIFICANT CHANGE UP (ref 43–77)
NRBC # BLD: 0 /100 WBCS — SIGNIFICANT CHANGE UP (ref 0–0)
PLATELET # BLD AUTO: 244 K/UL — SIGNIFICANT CHANGE UP (ref 150–400)
POTASSIUM SERPL-MCNC: 4.2 MMOL/L — SIGNIFICANT CHANGE UP (ref 3.5–5.3)
POTASSIUM SERPL-SCNC: 4.2 MMOL/L — SIGNIFICANT CHANGE UP (ref 3.5–5.3)
PROCALCITONIN SERPL-MCNC: 0.11 NG/ML — HIGH
RBC # BLD: 4.72 M/UL — SIGNIFICANT CHANGE UP (ref 3.8–5.2)
RBC # FLD: 13.2 % — SIGNIFICANT CHANGE UP (ref 10.3–14.5)
SARS-COV-2 RNA SPEC QL NAA+PROBE: SIGNIFICANT CHANGE UP
SODIUM SERPL-SCNC: 141 MMOL/L — SIGNIFICANT CHANGE UP (ref 135–145)
WBC # BLD: 17.39 K/UL — HIGH (ref 3.8–10.5)
WBC # FLD AUTO: 17.39 K/UL — HIGH (ref 3.8–10.5)

## 2022-06-22 PROCEDURE — 99233 SBSQ HOSP IP/OBS HIGH 50: CPT

## 2022-06-22 RX ORDER — POTASSIUM CHLORIDE 20 MEQ
1 PACKET (EA) ORAL
Qty: 0 | Refills: 0 | DISCHARGE

## 2022-06-22 RX ORDER — FUROSEMIDE 40 MG
1 TABLET ORAL
Qty: 0 | Refills: 0 | DISCHARGE

## 2022-06-22 RX ADMIN — ESCITALOPRAM OXALATE 10 MILLIGRAM(S): 10 TABLET, FILM COATED ORAL at 15:04

## 2022-06-22 RX ADMIN — GABAPENTIN 100 MILLIGRAM(S): 400 CAPSULE ORAL at 15:01

## 2022-06-22 RX ADMIN — Medication 50 MILLIGRAM(S): at 06:20

## 2022-06-22 RX ADMIN — Medication 81 MILLIGRAM(S): at 15:00

## 2022-06-22 RX ADMIN — Medication 3 MILLILITER(S): at 07:32

## 2022-06-22 RX ADMIN — Medication 2: at 12:05

## 2022-06-22 RX ADMIN — HEPARIN SODIUM 5000 UNIT(S): 5000 INJECTION INTRAVENOUS; SUBCUTANEOUS at 06:20

## 2022-06-22 RX ADMIN — GABAPENTIN 100 MILLIGRAM(S): 400 CAPSULE ORAL at 06:20

## 2022-06-22 RX ADMIN — Medication 1 DROP(S): at 06:21

## 2022-06-22 RX ADMIN — Medication 3 MILLILITER(S): at 13:44

## 2022-06-22 RX ADMIN — ATORVASTATIN CALCIUM 80 MILLIGRAM(S): 80 TABLET, FILM COATED ORAL at 22:01

## 2022-06-22 RX ADMIN — ISOSORBIDE MONONITRATE 30 MILLIGRAM(S): 60 TABLET, EXTENDED RELEASE ORAL at 15:01

## 2022-06-22 RX ADMIN — CLOPIDOGREL BISULFATE 75 MILLIGRAM(S): 75 TABLET, FILM COATED ORAL at 15:01

## 2022-06-22 RX ADMIN — Medication 8 UNIT(S): at 08:32

## 2022-06-22 RX ADMIN — Medication 8 UNIT(S): at 12:04

## 2022-06-22 RX ADMIN — INSULIN GLARGINE 15 UNIT(S): 100 INJECTION, SOLUTION SUBCUTANEOUS at 08:35

## 2022-06-22 RX ADMIN — Medication 1 DROP(S): at 17:55

## 2022-06-22 RX ADMIN — Medication 2000 UNIT(S): at 15:03

## 2022-06-22 RX ADMIN — GABAPENTIN 100 MILLIGRAM(S): 400 CAPSULE ORAL at 22:01

## 2022-06-22 RX ADMIN — Medication 8 UNIT(S): at 17:53

## 2022-06-22 RX ADMIN — HEPARIN SODIUM 5000 UNIT(S): 5000 INJECTION INTRAVENOUS; SUBCUTANEOUS at 17:55

## 2022-06-22 RX ADMIN — Medication 125 MICROGRAM(S): at 06:20

## 2022-06-22 RX ADMIN — Medication 3 MILLILITER(S): at 19:24

## 2022-06-22 RX ADMIN — Medication 1: at 08:33

## 2022-06-22 NOTE — PROGRESS NOTE ADULT - ASSESSMENT
87 year old female with PMHx COPD, HTN, HLD, T2DM on insulin, hx of MI presents from assisted living facility with dyspnea, wheezing, labored breathing admitted for COPD exacerbation.     6/20/22 - COPD exac 2/2 hMPV -- supportive care, satting well on 2L NC, had SLP eval today at son's request, rec pureed w/ mod thick liquids, and MBS will be done tomorrow to assess for aspiration. potential DC tomorrow. Hold Lasix on DC.   *son updated over phone    6/21/22 - MBS done, still rec pureed w/ mod thick liquids -- wbc increased, may be partly from recent steroids -- obtain cxr to r/o aspiration, ID Dr. De La Rosa consulted as well. potential DC tmrw if symptoms improve.  *son updated at bedside.     6/22/22 - SLP re-eval today as pt currently on pureed w/mod thick liquids via teaspoon ONLY --  repeat MBS tomorrow. pulm following. Will need repeat PT eval as well, may have worsened deconditioning. Will need 3122 forms filled for sunrise if going back to nursing home. follow up new PT recs.   *updated son Calvin over phone, please keep updated daily     Problem/Plan - 1:  ·  Problem: Acute respiratory failure with hypoxia and hypercapnia.   ·  Plan: - COPD exacerbation  due to hMPV - resp viral infection + on admission   -was on BiPAP, weaned off BiPAP. Now on 2L O2 NC. saturating well over 95%, no sob,    -Xanax  PRN - duoneb prn  - prednisone tapered  off  - wbc elevation likely stress, steroids induced, afebrile, cxr clear  - Dr Copeland patient stable for discharge with outpatient f/u.      Problem/Plan - 2:  ·  Problem: COPD exacerbation.   - Continue home medications - Duoneb q6h,  Proventil  - Supplemental O2 PRN. COPD patient will keep SpO2 goal 88-93%.   - Encourage incentive spirometry.     Problem/Plan - 3:  ·  Problem: Renal failure (ARF), acute on chronic.   ·  Plan: CKD cr at baseline ranging from 1.3-1.7 on outpatient HIE review  hold lasix per renal, change to po on discharge     Problem/Plan - 4:  ·  Problem: DM2 (diabetes mellitus, type 2).   ·  Plan: - Chronic, known history of T2DM  - C/W Lantus 15 U qd and and 8U pre meals  - Moderate dose insulin corrective scale   - HbA1c: 10  -endocrine following appreciate recs.     Problem/Plan - 5:  ·  Problem: HTN (hypertension).   ·  Plan: bp stable  - change lasix to 20mg prn on discharge per renal  - Continue home Metoprolol Succinate 50 mg qd with hold parameters  -monitor bp.     Problem/Plan - 6:  ·  Problem: Anxiety.   ·  Plan: stable  continue Xanax prn , lexapro.     Problem/Plan - 7:  ·  Problem: Hypothyroidism, unspecified type.   ·  Plan: Chronic  - Continue home Synthroid 125 mcg PO qd.     Problem/Plan - 8:  ·  Problem: HLD (hyperlipidemia).   ·  Plan: Chronic  - Continue home Atorvastatin 80 mg PO qhs.     Problem/Plan - 9:  ·  Problem: CAD (coronary artery disease).   ·  Plan: - Hx of MI  - Continue home Aspirin 81 mg PO qd, Plavix 75 mg PO qd, imdur 30mg daily   - Echo 3/11/2022: Left ventricle was of normal size, mild left ventricular hypertrophy LV   systolic function EF 65%. Moderate to severe aortic stenosis.     Problem/Plan - 10:  ·  Problem: Need for prophylactic measure.   ·  Plan; VTE ppx: Heparin 5000 subq q12h   DNR/DNI - MOLST filled

## 2022-06-22 NOTE — PROGRESS NOTE ADULT - ASSESSMENT
87 year old female with PMHx COPD, HTN, HLD, T2DM on insulin, hx of MI presents from assisted living facility with dyspnea, wheezing, labored breathing    copd  copd ex  TRENTON CKD  HTN  OP  OA  DM  HLD  Moderate to Severe AS  CAD    on RA  ID cx noted  MBS noted  LE doppler Neg     cvs rx regimen  BP control  serial renal indices  I and O  monitor VS and HD and Sat  HOB elev - asp prec - assist with needs - ADL - GOC discussion  pt has hMPV - resp viral infection - isolation precs - acap PRN - robitussin PRN -   cxr - labs reviewed  copd - rx regimen PRN for sob and or wheeze, NEBS, not a candidate for Inhaler use  proBNP noted  ABG noted  old records reviewed

## 2022-06-22 NOTE — PROGRESS NOTE ADULT - ASSESSMENT
TRENTON on CKD 3b  Respiratory Acidosis  HTN  Human Metapneumovirus      -BLCr 1.4  -Urine indices reviewed  -Hold further lasix  -Avoid IVF given respiratory status  -Pulm eval reviewed  -CXR clear 6/15/22  -Renal indices are stable    Thank you

## 2022-06-22 NOTE — PROGRESS NOTE ADULT - SUBJECTIVE AND OBJECTIVE BOX
CAPILLARY BLOOD GLUCOSE      POCT Blood Glucose.: 160 mg/dL (22 Jun 2022 07:41)  POCT Blood Glucose.: 131 mg/dL (21 Jun 2022 21:34)  POCT Blood Glucose.: 105 mg/dL (21 Jun 2022 16:39)  POCT Blood Glucose.: 124 mg/dL (21 Jun 2022 12:08)  POCT Blood Glucose.: 149 mg/dL (21 Jun 2022 09:07)      Vital Signs Last 24 Hrs  T(C): 36.6 (22 Jun 2022 05:20), Max: 36.7 (21 Jun 2022 20:11)  T(F): 97.9 (22 Jun 2022 05:20), Max: 98.1 (21 Jun 2022 20:11)  HR: 72 (22 Jun 2022 07:58) (66 - 75)  BP: 167/74 (22 Jun 2022 05:20) (128/82 - 167/74)  BP(mean): --  RR: 18 (22 Jun 2022 05:20) (18 - 19)  SpO2: 96% (22 Jun 2022 07:58) (91% - 97%)      Respiratory: CTA B/L  CV: RRR, S1S2, no murmurs, rubs or gallops  Abdominal: Soft, NT, ND +BS, Last BM  Extremities: No edema, + peripheral pulses     06-21    141  |  107  |  75<H>  ----------------------------<  129<H>  4.4   |  23  |  1.50<H>    Ca    8.9      21 Jun 2022 07:56    TPro  7.0  /  Alb  2.9<L>  /  TBili  0.6  /  DBili  x   /  AST  19  /  ALT  21  /  AlkPhos  70  06-20      atorvastatin 80 milliGRAM(s) Oral at bedtime  dextrose 50% Injectable 25 Gram(s) IV Push once  dextrose 50% Injectable 12.5 Gram(s) IV Push once  dextrose 50% Injectable 25 Gram(s) IV Push once  dextrose 50% Injectable 25 Gram(s) IV Push once  dextrose Oral Gel 15 Gram(s) Oral once PRN  glucagon  Injectable 1 milliGRAM(s) IntraMuscular once  glucagon  Injectable 1 milliGRAM(s) IntraMuscular once  insulin glargine Injectable (LANTUS) 15 Unit(s) SubCutaneous every morning  insulin lispro (ADMELOG) corrective regimen sliding scale   SubCutaneous three times a day before meals  insulin lispro (ADMELOG) corrective regimen sliding scale   SubCutaneous at bedtime  insulin lispro Injectable (ADMELOG) 8 Unit(s) SubCutaneous three times a day before meals  levothyroxine 125 MICROGram(s) Oral daily

## 2022-06-22 NOTE — PROGRESS NOTE ADULT - SUBJECTIVE AND OBJECTIVE BOX
Date/Time Patient Seen:  		  Referring MD:   Data Reviewed	       Patient is a 87y old  Female who presents with a chief complaint of COPD Exacerbation (21 Jun 2022 20:10)      Subjective/HPI     PAST MEDICAL & SURGICAL HISTORY:  Uncomplicated asthma, unspecified asthma severity    DM2 (diabetes mellitus, type 2)    Essential hypertension    Hypothyroidism, unspecified type    Pure hypercholesterolemia    Gastroesophageal reflux disease without esophagitis    Diabetes    Asthma    CAD (coronary artery disease)    HTN (hypertension)    HLD (hyperlipidemia)    Hypothyroid    NSTEMI (non-ST elevated myocardial infarction)    No significant past surgical history    History of appendectomy    History of appendectomy    Abnormal findings on cardiac catheterization  Cardiac Cath          Medication list         MEDICATIONS  (STANDING):  albuterol/ipratropium for Nebulization 3 milliLiter(s) Nebulizer every 6 hours  artificial tears (preservative free) Ophthalmic Solution 1 Drop(s) Both EYES two times a day  aspirin  chewable 81 milliGRAM(s) Oral daily  atorvastatin 80 milliGRAM(s) Oral at bedtime  cholecalciferol 2000 Unit(s) Oral daily  clopidogrel Tablet 75 milliGRAM(s) Oral daily  dextrose 5%. 1000 milliLiter(s) (100 mL/Hr) IV Continuous <Continuous>  dextrose 5%. 1000 milliLiter(s) (50 mL/Hr) IV Continuous <Continuous>  dextrose 50% Injectable 25 Gram(s) IV Push once  dextrose 50% Injectable 12.5 Gram(s) IV Push once  dextrose 50% Injectable 25 Gram(s) IV Push once  dextrose 50% Injectable 25 Gram(s) IV Push once  escitalopram 10 milliGRAM(s) Oral daily  gabapentin 100 milliGRAM(s) Oral three times a day  glucagon  Injectable 1 milliGRAM(s) IntraMuscular once  glucagon  Injectable 1 milliGRAM(s) IntraMuscular once  heparin   Injectable 5000 Unit(s) SubCutaneous every 12 hours  insulin glargine Injectable (LANTUS) 15 Unit(s) SubCutaneous every morning  insulin lispro (ADMELOG) corrective regimen sliding scale   SubCutaneous three times a day before meals  insulin lispro (ADMELOG) corrective regimen sliding scale   SubCutaneous at bedtime  insulin lispro Injectable (ADMELOG) 8 Unit(s) SubCutaneous three times a day before meals  isosorbide   mononitrate ER Tablet (IMDUR) 30 milliGRAM(s) Oral daily  levothyroxine 125 MICROGram(s) Oral daily  metoprolol succinate ER 50 milliGRAM(s) Oral daily    MEDICATIONS  (PRN):  acetaminophen     Tablet .. 650 milliGRAM(s) Oral every 6 hours PRN Mild Pain (1 - 3)  ALBUTerol    0.083% 2.5 milliGRAM(s) Nebulizer every 4 hours PRN Shortness of Breath and/or Wheezing  ALPRAZolam 0.5 milliGRAM(s) Oral two times a day PRN Anxiety  dextrose Oral Gel 15 Gram(s) Oral once PRN Blood Glucose LESS THAN 70 milliGRAM(s)/deciliter  melatonin 3 milliGRAM(s) Oral at bedtime PRN Insomnia         Vitals log        ICU Vital Signs Last 24 Hrs  T(C): 36.7 (21 Jun 2022 20:11), Max: 36.7 (21 Jun 2022 20:11)  T(F): 98.1 (21 Jun 2022 20:11), Max: 98.1 (21 Jun 2022 20:11)  HR: 74 (21 Jun 2022 20:19) (64 - 75)  BP: 134/68 (21 Jun 2022 20:11) (128/82 - 146/78)  BP(mean): --  ABP: --  ABP(mean): --  RR: 18 (21 Jun 2022 20:11) (18 - 19)  SpO2: 97% (21 Jun 2022 20:19) (91% - 98%)           Input and Output:  I&O's Detail      Lab Data                        13.1   19.58 )-----------( 238      ( 21 Jun 2022 07:56 )             42.5     06-21    141  |  107  |  75<H>  ----------------------------<  129<H>  4.4   |  23  |  1.50<H>    Ca    8.9      21 Jun 2022 07:56    TPro  7.0  /  Alb  2.9<L>  /  TBili  0.6  /  DBili  x   /  AST  19  /  ALT  21  /  AlkPhos  70  06-20            Review of Systems	      Objective     Physical Examination    heart s1s2  lung dc BS  abd soft      Pertinent Lab findings & Imaging      Chong:  NO   Adequate UO     I&O's Detail           Discussed with:     Cultures:	        Radiology

## 2022-06-22 NOTE — SWALLOW BEDSIDE ASSESSMENT ADULT - SWALLOW EVAL: DIAGNOSIS
Pt p/w a moderate oral dysphagia when given puree marked by reduced utensil stripping with tongue-thrust like motion upon attempts (improved when bolus was manually placed on lingual surface), adequate containment, prolonged manipulation and transfer, and adequate clearance post swallow. Mild oral dysphagia when given mod thick and thin liquids marked by adequate retrieval via straw presentation, adequate containment, timely manipulation and transfer, suspected posterior loss, and adequate clearance post swallow. Pharyngeal dysphagia marked by suspected brief delay in swallow onset, +laryngeal elevation to palpation, and no overt s/sx of aspiration. Given somewhat limited assessment upon initial MBS, concern for aspiration component, and improved mentation, recommend repeat MBS. Recommend maintain current diet of puree with moderately thick liquids pending repeat MBS. Aspiration precautions. Discussed with RN and MD.
Pt p/w a moderate oral dysphagia when given minced and moist marked by reduced utensil stripping, adequate containment, prolonged mastication with report of "I can't chew", delayed manipulation and transfer, and adequate clearance post swallow. Mild oral dysphagia when given puree, moderately thick liquids, and mildly thick liquids marked by reduced utensil stripping, adequate containment, timely manipulation and transfer, suspected posterior loss, and adequate clearance post swallow. Pharyngeal dysphagia marked by suspected discoordinated swallow onset, +laryngeal elevation to palpation. Pt p/w increased WOB post swallow when given mildly thick liquids, pt was not left in respiratory distress. Pt did not demonstrate (c)overt s/sx of aspiration with mod thick, puree, and minced. Recommend diet downgrade to puree with moderately thick liquids via single/small cup sips +aspiration precautions. Continue to monitor respiratory status closely; if there is a decline in status, hold PO and

## 2022-06-22 NOTE — PROGRESS NOTE ADULT - SUBJECTIVE AND OBJECTIVE BOX
Patient is a 87y old  Female who presents with a chief complaint of COPD Exacerbation (2022 11:01)       HPI:  87 year old female with PMHx COPD, HTN, HLD, T2DM on insulin, hx of MI presents from assisted living facility with dyspnea, wheezing, labored breathing. Patient recently admitted to [facility] 3/9-15/2022 for COPD exacerbation. History obtained from son Calvin, as patient is limited historian at baseline. Son states he received a call from [facility] this AM stating patient is having labored breathing. Patient was sent to [facility] for further management.   She states her breathing is improved.  Denies any N/V/Dizziness.  Has not seen nephrologist in the past.  No urinary issues. Denies NSAID usage.    No acute events noted       PAST MEDICAL & SURGICAL HISTORY:  Uncomplicated asthma, unspecified asthma severity      DM2 (diabetes mellitus, type 2)      Essential hypertension      Hypothyroidism, unspecified type      Pure hypercholesterolemia      Gastroesophageal reflux disease without esophagitis      Diabetes      Asthma      CAD (coronary artery disease)      HTN (hypertension)      HLD (hyperlipidemia)      Hypothyroid      NSTEMI (non-ST elevated myocardial infarction)      History of appendectomy      History of appendectomy      Abnormal findings on cardiac catheterization  Cardiac Cath           FAMILY HISTORY:  Family history of heart disease    Family history of cancer in mother    Family history of MI (myocardial infarction)    Family history of stomach cancer    NC    Social History:Non smoker    MEDICATIONS  (STANDING):  albuterol/ipratropium for Nebulization 3 milliLiter(s) Nebulizer every 6 hours  artificial tears (preservative free) Ophthalmic Solution 1 Drop(s) Both EYES two times a day  aspirin  chewable 81 milliGRAM(s) Oral daily  atorvastatin 80 milliGRAM(s) Oral at bedtime  cholecalciferol 2000 Unit(s) Oral daily  clopidogrel Tablet 75 milliGRAM(s) Oral daily  dextrose 5%. 1000 milliLiter(s) (50 mL/Hr) IV Continuous <Continuous>  dextrose 5%. 1000 milliLiter(s) (100 mL/Hr) IV Continuous <Continuous>  dextrose 50% Injectable 25 Gram(s) IV Push once  dextrose 50% Injectable 12.5 Gram(s) IV Push once  dextrose 50% Injectable 25 Gram(s) IV Push once  escitalopram 10 milliGRAM(s) Oral daily  furosemide   Injectable 40 milliGRAM(s) IV Push <User Schedule>  gabapentin 100 milliGRAM(s) Oral three times a day  glucagon  Injectable 1 milliGRAM(s) IntraMuscular once  heparin   Injectable 5000 Unit(s) SubCutaneous every 12 hours  insulin glargine Injectable (LANTUS) 15 Unit(s) SubCutaneous every morning  insulin lispro (ADMELOG) corrective regimen sliding scale   SubCutaneous every 6 hours  insulin lispro Injectable (ADMELOG) 8 Unit(s) SubCutaneous three times a day before meals  isosorbide   mononitrate ER Tablet (IMDUR) 30 milliGRAM(s) Oral daily  levothyroxine 125 MICROGram(s) Oral daily  methylPREDNISolone sodium succinate Injectable 40 milliGRAM(s) IV Push daily  metoprolol succinate ER 50 milliGRAM(s) Oral daily    MEDICATIONS  (PRN):  acetaminophen     Tablet .. 650 milliGRAM(s) Oral every 6 hours PRN Mild Pain (1 - 3)  ALBUTerol    0.083% 2.5 milliGRAM(s) Nebulizer every 4 hours PRN Shortness of Breath and/or Wheezing  ALPRAZolam 0.5 milliGRAM(s) Oral two times a day PRN Anxiety  dextrose Oral Gel 15 Gram(s) Oral once PRN Blood Glucose LESS THAN 70 milliGRAM(s)/deciliter  melatonin 3 milliGRAM(s) Oral at bedtime PRN Insomnia   Meds reviewed    Allergies    doxycycline (Unknown)  iodine (Hives)  iodine containing compounds (Unknown)  shellfish (Anaphylaxis)  shellfish (Swelling; Short breath)    Intolerances         REVIEW OF SYSTEMS:    Review of Systems:   Constitutional: Denies fatigue  HEENT: Denies headaches and dizziness  Respiratory: denies  cough, or wheezing, improving SOB  Cardiovascular: denies CP, palpitations  Gastrointestinal: Denies nausea, denies vomiting, diarrhea, constipation, abdominal pain, or bloody stools  Genitourinary: denies painful urination, increased frequency, urgency, or bloody urine  Skin: denies rashes or itching  Musculoskeletal: denies muscle aches, joint swelling  Neurologic: Denies generalized weakness, denies loss of sensation, numbness, or tingling    Vital Signs Last 24 Hrs  T(C): 36.6 (2022 05:20), Max: 36.7 (2022 20:11)  T(F): 97.9 (2022 05:20), Max: 98.1 (2022 20:11)  HR: 72 (2022 07:58) (66 - 75)  BP: 167/74 (2022 05:20) (128/82 - 167/74)  BP(mean): --  RR: 18 (2022 05:20) (18 - 18)  SpO2: 96% (2022 07:58) (91% - 97%)    PHYSICAL EXAM:    GENERAL: NAD  HEAD:  Atraumatic, Normocephalic  EYES: EOMI, conjunctiva and sclera clear  ENMT: No Drainage from nares, No drainage from ears  NECK: Supple, neck  veins full  NERVOUS SYSTEM:  Awake and Alert  CHEST/LUNG: Clear to percussion bilaterally; No rales, rhonchi, wheezing, or rubs  HEART: Regular rate and rhythm; No murmurs, rubs, or gallops  ABDOMEN: Soft, Nontender, Nondistended; Bowel sounds present  EXTREMITIES:  No Edema  SKIN: No rashes No obvious ecchymosis      LABS:                        13.0   17.39 )-----------( 244      ( 2022 08:41 )             42.5     06-22    141  |  107  |  66<H>  ----------------------------<  130<H>  4.2   |  25  |  1.40<H>    Ca    8.8      2022 08:41    TPro  7.0  /  Alb  2.9<L>  /  TBili  0.6  /  DBili  x   /  AST  19  /  ALT  21  /  AlkPhos  70  06-20      Urinalysis Basic - ( 2022 22:00 )    Color: Yellow / Appearance: Clear / S.020 / pH: x  Gluc: x / Ketone: Negative  / Bili: Negative / Urobili: Negative   Blood: x / Protein: 15 / Nitrite: Negative   Leuk Esterase: Trace / RBC: 0-2 /HPF / WBC 0-2   Sq Epi: x / Non Sq Epi: Few / Bacteria: Occasional

## 2022-06-22 NOTE — PROGRESS NOTE ADULT - SUBJECTIVE AND OBJECTIVE BOX
Patient is a 87y old  Female who presents with a chief complaint of COPD Exacerbation (2022 09:27)      INTERVAL HPI/OVERNIGHT EVENTS: Pt seen and examined at bedside. admits continued mild SOB.     MEDICATIONS  (STANDING):  albuterol/ipratropium for Nebulization 3 milliLiter(s) Nebulizer every 6 hours  artificial tears (preservative free) Ophthalmic Solution 1 Drop(s) Both EYES two times a day  aspirin  chewable 81 milliGRAM(s) Oral daily  atorvastatin 80 milliGRAM(s) Oral at bedtime  cholecalciferol 2000 Unit(s) Oral daily  clopidogrel Tablet 75 milliGRAM(s) Oral daily  dextrose 5%. 1000 milliLiter(s) (100 mL/Hr) IV Continuous <Continuous>  dextrose 5%. 1000 milliLiter(s) (50 mL/Hr) IV Continuous <Continuous>  dextrose 50% Injectable 25 Gram(s) IV Push once  dextrose 50% Injectable 12.5 Gram(s) IV Push once  dextrose 50% Injectable 25 Gram(s) IV Push once  dextrose 50% Injectable 25 Gram(s) IV Push once  escitalopram 10 milliGRAM(s) Oral daily  gabapentin 100 milliGRAM(s) Oral three times a day  glucagon  Injectable 1 milliGRAM(s) IntraMuscular once  glucagon  Injectable 1 milliGRAM(s) IntraMuscular once  heparin   Injectable 5000 Unit(s) SubCutaneous every 12 hours  insulin glargine Injectable (LANTUS) 15 Unit(s) SubCutaneous every morning  insulin lispro (ADMELOG) corrective regimen sliding scale   SubCutaneous three times a day before meals  insulin lispro (ADMELOG) corrective regimen sliding scale   SubCutaneous at bedtime  insulin lispro Injectable (ADMELOG) 8 Unit(s) SubCutaneous three times a day before meals  isosorbide   mononitrate ER Tablet (IMDUR) 30 milliGRAM(s) Oral daily  levothyroxine 125 MICROGram(s) Oral daily  metoprolol succinate ER 50 milliGRAM(s) Oral daily    MEDICATIONS  (PRN):  acetaminophen     Tablet .. 650 milliGRAM(s) Oral every 6 hours PRN Mild Pain (1 - 3)  ALBUTerol    0.083% 2.5 milliGRAM(s) Nebulizer every 4 hours PRN Shortness of Breath and/or Wheezing  ALPRAZolam 0.5 milliGRAM(s) Oral two times a day PRN Anxiety  dextrose Oral Gel 15 Gram(s) Oral once PRN Blood Glucose LESS THAN 70 milliGRAM(s)/deciliter  melatonin 3 milliGRAM(s) Oral at bedtime PRN Insomnia      Allergies    doxycycline (Unknown)  iodine (Hives)  iodine containing compounds (Unknown)  shellfish (Anaphylaxis)  shellfish (Swelling; Short breath)    Intolerances        REVIEW OF SYSTEMS:  CONSTITUTIONAL: No fever or chills  HEENT:  No headache, no sore throat  RESPIRATORY: No cough, wheezing, (+) shortness of breath  CARDIOVASCULAR: No chest pain, palpitations, or leg swelling  GASTROINTESTINAL: No abd pain, nausea, vomiting, or diarrhea  GENITOURINARY: No dysuria, frequency, or hematuria  NEUROLOGICAL: no focal weakness or dizziness  MUSCULOSKELETAL: no myalgias     Vital Signs Last 24 Hrs  T(C): 36.6 (2022 12:57), Max: 36.7 (2022 20:11)  T(F): 97.9 (2022 12:57), Max: 98.1 (2022 20:11)  HR: 74 (2022 13:48) (69 - 75)  BP: 125/61 (2022 12:57) (125/61 - 167/74)  BP(mean): --  RR: 17 (2022 12:57) (17 - 18)  SpO2: 96% (2022 13:48) (91% - 97%)    PHYSICAL EXAM:  GENERAL: elderly F in NAD  HEENT:  NC/AT, EOMI, moist mucous membranes  CHEST/LUNG: CTA b/l, no rales, wheezes, or rhonchi  HEART: RRR, S1, S2  ABDOMEN:  BS+, soft, nontender, nondistended  EXTREMITIES: no edema, cyanosis, or calf tenderness  NERVOUS SYSTEM: Awake and alert    LABS:                        13.0   17.39 )-----------( 244      ( 2022 08:41 )             42.5     CBC Full  -  ( 2022 08:41 )  WBC Count : 17.39 K/uL  Hemoglobin : 13.0 g/dL  Hematocrit : 42.5 %  Platelet Count - Automated : 244 K/uL  Mean Cell Volume : 90.0 fl  Mean Cell Hemoglobin : 27.5 pg  Mean Cell Hemoglobin Concentration : 30.6 gm/dL  Auto Neutrophil # : 13.37 K/uL  Auto Lymphocyte # : 2.34 K/uL  Auto Monocyte # : 1.17 K/uL  Auto Eosinophil # : 0.27 K/uL  Auto Basophil # : 0.04 K/uL  Auto Neutrophil % : 76.8 %  Auto Lymphocyte % : 13.5 %  Auto Monocyte % : 6.7 %  Auto Eosinophil % : 1.6 %  Auto Basophil % : 0.2 %    2022 08:41    141    |  107    |  66     ----------------------------<  130    4.2     |  25     |  1.40     Ca    8.8        2022 08:41        Urinalysis Basic - ( 2022 22:00 )    Color: Yellow / Appearance: Clear / S.020 / pH: x  Gluc: x / Ketone: Negative  / Bili: Negative / Urobili: Negative   Blood: x / Protein: 15 / Nitrite: Negative   Leuk Esterase: Trace / RBC: 0-2 /HPF / WBC 0-2   Sq Epi: x / Non Sq Epi: Few / Bacteria: Occasional      CAPILLARY BLOOD GLUCOSE      POCT Blood Glucose.: 201 mg/dL (2022 11:43)  POCT Blood Glucose.: 160 mg/dL (2022 07:41)  POCT Blood Glucose.: 131 mg/dL (2022 21:34)          RADIOLOGY & ADDITIONAL TESTS:    Personally reviewed.     Consultant(s) Notes Reviewed:  [x] YES  [ ] NO    Care Discussed with [x] Consultants  [x] Patient  [ ] Family  [ ]      [ ] Other; RN  DVT ppx

## 2022-06-22 NOTE — SWALLOW BEDSIDE ASSESSMENT ADULT - COMMENTS
Upon arrival, pt sleeping in bed. Pt was arousable to voice and able to maintain adequate level of alertness for remainder of assessment. Pt was upright in bed, on supplemental O2 via 3L NC. Pt reported heavy breathing, RN made aware and reported has been consistent upon admission, Dr. Aguayo made aware. Pt was agreeable to assessment. Pt demonstrated difficulty consistently following low level directives. Pt's vocal quality/intensity was WFL, though with reduced breath support upon discourse. Pt denied pain pre and post assessment.    Pt known to this service from a previous admission. Clinical swallow assessment completed 3/10/22, at which time pt was recommended easy to chew with thin liquids.    CXR 6/14: " Low lung volumes. Faint right perihilar opacity which could be due to subsegmental atelectasis or developing infection." Pt's WBC is elevated, no fever.
MBS completed 6/21, at which time pt was recommended puree with moderately thick liquids via teaspoon. MD requesting swallow reassessment for potential diet advancement given improvement in clinical presentation.  Repeat CXR 6/21 revealed: "Mild congestive changes" Pt's WBC is elevated, no fever.    Upon arrival, pt sleeping in bed. Pt was arousable to voice and able to maintain adequate level of alertness for remainder of session, noted to have improved mental status in comparison to previous sessions. RN present at bedside during assessment. Pt was positioned upright in bed, on room air. Pt was agreeable to assessment. Pt followed low level directives with verbal prompts. Pt's vocal quality/intensity was WFL. Pt with intermittent baseline/congested cough. Pt denied pain pre and post assessment.

## 2022-06-22 NOTE — SWALLOW BEDSIDE ASSESSMENT ADULT - ASR SWALLOW RECOMMEND DIAG
repeat MBS to determine safest/least restrictive PO diet given improvement in clinical presentation repeat MBS to determine safest/least restrictive PO diet given improvement in clinical presentation/VFSS/MBS

## 2022-06-22 NOTE — SWALLOW BEDSIDE ASSESSMENT ADULT - SLP PERTINENT HISTORY OF CURRENT PROBLEM
Per charting, "87 year old female with PMHx COPD, HTN, HLD, T2DM on insulin, hx of MI presents from assisted living facility with dyspnea, wheezing, labored breathing admitted for COPD exacerbation. "
Per charting, "87 year old female with PMHx COPD, HTN, HLD, T2DM on insulin, hx of MI presents from assisted living facility with dyspnea, wheezing, labored breathing admitted for COPD exacerbation."

## 2022-06-23 LAB
ANION GAP SERPL CALC-SCNC: 9 MMOL/L — SIGNIFICANT CHANGE UP (ref 5–17)
BASOPHILS # BLD AUTO: 0.05 K/UL — SIGNIFICANT CHANGE UP (ref 0–0.2)
BASOPHILS NFR BLD AUTO: 0.3 % — SIGNIFICANT CHANGE UP (ref 0–2)
BUN SERPL-MCNC: 60 MG/DL — HIGH (ref 7–23)
CALCIUM SERPL-MCNC: 8.8 MG/DL — SIGNIFICANT CHANGE UP (ref 8.4–10.5)
CHLORIDE SERPL-SCNC: 103 MMOL/L — SIGNIFICANT CHANGE UP (ref 96–108)
CO2 SERPL-SCNC: 25 MMOL/L — SIGNIFICANT CHANGE UP (ref 22–31)
CREAT SERPL-MCNC: 1.47 MG/DL — HIGH (ref 0.5–1.3)
EGFR: 34 ML/MIN/1.73M2 — LOW
EOSINOPHIL # BLD AUTO: 0.41 K/UL — SIGNIFICANT CHANGE UP (ref 0–0.5)
EOSINOPHIL NFR BLD AUTO: 2.2 % — SIGNIFICANT CHANGE UP (ref 0–6)
GLUCOSE SERPL-MCNC: 161 MG/DL — HIGH (ref 70–99)
HCT VFR BLD CALC: 41.8 % — SIGNIFICANT CHANGE UP (ref 34.5–45)
HGB BLD-MCNC: 13.1 G/DL — SIGNIFICANT CHANGE UP (ref 11.5–15.5)
IMM GRANULOCYTES NFR BLD AUTO: 1.1 % — SIGNIFICANT CHANGE UP (ref 0–1.5)
LYMPHOCYTES # BLD AUTO: 13.9 % — SIGNIFICANT CHANGE UP (ref 13–44)
LYMPHOCYTES # BLD AUTO: 2.61 K/UL — SIGNIFICANT CHANGE UP (ref 1–3.3)
MCHC RBC-ENTMCNC: 27.9 PG — SIGNIFICANT CHANGE UP (ref 27–34)
MCHC RBC-ENTMCNC: 31.3 GM/DL — LOW (ref 32–36)
MCV RBC AUTO: 88.9 FL — SIGNIFICANT CHANGE UP (ref 80–100)
MONOCYTES # BLD AUTO: 1.46 K/UL — HIGH (ref 0–0.9)
MONOCYTES NFR BLD AUTO: 7.8 % — SIGNIFICANT CHANGE UP (ref 2–14)
NEUTROPHILS # BLD AUTO: 14.02 K/UL — HIGH (ref 1.8–7.4)
NEUTROPHILS NFR BLD AUTO: 74.7 % — SIGNIFICANT CHANGE UP (ref 43–77)
NRBC # BLD: 0 /100 WBCS — SIGNIFICANT CHANGE UP (ref 0–0)
PLATELET # BLD AUTO: 253 K/UL — SIGNIFICANT CHANGE UP (ref 150–400)
POTASSIUM SERPL-MCNC: 4.5 MMOL/L — SIGNIFICANT CHANGE UP (ref 3.5–5.3)
POTASSIUM SERPL-SCNC: 4.5 MMOL/L — SIGNIFICANT CHANGE UP (ref 3.5–5.3)
RBC # BLD: 4.7 M/UL — SIGNIFICANT CHANGE UP (ref 3.8–5.2)
RBC # FLD: 13.1 % — SIGNIFICANT CHANGE UP (ref 10.3–14.5)
SODIUM SERPL-SCNC: 137 MMOL/L — SIGNIFICANT CHANGE UP (ref 135–145)
WBC # BLD: 18.75 K/UL — HIGH (ref 3.8–10.5)
WBC # FLD AUTO: 18.75 K/UL — HIGH (ref 3.8–10.5)

## 2022-06-23 PROCEDURE — 74230 X-RAY XM SWLNG FUNCJ C+: CPT | Mod: 26

## 2022-06-23 PROCEDURE — 71250 CT THORAX DX C-: CPT | Mod: 26

## 2022-06-23 PROCEDURE — 99233 SBSQ HOSP IP/OBS HIGH 50: CPT

## 2022-06-23 RX ADMIN — Medication 50 MILLIGRAM(S): at 06:21

## 2022-06-23 RX ADMIN — Medication 3 MILLILITER(S): at 13:34

## 2022-06-23 RX ADMIN — HEPARIN SODIUM 5000 UNIT(S): 5000 INJECTION INTRAVENOUS; SUBCUTANEOUS at 06:21

## 2022-06-23 RX ADMIN — Medication 125 MICROGRAM(S): at 06:21

## 2022-06-23 RX ADMIN — GABAPENTIN 100 MILLIGRAM(S): 400 CAPSULE ORAL at 22:03

## 2022-06-23 RX ADMIN — CLOPIDOGREL BISULFATE 75 MILLIGRAM(S): 75 TABLET, FILM COATED ORAL at 12:30

## 2022-06-23 RX ADMIN — Medication 81 MILLIGRAM(S): at 12:30

## 2022-06-23 RX ADMIN — Medication 1 TABLET(S): at 22:03

## 2022-06-23 RX ADMIN — INSULIN GLARGINE 15 UNIT(S): 100 INJECTION, SOLUTION SUBCUTANEOUS at 08:24

## 2022-06-23 RX ADMIN — Medication 3 MILLILITER(S): at 00:29

## 2022-06-23 RX ADMIN — GABAPENTIN 100 MILLIGRAM(S): 400 CAPSULE ORAL at 13:35

## 2022-06-23 RX ADMIN — Medication 1: at 08:23

## 2022-06-23 RX ADMIN — Medication 3 MILLILITER(S): at 07:55

## 2022-06-23 RX ADMIN — GABAPENTIN 100 MILLIGRAM(S): 400 CAPSULE ORAL at 06:20

## 2022-06-23 RX ADMIN — ATORVASTATIN CALCIUM 80 MILLIGRAM(S): 80 TABLET, FILM COATED ORAL at 22:03

## 2022-06-23 RX ADMIN — ESCITALOPRAM OXALATE 10 MILLIGRAM(S): 10 TABLET, FILM COATED ORAL at 12:30

## 2022-06-23 RX ADMIN — Medication 1 DROP(S): at 18:23

## 2022-06-23 RX ADMIN — HEPARIN SODIUM 5000 UNIT(S): 5000 INJECTION INTRAVENOUS; SUBCUTANEOUS at 18:23

## 2022-06-23 RX ADMIN — Medication 3 MILLILITER(S): at 19:40

## 2022-06-23 RX ADMIN — Medication 2000 UNIT(S): at 12:30

## 2022-06-23 RX ADMIN — Medication 1 DROP(S): at 06:21

## 2022-06-23 RX ADMIN — ISOSORBIDE MONONITRATE 30 MILLIGRAM(S): 60 TABLET, EXTENDED RELEASE ORAL at 12:26

## 2022-06-23 RX ADMIN — Medication 8 UNIT(S): at 08:22

## 2022-06-23 NOTE — PROGRESS NOTE ADULT - SUBJECTIVE AND OBJECTIVE BOX
Patient is a 87y old  Female who presents with a chief complaint of cough, SOB.      INTERVAL HPI/OVERNIGHT EVENTS: ROS limited as pt with advanced dementia. Denies cough, SOB, CP, abd pain, fever, chills. (of note, nursing reports pt is still having a deep cough)    MEDICATIONS  (STANDING):  albuterol/ipratropium for Nebulization 3 milliLiter(s) Nebulizer every 6 hours  amoxicillin  875 milliGRAM(s)/clavulanate 1 Tablet(s) Oral two times a day  artificial tears (preservative free) Ophthalmic Solution 1 Drop(s) Both EYES two times a day  aspirin  chewable 81 milliGRAM(s) Oral daily  atorvastatin 80 milliGRAM(s) Oral at bedtime  cholecalciferol 2000 Unit(s) Oral daily  clopidogrel Tablet 75 milliGRAM(s) Oral daily  dextrose 5%. 1000 milliLiter(s) (100 mL/Hr) IV Continuous <Continuous>  dextrose 5%. 1000 milliLiter(s) (50 mL/Hr) IV Continuous <Continuous>  dextrose 50% Injectable 25 Gram(s) IV Push once  dextrose 50% Injectable 12.5 Gram(s) IV Push once  dextrose 50% Injectable 25 Gram(s) IV Push once  dextrose 50% Injectable 25 Gram(s) IV Push once  escitalopram 10 milliGRAM(s) Oral daily  gabapentin 100 milliGRAM(s) Oral three times a day  glucagon  Injectable 1 milliGRAM(s) IntraMuscular once  glucagon  Injectable 1 milliGRAM(s) IntraMuscular once  heparin   Injectable 5000 Unit(s) SubCutaneous every 12 hours  insulin glargine Injectable (LANTUS) 15 Unit(s) SubCutaneous every morning  insulin lispro (ADMELOG) corrective regimen sliding scale   SubCutaneous three times a day before meals  insulin lispro (ADMELOG) corrective regimen sliding scale   SubCutaneous at bedtime  insulin lispro Injectable (ADMELOG) 8 Unit(s) SubCutaneous three times a day before meals  isosorbide   mononitrate ER Tablet (IMDUR) 30 milliGRAM(s) Oral daily  levothyroxine 125 MICROGram(s) Oral daily  metoprolol succinate ER 50 milliGRAM(s) Oral daily    MEDICATIONS  (PRN):  acetaminophen     Tablet .. 650 milliGRAM(s) Oral every 6 hours PRN Mild Pain (1 - 3)  ALBUTerol    0.083% 2.5 milliGRAM(s) Nebulizer every 4 hours PRN Shortness of Breath and/or Wheezing  dextrose Oral Gel 15 Gram(s) Oral once PRN Blood Glucose LESS THAN 70 milliGRAM(s)/deciliter  melatonin 3 milliGRAM(s) Oral at bedtime PRN Insomnia      Allergies    doxycycline (Unknown)  iodine (Hives)  iodine containing compounds (Unknown)  shellfish (Anaphylaxis)  shellfish (Swelling; Short breath)    Intolerances        REVIEW OF SYSTEMS:  ROS limited as pt with advanced dementia. Denies cough, SOB, CP, abd pain, fever, chills. (of note, nursing reports pt is still having a deep cough)    Vital Signs Last 24 Hrs  T(C): 36.4 (23 Jun 2022 20:43), Max: 37.2 (23 Jun 2022 12:32)  T(F): 97.6 (23 Jun 2022 20:43), Max: 98.9 (23 Jun 2022 12:32)  HR: 69 (24 Jun 2022 01:57) (65 - 77)  BP: 152/70 (23 Jun 2022 20:43) (139/61 - 152/70)  BP(mean): --  RR: 17 (23 Jun 2022 20:43) (17 - 17)  SpO2: 97% (24 Jun 2022 01:57) (92% - 97%)    PHYSICAL EXAM:  GENERAL: NAD at rest  HEENT:  anicteric, moist mucous membranes  CHEST/LUNG:  decreased breath sounds  HEART:  RRR, S1, S2  ABDOMEN:  BS+, soft, nontender, nondistended  EXTREMITIES: no cyanosis, or calf tenderness  NERVOUS SYSTEM: awake, alert, answers few simple questions and follows few commands     LABS:                        13.1   18.75 )-----------( 253      ( 23 Jun 2022 08:15 )             41.8     CBC Full  -  ( 23 Jun 2022 08:15 )  WBC Count : 18.75 K/uL  Hemoglobin : 13.1 g/dL  Hematocrit : 41.8 %  Platelet Count - Automated : 253 K/uL  Mean Cell Volume : 88.9 fl  Mean Cell Hemoglobin : 27.9 pg  Mean Cell Hemoglobin Concentration : 31.3 gm/dL  Auto Neutrophil # : 14.02 K/uL  Auto Lymphocyte # : 2.61 K/uL  Auto Monocyte # : 1.46 K/uL  Auto Eosinophil # : 0.41 K/uL  Auto Basophil # : 0.05 K/uL  Auto Neutrophil % : 74.7 %  Auto Lymphocyte % : 13.9 %  Auto Monocyte % : 7.8 %  Auto Eosinophil % : 2.2 %  Auto Basophil % : 0.3 %    23 Jun 2022 08:15    137    |  103    |  60     ----------------------------<  161    4.5     |  25     |  1.47     Ca    8.8        23 Jun 2022 08:15          CAPILLARY BLOOD GLUCOSE      POCT Blood Glucose.: 113 mg/dL (23 Jun 2022 16:52)  POCT Blood Glucose.: 98 mg/dL (23 Jun 2022 12:10)  POCT Blood Glucose.: 159 mg/dL (23 Jun 2022 07:51)          RADIOLOGY & ADDITIONAL TESTS:    Personally reviewed.     Consultant(s) Notes Reviewed:  [x] YES  [ ] NO     Patient is a 87y old  Female who presents with a chief complaint of cough, SOB.       INTERVAL HPI/OVERNIGHT EVENTS: ROS limited as pt with advanced dementia. Denies cough, SOB, CP, abd pain, fever, chills. (of note, nursing reports pt is still having a deep cough)    MEDICATIONS  (STANDING):  albuterol/ipratropium for Nebulization 3 milliLiter(s) Nebulizer every 6 hours  amoxicillin  875 milliGRAM(s)/clavulanate 1 Tablet(s) Oral two times a day  artificial tears (preservative free) Ophthalmic Solution 1 Drop(s) Both EYES two times a day  aspirin  chewable 81 milliGRAM(s) Oral daily  atorvastatin 80 milliGRAM(s) Oral at bedtime  cholecalciferol 2000 Unit(s) Oral daily  clopidogrel Tablet 75 milliGRAM(s) Oral daily  dextrose 5%. 1000 milliLiter(s) (100 mL/Hr) IV Continuous <Continuous>  dextrose 5%. 1000 milliLiter(s) (50 mL/Hr) IV Continuous <Continuous>  dextrose 50% Injectable 25 Gram(s) IV Push once  dextrose 50% Injectable 12.5 Gram(s) IV Push once  dextrose 50% Injectable 25 Gram(s) IV Push once  dextrose 50% Injectable 25 Gram(s) IV Push once  escitalopram 10 milliGRAM(s) Oral daily  gabapentin 100 milliGRAM(s) Oral three times a day  glucagon  Injectable 1 milliGRAM(s) IntraMuscular once  glucagon  Injectable 1 milliGRAM(s) IntraMuscular once  heparin   Injectable 5000 Unit(s) SubCutaneous every 12 hours  insulin glargine Injectable (LANTUS) 15 Unit(s) SubCutaneous every morning  insulin lispro (ADMELOG) corrective regimen sliding scale   SubCutaneous three times a day before meals  insulin lispro (ADMELOG) corrective regimen sliding scale   SubCutaneous at bedtime  insulin lispro Injectable (ADMELOG) 8 Unit(s) SubCutaneous three times a day before meals  isosorbide   mononitrate ER Tablet (IMDUR) 30 milliGRAM(s) Oral daily  levothyroxine 125 MICROGram(s) Oral daily  metoprolol succinate ER 50 milliGRAM(s) Oral daily    MEDICATIONS  (PRN):  acetaminophen     Tablet .. 650 milliGRAM(s) Oral every 6 hours PRN Mild Pain (1 - 3)  ALBUTerol    0.083% 2.5 milliGRAM(s) Nebulizer every 4 hours PRN Shortness of Breath and/or Wheezing  dextrose Oral Gel 15 Gram(s) Oral once PRN Blood Glucose LESS THAN 70 milliGRAM(s)/deciliter  melatonin 3 milliGRAM(s) Oral at bedtime PRN Insomnia      Allergies    doxycycline (Unknown)  iodine (Hives)  iodine containing compounds (Unknown)  shellfish (Anaphylaxis)  shellfish (Swelling; Short breath)    Intolerances        REVIEW OF SYSTEMS:  ROS limited as pt with advanced dementia. Denies cough, SOB, CP, abd pain, fever, chills. (of note, nursing reports pt is still having a deep cough)    Vital Signs Last 24 Hrs  T(C): 36.4 (23 Jun 2022 20:43), Max: 37.2 (23 Jun 2022 12:32)  T(F): 97.6 (23 Jun 2022 20:43), Max: 98.9 (23 Jun 2022 12:32)  HR: 69 (24 Jun 2022 01:57) (65 - 77)  BP: 152/70 (23 Jun 2022 20:43) (139/61 - 152/70)  BP(mean): --  RR: 17 (23 Jun 2022 20:43) (17 - 17)  SpO2: 97% (24 Jun 2022 01:57) (92% - 97%)    PHYSICAL EXAM:  GENERAL: NAD at rest  HEENT:  anicteric, moist mucous membranes  CHEST/LUNG:  decreased breath sounds  HEART:  RRR, S1, S2  ABDOMEN:  BS+, soft, nontender, nondistended  EXTREMITIES: no cyanosis, or calf tenderness  NERVOUS SYSTEM: awake, alert, answers few simple questions and follows few commands     LABS:                        13.1   18.75 )-----------( 253      ( 23 Jun 2022 08:15 )             41.8     CBC Full  -  ( 23 Jun 2022 08:15 )  WBC Count : 18.75 K/uL  Hemoglobin : 13.1 g/dL  Hematocrit : 41.8 %  Platelet Count - Automated : 253 K/uL  Mean Cell Volume : 88.9 fl  Mean Cell Hemoglobin : 27.9 pg  Mean Cell Hemoglobin Concentration : 31.3 gm/dL  Auto Neutrophil # : 14.02 K/uL  Auto Lymphocyte # : 2.61 K/uL  Auto Monocyte # : 1.46 K/uL  Auto Eosinophil # : 0.41 K/uL  Auto Basophil # : 0.05 K/uL  Auto Neutrophil % : 74.7 %  Auto Lymphocyte % : 13.9 %  Auto Monocyte % : 7.8 %  Auto Eosinophil % : 2.2 %  Auto Basophil % : 0.3 %    23 Jun 2022 08:15    137    |  103    |  60     ----------------------------<  161    4.5     |  25     |  1.47     Ca    8.8        23 Jun 2022 08:15          CAPILLARY BLOOD GLUCOSE      POCT Blood Glucose.: 113 mg/dL (23 Jun 2022 16:52)  POCT Blood Glucose.: 98 mg/dL (23 Jun 2022 12:10)  POCT Blood Glucose.: 159 mg/dL (23 Jun 2022 07:51)          RADIOLOGY & ADDITIONAL TESTS:    Personally reviewed.     Consultant(s) Notes Reviewed:  [x] YES  [ ] NO     Patient is a 87y old  Female who presents with a chief complaint of cough, SOB.       INTERVAL HPI/OVERNIGHT EVENTS: ROS limited as pt with advanced dementia. Denies cough, SOB, CP, abd pain, fever, chills. (of note, nursing reports pt is still having a deep cough)    MEDICATIONS  (STANDING):  albuterol/ipratropium for Nebulization 3 milliLiter(s) Nebulizer every 6 hours  amoxicillin  875 milliGRAM(s)/clavulanate 1 Tablet(s) Oral two times a day  artificial tears (preservative free) Ophthalmic Solution 1 Drop(s) Both EYES two times a day  aspirin  chewable 81 milliGRAM(s) Oral daily  atorvastatin 80 milliGRAM(s) Oral at bedtime  cholecalciferol 2000 Unit(s) Oral daily  clopidogrel Tablet 75 milliGRAM(s) Oral daily  dextrose 5%. 1000 milliLiter(s) (100 mL/Hr) IV Continuous <Continuous>  dextrose 5%. 1000 milliLiter(s) (50 mL/Hr) IV Continuous <Continuous>  dextrose 50% Injectable 25 Gram(s) IV Push once  dextrose 50% Injectable 12.5 Gram(s) IV Push once  dextrose 50% Injectable 25 Gram(s) IV Push once  dextrose 50% Injectable 25 Gram(s) IV Push once  escitalopram 10 milliGRAM(s) Oral daily  gabapentin 100 milliGRAM(s) Oral three times a day  glucagon  Injectable 1 milliGRAM(s) IntraMuscular once  glucagon  Injectable 1 milliGRAM(s) IntraMuscular once  heparin   Injectable 5000 Unit(s) SubCutaneous every 12 hours  insulin glargine Injectable (LANTUS) 15 Unit(s) SubCutaneous every morning  insulin lispro (ADMELOG) corrective regimen sliding scale   SubCutaneous three times a day before meals  insulin lispro (ADMELOG) corrective regimen sliding scale   SubCutaneous at bedtime  insulin lispro Injectable (ADMELOG) 8 Unit(s) SubCutaneous three times a day before meals  isosorbide   mononitrate ER Tablet (IMDUR) 30 milliGRAM(s) Oral daily  levothyroxine 125 MICROGram(s) Oral daily  metoprolol succinate ER 50 milliGRAM(s) Oral daily    MEDICATIONS  (PRN):  acetaminophen     Tablet .. 650 milliGRAM(s) Oral every 6 hours PRN Mild Pain (1 - 3)  ALBUTerol    0.083% 2.5 milliGRAM(s) Nebulizer every 4 hours PRN Shortness of Breath and/or Wheezing  dextrose Oral Gel 15 Gram(s) Oral once PRN Blood Glucose LESS THAN 70 milliGRAM(s)/deciliter  melatonin 3 milliGRAM(s) Oral at bedtime PRN Insomnia      Allergies    doxycycline (Unknown)  iodine (Hives)  iodine containing compounds (Unknown)  shellfish (Anaphylaxis)  shellfish (Swelling; Short breath)    Intolerances        REVIEW OF SYSTEMS:  ROS limited as pt with advanced dementia. Denies cough, SOB, CP, abd pain, fever, chills. (of note, nursing reports pt is still having a deep cough)    Vital Signs Last 24 Hrs  T(C): 37.2 (23 Jun 2022 12:32), Max: 37.2 (23 Jun 2022 12:32)  T(F): 98.9 (23 Jun 2022 12:32), Max: 98.9 (23 Jun 2022 12:32)  HR: 76 (23 Jun 2022 19:40) (65 - 77)  BP: 139/61 (23 Jun 2022 12:32) (139/61 - 154/68)  BP(mean): --  RR: 17 (23 Jun 2022 12:32) (17 - 18)  SpO2: 97% (23 Jun 2022 19:40) (91% - 97%)    PHYSICAL EXAM:  GENERAL: NAD at rest  HEENT:  anicteric, moist mucous membranes  CHEST/LUNG:  decreased breath sounds  HEART:  RRR, S1, S2  ABDOMEN:  BS+, soft, nontender, nondistended  EXTREMITIES: no cyanosis, or calf tenderness  NERVOUS SYSTEM: awake, alert, answers few simple questions and follows few commands     LABS:                        13.1   18.75 )-----------( 253      ( 23 Jun 2022 08:15 )             41.8     CBC Full  -  ( 23 Jun 2022 08:15 )  WBC Count : 18.75 K/uL  Hemoglobin : 13.1 g/dL  Hematocrit : 41.8 %  Platelet Count - Automated : 253 K/uL  Mean Cell Volume : 88.9 fl  Mean Cell Hemoglobin : 27.9 pg  Mean Cell Hemoglobin Concentration : 31.3 gm/dL  Auto Neutrophil # : 14.02 K/uL  Auto Lymphocyte # : 2.61 K/uL  Auto Monocyte # : 1.46 K/uL  Auto Eosinophil # : 0.41 K/uL  Auto Basophil # : 0.05 K/uL  Auto Neutrophil % : 74.7 %  Auto Lymphocyte % : 13.9 %  Auto Monocyte % : 7.8 %  Auto Eosinophil % : 2.2 %  Auto Basophil % : 0.3 %    23 Jun 2022 08:15    137    |  103    |  60     ----------------------------<  161    4.5     |  25     |  1.47     Ca    8.8        23 Jun 2022 08:15          CAPILLARY BLOOD GLUCOSE      POCT Blood Glucose.: 113 mg/dL (23 Jun 2022 16:52)  POCT Blood Glucose.: 98 mg/dL (23 Jun 2022 12:10)  POCT Blood Glucose.: 159 mg/dL (23 Jun 2022 07:51)          RADIOLOGY & ADDITIONAL TESTS:    Personally reviewed.     Consultant(s) Notes Reviewed:  [x] YES  [ ] NO

## 2022-06-23 NOTE — SWALLOW VFSS/MBS ASSESSMENT ADULT - ESOPHAGEAL STAGE
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

## 2022-06-23 NOTE — SWALLOW VFSS/MBS ASSESSMENT ADULT - NS SWALLOW VFSS REC ASPIR MON
If pt with inability to tolerate rx'd diet or overall decline in status, rx d/c PO & initiate NPO with SLP to reassess/change of breathing pattern/oral hygiene/position upright (90Y)/cough/gurgly voice/fever/pneumonia/throat clearing/upper respiratory infection
change of breathing pattern/oral hygiene/position upright (90Y)/cough/gurgly voice/fever/pneumonia/throat clearing/upper respiratory infection

## 2022-06-23 NOTE — PROGRESS NOTE ADULT - ASSESSMENT
87 year old female with PMHx COPD, HTN, HLD, T2DM on insulin, hx of MI presents from assisted living facility with dyspnea, wheezing, labored breathing    copd  copd ex  [place] CKD  HTN  OP  OA  DM  HLD  Moderate to Severe AS  CAD    on RA  ID cx noted  MBS noted  LE doppler Neg   plan for return to custodial    cvs rx regimen  BP control  serial renal indices  I and O  monitor VS and HD and Sat  HOB elev - asp prec - assist with needs - ADL - GOC discussion  pt has hMPV - resp viral infection - isolation precs - acap PRN - robitussin PRN -   cxr - labs reviewed  copd - rx regimen PRN for sob and or wheeze, NEBS, not a candidate for Inhaler use  proBNP noted  ABG noted  old records reviewed

## 2022-06-23 NOTE — SWALLOW VFSS/MBS ASSESSMENT ADULT - COMMENTS
Chart reviewed, order received for repeat MBS.  Pt received as above.  MBS completed see below for details.  Pt educated on rx's, ongoing education due to reduced cognition.  Pt left with Radiology staff awaiting transport to unit NAD PORFIRIO Llanos & Dr. Henriquez notified of results, overall presentation and recommendations.  Will follow to check PO tolerance and for trial dysphagia tx, as schedule permits.

## 2022-06-23 NOTE — SWALLOW VFSS/MBS ASSESSMENT ADULT - SLP PERTINENT HISTORY OF CURRENT PROBLEM
Pt seen for bedside swallow evaluation 6/20/22 Rx Puree with Moderately thick liquids via small, single cup sips, see report for details.  Pt known to this service from a previous admission. Clinical swallow assessment completed 3/10/22, at which time pt was recommended easy to chew with thin liquids.
Pt seen for MBS 6/21/22, however, limited assessment due to reduced PO acceptance and pt report of "not feeling well" rx puree with moderately thick liquids via teaspoon and repeat MBS when improvement in presentation, see report for details.  MD reported improvement in presentation 6/22, seen at bedside, Rx to continue puree with moderately thick liquids and repeat MBS, see report for details.

## 2022-06-23 NOTE — PROGRESS NOTE ADULT - ASSESSMENT
TRENTON on CKD 3b  Respiratory Acidosis  HTN  Human Metapneumovirus      -BLCr 1.4  -Urine indices reviewed  -Hold further lasix  -Avoid IVF given respiratory status  -Pulm eval reviewed  -CXR clear 6/15/22  -Renal indices are stable at baseline range    Thank you

## 2022-06-23 NOTE — SWALLOW VFSS/MBS ASSESSMENT ADULT - ORAL PREPARATORY PHASE
Poor oral grading from cup, significant improvement with use of straw Poor oral grading, requiring max cues to improve grading from cup Reduced oral grading, reduced stripping from utensil requiring cues to utilize labial stripping to eliminate dental stripping, reduced mastication, prolonged mastication time/Insufficient mastication/Poor bolus formation Reduced oral grading, reduced stripping from utensil requiring cues to utilize labial stripping to eliminate dental stripping/Insufficient mastication/Poor bolus formation Reduced oral grading, reduced stripping from utensil requiring cues to utilize labial stripping to eliminate dental stripping/Poor bolus formation

## 2022-06-23 NOTE — SWALLOW VFSS/MBS ASSESSMENT ADULT - DEMONSTRATES NEED FOR REFERRAL TO ANOTHER SERVICE
RD services to ensure adequate daily caloric nutritional intake
Pt may benefit from neurology consult due to above noted presentation/Registered Dietitian

## 2022-06-23 NOTE — PROGRESS NOTE ADULT - SUBJECTIVE AND OBJECTIVE BOX
Date/Time Patient Seen:  		  Referring MD:   Data Reviewed	       Patient is a 87y old  Female who presents with a chief complaint of COPD Exacerbation (22 Jun 2022 16:54)      Subjective/HPI     PAST MEDICAL & SURGICAL HISTORY:  Uncomplicated asthma, unspecified asthma severity    DM2 (diabetes mellitus, type 2)    Essential hypertension    Hypothyroidism, unspecified type    Pure hypercholesterolemia    Gastroesophageal reflux disease without esophagitis    Diabetes    Asthma    CAD (coronary artery disease)    HTN (hypertension)    HLD (hyperlipidemia)    Hypothyroid    NSTEMI (non-ST elevated myocardial infarction)    No significant past surgical history    History of appendectomy    History of appendectomy    Abnormal findings on cardiac catheterization  Cardiac Cath          Medication list         MEDICATIONS  (STANDING):  albuterol/ipratropium for Nebulization 3 milliLiter(s) Nebulizer every 6 hours  artificial tears (preservative free) Ophthalmic Solution 1 Drop(s) Both EYES two times a day  aspirin  chewable 81 milliGRAM(s) Oral daily  atorvastatin 80 milliGRAM(s) Oral at bedtime  cholecalciferol 2000 Unit(s) Oral daily  clopidogrel Tablet 75 milliGRAM(s) Oral daily  dextrose 5%. 1000 milliLiter(s) (100 mL/Hr) IV Continuous <Continuous>  dextrose 5%. 1000 milliLiter(s) (50 mL/Hr) IV Continuous <Continuous>  dextrose 50% Injectable 25 Gram(s) IV Push once  dextrose 50% Injectable 12.5 Gram(s) IV Push once  dextrose 50% Injectable 25 Gram(s) IV Push once  dextrose 50% Injectable 25 Gram(s) IV Push once  escitalopram 10 milliGRAM(s) Oral daily  gabapentin 100 milliGRAM(s) Oral three times a day  glucagon  Injectable 1 milliGRAM(s) IntraMuscular once  glucagon  Injectable 1 milliGRAM(s) IntraMuscular once  heparin   Injectable 5000 Unit(s) SubCutaneous every 12 hours  insulin glargine Injectable (LANTUS) 15 Unit(s) SubCutaneous every morning  insulin lispro (ADMELOG) corrective regimen sliding scale   SubCutaneous three times a day before meals  insulin lispro (ADMELOG) corrective regimen sliding scale   SubCutaneous at bedtime  insulin lispro Injectable (ADMELOG) 8 Unit(s) SubCutaneous three times a day before meals  isosorbide   mononitrate ER Tablet (IMDUR) 30 milliGRAM(s) Oral daily  levothyroxine 125 MICROGram(s) Oral daily  metoprolol succinate ER 50 milliGRAM(s) Oral daily    MEDICATIONS  (PRN):  acetaminophen     Tablet .. 650 milliGRAM(s) Oral every 6 hours PRN Mild Pain (1 - 3)  ALBUTerol    0.083% 2.5 milliGRAM(s) Nebulizer every 4 hours PRN Shortness of Breath and/or Wheezing  dextrose Oral Gel 15 Gram(s) Oral once PRN Blood Glucose LESS THAN 70 milliGRAM(s)/deciliter  melatonin 3 milliGRAM(s) Oral at bedtime PRN Insomnia         Vitals log        ICU Vital Signs Last 24 Hrs  T(C): 36.7 (22 Jun 2022 20:08), Max: 36.7 (22 Jun 2022 20:08)  T(F): 98.1 (22 Jun 2022 20:08), Max: 98.1 (22 Jun 2022 20:08)  HR: 69 (23 Jun 2022 00:29) (66 - 79)  BP: 133/68 (22 Jun 2022 20:08) (125/61 - 167/74)  BP(mean): --  ABP: --  ABP(mean): --  RR: 18 (22 Jun 2022 20:08) (17 - 18)  SpO2: 97% (23 Jun 2022 00:29) (91% - 98%)           Input and Output:  I&O's Detail      Lab Data                        13.0   17.39 )-----------( 244      ( 22 Jun 2022 08:41 )             42.5     06-22    141  |  107  |  66<H>  ----------------------------<  130<H>  4.2   |  25  |  1.40<H>    Ca    8.8      22 Jun 2022 08:41              Review of Systems	      Objective     Physical Examination    heart s1s2  lung dc BS  abd soft      Pertinent Lab findings & Imaging      Chong:  NO   Adequate UO     I&O's Detail           Discussed with:     Cultures:	        Radiology

## 2022-06-23 NOTE — CHART NOTE - NSCHARTNOTEFT_GEN_A_CORE
Nutrition Follow Up Note    Seen for follow up     Source: EMR, RN, Patient -asleep     Chart reviewed, events noted.     Per chart, 86yo Female with PMH of HLD, HTN, CAD, DM, GERD, T2DM, COPD, CKD3. Pt presenting with COPD exacerbation likely 2/2 HMPV.    Diet :  Diet, Consistent Carbohydrate w/Evening Snack:   DASH/TLC {Sodium & Cholesterol Restricted}  Pureed (PUREED)  Supplement Feeding Modality:  Oral  Glucerna Shake Cans or Servings Per Day:  1       Frequency:  Daily (06-23-22 @ 09:56)      Nutrition Events:   -Intake: per RN, poor intake in setting of lethargy today; ~20% intake of breakfast this AM   -GI: last BM documented 6/22  -Endo: 15 units long acting, 8unit short acting x3/day, sliding scale insulin; Consistent carbohydrate diet   -Renal: TRENTON on CKD3; holding Lasix   -Resp: supplemental O2 via NC   -Swallow: SLP recommend pureed with thin liquids (6/23)         Pertinent Medications:  MEDICATIONS  (STANDING):  albuterol/ipratropium for Nebulization 3 milliLiter(s) Nebulizer every 6 hours  artificial tears (preservative free) Ophthalmic Solution 1 Drop(s) Both EYES two times a day  aspirin  chewable 81 milliGRAM(s) Oral daily  atorvastatin 80 milliGRAM(s) Oral at bedtime  cholecalciferol 2000 Unit(s) Oral daily  clopidogrel Tablet 75 milliGRAM(s) Oral daily  dextrose 5%. 1000 milliLiter(s) (50 mL/Hr) IV Continuous <Continuous>  dextrose 5%. 1000 milliLiter(s) (100 mL/Hr) IV Continuous <Continuous>  dextrose 50% Injectable 25 Gram(s) IV Push once  dextrose 50% Injectable 12.5 Gram(s) IV Push once  dextrose 50% Injectable 25 Gram(s) IV Push once  dextrose 50% Injectable 25 Gram(s) IV Push once  escitalopram 10 milliGRAM(s) Oral daily  gabapentin 100 milliGRAM(s) Oral three times a day  glucagon  Injectable 1 milliGRAM(s) IntraMuscular once  glucagon  Injectable 1 milliGRAM(s) IntraMuscular once  heparin   Injectable 5000 Unit(s) SubCutaneous every 12 hours  insulin glargine Injectable (LANTUS) 15 Unit(s) SubCutaneous every morning  insulin lispro (ADMELOG) corrective regimen sliding scale   SubCutaneous three times a day before meals  insulin lispro (ADMELOG) corrective regimen sliding scale   SubCutaneous at bedtime  insulin lispro Injectable (ADMELOG) 8 Unit(s) SubCutaneous three times a day before meals  isosorbide   mononitrate ER Tablet (IMDUR) 30 milliGRAM(s) Oral daily  levothyroxine 125 MICROGram(s) Oral daily  metoprolol succinate ER 50 milliGRAM(s) Oral daily    MEDICATIONS  (PRN):  acetaminophen     Tablet .. 650 milliGRAM(s) Oral every 6 hours PRN Mild Pain (1 - 3)  ALBUTerol    0.083% 2.5 milliGRAM(s) Nebulizer every 4 hours PRN Shortness of Breath and/or Wheezing  dextrose Oral Gel 15 Gram(s) Oral once PRN Blood Glucose LESS THAN 70 milliGRAM(s)/deciliter  melatonin 3 milliGRAM(s) Oral at bedtime PRN Insomnia        Pertinent Labs:  06-23 Na137 mmol/L Glu 161 mg/dL<H> K+ 4.5 mmol/L Cr  1.47 mg/dL<H> BUN 60 mg/dL<H> 06-19 Phos 3.6 mg/dL 06-20 Alb 2.9 g/dL<L>     CAPILLARY BLOOD GLUCOSE      POCT Blood Glucose.: 98 mg/dL (23 Jun 2022 12:10)  POCT Blood Glucose.: 159 mg/dL (23 Jun 2022 07:51)  POCT Blood Glucose.: 91 mg/dL (22 Jun 2022 21:28)  POCT Blood Glucose.: 99 mg/dL (22 Jun 2022 16:58)       Pressure Injury per chart:   -L/R heel: stage 1     Edema per chart: none noted     Estimated Needs:  [x] no change since previous assessment      Previous Nutrition Diagnosis: altered labs (DM, TRENTON)    Nutrition Diagnosis is: ongoing      New Nutrition Diagnosis: N/A    Interventions and Recommendations  1) Continue Consistent carbohydrate, DASH/TLC diet with Glucerna x1/day  2) Defer consistency to SLP and team     Monitoring and Evaluation:     [X] PO intake [X] Tolerance to diet prescription [X] weight trends [x] skin integrity     RD remains available and will follow up per protocol.

## 2022-06-23 NOTE — SWALLOW VFSS/MBS ASSESSMENT ADULT - MODE OF PRESENTATION
Pt with difficulty self feeding requiring assistance from SLP/cup/straw/self fed/fed by clinician cup/self fed/fed by clinician spoon/fed by clinician

## 2022-06-23 NOTE — SWALLOW VFSS/MBS ASSESSMENT ADULT - ROSENBEK'S PENETRATION ASPIRATION SCALE
(1) no aspiration, contrast does not enter airway in 1/5 trials; 1 = no penetration/aspiration in 4/5 trials/(2) contrast enters airway, remains above the vocal cords, no residue remains (penetration)

## 2022-06-23 NOTE — SWALLOW VFSS/MBS ASSESSMENT ADULT - SLP GENERAL OBSERVATIONS
Pt received in Radiology upright on Cine chair A&A, Ox2, reduced cognition, increased processing time, pt with flat affect, RN Romi & Dr. Henriquez notified of presentation, on RA SpO2 93-94% throughout eval, pain scale 0/10 pre & post eval

## 2022-06-23 NOTE — PROGRESS NOTE ADULT - SUBJECTIVE AND OBJECTIVE BOX
Patient is a 87y old  Female who presents with a chief complaint of COPD Exacerbation (2022 11:01)       HPI:  87 year old female with PMHx COPD, HTN, HLD, T2DM on insulin, hx of MI presents from assisted living facility with dyspnea, wheezing, labored breathing. Patient recently admitted to [facility] 3/9-15/2022 for COPD exacerbation. History obtained from son Calvin, as patient is limited historian at baseline. Son states he received a call from [facility] this AM stating patient is having labored breathing. Patient was sent to [facility] for further management.   She states her breathing is improved.  Denies any N/V/Dizziness.  Has not seen nephrologist in the past.  No urinary issues. Denies NSAID usage.    No acute events noted       PAST MEDICAL & SURGICAL HISTORY:  Uncomplicated asthma, unspecified asthma severity      DM2 (diabetes mellitus, type 2)      Essential hypertension      Hypothyroidism, unspecified type      Pure hypercholesterolemia      Gastroesophageal reflux disease without esophagitis      Diabetes      Asthma      CAD (coronary artery disease)      HTN (hypertension)      HLD (hyperlipidemia)      Hypothyroid      NSTEMI (non-ST elevated myocardial infarction)      History of appendectomy      History of appendectomy      Abnormal findings on cardiac catheterization  Cardiac Cath           FAMILY HISTORY:  Family history of heart disease    Family history of cancer in mother    Family history of MI (myocardial infarction)    Family history of stomach cancer    NC    Social History:Non smoker    MEDICATIONS  (STANDING):  albuterol/ipratropium for Nebulization 3 milliLiter(s) Nebulizer every 6 hours  artificial tears (preservative free) Ophthalmic Solution 1 Drop(s) Both EYES two times a day  aspirin  chewable 81 milliGRAM(s) Oral daily  atorvastatin 80 milliGRAM(s) Oral at bedtime  cholecalciferol 2000 Unit(s) Oral daily  clopidogrel Tablet 75 milliGRAM(s) Oral daily  dextrose 5%. 1000 milliLiter(s) (50 mL/Hr) IV Continuous <Continuous>  dextrose 5%. 1000 milliLiter(s) (100 mL/Hr) IV Continuous <Continuous>  dextrose 50% Injectable 25 Gram(s) IV Push once  dextrose 50% Injectable 12.5 Gram(s) IV Push once  dextrose 50% Injectable 25 Gram(s) IV Push once  escitalopram 10 milliGRAM(s) Oral daily  furosemide   Injectable 40 milliGRAM(s) IV Push <User Schedule>  gabapentin 100 milliGRAM(s) Oral three times a day  glucagon  Injectable 1 milliGRAM(s) IntraMuscular once  heparin   Injectable 5000 Unit(s) SubCutaneous every 12 hours  insulin glargine Injectable (LANTUS) 15 Unit(s) SubCutaneous every morning  insulin lispro (ADMELOG) corrective regimen sliding scale   SubCutaneous every 6 hours  insulin lispro Injectable (ADMELOG) 8 Unit(s) SubCutaneous three times a day before meals  isosorbide   mononitrate ER Tablet (IMDUR) 30 milliGRAM(s) Oral daily  levothyroxine 125 MICROGram(s) Oral daily  methylPREDNISolone sodium succinate Injectable 40 milliGRAM(s) IV Push daily  metoprolol succinate ER 50 milliGRAM(s) Oral daily    MEDICATIONS  (PRN):  acetaminophen     Tablet .. 650 milliGRAM(s) Oral every 6 hours PRN Mild Pain (1 - 3)  ALBUTerol    0.083% 2.5 milliGRAM(s) Nebulizer every 4 hours PRN Shortness of Breath and/or Wheezing  ALPRAZolam 0.5 milliGRAM(s) Oral two times a day PRN Anxiety  dextrose Oral Gel 15 Gram(s) Oral once PRN Blood Glucose LESS THAN 70 milliGRAM(s)/deciliter  melatonin 3 milliGRAM(s) Oral at bedtime PRN Insomnia   Meds reviewed    Allergies    doxycycline (Unknown)  iodine (Hives)  iodine containing compounds (Unknown)  shellfish (Anaphylaxis)  shellfish (Swelling; Short breath)    Intolerances         REVIEW OF SYSTEMS:    Review of Systems:   Constitutional: Denies fatigue  HEENT: Denies headaches and dizziness  Respiratory: denies  cough, or wheezing, improving SOB  Cardiovascular: denies CP, palpitations  Gastrointestinal: Denies nausea, denies vomiting, diarrhea, constipation, abdominal pain, or bloody stools  Genitourinary: denies painful urination, increased frequency, urgency, or bloody urine  Skin: denies rashes or itching  Musculoskeletal: denies muscle aches, joint swelling  Neurologic: Denies generalized weakness, denies loss of sensation, numbness, or tingling    Vital Signs Last 24 Hrs  T(C): 36.6 (2022 05:10), Max: 36.7 (2022 20:08)  T(F): 97.9 (2022 05:10), Max: 98.1 (2022 20:08)  HR: 70 (2022 07:57) (65 - 79)  BP: 154/68 (2022 05:10) (125/61 - 154/68)  BP(mean): --  RR: 18 (2022 05:10) (17 - 18)  SpO2: 96% (2022 07:57) (91% - 98%)    PHYSICAL EXAM:    GENERAL: NAD  HEAD:  Atraumatic, Normocephalic  EYES: EOMI, conjunctiva and sclera clear  ENMT: No Drainage from nares, No drainage from ears  NECK: Supple, neck  veins full  NERVOUS SYSTEM:  Awake and Alert  CHEST/LUNG: Clear to percussion bilaterally; No rales, rhonchi, wheezing, or rubs  HEART: Regular rate and rhythm; No murmurs, rubs, or gallops  ABDOMEN: Soft, Nontender, Nondistended; Bowel sounds present  EXTREMITIES:  No Edema  SKIN: No rashes No obvious ecchymosis      LABS:                                          13.1   18.75 )-----------( 253      ( 2022 08:15 )             41.8     06-23    137  |  103  |  60<H>  ----------------------------<  161<H>  4.5   |  25  |  1.47<H>    Ca    8.8      2022 08:15        Urinalysis Basic - ( 2022 22:00 )    Color: Yellow / Appearance: Clear / S.020 / pH: x  Gluc: x / Ketone: Negative  / Bili: Negative / Urobili: Negative   Blood: x / Protein: 15 / Nitrite: Negative   Leuk Esterase: Trace / RBC: 0-2 /HPF / WBC 0-2   Sq Epi: x / Non Sq Epi: Few / Bacteria: Occasional

## 2022-06-23 NOTE — SWALLOW VFSS/MBS ASSESSMENT ADULT - ORAL PHASE
Uncontrolled bolus / spillover in teresa-pharynx +Piecemeal deglutition, reduced attention to bolus, requiring cues to increase swallow elicitation/Delayed oral transit time/Uncontrolled bolus / spillover in teresa-pharynx reduced attention to bolus, requiring cues to increase swallow elicitation/Delayed oral transit time Uncontrolled bolus / spillover in teresa-pharynx/Uncontrolled bolus / spillover in hypopharynx

## 2022-06-23 NOTE — PROGRESS NOTE ADULT - SUBJECTIVE AND OBJECTIVE BOX
Interval History:    CENTRAL LINE:   [  ] YES       [  ] NO  PACE:                 [  ] YES       [  ] NO         REVIEW OF SYSTEMS:  All Systems below were reviewed and are negative [  ]  HEENT:  ID:  Pulmonary:  Cardiac:  GI:  Renal:  Musculoskeletal:  All other systems above were reviewed and are negative   [  ]      MEDICATIONS  (STANDING):  albuterol/ipratropium for Nebulization 3 milliLiter(s) Nebulizer every 6 hours  artificial tears (preservative free) Ophthalmic Solution 1 Drop(s) Both EYES two times a day  aspirin  chewable 81 milliGRAM(s) Oral daily  atorvastatin 80 milliGRAM(s) Oral at bedtime  cholecalciferol 2000 Unit(s) Oral daily  clopidogrel Tablet 75 milliGRAM(s) Oral daily  dextrose 5%. 1000 milliLiter(s) (100 mL/Hr) IV Continuous <Continuous>  dextrose 5%. 1000 milliLiter(s) (50 mL/Hr) IV Continuous <Continuous>  dextrose 50% Injectable 25 Gram(s) IV Push once  dextrose 50% Injectable 12.5 Gram(s) IV Push once  dextrose 50% Injectable 25 Gram(s) IV Push once  dextrose 50% Injectable 25 Gram(s) IV Push once  escitalopram 10 milliGRAM(s) Oral daily  gabapentin 100 milliGRAM(s) Oral three times a day  glucagon  Injectable 1 milliGRAM(s) IntraMuscular once  glucagon  Injectable 1 milliGRAM(s) IntraMuscular once  heparin   Injectable 5000 Unit(s) SubCutaneous every 12 hours  insulin glargine Injectable (LANTUS) 15 Unit(s) SubCutaneous every morning  insulin lispro (ADMELOG) corrective regimen sliding scale   SubCutaneous three times a day before meals  insulin lispro (ADMELOG) corrective regimen sliding scale   SubCutaneous at bedtime  insulin lispro Injectable (ADMELOG) 8 Unit(s) SubCutaneous three times a day before meals  isosorbide   mononitrate ER Tablet (IMDUR) 30 milliGRAM(s) Oral daily  levothyroxine 125 MICROGram(s) Oral daily  metoprolol succinate ER 50 milliGRAM(s) Oral daily    MEDICATIONS  (PRN):  acetaminophen     Tablet .. 650 milliGRAM(s) Oral every 6 hours PRN Mild Pain (1 - 3)  ALBUTerol    0.083% 2.5 milliGRAM(s) Nebulizer every 4 hours PRN Shortness of Breath and/or Wheezing  dextrose Oral Gel 15 Gram(s) Oral once PRN Blood Glucose LESS THAN 70 milliGRAM(s)/deciliter  melatonin 3 milliGRAM(s) Oral at bedtime PRN Insomnia      Vital Signs Last 24 Hrs  T(C): 37.2 (23 Jun 2022 12:32), Max: 37.2 (23 Jun 2022 12:32)  T(F): 98.9 (23 Jun 2022 12:32), Max: 98.9 (23 Jun 2022 12:32)  HR: 76 (23 Jun 2022 19:40) (65 - 77)  BP: 139/61 (23 Jun 2022 12:32) (139/61 - 154/68)  BP(mean): --  RR: 17 (23 Jun 2022 12:32) (17 - 18)  SpO2: 97% (23 Jun 2022 19:40) (91% - 97%)    I&O's Summary      PHYSICAL EXAM:  HEENT: NC/AT; PERRLA  Neck: Soft; no tenderness  Lungs: CTA bilaterally; no wheezing.   Heart:  Abdomen:  Genital/ Rectal:  Extremities:  Neurologic:  Vascular:      LABORATORY:    CBC Full  -  ( 23 Jun 2022 08:15 )  WBC Count : 18.75 K/uL  RBC Count : 4.70 M/uL  Hemoglobin : 13.1 g/dL  Hematocrit : 41.8 %  Platelet Count - Automated : 253 K/uL  Mean Cell Volume : 88.9 fl  Mean Cell Hemoglobin : 27.9 pg  Mean Cell Hemoglobin Concentration : 31.3 gm/dL  Auto Neutrophil # : 14.02 K/uL  Auto Lymphocyte # : 2.61 K/uL  Auto Monocyte # : 1.46 K/uL  Auto Eosinophil # : 0.41 K/uL  Auto Basophil # : 0.05 K/uL  Auto Neutrophil % : 74.7 %  Auto Lymphocyte % : 13.9 %  Auto Monocyte % : 7.8 %  Auto Eosinophil % : 2.2 %  Auto Basophil % : 0.3 %      ESR:                   06-22 @ 08:41  --    C-Reactive Protein:     06-22 @ 08:41  --    Procalcitonin:           06-22 @ 08:41   0.11  ESR:                   06-13 @ 15:38  --    C-Reactive Protein:     06-13 @ 15:38  21    Procalcitonin:           06-13 @ 15:38   --      06-23    137  |  103  |  60<H>  ----------------------------<  161<H>  4.5   |  25  |  1.47<H>    Ca    8.8      23 Jun 2022 08:15        Rapid Respiratory Viral Panel Result        06-13 @ 07:46  Rapid RVP Detected  Coronovirus --  Adenovirus --  Bordetella Pertussis --  Chlamydia Pneumonia --  Entero/Rhinovirus--  HKU1 Coronovirus --  HMPV Coronovirus Detected  Influenza A --  Influenza AH1 --  Influenza AH1 2009 --  Influenza AH3 --  Influenza B --  Mycoplasma Pneumoniae --  NL63 Coronovirus --  OC43 Coronovirus --  Parainfluenza 1 --  Parainfluenza 2 --  Parainfluenza 3 --  Parainfluenza 4 --  Resp Syncytial Virus --      Assessment and Plan:          Lazaro De La Rosa MD   [phone].    No fevers     MEDICATIONS  (STANDING):  albuterol/ipratropium for Nebulization 3 milliLiter(s) Nebulizer every 6 hours  artificial tears (preservative free) Ophthalmic Solution 1 Drop(s) Both EYES two times a day  aspirin  chewable 81 milliGRAM(s) Oral daily  atorvastatin 80 milliGRAM(s) Oral at bedtime  cholecalciferol 2000 Unit(s) Oral daily  clopidogrel Tablet 75 milliGRAM(s) Oral daily  dextrose 5%. 1000 milliLiter(s) (100 mL/Hr) IV Continuous <Continuous>  dextrose 5%. 1000 milliLiter(s) (50 mL/Hr) IV Continuous <Continuous>  dextrose 50% Injectable 25 Gram(s) IV Push once  dextrose 50% Injectable 12.5 Gram(s) IV Push once  dextrose 50% Injectable 25 Gram(s) IV Push once  dextrose 50% Injectable 25 Gram(s) IV Push once  escitalopram 10 milliGRAM(s) Oral daily  gabapentin 100 milliGRAM(s) Oral three times a day  glucagon  Injectable 1 milliGRAM(s) IntraMuscular once  glucagon  Injectable 1 milliGRAM(s) IntraMuscular once  heparin   Injectable 5000 Unit(s) SubCutaneous every 12 hours  insulin glargine Injectable (LANTUS) 15 Unit(s) SubCutaneous every morning  insulin lispro (ADMELOG) corrective regimen sliding scale   SubCutaneous three times a day before meals  insulin lispro (ADMELOG) corrective regimen sliding scale   SubCutaneous at bedtime  insulin lispro Injectable (ADMELOG) 8 Unit(s) SubCutaneous three times a day before meals  isosorbide   mononitrate ER Tablet (IMDUR) 30 milliGRAM(s) Oral daily  levothyroxine 125 MICROGram(s) Oral daily  metoprolol succinate ER 50 milliGRAM(s) Oral daily    MEDICATIONS  (PRN):  acetaminophen     Tablet .. 650 milliGRAM(s) Oral every 6 hours PRN Mild Pain (1 - 3)  ALBUTerol    0.083% 2.5 milliGRAM(s) Nebulizer every 4 hours PRN Shortness of Breath and/or Wheezing  dextrose Oral Gel 15 Gram(s) Oral once PRN Blood Glucose LESS THAN 70 milliGRAM(s)/deciliter  melatonin 3 milliGRAM(s) Oral at bedtime PRN Insomnia      Vital Signs Last 24 Hrs  T(C): 37.2 (23 Jun 2022 12:32), Max: 37.2 (23 Jun 2022 12:32)  T(F): 98.9 (23 Jun 2022 12:32), Max: 98.9 (23 Jun 2022 12:32)  HR: 76 (23 Jun 2022 19:40) (65 - 77)  BP: 139/61 (23 Jun 2022 12:32) (139/61 - 154/68)  BP(mean): --  RR: 17 (23 Jun 2022 12:32) (17 - 18)  SpO2: 97% (23 Jun 2022 19:40) (91% - 97%)    I&O's Summary      PHYSICAL EXAM:  HEENT: NC/AT; PERRLA  Neck: Soft; no tenderness  Lungs: CTA bilaterally; no wheezing.   Heart:  Abdomen:  Genital/ Rectal:  Extremities:  Neurologic:  Vascular:      LABORATORY:    CBC Full  -  ( 23 Jun 2022 08:15 )  WBC Count : 18.75 K/uL  RBC Count : 4.70 M/uL  Hemoglobin : 13.1 g/dL  Hematocrit : 41.8 %  Platelet Count - Automated : 253 K/uL  Mean Cell Volume : 88.9 fl  Mean Cell Hemoglobin : 27.9 pg  Mean Cell Hemoglobin Concentration : 31.3 gm/dL  Auto Neutrophil # : 14.02 K/uL  Auto Lymphocyte # : 2.61 K/uL  Auto Monocyte # : 1.46 K/uL  Auto Eosinophil # : 0.41 K/uL  Auto Basophil # : 0.05 K/uL  Auto Neutrophil % : 74.7 %  Auto Lymphocyte % : 13.9 %  Auto Monocyte % : 7.8 %  Auto Eosinophil % : 2.2 %  Auto Basophil % : 0.3 %      ESR:                   06-22 @ 08:41  --    C-Reactive Protein:     06-22 @ 08:41  --    Procalcitonin:           06-22 @ 08:41   0.11  ESR:                   06-13 @ 15:38  --    C-Reactive Protein:     06-13 @ 15:38  21    Procalcitonin:           06-13 @ 15:38   --      06-23    137  |  103  |  60<H>  ----------------------------<  161<H>  4.5   |  25  |  1.47<H>    Ca    8.8      23 Jun 2022 08:15        Rapid Respiratory Viral Panel Result        06-13 @ 07:46  Rapid RVP Detected  Coronovirus --  Adenovirus --  Bordetella Pertussis --  Chlamydia Pneumonia --  Entero/Rhinovirus--  HKU1 Coronovirus --  HMPV Coronovirus Detected  Influenza A --  Influenza AH1 --  Influenza AH1 2009 --  Influenza AH3 --  Influenza B --  Mycoplasma Pneumoniae --  NL63 Coronovirus --  OC43 Coronovirus --  Parainfluenza 1 --  Parainfluenza 2 --  Parainfluenza 3 --  Parainfluenza 4 --  Resp Syncytial Virus --      Assessment and Plan:      1. COPD exacerbation due to viral infection with human meta pneumovirus.   2. Leukocytosis.  3. Dysphagia.    . Increasing leukocytosis is likely due to recent doses of IV and po steroids. Low suspicion for pneumonia or other infections as the patient SOB has improved and she has no cough or fevers now.  . Get UA and procalcitonin.  . Monitor the patient off antibiotics,  . Supportive care.          Lazaro De La Rosa MD   [phone].    No fevers   Comfortable     MEDICATIONS  (STANDING):  albuterol/ipratropium for Nebulization 3 milliLiter(s) Nebulizer every 6 hours  artificial tears (preservative free) Ophthalmic Solution 1 Drop(s) Both EYES two times a day  aspirin  chewable 81 milliGRAM(s) Oral daily  atorvastatin 80 milliGRAM(s) Oral at bedtime  cholecalciferol 2000 Unit(s) Oral daily  clopidogrel Tablet 75 milliGRAM(s) Oral daily  dextrose 5%. 1000 milliLiter(s) (100 mL/Hr) IV Continuous <Continuous>  dextrose 5%. 1000 milliLiter(s) (50 mL/Hr) IV Continuous <Continuous>  dextrose 50% Injectable 25 Gram(s) IV Push once  dextrose 50% Injectable 12.5 Gram(s) IV Push once  dextrose 50% Injectable 25 Gram(s) IV Push once  dextrose 50% Injectable 25 Gram(s) IV Push once  escitalopram 10 milliGRAM(s) Oral daily  gabapentin 100 milliGRAM(s) Oral three times a day  glucagon  Injectable 1 milliGRAM(s) IntraMuscular once  glucagon  Injectable 1 milliGRAM(s) IntraMuscular once  heparin   Injectable 5000 Unit(s) SubCutaneous every 12 hours  insulin glargine Injectable (LANTUS) 15 Unit(s) SubCutaneous every morning  insulin lispro (ADMELOG) corrective regimen sliding scale   SubCutaneous three times a day before meals  insulin lispro (ADMELOG) corrective regimen sliding scale   SubCutaneous at bedtime  insulin lispro Injectable (ADMELOG) 8 Unit(s) SubCutaneous three times a day before meals  isosorbide   mononitrate ER Tablet (IMDUR) 30 milliGRAM(s) Oral daily  levothyroxine 125 MICROGram(s) Oral daily  metoprolol succinate ER 50 milliGRAM(s) Oral daily    MEDICATIONS  (PRN):  acetaminophen     Tablet .. 650 milliGRAM(s) Oral every 6 hours PRN Mild Pain (1 - 3)  ALBUTerol    0.083% 2.5 milliGRAM(s) Nebulizer every 4 hours PRN Shortness of Breath and/or Wheezing  dextrose Oral Gel 15 Gram(s) Oral once PRN Blood Glucose LESS THAN 70 milliGRAM(s)/deciliter  melatonin 3 milliGRAM(s) Oral at bedtime PRN Insomnia      Vital Signs Last 24 Hrs  T(C): 37.2 (23 Jun 2022 12:32), Max: 37.2 (23 Jun 2022 12:32)  T(F): 98.9 (23 Jun 2022 12:32), Max: 98.9 (23 Jun 2022 12:32)  HR: 76 (23 Jun 2022 19:40) (65 - 77)  BP: 139/61 (23 Jun 2022 12:32) (139/61 - 154/68)  BP(mean): --  RR: 17 (23 Jun 2022 12:32) (17 - 18)  SpO2: 97% (23 Jun 2022 19:40) (91% - 97%)    I&O's Summary      PHYSICAL EXAM:  HEENT: NC/AT; PERRLA  Neck: Soft; no tenderness  Lungs: CTA bilaterally; no wheezing.   Heart:  Abdomen:  Genital/ Rectal:  Extremities:  Neurologic:  Vascular:      LABORATORY:    CBC Full  -  ( 23 Jun 2022 08:15 )  WBC Count : 18.75 K/uL  RBC Count : 4.70 M/uL  Hemoglobin : 13.1 g/dL  Hematocrit : 41.8 %  Platelet Count - Automated : 253 K/uL  Mean Cell Volume : 88.9 fl  Mean Cell Hemoglobin : 27.9 pg  Mean Cell Hemoglobin Concentration : 31.3 gm/dL  Auto Neutrophil # : 14.02 K/uL  Auto Lymphocyte # : 2.61 K/uL  Auto Monocyte # : 1.46 K/uL  Auto Eosinophil # : 0.41 K/uL  Auto Basophil # : 0.05 K/uL  Auto Neutrophil % : 74.7 %  Auto Lymphocyte % : 13.9 %  Auto Monocyte % : 7.8 %  Auto Eosinophil % : 2.2 %  Auto Basophil % : 0.3 %      ESR:                   06-22 @ 08:41  --    C-Reactive Protein:     06-22 @ 08:41  --    Procalcitonin:           06-22 @ 08:41   0.11  ESR:                   06-13 @ 15:38  --    C-Reactive Protein:     06-13 @ 15:38  21    Procalcitonin:           06-13 @ 15:38   --      06-23    137  |  103  |  60<H>  ----------------------------<  161<H>  4.5   |  25  |  1.47<H>    Ca    8.8      23 Jun 2022 08:15        Rapid Respiratory Viral Panel Result        06-13 @ 07:46  Rapid RVP Detected  Coronovirus --  Adenovirus --  Bordetella Pertussis --  Chlamydia Pneumonia --  Entero/Rhinovirus--  HKU1 Coronovirus --  HMPV Coronovirus Detected  Influenza A --  Influenza AH1 --  Influenza AH1 2009 --  Influenza AH3 --  Influenza B --  Mycoplasma Pneumoniae --  NL63 Coronovirus --  OC43 Coronovirus --  Parainfluenza 1 --  Parainfluenza 2 --  Parainfluenza 3 --  Parainfluenza 4 --  Resp Syncytial Virus --      Assessment and Plan:      1. COPD exacerbation due to viral infection with human meta pneumovirus.   2. Leukocytosis.  3. Dysphagia.    . UA was negative for UTI.  . Chest CT was suggestive of ground glass opacities in lower lobes, possible aspiration with dysphagia. She still has a persistent leukocytosis after steroid was discontinued 4 days ago. Will add po Augmentin 875 mg bid for 7 days for possible aspiration pneumonia.  . Monitor leukocytosis.  . Discharge planning.      Lazaro De La Rosa MD   [phone].    No fevers   Comfortable     MEDICATIONS  (STANDING):  albuterol/ipratropium for Nebulization 3 milliLiter(s) Nebulizer every 6 hours  artificial tears (preservative free) Ophthalmic Solution 1 Drop(s) Both EYES two times a day  aspirin  chewable 81 milliGRAM(s) Oral daily  atorvastatin 80 milliGRAM(s) Oral at bedtime  cholecalciferol 2000 Unit(s) Oral daily  clopidogrel Tablet 75 milliGRAM(s) Oral daily  dextrose 5%. 1000 milliLiter(s) (100 mL/Hr) IV Continuous <Continuous>  dextrose 5%. 1000 milliLiter(s) (50 mL/Hr) IV Continuous <Continuous>  dextrose 50% Injectable 25 Gram(s) IV Push once  dextrose 50% Injectable 12.5 Gram(s) IV Push once  dextrose 50% Injectable 25 Gram(s) IV Push once  dextrose 50% Injectable 25 Gram(s) IV Push once  escitalopram 10 milliGRAM(s) Oral daily  gabapentin 100 milliGRAM(s) Oral three times a day  glucagon  Injectable 1 milliGRAM(s) IntraMuscular once  glucagon  Injectable 1 milliGRAM(s) IntraMuscular once  heparin   Injectable 5000 Unit(s) SubCutaneous every 12 hours  insulin glargine Injectable (LANTUS) 15 Unit(s) SubCutaneous every morning  insulin lispro (ADMELOG) corrective regimen sliding scale   SubCutaneous three times a day before meals  insulin lispro (ADMELOG) corrective regimen sliding scale   SubCutaneous at bedtime  insulin lispro Injectable (ADMELOG) 8 Unit(s) SubCutaneous three times a day before meals  isosorbide   mononitrate ER Tablet (IMDUR) 30 milliGRAM(s) Oral daily  levothyroxine 125 MICROGram(s) Oral daily  metoprolol succinate ER 50 milliGRAM(s) Oral daily    MEDICATIONS  (PRN):  acetaminophen     Tablet .. 650 milliGRAM(s) Oral every 6 hours PRN Mild Pain (1 - 3)  ALBUTerol    0.083% 2.5 milliGRAM(s) Nebulizer every 4 hours PRN Shortness of Breath and/or Wheezing  dextrose Oral Gel 15 Gram(s) Oral once PRN Blood Glucose LESS THAN 70 milliGRAM(s)/deciliter  melatonin 3 milliGRAM(s) Oral at bedtime PRN Insomnia      Vital Signs Last 24 Hrs  T(C): 37.2 (23 Jun 2022 12:32), Max: 37.2 (23 Jun 2022 12:32)  T(F): 98.9 (23 Jun 2022 12:32), Max: 98.9 (23 Jun 2022 12:32)  HR: 76 (23 Jun 2022 19:40) (65 - 77)  BP: 139/61 (23 Jun 2022 12:32) (139/61 - 154/68)  BP(mean): --  RR: 17 (23 Jun 2022 12:32) (17 - 18)  SpO2: 97% (23 Jun 2022 19:40) (91% - 97%)    I&O's Summary      PHYSICAL EXAM:  HEENT: NC/AT; PERRLA  Neck: Soft; no tenderness  Lungs: CTA bilaterally; no wheezing.   Heart: RRR, no murmurs.  Abdomen: Soft, no tenderness.   Genital/ Rectal: No torres   Extremities: No ulcers  Neurologic: Awake.         LABORATORY:    CBC Full  -  ( 23 Jun 2022 08:15 )  WBC Count : 18.75 K/uL  RBC Count : 4.70 M/uL  Hemoglobin : 13.1 g/dL  Hematocrit : 41.8 %  Platelet Count - Automated : 253 K/uL  Mean Cell Volume : 88.9 fl  Mean Cell Hemoglobin : 27.9 pg  Mean Cell Hemoglobin Concentration : 31.3 gm/dL  Auto Neutrophil # : 14.02 K/uL  Auto Lymphocyte # : 2.61 K/uL  Auto Monocyte # : 1.46 K/uL  Auto Eosinophil # : 0.41 K/uL  Auto Basophil # : 0.05 K/uL  Auto Neutrophil % : 74.7 %  Auto Lymphocyte % : 13.9 %  Auto Monocyte % : 7.8 %  Auto Eosinophil % : 2.2 %  Auto Basophil % : 0.3 %      ESR:                   06-22 @ 08:41  --    C-Reactive Protein:     06-22 @ 08:41  --    Procalcitonin:           06-22 @ 08:41   0.11  ESR:                   06-13 @ 15:38  --    C-Reactive Protein:     06-13 @ 15:38  21    Procalcitonin:           06-13 @ 15:38   --      06-23    137  |  103  |  60<H>  ----------------------------<  161<H>  4.5   |  25  |  1.47<H>    Ca    8.8      23 Jun 2022 08:15        Rapid Respiratory Viral Panel Result        06-13 @ 07:46  Rapid RVP Detected  Coronovirus --  Adenovirus --  Bordetella Pertussis --  Chlamydia Pneumonia --  Entero/Rhinovirus--  HKU1 Coronovirus --  HMPV Coronovirus Detected  Influenza A --  Influenza AH1 --  Influenza AH1 2009 --  Influenza AH3 --  Influenza B --  Mycoplasma Pneumoniae --  NL63 Coronovirus --  OC43 Coronovirus --  Parainfluenza 1 --  Parainfluenza 2 --  Parainfluenza 3 --  Parainfluenza 4 --  Resp Syncytial Virus --      Assessment and Plan:      1. COPD exacerbation due to viral infection with human meta pneumovirus.   2. Leukocytosis.  3. Dysphagia.    . UA was negative for UTI.  . Chest CT was suggestive of ground glass opacities in lower lobes, possible aspiration with dysphagia. She still has a persistent leukocytosis after steroid was discontinued 4 days ago. Will add po Augmentin 875 mg bid for 7 days for possible aspiration pneumonia.  . Monitor leukocytosis.  . Discharge planning.      Lazaro De La Rosa MD   [phone].

## 2022-06-23 NOTE — SWALLOW VFSS/MBS ASSESSMENT ADULT - DIAGNOSTIC IMPRESSIONS
Overall improvement in participation with comparison to 6/21/22 MBS, however, pt still presents with a moderate oral dysphagia marked by reduced oral grading, reduced stripping from utensil requiring cues to utilize labial stripping to eliminate dental stripping across all food consistencies, poor oral grading from cup with all liquids, significant improvement with use of straw, insufficient mastication with soft & bite sized and minced & moist, reduced lingual ROM/strength/coordination, reduced attention to bolus requiring cues to increase swallow elicitation with foods, +posterior loss to oropharynx with soft & bite sized, minced & moist and moderately thick liquids, +posterior loss to hypopharynx with mildly thick and thin liquids.  Pharyngeal stage WFL for puree, soft & bite sized, minced & moist, and moderately thick liquids, no pharyngeal stasis, no penetration, no aspiration observed.  Pharyngeal stage with mildly thick and thin liquids notable for trace penetration above the vocal folds with clearance in 1/5 trials with both liquid densities, no aspiration observed.  Overall reduced cognition further impacting overall swallow function and presentation, discussed with Dr. Henriquez and RN Romi, pt with decline in overall presentation and oral stage of swallow with comparison to bedside swallow assessments during previous admission in 3/2022.  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
Overall limited study, pt with reduced PO acceptance requiring max cues to participate, pt only accepted 3 trials of puree and 3 trials of moderately thick liquids via teaspoon despite max encouragement, pt reported "not feeling well", therefore, solids not assessed and moderately thick liquids via cup, mildly thick liquids and thin liquids unable to be assessed at this time.   Moderate oral dysphagia with puree and moderately thick liquids via teaspoon marked by reduced oral grading, reduced bolus acceptance, requiring max cues to accept PO, reduced attention to bolus, requiring frequent redirection, reduced lingual ROM/strength/coordination. Suspect reduced cognition vs medical status further impacting participation and oral stage.  Pharyngeal stage WFL for small amounts of puree and moderately thick liquids via teaspoon.  No stasis, no penetration, no aspiration observed.  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  It should be noted, that this MBS is only one segment in time and does not reflect pt's swallow profile over the course of a meal.  If pt with inability to tolerate rx'd diet or overall decline in status, would rx to d/c PO diet & initiate NPO with SLP to reassess.  Lastly, would rx to consider repeat MBS to further assess all PO consistencies, when pt medically optimized.

## 2022-06-23 NOTE — PROGRESS NOTE ADULT - ASSESSMENT
87 year old female with PMHx COPD, HTN, HLD, T2DM on insulin, hx of MI presents from assisted living facility with dyspnea, wheezing, labored breathing admitted for COPD exacerbation and acute hypoxic resp failure due to hMPV.      Problem/Plan - 1:  ·  Problem: Acute respiratory failure with hypoxia.   ·  Plan: - COPD exacerbation  due to hMPV - resp viral infection + on admission   - was on BiPAP, weaned off BiPAP. Then on 2L O2 NC. Now saturating in 90s on RA.    -Xanax  PRN - duoneb prn  - steroids tapered off / completed on 06/19  - persistent leukocytosis so performed CT Chest which shows some groundglass opacity in base - discussed with ID (Estrella), started on Augmentin for suspected aspiration PNA  - Dr Copeland (pulm), recs appreciated     Problem/Plan - 2:  ·  Problem: COPD exacerbation.   - Continue home medications - c/w nebs  - steroids tapered off / completed on 06/19  - Supplemental O2 PRN. COPD patient will keep SpO2 goal 89-93%.   - Encourage incentive spirometry.     Problem/Plan - 3:  ·  Problem: Renal failure (ARF), acute on chronic.   ·  Plan: CKD cr at baseline ranging from 1.3-1.7 on outpatient HIE review  hold lasix per renal     Problem/Plan - 4:  ·  Problem: DM2 (diabetes mellitus, type 2).   ·  Plan: - Chronic, known history of T2DM  - C/W Lantus 15 U qd and and lispro 8U pre meals  - Moderate dose insulin corrective scale   - HgbA1c: 10  - endocrine following appreciate recs.     Problem/Plan - 5:  ·  Problem: HTN (hypertension).   ·  Plan: bp stable  - change lasix to 20mg prn on discharge per renal  - Continue home Metoprolol Succinate 50 mg qd with hold parameters  -monitor bp.     Problem/Plan - 6:  ·  Problem: Anxiety.   ·  Plan: stable  continue Xanax prn , lexapro.     Problem/Plan - 7:  ·  Problem: Hypothyroidism, unspecified type.   ·  Plan: Chronic  - Continue home Synthroid 125 mcg PO qd.     Problem/Plan - 8:  ·  Problem: HLD (hyperlipidemia).   ·  Plan: Chronic  - Continue home Atorvastatin 80 mg PO qhs.     Problem/Plan - 9:  ·  Problem: CAD (coronary artery disease).   ·  Plan: - Hx of MI  - Continue home Aspirin 81 mg PO qd, Plavix 75 mg PO qd, imdur 30mg daily   - Echo 3/11/2022: Left ventricle was of normal size, mild left ventricular hypertrophy LV   systolic function EF 65%. Moderate to severe aortic stenosis.     Problem/Plan - 10:  ·  Problem: Need for prophylactic measure.   ·  Plan; VTE ppx: Heparin 5000 subq q12h   DNR/DNI - [facility] filled   family now considering pursuing hospice as feel pt has had significant mental/physical decline

## 2022-06-23 NOTE — SWALLOW VFSS/MBS ASSESSMENT ADULT - RECOMMENDED CONSISTENCY
Puree with Moderately thick liquids, all PO to be fed via teaspoon
Puree with Thin liquids, as tolerated, utilize straw with liquids to increase bolus acceptance

## 2022-06-23 NOTE — SWALLOW VFSS/MBS ASSESSMENT ADULT - RECOMMENDED FEEDING/EATING TECHNIQUES
Utilize straw with liquids to increase bolus acceptance/allow for swallow between intakes/check mouth frequently for oral residue/pocketing/crush medication (when feasible)/maintain upright posture during/after eating for 30 mins/oral hygiene/position upright (90 degrees)/small sips/bites

## 2022-06-24 LAB
ALBUMIN SERPL ELPH-MCNC: 2.5 G/DL — LOW (ref 3.3–5)
ALP SERPL-CCNC: 74 U/L — SIGNIFICANT CHANGE UP (ref 40–120)
ALT FLD-CCNC: 20 U/L — SIGNIFICANT CHANGE UP (ref 12–78)
ANION GAP SERPL CALC-SCNC: 8 MMOL/L — SIGNIFICANT CHANGE UP (ref 5–17)
AST SERPL-CCNC: 23 U/L — SIGNIFICANT CHANGE UP (ref 15–37)
BASOPHILS # BLD AUTO: 0.06 K/UL — SIGNIFICANT CHANGE UP (ref 0–0.2)
BASOPHILS NFR BLD AUTO: 0.3 % — SIGNIFICANT CHANGE UP (ref 0–2)
BILIRUB SERPL-MCNC: 0.8 MG/DL — SIGNIFICANT CHANGE UP (ref 0.2–1.2)
BUN SERPL-MCNC: 54 MG/DL — HIGH (ref 7–23)
CALCIUM SERPL-MCNC: 8.6 MG/DL — SIGNIFICANT CHANGE UP (ref 8.5–10.1)
CHLORIDE SERPL-SCNC: 106 MMOL/L — SIGNIFICANT CHANGE UP (ref 96–108)
CO2 SERPL-SCNC: 25 MMOL/L — SIGNIFICANT CHANGE UP (ref 22–31)
CREAT SERPL-MCNC: 1.5 MG/DL — HIGH (ref 0.5–1.3)
EGFR: 34 ML/MIN/1.73M2 — LOW
EOSINOPHIL # BLD AUTO: 0.25 K/UL — SIGNIFICANT CHANGE UP (ref 0–0.5)
EOSINOPHIL NFR BLD AUTO: 1.3 % — SIGNIFICANT CHANGE UP (ref 0–6)
GLUCOSE SERPL-MCNC: 208 MG/DL — HIGH (ref 70–99)
HCT VFR BLD CALC: 42.4 % — SIGNIFICANT CHANGE UP (ref 34.5–45)
HGB BLD-MCNC: 13.4 G/DL — SIGNIFICANT CHANGE UP (ref 11.5–15.5)
IMM GRANULOCYTES NFR BLD AUTO: 1.3 % — SIGNIFICANT CHANGE UP (ref 0–1.5)
LYMPHOCYTES # BLD AUTO: 12.8 % — LOW (ref 13–44)
LYMPHOCYTES # BLD AUTO: 2.47 K/UL — SIGNIFICANT CHANGE UP (ref 1–3.3)
MCHC RBC-ENTMCNC: 28.3 PG — SIGNIFICANT CHANGE UP (ref 27–34)
MCHC RBC-ENTMCNC: 31.6 GM/DL — LOW (ref 32–36)
MCV RBC AUTO: 89.5 FL — SIGNIFICANT CHANGE UP (ref 80–100)
MONOCYTES # BLD AUTO: 1.56 K/UL — HIGH (ref 0–0.9)
MONOCYTES NFR BLD AUTO: 8.1 % — SIGNIFICANT CHANGE UP (ref 2–14)
NEUTROPHILS # BLD AUTO: 14.68 K/UL — HIGH (ref 1.8–7.4)
NEUTROPHILS NFR BLD AUTO: 76.2 % — SIGNIFICANT CHANGE UP (ref 43–77)
NRBC # BLD: 0 /100 WBCS — SIGNIFICANT CHANGE UP (ref 0–0)
PLATELET # BLD AUTO: 244 K/UL — SIGNIFICANT CHANGE UP (ref 150–400)
POTASSIUM SERPL-MCNC: 4.1 MMOL/L — SIGNIFICANT CHANGE UP (ref 3.5–5.3)
POTASSIUM SERPL-SCNC: 4.1 MMOL/L — SIGNIFICANT CHANGE UP (ref 3.5–5.3)
PROCALCITONIN SERPL-MCNC: 0.11 NG/ML — HIGH
PROT SERPL-MCNC: 6.5 G/DL — SIGNIFICANT CHANGE UP (ref 6–8.3)
RBC # BLD: 4.74 M/UL — SIGNIFICANT CHANGE UP (ref 3.8–5.2)
RBC # FLD: 13.2 % — SIGNIFICANT CHANGE UP (ref 10.3–14.5)
SODIUM SERPL-SCNC: 139 MMOL/L — SIGNIFICANT CHANGE UP (ref 135–145)
WBC # BLD: 19.27 K/UL — HIGH (ref 3.8–10.5)
WBC # FLD AUTO: 19.27 K/UL — HIGH (ref 3.8–10.5)

## 2022-06-24 PROCEDURE — 99497 ADVNCD CARE PLAN 30 MIN: CPT

## 2022-06-24 PROCEDURE — 99233 SBSQ HOSP IP/OBS HIGH 50: CPT

## 2022-06-24 RX ADMIN — CLOPIDOGREL BISULFATE 75 MILLIGRAM(S): 75 TABLET, FILM COATED ORAL at 12:19

## 2022-06-24 RX ADMIN — HEPARIN SODIUM 5000 UNIT(S): 5000 INJECTION INTRAVENOUS; SUBCUTANEOUS at 17:45

## 2022-06-24 RX ADMIN — Medication 81 MILLIGRAM(S): at 12:18

## 2022-06-24 RX ADMIN — Medication 1 DROP(S): at 05:34

## 2022-06-24 RX ADMIN — ESCITALOPRAM OXALATE 10 MILLIGRAM(S): 10 TABLET, FILM COATED ORAL at 12:19

## 2022-06-24 RX ADMIN — Medication 8 UNIT(S): at 12:18

## 2022-06-24 RX ADMIN — Medication 50 MILLIGRAM(S): at 05:34

## 2022-06-24 RX ADMIN — HEPARIN SODIUM 5000 UNIT(S): 5000 INJECTION INTRAVENOUS; SUBCUTANEOUS at 05:35

## 2022-06-24 RX ADMIN — GABAPENTIN 100 MILLIGRAM(S): 400 CAPSULE ORAL at 22:50

## 2022-06-24 RX ADMIN — Medication 125 MICROGRAM(S): at 05:34

## 2022-06-24 RX ADMIN — Medication 2000 UNIT(S): at 12:19

## 2022-06-24 RX ADMIN — Medication 1 TABLET(S): at 05:34

## 2022-06-24 RX ADMIN — Medication 1: at 17:44

## 2022-06-24 RX ADMIN — Medication 1 DROP(S): at 17:47

## 2022-06-24 RX ADMIN — Medication 1: at 12:18

## 2022-06-24 RX ADMIN — Medication 8 UNIT(S): at 07:58

## 2022-06-24 RX ADMIN — GABAPENTIN 100 MILLIGRAM(S): 400 CAPSULE ORAL at 05:34

## 2022-06-24 RX ADMIN — Medication 3 MILLILITER(S): at 13:06

## 2022-06-24 RX ADMIN — Medication 3 MILLILITER(S): at 07:24

## 2022-06-24 RX ADMIN — GABAPENTIN 100 MILLIGRAM(S): 400 CAPSULE ORAL at 15:59

## 2022-06-24 RX ADMIN — ISOSORBIDE MONONITRATE 30 MILLIGRAM(S): 60 TABLET, EXTENDED RELEASE ORAL at 12:19

## 2022-06-24 RX ADMIN — Medication 1 TABLET(S): at 17:45

## 2022-06-24 RX ADMIN — Medication 2: at 07:58

## 2022-06-24 RX ADMIN — Medication 3 MILLILITER(S): at 01:57

## 2022-06-24 RX ADMIN — Medication 3 MILLILITER(S): at 20:37

## 2022-06-24 RX ADMIN — ATORVASTATIN CALCIUM 80 MILLIGRAM(S): 80 TABLET, FILM COATED ORAL at 22:50

## 2022-06-24 RX ADMIN — Medication 8 UNIT(S): at 17:44

## 2022-06-24 RX ADMIN — INSULIN GLARGINE 15 UNIT(S): 100 INJECTION, SOLUTION SUBCUTANEOUS at 07:58

## 2022-06-24 NOTE — PROGRESS NOTE ADULT - SUBJECTIVE AND OBJECTIVE BOX
Interval History:    CENTRAL LINE:   [  ] YES       [  ] NO  PACE:                 [  ] YES       [  ] NO         REVIEW OF SYSTEMS:  All Systems below were reviewed and are negative [  ]  HEENT:  ID:  Pulmonary:  Cardiac:  GI:  Renal:  Musculoskeletal:  All other systems above were reviewed and are negative   [  ]      MEDICATIONS  (STANDING):  albuterol/ipratropium for Nebulization 3 milliLiter(s) Nebulizer every 6 hours  amoxicillin  875 milliGRAM(s)/clavulanate 1 Tablet(s) Oral two times a day  artificial tears (preservative free) Ophthalmic Solution 1 Drop(s) Both EYES two times a day  aspirin  chewable 81 milliGRAM(s) Oral daily  atorvastatin 80 milliGRAM(s) Oral at bedtime  cholecalciferol 2000 Unit(s) Oral daily  clopidogrel Tablet 75 milliGRAM(s) Oral daily  dextrose 5%. 1000 milliLiter(s) (100 mL/Hr) IV Continuous <Continuous>  dextrose 5%. 1000 milliLiter(s) (50 mL/Hr) IV Continuous <Continuous>  dextrose 50% Injectable 25 Gram(s) IV Push once  dextrose 50% Injectable 12.5 Gram(s) IV Push once  dextrose 50% Injectable 25 Gram(s) IV Push once  dextrose 50% Injectable 25 Gram(s) IV Push once  escitalopram 10 milliGRAM(s) Oral daily  gabapentin 100 milliGRAM(s) Oral three times a day  glucagon  Injectable 1 milliGRAM(s) IntraMuscular once  glucagon  Injectable 1 milliGRAM(s) IntraMuscular once  heparin   Injectable 5000 Unit(s) SubCutaneous every 12 hours  insulin glargine Injectable (LANTUS) 15 Unit(s) SubCutaneous every morning  insulin lispro (ADMELOG) corrective regimen sliding scale   SubCutaneous three times a day before meals  insulin lispro (ADMELOG) corrective regimen sliding scale   SubCutaneous at bedtime  insulin lispro Injectable (ADMELOG) 8 Unit(s) SubCutaneous three times a day before meals  isosorbide   mononitrate ER Tablet (IMDUR) 30 milliGRAM(s) Oral daily  levothyroxine 125 MICROGram(s) Oral daily  metoprolol succinate ER 50 milliGRAM(s) Oral daily    MEDICATIONS  (PRN):  acetaminophen     Tablet .. 650 milliGRAM(s) Oral every 6 hours PRN Mild Pain (1 - 3)  ALBUTerol    0.083% 2.5 milliGRAM(s) Nebulizer every 4 hours PRN Shortness of Breath and/or Wheezing  dextrose Oral Gel 15 Gram(s) Oral once PRN Blood Glucose LESS THAN 70 milliGRAM(s)/deciliter  melatonin 3 milliGRAM(s) Oral at bedtime PRN Insomnia      Vital Signs Last 24 Hrs  T(C): 37 (24 Jun 2022 12:53), Max: 37 (24 Jun 2022 12:53)  T(F): 98.6 (24 Jun 2022 12:53), Max: 98.6 (24 Jun 2022 12:53)  HR: 84 (24 Jun 2022 12:53) (69 - 86)  BP: 142/83 (24 Jun 2022 12:53) (116/71 - 152/70)  BP(mean): --  RR: 18 (24 Jun 2022 12:53) (17 - 18)  SpO2: 93% (24 Jun 2022 12:53) (92% - 97%)    I&O's Summary    24 Jun 2022 07:01  -  24 Jun 2022 19:04  --------------------------------------------------------  IN: 360 mL / OUT: 0 mL / NET: 360 mL        PHYSICAL EXAM:  HEENT: NC/AT; PERRLA  Neck: Soft; no tenderness  Lungs: CTA bilaterally; no wheezing.   Heart:  Abdomen:  Genital/ Rectal:  Extremities:  Neurologic:  Vascular:      LABORATORY:    CBC Full  -  ( 24 Jun 2022 07:10 )  WBC Count : 19.27 K/uL  RBC Count : 4.74 M/uL  Hemoglobin : 13.4 g/dL  Hematocrit : 42.4 %  Platelet Count - Automated : 244 K/uL  Mean Cell Volume : 89.5 fl  Mean Cell Hemoglobin : 28.3 pg  Mean Cell Hemoglobin Concentration : 31.6 gm/dL  Auto Neutrophil # : 14.68 K/uL  Auto Lymphocyte # : 2.47 K/uL  Auto Monocyte # : 1.56 K/uL  Auto Eosinophil # : 0.25 K/uL  Auto Basophil # : 0.06 K/uL  Auto Neutrophil % : 76.2 %  Auto Lymphocyte % : 12.8 %  Auto Monocyte % : 8.1 %  Auto Eosinophil % : 1.3 %  Auto Basophil % : 0.3 %      ESR:                   06-24 @ 07:10  --    C-Reactive Protein:     06-24 @ 07:10  --    Procalcitonin:           06-24 @ 07:10   0.11  ESR:                   06-22 @ 08:41  --    C-Reactive Protein:     06-22 @ 08:41  --    Procalcitonin:           06-22 @ 08:41   0.11  ESR:                   06-13 @ 15:38  --    C-Reactive Protein:     06-13 @ 15:38  21    Procalcitonin:           06-13 @ 15:38   --      06-24    139  |  106  |  54<H>  ----------------------------<  208<H>  4.1   |  25  |  1.50<H>    Ca    8.6      24 Jun 2022 07:10    TPro  6.5  /  Alb  2.5<L>  /  TBili  0.8  /  DBili  x   /  AST  23  /  ALT  20  /  AlkPhos  74  06-24      Rapid Respiratory Viral Panel Result        06-13 @ 07:46  Rapid RVP Detected  Coronovirus --  Adenovirus --  Bordetella Pertussis --  Chlamydia Pneumonia --  Entero/Rhinovirus--  HKU1 Coronovirus --  HMPV Coronovirus Detected  Influenza A --  Influenza AH1 --  Influenza AH1 2009 --  Influenza AH3 --  Influenza B --  Mycoplasma Pneumoniae --  NL63 Coronovirus --  OC43 Coronovirus --  Parainfluenza 1 --  Parainfluenza 2 --  Parainfluenza 3 --  Parainfluenza 4 --  Resp Syncytial Virus --      Assessment and Plan:          Lazaro De La Rosa MD   [phone].  No fevers  Comfortable      MEDICATIONS  (STANDING):  albuterol/ipratropium for Nebulization 3 milliLiter(s) Nebulizer every 6 hours  amoxicillin  875 milliGRAM(s)/clavulanate 1 Tablet(s) Oral two times a day  artificial tears (preservative free) Ophthalmic Solution 1 Drop(s) Both EYES two times a day  aspirin  chewable 81 milliGRAM(s) Oral daily  atorvastatin 80 milliGRAM(s) Oral at bedtime  cholecalciferol 2000 Unit(s) Oral daily  clopidogrel Tablet 75 milliGRAM(s) Oral daily  dextrose 5%. 1000 milliLiter(s) (100 mL/Hr) IV Continuous <Continuous>  dextrose 5%. 1000 milliLiter(s) (50 mL/Hr) IV Continuous <Continuous>  dextrose 50% Injectable 25 Gram(s) IV Push once  dextrose 50% Injectable 12.5 Gram(s) IV Push once  dextrose 50% Injectable 25 Gram(s) IV Push once  dextrose 50% Injectable 25 Gram(s) IV Push once  escitalopram 10 milliGRAM(s) Oral daily  gabapentin 100 milliGRAM(s) Oral three times a day  glucagon  Injectable 1 milliGRAM(s) IntraMuscular once  glucagon  Injectable 1 milliGRAM(s) IntraMuscular once  heparin   Injectable 5000 Unit(s) SubCutaneous every 12 hours  insulin glargine Injectable (LANTUS) 15 Unit(s) SubCutaneous every morning  insulin lispro (ADMELOG) corrective regimen sliding scale   SubCutaneous three times a day before meals  insulin lispro (ADMELOG) corrective regimen sliding scale   SubCutaneous at bedtime  insulin lispro Injectable (ADMELOG) 8 Unit(s) SubCutaneous three times a day before meals  isosorbide   mononitrate ER Tablet (IMDUR) 30 milliGRAM(s) Oral daily  levothyroxine 125 MICROGram(s) Oral daily  metoprolol succinate ER 50 milliGRAM(s) Oral daily    MEDICATIONS  (PRN):  acetaminophen     Tablet .. 650 milliGRAM(s) Oral every 6 hours PRN Mild Pain (1 - 3)  ALBUTerol    0.083% 2.5 milliGRAM(s) Nebulizer every 4 hours PRN Shortness of Breath and/or Wheezing  dextrose Oral Gel 15 Gram(s) Oral once PRN Blood Glucose LESS THAN 70 milliGRAM(s)/deciliter  melatonin 3 milliGRAM(s) Oral at bedtime PRN Insomnia      Vital Signs Last 24 Hrs  T(C): 37 (24 Jun 2022 12:53), Max: 37 (24 Jun 2022 12:53)  T(F): 98.6 (24 Jun 2022 12:53), Max: 98.6 (24 Jun 2022 12:53)  HR: 84 (24 Jun 2022 12:53) (69 - 86)  BP: 142/83 (24 Jun 2022 12:53) (116/71 - 152/70)  BP(mean): --  RR: 18 (24 Jun 2022 12:53) (17 - 18)  SpO2: 93% (24 Jun 2022 12:53) (92% - 97%)    I&O's Summary    24 Jun 2022 07:01  -  24 Jun 2022 19:04  --------------------------------------------------------  IN: 360 mL / OUT: 0 mL / NET: 360 mL        PHYSICAL EXAM:  HEENT: NC/AT; PERRLA  Neck: Soft; no tenderness  Lungs: CTA bilaterally; no wheezing.   Heart:  Abdomen:  Genital/ Rectal:  Extremities:  Neurologic:  Vascular:      LABORATORY:    CBC Full  -  ( 24 Jun 2022 07:10 )  WBC Count : 19.27 K/uL  RBC Count : 4.74 M/uL  Hemoglobin : 13.4 g/dL  Hematocrit : 42.4 %  Platelet Count - Automated : 244 K/uL  Mean Cell Volume : 89.5 fl  Mean Cell Hemoglobin : 28.3 pg  Mean Cell Hemoglobin Concentration : 31.6 gm/dL  Auto Neutrophil # : 14.68 K/uL  Auto Lymphocyte # : 2.47 K/uL  Auto Monocyte # : 1.56 K/uL  Auto Eosinophil # : 0.25 K/uL  Auto Basophil # : 0.06 K/uL  Auto Neutrophil % : 76.2 %  Auto Lymphocyte % : 12.8 %  Auto Monocyte % : 8.1 %  Auto Eosinophil % : 1.3 %  Auto Basophil % : 0.3 %      ESR:                   06-24 @ 07:10  --    C-Reactive Protein:     06-24 @ 07:10  --    Procalcitonin:           06-24 @ 07:10   0.11  ESR:                   06-22 @ 08:41  --    C-Reactive Protein:     06-22 @ 08:41  --    Procalcitonin:           06-22 @ 08:41   0.11  ESR:                   06-13 @ 15:38  --    C-Reactive Protein:     06-13 @ 15:38  21    Procalcitonin:           06-13 @ 15:38   --      06-24    139  |  106  |  54<H>  ----------------------------<  208<H>  4.1   |  25  |  1.50<H>    Ca    8.6      24 Jun 2022 07:10    TPro  6.5  /  Alb  2.5<L>  /  TBili  0.8  /  DBili  x   /  AST  23  /  ALT  20  /  AlkPhos  74  06-24        Assessment and Plan:    1. COPD exacerbation due to viral infection with human meta pneumovirus.   2. Leukocytosis.  3. Dysphagia.    . UA was negative for UTI.  . Chest CT was suggestive of ground glass opacities in lower lobes, possible aspiration with dysphagia. She still has a persistent leukocytosis after steroid was discontinued 4 days ago. Will add po Augmentin 875 mg bid for 7 days for possible aspiration pneumonia.  . Monitor leukocytosis.  . Discharge planning.        Lazaro De La Rosa MD   [phone].  No fevers  Comfortable      MEDICATIONS  (STANDING):  albuterol/ipratropium for Nebulization 3 milliLiter(s) Nebulizer every 6 hours  amoxicillin  875 milliGRAM(s)/clavulanate 1 Tablet(s) Oral two times a day  artificial tears (preservative free) Ophthalmic Solution 1 Drop(s) Both EYES two times a day  aspirin  chewable 81 milliGRAM(s) Oral daily  atorvastatin 80 milliGRAM(s) Oral at bedtime  cholecalciferol 2000 Unit(s) Oral daily  clopidogrel Tablet 75 milliGRAM(s) Oral daily  dextrose 5%. 1000 milliLiter(s) (100 mL/Hr) IV Continuous <Continuous>  dextrose 5%. 1000 milliLiter(s) (50 mL/Hr) IV Continuous <Continuous>  dextrose 50% Injectable 25 Gram(s) IV Push once  dextrose 50% Injectable 12.5 Gram(s) IV Push once  dextrose 50% Injectable 25 Gram(s) IV Push once  dextrose 50% Injectable 25 Gram(s) IV Push once  escitalopram 10 milliGRAM(s) Oral daily  gabapentin 100 milliGRAM(s) Oral three times a day  glucagon  Injectable 1 milliGRAM(s) IntraMuscular once  glucagon  Injectable 1 milliGRAM(s) IntraMuscular once  heparin   Injectable 5000 Unit(s) SubCutaneous every 12 hours  insulin glargine Injectable (LANTUS) 15 Unit(s) SubCutaneous every morning  insulin lispro (ADMELOG) corrective regimen sliding scale   SubCutaneous three times a day before meals  insulin lispro (ADMELOG) corrective regimen sliding scale   SubCutaneous at bedtime  insulin lispro Injectable (ADMELOG) 8 Unit(s) SubCutaneous three times a day before meals  isosorbide   mononitrate ER Tablet (IMDUR) 30 milliGRAM(s) Oral daily  levothyroxine 125 MICROGram(s) Oral daily  metoprolol succinate ER 50 milliGRAM(s) Oral daily    MEDICATIONS  (PRN):  acetaminophen     Tablet .. 650 milliGRAM(s) Oral every 6 hours PRN Mild Pain (1 - 3)  ALBUTerol    0.083% 2.5 milliGRAM(s) Nebulizer every 4 hours PRN Shortness of Breath and/or Wheezing  dextrose Oral Gel 15 Gram(s) Oral once PRN Blood Glucose LESS THAN 70 milliGRAM(s)/deciliter  melatonin 3 milliGRAM(s) Oral at bedtime PRN Insomnia      Vital Signs Last 24 Hrs  T(C): 37 (24 Jun 2022 12:53), Max: 37 (24 Jun 2022 12:53)  T(F): 98.6 (24 Jun 2022 12:53), Max: 98.6 (24 Jun 2022 12:53)  HR: 84 (24 Jun 2022 12:53) (69 - 86)  BP: 142/83 (24 Jun 2022 12:53) (116/71 - 152/70)  BP(mean): --  RR: 18 (24 Jun 2022 12:53) (17 - 18)  SpO2: 93% (24 Jun 2022 12:53) (92% - 97%)    I&O's Summary    24 Jun 2022 07:01  -  24 Jun 2022 19:04  --------------------------------------------------------  IN: 360 mL / OUT: 0 mL / NET: 360 mL    PHYSICAL EXAM:  HEENT: NC/AT; PERRLA  Neck: Soft; no tenderness  Lungs: Coarse BS  bilaterally; no wheezing.   Heart: RRR, no murmurs.   Abdomen: Soft, no tenderness.   Genital/ Rectal: No torres   Extremities: No ulcers or edema   Neurologic: Confused. Comfortable,         LABORATORY:    CBC Full  -  ( 24 Jun 2022 07:10 )  WBC Count : 19.27 K/uL  RBC Count : 4.74 M/uL  Hemoglobin : 13.4 g/dL  Hematocrit : 42.4 %  Platelet Count - Automated : 244 K/uL  Mean Cell Volume : 89.5 fl  Mean Cell Hemoglobin : 28.3 pg  Mean Cell Hemoglobin Concentration : 31.6 gm/dL  Auto Neutrophil # : 14.68 K/uL  Auto Lymphocyte # : 2.47 K/uL  Auto Monocyte # : 1.56 K/uL  Auto Eosinophil # : 0.25 K/uL  Auto Basophil # : 0.06 K/uL  Auto Neutrophil % : 76.2 %  Auto Lymphocyte % : 12.8 %  Auto Monocyte % : 8.1 %  Auto Eosinophil % : 1.3 %  Auto Basophil % : 0.3 %      ESR:                   06-24 @ 07:10  --    C-Reactive Protein:     06-24 @ 07:10  --    Procalcitonin:           06-24 @ 07:10   0.11  ESR:                   06-22 @ 08:41  --    C-Reactive Protein:     06-22 @ 08:41  --    Procalcitonin:           06-22 @ 08:41   0.11  ESR:                   06-13 @ 15:38  --    C-Reactive Protein:     06-13 @ 15:38  21    Procalcitonin:           06-13 @ 15:38   --      06-24    139  |  106  |  54<H>  ----------------------------<  208<H>  4.1   |  25  |  1.50<H>    Ca    8.6      24 Jun 2022 07:10    TPro  6.5  /  Alb  2.5<L>  /  TBili  0.8  /  DBili  x   /  AST  23  /  ALT  20  /  AlkPhos  74  06-24        Assessment and Plan:    1. COPD exacerbation due to viral infection with human meta pneumovirus.   2. Leukocytosis.  3. Dysphagia.    . UA was negative for UTI.  . Chest CT was suggestive of ground glass opacities in lower lobes, possible aspiration with dysphagia. She still has a persistent leukocytosis after steroid was discontinued 5 days ago. Continue po Augmentin 875 mg bid for 7 days for possible aspiration pneumonia.  . Monitor leukocytosis.  . Discharge planning.        Lazaro De La Rosa MD   [phone].

## 2022-06-24 NOTE — PROGRESS NOTE ADULT - SUBJECTIVE AND OBJECTIVE BOX
Patient is a 87y old  Female who presents with a chief complaint of COPD Exacerbation (16 Jun 2022 11:01)       HPI:  87 year old female with PMHx COPD, HTN, HLD, T2DM on insulin, hx of MI presents from assisted living facility with dyspnea, wheezing, labored breathing. Patient recently admitted to [facility] 3/9-15/2022 for COPD exacerbation. History obtained from son Calvin, as patient is limited historian at baseline. Son states he received a call from [facility] this AM stating patient is having labored breathing. Patient was sent to [facility] for further management.   She states her breathing is improved.  Denies any N/V/Dizziness.  Has not seen nephrologist in the past.  No urinary issues. Denies NSAID usage.    No acute events noted       PAST MEDICAL & SURGICAL HISTORY:  Uncomplicated asthma, unspecified asthma severity      DM2 (diabetes mellitus, type 2)      Essential hypertension      Hypothyroidism, unspecified type      Pure hypercholesterolemia      Gastroesophageal reflux disease without esophagitis      Diabetes      Asthma      CAD (coronary artery disease)      HTN (hypertension)      HLD (hyperlipidemia)      Hypothyroid      NSTEMI (non-ST elevated myocardial infarction)      History of appendectomy      History of appendectomy      Abnormal findings on cardiac catheterization  Cardiac Cath           FAMILY HISTORY:  Family history of heart disease    Family history of cancer in mother    Family history of MI (myocardial infarction)    Family history of stomach cancer    NC    Social History:Non smoker    MEDICATIONS  (STANDING):  albuterol/ipratropium for Nebulization 3 milliLiter(s) Nebulizer every 6 hours  artificial tears (preservative free) Ophthalmic Solution 1 Drop(s) Both EYES two times a day  aspirin  chewable 81 milliGRAM(s) Oral daily  atorvastatin 80 milliGRAM(s) Oral at bedtime  cholecalciferol 2000 Unit(s) Oral daily  clopidogrel Tablet 75 milliGRAM(s) Oral daily  dextrose 5%. 1000 milliLiter(s) (50 mL/Hr) IV Continuous <Continuous>  dextrose 5%. 1000 milliLiter(s) (100 mL/Hr) IV Continuous <Continuous>  dextrose 50% Injectable 25 Gram(s) IV Push once  dextrose 50% Injectable 12.5 Gram(s) IV Push once  dextrose 50% Injectable 25 Gram(s) IV Push once  escitalopram 10 milliGRAM(s) Oral daily  furosemide   Injectable 40 milliGRAM(s) IV Push <User Schedule>  gabapentin 100 milliGRAM(s) Oral three times a day  glucagon  Injectable 1 milliGRAM(s) IntraMuscular once  heparin   Injectable 5000 Unit(s) SubCutaneous every 12 hours  insulin glargine Injectable (LANTUS) 15 Unit(s) SubCutaneous every morning  insulin lispro (ADMELOG) corrective regimen sliding scale   SubCutaneous every 6 hours  insulin lispro Injectable (ADMELOG) 8 Unit(s) SubCutaneous three times a day before meals  isosorbide   mononitrate ER Tablet (IMDUR) 30 milliGRAM(s) Oral daily  levothyroxine 125 MICROGram(s) Oral daily  methylPREDNISolone sodium succinate Injectable 40 milliGRAM(s) IV Push daily  metoprolol succinate ER 50 milliGRAM(s) Oral daily    MEDICATIONS  (PRN):  acetaminophen     Tablet .. 650 milliGRAM(s) Oral every 6 hours PRN Mild Pain (1 - 3)  ALBUTerol    0.083% 2.5 milliGRAM(s) Nebulizer every 4 hours PRN Shortness of Breath and/or Wheezing  ALPRAZolam 0.5 milliGRAM(s) Oral two times a day PRN Anxiety  dextrose Oral Gel 15 Gram(s) Oral once PRN Blood Glucose LESS THAN 70 milliGRAM(s)/deciliter  melatonin 3 milliGRAM(s) Oral at bedtime PRN Insomnia   Meds reviewed    Allergies    doxycycline (Unknown)  iodine (Hives)  iodine containing compounds (Unknown)  shellfish (Anaphylaxis)  shellfish (Swelling; Short breath)    Intolerances         REVIEW OF SYSTEMS:   Constitutional: Denies fatigue  HEENT: Denies headaches and dizziness  Respiratory: denies  cough, or wheezing, improving SOB  Cardiovascular: denies CP, palpitations  Gastrointestinal: Denies nausea, denies vomiting, diarrhea, constipation, abdominal pain, or bloody stools  Genitourinary: denies painful urination, increased frequency, urgency, or bloody urine  Skin: denies rashes or itching  Musculoskeletal: denies muscle aches, joint swelling  Neurologic: Denies generalized weakness, denies loss of sensation, numbness, or tingling    Vital Signs Last 24 Hrs  T(C): 36.9 (24 Jun 2022 05:15), Max: 37.2 (23 Jun 2022 12:32)  T(F): 98.5 (24 Jun 2022 05:15), Max: 98.9 (23 Jun 2022 12:32)  HR: 70 (24 Jun 2022 07:25) (65 - 86)  BP: 116/71 (24 Jun 2022 05:15) (116/71 - 152/70)  BP(mean): --  RR: 17 (24 Jun 2022 05:15) (17 - 17)  SpO2: 96% (24 Jun 2022 07:25) (92% - 97%)    PHYSICAL EXAM:    GENERAL: NAD  HEAD:  Atraumatic, Normocephalic  EYES: EOMI, conjunctiva and sclera clear  ENMT: No Drainage from nares, No drainage from ears  NECK: Supple, neck  veins full  NERVOUS SYSTEM:  Awake and Alert  CHEST/LUNG: Clear to percussion bilaterally; No rales, rhonchi, wheezing, or rubs  HEART: Regular rate and rhythm; No murmurs, rubs, or gallops  ABDOMEN: Soft, Nontender, Nondistended; Bowel sounds present  EXTREMITIES:  No Edema  SKIN: No rashes No obvious ecchymosis      LABS:                                            13.4   19.27 )-----------( 244      ( 24 Jun 2022 07:10 )             42.4     06-24    139  |  106  |  54<H>  ----------------------------<  208<H>  4.1   |  25  |  1.50<H>    Ca    8.6      24 Jun 2022 07:10    TPro  6.5  /  Alb  2.5<L>  /  TBili  0.8  /  DBili  x   /  AST  23  /  ALT  20  /  AlkPhos  74  06-24

## 2022-06-24 NOTE — GOALS OF CARE CONVERSATION - ADVANCED CARE PLANNING - CONVERSATION DETAILS
Writer met with son/HCP Calvin. Reviewed patient's medical and social history as well as events leading to patient's hospitalization. Writer discussed patient's current diagnosis (SOB, NSTEMI, CAD, DM, Gerd), medical condition and management, prognosis, and life expectancy. Inquired about patient's wishes regarding extent of medical care to be provided including escalation of medical care into the ICU and use of vasopressor support. In addition, the writer inquired about thoughts regarding cardiopulmonary resuscitation, artificial nutrition and hydration including use of feeding tubes and IVF, antibiotics, and further investigative studies such as blood draws and radiology. Neil  showed good  insight into medical condition. Pts condition has changed since entering [facility] ,she requires  more assistance may need SHERIN.  All questions answered. Writer recommended hospice when pt is appropriate. Neil consented to DNR/DNI. MOLST form in chart. Psychosocial support provided.

## 2022-06-24 NOTE — PROGRESS NOTE ADULT - ASSESSMENT
87 year old female with PMHx COPD, HTN, HLD, T2DM on insulin, hx of MI presents from assisted living facility with dyspnea, wheezing, labored breathing    copd  copd ex  TRENTON CKD  HTN  OP  OA  DM  HLD  Moderate to Severe AS  CAD    on ABX  CT chest from June 23 noted  CT chest equivocal for PNA  vs noted  labs reviewed    cvs rx regimen  BP control  serial renal indices  I and O  monitor VS and HD and Sat  HOB elev - asp prec - assist with needs - ADL - GOC discussion  pt has hMPV - resp viral infection - isolation precs - acap PRN - robitussin PRN -   cxr - labs reviewed  copd - rx regimen PRN for sob and or wheeze, NEBS, not a candidate for Inhaler use  proBNP noted  ABG noted  old records reviewed

## 2022-06-24 NOTE — CONSULT NOTE ADULT - SUBJECTIVE AND OBJECTIVE BOX
HPI:  87 year old female with PMHx COPD, HTN, HLD, T2DM on insulin, hx of MI presents from assisted living facility with dyspnea, wheezing, labored breathing. Patient recently admitted to [facility] 3/9-15/2022 for COPD exacerbation. History obtained from son Calvin, as patient is limited historian at baseline. Son states he received a call from [facility] this AM stating patient is having labored breathing. Patient was sent to [facility] for further management.     ED course:  Vitals: HR 91 SpO2 on BIPAP  Labs: WBC 12.64, BUN 31/1.5, Glu 188, ProBNP 1430  Given Mag Sulfate IVPB 1 g, Solumedrol 125 mg IVP x1 (13 Jun 2022 06:00)    PERTINENT PM/SXH:   Uncomplicated asthma, unspecified asthma severity    DM2 (diabetes mellitus, type 2)    Essential hypertension    Hypothyroidism, unspecified type    Pure hypercholesterolemia    Gastroesophageal reflux disease without esophagitis    Diabetes    Asthma    CAD (coronary artery disease)    HTN (hypertension)    HLD (hyperlipidemia)    Hypothyroid    NSTEMI (non-ST elevated myocardial infarction)      No significant past surgical history    History of appendectomy    History of appendectomy    Abnormal findings on cardiac catheterization      FAMILY HISTORY:  Family history of heart disease    Family history of cancer in mother    Family history of MI (myocardial infarction)    Family history of stomach cancer      ITEMS NOT CHECKED ARE NOT PRESENT    SOCIAL HISTORY:   Significant other/partner[ ]  Children[x ]  Amish/Spirituality:  Substance hx:  [ ]   Tobacco hx:  [ ]   Alcohol hx: [ ]   Home Opioid hx:  [ ] I-Stop Reference No:  Living Situation: [ ]Home  [ ]Long term care  [ ]Rehab [ ]Other    ADVANCE DIRECTIVES:    DNR  MOLST  [ ]  Living Will  [ ]   DECISION MAKER(s):  [ ] Health Care Proxy(s)  [ ] Surrogate(s)  [ ] Guardian           Name(s): Phone Number(s):    BASELINE (I)ADL(s) (prior to admission):  [place]: [ ]Total  [ ] Moderate [ ]Dependent    Allergies    doxycycline (Unknown)  iodine (Hives)  iodine containing compounds (Unknown)  shellfish (Anaphylaxis)  shellfish (Swelling; Short breath)    Intolerances    MEDICATIONS  (STANDING):  albuterol/ipratropium for Nebulization 3 milliLiter(s) Nebulizer every 6 hours  amoxicillin  875 milliGRAM(s)/clavulanate 1 Tablet(s) Oral two times a day  artificial tears (preservative free) Ophthalmic Solution 1 Drop(s) Both EYES two times a day  aspirin  chewable 81 milliGRAM(s) Oral daily  atorvastatin 80 milliGRAM(s) Oral at bedtime  cholecalciferol 2000 Unit(s) Oral daily  clopidogrel Tablet 75 milliGRAM(s) Oral daily  dextrose 5%. 1000 milliLiter(s) (100 mL/Hr) IV Continuous <Continuous>  dextrose 5%. 1000 milliLiter(s) (50 mL/Hr) IV Continuous <Continuous>  dextrose 50% Injectable 25 Gram(s) IV Push once  dextrose 50% Injectable 12.5 Gram(s) IV Push once  dextrose 50% Injectable 25 Gram(s) IV Push once  dextrose 50% Injectable 25 Gram(s) IV Push once  escitalopram 10 milliGRAM(s) Oral daily  gabapentin 100 milliGRAM(s) Oral three times a day  glucagon  Injectable 1 milliGRAM(s) IntraMuscular once  glucagon  Injectable 1 milliGRAM(s) IntraMuscular once  heparin   Injectable 5000 Unit(s) SubCutaneous every 12 hours  insulin glargine Injectable (LANTUS) 15 Unit(s) SubCutaneous every morning  insulin lispro (ADMELOG) corrective regimen sliding scale   SubCutaneous three times a day before meals  insulin lispro (ADMELOG) corrective regimen sliding scale   SubCutaneous at bedtime  insulin lispro Injectable (ADMELOG) 8 Unit(s) SubCutaneous three times a day before meals  isosorbide   mononitrate ER Tablet (IMDUR) 30 milliGRAM(s) Oral daily  levothyroxine 125 MICROGram(s) Oral daily  metoprolol succinate ER 50 milliGRAM(s) Oral daily    MEDICATIONS  (PRN):  acetaminophen     Tablet .. 650 milliGRAM(s) Oral every 6 hours PRN Mild Pain (1 - 3)  ALBUTerol    0.083% 2.5 milliGRAM(s) Nebulizer every 4 hours PRN Shortness of Breath and/or Wheezing  dextrose Oral Gel 15 Gram(s) Oral once PRN Blood Glucose LESS THAN 70 milliGRAM(s)/deciliter  melatonin 3 milliGRAM(s) Oral at bedtime PRN Insomnia    PRESENT SYMPTOMS: [ ]Unable to obtain due to poor mentation   Source if other than patient:  [ ]Family   [ ]Team     Pain: [ ]yes [ ]no  QOL impact -   Location -                    Aggravating factors -  Quality -  Radiation -  Timing-  Severity (0-10 scale):  Minimal acceptable level (0-10 scale):     CPOT:    https://www.[email or web address]/getattachment/[email or web address]/Critical-Care-Pain-Observation-Tool-(CPOT)      PAIN AD Score:     [email or web address]/[email or web address].pdf (press ctrl +  left click to view)    Dyspnea:                           [ ]Mild [ ]Moderate [ ]Severe  Anxiety:                             [ ]Mild [ ]Moderate [ ]Severe  Fatigue:                             [ ]Mild [ ]Moderate [ ]Severe  Nausea:                             [ ]Mild [ ]Moderate [ ]Severe  Loss of appetite:              [ ]Mild [ ]Moderate [ ]Severe  Constipation:                    [ ]Mild [ ]Moderate [ ]Severe    Other Symptoms:  [ ]All other review of systems negative     Palliative Performance Status Version 2:         %    http://[email or web address]/files/news/palliative_performance_scale_[email or web address].pdf  PHYSICAL EXAM:  Vital Signs Last 24 Hrs  T(C): 37 (24 Jun 2022 12:53), Max: 37 (24 Jun 2022 12:53)  T(F): 98.6 (24 Jun 2022 12:53), Max: 98.6 (24 Jun 2022 12:53)  HR: 84 (24 Jun 2022 12:53) (69 - 86)  BP: 142/83 (24 Jun 2022 12:53) (116/71 - 152/70)  BP(mean): --  RR: 18 (24 Jun 2022 12:53) (17 - 18)  SpO2: 93% (24 Jun 2022 12:53) (92% - 97%) I&O's Summary    24 Jun 2022 07:01  -  24 Jun 2022 14:08  --------------------------------------------------------  IN: 360 mL / OUT: 0 mL / NET: 360 mL      GENERAL:  [ ]Alert  [ ]Oriented x   [ ]Lethargic  [ ]Cachexia  [ ]Unarousable  [ ]Verbal  [ ]Non-Verbal  Behavioral:   [ ] Anxiety  [ ] Delirium [ ] Agitation [ ] Other  HEENT:  [ ]Normal   [ ]Dry mouth   [ ]ET Tube/Trach  [ ]Oral lesions  PULMONARY:   [ ]Clear [ ]Tachypnea  [ ]Audible excessive secretions   [ ]Rhonchi        [ ]Right [ ]Left [ ]Bilateral  [ ]Crackles        [ ]Right [ ]Left [ ]Bilateral  [ ]Wheezing     [ ]Right [ ]Left [ ]Bilateral  [ ]Diminished breath sounds [ ]right [ ]left [ ]bilateral  CARDIOVASCULAR:    [ ]Regular [ ]Irregular [ ]Tachy  [ ]Yusuf [ ]Murmur [ ]Other  GASTROINTESTINAL:  [ ]Soft  [ ]Distended   [ ]+BS  [ ]Non tender [ ]Tender  [ ]PEG [ ]OGT/ NGT  Last BM:   GENITOURINARY:  [ ]Normal [ ] Incontinent   [ ]Oliguria/Anuria   [ ]Schwarz  MUSCULOSKELETAL:   [ ]Normal   [ ]Weakness  [ ]Bed/Wheelchair bound [ ]Edema  NEUROLOGIC:   [ ]No focal deficits  [ ]Cognitive impairment  [ ]Dysphagia [ ]Dysarthria [ ]Paresis [ ]Other   SKIN:   [ ]Normal    [ ]Rash  [ ]Pressure ulcer(s)       Present on admission [ ]y [ ]n    CRITICAL CARE:  [ ] Shock Present  [ ]Septic [ ]Cardiogenic [ ]Neurologic [ ]Hypovolemic  [ ]  Vasopressors [ ]  Inotropes   [ ]Respiratory failure present [ ]Mechanical ventilation [ ]Non-invasive ventilatory support [ ]High flow    [ ]Acute  [ ]Chronic [ ]Hypoxic  [ ]Hypercarbic [ ]Other  [ ]Other organ failure     LABS:                        13.4   19.27 )-----------( 244      ( 24 Jun 2022 07:10 )             42.4   06-24    139  |  106  |  54<H>  ----------------------------<  208<H>  4.1   |  25  |  1.50<H>    Ca    8.6      24 Jun 2022 07:10    TPro  6.5  /  Alb  2.5<L>  /  TBili  0.8  /  DBili  x   /  AST  23  /  ALT  20  /  AlkPhos  74  06-24        RADIOLOGY & ADDITIONAL STUDIES: reviewed    PROTEIN CALORIE MALNUTRITION PRESENT: [ ]mild [ ]moderate [ ]severe [ ]underweight [ ]morbid obesity  https://www.andeal.org/vault/0167/web/files/ONC/Table_Clinical%20Characteristics%20to%20Document%20Malnutrition-White%20JV%20et%20al%818487.pdf    Height (cm): 162.6 (04-19-22 @ 11:32), 162.6 (03-09-22 @ 05:36), 162.6 (11-12-21 @ 17:10)  Weight (kg): 92.6 (06-15-22 @ 12:15), 83.9 (04-19-22 @ 11:32), 90.7 (03-09-22 @ 05:36)  BMI (kg/m2): 35 (06-15-22 @ 12:15), 31.7 (04-19-22 @ 11:32), 34.3 (03-09-22 @ 05:36)    [ ]PPSV2 < or = to 30% [ ]significant weight loss  [ ]poor nutritional intake  [ ]anasarca      [ ]Artificial Nutrition      REFERRALS:   [ ]Chaplaincy  [ ]Hospice  [ ]Child Life  [ ]Social Work  [ ]Case management [ ]Holistic Therapy     Goals of Care Document:

## 2022-06-24 NOTE — PROGRESS NOTE ADULT - SUBJECTIVE AND OBJECTIVE BOX
Patient is a 87y old  Female who presents with a chief complaint of cough, SOB.       INTERVAL HPI/OVERNIGHT EVENTS: ROS limited as pt with advanced dementia. Admits cough. Denies SOB, CP, abd pain, fever, chills.    MEDICATIONS  (STANDING):  albuterol/ipratropium for Nebulization 3 milliLiter(s) Nebulizer every 6 hours  artificial tears (preservative free) Ophthalmic Solution 1 Drop(s) Both EYES two times a day  aspirin  chewable 81 milliGRAM(s) Oral daily  atorvastatin 80 milliGRAM(s) Oral at bedtime  cholecalciferol 2000 Unit(s) Oral daily  clopidogrel Tablet 75 milliGRAM(s) Oral daily  dextrose 5%. 1000 milliLiter(s) (100 mL/Hr) IV Continuous <Continuous>  dextrose 5%. 1000 milliLiter(s) (50 mL/Hr) IV Continuous <Continuous>  dextrose 50% Injectable 25 Gram(s) IV Push once  dextrose 50% Injectable 12.5 Gram(s) IV Push once  dextrose 50% Injectable 25 Gram(s) IV Push once  dextrose 50% Injectable 25 Gram(s) IV Push once  escitalopram 10 milliGRAM(s) Oral daily  gabapentin 100 milliGRAM(s) Oral three times a day  glucagon  Injectable 1 milliGRAM(s) IntraMuscular once  glucagon  Injectable 1 milliGRAM(s) IntraMuscular once  heparin   Injectable 5000 Unit(s) SubCutaneous every 12 hours  insulin glargine Injectable (LANTUS) 15 Unit(s) SubCutaneous every morning  insulin lispro (ADMELOG) corrective regimen sliding scale   SubCutaneous three times a day before meals  insulin lispro (ADMELOG) corrective regimen sliding scale   SubCutaneous at bedtime  insulin lispro Injectable (ADMELOG) 8 Unit(s) SubCutaneous three times a day before meals  isosorbide   mononitrate ER Tablet (IMDUR) 30 milliGRAM(s) Oral daily  levothyroxine 125 MICROGram(s) Oral daily  metoprolol succinate ER 50 milliGRAM(s) Oral daily    MEDICATIONS  (PRN):  acetaminophen     Tablet .. 650 milliGRAM(s) Oral every 6 hours PRN Mild Pain (1 - 3)  ALBUTerol    0.083% 2.5 milliGRAM(s) Nebulizer every 4 hours PRN Shortness of Breath and/or Wheezing  dextrose Oral Gel 15 Gram(s) Oral once PRN Blood Glucose LESS THAN 70 milliGRAM(s)/deciliter  melatonin 3 milliGRAM(s) Oral at bedtime PRN Insomnia        Allergies    doxycycline (Unknown)  iodine (Hives)  iodine containing compounds (Unknown)  shellfish (Anaphylaxis)  shellfish (Swelling; Short breath)    Intolerances        REVIEW OF SYSTEMS:  ROS limited as pt with advanced dementia. Admits cough. Denies SOB, CP, abd pain, fever, chills.    Vital Signs Last 24 Hrs  T(C): 37 (24 Jun 2022 12:53), Max: 37 (24 Jun 2022 12:53)  T(F): 98.6 (24 Jun 2022 12:53), Max: 98.6 (24 Jun 2022 12:53)  HR: 84 (24 Jun 2022 12:53) (69 - 86)  BP: 142/83 (24 Jun 2022 12:53) (116/71 - 152/70)  BP(mean): --  RR: 18 (24 Jun 2022 12:53) (17 - 18)  SpO2: 93% (24 Jun 2022 12:53) (92% - 97%)    PHYSICAL EXAM:  GENERAL: NAD at rest  HEENT:  anicteric, moist mucous membranes  CHEST/LUNG:  decreased breath sounds, coarse breath sounds  HEART:  RRR, S1, S2  ABDOMEN:  BS+, soft, nontender, nondistended  EXTREMITIES: no cyanosis, or calf tenderness  NERVOUS SYSTEM: awake, alert, answers few simple questions and follows few commands     LABS:                                   13.4   19.27 )-----------( 244      ( 24 Jun 2022 07:10 )             42.4     CBC Full  -  ( 24 Jun 2022 07:10 )  WBC Count : 19.27 K/uL  Hemoglobin : 13.4 g/dL  Hematocrit : 42.4 %  Platelet Count - Automated : 244 K/uL  Mean Cell Volume : 89.5 fl  Mean Cell Hemoglobin : 28.3 pg  Mean Cell Hemoglobin Concentration : 31.6 gm/dL  Auto Neutrophil # : 14.68 K/uL  Auto Lymphocyte # : 2.47 K/uL  Auto Monocyte # : 1.56 K/uL  Auto Eosinophil # : 0.25 K/uL  Auto Basophil # : 0.06 K/uL  Auto Neutrophil % : 76.2 %  Auto Lymphocyte % : 12.8 %  Auto Monocyte % : 8.1 %  Auto Eosinophil % : 1.3 %  Auto Basophil % : 0.3 %    06-24    139  |  106  |  54<H>  ----------------------------<  208<H>  4.1   |  25  |  1.50<H>    Ca    8.6      24 Jun 2022 07:10    TPro  6.5  /  Alb  2.5<L>  /  TBili  0.8  /  DBili  x   /  AST  23  /  ALT  20  /  AlkPhos  74  06-24            CAPILLARY BLOOD GLUCOSE      POCT Blood Glucose.: 156 mg/dL (24 Jun 2022 17:09)  POCT Blood Glucose.: 185 mg/dL (24 Jun 2022 12:00)  POCT Blood Glucose.: 205 mg/dL (24 Jun 2022 07:36)            RADIOLOGY & ADDITIONAL TESTS:    Personally reviewed.     Consultant(s) Notes Reviewed:  [x] YES  [ ] NO     Patient is a 87y old  Female who presents with a chief complaint of cough, SOB.        INTERVAL HPI/OVERNIGHT EVENTS: ROS limited as pt with advanced dementia. Admits cough. Denies SOB, CP, abd pain, fever, chills.    MEDICATIONS  (STANDING):  albuterol/ipratropium for Nebulization 3 milliLiter(s) Nebulizer every 6 hours  artificial tears (preservative free) Ophthalmic Solution 1 Drop(s) Both EYES two times a day  aspirin  chewable 81 milliGRAM(s) Oral daily  atorvastatin 80 milliGRAM(s) Oral at bedtime  cholecalciferol 2000 Unit(s) Oral daily  clopidogrel Tablet 75 milliGRAM(s) Oral daily  dextrose 5%. 1000 milliLiter(s) (100 mL/Hr) IV Continuous <Continuous>  dextrose 5%. 1000 milliLiter(s) (50 mL/Hr) IV Continuous <Continuous>  dextrose 50% Injectable 25 Gram(s) IV Push once  dextrose 50% Injectable 12.5 Gram(s) IV Push once  dextrose 50% Injectable 25 Gram(s) IV Push once  dextrose 50% Injectable 25 Gram(s) IV Push once  escitalopram 10 milliGRAM(s) Oral daily  gabapentin 100 milliGRAM(s) Oral three times a day  glucagon  Injectable 1 milliGRAM(s) IntraMuscular once  glucagon  Injectable 1 milliGRAM(s) IntraMuscular once  heparin   Injectable 5000 Unit(s) SubCutaneous every 12 hours  insulin glargine Injectable (LANTUS) 15 Unit(s) SubCutaneous every morning  insulin lispro (ADMELOG) corrective regimen sliding scale   SubCutaneous three times a day before meals  insulin lispro (ADMELOG) corrective regimen sliding scale   SubCutaneous at bedtime  insulin lispro Injectable (ADMELOG) 8 Unit(s) SubCutaneous three times a day before meals  isosorbide   mononitrate ER Tablet (IMDUR) 30 milliGRAM(s) Oral daily  levothyroxine 125 MICROGram(s) Oral daily  metoprolol succinate ER 50 milliGRAM(s) Oral daily    MEDICATIONS  (PRN):  acetaminophen     Tablet .. 650 milliGRAM(s) Oral every 6 hours PRN Mild Pain (1 - 3)  ALBUTerol    0.083% 2.5 milliGRAM(s) Nebulizer every 4 hours PRN Shortness of Breath and/or Wheezing  dextrose Oral Gel 15 Gram(s) Oral once PRN Blood Glucose LESS THAN 70 milliGRAM(s)/deciliter  melatonin 3 milliGRAM(s) Oral at bedtime PRN Insomnia        Allergies    doxycycline (Unknown)  iodine (Hives)  iodine containing compounds (Unknown)  shellfish (Anaphylaxis)  shellfish (Swelling; Short breath)    Intolerances        REVIEW OF SYSTEMS:  ROS limited as pt with advanced dementia. Admits cough. Denies SOB, CP, abd pain, fever, chills.    Vital Signs Last 24 Hrs  T(C): 37 (24 Jun 2022 12:53), Max: 37 (24 Jun 2022 12:53)  T(F): 98.6 (24 Jun 2022 12:53), Max: 98.6 (24 Jun 2022 12:53)  HR: 84 (24 Jun 2022 12:53) (69 - 86)  BP: 142/83 (24 Jun 2022 12:53) (116/71 - 152/70)  BP(mean): --  RR: 18 (24 Jun 2022 12:53) (17 - 18)  SpO2: 93% (24 Jun 2022 12:53) (92% - 97%)    PHYSICAL EXAM:  GENERAL: NAD at rest  HEENT:  anicteric, moist mucous membranes  CHEST/LUNG:  decreased breath sounds, coarse breath sounds  HEART:  RRR, S1, S2  ABDOMEN:  BS+, soft, nontender, nondistended  EXTREMITIES: no cyanosis, or calf tenderness  NERVOUS SYSTEM: awake, alert, answers few simple questions and follows few commands     LABS:                                   13.4   19.27 )-----------( 244      ( 24 Jun 2022 07:10 )             42.4     CBC Full  -  ( 24 Jun 2022 07:10 )  WBC Count : 19.27 K/uL  Hemoglobin : 13.4 g/dL  Hematocrit : 42.4 %  Platelet Count - Automated : 244 K/uL  Mean Cell Volume : 89.5 fl  Mean Cell Hemoglobin : 28.3 pg  Mean Cell Hemoglobin Concentration : 31.6 gm/dL  Auto Neutrophil # : 14.68 K/uL  Auto Lymphocyte # : 2.47 K/uL  Auto Monocyte # : 1.56 K/uL  Auto Eosinophil # : 0.25 K/uL  Auto Basophil # : 0.06 K/uL  Auto Neutrophil % : 76.2 %  Auto Lymphocyte % : 12.8 %  Auto Monocyte % : 8.1 %  Auto Eosinophil % : 1.3 %  Auto Basophil % : 0.3 %    06-24    139  |  106  |  54<H>  ----------------------------<  208<H>  4.1   |  25  |  1.50<H>    Ca    8.6      24 Jun 2022 07:10    TPro  6.5  /  Alb  2.5<L>  /  TBili  0.8  /  DBili  x   /  AST  23  /  ALT  20  /  AlkPhos  74  06-24            CAPILLARY BLOOD GLUCOSE      POCT Blood Glucose.: 156 mg/dL (24 Jun 2022 17:09)  POCT Blood Glucose.: 185 mg/dL (24 Jun 2022 12:00)  POCT Blood Glucose.: 205 mg/dL (24 Jun 2022 07:36)            RADIOLOGY & ADDITIONAL TESTS:    Personally reviewed.     Consultant(s) Notes Reviewed:  [x] YES  [ ] NO

## 2022-06-24 NOTE — PROGRESS NOTE ADULT - ASSESSMENT
87 year old female with PMHx of COPD (not on O2), HTN, HLD, T2DM on insulin, hx of MI presents from assisted living facility with dyspnea, wheezing, labored breathing admitted for COPD exacerbation and acute hypoxic resp failure due to hMPV.      Problem/Plan - 1:  ·  Problem: Acute respiratory failure with hypoxia.   ·  Plan: - COPD exacerbation  due to hMPV - resp viral infection + on admission   - was on BiPAP, weaned off BiPAP. Then on 2L O2 NC. Now saturating in 90s on RA.    -Xanax PRN - duoneb prn  - steroids tapered off / completed on 06/19  - persistent leukocytosis so performed CT Chest which showed some groundglass opacity in LLL - discussed with ID (Estrella), started on Augmentin for suspected aspiration PNA ; this evening, pt's family decided they wish to stop antibiotics. Overall, family would like to de-escalate medical interventions as they feel the pt has been rapidly declining and would not have wanted to be living  in hospitalized setting the way she has been. Further, they feel the Abx have caused pt to have significant diarrhea causing the pt discomfort and believe bacterial PNA is unlikely. For those reasons, family requested I discontinue antibiotics, which I did this evening.  - pulm (Shalshin), recs appreciated  - ID (Estrella), recs appreciated     Problem/Plan - 2:  ·  Problem: COPD exacerbation.   - Continue home medications - c/w nebs  - steroids tapered off / completed on 06/19  - Supplemental O2 PRN. COPD patient will keep SpO2 goal 89-93%.   - Encourage incentive spirometry.  - pulm (Shalshin), recs appreciated     Problem/Plan - 3:  ·  Problem: TRENTON on CKD 3  ·  Plan: CKD, Cr at baseline ranging from 1.3-1.7 on outpatient HIE review  hold lasix per renal     Problem/Plan - 4:  ·  Problem: DM2 (diabetes mellitus, type 2).   ·  Plan: - Chronic, known history of T2DM  - C/W Lantus 15 U qd and lispro 8U pre meals  - Moderate dose insulin corrective scale   - HgbA1c: 10  - endocrine following appreciate recs.     Problem/Plan - 5:  ·  Problem: HTN (hypertension).   ·  Plan: bp stable  - change lasix to 20mg prn on discharge per renal  - Continue home Metoprolol Succinate 50 mg qd with hold parameters  -monitor bp.     Problem/Plan - 6:  ·  Problem: Anxiety.   ·  Plan: stable  continue Xanax prn , lexapro.     Problem/Plan - 7:  ·  Problem: Hypothyroidism, unspecified type.   ·  Plan: Chronic  - Continue home Synthroid 125 mcg PO qd.     Problem/Plan - 8:  ·  Problem: HLD (hyperlipidemia).   ·  Plan: Chronic  - Continue home Atorvastatin 80 mg PO qhs.     Problem/Plan - 9:  ·  Problem: CAD (coronary artery disease).   ·  Plan: - Hx of MI  - Continue home Aspirin 81 mg PO qd, Plavix 75 mg PO qd, imdur 30mg daily   - Echo 3/11/2022: Left ventricle was of normal size, mild left ventricular hypertrophy LV systolic function EF 65%. Moderate to severe aortic stenosis.     Problem/Plan - 10:  ·  Problem: Need for prophylactic measure.   ·  Plan; VTE ppx: Heparin 5000 subq q12h    ACP / Disp:  - DNR/DNI - MOLST filled  - family now wished to pursue hospice as feel pt has had significant mental/physical decline; palliative care evaluated and believe pt not quite meeting hospice criteria  - pt's son now decided to pursue SHERIN/SNF at [facility] as opposed to pt returning to senior care as she has had further physical decline.  - [facility] working on placement to

## 2022-06-24 NOTE — PROGRESS NOTE ADULT - SUBJECTIVE AND OBJECTIVE BOX
CAPILLARY BLOOD GLUCOSE      POCT Blood Glucose.: 125 mg/dL (23 Jun 2022 21:37)  POCT Blood Glucose.: 113 mg/dL (23 Jun 2022 16:52)  POCT Blood Glucose.: 98 mg/dL (23 Jun 2022 12:10)  POCT Blood Glucose.: 159 mg/dL (23 Jun 2022 07:51)      Vital Signs Last 24 Hrs  T(C): 36.9 (24 Jun 2022 05:15), Max: 37.2 (23 Jun 2022 12:32)  T(F): 98.5 (24 Jun 2022 05:15), Max: 98.9 (23 Jun 2022 12:32)  HR: 86 (24 Jun 2022 05:15) (65 - 86)  BP: 116/71 (24 Jun 2022 05:15) (116/71 - 152/70)  BP(mean): --  RR: 17 (24 Jun 2022 05:15) (17 - 17)  SpO2: 92% (24 Jun 2022 05:15) (92% - 97%)    Respiratory: CTA B/L  CV: RRR, S1S2, no murmurs, rubs or gallops  Abdominal: Soft, NT, ND +BS, Last BM  Extremities: No edema, + peripheral pulses     06-23    137  |  103  |  60<H>  ----------------------------<  161<H>  4.5   |  25  |  1.47<H>    Ca    8.8      23 Jun 2022 08:15        atorvastatin 80 milliGRAM(s) Oral at bedtime  dextrose 50% Injectable 25 Gram(s) IV Push once  dextrose 50% Injectable 12.5 Gram(s) IV Push once  dextrose 50% Injectable 25 Gram(s) IV Push once  dextrose 50% Injectable 25 Gram(s) IV Push once  dextrose Oral Gel 15 Gram(s) Oral once PRN  glucagon  Injectable 1 milliGRAM(s) IntraMuscular once  glucagon  Injectable 1 milliGRAM(s) IntraMuscular once  insulin glargine Injectable (LANTUS) 15 Unit(s) SubCutaneous every morning  insulin lispro (ADMELOG) corrective regimen sliding scale   SubCutaneous three times a day before meals  insulin lispro (ADMELOG) corrective regimen sliding scale   SubCutaneous at bedtime  insulin lispro Injectable (ADMELOG) 8 Unit(s) SubCutaneous three times a day before meals  levothyroxine 125 MICROGram(s) Oral daily

## 2022-06-24 NOTE — PROGRESS NOTE ADULT - SUBJECTIVE AND OBJECTIVE BOX
Date/Time Patient Seen:  		  Referring MD:   Data Reviewed	       Patient is a 87y old  Female who presents with a chief complaint of COPD Exacerbation (23 Jun 2022 20:14)      Subjective/HPI     PAST MEDICAL & SURGICAL HISTORY:  Uncomplicated asthma, unspecified asthma severity    DM2 (diabetes mellitus, type 2)    Essential hypertension    Hypothyroidism, unspecified type    Pure hypercholesterolemia    Gastroesophageal reflux disease without esophagitis    Diabetes    Asthma    CAD (coronary artery disease)    HTN (hypertension)    HLD (hyperlipidemia)    Hypothyroid    NSTEMI (non-ST elevated myocardial infarction)    No significant past surgical history    History of appendectomy    History of appendectomy    Abnormal findings on cardiac catheterization  Cardiac Cath          Medication list         MEDICATIONS  (STANDING):  albuterol/ipratropium for Nebulization 3 milliLiter(s) Nebulizer every 6 hours  amoxicillin  875 milliGRAM(s)/clavulanate 1 Tablet(s) Oral two times a day  artificial tears (preservative free) Ophthalmic Solution 1 Drop(s) Both EYES two times a day  aspirin  chewable 81 milliGRAM(s) Oral daily  atorvastatin 80 milliGRAM(s) Oral at bedtime  cholecalciferol 2000 Unit(s) Oral daily  clopidogrel Tablet 75 milliGRAM(s) Oral daily  dextrose 5%. 1000 milliLiter(s) (100 mL/Hr) IV Continuous <Continuous>  dextrose 5%. 1000 milliLiter(s) (50 mL/Hr) IV Continuous <Continuous>  dextrose 50% Injectable 25 Gram(s) IV Push once  dextrose 50% Injectable 12.5 Gram(s) IV Push once  dextrose 50% Injectable 25 Gram(s) IV Push once  dextrose 50% Injectable 25 Gram(s) IV Push once  escitalopram 10 milliGRAM(s) Oral daily  gabapentin 100 milliGRAM(s) Oral three times a day  glucagon  Injectable 1 milliGRAM(s) IntraMuscular once  glucagon  Injectable 1 milliGRAM(s) IntraMuscular once  heparin   Injectable 5000 Unit(s) SubCutaneous every 12 hours  insulin glargine Injectable (LANTUS) 15 Unit(s) SubCutaneous every morning  insulin lispro (ADMELOG) corrective regimen sliding scale   SubCutaneous three times a day before meals  insulin lispro (ADMELOG) corrective regimen sliding scale   SubCutaneous at bedtime  insulin lispro Injectable (ADMELOG) 8 Unit(s) SubCutaneous three times a day before meals  isosorbide   mononitrate ER Tablet (IMDUR) 30 milliGRAM(s) Oral daily  levothyroxine 125 MICROGram(s) Oral daily  metoprolol succinate ER 50 milliGRAM(s) Oral daily    MEDICATIONS  (PRN):  acetaminophen     Tablet .. 650 milliGRAM(s) Oral every 6 hours PRN Mild Pain (1 - 3)  ALBUTerol    0.083% 2.5 milliGRAM(s) Nebulizer every 4 hours PRN Shortness of Breath and/or Wheezing  dextrose Oral Gel 15 Gram(s) Oral once PRN Blood Glucose LESS THAN 70 milliGRAM(s)/deciliter  melatonin 3 milliGRAM(s) Oral at bedtime PRN Insomnia         Vitals log        ICU Vital Signs Last 24 Hrs  T(C): 36.4 (23 Jun 2022 20:43), Max: 37.2 (23 Jun 2022 12:32)  T(F): 97.6 (23 Jun 2022 20:43), Max: 98.9 (23 Jun 2022 12:32)  HR: 69 (24 Jun 2022 01:57) (65 - 77)  BP: 152/70 (23 Jun 2022 20:43) (139/61 - 154/68)  BP(mean): --  ABP: --  ABP(mean): --  RR: 17 (23 Jun 2022 20:43) (17 - 18)  SpO2: 97% (24 Jun 2022 01:57) (91% - 97%)           Input and Output:  I&O's Detail      Lab Data                        13.1   18.75 )-----------( 253      ( 23 Jun 2022 08:15 )             41.8     06-23    137  |  103  |  60<H>  ----------------------------<  161<H>  4.5   |  25  |  1.47<H>    Ca    8.8      23 Jun 2022 08:15              Review of Systems	      Objective     Physical Examination    heart s1s2  lung dc BS  abd soft      Pertinent Lab findings & Imaging      Chong:  NO   Adequate UO     I&O's Detail           Discussed with:     Cultures:	        Radiology

## 2022-06-25 LAB
ALBUMIN SERPL ELPH-MCNC: 2.4 G/DL — LOW (ref 3.3–5)
ALP SERPL-CCNC: 73 U/L — SIGNIFICANT CHANGE UP (ref 40–120)
ALT FLD-CCNC: 23 U/L — SIGNIFICANT CHANGE UP (ref 12–78)
ANION GAP SERPL CALC-SCNC: 9 MMOL/L — SIGNIFICANT CHANGE UP (ref 5–17)
AST SERPL-CCNC: 25 U/L — SIGNIFICANT CHANGE UP (ref 15–37)
BILIRUB SERPL-MCNC: 0.7 MG/DL — SIGNIFICANT CHANGE UP (ref 0.2–1.2)
BUN SERPL-MCNC: 60 MG/DL — HIGH (ref 7–23)
CALCIUM SERPL-MCNC: 8.4 MG/DL — LOW (ref 8.5–10.1)
CHLORIDE SERPL-SCNC: 104 MMOL/L — SIGNIFICANT CHANGE UP (ref 96–108)
CO2 SERPL-SCNC: 23 MMOL/L — SIGNIFICANT CHANGE UP (ref 22–31)
CREAT SERPL-MCNC: 1.7 MG/DL — HIGH (ref 0.5–1.3)
EGFR: 29 ML/MIN/1.73M2 — LOW
GLUCOSE SERPL-MCNC: 173 MG/DL — HIGH (ref 70–99)
HCT VFR BLD CALC: 39.1 % — SIGNIFICANT CHANGE UP (ref 34.5–45)
HGB BLD-MCNC: 12.2 G/DL — SIGNIFICANT CHANGE UP (ref 11.5–15.5)
MCHC RBC-ENTMCNC: 27.5 PG — SIGNIFICANT CHANGE UP (ref 27–34)
MCHC RBC-ENTMCNC: 31.2 GM/DL — LOW (ref 32–36)
MCV RBC AUTO: 88.3 FL — SIGNIFICANT CHANGE UP (ref 80–100)
NRBC # BLD: 0 /100 WBCS — SIGNIFICANT CHANGE UP (ref 0–0)
PLATELET # BLD AUTO: 222 K/UL — SIGNIFICANT CHANGE UP (ref 150–400)
POTASSIUM SERPL-MCNC: 4.3 MMOL/L — SIGNIFICANT CHANGE UP (ref 3.5–5.3)
POTASSIUM SERPL-SCNC: 4.3 MMOL/L — SIGNIFICANT CHANGE UP (ref 3.5–5.3)
PROT SERPL-MCNC: 6.4 G/DL — SIGNIFICANT CHANGE UP (ref 6–8.3)
RBC # BLD: 4.43 M/UL — SIGNIFICANT CHANGE UP (ref 3.8–5.2)
RBC # FLD: 13.5 % — SIGNIFICANT CHANGE UP (ref 10.3–14.5)
SODIUM SERPL-SCNC: 136 MMOL/L — SIGNIFICANT CHANGE UP (ref 135–145)
WBC # BLD: 18.92 K/UL — HIGH (ref 3.8–10.5)
WBC # FLD AUTO: 18.92 K/UL — HIGH (ref 3.8–10.5)

## 2022-06-25 PROCEDURE — 99233 SBSQ HOSP IP/OBS HIGH 50: CPT

## 2022-06-25 RX ADMIN — Medication 50 MILLIGRAM(S): at 07:11

## 2022-06-25 RX ADMIN — Medication 3 MILLILITER(S): at 13:34

## 2022-06-25 RX ADMIN — Medication 8 UNIT(S): at 11:49

## 2022-06-25 RX ADMIN — GABAPENTIN 100 MILLIGRAM(S): 400 CAPSULE ORAL at 07:11

## 2022-06-25 RX ADMIN — ISOSORBIDE MONONITRATE 30 MILLIGRAM(S): 60 TABLET, EXTENDED RELEASE ORAL at 14:13

## 2022-06-25 RX ADMIN — ESCITALOPRAM OXALATE 10 MILLIGRAM(S): 10 TABLET, FILM COATED ORAL at 14:15

## 2022-06-25 RX ADMIN — Medication 125 MICROGRAM(S): at 07:11

## 2022-06-25 RX ADMIN — Medication 2000 UNIT(S): at 14:13

## 2022-06-25 RX ADMIN — Medication 1: at 08:27

## 2022-06-25 RX ADMIN — GABAPENTIN 100 MILLIGRAM(S): 400 CAPSULE ORAL at 22:36

## 2022-06-25 RX ADMIN — Medication 1 DROP(S): at 17:23

## 2022-06-25 RX ADMIN — Medication 8 UNIT(S): at 17:23

## 2022-06-25 RX ADMIN — Medication 1 DROP(S): at 07:11

## 2022-06-25 RX ADMIN — GABAPENTIN 100 MILLIGRAM(S): 400 CAPSULE ORAL at 14:12

## 2022-06-25 RX ADMIN — Medication 81 MILLIGRAM(S): at 14:12

## 2022-06-25 RX ADMIN — HEPARIN SODIUM 5000 UNIT(S): 5000 INJECTION INTRAVENOUS; SUBCUTANEOUS at 17:23

## 2022-06-25 RX ADMIN — Medication 1: at 11:49

## 2022-06-25 RX ADMIN — Medication 8 UNIT(S): at 08:26

## 2022-06-25 RX ADMIN — ATORVASTATIN CALCIUM 80 MILLIGRAM(S): 80 TABLET, FILM COATED ORAL at 22:36

## 2022-06-25 RX ADMIN — CLOPIDOGREL BISULFATE 75 MILLIGRAM(S): 75 TABLET, FILM COATED ORAL at 14:13

## 2022-06-25 RX ADMIN — Medication 3 MILLILITER(S): at 07:57

## 2022-06-25 RX ADMIN — Medication 3 MILLILITER(S): at 20:22

## 2022-06-25 RX ADMIN — HEPARIN SODIUM 5000 UNIT(S): 5000 INJECTION INTRAVENOUS; SUBCUTANEOUS at 07:10

## 2022-06-25 RX ADMIN — INSULIN GLARGINE 15 UNIT(S): 100 INJECTION, SOLUTION SUBCUTANEOUS at 08:27

## 2022-06-25 NOTE — PROGRESS NOTE ADULT - ASSESSMENT
TRENTON on CKD 3b  Respiratory Acidosis  HTN  Human Metapneumovirus      -BLCr 1.4  -Urine indices reviewed  -Hold further lasix  -Avoid IVF given respiratory status  -Pulm eval reviewed  -CXR clear 6/15/22  -Renal indices fluctuates but are relatively stable at baseline range    Thank you

## 2022-06-25 NOTE — PROGRESS NOTE ADULT - ASSESSMENT
87 year old female with PMHx COPD, HTN, HLD, T2DM on insulin, hx of MI presents from assisted living facility with dyspnea, wheezing, labored breathing    copd  copd ex  TRENTON CKD  HTN  OP  OA  DM  HLD  Moderate to Severe AS  CAD    plan for SHERIN  CM following  VS noted  GOC documented - DNR    cvs rx regimen  BP control  serial renal indices  I and O  monitor VS and HD and Sat  HOB elev - asp prec - assist with needs - ADL - GOC discussion  pt has hMPV - resp viral infection - isolation precs - acap PRN - robitussin PRN -   cxr - labs reviewed  copd - rx regimen PRN for sob and or wheeze, NEBS, not a candidate for Inhaler use  proBNP noted  ABG noted  old records reviewed

## 2022-06-25 NOTE — PROGRESS NOTE ADULT - SUBJECTIVE AND OBJECTIVE BOX
Patient is a 87y old  Female who presents with a chief complaint of COPD Exacerbation (16 Jun 2022 11:01)       HPI:  87 year old female with PMHx COPD, HTN, HLD, T2DM on insulin, hx of MI presents from assisted living facility with dyspnea, wheezing, labored breathing. Patient recently admitted to [facility] 3/9-15/2022 for COPD exacerbation. History obtained from son Calvin, as patient is limited historian at baseline. Son states he received a call from [facility] this AM stating patient is having labored breathing. Patient was sent to [facility] for further management.   She states her breathing is improved.  Denies any N/V/Dizziness.  Has not seen nephrologist in the past.  No urinary issues. Denies NSAID usage.    No acute events noted       PAST MEDICAL & SURGICAL HISTORY:  Uncomplicated asthma, unspecified asthma severity      DM2 (diabetes mellitus, type 2)      Essential hypertension      Hypothyroidism, unspecified type      Pure hypercholesterolemia      Gastroesophageal reflux disease without esophagitis      Diabetes      Asthma      CAD (coronary artery disease)      HTN (hypertension)      HLD (hyperlipidemia)      Hypothyroid      NSTEMI (non-ST elevated myocardial infarction)      History of appendectomy      History of appendectomy      Abnormal findings on cardiac catheterization  Cardiac Cath           FAMILY HISTORY:  Family history of heart disease    Family history of cancer in mother    Family history of MI (myocardial infarction)    Family history of stomach cancer    NC    Social History:Non smoker    MEDICATIONS  (STANDING):  albuterol/ipratropium for Nebulization 3 milliLiter(s) Nebulizer every 6 hours  artificial tears (preservative free) Ophthalmic Solution 1 Drop(s) Both EYES two times a day  aspirin  chewable 81 milliGRAM(s) Oral daily  atorvastatin 80 milliGRAM(s) Oral at bedtime  cholecalciferol 2000 Unit(s) Oral daily  clopidogrel Tablet 75 milliGRAM(s) Oral daily  dextrose 5%. 1000 milliLiter(s) (50 mL/Hr) IV Continuous <Continuous>  dextrose 5%. 1000 milliLiter(s) (100 mL/Hr) IV Continuous <Continuous>  dextrose 50% Injectable 25 Gram(s) IV Push once  dextrose 50% Injectable 12.5 Gram(s) IV Push once  dextrose 50% Injectable 25 Gram(s) IV Push once  escitalopram 10 milliGRAM(s) Oral daily  furosemide   Injectable 40 milliGRAM(s) IV Push <User Schedule>  gabapentin 100 milliGRAM(s) Oral three times a day  glucagon  Injectable 1 milliGRAM(s) IntraMuscular once  heparin   Injectable 5000 Unit(s) SubCutaneous every 12 hours  insulin glargine Injectable (LANTUS) 15 Unit(s) SubCutaneous every morning  insulin lispro (ADMELOG) corrective regimen sliding scale   SubCutaneous every 6 hours  insulin lispro Injectable (ADMELOG) 8 Unit(s) SubCutaneous three times a day before meals  isosorbide   mononitrate ER Tablet (IMDUR) 30 milliGRAM(s) Oral daily  levothyroxine 125 MICROGram(s) Oral daily  methylPREDNISolone sodium succinate Injectable 40 milliGRAM(s) IV Push daily  metoprolol succinate ER 50 milliGRAM(s) Oral daily    MEDICATIONS  (PRN):  acetaminophen     Tablet .. 650 milliGRAM(s) Oral every 6 hours PRN Mild Pain (1 - 3)  ALBUTerol    0.083% 2.5 milliGRAM(s) Nebulizer every 4 hours PRN Shortness of Breath and/or Wheezing  ALPRAZolam 0.5 milliGRAM(s) Oral two times a day PRN Anxiety  dextrose Oral Gel 15 Gram(s) Oral once PRN Blood Glucose LESS THAN 70 milliGRAM(s)/deciliter  melatonin 3 milliGRAM(s) Oral at bedtime PRN Insomnia   Meds reviewed    Allergies    doxycycline (Unknown)  iodine (Hives)  iodine containing compounds (Unknown)  shellfish (Anaphylaxis)  shellfish (Swelling; Short breath)    Intolerances         REVIEW OF SYSTEMS:   Constitutional: Denies fatigue  HEENT: Denies headaches and dizziness  Respiratory: denies  cough, or wheezing, improving SOB  Cardiovascular: denies CP, palpitations  Gastrointestinal: Denies nausea, denies vomiting, diarrhea, constipation, abdominal pain, or bloody stools  Genitourinary: denies painful urination, increased frequency, urgency, or bloody urine  Skin: denies rashes or itching  Musculoskeletal: denies muscle aches, joint swelling  Neurologic: Denies generalized weakness, denies loss of sensation, numbness, or tingling    Vital Signs Last 24 Hrs  T(C): 36.5 (25 Jun 2022 05:10), Max: 37 (24 Jun 2022 12:53)  T(F): 97.7 (25 Jun 2022 05:10), Max: 98.6 (24 Jun 2022 12:53)  HR: 69 (25 Jun 2022 08:05) (66 - 84)  BP: 157/75 (25 Jun 2022 05:10) (118/69 - 157/75)  BP(mean): --  RR: 17 (25 Jun 2022 05:10) (17 - 18)  SpO2: 94% (25 Jun 2022 08:05) (92% - 98%)    PHYSICAL EXAM:    GENERAL: NAD  HEAD:  Atraumatic, Normocephalic  EYES: EOMI, conjunctiva and sclera clear  ENMT: No Drainage from nares, No drainage from ears  NECK: Supple, neck  veins full  NERVOUS SYSTEM:  Awake and Alert  CHEST/LUNG: Clear to percussion bilaterally; No rales, rhonchi, wheezing, or rubs  HEART: Regular rate and rhythm; No murmurs, rubs, or gallops  ABDOMEN: Soft, Nontender, Nondistended; Bowel sounds present  EXTREMITIES:  No Edema  SKIN: No rashes No obvious ecchymosis      LABS:                        12.2   18.92 )-----------( 222      ( 25 Jun 2022 08:17 )             39.1     06-25    136  |  104  |  60<H>  ----------------------------<  173<H>  4.3   |  23  |  1.70<H>    Ca    8.4<L>      25 Jun 2022 08:17    TPro  6.4  /  Alb  2.4<L>  /  TBili  0.7  /  DBili  x   /  AST  25  /  ALT  23  /  AlkPhos  73  06-25

## 2022-06-25 NOTE — PROGRESS NOTE ADULT - ASSESSMENT
87 year old female with PMHx of COPD (not on O2), HTN, HLD, T2DM on insulin, hx of MI presents from assisted living facility with dyspnea, wheezing, labored breathing admitted for COPD exacerbation and acute hypoxic resp failure due to hMPV.      Problem/Plan - 1:  ·  Problem: Acute respiratory failure with hypoxia.   ·  Plan: - COPD exacerbation  due to hMPV - resp viral infection + on admission   - was on BiPAP, weaned off BiPAP. Then on 2L O2 NC. Now saturating in 90s on RA.    -Xanax PRN - duoneb prn  - steroids tapered off / completed on 06/19  - persistent leukocytosis so performed CT Chest which showed some groundglass opacity in LLL - discussed with ID (Estrella), started on Augmentin for possible aspiration PNA ; last night, pt's family decided they wish to stop antibiotics. Overall, family would like to de-escalate medical interventions as they feel the pt has been rapidly declining and would not have wanted to be living  in hospitalized setting the way she has been. Further, they feel the Abx have caused pt to have significant diarrhea causing the pt discomfort and believe bacterial PNA is unlikely. For those reasons, family requested I discontinue antibiotics, which I did this evening.  - pulm (Shalshin), recs appreciated  - ID (Estrella), recs appreciated     Problem/Plan - 2:  ·  Problem: COPD exacerbation.   - Continue home medications - c/w nebs  - steroids tapered off / completed on 06/19  - Supplemental O2 PRN. COPD patient will keep SpO2 goal 89-93%.   - Encourage incentive spirometry.  - pulm (Shalshin), recs appreciated     Problem/Plan - 3:  ·  Problem: TRENTON on CKD 3  ·  Plan: CKD, Cr at baseline ranging from 1.3-1.7 on outpatient HIE review  hold lasix per renal     Problem/Plan - 4:  ·  Problem: DM2 (diabetes mellitus, type 2).   ·  Plan: - Chronic, known history of T2DM  - C/W Lantus 15 U qd and lispro 8U pre meals  - Moderate dose insulin corrective scale   - HgbA1c: 10%  - endocrine following appreciate recs.     Problem/Plan - 5:  ·  Problem: HTN (hypertension).   ·  Plan: bp stable  - change lasix to 20mg prn on discharge per renal  - Continue home Metoprolol Succinate 50 mg qd with hold parameters  -monitor bp.     Problem/Plan - 6:  ·  Problem: Anxiety.   ·  Plan: stable  continue Xanax prn , lexapro.     Problem/Plan - 7:  ·  Problem: Hypothyroidism, unspecified type.   ·  Plan: Chronic  - Continue home Synthroid 125 mcg PO qd.     Problem/Plan - 8:  ·  Problem: HLD (hyperlipidemia).   ·  Plan: Chronic  - Continue home Atorvastatin 80 mg PO qhs.     Problem/Plan - 9:  ·  Problem: CAD (coronary artery disease).   ·  Plan: - Hx of MI  - Continue home Aspirin 81 mg PO qd, Plavix 75 mg PO qd, imdur 30mg daily   - Echo 3/11/2022: Left ventricle was of normal size, mild left ventricular hypertrophy LV systolic function EF 65%. Moderate to severe aortic stenosis.     Problem/Plan - 10:  ·  Problem: Need for prophylactic measure.   ·  Plan; VTE ppx: Heparin 5000 subq q12h    ACP / Disp:  - DNR/DNI - [facility]OLST filled  - family now wished to pursue hospice as feel pt has had significant mental/physical decline; palliative care evaluated and believe pt not quite meeting hospice criteria  - pt's son now decided to pursue SHERIN/SNF at [facility] as opposed to pt returning to [facility] as she has had further physical decline.  - [facility] working on placement to

## 2022-06-25 NOTE — PROGRESS NOTE ADULT - SUBJECTIVE AND OBJECTIVE BOX
Date/Time Patient Seen:  		  Referring MD:   Data Reviewed	       Patient is a 87y old  Female who presents with a chief complaint of COPD Exacerbation, hMPV infection (24 Jun 2022 20:12)      Subjective/HPI     PAST MEDICAL & SURGICAL HISTORY:  Uncomplicated asthma, unspecified asthma severity    DM2 (diabetes mellitus, type 2)    Essential hypertension    Hypothyroidism, unspecified type    Pure hypercholesterolemia    Gastroesophageal reflux disease without esophagitis    Diabetes    Asthma    CAD (coronary artery disease)    HTN (hypertension)    HLD (hyperlipidemia)    Hypothyroid    NSTEMI (non-ST elevated myocardial infarction)    No significant past surgical history    History of appendectomy    History of appendectomy    Abnormal findings on cardiac catheterization  Cardiac Cath          Medication list         MEDICATIONS  (STANDING):  albuterol/ipratropium for Nebulization 3 milliLiter(s) Nebulizer every 6 hours  artificial tears (preservative free) Ophthalmic Solution 1 Drop(s) Both EYES two times a day  aspirin  chewable 81 milliGRAM(s) Oral daily  atorvastatin 80 milliGRAM(s) Oral at bedtime  cholecalciferol 2000 Unit(s) Oral daily  clopidogrel Tablet 75 milliGRAM(s) Oral daily  dextrose 5%. 1000 milliLiter(s) (100 mL/Hr) IV Continuous <Continuous>  dextrose 5%. 1000 milliLiter(s) (50 mL/Hr) IV Continuous <Continuous>  dextrose 50% Injectable 25 Gram(s) IV Push once  dextrose 50% Injectable 12.5 Gram(s) IV Push once  dextrose 50% Injectable 25 Gram(s) IV Push once  dextrose 50% Injectable 25 Gram(s) IV Push once  escitalopram 10 milliGRAM(s) Oral daily  gabapentin 100 milliGRAM(s) Oral three times a day  glucagon  Injectable 1 milliGRAM(s) IntraMuscular once  glucagon  Injectable 1 milliGRAM(s) IntraMuscular once  heparin   Injectable 5000 Unit(s) SubCutaneous every 12 hours  insulin glargine Injectable (LANTUS) 15 Unit(s) SubCutaneous every morning  insulin lispro (ADMELOG) corrective regimen sliding scale   SubCutaneous three times a day before meals  insulin lispro (ADMELOG) corrective regimen sliding scale   SubCutaneous at bedtime  insulin lispro Injectable (ADMELOG) 8 Unit(s) SubCutaneous three times a day before meals  isosorbide   mononitrate ER Tablet (IMDUR) 30 milliGRAM(s) Oral daily  levothyroxine 125 MICROGram(s) Oral daily  metoprolol succinate ER 50 milliGRAM(s) Oral daily    MEDICATIONS  (PRN):  acetaminophen     Tablet .. 650 milliGRAM(s) Oral every 6 hours PRN Mild Pain (1 - 3)  ALBUTerol    0.083% 2.5 milliGRAM(s) Nebulizer every 4 hours PRN Shortness of Breath and/or Wheezing  dextrose Oral Gel 15 Gram(s) Oral once PRN Blood Glucose LESS THAN 70 milliGRAM(s)/deciliter  melatonin 3 milliGRAM(s) Oral at bedtime PRN Insomnia         Vitals log        ICU Vital Signs Last 24 Hrs  T(C): 36.5 (25 Jun 2022 05:10), Max: 37 (24 Jun 2022 12:53)  T(F): 97.7 (25 Jun 2022 05:10), Max: 98.6 (24 Jun 2022 12:53)  HR: 66 (25 Jun 2022 05:10) (66 - 84)  BP: 157/75 (25 Jun 2022 05:10) (118/69 - 157/75)  BP(mean): --  ABP: --  ABP(mean): --  RR: 17 (25 Jun 2022 05:10) (17 - 18)  SpO2: 93% (25 Jun 2022 05:10) (92% - 98%)           Input and Output:  I&O's Detail    24 Jun 2022 07:01  -  25 Jun 2022 06:36  --------------------------------------------------------  IN:    Oral Fluid: 360 mL  Total IN: 360 mL    OUT:  Total OUT: 0 mL    Total NET: 360 mL          Lab Data                        13.4   19.27 )-----------( 244      ( 24 Jun 2022 07:10 )             42.4     06-24    139  |  106  |  54<H>  ----------------------------<  208<H>  4.1   |  25  |  1.50<H>    Ca    8.6      24 Jun 2022 07:10    TPro  6.5  /  Alb  2.5<L>  /  TBili  0.8  /  DBili  x   /  AST  23  /  ALT  20  /  AlkPhos  74  06-24            Review of Systems	      Objective     Physical Examination    heart s1s2  lung dec BS  abd soft      Pertinent Lab findings & Imaging      Chong:  NO   Adequate UO     I&O's Detail    24 Jun 2022 07:01  -  25 Jun 2022 06:36  --------------------------------------------------------  IN:    Oral Fluid: 360 mL  Total IN: 360 mL    OUT:  Total OUT: 0 mL    Total NET: 360 mL               Discussed with:     Cultures:	        Radiology

## 2022-06-25 NOTE — PROGRESS NOTE ADULT - SUBJECTIVE AND OBJECTIVE BOX
Patient is a 87y old  Female who presents with a chief complaint of cough, SOB.        INTERVAL HPI/OVERNIGHT EVENTS: ROS limited as pt with dementia. Admits cough (gradually improving). Denies SOB, CP, abd pain, fever, chills.    MEDICATIONS  (STANDING):  albuterol/ipratropium for Nebulization 3 milliLiter(s) Nebulizer every 6 hours  artificial tears (preservative free) Ophthalmic Solution 1 Drop(s) Both EYES two times a day  aspirin  chewable 81 milliGRAM(s) Oral daily  atorvastatin 80 milliGRAM(s) Oral at bedtime  cholecalciferol 2000 Unit(s) Oral daily  clopidogrel Tablet 75 milliGRAM(s) Oral daily  dextrose 5%. 1000 milliLiter(s) (100 mL/Hr) IV Continuous <Continuous>  dextrose 5%. 1000 milliLiter(s) (50 mL/Hr) IV Continuous <Continuous>  dextrose 50% Injectable 25 Gram(s) IV Push once  dextrose 50% Injectable 12.5 Gram(s) IV Push once  dextrose 50% Injectable 25 Gram(s) IV Push once  dextrose 50% Injectable 25 Gram(s) IV Push once  escitalopram 10 milliGRAM(s) Oral daily  gabapentin 100 milliGRAM(s) Oral three times a day  glucagon  Injectable 1 milliGRAM(s) IntraMuscular once  glucagon  Injectable 1 milliGRAM(s) IntraMuscular once  heparin   Injectable 5000 Unit(s) SubCutaneous every 12 hours  insulin glargine Injectable (LANTUS) 15 Unit(s) SubCutaneous every morning  insulin lispro (ADMELOG) corrective regimen sliding scale   SubCutaneous three times a day before meals  insulin lispro (ADMELOG) corrective regimen sliding scale   SubCutaneous at bedtime  insulin lispro Injectable (ADMELOG) 8 Unit(s) SubCutaneous three times a day before meals  isosorbide   mononitrate ER Tablet (IMDUR) 30 milliGRAM(s) Oral daily  levothyroxine 125 MICROGram(s) Oral daily  metoprolol succinate ER 50 milliGRAM(s) Oral daily    MEDICATIONS  (PRN):  acetaminophen     Tablet .. 650 milliGRAM(s) Oral every 6 hours PRN Mild Pain (1 - 3)  ALBUTerol    0.083% 2.5 milliGRAM(s) Nebulizer every 4 hours PRN Shortness of Breath and/or Wheezing  dextrose Oral Gel 15 Gram(s) Oral once PRN Blood Glucose LESS THAN 70 milliGRAM(s)/deciliter  melatonin 3 milliGRAM(s) Oral at bedtime PRN Insomnia        Allergies    doxycycline (Unknown)  iodine (Hives)  iodine containing compounds (Unknown)  shellfish (Anaphylaxis)  shellfish (Swelling; Short breath)    Intolerances        REVIEW OF SYSTEMS:  ROS limited as pt with advanced dementia. Admits cough. Denies SOB, CP, abd pain, fever, chills.    Vital Signs Last 24 Hrs  T(C): 36.5 (25 Jun 2022 05:10), Max: 37 (24 Jun 2022 12:53)  T(F): 97.7 (25 Jun 2022 05:10), Max: 98.6 (24 Jun 2022 12:53)  HR: 69 (25 Jun 2022 08:05) (66 - 84)  BP: 157/75 (25 Jun 2022 05:10) (118/69 - 157/75)  BP(mean): --  RR: 17 (25 Jun 2022 05:10) (17 - 18)  SpO2: 94% (25 Jun 2022 08:05) (92% - 98%)    PHYSICAL EXAM:  GENERAL: NAD at rest  HEENT:  anicteric, moist mucous membranes  CHEST/LUNG:  decreased breath sounds  HEART:  RRR, S1, S2  ABDOMEN:  BS+, soft, nontender, nondistended  EXTREMITIES: no cyanosis, or calf tenderness  NERVOUS SYSTEM: awake, alert, answers few simple questions and follows few commands     LABS:                                              12.2   18.92 )-----------( 222      ( 25 Jun 2022 08:17 )             39.1     CBC Full  -  ( 25 Jun 2022 08:17 )  WBC Count : 18.92 K/uL  Hemoglobin : 12.2 g/dL  Hematocrit : 39.1 %  Platelet Count - Automated : 222 K/uL  Mean Cell Volume : 88.3 fl  Mean Cell Hemoglobin : 27.5 pg  Mean Cell Hemoglobin Concentration : 31.2 gm/dL  Auto Neutrophil # : x  Auto Lymphocyte # : x  Auto Monocyte # : x  Auto Eosinophil # : x  Auto Basophil # : x  Auto Neutrophil % : x  Auto Lymphocyte % : x  Auto Monocyte % : x  Auto Eosinophil % : x  Auto Basophil % : x    06-25    136  |  104  |  60<H>  ----------------------------<  173<H>  4.3   |  23  |  1.70<H>    Ca    8.4<L>      25 Jun 2022 08:17    TPro  6.4  /  Alb  2.4<L>  /  TBili  0.7  /  DBili  x   /  AST  25  /  ALT  23  /  AlkPhos  73  06-25        CAPILLARY BLOOD GLUCOSE      POCT Blood Glucose.: 185 mg/dL (25 Jun 2022 11:38)  POCT Blood Glucose.: 166 mg/dL (25 Jun 2022 07:34)              RADIOLOGY & ADDITIONAL TESTS:    Personally reviewed.     Consultant(s) Notes Reviewed:  [x] YES  [ ] NO     Patient is a 87y old  Female who presents with a chief complaint of cough, SOB.         INTERVAL HPI/OVERNIGHT EVENTS: ROS limited as pt with dementia. Admits cough (gradually improving). Denies SOB, CP, abd pain, fever, chills.    MEDICATIONS  (STANDING):  albuterol/ipratropium for Nebulization 3 milliLiter(s) Nebulizer every 6 hours  artificial tears (preservative free) Ophthalmic Solution 1 Drop(s) Both EYES two times a day  aspirin  chewable 81 milliGRAM(s) Oral daily  atorvastatin 80 milliGRAM(s) Oral at bedtime  cholecalciferol 2000 Unit(s) Oral daily  clopidogrel Tablet 75 milliGRAM(s) Oral daily  dextrose 5%. 1000 milliLiter(s) (100 mL/Hr) IV Continuous <Continuous>  dextrose 5%. 1000 milliLiter(s) (50 mL/Hr) IV Continuous <Continuous>  dextrose 50% Injectable 25 Gram(s) IV Push once  dextrose 50% Injectable 12.5 Gram(s) IV Push once  dextrose 50% Injectable 25 Gram(s) IV Push once  dextrose 50% Injectable 25 Gram(s) IV Push once  escitalopram 10 milliGRAM(s) Oral daily  gabapentin 100 milliGRAM(s) Oral three times a day  glucagon  Injectable 1 milliGRAM(s) IntraMuscular once  glucagon  Injectable 1 milliGRAM(s) IntraMuscular once  heparin   Injectable 5000 Unit(s) SubCutaneous every 12 hours  insulin glargine Injectable (LANTUS) 15 Unit(s) SubCutaneous every morning  insulin lispro (ADMELOG) corrective regimen sliding scale   SubCutaneous three times a day before meals  insulin lispro (ADMELOG) corrective regimen sliding scale   SubCutaneous at bedtime  insulin lispro Injectable (ADMELOG) 8 Unit(s) SubCutaneous three times a day before meals  isosorbide   mononitrate ER Tablet (IMDUR) 30 milliGRAM(s) Oral daily  levothyroxine 125 MICROGram(s) Oral daily  metoprolol succinate ER 50 milliGRAM(s) Oral daily    MEDICATIONS  (PRN):  acetaminophen     Tablet .. 650 milliGRAM(s) Oral every 6 hours PRN Mild Pain (1 - 3)  ALBUTerol    0.083% 2.5 milliGRAM(s) Nebulizer every 4 hours PRN Shortness of Breath and/or Wheezing  dextrose Oral Gel 15 Gram(s) Oral once PRN Blood Glucose LESS THAN 70 milliGRAM(s)/deciliter  melatonin 3 milliGRAM(s) Oral at bedtime PRN Insomnia        Allergies    doxycycline (Unknown)  iodine (Hives)  iodine containing compounds (Unknown)  shellfish (Anaphylaxis)  shellfish (Swelling; Short breath)    Intolerances        REVIEW OF SYSTEMS:  ROS limited as pt with advanced dementia. Admits cough. Denies SOB, CP, abd pain, fever, chills.    Vital Signs Last 24 Hrs  T(C): 36.5 (25 Jun 2022 05:10), Max: 37 (24 Jun 2022 12:53)  T(F): 97.7 (25 Jun 2022 05:10), Max: 98.6 (24 Jun 2022 12:53)  HR: 69 (25 Jun 2022 08:05) (66 - 84)  BP: 157/75 (25 Jun 2022 05:10) (118/69 - 157/75)  BP(mean): --  RR: 17 (25 Jun 2022 05:10) (17 - 18)  SpO2: 94% (25 Jun 2022 08:05) (92% - 98%)    PHYSICAL EXAM:  GENERAL: NAD at rest  HEENT:  anicteric, moist mucous membranes  CHEST/LUNG:  decreased breath sounds  HEART:  RRR, S1, S2  ABDOMEN:  BS+, soft, nontender, nondistended  EXTREMITIES: no cyanosis, or calf tenderness  NERVOUS SYSTEM: awake, alert, answers few simple questions and follows few commands     LABS:                                              12.2   18.92 )-----------( 222      ( 25 Jun 2022 08:17 )             39.1     CBC Full  -  ( 25 Jun 2022 08:17 )  WBC Count : 18.92 K/uL  Hemoglobin : 12.2 g/dL  Hematocrit : 39.1 %  Platelet Count - Automated : 222 K/uL  Mean Cell Volume : 88.3 fl  Mean Cell Hemoglobin : 27.5 pg  Mean Cell Hemoglobin Concentration : 31.2 gm/dL  Auto Neutrophil # : x  Auto Lymphocyte # : x  Auto Monocyte # : x  Auto Eosinophil # : x  Auto Basophil # : x  Auto Neutrophil % : x  Auto Lymphocyte % : x  Auto Monocyte % : x  Auto Eosinophil % : x  Auto Basophil % : x    06-25    136  |  104  |  60<H>  ----------------------------<  173<H>  4.3   |  23  |  1.70<H>    Ca    8.4<L>      25 Jun 2022 08:17    TPro  6.4  /  Alb  2.4<L>  /  TBili  0.7  /  DBili  x   /  AST  25  /  ALT  23  /  AlkPhos  73  06-25        CAPILLARY BLOOD GLUCOSE      POCT Blood Glucose.: 185 mg/dL (25 Jun 2022 11:38)  POCT Blood Glucose.: 166 mg/dL (25 Jun 2022 07:34)              RADIOLOGY & ADDITIONAL TESTS:    Personally reviewed.     Consultant(s) Notes Reviewed:  [x] YES  [ ] NO

## 2022-06-26 LAB
ANION GAP SERPL CALC-SCNC: 7 MMOL/L — SIGNIFICANT CHANGE UP (ref 5–17)
BUN SERPL-MCNC: 59 MG/DL — HIGH (ref 7–23)
CALCIUM SERPL-MCNC: 8.6 MG/DL — SIGNIFICANT CHANGE UP (ref 8.5–10.1)
CHLORIDE SERPL-SCNC: 106 MMOL/L — SIGNIFICANT CHANGE UP (ref 96–108)
CO2 SERPL-SCNC: 24 MMOL/L — SIGNIFICANT CHANGE UP (ref 22–31)
CREAT SERPL-MCNC: 1.6 MG/DL — HIGH (ref 0.5–1.3)
EGFR: 31 ML/MIN/1.73M2 — LOW
GLUCOSE SERPL-MCNC: 159 MG/DL — HIGH (ref 70–99)
HCT VFR BLD CALC: 39.1 % — SIGNIFICANT CHANGE UP (ref 34.5–45)
HGB BLD-MCNC: 12.1 G/DL — SIGNIFICANT CHANGE UP (ref 11.5–15.5)
MCHC RBC-ENTMCNC: 27.8 PG — SIGNIFICANT CHANGE UP (ref 27–34)
MCHC RBC-ENTMCNC: 30.9 GM/DL — LOW (ref 32–36)
MCV RBC AUTO: 89.9 FL — SIGNIFICANT CHANGE UP (ref 80–100)
NRBC # BLD: 0 /100 WBCS — SIGNIFICANT CHANGE UP (ref 0–0)
PLATELET # BLD AUTO: 193 K/UL — SIGNIFICANT CHANGE UP (ref 150–400)
POTASSIUM SERPL-MCNC: 4.5 MMOL/L — SIGNIFICANT CHANGE UP (ref 3.5–5.3)
POTASSIUM SERPL-SCNC: 4.5 MMOL/L — SIGNIFICANT CHANGE UP (ref 3.5–5.3)
RBC # BLD: 4.35 M/UL — SIGNIFICANT CHANGE UP (ref 3.8–5.2)
RBC # FLD: 13.5 % — SIGNIFICANT CHANGE UP (ref 10.3–14.5)
SODIUM SERPL-SCNC: 137 MMOL/L — SIGNIFICANT CHANGE UP (ref 135–145)
WBC # BLD: 20.41 K/UL — HIGH (ref 3.8–10.5)
WBC # FLD AUTO: 20.41 K/UL — HIGH (ref 3.8–10.5)

## 2022-06-26 PROCEDURE — 99232 SBSQ HOSP IP/OBS MODERATE 35: CPT

## 2022-06-26 RX ADMIN — Medication 2: at 12:00

## 2022-06-26 RX ADMIN — Medication 1 DROP(S): at 06:50

## 2022-06-26 RX ADMIN — Medication 8 UNIT(S): at 18:34

## 2022-06-26 RX ADMIN — HEPARIN SODIUM 5000 UNIT(S): 5000 INJECTION INTRAVENOUS; SUBCUTANEOUS at 18:33

## 2022-06-26 RX ADMIN — Medication 2000 UNIT(S): at 13:51

## 2022-06-26 RX ADMIN — GABAPENTIN 100 MILLIGRAM(S): 400 CAPSULE ORAL at 22:01

## 2022-06-26 RX ADMIN — GABAPENTIN 100 MILLIGRAM(S): 400 CAPSULE ORAL at 13:52

## 2022-06-26 RX ADMIN — Medication 81 MILLIGRAM(S): at 13:51

## 2022-06-26 RX ADMIN — Medication 3 MILLILITER(S): at 12:33

## 2022-06-26 RX ADMIN — HEPARIN SODIUM 5000 UNIT(S): 5000 INJECTION INTRAVENOUS; SUBCUTANEOUS at 06:51

## 2022-06-26 RX ADMIN — Medication 0: at 22:01

## 2022-06-26 RX ADMIN — Medication 1: at 08:06

## 2022-06-26 RX ADMIN — Medication 8 UNIT(S): at 08:06

## 2022-06-26 RX ADMIN — INSULIN GLARGINE 15 UNIT(S): 100 INJECTION, SOLUTION SUBCUTANEOUS at 08:07

## 2022-06-26 RX ADMIN — ATORVASTATIN CALCIUM 80 MILLIGRAM(S): 80 TABLET, FILM COATED ORAL at 22:00

## 2022-06-26 RX ADMIN — Medication 1 DROP(S): at 18:34

## 2022-06-26 RX ADMIN — GABAPENTIN 100 MILLIGRAM(S): 400 CAPSULE ORAL at 06:50

## 2022-06-26 RX ADMIN — ESCITALOPRAM OXALATE 10 MILLIGRAM(S): 10 TABLET, FILM COATED ORAL at 13:52

## 2022-06-26 RX ADMIN — Medication 8 UNIT(S): at 12:00

## 2022-06-26 RX ADMIN — Medication 50 MILLIGRAM(S): at 06:50

## 2022-06-26 RX ADMIN — ISOSORBIDE MONONITRATE 30 MILLIGRAM(S): 60 TABLET, EXTENDED RELEASE ORAL at 13:51

## 2022-06-26 RX ADMIN — Medication 125 MICROGRAM(S): at 06:50

## 2022-06-26 RX ADMIN — CLOPIDOGREL BISULFATE 75 MILLIGRAM(S): 75 TABLET, FILM COATED ORAL at 13:52

## 2022-06-26 RX ADMIN — Medication 3 MILLILITER(S): at 07:47

## 2022-06-26 RX ADMIN — Medication 3 MILLILITER(S): at 20:30

## 2022-06-26 NOTE — PROGRESS NOTE ADULT - SUBJECTIVE AND OBJECTIVE BOX
Date/Time Patient Seen:  		  Referring MD:   Data Reviewed	       Patient is a 87y old  Female who presents with a chief complaint of COPD Exacerbation, hMPV infection (25 Jun 2022 12:39)      Subjective/HPI     PAST MEDICAL & SURGICAL HISTORY:  Uncomplicated asthma, unspecified asthma severity    DM2 (diabetes mellitus, type 2)    Essential hypertension    Hypothyroidism, unspecified type    Pure hypercholesterolemia    Gastroesophageal reflux disease without esophagitis    Diabetes    Asthma    CAD (coronary artery disease)    HTN (hypertension)    HLD (hyperlipidemia)    Hypothyroid    NSTEMI (non-ST elevated myocardial infarction)    No significant past surgical history    History of appendectomy    History of appendectomy    Abnormal findings on cardiac catheterization  Cardiac Cath          Medication list         MEDICATIONS  (STANDING):  albuterol/ipratropium for Nebulization 3 milliLiter(s) Nebulizer every 6 hours  artificial tears (preservative free) Ophthalmic Solution 1 Drop(s) Both EYES two times a day  aspirin  chewable 81 milliGRAM(s) Oral daily  atorvastatin 80 milliGRAM(s) Oral at bedtime  cholecalciferol 2000 Unit(s) Oral daily  clopidogrel Tablet 75 milliGRAM(s) Oral daily  dextrose 5%. 1000 milliLiter(s) (50 mL/Hr) IV Continuous <Continuous>  dextrose 5%. 1000 milliLiter(s) (100 mL/Hr) IV Continuous <Continuous>  dextrose 50% Injectable 12.5 Gram(s) IV Push once  dextrose 50% Injectable 25 Gram(s) IV Push once  dextrose 50% Injectable 25 Gram(s) IV Push once  dextrose 50% Injectable 25 Gram(s) IV Push once  escitalopram 10 milliGRAM(s) Oral daily  gabapentin 100 milliGRAM(s) Oral three times a day  glucagon  Injectable 1 milliGRAM(s) IntraMuscular once  glucagon  Injectable 1 milliGRAM(s) IntraMuscular once  heparin   Injectable 5000 Unit(s) SubCutaneous every 12 hours  insulin glargine Injectable (LANTUS) 15 Unit(s) SubCutaneous every morning  insulin lispro (ADMELOG) corrective regimen sliding scale   SubCutaneous three times a day before meals  insulin lispro (ADMELOG) corrective regimen sliding scale   SubCutaneous at bedtime  insulin lispro Injectable (ADMELOG) 8 Unit(s) SubCutaneous three times a day before meals  isosorbide   mononitrate ER Tablet (IMDUR) 30 milliGRAM(s) Oral daily  levothyroxine 125 MICROGram(s) Oral daily  metoprolol succinate ER 50 milliGRAM(s) Oral daily    MEDICATIONS  (PRN):  acetaminophen     Tablet .. 650 milliGRAM(s) Oral every 6 hours PRN Mild Pain (1 - 3)  ALBUTerol    0.083% 2.5 milliGRAM(s) Nebulizer every 4 hours PRN Shortness of Breath and/or Wheezing  dextrose Oral Gel 15 Gram(s) Oral once PRN Blood Glucose LESS THAN 70 milliGRAM(s)/deciliter  melatonin 3 milliGRAM(s) Oral at bedtime PRN Insomnia         Vitals log        ICU Vital Signs Last 24 Hrs  T(C): 37.2 (25 Jun 2022 21:04), Max: 37.2 (25 Jun 2022 21:04)  T(F): 99 (25 Jun 2022 21:04), Max: 99 (25 Jun 2022 21:04)  HR: 89 (25 Jun 2022 21:04) (66 - 89)  BP: 115/70 (25 Jun 2022 21:04) (115/70 - 147/75)  BP(mean): --  ABP: --  ABP(mean): --  RR: 17 (25 Jun 2022 21:04) (16 - 17)  SpO2: 93% (25 Jun 2022 21:04) (92% - 95%)           Input and Output:  I&O's Detail    24 Jun 2022 07:01  -  25 Jun 2022 07:00  --------------------------------------------------------  IN:    Oral Fluid: 360 mL  Total IN: 360 mL    OUT:  Total OUT: 0 mL    Total NET: 360 mL          Lab Data                        12.2   18.92 )-----------( 222      ( 25 Jun 2022 08:17 )             39.1     06-25    136  |  104  |  60<H>  ----------------------------<  173<H>  4.3   |  23  |  1.70<H>    Ca    8.4<L>      25 Jun 2022 08:17    TPro  6.4  /  Alb  2.4<L>  /  TBili  0.7  /  DBili  x   /  AST  25  /  ALT  23  /  AlkPhos  73  06-25            Review of Systems	      Objective     Physical Examination    heart s1s2  lung dec BS  abd soft      Pertinent Lab findings & Imaging      Chong:  NO   Adequate UO     I&O's Detail    24 Jun 2022 07:01  -  25 Jun 2022 07:00  --------------------------------------------------------  IN:    Oral Fluid: 360 mL  Total IN: 360 mL    OUT:  Total OUT: 0 mL    Total NET: 360 mL               Discussed with:     Cultures:	        Radiology

## 2022-06-26 NOTE — PROGRESS NOTE ADULT - SUBJECTIVE AND OBJECTIVE BOX
Patient is a 87y old  Female who presents with a chief complaint of COPD Exacerbation (16 Jun 2022 11:01)       HPI:  87 year old female with PMHx COPD, HTN, HLD, T2DM on insulin, hx of MI presents from assisted living facility with dyspnea, wheezing, labored breathing. Patient recently admitted to [facility] 3/9-15/2022 for COPD exacerbation. History obtained from son Calvin, as patient is limited historian at baseline. Son states he received a call from [facility] this AM stating patient is having labored breathing. Patient was sent to [facility] for further management.   She states her breathing is improved.  Denies any N/V/Dizziness.  Has not seen nephrologist in the past.  No urinary issues. Denies NSAID usage.    No acute events noted       PAST MEDICAL & SURGICAL HISTORY:  Uncomplicated asthma, unspecified asthma severity      DM2 (diabetes mellitus, type 2)      Essential hypertension      Hypothyroidism, unspecified type      Pure hypercholesterolemia      Gastroesophageal reflux disease without esophagitis      Diabetes      Asthma      CAD (coronary artery disease)      HTN (hypertension)      HLD (hyperlipidemia)      Hypothyroid      NSTEMI (non-ST elevated myocardial infarction)      History of appendectomy      History of appendectomy      Abnormal findings on cardiac catheterization  Cardiac Cath           FAMILY HISTORY:  Family history of heart disease    Family history of cancer in mother    Family history of MI (myocardial infarction)    Family history of stomach cancer    NC    Social History:Non smoker    MEDICATIONS  (STANDING):  albuterol/ipratropium for Nebulization 3 milliLiter(s) Nebulizer every 6 hours  artificial tears (preservative free) Ophthalmic Solution 1 Drop(s) Both EYES two times a day  aspirin  chewable 81 milliGRAM(s) Oral daily  atorvastatin 80 milliGRAM(s) Oral at bedtime  cholecalciferol 2000 Unit(s) Oral daily  clopidogrel Tablet 75 milliGRAM(s) Oral daily  dextrose 5%. 1000 milliLiter(s) (50 mL/Hr) IV Continuous <Continuous>  dextrose 5%. 1000 milliLiter(s) (100 mL/Hr) IV Continuous <Continuous>  dextrose 50% Injectable 25 Gram(s) IV Push once  dextrose 50% Injectable 12.5 Gram(s) IV Push once  dextrose 50% Injectable 25 Gram(s) IV Push once  escitalopram 10 milliGRAM(s) Oral daily  furosemide   Injectable 40 milliGRAM(s) IV Push <User Schedule>  gabapentin 100 milliGRAM(s) Oral three times a day  glucagon  Injectable 1 milliGRAM(s) IntraMuscular once  heparin   Injectable 5000 Unit(s) SubCutaneous every 12 hours  insulin glargine Injectable (LANTUS) 15 Unit(s) SubCutaneous every morning  insulin lispro (ADMELOG) corrective regimen sliding scale   SubCutaneous every 6 hours  insulin lispro Injectable (ADMELOG) 8 Unit(s) SubCutaneous three times a day before meals  isosorbide   mononitrate ER Tablet (IMDUR) 30 milliGRAM(s) Oral daily  levothyroxine 125 MICROGram(s) Oral daily  methylPREDNISolone sodium succinate Injectable 40 milliGRAM(s) IV Push daily  metoprolol succinate ER 50 milliGRAM(s) Oral daily    MEDICATIONS  (PRN):  acetaminophen     Tablet .. 650 milliGRAM(s) Oral every 6 hours PRN Mild Pain (1 - 3)  ALBUTerol    0.083% 2.5 milliGRAM(s) Nebulizer every 4 hours PRN Shortness of Breath and/or Wheezing  ALPRAZolam 0.5 milliGRAM(s) Oral two times a day PRN Anxiety  dextrose Oral Gel 15 Gram(s) Oral once PRN Blood Glucose LESS THAN 70 milliGRAM(s)/deciliter  melatonin 3 milliGRAM(s) Oral at bedtime PRN Insomnia   Meds reviewed    Allergies    doxycycline (Unknown)  iodine (Hives)  iodine containing compounds (Unknown)  shellfish (Anaphylaxis)  shellfish (Swelling; Short breath)    Intolerances         REVIEW OF SYSTEMS:   Constitutional: Denies fatigue  HEENT: Denies headaches and dizziness  Respiratory: denies  cough, or wheezing, improving SOB  Cardiovascular: denies CP, palpitations  Gastrointestinal: Denies nausea, denies vomiting, diarrhea, constipation, abdominal pain, or bloody stools  Genitourinary: denies painful urination, increased frequency, urgency, or bloody urine  Skin: denies rashes or itching  Musculoskeletal: denies muscle aches, joint swelling  Neurologic: Denies generalized weakness, denies loss of sensation, numbness, or tingling    Vital Signs Last 24 Hrs  T(C): 36.3 (26 Jun 2022 06:47), Max: 37.2 (25 Jun 2022 21:04)  T(F): 97.4 (26 Jun 2022 06:47), Max: 99 (25 Jun 2022 21:04)  HR: 65 (26 Jun 2022 07:47) (63 - 89)  BP: 160/75 (26 Jun 2022 06:47) (115/70 - 160/75)  BP(mean): --  RR: 18 (26 Jun 2022 06:47) (16 - 18)  SpO2: 92% (26 Jun 2022 06:47) (92% - 95%)    PHYSICAL EXAM:    GENERAL: NAD  HEAD:  Atraumatic, Normocephalic  EYES: EOMI, conjunctiva and sclera clear  ENMT: No Drainage from nares, No drainage from ears  NECK: Supple, neck  veins full  NERVOUS SYSTEM:  Awake and Alert  CHEST/LUNG: Clear to percussion bilaterally; No rales, rhonchi, wheezing, or rubs  HEART: Regular rate and rhythm; No murmurs, rubs, or gallops  ABDOMEN: Soft, Nontender, Nondistended; Bowel sounds present  EXTREMITIES:  No Edema  SKIN: No rashes No obvious ecchymosis      LABS:                        12.2   18.92 )-----------( 222      ( 25 Jun 2022 08:17 )             39.1     06-25    136  |  104  |  60<H>  ----------------------------<  173<H>  4.3   |  23  |  1.70<H>    Ca    8.4<L>      25 Jun 2022 08:17    TPro  6.4  /  Alb  2.4<L>  /  TBili  0.7  /  DBili  x   /  AST  25  /  ALT  23  /  AlkPhos  73  06-25

## 2022-06-26 NOTE — PROGRESS NOTE ADULT - SUBJECTIVE AND OBJECTIVE BOX
CAPILLARY BLOOD GLUCOSE      POCT Blood Glucose.: 201 mg/dL (26 Jun 2022 11:53)  POCT Blood Glucose.: 172 mg/dL (26 Jun 2022 07:52)  POCT Blood Glucose.: 174 mg/dL (25 Jun 2022 21:21)  POCT Blood Glucose.: 144 mg/dL (25 Jun 2022 16:38)      Vital Signs Last 24 Hrs  T(C): 36.3 (26 Jun 2022 06:47), Max: 37.2 (25 Jun 2022 21:04)  T(F): 97.4 (26 Jun 2022 06:47), Max: 99 (25 Jun 2022 21:04)  HR: 65 (26 Jun 2022 07:47) (63 - 89)  BP: 160/75 (26 Jun 2022 06:47) (115/70 - 160/75)  BP(mean): --  RR: 18 (26 Jun 2022 06:47) (16 - 18)  SpO2: 92% (26 Jun 2022 06:47) (92% - 95%)    General: WN/WD NAD  Respiratory: CTA B/L  CV: RRR, S1S2, no murmurs, rubs or gallops  Abdominal: Soft, NT, ND +BS, Last BM  Extremities: No edema, + peripheral pulses     06-26    137  |  106  |  59<H>  ----------------------------<  159<H>  4.5   |  24  |  1.60<H>    Ca    8.6      26 Jun 2022 08:33    TPro  6.4  /  Alb  2.4<L>  /  TBili  0.7  /  DBili  x   /  AST  25  /  ALT  23  /  AlkPhos  73  06-25      atorvastatin 80 milliGRAM(s) Oral at bedtime  dextrose 50% Injectable 25 Gram(s) IV Push once  dextrose 50% Injectable 25 Gram(s) IV Push once  dextrose 50% Injectable 12.5 Gram(s) IV Push once  dextrose 50% Injectable 25 Gram(s) IV Push once  dextrose Oral Gel 15 Gram(s) Oral once PRN  glucagon  Injectable 1 milliGRAM(s) IntraMuscular once  glucagon  Injectable 1 milliGRAM(s) IntraMuscular once  insulin glargine Injectable (LANTUS) 15 Unit(s) SubCutaneous every morning  insulin lispro (ADMELOG) corrective regimen sliding scale   SubCutaneous three times a day before meals  insulin lispro (ADMELOG) corrective regimen sliding scale   SubCutaneous at bedtime  insulin lispro Injectable (ADMELOG) 8 Unit(s) SubCutaneous three times a day before meals  levothyroxine 125 MICROGram(s) Oral daily

## 2022-06-26 NOTE — PROGRESS NOTE ADULT - ASSESSMENT
[place] on CKD 3b  Respiratory Acidosis  HTN  Human Metapneumovirus      -BLCr 1.4  -Urine indices reviewed  -Hold further lasix  -Avoid IVF given respiratory status  -Pulm eval reviewed  -CXR clear 6/15/22  -Renal indices fluctuates but are relatively stable at baseline range. Awaiting today's lab result     Thank you

## 2022-06-26 NOTE — PROGRESS NOTE ADULT - SUBJECTIVE AND OBJECTIVE BOX
Patient is a 87y old  Female who presents with a chief complaint of cough, SOB.         INTERVAL HPI/OVERNIGHT EVENTS: ROS limited as pt with dementia. Admits cough (gradually improving). Denies SOB, CP, abd pain, fever, chills.    MEDICATIONS  (STANDING):  albuterol/ipratropium for Nebulization 3 milliLiter(s) Nebulizer every 6 hours  artificial tears (preservative free) Ophthalmic Solution 1 Drop(s) Both EYES two times a day  aspirin  chewable 81 milliGRAM(s) Oral daily  atorvastatin 80 milliGRAM(s) Oral at bedtime  cholecalciferol 2000 Unit(s) Oral daily  clopidogrel Tablet 75 milliGRAM(s) Oral daily  dextrose 5%. 1000 milliLiter(s) (100 mL/Hr) IV Continuous <Continuous>  dextrose 5%. 1000 milliLiter(s) (50 mL/Hr) IV Continuous <Continuous>  dextrose 50% Injectable 25 Gram(s) IV Push once  dextrose 50% Injectable 12.5 Gram(s) IV Push once  dextrose 50% Injectable 25 Gram(s) IV Push once  dextrose 50% Injectable 25 Gram(s) IV Push once  escitalopram 10 milliGRAM(s) Oral daily  gabapentin 100 milliGRAM(s) Oral three times a day  glucagon  Injectable 1 milliGRAM(s) IntraMuscular once  glucagon  Injectable 1 milliGRAM(s) IntraMuscular once  heparin   Injectable 5000 Unit(s) SubCutaneous every 12 hours  insulin glargine Injectable (LANTUS) 15 Unit(s) SubCutaneous every morning  insulin lispro (ADMELOG) corrective regimen sliding scale   SubCutaneous three times a day before meals  insulin lispro (ADMELOG) corrective regimen sliding scale   SubCutaneous at bedtime  insulin lispro Injectable (ADMELOG) 8 Unit(s) SubCutaneous three times a day before meals  isosorbide   mononitrate ER Tablet (IMDUR) 30 milliGRAM(s) Oral daily  levothyroxine 125 MICROGram(s) Oral daily  metoprolol succinate ER 50 milliGRAM(s) Oral daily    MEDICATIONS  (PRN):  acetaminophen     Tablet .. 650 milliGRAM(s) Oral every 6 hours PRN Mild Pain (1 - 3)  ALBUTerol    0.083% 2.5 milliGRAM(s) Nebulizer every 4 hours PRN Shortness of Breath and/or Wheezing  dextrose Oral Gel 15 Gram(s) Oral once PRN Blood Glucose LESS THAN 70 milliGRAM(s)/deciliter  melatonin 3 milliGRAM(s) Oral at bedtime PRN Insomnia        Allergies    doxycycline (Unknown)  iodine (Hives)  iodine containing compounds (Unknown)  shellfish (Anaphylaxis)  shellfish (Swelling; Short breath)    Intolerances        REVIEW OF SYSTEMS:  ROS limited as pt with advanced dementia. Admits cough. Denies SOB, CP, abd pain, fever, chills.    Vital Signs Last 24 Hrs  T(C): 36.7 (26 Jun 2022 13:10), Max: 37.2 (25 Jun 2022 21:04)  T(F): 98.1 (26 Jun 2022 13:10), Max: 99 (25 Jun 2022 21:04)  HR: 62 (26 Jun 2022 13:10) (62 - 89)  BP: 130/72 (26 Jun 2022 13:10) (115/70 - 160/75)  BP(mean): --  RR: 17 (26 Jun 2022 13:10) (17 - 18)  SpO2: 95% (26 Jun 2022 13:10) (92% - 95%)    PHYSICAL EXAM:  GENERAL: NAD at rest  HEENT:  anicteric, moist mucous membranes  CHEST/LUNG:  decreased breath sounds, scant wheezes  HEART:  RRR, S1, S2  ABDOMEN:  BS+, soft, nontender, nondistended  EXTREMITIES: no cyanosis, or calf tenderness  NERVOUS SYSTEM: awake, alert, answers few simple questions and follows few commands       LABS:                                              12.1   20.41 )-----------( 193      ( 26 Jun 2022 08:33 )             39.1     CBC Full  -  ( 26 Jun 2022 08:33 )  WBC Count : 20.41 K/uL  Hemoglobin : 12.1 g/dL  Hematocrit : 39.1 %  Platelet Count - Automated : 193 K/uL  Mean Cell Volume : 89.9 fl  Mean Cell Hemoglobin : 27.8 pg  Mean Cell Hemoglobin Concentration : 30.9 gm/dL  Auto Neutrophil # : x  Auto Lymphocyte # : x  Auto Monocyte # : x  Auto Eosinophil # : x  Auto Basophil # : x  Auto Neutrophil % : x  Auto Lymphocyte % : x  Auto Monocyte % : x  Auto Eosinophil % : x  Auto Basophil % : x    06-26    137  |  106  |  59<H>  ----------------------------<  159<H>  4.5   |  24  |  1.60<H>    Ca    8.6      26 Jun 2022 08:33    TPro  6.4  /  Alb  2.4<L>  /  TBili  0.7  /  DBili  x   /  AST  25  /  ALT  23  /  AlkPhos  73  06-25        CAPILLARY BLOOD GLUCOSE        POCT Blood Glucose.: 123 mg/dL (26 Jun 2022 16:36)  POCT Blood Glucose.: 201 mg/dL (26 Jun 2022 11:53)  POCT Blood Glucose.: 172 mg/dL (26 Jun 2022 07:52)              RADIOLOGY & ADDITIONAL TESTS:    Personally reviewed.     Consultant(s) Notes Reviewed:  [x] YES  [ ] NO     Patient is a 87y old  Female who presents with a chief complaint of cough, SOB.          INTERVAL HPI/OVERNIGHT EVENTS: ROS limited as pt with dementia. Admits cough (gradually improving). Denies SOB, CP, abd pain, fever, chills.    MEDICATIONS  (STANDING):  albuterol/ipratropium for Nebulization 3 milliLiter(s) Nebulizer every 6 hours  artificial tears (preservative free) Ophthalmic Solution 1 Drop(s) Both EYES two times a day  aspirin  chewable 81 milliGRAM(s) Oral daily  atorvastatin 80 milliGRAM(s) Oral at bedtime  cholecalciferol 2000 Unit(s) Oral daily  clopidogrel Tablet 75 milliGRAM(s) Oral daily  dextrose 5%. 1000 milliLiter(s) (100 mL/Hr) IV Continuous <Continuous>  dextrose 5%. 1000 milliLiter(s) (50 mL/Hr) IV Continuous <Continuous>  dextrose 50% Injectable 25 Gram(s) IV Push once  dextrose 50% Injectable 12.5 Gram(s) IV Push once  dextrose 50% Injectable 25 Gram(s) IV Push once  dextrose 50% Injectable 25 Gram(s) IV Push once  escitalopram 10 milliGRAM(s) Oral daily  gabapentin 100 milliGRAM(s) Oral three times a day  glucagon  Injectable 1 milliGRAM(s) IntraMuscular once  glucagon  Injectable 1 milliGRAM(s) IntraMuscular once  heparin   Injectable 5000 Unit(s) SubCutaneous every 12 hours  insulin glargine Injectable (LANTUS) 15 Unit(s) SubCutaneous every morning  insulin lispro (ADMELOG) corrective regimen sliding scale   SubCutaneous three times a day before meals  insulin lispro (ADMELOG) corrective regimen sliding scale   SubCutaneous at bedtime  insulin lispro Injectable (ADMELOG) 8 Unit(s) SubCutaneous three times a day before meals  isosorbide   mononitrate ER Tablet (IMDUR) 30 milliGRAM(s) Oral daily  levothyroxine 125 MICROGram(s) Oral daily  metoprolol succinate ER 50 milliGRAM(s) Oral daily    MEDICATIONS  (PRN):  acetaminophen     Tablet .. 650 milliGRAM(s) Oral every 6 hours PRN Mild Pain (1 - 3)  ALBUTerol    0.083% 2.5 milliGRAM(s) Nebulizer every 4 hours PRN Shortness of Breath and/or Wheezing  dextrose Oral Gel 15 Gram(s) Oral once PRN Blood Glucose LESS THAN 70 milliGRAM(s)/deciliter  melatonin 3 milliGRAM(s) Oral at bedtime PRN Insomnia        Allergies    doxycycline (Unknown)  iodine (Hives)  iodine containing compounds (Unknown)  shellfish (Anaphylaxis)  shellfish (Swelling; Short breath)    Intolerances        REVIEW OF SYSTEMS:  ROS limited as pt with advanced dementia. Admits cough. Denies SOB, CP, abd pain, fever, chills.    Vital Signs Last 24 Hrs  T(C): 36.7 (26 Jun 2022 13:10), Max: 37.2 (25 Jun 2022 21:04)  T(F): 98.1 (26 Jun 2022 13:10), Max: 99 (25 Jun 2022 21:04)  HR: 62 (26 Jun 2022 13:10) (62 - 89)  BP: 130/72 (26 Jun 2022 13:10) (115/70 - 160/75)  BP(mean): --  RR: 17 (26 Jun 2022 13:10) (17 - 18)  SpO2: 95% (26 Jun 2022 13:10) (92% - 95%)    PHYSICAL EXAM:  GENERAL: NAD at rest  HEENT:  anicteric, moist mucous membranes  CHEST/LUNG:  decreased breath sounds, scant wheezes  HEART:  RRR, S1, S2  ABDOMEN:  BS+, soft, nontender, nondistended  EXTREMITIES: no cyanosis, or calf tenderness  NERVOUS SYSTEM: awake, alert, answers few simple questions and follows few commands       LABS:                                              12.1   20.41 )-----------( 193      ( 26 Jun 2022 08:33 )             39.1     CBC Full  -  ( 26 Jun 2022 08:33 )  WBC Count : 20.41 K/uL  Hemoglobin : 12.1 g/dL  Hematocrit : 39.1 %  Platelet Count - Automated : 193 K/uL  Mean Cell Volume : 89.9 fl  Mean Cell Hemoglobin : 27.8 pg  Mean Cell Hemoglobin Concentration : 30.9 gm/dL  Auto Neutrophil # : x  Auto Lymphocyte # : x  Auto Monocyte # : x  Auto Eosinophil # : x  Auto Basophil # : x  Auto Neutrophil % : x  Auto Lymphocyte % : x  Auto Monocyte % : x  Auto Eosinophil % : x  Auto Basophil % : x    06-26    137  |  106  |  59<H>  ----------------------------<  159<H>  4.5   |  24  |  1.60<H>    Ca    8.6      26 Jun 2022 08:33    TPro  6.4  /  Alb  2.4<L>  /  TBili  0.7  /  DBili  x   /  AST  25  /  ALT  23  /  AlkPhos  73  06-25        CAPILLARY BLOOD GLUCOSE        POCT Blood Glucose.: 123 mg/dL (26 Jun 2022 16:36)  POCT Blood Glucose.: 201 mg/dL (26 Jun 2022 11:53)  POCT Blood Glucose.: 172 mg/dL (26 Jun 2022 07:52)              RADIOLOGY & ADDITIONAL TESTS:    Personally reviewed.     Consultant(s) Notes Reviewed:  [x] YES  [ ] NO

## 2022-06-26 NOTE — PROGRESS NOTE ADULT - SUBJECTIVE AND OBJECTIVE BOX
Interval History:    CENTRAL LINE:   [  ] YES       [  ] NO  PACE:                 [  ] YES       [  ] NO         REVIEW OF SYSTEMS:  All Systems below were reviewed and are negative [  ]  HEENT:  ID:  Pulmonary:  Cardiac:  GI:  Renal:  Musculoskeletal:  All other systems above were reviewed and are negative   [  ]      MEDICATIONS  (STANDING):  albuterol/ipratropium for Nebulization 3 milliLiter(s) Nebulizer every 6 hours  artificial tears (preservative free) Ophthalmic Solution 1 Drop(s) Both EYES two times a day  aspirin  chewable 81 milliGRAM(s) Oral daily  atorvastatin 80 milliGRAM(s) Oral at bedtime  cholecalciferol 2000 Unit(s) Oral daily  clopidogrel Tablet 75 milliGRAM(s) Oral daily  dextrose 5%. 1000 milliLiter(s) (50 mL/Hr) IV Continuous <Continuous>  dextrose 5%. 1000 milliLiter(s) (100 mL/Hr) IV Continuous <Continuous>  dextrose 50% Injectable 25 Gram(s) IV Push once  dextrose 50% Injectable 12.5 Gram(s) IV Push once  dextrose 50% Injectable 25 Gram(s) IV Push once  dextrose 50% Injectable 25 Gram(s) IV Push once  escitalopram 10 milliGRAM(s) Oral daily  gabapentin 100 milliGRAM(s) Oral three times a day  glucagon  Injectable 1 milliGRAM(s) IntraMuscular once  glucagon  Injectable 1 milliGRAM(s) IntraMuscular once  heparin   Injectable 5000 Unit(s) SubCutaneous every 12 hours  insulin glargine Injectable (LANTUS) 15 Unit(s) SubCutaneous every morning  insulin lispro (ADMELOG) corrective regimen sliding scale   SubCutaneous at bedtime  insulin lispro (ADMELOG) corrective regimen sliding scale   SubCutaneous three times a day before meals  insulin lispro Injectable (ADMELOG) 8 Unit(s) SubCutaneous three times a day before meals  isosorbide   mononitrate ER Tablet (IMDUR) 30 milliGRAM(s) Oral daily  levothyroxine 125 MICROGram(s) Oral daily  metoprolol succinate ER 50 milliGRAM(s) Oral daily    MEDICATIONS  (PRN):  acetaminophen     Tablet .. 650 milliGRAM(s) Oral every 6 hours PRN Mild Pain (1 - 3)  ALBUTerol    0.083% 2.5 milliGRAM(s) Nebulizer every 4 hours PRN Shortness of Breath and/or Wheezing  dextrose Oral Gel 15 Gram(s) Oral once PRN Blood Glucose LESS THAN 70 milliGRAM(s)/deciliter  melatonin 3 milliGRAM(s) Oral at bedtime PRN Insomnia      Vital Signs Last 24 Hrs  T(C): 36.7 (26 Jun 2022 13:10), Max: 37.2 (25 Jun 2022 21:04)  T(F): 98.1 (26 Jun 2022 13:10), Max: 99 (25 Jun 2022 21:04)  HR: 62 (26 Jun 2022 13:10) (62 - 89)  BP: 130/72 (26 Jun 2022 13:10) (115/70 - 160/75)  BP(mean): --  RR: 17 (26 Jun 2022 13:10) (17 - 18)  SpO2: 95% (26 Jun 2022 13:10) (92% - 95%)    I&O's Summary      PHYSICAL EXAM:  HEENT: NC/AT; PERRLA  Neck: Soft; no tenderness  Lungs: CTA bilaterally; no wheezing.   Heart:  Abdomen:  Genital/ Rectal:  Extremities:  Neurologic:  Vascular:      LABORATORY:    CBC Full  -  ( 26 Jun 2022 08:33 )  WBC Count : 20.41 K/uL  RBC Count : 4.35 M/uL  Hemoglobin : 12.1 g/dL  Hematocrit : 39.1 %  Platelet Count - Automated : 193 K/uL  Mean Cell Volume : 89.9 fl  Mean Cell Hemoglobin : 27.8 pg  Mean Cell Hemoglobin Concentration : 30.9 gm/dL  Auto Neutrophil # : x  Auto Lymphocyte # : x  Auto Monocyte # : x  Auto Eosinophil # : x  Auto Basophil # : x  Auto Neutrophil % : x  Auto Lymphocyte % : x  Auto Monocyte % : x  Auto Eosinophil % : x  Auto Basophil % : x      ESR:                   06-24 @ 07:10  --    C-Reactive Protein:     06-24 @ 07:10  --    Procalcitonin:           06-24 @ 07:10   0.11  ESR:                   06-22 @ 08:41  --    C-Reactive Protein:     06-22 @ 08:41  --    Procalcitonin:           06-22 @ 08:41   0.11  ESR:                   06-13 @ 15:38  --    C-Reactive Protein:     06-13 @ 15:38  21    Procalcitonin:           06-13 @ 15:38   --      06-26    137  |  106  |  59<H>  ----------------------------<  159<H>  4.5   |  24  |  1.60<H>    Ca    8.6      26 Jun 2022 08:33    TPro  6.4  /  Alb  2.4<L>  /  TBili  0.7  /  DBili  x   /  AST  25  /  ALT  23  /  AlkPhos  73  06-25      Rapid Respiratory Viral Panel Result        06-13 @ 07:46  Rapid RVP Detected  Coronovirus --  Adenovirus --  Bordetella Pertussis --  Chlamydia Pneumonia --  Entero/Rhinovirus--  HKU1 Coronovirus --  HMPV Coronovirus Detected  Influenza A --  Influenza AH1 --  Influenza AH1 2009 --  Influenza AH3 --  Influenza B --  Mycoplasma Pneumoniae --  NL63 Coronovirus --  OC43 Coronovirus --  Parainfluenza 1 --  Parainfluenza 2 --  Parainfluenza 3 --  Parainfluenza 4 --  Resp Syncytial Virus --      Assessment and Plan:          Lazaro De La Rosa MD   [phone].    She is afebrile  Comfortable   Awake    MEDICATIONS  (STANDING):  albuterol/ipratropium for Nebulization 3 milliLiter(s) Nebulizer every 6 hours  artificial tears (preservative free) Ophthalmic Solution 1 Drop(s) Both EYES two times a day  aspirin  chewable 81 milliGRAM(s) Oral daily  atorvastatin 80 milliGRAM(s) Oral at bedtime  cholecalciferol 2000 Unit(s) Oral daily  clopidogrel Tablet 75 milliGRAM(s) Oral daily  dextrose 5%. 1000 milliLiter(s) (50 mL/Hr) IV Continuous <Continuous>  dextrose 5%. 1000 milliLiter(s) (100 mL/Hr) IV Continuous <Continuous>  dextrose 50% Injectable 25 Gram(s) IV Push once  dextrose 50% Injectable 12.5 Gram(s) IV Push once  dextrose 50% Injectable 25 Gram(s) IV Push once  dextrose 50% Injectable 25 Gram(s) IV Push once  escitalopram 10 milliGRAM(s) Oral daily  gabapentin 100 milliGRAM(s) Oral three times a day  glucagon  Injectable 1 milliGRAM(s) IntraMuscular once  glucagon  Injectable 1 milliGRAM(s) IntraMuscular once  heparin   Injectable 5000 Unit(s) SubCutaneous every 12 hours  insulin glargine Injectable (LANTUS) 15 Unit(s) SubCutaneous every morning  insulin lispro (ADMELOG) corrective regimen sliding scale   SubCutaneous at bedtime  insulin lispro (ADMELOG) corrective regimen sliding scale   SubCutaneous three times a day before meals  insulin lispro Injectable (ADMELOG) 8 Unit(s) SubCutaneous three times a day before meals  isosorbide   mononitrate ER Tablet (IMDUR) 30 milliGRAM(s) Oral daily  levothyroxine 125 MICROGram(s) Oral daily  metoprolol succinate ER 50 milliGRAM(s) Oral daily    MEDICATIONS  (PRN):  acetaminophen     Tablet .. 650 milliGRAM(s) Oral every 6 hours PRN Mild Pain (1 - 3)  ALBUTerol    0.083% 2.5 milliGRAM(s) Nebulizer every 4 hours PRN Shortness of Breath and/or Wheezing  dextrose Oral Gel 15 Gram(s) Oral once PRN Blood Glucose LESS THAN 70 milliGRAM(s)/deciliter  melatonin 3 milliGRAM(s) Oral at bedtime PRN Insomnia      Vital Signs Last 24 Hrs  T(C): 36.7 (26 Jun 2022 13:10), Max: 37.2 (25 Jun 2022 21:04)  T(F): 98.1 (26 Jun 2022 13:10), Max: 99 (25 Jun 2022 21:04)  HR: 62 (26 Jun 2022 13:10) (62 - 89)  BP: 130/72 (26 Jun 2022 13:10) (115/70 - 160/75)  BP(mean): --  RR: 17 (26 Jun 2022 13:10) (17 - 18)  SpO2: 95% (26 Jun 2022 13:10) (92% - 95%)      PHYSICAL EXAM:  HEENT: NC/AT; PERRLA  Neck: Soft; no tenderness  Lungs: Coarse BS  bilaterally; no wheezing.   Heart: RRR, no murmurs.   Abdomen: Soft, no tenderness.   Genital/ Rectal: No torres   Extremities: No ulcers  Neurologic: Awake.      LABORATORY:    CBC Full  -  ( 26 Jun 2022 08:33 )  WBC Count : 20.41 K/uL  RBC Count : 4.35 M/uL  Hemoglobin : 12.1 g/dL  Hematocrit : 39.1 %  Platelet Count - Automated : 193 K/uL  Mean Cell Volume : 89.9 fl  Mean Cell Hemoglobin : 27.8 pg  Mean Cell Hemoglobin Concentration : 30.9 gm/dL  Auto Neutrophil # : x  Auto Lymphocyte # : x  Auto Monocyte # : x  Auto Eosinophil # : x  Auto Basophil # : x  Auto Neutrophil % : x  Auto Lymphocyte % : x  Auto Monocyte % : x  Auto Eosinophil % : x  Auto Basophil % : x      ESR:                   06-24 @ 07:10  --    C-Reactive Protein:     06-24 @ 07:10  --    Procalcitonin:           06-24 @ 07:10   0.11  ESR:                   06-22 @ 08:41  --    C-Reactive Protein:     06-22 @ 08:41  --    Procalcitonin:           06-22 @ 08:41   0.11  ESR:                   06-13 @ 15:38  --    C-Reactive Protein:     06-13 @ 15:38  21    Procalcitonin:           06-13 @ 15:38   --      06-26    137  |  106  |  59<H>  ----------------------------<  159<H>  4.5   |  24  |  1.60<H>    Ca    8.6      26 Jun 2022 08:33    TPro  6.4  /  Alb  2.4<L>  /  TBili  0.7  /  DBili  x   /  AST  25  /  ALT  23  /  AlkPhos  73  06-25    Assessment and Plan:    1. COPD exacerbation due to viral infection with human meta pneumovirus.   2. Leukocytosis.  3. Dysphagia.    . Family declined oral Augmentin.  . Still with a persistent leukocytosis, off steroid.  . Monitor off antibiotics.      Lazaro De La Rosa MD   [phone].

## 2022-06-26 NOTE — PROGRESS NOTE ADULT - ASSESSMENT
87 year old female with PMHx of COPD (not on O2), HTN, HLD, T2DM on insulin, hx of MI presents from assisted living facility with dyspnea, wheezing, labored breathing admitted for COPD exacerbation and acute hypoxic resp failure due to hMPV.      Problem/Plan - 1:  ·  Problem: Acute respiratory failure with hypoxia.   ·  Plan: - COPD exacerbation  due to hMPV - resp viral infection + on admission   - was on BiPAP, weaned off BiPAP. Then on 2L O2 NC. Now saturating in 90s on RA.    -Xanax PRN - duoneb prn  - steroids tapered off / completed on 06/19  - persistent leukocytosis so performed CT Chest which showed some groundglass opacity in LLL - discussed with ID (Estrella), started on Augmentin for possible aspiration PNA ; last night, pt's family decided they wish to stop antibiotics. Overall, family would like to de-escalate medical interventions as they feel the pt has been rapidly declining and would not have wanted to be living  in hospitalized setting the way she has been. Further, they feel the Abx have caused pt to have significant diarrhea causing the pt discomfort and believe bacterial PNA is unlikely. For those reasons, family requested I discontinue antibiotics, which was done.  - pulm (Shalshin), recs appreciated  - ID (Estrella), recs appreciated     Problem/Plan - 2:  ·  Problem: COPD exacerbation.   - Continue home medications - c/w nebs  - steroids tapered off / completed on 06/19  - Supplemental O2 PRN. COPD patient will keep SpO2 goal 89-93%.   - Encourage incentive spirometry.  - pulm (Shalshin), recs appreciated     Problem/Plan - 3:  ·  Problem: TRENTON on CKD 3  ·  Plan: CKD, Cr at baseline ranging from 1.3-1.7 on outpatient HIE review  hold lasix per renal     Problem/Plan - 4:  ·  Problem: DM2 (diabetes mellitus, type 2).   ·  Plan: - Chronic, known history of T2DM  - C/W Lantus 15 U qd and lispro 8U pre meals  - Moderate dose insulin corrective scale   - HgbA1c: 10%  - endocrine following appreciate recs.     Problem/Plan - 5:  ·  Problem: HTN (hypertension).   ·  Plan: bp stable  - change lasix to 20mg prn on discharge per renal  - Continue home Metoprolol Succinate 50 mg qd with hold parameters  -monitor bp.     Problem/Plan - 6:  ·  Problem: Anxiety.   ·  Plan: stable  continue Xanax prn , lexapro.     Problem/Plan - 7:  ·  Problem: Hypothyroidism, unspecified type.   ·  Plan: Chronic  - Continue home Synthroid 125 mcg PO qd.     Problem/Plan - 8:  ·  Problem: HLD (hyperlipidemia).   ·  Plan: Chronic  - Continue home Atorvastatin 80 mg PO qhs.     Problem/Plan - 9:  ·  Problem: CAD (coronary artery disease).   ·  Plan: - Hx of MI  - Continue home Aspirin 81 mg PO qd, Plavix 75 mg PO qd, imdur 30mg daily   - Echo 3/11/2022: Left ventricle was of normal size, mild left ventricular hypertrophy LV systolic function EF 65%. Moderate to severe aortic stenosis.     Problem/Plan - 10:  ·  Problem: Need for prophylactic measure.   ·  Plan; VTE ppx: Heparin 5000 subq q12h    ACP / Disp:  - DNR/DNI - MOLST filled  - family wished to pursue hospice as feel pt has had significant mental/physical decline; palliative care evaluated and believe pt not quite meeting hospice criteria  - pt's son decided to pursue [facility]IN/SNF at [facility] as opposed to pt returning to care home as she has had further physical decline.  - SW working on placement to

## 2022-06-26 NOTE — PROGRESS NOTE ADULT - ASSESSMENT
87 year old female with PMHx COPD, HTN, HLD, T2DM on insulin, hx of MI presents from assisted living facility with dyspnea, wheezing, labored breathing    copd  copd ex  TRENTON CKD  HTN  OP  OA  DM  HLD  Moderate to Severe AS  CAD      cvs rx regimen  BP control  serial renal indices  I and O  monitor VS and HD and Sat  HOB elev - asp prec - assist with needs - ADL - GOC discussion  pt has hMPV - resp viral infection - isolation precs - acap PRN - robitussin PRN -   cxr - labs reviewed  copd - rx regimen PRN for sob and or wheeze, NEBS, not a candidate for Inhaler use  proBNP noted  ABG noted  old records reviewed

## 2022-06-27 LAB
ANION GAP SERPL CALC-SCNC: 5 MMOL/L — SIGNIFICANT CHANGE UP (ref 5–17)
BUN SERPL-MCNC: 61 MG/DL — HIGH (ref 7–23)
CALCIUM SERPL-MCNC: 8.6 MG/DL — SIGNIFICANT CHANGE UP (ref 8.5–10.1)
CHLORIDE SERPL-SCNC: 106 MMOL/L — SIGNIFICANT CHANGE UP (ref 96–108)
CO2 SERPL-SCNC: 26 MMOL/L — SIGNIFICANT CHANGE UP (ref 22–31)
CREAT SERPL-MCNC: 1.6 MG/DL — HIGH (ref 0.5–1.3)
EGFR: 31 ML/MIN/1.73M2 — LOW
GLUCOSE SERPL-MCNC: 189 MG/DL — HIGH (ref 70–99)
HCT VFR BLD CALC: 35.6 % — SIGNIFICANT CHANGE UP (ref 34.5–45)
HGB BLD-MCNC: 11.2 G/DL — LOW (ref 11.5–15.5)
MCHC RBC-ENTMCNC: 28 PG — SIGNIFICANT CHANGE UP (ref 27–34)
MCHC RBC-ENTMCNC: 31.5 GM/DL — LOW (ref 32–36)
MCV RBC AUTO: 89 FL — SIGNIFICANT CHANGE UP (ref 80–100)
NRBC # BLD: 0 /100 WBCS — SIGNIFICANT CHANGE UP (ref 0–0)
PLATELET # BLD AUTO: 198 K/UL — SIGNIFICANT CHANGE UP (ref 150–400)
POTASSIUM SERPL-MCNC: 4.5 MMOL/L — SIGNIFICANT CHANGE UP (ref 3.5–5.3)
POTASSIUM SERPL-SCNC: 4.5 MMOL/L — SIGNIFICANT CHANGE UP (ref 3.5–5.3)
RBC # BLD: 4 M/UL — SIGNIFICANT CHANGE UP (ref 3.8–5.2)
RBC # FLD: 13.2 % — SIGNIFICANT CHANGE UP (ref 10.3–14.5)
SARS-COV-2 RNA SPEC QL NAA+PROBE: SIGNIFICANT CHANGE UP
SODIUM SERPL-SCNC: 137 MMOL/L — SIGNIFICANT CHANGE UP (ref 135–145)
WBC # BLD: 16.46 K/UL — HIGH (ref 3.8–10.5)
WBC # FLD AUTO: 16.46 K/UL — HIGH (ref 3.8–10.5)

## 2022-06-27 PROCEDURE — 99232 SBSQ HOSP IP/OBS MODERATE 35: CPT

## 2022-06-27 RX ORDER — ALBUTEROL 90 UG/1
2.5 AEROSOL, METERED ORAL EVERY 8 HOURS
Refills: 0 | Status: DISCONTINUED | OUTPATIENT
Start: 2022-06-27 | End: 2022-07-01

## 2022-06-27 RX ORDER — ALBUTEROL 90 UG/1
2.5 AEROSOL, METERED ORAL EVERY 6 HOURS
Refills: 0 | Status: DISCONTINUED | OUTPATIENT
Start: 2022-06-27 | End: 2022-07-01

## 2022-06-27 RX ADMIN — Medication 3 MILLILITER(S): at 02:23

## 2022-06-27 RX ADMIN — ESCITALOPRAM OXALATE 10 MILLIGRAM(S): 10 TABLET, FILM COATED ORAL at 12:57

## 2022-06-27 RX ADMIN — CLOPIDOGREL BISULFATE 75 MILLIGRAM(S): 75 TABLET, FILM COATED ORAL at 12:56

## 2022-06-27 RX ADMIN — Medication 125 MICROGRAM(S): at 05:59

## 2022-06-27 RX ADMIN — Medication 8 UNIT(S): at 17:31

## 2022-06-27 RX ADMIN — ATORVASTATIN CALCIUM 80 MILLIGRAM(S): 80 TABLET, FILM COATED ORAL at 21:41

## 2022-06-27 RX ADMIN — Medication 2000 UNIT(S): at 12:57

## 2022-06-27 RX ADMIN — Medication 8 UNIT(S): at 12:56

## 2022-06-27 RX ADMIN — Medication 50 MILLIGRAM(S): at 05:54

## 2022-06-27 RX ADMIN — GABAPENTIN 100 MILLIGRAM(S): 400 CAPSULE ORAL at 21:41

## 2022-06-27 RX ADMIN — HEPARIN SODIUM 5000 UNIT(S): 5000 INJECTION INTRAVENOUS; SUBCUTANEOUS at 18:07

## 2022-06-27 RX ADMIN — Medication 8 UNIT(S): at 08:12

## 2022-06-27 RX ADMIN — GABAPENTIN 100 MILLIGRAM(S): 400 CAPSULE ORAL at 05:53

## 2022-06-27 RX ADMIN — Medication 1 DROP(S): at 18:08

## 2022-06-27 RX ADMIN — Medication 2: at 08:12

## 2022-06-27 RX ADMIN — Medication 1 DROP(S): at 05:53

## 2022-06-27 RX ADMIN — Medication 81 MILLIGRAM(S): at 12:57

## 2022-06-27 RX ADMIN — ISOSORBIDE MONONITRATE 30 MILLIGRAM(S): 60 TABLET, EXTENDED RELEASE ORAL at 12:57

## 2022-06-27 RX ADMIN — INSULIN GLARGINE 15 UNIT(S): 100 INJECTION, SOLUTION SUBCUTANEOUS at 08:12

## 2022-06-27 RX ADMIN — ALBUTEROL 2.5 MILLIGRAM(S): 90 AEROSOL, METERED ORAL at 17:55

## 2022-06-27 RX ADMIN — Medication 1: at 12:56

## 2022-06-27 RX ADMIN — HEPARIN SODIUM 5000 UNIT(S): 5000 INJECTION INTRAVENOUS; SUBCUTANEOUS at 05:53

## 2022-06-27 NOTE — PROGRESS NOTE ADULT - ASSESSMENT
87 year old female with PMHx of COPD (not on O2), HTN, HLD, T2DM on insulin, hx of MI presents from assisted living facility with dyspnea, wheezing, labored breathing admitted for COPD exacerbation and acute hypoxic resp failure due to hMPV.      Problem/Plan - 1:  ·  Problem: Acute respiratory failure with hypoxia.   ·  Plan: - COPD exacerbation  due to hMPV - resp viral infection + on admission   - was on BiPAP, weaned off BiPAP. Then on 2L O2 NC. Now saturating in 90s on RA.    -Xanax PRN - duoneb prn  - steroids tapered off / completed on 06/19  - persistent leukocytosis so performed CT Chest which showed some groundglass opacity in LLL - discussed with ID (Estrella), started on Augmentin for possible aspiration PNA ; last night, pt's family decided they wish to stop antibiotics. Overall, family would like to de-escalate medical interventions as they feel the pt has been rapidly declining and would not have wanted to be living  in hospitalized setting the way she has been. Further, they feel the Abx have caused pt to have significant diarrhea causing the pt discomfort and believe bacterial PNA is unlikely. For those reasons, family requested I discontinue antibiotics, which was done.  - WBC count trending down  - pulm ([facility]), recs appreciated  - ID (Estrella), recs appreciated     Problem/Plan - 2:  ·  Problem: COPD exacerbation.   - Continue home medications - c/w nebs  - steroids tapered off / completed on 06/19  - Supplemental O2 PRN. COPD patient will keep SpO2 goal 89-93%.   - Encourage incentive spirometry.  - pulm ([facility]), recs appreciated     Problem/Plan - 3:  ·  Problem: [place] on CKD 3  ·  Plan: CKD, Cr at baseline ranging from 1.3-1.7 on outpatient HIE review  hold lasix per renal     Problem/Plan - 4:  ·  Problem: DM2 (diabetes mellitus, type 2).   ·  Plan: - Chronic, known history of T2DM  - C/W Lantus 15 U qd and lispro 8U pre meals  - Moderate dose insulin corrective scale   - HgbA1c: 10%  - endocrine following appreciate recs.     Problem/Plan - 5:  ·  Problem: HTN (hypertension).   ·  Plan: bp stable  - change lasix to 20mg prn on discharge per renal  - Continue home Metoprolol Succinate 50 mg qd with hold parameters  -monitor bp.     Problem/Plan - 6:  ·  Problem: Anxiety.   ·  Plan: stable  continue Xanax prn , lexapro.     Problem/Plan - 7:  ·  Problem: Hypothyroidism, unspecified type.   ·  Plan: Chronic  - Continue home Synthroid 125 mcg PO qd.     Problem/Plan - 8:  ·  Problem: HLD (hyperlipidemia).   ·  Plan: Chronic  - Continue home Atorvastatin 80 mg PO qhs.     Problem/Plan - 9:  ·  Problem: CAD (coronary artery disease).   ·  Plan: - Hx of MI  - Continue home Aspirin 81 mg PO qd, Plavix 75 mg PO qd, imdur 30mg daily   - Echo 3/11/2022: Left ventricle was of normal size, mild left ventricular hypertrophy LV systolic function EF 65%. Moderate to severe aortic stenosis.     Problem/Plan - 10:  ·  Problem: Need for prophylactic measure.   ·  Plan; VTE ppx: Heparin 5000 subq q12h    ACP / Disp:  - DNR/DNI - [facility] filled  - family wished to pursue hospice as feel pt has had significant mental/physical decline; palliative care evaluated and believe pt not quite meeting hospice criteria  - on 6/24, pt's son decided to pursue [facility]/SNF at [facility] as opposed to pt returning to [facility] as she has had further physical decline.  - [facility] was working on placement to  -- today, pt's son stated that the pt's [facility] was willing to take her back in her more declined state -- [facility] placed call to [facility] and their director is evaluating the case to decide if pt indeed would be allowed to come back to [facility]

## 2022-06-27 NOTE — PROGRESS NOTE ADULT - SUBJECTIVE AND OBJECTIVE BOX
Interval History:    CENTRAL LINE:   [  ] YES       [  ] NO  PACE:                 [  ] YES       [  ] NO         REVIEW OF SYSTEMS:  All Systems below were reviewed and are negative [  ]  HEENT:  ID:  Pulmonary:  Cardiac:  GI:  Renal:  Musculoskeletal:  All other systems above were reviewed and are negative   [  ]      MEDICATIONS  (STANDING):  ALBUTerol    0.083% 2.5 milliGRAM(s) Nebulizer every 8 hours  artificial tears (preservative free) Ophthalmic Solution 1 Drop(s) Both EYES two times a day  aspirin  chewable 81 milliGRAM(s) Oral daily  atorvastatin 80 milliGRAM(s) Oral at bedtime  cholecalciferol 2000 Unit(s) Oral daily  clopidogrel Tablet 75 milliGRAM(s) Oral daily  dextrose 5%. 1000 milliLiter(s) (100 mL/Hr) IV Continuous <Continuous>  dextrose 5%. 1000 milliLiter(s) (50 mL/Hr) IV Continuous <Continuous>  dextrose 50% Injectable 25 Gram(s) IV Push once  dextrose 50% Injectable 12.5 Gram(s) IV Push once  dextrose 50% Injectable 25 Gram(s) IV Push once  dextrose 50% Injectable 25 Gram(s) IV Push once  escitalopram 10 milliGRAM(s) Oral daily  gabapentin 100 milliGRAM(s) Oral three times a day  glucagon  Injectable 1 milliGRAM(s) IntraMuscular once  glucagon  Injectable 1 milliGRAM(s) IntraMuscular once  heparin   Injectable 5000 Unit(s) SubCutaneous every 12 hours  insulin glargine Injectable (LANTUS) 15 Unit(s) SubCutaneous every morning  insulin lispro (ADMELOG) corrective regimen sliding scale   SubCutaneous three times a day before meals  insulin lispro (ADMELOG) corrective regimen sliding scale   SubCutaneous at bedtime  insulin lispro Injectable (ADMELOG) 8 Unit(s) SubCutaneous three times a day before meals  isosorbide   mononitrate ER Tablet (IMDUR) 30 milliGRAM(s) Oral daily  levothyroxine 125 MICROGram(s) Oral daily  metoprolol succinate ER 50 milliGRAM(s) Oral daily    MEDICATIONS  (PRN):  acetaminophen     Tablet .. 650 milliGRAM(s) Oral every 6 hours PRN Mild Pain (1 - 3)  ALBUTerol    0.083% 2.5 milliGRAM(s) Nebulizer every 6 hours PRN Shortness of Breath and/or Wheezing  dextrose Oral Gel 15 Gram(s) Oral once PRN Blood Glucose LESS THAN 70 milliGRAM(s)/deciliter  melatonin 3 milliGRAM(s) Oral at bedtime PRN Insomnia      Vital Signs Last 24 Hrs  T(C): 36.9 (27 Jun 2022 13:08), Max: 36.9 (27 Jun 2022 13:08)  T(F): 98.5 (27 Jun 2022 13:08), Max: 98.5 (27 Jun 2022 13:08)  HR: 61 (27 Jun 2022 17:58) (61 - 67)  BP: 117/69 (27 Jun 2022 13:08) (117/69 - 124/67)  BP(mean): --  RR: 17 (27 Jun 2022 13:08) (17 - 18)  SpO2: 93% (27 Jun 2022 17:58) (92% - 98%)    I&O's Summary      PHYSICAL EXAM:  HEENT: NC/AT; PERRLA  Neck: Soft; no tenderness  Lungs: CTA bilaterally; no wheezing.   Heart:  Abdomen:  Genital/ Rectal:  Extremities:  Neurologic:  Vascular:      LABORATORY:    CBC Full  -  ( 27 Jun 2022 07:50 )  WBC Count : 16.46 K/uL  RBC Count : 4.00 M/uL  Hemoglobin : 11.2 g/dL  Hematocrit : 35.6 %  Platelet Count - Automated : 198 K/uL  Mean Cell Volume : 89.0 fl  Mean Cell Hemoglobin : 28.0 pg  Mean Cell Hemoglobin Concentration : 31.5 gm/dL  Auto Neutrophil # : x  Auto Lymphocyte # : x  Auto Monocyte # : x  Auto Eosinophil # : x  Auto Basophil # : x  Auto Neutrophil % : x  Auto Lymphocyte % : x  Auto Monocyte % : x  Auto Eosinophil % : x  Auto Basophil % : x      ESR:                   06-24 @ 07:10  --    C-Reactive Protein:     06-24 @ 07:10  --    Procalcitonin:           06-24 @ 07:10   0.11  ESR:                   06-22 @ 08:41  --    C-Reactive Protein:     06-22 @ 08:41  --    Procalcitonin:           06-22 @ 08:41   0.11  ESR:                   06-13 @ 15:38  --    C-Reactive Protein:     06-13 @ 15:38  21    Procalcitonin:           06-13 @ 15:38   --      06-27    137  |  106  |  61<H>  ----------------------------<  189<H>  4.5   |  26  |  1.60<H>    Ca    8.6      27 Jun 2022 07:50        Rapid Respiratory Viral Panel Result        06-13 @ 07:46  Rapid RVP Detected  Coronovirus --  Adenovirus --  Bordetella Pertussis --  Chlamydia Pneumonia --  Entero/Rhinovirus--  HKU1 Coronovirus --  HMPV Coronovirus Detected  Influenza A --  Influenza AH1 --  Influenza AH1 2009 --  Influenza AH3 --  Influenza B --  Mycoplasma Pneumoniae --  NL63 Coronovirus --  OC43 Coronovirus --  Parainfluenza 1 --  Parainfluenza 2 --  Parainfluenza 3 --  Parainfluenza 4 --  Resp Syncytial Virus --      Assessment and Plan:          Lazaro De La Rosa MD   [phone].    She is afebrile  Comfortable.      MEDICATIONS  (STANDING):  ALBUTerol    0.083% 2.5 milliGRAM(s) Nebulizer every 8 hours  artificial tears (preservative free) Ophthalmic Solution 1 Drop(s) Both EYES two times a day  aspirin  chewable 81 milliGRAM(s) Oral daily  atorvastatin 80 milliGRAM(s) Oral at bedtime  cholecalciferol 2000 Unit(s) Oral daily  clopidogrel Tablet 75 milliGRAM(s) Oral daily  dextrose 5%. 1000 milliLiter(s) (100 mL/Hr) IV Continuous <Continuous>  dextrose 5%. 1000 milliLiter(s) (50 mL/Hr) IV Continuous <Continuous>  dextrose 50% Injectable 25 Gram(s) IV Push once  dextrose 50% Injectable 12.5 Gram(s) IV Push once  dextrose 50% Injectable 25 Gram(s) IV Push once  dextrose 50% Injectable 25 Gram(s) IV Push once  escitalopram 10 milliGRAM(s) Oral daily  gabapentin 100 milliGRAM(s) Oral three times a day  glucagon  Injectable 1 milliGRAM(s) IntraMuscular once  glucagon  Injectable 1 milliGRAM(s) IntraMuscular once  heparin   Injectable 5000 Unit(s) SubCutaneous every 12 hours  insulin glargine Injectable (LANTUS) 15 Unit(s) SubCutaneous every morning  insulin lispro (ADMELOG) corrective regimen sliding scale   SubCutaneous three times a day before meals  insulin lispro (ADMELOG) corrective regimen sliding scale   SubCutaneous at bedtime  insulin lispro Injectable (ADMELOG) 8 Unit(s) SubCutaneous three times a day before meals  isosorbide   mononitrate ER Tablet (IMDUR) 30 milliGRAM(s) Oral daily  levothyroxine 125 MICROGram(s) Oral daily  metoprolol succinate ER 50 milliGRAM(s) Oral daily    MEDICATIONS  (PRN):  acetaminophen     Tablet .. 650 milliGRAM(s) Oral every 6 hours PRN Mild Pain (1 - 3)  ALBUTerol    0.083% 2.5 milliGRAM(s) Nebulizer every 6 hours PRN Shortness of Breath and/or Wheezing  dextrose Oral Gel 15 Gram(s) Oral once PRN Blood Glucose LESS THAN 70 milliGRAM(s)/deciliter  melatonin 3 milliGRAM(s) Oral at bedtime PRN Insomnia      Vital Signs Last 24 Hrs  T(C): 36.9 (27 Jun 2022 13:08), Max: 36.9 (27 Jun 2022 13:08)  T(F): 98.5 (27 Jun 2022 13:08), Max: 98.5 (27 Jun 2022 13:08)  HR: 61 (27 Jun 2022 17:58) (61 - 67)  BP: 117/69 (27 Jun 2022 13:08) (117/69 - 124/67)  BP(mean): --  RR: 17 (27 Jun 2022 13:08) (17 - 18)  SpO2: 93% (27 Jun 2022 17:58) (92% - 98%)    I&O's Summary      PHYSICAL EXAM:  HEENT: NC/AT; PERRLA  Neck: Soft; no tenderness  Lungs: Coarse BS bilaterally; no wheezing.   Heart: RRR, no murmurs.   Abdomen: Soft, no tenderness,  Genital/ Rectal: No torres  Extremities: No ulcers.   Neurologic: Confused.         LABORATORY:    CBC Full  -  ( 27 Jun 2022 07:50 )  WBC Count : 16.46 K/uL  RBC Count : 4.00 M/uL  Hemoglobin : 11.2 g/dL  Hematocrit : 35.6 %  Platelet Count - Automated : 198 K/uL  Mean Cell Volume : 89.0 fl  Mean Cell Hemoglobin : 28.0 pg  Mean Cell Hemoglobin Concentration : 31.5 gm/dL  Auto Neutrophil # : x  Auto Lymphocyte # : x  Auto Monocyte # : x  Auto Eosinophil # : x  Auto Basophil # : x  Auto Neutrophil % : x  Auto Lymphocyte % : x  Auto Monocyte % : x  Auto Eosinophil % : x  Auto Basophil % : x      ESR:                   06-24 @ 07:10  --    C-Reactive Protein:     06-24 @ 07:10  --    Procalcitonin:           06-24 @ 07:10   0.11  ESR:                   06-22 @ 08:41  --    C-Reactive Protein:     06-22 @ 08:41  --    Procalcitonin:           06-22 @ 08:41   0.11  ESR:                   06-13 @ 15:38  --    C-Reactive Protein:     06-13 @ 15:38  21    Procalcitonin:           06-13 @ 15:38   --      06-27    137  |  106  |  61<H>  ----------------------------<  189<H>  4.5   |  26  |  1.60<H>    Ca    8.6      27 Jun 2022 07:50        Rapid Respiratory Viral Panel Result        06-13 @ 07:46  Rapid RVP Detected  Coronovirus --  Adenovirus --  Bordetella Pertussis --  Chlamydia Pneumonia --  Entero/Rhinovirus--  HKU1 Coronovirus --  HMPV Coronovirus Detected  Influenza A --  Influenza AH1 --  Influenza AH1 2009 --  Influenza AH3 --  Influenza B --  Mycoplasma Pneumoniae --  NL63 Coronovirus --  OC43 Coronovirus --  Parainfluenza 1 --  Parainfluenza 2 --  Parainfluenza 3 --  Parainfluenza 4 --  Resp Syncytial Virus --      Assessment and Plan:    1. COPD exacerbation due to viral infection with human meta pneumovirus.   2. Leukocytosis.  3. Dysphagia.    . Family declined oral Augmentin.  . Leukocytosis is improving. WBC is now 16K today.   . Monitor off antibiotics.  . Discharge planning.      Lazaro De La Rosa MD   [phone].

## 2022-06-27 NOTE — PROGRESS NOTE ADULT - SUBJECTIVE AND OBJECTIVE BOX
Patient is a 87y old  Female who presents with a chief complaint of cough, SOB.          INTERVAL HPI/OVERNIGHT EVENTS: ROS limited as pt with dementia. Admits cough (gradually improving). Denies SOB, CP, abd pain, fever, chills.    MEDICATIONS  (STANDING):  albuterol/ipratropium for Nebulization 3 milliLiter(s) Nebulizer every 6 hours  artificial tears (preservative free) Ophthalmic Solution 1 Drop(s) Both EYES two times a day  aspirin  chewable 81 milliGRAM(s) Oral daily  atorvastatin 80 milliGRAM(s) Oral at bedtime  cholecalciferol 2000 Unit(s) Oral daily  clopidogrel Tablet 75 milliGRAM(s) Oral daily  dextrose 5%. 1000 milliLiter(s) (100 mL/Hr) IV Continuous <Continuous>  dextrose 5%. 1000 milliLiter(s) (50 mL/Hr) IV Continuous <Continuous>  dextrose 50% Injectable 25 Gram(s) IV Push once  dextrose 50% Injectable 12.5 Gram(s) IV Push once  dextrose 50% Injectable 25 Gram(s) IV Push once  dextrose 50% Injectable 25 Gram(s) IV Push once  escitalopram 10 milliGRAM(s) Oral daily  gabapentin 100 milliGRAM(s) Oral three times a day  glucagon  Injectable 1 milliGRAM(s) IntraMuscular once  glucagon  Injectable 1 milliGRAM(s) IntraMuscular once  heparin   Injectable 5000 Unit(s) SubCutaneous every 12 hours  insulin glargine Injectable (LANTUS) 15 Unit(s) SubCutaneous every morning  insulin lispro (ADMELOG) corrective regimen sliding scale   SubCutaneous three times a day before meals  insulin lispro (ADMELOG) corrective regimen sliding scale   SubCutaneous at bedtime  insulin lispro Injectable (ADMELOG) 8 Unit(s) SubCutaneous three times a day before meals  isosorbide   mononitrate ER Tablet (IMDUR) 30 milliGRAM(s) Oral daily  levothyroxine 125 MICROGram(s) Oral daily  metoprolol succinate ER 50 milliGRAM(s) Oral daily    MEDICATIONS  (PRN):  acetaminophen     Tablet .. 650 milliGRAM(s) Oral every 6 hours PRN Mild Pain (1 - 3)  ALBUTerol    0.083% 2.5 milliGRAM(s) Nebulizer every 4 hours PRN Shortness of Breath and/or Wheezing  dextrose Oral Gel 15 Gram(s) Oral once PRN Blood Glucose LESS THAN 70 milliGRAM(s)/deciliter  melatonin 3 milliGRAM(s) Oral at bedtime PRN Insomnia        Allergies    doxycycline (Unknown)  iodine (Hives)  iodine containing compounds (Unknown)  shellfish (Anaphylaxis)  shellfish (Swelling; Short breath)    Intolerances        REVIEW OF SYSTEMS:  ROS limited as pt with advanced dementia. Admits cough. Denies SOB, CP, abd pain, fever, chills.    Vital Signs Last 24 Hrs  T(C): 36.9 (27 Jun 2022 13:08), Max: 36.9 (27 Jun 2022 13:08)  T(F): 98.5 (27 Jun 2022 13:08), Max: 98.5 (27 Jun 2022 13:08)  HR: 61 (27 Jun 2022 17:58) (61 - 67)  BP: 117/69 (27 Jun 2022 13:08) (117/69 - 124/67)  BP(mean): --  RR: 17 (27 Jun 2022 13:08) (17 - 18)  SpO2: 93% (27 Jun 2022 17:58) (92% - 98%)    PHYSICAL EXAM:  GENERAL: NAD at rest  HEENT:  anicteric, moist mucous membranes  CHEST/LUNG:  decreased breath sounds, scant wheezes  HEART:  RRR, S1, S2  ABDOMEN:  BS+, soft, nontender, nondistended  EXTREMITIES: no cyanosis, or calf tenderness  NERVOUS SYSTEM: awake, alert, answers few simple questions and follows few commands       LABS:                                                         11.2   16.46 )-----------( 198      ( 27 Jun 2022 07:50 )             35.6     CBC Full  -  ( 27 Jun 2022 07:50 )  WBC Count : 16.46 K/uL  Hemoglobin : 11.2 g/dL  Hematocrit : 35.6 %  Platelet Count - Automated : 198 K/uL  Mean Cell Volume : 89.0 fl  Mean Cell Hemoglobin : 28.0 pg  Mean Cell Hemoglobin Concentration : 31.5 gm/dL  Auto Neutrophil # : x  Auto Lymphocyte # : x  Auto Monocyte # : x  Auto Eosinophil # : x  Auto Basophil # : x  Auto Neutrophil % : x  Auto Lymphocyte % : x  Auto Monocyte % : x  Auto Eosinophil % : x  Auto Basophil % : x    06-27    137  |  106  |  61<H>  ----------------------------<  189<H>  4.5   |  26  |  1.60<H>    Ca    8.6      27 Jun 2022 07:50            CAPILLARY BLOOD GLUCOSE      POCT Blood Glucose.: 121 mg/dL (27 Jun 2022 17:03)  POCT Blood Glucose.: 188 mg/dL (27 Jun 2022 12:49)  POCT Blood Glucose.: 280 mg/dL (27 Jun 2022 11:35)  POCT Blood Glucose.: 222 mg/dL (27 Jun 2022 07:30)                RADIOLOGY & ADDITIONAL TESTS:    Personally reviewed.     Consultant(s) Notes Reviewed:  [x] YES  [ ] NO     Patient is a 87y old  Female who presents with a chief complaint of cough, SOB.           INTERVAL HPI/OVERNIGHT EVENTS: ROS limited as pt with dementia. Admits cough (gradually improving). Denies SOB, CP, abd pain, fever, chills.    MEDICATIONS  (STANDING):  albuterol/ipratropium for Nebulization 3 milliLiter(s) Nebulizer every 6 hours  artificial tears (preservative free) Ophthalmic Solution 1 Drop(s) Both EYES two times a day  aspirin  chewable 81 milliGRAM(s) Oral daily  atorvastatin 80 milliGRAM(s) Oral at bedtime  cholecalciferol 2000 Unit(s) Oral daily  clopidogrel Tablet 75 milliGRAM(s) Oral daily  dextrose 5%. 1000 milliLiter(s) (100 mL/Hr) IV Continuous <Continuous>  dextrose 5%. 1000 milliLiter(s) (50 mL/Hr) IV Continuous <Continuous>  dextrose 50% Injectable 25 Gram(s) IV Push once  dextrose 50% Injectable 12.5 Gram(s) IV Push once  dextrose 50% Injectable 25 Gram(s) IV Push once  dextrose 50% Injectable 25 Gram(s) IV Push once  escitalopram 10 milliGRAM(s) Oral daily  gabapentin 100 milliGRAM(s) Oral three times a day  glucagon  Injectable 1 milliGRAM(s) IntraMuscular once  glucagon  Injectable 1 milliGRAM(s) IntraMuscular once  heparin   Injectable 5000 Unit(s) SubCutaneous every 12 hours  insulin glargine Injectable (LANTUS) 15 Unit(s) SubCutaneous every morning  insulin lispro (ADMELOG) corrective regimen sliding scale   SubCutaneous three times a day before meals  insulin lispro (ADMELOG) corrective regimen sliding scale   SubCutaneous at bedtime  insulin lispro Injectable (ADMELOG) 8 Unit(s) SubCutaneous three times a day before meals  isosorbide   mononitrate ER Tablet (IMDUR) 30 milliGRAM(s) Oral daily  levothyroxine 125 MICROGram(s) Oral daily  metoprolol succinate ER 50 milliGRAM(s) Oral daily    MEDICATIONS  (PRN):  acetaminophen     Tablet .. 650 milliGRAM(s) Oral every 6 hours PRN Mild Pain (1 - 3)  ALBUTerol    0.083% 2.5 milliGRAM(s) Nebulizer every 4 hours PRN Shortness of Breath and/or Wheezing  dextrose Oral Gel 15 Gram(s) Oral once PRN Blood Glucose LESS THAN 70 milliGRAM(s)/deciliter  melatonin 3 milliGRAM(s) Oral at bedtime PRN Insomnia        Allergies    doxycycline (Unknown)  iodine (Hives)  iodine containing compounds (Unknown)  shellfish (Anaphylaxis)  shellfish (Swelling; Short breath)    Intolerances        REVIEW OF SYSTEMS:  ROS limited as pt with advanced dementia. Admits cough. Denies SOB, CP, abd pain, fever, chills.    Vital Signs Last 24 Hrs  T(C): 36.9 (27 Jun 2022 13:08), Max: 36.9 (27 Jun 2022 13:08)  T(F): 98.5 (27 Jun 2022 13:08), Max: 98.5 (27 Jun 2022 13:08)  HR: 61 (27 Jun 2022 17:58) (61 - 67)  BP: 117/69 (27 Jun 2022 13:08) (117/69 - 124/67)  BP(mean): --  RR: 17 (27 Jun 2022 13:08) (17 - 18)  SpO2: 93% (27 Jun 2022 17:58) (92% - 98%)    PHYSICAL EXAM:  GENERAL: NAD at rest  HEENT:  anicteric, moist mucous membranes  CHEST/LUNG:  decreased breath sounds, scant wheezes  HEART:  RRR, S1, S2  ABDOMEN:  BS+, soft, nontender, nondistended  EXTREMITIES: no cyanosis, or calf tenderness  NERVOUS SYSTEM: awake, alert, answers few simple questions and follows few commands       LABS:                                                         11.2   16.46 )-----------( 198      ( 27 Jun 2022 07:50 )             35.6     CBC Full  -  ( 27 Jun 2022 07:50 )  WBC Count : 16.46 K/uL  Hemoglobin : 11.2 g/dL  Hematocrit : 35.6 %  Platelet Count - Automated : 198 K/uL  Mean Cell Volume : 89.0 fl  Mean Cell Hemoglobin : 28.0 pg  Mean Cell Hemoglobin Concentration : 31.5 gm/dL  Auto Neutrophil # : x  Auto Lymphocyte # : x  Auto Monocyte # : x  Auto Eosinophil # : x  Auto Basophil # : x  Auto Neutrophil % : x  Auto Lymphocyte % : x  Auto Monocyte % : x  Auto Eosinophil % : x  Auto Basophil % : x    06-27    137  |  106  |  61<H>  ----------------------------<  189<H>  4.5   |  26  |  1.60<H>    Ca    8.6      27 Jun 2022 07:50            CAPILLARY BLOOD GLUCOSE      POCT Blood Glucose.: 121 mg/dL (27 Jun 2022 17:03)  POCT Blood Glucose.: 188 mg/dL (27 Jun 2022 12:49)  POCT Blood Glucose.: 280 mg/dL (27 Jun 2022 11:35)  POCT Blood Glucose.: 222 mg/dL (27 Jun 2022 07:30)                RADIOLOGY & ADDITIONAL TESTS:    Personally reviewed.     Consultant(s) Notes Reviewed:  [x] YES  [ ] NO

## 2022-06-27 NOTE — PROGRESS NOTE ADULT - SUBJECTIVE AND OBJECTIVE BOX
Patient is a 87y old  Female who presents with a chief complaint of COPD Exacerbation (16 Jun 2022 11:01)       HPI:  87 year old female with PMHx COPD, HTN, HLD, T2DM on insulin, hx of MI presents from assisted living facility with dyspnea, wheezing, labored breathing. Patient recently admitted to [facility] 3/9-15/2022 for COPD exacerbation. History obtained from son Calvin, as patient is limited historian at baseline. Son states he received a call from [facility] this AM stating patient is having labored breathing. Patient was sent to [facility] for further management.   She states her breathing is improved.  Denies any N/V/Dizziness.  Has not seen nephrologist in the past.  No urinary issues. Denies NSAID usage.    No acute events noted       PAST MEDICAL & SURGICAL HISTORY:  Uncomplicated asthma, unspecified asthma severity      DM2 (diabetes mellitus, type 2)      Essential hypertension      Hypothyroidism, unspecified type      Pure hypercholesterolemia      Gastroesophageal reflux disease without esophagitis      Diabetes      Asthma      CAD (coronary artery disease)      HTN (hypertension)      HLD (hyperlipidemia)      Hypothyroid      NSTEMI (non-ST elevated myocardial infarction)      History of appendectomy      History of appendectomy      Abnormal findings on cardiac catheterization  Cardiac Cath           FAMILY HISTORY:  Family history of heart disease    Family history of cancer in mother    Family history of MI (myocardial infarction)    Family history of stomach cancer    NC    Social History:Non smoker    MEDICATIONS  (STANDING):  albuterol/ipratropium for Nebulization 3 milliLiter(s) Nebulizer every 6 hours  artificial tears (preservative free) Ophthalmic Solution 1 Drop(s) Both EYES two times a day  aspirin  chewable 81 milliGRAM(s) Oral daily  atorvastatin 80 milliGRAM(s) Oral at bedtime  cholecalciferol 2000 Unit(s) Oral daily  clopidogrel Tablet 75 milliGRAM(s) Oral daily  dextrose 5%. 1000 milliLiter(s) (50 mL/Hr) IV Continuous <Continuous>  dextrose 5%. 1000 milliLiter(s) (100 mL/Hr) IV Continuous <Continuous>  dextrose 50% Injectable 25 Gram(s) IV Push once  dextrose 50% Injectable 12.5 Gram(s) IV Push once  dextrose 50% Injectable 25 Gram(s) IV Push once  escitalopram 10 milliGRAM(s) Oral daily  furosemide   Injectable 40 milliGRAM(s) IV Push <User Schedule>  gabapentin 100 milliGRAM(s) Oral three times a day  glucagon  Injectable 1 milliGRAM(s) IntraMuscular once  heparin   Injectable 5000 Unit(s) SubCutaneous every 12 hours  insulin glargine Injectable (LANTUS) 15 Unit(s) SubCutaneous every morning  insulin lispro (ADMELOG) corrective regimen sliding scale   SubCutaneous every 6 hours  insulin lispro Injectable (ADMELOG) 8 Unit(s) SubCutaneous three times a day before meals  isosorbide   mononitrate ER Tablet (IMDUR) 30 milliGRAM(s) Oral daily  levothyroxine 125 MICROGram(s) Oral daily  methylPREDNISolone sodium succinate Injectable 40 milliGRAM(s) IV Push daily  metoprolol succinate ER 50 milliGRAM(s) Oral daily    MEDICATIONS  (PRN):  acetaminophen     Tablet .. 650 milliGRAM(s) Oral every 6 hours PRN Mild Pain (1 - 3)  ALBUTerol    0.083% 2.5 milliGRAM(s) Nebulizer every 4 hours PRN Shortness of Breath and/or Wheezing  ALPRAZolam 0.5 milliGRAM(s) Oral two times a day PRN Anxiety  dextrose Oral Gel 15 Gram(s) Oral once PRN Blood Glucose LESS THAN 70 milliGRAM(s)/deciliter  melatonin 3 milliGRAM(s) Oral at bedtime PRN Insomnia   Meds reviewed    Allergies    doxycycline (Unknown)  iodine (Hives)  iodine containing compounds (Unknown)  shellfish (Anaphylaxis)  shellfish (Swelling; Short breath)    Intolerances         REVIEW OF SYSTEMS:   Constitutional: Denies fatigue  HEENT: Denies headaches and dizziness  Respiratory: denies  cough, or wheezing, improving SOB  Cardiovascular: denies CP, palpitations  Gastrointestinal: Denies nausea, denies vomiting, diarrhea, constipation, abdominal pain, or bloody stools  Genitourinary: denies painful urination, increased frequency, urgency, or bloody urine  Skin: denies rashes or itching  Musculoskeletal: denies muscle aches, joint swelling  Neurologic: Denies generalized weakness, denies loss of sensation, numbness, or tingling    ICU Vital Signs Last 24 Hrs  T(C): 36.9 (27 Jun 2022 13:08), Max: 36.9 (27 Jun 2022 13:08)  T(F): 98.5 (27 Jun 2022 13:08), Max: 98.5 (27 Jun 2022 13:08)  HR: 65 (27 Jun 2022 13:08) (64 - 67)  BP: 117/69 (27 Jun 2022 13:08) (117/69 - 124/67)  BP(mean): --  ABP: --  ABP(mean): --  RR: 17 (27 Jun 2022 13:08) (17 - 18)  SpO2: 92% (27 Jun 2022 13:08) (92% - 98%)    PHYSICAL EXAM:    GENERAL: NAD  HEAD:  Atraumatic, Normocephalic  EYES: EOMI, conjunctiva and sclera clear  ENMT: No Drainage from nares, No drainage from ears  NECK: Supple, neck  veins full  NERVOUS SYSTEM:  Awake and Alert  CHEST/LUNG: Clear to percussion bilaterally; No rales, rhonchi, wheezing, or rubs  HEART: Regular rate and rhythm; No murmurs, rubs, or gallops  ABDOMEN: Soft, Nontender, Nondistended; Bowel sounds present  EXTREMITIES:  No Edema  SKIN: No rashes No obvious ecchymosis      LABS:                                   11.2   16.46 )-----------( 198      ( 27 Jun 2022 07:50 )             35.6     06-27    137  |  106  |  61<H>  ----------------------------<  189<H>  4.5   |  26  |  1.60<H>    Ca    8.6      27 Jun 2022 07:50

## 2022-06-27 NOTE — PROGRESS NOTE ADULT - ASSESSMENT
[place] on CKD 3b  Respiratory Acidosis  HTN  Human Metapneumovirus      -BLCr 1.4  -Urine indices reviewed  -Avoid IVF given respiratory status  -Pulm eval reviewed  -CXR clear 6/15/22  -Renal indices fluctuates but are relatively stable at baseline range.  -Can restart lasix as needed    Thank you

## 2022-06-27 NOTE — PROGRESS NOTE ADULT - SUBJECTIVE AND OBJECTIVE BOX
Date/Time Patient Seen:  		  Referring MD:   Data Reviewed	       Patient is a 87y old  Female who presents with a chief complaint of COPD Exacerbation (26 Jun 2022 18:11)      Subjective/HPI     PAST MEDICAL & SURGICAL HISTORY:  Uncomplicated asthma, unspecified asthma severity    DM2 (diabetes mellitus, type 2)    Essential hypertension    Hypothyroidism, unspecified type    Pure hypercholesterolemia    Gastroesophageal reflux disease without esophagitis    Diabetes    Asthma    CAD (coronary artery disease)    HTN (hypertension)    HLD (hyperlipidemia)    Hypothyroid    NSTEMI (non-ST elevated myocardial infarction)    No significant past surgical history    History of appendectomy    History of appendectomy    Abnormal findings on cardiac catheterization  Cardiac Cath          Medication list         MEDICATIONS  (STANDING):  albuterol/ipratropium for Nebulization 3 milliLiter(s) Nebulizer every 6 hours  artificial tears (preservative free) Ophthalmic Solution 1 Drop(s) Both EYES two times a day  aspirin  chewable 81 milliGRAM(s) Oral daily  atorvastatin 80 milliGRAM(s) Oral at bedtime  cholecalciferol 2000 Unit(s) Oral daily  clopidogrel Tablet 75 milliGRAM(s) Oral daily  dextrose 5%. 1000 milliLiter(s) (50 mL/Hr) IV Continuous <Continuous>  dextrose 5%. 1000 milliLiter(s) (100 mL/Hr) IV Continuous <Continuous>  dextrose 50% Injectable 25 Gram(s) IV Push once  dextrose 50% Injectable 25 Gram(s) IV Push once  dextrose 50% Injectable 12.5 Gram(s) IV Push once  dextrose 50% Injectable 25 Gram(s) IV Push once  escitalopram 10 milliGRAM(s) Oral daily  gabapentin 100 milliGRAM(s) Oral three times a day  glucagon  Injectable 1 milliGRAM(s) IntraMuscular once  glucagon  Injectable 1 milliGRAM(s) IntraMuscular once  heparin   Injectable 5000 Unit(s) SubCutaneous every 12 hours  insulin glargine Injectable (LANTUS) 15 Unit(s) SubCutaneous every morning  insulin lispro (ADMELOG) corrective regimen sliding scale   SubCutaneous three times a day before meals  insulin lispro (ADMELOG) corrective regimen sliding scale   SubCutaneous at bedtime  insulin lispro Injectable (ADMELOG) 8 Unit(s) SubCutaneous three times a day before meals  isosorbide   mononitrate ER Tablet (IMDUR) 30 milliGRAM(s) Oral daily  levothyroxine 125 MICROGram(s) Oral daily  metoprolol succinate ER 50 milliGRAM(s) Oral daily    MEDICATIONS  (PRN):  acetaminophen     Tablet .. 650 milliGRAM(s) Oral every 6 hours PRN Mild Pain (1 - 3)  ALBUTerol    0.083% 2.5 milliGRAM(s) Nebulizer every 4 hours PRN Shortness of Breath and/or Wheezing  dextrose Oral Gel 15 Gram(s) Oral once PRN Blood Glucose LESS THAN 70 milliGRAM(s)/deciliter  melatonin 3 milliGRAM(s) Oral at bedtime PRN Insomnia         Vitals log        ICU Vital Signs Last 24 Hrs  T(C): 36.8 (26 Jun 2022 20:43), Max: 36.8 (26 Jun 2022 20:43)  T(F): 98.2 (26 Jun 2022 20:43), Max: 98.2 (26 Jun 2022 20:43)  HR: 67 (26 Jun 2022 20:43) (62 - 67)  BP: 122/71 (26 Jun 2022 20:43) (122/71 - 160/75)  BP(mean): --  ABP: --  ABP(mean): --  RR: 17 (26 Jun 2022 20:43) (17 - 18)  SpO2: 98% (26 Jun 2022 21:10) (92% - 98%)           Input and Output:  I&O's Detail      Lab Data                        12.1   20.41 )-----------( 193      ( 26 Jun 2022 08:33 )             39.1     06-26    137  |  106  |  59<H>  ----------------------------<  159<H>  4.5   |  24  |  1.60<H>    Ca    8.6      26 Jun 2022 08:33    TPro  6.4  /  Alb  2.4<L>  /  TBili  0.7  /  DBili  x   /  AST  25  /  ALT  23  /  AlkPhos  73  06-25            Review of Systems	      Objective     Physical Examination    heart s1s2  lung dec BS  abd soft      Pertinent Lab findings & Imaging      Chong:  NO   Adequate UO     I&O's Detail           Discussed with:     Cultures:	        Radiology

## 2022-06-28 LAB
ANION GAP SERPL CALC-SCNC: 6 MMOL/L — SIGNIFICANT CHANGE UP (ref 5–17)
BUN SERPL-MCNC: 55 MG/DL — HIGH (ref 7–23)
CALCIUM SERPL-MCNC: 8.5 MG/DL — SIGNIFICANT CHANGE UP (ref 8.5–10.1)
CHLORIDE SERPL-SCNC: 107 MMOL/L — SIGNIFICANT CHANGE UP (ref 96–108)
CO2 SERPL-SCNC: 25 MMOL/L — SIGNIFICANT CHANGE UP (ref 22–31)
CREAT SERPL-MCNC: 1.6 MG/DL — HIGH (ref 0.5–1.3)
EGFR: 31 ML/MIN/1.73M2 — LOW
GLUCOSE SERPL-MCNC: 143 MG/DL — HIGH (ref 70–99)
HCT VFR BLD CALC: 38 % — SIGNIFICANT CHANGE UP (ref 34.5–45)
HGB BLD-MCNC: 11.9 G/DL — SIGNIFICANT CHANGE UP (ref 11.5–15.5)
MCHC RBC-ENTMCNC: 28 PG — SIGNIFICANT CHANGE UP (ref 27–34)
MCHC RBC-ENTMCNC: 31.3 GM/DL — LOW (ref 32–36)
MCV RBC AUTO: 89.4 FL — SIGNIFICANT CHANGE UP (ref 80–100)
NRBC # BLD: 0 /100 WBCS — SIGNIFICANT CHANGE UP (ref 0–0)
PLATELET # BLD AUTO: 192 K/UL — SIGNIFICANT CHANGE UP (ref 150–400)
POTASSIUM SERPL-MCNC: 4.7 MMOL/L — SIGNIFICANT CHANGE UP (ref 3.5–5.3)
POTASSIUM SERPL-SCNC: 4.7 MMOL/L — SIGNIFICANT CHANGE UP (ref 3.5–5.3)
RBC # BLD: 4.25 M/UL — SIGNIFICANT CHANGE UP (ref 3.8–5.2)
RBC # FLD: 13.5 % — SIGNIFICANT CHANGE UP (ref 10.3–14.5)
SODIUM SERPL-SCNC: 138 MMOL/L — SIGNIFICANT CHANGE UP (ref 135–145)
WBC # BLD: 13.42 K/UL — HIGH (ref 3.8–10.5)
WBC # FLD AUTO: 13.42 K/UL — HIGH (ref 3.8–10.5)

## 2022-06-28 PROCEDURE — 99232 SBSQ HOSP IP/OBS MODERATE 35: CPT

## 2022-06-28 RX ADMIN — Medication 8 UNIT(S): at 12:35

## 2022-06-28 RX ADMIN — HEPARIN SODIUM 5000 UNIT(S): 5000 INJECTION INTRAVENOUS; SUBCUTANEOUS at 05:17

## 2022-06-28 RX ADMIN — CLOPIDOGREL BISULFATE 75 MILLIGRAM(S): 75 TABLET, FILM COATED ORAL at 12:36

## 2022-06-28 RX ADMIN — GABAPENTIN 100 MILLIGRAM(S): 400 CAPSULE ORAL at 21:54

## 2022-06-28 RX ADMIN — Medication 1 DROP(S): at 17:21

## 2022-06-28 RX ADMIN — ALBUTEROL 2.5 MILLIGRAM(S): 90 AEROSOL, METERED ORAL at 08:07

## 2022-06-28 RX ADMIN — INSULIN GLARGINE 15 UNIT(S): 100 INJECTION, SOLUTION SUBCUTANEOUS at 11:01

## 2022-06-28 RX ADMIN — Medication 1 DROP(S): at 05:18

## 2022-06-28 RX ADMIN — Medication 2000 UNIT(S): at 12:36

## 2022-06-28 RX ADMIN — Medication 1: at 17:20

## 2022-06-28 RX ADMIN — Medication 1: at 12:35

## 2022-06-28 RX ADMIN — ALBUTEROL 2.5 MILLIGRAM(S): 90 AEROSOL, METERED ORAL at 00:32

## 2022-06-28 RX ADMIN — Medication 81 MILLIGRAM(S): at 12:36

## 2022-06-28 RX ADMIN — ESCITALOPRAM OXALATE 10 MILLIGRAM(S): 10 TABLET, FILM COATED ORAL at 12:36

## 2022-06-28 RX ADMIN — HEPARIN SODIUM 5000 UNIT(S): 5000 INJECTION INTRAVENOUS; SUBCUTANEOUS at 17:21

## 2022-06-28 RX ADMIN — Medication 8 UNIT(S): at 17:19

## 2022-06-28 RX ADMIN — ISOSORBIDE MONONITRATE 30 MILLIGRAM(S): 60 TABLET, EXTENDED RELEASE ORAL at 12:36

## 2022-06-28 RX ADMIN — ATORVASTATIN CALCIUM 80 MILLIGRAM(S): 80 TABLET, FILM COATED ORAL at 21:54

## 2022-06-28 RX ADMIN — Medication 50 MILLIGRAM(S): at 05:18

## 2022-06-28 RX ADMIN — GABAPENTIN 100 MILLIGRAM(S): 400 CAPSULE ORAL at 05:18

## 2022-06-28 RX ADMIN — Medication 125 MICROGRAM(S): at 05:18

## 2022-06-28 RX ADMIN — ALBUTEROL 2.5 MILLIGRAM(S): 90 AEROSOL, METERED ORAL at 16:12

## 2022-06-28 RX ADMIN — GABAPENTIN 100 MILLIGRAM(S): 400 CAPSULE ORAL at 12:41

## 2022-06-28 NOTE — PROGRESS NOTE ADULT - SUBJECTIVE AND OBJECTIVE BOX
Patient is a 87y old  Female who presents with a chief complaint of cough, SOB.           INTERVAL HPI/OVERNIGHT EVENTS: ROS limited as pt with dementia. Admits cough (gradually improving). Denies SOB at rest, CP, abd pain, fever, chills.    MEDICATIONS  (STANDING):  ALBUTerol    0.083% 2.5 milliGRAM(s) Nebulizer every 8 hours  artificial tears (preservative free) Ophthalmic Solution 1 Drop(s) Both EYES two times a day  aspirin  chewable 81 milliGRAM(s) Oral daily  atorvastatin 80 milliGRAM(s) Oral at bedtime  cholecalciferol 2000 Unit(s) Oral daily  clopidogrel Tablet 75 milliGRAM(s) Oral daily  dextrose 5%. 1000 milliLiter(s) (100 mL/Hr) IV Continuous <Continuous>  dextrose 5%. 1000 milliLiter(s) (50 mL/Hr) IV Continuous <Continuous>  dextrose 50% Injectable 25 Gram(s) IV Push once  dextrose 50% Injectable 12.5 Gram(s) IV Push once  dextrose 50% Injectable 25 Gram(s) IV Push once  dextrose 50% Injectable 25 Gram(s) IV Push once  escitalopram 10 milliGRAM(s) Oral daily  gabapentin 100 milliGRAM(s) Oral three times a day  glucagon  Injectable 1 milliGRAM(s) IntraMuscular once  glucagon  Injectable 1 milliGRAM(s) IntraMuscular once  heparin   Injectable 5000 Unit(s) SubCutaneous every 12 hours  insulin glargine Injectable (LANTUS) 15 Unit(s) SubCutaneous every morning  insulin lispro (ADMELOG) corrective regimen sliding scale   SubCutaneous three times a day before meals  insulin lispro (ADMELOG) corrective regimen sliding scale   SubCutaneous at bedtime  insulin lispro Injectable (ADMELOG) 8 Unit(s) SubCutaneous three times a day before meals  isosorbide   mononitrate ER Tablet (IMDUR) 30 milliGRAM(s) Oral daily  levothyroxine 125 MICROGram(s) Oral daily  metoprolol succinate ER 50 milliGRAM(s) Oral daily    MEDICATIONS  (PRN):  acetaminophen     Tablet .. 650 milliGRAM(s) Oral every 6 hours PRN Mild Pain (1 - 3)  ALBUTerol    0.083% 2.5 milliGRAM(s) Nebulizer every 6 hours PRN Shortness of Breath and/or Wheezing  dextrose Oral Gel 15 Gram(s) Oral once PRN Blood Glucose LESS THAN 70 milliGRAM(s)/deciliter  melatonin 3 milliGRAM(s) Oral at bedtime PRN Insomnia          Allergies    doxycycline (Unknown)  iodine (Hives)  iodine containing compounds (Unknown)  shellfish (Anaphylaxis)  shellfish (Swelling; Short breath)    Intolerances        REVIEW OF SYSTEMS:  ROS limited as pt with advanced dementia. Admits cough. Denies SOB at rest, CP, abd pain, fever, chills.    Vital Signs Last 24 Hrs  T(C): 36.6 (28 Jun 2022 05:10), Max: 36.9 (27 Jun 2022 13:08)  T(F): 97.9 (28 Jun 2022 05:10), Max: 98.5 (27 Jun 2022 13:08)  HR: 66 (28 Jun 2022 08:08) (60 - 66)  BP: 136/72 (28 Jun 2022 05:10) (117/69 - 140/63)  BP(mean): --  RR: 17 (28 Jun 2022 05:10) (17 - 18)  SpO2: 96% (28 Jun 2022 08:08) (92% - 96%)    PHYSICAL EXAM:  GENERAL: NAD at rest  HEENT:  anicteric, moist mucous membranes  CHEST/LUNG:  decreased breath sounds, scant wheezes  HEART:  RRR, S1, S2  ABDOMEN:  BS+, soft, nontender, nondistended  EXTREMITIES: no cyanosis, or calf tenderness  NERVOUS SYSTEM: awake, alert, answers some simple questions and follows simple commands appropriately      LABS:                                                         11.9   13.42 )-----------( 192      ( 28 Jun 2022 07:40 )             38.0     CBC Full  -  ( 28 Jun 2022 07:40 )  WBC Count : 13.42 K/uL  Hemoglobin : 11.9 g/dL  Hematocrit : 38.0 %  Platelet Count - Automated : 192 K/uL  Mean Cell Volume : 89.4 fl  Mean Cell Hemoglobin : 28.0 pg  Mean Cell Hemoglobin Concentration : 31.3 gm/dL  Auto Neutrophil # : x  Auto Lymphocyte # : x  Auto Monocyte # : x  Auto Eosinophil # : x  Auto Basophil # : x  Auto Neutrophil % : x  Auto Lymphocyte % : x  Auto Monocyte % : x  Auto Eosinophil % : x  Auto Basophil % : x    06-28    138  |  107  |  55<H>  ----------------------------<  143<H>  4.7   |  25  |  1.60<H>    Ca    8.5      28 Jun 2022 07:40        CAPILLARY BLOOD GLUCOSE      POCT Blood Glucose.: 186 mg/dL (28 Jun 2022 11:41)  POCT Blood Glucose.: 143 mg/dL (28 Jun 2022 07:50)  POCT Blood Glucose.: 146 mg/dL (27 Jun 2022 21:59)  POCT Blood Glucose.: 121 mg/dL (27 Jun 2022 17:03)  POCT Blood Glucose.: 188 mg/dL (27 Jun 2022 12:49)          RADIOLOGY & ADDITIONAL TESTS:    Personally reviewed.     Consultant(s) Notes Reviewed:  [x] YES  [ ] NO     Patient is a 87y old  Female who presents with a chief complaint of cough, SOB.            INTERVAL HPI/OVERNIGHT EVENTS: ROS limited as pt with dementia. Admits cough (gradually improving). Denies SOB at rest, CP, abd pain, fever, chills.    MEDICATIONS  (STANDING):  ALBUTerol    0.083% 2.5 milliGRAM(s) Nebulizer every 8 hours  artificial tears (preservative free) Ophthalmic Solution 1 Drop(s) Both EYES two times a day  aspirin  chewable 81 milliGRAM(s) Oral daily  atorvastatin 80 milliGRAM(s) Oral at bedtime  cholecalciferol 2000 Unit(s) Oral daily  clopidogrel Tablet 75 milliGRAM(s) Oral daily  dextrose 5%. 1000 milliLiter(s) (100 mL/Hr) IV Continuous <Continuous>  dextrose 5%. 1000 milliLiter(s) (50 mL/Hr) IV Continuous <Continuous>  dextrose 50% Injectable 25 Gram(s) IV Push once  dextrose 50% Injectable 12.5 Gram(s) IV Push once  dextrose 50% Injectable 25 Gram(s) IV Push once  dextrose 50% Injectable 25 Gram(s) IV Push once  escitalopram 10 milliGRAM(s) Oral daily  gabapentin 100 milliGRAM(s) Oral three times a day  glucagon  Injectable 1 milliGRAM(s) IntraMuscular once  glucagon  Injectable 1 milliGRAM(s) IntraMuscular once  heparin   Injectable 5000 Unit(s) SubCutaneous every 12 hours  insulin glargine Injectable (LANTUS) 15 Unit(s) SubCutaneous every morning  insulin lispro (ADMELOG) corrective regimen sliding scale   SubCutaneous three times a day before meals  insulin lispro (ADMELOG) corrective regimen sliding scale   SubCutaneous at bedtime  insulin lispro Injectable (ADMELOG) 8 Unit(s) SubCutaneous three times a day before meals  isosorbide   mononitrate ER Tablet (IMDUR) 30 milliGRAM(s) Oral daily  levothyroxine 125 MICROGram(s) Oral daily  metoprolol succinate ER 50 milliGRAM(s) Oral daily    MEDICATIONS  (PRN):  acetaminophen     Tablet .. 650 milliGRAM(s) Oral every 6 hours PRN Mild Pain (1 - 3)  ALBUTerol    0.083% 2.5 milliGRAM(s) Nebulizer every 6 hours PRN Shortness of Breath and/or Wheezing  dextrose Oral Gel 15 Gram(s) Oral once PRN Blood Glucose LESS THAN 70 milliGRAM(s)/deciliter  melatonin 3 milliGRAM(s) Oral at bedtime PRN Insomnia          Allergies    doxycycline (Unknown)  iodine (Hives)  iodine containing compounds (Unknown)  shellfish (Anaphylaxis)  shellfish (Swelling; Short breath)    Intolerances        REVIEW OF SYSTEMS:  ROS limited as pt with advanced dementia. Admits cough. Denies SOB at rest, CP, abd pain, fever, chills.    Vital Signs Last 24 Hrs  T(C): 36.6 (28 Jun 2022 05:10), Max: 36.9 (27 Jun 2022 13:08)  T(F): 97.9 (28 Jun 2022 05:10), Max: 98.5 (27 Jun 2022 13:08)  HR: 66 (28 Jun 2022 08:08) (60 - 66)  BP: 136/72 (28 Jun 2022 05:10) (117/69 - 140/63)  BP(mean): --  RR: 17 (28 Jun 2022 05:10) (17 - 18)  SpO2: 96% (28 Jun 2022 08:08) (92% - 96%)    PHYSICAL EXAM:  GENERAL: NAD at rest  HEENT:  anicteric, moist mucous membranes  CHEST/LUNG:  decreased breath sounds, scant wheezes  HEART:  RRR, S1, S2  ABDOMEN:  BS+, soft, nontender, nondistended  EXTREMITIES: no cyanosis, or calf tenderness  NERVOUS SYSTEM: awake, alert, answers some simple questions and follows simple commands appropriately      LABS:                                                         11.9   13.42 )-----------( 192      ( 28 Jun 2022 07:40 )             38.0     CBC Full  -  ( 28 Jun 2022 07:40 )  WBC Count : 13.42 K/uL  Hemoglobin : 11.9 g/dL  Hematocrit : 38.0 %  Platelet Count - Automated : 192 K/uL  Mean Cell Volume : 89.4 fl  Mean Cell Hemoglobin : 28.0 pg  Mean Cell Hemoglobin Concentration : 31.3 gm/dL  Auto Neutrophil # : x  Auto Lymphocyte # : x  Auto Monocyte # : x  Auto Eosinophil # : x  Auto Basophil # : x  Auto Neutrophil % : x  Auto Lymphocyte % : x  Auto Monocyte % : x  Auto Eosinophil % : x  Auto Basophil % : x    06-28    138  |  107  |  55<H>  ----------------------------<  143<H>  4.7   |  25  |  1.60<H>    Ca    8.5      28 Jun 2022 07:40        CAPILLARY BLOOD GLUCOSE      POCT Blood Glucose.: 186 mg/dL (28 Jun 2022 11:41)  POCT Blood Glucose.: 143 mg/dL (28 Jun 2022 07:50)  POCT Blood Glucose.: 146 mg/dL (27 Jun 2022 21:59)  POCT Blood Glucose.: 121 mg/dL (27 Jun 2022 17:03)  POCT Blood Glucose.: 188 mg/dL (27 Jun 2022 12:49)          RADIOLOGY & ADDITIONAL TESTS:    Personally reviewed.     Consultant(s) Notes Reviewed:  [x] YES  [ ] NO

## 2022-06-28 NOTE — PROGRESS NOTE ADULT - ASSESSMENT
[place] on CKD 3b  Respiratory Acidosis  HTN  Human Metapneumovirus      -BLCr 1.4  -Urine indices reviewed  -Avoid IVF given respiratory status  -Pulm eval reviewed  -CXR clear 6/15/22  -Renal indices fluctuates but are relatively stable at baseline range.  -Can restart lasix as needed per pulmonary     Thank you

## 2022-06-28 NOTE — PROGRESS NOTE ADULT - SUBJECTIVE AND OBJECTIVE BOX
CAPILLARY BLOOD GLUCOSE      POCT Blood Glucose.: 146 mg/dL (27 Jun 2022 21:59)  POCT Blood Glucose.: 121 mg/dL (27 Jun 2022 17:03)  POCT Blood Glucose.: 188 mg/dL (27 Jun 2022 12:49)  POCT Blood Glucose.: 280 mg/dL (27 Jun 2022 11:35)      Vital Signs Last 24 Hrs  T(C): 36.6 (28 Jun 2022 05:10), Max: 36.9 (27 Jun 2022 13:08)  T(F): 97.9 (28 Jun 2022 05:10), Max: 98.5 (27 Jun 2022 13:08)  HR: 64 (28 Jun 2022 05:10) (60 - 65)  BP: 136/72 (28 Jun 2022 05:10) (117/69 - 140/63)  BP(mean): --  RR: 17 (28 Jun 2022 05:10) (17 - 18)  SpO2: 92% (28 Jun 2022 05:10) (92% - 95%)    General: WN/WD NAD  Respiratory: CTA B/L  CV: RRR, S1S2, no murmurs, rubs or gallops  Abdominal: Soft, NT, ND +BS, Last BM  Extremities: No edema, + peripheral pulses     06-27    137  |  106  |  61<H>  ----------------------------<  189<H>  4.5   |  26  |  1.60<H>    Ca    8.6      27 Jun 2022 07:50        atorvastatin 80 milliGRAM(s) Oral at bedtime  dextrose 50% Injectable 25 Gram(s) IV Push once  dextrose 50% Injectable 12.5 Gram(s) IV Push once  dextrose 50% Injectable 25 Gram(s) IV Push once  dextrose 50% Injectable 25 Gram(s) IV Push once  dextrose Oral Gel 15 Gram(s) Oral once PRN  glucagon  Injectable 1 milliGRAM(s) IntraMuscular once  glucagon  Injectable 1 milliGRAM(s) IntraMuscular once  insulin glargine Injectable (LANTUS) 15 Unit(s) SubCutaneous every morning  insulin lispro (ADMELOG) corrective regimen sliding scale   SubCutaneous three times a day before meals  insulin lispro (ADMELOG) corrective regimen sliding scale   SubCutaneous at bedtime  insulin lispro Injectable (ADMELOG) 8 Unit(s) SubCutaneous three times a day before meals  levothyroxine 125 MICROGram(s) Oral daily

## 2022-06-28 NOTE — PROGRESS NOTE ADULT - SUBJECTIVE AND OBJECTIVE BOX
Patient is a 87y old  Female who presents with a chief complaint of COPD Exacerbation (16 Jun 2022 11:01)       HPI:  87 year old female with PMHx COPD, HTN, HLD, T2DM on insulin, hx of MI presents from assisted living facility with dyspnea, wheezing, labored breathing. Patient recently admitted to [facility] 3/9-15/2022 for COPD exacerbation. History obtained from son Calvin, as patient is limited historian at baseline. Son states he received a call from [facility] this AM stating patient is having labored breathing. Patient was sent to [facility] for further management.   She states her breathing is improved.  Denies any N/V/Dizziness.  Has not seen nephrologist in the past.  No urinary issues. Denies NSAID usage.    No acute events noted       PAST MEDICAL & SURGICAL HISTORY:  Uncomplicated asthma, unspecified asthma severity      DM2 (diabetes mellitus, type 2)      Essential hypertension      Hypothyroidism, unspecified type      Pure hypercholesterolemia      Gastroesophageal reflux disease without esophagitis      Diabetes      Asthma      CAD (coronary artery disease)      HTN (hypertension)      HLD (hyperlipidemia)      Hypothyroid      NSTEMI (non-ST elevated myocardial infarction)      History of appendectomy      History of appendectomy      Abnormal findings on cardiac catheterization  Cardiac Cath           FAMILY HISTORY:  Family history of heart disease    Family history of cancer in mother    Family history of MI (myocardial infarction)    Family history of stomach cancer    NC    Social History:Non smoker    MEDICATIONS  (STANDING):  albuterol/ipratropium for Nebulization 3 milliLiter(s) Nebulizer every 6 hours  artificial tears (preservative free) Ophthalmic Solution 1 Drop(s) Both EYES two times a day  aspirin  chewable 81 milliGRAM(s) Oral daily  atorvastatin 80 milliGRAM(s) Oral at bedtime  cholecalciferol 2000 Unit(s) Oral daily  clopidogrel Tablet 75 milliGRAM(s) Oral daily  dextrose 5%. 1000 milliLiter(s) (50 mL/Hr) IV Continuous <Continuous>  dextrose 5%. 1000 milliLiter(s) (100 mL/Hr) IV Continuous <Continuous>  dextrose 50% Injectable 25 Gram(s) IV Push once  dextrose 50% Injectable 12.5 Gram(s) IV Push once  dextrose 50% Injectable 25 Gram(s) IV Push once  escitalopram 10 milliGRAM(s) Oral daily  furosemide   Injectable 40 milliGRAM(s) IV Push <User Schedule>  gabapentin 100 milliGRAM(s) Oral three times a day  glucagon  Injectable 1 milliGRAM(s) IntraMuscular once  heparin   Injectable 5000 Unit(s) SubCutaneous every 12 hours  insulin glargine Injectable (LANTUS) 15 Unit(s) SubCutaneous every morning  insulin lispro (ADMELOG) corrective regimen sliding scale   SubCutaneous every 6 hours  insulin lispro Injectable (ADMELOG) 8 Unit(s) SubCutaneous three times a day before meals  isosorbide   mononitrate ER Tablet (IMDUR) 30 milliGRAM(s) Oral daily  levothyroxine 125 MICROGram(s) Oral daily  methylPREDNISolone sodium succinate Injectable 40 milliGRAM(s) IV Push daily  metoprolol succinate ER 50 milliGRAM(s) Oral daily    MEDICATIONS  (PRN):  acetaminophen     Tablet .. 650 milliGRAM(s) Oral every 6 hours PRN Mild Pain (1 - 3)  ALBUTerol    0.083% 2.5 milliGRAM(s) Nebulizer every 4 hours PRN Shortness of Breath and/or Wheezing  ALPRAZolam 0.5 milliGRAM(s) Oral two times a day PRN Anxiety  dextrose Oral Gel 15 Gram(s) Oral once PRN Blood Glucose LESS THAN 70 milliGRAM(s)/deciliter  melatonin 3 milliGRAM(s) Oral at bedtime PRN Insomnia   Meds reviewed    Allergies    doxycycline (Unknown)  iodine (Hives)  iodine containing compounds (Unknown)  shellfish (Anaphylaxis)  shellfish (Swelling; Short breath)    Intolerances         REVIEW OF SYSTEMS:   Constitutional: Denies fatigue  HEENT: Denies headaches and dizziness  Respiratory: denies  cough, or wheezing, improving SOB  Cardiovascular: denies CP, palpitations  Gastrointestinal: Denies nausea, denies vomiting, diarrhea, constipation, abdominal pain, or bloody stools  Genitourinary: denies painful urination, increased frequency, urgency, or bloody urine  Skin: denies rashes or itching  Musculoskeletal: denies muscle aches, joint swelling  Neurologic: Denies generalized weakness, denies loss of sensation, numbness, or tingling    Vital Signs Last 24 Hrs  T(C): 36.6 (28 Jun 2022 05:10), Max: 36.9 (27 Jun 2022 13:08)  T(F): 97.9 (28 Jun 2022 05:10), Max: 98.5 (27 Jun 2022 13:08)  HR: 66 (28 Jun 2022 08:08) (60 - 66)  BP: 136/72 (28 Jun 2022 05:10) (117/69 - 140/63)  BP(mean): --  RR: 17 (28 Jun 2022 05:10) (17 - 18)  SpO2: 96% (28 Jun 2022 08:08) (92% - 96%)    PHYSICAL EXAM:    GENERAL: NAD  HEAD:  Atraumatic, Normocephalic  EYES: EOMI, conjunctiva and sclera clear  ENMT: No Drainage from nares, No drainage from ears  NECK: Supple, neck  veins full  NERVOUS SYSTEM:  Awake and Alert  CHEST/LUNG: Clear to percussion bilaterally; No rales, rhonchi, wheezing, or rubs  HEART: Regular rate and rhythm; No murmurs, rubs, or gallops  ABDOMEN: Soft, Nontender, Nondistended; Bowel sounds present  EXTREMITIES:  No Edema  SKIN: No rashes No obvious ecchymosis      LABS:                        11.9   13.42 )-----------( 192      ( 28 Jun 2022 07:40 )             38.0     06-28    138  |  107  |  55<H>  ----------------------------<  143<H>  4.7   |  25  |  1.60<H>    Ca    8.5      28 Jun 2022 07:40

## 2022-06-28 NOTE — PROGRESS NOTE ADULT - TIME BILLING
Note written by attending, see above.  Time spent: 40min. More than 50% of the visit was spent counseling the patient and pt's son/daughter-in-law on medical condition - COPD Exacerbation, hMPV infection, possible aspiration PNA.
Note written by attending, see above.  Time spent: 40min. More than 50% of the visit was spent counseling the patient and pt's son/daughter-in-law on medical condition - COPD Exacerbation, hMPV infection, possible aspiration PNA.
Note written by attending, see above.  Time spent: 30min. More than 50% of the visit was spent counseling the patient and pt's son on medical condition - COPD Exacerbation, hMPV infection, possible aspiration PNA, diabetes management.
Reviewing medical record including consultant recommendations, labs, vitals, medications, orders, imaging, and discussion of plan of care with patient, family, consultants, and nursing.
Note written by attending, see above.  Time spent: 30min. More than 50% of the visit was spent counseling the patient on medical condition - COPD Exacerbation, hMPV infection, possible aspiration PNA, diabetes management.
Note written by attending, see above.  Time spent: 30min. More than 50% of the visit was spent counseling the patient and pt's son on medical condition - COPD Exacerbation, hMPV infection, possible aspiration PNA, diabetes management.
Note written by attending, see above.  Time spent: 37min. More than 50% of the visit was spent counseling the patient on medical condition - COPD Exacerbation, hMPV infection, possible aspiration PNA, diabetes management.
Reviewing medical record including consultant recommendations, labs, vitals, medications, orders, imaging, and discussion of plan of care with patient, family, consultants, and nursing.

## 2022-06-28 NOTE — PROGRESS NOTE ADULT - ASSESSMENT
87 year old female with PMHx of COPD (not on O2), HTN, HLD, T2DM on insulin, hx of MI presents from assisted living facility with dyspnea, wheezing, labored breathing admitted for COPD exacerbation and acute hypoxic resp failure due to hMPV.      Problem/Plan - 1:  ·  Problem: Acute respiratory failure with hypoxia.   ·  Plan: - COPD exacerbation  due to hMPV - resp viral infection + on admission   - was on BiPAP, weaned off BiPAP. Then on 2L O2 NC. Now saturating in 90s on RA.    -Xanax PRN - duoneb prn  - steroids tapered off / completed on 06/19  - persistent leukocytosis so performed CT Chest which showed some groundglass opacity in LLL - discussed with ID (Estrella), started on Augmentin for possible aspiration PNA ; last night, pt's family decided they wish to stop antibiotics. Overall, family would like to de-escalate medical interventions as they feel the pt has been rapidly declining and would not have wanted to be living  in hospitalized setting the way she has been. Further, they feel the Abx have caused pt to have significant diarrhea causing the pt discomfort and believe bacterial PNA is unlikely. For those reasons, family requested I discontinue antibiotics, which was done.  - WBC count trending down  - pulm (Shalshin), recs appreciated  - ID (Estrella), recs appreciated     Problem/Plan - 2:  ·  Problem: COPD exacerbation.   - Continue home medications - c/w nebs  - steroids tapered off / completed on 06/19  - Supplemental O2 PRN. COPD patient will keep SpO2 goal 89-93%.   - Encourage incentive spirometry.  - pulm (Shalshin), recs appreciated     Problem/Plan - 3:  ·  Problem: TRENTON on CKD 3  ·  Plan: CKD, Cr at baseline ranging from 1.3-1.7 on outpatient HIE review  hold lasix per renal     Problem/Plan - 4:  ·  Problem: DM2 (diabetes mellitus, type 2).   ·  Plan: - Chronic, known history of T2DM  - C/W Lantus 15 U qd and lispro 8U pre meals  - Moderate dose insulin corrective scale   - HgbA1c: 10%  - endocrine following appreciate recs.     Problem/Plan - 5:  ·  Problem: HTN (hypertension).   ·  Plan: bp stable  - change lasix to 20mg prn on discharge per renal  - Continue home Metoprolol Succinate 50 mg qd with hold parameters  -monitor bp.     Problem/Plan - 6:  ·  Problem: Anxiety.   ·  Plan: stable  continue Xanax prn , lexapro.     Problem/Plan - 7:  ·  Problem: Hypothyroidism, unspecified type.   ·  Plan: Chronic  - Continue home Synthroid 125 mcg PO qd.     Problem/Plan - 8:  ·  Problem: HLD (hyperlipidemia).   ·  Plan: Chronic  - Continue home Atorvastatin 80 mg PO qhs.     Problem/Plan - 9:  ·  Problem: CAD (coronary artery disease).   ·  Plan: - Hx of MI  - Continue home Aspirin 81 mg PO qd, Plavix 75 mg PO qd, imdur 30mg daily   - Echo 3/11/2022: Left ventricle was of normal size, mild left ventricular hypertrophy LV systolic function EF 65%. Moderate to severe aortic stenosis.     Problem/Plan - 10:  ·  Problem: Need for prophylactic measure.   ·  Plan; VTE ppx: Heparin 5000 subq q12h    ACP / Disp:  - DNR/DNI - [facility]OLST filled  - family wished to pursue hospice as feel pt has had significant mental/physical decline; palliative care evaluated and believe pt not quite meeting hospice criteria  - on 6/24, pt's son decided to pursue [facility]/SNF at [facility] as opposed to pt returning to [facility] as she has had further physical decline.  - [facility], but today [facility] again stated [facility] cannot [facility], thus plan again shifted to [facility]  - no availability for [facility] for now -- d/c to [facility] when bed available.

## 2022-06-28 NOTE — PROGRESS NOTE ADULT - SUBJECTIVE AND OBJECTIVE BOX
Date/Time Patient Seen:  		  Referring MD:   Data Reviewed	       Patient is a 87y old  Female who presents with a chief complaint of COPD Exacerbation (27 Jun 2022 18:52)      Subjective/HPI     PAST MEDICAL & SURGICAL HISTORY:  Uncomplicated asthma, unspecified asthma severity    DM2 (diabetes mellitus, type 2)    Essential hypertension    Hypothyroidism, unspecified type    Pure hypercholesterolemia    Gastroesophageal reflux disease without esophagitis    Diabetes    Asthma    CAD (coronary artery disease)    HTN (hypertension)    HLD (hyperlipidemia)    Hypothyroid    NSTEMI (non-ST elevated myocardial infarction)    No significant past surgical history    History of appendectomy    History of appendectomy    Abnormal findings on cardiac catheterization  Cardiac Cath          Medication list         MEDICATIONS  (STANDING):  ALBUTerol    0.083% 2.5 milliGRAM(s) Nebulizer every 8 hours  artificial tears (preservative free) Ophthalmic Solution 1 Drop(s) Both EYES two times a day  aspirin  chewable 81 milliGRAM(s) Oral daily  atorvastatin 80 milliGRAM(s) Oral at bedtime  cholecalciferol 2000 Unit(s) Oral daily  clopidogrel Tablet 75 milliGRAM(s) Oral daily  dextrose 5%. 1000 milliLiter(s) (50 mL/Hr) IV Continuous <Continuous>  dextrose 5%. 1000 milliLiter(s) (100 mL/Hr) IV Continuous <Continuous>  dextrose 50% Injectable 25 Gram(s) IV Push once  dextrose 50% Injectable 12.5 Gram(s) IV Push once  dextrose 50% Injectable 25 Gram(s) IV Push once  dextrose 50% Injectable 25 Gram(s) IV Push once  escitalopram 10 milliGRAM(s) Oral daily  gabapentin 100 milliGRAM(s) Oral three times a day  glucagon  Injectable 1 milliGRAM(s) IntraMuscular once  glucagon  Injectable 1 milliGRAM(s) IntraMuscular once  heparin   Injectable 5000 Unit(s) SubCutaneous every 12 hours  insulin glargine Injectable (LANTUS) 15 Unit(s) SubCutaneous every morning  insulin lispro (ADMELOG) corrective regimen sliding scale   SubCutaneous three times a day before meals  insulin lispro (ADMELOG) corrective regimen sliding scale   SubCutaneous at bedtime  insulin lispro Injectable (ADMELOG) 8 Unit(s) SubCutaneous three times a day before meals  isosorbide   mononitrate ER Tablet (IMDUR) 30 milliGRAM(s) Oral daily  levothyroxine 125 MICROGram(s) Oral daily  metoprolol succinate ER 50 milliGRAM(s) Oral daily    MEDICATIONS  (PRN):  acetaminophen     Tablet .. 650 milliGRAM(s) Oral every 6 hours PRN Mild Pain (1 - 3)  ALBUTerol    0.083% 2.5 milliGRAM(s) Nebulizer every 6 hours PRN Shortness of Breath and/or Wheezing  dextrose Oral Gel 15 Gram(s) Oral once PRN Blood Glucose LESS THAN 70 milliGRAM(s)/deciliter  melatonin 3 milliGRAM(s) Oral at bedtime PRN Insomnia         Vitals log        ICU Vital Signs Last 24 Hrs  T(C): 36.8 (27 Jun 2022 20:20), Max: 36.9 (27 Jun 2022 13:08)  T(F): 98.3 (27 Jun 2022 20:20), Max: 98.5 (27 Jun 2022 13:08)  HR: 60 (28 Jun 2022 00:30) (60 - 65)  BP: 140/63 (27 Jun 2022 20:20) (117/69 - 140/63)  BP(mean): --  ABP: --  ABP(mean): --  RR: 18 (27 Jun 2022 20:20) (17 - 18)  SpO2: 95% (28 Jun 2022 00:30) (92% - 95%)           Input and Output:  I&O's Detail      Lab Data                        11.2   16.46 )-----------( 198      ( 27 Jun 2022 07:50 )             35.6     06-27    137  |  106  |  61<H>  ----------------------------<  189<H>  4.5   |  26  |  1.60<H>    Ca    8.6      27 Jun 2022 07:50              Review of Systems	      Objective     Physical Examination  heart s1s2  lung dec BS  abd soft        Pertinent Lab findings & Imaging      Schwarz:  NO   Adequate UO     I&O's Detail           Discussed with:     Cultures:	        Radiology

## 2022-06-28 NOTE — PROGRESS NOTE ADULT - ASSESSMENT
87 year old female with PMHx COPD, HTN, HLD, T2DM on insulin, hx of MI presents from assisted living facility with dyspnea, wheezing, labored breathing    copd  copd ex  TRENTON CKD  HTN  OP  OA  DM  HLD  Moderate to Severe AS  CAD    plan for [facility]   vs noted  labs reviewed      cvs rx regimen  BP control  serial renal indices  I and O  monitor VS and HD and Sat  HOB elev - asp prec - assist with needs - ADL - GOC discussion  pt has hMPV - resp viral infection - isolation precs - acap PRN - robitussin PRN -   cxr - labs reviewed  copd - rx regimen PRN for sob and or wheeze, NEBS, not a candidate for Inhaler use  proBNP noted  ABG noted  old records reviewed

## 2022-06-29 ENCOUNTER — TRANSCRIPTION ENCOUNTER (OUTPATIENT)
Age: 87
End: 2022-06-29

## 2022-06-29 LAB
ANION GAP SERPL CALC-SCNC: 9 MMOL/L — SIGNIFICANT CHANGE UP (ref 5–17)
BUN SERPL-MCNC: 52 MG/DL — HIGH (ref 7–23)
CALCIUM SERPL-MCNC: 9 MG/DL — SIGNIFICANT CHANGE UP (ref 8.5–10.1)
CHLORIDE SERPL-SCNC: 106 MMOL/L — SIGNIFICANT CHANGE UP (ref 96–108)
CO2 SERPL-SCNC: 22 MMOL/L — SIGNIFICANT CHANGE UP (ref 22–31)
CREAT SERPL-MCNC: 1.5 MG/DL — HIGH (ref 0.5–1.3)
EGFR: 34 ML/MIN/1.73M2 — LOW
GLUCOSE SERPL-MCNC: 149 MG/DL — HIGH (ref 70–99)
HCT VFR BLD CALC: 39.3 % — SIGNIFICANT CHANGE UP (ref 34.5–45)
HGB BLD-MCNC: 12.4 G/DL — SIGNIFICANT CHANGE UP (ref 11.5–15.5)
MCHC RBC-ENTMCNC: 28.1 PG — SIGNIFICANT CHANGE UP (ref 27–34)
MCHC RBC-ENTMCNC: 31.6 GM/DL — LOW (ref 32–36)
MCV RBC AUTO: 88.9 FL — SIGNIFICANT CHANGE UP (ref 80–100)
NRBC # BLD: 0 /100 WBCS — SIGNIFICANT CHANGE UP (ref 0–0)
PLATELET # BLD AUTO: 202 K/UL — SIGNIFICANT CHANGE UP (ref 150–400)
POTASSIUM SERPL-MCNC: 4.8 MMOL/L — SIGNIFICANT CHANGE UP (ref 3.5–5.3)
POTASSIUM SERPL-SCNC: 4.8 MMOL/L — SIGNIFICANT CHANGE UP (ref 3.5–5.3)
RBC # BLD: 4.42 M/UL — SIGNIFICANT CHANGE UP (ref 3.8–5.2)
RBC # FLD: 13.4 % — SIGNIFICANT CHANGE UP (ref 10.3–14.5)
SODIUM SERPL-SCNC: 137 MMOL/L — SIGNIFICANT CHANGE UP (ref 135–145)
WBC # BLD: 13.84 K/UL — HIGH (ref 3.8–10.5)
WBC # FLD AUTO: 13.84 K/UL — HIGH (ref 3.8–10.5)

## 2022-06-29 PROCEDURE — 99233 SBSQ HOSP IP/OBS HIGH 50: CPT

## 2022-06-29 RX ORDER — SIMETHICONE 80 MG/1
80 TABLET, CHEWABLE ORAL EVERY 8 HOURS
Refills: 0 | Status: DISCONTINUED | OUTPATIENT
Start: 2022-06-29 | End: 2022-06-30

## 2022-06-29 RX ADMIN — INSULIN GLARGINE 15 UNIT(S): 100 INJECTION, SOLUTION SUBCUTANEOUS at 07:50

## 2022-06-29 RX ADMIN — HEPARIN SODIUM 5000 UNIT(S): 5000 INJECTION INTRAVENOUS; SUBCUTANEOUS at 05:53

## 2022-06-29 RX ADMIN — GABAPENTIN 100 MILLIGRAM(S): 400 CAPSULE ORAL at 21:31

## 2022-06-29 RX ADMIN — ALBUTEROL 2.5 MILLIGRAM(S): 90 AEROSOL, METERED ORAL at 07:30

## 2022-06-29 RX ADMIN — Medication 1: at 11:55

## 2022-06-29 RX ADMIN — HEPARIN SODIUM 5000 UNIT(S): 5000 INJECTION INTRAVENOUS; SUBCUTANEOUS at 18:16

## 2022-06-29 RX ADMIN — Medication 2000 UNIT(S): at 11:57

## 2022-06-29 RX ADMIN — Medication 50 MILLIGRAM(S): at 05:52

## 2022-06-29 RX ADMIN — Medication 81 MILLIGRAM(S): at 11:56

## 2022-06-29 RX ADMIN — Medication 125 MICROGRAM(S): at 05:53

## 2022-06-29 RX ADMIN — ALBUTEROL 2.5 MILLIGRAM(S): 90 AEROSOL, METERED ORAL at 16:05

## 2022-06-29 RX ADMIN — Medication 3 MILLIGRAM(S): at 21:32

## 2022-06-29 RX ADMIN — Medication 8 UNIT(S): at 16:48

## 2022-06-29 RX ADMIN — GABAPENTIN 100 MILLIGRAM(S): 400 CAPSULE ORAL at 13:32

## 2022-06-29 RX ADMIN — Medication 1 DROP(S): at 18:16

## 2022-06-29 RX ADMIN — GABAPENTIN 100 MILLIGRAM(S): 400 CAPSULE ORAL at 05:52

## 2022-06-29 RX ADMIN — ATORVASTATIN CALCIUM 80 MILLIGRAM(S): 80 TABLET, FILM COATED ORAL at 21:32

## 2022-06-29 RX ADMIN — CLOPIDOGREL BISULFATE 75 MILLIGRAM(S): 75 TABLET, FILM COATED ORAL at 11:57

## 2022-06-29 RX ADMIN — ESCITALOPRAM OXALATE 10 MILLIGRAM(S): 10 TABLET, FILM COATED ORAL at 11:56

## 2022-06-29 RX ADMIN — Medication 1 DROP(S): at 05:53

## 2022-06-29 RX ADMIN — Medication 8 UNIT(S): at 11:55

## 2022-06-29 RX ADMIN — ALBUTEROL 2.5 MILLIGRAM(S): 90 AEROSOL, METERED ORAL at 21:19

## 2022-06-29 RX ADMIN — ISOSORBIDE MONONITRATE 30 MILLIGRAM(S): 60 TABLET, EXTENDED RELEASE ORAL at 11:56

## 2022-06-29 RX ADMIN — ALBUTEROL 2.5 MILLIGRAM(S): 90 AEROSOL, METERED ORAL at 00:32

## 2022-06-29 RX ADMIN — Medication 8 UNIT(S): at 07:49

## 2022-06-29 NOTE — PROGRESS NOTE ADULT - ASSESSMENT
TRENTON on CKD 3b  Respiratory Acidosis  HTN  Human Metapneumovirus      -BLCr 1.4  -Urine indices reviewed  -Avoid IVF given respiratory status  -Pulm eval reviewed  -CXR clear 6/15/22  -Renal indices fluctuates but are relatively stable at baseline range, cr 1.6. Will follow up repeat labs  -Can restart lasix as needed per pulmonary     DC planning per primary team    Thank you

## 2022-06-29 NOTE — PROGRESS NOTE ADULT - ASSESSMENT
87 year old female with PMHx COPD, HTN, HLD, T2DM on insulin, hx of MI presents from assisted living facility with dyspnea, wheezing, labored breathing    copd  copd ex  TRENTON CKD  HTN  OP  OA  DM  HLD  Moderate to Severe AS  CAD    plan for care home dc   vs noted  labs reviewed  remains on NEBS    cvs rx regimen  BP control  serial renal indices  I and O  monitor VS and HD and Sat  HOB elev - asp prec - assist with needs - ADL - GOC discussion  pt has hMPV - resp viral infection - isolation precs - acap PRN - robitussin PRN -   cxr - labs reviewed  copd - rx regimen PRN for sob and or wheeze, NEBS, not a candidate for Inhaler use  proBNP noted  ABG noted  old records reviewed

## 2022-06-29 NOTE — DISCHARGE NOTE NURSING/CASE MANAGEMENT/SOCIAL WORK - NSDCPEFALRISK_GEN_ALL_CORE
For information on Fall & Injury Prevention, visit: [email or web address] OR  [email or web address] OR  https://www.cdc.gov/steadi/patient.html

## 2022-06-29 NOTE — DISCHARGE NOTE NURSING/CASE MANAGEMENT/SOCIAL WORK - PATIENT PORTAL LINK FT
You can access the FollowMyHealth Patient Portal offered by [facility] by registering at the following website: [email or web address]. By joining [facility]’s FollowMyHealth portal, you will also be able to view your health information using other applications (apps) compatible with our system.

## 2022-06-29 NOTE — PROGRESS NOTE ADULT - ASSESSMENT
87 year old female with PMHx of COPD (not on O2), HTN, HLD, T2DM on insulin, hx of MI presents from assisted living facility with dyspnea, wheezing, labored breathing admitted for COPD exacerbation and acute hypoxic resp failure due to hMPV.     6/29/22 - stable resp status, off ABX, euvolemic off lasix -- pending auth for [facility], anticipate DC tomorrow  *could not reach HCP Calvin via phone, left      Problem/Plan - 1:  ·  Problem: Acute respiratory failure with hypoxia.   ·  Plan: - COPD exacerbation  due to hMPV - resp viral infection + on admission   - was on BiPAP, weaned off BiPAP. Then on 2L O2 NC. Now saturating in 90s on RA.    -Xanax PRN - duoneb prn  - steroids tapered off / completed on 06/19  - persistent leukocytosis so performed CT Chest which showed some groundglass opacity in LLL - discussed with ID (Estrella), started on Augmentin for possible aspiration PNA ; last night, pt's family decided they wish to stop antibiotics. Overall, family would like to de-escalate medical interventions as they feel the pt has been rapidly declining and would not have wanted to be living  in hospitalized setting the way she has been. Further, they feel the Abx have caused pt to have significant diarrhea causing the pt discomfort and believe bacterial PNA is unlikely. For those reasons, family requested I discontinue antibiotics, which was done.  - WBC count trending down  - pulm (Shalshin), recs appreciated  - ID Diallo), recs appreciated     Problem/Plan - 2:  ·  Problem: COPD exacerbation.   - Continue home medications - c/w nebs  - steroids tapered off / completed on 06/19  - Supplemental O2 PRN. COPD patient will keep SpO2 goal 89-93%.   - Encourage incentive spirometry.  - pulm (Shalshin), recs appreciated     Problem/Plan - 3:  ·  Problem: TRENTON on CKD 3  ·  Plan: CKD, Cr at baseline ranging from 1.3-1.7 on outpatient HIE review  hold lasix per renal     Problem/Plan - 4:  ·  Problem: DM2 (diabetes mellitus, type 2).   ·  Plan: - Chronic, known history of T2DM  - C/W Lantus 15 U qd and lispro 8U pre meals  - Moderate dose insulin corrective scale   - HgbA1c: 10%  - endocrine following appreciate recs.     Problem/Plan - 5:  ·  Problem: HTN (hypertension).   ·  Plan: bp stable  - change lasix to 20mg prn on discharge per renal  - Continue home Metoprolol Succinate 50 mg qd with hold parameters  -monitor bp.     Problem/Plan - 6:  ·  Problem: Anxiety.   ·  Plan: stable  continue Xanax prn , lexapro.     Problem/Plan - 7:  ·  Problem: Hypothyroidism, unspecified type.   ·  Plan: Chronic  - Continue home Synthroid 125 mcg PO qd.     Problem/Plan - 8:  ·  Problem: HLD (hyperlipidemia).   ·  Plan: Chronic  - Continue home Atorvastatin 80 mg PO qhs.     Problem/Plan - 9:  ·  Problem: CAD (coronary artery disease).   ·  Plan: - Hx of MI  - Continue home Aspirin 81 mg PO qd, Plavix 75 mg PO qd, imdur 30mg daily   - Echo 3/11/2022: Left ventricle was of normal size, mild left ventricular hypertrophy LV systolic function EF 65%. Moderate to severe aortic stenosis.     Problem/Plan - 10:  ·  Problem: Need for prophylactic measure.   ·  Plan; VTE ppx: Heparin 5000 subq q12h    ACP / Disp:  - DNR/DNI - [facility]OL[facility] filled  - family wished to pursue hospice as feel pt has had significant mental/physical decline; palliative care evaluated and believe pt not quite meeting hospice criteria  - on 6/24, pt's son decided to pursue [facility]/SNF at [facility] as opposed to pt returning to [facility] as she has had further physical decline.  - supposedly [facility] was considering taking pt back, but today they again stated pt cannot return to [facility], thus plan again shifted to [facility]  - no availability for [facility] for now -- d/c to [facility] when bed available.

## 2022-06-29 NOTE — PROGRESS NOTE ADULT - SUBJECTIVE AND OBJECTIVE BOX
Date/Time Patient Seen:  		  Referring MD:   Data Reviewed	       Patient is a 87y old  Female who presents with a chief complaint of COPD Exacerbation (28 Jun 2022 10:53)      Subjective/HPI     PAST MEDICAL & SURGICAL HISTORY:  Uncomplicated asthma, unspecified asthma severity    DM2 (diabetes mellitus, type 2)    Essential hypertension    Hypothyroidism, unspecified type    Pure hypercholesterolemia    Gastroesophageal reflux disease without esophagitis    Diabetes    Asthma    CAD (coronary artery disease)    HTN (hypertension)    HLD (hyperlipidemia)    Hypothyroid    NSTEMI (non-ST elevated myocardial infarction)    No significant past surgical history    History of appendectomy    History of appendectomy    Abnormal findings on cardiac catheterization  Cardiac Cath          Medication list         MEDICATIONS  (STANDING):  ALBUTerol    0.083% 2.5 milliGRAM(s) Nebulizer every 8 hours  artificial tears (preservative free) Ophthalmic Solution 1 Drop(s) Both EYES two times a day  aspirin  chewable 81 milliGRAM(s) Oral daily  atorvastatin 80 milliGRAM(s) Oral at bedtime  cholecalciferol 2000 Unit(s) Oral daily  clopidogrel Tablet 75 milliGRAM(s) Oral daily  dextrose 5%. 1000 milliLiter(s) (100 mL/Hr) IV Continuous <Continuous>  dextrose 5%. 1000 milliLiter(s) (50 mL/Hr) IV Continuous <Continuous>  dextrose 50% Injectable 25 Gram(s) IV Push once  dextrose 50% Injectable 12.5 Gram(s) IV Push once  dextrose 50% Injectable 25 Gram(s) IV Push once  dextrose 50% Injectable 25 Gram(s) IV Push once  escitalopram 10 milliGRAM(s) Oral daily  gabapentin 100 milliGRAM(s) Oral three times a day  glucagon  Injectable 1 milliGRAM(s) IntraMuscular once  glucagon  Injectable 1 milliGRAM(s) IntraMuscular once  heparin   Injectable 5000 Unit(s) SubCutaneous every 12 hours  insulin glargine Injectable (LANTUS) 15 Unit(s) SubCutaneous every morning  insulin lispro (ADMELOG) corrective regimen sliding scale   SubCutaneous three times a day before meals  insulin lispro (ADMELOG) corrective regimen sliding scale   SubCutaneous at bedtime  insulin lispro Injectable (ADMELOG) 8 Unit(s) SubCutaneous three times a day before meals  isosorbide   mononitrate ER Tablet (IMDUR) 30 milliGRAM(s) Oral daily  levothyroxine 125 MICROGram(s) Oral daily  metoprolol succinate ER 50 milliGRAM(s) Oral daily    MEDICATIONS  (PRN):  acetaminophen     Tablet .. 650 milliGRAM(s) Oral every 6 hours PRN Mild Pain (1 - 3)  ALBUTerol    0.083% 2.5 milliGRAM(s) Nebulizer every 6 hours PRN Shortness of Breath and/or Wheezing  dextrose Oral Gel 15 Gram(s) Oral once PRN Blood Glucose LESS THAN 70 milliGRAM(s)/deciliter  melatonin 3 milliGRAM(s) Oral at bedtime PRN Insomnia         Vitals log        ICU Vital Signs Last 24 Hrs  T(C): 36.4 (29 Jun 2022 05:02), Max: 37.1 (28 Jun 2022 20:11)  T(F): 97.5 (29 Jun 2022 05:02), Max: 98.7 (28 Jun 2022 20:11)  HR: 64 (29 Jun 2022 05:02) (59 - 66)  BP: 154/73 (29 Jun 2022 05:02) (146/71 - 155/80)  BP(mean): --  ABP: --  ABP(mean): --  RR: 18 (29 Jun 2022 05:02) (18 - 18)  SpO2: 93% (29 Jun 2022 05:02) (92% - 96%)           Input and Output:  I&O's Detail      Lab Data                        11.9   13.42 )-----------( 192      ( 28 Jun 2022 07:40 )             38.0     06-28    138  |  107  |  55<H>  ----------------------------<  143<H>  4.7   |  25  |  1.60<H>    Ca    8.5      28 Jun 2022 07:40              Review of Systems	      Objective     Physical Examination    heart s1s2  lung dec BS  abd soft      Pertinent Lab findings & Imaging      Schwarz:  NO   Adequate UO     I&O's Detail           Discussed with:     Cultures:	        Radiology

## 2022-06-29 NOTE — CHART NOTE - NSCHARTNOTEFT_GEN_A_CORE
Assessment:   Pt seen for nutrition follow up.  Chart reviewed, hospital course noted.     Brief History: 87 year old female with PMHx of COPD (not on O2), HTN, HLD, T2DM on insulin, hx of MI presents from assisted living facility with dyspnea, wheezing, labored breathing admitted for COPD exacerbation and acute hypoxic resp failure due to hMPV.     Pt seen at bedside, sound asleep, not disturbed.  Fecal incontinence noted 6/28- no bowel regimen ordered.     Factors impacting intake: [ ] none [ ] nausea  [ ] vomiting [ ] diarrhea [ ] constipation  [ ]chewing problems [x] swallowing issues  [ ] other:     Diet Prescription: Diet, Consistent Carbohydrate w/Evening Snack:   DASH/TLC {Sodium & Cholesterol Restricted}  Pureed (PUREED)  Supplement Feeding Modality:  Oral  Glucerna Shake Cans or Servings Per Day:  1       Frequency:  Daily (06-23-22 @ 09:56)    Intake: 25-50% per flow sheets    Current Weight: 6/15 204.1#, 6/22 195.7#; ?8# wt loss x 1 week      Pertinent Medications: MEDICATIONS  (STANDING):  ALBUTerol    0.083% 2.5 milliGRAM(s) Nebulizer every 8 hours  artificial tears (preservative free) Ophthalmic Solution 1 Drop(s) Both EYES two times a day  aspirin  chewable 81 milliGRAM(s) Oral daily  atorvastatin 80 milliGRAM(s) Oral at bedtime  cholecalciferol 2000 Unit(s) Oral daily  clopidogrel Tablet 75 milliGRAM(s) Oral daily  dextrose 5%. 1000 milliLiter(s) (100 mL/Hr) IV Continuous <Continuous>  dextrose 5%. 1000 milliLiter(s) (50 mL/Hr) IV Continuous <Continuous>  dextrose 50% Injectable 25 Gram(s) IV Push once  dextrose 50% Injectable 12.5 Gram(s) IV Push once  dextrose 50% Injectable 25 Gram(s) IV Push once  dextrose 50% Injectable 25 Gram(s) IV Push once  escitalopram 10 milliGRAM(s) Oral daily  gabapentin 100 milliGRAM(s) Oral three times a day  glucagon  Injectable 1 milliGRAM(s) IntraMuscular once  glucagon  Injectable 1 milliGRAM(s) IntraMuscular once  heparin   Injectable 5000 Unit(s) SubCutaneous every 12 hours  insulin glargine Injectable (LANTUS) 15 Unit(s) SubCutaneous every morning  insulin lispro (ADMELOG) corrective regimen sliding scale   SubCutaneous three times a day before meals  insulin lispro (ADMELOG) corrective regimen sliding scale   SubCutaneous at bedtime  insulin lispro Injectable (ADMELOG) 8 Unit(s) SubCutaneous three times a day before meals  isosorbide   mononitrate ER Tablet (IMDUR) 30 milliGRAM(s) Oral daily  levothyroxine 125 MICROGram(s) Oral daily  metoprolol succinate ER 50 milliGRAM(s) Oral daily    MEDICATIONS  (PRN):  acetaminophen     Tablet .. 650 milliGRAM(s) Oral every 6 hours PRN Mild Pain (1 - 3)  ALBUTerol    0.083% 2.5 milliGRAM(s) Nebulizer every 6 hours PRN Shortness of Breath and/or Wheezing  dextrose Oral Gel 15 Gram(s) Oral once PRN Blood Glucose LESS THAN 70 milliGRAM(s)/deciliter  melatonin 3 milliGRAM(s) Oral at bedtime PRN Insomnia    Pertinent Labs: 06-29 Na137 mmol/L Glu 149 mg/dL<H> K+ 4.8 mmol/L Cr  1.50 mg/dL<H> BUN 52 mg/dL<H> 06-25 Alb 2.4 g/dL<L>     CAPILLARY BLOOD GLUCOSE      POCT Blood Glucose.: 147 mg/dL (29 Jun 2022 07:39)  POCT Blood Glucose.: 102 mg/dL (28 Jun 2022 21:52)  POCT Blood Glucose.: 191 mg/dL (28 Jun 2022 17:05)  POCT Blood Glucose.: 186 mg/dL (28 Jun 2022 11:41)      Skin: R/L heels I  Edema: none noted    Estimated Needs:   [x] no change since previous assessment on: 6/17 based on #  kcal/day:  3919-0341  gms protein/day: 65-76    Previous Nutrition Diagnosis:   [x] Altered Nutrition Related Labs (DM, [place])    Nutrition Diagnosis is [x] ongoing  [ ] resolved [ ] not applicable     New Nutrition Diagnosis: [x] Swallowing Difficulty related to motor causes evidenced by SLP eval/MBS results 6/23      Interventions:   Recommend  [x] Continue current diet  [ ] Change Diet To:  [ ] Nutrition Supplement  [ ] Nutrition Support  [x] Other: maintain aspiration precautions, encourage intake.  Recommend daily MVI.    Monitoring and Evaluation:   [x] PO intake [ x ] Tolerance to diet prescription [ x ] weights [ x ] labs [ x ] follow up per protocol  [x] other: s/s GI distress, bowel function, skin integrity/ edema

## 2022-06-29 NOTE — PROGRESS NOTE ADULT - SUBJECTIVE AND OBJECTIVE BOX
Interval History:    CENTRAL LINE:   [  ] YES       [  ] NO  PACE:                 [  ] YES       [  ] NO         REVIEW OF SYSTEMS:  All Systems below were reviewed and are negative [  ]  HEENT:  ID:  Pulmonary:  Cardiac:  GI:  Renal:  Musculoskeletal:  All other systems above were reviewed and are negative   [  ]      MEDICATIONS  (STANDING):  ALBUTerol    0.083% 2.5 milliGRAM(s) Nebulizer every 8 hours  artificial tears (preservative free) Ophthalmic Solution 1 Drop(s) Both EYES two times a day  aspirin  chewable 81 milliGRAM(s) Oral daily  atorvastatin 80 milliGRAM(s) Oral at bedtime  cholecalciferol 2000 Unit(s) Oral daily  clopidogrel Tablet 75 milliGRAM(s) Oral daily  dextrose 5%. 1000 milliLiter(s) (50 mL/Hr) IV Continuous <Continuous>  dextrose 5%. 1000 milliLiter(s) (100 mL/Hr) IV Continuous <Continuous>  dextrose 50% Injectable 25 Gram(s) IV Push once  dextrose 50% Injectable 12.5 Gram(s) IV Push once  dextrose 50% Injectable 25 Gram(s) IV Push once  dextrose 50% Injectable 25 Gram(s) IV Push once  escitalopram 10 milliGRAM(s) Oral daily  gabapentin 100 milliGRAM(s) Oral three times a day  glucagon  Injectable 1 milliGRAM(s) IntraMuscular once  glucagon  Injectable 1 milliGRAM(s) IntraMuscular once  heparin   Injectable 5000 Unit(s) SubCutaneous every 12 hours  insulin glargine Injectable (LANTUS) 15 Unit(s) SubCutaneous every morning  insulin lispro (ADMELOG) corrective regimen sliding scale   SubCutaneous three times a day before meals  insulin lispro (ADMELOG) corrective regimen sliding scale   SubCutaneous at bedtime  insulin lispro Injectable (ADMELOG) 8 Unit(s) SubCutaneous three times a day before meals  isosorbide   mononitrate ER Tablet (IMDUR) 30 milliGRAM(s) Oral daily  levothyroxine 125 MICROGram(s) Oral daily  metoprolol succinate ER 50 milliGRAM(s) Oral daily    MEDICATIONS  (PRN):  acetaminophen     Tablet .. 650 milliGRAM(s) Oral every 6 hours PRN Mild Pain (1 - 3)  ALBUTerol    0.083% 2.5 milliGRAM(s) Nebulizer every 6 hours PRN Shortness of Breath and/or Wheezing  dextrose Oral Gel 15 Gram(s) Oral once PRN Blood Glucose LESS THAN 70 milliGRAM(s)/deciliter  melatonin 3 milliGRAM(s) Oral at bedtime PRN Insomnia  simethicone 80 milliGRAM(s) Chew every 8 hours PRN Gas      Vital Signs Last 24 Hrs  T(C): 36.8 (29 Jun 2022 19:59), Max: 37.1 (29 Jun 2022 13:45)  T(F): 98.2 (29 Jun 2022 19:59), Max: 98.8 (29 Jun 2022 13:45)  HR: 66 (29 Jun 2022 19:59) (60 - 81)  BP: 127/66 (29 Jun 2022 19:59) (106/71 - 154/73)  BP(mean): --  RR: 18 (29 Jun 2022 19:59) (17 - 18)  SpO2: 92% (29 Jun 2022 19:59) (92% - 95%)    I&O's Summary      PHYSICAL EXAM:  HEENT: NC/AT; PERRLA  Neck: Soft; no tenderness  Lungs: CTA bilaterally; no wheezing.   Heart:  Abdomen:  Genital/ Rectal:  Extremities:  Neurologic:  Vascular:      LABORATORY:    CBC Full  -  ( 29 Jun 2022 08:00 )  WBC Count : 13.84 K/uL  RBC Count : 4.42 M/uL  Hemoglobin : 12.4 g/dL  Hematocrit : 39.3 %  Platelet Count - Automated : 202 K/uL  Mean Cell Volume : 88.9 fl  Mean Cell Hemoglobin : 28.1 pg  Mean Cell Hemoglobin Concentration : 31.6 gm/dL  Auto Neutrophil # : x  Auto Lymphocyte # : x  Auto Monocyte # : x  Auto Eosinophil # : x  Auto Basophil # : x  Auto Neutrophil % : x  Auto Lymphocyte % : x  Auto Monocyte % : x  Auto Eosinophil % : x  Auto Basophil % : x      ESR:                   06-24 @ 07:10  --    C-Reactive Protein:     06-24 @ 07:10  --    Procalcitonin:           06-24 @ 07:10   0.11  ESR:                   06-22 @ 08:41  --    C-Reactive Protein:     06-22 @ 08:41  --    Procalcitonin:           06-22 @ 08:41   0.11  ESR:                   06-13 @ 15:38  --    C-Reactive Protein:     06-13 @ 15:38  21    Procalcitonin:           06-13 @ 15:38   --      06-29    137  |  106  |  52<H>  ----------------------------<  149<H>  4.8   |  22  |  1.50<H>    Ca    9.0      29 Jun 2022 08:00        Rapid Respiratory Viral Panel Result        06-13 @ 07:46  Rapid RVP Detected  Coronovirus --  Adenovirus --  Bordetella Pertussis --  Chlamydia Pneumonia --  Entero/Rhinovirus--  HKU1 Coronovirus --  HMPV Coronovirus Detected  Influenza A --  Influenza AH1 --  Influenza AH1 2009 --  Influenza AH3 --  Influenza B --  Mycoplasma Pneumoniae --  NL63 Coronovirus --  OC43 Coronovirus --  Parainfluenza 1 --  Parainfluenza 2 --  Parainfluenza 3 --  Parainfluenza 4 --  Resp Syncytial Virus --      Assessment and Plan:          Lazaro De La Rosa MD   [phone].  No fevers      MEDICATIONS  (STANDING):  ALBUTerol    0.083% 2.5 milliGRAM(s) Nebulizer every 8 hours  artificial tears (preservative free) Ophthalmic Solution 1 Drop(s) Both EYES two times a day  aspirin  chewable 81 milliGRAM(s) Oral daily  atorvastatin 80 milliGRAM(s) Oral at bedtime  cholecalciferol 2000 Unit(s) Oral daily  clopidogrel Tablet 75 milliGRAM(s) Oral daily  dextrose 5%. 1000 milliLiter(s) (50 mL/Hr) IV Continuous <Continuous>  dextrose 5%. 1000 milliLiter(s) (100 mL/Hr) IV Continuous <Continuous>  dextrose 50% Injectable 25 Gram(s) IV Push once  dextrose 50% Injectable 12.5 Gram(s) IV Push once  dextrose 50% Injectable 25 Gram(s) IV Push once  dextrose 50% Injectable 25 Gram(s) IV Push once  escitalopram 10 milliGRAM(s) Oral daily  gabapentin 100 milliGRAM(s) Oral three times a day  glucagon  Injectable 1 milliGRAM(s) IntraMuscular once  glucagon  Injectable 1 milliGRAM(s) IntraMuscular once  heparin   Injectable 5000 Unit(s) SubCutaneous every 12 hours  insulin glargine Injectable (LANTUS) 15 Unit(s) SubCutaneous every morning  insulin lispro (ADMELOG) corrective regimen sliding scale   SubCutaneous three times a day before meals  insulin lispro (ADMELOG) corrective regimen sliding scale   SubCutaneous at bedtime  insulin lispro Injectable (ADMELOG) 8 Unit(s) SubCutaneous three times a day before meals  isosorbide   mononitrate ER Tablet (IMDUR) 30 milliGRAM(s) Oral daily  levothyroxine 125 MICROGram(s) Oral daily  metoprolol succinate ER 50 milliGRAM(s) Oral daily    MEDICATIONS  (PRN):  acetaminophen     Tablet .. 650 milliGRAM(s) Oral every 6 hours PRN Mild Pain (1 - 3)  ALBUTerol    0.083% 2.5 milliGRAM(s) Nebulizer every 6 hours PRN Shortness of Breath and/or Wheezing  dextrose Oral Gel 15 Gram(s) Oral once PRN Blood Glucose LESS THAN 70 milliGRAM(s)/deciliter  melatonin 3 milliGRAM(s) Oral at bedtime PRN Insomnia  simethicone 80 milliGRAM(s) Chew every 8 hours PRN Gas      Vital Signs Last 24 Hrs  T(C): 36.8 (29 Jun 2022 19:59), Max: 37.1 (29 Jun 2022 13:45)  T(F): 98.2 (29 Jun 2022 19:59), Max: 98.8 (29 Jun 2022 13:45)  HR: 66 (29 Jun 2022 19:59) (60 - 81)  BP: 127/66 (29 Jun 2022 19:59) (106/71 - 154/73)  BP(mean): --  RR: 18 (29 Jun 2022 19:59) (17 - 18)  SpO2: 92% (29 Jun 2022 19:59) (92% - 95%)    I&O's Summary      PHYSICAL EXAM:  HEENT: NC/AT; PERRLA  Neck: Soft; no tenderness  Lungs: CTA bilaterally; no wheezing.   Heart:  Abdomen:  Genital/ Rectal:  Extremities:  Neurologic:  Vascular:      LABORATORY:    CBC Full  -  ( 29 Jun 2022 08:00 )  WBC Count : 13.84 K/uL  RBC Count : 4.42 M/uL  Hemoglobin : 12.4 g/dL  Hematocrit : 39.3 %  Platelet Count - Automated : 202 K/uL  Mean Cell Volume : 88.9 fl  Mean Cell Hemoglobin : 28.1 pg  Mean Cell Hemoglobin Concentration : 31.6 gm/dL  Auto Neutrophil # : x  Auto Lymphocyte # : x  Auto Monocyte # : x  Auto Eosinophil # : x  Auto Basophil # : x  Auto Neutrophil % : x  Auto Lymphocyte % : x  Auto Monocyte % : x  Auto Eosinophil % : x  Auto Basophil % : x      ESR:                   06-24 @ 07:10  --    C-Reactive Protein:     06-24 @ 07:10  --    Procalcitonin:           06-24 @ 07:10   0.11  ESR:                   06-22 @ 08:41  --    C-Reactive Protein:     06-22 @ 08:41  --    Procalcitonin:           06-22 @ 08:41   0.11  ESR:                   06-13 @ 15:38  --    C-Reactive Protein:     06-13 @ 15:38  21    Procalcitonin:           06-13 @ 15:38   --      06-29    137  |  106  |  52<H>  ----------------------------<  149<H>  4.8   |  22  |  1.50<H>    Ca    9.0      29 Jun 2022 08:00          Assessment and Plan:    1. COPD exacerbation due to viral infection with human meta pneumovirus.   2. Leukocytosis.  3. Dysphagia.    . Family declined oral Augmentin.  . Leukocytosis is improving. WBC is now 16K today.   . Monitor off antibiotics.  . Discharge planning.        Lazaro De La Rosa MD   [phone].  No fevers  Comfortable     MEDICATIONS  (STANDING):  ALBUTerol    0.083% 2.5 milliGRAM(s) Nebulizer every 8 hours  artificial tears (preservative free) Ophthalmic Solution 1 Drop(s) Both EYES two times a day  aspirin  chewable 81 milliGRAM(s) Oral daily  atorvastatin 80 milliGRAM(s) Oral at bedtime  cholecalciferol 2000 Unit(s) Oral daily  clopidogrel Tablet 75 milliGRAM(s) Oral daily  dextrose 5%. 1000 milliLiter(s) (50 mL/Hr) IV Continuous <Continuous>  dextrose 5%. 1000 milliLiter(s) (100 mL/Hr) IV Continuous <Continuous>  dextrose 50% Injectable 25 Gram(s) IV Push once  dextrose 50% Injectable 12.5 Gram(s) IV Push once  dextrose 50% Injectable 25 Gram(s) IV Push once  dextrose 50% Injectable 25 Gram(s) IV Push once  escitalopram 10 milliGRAM(s) Oral daily  gabapentin 100 milliGRAM(s) Oral three times a day  glucagon  Injectable 1 milliGRAM(s) IntraMuscular once  glucagon  Injectable 1 milliGRAM(s) IntraMuscular once  heparin   Injectable 5000 Unit(s) SubCutaneous every 12 hours  insulin glargine Injectable (LANTUS) 15 Unit(s) SubCutaneous every morning  insulin lispro (ADMELOG) corrective regimen sliding scale   SubCutaneous three times a day before meals  insulin lispro (ADMELOG) corrective regimen sliding scale   SubCutaneous at bedtime  insulin lispro Injectable (ADMELOG) 8 Unit(s) SubCutaneous three times a day before meals  isosorbide   mononitrate ER Tablet (IMDUR) 30 milliGRAM(s) Oral daily  levothyroxine 125 MICROGram(s) Oral daily  metoprolol succinate ER 50 milliGRAM(s) Oral daily    MEDICATIONS  (PRN):  acetaminophen     Tablet .. 650 milliGRAM(s) Oral every 6 hours PRN Mild Pain (1 - 3)  ALBUTerol    0.083% 2.5 milliGRAM(s) Nebulizer every 6 hours PRN Shortness of Breath and/or Wheezing  dextrose Oral Gel 15 Gram(s) Oral once PRN Blood Glucose LESS THAN 70 milliGRAM(s)/deciliter  melatonin 3 milliGRAM(s) Oral at bedtime PRN Insomnia  simethicone 80 milliGRAM(s) Chew every 8 hours PRN Gas      Vital Signs Last 24 Hrs  T(C): 36.8 (29 Jun 2022 19:59), Max: 37.1 (29 Jun 2022 13:45)  T(F): 98.2 (29 Jun 2022 19:59), Max: 98.8 (29 Jun 2022 13:45)  HR: 66 (29 Jun 2022 19:59) (60 - 81)  BP: 127/66 (29 Jun 2022 19:59) (106/71 - 154/73)  BP(mean): --  RR: 18 (29 Jun 2022 19:59) (17 - 18)  SpO2: 92% (29 Jun 2022 19:59) (92% - 95%)    I&O's Summary      PHYSICAL EXAM:  HEENT: NC/AT; PERRLA  Neck: Soft; no tenderness  Lungs: Coarse BS bilaterally; no wheezing.   Heart: RRR, no murmurs.   Abdomen: Soft, no tenderness.   Genital/ Rectal: No torres.  Extremities: No ulcers.   Neurologic: Confused.     LABORATORY:    CBC Full  -  ( 29 Jun 2022 08:00 )  WBC Count : 13.84 K/uL  RBC Count : 4.42 M/uL  Hemoglobin : 12.4 g/dL  Hematocrit : 39.3 %  Platelet Count - Automated : 202 K/uL  Mean Cell Volume : 88.9 fl  Mean Cell Hemoglobin : 28.1 pg  Mean Cell Hemoglobin Concentration : 31.6 gm/dL  Auto Neutrophil # : x  Auto Lymphocyte # : x  Auto Monocyte # : x  Auto Eosinophil # : x  Auto Basophil # : x  Auto Neutrophil % : x  Auto Lymphocyte % : x  Auto Monocyte % : x  Auto Eosinophil % : x  Auto Basophil % : x      ESR:                   06-24 @ 07:10  --    C-Reactive Protein:     06-24 @ 07:10  --    Procalcitonin:           06-24 @ 07:10   0.11  ESR:                   06-22 @ 08:41  --    C-Reactive Protein:     06-22 @ 08:41  --    Procalcitonin:           06-22 @ 08:41   0.11  ESR:                   06-13 @ 15:38  --    C-Reactive Protein:     06-13 @ 15:38  21    Procalcitonin:           06-13 @ 15:38   --      06-29    137  |  106  |  52<H>  ----------------------------<  149<H>  4.8   |  22  |  1.50<H>    Ca    9.0      29 Jun 2022 08:00          Assessment and Plan:    1. COPD exacerbation due to viral infection with human meta pneumovirus.   2. Leukocytosis.  3. Dysphagia.    . Family declined oral Augmentin.  . Leukocytosis is improving. WBC is down to 13K.   . Monitor off antibiotics.  . Discharge planning.        Lazaro De La Rosa MD   [phone].

## 2022-06-29 NOTE — DISCHARGE NOTE NURSING/CASE MANAGEMENT/SOCIAL WORK - DATE OF LAST VACCINATION
01-Feb-2021 [FreeTextEntry1] : DEBORAH is a 2 mo old girl who presents as follow up due to multiple ventricular septal defects, patent foramen ovale. \par She has been feeding without difficulty. Weight today is 4.57 kg, up from 1930 g. She feeds formula and breast milk and takes 2 oz every 2 hours. There has been no tachypnea, increased work of breathing, cyanosis or syncope.\par \par She was initially referred for cardiac consultation due to fetal diagnosis of ventricular septal defect. \par She was born at 36 weeks to a 33 year old  mother via an urgent repeat C/S. Pregnancy course complicated by gestational diabetes and preeclampsia requiring magnesium. \par \par Delivery was uncomplicated. APGAR 9 & 9 at 1 & 5 minutes respectively. Birth weight 2350 g. \par Low temperature noted after delivery requiring rewarming under radiant warmer with improvement in temperature; subsequent temperatures normalized. \par She has been seen by my colleague Dr Michelle as a fetal and was diagnosed with VSD. \par \par There have been no known COVID infections or exposures recently or in the past.\par \par No relevant family cardiac history.  Specifically: no congenital heart disease, no pediatric arrhythmia, no pacemaker placement, no cardiomyopathy, no heart transplant, no sudden unexplained death, no SIDS death, no congenital deafness.\par \par She lives at home with her parents and 13 year old brother.\par

## 2022-06-29 NOTE — PROGRESS NOTE ADULT - SUBJECTIVE AND OBJECTIVE BOX
Patient is a 87y old  Female who presents with a chief complaint of COPD Exacerbation (16 Jun 2022 11:01)       HPI:  87 year old female with PMHx COPD, HTN, HLD, T2DM on insulin, hx of MI presents from assisted living facility with dyspnea, wheezing, labored breathing. Patient recently admitted to [facility] 3/9-15/2022 for COPD exacerbation. History obtained from son Calvin, as patient is limited historian at baseline. Son states he received a call from [facility] this AM stating patient is having labored breathing. Patient was sent to [facility] for further management.   She states her breathing is improved.  Denies any N/V/Dizziness.  Has not seen nephrologist in the past.  No urinary issues. Denies NSAID usage.    No acute events noted     No acute complaints this morning    PAST MEDICAL & SURGICAL HISTORY:  Uncomplicated asthma, unspecified asthma severity      DM2 (diabetes mellitus, type 2)      Essential hypertension      Hypothyroidism, unspecified type      Pure hypercholesterolemia      Gastroesophageal reflux disease without esophagitis      Diabetes      Asthma      CAD (coronary artery disease)      HTN (hypertension)      HLD (hyperlipidemia)      Hypothyroid      NSTEMI (non-ST elevated myocardial infarction)      History of appendectomy      History of appendectomy      Abnormal findings on cardiac catheterization  Cardiac Cath           FAMILY HISTORY:  Family history of heart disease    Family history of cancer in mother    Family history of MI (myocardial infarction)    Family history of stomach cancer    NC    Social History:Non smoker    MEDICATIONS  (STANDING):  albuterol/ipratropium for Nebulization 3 milliLiter(s) Nebulizer every 6 hours  artificial tears (preservative free) Ophthalmic Solution 1 Drop(s) Both EYES two times a day  aspirin  chewable 81 milliGRAM(s) Oral daily  atorvastatin 80 milliGRAM(s) Oral at bedtime  cholecalciferol 2000 Unit(s) Oral daily  clopidogrel Tablet 75 milliGRAM(s) Oral daily  dextrose 5%. 1000 milliLiter(s) (50 mL/Hr) IV Continuous <Continuous>  dextrose 5%. 1000 milliLiter(s) (100 mL/Hr) IV Continuous <Continuous>  dextrose 50% Injectable 25 Gram(s) IV Push once  dextrose 50% Injectable 12.5 Gram(s) IV Push once  dextrose 50% Injectable 25 Gram(s) IV Push once  escitalopram 10 milliGRAM(s) Oral daily  furosemide   Injectable 40 milliGRAM(s) IV Push <User Schedule>  gabapentin 100 milliGRAM(s) Oral three times a day  glucagon  Injectable 1 milliGRAM(s) IntraMuscular once  heparin   Injectable 5000 Unit(s) SubCutaneous every 12 hours  insulin glargine Injectable (LANTUS) 15 Unit(s) SubCutaneous every morning  insulin lispro (ADMELOG) corrective regimen sliding scale   SubCutaneous every 6 hours  insulin lispro Injectable (ADMELOG) 8 Unit(s) SubCutaneous three times a day before meals  isosorbide   mononitrate ER Tablet (IMDUR) 30 milliGRAM(s) Oral daily  levothyroxine 125 MICROGram(s) Oral daily  methylPREDNISolone sodium succinate Injectable 40 milliGRAM(s) IV Push daily  metoprolol succinate ER 50 milliGRAM(s) Oral daily    MEDICATIONS  (PRN):  acetaminophen     Tablet .. 650 milliGRAM(s) Oral every 6 hours PRN Mild Pain (1 - 3)  ALBUTerol    0.083% 2.5 milliGRAM(s) Nebulizer every 4 hours PRN Shortness of Breath and/or Wheezing  ALPRAZolam 0.5 milliGRAM(s) Oral two times a day PRN Anxiety  dextrose Oral Gel 15 Gram(s) Oral once PRN Blood Glucose LESS THAN 70 milliGRAM(s)/deciliter  melatonin 3 milliGRAM(s) Oral at bedtime PRN Insomnia   Meds reviewed    Allergies    doxycycline (Unknown)  iodine (Hives)  iodine containing compounds (Unknown)  shellfish (Anaphylaxis)  shellfish (Swelling; Short breath)    Intolerances         REVIEW OF SYSTEMS:   Constitutional: Denies fatigue  HEENT: Denies headaches and dizziness  Respiratory: denies  cough, or wheezing, improving SOB  Cardiovascular: denies CP, palpitations  Gastrointestinal: Denies nausea, denies vomiting, diarrhea, constipation, abdominal pain, or bloody stools  Genitourinary: denies painful urination, increased frequency, urgency, or bloody urine  Skin: denies rashes or itching  Musculoskeletal: denies muscle aches, joint swelling  Neurologic: Denies generalized weakness, denies loss of sensation, numbness, or tingling    Vital Signs Last 24 Hrs  T(C): 36.4 (29 Jun 2022 05:02), Max: 37.1 (28 Jun 2022 20:11)  T(F): 97.5 (29 Jun 2022 05:02), Max: 98.7 (28 Jun 2022 20:11)  HR: 60 (29 Jun 2022 08:15) (59 - 64)  BP: 154/73 (29 Jun 2022 05:02) (146/71 - 155/80)  BP(mean): --  RR: 18 (29 Jun 2022 05:02) (18 - 18)  SpO2: 94% (29 Jun 2022 08:15) (92% - 96%)    PHYSICAL EXAM:    GENERAL: NAD  HEAD:  Atraumatic, Normocephalic  EYES: EOMI, conjunctiva and sclera clear  ENMT: No Drainage from nares, No drainage from ears  NECK: Supple, neck  veins full  NERVOUS SYSTEM:  Awake and Alert  CHEST/LUNG: Clear to percussion bilaterally; No rales, rhonchi, wheezing, or rubs  HEART: Regular rate and rhythm; No murmurs, rubs, or gallops  ABDOMEN: Soft, Nontender, Nondistended; Bowel sounds present  EXTREMITIES:  No Edema  SKIN: No rashes No obvious ecchymosis      LABS:    6/29/22: pending                        11.9   13.42 )-----------( 192      ( 28 Jun 2022 07:40 )             38.0     06-28    138  |  107  |  55<H>  ----------------------------<  143<H>  4.7   |  25  |  1.60<H>    Ca    8.5      28 Jun 2022 07:40

## 2022-06-30 PROCEDURE — 99232 SBSQ HOSP IP/OBS MODERATE 35: CPT

## 2022-06-30 RX ADMIN — Medication 1 DROP(S): at 17:07

## 2022-06-30 RX ADMIN — ESCITALOPRAM OXALATE 10 MILLIGRAM(S): 10 TABLET, FILM COATED ORAL at 11:49

## 2022-06-30 RX ADMIN — ALBUTEROL 2.5 MILLIGRAM(S): 90 AEROSOL, METERED ORAL at 22:16

## 2022-06-30 RX ADMIN — ALBUTEROL 2.5 MILLIGRAM(S): 90 AEROSOL, METERED ORAL at 14:43

## 2022-06-30 RX ADMIN — GABAPENTIN 100 MILLIGRAM(S): 400 CAPSULE ORAL at 21:47

## 2022-06-30 RX ADMIN — Medication 50 MILLIGRAM(S): at 05:15

## 2022-06-30 RX ADMIN — Medication 8 UNIT(S): at 11:50

## 2022-06-30 RX ADMIN — Medication 8 UNIT(S): at 17:06

## 2022-06-30 RX ADMIN — HEPARIN SODIUM 5000 UNIT(S): 5000 INJECTION INTRAVENOUS; SUBCUTANEOUS at 05:14

## 2022-06-30 RX ADMIN — Medication 125 MICROGRAM(S): at 05:15

## 2022-06-30 RX ADMIN — ISOSORBIDE MONONITRATE 30 MILLIGRAM(S): 60 TABLET, EXTENDED RELEASE ORAL at 11:49

## 2022-06-30 RX ADMIN — GABAPENTIN 100 MILLIGRAM(S): 400 CAPSULE ORAL at 05:14

## 2022-06-30 RX ADMIN — GABAPENTIN 100 MILLIGRAM(S): 400 CAPSULE ORAL at 13:07

## 2022-06-30 RX ADMIN — ATORVASTATIN CALCIUM 80 MILLIGRAM(S): 80 TABLET, FILM COATED ORAL at 21:47

## 2022-06-30 RX ADMIN — CLOPIDOGREL BISULFATE 75 MILLIGRAM(S): 75 TABLET, FILM COATED ORAL at 11:50

## 2022-06-30 RX ADMIN — ALBUTEROL 2.5 MILLIGRAM(S): 90 AEROSOL, METERED ORAL at 07:32

## 2022-06-30 RX ADMIN — INSULIN GLARGINE 15 UNIT(S): 100 INJECTION, SOLUTION SUBCUTANEOUS at 07:37

## 2022-06-30 RX ADMIN — ALBUTEROL 2.5 MILLIGRAM(S): 90 AEROSOL, METERED ORAL at 00:44

## 2022-06-30 RX ADMIN — Medication 8 UNIT(S): at 07:37

## 2022-06-30 RX ADMIN — Medication 81 MILLIGRAM(S): at 11:49

## 2022-06-30 RX ADMIN — Medication 1 DROP(S): at 05:14

## 2022-06-30 RX ADMIN — HEPARIN SODIUM 5000 UNIT(S): 5000 INJECTION INTRAVENOUS; SUBCUTANEOUS at 17:06

## 2022-06-30 RX ADMIN — Medication 2000 UNIT(S): at 11:49

## 2022-06-30 NOTE — PROGRESS NOTE ADULT - SUBJECTIVE AND OBJECTIVE BOX
Patient is a 87y old  Female who presents with a chief complaint of COPD Exacerbation (30 Jun 2022 09:04)      INTERVAL HPI/OVERNIGHT EVENTS: Pt seen and examined at bedside. has no complaints.     MEDICATIONS  (STANDING):  ALBUTerol    0.083% 2.5 milliGRAM(s) Nebulizer every 8 hours  artificial tears (preservative free) Ophthalmic Solution 1 Drop(s) Both EYES two times a day  aspirin  chewable 81 milliGRAM(s) Oral daily  atorvastatin 80 milliGRAM(s) Oral at bedtime  cholecalciferol 2000 Unit(s) Oral daily  clopidogrel Tablet 75 milliGRAM(s) Oral daily  dextrose 5%. 1000 milliLiter(s) (50 mL/Hr) IV Continuous <Continuous>  dextrose 5%. 1000 milliLiter(s) (100 mL/Hr) IV Continuous <Continuous>  dextrose 50% Injectable 25 Gram(s) IV Push once  dextrose 50% Injectable 12.5 Gram(s) IV Push once  dextrose 50% Injectable 25 Gram(s) IV Push once  dextrose 50% Injectable 25 Gram(s) IV Push once  escitalopram 10 milliGRAM(s) Oral daily  gabapentin 100 milliGRAM(s) Oral three times a day  glucagon  Injectable 1 milliGRAM(s) IntraMuscular once  glucagon  Injectable 1 milliGRAM(s) IntraMuscular once  heparin   Injectable 5000 Unit(s) SubCutaneous every 12 hours  insulin glargine Injectable (LANTUS) 15 Unit(s) SubCutaneous every morning  insulin lispro (ADMELOG) corrective regimen sliding scale   SubCutaneous three times a day before meals  insulin lispro (ADMELOG) corrective regimen sliding scale   SubCutaneous at bedtime  insulin lispro Injectable (ADMELOG) 8 Unit(s) SubCutaneous three times a day before meals  isosorbide   mononitrate ER Tablet (IMDUR) 30 milliGRAM(s) Oral daily  levothyroxine 125 MICROGram(s) Oral daily  metoprolol succinate ER 50 milliGRAM(s) Oral daily    MEDICATIONS  (PRN):  acetaminophen     Tablet .. 650 milliGRAM(s) Oral every 6 hours PRN Mild Pain (1 - 3)  ALBUTerol    0.083% 2.5 milliGRAM(s) Nebulizer every 6 hours PRN Shortness of Breath and/or Wheezing  dextrose Oral Gel 15 Gram(s) Oral once PRN Blood Glucose LESS THAN 70 milliGRAM(s)/deciliter  melatonin 3 milliGRAM(s) Oral at bedtime PRN Insomnia  simethicone 80 milliGRAM(s) Chew every 8 hours PRN Gas      Allergies    doxycycline (Unknown)  iodine (Hives)  iodine containing compounds (Unknown)  shellfish (Anaphylaxis)  shellfish (Swelling; Short breath)    Intolerances        REVIEW OF SYSTEMS:  CONSTITUTIONAL: No fever or chills  HEENT:  No headache, no sore throat  RESPIRATORY: No cough, wheezing, or shortness of breath  CARDIOVASCULAR: No chest pain, palpitations, or leg swelling  GASTROINTESTINAL: No abd pain, nausea, vomiting, or diarrhea  GENITOURINARY: No dysuria, frequency, or hematuria  NEUROLOGICAL: no focal weakness or dizziness  MUSCULOSKELETAL: no myalgias     Vital Signs Last 24 Hrs  T(C): 36.6 (30 Jun 2022 05:17), Max: 37.1 (29 Jun 2022 13:45)  T(F): 97.8 (30 Jun 2022 05:17), Max: 98.8 (29 Jun 2022 13:45)  HR: 62 (30 Jun 2022 07:56) (62 - 83)  BP: 117/64 (30 Jun 2022 05:17) (106/71 - 127/66)  BP(mean): --  RR: 18 (30 Jun 2022 05:17) (17 - 18)  SpO2: 94% (30 Jun 2022 07:56) (92% - 97%)    PHYSICAL EXAM:  GENERAL: elderly F in NAD  HEENT:  NC/AT, EOMI, moist mucous membranes  CHEST/LUNG:  CTA b/l, no rales, wheezes, or rhonchi  HEART:  RRR, S1, S2  ABDOMEN:  BS+, soft, nontender, nondistended  EXTREMITIES: no edema, cyanosis, or calf tenderness  NERVOUS SYSTEM: awake, alert    LABS:    CBC Full  -  ( 29 Jun 2022 08:00 )  WBC Count : 13.84 K/uL  Hemoglobin : 12.4 g/dL  Hematocrit : 39.3 %  Platelet Count - Automated : 202 K/uL  Mean Cell Volume : 88.9 fl  Mean Cell Hemoglobin : 28.1 pg  Mean Cell Hemoglobin Concentration : 31.6 gm/dL  Auto Neutrophil # : x  Auto Lymphocyte # : x  Auto Monocyte # : x  Auto Eosinophil # : x  Auto Basophil # : x  Auto Neutrophil % : x  Auto Lymphocyte % : x  Auto Monocyte % : x  Auto Eosinophil % : x  Auto Basophil % : x      Ca    9.0        29 Jun 2022 08:00          CAPILLARY BLOOD GLUCOSE      POCT Blood Glucose.: 147 mg/dL (30 Jun 2022 07:28)  POCT Blood Glucose.: 92 mg/dL (29 Jun 2022 21:19)  POCT Blood Glucose.: 140 mg/dL (29 Jun 2022 16:44)  POCT Blood Glucose.: 168 mg/dL (29 Jun 2022 11:39)          RADIOLOGY & ADDITIONAL TESTS:    Personally reviewed.     Consultant(s) Notes Reviewed:  [x] YES  [ ] NO    Care Discussed with [x] Consultants  [x] Patient  [ ] Family  [ ]      [ ] Other; RN  DVT ppx

## 2022-06-30 NOTE — PROGRESS NOTE ADULT - ASSESSMENT
87 year old female with PMHx of COPD (not on O2), HTN, HLD, T2DM on insulin, hx of MI presents from assisted living facility with dyspnea, wheezing, labored breathing admitted for COPD exacerbation and acute hypoxic resp failure due to hMPV.     6/29/22 - stable resp status, off ABX, euvolemic off lasix -- pending auth for [facility], anticipate DC tomorrow  *could not reach HCP Calvin via phone, left VM    6/30/22 - stable resp status, off ABX, euvolemic off lasix -- pending auth for [facility], still not available -- stable for DC  *could not reach HCP Calvin via phone again     Problem/Plan - 1:  ·  Problem: Acute respiratory failure with hypoxia.   ·  Plan: - COPD exacerbation  due to hMPV - resp viral infection + on admission   - was on BiPAP, weaned off BiPAP. Then on 2L O2 NC. Now saturating in 90s on RA.    -Xanax PRN - duoneb prn  - steroids tapered off / completed on 06/19  - persistent leukocytosis so performed CT Chest which showed some groundglass opacity in LLL - discussed with ID Diallo), started on Augmentin for possible aspiration PNA ; last night, pt's family decided they wish to stop antibiotics. Overall, family would like to de-escalate medical interventions as they feel the pt has been rapidly declining and would not have wanted to be living  in hospitalized setting the way she has been. Further, they feel the Abx have caused pt to have significant diarrhea causing the pt discomfort and believe bacterial PNA is unlikely. For those reasons, family requested I discontinue antibiotics, which was done.  - WBC count trending down  - pulm (Shalshin), recs appreciated  - ID Diallo), recs appreciated     Problem/Plan - 2:  ·  Problem: COPD exacerbation.   - Continue home medications - c/w nebs  - steroids tapered off / completed on 06/19  - Supplemental O2 PRN. COPD patient will keep SpO2 goal 89-93%.   - Encourage incentive spirometry.  - pulm (Shalshin), recs appreciated     Problem/Plan - 3:  ·  Problem: TRENTON on CKD 3  ·  Plan: CKD, Cr at baseline ranging from 1.3-1.7 on outpatient HIE review  hold lasix per renal     Problem/Plan - 4:  ·  Problem: DM2 (diabetes mellitus, type 2).   ·  Plan: - Chronic, known history of T2DM  - C/W Lantus 15 U qd and lispro 8U pre meals  - Moderate dose insulin corrective scale   - HgbA1c: 10%  - endocrine following appreciate recs.     Problem/Plan - 5:  ·  Problem: HTN (hypertension).   ·  Plan: bp stable  - change lasix to 20mg prn on discharge per renal  - Continue home Metoprolol Succinate 50 mg qd with hold parameters  -monitor bp.     Problem/Plan - 6:  ·  Problem: Anxiety.   ·  Plan: stable  continue Xanax prn , lexapro.     Problem/Plan - 7:  ·  Problem: Hypothyroidism, unspecified type.   ·  Plan: Chronic  - Continue home Synthroid 125 mcg PO qd.     Problem/Plan - 8:  ·  Problem: HLD (hyperlipidemia).   ·  Plan: Chronic  - Continue home Atorvastatin 80 mg PO qhs.     Problem/Plan - 9:  ·  Problem: CAD (coronary artery disease).   ·  Plan: - Hx of MI  - Continue home Aspirin 81 mg PO qd, Plavix 75 mg PO qd, imdur 30mg daily   - Echo 3/11/2022: Left ventricle was of normal size, mild left ventricular hypertrophy LV systolic function EF 65%. Moderate to severe aortic stenosis.     Problem/Plan - 10:  ·  Problem: Need for prophylactic measure.   ·  Plan; VTE ppx: Heparin 5000 subq q12h    ACP / Disp:  - DNR/DNI - MOLST filled  - family wished to pursue hospice as feel pt has had significant mental/physical decline; palliative care evaluated and believe pt not quite meeting hospice criteria  - on 6/24, pt's son decided to pursue [facility]ERIN/SNF at [facility] as opposed to pt returning to custodial as she has had further physical decline.  - supposedly custodial was considering taking pt back, but today they again stated pt cannot return to custodial, thus plan again shifted to [facility]  - no availability for SHERIN for now -- d/c to [facility] when bed available.               87 year old female with PMHx of COPD (not on O2), HTN, HLD, T2DM on insulin, hx of MI presents from assisted living facility with dyspnea, wheezing, labored breathing admitted for COPD exacerbation and acute hypoxic resp failure due to hMPV.     6/29/22 - stable resp status, off ABX, euvolemic off lasix -- pending auth for [facility], anticipate DC tomorrow  *could not reach HCP Calvin via phone, left VM    6/30/22 - stable resp status, off ABX, euvolemic off lasix -- pending auth for SHERIN, still not available -- stable for DC  *could not reach HCP Calvin via phone again    7/1/22 -  stable resp status, off ABX, euvolemic off lasix -- authorized for [facility]  -- will be d/c today (7/1/22)       Problem/Plan - 1:  ·  Problem: Acute respiratory failure with hypoxia.   ·  Plan: - COPD exacerbation  due to hMPV - resp viral infection + on admission   - was on BiPAP, weaned off BiPAP. Then on 2L O2 NC. Now saturating in 90s on RA.    -Xanax PRN - duoneb prn  - steroids tapered off / completed on 06/19  - persistent leukocytosis so performed CT Chest which showed some groundglass opacity in LLL - discussed with ID (Estrella), started on Augmentin for possible aspiration PNA ; last night, pt's family decided they wish to stop antibiotics. Overall, family would like to de-escalate medical interventions as they feel the pt has been rapidly declining and would not have wanted to be living  in hospitalized setting the way she has been. Further, they feel the Abx have caused pt to have significant diarrhea causing the pt discomfort and believe bacterial PNA is unlikely. For those reasons, family requested I discontinue antibiotics, which was done.  - WBC count trending down  - pulm (Min), recs appreciated  - ID (Estrella), recs appreciated     Problem/Plan - 2:  ·  Problem: COPD exacerbation.   - Continue home medications - c/w nebs  - steroids tapered off / completed on 06/19  - Supplemental O2 PRN. COPD patient will keep SpO2 goal 89-93%.   - Encourage incentive spirometry.     Problem/Plan - 3:  ·  Problem: TR[place]ON on CKD 3  ·  Plan: CKD, Cr at baseline ranging from 1.3-1.7 on outpatient HIE review  - holding lasix currently. Can continue lasix for pulmonary issues if needed as per renal     Problem/Plan - 4:  ·  Problem: DM2 (diabetes mellitus, type 2).   ·  Plan: - Chronic, known history of T2DM  - C/W Lantus 15 U qd and lispro 8U pre meals  - Moderate dose insulin corrective scale   - HgbA1c: 10%  - endocrine following appreciate recs.     Problem/Plan - 5:  ·  Problem: HTN (hypertension).   ·  Plan: bp stable  - change lasix to 20mg prn on discharge per renal  - Continue home Metoprolol Succinate 50 mg qd with hold parameters  -monitor bp.     Problem/Plan - 6:  ·  Problem: Anxiety.   ·  Plan: stable  continue Xanax prn , lexapro.     Problem/Plan - 7:  ·  Problem: Hypothyroidism, unspecified type.   ·  Plan: Chronic  - Continue home Synthroid 125 mcg PO qd.     Problem/Plan - 8:  ·  Problem: HLD (hyperlipidemia).   ·  Plan: Chronic  - Continue home Atorvastatin 80 mg PO qhs.     Problem/Plan - 9:  ·  Problem: CAD (coronary artery disease).   ·  Plan: - Hx of MI  - Continue home Aspirin 81 mg PO qd, Plavix 75 mg PO qd, imdur 30mg daily   - Echo 3/11/2022: Left ventricle was of normal size, mild left ventricular hypertrophy LV systolic function EF 65%. Moderate to severe aortic stenosis.     Problem/Plan - 10:  ·  Problem: Need for prophylactic measure.   ·  Plan; VTE ppx: Heparin 5000 subq q12h    ACP / Disp:  - DNR/DNI - MOLST filled  - family wished to pursue hospice as feel pt has had significant mental/physical decline; palliative care evaluated and believe pt not quite meeting hospice criteria  - on 6/24, pt's son decided to pursue SHERIN/SNF at [facility] as opposed to pt returning to [facility] as she has had further physical decline.  - supposedly [facility] was considering taking pt back, but today they again stated pt cannot return to [facility], thus plan again shifted to [facility]  - Pt accepted for [facility]. Will be DC today (7/1/22).

## 2022-06-30 NOTE — PROGRESS NOTE ADULT - ASSESSMENT
87 year old female with PMHx COPD, HTN, HLD, T2DM on insulin, hx of MI presents from assisted living facility with dyspnea, wheezing, labored breathing    copd  copd ex  TRENTON CKD  HTN  OP  OA  DM  HLD  Moderate to Severe AS  CAD    plan for [facility] dc   vs noted  labs reviewed  remains on NEBS    cvs rx regimen  BP control  serial renal indices  I and O  monitor VS and HD and Sat  HOB elev - asp prec - assist with needs - ADL - GOC discussion  pt has hMPV - resp viral infection - isolation precs - acap PRN - robitussin PRN -   cxr - labs reviewed  copd - rx regimen PRN for sob and or wheeze, NEBS, not a candidate for Inhaler use  proBNP noted  ABG noted  old records reviewed

## 2022-06-30 NOTE — PROGRESS NOTE ADULT - SUBJECTIVE AND OBJECTIVE BOX
Patient is a 87y old  Female who presents with a chief complaint of COPD Exacerbation (30 Jun 2022 11:33)       HPI:  87 year old female with PMHx COPD, HTN, HLD, T2DM on insulin, hx of MI presents from assisted living facility with dyspnea, wheezing, labored breathing. Patient recently admitted to [facility] 3/9-15/2022 for COPD exacerbation. History obtained from son Calvin, as patient is limited historian at baseline. Son states he received a call from [facility] this AM stating patient is having labored breathing. Patient was sent to [facility] for further management.     Interval HPI: Pt seen and examined at  bedside. No acute overnight events. She offers no acute complaints at this time. Says her breathing is about the same as yesterday.    ED course:  Vitals: HR 91 SpO2 on BIPAP  Labs: WBC 12.64, BUN 31/1.5, Glu 188, ProBNP 1430  Given Mag Sulfate IVPB 1 g, Solumedrol 125 mg IVP x1 (13 Jun 2022 06:00)       PAST MEDICAL & SURGICAL HISTORY:  Uncomplicated asthma, unspecified asthma severity      DM2 (diabetes mellitus, type 2)      Essential hypertension      Hypothyroidism, unspecified type      Pure hypercholesterolemia      Gastroesophageal reflux disease without esophagitis      Diabetes      Asthma      CAD (coronary artery disease)      HTN (hypertension)      HLD (hyperlipidemia)      Hypothyroid      NSTEMI (non-ST elevated myocardial infarction)      History of appendectomy      History of appendectomy      Abnormal findings on cardiac catheterization  Cardiac Cath           FAMILY HISTORY:  Family history of heart disease    Family history of cancer in mother    Family history of MI (myocardial infarction)    Family history of stomach cancer    NC    Social History:Non smoker    MEDICATIONS  (STANDING):  ALBUTerol    0.083% 2.5 milliGRAM(s) Nebulizer every 8 hours  artificial tears (preservative free) Ophthalmic Solution 1 Drop(s) Both EYES two times a day  aspirin  chewable 81 milliGRAM(s) Oral daily  atorvastatin 80 milliGRAM(s) Oral at bedtime  cholecalciferol 2000 Unit(s) Oral daily  clopidogrel Tablet 75 milliGRAM(s) Oral daily  dextrose 5%. 1000 milliLiter(s) (100 mL/Hr) IV Continuous <Continuous>  dextrose 5%. 1000 milliLiter(s) (50 mL/Hr) IV Continuous <Continuous>  dextrose 50% Injectable 25 Gram(s) IV Push once  dextrose 50% Injectable 12.5 Gram(s) IV Push once  dextrose 50% Injectable 25 Gram(s) IV Push once  dextrose 50% Injectable 25 Gram(s) IV Push once  escitalopram 10 milliGRAM(s) Oral daily  gabapentin 100 milliGRAM(s) Oral three times a day  glucagon  Injectable 1 milliGRAM(s) IntraMuscular once  glucagon  Injectable 1 milliGRAM(s) IntraMuscular once  heparin   Injectable 5000 Unit(s) SubCutaneous every 12 hours  insulin glargine Injectable (LANTUS) 15 Unit(s) SubCutaneous every morning  insulin lispro (ADMELOG) corrective regimen sliding scale   SubCutaneous three times a day before meals  insulin lispro (ADMELOG) corrective regimen sliding scale   SubCutaneous at bedtime  insulin lispro Injectable (ADMELOG) 8 Unit(s) SubCutaneous three times a day before meals  isosorbide   mononitrate ER Tablet (IMDUR) 30 milliGRAM(s) Oral daily  levothyroxine 125 MICROGram(s) Oral daily  metoprolol succinate ER 50 milliGRAM(s) Oral daily    MEDICATIONS  (PRN):  acetaminophen     Tablet .. 650 milliGRAM(s) Oral every 6 hours PRN Mild Pain (1 - 3)  ALBUTerol    0.083% 2.5 milliGRAM(s) Nebulizer every 6 hours PRN Shortness of Breath and/or Wheezing  dextrose Oral Gel 15 Gram(s) Oral once PRN Blood Glucose LESS THAN 70 milliGRAM(s)/deciliter  melatonin 3 milliGRAM(s) Oral at bedtime PRN Insomnia  simethicone 80 milliGRAM(s) Chew every 8 hours PRN Gas   Meds reviewed    Allergies    doxycycline (Unknown)  iodine (Hives)  iodine containing compounds (Unknown)  shellfish (Anaphylaxis)  shellfish (Swelling; Short breath)    Intolerances         REVIEW OF SYSTEMS:    Review of Systems:   Constitutional: Denies fatigue  HEENT: Denies headaches and dizziness  Respiratory: denies SOB, cough, or wheezing  Cardiovascular: denies CP, palpitations  Gastrointestinal: Denies nausea, denies vomiting, diarrhea, constipation, abdominal pain, or bloody stools  Genitourinary: denies painful urination, increased frequency, urgency, or bloody urine  Skin: denies rashes or itching  Musculoskeletal: denies muscle aches, joint swelling  Neurologic: Denies generalized weakness, denies loss of sensation, numbness, or tingling      Vital Signs Last 24 Hrs  T(C): 37.1 (30 Jun 2022 13:05), Max: 37.1 (29 Jun 2022 13:45)  T(F): 98.8 (30 Jun 2022 13:05), Max: 98.8 (29 Jun 2022 13:45)  HR: 68 (30 Jun 2022 13:05) (62 - 83)  BP: 128/76 (30 Jun 2022 13:05) (106/71 - 128/76)  BP(mean): --  RR: 18 (30 Jun 2022 13:05) (17 - 18)  SpO2: 93% (30 Jun 2022 13:05) (92% - 97%)  Daily     Daily     PHYSICAL EXAM:    GENERAL: NAD  HEAD:  Atraumatic, Normocephalic  EYES: EOMI, conjunctiva and sclera clear  ENMT: No Drainage from nares, No drainage from ears  NECK: Supple, neck  veins full  NERVOUS SYSTEM:  Awake and Alert  CHEST/LUNG: Decreased breath sounds b/l, mild expiratory wheezing, no rhonci or rales  HEART: Regular rate and rhythm; No murmurs, rubs, or gallops  ABDOMEN: Soft, Nontender, Nondistended; Bowel sounds present  EXTREMITIES:  No Edema  SKIN: No rashes No obvious ecchymosis      LABS:                        12.4   13.84 )-----------( 202      ( 29 Jun 2022 08:00 )             39.3     06-29    137  |  106  |  52<H>  ----------------------------<  149<H>  4.8   |  22  |  1.50<H>    Ca    9.0      29 Jun 2022 08:00                  RADIOLOGY & ADDITIONAL TESTS:

## 2022-06-30 NOTE — PROGRESS NOTE ADULT - SUBJECTIVE AND OBJECTIVE BOX
Date/Time Patient Seen:  		  Referring MD:   Data Reviewed	       Patient is a 87y old  Female who presents with a chief complaint of COPD Exacerbation (29 Jun 2022 20:33)      Subjective/HPI     PAST MEDICAL & SURGICAL HISTORY:  Uncomplicated asthma, unspecified asthma severity    DM2 (diabetes mellitus, type 2)    Essential hypertension    Hypothyroidism, unspecified type    Pure hypercholesterolemia    Gastroesophageal reflux disease without esophagitis    Diabetes    Asthma    CAD (coronary artery disease)    HTN (hypertension)    HLD (hyperlipidemia)    Hypothyroid    NSTEMI (non-ST elevated myocardial infarction)    No significant past surgical history    History of appendectomy    History of appendectomy    Abnormal findings on cardiac catheterization  Cardiac Cath          Medication list         MEDICATIONS  (STANDING):  ALBUTerol    0.083% 2.5 milliGRAM(s) Nebulizer every 8 hours  artificial tears (preservative free) Ophthalmic Solution 1 Drop(s) Both EYES two times a day  aspirin  chewable 81 milliGRAM(s) Oral daily  atorvastatin 80 milliGRAM(s) Oral at bedtime  cholecalciferol 2000 Unit(s) Oral daily  clopidogrel Tablet 75 milliGRAM(s) Oral daily  dextrose 5%. 1000 milliLiter(s) (100 mL/Hr) IV Continuous <Continuous>  dextrose 5%. 1000 milliLiter(s) (50 mL/Hr) IV Continuous <Continuous>  dextrose 50% Injectable 25 Gram(s) IV Push once  dextrose 50% Injectable 12.5 Gram(s) IV Push once  dextrose 50% Injectable 25 Gram(s) IV Push once  dextrose 50% Injectable 25 Gram(s) IV Push once  escitalopram 10 milliGRAM(s) Oral daily  gabapentin 100 milliGRAM(s) Oral three times a day  glucagon  Injectable 1 milliGRAM(s) IntraMuscular once  glucagon  Injectable 1 milliGRAM(s) IntraMuscular once  heparin   Injectable 5000 Unit(s) SubCutaneous every 12 hours  insulin glargine Injectable (LANTUS) 15 Unit(s) SubCutaneous every morning  insulin lispro (ADMELOG) corrective regimen sliding scale   SubCutaneous three times a day before meals  insulin lispro (ADMELOG) corrective regimen sliding scale   SubCutaneous at bedtime  insulin lispro Injectable (ADMELOG) 8 Unit(s) SubCutaneous three times a day before meals  isosorbide   mononitrate ER Tablet (IMDUR) 30 milliGRAM(s) Oral daily  levothyroxine 125 MICROGram(s) Oral daily  metoprolol succinate ER 50 milliGRAM(s) Oral daily    MEDICATIONS  (PRN):  acetaminophen     Tablet .. 650 milliGRAM(s) Oral every 6 hours PRN Mild Pain (1 - 3)  ALBUTerol    0.083% 2.5 milliGRAM(s) Nebulizer every 6 hours PRN Shortness of Breath and/or Wheezing  dextrose Oral Gel 15 Gram(s) Oral once PRN Blood Glucose LESS THAN 70 milliGRAM(s)/deciliter  melatonin 3 milliGRAM(s) Oral at bedtime PRN Insomnia  simethicone 80 milliGRAM(s) Chew every 8 hours PRN Gas         Vitals log        ICU Vital Signs Last 24 Hrs  T(C): 36.8 (29 Jun 2022 19:59), Max: 37.1 (29 Jun 2022 13:45)  T(F): 98.2 (29 Jun 2022 19:59), Max: 98.8 (29 Jun 2022 13:45)  HR: 79 (30 Jun 2022 00:44) (60 - 83)  BP: 127/66 (29 Jun 2022 19:59) (106/71 - 154/73)  BP(mean): --  ABP: --  ABP(mean): --  RR: 18 (29 Jun 2022 19:59) (17 - 18)  SpO2: 97% (30 Jun 2022 00:44) (92% - 97%)           Input and Output:  I&O's Detail      Lab Data                        12.4   13.84 )-----------( 202      ( 29 Jun 2022 08:00 )             39.3     06-29    137  |  106  |  52<H>  ----------------------------<  149<H>  4.8   |  22  |  1.50<H>    Ca    9.0      29 Jun 2022 08:00              Review of Systems	      Objective     Physical Examination    heart s1s2  lung dec BS  abd soft      Pertinent Lab findings & Imaging      Chong:  NO   Adequate UO     I&O's Detail           Discussed with:     Cultures:	        Radiology

## 2022-06-30 NOTE — PROGRESS NOTE ADULT - ASSESSMENT
TRENTON on CKD 3b  Respiratory Acidosis  HTN  Human Metapneumovirus      -BLCr 1.4  -Urine indices reviewed  -Avoid IVF given respiratory status  -Pulm eval reviewed  -CXR clear 6/15/22  -Renal indices fluctuates but are relatively stable at baseline range. Will follow up AM labs  -Can restart lasix as needed per pulmonary

## 2022-06-30 NOTE — PROGRESS NOTE ADULT - SUBJECTIVE AND OBJECTIVE BOX
CAPILLARY BLOOD GLUCOSE      POCT Blood Glucose.: 147 mg/dL (30 Jun 2022 07:28)  POCT Blood Glucose.: 92 mg/dL (29 Jun 2022 21:19)  POCT Blood Glucose.: 140 mg/dL (29 Jun 2022 16:44)  POCT Blood Glucose.: 168 mg/dL (29 Jun 2022 11:39)      Vital Signs Last 24 Hrs  T(C): 36.6 (30 Jun 2022 05:17), Max: 37.1 (29 Jun 2022 13:45)  T(F): 97.8 (30 Jun 2022 05:17), Max: 98.8 (29 Jun 2022 13:45)  HR: 62 (30 Jun 2022 07:56) (62 - 83)  BP: 117/64 (30 Jun 2022 05:17) (106/71 - 127/66)  BP(mean): --  RR: 18 (30 Jun 2022 05:17) (17 - 18)  SpO2: 94% (30 Jun 2022 07:56) (92% - 97%)    General: WN/WD NAD  Respiratory: CTA B/L  CV: RRR, S1S2, no murmurs, rubs or gallops  Abdominal: Soft, NT, ND +BS, Last BM  Extremities: No edema, + peripheral pulses     06-29    137  |  106  |  52<H>  ----------------------------<  149<H>  4.8   |  22  |  1.50<H>    Ca    9.0      29 Jun 2022 08:00        atorvastatin 80 milliGRAM(s) Oral at bedtime  dextrose 50% Injectable 25 Gram(s) IV Push once  dextrose 50% Injectable 12.5 Gram(s) IV Push once  dextrose 50% Injectable 25 Gram(s) IV Push once  dextrose 50% Injectable 25 Gram(s) IV Push once  dextrose Oral Gel 15 Gram(s) Oral once PRN  glucagon  Injectable 1 milliGRAM(s) IntraMuscular once  glucagon  Injectable 1 milliGRAM(s) IntraMuscular once  insulin glargine Injectable (LANTUS) 15 Unit(s) SubCutaneous every morning  insulin lispro (ADMELOG) corrective regimen sliding scale   SubCutaneous three times a day before meals  insulin lispro (ADMELOG) corrective regimen sliding scale   SubCutaneous at bedtime  insulin lispro Injectable (ADMELOG) 8 Unit(s) SubCutaneous three times a day before meals  levothyroxine 125 MICROGram(s) Oral daily

## 2022-06-30 NOTE — PROGRESS NOTE ADULT - ATTENDING COMMENTS
Patient seen and examined.  Discussed assessment and plan with resident.  Agree with above except where changed by me

## 2022-07-01 VITALS
TEMPERATURE: 98 F | HEART RATE: 63 BPM | DIASTOLIC BLOOD PRESSURE: 86 MMHG | RESPIRATION RATE: 17 BRPM | OXYGEN SATURATION: 93 % | SYSTOLIC BLOOD PRESSURE: 139 MMHG

## 2022-07-01 LAB — SARS-COV-2 RNA SPEC QL NAA+PROBE: SIGNIFICANT CHANGE UP

## 2022-07-01 PROCEDURE — 97110 THERAPEUTIC EXERCISES: CPT

## 2022-07-01 PROCEDURE — 94640 AIRWAY INHALATION TREATMENT: CPT

## 2022-07-01 PROCEDURE — 83605 ASSAY OF LACTIC ACID: CPT

## 2022-07-01 PROCEDURE — 97161 PT EVAL LOW COMPLEX 20 MIN: CPT

## 2022-07-01 PROCEDURE — 92610 EVALUATE SWALLOWING FUNCTION: CPT

## 2022-07-01 PROCEDURE — 97530 THERAPEUTIC ACTIVITIES: CPT

## 2022-07-01 PROCEDURE — 92611 MOTION FLUOROSCOPY/SWALLOW: CPT

## 2022-07-01 PROCEDURE — 96375 TX/PRO/DX INJ NEW DRUG ADDON: CPT | Mod: XU

## 2022-07-01 PROCEDURE — 93005 ELECTROCARDIOGRAM TRACING: CPT

## 2022-07-01 PROCEDURE — 99239 HOSP IP/OBS DSCHRG MGMT >30: CPT

## 2022-07-01 PROCEDURE — 85025 COMPLETE CBC W/AUTO DIFF WBC: CPT

## 2022-07-01 PROCEDURE — 84540 ASSAY OF URINE/UREA-N: CPT

## 2022-07-01 PROCEDURE — 83880 ASSAY OF NATRIURETIC PEPTIDE: CPT

## 2022-07-01 PROCEDURE — A9698: CPT

## 2022-07-01 PROCEDURE — 81001 URINALYSIS AUTO W/SCOPE: CPT

## 2022-07-01 PROCEDURE — 87040 BLOOD CULTURE FOR BACTERIA: CPT

## 2022-07-01 PROCEDURE — 93306 TTE W/DOPPLER COMPLETE: CPT

## 2022-07-01 PROCEDURE — 86140 C-REACTIVE PROTEIN: CPT

## 2022-07-01 PROCEDURE — U0003: CPT

## 2022-07-01 PROCEDURE — 84100 ASSAY OF PHOSPHORUS: CPT

## 2022-07-01 PROCEDURE — 74230 X-RAY XM SWLNG FUNCJ C+: CPT

## 2022-07-01 PROCEDURE — U0005: CPT

## 2022-07-01 PROCEDURE — 80053 COMPREHEN METABOLIC PANEL: CPT

## 2022-07-01 PROCEDURE — 83036 HEMOGLOBIN GLYCOSYLATED A1C: CPT

## 2022-07-01 PROCEDURE — 36600 WITHDRAWAL OF ARTERIAL BLOOD: CPT

## 2022-07-01 PROCEDURE — 85610 PROTHROMBIN TIME: CPT

## 2022-07-01 PROCEDURE — 84300 ASSAY OF URINE SODIUM: CPT

## 2022-07-01 PROCEDURE — 84145 PROCALCITONIN (PCT): CPT

## 2022-07-01 PROCEDURE — 84484 ASSAY OF TROPONIN QUANT: CPT

## 2022-07-01 PROCEDURE — 85730 THROMBOPLASTIN TIME PARTIAL: CPT

## 2022-07-01 PROCEDURE — 36415 COLL VENOUS BLD VENIPUNCTURE: CPT

## 2022-07-01 PROCEDURE — 82570 ASSAY OF URINE CREATININE: CPT

## 2022-07-01 PROCEDURE — 94760 N-INVAS EAR/PLS OXIMETRY 1: CPT

## 2022-07-01 PROCEDURE — 80048 BASIC METABOLIC PNL TOTAL CA: CPT

## 2022-07-01 PROCEDURE — 83735 ASSAY OF MAGNESIUM: CPT

## 2022-07-01 PROCEDURE — 97116 GAIT TRAINING THERAPY: CPT

## 2022-07-01 PROCEDURE — 82803 BLOOD GASES ANY COMBINATION: CPT

## 2022-07-01 PROCEDURE — 92526 ORAL FUNCTION THERAPY: CPT

## 2022-07-01 PROCEDURE — 99285 EMERGENCY DEPT VISIT HI MDM: CPT | Mod: 25

## 2022-07-01 PROCEDURE — 87635 SARS-COV-2 COVID-19 AMP PRB: CPT

## 2022-07-01 PROCEDURE — 71250 CT THORAX DX C-: CPT

## 2022-07-01 PROCEDURE — 82962 GLUCOSE BLOOD TEST: CPT

## 2022-07-01 PROCEDURE — 85027 COMPLETE CBC AUTOMATED: CPT

## 2022-07-01 PROCEDURE — 96374 THER/PROPH/DIAG INJ IV PUSH: CPT

## 2022-07-01 PROCEDURE — 0225U NFCT DS DNA&RNA 21 SARSCOV2: CPT

## 2022-07-01 PROCEDURE — 94660 CPAP INITIATION&MGMT: CPT

## 2022-07-01 PROCEDURE — 93970 EXTREMITY STUDY: CPT

## 2022-07-01 PROCEDURE — 71045 X-RAY EXAM CHEST 1 VIEW: CPT

## 2022-07-01 RX ADMIN — Medication 8 UNIT(S): at 08:27

## 2022-07-01 RX ADMIN — Medication 81 MILLIGRAM(S): at 11:54

## 2022-07-01 RX ADMIN — INSULIN GLARGINE 15 UNIT(S): 100 INJECTION, SOLUTION SUBCUTANEOUS at 08:28

## 2022-07-01 RX ADMIN — Medication 1 DROP(S): at 06:25

## 2022-07-01 RX ADMIN — HEPARIN SODIUM 5000 UNIT(S): 5000 INJECTION INTRAVENOUS; SUBCUTANEOUS at 06:25

## 2022-07-01 RX ADMIN — Medication 2000 UNIT(S): at 11:54

## 2022-07-01 RX ADMIN — ESCITALOPRAM OXALATE 10 MILLIGRAM(S): 10 TABLET, FILM COATED ORAL at 11:54

## 2022-07-01 RX ADMIN — ISOSORBIDE MONONITRATE 30 MILLIGRAM(S): 60 TABLET, EXTENDED RELEASE ORAL at 11:54

## 2022-07-01 RX ADMIN — GABAPENTIN 100 MILLIGRAM(S): 400 CAPSULE ORAL at 06:27

## 2022-07-01 RX ADMIN — Medication 50 MILLIGRAM(S): at 06:25

## 2022-07-01 RX ADMIN — Medication 125 MICROGRAM(S): at 06:25

## 2022-07-01 RX ADMIN — CLOPIDOGREL BISULFATE 75 MILLIGRAM(S): 75 TABLET, FILM COATED ORAL at 11:54

## 2022-07-01 RX ADMIN — ALBUTEROL 2.5 MILLIGRAM(S): 90 AEROSOL, METERED ORAL at 07:34

## 2022-07-01 NOTE — PROGRESS NOTE ADULT - ASSESSMENT
87 year old female with PMHx of COPD (not on O2), HTN, HLD, T2DM on insulin, hx of MI presents from assisted living facility with dyspnea, wheezing, labored breathing admitted for COPD exacerbation and acute hypoxic resp failure due to hMPV.     6/29/22 - stable resp status, off ABX, euvolemic off lasix -- pending auth for [facility], anticipate DC tomorrow  *could not reach HCP Calvin via phone, left VM    6/30/22 - stable resp status, off ABX, euvolemic off lasix -- pending auth for [facility]ERIN, still not available -- stable for DC  *could not reach HCP Calvin via phone again    7/1/22 - status resp status, off ABX, euvolemic off lasix -- authorized for [facility], planned for DC today     Problem/Plan - 1:  ·  Problem: Acute respiratory failure with hypoxia.   ·  Plan: - COPD exacerbation  due to hMPV - resp viral infection + on admission   - was on BiPAP, weaned off BiPAP. Then on 2L O2 NC. Now saturating in 90s on RA.    -Xanax PRN - duoneb prn  - steroids tapered off / completed on 06/19  - persistent leukocytosis so performed CT Chest which showed some groundglass opacity in LLL - discussed with ID (Estrella), started on Augmentin for possible aspiration PNA ; last night, pt's family decided they wish to stop antibiotics. Overall, family would like to de-escalate medical interventions as they feel the pt has been rapidly declining and would not have wanted to be living  in hospitalized setting the way she has been. Further, they feel the Abx have caused pt to have significant diarrhea causing the pt discomfort and believe bacterial PNA is unlikely. For those reasons, family requested I discontinue antibiotics, which was done.  - WBC count trending down  - pulm (Min), recs appreciated  - ID (Estrella), recs appreciated     Problem/Plan - 2:  ·  Problem: COPD exacerbation.   - Continue home medications - c/w nebs  - steroids tapered off / completed on 06/19  - Supplemental O2 PRN. COPD patient will keep SpO2 goal 89-93%. Saturating well on RA  - Encourage incentive spirometry.  - pulm (Brian, recs appreciated     Problem/Plan - 3:  ·  Problem: TRENTON on CKD 3  ·  Plan: CKD, Cr at baseline ranging from 1.3-1.7 on outpatient HIE review  hold lasix per renal     Problem/Plan - 4:  ·  Problem: DM2 (diabetes mellitus, type 2).   ·  Plan: - Chronic, known history of T2DM  - C/W Lantus 15 U qd and lispro 8U pre meals  - Moderate dose insulin corrective scale   - HgbA1c: 10%  - endocrine following appreciate recs.     Problem/Plan - 5:  ·  Problem: HTN (hypertension).   ·  Plan: bp stable  - change lasix to 20mg prn on discharge per renal  - Continue home Metoprolol Succinate 50 mg qd with hold parameters  -monitor bp.     Problem/Plan - 6:  ·  Problem: Anxiety.   ·  Plan: stable  continue Xanax prn , lexapro.     Problem/Plan - 7:  ·  Problem: Hypothyroidism, unspecified type.   ·  Plan: Chronic  - Continue home Synthroid 125 mcg PO qd.     Problem/Plan - 8:  ·  Problem: HLD (hyperlipidemia).   ·  Plan: Chronic  - Continue home Atorvastatin 80 mg PO qhs.     Problem/Plan - 9:  ·  Problem: CAD (coronary artery disease).   ·  Plan: - Hx of MI  - Continue home Aspirin 81 mg PO qd, Plavix 75 mg PO qd, imdur 30mg daily   - Echo 3/11/2022: Left ventricle was of normal size, mild left ventricular hypertrophy LV systolic function EF 65%. Moderate to severe aortic stenosis.     Problem/Plan - 10:  ·  Problem: Need for prophylactic measure.   ·  Plan; VTE ppx: Heparin 5000 subq q12h    ACP / Disp:  - DNR/DNI - MOLST filled  - family wished to pursue hospice as feel pt has had significant mental/physical decline; palliative care evaluated and believe pt not quite meeting hospice criteria  - on 6/24, pt's son decided to pursue [facility]IN/SNF at [facility] as opposed to [facility] returning [facility] as she has had further physical decline.  - supposedly [facility] was considering taking pt back, but today they again stated pt cannot return to [facility], thus plan again shifted to [facility]  - will be d/c to [facility] today (7/1/22)

## 2022-07-01 NOTE — PROGRESS NOTE ADULT - SUBJECTIVE AND OBJECTIVE BOX
Patient is a 87y old  Female who presents with a chief complaint of COPD Exacerbation (01 Jul 2022 05:03)      INTERVAL HPI/OVERNIGHT EVENTS: Patient seen and examined at bedside. No overnight events occurred. Patient has no complaints at this time. Denies chest pain, dyspnea, and abdominal pain.    MEDICATIONS  (STANDING):  ALBUTerol    0.083% 2.5 milliGRAM(s) Nebulizer every 8 hours  artificial tears (preservative free) Ophthalmic Solution 1 Drop(s) Both EYES two times a day  aspirin  chewable 81 milliGRAM(s) Oral daily  atorvastatin 80 milliGRAM(s) Oral at bedtime  cholecalciferol 2000 Unit(s) Oral daily  clopidogrel Tablet 75 milliGRAM(s) Oral daily  dextrose 5%. 1000 milliLiter(s) (100 mL/Hr) IV Continuous <Continuous>  dextrose 5%. 1000 milliLiter(s) (50 mL/Hr) IV Continuous <Continuous>  dextrose 50% Injectable 25 Gram(s) IV Push once  dextrose 50% Injectable 12.5 Gram(s) IV Push once  dextrose 50% Injectable 25 Gram(s) IV Push once  dextrose 50% Injectable 25 Gram(s) IV Push once  escitalopram 10 milliGRAM(s) Oral daily  gabapentin 100 milliGRAM(s) Oral three times a day  glucagon  Injectable 1 milliGRAM(s) IntraMuscular once  glucagon  Injectable 1 milliGRAM(s) IntraMuscular once  heparin   Injectable 5000 Unit(s) SubCutaneous every 12 hours  insulin glargine Injectable (LANTUS) 15 Unit(s) SubCutaneous every morning  insulin lispro (ADMELOG) corrective regimen sliding scale   SubCutaneous three times a day before meals  insulin lispro (ADMELOG) corrective regimen sliding scale   SubCutaneous at bedtime  insulin lispro Injectable (ADMELOG) 8 Unit(s) SubCutaneous three times a day before meals  isosorbide   mononitrate ER Tablet (IMDUR) 30 milliGRAM(s) Oral daily  levothyroxine 125 MICROGram(s) Oral daily  metoprolol succinate ER 50 milliGRAM(s) Oral daily    MEDICATIONS  (PRN):  acetaminophen     Tablet .. 650 milliGRAM(s) Oral every 6 hours PRN Mild Pain (1 - 3)  ALBUTerol    0.083% 2.5 milliGRAM(s) Nebulizer every 6 hours PRN Shortness of Breath and/or Wheezing  dextrose Oral Gel 15 Gram(s) Oral once PRN Blood Glucose LESS THAN 70 milliGRAM(s)/deciliter  melatonin 3 milliGRAM(s) Oral at bedtime PRN Insomnia      Allergies    doxycycline (Unknown)  iodine (Hives)  iodine containing compounds (Unknown)  shellfish (Anaphylaxis)  shellfish (Swelling; Short breath)    Intolerances        REVIEW OF SYSTEMS:  CONSTITUTIONAL: No fever or chills  HEENT:  No headache, no sore throat  RESPIRATORY: No cough, wheezing, or shortness of breath  CARDIOVASCULAR: No chest pain, palpitations  GASTROINTESTINAL: No abd pain, nausea, vomiting, or diarrhea  GENITOURINARY: No dysuria, frequency, or hematuria  NEUROLOGICAL: no focal weakness or dizziness  MUSCULOSKELETAL: no myalgias     Vital Signs Last 24 Hrs  T(C): 36.7 (01 Jul 2022 04:47), Max: 37.1 (30 Jun 2022 13:05)  T(F): 98 (01 Jul 2022 04:47), Max: 98.8 (30 Jun 2022 13:05)  HR: 68 (01 Jul 2022 07:36) (62 - 71)  BP: 136/71 (01 Jul 2022 04:47) (128/76 - 136/73)  BP(mean): --  RR: 17 (01 Jul 2022 04:47) (17 - 18)  SpO2: 95% (01 Jul 2022 07:36) (93% - 98%)    PHYSICAL EXAM:  GENERAL: NAD  HEENT:  anicteric, moist mucous membranes  CHEST/LUNG:  CTA b/l, no rales, wheezes, or rhonchi  HEART:  RRR, S1, S2  ABDOMEN:  BS+, soft, nontender, nondistended  EXTREMITIES: no edema, cyanosis, or calf tenderness  NERVOUS SYSTEM: answers questions and follows commands appropriately    LABS:                 CAPILLARY BLOOD GLUCOSE      POCT Blood Glucose.: 149 mg/dL (01 Jul 2022 07:28)  POCT Blood Glucose.: 86 mg/dL (30 Jun 2022 21:37)  POCT Blood Glucose.: 132 mg/dL (30 Jun 2022 16:54)  POCT Blood Glucose.: 130 mg/dL (30 Jun 2022 11:45)          RADIOLOGY & ADDITIONAL TESTS:     Personally reviewed.     Consultant(s) Notes Reviewed:  [x] YES  [ ] NO

## 2022-07-01 NOTE — PROGRESS NOTE ADULT - SUBJECTIVE AND OBJECTIVE BOX
Date/Time Patient Seen:  		  Referring MD:   Data Reviewed	       Patient is a 87y old  Female who presents with a chief complaint of COPD Exacerbation (30 Jun 2022 13:40)      Subjective/HPI     PAST MEDICAL & SURGICAL HISTORY:  Uncomplicated asthma, unspecified asthma severity    DM2 (diabetes mellitus, type 2)    Essential hypertension    Hypothyroidism, unspecified type    Pure hypercholesterolemia    Gastroesophageal reflux disease without esophagitis    Diabetes    Asthma    CAD (coronary artery disease)    HTN (hypertension)    HLD (hyperlipidemia)    Hypothyroid    NSTEMI (non-ST elevated myocardial infarction)    No significant past surgical history    History of appendectomy    History of appendectomy    Abnormal findings on cardiac catheterization  Cardiac Cath          Medication list         MEDICATIONS  (STANDING):  ALBUTerol    0.083% 2.5 milliGRAM(s) Nebulizer every 8 hours  artificial tears (preservative free) Ophthalmic Solution 1 Drop(s) Both EYES two times a day  aspirin  chewable 81 milliGRAM(s) Oral daily  atorvastatin 80 milliGRAM(s) Oral at bedtime  cholecalciferol 2000 Unit(s) Oral daily  clopidogrel Tablet 75 milliGRAM(s) Oral daily  dextrose 5%. 1000 milliLiter(s) (50 mL/Hr) IV Continuous <Continuous>  dextrose 5%. 1000 milliLiter(s) (100 mL/Hr) IV Continuous <Continuous>  dextrose 50% Injectable 25 Gram(s) IV Push once  dextrose 50% Injectable 25 Gram(s) IV Push once  dextrose 50% Injectable 12.5 Gram(s) IV Push once  dextrose 50% Injectable 25 Gram(s) IV Push once  escitalopram 10 milliGRAM(s) Oral daily  gabapentin 100 milliGRAM(s) Oral three times a day  glucagon  Injectable 1 milliGRAM(s) IntraMuscular once  glucagon  Injectable 1 milliGRAM(s) IntraMuscular once  heparin   Injectable 5000 Unit(s) SubCutaneous every 12 hours  insulin glargine Injectable (LANTUS) 15 Unit(s) SubCutaneous every morning  insulin lispro (ADMELOG) corrective regimen sliding scale   SubCutaneous at bedtime  insulin lispro (ADMELOG) corrective regimen sliding scale   SubCutaneous three times a day before meals  insulin lispro Injectable (ADMELOG) 8 Unit(s) SubCutaneous three times a day before meals  isosorbide   mononitrate ER Tablet (IMDUR) 30 milliGRAM(s) Oral daily  levothyroxine 125 MICROGram(s) Oral daily  metoprolol succinate ER 50 milliGRAM(s) Oral daily    MEDICATIONS  (PRN):  acetaminophen     Tablet .. 650 milliGRAM(s) Oral every 6 hours PRN Mild Pain (1 - 3)  ALBUTerol    0.083% 2.5 milliGRAM(s) Nebulizer every 6 hours PRN Shortness of Breath and/or Wheezing  dextrose Oral Gel 15 Gram(s) Oral once PRN Blood Glucose LESS THAN 70 milliGRAM(s)/deciliter  melatonin 3 milliGRAM(s) Oral at bedtime PRN Insomnia         Vitals log        ICU Vital Signs Last 24 Hrs  T(C): 36.7 (01 Jul 2022 04:47), Max: 37.1 (30 Jun 2022 13:05)  T(F): 98 (01 Jul 2022 04:47), Max: 98.8 (30 Jun 2022 13:05)  HR: 67 (01 Jul 2022 04:47) (62 - 71)  BP: 136/71 (01 Jul 2022 04:47) (117/64 - 136/73)  BP(mean): --  ABP: --  ABP(mean): --  RR: 17 (01 Jul 2022 04:47) (17 - 18)  SpO2: 94% (01 Jul 2022 04:47) (93% - 98%)           Input and Output:  I&O's Detail    29 Jun 2022 07:01  -  30 Jun 2022 07:00  --------------------------------------------------------  IN:  Total IN: 0 mL    OUT:    Voided (mL): 400 mL  Total OUT: 400 mL    Total NET: -400 mL          Lab Data                        12.4   13.84 )-----------( 202      ( 29 Jun 2022 08:00 )             39.3     06-29    137  |  106  |  52<H>  ----------------------------<  149<H>  4.8   |  22  |  1.50<H>    Ca    9.0      29 Jun 2022 08:00              Review of Systems	      Objective     Physical Examination    heart s1s2  lung dec BS  abd soft      Pertinent Lab findings & Imaging      Chong:  NO   Adequate UO     I&O's Detail    29 Jun 2022 07:01  -  30 Jun 2022 07:00  --------------------------------------------------------  IN:  Total IN: 0 mL    OUT:    Voided (mL): 400 mL  Total OUT: 400 mL    Total NET: -400 mL               Discussed with:     Cultures:	        Radiology

## 2022-07-01 NOTE — PROGRESS NOTE ADULT - PROVIDER SPECIALTY LIST ADULT
Hospitalist
Nephrology
Nephrology
Pulmonology
Endocrinology
Hospitalist
Infectious Disease
Nephrology
Pulmonology
Endocrinology
Hospitalist
Nephrology
Pulmonology
Endocrinology
Hospitalist
Nephrology
Pulmonology
Endocrinology
Endocrinology
Hospitalist

## 2022-07-01 NOTE — PROGRESS NOTE ADULT - ATTENDING COMMENTS
87 year old female with PMHx of COPD (not on O2), HTN, HLD, T2DM on insulin, hx of MI presents from assisted living facility with dyspnea, wheezing, labored breathing admitted for COPD exacerbation and acute hypoxic resp failure due to hMPV.

## 2022-07-01 NOTE — PROGRESS NOTE ADULT - REASON FOR ADMISSION
COPD Exacerbation
COPD Exacerbation, hMPV infection
COPD Exacerbation
COPD Exacerbation, hMPV infection
COPD Exacerbation
COPD Exacerbation, hMPV infection
COPD Exacerbation
COPD Exacerbation
COPD Exacerbation, hMPV infection
COPD Exacerbation, hMPV infection
COPD Exacerbation
COPD Exacerbation
COPD Exacerbation, hMPV infection
COPD Exacerbation

## 2022-07-01 NOTE — PROGRESS NOTE ADULT - ASSESSMENT
87 year old female with PMHx COPD, HTN, HLD, T2DM on insulin, hx of MI presents from assisted living facility with dyspnea, wheezing, labored breathing    copd  copd ex  TRENTON CKD  HTN  OP  OA  DM  HLD  Moderate to Severe AS  CAD    cm follow up noted  vs noted    cvs rx regimen  BP control  serial renal indices  I and O  monitor VS and HD and Sat  HOB elev - asp prec - assist with needs - ADL - GOC discussion  pt has hMPV - resp viral infection - isolation precs - acap PRN - robitussin PRN -   cxr - labs reviewed  copd - rx regimen PRN for sob and or wheeze, NEBS, not a candidate for Inhaler use  proBNP noted  ABG noted  old records reviewed

## 2022-07-08 NOTE — PROGRESS NOTE ADULT - SUBJECTIVE AND OBJECTIVE BOX
FLOYD JEANNINE    PLV 2NOR 229 W1    Allergies    doxycycline (Unknown)  iodine (Hives)  iodine containing compounds (Unknown)  shellfish (Anaphylaxis)  shellfish (Swelling; Short breath)    Intolerances        PAST MEDICAL & SURGICAL HISTORY:  Uncomplicated asthma, unspecified asthma severity    DM2 (diabetes mellitus, type 2)    Essential hypertension    Hypothyroidism, unspecified type    Pure hypercholesterolemia    Gastroesophageal reflux disease without esophagitis    Diabetes    Asthma    CAD (coronary artery disease)    HTN (hypertension)    HLD (hyperlipidemia)    Hypothyroid    NSTEMI (non-ST elevated myocardial infarction)    History of appendectomy    History of appendectomy    Abnormal findings on cardiac catheterization  Cardiac Cath        FAMILY HISTORY:  Family history of heart disease    Family history of cancer in mother    Family history of MI (myocardial infarction)    Family history of stomach cancer        Home Medications:  acetaminophen 500 mg oral tablet: 2 tab(s) orally 3 times a day (09 Mar 2022 08:41)  ALPRAZolam 0.5 mg oral tablet: 1 tab(s) orally once a day (at bedtime) (09 Mar 2022 08:41)  aspirin 81 mg oral tablet: 1 tab(s) orally once a day (09 Mar 2022 08:41)  atorvastatin 80 mg oral tablet: 1 tab(s) orally once a day (09 Mar 2022 08:41)  clopidogrel 75 mg oral tablet: 1 tab(s) orally once a day (09 Mar 2022 08:41)  escitalopram 10 mg oral tablet: 1 tab(s) orally once a day (09 Mar 2022 08:41)  fluticasone-salmeterol 250 mcg-50 mcg inhalation powder: 1 inhaler(s) inhaled 2 times a day (09 Mar 2022 08:41)  furosemide 40 mg oral tablet: 1 tab(s) orally once a day (09 Mar 2022 08:41)  gabapentin 100 mg oral capsule: 1 cap(s) orally 3 times a day (09 Mar 2022 08:41)  ipratropium-albuterol 0.5 mg-2.5 mg/3 mL inhalation solution: 3 milliliter(s) inhaled every 6 hours, As Needed (09 Mar 2022 08:41)  ipratropium-albuterol 0.5 mg-2.5 mg/3 mL inhalation solution: 3 milliliter(s) inhaled 2 times a day (09 Mar 2022 08:41)  isosorbide mononitrate 30 mg oral tablet, extended release: 1 tab(s) orally once a day (in the morning) (09 Mar 2022 08:41)  Lantus 100 units/mL subcutaneous solution: 18 unit(s) subcutaneous 2 times a day (09 Mar 2022 08:41)  levothyroxine 125 mcg (0.125 mg) oral tablet: 1 tab(s) orally Monday through Friday (09 Mar 2022 08:41)  Metoprolol Succinate ER 50 mg oral tablet, extended release: 1 tab(s) orally once a day (09 Mar 2022 08:41)  NovoLOG FlexPen 100 units/mL injectable solution: 15 unit(s) subcutaneous before meals  (09 Mar 2022 08:41)  potassium chloride 10 mEq oral capsule, extended release: 1 cap(s) orally once a day (09 Mar 2022 08:41)  ProAir HFA 90 mcg/inh inhalation aerosol: 2 puff(s) inhaled every 4 hours, As Needed (09 Mar 2022 08:41)  Refresh Relieva ophthalmic solution: 1 drop(s) in each eye every 2 hours, As Needed for Dry eyes (09 Mar 2022 08:41)  Refresh Relieva ophthalmic solution: 1 drop(s) in each eye 2 times a day (09 Mar 2022 08:41)  Vitamin D3 50 mcg (2000 intl units) oral tablet: 1 tab(s) orally once a day (09 Mar 2022 08:41)  ZeaSORB 0.5%-0.22% topical powder: Apply to under bilateral abdominal topically twice a day  (09 Mar 2022 08:41)      MEDICATIONS  (STANDING):  ALBUTerol    90 MICROgram(s) HFA Inhaler 2 Puff(s) Inhalation every 6 hours  ALPRAZolam 0.5 milliGRAM(s) Oral at bedtime  artificial  tears Solution 1 Drop(s) Both EYES two times a day  aspirin enteric coated 81 milliGRAM(s) Oral daily  atorvastatin 80 milliGRAM(s) Oral at bedtime  budesonide 160 MICROgram(s)/formoterol 4.5 MICROgram(s) Inhaler 2 Puff(s) Inhalation two times a day  cholecalciferol 2000 Unit(s) Oral daily  clopidogrel Tablet 75 milliGRAM(s) Oral daily  dextrose 40% Gel 15 Gram(s) Oral once  dextrose 5%. 1000 milliLiter(s) (50 mL/Hr) IV Continuous <Continuous>  dextrose 5%. 1000 milliLiter(s) (100 mL/Hr) IV Continuous <Continuous>  dextrose 50% Injectable 25 Gram(s) IV Push once  dextrose 50% Injectable 12.5 Gram(s) IV Push once  dextrose 50% Injectable 25 Gram(s) IV Push once  escitalopram 10 milliGRAM(s) Oral daily  gabapentin 100 milliGRAM(s) Oral three times a day  glucagon  Injectable 1 milliGRAM(s) IntraMuscular once  heparin   Injectable 5000 Unit(s) SubCutaneous every 12 hours  insulin lispro (ADMELOG) corrective regimen sliding scale   SubCutaneous three times a day before meals  insulin lispro (ADMELOG) corrective regimen sliding scale   SubCutaneous at bedtime  insulin lispro Injectable (ADMELOG) 6 Unit(s) SubCutaneous three times a day before meals  isosorbide   mononitrate ER Tablet (IMDUR) 30 milliGRAM(s) Oral daily  lactobacillus acidophilus 1 Tablet(s) Oral two times a day  levothyroxine 125 MICROGram(s) Oral <User Schedule>  metoprolol succinate ER 50 milliGRAM(s) Oral daily  predniSONE   Tablet 20 milliGRAM(s) Oral daily    MEDICATIONS  (PRN):  acetaminophen     Tablet .. 650 milliGRAM(s) Oral every 6 hours PRN Temp greater or equal to 38C (100.4F), Mild Pain (1 - 3)  aluminum hydroxide/magnesium hydroxide/simethicone Suspension 30 milliLiter(s) Oral every 4 hours PRN Dyspepsia  melatonin 3 milliGRAM(s) Oral at bedtime PRN Insomnia  ondansetron Injectable 4 milliGRAM(s) IV Push every 8 hours PRN Nausea and/or Vomiting  traMADol 50 milliGRAM(s) Oral three times a day PRN Moderate Pain (4 - 6)      Diet, Consistent Carbohydrate w/Evening Snack:   DASH/TLC Sodium & Cholesterol Restricted  Easy to Chew (EASYTOCHEW) (03-10-22 @ 19:55) [Active]          Vital Signs Last 24 Hrs  T(C): 36.3 (12 Mar 2022 04:45), Max: 36.3 (12 Mar 2022 04:45)  T(F): 97.3 (12 Mar 2022 04:45), Max: 97.3 (12 Mar 2022 04:45)  HR: 60 (12 Mar 2022 04:45) (60 - 60)  BP: 112/65 (12 Mar 2022 04:45) (112/65 - 112/65)  BP(mean): --  RR: 18 (12 Mar 2022 04:45) (18 - 18)  SpO2: 93% (12 Mar 2022 04:45) (93% - 93%)              LABS:                        11.4   14.33 )-----------( 181      ( 12 Mar 2022 07:51 )             35.5     03-12    138  |  107  |  68<H>  ----------------------------<  214<H>  4.2   |  22  |  1.60<H>    Ca    9.2      12 Mar 2022 07:51                WBC:  WBC Count: 14.33 K/uL (03-12 @ 07:51)  WBC Count: 14.73 K/uL (03-11 @ 07:54)  WBC Count: 12.64 K/uL (03-10 @ 07:42)  WBC Count: 13.36 K/uL (03-09 @ 06:05)      MICROBIOLOGY:  RECENT CULTURES:  03-11 .Stool Feces XXXX XXXX   GI PCR Results: NOT detected  *******Please Note:*******  GI panel PCR evaluates for:  Campylobacter, Plesiomonas shigelloides, Salmonella,  Vibrio, Yersinia enterocolitica, Enteroaggregative  Escherichia coli (EAEC), Enteropathogenic E.coli (EPEC),  Enterotoxigenic E. coli (ETEC) lt/st, Shiga-like  toxin-producing E. coli (STEC) stx1/stx2,  Shigella/ Enteroinvasive E. coli (EIEC), Cryptosporidium,  Cyclospora cayetanensis, Entamoeba histolytica,  Giardia lamblia, Adenovirus F 40/41, Astrovirus,  Norovirus GI/GII, Rotavirus A, Sapovirus    03-09 .Blood Blood-Peripheral XXXX XXXX   No growth to date.                    Sodium:  Sodium, Serum: 138 mmol/L (03-12 @ 07:51)  Sodium, Serum: 139 mmol/L (03-11 @ 07:54)  Sodium, Serum: 139 mmol/L (03-10 @ 07:43)  Sodium, Serum: 140 mmol/L (03-09 @ 06:05)      1.60 mg/dL 03-12 @ 07:51  1.70 mg/dL 03-11 @ 07:54  1.60 mg/dL 03-10 @ 07:43  1.70 mg/dL 03-09 @ 06:05      Hemoglobin:  Hemoglobin: 11.4 g/dL (03-12 @ 07:51)  Hemoglobin: 12.9 g/dL (03-11 @ 07:54)  Hemoglobin: 12.0 g/dL (03-10 @ 07:42)  Hemoglobin: 12.5 g/dL (03-09 @ 06:05)      Platelets: Platelet Count - Automated: 181 K/uL (03-12 @ 07:51)  Platelet Count - Automated: 182 K/uL (03-11 @ 07:54)  Platelet Count - Automated: 171 K/uL (03-10 @ 07:42)  Platelet Count - Automated: 190 K/uL (03-09 @ 06:05)              RADIOLOGY & ADDITIONAL STUDIES:      MICROBIOLOGY:  RECENT CULTURES:  03-11 .Stool Feces XXXX XXXX   GI PCR Results: NOT detected  *******Please Note:*******  GI panel PCR evaluates for:  Campylobacter, Plesiomonas shigelloides, Salmonella,  Vibrio, Yersinia enterocolitica, Enteroaggregative  Escherichia coli (EAEC), Enteropathogenic E.coli (EPEC),  Enterotoxigenic E. coli (ETEC) lt/st, Shiga-like  toxin-producing E. coli (STEC) stx1/stx2,  Shigella/ Enteroinvasive E. coli (EIEC), Cryptosporidium,  Cyclospora cayetanensis, Entamoeba histolytica,  Giardia lamblia, Adenovirus F 40/41, Astrovirus,  Norovirus GI/GII, Rotavirus A, Sapovirus    03-09 .Blood Blood-Peripheral XXXX XXXX   No growth to date.             Ketoconazole Counseling:   Patient counseled regarding improving absorption with orange juice.  Adverse effects include but are not limited to breast enlargement, headache, diarrhea, nausea, upset stomach, liver function test abnormalities, taste disturbance, and stomach pain.  There is a rare possibility of liver failure that can occur when taking ketoconazole. The patient understands that monitoring of LFTs may be required, especially at baseline. The patient verbalized understanding of the proper use and possible adverse effects of ketoconazole.  All of the patient's questions and concerns were addressed.

## 2022-07-31 ENCOUNTER — INPATIENT (INPATIENT)
Facility: HOSPITAL | Age: 87
LOS: 1 days | Discharge: HOSPICE | DRG: 444 | End: 2022-08-02
Attending: STUDENT IN AN ORGANIZED HEALTH CARE EDUCATION/TRAINING PROGRAM | Admitting: STUDENT IN AN ORGANIZED HEALTH CARE EDUCATION/TRAINING PROGRAM
Payer: MEDICARE

## 2022-07-31 VITALS
HEIGHT: 64 IN | OXYGEN SATURATION: 98 % | DIASTOLIC BLOOD PRESSURE: 63 MMHG | HEART RATE: 82 BPM | SYSTOLIC BLOOD PRESSURE: 118 MMHG | TEMPERATURE: 98 F | RESPIRATION RATE: 18 BRPM

## 2022-07-31 DIAGNOSIS — Z90.49 ACQUIRED ABSENCE OF OTHER SPECIFIED PARTS OF DIGESTIVE TRACT: Chronic | ICD-10-CM

## 2022-07-31 DIAGNOSIS — N17.9 ACUTE KIDNEY FAILURE, UNSPECIFIED: ICD-10-CM

## 2022-07-31 DIAGNOSIS — D64.9 ANEMIA, UNSPECIFIED: ICD-10-CM

## 2022-07-31 DIAGNOSIS — R60.0 LOCALIZED EDEMA: ICD-10-CM

## 2022-07-31 DIAGNOSIS — E03.9 HYPOTHYROIDISM, UNSPECIFIED: ICD-10-CM

## 2022-07-31 DIAGNOSIS — R93.1 ABNORMAL FINDINGS ON DIAGNOSTIC IMAGING OF HEART AND CORONARY CIRCULATION: Chronic | ICD-10-CM

## 2022-07-31 DIAGNOSIS — E11.9 TYPE 2 DIABETES MELLITUS WITHOUT COMPLICATIONS: ICD-10-CM

## 2022-07-31 DIAGNOSIS — I25.10 ATHEROSCLEROTIC HEART DISEASE OF NATIVE CORONARY ARTERY WITHOUT ANGINA PECTORIS: ICD-10-CM

## 2022-07-31 DIAGNOSIS — F41.9 ANXIETY DISORDER, UNSPECIFIED: ICD-10-CM

## 2022-07-31 DIAGNOSIS — Z87.09 PERSONAL HISTORY OF OTHER DISEASES OF THE RESPIRATORY SYSTEM: ICD-10-CM

## 2022-07-31 DIAGNOSIS — R10.9 UNSPECIFIED ABDOMINAL PAIN: ICD-10-CM

## 2022-07-31 DIAGNOSIS — I10 ESSENTIAL (PRIMARY) HYPERTENSION: ICD-10-CM

## 2022-07-31 DIAGNOSIS — Z29.9 ENCOUNTER FOR PROPHYLACTIC MEASURES, UNSPECIFIED: ICD-10-CM

## 2022-07-31 LAB
ALBUMIN SERPL ELPH-MCNC: 3.1 G/DL — LOW (ref 3.3–5)
ALP SERPL-CCNC: 79 U/L — SIGNIFICANT CHANGE UP (ref 40–120)
ALT FLD-CCNC: 18 U/L — SIGNIFICANT CHANGE UP (ref 12–78)
ANION GAP SERPL CALC-SCNC: 9 MMOL/L — SIGNIFICANT CHANGE UP (ref 5–17)
APPEARANCE UR: ABNORMAL
APTT BLD: 27.7 SEC — SIGNIFICANT CHANGE UP (ref 27.5–35.5)
AST SERPL-CCNC: 21 U/L — SIGNIFICANT CHANGE UP (ref 15–37)
BASOPHILS # BLD AUTO: 0.07 K/UL — SIGNIFICANT CHANGE UP (ref 0–0.2)
BASOPHILS NFR BLD AUTO: 0.5 % — SIGNIFICANT CHANGE UP (ref 0–2)
BILIRUB SERPL-MCNC: 1 MG/DL — SIGNIFICANT CHANGE UP (ref 0.2–1.2)
BILIRUB UR-MCNC: NEGATIVE — SIGNIFICANT CHANGE UP
BUN SERPL-MCNC: 73 MG/DL — HIGH (ref 7–23)
CALCIUM SERPL-MCNC: 9 MG/DL — SIGNIFICANT CHANGE UP (ref 8.5–10.1)
CHLORIDE SERPL-SCNC: 111 MMOL/L — HIGH (ref 96–108)
CO2 SERPL-SCNC: 21 MMOL/L — LOW (ref 22–31)
COLOR SPEC: YELLOW — SIGNIFICANT CHANGE UP
CREAT SERPL-MCNC: 2 MG/DL — HIGH (ref 0.5–1.3)
DIFF PNL FLD: ABNORMAL
EGFR: 24 ML/MIN/1.73M2 — LOW
EOSINOPHIL # BLD AUTO: 0.13 K/UL — SIGNIFICANT CHANGE UP (ref 0–0.5)
EOSINOPHIL NFR BLD AUTO: 0.9 % — SIGNIFICANT CHANGE UP (ref 0–6)
GLUCOSE SERPL-MCNC: 206 MG/DL — HIGH (ref 70–99)
GLUCOSE UR QL: NEGATIVE — SIGNIFICANT CHANGE UP
HCT VFR BLD CALC: 31 % — LOW (ref 34.5–45)
HGB BLD-MCNC: 9.4 G/DL — LOW (ref 11.5–15.5)
IMM GRANULOCYTES NFR BLD AUTO: 0.7 % — SIGNIFICANT CHANGE UP (ref 0–1.5)
INR BLD: 1.08 RATIO — SIGNIFICANT CHANGE UP (ref 0.88–1.16)
KETONES UR-MCNC: NEGATIVE — SIGNIFICANT CHANGE UP
LEUKOCYTE ESTERASE UR-ACNC: ABNORMAL
LYMPHOCYTES # BLD AUTO: 15.1 % — SIGNIFICANT CHANGE UP (ref 13–44)
LYMPHOCYTES # BLD AUTO: 2.12 K/UL — SIGNIFICANT CHANGE UP (ref 1–3.3)
MCHC RBC-ENTMCNC: 27.4 PG — SIGNIFICANT CHANGE UP (ref 27–34)
MCHC RBC-ENTMCNC: 30.3 GM/DL — LOW (ref 32–36)
MCV RBC AUTO: 90.4 FL — SIGNIFICANT CHANGE UP (ref 80–100)
MONOCYTES # BLD AUTO: 0.82 K/UL — SIGNIFICANT CHANGE UP (ref 0–0.9)
MONOCYTES NFR BLD AUTO: 5.8 % — SIGNIFICANT CHANGE UP (ref 2–14)
NEUTROPHILS # BLD AUTO: 10.81 K/UL — HIGH (ref 1.8–7.4)
NEUTROPHILS NFR BLD AUTO: 77 % — SIGNIFICANT CHANGE UP (ref 43–77)
NITRITE UR-MCNC: NEGATIVE — SIGNIFICANT CHANGE UP
NRBC # BLD: 0 /100 WBCS — SIGNIFICANT CHANGE UP (ref 0–0)
OB PNL STL: POSITIVE
PH UR: 5 — SIGNIFICANT CHANGE UP (ref 5–8)
PLATELET # BLD AUTO: 250 K/UL — SIGNIFICANT CHANGE UP (ref 150–400)
POTASSIUM SERPL-MCNC: 4.9 MMOL/L — SIGNIFICANT CHANGE UP (ref 3.5–5.3)
POTASSIUM SERPL-SCNC: 4.9 MMOL/L — SIGNIFICANT CHANGE UP (ref 3.5–5.3)
PROT SERPL-MCNC: 6.8 G/DL — SIGNIFICANT CHANGE UP (ref 6–8.3)
PROT UR-MCNC: NEGATIVE — SIGNIFICANT CHANGE UP
PROTHROM AB SERPL-ACNC: 12.6 SEC — SIGNIFICANT CHANGE UP (ref 10.5–13.4)
RBC # BLD: 3.43 M/UL — LOW (ref 3.8–5.2)
RBC # FLD: 14.4 % — SIGNIFICANT CHANGE UP (ref 10.3–14.5)
SARS-COV-2 RNA SPEC QL NAA+PROBE: SIGNIFICANT CHANGE UP
SODIUM SERPL-SCNC: 141 MMOL/L — SIGNIFICANT CHANGE UP (ref 135–145)
SP GR SPEC: 1.01 — SIGNIFICANT CHANGE UP (ref 1.01–1.02)
UROBILINOGEN FLD QL: NEGATIVE — SIGNIFICANT CHANGE UP
WBC # BLD: 14.05 K/UL — HIGH (ref 3.8–10.5)
WBC # FLD AUTO: 14.05 K/UL — HIGH (ref 3.8–10.5)

## 2022-07-31 PROCEDURE — 99285 EMERGENCY DEPT VISIT HI MDM: CPT | Mod: FS

## 2022-07-31 PROCEDURE — 76705 ECHO EXAM OF ABDOMEN: CPT | Mod: 26

## 2022-07-31 PROCEDURE — 93010 ELECTROCARDIOGRAM REPORT: CPT

## 2022-07-31 PROCEDURE — 74176 CT ABD & PELVIS W/O CONTRAST: CPT | Mod: 26

## 2022-07-31 PROCEDURE — 99223 1ST HOSP IP/OBS HIGH 75: CPT

## 2022-07-31 PROCEDURE — 93970 EXTREMITY STUDY: CPT | Mod: 26

## 2022-07-31 PROCEDURE — 99222 1ST HOSP IP/OBS MODERATE 55: CPT | Mod: GC,AI

## 2022-07-31 RX ORDER — PIPERACILLIN AND TAZOBACTAM 4; .5 G/20ML; G/20ML
3.38 INJECTION, POWDER, LYOPHILIZED, FOR SOLUTION INTRAVENOUS ONCE
Refills: 0 | Status: COMPLETED | OUTPATIENT
Start: 2022-07-31 | End: 2022-07-31

## 2022-07-31 RX ORDER — GABAPENTIN 400 MG/1
100 CAPSULE ORAL THREE TIMES A DAY
Refills: 0 | Status: DISCONTINUED | OUTPATIENT
Start: 2022-07-31 | End: 2022-07-31

## 2022-07-31 RX ORDER — ALPRAZOLAM 0.25 MG
1 TABLET ORAL
Qty: 0 | Refills: 0 | DISCHARGE

## 2022-07-31 RX ORDER — INSULIN ASPART 100 [IU]/ML
0 INJECTION, SOLUTION SUBCUTANEOUS
Qty: 0 | Refills: 0 | DISCHARGE

## 2022-07-31 RX ORDER — ACETAMINOPHEN 500 MG
2 TABLET ORAL
Qty: 0 | Refills: 0 | DISCHARGE

## 2022-07-31 RX ORDER — ONDANSETRON 8 MG/1
4 TABLET, FILM COATED ORAL EVERY 8 HOURS
Refills: 0 | Status: DISCONTINUED | OUTPATIENT
Start: 2022-07-31 | End: 2022-08-02

## 2022-07-31 RX ORDER — LEVOTHYROXINE SODIUM 125 MCG
125 TABLET ORAL DAILY
Refills: 0 | Status: DISCONTINUED | OUTPATIENT
Start: 2022-07-31 | End: 2022-07-31

## 2022-07-31 RX ORDER — ESCITALOPRAM OXALATE 10 MG/1
10 TABLET, FILM COATED ORAL DAILY
Refills: 0 | Status: DISCONTINUED | OUTPATIENT
Start: 2022-07-31 | End: 2022-07-31

## 2022-07-31 RX ORDER — ONDANSETRON 8 MG/1
4 TABLET, FILM COATED ORAL ONCE
Refills: 0 | Status: COMPLETED | OUTPATIENT
Start: 2022-07-31 | End: 2022-07-31

## 2022-07-31 RX ORDER — ATORVASTATIN CALCIUM 80 MG/1
80 TABLET, FILM COATED ORAL AT BEDTIME
Refills: 0 | Status: DISCONTINUED | OUTPATIENT
Start: 2022-07-31 | End: 2022-07-31

## 2022-07-31 RX ORDER — ALBUTEROL 90 UG/1
2 AEROSOL, METERED ORAL
Qty: 0 | Refills: 0 | DISCHARGE

## 2022-07-31 RX ORDER — IPRATROPIUM/ALBUTEROL SULFATE 18-103MCG
3 AEROSOL WITH ADAPTER (GRAM) INHALATION
Qty: 0 | Refills: 0 | DISCHARGE

## 2022-07-31 RX ORDER — LEVOTHYROXINE SODIUM 125 MCG
62.5 TABLET ORAL AT BEDTIME
Refills: 0 | Status: DISCONTINUED | OUTPATIENT
Start: 2022-07-31 | End: 2022-08-02

## 2022-07-31 RX ORDER — PANTOPRAZOLE SODIUM 20 MG/1
40 TABLET, DELAYED RELEASE ORAL
Refills: 0 | Status: DISCONTINUED | OUTPATIENT
Start: 2022-07-31 | End: 2022-08-02

## 2022-07-31 RX ORDER — MORPHINE SULFATE 50 MG/1
4 CAPSULE, EXTENDED RELEASE ORAL ONCE
Refills: 0 | Status: DISCONTINUED | OUTPATIENT
Start: 2022-07-31 | End: 2022-07-31

## 2022-07-31 RX ORDER — ACETAMINOPHEN 500 MG
650 TABLET ORAL EVERY 6 HOURS
Refills: 0 | Status: DISCONTINUED | OUTPATIENT
Start: 2022-07-31 | End: 2022-08-01

## 2022-07-31 RX ORDER — BUDESONIDE AND FORMOTEROL FUMARATE DIHYDRATE 160; 4.5 UG/1; UG/1
2 AEROSOL RESPIRATORY (INHALATION)
Refills: 0 | Status: DISCONTINUED | OUTPATIENT
Start: 2022-07-31 | End: 2022-08-02

## 2022-07-31 RX ORDER — SODIUM CHLORIDE 9 MG/ML
1000 INJECTION INTRAMUSCULAR; INTRAVENOUS; SUBCUTANEOUS ONCE
Refills: 0 | Status: COMPLETED | OUTPATIENT
Start: 2022-07-31 | End: 2022-07-31

## 2022-07-31 RX ORDER — ACETAMINOPHEN 500 MG
650 TABLET ORAL EVERY 6 HOURS
Refills: 0 | Status: DISCONTINUED | OUTPATIENT
Start: 2022-07-31 | End: 2022-07-31

## 2022-07-31 RX ORDER — PIPERACILLIN AND TAZOBACTAM 4; .5 G/20ML; G/20ML
3.38 INJECTION, POWDER, LYOPHILIZED, FOR SOLUTION INTRAVENOUS EVERY 8 HOURS
Refills: 0 | Status: DISCONTINUED | OUTPATIENT
Start: 2022-07-31 | End: 2022-08-02

## 2022-07-31 RX ORDER — TALC/CELLULOS/CHLOROXY/ALDIOXA
1 POWDER (GRAM) TOPICAL
Qty: 0 | Refills: 0 | DISCHARGE

## 2022-07-31 RX ORDER — SODIUM CHLORIDE 9 MG/ML
1000 INJECTION INTRAMUSCULAR; INTRAVENOUS; SUBCUTANEOUS
Refills: 0 | Status: DISCONTINUED | OUTPATIENT
Start: 2022-07-31 | End: 2022-08-01

## 2022-07-31 RX ORDER — ALBUTEROL 90 UG/1
2 AEROSOL, METERED ORAL EVERY 6 HOURS
Refills: 0 | Status: DISCONTINUED | OUTPATIENT
Start: 2022-07-31 | End: 2022-08-02

## 2022-07-31 RX ORDER — LANOLIN ALCOHOL/MO/W.PET/CERES
3 CREAM (GRAM) TOPICAL AT BEDTIME
Refills: 0 | Status: DISCONTINUED | OUTPATIENT
Start: 2022-07-31 | End: 2022-07-31

## 2022-07-31 RX ORDER — METOPROLOL TARTRATE 50 MG
50 TABLET ORAL DAILY
Refills: 0 | Status: DISCONTINUED | OUTPATIENT
Start: 2022-07-31 | End: 2022-07-31

## 2022-07-31 RX ADMIN — MORPHINE SULFATE 4 MILLIGRAM(S): 50 CAPSULE, EXTENDED RELEASE ORAL at 12:22

## 2022-07-31 RX ADMIN — ONDANSETRON 4 MILLIGRAM(S): 8 TABLET, FILM COATED ORAL at 12:22

## 2022-07-31 RX ADMIN — PANTOPRAZOLE SODIUM 40 MILLIGRAM(S): 20 TABLET, DELAYED RELEASE ORAL at 22:52

## 2022-07-31 RX ADMIN — PIPERACILLIN AND TAZOBACTAM 3.38 GRAM(S): 4; .5 INJECTION, POWDER, LYOPHILIZED, FOR SOLUTION INTRAVENOUS at 16:40

## 2022-07-31 RX ADMIN — PIPERACILLIN AND TAZOBACTAM 25 GRAM(S): 4; .5 INJECTION, POWDER, LYOPHILIZED, FOR SOLUTION INTRAVENOUS at 22:51

## 2022-07-31 RX ADMIN — Medication 62.5 MICROGRAM(S): at 23:26

## 2022-07-31 RX ADMIN — MORPHINE SULFATE 4 MILLIGRAM(S): 50 CAPSULE, EXTENDED RELEASE ORAL at 13:00

## 2022-07-31 RX ADMIN — SODIUM CHLORIDE 1000 MILLILITER(S): 9 INJECTION INTRAMUSCULAR; INTRAVENOUS; SUBCUTANEOUS at 12:21

## 2022-07-31 RX ADMIN — PIPERACILLIN AND TAZOBACTAM 200 GRAM(S): 4; .5 INJECTION, POWDER, LYOPHILIZED, FOR SOLUTION INTRAVENOUS at 15:59

## 2022-07-31 NOTE — ED ADULT TRIAGE NOTE - CHIEF COMPLAINT QUOTE
patient came in ED from [facility] with c/o as per EMS abdominal pain. ultrasound was and Niurka was identified.

## 2022-07-31 NOTE — H&P ADULT - PROBLEM SELECTOR PLAN 7
Chronic  - Continue home Synthroid 125 mcg PO qd. Chronic  - Hx of MI  - hold home Aspirin 81 mg PO qd, Plavix 75 mg PO qd, ?if requires surgical intervention   - continue imdur 30 mg daily   - Echo 3/11/2022: Left ventricle was of normal size, mild left ventricular hypertrophy LV systolic function EF 65%. Moderate to severe aortic stenosis. bp stable  - change lasix to 20mg prn on discharge per renal  - Continue home Metoprolol Succinate 50 mg qd with hold parameters  -monitor bp. - Moderate dose insulin corrective scale q6 while NPO   - HgbA1c: 10%

## 2022-07-31 NOTE — ED PROVIDER NOTE - CLINICAL SUMMARY MEDICAL DECISION MAKING FREE TEXT BOX
86 yo female with pmhx of Asthma CAD (coronary artery disease) Diabetes DM2 (diabetes mellitus, type 2) Essential hypertension Gastroesophageal reflux disease without esophagitis HLD (hyperlipidemia) HTN (hypertension) Hypothyroid NSTEMI (non-ST elevated myocardial infarction) HLD from [facility] rehab sent in for abdominal pain noted to have gallstones on outpatient ultrasound.  Patient for history and patient pointing to right upper quadrant of abdomen. will check labs, rpt US

## 2022-07-31 NOTE — H&P ADULT - PROBLEM SELECTOR PLAN 5
bp stable  - change lasix to 20mg prn on discharge per renal  - Continue home Metoprolol Succinate 50 mg qd with hold parameters  -monitor bp. - Moderate dose insulin corrective scale q6 while NPO   - HgbA1c: 10% ducollins prn luisa prn  symbicort therapeutic exchange Not on supplemental O2  - duonebs prn  - symbicort therapeutic exchange stable  - continue Xanax prn , lexapro

## 2022-07-31 NOTE — ED PROVIDER NOTE - OBJECTIVE STATEMENT
Pt is a 86 yo female with pmhx of Asthma CAD (coronary artery disease) Diabetes DM2 (diabetes mellitus, type 2) Essential hypertension Gastroesophageal reflux disease without esophagitis HLD (hyperlipidemia) HTN (hypertension) Hypothyroid NSTEMI (non-ST elevated myocardial infarction) HLD from [facility] rehab sent in for abdominal pain noted to have gallstones on outpatient ultrasound.  Patient for history and patient pointing to right upper quadrant of abdomen.

## 2022-07-31 NOTE — H&P ADULT - PROBLEM SELECTOR PLAN 1
RUQ ultrasound negative for acute cholecytisits  obtain HIDA Scan  continue zosyn  surgery consulted awaiting recs  NPO except meds with IVF RUQ ultrasound negative for acute cholecytisits  obtain HIDA Scan  continue zosyn  surgery consulted awaiting recs  NPO except meds with IVF  fu CT A/P RUQ ultrasound negative for acute cholecytisits, ?PUD due to anemia FOBT positive   obtain HIDA Scan  continue zosyn  surgery consulted awaiting recs  NPO except meds with IVF  fu CT A/P RUQ ultrasound negative for acute cholecytisits, ?PUD due to anemia FOBT positive   continue zosyn  surgery consulted awaiting recs  NPO except meds with IVF  fu CT A/P  GI consulted Reynold 7/30 RUQ ultrasound (outside facility): Gallstone and hepatomegaly. FOBT+. Patient has only consumed Glucose drink in past 3 days. Pain possibly 2/2 to acute cholecystitis vs. PUD  - continue zosyn  - surgery consulted awaiting recs  - NPO except meds with IVF  - fu CT A/P  - GI consulted (Reynold) 7/30 RUQ ultrasound (outside facility): Gallstone and hepatomegaly. FOBT+. Patient has only consumed Glucose drink in past 3 days. Pain possibly 2/2 to acute cholecystitis vs. PUD  - continue zosyn  - surgery consulted, will get HIDA scan  - NPO except meds with IVF  - fu CT A/P  - GI consulted (Reynold)

## 2022-07-31 NOTE — ED ADULT NURSE NOTE - OBJECTIVE STATEMENT
patient came in ED from [facility]. as per EMS, patient has been having abdominal pain and an ultrasound was done yesterday gall stone was noted. on arrival patient was noted confused but is moaning and curling up for pain. afebrile. non-labored respiration noted. very dry oral cavity noted with crusting of left over food. abdomen noted tender on palpation. multiple areas of bruising noted, bilateral arms and lower abdomen. +2 lower leg edema noted. patient kept on fall precaution. patient not a good historian.

## 2022-07-31 NOTE — H&P ADULT - PROBLEM SELECTOR PROBLEM 7
Need for prophylactic measure Hypothyroidism CAD (coronary artery disease) Hypertension Type 2 diabetes mellitus

## 2022-07-31 NOTE — ED PROVIDER NOTE - CONSTITUTIONAL, MLM
normal... Well appearing, awake, alert, oriented to person, place, time/situation and in mod apparent distress dry mucous membrane

## 2022-07-31 NOTE — H&P ADULT - PROBLEM SELECTOR PLAN 10
baseline appears to be around 11-12  will order anemia panel   iron/b12/folate/transferrin/TIBC heparin 5000 mg q8 hold Anticoagulation in the setting of anemia and FOBT positive hold Anticoagulation in the setting of anemia and FOBT positive  - SCDs Chronic  - Continue home Synthroid 125 mcg PO qd.

## 2022-07-31 NOTE — CONSULT NOTE ADULT - ASSESSMENT
Abdominal Pain/GIB  ppi bid  diet as tolerate   aspiration precautions  monitor cbc   family wishes conservative management

## 2022-07-31 NOTE — H&P ADULT - PROBLEM SELECTOR PLAN 4
- Moderate dose insulin corrective scale q6 while NPO   - HgbA1c: 10% ducollins prn stable  continue Xanax prn , lexapro. stable  continue Xanax prn , lexapro stable  - continue Xanax prn , lexapro BL creatinine 1.4 likely from hypovolemia and blood loss from possible GIB   - IVF BL creatinine 1.4 likely from hypovolemia and blood loss from possible GIB   - IVF  -trend renal indices  -avoid nephrotoxic meds

## 2022-07-31 NOTE — CHART NOTE - NSCHARTNOTEFT_GEN_A_CORE
Notified by RN, patient not tolerating oral medications. Unable to swallow them. Will convert medications to non oral formulations where possible and hold medications with no interchange available. Speech and swallow eval when cleared for diet by GI.

## 2022-07-31 NOTE — ED ADULT NURSE NOTE - NSIMPLEMENTINTERV_GEN_ALL_ED
Implemented All Fall with Harm Risk Interventions:  [place] to call system. Call bell, personal items and telephone within reach. Instruct patient to call for assistance. Room bathroom lighting operational. Non-slip footwear when patient is off stretcher. Physically safe environment: no spills, clutter or unnecessary equipment. Stretcher in lowest position, wheels locked, appropriate side rails in place. Provide visual cue, wrist band, yellow gown, etc. Monitor gait and stability. Monitor for mental status changes and reorient to person, place, and time. Review medications for side effects contributing to fall risk. Reinforce activity limits and safety measures with patient and family. Provide visual clues: red socks.

## 2022-07-31 NOTE — H&P ADULT - PROBLEM SELECTOR PLAN 3
ducollins prn stable  continue Xanax prn , lexapro. BL creatinine 1.4  IVF BL creatinine 1.4 likely from hypovolemia and blood loss from possible GIB   IVF BL creatinine 1.4 likely from hypovolemia and blood loss from possible GIB   - IVF Patient has chronic leg edema, but there is moderate L calf tenderness on physical exam.    - BLE doppler US    - Continue holding ASA and plavix in setting of possible GI bleed

## 2022-07-31 NOTE — H&P ADULT - PROBLEM SELECTOR PROBLEM 4
Type 2 diabetes mellitus History of COPD Anxiety Anxiety and depression Acute kidney injury superimposed on CKD

## 2022-07-31 NOTE — ED PROVIDER NOTE - CARE PLAN
Principal Discharge DX:	Abdominal pain  Secondary Diagnosis:	Abdominal pain  Secondary Diagnosis:	GI bleed  Secondary Diagnosis:	TRENTON (acute kidney injury)   1

## 2022-07-31 NOTE — CONSULT NOTE ADULT - ASSESSMENT
87F with HTn, HLD, DM, CAD, NTEMI, GERD, presenting with abdominal pain. SUrgery consulted for cholelithiasis.    No evidence of acute cholecystitis on US. Patient nontender in RUQ.    -CT abdomen/pelvis  -workup other possible sources of leukocytosis  -GI c/s for anemia, ?PUD  -will consider HIDA if no other source of leukocytosis but low clinical suspicion given abdominal exam  -NPO/IVF for now  -bilateral LE duplex given calf tenderness L>R

## 2022-07-31 NOTE — H&P ADULT - HISTORY OF PRESENT ILLNESS
88 yo f PMHx Asthma presenting with abdominal pain.       ER Course:   Vitals: 118/63 HR 82 BPM RR 18/min T 97.6 F   Imaging: RUQ Ultrasound: Cholelithiasis without evidence of acute cholecystitis.  Labs: BUN/Cr: 73/2.00 WBC: 14.05 w/left shift   Recieved: morphine x 1, zofran IV x 1, 1 L normal saline bolus, zosyn x 1  86 y/o F with PMHx of CAD, NSTEMI, COPD, Asthma, DM2 (home insulin), Hypoparathyroidism, HTN, HLD, GERD, Major depressive disorder, s/p appendectomy presenting from [facility] with abdominal pain. At baseline patient is able to say "yes", "no", and other short phrases. However, on 7/28 she stopped eating, stopped speaking, and began pointing to the R side of her abdomen with pain. A RUQ ultrasound from 7/30 revealed a gallstone and hepatomegaly. Currently, patient understands questions but is unable to answer verbally due to medical condition. She points to RUQ and epigastric regions of abdomen as painful. Communication is limited but she denies chest pain, shortness of breath, headache, or leg pain.    ER Course:   Vitals: 118/63 HR 82 BPM RR 18/min T 97.6 F   Imaging: RUQ Ultrasound: Cholelithiasis without evidence of acute cholecystitis. CT abd/pelv:   Labs: BUN/Cr: 73/2.00 WBC: 14.05 w/left shift   Received: morphine x 1, zofran IV x 1, 1 L normal saline bolus, zosyn x 1  86 y/o F with PMHx of CAD, NSTEMI, COPD (no supp O2), Asthma, DM2 (home insulin), Hypoparathyroidism, HTN, HLD, GERD, Major depressive disorder, s/p appendectomy presenting from [facility] with abdominal pain. History obtained from facility and family. At baseline patient is able to say "yes", "no", and use short phrases. However, on 7/28 she stopped eating, stopped speaking, and began pointing to the R side of her abdomen with pain. A RUQ ultrasound from 7/30 revealed a gallstone and hepatomegaly. Patient was brought to the ED this afternoon (7/31) given worsening of symptoms. Currently, patient understands questions but is unable to answer verbally due to medical condition. She points to RUQ and epigastric regions of abdomen as painful. Communication is limited but she denies chest pain, shortness of breath, headache, or leg pain. Notably, patient is DNR/DNI, no tube feeds (MOLST), and no blood transfusions (as per Neil Chávez - son and health proxy, and daughter-in-law).    ER Course:   Vitals: 118/63 HR 82 BPM RR 18/min T 97.6 F   Imaging: RUQ Ultrasound: Cholelithiasis without evidence of acute cholecystitis.  Labs: BUN/Cr: 73/2.00 WBC: 14.05 w/left shift   Received: morphine x 1, zofran IV x 1, 1 L normal saline bolus, zosyn x 1

## 2022-07-31 NOTE — ED ADULT NURSE NOTE - CHIEF COMPLAINT QUOTE
patient came in ED from [facility] with c/o as per EMS abdominal pain. ultrasound was and Niurka was identified.

## 2022-07-31 NOTE — CONSULT NOTE ADULT - SUBJECTIVE AND OBJECTIVE BOX
INTERVAL HPI/OVERNIGHT EVENTS:    MEDICATIONS  (STANDING):  ALBUTerol    90 MICROgram(s) HFA Inhaler 2 Puff(s) Inhalation every 6 hours  atorvastatin 80 milliGRAM(s) Oral at bedtime  budesonide 160 MICROgram(s)/formoterol 4.5 MICROgram(s) Inhaler 2 Puff(s) Inhalation two times a day  escitalopram 10 milliGRAM(s) Oral daily  gabapentin 100 milliGRAM(s) Oral three times a day  levothyroxine 125 MICROGram(s) Oral daily  pantoprazole  Injectable 40 milliGRAM(s) IV Push two times a day  piperacillin/tazobactam IVPB.. 3.375 Gram(s) IV Intermittent every 8 hours  sodium chloride 0.9%. 1000 milliLiter(s) (75 mL/Hr) IV Continuous <Continuous>    MEDICATIONS  (PRN):  acetaminophen     Tablet .. 650 milliGRAM(s) Oral every 6 hours PRN Temp greater or equal to 38C (100.4F), Mild Pain (1 - 3)  aluminum hydroxide/magnesium hydroxide/simethicone Suspension 30 milliLiter(s) Oral every 4 hours PRN Dyspepsia  melatonin 3 milliGRAM(s) Oral at bedtime PRN Insomnia  ondansetron Injectable 4 milliGRAM(s) IV Push every 8 hours PRN Nausea and/or Vomiting      Allergies    doxycycline (Unknown)  iodine (Hives)  iodine containing compounds (Unknown)  shellfish (Anaphylaxis)  shellfish (Swelling; Short breath)    Intolerances        Review of Systems:    General:  No wt loss, fevers, chills, night sweats,fatigue,   Eyes:  Good vision, no reported pain  ENT:  No sore throat, pain, runny nose, dysphagia  CV:  No pain, palpitatioins, hypo/hypertension  Resp:  No dyspnea, cough, tachypnea, wheezing  GI:  No pain, No nausea, No vomiting, No diarrhea, No constipatiion, No weight loss, No fever, No pruritis, No rectal bleeding, No tarry stools, No dysphagia,  :  No pain, bleeding, incontinence, nocturia  Muscle:  No pain, weakness  Neuro:  No weakness, tingling, memory problems  Psych:  No fatigue, insomnia, mood problems, depression  Endocrine:  No polyuria, polydypsia, cold/heat intolerance  Heme:  No petechiae, ecchymosis, easy bruisability  Skin:  No rash, tattoos, scars, edema      Vital Signs Last 24 Hrs  T(C): 36.6 (2022 12:27), Max: 36.6 (2022 12:27)  T(F): 97.9 (2022 12:27), Max: 97.9 (2022 12:27)  HR: 77 (2022 12:27) (77 - 82)  BP: 118/63 (2022 11:43) (118/63 - 118/63)  BP(mean): --  RR: 18 (2022 12:27) (18 - 18)  SpO2: 99% (2022 12:) (98% - 99%)    Parameters below as of 2022 12:  Patient On (Oxygen Delivery Method): room air        PHYSICAL EXAM:    Constitutional: NAD, well-developed  HEENT: EOMI, throat clear  Neck: No LAD, supple  Respiratory: CTA and P  Cardiovascular: S1 and S2, RRR, no M  Gastrointestinal: BS+, soft, NT/ND, neg HSM,  Extremities: No peripheral edema, neg clubing, cyanosis  Vascular: 2+ peripheral pulses  Neurological: A/O x 3, no focal deficits  Psychiatric: Normal mood, normal affect  Skin: No rashes      LABS:                        9.4    14.05 )-----------( 250      ( 2022 12:15 )             31.0     07-31    141  |  111<H>  |  73<H>  ----------------------------<  206<H>  4.9   |  21<L>  |  2.00<H>    Ca    9.0      2022 12:15    TPro  6.8  /  Alb  3.1<L>  /  TBili  1.0  /  DBili  x   /  AST  21  /  ALT  18  /  AlkPhos  79  07-    PT/INR - ( 2022 12:15 )   PT: 12.6 sec;   INR: 1.08 ratio         PTT - ( 2022 12:15 )  PTT:27.7 sec  Urinalysis Basic - ( 2022 12:15 )    Color: Yellow / Appearance: Slightly Turbid / S.015 / pH: x  Gluc: x / Ketone: Negative  / Bili: Negative / Urobili: Negative   Blood: x / Protein: Negative / Nitrite: Negative   Leuk Esterase: Moderate / RBC: 0-2 /HPF / WBC 3-5   Sq Epi: x / Non Sq Epi: Occasional / Bacteria: Moderate        RADIOLOGY & ADDITIONAL TESTS:

## 2022-07-31 NOTE — H&P ADULT - NSHPSOCIALHISTORY_GEN_ALL_CORE
Lived: Tobacco: former smoker, denies  EtOH: denies  Recreational drug use: denies  Lives with: assisted living facility  but now in [facility]   Ambulates: walker  ADLs: assisted living facility Tobacco: former smoker, denies  EtOH: denies  Recreational drug use: denies  Lives with: assisted living facility  but now in [facility]   Ambulates: with walker, with assistance  ADLs: assisted living facility

## 2022-07-31 NOTE — CONSULT NOTE ADULT - SUBJECTIVE AND OBJECTIVE BOX
General Surgery Consult    HPI:  86 yo f PMHx Asthma presenting with abdominal pain.       ER Course:   Vitals: 118/63 HR 82 BPM RR 18/min T 97.6 F   Imaging: RUQ Ultrasound: Cholelithiasis without evidence of acute cholecystitis.  Labs: BUN/Cr: 73/2.00 WBC: 14.05 w/left shift       Patient unable to relay history due to dementia    PAST MEDICAL HISTORY:  Uncomplicated asthma, unspecified asthma severity    DM2 (diabetes mellitus, type 2)    Essential hypertension    Hypothyroidism, unspecified type    Pure hypercholesterolemia    Gastroesophageal reflux disease without esophagitis    Diabetes    Asthma    CAD (coronary artery disease)    HTN (hypertension)    HLD (hyperlipidemia)    Hypothyroid    NSTEMI (non-ST elevated myocardial infarction)        PAST SURGICAL HISTORY:     History of appendectomy    History of appendectomy    Abnormal findings on cardiac catheterization        MEDICATIONS:  acetaminophen     Tablet .. 650 milliGRAM(s) Oral every 6 hours PRN  ALBUTerol    90 MICROgram(s) HFA Inhaler 2 Puff(s) Inhalation every 6 hours  aluminum hydroxide/magnesium hydroxide/simethicone Suspension 30 milliLiter(s) Oral every 4 hours PRN  atorvastatin 80 milliGRAM(s) Oral at bedtime  budesonide 160 MICROgram(s)/formoterol 4.5 MICROgram(s) Inhaler 2 Puff(s) Inhalation two times a day  escitalopram 10 milliGRAM(s) Oral daily  gabapentin 100 milliGRAM(s) Oral three times a day  levothyroxine 125 MICROGram(s) Oral daily  melatonin 3 milliGRAM(s) Oral at bedtime PRN  metoprolol succinate ER 50 milliGRAM(s) Oral daily  ondansetron Injectable 4 milliGRAM(s) IV Push every 8 hours PRN  pantoprazole  Injectable 40 milliGRAM(s) IV Push two times a day  sodium chloride 0.9%. 1000 milliLiter(s) IV Continuous <Continuous>      ALLERGIES:  doxycycline (Unknown)  iodine (Hives)  iodine containing compounds (Unknown)  shellfish (Anaphylaxis)  shellfish (Swelling; Short breath)      VITALS & I/Os:  Vital Signs Last 24 Hrs  T(C): 36.6 (2022 12:27), Max: 36.6 (2022 12:27)  T(F): 97.9 (2022 12:27), Max: 97.9 (2022 12:27)  HR: 77 (2022 12:27) (77 - 82)  BP: 118/63 (2022 11:43) (118/63 - 118/63)  BP(mean): --  RR: 18 (2022 12:27) (18 - 18)  SpO2: 99% (2022 12:27) (98% - 99%)    Parameters below as of 2022 12:27  Patient On (Oxygen Delivery Method): room air        I&O's Summary      PHYSICAL EXAM:  General: No acute distress  HEENT: Anicteric, normocephalic  Respiratory: Nonlabored, equal chest rise  Cardiovascular: RRR  Abdominal: Soft, obese, minimally tender in lower abdomen. No rebound or guarding. No organomegaly, no palpable mass. Negative Leyva's.  Extremities: Warm, + calf tenderness left more than right    LABS:                        9.4    14.05 )-----------( 250      ( 2022 12:15 )             31.0     07    141  |  111<H>  |  73<H>  ----------------------------<  206<H>  4.9   |  21<L>  |  2.00<H>    Ca    9.0      2022 12:15    TPro  6.8  /  Alb  3.1<L>  /  TBili  1.0  /  DBili  x   /  AST  21  /  ALT  18  /  AlkPhos  79      Lactate:    PT/INR - ( 2022 12:15 )   PT: 12.6 sec;   INR: 1.08 ratio         PTT - ( 2022 12:15 )  PTT:27.7 sec          Urinalysis Basic - ( 2022 12:15 )    Color: Yellow / Appearance: Slightly Turbid / S.015 / pH: x  Gluc: x / Ketone: Negative  / Bili: Negative / Urobili: Negative   Blood: x / Protein: Negative / Nitrite: Negative   Leuk Esterase: Moderate / RBC: 0-2 /HPF / WBC 3-5   Sq Epi: x / Non Sq Epi: Occasional / Bacteria: Moderate        IMAGING:  < from: US Abdomen Upper Quadrant Right (22 @ 13:16) >    ACC: [id number] EXAM:  US ABDOMEN RT UPR QUADRANT                          PROCEDURE DATE:  2022          INTERPRETATION:  CLINICAL INFORMATION: Abdominal pain    COMPARISON:  Images from CT chest 2022 were reviewed.    TECHNIQUE: Sonography of the right upper quadrant.    FINDINGS:  Liver: Within normal limits.  Bile ducts: Normal caliber.  Gallbladder: Large (1.7 cm) gallstone.  Pancreas: Visualized portions are within normal limits.  Right kidney: 9.5 cm. No hydronephrosis.  Ascites: None.  IVC: Visualized portions are within normal limits.    IMPRESSION:  Cholelithiasis without evidence of acute cholecystitis.        --- End of Report ---            AMY ECHOLS MD; Attending Radiologist  This document has been electronicallysigned. 2022  1:40PM    < end of copied text >

## 2022-07-31 NOTE — H&P ADULT - ATTENDING COMMENTS
88 y/o F with PMHx of CAD, NSTEMI, COPD (no supp O2), Asthma, DM2 (home insulin), Hypoparathyroidism, HTN, HLD, GERD, Major depressive disorder, s/p appendectomy presenting from [facility] being admitted for medical management of acute cholecystitis.     Suspect abd pain is 2/2 upper GI bleed vs. cholecystitis. MOLST in chart. Pt is DNR/DNI and per son Neil refusing blood transfusions at this time.. will treat conservatively with IV antibiotics and NPO for now. HIDA scan ordered. Palliative care consult.     Agree with H&P as outlined above, edited where appropriate.

## 2022-07-31 NOTE — H&P ADULT - PROBLEM SELECTOR PLAN 9
heparin 5000 mg q8 Chronic  - Continue home Synthroid 125 mcg PO qd. Chronic  - Hx of MI  - hold home Aspirin 81 mg PO qd, Plavix 75 mg PO qd, ?if requires surgical intervention   - continue imdur 30 mg daily   - Echo 3/11/2022: Left ventricle was of normal size, mild left ventricular hypertrophy LV systolic function EF 65%. Moderate to severe aortic stenosis. Chronic  - Hx of MI  - hold home Aspirin 81 mg PO qd, Plavix 75 mg PO qd  - hold imdur 30 mg daily   - Echo 3/11/2022: Left ventricle was of normal size, mild left ventricular hypertrophy LV systolic function EF 65%. Moderate to severe aortic stenosis.

## 2022-07-31 NOTE — H&P ADULT - NSICDXPASTMEDICALHX_GEN_ALL_CORE_FT
PAST MEDICAL HISTORY:  Asthma     CAD (coronary artery disease)     Diabetes     DM2 (diabetes mellitus, type 2)     Essential hypertension     Gastroesophageal reflux disease without esophagitis     HLD (hyperlipidemia)     HTN (hypertension)     Hypothyroid     Hypothyroidism, unspecified type     NSTEMI (non-ST elevated myocardial infarction)     Pure hypercholesterolemia     Uncomplicated asthma, unspecified asthma severity      PAST MEDICAL HISTORY:  Asthma     CAD (coronary artery disease)     Diabetes     DM2 (diabetes mellitus, type 2)     Essential hypertension     Gastroesophageal reflux disease without esophagitis     HLD (hyperlipidemia)     HTN (hypertension)     Hypoparathyroidism     Hypothyroid     Hypothyroidism, unspecified type     NSTEMI (non-ST elevated myocardial infarction)     Pure hypercholesterolemia     Uncomplicated asthma, unspecified asthma severity

## 2022-07-31 NOTE — H&P ADULT - PROBLEM SELECTOR PROBLEM 5
CAD (coronary artery disease) Hypertension Type 2 diabetes mellitus History of COPD Anxiety and depression

## 2022-07-31 NOTE — H&P ADULT - ASSESSMENT
86 yo F PMHx COPD presenting with abdominal pain, admitted for medical management of acute cholecystitis.  88 y/o F with PMHx of CAD, NSTEMI, COPD (no supp O2), Asthma, DM2 (home insulin), Hypoparathyroidism, HTN, HLD, GERD, Major depressive disorder, s/p appendectomy presenting from [facility] being admitted for medical management of acute cholecystitis.

## 2022-07-31 NOTE — H&P ADULT - PROBLEM SELECTOR PLAN 2
BL creatinine 1.4  IVF baseline appears to be around 11-12  will order anemia panel   FOBT positive   start PPI BID   consult GI   iron/b12/folate/transferrin/TIBC baseline appears to be around 11-12  will order anemia panel   FOBT positive   start PPI BID   consult GI   iron/b12/folate/transferrin/TIBC  transfuse < 8 No pmhx of anemia. Baseline appears to be around 11-12  - will order anemia panel   - FOBT positive   - start PPI BID   - consult GI   - iron/b12/folate/transferrin/TIBC  - transfuse < 8 given CAD, NSTEMI hx No pmhx of anemia. Baseline appears to be around 11-12. FAMILY REFUSED BLOOD TRANSFUSIONS. Health proxy (Neil Chávez - son) and wife made aware of benefits of blood transfusion and the consequences that result from not transfusing an actively bleeding patient (e.g. organ failure, heart attack, stroke). Family made aware that there is currently no time-frame for the duration of bleeding, and made aware that bleeding may stop through natural clotting. Son endorsed "No heroic measures" .  - will order anemia panel   - FOBT positive   - start PPI BID   - consult GI   - iron/b12/folate/transferrin/TIBC

## 2022-07-31 NOTE — ED ADULT NURSE REASSESSMENT NOTE - NS ED NURSE REASSESS COMMENT FT1
patient offered vanilla pudding patient just took a teaspoon full and pushed away the rest of the cup. mashed potato also offered patient pushed away. patient offered vanilla pudding patient just took a teaspoon full and pushed away the rest of the cup. mashed potato also offered patient pushed away. Son Calvin came to visit the patient earlier and stated in the facility the staff are having difficulty feeding the patient.

## 2022-07-31 NOTE — H&P ADULT - PROBLEM SELECTOR PLAN 8
stable  continue Xanax prn , lexapro. Chronic  - Continue home Synthroid 125 mcg PO qd. Chronic  - Hx of MI  - hold home Aspirin 81 mg PO qd, Plavix 75 mg PO qd, ?if requires surgical intervention   - continue imdur 30 mg daily   - Echo 3/11/2022: Left ventricle was of normal size, mild left ventricular hypertrophy LV systolic function EF 65%. Moderate to severe aortic stenosis. bp stable  - change lasix to 20mg prn on discharge per renal  - Continue home Metoprolol Succinate 50 mg qd with hold parameters  -monitor bp.

## 2022-07-31 NOTE — H&P ADULT - PROBLEM SELECTOR PLAN 6
Chronic  - Hx of MI  - hold home Aspirin 81 mg PO qd, Plavix 75 mg PO qd, ?if requires surgical intervention   - continue imdur 30 mg daily   - Echo 3/11/2022: Left ventricle was of normal size, mild left ventricular hypertrophy LV systolic function EF 65%. Moderate to severe aortic stenosis. bp stable  - change lasix to 20mg prn on discharge per renal  - Continue home Metoprolol Succinate 50 mg qd with hold parameters  -monitor bp. - Moderate dose insulin corrective scale q6 while NPO   - HgbA1c: 10% Not on supplemental O2  - duonebs prn  - symbicort therapeutic exchange Not on supplemental O2  - duonebs prn  - symbicort therapeutic exchange  - Supp O2 if required

## 2022-07-31 NOTE — H&P ADULT - NSHPPHYSICALEXAM_GEN_ALL_CORE
T(C): 36.6 (07-31-22 @ 12:27), Max: 36.6 (07-31-22 @ 12:27)  HR: 77 (07-31-22 @ 12:27) (77 - 82)  BP: 118/63 (07-31-22 @ 11:43) (118/63 - 118/63)  RR: 18 (07-31-22 @ 12:27) (18 - 18)  SpO2: 99% (07-31-22 @ 12:27) (98% - 99%)    GENERAL: patient appears anxious and in pain  EYES: sclera clear, no exudates  ENMT: + Dry mucous membranes, +dry oropharynx  NECK: supple, soft, no thyromegaly noted  LUNGS: good air entry bilaterally, clear to auscultation, symmetric breath sounds, no wheezing or rhonchi appreciated  HEART: +systolic murmur radiating to bilateral carotids, +BLE edema, soft S1/S2, RRR, no murmurs noted  GASTROINTESTINAL: +RUQ and epigastric tenderness, +hypoactive bowel sounds,   MUSCULOSKELETAL: +L calf tenderness, +BLE edema, DP pulses palpable, No cyanosis, obvious deformity  NEUROLOGIC: +non-verbal and minimally communicative, +AAOx1 (minimum), 4/5 strength all 4 extremities, no obvious sensory deficits  PSYCHIATRIC: +anxious  HEME/LYMPH: + ecchymosis waistline, no palpable supraclavicular nodules T(C): 36.6 (07-31-22 @ 12:27), Max: 36.6 (07-31-22 @ 12:27)  HR: 77 (07-31-22 @ 12:27) (77 - 82)  BP: 118/63 (07-31-22 @ 11:43) (118/63 - 118/63)  RR: 18 (07-31-22 @ 12:27) (18 - 18)  SpO2: 99% (07-31-22 @ 12:27) (98% - 99%)    GENERAL: patient appears anxious and in pain  EYES: sclera clear, no exudates  ENMT: + Dry mucous membranes, +dry oropharynx  NECK: supple, soft, no thyromegaly noted  LUNGS: good air entry bilaterally, clear to auscultation, symmetric breath sounds, no wheezing or rhonchi appreciated  HEART: +systolic murmur radiating to bilateral carotids, +BLE edema, soft S1/S2, RRR, no murmurs noted  GASTROINTESTINAL: +RUQ and epigastric tenderness, +hypoactive bowel sounds,   MUSCULOSKELETAL: +L calf tenderness, +BLE edema, DP pulses palpable, No cyanosis, obvious deformity  NEUROLOGIC: minimally verbal and minimally communicative, +AAOx1 (minimum), 4/5 strength all 4 extremities, no obvious sensory deficits  PSYCHIATRIC: +anxious  HEME/LYMPH: + ecchymosis waistline, no palpable supraclavicular nodules

## 2022-08-01 DIAGNOSIS — K80.20 CALCULUS OF GALLBLADDER WITHOUT CHOLECYSTITIS WITHOUT OBSTRUCTION: ICD-10-CM

## 2022-08-01 LAB
ANION GAP SERPL CALC-SCNC: 7 MMOL/L — SIGNIFICANT CHANGE UP (ref 5–17)
BASE EXCESS BLDA CALC-SCNC: -13.2 MMOL/L — LOW (ref -2–3)
BASOPHILS # BLD AUTO: 0.09 K/UL — SIGNIFICANT CHANGE UP (ref 0–0.2)
BASOPHILS NFR BLD AUTO: 0.7 % — SIGNIFICANT CHANGE UP (ref 0–2)
BLOOD GAS COMMENTS ARTERIAL: SIGNIFICANT CHANGE UP
BUN SERPL-MCNC: 58 MG/DL — HIGH (ref 7–23)
CALCIUM SERPL-MCNC: 8.2 MG/DL — LOW (ref 8.5–10.1)
CHLORIDE SERPL-SCNC: 114 MMOL/L — HIGH (ref 96–108)
CO2 SERPL-SCNC: 22 MMOL/L — SIGNIFICANT CHANGE UP (ref 22–31)
CREAT SERPL-MCNC: 2 MG/DL — HIGH (ref 0.5–1.3)
EGFR: 24 ML/MIN/1.73M2 — LOW
EOSINOPHIL # BLD AUTO: 0.15 K/UL — SIGNIFICANT CHANGE UP (ref 0–0.5)
EOSINOPHIL NFR BLD AUTO: 1.2 % — SIGNIFICANT CHANGE UP (ref 0–6)
GLUCOSE SERPL-MCNC: 168 MG/DL — HIGH (ref 70–99)
HCO3 BLDA-SCNC: 13 MMOL/L — LOW (ref 21–28)
HCT VFR BLD CALC: 28.3 % — LOW (ref 34.5–45)
HGB BLD-MCNC: 8.7 G/DL — LOW (ref 11.5–15.5)
IMM GRANULOCYTES NFR BLD AUTO: 0.4 % — SIGNIFICANT CHANGE UP (ref 0–1.5)
LYMPHOCYTES # BLD AUTO: 16.8 % — SIGNIFICANT CHANGE UP (ref 13–44)
LYMPHOCYTES # BLD AUTO: 2.02 K/UL — SIGNIFICANT CHANGE UP (ref 1–3.3)
MCHC RBC-ENTMCNC: 28.2 PG — SIGNIFICANT CHANGE UP (ref 27–34)
MCHC RBC-ENTMCNC: 30.7 GM/DL — LOW (ref 32–36)
MCV RBC AUTO: 91.6 FL — SIGNIFICANT CHANGE UP (ref 80–100)
MONOCYTES # BLD AUTO: 0.74 K/UL — SIGNIFICANT CHANGE UP (ref 0–0.9)
MONOCYTES NFR BLD AUTO: 6.1 % — SIGNIFICANT CHANGE UP (ref 2–14)
NEUTROPHILS # BLD AUTO: 9 K/UL — HIGH (ref 1.8–7.4)
NEUTROPHILS NFR BLD AUTO: 74.8 % — SIGNIFICANT CHANGE UP (ref 43–77)
NRBC # BLD: 0 /100 WBCS — SIGNIFICANT CHANGE UP (ref 0–0)
PCO2 BLDA: 26 MMHG — LOW (ref 32–35)
PH BLDA: 7.31 — LOW (ref 7.35–7.45)
PLATELET # BLD AUTO: 228 K/UL — SIGNIFICANT CHANGE UP (ref 150–400)
PO2 BLDA: 156 MMHG — HIGH (ref 83–108)
POTASSIUM SERPL-MCNC: 4.6 MMOL/L — SIGNIFICANT CHANGE UP (ref 3.5–5.3)
POTASSIUM SERPL-SCNC: 4.6 MMOL/L — SIGNIFICANT CHANGE UP (ref 3.5–5.3)
RBC # BLD: 3.09 M/UL — LOW (ref 3.8–5.2)
RBC # FLD: 14.4 % — SIGNIFICANT CHANGE UP (ref 10.3–14.5)
SAO2 % BLDA: 100 % — HIGH (ref 94–98)
SODIUM SERPL-SCNC: 143 MMOL/L — SIGNIFICANT CHANGE UP (ref 135–145)
WBC # BLD: 12.05 K/UL — HIGH (ref 3.8–10.5)
WBC # FLD AUTO: 12.05 K/UL — HIGH (ref 3.8–10.5)

## 2022-08-01 PROCEDURE — 71045 X-RAY EXAM CHEST 1 VIEW: CPT | Mod: 26

## 2022-08-01 PROCEDURE — 99232 SBSQ HOSP IP/OBS MODERATE 35: CPT

## 2022-08-01 PROCEDURE — 99233 SBSQ HOSP IP/OBS HIGH 50: CPT

## 2022-08-01 PROCEDURE — 99497 ADVNCD CARE PLAN 30 MIN: CPT

## 2022-08-01 PROCEDURE — 78226 HEPATOBILIARY SYSTEM IMAGING: CPT | Mod: 26

## 2022-08-01 RX ORDER — SODIUM CHLORIDE 9 MG/ML
1000 INJECTION, SOLUTION INTRAVENOUS
Refills: 0 | Status: DISCONTINUED | OUTPATIENT
Start: 2022-08-01 | End: 2022-08-01

## 2022-08-01 RX ORDER — OLANZAPINE 15 MG/1
2.5 TABLET, FILM COATED ORAL ONCE
Refills: 0 | Status: DISCONTINUED | OUTPATIENT
Start: 2022-08-01 | End: 2022-08-01

## 2022-08-01 RX ORDER — KETOROLAC TROMETHAMINE 30 MG/ML
15 SYRINGE (ML) INJECTION
Refills: 0 | Status: DISCONTINUED | OUTPATIENT
Start: 2022-08-01 | End: 2022-08-01

## 2022-08-01 RX ORDER — ACETAMINOPHEN 500 MG
650 TABLET ORAL EVERY 6 HOURS
Refills: 0 | Status: DISCONTINUED | OUTPATIENT
Start: 2022-08-01 | End: 2022-08-02

## 2022-08-01 RX ORDER — OLANZAPINE 15 MG/1
2.5 TABLET, FILM COATED ORAL ONCE
Refills: 0 | Status: COMPLETED | OUTPATIENT
Start: 2022-08-01 | End: 2022-08-01

## 2022-08-01 RX ORDER — SODIUM CHLORIDE 9 MG/ML
1000 INJECTION, SOLUTION INTRAVENOUS
Refills: 0 | Status: DISCONTINUED | OUTPATIENT
Start: 2022-08-01 | End: 2022-08-02

## 2022-08-01 RX ORDER — SODIUM CHLORIDE 9 MG/ML
500 INJECTION INTRAMUSCULAR; INTRAVENOUS; SUBCUTANEOUS ONCE
Refills: 0 | Status: COMPLETED | OUTPATIENT
Start: 2022-08-01 | End: 2022-08-02

## 2022-08-01 RX ORDER — IPRATROPIUM/ALBUTEROL SULFATE 18-103MCG
3 AEROSOL WITH ADAPTER (GRAM) INHALATION ONCE
Refills: 0 | Status: COMPLETED | OUTPATIENT
Start: 2022-08-01 | End: 2022-08-01

## 2022-08-01 RX ADMIN — BUDESONIDE AND FORMOTEROL FUMARATE DIHYDRATE 2 PUFF(S): 160; 4.5 AEROSOL RESPIRATORY (INHALATION) at 20:25

## 2022-08-01 RX ADMIN — OLANZAPINE 2.5 MILLIGRAM(S): 15 TABLET, FILM COATED ORAL at 20:32

## 2022-08-01 RX ADMIN — PIPERACILLIN AND TAZOBACTAM 25 GRAM(S): 4; .5 INJECTION, POWDER, LYOPHILIZED, FOR SOLUTION INTRAVENOUS at 12:19

## 2022-08-01 RX ADMIN — ALBUTEROL 2 PUFF(S): 90 AEROSOL, METERED ORAL at 20:15

## 2022-08-01 RX ADMIN — PANTOPRAZOLE SODIUM 40 MILLIGRAM(S): 20 TABLET, DELAYED RELEASE ORAL at 17:20

## 2022-08-01 RX ADMIN — SODIUM CHLORIDE 50 MILLILITER(S): 9 INJECTION, SOLUTION INTRAVENOUS at 18:00

## 2022-08-01 RX ADMIN — Medication 3 MILLILITER(S): at 23:21

## 2022-08-01 RX ADMIN — PIPERACILLIN AND TAZOBACTAM 25 GRAM(S): 4; .5 INJECTION, POWDER, LYOPHILIZED, FOR SOLUTION INTRAVENOUS at 03:49

## 2022-08-01 RX ADMIN — ALBUTEROL 2 PUFF(S): 90 AEROSOL, METERED ORAL at 12:20

## 2022-08-01 RX ADMIN — Medication 15 MILLIGRAM(S): at 17:20

## 2022-08-01 RX ADMIN — PANTOPRAZOLE SODIUM 40 MILLIGRAM(S): 20 TABLET, DELAYED RELEASE ORAL at 08:37

## 2022-08-01 RX ADMIN — SODIUM CHLORIDE 75 MILLILITER(S): 9 INJECTION INTRAMUSCULAR; INTRAVENOUS; SUBCUTANEOUS at 08:37

## 2022-08-01 NOTE — PROGRESS NOTE ADULT - PROBLEM SELECTOR PLAN 1
Patient with abdominal pain due to cholelithiasis   Await HIDA scan to assess for possible acute cholecystitis  Continue broad spectrum antibiotics  Keep NPO for now  Prognosis is poor.  GI consulted. Patient with abdominal pain due to cholelithiasis   Await HIDA scan to assess for possible acute cholecystitis  Continue broad spectrum antibiotics  Keep NPO for now  Prognosis is poor.  GI / surgery consulted- await HIDA  gentle hydration while NPO

## 2022-08-01 NOTE — SWALLOW BEDSIDE ASSESSMENT ADULT - ASR SWALLOW RECOMMEND DIAG
Objective assessment not indicated at this time due to limited assessment and no overt s/s aspiration with small amounts of thin liquids.  Will follow to reassess at bedside.

## 2022-08-01 NOTE — PATIENT PROFILE ADULT - FALL HARM RISK - HARM RISK INTERVENTIONS
Assistance with ambulation/Assistance OOB with selected safe patient handling equipment/Communicate Risk of Fall with Harm to all staff/Discuss with provider need for PT consult/Monitor for mental status changes/Monitor gait and stability/Move patient closer to nurses' station/Provide patient with walking aids - walker, cane, crutches/Reinforce activity limits and safety measures with patient and family/Reorient to person, place and time as needed/Tailored Fall Risk Interventions/Toileting schedule using arm’s reach rule for commode and bathroom/Use of alarms - bed, chair and/or voice tab/Visual Cue: Yellow wristband and red socks/Bed in lowest position, wheels locked, appropriate side rails in place/Call bell, personal items and telephone in reach/Instruct patient to call for assistance before getting out of bed or chair/Non-slip footwear when patient is out of bed/[place] to call system/Physically safe environment - no spills, clutter or unnecessary equipment/Purposeful Proactive Rounding/Room/bathroom lighting operational, light cord in reach

## 2022-08-01 NOTE — PROGRESS NOTE ADULT - SUBJECTIVE AND OBJECTIVE BOX
MEDICAL ATTENDING NOTE    Patient is a 87y old  Female who presents with a chief complaint of abdominal pain (01 Aug 2022 06:23)      INTERVAL HPI/OVERNIGHT EVENTS: offers no new complaints today    MEDICATIONS  (STANDING):  ALBUTerol    90 MICROgram(s) HFA Inhaler 2 Puff(s) Inhalation every 6 hours  budesonide 160 MICROgram(s)/formoterol 4.5 MICROgram(s) Inhaler 2 Puff(s) Inhalation two times a day  levothyroxine Injectable 62.5 MICROGram(s) IV Push at bedtime  pantoprazole  Injectable 40 milliGRAM(s) IV Push two times a day  piperacillin/tazobactam IVPB.. 3.375 Gram(s) IV Intermittent every 8 hours  sodium chloride 0.9%. 1000 milliLiter(s) (75 mL/Hr) IV Continuous <Continuous>    MEDICATIONS  (PRN):  acetaminophen  Suppository .. 650 milliGRAM(s) Rectal every 6 hours PRN Mild Pain (1 - 3)  ondansetron Injectable 4 milliGRAM(s) IV Push every 8 hours PRN Nausea and/or Vomiting      __________________________________________________  ----------------------------------------------------------------------------------  REVIEW OF SYSTEMS: no fever; other ROS limited as patient drowsy and uncooperative with ROS       Vital Signs Last 24 Hrs  T(C): 36.6 (01 Aug 2022 09:58), Max: 36.6 (2022 12:27)  T(F): 97.8 (01 Aug 2022 09:58), Max: 97.9 (2022 12:27)  HR: 74 (01 Aug 2022 09:58) (74 - 85)  BP: 133/73 (01 Aug 2022 09:58) (121/59 - 148/62)  RR: 18 (01 Aug 2022 09:58) (16 - 18)  SpO2: 94% (01 Aug 2022 09:58) (94% - 99%)    Parameters below as of 01 Aug 2022 09:58  Patient On (Oxygen Delivery Method): room air        _________________  PHYSICAL EXAM:  ---------------------------   NAD; Normocephalic;   LUNGS - no wheezing  HEART: S1 S2+   ABDOMEN: Soft, Nontender,  distended and obese; hypoactive bowel sounds  EXTREMITIES: no cyanosis; no edema  NERVOUS SYSTEM:  drowsy but arousable;  follows minimal commands    _________________________________________________  LABS:                        8.7    12.05 )-----------( 228      ( 01 Aug 2022 04:50 )             28.3     08-    143  |  114<H>  |  58<H>  ----------------------------<  168<H>  4.6   |  22  |  2.00<H>    Ca    8.2<L>      01 Aug 2022 04:50    TPro  6.8  /  Alb  3.1<L>  /  TBili  1.0  /  DBili  x   /  AST  21  /  ALT  18  /  AlkPhos  79      PT/INR - ( 2022 12:15 )   PT: 12.6 sec;   INR: 1.08 ratio         PTT - ( 2022 12:15 )  PTT:27.7 sec  Urinalysis Basic - ( 2022 12:15 )    Color: Yellow / Appearance: Slightly Turbid / S.015 / pH: x  Gluc: x / Ketone: Negative  / Bili: Negative / Urobili: Negative   Blood: x / Protein: Negative / Nitrite: Negative   Leuk Esterase: Moderate / RBC: 0-2 /HPF / WBC 3-5   Sq Epi: x / Non Sq Epi: Occasional / Bacteria: Moderate      CAPILLARY BLOOD GLUCOSE      < from: CT Abdomen and Pelvis No Cont (22 @ 20:15) >  INTERPRETATION:  CLINICAL INFORMATION: Abdominal pain.    COMPARISON: Right upper quadrant ultrasound 2022 and CT scan abdomen   pelvis for/2017.    CONTRAST/COMPLICATIONS:  IV Contrast: NONE  Oral Contrast: NONE  Complications: None reported at time of study completion    PROCEDURE:  CT of the Abdomen and Pelvis was performed.  Sagittal and coronal reformats were performed.    FINDINGS:    LOWER CHEST: Within normal limits.    The evaluation of the solid organ parenchyma is limited without   intravenous contrast.    LIVER: Elongated right hepatic lobe.  BILE DUCTS: Common bile duct measures up to 9 mm.  GALLBLADDER: Distended gallbladder with large gallstone.  No gallbladder wall thickening noted.  SPLEEN: Within normal limits.  PANCREAS: Atrophic.  ADRENALS: Within normal limits.  KIDNEYS/URETERS: Within normal limits.    BLADDER: Within normal limits.  REPRODUCTIVE ORGANS: Calcified fibroid uterus.    BOWEL: Large air/fluid-filled periampullary and transverse portion   duodenal diverticulum.  Sigmoid diverticulosis, without CT evidence of diverticulitis.  Minimally distended fluid-filled loops of small bowel.  No bowel obstruction.  PERITONEUM: No ascites.    VESSELS: Atherosclerotic changes.  RETROPERITONEUM/LYMPH NODES: No lymphadenopathy.    ABDOMINAL WALL:  Rectus diastases with small fat-containing umbilical hernia.    BONES:  Degenerative changes.  Mild, age indeterminate compression deformity superior endplate L3   vertebral body.    IMPRESSION:    Cholelithiasis with distended gallbladder.    Other findings as discussed above.    --- End of Report ---    < end of copied text >                Plan of care was discussed with patient ; all questions and concerns were addressed and care was aligned with patient's wishes.             MEDICAL ATTENDING NOTE    Patient is a 87y old  Female who presents with a chief complaint of abdominal pain (01 Aug 2022 06:23)      INTERVAL HPI/OVERNIGHT EVENTS: offers no new complaints today    MEDICATIONS  (STANDING):  ALBUTerol    90 MICROgram(s) HFA Inhaler 2 Puff(s) Inhalation every 6 hours  budesonide 160 MICROgram(s)/formoterol 4.5 MICROgram(s) Inhaler 2 Puff(s) Inhalation two times a day  levothyroxine Injectable 62.5 MICROGram(s) IV Push at bedtime  pantoprazole  Injectable 40 milliGRAM(s) IV Push two times a day  piperacillin/tazobactam IVPB.. 3.375 Gram(s) IV Intermittent every 8 hours  sodium chloride 0.9%. 1000 milliLiter(s) (75 mL/Hr) IV Continuous <Continuous>    MEDICATIONS  (PRN):  acetaminophen  Suppository .. 650 milliGRAM(s) Rectal every 6 hours PRN Mild Pain (1 - 3)  ondansetron Injectable 4 milliGRAM(s) IV Push every 8 hours PRN Nausea and/or Vomiting      __________________________________________________  ----------------------------------------------------------------------------------  REVIEW OF SYSTEMS: no fever; other ROS limited as patient drowsy and uncooperative with ROS       Vital Signs Last 24 Hrs  T(C): 36.6 (01 Aug 2022 09:58), Max: 36.6 (2022 12:27)  T(F): 97.8 (01 Aug 2022 09:58), Max: 97.9 (2022 12:27)  HR: 74 (01 Aug 2022 09:58) (74 - 85)  BP: 133/73 (01 Aug 2022 09:58) (121/59 - 148/62)  RR: 18 (01 Aug 2022 09:58) (16 - 18)  SpO2: 94% (01 Aug 2022 09:58) (94% - 99%)    Parameters below as of 01 Aug 2022 09:58  Patient On (Oxygen Delivery Method): room air        _________________  PHYSICAL EXAM:  ---------------------------   NAD; Normocephalic;   LUNGS - no wheezing  HEART: S1 S2+   ABDOMEN: Soft, Nontender,  distended and obese; hypoactive bowel sounds  EXTREMITIES: no cyanosis; no edema  NERVOUS SYSTEM:  drowsy but arousable;  follows minimal commands    _________________________________________________  LABS:                        8.7    12.05 )-----------( 228      ( 01 Aug 2022 04:50 )             28.3     08-    143  |  114<H>  |  58<H>  ----------------------------<  168<H>  4.6   |  22  |  2.00<H>    Ca    8.2<L>      01 Aug 2022 04:50    TPro  6.8  /  Alb  3.1<L>  /  TBili  1.0  /  DBili  x   /  AST  21  /  ALT  18  /  AlkPhos  79      PT/INR - ( 2022 12:15 )   PT: 12.6 sec;   INR: 1.08 ratio         PTT - ( 2022 12:15 )  PTT:27.7 sec  Urinalysis Basic - ( 2022 12:15 )    Color: Yellow / Appearance: Slightly Turbid / S.015 / pH: x  Gluc: x / Ketone: Negative  / Bili: Negative / Urobili: Negative   Blood: x / Protein: Negative / Nitrite: Negative   Leuk Esterase: Moderate / RBC: 0-2 /HPF / WBC 3-5   Sq Epi: x / Non Sq Epi: Occasional / Bacteria: Moderate      CAPILLARY BLOOD GLUCOSE      < from: CT Abdomen and Pelvis No Cont (22 @ 20:15) >  INTERPRETATION:  CLINICAL INFORMATION: Abdominal pain.    COMPARISON: Right upper quadrant ultrasound 2022 and CT scan abdomen   pelvis for/2017.    CONTRAST/COMPLICATIONS:  IV Contrast: NONE  Oral Contrast: NONE  Complications: None reported at time of study completion    PROCEDURE:  CT of the Abdomen and Pelvis was performed.  Sagittal and coronal reformats were performed.    FINDINGS:    LOWER CHEST: Within normal limits.    The evaluation of the solid organ parenchyma is limited without   intravenous contrast.    LIVER: Elongated right hepatic lobe.  BILE DUCTS: Common bile duct measures up to 9 mm.  GALLBLADDER: Distended gallbladder with large gallstone.  No gallbladder wall thickening noted.  SPLEEN: Within normal limits.  PANCREAS: Atrophic.  ADRENALS: Within normal limits.  KIDNEYS/URETERS: Within normal limits.    BLADDER: Within normal limits.  REPRODUCTIVE ORGANS: Calcified fibroid uterus.    BOWEL: Large air/fluid-filled periampullary and transverse portion   duodenal diverticulum.  Sigmoid diverticulosis, without CT evidence of diverticulitis.  Minimally distended fluid-filled loops of small bowel.  No bowel obstruction.  PERITONEUM: No ascites.    VESSELS: Atherosclerotic changes.  RETROPERITONEUM/LYMPH NODES: No lymphadenopathy.    ABDOMINAL WALL:  Rectus diastases with small fat-containing umbilical hernia.    BONES:  Degenerative changes.  Mild, age indeterminate compression deformity superior endplate L3   vertebral body.    IMPRESSION:    Cholelithiasis with distended gallbladder.    Other findings as discussed above.    --- End of Report ---    < end of copied text >                Plan of care was discussed with patient's son/ HCP ; all questions and concerns were addressed and care was aligned with patient's wishes.

## 2022-08-01 NOTE — PROGRESS NOTE ADULT - SUBJECTIVE AND OBJECTIVE BOX
INTERVAL HPI/OVERNIGHT EVENTS:    STATUS POST:      POST OPERATIVE DAY #:     SUBJECTIVE:  Flatus: [ ] YES [ ] NO             Bowel Movement: [ ] YES [ ] NO  Pain (0-10):            Pain Control Adequate: [ ] YES [ ] NO  Nausea: [ ] YES [ ] NO            Vomiting: [ ] YES [ ] NO  Diarrhea: [ ] YES [ ] NO         Constipation: [ ] YES [ ] NO     Chest Pain: [ ] YES [ ] NO    SOB:  [ ] YES [ ] NO    MEDICATIONS  (STANDING):  ALBUTerol    90 MICROgram(s) HFA Inhaler 2 Puff(s) Inhalation every 6 hours  budesonide 160 MICROgram(s)/formoterol 4.5 MICROgram(s) Inhaler 2 Puff(s) Inhalation two times a day  levothyroxine Injectable 62.5 MICROGram(s) IV Push at bedtime  pantoprazole  Injectable 40 milliGRAM(s) IV Push two times a day  piperacillin/tazobactam IVPB.. 3.375 Gram(s) IV Intermittent every 8 hours  sodium chloride 0.9%. 1000 milliLiter(s) (75 mL/Hr) IV Continuous <Continuous>    MEDICATIONS  (PRN):  acetaminophen  Suppository .. 650 milliGRAM(s) Rectal every 6 hours PRN Mild Pain (1 - 3)  ondansetron Injectable 4 milliGRAM(s) IV Push every 8 hours PRN Nausea and/or Vomiting      Vital Signs Last 24 Hrs  T(C): 36.3 (2022 20:49), Max: 36.6 (2022 12:27)  T(F): 97.4 (2022 20:49), Max: 97.9 (2022 12:27)  HR: 83 (2022 20:49) (77 - 83)  BP: 121/59 (2022 20:49) (118/63 - 121/59)  BP(mean): --  RR: 16 (2022 20:49) (16 - 18)  SpO2: 99% (2022 20:49) (98% - 99%)    Parameters below as of 2022 20:49  Patient On (Oxygen Delivery Method): room air        PHYSICAL EXAM:      Constitutional: A&Ox3    Breasts:    Respiratory: non labored breathing, no respiratory distress    Cardiovascular: NSR, RRR    Gastrointestinal:                 Incision:    Genitourinary:    Extremities: (-) edema                  I&O's Detail      LABS:                        8.7    12.05 )-----------( 228      ( 01 Aug 2022 04:50 )             28.3     08-    143  |  114<H>  |  58<H>  ----------------------------<  168<H>  4.6   |  22  |  2.00<H>    Ca    8.2<L>      01 Aug 2022 04:50    TPro  6.8  /  Alb  3.1<L>  /  TBili  1.0  /  DBili  x   /  AST  21  /  ALT  18  /  AlkPhos  79  07-31    PT/INR - ( 2022 12:15 )   PT: 12.6 sec;   INR: 1.08 ratio         PTT - ( 2022 12:15 )  PTT:27.7 sec  Urinalysis Basic - ( 2022 12:15 )    Color: Yellow / Appearance: Slightly Turbid / S.015 / pH: x  Gluc: x / Ketone: Negative  / Bili: Negative / Urobili: Negative   Blood: x / Protein: Negative / Nitrite: Negative   Leuk Esterase: Moderate / RBC: 0-2 /HPF / WBC 3-5   Sq Epi: x / Non Sq Epi: Occasional / Bacteria: Moderate        RADIOLOGY & ADDITIONAL STUDIES: Hospital Day #1    BRAD TIRADO    SUBJECTIVE:  Patient is doing well this morning, seen and examined at bedside with chief, denies any new complaints. Denies any nausea, vomiting, chest pain, shortness of breath, calf tenderness, fever or chills.    MEDICATIONS  (STANDING):  ALBUTerol    90 MICROgram(s) HFA Inhaler 2 Puff(s) Inhalation every 6 hours  budesonide 160 MICROgram(s)/formoterol 4.5 MICROgram(s) Inhaler 2 Puff(s) Inhalation two times a day  levothyroxine Injectable 62.5 MICROGram(s) IV Push at bedtime  pantoprazole  Injectable 40 milliGRAM(s) IV Push two times a day  piperacillin/tazobactam IVPB.. 3.375 Gram(s) IV Intermittent every 8 hours  sodium chloride 0.9%. 1000 milliLiter(s) (75 mL/Hr) IV Continuous <Continuous>    MEDICATIONS  (PRN):  acetaminophen  Suppository .. 650 milliGRAM(s) Rectal every 6 hours PRN Mild Pain (1 - 3)  ondansetron Injectable 4 milliGRAM(s) IV Push every 8 hours PRN Nausea and/or Vomiting      Vital Signs Last 24 Hrs  T(C): 36.3 (2022 20:49), Max: 36.6 (2022 12:27)  T(F): 97.4 (2022 20:49), Max: 97.9 (2022 12:27)  HR: 83 (2022 20:49) (77 - 83)  BP: 121/59 (2022 20:49) (118/63 - 121/59)  BP(mean): --  RR: 16 (2022 20:49) (16 - 18)  SpO2: 99% (2022 20:49) (98% - 99%)    Parameters below as of 2022 20:49  Patient On (Oxygen Delivery Method): room air        PHYSICAL EXAM:  Constitutional: AAOx3, no acute distress  HEENT: NCAT, airway patent  Cardiovascular: RRR, pulses present bilaterally  Respiratory: nonlabored breathing  Gastrointestinal: abdomen soft, nontender, non distended, no rebound or guarding  Neuro: no focal deficits    I&O's Detail      LABS:                        8.7    12.05 )-----------( 228      ( 01 Aug 2022 04:50 )             28.3     08-    143  |  114<H>  |  58<H>  ----------------------------<  168<H>  4.6   |  22  |  2.00<H>    Ca    8.2<L>      01 Aug 2022 04:50    TPro  6.8  /  Alb  3.1<L>  /  TBili  1.0  /  DBili  x   /  AST  21  /  ALT  18  /  AlkPhos  79  07-31    PT/INR - ( 2022 12:15 )   PT: 12.6 sec;   INR: 1.08 ratio         PTT - ( 2022 12:15 )  PTT:27.7 sec  Urinalysis Basic - ( 2022 12:15 )    Color: Yellow / Appearance: Slightly Turbid / S.015 / pH: x  Gluc: x / Ketone: Negative  / Bili: Negative / Urobili: Negative   Blood: x / Protein: Negative / Nitrite: Negative   Leuk Esterase: Moderate / RBC: 0-2 /HPF / WBC 3-5   Sq Epi: x / Non Sq Epi: Occasional / Bacteria: Moderate        RADIOLOGY & ADDITIONAL STUDIES: Hospital Day #1    BRAD TIRADO    SUBJECTIVE:  Patient is doing well this morning, seen and examined at bedside with chief.      MEDICATIONS  (STANDING):  ALBUTerol    90 MICROgram(s) HFA Inhaler 2 Puff(s) Inhalation every 6 hours  budesonide 160 MICROgram(s)/formoterol 4.5 MICROgram(s) Inhaler 2 Puff(s) Inhalation two times a day  levothyroxine Injectable 62.5 MICROGram(s) IV Push at bedtime  pantoprazole  Injectable 40 milliGRAM(s) IV Push two times a day  piperacillin/tazobactam IVPB.. 3.375 Gram(s) IV Intermittent every 8 hours  sodium chloride 0.9%. 1000 milliLiter(s) (75 mL/Hr) IV Continuous <Continuous>    MEDICATIONS  (PRN):  acetaminophen  Suppository .. 650 milliGRAM(s) Rectal every 6 hours PRN Mild Pain (1 - 3)  ondansetron Injectable 4 milliGRAM(s) IV Push every 8 hours PRN Nausea and/or Vomiting      Vital Signs Last 24 Hrs  T(C): 36.3 (2022 20:49), Max: 36.6 (2022 12:27)  T(F): 97.4 (2022 20:49), Max: 97.9 (2022 12:27)  HR: 83 (2022 20:49) (77 - 83)  BP: 121/59 (2022 20:49) (118/63 - 121/59)  BP(mean): --  RR: 16 (2022 20:49) (16 - 18)  SpO2: 99% (2022 20:49) (98% - 99%)    Parameters below as of 2022 20:49  Patient On (Oxygen Delivery Method): room air        PHYSICAL EXAM:  Constitutional: AAOx3, no acute distress  HEENT: NCAT, airway patent  Cardiovascular: RRR, pulses present bilaterally  Respiratory: nonlabored breathing  Gastrointestinal: abdomen soft, nontender, obese, no rebound or guarding  Neuro: no focal deficits    I&O's Detail      LABS:                        8.7    12.05 )-----------( 228      ( 01 Aug 2022 04:50 )             28.3     08-    143  |  114<H>  |  58<H>  ----------------------------<  168<H>  4.6   |  22  |  2.00<H>    Ca    8.2<L>      01 Aug 2022 04:50    TPro  6.8  /  Alb  3.1<L>  /  TBili  1.0  /  DBili  x   /  AST  21  /  ALT  18  /  AlkPhos  79  07-31    PT/INR - ( 2022 12:15 )   PT: 12.6 sec;   INR: 1.08 ratio         PTT - ( 2022 12:15 )  PTT:27.7 sec  Urinalysis Basic - ( 2022 12:15 )    Color: Yellow / Appearance: Slightly Turbid / S.015 / pH: x  Gluc: x / Ketone: Negative  / Bili: Negative / Urobili: Negative   Blood: x / Protein: Negative / Nitrite: Negative   Leuk Esterase: Moderate / RBC: 0-2 /HPF / WBC 3-5   Sq Epi: x / Non Sq Epi: Occasional / Bacteria: Moderate        RADIOLOGY & ADDITIONAL STUDIES:

## 2022-08-01 NOTE — PROGRESS NOTE ADULT - PROBLEM SELECTOR PLAN 4
HgbA1c: 10%  Monitor blood glucose level  Continue insulin regimen with corrective sliding scale   Insulin dose adjustment as needed based on fingersticks  Diabetes and insulin administration education as appropriate

## 2022-08-01 NOTE — SWALLOW BEDSIDE ASSESSMENT ADULT - NS SPL SWALLOW CLINIC TRIAL FT
Pt declined to accept trials turning head away, pursing lips, pushing clinician hand away.  MD attempted to present trials, however, pt declined, as well.

## 2022-08-01 NOTE — SWALLOW BEDSIDE ASSESSMENT ADULT - SLP GENERAL OBSERVATIONS
Pt received upright in bed A&A Ox1 via head nod, reduced cognition, on RA, no verbalizations noted, Dr. Ortega present, pain scale 0/10 via non-verbal pain scale

## 2022-08-01 NOTE — CHART NOTE - NSCHARTNOTEFT_GEN_A_CORE
Called by RN for patient complaining of altered mental status.  Patient seen and examined at bedside. Patient is unable to be redirected and cannot answer any questions.   Refusing physical exam. Per RN, patient was calm earlier (A&O x2) when family was present but became altered after they left. RN called family to redirect patient which did not help and family is also requesting medication to calm down patient.       T(C): 36.8 (08-01-22 @ 19:06), Max: 36.8 (08-01-22 @ 19:06)  HR: 72 (08-01-22 @ 19:06) (72 - 85)  BP: 152/80 (08-01-22 @ 19:06) (121/59 - 152/80)  RR: 19 (08-01-22 @ 19:06) (16 - 19)  SpO2: 94% (08-01-22 @ 19:06) (94% - 99%)  Wt(kg): --    Physical Exam: unable to obtain 2/2 patient's ams  Gen: confused, in acute distress  Psych: confused  Neuro: AAOx0    Assessment/Plan  88yo Female with PMH of  CAD, NSTEMI, COPD (no supp O2), Asthma, DM2 (home insulin), Hypoparathyroidism, HTN, HLD, GERD, Major depressive disorder, Anxiety s/p appendectomy presenting from [facility] being admitted for medical management of acute cholecystitis.     Altered Mental Status- patient is confused, taking off her clothes and getting out of bed; unable to be redirected  - Will give 1 dose of Zyprexa 2.5mg IVP now   - RN to call if any changes

## 2022-08-01 NOTE — PROGRESS NOTE ADULT - SUBJECTIVE AND OBJECTIVE BOX
[facility]  Farrukh Aguilar PA-C  [address] [place] [address]  [phone]      INTERVAL HPI/OVERNIGHT EVENTS:  Pt s/e with daughter at bedside  Pt only able to provide yes or no answers   D/w RN who reports no BM yet since arriving on floor, and no report of overt GI bleed in ER     MEDICATIONS  (STANDING):  ALBUTerol    90 MICROgram(s) HFA Inhaler 2 Puff(s) Inhalation every 6 hours  budesonide 160 MICROgram(s)/formoterol 4.5 MICROgram(s) Inhaler 2 Puff(s) Inhalation two times a day  levothyroxine Injectable 62.5 MICROGram(s) IV Push at bedtime  pantoprazole  Injectable 40 milliGRAM(s) IV Push two times a day  piperacillin/tazobactam IVPB.. 3.375 Gram(s) IV Intermittent every 8 hours  sodium chloride 0.9%. 1000 milliLiter(s) (75 mL/Hr) IV Continuous <Continuous>    MEDICATIONS  (PRN):  acetaminophen  Suppository .. 650 milliGRAM(s) Rectal every 6 hours PRN Mild Pain (1 - 3)  ketorolac   Injectable 15 milliGRAM(s) IV Push two times a day PRN Severe Pain (7 - 10)  ondansetron Injectable 4 milliGRAM(s) IV Push every 8 hours PRN Nausea and/or Vomiting      Allergies    doxycycline (Unknown)  iodine (Hives)  iodine containing compounds (Unknown)  shellfish (Anaphylaxis)  shellfish (Swelling; Short breath)    PHYSICAL EXAM:   Vital Signs:  Vital Signs Last 24 Hrs  T(C): 36.6 (01 Aug 2022 09:58), Max: 36.6 (01 Aug 2022 09:58)  T(F): 97.8 (01 Aug 2022 09:58), Max: 97.8 (01 Aug 2022 09:58)  HR: 74 (01 Aug 2022 09:58) (74 - 85)  BP: 133/73 (01 Aug 2022 09:58) (121/59 - 148/62)  BP(mean): --  RR: 18 (01 Aug 2022 09:58) (16 - 18)  SpO2: 94% (01 Aug 2022 09:58) (94% - 99%)    Parameters below as of 01 Aug 2022 09:58  Patient On (Oxygen Delivery Method): room air    GENERAL:  Appears stated age  ABDOMEN:  Soft, +grimace to palpation RUQ and epigastric region, non-distended  NEURO:  Alert      LABS:                        8.7    12.05 )-----------( 228      ( 01 Aug 2022 04:50 )             28.3     08    143  |  114<H>  |  58<H>  ----------------------------<  168<H>  4.6   |  22  |  2.00<H>    Ca    8.2<L>      01 Aug 2022 04:50    TPro  6.8  /  Alb  3.1<L>  /  TBili  1.0  /  DBili  x   /  AST  21  /  ALT  18  /  AlkPhos  79      PT/INR - ( 2022 12:15 )   PT: 12.6 sec;   INR: 1.08 ratio         PTT - ( 2022 12:15 )  PTT:27.7 sec  Urinalysis Basic - ( 2022 12:15 )    Color: Yellow / Appearance: Slightly Turbid / S.015 / pH: x  Gluc: x / Ketone: Negative  / Bili: Negative / Urobili: Negative   Blood: x / Protein: Negative / Nitrite: Negative   Leuk Esterase: Moderate / RBC: 0-2 /HPF / WBC 3-5   Sq Epi: x / Non Sq Epi: Occasional / Bacteria: Moderate        RADIOLOGY & ADDITIONAL TESTS:  < from: CT Abdomen and Pelvis No Cont (22 @ 20:15) >    ACC: [id number] EXAM:  CT ABDOMEN AND PELVIS                          PROCEDURE DATE:  2022          INTERPRETATION:  CLINICAL INFORMATION: Abdominal pain.    COMPARISON: Right upper quadrant ultrasound 2022 and CT scan abdomen   pelvis for/2017.    CONTRAST/COMPLICATIONS:  IV Contrast: NONE  Oral Contrast: NONE  Complications: None reported at time of study completion    PROCEDURE:  CT of the Abdomen and Pelvis was performed.  Sagittal and coronal reformats were performed.    FINDINGS:    LOWER CHEST: Within normal limits.    The evaluation of the solid organ parenchyma is limited without   intravenous contrast.    LIVER: Elongated right hepatic lobe.  BILE DUCTS: Common bile duct measures up to 9 mm.  GALLBLADDER: Distended gallbladder with large gallstone.  No gallbladder wall thickening noted.  SPLEEN: Within normal limits.  PANCREAS: Atrophic.  ADRENALS: Within normal limits.  KIDNEYS/URETERS: Within normal limits.    BLADDER: Within normal limits.  REPRODUCTIVE ORGANS: Calcified fibroid uterus.    BOWEL: Large air/fluid-filled periampullary and transverse portion   duodenal diverticulum.  Sigmoid diverticulosis, without CT evidence of diverticulitis.  Minimally distended fluid-filled loops of small bowel.  No bowel obstruction.  PERITONEUM: No ascites.    VESSELS: Atherosclerotic changes.  RETROPERITONEUM/LYMPH NODES: No lymphadenopathy.    ABDOMINAL WALL:  Rectus diastases with small fat-containing umbilical hernia.    BONES:  Degenerative changes.  Mild, age indeterminate compression deformity superior endplate L3   vertebral body.    IMPRESSION:    Cholelithiasis with distended gallbladder.    Other findings as discussed above.    --- End of Report ---      SACHI NICK MD; Attending Radiologist  This document has been electronically signed. Aug  1 2022  9:47AM    < end of copied text >

## 2022-08-01 NOTE — PROGRESS NOTE ADULT - CONVERSATION DETAILS
ADVANCED CARE PLANNING NOTE:  I met with patient's son who is her designated health care proxy.  Discussed in details about current diagnosis of dementia with acute abdominal pain due to gall bladder disease; treatment options , prognosis and estimated life expectancy based on current diagnosis and potential disease trajectory.   Wishes regarding extent of and possible escalation of medical care was discussed however patient's son stated that based on her wishes, she is DNR/DNI.     Also discussed interests in artificial nutrition and hydration including use of feeding tubes and IVF, antibiotics, and further investigative studies such as blood draws and radiological imaging including HIDA scan. MOLST completed based on patients known wishes.   All risks and benefits discussed. Patient wishes and goals of care were addressed - comfort; no heroic interventions  All questions and concerns were answered and addressed.    Per son - DNR/DNI; no invasive surgical intervention; would like the HIDA scan done; no feeding tube; trial of antibiotics for infection; trial of IV fluids.   Time spent 22mins

## 2022-08-01 NOTE — PROGRESS NOTE ADULT - PROBLEM SELECTOR PLAN 1
-f/u CTAP final read   -leukocytosis workup  -f/u HIDA scan  -NPO/LOUIS  -no DVT seen on duplex  -GI recs (ppi bid, aspiration [precautions, monitor cbc, conservative management)  -pain/nausea control

## 2022-08-01 NOTE — PROGRESS NOTE ADULT - TIME BILLING
direct patient care including but not limited to reviewing chart, medications ,laboratory data, imaging reports, discussion of plan of care with consultants on the case, coordination of care with multidisciplinary team involved in the case and discussion of plan with patient.  Patient's family receptive and agreeable to plan of care and verbalized understanding the prognosis, anticipated hospital course and treatment plan.    Based on expressed gaols of care, MOLST completed.   Family contacts- Son Calvin Chávez - HCP and primary care giver ( [phone])  Daughter in law- Aranza Chávez- [phone]  Trinity- daughter - [phone]

## 2022-08-01 NOTE — CHART NOTE - NSCHARTNOTEFT_GEN_A_CORE
Rapid response was called at 2325 for hypotension, tachypnea.     Events leading up to Rapid Response:  Patient noted to be drowsy with intermittent bursts of agitation throughout the day, improved when patient's family was present. Patient continued to be confused and agitated throughout the night, trying to get out of bed and received Zyprexa 2.5mg IM at 2032. Was then called to the bedside to evaluate patient with tachypnea, hypoxia to 87% on RA. Upon arrival, patient with increased work of breathing, respiratory rate found to be 38. Placed on 6L NC with O2 saturation improved to 99%, however continuing to pull off nasal cannula. SBP noted to be in the 80s on manual BP reading, at which time rapid response was called.     Patient was seen and examined at the bedside by the rapid response team. Dr. Hernandez / ICU PA at bedside.    Rapid Response Vital Signs:    BP: 84/52  HR: 128  RR: 30  SpO2: 96% on 6L NC  Temp: 98.3F axillary  FS: 371      Physical Exam:  Gen: ill appearing female in moderate respiratory distress  HEENT: NCAT  Cardio: regular rate and rhythm, +s1s2, systolic murmur appreciated  Pulm: Faint crackles at right lung base with scattered expiratory wheezing   Abdomen: soft, nontender, +distended, +BS x4 quadrants, no guarding  Extremities: no cyanosis or edema, +2 pedal pulses  Neuro: AAOx1, follows commands  Skin: diaphoretic       Assessment/Plan:   88yo female with PMH of CAD, NSTEMI, COPD (not on O2), asthma, T2DM (on insulin), hypoparathyroidisms, HTN, HLD, major depressive disorder and anxiety who presented from [facility] with abdominal pain, admitted for cholelithiasis and to rule out acute cholecystitis. Rapid response called for tachypnea, hypotension.     - Tachypnea likely in the setting of aspiration PNA vs acute COPD exacerbation  - CXR obtained, on wet read new consolidation in the right lower lung field   - Patient has been noted to be drowsy and altered all day, had been seen speech and swallow today and was placed on   - Will obtain STAT CBC, CMP, lactate and blood cultures  - Currently on Zosyn empirically while ruling out acute nelida, continue  - Maintain aspiration precautions   - Keep strict NPO  - Patient with history of recent COPD exacerbation last month, required CPAP and tolerated  - STAT ABG performed showed pH 7.31/pCO2 26/pO2 156/HCO3 13; consistent with metabolic acidosis, unclear etiology at this time  - Start AVAPS now   - Will repeat ABG in 2 hours  - Discussed with Dr. Hernandez, agrees with above plan  - Family updated by Dr. Kaur, spoke with son Gibran Chávez who agrees to trial of AVAPS now, confirms DNR/DNI status again requesting "no heroic measures"   - RN to call if any changes Rapid response was called at  for hypotension, tachypnea.     Events leading up to Rapid Response:  Patient noted to be drowsy with intermittent bursts of agitation throughout the day, improved when patient's family was present. Patient continued to be confused and agitated throughout the night, trying to get out of bed and received Zyprexa 2.5mg IM at . Was then called to the bedside to evaluate patient with tachypnea, hypoxia to 87% on RA. Upon arrival, patient with increased work of breathing, respiratory rate found to be 38. Placed on 6L NC with O2 saturation improved to 99%, however continuing to pull off nasal cannula. SBP noted to be in the 80s on manual BP reading, at which time rapid response was called.     Patient was seen and examined at the bedside by the rapid response team. Dr. Hernandez / ICU PA at bedside.    Rapid Response Vital Signs:    BP: 84/52  HR: 128  RR: 30  SpO2: 96% on 6L NC  Temp: 98.3F axillary  FS: 371      Physical Exam:  Gen: ill appearing female in moderate respiratory distress  HEENT: NCAT  Cardio: regular rate and rhythm, +s1s2, systolic murmur appreciated  Pulm: Faint crackles at right lung base with scattered expiratory wheezing   Abdomen: soft, nontender, +distended, +BS x4 quadrants, no guarding  Extremities: no cyanosis or edema, +2 pedal pulses  Neuro: AAOx1, follows commands  Skin: diaphoretic       Assessment/Plan:   88yo female with PMH of CAD, NSTEMI, COPD (not on O2), asthma, T2DM (on insulin), hypoparathyroidisms, HTN, HLD, major depressive disorder and anxiety who presented from [facility] with abdominal pain, admitted for cholelithiasis and to rule out acute cholecystitis. Rapid response called for tachypnea, hypotension.     - Tachypnea likely in the setting of aspiration PNA vs acute COPD exacerbation  - CXR obtained, on wet read new consolidation in the right lower lung field   - Patient has been noted to be drowsy and altered all day, had been seen speech and swallow today and was placed on   - Will obtain STAT CBC, CMP, lactate and blood cultures  - Currently on Zosyn empirically while ruling out acute nelida, continue  - Maintain aspiration precautions   - Keep strict NPO  - Patient with history of recent COPD exacerbation last month, required CPAP and tolerated  - STAT ABG performed showed pH 7.31/pCO2 26/pO2 156/HCO3 13; consistent with metabolic acidosis, unclear etiology at this time  - Start AVAPS now   - Will repeat ABG in 2 hours  - Discussed with Dr. Hernandez, agrees with above plan  - Family updated by Dr. Kaur, spoke with son Gibran Chávez who agrees to trial of AVAPS now, confirms DNR/DNI status again requesting "no heroic measures"   - RN to call if any changes      ADDENDUM 0125  Patient seen and examined at bedside for two hour rapid response follow up. Patient is calmly sleeping in bed with AVAPS on.     ROS:  CONSTITUTIONAL: No weakness, fevers or chills  EYES/ENT: No visual changes;  No vertigo or throat pain   NECK: No pain or stiffness  RESPIRATORY: No cough, wheezing, hemoptysis; No shortness of breath  CARDIOVASCULAR: No chest pain or palpitations  GASTROINTESTINAL: No abdominal or epigastric pain. No nausea, vomiting, or hematemesis; No diarrhea or constipation. No melena or hematochezia.  GENITOURINARY: No dysuria, frequency or hematuria  NEUROLOGICAL: No numbness or weakness  SKIN: No itching, burning, rashes, or lesions   All other review of systems is negative unless indicated above.    PHYSICAL EXAM:  General: resting, in NAD   HEENT: NCAT  Neck: Supple, nontender, no mass  Neurology: A&Ox3, nonfocal, CN II-XII grossly intact, sensation intact, no gait abnormalities   Respiratory: CTA B/L, No wheezes, rales, rhonchi  CV: RRR, +S1/S2, no murmurs, rubs or gallops  Abdominal: Soft, NT, ND +BSx4  Extremities: No clubbing, cyanosis, edema; + peripheral pulses  MSK: Normal ROM, no joint erythema or warmth, no joint swelling   Skin: warm, dry, normal color, no rash or abnormal lesions      Vital Signs Last 24 Hrs  T(C): 36.8 (01 Aug 2022 19:06), Max: 36.8 (01 Aug 2022 19:06)  T(F): 98.2 (01 Aug 2022 19:06), Max: 98.2 (01 Aug 2022 19:06)  HR: 98 (02 Aug 2022 01:19) (72 - 98)  BP: 110/62 (02 Aug 2022 01:19) (84/52 - 152/80)  BP(mean): --  RR: 19 (01 Aug 2022 19:06) (18 - 19)  SpO2: 100% (02 Aug 2022 01:19) (94% - 100%)    Parameters below as of 02 Aug 2022 01:19  Patient On (Oxygen Delivery Method): BiPAP/CPAP    O2 Concentration (%): 40                            9.7    12.35 )-----------( 268      ( 01 Aug 2022 23:55 )             32.7     08    141  |  112<H>  |  51<H>  ----------------------------<  377<H>  4.5   |  17<L>  |  2.30<H>    Ca    8.4<L>      01 Aug 2022 23:55    TPro  6.6  /  Alb  2.9<L>  /  TBili  0.6  /  DBili  x   /  AST  34  /  ALT  20  /  AlkPhos  87  08    PT/INR - ( 2022 12:15 )   PT: 12.6 sec;   INR: 1.08 ratio         PTT - ( 2022 12:15 )  PTT:27.7 sec  Urinalysis Basic - ( 2022 12:15 )    Color: Yellow / Appearance: Slightly Turbid / S.015 / pH: x  Gluc: x / Ketone: Negative  / Bili: Negative / Urobili: Negative   Blood: x / Protein: Negative / Nitrite: Negative   Leuk Esterase: Moderate / RBC: 0-2 /HPF / WBC 3-5   Sq Epi: x / Non Sq Epi: Occasional / Bacteria: Moderate          A/P Rapid response was called at  for hypotension, tachypnea.     Events leading up to Rapid Response:  Patient noted to be drowsy with intermittent bursts of agitation throughout the day, improved when patient's family was present. Patient continued to be confused and agitated throughout the night, trying to get out of bed and received Zyprexa 2.5mg IM at . Was then called to the bedside to evaluate patient with tachypnea, hypoxia to 87% on RA. Upon arrival, patient with increased work of breathing, respiratory rate found to be 38. Placed on 6L NC with O2 saturation improved to 99%, however continuing to pull off nasal cannula. SBP noted to be in the 80s on manual BP reading, at which time rapid response was called.     Patient was seen and examined at the bedside by the rapid response team. Dr. Hernandez / ICU PA at bedside.    Rapid Response Vital Signs:    BP: 84/52  HR: 128  RR: 30  SpO2: 96% on 6L NC  Temp: 98.3F axillary  FS: 371      Physical Exam:  Gen: ill appearing female in moderate respiratory distress  HEENT: NCAT  Cardio: tachycardiac, +s1s2, systolic murmur appreciated  Pulm: Faint crackles at right lung base with scattered expiratory wheezing   Abdomen: soft, nontender, +distended, +BS x4 quadrants, no guarding  Extremities: no cyanosis or edema, +2 pedal pulses  Neuro: AAOx1, follows commands  Skin: diaphoretic       Assessment/Plan:   88yo female with PMH of CAD, NSTEMI, COPD (not on O2), asthma, T2DM (on insulin), hypoparathyroidisms, HTN, HLD, major depressive disorder and anxiety who presented from [facility] with abdominal pain, admitted for cholelithiasis and to rule out acute cholecystitis. Rapid response called for tachypnea, hypotension.     - Tachypnea likely in the setting of aspiration PNA vs acute COPD exacerbation  - CXR obtained, on wet read new consolidation in the right lower lung field   - Now meeting severe sepsis criteria   - Give 500cc NS bolus over 1 hour   - Patient has been noted to be drowsy and altered all day, had been seen speech and swallow today and was placed on   - Will obtain STAT CBC, CMP, lactate and blood cultures  - Currently on Zosyn empirically while ruling out acute nelida, continue  - Maintain aspiration precautions   - Keep strict NPO  - Patient with history of recent COPD exacerbation last month, required CPAP and tolerated  - STAT ABG performed showed pH 7.31/pCO2 26/pO2 156/HCO3 13; consistent with metabolic acidosis, unclear etiology at this time  - Start AVAPS now   - Plan to repeat ABG later in AM   - Discussed with Dr. Hernandez, agrees with above plan  - Family updated by Dr. Kaur, spoke with son Gibran Chávez who agrees to trial of AVAPS now, confirms DNR/DNI status again requesting "no heroic measures"   - RN to call if any changes      ADDENDUM 0125  Patient seen and examined at bedside for two hour rapid response follow up. Patient is calmly sleeping in bed with AVAPS on.     Vital Signs Last 24 Hrs  T(C): 36.8 (01 Aug 2022 19:06), Max: 36.8 (01 Aug 2022 19:06)  T(F): 98.2 (01 Aug 2022 19:06), Max: 98.2 (01 Aug 2022 19:06)  HR: 98 (02 Aug 2022 01:19) (72 - 98)  BP: 110/62 (02 Aug 2022 01:19) (84/52 - 152/80)  BP(mean): --  RR: 19 (01 Aug 2022 19:06) (18 - 19)  SpO2: 100% (02 Aug 2022 01:19) (94% - 100%)    Parameters below as of 02 Aug 2022 01:19  Patient On (Oxygen Delivery Method): BiPAP/CPAP    O2 Concentration (%): 40    PHYSICAL EXAM:  General: resting, in NAD   HEENT: NCAT  Cardio: regular rate and rhythm, +s1s2, systolic murmur appreciated  Pulm: decreased breath sounds, crackles at right lower lung base  Abdomen: soft, nontender, +distended, +BS x4 quadrants, no guarding  Extremities: no cyanosis or edema, +2 pedal pulses  Neuro: AAOx1, follows commands  Skin: warm and dry                             9.7    12.35 )-----------( 268      ( 01 Aug 2022 23:55 )             32.7     08-    141  |  112<H>  |  51<H>  ----------------------------<  377<H>  4.5   |  17<L>  |  2.30<H>    Ca    8.4<L>      01 Aug 2022 23:55    TPro  6.6  /  Alb  2.9<L>  /  TBili  0.6  /  DBili  x   /  AST  34  /  ALT  20  /  AlkPhos  87  08-01    PT/INR - ( 2022 12:15 )   PT: 12.6 sec;   INR: 1.08 ratio         PTT - ( 2022 12:15 )  PTT:27.7 sec  Urinalysis Basic - ( 2022 12:15 )    Color: Yellow / Appearance: Slightly Turbid / S.015 / pH: x  Gluc: x / Ketone: Negative  / Bili: Negative / Urobili: Negative   Blood: x / Protein: Negative / Nitrite: Negative   Leuk Esterase: Moderate / RBC: 0-2 /HPF / WBC 3-5   Sq Epi: x / Non Sq Epi: Occasional / Bacteria: Moderate          A/P  88yo female with PMH of CAD, NSTEMI, COPD (not on O2), asthma, T2DM (on insulin), hypoparathyroidisms, HTN, HLD, major depressive disorder and anxiety who presented from [facility] with abdominal pain, admitted for cholelithiasis and to rule out acute cholecystitis. Rapid response called for tachypnea, hypotension.    - Tachypnea now improved on AVAPS, will continue   - CBC showing persistent leukocytosis to 12.35; continue Zosyn for now  - Lactate 4.9, received 500cc NS bolus and will give additional 500cc NS bolus now; will be cautious with IVF resuscitation given tenuous respiratory status   - Check repeat lactate with AM labs  - H/H 9.7/32.7 which is improved from prior however likely hemoconcentrated  - CMP revealing low CO2 at 17; will give amp of bicarb   - Cr now 2.3, likely worsening TRENTON in the setting of severe sepsis, IVF as above  - Will continue to monitor for now, RN to call with changes

## 2022-08-01 NOTE — SWALLOW BEDSIDE ASSESSMENT ADULT - SLP PERTINENT HISTORY OF CURRENT PROBLEM
Pt known to speech tx from previous admissions, most recently seen for MBS 6/23/22 Rx Puree with Thin liquids, seen 6/30/22 for follow up rx'd to continue puree with thin, see reports for details

## 2022-08-01 NOTE — PROGRESS NOTE ADULT - PROBLEM SELECTOR PLAN 2
acute on chronic renal failure  Avoid nephrotoxic meds acute on chronic renal failure 3 or 4  Avoid nephrotoxic meds  Monitor labs  Gentle hydration

## 2022-08-01 NOTE — SWALLOW BEDSIDE ASSESSMENT ADULT - SWALLOW EVAL: DIAGNOSIS
Overall limited assessment due to pt declining to accept puree trials.  Pt agreeable to self feed trials of thin liquids.  Oral stage notable for anterior loss and suspected posterior loss.  Pharyngeal stage deemed functional for small amounts of thin liquids, no overt s/s penetration/aspiration noted.  Will ultimately defer diet to MD due to limited assessment.  Dr. Ortega present for evaluation and in agreement.  MD additionally notified of dysphagia hx and most recent diet rx's from previous admission, PORFIRIO Nielson notified.

## 2022-08-01 NOTE — PROGRESS NOTE ADULT - PROBLEM SELECTOR PLAN 6
- Hx of MI- continue Aspirin /Plavix  - Restart Imdur   - Echo 3/11/2022: Left ventricle was of normal size, mild left ventricular hypertrophy LV systolic function EF 65%. Moderate to severe aortic stenosis.

## 2022-08-02 ENCOUNTER — TRANSCRIPTION ENCOUNTER (OUTPATIENT)
Age: 87
End: 2022-08-02

## 2022-08-02 VITALS
SYSTOLIC BLOOD PRESSURE: 125 MMHG | HEART RATE: 74 BPM | OXYGEN SATURATION: 99 % | RESPIRATION RATE: 20 BRPM | TEMPERATURE: 98 F | DIASTOLIC BLOOD PRESSURE: 57 MMHG

## 2022-08-02 LAB
ALBUMIN SERPL ELPH-MCNC: 2.3 G/DL — LOW (ref 3.3–5)
ALBUMIN SERPL ELPH-MCNC: 2.9 G/DL — LOW (ref 3.3–5)
ALP SERPL-CCNC: 65 U/L — SIGNIFICANT CHANGE UP (ref 40–120)
ALP SERPL-CCNC: 87 U/L — SIGNIFICANT CHANGE UP (ref 40–120)
ALT FLD-CCNC: 20 U/L — SIGNIFICANT CHANGE UP (ref 12–78)
ALT FLD-CCNC: 21 U/L — SIGNIFICANT CHANGE UP (ref 12–78)
ANION GAP SERPL CALC-SCNC: 12 MMOL/L — SIGNIFICANT CHANGE UP (ref 5–17)
ANION GAP SERPL CALC-SCNC: 8 MMOL/L — SIGNIFICANT CHANGE UP (ref 5–17)
AST SERPL-CCNC: 115 U/L — HIGH (ref 15–37)
AST SERPL-CCNC: 34 U/L — SIGNIFICANT CHANGE UP (ref 15–37)
BASE EXCESS BLDV CALC-SCNC: -3.2 MMOL/L — LOW (ref -2–3)
BASOPHILS # BLD AUTO: 0.08 K/UL — SIGNIFICANT CHANGE UP (ref 0–0.2)
BASOPHILS NFR BLD AUTO: 0.6 % — SIGNIFICANT CHANGE UP (ref 0–2)
BILIRUB SERPL-MCNC: 0.4 MG/DL — SIGNIFICANT CHANGE UP (ref 0.2–1.2)
BILIRUB SERPL-MCNC: 0.6 MG/DL — SIGNIFICANT CHANGE UP (ref 0.2–1.2)
BUN SERPL-MCNC: 51 MG/DL — HIGH (ref 7–23)
BUN SERPL-MCNC: 52 MG/DL — HIGH (ref 7–23)
CALCIUM SERPL-MCNC: 7.7 MG/DL — LOW (ref 8.5–10.1)
CALCIUM SERPL-MCNC: 8.4 MG/DL — LOW (ref 8.5–10.1)
CHLORIDE SERPL-SCNC: 112 MMOL/L — HIGH (ref 96–108)
CHLORIDE SERPL-SCNC: 115 MMOL/L — HIGH (ref 96–108)
CO2 SERPL-SCNC: 17 MMOL/L — LOW (ref 22–31)
CO2 SERPL-SCNC: 23 MMOL/L — SIGNIFICANT CHANGE UP (ref 22–31)
CREAT SERPL-MCNC: 2.3 MG/DL — HIGH (ref 0.5–1.3)
CREAT SERPL-MCNC: 2.3 MG/DL — HIGH (ref 0.5–1.3)
EGFR: 20 ML/MIN/1.73M2 — LOW
EGFR: 20 ML/MIN/1.73M2 — LOW
EOSINOPHIL # BLD AUTO: 0.21 K/UL — SIGNIFICANT CHANGE UP (ref 0–0.5)
EOSINOPHIL NFR BLD AUTO: 1.7 % — SIGNIFICANT CHANGE UP (ref 0–6)
GAS PNL BLDV: SIGNIFICANT CHANGE UP
GLUCOSE SERPL-MCNC: 233 MG/DL — HIGH (ref 70–99)
GLUCOSE SERPL-MCNC: 377 MG/DL — HIGH (ref 70–99)
HCO3 BLDV-SCNC: 21 MMOL/L — LOW (ref 22–29)
HCT VFR BLD CALC: 24.2 % — LOW (ref 34.5–45)
HCT VFR BLD CALC: 32.7 % — LOW (ref 34.5–45)
HGB BLD-MCNC: 7.3 G/DL — LOW (ref 11.5–15.5)
HGB BLD-MCNC: 9.7 G/DL — LOW (ref 11.5–15.5)
IMM GRANULOCYTES NFR BLD AUTO: 0.5 % — SIGNIFICANT CHANGE UP (ref 0–1.5)
LACTATE SERPL-SCNC: 2.4 MMOL/L — HIGH (ref 0.7–2)
LACTATE SERPL-SCNC: 4.9 MMOL/L — CRITICAL HIGH (ref 0.7–2)
LYMPHOCYTES # BLD AUTO: 1.7 K/UL — SIGNIFICANT CHANGE UP (ref 1–3.3)
LYMPHOCYTES # BLD AUTO: 13.8 % — SIGNIFICANT CHANGE UP (ref 13–44)
MCHC RBC-ENTMCNC: 27.8 PG — SIGNIFICANT CHANGE UP (ref 27–34)
MCHC RBC-ENTMCNC: 28.2 PG — SIGNIFICANT CHANGE UP (ref 27–34)
MCHC RBC-ENTMCNC: 29.7 GM/DL — LOW (ref 32–36)
MCHC RBC-ENTMCNC: 30.2 GM/DL — LOW (ref 32–36)
MCV RBC AUTO: 93.4 FL — SIGNIFICANT CHANGE UP (ref 80–100)
MCV RBC AUTO: 93.7 FL — SIGNIFICANT CHANGE UP (ref 80–100)
MONOCYTES # BLD AUTO: 0.48 K/UL — SIGNIFICANT CHANGE UP (ref 0–0.9)
MONOCYTES NFR BLD AUTO: 3.9 % — SIGNIFICANT CHANGE UP (ref 2–14)
NEUTROPHILS # BLD AUTO: 9.82 K/UL — HIGH (ref 1.8–7.4)
NEUTROPHILS NFR BLD AUTO: 79.5 % — HIGH (ref 43–77)
NRBC # BLD: 0 /100 WBCS — SIGNIFICANT CHANGE UP (ref 0–0)
NRBC # BLD: 0 /100 WBCS — SIGNIFICANT CHANGE UP (ref 0–0)
NT-PROBNP SERPL-SCNC: HIGH PG/ML (ref 0–450)
PCO2 BLDV: 29 MMHG — LOW (ref 39–42)
PH BLDV: 7.46 — HIGH (ref 7.32–7.43)
PLATELET # BLD AUTO: 185 K/UL — SIGNIFICANT CHANGE UP (ref 150–400)
PLATELET # BLD AUTO: 268 K/UL — SIGNIFICANT CHANGE UP (ref 150–400)
PO2 BLDV: 175 MMHG — HIGH (ref 25–45)
POTASSIUM SERPL-MCNC: 4.5 MMOL/L — SIGNIFICANT CHANGE UP (ref 3.5–5.3)
POTASSIUM SERPL-MCNC: 4.5 MMOL/L — SIGNIFICANT CHANGE UP (ref 3.5–5.3)
POTASSIUM SERPL-SCNC: 4.5 MMOL/L — SIGNIFICANT CHANGE UP (ref 3.5–5.3)
POTASSIUM SERPL-SCNC: 4.5 MMOL/L — SIGNIFICANT CHANGE UP (ref 3.5–5.3)
PROT SERPL-MCNC: 5.1 G/DL — LOW (ref 6–8.3)
PROT SERPL-MCNC: 6.6 G/DL — SIGNIFICANT CHANGE UP (ref 6–8.3)
RBC # BLD: 2.59 M/UL — LOW (ref 3.8–5.2)
RBC # BLD: 3.49 M/UL — LOW (ref 3.8–5.2)
RBC # FLD: 14.6 % — HIGH (ref 10.3–14.5)
RBC # FLD: 14.6 % — HIGH (ref 10.3–14.5)
SAO2 % BLDV: 100 % — HIGH (ref 67–88)
SODIUM SERPL-SCNC: 141 MMOL/L — SIGNIFICANT CHANGE UP (ref 135–145)
SODIUM SERPL-SCNC: 146 MMOL/L — HIGH (ref 135–145)
TSH SERPL-MCNC: 7.71 UIU/ML — HIGH (ref 0.36–3.74)
WBC # BLD: 10.77 K/UL — HIGH (ref 3.8–10.5)
WBC # BLD: 12.35 K/UL — HIGH (ref 3.8–10.5)
WBC # FLD AUTO: 10.77 K/UL — HIGH (ref 3.8–10.5)
WBC # FLD AUTO: 12.35 K/UL — HIGH (ref 3.8–10.5)

## 2022-08-02 PROCEDURE — 99239 HOSP IP/OBS DSCHRG MGMT >30: CPT

## 2022-08-02 PROCEDURE — 85025 COMPLETE CBC W/AUTO DIFF WBC: CPT

## 2022-08-02 PROCEDURE — 85027 COMPLETE CBC AUTOMATED: CPT

## 2022-08-02 PROCEDURE — 85730 THROMBOPLASTIN TIME PARTIAL: CPT

## 2022-08-02 PROCEDURE — 76705 ECHO EXAM OF ABDOMEN: CPT

## 2022-08-02 PROCEDURE — 83880 ASSAY OF NATRIURETIC PEPTIDE: CPT

## 2022-08-02 PROCEDURE — 99233 SBSQ HOSP IP/OBS HIGH 50: CPT

## 2022-08-02 PROCEDURE — 85610 PROTHROMBIN TIME: CPT

## 2022-08-02 PROCEDURE — 80048 BASIC METABOLIC PNL TOTAL CA: CPT

## 2022-08-02 PROCEDURE — 78226 HEPATOBILIARY SYSTEM IMAGING: CPT

## 2022-08-02 PROCEDURE — 96365 THER/PROPH/DIAG IV INF INIT: CPT

## 2022-08-02 PROCEDURE — 71045 X-RAY EXAM CHEST 1 VIEW: CPT

## 2022-08-02 PROCEDURE — 80053 COMPREHEN METABOLIC PANEL: CPT

## 2022-08-02 PROCEDURE — 87040 BLOOD CULTURE FOR BACTERIA: CPT

## 2022-08-02 PROCEDURE — 87635 SARS-COV-2 COVID-19 AMP PRB: CPT

## 2022-08-02 PROCEDURE — 99232 SBSQ HOSP IP/OBS MODERATE 35: CPT

## 2022-08-02 PROCEDURE — 86850 RBC ANTIBODY SCREEN: CPT

## 2022-08-02 PROCEDURE — 94660 CPAP INITIATION&MGMT: CPT

## 2022-08-02 PROCEDURE — 84443 ASSAY THYROID STIM HORMONE: CPT

## 2022-08-02 PROCEDURE — 96375 TX/PRO/DX INJ NEW DRUG ADDON: CPT

## 2022-08-02 PROCEDURE — 92610 EVALUATE SWALLOWING FUNCTION: CPT

## 2022-08-02 PROCEDURE — 36415 COLL VENOUS BLD VENIPUNCTURE: CPT

## 2022-08-02 PROCEDURE — 93005 ELECTROCARDIOGRAM TRACING: CPT

## 2022-08-02 PROCEDURE — 94640 AIRWAY INHALATION TREATMENT: CPT

## 2022-08-02 PROCEDURE — 83605 ASSAY OF LACTIC ACID: CPT

## 2022-08-02 PROCEDURE — 74176 CT ABD & PELVIS W/O CONTRAST: CPT | Mod: MA

## 2022-08-02 PROCEDURE — 81001 URINALYSIS AUTO W/SCOPE: CPT

## 2022-08-02 PROCEDURE — 82272 OCCULT BLD FECES 1-3 TESTS: CPT

## 2022-08-02 PROCEDURE — 82803 BLOOD GASES ANY COMBINATION: CPT

## 2022-08-02 PROCEDURE — 94760 N-INVAS EAR/PLS OXIMETRY 1: CPT

## 2022-08-02 PROCEDURE — 86901 BLOOD TYPING SEROLOGIC RH(D): CPT

## 2022-08-02 PROCEDURE — 93970 EXTREMITY STUDY: CPT

## 2022-08-02 PROCEDURE — 82962 GLUCOSE BLOOD TEST: CPT

## 2022-08-02 PROCEDURE — 99285 EMERGENCY DEPT VISIT HI MDM: CPT

## 2022-08-02 PROCEDURE — A9537: CPT

## 2022-08-02 PROCEDURE — 86900 BLOOD TYPING SEROLOGIC ABO: CPT

## 2022-08-02 RX ORDER — HYDROMORPHONE HYDROCHLORIDE 2 MG/ML
0.25 INJECTION INTRAMUSCULAR; INTRAVENOUS; SUBCUTANEOUS
Qty: 0 | Refills: 0 | DISCHARGE
Start: 2022-08-02

## 2022-08-02 RX ORDER — ESCITALOPRAM OXALATE 10 MG/1
1 TABLET, FILM COATED ORAL
Qty: 0 | Refills: 0 | DISCHARGE

## 2022-08-02 RX ORDER — ONDANSETRON 8 MG/1
4 TABLET, FILM COATED ORAL
Qty: 0 | Refills: 0 | DISCHARGE
Start: 2022-08-02

## 2022-08-02 RX ORDER — FLUTICASONE PROPIONATE AND SALMETEROL 50; 250 UG/1; UG/1
1 POWDER ORAL; RESPIRATORY (INHALATION)
Qty: 0 | Refills: 0 | DISCHARGE

## 2022-08-02 RX ORDER — HYDROMORPHONE HYDROCHLORIDE 2 MG/ML
0.25 INJECTION INTRAMUSCULAR; INTRAVENOUS; SUBCUTANEOUS EVERY 4 HOURS
Refills: 0 | Status: DISCONTINUED | OUTPATIENT
Start: 2022-08-02 | End: 2022-08-02

## 2022-08-02 RX ORDER — METFORMIN HYDROCHLORIDE 850 MG/1
1 TABLET ORAL
Qty: 0 | Refills: 0 | DISCHARGE

## 2022-08-02 RX ORDER — SODIUM CHLORIDE 9 MG/ML
500 INJECTION INTRAMUSCULAR; INTRAVENOUS; SUBCUTANEOUS ONCE
Refills: 0 | Status: COMPLETED | OUTPATIENT
Start: 2022-08-02 | End: 2022-08-02

## 2022-08-02 RX ORDER — CHOLECALCIFEROL (VITAMIN D3) 125 MCG
1 CAPSULE ORAL
Qty: 0 | Refills: 0 | DISCHARGE

## 2022-08-02 RX ORDER — INSULIN ASPART 100 [IU]/ML
8 INJECTION, SOLUTION SUBCUTANEOUS
Qty: 0 | Refills: 0 | DISCHARGE

## 2022-08-02 RX ORDER — ASPIRIN/CALCIUM CARB/MAGNESIUM 324 MG
1 TABLET ORAL
Qty: 0 | Refills: 0 | DISCHARGE

## 2022-08-02 RX ORDER — IPRATROPIUM/ALBUTEROL SULFATE 18-103MCG
3 AEROSOL WITH ADAPTER (GRAM) INHALATION
Qty: 0 | Refills: 0 | DISCHARGE

## 2022-08-02 RX ORDER — GABAPENTIN 400 MG/1
1 CAPSULE ORAL
Qty: 0 | Refills: 0 | DISCHARGE

## 2022-08-02 RX ORDER — ATORVASTATIN CALCIUM 80 MG/1
1 TABLET, FILM COATED ORAL
Qty: 0 | Refills: 0 | DISCHARGE

## 2022-08-02 RX ORDER — CLOPIDOGREL BISULFATE 75 MG/1
1 TABLET, FILM COATED ORAL
Qty: 0 | Refills: 0 | DISCHARGE

## 2022-08-02 RX ORDER — LEVOTHYROXINE SODIUM 125 MCG
1 TABLET ORAL
Qty: 0 | Refills: 0 | DISCHARGE

## 2022-08-02 RX ORDER — ISOSORBIDE MONONITRATE 60 MG/1
1 TABLET, EXTENDED RELEASE ORAL
Qty: 0 | Refills: 0 | DISCHARGE

## 2022-08-02 RX ORDER — ALBUTEROL 90 UG/1
3 AEROSOL, METERED ORAL
Qty: 0 | Refills: 0 | DISCHARGE
Start: 2022-08-02

## 2022-08-02 RX ORDER — ALBUTEROL 90 UG/1
2.5 AEROSOL, METERED ORAL
Refills: 0 | Status: DISCONTINUED | OUTPATIENT
Start: 2022-08-02 | End: 2022-08-02

## 2022-08-02 RX ORDER — SODIUM BICARBONATE 1 MEQ/ML
50 SYRINGE (ML) INTRAVENOUS ONCE
Refills: 0 | Status: COMPLETED | OUTPATIENT
Start: 2022-08-02 | End: 2022-08-02

## 2022-08-02 RX ADMIN — SODIUM CHLORIDE 500 MILLILITER(S): 9 INJECTION INTRAMUSCULAR; INTRAVENOUS; SUBCUTANEOUS at 00:20

## 2022-08-02 RX ADMIN — Medication 62.5 MICROGRAM(S): at 00:44

## 2022-08-02 RX ADMIN — HYDROMORPHONE HYDROCHLORIDE 0.25 MILLIGRAM(S): 2 INJECTION INTRAMUSCULAR; INTRAVENOUS; SUBCUTANEOUS at 16:47

## 2022-08-02 RX ADMIN — HYDROMORPHONE HYDROCHLORIDE 0.25 MILLIGRAM(S): 2 INJECTION INTRAMUSCULAR; INTRAVENOUS; SUBCUTANEOUS at 17:03

## 2022-08-02 RX ADMIN — PANTOPRAZOLE SODIUM 40 MILLIGRAM(S): 20 TABLET, DELAYED RELEASE ORAL at 06:01

## 2022-08-02 RX ADMIN — SODIUM CHLORIDE 500 MILLILITER(S): 9 INJECTION INTRAMUSCULAR; INTRAVENOUS; SUBCUTANEOUS at 01:07

## 2022-08-02 RX ADMIN — Medication 50 MILLIEQUIVALENT(S): at 05:54

## 2022-08-02 RX ADMIN — PIPERACILLIN AND TAZOBACTAM 25 GRAM(S): 4; .5 INJECTION, POWDER, LYOPHILIZED, FOR SOLUTION INTRAVENOUS at 00:35

## 2022-08-02 RX ADMIN — PIPERACILLIN AND TAZOBACTAM 25 GRAM(S): 4; .5 INJECTION, POWDER, LYOPHILIZED, FOR SOLUTION INTRAVENOUS at 09:06

## 2022-08-02 NOTE — SWALLOW BEDSIDE ASSESSMENT ADULT - COMMENTS
Chart reviewed, pt with change in status, s/p RRT, now on BIPAP per charting, therefore, not appropriate for swallow reassessment at this time.  Please reconsult as appropriate, if status improves.  PORFIRIO mina.
Chart reviewed, order received for swallow eval, case discussed with Dr. Shannon MD to assess pt prior to swallow eval and confirm with SLP if pt can be given PO trials, given reason for admission.  Will follow.
Chart reviewed order received for swallow eval.  Pt cleared for PO trials/assessment per Dr. Ortega.  Pt received upright in bed A&A Ox1 via head nod, reduced cognition, on RA, no verbalizations noted, Dr. Ortega present, pain scale 0/10 via non-verbal pain scale.  Swallow eval completed, however, limited assessment, see below for details.  Pt left as received NAD PORFIRIO Nielson & Dr. Ortega aware of presentation and rx's.  Will follow to reassess at bedside.     Per charting, pt is a "86 y/o F with PMHx of CAD, NSTEMI, COPD (no supp O2), Asthma, DM2 (home insulin), Hypoparathyroidism, HTN, HLD, GERD, Major depressive disorder, s/p appendectomy presenting from [facility] being admitted for medical management of acute cholecystitis."    CT Abdomen & Pelvis 7/31/22: "LOWER CHEST: Within normal limits...."

## 2022-08-02 NOTE — DISCHARGE NOTE PROVIDER - NSDCMRMEDTOKEN_GEN_ALL_CORE_FT
aspirin 81 mg oral tablet, chewable: 1 tab(s) orally once a day  atorvastatin 80 mg oral tablet: 1 tab(s) orally once a day  clopidogrel 75 mg oral tablet: 1 tab(s) orally once a day  escitalopram 10 mg oral tablet: 1 tab(s) orally once a day  fluticasone-salmeterol 250 mcg-50 mcg inhalation powder: 1 puff(s) inhaled 2 times a day  gabapentin 100 mg oral capsule: 1 cap(s) orally 3 times a day  ipratropium-albuterol 0.5 mg-2.5 mg/3 mL inhalation solution: 3 milliliter(s) inhaled 2 times a day  isosorbide mononitrate 30 mg oral tablet, extended release: 1 tab(s) orally once a day (in the morning)  levothyroxine 125 mcg (0.125 mg) oral tablet: 1 tab(s) orally Monday through Friday  metFORMIN 500 mg oral tablet: 1 tab(s) orally 2 times a day (with meals)  metoprolol succinate 50 mg oral tablet, extended release: 1 tab(s) orally once a day  NovoLOG 100 units/mL injectable solution: 8 unit(s) injectable 3 times a day (before meals)  Refresh Relieva ophthalmic solution: 1 drop(s) in each eye 2 times a day  Semglee (Prefilled Pen) 100 units/mL subcutaneous solution: 15 unit(s) subcutaneous once a day (at bedtime)  Vitamin D3 50 mcg (2000 intl units) oral tablet: 1 tab(s) orally once a day   albuterol 2.5 mg/3 mL (0.083%) inhalation solution: 3 milliliter(s) inhaled every 6 hours, As Needed SOB  HYDROmorphone: 0.25 milligram(s) intravenously every 4 hours, As Needed for comfort  ondansetron 2 mg/mL injectable solution: 4 milligram(s) injectable every 8 hours  Refresh Relieva ophthalmic solution: 1 drop(s) in each eye 2 times a day

## 2022-08-02 NOTE — DIETITIAN INITIAL EVALUATION ADULT - PERTINENT LABORATORY DATA
08-02    146<H>  |  115<H>  |  52<H>  ----------------------------<  233<H>  4.5   |  23  |  2.30<H>    Ca    7.7<L>      02 Aug 2022 07:16    TPro  5.1<L>  /  Alb  2.3<L>  /  TBili  0.4  /  DBili  x   /  AST  115<H>  /  ALT  21  /  AlkPhos  65  08-02  POCT Blood Glucose.: 371 mg/dL (08-01-22 @ 23:31)  A1C with Estimated Average Glucose Result: 10.0 % (06-14-22 @ 09:06)  A1C with Estimated Average Glucose Result: 8.4 % (03-10-22 @ 11:06)

## 2022-08-02 NOTE — DIETITIAN INITIAL EVALUATION ADULT - OTHER INFO
Reason for Admission: abdominal pain  86 y/o F with PMHx of CAD, NSTEMI, COPD (no supp O2), Asthma, DM2 (home insulin), Hypoparathyroidism, HTN, HLD, GERD, Major depressive disorder, s/p appendectomy presenting from [facility] with abdominal pain. History obtained from facility and family. At baseline patient is able to say "yes", "no", and use short phrases. However, on 7/28 she stopped eating, stopped speaking, and began pointing to the R side of her abdomen with pain. A RUQ ultrasound from 7/30 revealed a gallstone and hepatomegaly.   weight from transfer papers 188.2# Kyung admit wt 204# ? weight loss with noted poor PO just prior to admit   A1c 10% June on novolog PTA

## 2022-08-02 NOTE — CONSULT NOTE ADULT - ASSESSMENT
86yo female with PMH of CAD, NSTEMI, COPD (not on O2), asthma, T2DM (on insulin), hypoparathyroidisms, HTN, HLD, major depressive disorder and anxiety who presented from [facility] with abdominal pain, admitted for cholelithiasis    sepsis  resp distress  CAD  COPD  Asthma  DM  HTN  HLD  cholelithiasis   86yo female with PMH of CAD, NSTEMI, COPD (not on O2), asthma, T2DM (on insulin), hypoparathyroidisms, HTN, HLD, major depressive disorder and anxiety who presented from [facility] with abdominal pain, admitted for cholelithiasis    sepsis  resp distress  CAD  COPD  Asthma  DM  HTN  HLD  cholelithiasis    pt with sepsis - met acidosis - resp distress - DM - TRENTON - aortic stenosis - asthma    on bipap for resp distress  Blood gas noted - shows met acidosis  will repeat Blood Gas  I and O  hold off on Diuresis  I and O  serial labs  cont emp ABX  HIDA scan noted - CT imaging reviewed - Labs reviewed - Old records reviewed - TTE reviewed -   Surgery eval - GI eval noted  consideration for poss Perc Cholecystotomy - ?   GOC - DNR  TELE monitoring in progress  prognosis guarded  not a candidate for Inhaler rx regimen - will change to Albuterol NEBS prn

## 2022-08-02 NOTE — DISCHARGE NOTE NURSING/CASE MANAGEMENT/SOCIAL WORK - PATIENT PORTAL LINK FT
You can access the FollowMyHealth Patient Portal offered by [facility] by registering at the following website: [email or web address]. By joining [facility]’s FollowMyHealth portal, you will also be able to view your health information using other applications (apps) compatible with our system.

## 2022-08-02 NOTE — PROGRESS NOTE ADULT - SUBJECTIVE AND OBJECTIVE BOX
Hospital day: 2    87y Female admitted with Abdominal pain      Patient seen and examined bedside. CPAP in place.  Pt had an overnight event/rapid response for agitation with subsequent tachycardia and decreased O2sats.  Currently sleeping with CPAP in place. No complaints.    T(F): 98.4 (08-02-22 @ 04:39), Max: 98.4 (08-02-22 @ 04:39)  HR: 80 (08-02-22 @ 07:35) (72 - 98)  BP: 119/50 (08-02-22 @ 04:39) (84/52 - 152/80)  RR: 19 (08-01-22 @ 19:06) (19 - 19)  SpO2: 99% (08-02-22 @ 07:35) (94% - 100%)  Wt(kg): --  CAPILLARY BLOOD GLUCOSE      POCT Blood Glucose.: 371 mg/dL (01 Aug 2022 23:31)  POCT Blood Glucose.: 355 mg/dL (01 Aug 2022 22:51)      PHYSICAL EXAM:  General: NAD  Neuro:  Lethargic, CPAP on    Abdomen:   Extremities: no pedal edema or calf tenderness noted       LABS:                        7.3    10.77 )-----------( 185      ( 02 Aug 2022 07:16 )             24.2     08-02    146<H>  |  115<H>  |  52<H>  ----------------------------<  233<H>  4.5   |  23  |  2.30<H>    Ca    7.7<L>      02 Aug 2022 07:16    TPro  5.1<L>  /  Alb  2.3<L>  /  TBili  0.4  /  DBili  x   /  AST  115<H>  /  ALT  21  /  AlkPhos  65  08-02    PT/INR - ( 31 Jul 2022 12:15 )   PT: 12.6 sec;   INR: 1.08 ratio         PTT - ( 31 Jul 2022 12:15 )  PTT:27.7 sec  I&O's Detail        RADIOLOGY:     Hospital day: 2    87y Female admitted with Abdominal pain      Patient seen and examined bedside. CPAP in place.  Pt had an overnight event/rapid response for agitation with subsequent tachycardia and decreased O2sats.  Currently sleeping with CPAP in place. No complaints.    T(F): 98.4 (08-02-22 @ 04:39), Max: 98.4 (08-02-22 @ 04:39)  HR: 80 (08-02-22 @ 07:35) (72 - 98)  BP: 119/50 (08-02-22 @ 04:39) (84/52 - 152/80)  RR: 19 (08-01-22 @ 19:06) (19 - 19)  SpO2: 99% (08-02-22 @ 07:35) (94% - 100%)  Wt(kg): --  CAPILLARY BLOOD GLUCOSE      POCT Blood Glucose.: 371 mg/dL (01 Aug 2022 23:31)  POCT Blood Glucose.: 355 mg/dL (01 Aug 2022 22:51)      PHYSICAL EXAM:  General: NAD  Neuro: awake  Chest: nonlabored  CV: RRR   Abdomen: soft, obese, nontender  Extremities: no pedal edema , + calf tenderness noted       LABS:                        7.3    10.77 )-----------( 185      ( 02 Aug 2022 07:16 )             24.2     08-02    146<H>  |  115<H>  |  52<H>  ----------------------------<  233<H>  4.5   |  23  |  2.30<H>    Ca    7.7<L>      02 Aug 2022 07:16    TPro  5.1<L>  /  Alb  2.3<L>  /  TBili  0.4  /  DBili  x   /  AST  115<H>  /  ALT  21  /  AlkPhos  65  08-02    PT/INR - ( 31 Jul 2022 12:15 )   PT: 12.6 sec;   INR: 1.08 ratio         PTT - ( 31 Jul 2022 12:15 )  PTT:27.7 sec  I&O's Detail        RADIOLOGY:

## 2022-08-02 NOTE — DIETITIAN INITIAL EVALUATION ADULT - ORAL INTAKE PTA/DIET HISTORY
patient seen on BIPAP now NPO . per RN family states poor PO PTA.   attempted puree diet yesterday. patient from history answers yes and no to questions   MOLST no tube feeding in chart. shellfish allergy noted. with dental implants noted from last admit  patient from [facility] assisted living on mechanical soft NCS glucerna TID per transfer records    attempted to call son who was unable to speak at this time will try again  patient seen on BIPAP now NPO . per RN family states poor PO PTA.   attempted puree diet yesterday. patient from history answers yes and no to questions   MOLST no tube feeding in chart. shellfish allergy noted. with dental implants noted from last admit  patient from [facility]  mechanical soft NCS glucerna TID per transfer records previous at assisted living     attempted to call son who was unable to speak at this time will try again  patient seen on BIPAP now NPO . per RN family states poor PO PTA.   attempted puree diet yesterday. patient from history answers yes and no to questions   MOLST no tube feeding in chart. shellfish allergy noted. with dental implants noted from last admit  patient from [facility]  mechanical soft NCS glucerna TID per transfer records previous at assisted living     attempted to call son who was unable to speak at first then retried and was told by son that patient with poor appetite basically since last admit to [facility] from June. with refusing soft puree and all diets. considered hospice at some point but mother did not qualify per son.

## 2022-08-02 NOTE — DIETITIAN INITIAL EVALUATION ADULT - NSICDXPASTMEDICALHX_GEN_ALL_CORE_FT
PAST MEDICAL HISTORY:  Asthma     CAD (coronary artery disease)     Diabetes     DM2 (diabetes mellitus, type 2)     Essential hypertension     Gastroesophageal reflux disease without esophagitis     HLD (hyperlipidemia)     HTN (hypertension)     Hypoparathyroidism     Hypothyroid     Hypothyroidism, unspecified type     NSTEMI (non-ST elevated myocardial infarction)     Pure hypercholesterolemia     Uncomplicated asthma, unspecified asthma severity

## 2022-08-02 NOTE — DIETITIAN INITIAL EVALUATION ADULT - PERTINENT MEDS FT
MEDICATIONS  (STANDING):  levothyroxine Injectable 62.5 MICROGram(s) IV Push at bedtime  pantoprazole  Injectable 40 milliGRAM(s) IV Push two times a day  piperacillin/tazobactam IVPB.. 3.375 Gram(s) IV Intermittent every 8 hours    MEDICATIONS  (PRN):  ALBUTerol    0.083% 2.5 milliGRAM(s) Nebulizer every 3 hours PRN Shortness of Breath and/or Wheezing  ondansetron Injectable 4 milliGRAM(s) IV Push every 8 hours PRN Nausea and/or Vomiting

## 2022-08-02 NOTE — PROGRESS NOTE ADULT - ASSESSMENT
Anemia  FOBT GIB    CT noted, d/w daughter  SATURNINO noted  Hold a/c  Continue PPI  Per primary team charting, pt's family does not currently want blood transfusions or invasive management  Would consult palliative care  Will follow    I reviewed the overnight course of events on the unit, re-confirming the patient history. I discussed the care with the patient and their family  Differential diagnosis and plan of care discussed with patient after the evaluation  35 minutes spent on total encounter of which more than fifty percent of the encounter was spent counseling and/or coordinating care by the attending physician.  Advanced care planning was discussed with patient and family.  Advanced care planning forms were reviewed and discussed.  Risks, benefits and alternatives of gastroenterologic procedures were discussed in detail and all questions were answered.
Anemia  FOBT GIB    CT noted, d/w daughter  Planned for HIDA, f/u  Hold a/c  Continue PPI  Per primary team charting, pt's family does not currently want blood transfusions or invasive management  Monitor on conservative management  Will follow    I reviewed the overnight course of events on the unit, re-confirming the patient history. I discussed the care with the patient and their family  Differential diagnosis and plan of care discussed with patient after the evaluation  35 minutes spent on total encounter of which more than fifty percent of the encounter was spent counseling and/or coordinating care by the attending physician.  Advanced care planning was discussed with patient and family.  Advanced care planning forms were reviewed and discussed.  Risks, benefits and alternatives of gastroenterologic procedures were discussed in detail and all questions were answered.  
88 yo female here for abdominal pain.  Recent rapid response overnight for tachycardia and O2 desaturations, now with CPAP in place.  HIDA negative. 
88 yo female pmh of HTN, HLD, DM, CAD, NSTEMI, GERD, presenting with abdominal pain, presenting with potential cholelithiasis    No evidence of acute nelida on US
86 y/o F with PMHx of CAD, NSTEMI, COPD (no supp O2), Asthma, DM2 (home insulin), Hypoparathyroidism, HTN, HLD, GERD, Major depressive disorder, s/p appendectomy presenting from [facility] being admitted for medical management of acute cholecystitis.

## 2022-08-02 NOTE — PROGRESS NOTE ADULT - SUBJECTIVE AND OBJECTIVE BOX
[facility]  Farrukh Aguilar PA-C  [address] [place], [place] [id number] [phone]      INTERVAL HPI/OVERNIGHT EVENTS:  Pt s/e  Overnight events noted, pt currently on bipap. Unable to provide history  D/w RN, no BM overnight or overt GI bleeding symptoms  Hgb noted  HIDA noted    MEDICATIONS  (STANDING):  levothyroxine Injectable 62.5 MICROGram(s) IV Push at bedtime  pantoprazole  Injectable 40 milliGRAM(s) IV Push two times a day  piperacillin/tazobactam IVPB.. 3.375 Gram(s) IV Intermittent every 8 hours    MEDICATIONS  (PRN):  ALBUTerol    0.083% 2.5 milliGRAM(s) Nebulizer every 3 hours PRN Shortness of Breath and/or Wheezing  ondansetron Injectable 4 milliGRAM(s) IV Push every 8 hours PRN Nausea and/or Vomiting      Allergies    doxycycline (Unknown)  iodine (Hives)  iodine containing compounds (Unknown)  shellfish (Anaphylaxis)  shellfish (Swelling; Short breath)      PHYSICAL EXAM:   Vital Signs:  Vital Signs Last 24 Hrs  T(C): 36.9 (02 Aug 2022 04:39), Max: 36.9 (02 Aug 2022 04:39)  T(F): 98.4 (02 Aug 2022 04:39), Max: 98.4 (02 Aug 2022 04:39)  HR: 80 (02 Aug 2022 07:35) (72 - 98)  BP: 119/50 (02 Aug 2022 04:39) (84/52 - 152/80)  BP(mean): --  RR: 19 (01 Aug 2022 19:06) (19 - 19)  SpO2: 99% (02 Aug 2022 07:35) (94% - 100%)    Parameters below as of 02 Aug 2022 04:39  Patient On (Oxygen Delivery Method): BiPAP/CPAP    O2 Concentration (%): 40  Daily     Daily     GENERAL:  Appears stated age  ABDOMEN:  Soft, non-tender, non-distended  NEURO:  Alert      LABS:                        7.3    10.77 )-----------( 185      ( 02 Aug 2022 07:16 )             24.2     08-02    146<H>  |  115<H>  |  52<H>  ----------------------------<  233<H>  4.5   |  23  |  2.30<H>    Ca    7.7<L>      02 Aug 2022 07:16    TPro  5.1<L>  /  Alb  2.3<L>  /  TBili  0.4  /  DBili  x   /  AST  115<H>  /  ALT  21  /  AlkPhos  65  08-02    PT/INR - ( 2022 12:15 )   PT: 12.6 sec;   INR: 1.08 ratio         PTT - ( 2022 12:15 )  PTT:27.7 sec  Urinalysis Basic - ( 2022 12:15 )    Color: Yellow / Appearance: Slightly Turbid / S.015 / pH: x  Gluc: x / Ketone: Negative  / Bili: Negative / Urobili: Negative   Blood: x / Protein: Negative / Nitrite: Negative   Leuk Esterase: Moderate / RBC: 0-2 /HPF / WBC 3-5   Sq Epi: x / Non Sq Epi: Occasional / Bacteria: Moderate

## 2022-08-02 NOTE — DISCHARGE NOTE PROVIDER - HOSPITAL COURSE
HPI:  86 y/o F with PMHx of CAD, NSTEMI, COPD (no supp O2), Asthma, DM2 (home insulin), Hypoparathyroidism, HTN, HLD, GERD, Major depressive disorder, s/p appendectomy presenting from [facility] with abdominal pain. History obtained from facility and family. At baseline patient is able to say "yes", "no", and use short phrases. However, on 7/28 she stopped eating, stopped speaking, and began pointing to the R side of her abdomen with pain. A RUQ ultrasound from 7/30 revealed a gallstone and hepatomegaly. Patient was brought to the ED this afternoon (7/31) given worsening of symptoms. Currently, patient understands questions but is unable to answer verbally due to medical condition. She points to RUQ and epigastric regions of abdomen as painful. Communication is limited but she denies chest pain, shortness of breath, headache, or leg pain. Notably, patient is DNR/DNI, no tube feeds (MOLST), and no blood transfusions (as per Neil Chávez - son and health proxy, and daughter-in-law).    ER Course:   Vitals: 118/63 HR 82 BPM RR 18/min T 97.6 F   Imaging: RUQ Ultrasound: Cholelithiasis without evidence of acute cholecystitis.  Labs: BUN/Cr: 73/2.00 WBC: 14.05 w/left shift   Received: morphine x 1, zofran IV x 1, 1 L normal saline bolus, zosyn x 1  (31 Jul 2022 15:27)      COURSE: Patient was hospitalized due to abdominal pain and found to have cholelithiases without evidence of acute cholecystitis. She also developed hypoxic respiratory failure likely due to underlying COPD and requiring BiPAP.   Based on her expressed wishes and goals of care, family opted for hospice care. Palliative consultation and hospice referral done. Plan is for transfer to inpatient hospice care.

## 2022-08-02 NOTE — PROGRESS NOTE ADULT - NS ATTEND AMEND GEN_ALL_CORE FT
I have personally seen and examined the patient.  I fully participated in the care of this patient.  I have made amendments to the documentation where necessary, and agree with the history, physical exam, impression/assessment, and plan as documented by the PA    Patient unable to relay pain but appears nontender on exam. awaiting HIDA but doubt acute cholecystitis based on clinical picture.
I have personally seen and examined the patient.  I fully participated in the care of this patient.  I have made amendments to the documentation where necessary, and agree with the history, physical exam, impression/assessment, and plan as documented by the PA    Patient soft, obese , nontender. HIDA neg for acute cholecystitis. CT with no acute intra abdominal pathology. WIll s/o please reconsult as needed

## 2022-08-02 NOTE — DIETITIAN INITIAL EVALUATION ADULT - NUTRITIONGOAL OUTCOME1
patient to meet micronutrient and energy and protein needs to assist in wound healing  patient to meet estimated energy needs as diet initiated

## 2022-08-02 NOTE — DIETITIAN INITIAL EVALUATION ADULT - SIGNS/SYMPTOMS
as evidenced by pressure injury  as evidenced by patient is NPO on BIPAP unable to complete speech evaluation  as evidenced by weight loss 7.8% with < 50% of estimated energy intake > 5 days

## 2022-08-02 NOTE — DISCHARGE NOTE PROVIDER - ATTENDING DISCHARGE PHYSICAL EXAMINATION:
MEDICAL ATTENDING DISCHARGE NOTE :    Patient is a 87y old  Female who presents with a chief complaint of abdominal pain (02 Aug 2022 13:14)      INTERVAL HPI / OVERNIGHT EVENTS: patient was hypoxic and had a RRT overnight.    ----------------------------------------------------------------------------------  REVIEW OF SYSTEMS: no fever; no SOB      Vital Signs Last 24 Hrs  T(C): 36.6 (02 Aug 2022 12:59), Max: 36.9 (02 Aug 2022 04:39)  T(F): 97.9 (02 Aug 2022 12:59), Max: 98.4 (02 Aug 2022 04:39)  HR: 74 (02 Aug 2022 12:59) (72 - 98)  BP: 125/57 (02 Aug 2022 12:59) (84/52 - 152/80)  RR: 20 (02 Aug 2022 12:59) (19 - 20)  SpO2: 99% (02 Aug 2022 12:59) (94% - 100%)    Parameters below as of 02 Aug 2022 12:59  Patient On (Oxygen Delivery Method): BiPAP/CPAP    _________________  PHYSICAL EXAM:  ---------------------------   NAD; Normocephalic  LUNGS - no wheezing  HEART: S1 S2+   ABDOMEN: Soft, Nontender, non distended  EXTREMITIES: no cyanosis; no edema      _________________________________________________  LABS:                        7.3    10.77 )-----------( 185      ( 02 Aug 2022 07:16 )             24.2     08-02    146<H>  |  115<H>  |  52<H>  ----------------------------<  233<H>  4.5   |  23  |  2.30<H>    Ca    7.7<L>      02 Aug 2022 07:16    TPro  5.1<L>  /  Alb  2.3<L>  /  TBili  0.4  /  DBili  x   /  AST  115<H>  /  ALT  21  /  AlkPhos  65  08-02      A/P: 88 y/o F with PMHx of CAD, NSTEMI, COPD  , Asthma, DM2 (home insulin), Hypoparathyroidism, HTN, HLD, GERD, Major depressive disorder, s/p appendectomy presenting from [facility] being admitted for   1. Acute hypoxic respiratory failure likely due to COPD - required non invasive ventilation  2. Cholelithiasis with intermittent abdominal pain  3. Dementia  4. 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 Coronary artery disease  6. Anemia with + fecal occult- possible GI bleed causing acute blood loss.     Overnight events and plan of care, test results and findings were  discussed with patient's family at bedside- based on her prior expressed wishes for comfort and dignity at end of life, family opted for no heroic measures, no escalation of care, no PRBC transfusion and no further labs.  Son is the HCP and understands risks and benefits associated including possibility of death. Hospice care was discussed and family agreeable.   Palliative consult requested and hospice referral made.   All questions and concerns were addressed and care was aligned with patient's wishes.  Anticipate discharge to in-patient hospice possibly today.  Discharge plans was discussed with care team including the nursing staff  and unit discharge planner.             MEDICAL ATTENDING DISCHARGE NOTE :    Patient is a 87y old  Female who presents with a chief complaint of abdominal pain (02 Aug 2022 13:14)      INTERVAL HPI / OVERNIGHT EVENTS: patient was hypoxic and had a RRT overnight.    ----------------------------------------------------------------------------------  REVIEW OF SYSTEMS: no fever; no SOB      Vital Signs Last 24 Hrs  T(C): 36.6 (02 Aug 2022 12:59), Max: 36.9 (02 Aug 2022 04:39)  T(F): 97.9 (02 Aug 2022 12:59), Max: 98.4 (02 Aug 2022 04:39)  HR: 74 (02 Aug 2022 12:59) (72 - 98)  BP: 125/57 (02 Aug 2022 12:59) (84/52 - 152/80)  RR: 20 (02 Aug 2022 12:59) (19 - 20)  SpO2: 99% (02 Aug 2022 12:59) (94% - 100%)    Parameters below as of 02 Aug 2022 12:59  Patient On (Oxygen Delivery Method): BiPAP/CPAP    _________________  PHYSICAL EXAM:  ---------------------------   NAD; Normocephalic  LUNGS - no wheezing  HEART: S1 S2+   ABDOMEN: Soft, Nontender, non distended  EXTREMITIES: no cyanosis; no edema      _________________________________________________  LABS:                        7.3    10.77 )-----------( 185      ( 02 Aug 2022 07:16 )             24.2     08-02    146<H>  |  115<H>  |  52<H>  ----------------------------<  233<H>  4.5   |  23  |  2.30<H>    Ca    7.7<L>      02 Aug 2022 07:16    TPro  5.1<L>  /  Alb  2.3<L>  /  TBili  0.4  /  DBili  x   /  AST  115<H>  /  ALT  21  /  AlkPhos  65  08-02      A/P: 88 y/o F with PMHx of CAD, NSTEMI, COPD  , Asthma, DM2 (home insulin), Hypoparathyroidism, HTN, HLD, GERD, Major depressive disorder, s/p appendectomy presenting from [facility] being admitted for   1. Acute hypoxic respiratory failure likely due to COPD - required non invasive ventilation  2. Cholelithiasis with intermittent abdominal pain  3. Dementia  4. 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 Coronary artery disease  6. Anemia with + fecal occult- possible GI bleed causing acute blood loss.   7. Severe protein calorie malnutrition    Overnight events and plan of care, test results and findings were  discussed with patient's family at bedside- based on her prior expressed wishes for comfort and dignity at end of life, family opted for no heroic measures, no escalation of care, no PRBC transfusion and no further labs.  Son is the HCP and understands risks and benefits associated including possibility of death. Hospice care was discussed and family agreeable.   Palliative consult requested and hospice referral made.   All questions and concerns were addressed and care was aligned with patient's wishes.  Anticipate discharge to in-patient hospice possibly today.  Discharge plans was discussed with care team including the nursing staff  and unit discharge planner.

## 2022-08-02 NOTE — PROGRESS NOTE ADULT - SUBJECTIVE AND OBJECTIVE BOX
MEDICAL ATTENDING NOTE    Patient is a 87y old  Female who presents with a chief complaint of abdominal pain (02 Aug 2022 10:00)      INTERVAL HPI/OVERNIGHT EVENTS:    MEDICATIONS  (STANDING):  levothyroxine Injectable 62.5 MICROGram(s) IV Push at bedtime  pantoprazole  Injectable 40 milliGRAM(s) IV Push two times a day  piperacillin/tazobactam IVPB.. 3.375 Gram(s) IV Intermittent every 8 hours    MEDICATIONS  (PRN):  ALBUTerol    0.083% 2.5 milliGRAM(s) Nebulizer every 3 hours PRN Shortness of Breath and/or Wheezing  ondansetron Injectable 4 milliGRAM(s) IV Push every 8 hours PRN Nausea and/or Vomiting      __________________________________________________  ----------------------------------------------------------------------------------  REVIEW OF SYSTEMS: negative      Vital Signs Last 24 Hrs  T(C): 36.9 (02 Aug 2022 04:39), Max: 36.9 (02 Aug 2022 04:39)  T(F): 98.4 (02 Aug 2022 04:39), Max: 98.4 (02 Aug 2022 04:39)  HR: 80 (02 Aug 2022 07:35) (72 - 98)  BP: 119/50 (02 Aug 2022 04:39) (84/52 - 152/80)  BP(mean): --  RR: 19 (01 Aug 2022 19:06) (19 - 19)  SpO2: 99% (02 Aug 2022 07:35) (94% - 100%)    Parameters below as of 02 Aug 2022 04:39  Patient On (Oxygen Delivery Method): BiPAP/CPAP    O2 Concentration (%): 40    _________________  PHYSICAL EXAM:  ---------------------------   NAD; Normocephalic;   LUNGS - no wheezing  HEART: S1 S2+   ABDOMEN: Soft, Nontender, non distended  EXTREMITIES: no cyanosis; no edema  NERVOUS SYSTEM:      _________________________________________________  LABS:                        7.3    10.77 )-----------( 185      ( 02 Aug 2022 07:16 )             24.2     08-02    146<H>  |  115<H>  |  52<H>  ----------------------------<  233<H>  4.5   |  23  |  2.30<H>    Ca    7.7<L>      02 Aug 2022 07:16    TPro  5.1<L>  /  Alb  2.3<L>  /  TBili  0.4  /  DBili  x   /  AST  115<H>  /  ALT  21  /  AlkPhos  65  08-02    PT/INR - ( 2022 12:15 )   PT: 12.6 sec;   INR: 1.08 ratio         PTT - ( 2022 12:15 )  PTT:27.7 sec  Urinalysis Basic - ( 2022 12:15 )    Color: Yellow / Appearance: Slightly Turbid / S.015 / pH: x  Gluc: x / Ketone: Negative  / Bili: Negative / Urobili: Negative   Blood: x / Protein: Negative / Nitrite: Negative   Leuk Esterase: Moderate / RBC: 0-2 /HPF / WBC 3-5   Sq Epi: x / Non Sq Epi: Occasional / Bacteria: Moderate      CAPILLARY BLOOD GLUCOSE      POCT Blood Glucose.: 371 mg/dL (01 Aug 2022 23:31)  POCT Blood Glucose.: 355 mg/dL (01 Aug 2022 22:51)                Plan of care was discussed with patient ; all questions and concerns were addressed and care was aligned with patient's wishes.

## 2022-08-02 NOTE — PROVIDER CONTACT NOTE (CRITICAL VALUE NOTIFICATION) - PERSON GIVING RESULT:
[facility] HOSPITALIST PROGRESS NOTE     Patient Identification:  Name:  Lian Kang  Age:  60 y.o.  Sex:  female  :  1959  MRN:  [id number]  Visit Number:  [id number]  Primary Care Provider:  Arben Ibrahim MD    Length of stay:  0    Chief complaint: Chest pain    Subjective:    Patient reports complete resolution of chest pain since last night.  She has no further symptoms today.  She denies any other symptoms at this time.  ----------------------------------------------------------------------------------------------------------------------  Current Hospital Meds:    aspirin 81 mg Oral Daily   clopidogrel 75 mg Oral Daily   metoprolol succinate XL 50 mg Oral Q24H   pravastatin 80 mg Oral Nightly   sodium chloride 10 mL Intravenous Q12H        ----------------------------------------------------------------------------------------------------------------------  Vital Signs:  Temp:  [97.5 °F (36.4 °C)-99 °F (37.2 °C)] 98 °F (36.7 °C)  Heart Rate:  [] 105  Resp:  [18-20] 18  BP: (105-148)/() 135/95      20  1728 20  0605   Weight: 87.5 kg (193 lb) 89.9 kg (198 lb 3.1 oz)     Body mass index is 31.03 kg/m².    Intake/Output Summary (Last 24 hours) at 2020 1004  Last data filed at 2020 0831  Gross per 24 hour   Intake 1000 ml   Output --   Net 1000 ml     NPO Diet NPO Except: Sips With Meds  ----------------------------------------------------------------------------------------------------------------------  Physical exam:  Constitutional: Well-nourished  female in no apparent distress.     HENT:  Head:  Normocephalic and atraumatic.  Mouth:  Moist mucous membranes.    Eyes:  Conjunctivae and EOM are normal.  Pupils are equal, round, and reactive to light.  No scleral icterus.    Neck:  Neck supple. No thyromegaly.  No JVD present.    Cardiovascular:  Regular rate and rhythm with no murmurs, rubs, clicks or gallops appreciated.  Pulmonary/Chest:  Loni Jolley/   Clear to auscultation bilaterally with no crackles, wheezes or rhonchi appreciated.  Abdominal:  Soft. Nontender. Nondistended  Bowel sounds are normal in all four quadrants. No organomegally appreciated.   Musculoskeletal:  5/5 muscle strength bilateral upper and lower extermties with equal muscle tone and bulk. No edema, no tenderness, and no deformity.  No red or swollen joints anywhere.    Neurological:  Alert and oriented to person, place, and time. Cranial nerves II-XII intact with no focal defecits.  No facial droop.  No slurred speech.   Skin:  Warm and dry to palpation with no rashes or lesions appreciated.  Psychiatric:  Alert and oriented x3, demonstrates appropriate judgement and insight.  ----------------------------------------------------------------------------------------------------------------------  Tele:    ----------------------------------------------------------------------------------------------------------------------  Results from last 7 days   Lab Units 04/02/20  0737 04/02/20 0037 04/01/20  1931   TROPONIN T ng/mL <0.010 <0.010 <0.010     Results from last 7 days   Lab Units 04/02/20  0037 04/01/20 1931 04/01/20  1732   CRP mg/dL  --   --  0.08   LACTATE mmol/L  --  0.7  --    WBC 10*3/mm3 7.33  --  7.97   HEMOGLOBIN g/dL 12.5  --  13.2   HEMATOCRIT % 39.6  --  42.7   MCV fL 91.5  --  92.0   MCHC g/dL 31.6  --  30.9*   PLATELETS 10*3/mm3 205  --  250         Results from last 7 days   Lab Units 04/02/20  0037 04/01/20  1732   SODIUM mmol/L 141 143   POTASSIUM mmol/L 3.8 4.0   MAGNESIUM mg/dL  --  2.1   CHLORIDE mmol/L 108* 107   CO2 mmol/L 22.0 24.7   BUN mg/dL 11 12   CREATININE mg/dL 0.82 0.86   EGFR IF NONAFRICN AM mL/min/1.73 71 67   CALCIUM mg/dL 8.9 9.2   GLUCOSE mg/dL 99 90   ALBUMIN g/dL 3.79 4.12   BILIRUBIN mg/dL 0.2 0.2   ALK PHOS U/L 61 67   AST (SGOT) U/L 16 18   ALT (SGPT) U/L 14 15   Estimated Creatinine Clearance: 84 mL/min (by C-G formula based on SCr of 0.82  mg/dL).    No results found for: AMMONIA  Results from last 7 days   Lab Units 04/02/20  0037   CHOLESTEROL mg/dL 182   TRIGLYCERIDES mg/dL 126   HDL CHOL mg/dL 56   LDL CHOL mg/dL 101*     No results found for: BLOODCX  No results found for: URINECX  No results found for: WOUNDCX  No results found for: STOOLCX    I have personally looked at the labs and they are summarized above.  ----------------------------------------------------------------------------------------------------------------------  Imaging Results (Last 24 Hours)     Procedure Component Value Units Date/Time    US Carotid Bilateral [[id number]] Collected:  04/02/20 0826     Updated:  04/02/20 0829    Narrative:       EXAMINATION: US CAROTID BILATERAL-      Technique: Multiple real-time color Doppler images were acquired of  bilateral carotid arteries.     Stenosis measurements if obtained, were performed by the NASCET or  similar method.        CLINICAL INDICATION:     recurrent lightheadedness, syncope;  R94.31-Abnormal electrocardiogram (ECG) (EKG); R07.9-Chest pain,  unspecified; J18.1-Lobar pneumonia, unspecified organism 0      COMPARISON:    None     FINDINGS:         Right:        Mid internal carotid artery peak systolic velocity of -754.00 mm/s  end-diastolic velocity of -154.00 mm/s.   Right ICA/CCA Ratio:   1.50     Left:     Mid internal carotid artery peak systolic velocity of -787.00 mm/s and  end-diastolic velocity of -130.00 mm/s.   Left ICA/CCA Ratio:   1           Anterograde flow is demonstrated in bilateral vertebral arteries.       Impression:       Impression:  No hemodynamically significant stenosis.  Heterogeneous appearance of right lobe of thyroid. Multiple nodules.  Further evaluation with thyroid ultrasound recommended.  Comments:  <50% stenosis: PSV <125cm/s and Ratio of <2  50-69% stenosis: -229cm/s and Ratio of 2-3.9  70-99% stenosis: PSV >230cm/s and Ratio of >4     This report was finalized on 4/2/2020 8:27 AM  by Dr. Awais Farris MD.       CT Head Without Contrast [[id number]] Collected:  04/01/20 2221     Updated:  04/01/20 2223    Narrative:       CT Head WO    HISTORY:   Recurrent syncope. Lightheadedness today.    TECHNIQUE:   Axial unenhanced head CT with multiplanar reformats. Radiation dose reduction techniques included automated exposure control or exposure modulation based on body size. Count of known CT and cardiac nuc med studies performed in previous 12 months: 1.     Time of scan: 2203 hours    COMPARISON:   None.    FINDINGS:   No intracranial hemorrhage, mass, or infarct. No hydrocephalus or extra-axial fluid collection. Brain parenchymal density is normal. The skull base, calvarium, and extracranial soft tissues are normal. Visualized paranasal sinuses and mastoid air cells  are clear.      Impression:       Normal, negative unenhanced head CT.          Signer Name: Michael Smith MD   Signed: 4/1/2020 10:21 PM   Workstation Name: [facility]    [facility]    CT Chest Pulmonary Embolism [[id number]] Collected:  04/01/20 1923     Updated:  04/01/20 1927    Narrative:       CT CHEST PULMONARY EMBOLISM-     CLINICAL INDICATION: Pulmonary embolism; R94.31-Abnormal  electrocardiogram (ECG) (EKG); R07.9-Chest pain, unspecified;  J18.1-Lobar pneumonia, unspecified organism        COMPARISON: Chest x-ray performed the same day      PROCEDURE: Thin cut axial images were acquired through the pulmonary  vessels during the rapid infusion of IV contrast.     Additional 3-D reformatted images obtained via post-processing for  improved diagnostic accuracy and procedural planning.     Radiation dose reduction techniques were utilized per ALARA protocol.  Automated exposure control was initiated through either or [employer] or  [employer] software packages by  protocol.           FINDINGS: Today's study demonstrates opacification of the central  pulmonary vessels.   There are no filling  defects.   There is no truncation.     No evidence of a pulmonary embolus.     Otherwise there are no parenchymal soft tissue nodules or masses.     There is no mediastinal lymph node enlargement     No pericardial or pleural effusion.          Impression:       1. No evidence of a pulmonary embolus  2. The area in question in the right upper lobe on plain radiography is  not evident on the CT scan and may have represented superimposition of  adjacent structures..     This report was finalized on 4/1/2020 7:25 PM by Dr. Oelg Harris MD.       XR Chest 1 View [[id number]] Collected:  04/01/20 1815     Updated:  04/01/20 1817    Narrative:       XR CHEST 1 VW-     CLINICAL INDICATION: soa        COMPARISON: 09/19/2014      TECHNIQUE: Single frontal view of the chest.     FINDINGS:     Right upper lobe airspace disease  The cardiac silhouette is normal. The pulmonary vasculature is  unremarkable.  There is no evidence of an acute osseous abnormality.   There are no suspicious-appearing parenchymal soft tissue nodules.          Impression:       Right upper lobe consolidation, very possibly pneumonia     This report was finalized on 4/1/2020 6:15 PM by Dr. Oleg Harris MD.           ----------------------------------------------------------------------------------------------------------------------  Assessment and Plan:    1.  Chest pain -EKG with diffuse ST and T wave changes, troponins are currently normal.  Patient with type 2 diabetes mellitus and extensive secondhand smoke exposure.  We will proceed with stress testing today.  Appreciate cardiology recommendations.    2.  Paroxysmal A. Fib -continue Eliquis    3.  Essential hypertension -currently well controlled, continue current medications and monitor closely    4.  History of sick sinus syndrome    5.  Hyperlipidemia -continue statin    6.  Non-insulin-dependent type 2 diabetes mellitus -continue Accu-Cheks q. before meals and nightly with sliding scale  insulin            Santhosh Paniagua,   04/02/20  10:04

## 2022-08-02 NOTE — DISCHARGE NOTE PROVIDER - NSDCCPCAREPLAN_GEN_ALL_CORE_FT
PRINCIPAL DISCHARGE DIAGNOSIS  Diagnosis: Acute respiratory failure with hypoxia  Assessment and Plan of Treatment: Oxygen supplementation as tolerated. Family declined BiPAP. Prognosis poor. Discharge to in-patient hospice care.  Dilaudid PRN for comfort      SECONDARY DISCHARGE DIAGNOSES  Diagnosis: Cholelithiasis  Assessment and Plan of Treatment: Intermittent abdominal pain due to cholelithiasis  Dilaudid PRN for pain.    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 Family declined PRBC transfusion.

## 2022-08-02 NOTE — PROGRESS NOTE ADULT - PROBLEM SELECTOR PLAN 1
H.H dropped- from 9.5 to 7.3- may need transfusion  Lactate elevated from 4:30 this am- may want to redraw  HIDA negative.  Does not appear to need surgical intervention at this time.   Will review case with Dr. Gilbert.

## 2022-08-02 NOTE — DIETITIAN INITIAL EVALUATION ADULT - RD TO REMAIN AVAILABLE
follow at increased risk for malnutrition . await interview with family to confirm weights and PO intake PTA/yes

## 2022-08-02 NOTE — CONSULT NOTE ADULT - SUBJECTIVE AND OBJECTIVE BOX
Date/Time Patient Seen:  		  Referring MD:   Data Reviewed	       Patient is a 87y old  Female who presents with a chief complaint of abdominal pain (01 Aug 2022 12:50)      Subjective/HPI  vs noted  labs reviewed  imaging reviewed  H and P reviewed  ER provider note reviewed  old records reviewed  ABG noted  RRT note reviewed  from SHERIN     86 y/o F with PMHx of CAD, NSTEMI, COPD (no supp O2), Asthma, DM2 (home insulin), Hypoparathyroidism, HTN, HLD, GERD, Major depressive disorder, s/p appendectomy presenting from [facility] with abdominal pain. History obtained from facility and family. At baseline patient is able to say "yes", "no", and use short phrases. However, on 7/28 she stopped eating, stopped speaking, and began pointing to the R side of her abdomen with pain. A RUQ ultrasound from 7/30 revealed a gallstone and hepatomegaly. Patient was brought to the ED this afternoon (7/31) given worsening of symptoms. Currently, patient understands questions but is unable to answer verbally due to medical condition. She points to RUQ and epigastric regions of abdomen as painful. Communication is limited but she denies chest pain, shortness of breath, headache, or leg pain. Notably, patient is DNR/DNI, no tube feeds (MOLST), and no blood transfusions (as per Neil Chávez - son and health proxy, and daughter-in-law).  PAST MEDICAL & SURGICAL HISTORY:  Uncomplicated asthma, unspecified asthma severity    DM2 (diabetes mellitus, type 2)    Essential hypertension    Hypothyroidism, unspecified type    Pure hypercholesterolemia    Gastroesophageal reflux disease without esophagitis    Diabetes    Asthma    CAD (coronary artery disease)    HTN (hypertension)    HLD (hyperlipidemia)    Hypothyroid    NSTEMI (non-ST elevated myocardial infarction)    Hypoparathyroidism    No significant past surgical history    History of appendectomy    History of appendectomy    Abnormal findings on cardiac catheterization  Cardiac Cath    FAMILY HISTORY:  Family history of cancer in mother  Family history of heart disease  Family history of MI (myocardial infarction)  Family history of stomach cancer.     Social History:  Social History (marital status, living situation, occupation, and sexual history): Tobacco: former smoker, denies  EtOH: denies  Recreational drug use: denies  Lives with: assisted living facility  but now in [facility]   Ambulates: with walker, with assistance  ADLs: assisted living facility     Tobacco Screening:  · Core Measure Site	Yes  · Has the patient used tobacco in the past 30 days?	Unable to assess due to patient's cognitive impairment    Risk Assessment:    Present on Admission:  Deep Venous Thrombosis	no  Pulmonary Embolus	no     HIV Screening:  · In accordance with [place] State law, we offer every patient who comes to our ED an HIV test. Would you like to be tested today?	Unable to answer due to medical condition/unresponsive/etc...        Medication list         MEDICATIONS  (STANDING):  ALBUTerol    90 MICROgram(s) HFA Inhaler 2 Puff(s) Inhalation every 6 hours  budesonide 160 MICROgram(s)/formoterol 4.5 MICROgram(s) Inhaler 2 Puff(s) Inhalation two times a day  levothyroxine Injectable 62.5 MICROGram(s) IV Push at bedtime  pantoprazole  Injectable 40 milliGRAM(s) IV Push two times a day  piperacillin/tazobactam IVPB.. 3.375 Gram(s) IV Intermittent every 8 hours    MEDICATIONS  (PRN):  ondansetron Injectable 4 milliGRAM(s) IV Push every 8 hours PRN Nausea and/or Vomiting         Vitals log        ICU Vital Signs Last 24 Hrs  T(C): 36.9 (02 Aug 2022 04:39), Max: 36.9 (02 Aug 2022 04:39)  T(F): 98.4 (02 Aug 2022 04:39), Max: 98.4 (02 Aug 2022 04:39)  HR: 73 (02 Aug 2022 04:39) (72 - 98)  BP: 119/50 (02 Aug 2022 04:39) (84/52 - 152/80)  BP(mean): --  ABP: --  ABP(mean): --  RR: 19 (01 Aug 2022 19:06) (18 - 19)  SpO2: 99% (02 Aug 2022 04:39) (94% - 100%)    O2 Parameters below as of 02 Aug 2022 04:39  Patient On (Oxygen Delivery Method): BiPAP/CPAP    O2 Concentration (%): 40             Input and Output:  I&O's Detail      Lab Data                        9.7    12.35 )-----------( 268      ( 01 Aug 2022 23:55 )             32.7     08-01    141  |  112<H>  |  51<H>  ----------------------------<  377<H>  4.5   |  17<L>  |  2.30<H>    Ca    8.4<L>      01 Aug 2022 23:55    TPro  6.6  /  Alb  2.9<L>  /  TBili  0.6  /  DBili  x   /  AST  34  /  ALT  20  /  AlkPhos  87  08-01    ABG - ( 01 Aug 2022 23:46 )  pH, Arterial: 7.31  pH, Blood: x     /  pCO2: 26    /  pO2: 156   / HCO3: 13    / Base Excess: -13.2 /  SaO2: 100.0                   Review of Systems	  abd pain  resp distress      Objective     Physical Examination        Pertinent Lab findings & Imaging      Schwarz:  NO   Adequate UO     I&O's Detail           Discussed with:     Cultures:	        Radiology      ACC: [id number] EXAM:  CT ABDOMEN AND PELVIS                          PROCEDURE DATE:  07/31/2022          INTERPRETATION:  CLINICAL INFORMATION: Abdominal pain.    COMPARISON: Right upper quadrant ultrasound 7/31/2022 and CT scan abdomen   pelvis for/7/2017.    CONTRAST/COMPLICATIONS:  IV Contrast: NONE  Oral Contrast: NONE  Complications: None reported at time of study completion    PROCEDURE:  CT of the Abdomen and Pelvis was performed.  Sagittal and coronal reformats were performed.    FINDINGS:    LOWER CHEST: Within normal limits.    The evaluation of the solid organ parenchyma is limited without   intravenous contrast.    LIVER: Elongated right hepatic lobe.  BILE DUCTS: Common bile duct measures up to 9 mm.  GALLBLADDER: Distended gallbladder with large gallstone.  No gallbladder wall thickening noted.  SPLEEN: Within normal limits.  PANCREAS: Atrophic.  ADRENALS: Within normal limits.  KIDNEYS/URETERS: Within normal limits.    BLADDER: Within normal limits.  REPRODUCTIVE ORGANS: Calcified fibroid uterus.    BOWEL: Large air/fluid-filled periampullary and transverse portion   duodenal diverticulum.  Sigmoid diverticulosis, without CT evidence of diverticulitis.  Minimally distended fluid-filled loops of small bowel.  No bowel obstruction.  PERITONEUM: No ascites.    VESSELS: Atherosclerotic changes.  RETROPERITONEUM/LYMPH NODES: No lymphadenopathy.    ABDOMINAL WALL:  Rectus diastases with small fat-containing umbilical hernia.    BONES:  Degenerative changes.  Mild, age indeterminate compression deformity superior endplate L3   vertebral body.    IMPRESSION:    Cholelithiasis with distended gallbladder.    Other findings as discussed above.    --- End of Report ---            SACHI NICK MD; Attending Radiologist  This document has been electronically signed. Aug  1 2022  9:47AM                         Date/Time Patient Seen:  		  Referring MD:   Data Reviewed	       Patient is a 87y old  Female who presents with a chief complaint of abdominal pain (01 Aug 2022 12:50)      Subjective/HPI  vs noted  labs reviewed  imaging reviewed  H and P reviewed  ER provider note reviewed  old records reviewed  ABG noted  RRT note reviewed  from SHERIN    88 y/o F with PMHx of CAD, NSTEMI, COPD (no supp O2), Asthma, DM2 (home insulin), Hypoparathyroidism, HTN, HLD, GERD, Major depressive disorder, s/p appendectomy presenting from [facility] with abdominal pain. History obtained from facility and family. At baseline patient is able to say "yes", "no", and use short phrases. However, on 7/28 she stopped eating, stopped speaking, and began pointing to the R side of her abdomen with pain. A RUQ ultrasound from 7/30 revealed a gallstone and hepatomegaly. Patient was brought to the ED this afternoon (7/31) given worsening of symptoms. Currently, patient understands questions but is unable to answer verbally due to medical condition. She points to RUQ and epigastric regions of abdomen as painful. Communication is limited but she denies chest pain, shortness of breath, headache, or leg pain. Notably, patient is DNR/DNI, no tube feeds (MOLST), and no blood transfusions (as per Neil Chávez - son and health proxy, and daughter-in-law).  PAST MEDICAL & SURGICAL HISTORY:  Uncomplicated asthma, unspecified asthma severity    DM2 (diabetes mellitus, type 2)    Essential hypertension    Hypothyroidism, unspecified type    Pure hypercholesterolemia    Gastroesophageal reflux disease without esophagitis    Diabetes    Asthma    CAD (coronary artery disease)    HTN (hypertension)    HLD (hyperlipidemia)    Hypothyroid    NSTEMI (non-ST elevated myocardial infarction)    Hypoparathyroidism    No significant past surgical history    History of appendectomy    History of appendectomy    Abnormal findings on cardiac catheterization  Cardiac Cath    FAMILY HISTORY:  Family history of cancer in mother  Family history of heart disease  Family history of MI (myocardial infarction)  Family history of stomach cancer.     Social History:  Social History (marital status, living situation, occupation, and sexual history): Tobacco: former smoker, denies  EtOH: denies  Recreational drug use: denies  Lives with: assisted living facility  but now in [facility]   Ambulates: with walker, with assistance  ADLs: assisted living facility     Tobacco Screening:  · Core Measure Site	Yes  · Has the patient used tobacco in the past 30 days?	Unable to assess due to patient's cognitive impairment    Risk Assessment:    Present on Admission:  Deep Venous Thrombosis	no  Pulmonary Embolus	no     HIV Screening:  · In accordance [place] law, we offer every patient who comes to our ED an HIV test. Would you like to be tested today?	Unable to answer due to medical condition/unresponsive/etc...        Medication list         MEDICATIONS  (STANDING):  ALBUTerol    90 MICROgram(s) HFA Inhaler 2 Puff(s) Inhalation every 6 hours  budesonide 160 MICROgram(s)/formoterol 4.5 MICROgram(s) Inhaler 2 Puff(s) Inhalation two times a day  levothyroxine Injectable 62.5 MICROGram(s) IV Push at bedtime  pantoprazole  Injectable 40 milliGRAM(s) IV Push two times a day  piperacillin/tazobactam IVPB.. 3.375 Gram(s) IV Intermittent every 8 hours    MEDICATIONS  (PRN):  ondansetron Injectable 4 milliGRAM(s) IV Push every 8 hours PRN Nausea and/or Vomiting         Vitals log        ICU Vital Signs Last 24 Hrs  T(C): 36.9 (02 Aug 2022 04:39), Max: 36.9 (02 Aug 2022 04:39)  T(F): 98.4 (02 Aug 2022 04:39), Max: 98.4 (02 Aug 2022 04:39)  HR: 73 (02 Aug 2022 04:39) (72 - 98)  BP: 119/50 (02 Aug 2022 04:39) (84/52 - 152/80)  BP(mean): --  ABP: --  ABP(mean): --  RR: 19 (01 Aug 2022 19:06) (18 - 19)  SpO2: 99% (02 Aug 2022 04:39) (94% - 100%)    O2 Parameters below as of 02 Aug 2022 04:39  Patient On (Oxygen Delivery Method): BiPAP/CPAP    O2 Concentration (%): 40             Input and Output:  I&O's Detail      Lab Data                        9.7    12.35 )-----------( 268      ( 01 Aug 2022 23:55 )             32.7     08-01    141  |  112<H>  |  51<H>  ----------------------------<  377<H>  4.5   |  17<L>  |  2.30<H>    Ca    8.4<L>      01 Aug 2022 23:55    TPro  6.6  /  Alb  2.9<L>  /  TBili  0.6  /  DBili  x   /  AST  34  /  ALT  20  /  AlkPhos  87  08-01    ABG - ( 01 Aug 2022 23:46 )  pH, Arterial: 7.31  pH, Blood: x     /  pCO2: 26    /  pO2: 156   / HCO3: 13    / Base Excess: -13.2 /  SaO2: 100.0                   Review of Systems	  abd pain  resp distress      Objective     Physical Examination  poorly responsive  on BIPAP  heart s1s2  lung dec BS  abd soft  head nc        Pertinent Lab findings & Imaging      Schwarz:  NO   Adequate UO     I&O's Detail           Discussed with:     Cultures:	        Radiology      ACC: [id number] EXAM:  CT ABDOMEN AND PELVIS                          PROCEDURE DATE:  07/31/2022          INTERPRETATION:  CLINICAL INFORMATION: Abdominal pain.    COMPARISON: Right upper quadrant ultrasound 7/31/2022 and CT scan abdomen   pelvis for/7/2017.    CONTRAST/COMPLICATIONS:  IV Contrast: NONE  Oral Contrast: NONE  Complications: None reported at time of study completion    PROCEDURE:  CT of the Abdomen and Pelvis was performed.  Sagittal and coronal reformats were performed.    FINDINGS:    LOWER CHEST: Within normal limits.    The evaluation of the solid organ parenchyma is limited without   intravenous contrast.    LIVER: Elongated right hepatic lobe.  BILE DUCTS: Common bile duct measures up to 9 mm.  GALLBLADDER: Distended gallbladder with large gallstone.  No gallbladder wall thickening noted.  SPLEEN: Within normal limits.  PANCREAS: Atrophic.  ADRENALS: Within normal limits.  KIDNEYS/URETERS: Within normal limits.    BLADDER: Within normal limits.  REPRODUCTIVE ORGANS: Calcified fibroid uterus.    BOWEL: Large air/fluid-filled periampullary and transverse portion   duodenal diverticulum.  Sigmoid diverticulosis, without CT evidence of diverticulitis.  Minimally distended fluid-filled loops of small bowel.  No bowel obstruction.  PERITONEUM: No ascites.    VESSELS: Atherosclerotic changes.  RETROPERITONEUM/LYMPH NODES: No lymphadenopathy.    ABDOMINAL WALL:  Rectus diastases with small fat-containing umbilical hernia.    BONES:  Degenerative changes.  Mild, age indeterminate compression deformity superior endplate L3   vertebral body.    IMPRESSION:    Cholelithiasis with distended gallbladder.    Other findings as discussed above.    --- End of Report ---            SACHI NICK MD; Attending Radiologist  This document has been electronically signed. Aug  1 2022  9:47AM

## 2022-08-02 NOTE — DIETITIAN INITIAL EVALUATION ADULT - ETIOLOGY
related to wound healing  related to decreased ability to consume sufficient energy related to inadequate energy intake in setting of multiple hospitalizations

## 2022-08-02 NOTE — DIETITIAN INITIAL EVALUATION ADULT - NUTRITIONGOAL OUTCOME2
patient to meet estimated needs as diet initiated  patient to meet micronutrient and protein needs as diet initiated

## 2022-08-02 NOTE — CONSULT NOTE ADULT - ASSESSMENT
comfort care  d/c to inpatient hospice    SOB   dilaudid prn  NRBM    DNR/DNI  PPS 20%  hours-days    Appreciate consult. Discussed with the primary team.    Jesse Pena MD, [facility]

## 2022-08-02 NOTE — DISCHARGE NOTE NURSING/CASE MANAGEMENT/SOCIAL WORK - NSDCPEFALRISK_GEN_ALL_CORE
For information on Fall & Injury Prevention, visit: [email or web address] OR  [email or web address] OR  https://www.cdc.gov/steadi/patient.html

## 2022-09-02 NOTE — PATIENT PROFILE ADULT - INDICATE TYPE
Diabetes/ Glycemic Control Plan of Care  Recommendations:   Recommend to discontinue 20 units Lantus at this time. Continue corrective Humalog -   If BG elevates >200 mg/dl this evening, recommend to resume basal insulin at a reduced rate - suggest 8-10 units Lantus q 24 hours     Assessment: Patient with two episodes of hypoglycemia in the last 24 hours. Lantus was increased yesterday to 20 units. Patient was extubated and feedings discontinued which likely was a contributing factor to the low blood sugars. Recent Glucose Results:   Lab Results   Component Value Date/Time    GLU 64 (L) 09/02/2022 04:51 AM     (H) 09/01/2022 01:38 PM    GLUCPOC 91 09/02/2022 06:59 AM    GLUCPOC 79 09/02/2022 06:18 AM    GLUCPOC 65 (L) 09/02/2022 05:25 AM         BG within target range (non-ICU: <180; -180):  [] Yes    [x] No   Current insulin orders: 20 units Lantus, corrective Humalog  Total Daily Dose previous 24 hours = 23 units     Plan/Goals:   Blood glucose will be within target of 70 - 180 mg/dl within 72 hours  Reinforce dietary and medication compliance at home.           Education:  [] Refer to Diabetes Education Record                       [x] Education not indicated at this time     Ene Carranza RD  Glycemic Control Team  [phone]    Monday-Friday   9 am - 3 pm Do Not Resuscitate (DNR)/Medical Orders for Life-Sustaining Treatment (MOLST)/Health Care Proxy (HCP)

## 2022-10-15 NOTE — PROGRESS NOTE ADULT - SUBJECTIVE AND OBJECTIVE BOX
Patient is a 87y old  Female who presents with a chief complaint of COPD Exacerbation (29 Jun 2022 09:11)      INTERVAL HPI/OVERNIGHT EVENTS: Pt seen and examined at bedside. admits mild heavy breathing.     MEDICATIONS  (STANDING):  ALBUTerol    0.083% 2.5 milliGRAM(s) Nebulizer every 8 hours  artificial tears (preservative free) Ophthalmic Solution 1 Drop(s) Both EYES two times a day  aspirin  chewable 81 milliGRAM(s) Oral daily  atorvastatin 80 milliGRAM(s) Oral at bedtime  cholecalciferol 2000 Unit(s) Oral daily  clopidogrel Tablet 75 milliGRAM(s) Oral daily  dextrose 5%. 1000 milliLiter(s) (100 mL/Hr) IV Continuous <Continuous>  dextrose 5%. 1000 milliLiter(s) (50 mL/Hr) IV Continuous <Continuous>  dextrose 50% Injectable 25 Gram(s) IV Push once  dextrose 50% Injectable 12.5 Gram(s) IV Push once  dextrose 50% Injectable 25 Gram(s) IV Push once  dextrose 50% Injectable 25 Gram(s) IV Push once  escitalopram 10 milliGRAM(s) Oral daily  gabapentin 100 milliGRAM(s) Oral three times a day  glucagon  Injectable 1 milliGRAM(s) IntraMuscular once  glucagon  Injectable 1 milliGRAM(s) IntraMuscular once  heparin   Injectable 5000 Unit(s) SubCutaneous every 12 hours  insulin glargine Injectable (LANTUS) 15 Unit(s) SubCutaneous every morning  insulin lispro (ADMELOG) corrective regimen sliding scale   SubCutaneous three times a day before meals  insulin lispro (ADMELOG) corrective regimen sliding scale   SubCutaneous at bedtime  insulin lispro Injectable (ADMELOG) 8 Unit(s) SubCutaneous three times a day before meals  isosorbide   mononitrate ER Tablet (IMDUR) 30 milliGRAM(s) Oral daily  levothyroxine 125 MICROGram(s) Oral daily  metoprolol succinate ER 50 milliGRAM(s) Oral daily    MEDICATIONS  (PRN):  acetaminophen     Tablet .. 650 milliGRAM(s) Oral every 6 hours PRN Mild Pain (1 - 3)  ALBUTerol    0.083% 2.5 milliGRAM(s) Nebulizer every 6 hours PRN Shortness of Breath and/or Wheezing  dextrose Oral Gel 15 Gram(s) Oral once PRN Blood Glucose LESS THAN 70 milliGRAM(s)/deciliter  melatonin 3 milliGRAM(s) Oral at bedtime PRN Insomnia  simethicone 80 milliGRAM(s) Chew every 8 hours PRN Gas      Allergies    doxycycline (Unknown)  iodine (Hives)  iodine containing compounds (Unknown)  shellfish (Anaphylaxis)  shellfish (Swelling; Short breath)    Intolerances        REVIEW OF SYSTEMS:  CONSTITUTIONAL: No fever or chills  HEENT:  No headache, no sore throat  RESPIRATORY: see hpi  CARDIOVASCULAR: No chest pain, palpitations, or leg swelling  GASTROINTESTINAL: No abd pain, nausea, vomiting, or diarrhea  GENITOURINARY: No dysuria, frequency, or hematuria  NEUROLOGICAL: no focal weakness or dizziness  MUSCULOSKELETAL: no myalgias     Vital Signs Last 24 Hrs  T(C): 37.1 (29 Jun 2022 13:45), Max: 37.1 (28 Jun 2022 20:11)  T(F): 98.8 (29 Jun 2022 13:45), Max: 98.8 (29 Jun 2022 13:45)  HR: 81 (29 Jun 2022 13:45) (60 - 81)  BP: 106/71 (29 Jun 2022 13:45) (106/71 - 154/73)  BP(mean): --  RR: 17 (29 Jun 2022 13:45) (17 - 18)  SpO2: 94% (29 Jun 2022 13:45) (92% - 96%)    PHYSICAL EXAM:  GENERAL: elderly F in NAD  HEENT:  NC/AT, EOMI, moist mucous membranes  CHEST/LUNG: CTA b/l, no rales, wheezes, or rhonchi  HEART:  RRR, S1, S2  ABDOMEN:  BS+, soft, nontender, nondistended  EXTREMITIES: no edema, cyanosis, or calf tenderness  NERVOUS SYSTEM: AA&Ox3    LABS:                        12.4   13.84 )-----------( 202      ( 29 Jun 2022 08:00 )             39.3     CBC Full  -  ( 29 Jun 2022 08:00 )  WBC Count : 13.84 K/uL  Hemoglobin : 12.4 g/dL  Hematocrit : 39.3 %  Platelet Count - Automated : 202 K/uL  Mean Cell Volume : 88.9 fl  Mean Cell Hemoglobin : 28.1 pg  Mean Cell Hemoglobin Concentration : 31.6 gm/dL  Auto Neutrophil # : x  Auto Lymphocyte # : x  Auto Monocyte # : x  Auto Eosinophil # : x  Auto Basophil # : x  Auto Neutrophil % : x  Auto Lymphocyte % : x  Auto Monocyte % : x  Auto Eosinophil % : x  Auto Basophil % : x    29 Jun 2022 08:00    137    |  106    |  52     ----------------------------<  149    4.8     |  22     |  1.50     Ca    9.0        29 Jun 2022 08:00          CAPILLARY BLOOD GLUCOSE      POCT Blood Glucose.: 168 mg/dL (29 Jun 2022 11:39)  POCT Blood Glucose.: 147 mg/dL (29 Jun 2022 07:39)  POCT Blood Glucose.: 102 mg/dL (28 Jun 2022 21:52)  POCT Blood Glucose.: 191 mg/dL (28 Jun 2022 17:05)          RADIOLOGY & ADDITIONAL TESTS:    Personally reviewed.     Consultant(s) Notes Reviewed:  [x] YES  [ ] NO    Care Discussed with [x] Consultants  [x] Patient  [ ] Family  [ ]      [ ] Other; RN  DVT ppx   Primary Defect Length In Cm (Final Defect Size - Required For Flaps/Grafts): 2.5

## 2022-11-10 NOTE — H&P ADULT - PROBLEM SELECTOR PLAN 8
no chest pain and no edema. VTE ppx: Heparin 5000 subq q12h    IMPROVE VTE Individual Risk Assessment          RISK                                                          Points  [  ] Previous VTE                                                3  [  ] Thrombophilia                                             2  [  ] Lower limb paralysis                                   2        (unable to hold up >15 seconds)    [  ] Current Cancer                                             2         (within 6 months)  [  ] Immobilization > 24 hrs                              1  [  ] ICU/CCU stay > 24 hours                             1  [ x ] Age > 60                                                         1    IMPROVE VTE Score: 1

## 2022-12-08 NOTE — ED ADULT TRIAGE NOTE - CCCP TRG CHIEF CMPLNT
chest pain
Albuterol (Eqv-Proventil HFA) 90 mcg/inh inhalation aerosol: 2 puff(s) inhaled 2 times a day, As Needed - for shortness of breath and/or wheezing  Aspirin Enteric Coated 81 mg oral delayed release tablet: 1 tab(s) orally once a day  famotidine 20 mg oral tablet: 1 tab(s) orally once a day  Fleet Enema 19 g-7 g rectal enema: 133 milliliter(s) rectal once, As Needed - for constipation if no relief from MOM or Dulcolax    gabapentin 100 mg oral capsule: 1 cap(s) orally 3 times a day  HumaLOG 100 units/mL subcutaneous solution: subcutaneous 3 times a day (before meals) per sliding scale:  151-200 = 1 units  201-250 = 2 units  251-300 = 3 units  301-350 = 4 units  351-400 = 6 units  &gt;400 = call MD    insulin glargine-yfgn 100 units/mL subcutaneous solution: 7 unit(s) subcutaneous once a day (in the morning)  levothyroxine 75 mcg (0.075 mg) oral tablet: 1 tab(s) orally once a day  methocarbamol 500 mg oral tablet: 1 tab(s) orally every 6 hours  midodrine 5 mg oral tablet: 1 tab(s) orally 3 times a day  rosuvastatin 10 mg oral tablet: 1 tab(s) orally once a day (in the morning)  rosuvastatin 20 mg oral tablet: 1 tab(s) orally once a day (in the evening)  sertraline 100 mg oral tablet: 1 tab(s) orally once a day  Symbicort 160 mcg-4.5 mcg/inh inhalation aerosol: 2 puff(s) inhaled 2 times a day  tiotropium 2.5 mcg/inh inhalation aerosol: 2 puff(s) inhaled once a day

## 2023-03-09 NOTE — ED PROVIDER NOTE - NSTIMEPROVIDERCAREINITIATE_GEN_ER
Balaji Spears was seen and treated in our emergency department on 3/9/2023. He may return to work on 03/10/2023. If you have any questions or concerns, please don't hesitate to call.       Olivia Gil MD 09-Mar-2022 05:40

## 2023-05-30 NOTE — H&P CARDIOLOGY - PATIENT REFERRED TO
.                                     [facility] [place]     PARKING:     Pavilion: Parking is available I Parking Garage D on [address] and [facility]. Pedestrian Walkway bridge is located on the 2nd floor of Parking Garage D.  is also available at main entrance of Outpatient Pavilion on [facility].     Hospital:. Please arrive at Entrance F on [address] [facility] [address]. Parking access is located across the street from Entrance F in Parking lot E. If closer access is needed for drop off, your  can drop you off at the door by taking the ramp on the right. Please be aware there is no parking at the drop off area. Check in at the desk at Entrance F and you will be given instructions from there.    GENERAL INSTRUCTIONS:    • TWO family members/friend who is over the age of 18 may accompany you. A responsible person MUST accompany you home and stay with you the first 24 hours after surgery. Your surgery will be cancelled if these arrangements have not been made.   • If you become ill prior to surgery (I.e. fever, vomiting), please notify your surgeon immediately.   • Children under 12 years old MUST have a parent with them at all times.   • ON DAY OF SURGERY: Do not wear contact lenses, make up, nail polish or jewelry. Leave all valuables at home except what you will need or are instructed to bring.   • For your convenience, [facility] is available to fill medications. Please bring a form of payments accepted at [facility]' locations.       BRING WITH YOU ON DAY OF SURGERY:  1. Insurance Card and referral (if required), 's license or photo ID  2. All your medications in their original pharmacy bottles  3. Advance directive (living will, Healthcare Power of )  4. All information on your pacemaker or AICD, if appropriate  5. Any other forms, x-rays or films the doctor may have given you  6. Any medical devices (I.e. crutches, braces, sling)  7. Loose fitting clothing,  slippers, walking shoes if applicable  8. For comfort measures, you may bring headphones/music device/magazines that can be given to family when you go into the operating room  9. Favorite toy of blanket for children. Formula for feeding after surgery or favorite sippy cup.   10. Please be aware that there are 2 different locations. Hospital patients please arrive at Entrance F on [address] [place] [address] & [facility] and check in with .         Pavilion patients please check in with  Desk right off the 2nd floor Pedestrian walkway entrance.     NPO/Eating/Drinking Restrictions: Please note: If these guidelines are not followed, your procedure will be delayed and possibly cancelled.  For cases starting on or after 3 pm, please contact the anesthesia department regarding NPO status.    Do NOT eat or consume any food or dairy after midnight.  This includes candy or gum.    Acceptable clear liquids can be given up until 1 hour prior to arrival time given.  **Acceptable clear liquids: water, high-carbohydrate drink (Ensure Clear Pre-Surgical Drink, Propel; or Clear non-colored Gatorade), apple juice without fiber (non-organic), Pedialyte, 7-up/Sprite, black coffee or tea without milk products or lemon  Unacceptable clear liquids: Coffee or tea with milk products, orange juice, alcohol, soup broth    Do NOT smoke, drink alcohol, or use recreational drugs prior to your surgery.        Please call the location of surgery with questions: Pavilion (up to  6 PM) [phone]; Hospital (up to 7 PM) [phone].   After hours for both locations: [phone]    This information has been reviewed with the patient on 05/30/23 2:14 PM.      Cardiac catherization with possible intervention

## 2023-06-17 NOTE — REVIEW OF SYSTEMS
36.9 [Eyeglasses] : currently wearing eyeglasses [Shortness Of Breath] : shortness of breath [Dyspnea on exertion] : dyspnea during exertion [Lower Ext Edema] : lower extremity edema [Wheezing] : wheezing [Joint Pain] : joint pain [Negative] : Heme/Lymph [Earache] : earache [Cough] : cough [Chest  Pressure] : no chest pressure [Chest Pain] : no chest pain [Leg Claudication] : no intermittent leg claudication [Palpitations] : no palpitations

## 2023-08-02 NOTE — PATIENT PROFILE ADULT - NSPROIMPLANTSMEDDEV_GEN_A_NUR
Received staff message from PA in Dr Acevedo office with update on recent ECHO    Called patient and offered him earlier office visit to discuss ICD as Echo unchanged, LVEF remains declined at 26%    Current OV scheduled for 8/29/23    Patient reports he has transportation challenges and he will keep his appt as it is on 8/29/23      
None

## 2023-08-06 NOTE — ED ADULT NURSE NOTE - NS ED NURSE LEVEL OF CONSCIOUSNESS ORIENTATION
Pt sent from urgent care for hypotension and lightheadedness starting yesterday, Phx HTN, Lung CA (last treatment 7/22. Oriented - self; Oriented - place; Oriented - time

## 2023-08-09 NOTE — DISCHARGE NOTE PROVIDER - NSDCDCMDCOMP_GEN_ALL_CORE

## 2023-08-24 NOTE — PATIENT PROFILE ADULT. - PRESSURE ULCER(S)
Post Op Note    Surgery: gastric bypass surgery  Date: 5/1/23  Initial weight: 226  Last weight: 203  Current weight: 196    Constipation: none  Reflux: none  Vomiting: occasionally has nausea    Diet:  Breakfast:  protein pack or shake; rice cakes  Lunch: tuna; salad  Dinner: tuna, napa cabbage    Exercise:  Going before work in morning and pool    MVI: florentin mvi, calc + D, vit e    Vitals:    08/24/23 0822   BP: 124/80   Pulse: (!) 56   Resp: 16   Temp: 98 °F (36.7 °C)       Body comp:  Fat Percent:  42.6 %  Fat Mass:  83.4 lb  FFM:  112.6 lb  TBW: 80.6 lb  TBW %:  41.1 %  BMR: 1567 kcal    PE:  NAD  RRR  Soft/nt/nd    Labs: none    A/P: s/p gastric bypass     Counseling for patient:    Diet: continue low carb dieting   Exercise: exercise routine at least 3-4 times per week for 20 min  Vitamins: check labs- continue routine  Co morbidities:     1. Morbid obesity        2. BMI 36.0-36.9,adult        3. ADELIA (obstructive sleep apnea)            -All above: Evaluated, monitored, and treated with diet and exercise program.          
no

## 2023-09-21 NOTE — ED ADULT NURSE REASSESSMENT NOTE - BOWEL SOUNDS RLQ
4 Eyes Skin Assessment     NAME:  Gunnar Dia  YOB: 1998  MEDICAL RECORD NUMBER:  [id number]    The patient is being assessed for  Admission    I agree that at least one RN has performed a thorough Head to Toe Skin Assessment on the patient. ALL assessment sites listed below have been assessed. Areas assessed by both nurses:    Head, Face, Ears, Shoulders, Back, Chest, Arms, Elbows, Hands, Sacrum. Buttock, Coccyx, Ischium, Legs. Feet and Heels, and Under Medical Devices         Does the Patient have a Wound?  No noted wound(s)       Michael Prevention initiated by RN: No  Wound Care Orders initiated by RN: No    Pressure Injury (Stage 3,4, Unstageable, DTI, NWPT, and Complex wounds) if present, place Wound referral order by RN under : No    New Ostomies, if present place, Ostomy referral order under : No     Nurse 1 eSignature: Electronically signed by Jose L Calhoun RN on 9/21/23 at 5:53 PM EDT    **SHARE this note so that the co-signing nurse can place an eSignature**    Nurse 2 eSignature: Electronically signed by Rena Rodriguez RN on 9/21/23 at 5:55 PM EDT present

## 2023-10-10 NOTE — ED ADULT NURSE NOTE - CAS DISCH ACCOMP BY
Better control overall. Off of prednisone. Followed closely by Dr Wayne  
Lab Results   Component Value Date    HGBA1C 5.9 (H) 05/02/2023     Fair control of HTN, good DMII control. Cont POC. CGM and supplies ordered with assistance of staff.   
Repeat U/S. She does not want to repeat biopsy due to painful procedure.  
Self

## 2024-01-11 NOTE — ED PROVIDER NOTE - QUALITY
We had a good conversation today regarding symptoms of premenstrual dysphoric disorder and I do think treatment is indicated.  We discussed treatment options and ultimately the patient decided to adjust her SSRI medication to see if that would benefit and to take it in a continuous form.  I recommended increasing her Lexapro initially to 15 mg for a week and then increasing up to 20 mg daily.  I asked her to follow-up in 3 months for reevaluation.  Consider an oral contraceptive pill if she either does not tolerate or does not respond well to the adjustment in the dose of Lexapro to treat her symptoms.  
chest pain

## 2024-02-22 NOTE — PROGRESS NOTE ADULT - ATTENDING COMMENTS
[Arrhythmia/ECG Abnorrmalities] : arrhythmia/ECG abnormalities [Coronary Artery Disease] : coronary artery disease [Other: _____] : [unfilled] [FreeTextEntry1] : February 2024 - Patient returns today for follow-up in her usual state of health. She is a poor  of her symptoms or condition. Her daughter and granddaughter report that her activity level has decreased. She has less energy. But she does not complain of anything except constipation. Constipation that has been relieved by prune juice. She sleeps well at night.  87F with PMHX COPD, CAD/MI, HTN, HLD, IDDM presents from [facility] to [facility] ER c/o SOB/RABAGO admitted for Acute Hypoxic Respiratory Failure 2/2 Acute COPD Exacerbation on NIPPV BIPAP.    SOB/RABAGO 2/2 Acute Hypoxic Resp Failure 2/2 Acute COPD Exacerbation  -Cont NIPPV BIPAP   -Monitor Pulse Ox Goal SPO2 88-96%  -Continue Solumedrol 40mg IV q8  -Duonebs q6 ATC  -Repeat Labs  -Trend WBC  -CXR Hyperinflated/Flat Diaphragms no obvious infiltrate   -Hold ABX for now  -Pulm following

## 2024-04-08 NOTE — DISCHARGE NOTE ADULT - HOME CARE AGENCY
[facility]- visiting nurse, will evaluate for HHA. [phone]. They will call you to set up 1st visit, requested for 4/12. no anemia, no easy bruising, no jaundice, no swollen lymph nodes.

## 2024-04-18 NOTE — H&P ADULT - PROBLEM SELECTOR PLAN 4
Voicemail was left regarding sleep appointment location change.  Call back number for central scheduling left.    Chronic, stable on admission  - Continue home Metoprolol Succinate 50 mg qd with hold parameters  - Monitor routine hemodynamics

## 2024-11-07 NOTE — PHARMACY COMMUNICATION NOTE - COMMENTS
Teaching addendum:    This patient was seen and evaluated with SANDOR Cerda.  I agree with their assessment and plan as detailed in her note.    Pt is a 86yo M w/ PMH HTN, afib(on Eliquis), HLD, and cardiomyopathy p/w headaches x past few weeks. Pt states of a diffuse headache that occurs only if he doesn't take tylenol. He reports as long as he takes tylenol twice a day he doesn't get the headache, but if he misses a dose then he develops a headache. He reports of neck pain/soreness. Pt was seen by his PMD today who noted the pt was complaining of a headache and visual hallucinations x past 2-3 weeks. He also reports of BLE edema x past few months. Pt denies any nausea, vomiting, abdominal pain, dizziness, vision changes, recent travel, rash, chest pain, difficulty breathing, weakness, numbness, tingling, fever, or trauma.  He reports he had a new blood pressure medication added by his PMD today which she has not taken yet.  Per review of EMR pt had an MRI on 10/11/24 showing Small vessel ischemic change, remote lacunar infarcts, sub centimeter pineal cyst and mild BL mastoid air cell opacification. VS hypertensive, afebrile. PE notable for no FND, soft, FROM of neck w/ no nuchal rigidity, irreg irreg heart rhythm, and clear lungs. Pt's symptoms concerning for hypertensive emergency/urgency vs new onset chf vs unlikely infxn given lack of fever/ams vs acute intracranial pathology. Will obtain EKG, xr, and labs. Will monitor and reassess.      EKG reviewed afib @72bpm, LAD, PVCs.     Labs reviewed CMP K3.3(oral repletion ordered). Trop elevated, pt given asa and started on heparin gtt.    CT head reviewed neg acute path.    Pt's rpt SBP 170s w/o any meds given will hold off on labetalol push at this time.      Disposition: obs  Diagnosis: NSTEMI     Stephanie Starr MD  11/07/24 2015     per renal function recommended to dr aguirre to change to levaquin 250mg q24 cbarto 256pm 3/31/19

## 2024-11-13 NOTE — PATIENT PROFILE ADULT - LIVES WITH
Spoke with Mom (Ysabel) and offered more resources regarding IOP for patient.   Mom stated that she would like a referral sent to [facility] for the IOP program there.  Mom agreed to sign TIAGO if needed to send Documents to [facility].  Mom's email is [email or web address]    Writer entered a referral through the [facility] website for IOP for patient.       Marcia Grossman  Transition Clinic Coordinator  11/13/24 2:09 PM       
alone

## 2024-11-26 NOTE — H&P ADULT. - PROBLEM/PLAN-6
Returned call to pt; explained to pt that she may be having recurrent UTI's could be just from catheterizing and instillation of the BCG.  States she has no symptoms but she did call Dr. Willoughby's office where she received the BCG, and they put her on 7 days of Cipro.  Encouraged pt to drink lots of water and keep pending appt with Dr. Lin.  
DISPLAY PLAN FREE TEXT

## 2025-01-10 NOTE — ED PROVIDER NOTE - INTERPRETATION
CHIEF COMPLAINT:  Chief Complaint   Patient presents with    Derm Problem         HPI  Kelsey Lozada is a 35 year old female who presents to urgent care today for a rash.  Complains of pruritic rash that encompasses around her neck in the distribution of a short collar.  She has been trying over-the-counter CeraVe a moisturizer that seems to make the rash burning more itchy.  Has never had a rash like this before.  Denies any new clothing, new laundry detergents or soaps.  Otherwise feels well.  Denies any fevers, chills, cough, congestion.  Denies any general fatigue or weakness.    ALLERGIES:  ALLERGIES:   Allergen Reactions    Hydrocodone Nausea & Vomiting       CURRENT MEDICATIONS:  No current facility-administered medications for this encounter.     Current Outpatient Medications   Medication Sig Dispense Refill    triamcinolone (ARISTOCORT) 0.1 % cream Apply topically 2 times daily. 30 g 0    diphenhydrAMINE (Benadryl Allergy) 25 MG tablet Take 1 tablet by mouth 3 times daily as needed for Itching. 30 tablet 0    diclofenac (VOLTAREN) 75 MG EC tablet Take 1 tablet by mouth 2 times daily. 60 tablet 1    levothyroxine 200 MCG tablet TAKE 200 MCG (1 TABLET) 6 DAYS PER WEEK. TAKE 100 MCG (1/2 TABLET) ONCE WEEKLY, ON THE SAME DAY. 84 tablet 1    mupirocin (BACTROBAN) 2 % ointment Apply topically 3 times daily. 1 g 3    Tirzepatide-Weight Management (Zepbound) 2.5 MG/0.5ML Solution Auto-injector Inject 2.5 mg into the skin every 7 days. Indications: OBESITY 2 mL 0    Tirzepatide-Weight Management (Zepbound) 5 MG/0.5ML Solution Auto-injector Inject 5 mg into the skin every 7 days. Indications: OBESITY Start after finishing 2.5 mg prescription. 2 mL 3    olmesartan (BENICAR) 20 MG tablet Take 1.5 tablets by mouth daily. 120 tablet 3    cholecalciferol (cholecalciferol) 25 mcg (1,000 units) tablet Take 5 tablets by mouth daily. Start once high dose rx is completed. 90 tablet 0    calcium carbonate (Tums) 500 MG chewable  tablet Chew 1 tablet by mouth daily. 30 tablet prn       PAST MEDICAL HISTORY:  Past Medical History:   Diagnosis Date    Abnormal Pap smear of cervix     11 LSIL    Depression     Goiter     Group B streptococcal infection during pregnancy (CMD)     18, 5/28/15    History of anemia     Hypothyroidism     Malignant neoplasm  (CMD)     thyroid cancer    PONV (postoperative nausea and vomiting)     Postpartum depression        SURGICAL HISTORY:  Past Surgical History:   Procedure Laterality Date    Anes arthroscopy knee surg; w/meniscus r  2012     section, classic       section, low transverse  2015     section, low transverse  2019    Repeat  done by Dr. Holguin    Colposcopy  2011    Mild dysplasia    Cyst removal      removal of eye cyst    Hb etonogestrel (nexplanon) implant Left 2019    LOT#[id number] EXP#2022  REMOVED 2024 Dr. Holguin    Hb etonogestrel (nexplanon) implant Left 2024    Dr. Holguin Remove  LOT #[id number]    Removal nodes, neck,cerv cmplt Left 2022    Dr. Hill    Thyroid surgery      Thyroidectomy      Thyroidectomy,[facility] neck surg  2021    Dr. Hill    Tonsillectomy and adenoidectomy  2012    Vaginal delivery      x2    Vaginal delivery      x2       SOCIAL HISTORY:  Social History     Tobacco Use    Smoking status: Never     Passive exposure: Never    Smokeless tobacco: Never   Vaping Use    Vaping status: never used   Substance Use Topics    Alcohol use: Not Currently    Drug use: No       REVIEW OF SYSTEMS:  Negative not pertinent non-contributory for all remaining 13 systems other than stated above.       PHYSICAL EXAM:  ED Triage Vitals [01/10/25 0914]   BP (!) 142/93   Heart Rate 69   Resp 18   Temp 98.5 °F (36.9 °C)   SpO2 96 %     Vitals reviewed per nursing notes.    Physical Exam  Constitutional:       Appearance: Normal appearance.   Pulmonary:      Effort: Pulmonary effort is  normal. No respiratory distress.   Skin:     General: Skin is warm and dry.      Capillary Refill: Capillary refill takes less than 2 seconds.      Comments: Erythematous sandpaperlike rash in the distribution of his shirt collar wrapping around her neck circumferentially.  Excoriation seen posteriorly.  Some degree of acanthosis nigricans present as well.   Neurological:      General: No focal deficit present.      Mental Status: She is alert.      Sensory: No sensory deficit.   Psychiatric:         Mood and Affect: Mood normal.         Behavior: Behavior normal.                Lab Results  No results found for this visit on 01/10/25.    Radiology  No orders to display       Last Vital Signs  Vitals:    01/10/25 0914   BP: (!) 142/93   BP Location: LUE - Left upper extremity   Patient Position: Sitting/High-Dougherty's   Pulse: 69   Resp: 18   Temp: 98.5 °F (36.9 °C)   TempSrc: Oral   SpO2: 96%   Weight: 128.1 kg (282 lb 6.6 oz)   Height: 5' 5\" (1.651 m)   LMP: 01/04/2025       Treatment in ED:  ED Medication Orders (From admission, onward)      None                     Diagnosis:    1. Rash of neck         MDM:      Kelsey Lozada is a 35 year old female who presents to urgent care today for a rash.  See full history and physical exam above.  Patient is afebrile.  Hypertensive on arrival 142/93.  Otherwise stable vital signs.  Appears no acute distress.  Rash circumferentially around her neck.  Started 3 to 4 days ago.  Possibly contact dermatitis from some sort of clothing although patient denies any changes in detergents or clothing.  Does not appear fungal in nature.  Will try a topical corticosteroid for 10 days to see if this provides her some relief.  Prescribed Benadryl as well to use as needed for itching.  Recommended follow-up with primary doctor if rash is persisting.  Reasons to return to emergency department/urgent care were discussed.  Patient understands agreeable plan.  All questions were  answered.        Disposition:  Discharge  1/10/2025  9:29 AM    Kelsey Lozada discharge to home/self care.        Follow Up:  Pily Manning MD  [address] [facility] [id number] [phone]               Please understand this is a provisional diagnosis that can and may change. The diagnosis that you are discharged with is based on the symptoms you described and the diagnostic information available today. If any new symptoms occur or worsen, you should seek immediate re-evaluation at the nearest ED. If any symptoms persist, please follow up for reassessment.    Treatment:     Summary of your Discharge Medications        Take these Medications        Details   diphenhydrAMINE 25 MG tablet  Commonly known as: Benadryl Allergy   Take 1 tablet by mouth 3 times daily as needed for Itching.     triamcinolone 0.1 % cream  Commonly known as: ARISTOCORT   Apply topically 2 times daily.               Segun Barnes PA  01/10/25 0946     sinus tachycardia

## 2025-02-02 NOTE — PATIENT PROFILE ADULT - NSPROPTRIGHTREPPHONE_GEN_A_NUR
Patient given discharge instructions and verbalizes understanding. Pt aware of prescriptions sent to pharmacy. IV removed. VS stable. Pt left ED ambulatory with all belongings.      Katherin Mroan RN  02/02/25 0109    
[phone]

## 2025-02-20 NOTE — ED ADULT NURSE NOTE - NS PRO AD PATIENT TYPE
Onset: ongoing, but worse yesterday     Location / description: Pt reporting back pain intense. Pt unable to stand for a long time due to the pain, hard to walk. Pain is so intense that the pressure goes to thighs. Pt states this is the worst pain he has ever experienced.    Went to therapy Tuesday for back, woke up yesterday and couldn't walk.  Pressure is intense on both of legs in the back on thigh    Pain/pressure in thighs is new, no swelling   Pt can stand up straight, but having a hard time walking   Noticed back of thighs were warm yesterday, not currently. No redness.    Precipitating Factors: Hx Chronic bilateral low back pain without sciatica     Pain Scale (1-10), 10 highest: 10/10    What  improves / worsens symptoms: Movement makes pain worse   Laying still helps with pain     Symptom specific medications: Has not tried an OTC medications     Recent visits (last 3-4 weeks) for same reason or recent surgery:  Has not been seen for above NEW symptoms     PLAN:  Provider's office  See in provider's office within 4 hours    Offered pt an appt for today with PCP office, unsure if he will be able to get transportation to office. Encouraged pt to be see [facility] ED when able to get transportation or to call 911 to be seen in ED.   Encouraged to call back with any additional questions, concerns, or change in symptoms, patient/caller verbalized understanding.    Patient/Caller agrees to follow recommendations.  Reason for Disposition   SEVERE pain (e.g., excruciating, unable to do any normal activities)    Protocols used: Leg Pain-A-[place]    
Regarding: M 64 ([place])  back pain intense 10/10 currently  unable to stand for a long time due to the pain  ----- Message from Liz RODRIGUEZ sent at 2/20/2025  9:09 AM CST -----  Patient Name: Renny Faye    Specialist or PCP Name: Last office visit with Shital Hutchinson - Dr. Pollard retired    Symptoms: back pain intense 10/10 currently  unable to stand for a long time due to the pain, hard to walk, - pain is so intense that the pressure goes to thighs  states this is the worst pain he has ever experienced and he has a high pain tolerance     Pregnant (females aged 13-60. If Yes, how long?) : n/a    Call Back # : [phone]      Which State are you currently located in?: [place]    Name of Clinic Site / Acct# : [facility] [[id number]]      Call arrived during: Work Hours    
Thank you-  PLAN:  Provider's office  See in provider's office within 4 hours     Offered pt an appt for today with PCP office, unsure if he will be able to get transportation to office. Encouraged pt to be see [facility] ED when able to get transportation or to call 911 to be seen in ED.   Encouraged to call back with any additional questions, concerns, or change in symptoms, patient/caller verbalized understanding.     Patient/Caller agrees to follow recommendations.  
Health Care Proxy (HCP)/Do Not Resuscitate (DNR)

## 2025-03-05 NOTE — ED ADULT NURSE NOTE - NSFALLRSKHARMRISK_ED_ALL_ED
ANTICOAGULATION  MANAGEMENT    Assessment     Today's INR result of 4.4 is Supratherapeutic (goal INR of 2.5-3.5)        Warfarin taken as previously instructed    No new diet changes affecting INR    No new medication/supplements affecting INR    Continues to tolerate warfarin with no reported s/s of bleeding or thromboembolism     Previous INR was Supratherapeutic    Plan:     Left a detailed message for Eileen regarding INR result and instructed:     Warfarin Dosing Instructions:  hold today then change warfarin dose to 5 mg daily on sun/tue; and 2.5 mg daily rest of week  (10 % change)    Instructed patient to follow up no later than: 7-10 days    Instructed to call the [facility] for any changes, questions or concerns. (#[phone])   ?   Dany Cramer RN    Subjective/Objective:      Eileen Donald, a 63 y.o. female is on warfarin.     Eileen reports:     Home warfarin dose: as updated on anticoagulation calendar per template     Missed doses: No     Medication changes:  No     S/S of bleeding or thromboembolism:  No     New Injury or illness:  No     Changes in diet or alcohol consumption:  No     Upcoming surgery, procedure or cardioversion:  No    Anticoagulation Episode Summary     Current INR goal:   2.5-3.5   TTR:   57.7 % (1 y)   Next INR check:   5/28/2020   INR from last check:   4.40! (5/14/2020)   Weekly max warfarin dose:      Target end date:      INR check location:      Preferred lab:      Send INR reminders to:   [facility]    Indications    Heart valve replaced [Z95.2]           Comments:            Anticoagulation Care Providers     Provider Role Specialty Phone number    Tito Salazar MD Referring Family Medicine [phone]        
DISPLAY PLAN FREE TEXT
no

## 2025-03-19 NOTE — ED ADULT NURSE NOTE - CAS TRG GENERAL NORM CIRC DET
When Should The Patient Follow-Up For Their Next Full-Body Skin Exam?: 6 Months Additional Instructions: Standard Detail Level: Detailed Standard Counseling: I stressed the importance of regular monthly self-examinations. They should examine their entire skin surface. The buttocks, gluteal cleft, genitalia, nailbeds, and bottom of the feet should be examined with a hand held mirror. A significant other should help them if they are comfortable with this. Melanoma In Situ Counseling: I stressed the importance of regular monthly self-examinations. They should examine their entire skin surface. The buttocks, gluteal cleft, genitalia and bottom of the feet should be examined with a hand held mirror. A significant other should help them if they are comfortable with this. Strong peripheral pulses

## 2025-03-24 NOTE — PHYSICAL THERAPY INITIAL EVALUATION ADULT - WORK/LEISURE ACTIVITY, REHAB EVAL
Clinic Care Coordination Contact  [facility]/Voicemail    Clinical Data: Care Coordinator Outreach    Outreach Documentation Number of Outreach Attempt   3/24/2025   1:13 PM 1     BOUCHRA CC attempted to reach patient's spouse (Samantha) on this date to complete initial outreach/enrollment. BOUCHRA CC attempted to reach spouse twice.    Left message on spouse's voicemail with call back information and requested return call.      Plan: Care Coordinator will try to reach patient again in 1-2 business days.    Lashell Rivera [facility]  Social Work Care Coordinator  [facility]  [email or web address]  Office: [phone]  Gender pronouns: she/her       independent

## 2025-04-10 NOTE — PATIENT PROFILE ADULT - NSPROPASSIVESMOKEEXPOSURE_GEN_A_NUR
I have discussed with the Attending the patient's status, as well as the H&P and the fetal status. The Attending agreed with the suggested management.
No

## 2025-05-16 NOTE — ED ADULT NURSE NOTE - NURSING MUSC SHOULDER SYMPTOMS LEFT
5/16/2025      Dear Ciera,    There’s no question about it - preventive care can save lives. Many health problems start out silently without symptoms. Preventive care is often the only way to catch these problems in early stages, when they can be more successfully treated.     Our records show that you are due for the screening(s) listed below. If you have completed these screening(s), please call us so we can update your record.    Screening Pap  Pap Test: Cervical cancer is usually slow growing. It is preventable and it can be cured if found early. The Pap test is the most effective cancer screening test. To assist you in scheduling with a provider, please call [phone].    Your health is important to us. Please feel free to call my office at [phone] or make an appointment to discuss the best screening options for you.     Sincerely,      Nessa Jane MD   [facility] [address] [facility] [address]  Dept: [phone]  Dept Fax: [phone]        
limited movement
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
## 2025-05-22 NOTE — DIETITIAN INITIAL EVALUATION ADULT - ETIOLOGY
3700cc's of clear yellow fluid drained from Rt side of abdomen.   related to uncontrolled DM; [place]

## 2025-06-23 NOTE — ED PROVIDER NOTE - MUSCULOSKELETAL, MLM
Left voicemail on recommendations.   Spine appears normal, No C/T/L spine ttp/Ecchymosis noted, +ttp R posterior/lateral R hip with mild LROM secondary to pain, skin intact, no erythema or swelling noted, FROM R knee/ankle/toes, pulses and sensation intact, NVI

## 2025-07-18 NOTE — H&P ADULT - FAMILY HISTORY
PCP: Paulo Alves MD    Last Visit 5/15/2025   No future appointments.    Requested Prescriptions     Pending Prescriptions Disp Refills    simvastatin (ZOCOR) 20 MG tablet [Pharmacy Med Name: SIMVASTATIN 20 MG TABLET] 90 tablet 0     Sig: TAKE 1 TABLET BY MOUTH EVERY DAY AT NIGHT         Other Comments: Last Refill   04/21/25  
Family history of stomach cancer     Father  Still living? Unknown  Family history of MI (myocardial infarction), Age at diagnosis: Age Unknown